# Patient Record
Sex: FEMALE | Race: WHITE | Employment: OTHER | URBAN - METROPOLITAN AREA
[De-identification: names, ages, dates, MRNs, and addresses within clinical notes are randomized per-mention and may not be internally consistent; named-entity substitution may affect disease eponyms.]

---

## 2020-01-16 ENCOUNTER — HOSPITAL ENCOUNTER (INPATIENT)
Facility: CLINIC | Age: 75
LOS: 2 days | Discharge: HOME | DRG: 306 | End: 2020-01-19
Attending: EMERGENCY MEDICINE | Admitting: INTERNAL MEDICINE
Payer: MEDICARE

## 2020-01-16 ENCOUNTER — APPOINTMENT (OUTPATIENT)
Dept: GENERAL RADIOLOGY | Facility: CLINIC | Age: 75
DRG: 306 | End: 2020-01-16
Attending: EMERGENCY MEDICINE
Payer: MEDICARE

## 2020-01-16 DIAGNOSIS — I50.9 ACUTE ON CHRONIC CONGESTIVE HEART FAILURE, UNSPECIFIED HEART FAILURE TYPE (H): ICD-10-CM

## 2020-01-16 DIAGNOSIS — I10 ESSENTIAL HYPERTENSION: ICD-10-CM

## 2020-01-16 DIAGNOSIS — J96.01 ACUTE RESPIRATORY FAILURE WITH HYPOXIA (H): ICD-10-CM

## 2020-01-16 DIAGNOSIS — Z79.4 TYPE 2 DIABETES MELLITUS WITHOUT COMPLICATION, WITH LONG-TERM CURRENT USE OF INSULIN (H): Primary | ICD-10-CM

## 2020-01-16 DIAGNOSIS — E11.9 TYPE 2 DIABETES MELLITUS WITHOUT COMPLICATION, WITH LONG-TERM CURRENT USE OF INSULIN (H): Primary | ICD-10-CM

## 2020-01-16 LAB
ALBUMIN SERPL-MCNC: 3.9 G/DL (ref 3.4–5)
ALP SERPL-CCNC: 56 U/L (ref 40–150)
ALT SERPL W P-5'-P-CCNC: 36 U/L (ref 0–50)
ANION GAP SERPL CALCULATED.3IONS-SCNC: 5 MMOL/L (ref 3–14)
AST SERPL W P-5'-P-CCNC: 32 U/L (ref 0–45)
BASOPHILS # BLD AUTO: 0 10E9/L (ref 0–0.2)
BASOPHILS NFR BLD AUTO: 0.4 %
BILIRUB SERPL-MCNC: 0.8 MG/DL (ref 0.2–1.3)
BUN SERPL-MCNC: 19 MG/DL (ref 7–30)
CALCIUM SERPL-MCNC: 9.5 MG/DL (ref 8.5–10.1)
CHLORIDE SERPL-SCNC: 104 MMOL/L (ref 94–109)
CO2 BLDCOV-SCNC: 28 MMOL/L (ref 21–28)
CO2 SERPL-SCNC: 27 MMOL/L (ref 20–32)
CREAT SERPL-MCNC: 0.95 MG/DL (ref 0.52–1.04)
DIFFERENTIAL METHOD BLD: NORMAL
EOSINOPHIL # BLD AUTO: 0.1 10E9/L (ref 0–0.7)
EOSINOPHIL NFR BLD AUTO: 1 %
ERYTHROCYTE [DISTWIDTH] IN BLOOD BY AUTOMATED COUNT: 13.1 % (ref 10–15)
FLUAV+FLUBV AG SPEC QL: NEGATIVE
FLUAV+FLUBV AG SPEC QL: NEGATIVE
GFR SERPL CREATININE-BSD FRML MDRD: 59 ML/MIN/{1.73_M2}
GLUCOSE BLDC GLUCOMTR-MCNC: 202 MG/DL (ref 70–99)
GLUCOSE SERPL-MCNC: 204 MG/DL (ref 70–99)
HCT VFR BLD AUTO: 38.3 % (ref 35–47)
HGB BLD-MCNC: 12.3 G/DL (ref 11.7–15.7)
IMM GRANULOCYTES # BLD: 0 10E9/L (ref 0–0.4)
IMM GRANULOCYTES NFR BLD: 0.3 %
LACTATE BLD-SCNC: 1.7 MMOL/L (ref 0.7–2.1)
LYMPHOCYTES # BLD AUTO: 0.9 10E9/L (ref 0.8–5.3)
LYMPHOCYTES NFR BLD AUTO: 11.8 %
MCH RBC QN AUTO: 31.9 PG (ref 26.5–33)
MCHC RBC AUTO-ENTMCNC: 32.1 G/DL (ref 31.5–36.5)
MCV RBC AUTO: 99 FL (ref 78–100)
MONOCYTES # BLD AUTO: 0.5 10E9/L (ref 0–1.3)
MONOCYTES NFR BLD AUTO: 6.6 %
NEUTROPHILS # BLD AUTO: 6.2 10E9/L (ref 1.6–8.3)
NEUTROPHILS NFR BLD AUTO: 79.9 %
NRBC # BLD AUTO: 0 10*3/UL
NRBC BLD AUTO-RTO: 0 /100
NT-PROBNP SERPL-MCNC: 1556 PG/ML (ref 0–900)
PCO2 BLDV: 50 MM HG (ref 40–50)
PH BLDV: 7.36 PH (ref 7.32–7.43)
PLATELET # BLD AUTO: 185 10E9/L (ref 150–450)
PO2 BLDV: 27 MM HG (ref 25–47)
POTASSIUM SERPL-SCNC: 4 MMOL/L (ref 3.4–5.3)
PROT SERPL-MCNC: 7.7 G/DL (ref 6.8–8.8)
RBC # BLD AUTO: 3.86 10E12/L (ref 3.8–5.2)
SAO2 % BLDV FROM PO2: 47 %
SODIUM SERPL-SCNC: 136 MMOL/L (ref 133–144)
SPECIMEN SOURCE: NORMAL
TROPONIN I SERPL-MCNC: 0.02 UG/L (ref 0–0.04)
WBC # BLD AUTO: 7.7 10E9/L (ref 4–11)

## 2020-01-16 PROCEDURE — 83036 HEMOGLOBIN GLYCOSYLATED A1C: CPT | Performed by: EMERGENCY MEDICINE

## 2020-01-16 PROCEDURE — 87804 INFLUENZA ASSAY W/OPTIC: CPT | Performed by: EMERGENCY MEDICINE

## 2020-01-16 PROCEDURE — 00000146 ZZHCL STATISTIC GLUCOSE BY METER IP

## 2020-01-16 PROCEDURE — 94640 AIRWAY INHALATION TREATMENT: CPT

## 2020-01-16 PROCEDURE — 96374 THER/PROPH/DIAG INJ IV PUSH: CPT

## 2020-01-16 PROCEDURE — 40000275 ZZH STATISTIC RCP TIME EA 10 MIN

## 2020-01-16 PROCEDURE — 85025 COMPLETE CBC W/AUTO DIFF WBC: CPT | Performed by: EMERGENCY MEDICINE

## 2020-01-16 PROCEDURE — 25000132 ZZH RX MED GY IP 250 OP 250 PS 637: Mod: GY | Performed by: EMERGENCY MEDICINE

## 2020-01-16 PROCEDURE — 71045 X-RAY EXAM CHEST 1 VIEW: CPT

## 2020-01-16 PROCEDURE — 99285 EMERGENCY DEPT VISIT HI MDM: CPT | Mod: 25

## 2020-01-16 PROCEDURE — 84145 PROCALCITONIN (PCT): CPT | Performed by: EMERGENCY MEDICINE

## 2020-01-16 PROCEDURE — 25000128 H RX IP 250 OP 636: Performed by: EMERGENCY MEDICINE

## 2020-01-16 PROCEDURE — 83880 ASSAY OF NATRIURETIC PEPTIDE: CPT | Performed by: EMERGENCY MEDICINE

## 2020-01-16 PROCEDURE — 93005 ELECTROCARDIOGRAM TRACING: CPT

## 2020-01-16 PROCEDURE — 25000128 H RX IP 250 OP 636

## 2020-01-16 PROCEDURE — 94660 CPAP INITIATION&MGMT: CPT

## 2020-01-16 PROCEDURE — 84484 ASSAY OF TROPONIN QUANT: CPT | Performed by: EMERGENCY MEDICINE

## 2020-01-16 PROCEDURE — 80053 COMPREHEN METABOLIC PANEL: CPT | Performed by: EMERGENCY MEDICINE

## 2020-01-16 PROCEDURE — 87040 BLOOD CULTURE FOR BACTERIA: CPT | Performed by: EMERGENCY MEDICINE

## 2020-01-16 PROCEDURE — 83605 ASSAY OF LACTIC ACID: CPT

## 2020-01-16 PROCEDURE — 96375 TX/PRO/DX INJ NEW DRUG ADDON: CPT

## 2020-01-16 PROCEDURE — 25000125 ZZHC RX 250

## 2020-01-16 PROCEDURE — 82803 BLOOD GASES ANY COMBINATION: CPT

## 2020-01-16 RX ORDER — IPRATROPIUM BROMIDE AND ALBUTEROL SULFATE 2.5; .5 MG/3ML; MG/3ML
SOLUTION RESPIRATORY (INHALATION)
Status: COMPLETED
Start: 2020-01-16 | End: 2020-01-16

## 2020-01-16 RX ORDER — ASPIRIN 81 MG/1
324 TABLET, CHEWABLE ORAL ONCE
Status: COMPLETED | OUTPATIENT
Start: 2020-01-16 | End: 2020-01-16

## 2020-01-16 RX ORDER — FUROSEMIDE 40 MG
40 TABLET ORAL EVERY MORNING
Status: ON HOLD | COMMUNITY
End: 2020-05-01

## 2020-01-16 RX ORDER — FUROSEMIDE 10 MG/ML
40 INJECTION INTRAMUSCULAR; INTRAVENOUS ONCE
Status: COMPLETED | OUTPATIENT
Start: 2020-01-16 | End: 2020-01-16

## 2020-01-16 RX ORDER — LOSARTAN POTASSIUM 50 MG/1
50 TABLET ORAL EVERY MORNING
Status: ON HOLD | COMMUNITY
End: 2020-05-01

## 2020-01-16 RX ORDER — METHYLPREDNISOLONE SODIUM SUCCINATE 125 MG/2ML
INJECTION, POWDER, LYOPHILIZED, FOR SOLUTION INTRAMUSCULAR; INTRAVENOUS
Status: COMPLETED
Start: 2020-01-16 | End: 2020-01-16

## 2020-01-16 RX ORDER — METFORMIN HCL 500 MG
1000 TABLET, EXTENDED RELEASE 24 HR ORAL
COMMUNITY
End: 2020-06-10

## 2020-01-16 RX ORDER — METOPROLOL SUCCINATE 25 MG/1
25 TABLET, EXTENDED RELEASE ORAL EVERY MORNING
Status: ON HOLD | COMMUNITY
End: 2020-01-19

## 2020-01-16 RX ORDER — IPRATROPIUM BROMIDE 42 UG/1
2 SPRAY, METERED NASAL 4 TIMES DAILY PRN
COMMUNITY
End: 2020-08-26

## 2020-01-16 RX ORDER — ALBUTEROL SULFATE 90 UG/1
2 AEROSOL, METERED RESPIRATORY (INHALATION) EVERY 4 HOURS PRN
COMMUNITY

## 2020-01-16 RX ADMIN — FUROSEMIDE 40 MG: 10 INJECTION, SOLUTION INTRAMUSCULAR; INTRAVENOUS at 23:09

## 2020-01-16 RX ADMIN — METHYLPREDNISOLONE SODIUM SUCCINATE 125 MG: 125 INJECTION, POWDER, FOR SOLUTION INTRAMUSCULAR; INTRAVENOUS at 22:20

## 2020-01-16 RX ADMIN — IPRATROPIUM BROMIDE AND ALBUTEROL SULFATE 9 ML: .5; 3 SOLUTION RESPIRATORY (INHALATION) at 22:18

## 2020-01-16 RX ADMIN — ASPIRIN 81 MG 324 MG: 81 TABLET ORAL at 23:44

## 2020-01-16 ASSESSMENT — ENCOUNTER SYMPTOMS
RHINORRHEA: 1
SHORTNESS OF BREATH: 1
FEVER: 1
COUGH: 1

## 2020-01-17 ENCOUNTER — APPOINTMENT (OUTPATIENT)
Dept: ULTRASOUND IMAGING | Facility: CLINIC | Age: 75
DRG: 306 | End: 2020-01-17
Attending: INTERNAL MEDICINE
Payer: MEDICARE

## 2020-01-17 ENCOUNTER — APPOINTMENT (OUTPATIENT)
Dept: CARDIOLOGY | Facility: CLINIC | Age: 75
DRG: 306 | End: 2020-01-17
Attending: INTERNAL MEDICINE
Payer: MEDICARE

## 2020-01-17 PROBLEM — J96.90 RESPIRATORY FAILURE (H): Status: ACTIVE | Noted: 2020-01-17

## 2020-01-17 LAB
GLUCOSE BLDC GLUCOMTR-MCNC: 122 MG/DL (ref 70–99)
GLUCOSE BLDC GLUCOMTR-MCNC: 209 MG/DL (ref 70–99)
GLUCOSE BLDC GLUCOMTR-MCNC: 284 MG/DL (ref 70–99)
GLUCOSE BLDC GLUCOMTR-MCNC: 317 MG/DL (ref 70–99)
GLUCOSE BLDC GLUCOMTR-MCNC: 338 MG/DL (ref 70–99)
HBA1C MFR BLD: 6.4 % (ref 0–5.6)
INTERPRETATION ECG - MUSE: NORMAL
MRSA DNA SPEC QL NAA+PROBE: NEGATIVE
PROCALCITONIN SERPL-MCNC: <0.05 NG/ML
SPECIMEN SOURCE: NORMAL
TROPONIN I SERPL-MCNC: 0.06 UG/L (ref 0–0.04)
TROPONIN I SERPL-MCNC: 0.11 UG/L (ref 0–0.04)

## 2020-01-17 PROCEDURE — 25000128 H RX IP 250 OP 636: Performed by: INTERNAL MEDICINE

## 2020-01-17 PROCEDURE — 25000131 ZZH RX MED GY IP 250 OP 636 PS 637: Mod: GY | Performed by: INTERNAL MEDICINE

## 2020-01-17 PROCEDURE — 84484 ASSAY OF TROPONIN QUANT: CPT | Performed by: INTERNAL MEDICINE

## 2020-01-17 PROCEDURE — 36415 COLL VENOUS BLD VENIPUNCTURE: CPT | Performed by: INTERNAL MEDICINE

## 2020-01-17 PROCEDURE — 99291 CRITICAL CARE FIRST HOUR: CPT | Performed by: INTERNAL MEDICINE

## 2020-01-17 PROCEDURE — 40000275 ZZH STATISTIC RCP TIME EA 10 MIN

## 2020-01-17 PROCEDURE — 93971 EXTREMITY STUDY: CPT | Mod: LT

## 2020-01-17 PROCEDURE — 25000132 ZZH RX MED GY IP 250 OP 250 PS 637: Mod: GY | Performed by: INTERNAL MEDICINE

## 2020-01-17 PROCEDURE — 12000000 ZZH R&B MED SURG/OB

## 2020-01-17 PROCEDURE — 99221 1ST HOSP IP/OBS SF/LOW 40: CPT | Mod: 25 | Performed by: INTERNAL MEDICINE

## 2020-01-17 PROCEDURE — 00000146 ZZHCL STATISTIC GLUCOSE BY METER IP

## 2020-01-17 PROCEDURE — 40000281 ZZH STATISTIC TRANSPORT TIME EA 15 MIN

## 2020-01-17 PROCEDURE — 94660 CPAP INITIATION&MGMT: CPT

## 2020-01-17 PROCEDURE — 87641 MR-STAPH DNA AMP PROBE: CPT | Performed by: INTERNAL MEDICINE

## 2020-01-17 PROCEDURE — 87640 STAPH A DNA AMP PROBE: CPT | Performed by: INTERNAL MEDICINE

## 2020-01-17 PROCEDURE — 93306 TTE W/DOPPLER COMPLETE: CPT | Mod: 26 | Performed by: INTERNAL MEDICINE

## 2020-01-17 PROCEDURE — 93306 TTE W/DOPPLER COMPLETE: CPT

## 2020-01-17 RX ORDER — LOSARTAN POTASSIUM 25 MG/1
25 TABLET ORAL EVERY MORNING
Status: DISCONTINUED | OUTPATIENT
Start: 2020-01-18 | End: 2020-01-19 | Stop reason: HOSPADM

## 2020-01-17 RX ORDER — IPRATROPIUM BROMIDE 42 UG/1
2 SPRAY, METERED NASAL 4 TIMES DAILY PRN
Status: DISCONTINUED | OUTPATIENT
Start: 2020-01-17 | End: 2020-01-19 | Stop reason: HOSPADM

## 2020-01-17 RX ORDER — PROCHLORPERAZINE MALEATE 5 MG
5 TABLET ORAL EVERY 6 HOURS PRN
Status: DISCONTINUED | OUTPATIENT
Start: 2020-01-17 | End: 2020-01-19 | Stop reason: HOSPADM

## 2020-01-17 RX ORDER — LOSARTAN POTASSIUM 50 MG/1
50 TABLET ORAL EVERY MORNING
Status: DISCONTINUED | OUTPATIENT
Start: 2020-01-17 | End: 2020-01-17

## 2020-01-17 RX ORDER — ONDANSETRON 4 MG/1
4 TABLET, ORALLY DISINTEGRATING ORAL EVERY 6 HOURS PRN
Status: DISCONTINUED | OUTPATIENT
Start: 2020-01-17 | End: 2020-01-17

## 2020-01-17 RX ORDER — POLYETHYLENE GLYCOL 3350 17 G/17G
17 POWDER, FOR SOLUTION ORAL DAILY PRN
Status: DISCONTINUED | OUTPATIENT
Start: 2020-01-17 | End: 2020-01-17

## 2020-01-17 RX ORDER — FUROSEMIDE 10 MG/ML
40 INJECTION INTRAMUSCULAR; INTRAVENOUS
Status: DISCONTINUED | OUTPATIENT
Start: 2020-01-17 | End: 2020-01-18

## 2020-01-17 RX ORDER — METOPROLOL SUCCINATE 25 MG/1
25 TABLET, EXTENDED RELEASE ORAL EVERY MORNING
Status: DISCONTINUED | OUTPATIENT
Start: 2020-01-17 | End: 2020-01-17

## 2020-01-17 RX ORDER — ONDANSETRON 2 MG/ML
4 INJECTION INTRAMUSCULAR; INTRAVENOUS EVERY 6 HOURS PRN
Status: DISCONTINUED | OUTPATIENT
Start: 2020-01-17 | End: 2020-01-19 | Stop reason: HOSPADM

## 2020-01-17 RX ORDER — POTASSIUM CHLORIDE 7.45 MG/ML
10 INJECTION INTRAVENOUS
Status: DISCONTINUED | OUTPATIENT
Start: 2020-01-17 | End: 2020-01-19 | Stop reason: HOSPADM

## 2020-01-17 RX ORDER — ATORVASTATIN CALCIUM 40 MG/1
40 TABLET, FILM COATED ORAL AT BEDTIME
Status: DISCONTINUED | OUTPATIENT
Start: 2020-01-18 | End: 2020-01-19 | Stop reason: HOSPADM

## 2020-01-17 RX ORDER — POTASSIUM CL/LIDO/0.9 % NACL 10MEQ/0.1L
10 INTRAVENOUS SOLUTION, PIGGYBACK (ML) INTRAVENOUS
Status: DISCONTINUED | OUTPATIENT
Start: 2020-01-17 | End: 2020-01-19 | Stop reason: HOSPADM

## 2020-01-17 RX ORDER — METOPROLOL SUCCINATE 50 MG/1
50 TABLET, EXTENDED RELEASE ORAL EVERY MORNING
Status: DISCONTINUED | OUTPATIENT
Start: 2020-01-18 | End: 2020-01-18

## 2020-01-17 RX ORDER — FUROSEMIDE 10 MG/ML
60 INJECTION INTRAMUSCULAR; INTRAVENOUS EVERY 8 HOURS
Status: CANCELLED | OUTPATIENT
Start: 2020-01-17

## 2020-01-17 RX ORDER — ALBUTEROL SULFATE 90 UG/1
2 AEROSOL, METERED RESPIRATORY (INHALATION) EVERY 4 HOURS PRN
Status: DISCONTINUED | OUTPATIENT
Start: 2020-01-17 | End: 2020-01-19 | Stop reason: HOSPADM

## 2020-01-17 RX ORDER — MAGNESIUM SULFATE HEPTAHYDRATE 40 MG/ML
4 INJECTION, SOLUTION INTRAVENOUS EVERY 4 HOURS PRN
Status: DISCONTINUED | OUTPATIENT
Start: 2020-01-17 | End: 2020-01-19 | Stop reason: HOSPADM

## 2020-01-17 RX ORDER — IPRATROPIUM BROMIDE AND ALBUTEROL SULFATE 2.5; .5 MG/3ML; MG/3ML
3 SOLUTION RESPIRATORY (INHALATION) EVERY 4 HOURS PRN
Status: DISCONTINUED | OUTPATIENT
Start: 2020-01-17 | End: 2020-01-19 | Stop reason: HOSPADM

## 2020-01-17 RX ORDER — POLYETHYLENE GLYCOL 3350 17 G/17G
17 POWDER, FOR SOLUTION ORAL DAILY PRN
Status: DISCONTINUED | OUTPATIENT
Start: 2020-01-17 | End: 2020-01-19 | Stop reason: HOSPADM

## 2020-01-17 RX ORDER — POTASSIUM CHLORIDE 29.8 MG/ML
20 INJECTION INTRAVENOUS
Status: DISCONTINUED | OUTPATIENT
Start: 2020-01-17 | End: 2020-01-19 | Stop reason: HOSPADM

## 2020-01-17 RX ORDER — ACETAMINOPHEN 325 MG/1
650 TABLET ORAL EVERY 4 HOURS PRN
Status: DISCONTINUED | OUTPATIENT
Start: 2020-01-17 | End: 2020-01-19 | Stop reason: HOSPADM

## 2020-01-17 RX ORDER — AMOXICILLIN 250 MG
1 CAPSULE ORAL 2 TIMES DAILY PRN
Status: DISCONTINUED | OUTPATIENT
Start: 2020-01-17 | End: 2020-01-19 | Stop reason: HOSPADM

## 2020-01-17 RX ORDER — DEXTROSE MONOHYDRATE 25 G/50ML
25-50 INJECTION, SOLUTION INTRAVENOUS
Status: DISCONTINUED | OUTPATIENT
Start: 2020-01-17 | End: 2020-01-19 | Stop reason: HOSPADM

## 2020-01-17 RX ORDER — AMOXICILLIN 250 MG
2 CAPSULE ORAL 2 TIMES DAILY PRN
Status: DISCONTINUED | OUTPATIENT
Start: 2020-01-17 | End: 2020-01-19 | Stop reason: HOSPADM

## 2020-01-17 RX ORDER — PROCHLORPERAZINE 25 MG
12.5 SUPPOSITORY, RECTAL RECTAL EVERY 12 HOURS PRN
Status: DISCONTINUED | OUTPATIENT
Start: 2020-01-17 | End: 2020-01-19 | Stop reason: HOSPADM

## 2020-01-17 RX ORDER — ACETAMINOPHEN 325 MG/1
650 TABLET ORAL EVERY 4 HOURS PRN
Status: DISCONTINUED | OUTPATIENT
Start: 2020-01-17 | End: 2020-01-17

## 2020-01-17 RX ORDER — ONDANSETRON 2 MG/ML
4 INJECTION INTRAMUSCULAR; INTRAVENOUS EVERY 6 HOURS PRN
Status: DISCONTINUED | OUTPATIENT
Start: 2020-01-17 | End: 2020-01-17

## 2020-01-17 RX ORDER — NICOTINE POLACRILEX 4 MG
15-30 LOZENGE BUCCAL
Status: DISCONTINUED | OUTPATIENT
Start: 2020-01-17 | End: 2020-01-19 | Stop reason: HOSPADM

## 2020-01-17 RX ORDER — BENZONATATE 100 MG/1
100 CAPSULE ORAL 3 TIMES DAILY PRN
Status: DISCONTINUED | OUTPATIENT
Start: 2020-01-17 | End: 2020-01-19 | Stop reason: HOSPADM

## 2020-01-17 RX ORDER — POTASSIUM CHLORIDE 1.5 G/1.58G
20-40 POWDER, FOR SOLUTION ORAL
Status: DISCONTINUED | OUTPATIENT
Start: 2020-01-17 | End: 2020-01-19 | Stop reason: HOSPADM

## 2020-01-17 RX ORDER — POTASSIUM CHLORIDE 1500 MG/1
20-40 TABLET, EXTENDED RELEASE ORAL
Status: DISCONTINUED | OUTPATIENT
Start: 2020-01-17 | End: 2020-01-19 | Stop reason: HOSPADM

## 2020-01-17 RX ORDER — NALOXONE HYDROCHLORIDE 0.4 MG/ML
.1-.4 INJECTION, SOLUTION INTRAMUSCULAR; INTRAVENOUS; SUBCUTANEOUS
Status: DISCONTINUED | OUTPATIENT
Start: 2020-01-17 | End: 2020-01-19 | Stop reason: HOSPADM

## 2020-01-17 RX ORDER — ONDANSETRON 4 MG/1
4 TABLET, ORALLY DISINTEGRATING ORAL EVERY 6 HOURS PRN
Status: DISCONTINUED | OUTPATIENT
Start: 2020-01-17 | End: 2020-01-19 | Stop reason: HOSPADM

## 2020-01-17 RX ADMIN — INSULIN ASPART 4 UNITS: 100 INJECTION, SOLUTION INTRAVENOUS; SUBCUTANEOUS at 08:12

## 2020-01-17 RX ADMIN — INSULIN HUMAN 40 UNITS: 100 INJECTION, SUSPENSION SUBCUTANEOUS at 16:35

## 2020-01-17 RX ADMIN — METOPROLOL SUCCINATE 25 MG: 25 TABLET, FILM COATED, EXTENDED RELEASE ORAL at 08:50

## 2020-01-17 RX ADMIN — INSULIN HUMAN 40 UNITS: 100 INJECTION, SUSPENSION SUBCUTANEOUS at 12:03

## 2020-01-17 RX ADMIN — ENOXAPARIN SODIUM 40 MG: 40 INJECTION SUBCUTANEOUS at 08:50

## 2020-01-17 RX ADMIN — LOSARTAN POTASSIUM 50 MG: 50 TABLET, FILM COATED ORAL at 08:50

## 2020-01-17 RX ADMIN — FUROSEMIDE 40 MG: 10 INJECTION, SOLUTION INTRAMUSCULAR; INTRAVENOUS at 16:08

## 2020-01-17 RX ADMIN — BENZONATATE 100 MG: 100 CAPSULE ORAL at 18:44

## 2020-01-17 RX ADMIN — FUROSEMIDE 40 MG: 10 INJECTION, SOLUTION INTRAMUSCULAR; INTRAVENOUS at 08:50

## 2020-01-17 RX ADMIN — INSULIN ASPART 4 UNITS: 100 INJECTION, SOLUTION INTRAVENOUS; SUBCUTANEOUS at 04:46

## 2020-01-17 RX ADMIN — SERTRALINE HYDROCHLORIDE 150 MG: 100 TABLET ORAL at 08:50

## 2020-01-17 ASSESSMENT — ACTIVITIES OF DAILY LIVING (ADL)
ADLS_ACUITY_SCORE: 15
COGNITION: 0 - NO COGNITION ISSUES REPORTED
SWALLOWING: 0-->SWALLOWS FOODS/LIQUIDS WITHOUT DIFFICULTY
ADLS_ACUITY_SCORE: 15
ADLS_ACUITY_SCORE: 15
RETIRED_COMMUNICATION: 0-->UNDERSTANDS/COMMUNICATES WITHOUT DIFFICULTY
RETIRED_EATING: 0-->INDEPENDENT
BATHING: 0-->INDEPENDENT
AMBULATION: 1-->ASSISTIVE EQUIPMENT
ADLS_ACUITY_SCORE: 15
DRESS: 0-->INDEPENDENT
ADLS_ACUITY_SCORE: 15
TOILETING: 0-->INDEPENDENT
TRANSFERRING: 1-->ASSISTIVE EQUIPMENT
FALL_HISTORY_WITHIN_LAST_SIX_MONTHS: NO

## 2020-01-17 NOTE — PROGRESS NOTES
A BiPAP of  14/7 @ 35% was applied to the pt via the full face mask for an increase in WOB and/or SOB.  The bridge of the nose is blanchable red but skin integrity is intact, a gel pad had been put in place. Pt is tolerating it well. Will continue to monitor and assess the pt's current respiratory status and needs.

## 2020-01-17 NOTE — PROGRESS NOTES
Pt has been transported to ICU on BIPAP. SPO2 has been maintained at 100% through duration of transport. PT is secure in new bed. No complications were encountered.

## 2020-01-17 NOTE — PROGRESS NOTES
RT: Received patient on BIPAP 14/7, 30%. Patient transitioned to 3L nasal cannula this am and has remained off BIPAP support the rest of the shift.  BS diminished. SPO2 94%+. Breathing is unlabored.    Will continue to follow and assess.

## 2020-01-17 NOTE — PROGRESS NOTES
Pt is tolerating BIPAP well overnight. RR 28-34, BS coarse/ expiratory wheezes, and SPO2 is 97%. Respiratory will continue to follow.

## 2020-01-17 NOTE — PLAN OF CARE
ICU End of Shift Summary.  For vital signs and complete assessments, please see documentation flowsheets.     Pertinent assessments: A&Ox4, calling as needed.  Tele SR.  Pt LS wheezy, tolerating Bipap with sats >90%.  Pt sleeping between cares and denying pain.  Good output via purewick, no reports of stool this shift.  Transferred to bed on admission with minimal staff assist.  Major Shift Events: Admitted to ICU, tolerating BiPAP  Plan (Upcoming Events): Continue to monitor respiratory status, telemetry and urine output  Discharge/Transfer Needs: Continue ICU cares but as day progresses may be able to transition to IMC or medical.    Bedside Shift Report Completed : y  Bedside Safety Check Completed: y

## 2020-01-17 NOTE — H&P
[facility]  Hospitalist Admission Note  Name: Amy Luna    MRN: [id number]  YOB: 1945    Age: 74 year old  Date of admission: 1/16/2020  Primary care provider: Candy Frederick    Chief Complaint:  SOB, cough, wheezing    Assessment and Plan:     Amy Luna is a 74 year old female with PMH including IDDM2, LAVERNE (CPAP at night), hypertension, status post PPM secondary to AV block, depression who was admitted on 01/17/20 with several day history of sinus drainage followed by development of significant cough and SOB and was found to have acute hypoxic respiratory failure requiring BiPAP therapy likely due to acute CHF exacerbation.    1.  Acute hypoxic respiratory failure: Patient presented with 2 days of significantly worsening shortness of breath with exertion and cough.  She was very cold and potentially had subjective fever earlier today.  She does describe significant sinus drainage but no sinus pain.  She presented in respiratory distress with hypoxia to the ER.  She was placed on BiPAP.  VBG obtained and was normal.  EKG showed no acute ischemic changes.  Chest x-ray shows evidence of mild CHF with possible small left pleural effusion.  Her troponin was 0.02.  Lactic acid was not elevated.  Her influenza swab was negative and CBC was normal.  BNP was elevated at 1550.  She was given a dose of Lasix 40 mg IV.  She has improved with Lasix as well as BiPAP therapy.  She does have known moderate aortic stenosis as of echo from 10/2016.  It is possible that her aortic stenosis has progressed.  At this time I think her presentation is most consistent with acute CHF exacerbation.  I do not see evidence of an acute bacterial infection and antibiotics are not indicated at this time.  -TTE  -Lasix 40 mg IV twice daily  -Wean oxygen as able, continue BiPAP at this time  -PRN DuoNebs    2.  Moderate aortic stenosis: Last echo was from 10/2016 and showed an EF of 60% with moderate  aortic stenosis.  It is possible that she has progression of her aortic stenosis and TTE will be repeated as above.    3.  Type 2 diabetes: Prior to admission the patient was on glipizide 20 mg daily, metformin 1000 mg daily and insulin NPH 50 units twice daily.  Glucose was 202 on admission.  For now she will be n.p.o. if she is on BiPAP.  We will hold her prior to admission diabetic regimen and use n.p.o. medium sliding scale insulin at this time.  Once a diet is ordered this will need to be adjusted.    4.  History of PPM: This was placed in 2015 due to symptomatic second-degree AV block with intermittent third-degree block. This was complicated by a large pericardial effusion with tamponade physiology which was thought secondary to PPM placement.  She did undergo pericardial window and did well other than development of volume overload and at that time was discharged on Lasix.  In 2016 she was found to be in atrial flutter and CHF during an echocardiogram.  She underwent SOCORRO guided atrial flutter ablation and was diuresed.  Last pacemaker check was 12/9/2019.    5.  LAVERNE: Patient is currently on BiPAP.  When this is able to be removed she should use her CPAP per home settings at night.    6.  Hypertension: Prior to admission the patient is on Lasix 40 mg daily, losartan 50 mg daily and metoprolol XL 25 mg daily.  Losartan and metoprolol will be continued.  As above she will be on IV Lasix for diuresis.    7.  Depression: Continue Zoloft.    8.  Hyperlipidemia: Continue statin.  Of note the patient did undergo coronary angiography in 12/2014 which showed mild nonocclusive coronary artery disease.    Diet: N.p.o. at this time given she is on BiPAP, once able to be removed from BiPAP a low-salt and diabetic diet could be started  DVT Prophylaxis: Enoxaparin (Lovenox) SQ  Stewart Catheter: not present  Code Status: Full code    Disposition Plan   Expected discharge: Admit to ICU, recommended to prior living arrangement  once cardiac workup completed, respiratory status improved.  Entered: Khadra Grimaldo MD 01/16/2020, 11:55 PM     The patient's care was discussed with the Bedside Nurse, Patient and Patient's Family.    The patient was discussed with Intensivist as well.    Khadra Grimaldo MD  [facility]      History of Present Illness:  Amy Luna is a 74 year old female with PMH including IDDM2, LAVERNE (CPAP at night), hypertension, status post PPM secondary to AV block, depression who was admitted on 01/17/20 with several day history of sinus drainage followed by development of significant cough and SOB and was found to have acute hypoxic respiratory failure requiring BiPAP therapy likely due to acute CHF exacerbation.  History was obtained through patient interview, interview with the patient's , chart review and discussion with Dr. Weir in the ER.    The patient was seen with her .  She is currently on BiPAP and able to answer questions.  She reports that quite frequently she will get a lot of mucus from her sinuses and her nose drips constantly.  If she does not have mucus then she has very thin drainage like water.  Lately she is noticed more mucus draining from her sinuses.  About 2 days ago she began coughing almost uncontrollably.  She would cough up mucus and had a lot of sinus drainage.  Yesterday when she got home from work she had walk up 15 stairs to get into her home and she felt incredibly short of breath doing this.  Today when she got home from work she could barely make it up the stairs and she felt as if she was being strangled because she could not get enough air in.  Normally she will not have shortness of breath when going up and down stairs.  She felt freezing cold and so went to bed.  Even after warming up she was shaking as if she could not get warm.  She knew she did not feel well and could hardly make it down the stairs because she was so short of breath to get to the  car to come to the emergency room.    She has not had any chest pain, nausea, vomiting, abdominal pain, headaches, worsening leg swelling, diarrhea or constipation.  She has felt both dizzy and lightheaded when she cannot catch her breath.  She denies sinus pain or pressure.  She thinks she might of had a fever earlier today but did not check her temperature.    She was seen in clinic on 2019 at which time she was diagnosed with sinusitis.  She completed a course of doxycycline at that time.  She thinks the sinus drainage did improve after the course of antibiotics.  I do note at that appointment her weight was 288 pounds.  She states that her normal weight is 287 to 288 pounds.  She thinks she is weighed herself in the past week.    She does use a CPAP at night.  Over the past 2 days when she is noticed worsening shortness of breath the CPAP does help her to breathe.    When she presented to the emergency room she was found to be in acute respiratory distress.  She was hypoxic to the mid 80s.  She had diffuse wheezing.  She was placed on BiPAP and given 40 mg of IV Lasix.     Past Medical History:  Past Medical History:   Diagnosis Date     Aortic stenosis      Chest pain      Depressive disorder      Diabetes (H)      Hyperlipidemia      Hypertension      Obesity      Sleep apnea     CPAP     Past Surgical History:  Past Surgical History:   Procedure Laterality Date     APPENDECTOMY       GYN SURGERY       x2 ,      GYN SURGERY      total hysterectomy     Social History:  Social History     Tobacco Use     Smoking status: Never Smoker     Smokeless tobacco: Never Used   Substance Use Topics     Alcohol use: No     Social History     Social History Narrative     Not on file     Family History:  No family history on file.  Allergies:  Allergies   Allergen Reactions     Penicillins      Medications:  No current facility-administered medications for this encounter.      Current Outpatient  Medications   Medication     albuterol (PROAIR HFA/PROVENTIL HFA/VENTOLIN HFA) 108 (90 Base) MCG/ACT inhaler     Atorvastatin Calcium (LIPITOR PO)     furosemide (LASIX) 40 MG tablet     GLIPIZIDE XL PO     insulin NPH (HUMULIN N/NOVOLIN N VIAL) 100 UNIT/ML vial     ipratropium (ATROVENT) 0.06 % nasal spray     losartan (COZAAR) 50 MG tablet     metFORMIN (GLUCOPHAGE-XR) 500 MG 24 hr tablet     metoprolol succinate ER (TOPROL-XL) 25 MG 24 hr tablet     pyridostigmine (MESTINON) 30 mg TABS half-tab     Sertraline HCl (ZOLOFT PO)      Review of Systems:  A Comprehensive greater than 10 system review of systems was carried out.  Pertinent positives and negatives are noted above.  Otherwise negative for contributory information.     Physical Exam:  Blood pressure (!) 174/69, pulse 85, temperature 96.4  F (35.8  C), temperature source Temporal, resp. rate 20, weight 133.7 kg (294 lb 12.1 oz), SpO2 99 %.  Wt Readings from Last 1 Encounters:   01/16/20 133.7 kg (294 lb 12.1 oz)     Exam:   General: Alert, awake, no acute distress. BiPAP currently in place  HEENT: NC/AT, eyes anicteric and without injection, EOMI, face symmetric.  Dentition WNL, MMM.  Cardiac: RRR, normal S1, S2.  3 out of 6 systolic ejection murmur heard loudest at the right upper sternal border but heard throughout precordium, no g/r.  2+ bilateral LE edema  Pulmonary: Normal chest rise, increased work of breathing with BiPAP currently in place.  Expiratory wheezes diffusely.  Abdomen: soft, non-tender, non-distended.  Normoactive BS.  No guarding or rebound tenderness.  Extremities: no deformities.  Warm, well perfused.  Skin: no rashes or lesions noted.  Warm and Dry.  Neuro: No focal deficits noted.  Speech clear.  Opening all extremities in bed.  Psych: Appropriate affect.  Alert and oriented x3.    Data Reviewed Today:  EKG: Normal sinus rhythm without acute ischemic changes  Imaging:  Results for orders placed or performed during the hospital  encounter of 01/16/20   XR Chest Port 1 View    Narrative    EXAM: XR CHEST PORT 1 VW  LOCATION: [facility]  DATE/TIME: 1/16/2020 10:18 PM    INDICATION: Shortness of breath  COMPARISON: 11/11/2016      Impression    IMPRESSION: Upper normal heart size. Stable left chest wall pacer. Mild CHF suggested, with subtle interstitial edema, vascular congestion, and possible small left pleural effusion. No pneumothorax.     Labs:  Recent Labs   Lab 01/16/20  2221   WBC 7.7   HGB 12.3   HCT 38.3   MCV 99        Recent Labs   Lab 01/16/20  2221      POTASSIUM 4.0   CHLORIDE 104   CO2 27   ANIONGAP 5   *   BUN 19   CR 0.95   GFRESTIMATED 59*   GFRESTBLACK 68   NELLY 9.5     Recent Labs   Lab 01/16/20  2221   NTBNPI 1,556*     Recent Labs   Lab 01/16/20  2223   LACT 1.7     Recent Labs   Lab 01/16/20  2221   TROPI 0.020       Khadra Grimaldo MD  Hospitalist  [facility]    Evaluation and management time exclusive of procedures was 45 minutes critical care time including: urgent examination and evaluation of the patient, discussion of the patient's condition with other physicians and members of the care team, reviewing data and chart related to the patient, discussion of patient's condition with the family and time utilizing the EMR for documentation of this patient's care.

## 2020-01-17 NOTE — PROGRESS NOTES
A BiPAP of  14/7 @ 50% was applied to the pt via the full face mask for an increase in WOB and/or SOB.  The bridge of the nose is intact with no signs of skin breakdown. Pt is tolerating it well. Will continue to monitor and assess the pt's current respiratory status and needs.

## 2020-01-17 NOTE — CONSULTS
Consult Date:  01/17/2020      CARDIOLOGY CONSULTATION      REQUESTING PHYSICIAN:  Candy Frederick MD      REASON FOR REFERRAL:  Congestive heart failure.      HISTORY OF PRESENT ILLNESS:  I have been asked to evaluate this very pleasant 74-year-old female for the above.  She has an underlying history of insulin-dependent diabetes, obstructive sleep apnea, morbid obesity, hypertension and a permanent pacemaker implant secondary to AV block.  She has had an atrial flutter ablation in the past as well and known moderate aortic stenosis per records.  It looks like she has been followed by Electrophysiology at [facility].  I do not see that she has a cardiologist, and she denies having seen a cardiologist in many years.  She has been experiencing progressive shortness of breath over the last 3 days.  She does have chronic dyspnea on exertion and mobility issues due to ongoing back pain and her weight.  She has chronic rhinitis but has been treated for sinus infection in 11/2019 and is having some persistent issues with this.  She has recurrent nasal congestion, cough productive of some green sputum.  She is not sure if it is coming from her sinuses of lungs.  She denies any subjective fevers.  She does have chronic lower extremity edema.  She wears her CPAP machine every night for obstructive sleep apnea.  She denies any sick contacts.  On admission, she was noted to have clinical evidence of congestive heart failure and therefore was admitted with IV Lasix.  She was hypertensive and tachycardic on admission with hypoxia.  Initially, BiPAP was needed, but she has been able to wean off all supplemental oxygen since admission.  She is currently short of breath with conversation but appears comfortable.  She denies any chest pain symptoms.      PAST MEDICAL HISTORY:  History of aortic stenosis, atrial flutter with ablation procedure, AV block and permanent pacemaker implant, insulin-dependent diabetes, depression,  hyperlipidemia, hypertension, morbid obesity and sleep apnea.      PAST SURGICAL HISTORY:  She has had an appendectomy and a total hysterectomy.      SOCIAL HISTORY:  She is a lifelong nonsmoker.  She is , lives independently.      FAMILY HISTORY:  Negative for premature coronary disease.      ALLERGIES:  PENICILLIN.      MEDICATIONS:  Please see H and P.      REVIEW OF SYSTEMS:  She denies any chest discomfort or palpitations, lightheadedness or presyncope.  She has chronic dyspnea on exertion, chronic lower extremity weakness and back pain that limit her mobility.  She has chronic rhinitis as described above.      PHYSICAL EXAMINATION:   VITAL SIGNS:  She is now normotensive with blood pressures in the 120s to 130s systolic over 50s diastolic.  Her heart rate is ranging in the 60s to 70s.  Respiratory rate is 18-21.  She is off oxygen at this time.  She is afebrile.   GENERAL:  She is a morbidly obese 74-year-old female in no apparent distress with mild tachypnea with conversation.   HEENT:  Head is atraumatic and normocephalic.  Pupils are equal and small.  Dentition is fair.   NECK:  Thick and short.  I cannot appreciate for jugular venous distention.  I do not hear carotid bruits.   CARDIOVASCULAR:  Tones are regular with a grade 2 systolic ejection murmur at the right and left upper sternal borders.  I do not appreciate a diastolic murmur on exam.   LUNGS:  Demonstrate some crackles, especially in the right base, otherwise clear.   ABDOMEN:  Soft, protuberant.  Bowel sounds are active.  She has tense pitting edema to the lower extremities to mid shin with some erythematous changes to her skin in the lower extremities.   NEUROLOGIC:  She is alert and oriented.  There are no gross focal neurologic abnormalities noted.        LABORATORY AND IMAGING:  I did review her admission electrocardiogram, which demonstrates sinus tachycardia with a left anterior fascicular block.  There are no acute ST or T-wave  abnormalities.  Chest x-ray was read by Radiology as mild CHF.  Influenza is negative.  CBC including white blood cell count is normal.  Electrolyte panel is normal.  Troponin level is initially 0.02 and subsequent 0.05.  NT-proBNP is 1556.  An echocardiogram was performed, which I have reviewed.  It demonstrates hyperdynamic LV systolic function with moderate-to-severe mitral stenosis with heavy annular calcification and moderate aortic stenosis with mild insufficiency.      ASSESSMENT AND PLAN:  This is a 74-year-old female with an upper respiratory infection with progressive dyspnea on exertion and respiratory failure over the last 3 days.  She has underlying history of sinusitis, type 2 diabetes, morbid obesity and sleep apnea.  Echocardiogram suggests moderate-to-severe valvular heart disease involving both aortic stenosis and mitral stenosis, likely contributing to her congestive heart failure.  There is no evidence of an acute coronary syndrome here.  I talked to her and her  a little bit about her valvular heart disease.  She has had significant symptomatic improvement with diuresis; however, she does remain mildly tachypneic with conversation and minimal exertion.  Neither valve problem is in the category of severe, but the combination of the two makes for severe valvular heart disease.  Unfortunately, because of the heavy calcification of both the mitral annulus and the leaflets, this really complicates and reduces management options for her valvular heart disease.  Transcatheter aortic valve replacement (TAVR) could be considered for her aortic valve, but she would still be left with mitral stenosis.  Mitral valvuloplasty will not work given the heavy calcifications.  I estimate her Patrick score to be at approximately 11.  That leaves her with medical management versus a double valve surgery.  Given her significant comorbidities, a double valve surgery would be high risk.  At this time, I am going  to try to slow her heart rate down with adjustments to her medications with a goal heart rate in the low 60s at rest to maximize her diastolic time and filling.  I would continue with gentle diuresis as well.  I will need to discuss her case with Cardiovascular Surgery and the Structural Heart Team, most likely presenting her in Valve Conference for discussion of management options given the complicated nature of her double valve disease.  Please feel free to contact me with any questions you have in regard to her care.         CHERI KAPOOR DO             D: 2020   T: 2020   MT:       Name:     KASSANDRA RIVERA   MRN:      [id number]        Account:       [id number]   :      1945           Consult Date:  2020      Document: [id number]       cc: Candy Frederick MD

## 2020-01-17 NOTE — PROGRESS NOTES
CM aware of patient's admitted diagnosis of CHF. Per discussion with hospitalist, workup still underway to determine if respiratory failure d/t cardiac reasons. He recommended waiting to do CHF teaching.     CM will continue to follow patient until discharge for any additional needs.     Kathy Angulo RN, BSN, CPHN, CM  Inpatient Care Coordination  [facility]  [phone]

## 2020-01-17 NOTE — PROGRESS NOTES
Brief update note    Patient seen and examined and admission H&P reviewed.  By the time of my evaluation, the patient had been able to be weaned from BiPAP to nasal cannula, 3 L.  She was breathing much more comfortably.  I noted the echocardiogram findings showing moderate to severe mitral stenosis as well as moderate aortic stenosis. For this reason cardiology consultation was ordered and their assistance is very much appreciated.  Sounds as if she has a complicated valve problem given severe calcifications.  Current plan will be to optimize blood pressure and heart rate.    Plan to continue diuresis as she is clinically improving with this.  Continue other cardiac medications as directed by cardiology.    As she has been able to be weaned to nasal cannula oxygen, she is appropriate for transfer to the floor.    Yunior Quinn MD

## 2020-01-17 NOTE — PHARMACY-ADMISSION MEDICATION HISTORY
Admission medication history interview status for this patient is complete. See [facility] admission navigator for allergy information, prior to admission medications and immunization status.     Medication history interview source(s):Patient  Medication history resources (including written lists, pill bottles, clinic record):typed medlist    Changes made to PTA medication list:  Added: nph insulin, cozaar, toprol, mestinon, atrovent nasal, ventolin MDI  Deleted: wellbuterin SR, lantus, hyzaar,   Changed: zoloft, glipizide ER, Metformin to Metformin ER    Actions taken by pharmacist (provider contacted, etc):None     Additional medication history information:None    Medication reconciliation/reorder completed by provider prior to medication history? No          Prior to Admission medications    Medication Sig Last Dose Taking? Auth Provider   albuterol (PROAIR HFA/PROVENTIL HFA/VENTOLIN HFA) 108 (90 Base) MCG/ACT inhaler Inhale 2 puffs into the lungs every 4 hours as needed for shortness of breath / dyspnea or wheezing  Yes Unknown, Entered By History   Atorvastatin Calcium (LIPITOR PO) Take 40 mg by mouth At Bedtime  1/15/2020 at Unknown time Yes Reported, Patient   furosemide (LASIX) 40 MG tablet Take 40 mg by mouth every morning 1/16/2020 at Unknown time Yes Unknown, Entered By History   GLIPIZIDE XL PO Take 20 mg by mouth daily (with breakfast)  1/16/2020 at am Yes Unknown, Entered By History   insulin NPH (HUMULIN N/NOVOLIN N VIAL) 100 UNIT/ML vial Inject 50 Units Subcutaneous 2 times daily 1/16/2020 at am Yes Unknown, Entered By History   ipratropium (ATROVENT) 0.06 % nasal spray Spray 2 sprays into both nostrils 4 times daily as needed for rhinitis  Yes Unknown, Entered By History   losartan (COZAAR) 50 MG tablet Take 50 mg by mouth every morning 1/16/2020 at am Yes Unknown, Entered By History   metFORMIN (GLUCOPHAGE-XR) 500 MG 24 hr tablet Take 1,000 mg by mouth daily (with breakfast) 1/16/2020 at am Yes Unknown,  Entered By History   metoprolol succinate ER (TOPROL-XL) 25 MG 24 hr tablet Take 25 mg by mouth every morning 1/16/2020 at am Yes Unknown, Entered By History   pyridostigmine (MESTINON) 30 mg TABS half-tab Take 30 mg by mouth every morning 1/16/2020 at am Yes Unknown, Entered By History   Sertraline HCl (ZOLOFT PO) Take 150 mg by mouth every morning  1/16/2020 at am Yes Reported, Patient

## 2020-01-18 LAB
ANION GAP SERPL CALCULATED.3IONS-SCNC: 7 MMOL/L (ref 3–14)
BUN SERPL-MCNC: 31 MG/DL (ref 7–30)
CALCIUM SERPL-MCNC: 9.2 MG/DL (ref 8.5–10.1)
CHLORIDE SERPL-SCNC: 107 MMOL/L (ref 94–109)
CO2 SERPL-SCNC: 26 MMOL/L (ref 20–32)
CREAT SERPL-MCNC: 0.94 MG/DL (ref 0.52–1.04)
ERYTHROCYTE [DISTWIDTH] IN BLOOD BY AUTOMATED COUNT: 13.1 % (ref 10–15)
GFR SERPL CREATININE-BSD FRML MDRD: 60 ML/MIN/{1.73_M2}
GLUCOSE BLDC GLUCOMTR-MCNC: 108 MG/DL (ref 70–99)
GLUCOSE BLDC GLUCOMTR-MCNC: 164 MG/DL (ref 70–99)
GLUCOSE BLDC GLUCOMTR-MCNC: 165 MG/DL (ref 70–99)
GLUCOSE BLDC GLUCOMTR-MCNC: 209 MG/DL (ref 70–99)
GLUCOSE BLDC GLUCOMTR-MCNC: 94 MG/DL (ref 70–99)
GLUCOSE SERPL-MCNC: 142 MG/DL (ref 70–99)
HCT VFR BLD AUTO: 35.6 % (ref 35–47)
HGB BLD-MCNC: 12 G/DL (ref 11.7–15.7)
MAGNESIUM SERPL-MCNC: 2.2 MG/DL (ref 1.6–2.3)
MCH RBC QN AUTO: 32.3 PG (ref 26.5–33)
MCHC RBC AUTO-ENTMCNC: 33.7 G/DL (ref 31.5–36.5)
MCV RBC AUTO: 96 FL (ref 78–100)
PLATELET # BLD AUTO: 184 10E9/L (ref 150–450)
POTASSIUM SERPL-SCNC: 4.5 MMOL/L (ref 3.4–5.3)
RBC # BLD AUTO: 3.72 10E12/L (ref 3.8–5.2)
SODIUM SERPL-SCNC: 140 MMOL/L (ref 133–144)
WBC # BLD AUTO: 6.6 10E9/L (ref 4–11)

## 2020-01-18 PROCEDURE — 25000128 H RX IP 250 OP 636: Performed by: INTERNAL MEDICINE

## 2020-01-18 PROCEDURE — 40000275 ZZH STATISTIC RCP TIME EA 10 MIN

## 2020-01-18 PROCEDURE — 94640 AIRWAY INHALATION TREATMENT: CPT | Mod: 76

## 2020-01-18 PROCEDURE — 12000000 ZZH R&B MED SURG/OB

## 2020-01-18 PROCEDURE — 25000132 ZZH RX MED GY IP 250 OP 250 PS 637: Mod: GY | Performed by: HOSPITALIST

## 2020-01-18 PROCEDURE — 25000132 ZZH RX MED GY IP 250 OP 250 PS 637: Mod: GY | Performed by: INTERNAL MEDICINE

## 2020-01-18 PROCEDURE — 85027 COMPLETE CBC AUTOMATED: CPT | Performed by: INTERNAL MEDICINE

## 2020-01-18 PROCEDURE — 80048 BASIC METABOLIC PNL TOTAL CA: CPT | Performed by: INTERNAL MEDICINE

## 2020-01-18 PROCEDURE — 00000146 ZZHCL STATISTIC GLUCOSE BY METER IP

## 2020-01-18 PROCEDURE — 40000274 ZZH STATISTIC RCP CONSULT EA 30 MIN

## 2020-01-18 PROCEDURE — 36415 COLL VENOUS BLD VENIPUNCTURE: CPT | Performed by: INTERNAL MEDICINE

## 2020-01-18 PROCEDURE — 83735 ASSAY OF MAGNESIUM: CPT | Performed by: INTERNAL MEDICINE

## 2020-01-18 PROCEDURE — 25000125 ZZHC RX 250: Performed by: INTERNAL MEDICINE

## 2020-01-18 PROCEDURE — 94640 AIRWAY INHALATION TREATMENT: CPT

## 2020-01-18 PROCEDURE — 99232 SBSQ HOSP IP/OBS MODERATE 35: CPT | Performed by: INTERNAL MEDICINE

## 2020-01-18 PROCEDURE — 99233 SBSQ HOSP IP/OBS HIGH 50: CPT | Performed by: INTERNAL MEDICINE

## 2020-01-18 RX ORDER — METOPROLOL SUCCINATE 25 MG/1
25 TABLET, EXTENDED RELEASE ORAL ONCE
Status: COMPLETED | OUTPATIENT
Start: 2020-01-18 | End: 2020-01-18

## 2020-01-18 RX ORDER — ALBUTEROL SULFATE 0.83 MG/ML
2.5 SOLUTION RESPIRATORY (INHALATION) EVERY 6 HOURS PRN
Status: DISCONTINUED | OUTPATIENT
Start: 2020-01-18 | End: 2020-01-19 | Stop reason: HOSPADM

## 2020-01-18 RX ORDER — IPRATROPIUM BROMIDE AND ALBUTEROL SULFATE 2.5; .5 MG/3ML; MG/3ML
3 SOLUTION RESPIRATORY (INHALATION)
Status: DISCONTINUED | OUTPATIENT
Start: 2020-01-18 | End: 2020-01-19 | Stop reason: HOSPADM

## 2020-01-18 RX ADMIN — Medication 10 MG: at 00:52

## 2020-01-18 RX ADMIN — IPRATROPIUM BROMIDE AND ALBUTEROL SULFATE 3 ML: .5; 3 SOLUTION RESPIRATORY (INHALATION) at 08:29

## 2020-01-18 RX ADMIN — BENZONATATE 100 MG: 100 CAPSULE ORAL at 19:06

## 2020-01-18 RX ADMIN — Medication 30 MG: at 10:29

## 2020-01-18 RX ADMIN — IPRATROPIUM BROMIDE AND ALBUTEROL SULFATE 3 ML: .5; 3 SOLUTION RESPIRATORY (INHALATION) at 12:20

## 2020-01-18 RX ADMIN — IPRATROPIUM BROMIDE AND ALBUTEROL SULFATE 3 ML: .5; 3 SOLUTION RESPIRATORY (INHALATION) at 20:14

## 2020-01-18 RX ADMIN — SERTRALINE HYDROCHLORIDE 150 MG: 100 TABLET ORAL at 10:20

## 2020-01-18 RX ADMIN — METOPROLOL SUCCINATE 50 MG: 50 TABLET, EXTENDED RELEASE ORAL at 10:19

## 2020-01-18 RX ADMIN — ATORVASTATIN CALCIUM 40 MG: 40 TABLET, FILM COATED ORAL at 22:00

## 2020-01-18 RX ADMIN — INSULIN HUMAN 40 UNITS: 100 INJECTION, SUSPENSION SUBCUTANEOUS at 12:16

## 2020-01-18 RX ADMIN — FUROSEMIDE 40 MG: 10 INJECTION, SOLUTION INTRAMUSCULAR; INTRAVENOUS at 10:20

## 2020-01-18 RX ADMIN — METOPROLOL SUCCINATE 25 MG: 25 TABLET, FILM COATED, EXTENDED RELEASE ORAL at 12:16

## 2020-01-18 RX ADMIN — LOSARTAN POTASSIUM 25 MG: 25 TABLET, FILM COATED ORAL at 10:20

## 2020-01-18 RX ADMIN — ENOXAPARIN SODIUM 40 MG: 40 INJECTION SUBCUTANEOUS at 10:18

## 2020-01-18 ASSESSMENT — ACTIVITIES OF DAILY LIVING (ADL)
ADLS_ACUITY_SCORE: 15
ADLS_ACUITY_SCORE: 13
ADLS_ACUITY_SCORE: 15
ADLS_ACUITY_SCORE: 15
ADLS_ACUITY_SCORE: 17
ADLS_ACUITY_SCORE: 17

## 2020-01-18 NOTE — PROGRESS NOTES
"[facility]     Date: 1/18/20    Admission Dx: Respiratory Failure    Pulmonary History: CHF, LAVERNE    Home Nebulizer/MDI Use: Albuterol Q4 prn    Home Oxygen: N/A    Acuity Level (RCAT flow sheet): Level 3    Aerosol Therapy initiated: Duoneb QID, Albuterol Q6 prn    Pulmonary Hygiene initiated: N/A    Volume Expansion initiated: Incentive spirometry    Current Oxygen Requirements: 3L NC    Current SpO2: 94%    Re-evaluation date: 1/21/20    Patient Education: indications for nebulizers      See \"RT Assessments\" flow sheet for patient assessment scoring and Acuity Level Details.             "

## 2020-01-18 NOTE — PROGRESS NOTES
Cardiology Progress Note          Assessment and Plan:     73 yo female with respiratory distress, URI and mod-severe MS and AS  1. IV diuresis and HR control for valvular HD. Discussed case with Structural heart physician.  She may qualify for TAVR valve in mitral position and in aortic position, as another option for treatment of valve disease instead of high risk open heart surgery.  He recommends follow up once discharged with structural heart clinic.   Will continue to attempt to increase filling period by slowing heart rate down, optimizing medical therapy. Treat underlying URI symptoms.  Possible discharge tomorrow.  Would like to see resting HR in 60s         Interval History:   no new complaints                Medications:   I have reviewed this patient's current medications         Physical Exam:         Vital Sign Ranges  Temperature Temp  Av.6  F (36.4  C)  Min: 96.3  F (35.7  C)  Max: 98.4  F (36.9  C)   Blood pressure Systolic (24hrs), Av , Min:96 , Max:142        Diastolic (24hrs), Av, Min:39, Max:57      Pulse Pulse  Av.9  Min: 71  Max: 78   Respirations Resp  Av.4  Min: 18  Max: 27   Pulse oximetry SpO2  Av.1 %  Min: 91 %  Max: 97 %         Intake/Output Summary (Last 24 hours) at 2020 1131  Last data filed at 2020 1000  Gross per 24 hour   Intake --   Output 4100 ml   Net -4100 ml       Constitutional:   in no apparent distress     Skin:   normal     Neck:   supple, symmetrical, trachea midline     Chest:   Normal Symmetry and no tenderness     Lungs:   Left LL rales     Cardiovascular:   Grade 2 high pitched MARYANNE and normal S1 and S2     Extremities and Back:   no cyanosis or clubbing     Neurological:   No gross or focal neurologic abnormalities              Data:     Results for orders placed or performed during the hospital encounter of 20 (from the past 24 hour(s))   Glucose by meter   Result Value Ref Range    Glucose 284 (H) 70 - 99 mg/dL   Glucose  by meter   Result Value Ref Range    Glucose 209 (H) 70 - 99 mg/dL   Glucose by meter   Result Value Ref Range    Glucose 122 (H) 70 - 99 mg/dL   Glucose by meter   Result Value Ref Range    Glucose 94 70 - 99 mg/dL   Glucose by meter   Result Value Ref Range    Glucose 108 (H) 70 - 99 mg/dL   CBC with platelets   Result Value Ref Range    WBC 6.6 4.0 - 11.0 10e9/L    RBC Count 3.72 (L) 3.8 - 5.2 10e12/L    Hemoglobin 12.0 11.7 - 15.7 g/dL    Hematocrit 35.6 35.0 - 47.0 %    MCV 96 78 - 100 fl    MCH 32.3 26.5 - 33.0 pg    MCHC 33.7 31.5 - 36.5 g/dL    RDW 13.1 10.0 - 15.0 %    Platelet Count 184 150 - 450 10e9/L   Basic metabolic panel   Result Value Ref Range    Sodium 140 133 - 144 mmol/L    Potassium 4.5 3.4 - 5.3 mmol/L    Chloride 107 94 - 109 mmol/L    Carbon Dioxide 26 20 - 32 mmol/L    Anion Gap 7 3 - 14 mmol/L    Glucose 142 (H) 70 - 99 mg/dL    Urea Nitrogen 31 (H) 7 - 30 mg/dL    Creatinine 0.94 0.52 - 1.04 mg/dL    GFR Estimate 60 (L) >60 mL/min/[1.73_m2]    GFR Estimate If Black 69 >60 mL/min/[1.73_m2]    Calcium 9.2 8.5 - 10.1 mg/dL   Magnesium   Result Value Ref Range    Magnesium 2.2 1.6 - 2.3 mg/dL   Glucose by meter   Result Value Ref Range    Glucose 164 (H) 70 - 99 mg/dL

## 2020-01-18 NOTE — PLAN OF CARE
VSS on RA. Tele: SR. A/O. SBA. Pt ambulated to the bathroom, declines purewick. , Insulin given per sliding scale. Mod carb diet, did not eat breakfast. Likely discharge to home tomorrow.

## 2020-01-18 NOTE — CONSULTS
CM aware of consult for SW/CC for CHF. Per Cardiology note, patient has HF 2/2 aortic & mitral stenosis. Cardiology to refer patient for possible TAVR. No dietary restrictions or daily weights recommended. Per conversation with hospitalist, further education would be confusing to patient. CM will clear consult(s).     CM will continue to follow patient until discharge for any additional needs.     Kathy Angulo RN, BSN, CPHN, CM  Inpatient Care Coordination  [facility]  [phone]

## 2020-01-18 NOTE — PLAN OF CARE
ICU End of Shift Summary.  For vital signs and complete assessments, please see documentation flowsheets.     Pertinent assessments: Patient alert, oriented and pleasant. Patient demonstrates dry hacking cough. Good effort. Patient reports that cough is productive with green/yellow sputum. No sputum visualized this shift. Cardiac murmur noted.    Major Shift Events: IV diuretics effective.  Significant urine output via purewick. Tessalon pearls ordered. Patient out of bed to chair for lunch and dinner. Tolerates activity fairly.  Echo completed, Cardiac consultation. LLE doppler US to rule out DVT completed. Patient status downgraded to Cardiac Telemetry.   Plan (Upcoming Events): Anticipate transfer to cardiac telemetry.   Discharge/Transfer Needs: To be determined, patient is high risk for surgical procedure and requires double valve surgery.     Bedside Shift Report Completed : Yes  Bedside Safety Check Completed: Yes

## 2020-01-18 NOTE — PROGRESS NOTES
[facility]  Hospitalist Progress Note  Laura Fung MD 01/18/2020    Reason for Stay (Diagnosis): Hypoxic respiratory failure         Assessment and Plan:      Summary of Stay: Amy Luna is a 74 year old female with a history of type 2 diabetes, obstructive sleep apnea on chronic CPAP, hypertension, pacemaker placement secondary to AV block, and depression admitted on 1/16/2020 with several day history of sinus drainage followed by development of cough and shortness of breath and found to have acute hypoxic respiratory failure requiring BiPAP therapy secondary to an acute CHF exacerbation.    During this hospitalization echocardiogram has confirmed that she has had progression of her moderate aortic stenosis and now has the addition of mitral stenosis neither of which individually are considered severe, but in combination the situation would be considered severe.  She is suffering from pulmonary edema due to progressive dual valvular disease.    At this point in time she is undergoing diuresis as well as initiation of beta-blockade to slow down her heart rate to improve forward flow.  She has some mild symptom symptoms of periodic lightheadedness that could be due to arterial dehydration.      Problem List:   1. Acute hypoxic respiratory failure: Secondary to pulmonary edema due to dual valvular disease of both aortic and mitral stenosis.  There was no evidence of an acute infection or ACS.  She required BiPAP rescue therapy and is responded well to IV diuresis.  2. Orthostatic BPs with symptoms: Suspect secondary to I serial dehydration- will stop IV Lasix this evening reassess in the morning.  3. Dual valvular stenosis: Very much appreciate cardiology input, Dr. Christina has discussed her care with the structural heart physician and she may be a candidate for TAVR for both the aortic and mitral disease as opposed to the more invasive open heart surgery.  4. Type 2 diabetes: PTA regimen is glipizide  20 mg daily, metformin 1 g daily, and insulin NPH 50 units twice daily.  She is currently on NPH 40 units twice daily and insulin sliding scale and blood sugars are trending up.  Will increase NPH to 50 twice daily and continue ISS  5. LAVERNE: Required short-term BiPAP initially and will resume typical CPAP dosing  6. Hypertension: PTA regimen furosemide 40 mg daily, losartan 50 mg daily, metoprolol XL 25 mg p.o. daily.  Both metoprolol and losartan have been resumed with her typical dose, and she is currently on IV furosemide to facilitate diuresis.  7. Hyperlipidemia: Continue statin.  Of note patient did undergo coronary angiography in 12/2014 which showed mild nonocclusive coronary artery disease  DVT Prophylaxis: Enoxaparin (Lovenox) SQ  Code Status: Full Code  Functional Status: We will inquire tomorrow  Diet: Regular texture  Stewart: Not present      Disposition Plan   Expected discharge in 1-2 days to prior living arrangement once adequately diuresed.     Entered: Laura Fung 01/18/2020, 5:01 PM               Interval History (Subjective):      Breathing is significantly improved from admission as well as her lower extremity swelling and venous stasis changes.  She reports a transient perhaps a minute long episode of lightheadedness while sitting in the reclining chair.  Felt like she was going to pass out but she did not.  It resolved without intervention and she did not notify the nurse.  She currently has none of the symptoms.  She denies nausea vomiting abdominal pain and diarrhea.                  Physical Exam:      Last Vital Signs:  BP (!) 117/34 (BP Location: Left arm)   Pulse 70   Temp 98  F (36.7  C) (Axillary)   Resp 18   Wt 126.7 kg (279 lb 6.4 oz)   SpO2 94%   BMI 45.10 kg/m      I/O: Net -5.6 L  Weight: Down 7 kg  Telemetry: NSR    Pleasant no acute distress looks stated age head is normocephalic atraumatic and sclera clear lungs she has bibasilar wet sounding crackles quarter the way up  effort is normal heart is of a regular rate and rhythm with a short systolic ejection murmur heard best at the left upper sternal border, chronic appearing edema currently only about 1-2+.  Belly is obese but soft nontender nondistended skin is warm and dry there is no cyanosis or clubbing of the extremities her affect is good inquisitive and somewhat tangential.  But she is able to understand complex medical issues.  He is moving all extremities           Medications:      All current medications were reviewed with changes reflected in problem list.         Data:      All new lab and imaging data was reviewed.   Labs:  Recent Labs   Lab 01/18/20  0645      POTASSIUM 4.5   CHLORIDE 107   CO2 26   ANIONGAP 7   *   BUN 31*   CR 0.94   GFRESTIMATED 60*   GFRESTBLACK 69   NELLY 9.2     Recent Labs   Lab 01/18/20  0645   WBC 6.6   HGB 12.0   HCT 35.6   MCV 96         Imaging:

## 2020-01-18 NOTE — PLAN OF CARE
ICU End of Shift Summary.  For vital signs and complete assessments, please see documentation flowsheets.     Pertinent assessments: Pt A&Ox4, up SBA, tele SR murmur detected, LS dim, BS +, pure wick- 900 out  Major Shift Events: good urine output, home cpap set up by RT  Plan (Upcoming Events): TOF  Discharge/Transfer Needs: transfer to 3rd floor- hand off report given to Niranjan RN    Bedside Shift Report Completed : Y  Bedside Safety Check Completed: Y

## 2020-01-18 NOTE — PLAN OF CARE
Assumed care of pt from [id number]  Neuro: A&Ox4, Afebrile. VSS. Denies pain.  CARDS: SR. Murmur detected. Needs bilat. Valve replacement. C/O dizziness/lightheaded after shower. Laid flat in bed; BP stable at that time. Slight edema. Obese.   Resp: RA, O2 sats stable. Lungs with crackles in upper airway and coarse in lower lobes.   GI/: MOD CHO. Did not eat breakfast this AM. Purewick in place. Placed brief on with dry cloths in abdominal folds due to excoriation.   Skin: Excoriation and yeast in abdominal folds and under breasts. Ordered nystatin powder and placed dry cloths.   Activity: SBAx1  PLAN: Cards to determine if wanting to replace heart valves due to calcification. Awaiting cards review

## 2020-01-19 VITALS
BODY MASS INDEX: 44.32 KG/M2 | HEART RATE: 75 BPM | RESPIRATION RATE: 20 BRPM | WEIGHT: 274.6 LBS | SYSTOLIC BLOOD PRESSURE: 116 MMHG | DIASTOLIC BLOOD PRESSURE: 42 MMHG | OXYGEN SATURATION: 94 % | TEMPERATURE: 96.6 F

## 2020-01-19 LAB
ANION GAP SERPL CALCULATED.3IONS-SCNC: 6 MMOL/L (ref 3–14)
BASOPHILS # BLD AUTO: 0 10E9/L (ref 0–0.2)
BASOPHILS NFR BLD AUTO: 0.7 %
BUN SERPL-MCNC: 33 MG/DL (ref 7–30)
CALCIUM SERPL-MCNC: 9.6 MG/DL (ref 8.5–10.1)
CHLORIDE SERPL-SCNC: 102 MMOL/L (ref 94–109)
CO2 SERPL-SCNC: 30 MMOL/L (ref 20–32)
CREAT SERPL-MCNC: 1.03 MG/DL (ref 0.52–1.04)
DIFFERENTIAL METHOD BLD: NORMAL
EOSINOPHIL # BLD AUTO: 0.2 10E9/L (ref 0–0.7)
EOSINOPHIL NFR BLD AUTO: 3.4 %
ERYTHROCYTE [DISTWIDTH] IN BLOOD BY AUTOMATED COUNT: 12.9 % (ref 10–15)
GFR SERPL CREATININE-BSD FRML MDRD: 54 ML/MIN/{1.73_M2}
GLUCOSE BLDC GLUCOMTR-MCNC: 164 MG/DL (ref 70–99)
GLUCOSE BLDC GLUCOMTR-MCNC: 168 MG/DL (ref 70–99)
GLUCOSE BLDC GLUCOMTR-MCNC: 174 MG/DL (ref 70–99)
GLUCOSE BLDC GLUCOMTR-MCNC: 235 MG/DL (ref 70–99)
GLUCOSE BLDC GLUCOMTR-MCNC: 236 MG/DL (ref 70–99)
GLUCOSE BLDC GLUCOMTR-MCNC: 268 MG/DL (ref 70–99)
GLUCOSE SERPL-MCNC: 201 MG/DL (ref 70–99)
HCT VFR BLD AUTO: 37.9 % (ref 35–47)
HGB BLD-MCNC: 12.4 G/DL (ref 11.7–15.7)
IMM GRANULOCYTES # BLD: 0 10E9/L (ref 0–0.4)
IMM GRANULOCYTES NFR BLD: 0.5 %
LYMPHOCYTES # BLD AUTO: 1.6 10E9/L (ref 0.8–5.3)
LYMPHOCYTES NFR BLD AUTO: 28.1 %
MCH RBC QN AUTO: 32.3 PG (ref 26.5–33)
MCHC RBC AUTO-ENTMCNC: 32.7 G/DL (ref 31.5–36.5)
MCV RBC AUTO: 99 FL (ref 78–100)
MONOCYTES # BLD AUTO: 0.5 10E9/L (ref 0–1.3)
MONOCYTES NFR BLD AUTO: 8.6 %
NEUTROPHILS # BLD AUTO: 3.3 10E9/L (ref 1.6–8.3)
NEUTROPHILS NFR BLD AUTO: 58.7 %
NRBC # BLD AUTO: 0 10*3/UL
NRBC BLD AUTO-RTO: 0 /100
PLATELET # BLD AUTO: 202 10E9/L (ref 150–450)
PLATELET # BLD EST: NORMAL 10*3/UL
POTASSIUM SERPL-SCNC: 3.5 MMOL/L (ref 3.4–5.3)
RBC # BLD AUTO: 3.84 10E12/L (ref 3.8–5.2)
RBC MORPH BLD: NORMAL
SODIUM SERPL-SCNC: 138 MMOL/L (ref 133–144)
VARIANT LYMPHS BLD QL SMEAR: PRESENT
WBC # BLD AUTO: 5.6 10E9/L (ref 4–11)

## 2020-01-19 PROCEDURE — 40000275 ZZH STATISTIC RCP TIME EA 10 MIN

## 2020-01-19 PROCEDURE — 25000128 H RX IP 250 OP 636: Performed by: INTERNAL MEDICINE

## 2020-01-19 PROCEDURE — 94640 AIRWAY INHALATION TREATMENT: CPT

## 2020-01-19 PROCEDURE — 99239 HOSP IP/OBS DSCHRG MGMT >30: CPT | Performed by: INTERNAL MEDICINE

## 2020-01-19 PROCEDURE — 25000125 ZZHC RX 250: Performed by: INTERNAL MEDICINE

## 2020-01-19 PROCEDURE — 94640 AIRWAY INHALATION TREATMENT: CPT | Mod: 76

## 2020-01-19 PROCEDURE — 00000146 ZZHCL STATISTIC GLUCOSE BY METER IP

## 2020-01-19 PROCEDURE — 85025 COMPLETE CBC W/AUTO DIFF WBC: CPT | Performed by: INTERNAL MEDICINE

## 2020-01-19 PROCEDURE — 36415 COLL VENOUS BLD VENIPUNCTURE: CPT | Performed by: INTERNAL MEDICINE

## 2020-01-19 PROCEDURE — 25000132 ZZH RX MED GY IP 250 OP 250 PS 637: Mod: GY | Performed by: INTERNAL MEDICINE

## 2020-01-19 PROCEDURE — 99232 SBSQ HOSP IP/OBS MODERATE 35: CPT | Performed by: INTERNAL MEDICINE

## 2020-01-19 PROCEDURE — 80048 BASIC METABOLIC PNL TOTAL CA: CPT | Performed by: INTERNAL MEDICINE

## 2020-01-19 RX ORDER — METOPROLOL SUCCINATE 25 MG/1
75 TABLET, EXTENDED RELEASE ORAL EVERY MORNING
Qty: 90 TABLET | Refills: 0 | Status: SHIPPED | OUTPATIENT
Start: 2020-01-19 | End: 2020-02-20

## 2020-01-19 RX ORDER — GLIPIZIDE 10 MG/1
10 TABLET, FILM COATED, EXTENDED RELEASE ORAL
Start: 2020-01-19 | End: 2020-04-14

## 2020-01-19 RX ADMIN — IPRATROPIUM BROMIDE AND ALBUTEROL SULFATE 3 ML: .5; 3 SOLUTION RESPIRATORY (INHALATION) at 08:05

## 2020-01-19 RX ADMIN — METOPROLOL SUCCINATE 75 MG: 50 TABLET, EXTENDED RELEASE ORAL at 09:45

## 2020-01-19 RX ADMIN — ENOXAPARIN SODIUM 40 MG: 40 INJECTION SUBCUTANEOUS at 09:40

## 2020-01-19 RX ADMIN — IPRATROPIUM BROMIDE AND ALBUTEROL SULFATE 3 ML: .5; 3 SOLUTION RESPIRATORY (INHALATION) at 11:54

## 2020-01-19 RX ADMIN — LOSARTAN POTASSIUM 25 MG: 25 TABLET, FILM COATED ORAL at 09:48

## 2020-01-19 RX ADMIN — SERTRALINE HYDROCHLORIDE 150 MG: 100 TABLET ORAL at 09:44

## 2020-01-19 ASSESSMENT — ACTIVITIES OF DAILY LIVING (ADL)
ADLS_ACUITY_SCORE: 17

## 2020-01-19 NOTE — PLAN OF CARE
A&O x 4, /40, LS coarse crackles, 93%RA, , 174 tele: SR, CPAP at night, mod carb diet, SBA, possible discharge today with follow up to cardiology

## 2020-01-19 NOTE — PLAN OF CARE
1022 MD updated: Pt up in chair and refused to go back to bed due to meal arriving. Lying down BP 0935 143/60 pulse 71. Sitting BP 1015: 112/56 pulse 74. Standing BP 1016: 114/64 pulse 84. can repeat when pt gets back in bed after meal. Thanks!    /64 (BP Location: Right arm)   Pulse 71   Temp 95.2  F (35.1  C) (Oral)   Resp 20   Wt 124.6 kg (274 lb 9.6 oz)   SpO2 92%   BMI 44.32 kg/m       Denies pain, mod carb CHO diet, SBA A/OX4, forgetful. PIV right arm SL. Tele: SR with peaked T waves HR 65. Orthostatic BP taken,  and 268. Plan to discharge today to home.

## 2020-01-19 NOTE — PLAN OF CARE
BP (!) 117/34 (BP Location: Left arm)   Pulse 70   Temp 98  F (36.7  C) (Axillary)   Resp 18   Wt 126.7 kg (279 lb 6.4 oz)   SpO2 94%   BMI 45.10 kg/m      -VSS  -No edema  -Pleasant  -Some difficulty word finding  -Had the huc do the orthostastic hypotension test whic hresulted in a 40 pt drop systolically from lying to standing  -Lasix DC'd  -Humalin DC'd  -tessalon given for cough  -Paged MD for 2 consecutive BP's with diastolic values less than 37. MD said its likely due to diastolic disfunction

## 2020-01-19 NOTE — PROGRESS NOTES
"Paged by nursing to evaluate the patient. He is demanding to speak with the physician on duty. He states \" it is in the patient's bill of rights to talk to the physician.\" He has been combative and verbally abusive to nursing staff. Refusing cares and than later asking for them. He is very frustrated with his overall health. He is constantly nauseated and dry heaving with no relief with Zofran. He had similar experiences when on dialysis in the past. He is uncomfortable in bed. While he is acting out to nursing staff it appears to be more related to his depressed mood and recognizing the poor quality of life his future holds now that he is back on dialysis. I ordered compazine and Reglan to assist with symptom management. Encouraged him to attempt tube feeds again and to try to rest/sleep. Offered heating pad and ice to help improve comfort. Additional conversations about his quality of life and overall care goals would be helpful. He may benefit for palliative care evaluation and anti-depressant. His mood swings seem to be associated with his overall frustration with his current health.   "

## 2020-01-19 NOTE — PROGRESS NOTES
"Cardiology Progress Note          Assessment and Plan:         73 yo female with respiratory distress, URI and mod-severe MS and AS  1. Lasix stopped yesterday due to low BPs, BUN/Cr ratio suggesting intravascular \"dry\".  Will likely need oral lasix upon discharge, was on lasix 40mg daily on admit, would just restart this.  2. Please order a clinic follow up at [facility] in [place] with Dr. Post for assessment of Valve disease.  Our office will call to arrange in am, just need order to be sent to .  Agree with discharge today         Interval History:   no new complaints                Medications:   I have reviewed this patient's current medications         Physical Exam:         Vital Sign Ranges  Temperature Temp  Av  F (36.1  C)  Min: 95.2  F (35.1  C)  Max: 98  F (36.7  C)   Blood pressure Systolic (24hrs), Av , Min:98 , Max:134        Diastolic (24hrs), Av, Min:34, Max:64      Pulse Pulse  Av.5  Min: 67  Max: 75   Respirations Resp  Av.6  Min: 18  Max: 20   Pulse oximetry SpO2  Av.4 %  Min: 91 %  Max: 95 %         Intake/Output Summary (Last 24 hours) at 2020 1200  Last data filed at 2020 1125  Gross per 24 hour   Intake 720 ml   Output --   Net 720 ml       Constitutional:   in no apparent distress     Skin:   normal     Neck:   supple, symmetrical, trachea midline     Chest:   Normal Symmetry and no tenderness     Lungs:   Coarse BS     Cardiovascular:   Regular MARYANNE     Extremities and Back:   no cyanosis or clubbing     Neurological:   No gross or focal neurologic abnormalities              Data:     Results for orders placed or performed during the hospital encounter of 20 (from the past 24 hour(s))   Glucose by meter   Result Value Ref Range    Glucose 165 (H) 70 - 99 mg/dL   Glucose by meter   Result Value Ref Range    Glucose 168 (H) 70 - 99 mg/dL   Glucose by meter   Result Value Ref Range    Glucose 164 (H) 70 - 99 mg/dL   Glucose by meter   Result " Value Ref Range    Glucose 174 (H) 70 - 99 mg/dL   Basic metabolic panel   Result Value Ref Range    Sodium 138 133 - 144 mmol/L    Potassium 3.5 3.4 - 5.3 mmol/L    Chloride 102 94 - 109 mmol/L    Carbon Dioxide 30 20 - 32 mmol/L    Anion Gap 6 3 - 14 mmol/L    Glucose 201 (H) 70 - 99 mg/dL    Urea Nitrogen 33 (H) 7 - 30 mg/dL    Creatinine 1.03 0.52 - 1.04 mg/dL    GFR Estimate 54 (L) >60 mL/min/[1.73_m2]    GFR Estimate If Black 62 >60 mL/min/[1.73_m2]    Calcium 9.6 8.5 - 10.1 mg/dL   CBC with platelets differential   Result Value Ref Range    WBC 5.6 4.0 - 11.0 10e9/L    RBC Count 3.84 3.8 - 5.2 10e12/L    Hemoglobin 12.4 11.7 - 15.7 g/dL    Hematocrit 37.9 35.0 - 47.0 %    MCV 99 78 - 100 fl    MCH 32.3 26.5 - 33.0 pg    MCHC 32.7 31.5 - 36.5 g/dL    RDW 12.9 10.0 - 15.0 %    Platelet Count 202 150 - 450 10e9/L    Diff Method Automated Method     % Neutrophils 58.7 %    % Lymphocytes 28.1 %    % Monocytes 8.6 %    % Eosinophils 3.4 %    % Basophils 0.7 %    % Immature Granulocytes 0.5 %    Nucleated RBCs 0 0 /100    Absolute Neutrophil 3.3 1.6 - 8.3 10e9/L    Absolute Lymphocytes 1.6 0.8 - 5.3 10e9/L    Absolute Monocytes 0.5 0.0 - 1.3 10e9/L    Absolute Eosinophils 0.2 0.0 - 0.7 10e9/L    Absolute Basophils 0.0 0.0 - 0.2 10e9/L    Abs Immature Granulocytes 0.0 0 - 0.4 10e9/L    Absolute Nucleated RBC 0.0     Reactive Lymphs Present     RBC Morphology Consistent with reported results     Platelet Estimate       Automated count confirmed.  Platelet morphology is normal.   Glucose by meter   Result Value Ref Range    Glucose 268 (H) 70 - 99 mg/dL

## 2020-01-19 NOTE — DISCHARGE SUMMARY
Discharge Summary  Hospitalist Service    Amy Luna MRN# [id number]   YOB: 1945 Age: 74 year old     Date of Admission:  1/16/2020  Date of Discharge:  1/19/2020  Admitting Physician:  Khadra Grimaldo MD  Discharge Physician: Laura Fung MD  Discharging Service: Hospitalist Service     Primary Provider: Candy Frederick  Primary Care Physician Phone Number: [phone]         Discharge Diagnoses/Problem Oriented Hospital Course (Providers):    Amy Luna was admitted on 1/16/2020 by Khadra Grimaldo MD and I would refer you to their history and physical.  The following problems were addressed during her hospitalization:    Acute hypoxic respiratory failure  Aortic stenosis and mitral stenosis leading to pulmonary edema  orthostasis 2/2 overdiuresis  T2dm  LAVERNE on CPAP  htn  hlp  Prior hx of possible MG, no longer on mestinon           Code Status:      Full Code        Brief Hospital Stay Summary Sent Home With Patient in AVS:       Summary of Stay: Amy Luna is a 74 year old female with a history of type 2 diabetes, obstructive sleep apnea on chronic CPAP, hypertension, pacemaker placement secondary to AV block, and depression admitted on 1/16/2020 with several day history of sinus drainage followed by development of cough and shortness of breath and found to have acute hypoxic respiratory failure requiring BiPAP therapy secondary to an acute CHF exacerbation.     During this hospitalization echocardiogram has confirmed that she has had progression of her moderate aortic stenosis and now has the addition of mitral stenosis neither of which individually are considered severe, but in combination the situation would be considered severe.  She is suffering from pulmonary edema due to progressive dual valvular disease.     She was admitted and treated with IV furosemide and initiated on beta-blockade to slow down her heart rate to improve forward flow.      She is substantially  improved from presentation and is feeling well enough to return home.     She will f/u with Dr Post-structural heart MD to discuss options for valvular replacement         Problem List:   1. Acute hypoxic respiratory failure: Secondary to pulmonary edema due to dual valvular disease of both aortic and mitral stenosis.  There was no evidence of an acute infection or ACS.  She required BiPAP rescue therapy and has responded well to IV diuresis.  She will discharge back on home furosemide at 40 mg per day  2. Orthostatic BPs with symptoms: Suspect secondary to overdiuresis and dehydration- stopped IV furosemide, symptoms have resolved  3. Dual valvular stenosis complicated by pulmonary edema/LE edema: Very much appreciate cardiology input, Dr. Christina has discussed her care with the structural heart physician and she may be a candidate for TAVR for both the aortic and mitral disease as opposed to the more invasive open heart surgery.  Metop was increased from 25 mg to 75 mg per day, and she will resume furosemide at 40 mg per day.   4. Type 2 diabetes: PTA regimen is glipizide 20 mg daily, metformin 1 g daily, and insulin NPH 50 units twice daily.  She is currently on NPH 40 units twice daily and insulin sliding scale and blood sugars are trending up. Increased NPH to 50 twice daily day prior to discharge and continued ISS.  Blood sugars remain inadequately controlled at > 200.  Therefore resumed metformin, and half dosing of glipizide at discharge and requested close follow up with PCP  5. LAVERNE: Required short-term BiPAP initially and will resume typical CPAP dosing  6. Hypertension: PTA regimen furosemide 40 mg daily, losartan 50 mg daily, metoprolol XL 25 mg p.o. daily.  Losartan had been resumed and metop has been titrated up from 25 mg to 75 mg during this hospitalization.  She underwent IV diuresis during her hospitalization but will resume typical furosemide at 40 mg per day at discharge.  7. Hyperlipidemia:  Continue statin.  Of note patient did undergo coronary angiography in 12/2014 which showed mild nonocclusive coronary artery disease  8. Mestinon:  Patient's medication list has mestinon on it but patient hasn't taken it for a long time.  Initially instituted for a droopy eyelid thought potentially related to myasthenia gravis. Seeing as how she is no longer on it, and does not carry the diagnosis of MG I have removed it from her medication list.   DVT Prophylaxis: Enoxaparin (Lovenox) subcutaneous while hospitalized  Code Status: Full Code  Diet: Regular texture  Stewart: Not present             Important Results:      As noted below          Pending Results:        Unresulted Labs Ordered in the Past 30 Days of this Admission     Date and Time Order Name Status Description    1/16/2020 2220 Blood culture Preliminary     1/16/2020 2220 Blood culture Preliminary       NG td      Discharge Instructions and Follow-Up:      Follow-up Appointments     Follow-up and recommended labs and tests       Establish care at [facility].     You should be seen in 7-10 days to f/u this hospitalization      I cut back on your glipizide from 20 mg to 10 mg per day-you've only   been getting your NPH insulin while here.        I resumed your metformin at discharge.     Track weights every morning to assess fluid retention     F/U Dr Post (structural heart MD) on 1/29/20 at 830 am to discuss   options for Aortic and Mitral Valve replacment    Your beta blocker was increased from 25 mg to 75 mg per day.  You can just   triple up on your tablets that you have at home and I have sent over a new   Rx to [facility]    I have taken mestinon off of your medication list               Discharge Disposition:      Discharged to home         Discharge Medications:        Current Discharge Medication List      CONTINUE these medications which have CHANGED    Details   glipiZIDE (GLIPIZIDE XL) 10 MG 24  hr tablet Take 1 tablet (10 mg) by mouth daily (with breakfast)    Associated Diagnoses: Type 2 diabetes mellitus without complication, with long-term current use of insulin (H)      metoprolol succinate ER (TOPROL-XL) 25 MG 24 hr tablet Take 3 tablets (75 mg) by mouth every morning  Qty: 90 tablet, Refills: 0    Associated Diagnoses: Essential hypertension         CONTINUE these medications which have NOT CHANGED    Details   albuterol (PROAIR HFA/PROVENTIL HFA/VENTOLIN HFA) 108 (90 Base) MCG/ACT inhaler Inhale 2 puffs into the lungs every 4 hours as needed for shortness of breath / dyspnea or wheezing    Comments: Pharmacy may dispense brand covered by insurance (Proair, or proventil or ventolin or generic albuterol inhaler)      Atorvastatin Calcium (LIPITOR PO) Take 40 mg by mouth At Bedtime       furosemide (LASIX) 40 MG tablet Take 40 mg by mouth every morning      insulin NPH (HUMULIN N/NOVOLIN N VIAL) 100 UNIT/ML vial Inject 50 Units Subcutaneous 2 times daily      ipratropium (ATROVENT) 0.06 % nasal spray Spray 2 sprays into both nostrils 4 times daily as needed for rhinitis      losartan (COZAAR) 50 MG tablet Take 50 mg by mouth every morning      metFORMIN (GLUCOPHAGE-XR) 500 MG 24 hr tablet Take 1,000 mg by mouth daily (with breakfast)      Sertraline HCl (ZOLOFT PO) Take 150 mg by mouth every morning          STOP taking these medications       pyridostigmine (MESTINON) 30 mg TABS half-tab Comments:   Reason for Stopping:                 Allergies:         Allergies   Allergen Reactions     Penicillins            Consultations This Hospital Stay:      Consultation during this admission received from cardiology         Condition and Physical on Discharge:      Discharge condition: Stable   Vitals: Blood pressure 114/64, pulse 71, temperature 95.2  F (35.1  C), temperature source Oral, resp. rate 20, weight 124.6 kg (274 lb 9.6 oz), SpO2 92 %.     Constitutional: Pleasant nad looks stated age head n/cat  sclera clear    Lungs: Bibasilar crackles ~1/8 of the way up otherwise cta with normal effort   Cardiovascular: rrr with brief HSM, trace - 1 + b le edema   Abdomen: S/nt/nd   Skin: W/d no c/c   Other: Alert and oriented affect appropriate sweeney          Discharge Time:      Greater than 30 minutes.        Image Results From This Hospital Stay (For Non-EPIC Providers):        Results for orders placed or performed during the hospital encounter of 20   XR Chest Port 1 View    Narrative    EXAM: XR CHEST PORT 1 VW  LOCATION: [facility]  DATE/TIME: 2020 10:18 PM    INDICATION: Shortness of breath  COMPARISON: 2016      Impression    IMPRESSION: Upper normal heart size. Stable left chest wall pacer. Mild CHF suggested, with subtle interstitial edema, vascular congestion, and possible small left pleural effusion. No pneumothorax.   US Lower Extremity Venous Duplex Left    Narrative    VENOUS ULTRASOUND LEFT LOWER EXTREMITY  2020 11:09 AM     HISTORY: Left leg pain and swelling.    COMPARISON: None.    TECHNIQUE: Color Doppler and spectral waveform analysis performed  throughout the deep veins of the left lower extremity.    FINDINGS: The left common femoral, proximal greater saphenous,  femoral, and popliteal veins demonstrate normal blood flow,  compression, and augmentation. Posterior tibial and peroneal veins are  compressible. Contralateral right common femoral vein is patent.      Impression    IMPRESSION: Negative for deep venous thrombosis in the left lower  extremity.    ALEXANDRIA PANIAGUA MD   Echocardiogram Complete    Narrative    [id number]  [id number]  [id number]  [id number]^LIN^LOUISE^EDGAR           [facility]  Echocardiography Laboratory  [address] [place] [address]        Name: KASSANDRA RIVERA  MRN: [id number]  : 1945  Study Date: 2020 09:19 AM  Age: 74 yrs  Gender: Female  Patient Location: [facility]  Reason For Study: SOB  Ordering  Physician: LOUISE LIN  Referring Physician: Candy Frederick  Performed By: Ciara Greco     BSA: 2.4 m2  Height: 66 in  Weight: 300 lb  BP: 178/86 mmHg  _____________________________________________________________________________  __        Procedure  Complete Portable Echo Adult.  _____________________________________________________________________________  __        Interpretation Summary     Hyperdynamic left ventricular function  The visual ejection fraction is estimated at >70%.  The right ventricle is not well visualized.  There is a pacemaker lead in the right ventricle.  The right ventricular systolic function is normal.  There is moderate to severe mitral annular calcification.  There is moderate to severe mitral stenosis.  Moderate valvular aortic stenosis.  There is mild (1+) aortic regurgitation.  The study was technically difficult. There is no comparison study available.  _____________________________________________________________________________  __        Left Ventricle  The left ventricle is normal in size. Diastolic function not assessed due to  significant mitral annular calcification. Hyperdynamic left ventricular  function. The visual ejection fraction is estimated at >70%. A trivial left  ventricular outflow tract (LVOT) obstruction is present. No regional wall  motion abnormalities noted.     Right Ventricle  Borderline right ventricular enlargement. The right ventricular systolic  function is normal. The right ventricle is not well visualized. There is a  pacemaker lead in the right ventricle.     Atria  The left atrium is moderately dilated. Right atrium not well visualized. Right  atrial size is normal.     Mitral Valve  The mitral valve is not well visualized. There is moderate to severe mitral  annular calcification. The mean mitral valve gradient is 10.4 mmHg. The peak  mitral valve gradient is 18.4 mmHg. There is moderate to severe mitral  stenosis.        Tricuspid Valve  The  tricuspid valve is not well visualized. The right ventricular systolic  pressure is elevated at 31.9 mmHg.     Aortic Valve  The aortic valve is not well visualized. There is mild (1+) aortic  regurgitation. The peak AoV pressure gradient is 41.0 mmHg. The mean AoV  pressure gradient is 24.9 mmHg. The calculated aortic valve are is 1.1 cm^2.  Moderate valvular aortic stenosis.     Pulmonic Valve  The pulmonic valve is not well visualized. Normal pulmonic valve velocity.     Vessels  Normal size aorta. The inferior vena cava is normal.     Pericardium  The pericardium is not well visualized.     _____________________________________________________________________________  __  MMode/2D Measurements & Calculations  IVSd: 1.0 cm  LVIDd: 4.6 cm  LVIDs: 2.4 cm  LVPWd: 1.1 cm  FS: 46.8 %     LV mass(C)d: 172.1 grams  LV mass(C)dI: 72.4 grams/m2  Ao root diam: 3.1 cm  asc Aorta Diam: 3.6 cm  LVOT diam: 1.9 cm  LVOT area: 2.7 cm2  LA Volume (BP): 95.0 ml  LA Volume Index (BP): 39.9 ml/m2  RWT: 0.48        Doppler Measurements & Calculations  MV E max yasir: 187.0 cm/sec  MV A max yasir: 154.2 cm/sec  MV E/A: 1.2     MV max P.4 mmHg  MV mean PG: 10.4 mmHg  MV V2 VTI: 85.2 cm  MVA(VTI): 1.1 cm2  MV P1/2t max yasir: 215.8 cm/sec  MV P1/2t: 149.6 msec  MVA(P1/2t): 1.5 cm2  MV dec slope: 422.6 cm/sec2  MV dec time: 0.46 sec  Ao V2 max: 322.8 cm/sec  Ao max P.0 mmHg  Ao V2 mean: 237.0 cm/sec  Ao mean P.9 mmHg  Ao V2 VTI: 82.6 cm  GUERITA(I,D): 1.1 cm2  GUERITA(V,D): 1.2 cm2  LV V1 max P.7 mmHg  LV V1 max: 138.3 cm/sec  LV V1 VTI: 34.0 cm  SV(LVOT): 93.4 ml  SI(LVOT): 39.3 ml/m2  PA acc time: 0.12 sec  TR max yasir: 282.3 cm/sec  TR max P.9 mmHg  AV Yasir Ratio (DI): 0.43  GUERITA Index (cm2/m2): 0.48  E/E' av.8  Lateral E/e': 30.2  Medial E/e': 37.3              _____________________________________________________________________________  __        Report approved by: Saul Vazquez 2020 11:28 AM               Most Recent Lab Results In EPIC (For Non-EPIC Providers):    Most Recent 3 CBC's:  Recent Labs   Lab Test 01/19/20  0643 01/18/20  0645 01/16/20  2221   WBC 5.6 6.6 7.7   HGB 12.4 12.0 12.3   MCV 99 96 99    184 185      Most Recent 3 BMP's:  Recent Labs   Lab Test 01/19/20  0643 01/18/20  0645 01/16/20  2221    140 136   POTASSIUM 3.5 4.5 4.0   CHLORIDE 102 107 104   CO2 30 26 27   BUN 33* 31* 19   CR 1.03 0.94 0.95   ANIONGAP 6 7 5   NELLY 9.6 9.2 9.5   * 142* 204*     Most Recent 2 LFT's:  Recent Labs   Lab Test 01/16/20  2221   AST 32   ALT 36   ALKPHOS 56   BILITOTAL 0.8     Most Recent 6 Bacteria Isolates From Any Culture (See EPIC Reports for Culture Details):  Recent Labs   Lab Test 01/16/20  2221 01/16/20  2220   CULT No growth after 2 days No growth after 2 days     Most Recent TSH, T4 and HgbA1c:   Recent Labs   Lab Test 01/16/20  2221   A1C 6.4*

## 2020-01-20 ENCOUNTER — TELEPHONE (OUTPATIENT)
Dept: CARDIOLOGY | Facility: CLINIC | Age: 75
End: 2020-01-20

## 2020-01-20 NOTE — TELEPHONE ENCOUNTER
Spoke with Amy regarding an appt with Dr. Post to discuss her mitral and aortic valve disease. Date, time and location reviewed. Pt verbalized understanding. Naila Campbell RN

## 2020-01-21 ENCOUNTER — NURSE TRIAGE (OUTPATIENT)
Dept: CARDIOLOGY | Facility: CLINIC | Age: 75
End: 2020-01-21

## 2020-01-21 NOTE — TELEPHONE ENCOUNTER
Patient called and left a message stating she was just discharged from the hospital and has a question regarding her after visit summary. Routing to discharge RN for follow up.

## 2020-01-23 LAB
BACTERIA SPEC CULT: NO GROWTH
BACTERIA SPEC CULT: NO GROWTH
Lab: NORMAL
Lab: NORMAL
SPECIMEN SOURCE: NORMAL
SPECIMEN SOURCE: NORMAL

## 2020-01-29 ENCOUNTER — OFFICE VISIT (OUTPATIENT)
Dept: CARDIOLOGY | Facility: CLINIC | Age: 75
End: 2020-01-29
Payer: MEDICARE

## 2020-01-29 VITALS
HEART RATE: 60 BPM | WEIGHT: 277.5 LBS | DIASTOLIC BLOOD PRESSURE: 71 MMHG | SYSTOLIC BLOOD PRESSURE: 117 MMHG | HEIGHT: 65 IN | BODY MASS INDEX: 46.23 KG/M2

## 2020-01-29 DIAGNOSIS — G47.33 OSA (OBSTRUCTIVE SLEEP APNEA): ICD-10-CM

## 2020-01-29 DIAGNOSIS — Q23.2 CONGENITAL MITRAL STENOSIS: ICD-10-CM

## 2020-01-29 DIAGNOSIS — E66.01 MORBID OBESITY (H): ICD-10-CM

## 2020-01-29 DIAGNOSIS — I34.81 MITRAL ANNULAR CALCIFICATION: ICD-10-CM

## 2020-01-29 DIAGNOSIS — I50.22 CHRONIC SYSTOLIC CONGESTIVE HEART FAILURE (H): ICD-10-CM

## 2020-01-29 DIAGNOSIS — I35.0 AORTIC VALVE STENOSIS, ETIOLOGY OF CARDIAC VALVE DISEASE UNSPECIFIED: Primary | ICD-10-CM

## 2020-01-29 PROCEDURE — 99205 OFFICE O/P NEW HI 60 MIN: CPT | Performed by: INTERNAL MEDICINE

## 2020-01-29 ASSESSMENT — MIFFLIN-ST. JEOR: SCORE: 1764.61

## 2020-01-29 NOTE — LETTER
1/29/2020    Candy Frederick MD  [facility] [address]  [facility] [address]    RE: Amy Luna       Dear Colleague,    I had the pleasure of seeing Amy Luna in the [facility].    HPI and Plan:     Amy Luna is a 74-year-old referred by Dr. Vela for consideration of transcatheter therapy.  She has been admitted recently at [facility] couple weeks ago with acute on chronic systolic heart failure.  She was hospitalized diuresed approximately 10 pounds and had some metastatic medication adjustments.  An echocardiogram showed normal left ventricular function with severe mitral valve stenosis primarily due to mitral annular calcification and moderate aortic valve stenosis with mean gradient of pressure 24 mmHg.  The mean gradient across her mitral valve 14 mmHg.  She not have a transesophageal echocardiogram done but did have one performed in 2015 at [facility].  This confirmed moderately severe mitral annular calcification.  No mention interestingly of the leaflets.    She denies any chest pain or chest pressure.  She did have an abnormal stress test at [facility] a few years ago showing some reversible defect but never had an angiogram performed that I can find.  She denies any heart attack.  She had a previous episode of congestive heart failure a few years ago as well.  She has chronic shortness of breath but she attributes this mostly to her weight.  It sounds as if it is hard that she does not do much activity for example and shopping she uses an electric chair cart.  She also has obstructive sleep apnea.    Assessment and plan patient is morbidly obese has obstructive sleep apnea and both aortic and mitral valve disease.  I believe the aortic valve disease is only moderate by visual as well as echocardiographic criteria and therefore does not warrant intervention.  Her mitral valve is clearly abnormal due to combination of mitral valve  thickening subvalvular thickening and mitral annular calcification making percutaneous options limited.  Certainly MitraClip is not feasible and she has a high Sherice score of approximately 11.  Mitral view uncertain mitral valvuloplasty is not indicated.  I restart is not warranted.  Obviously she is not a candidate for mitral clip as it is mitral stenosis.  Possible transcatheter therapy includes a TAVR and MAC.  I will refer her for a cardiovascular CT scan to assess the degree of circumferential calcification also the orion-LVOT area.  I also like her to see Dr. Ok thornton as open surgical option may in fact be her best option as well.  Amy does want to pursue options of course would prefer transcatheter approach if possible.  I did explain that if we did proceed with TAVR and MAC this would clearly be an off label indication.  She understood this.      Encounter Diagnoses   Name Primary?     Aortic valve stenosis, etiology of cardiac valve disease unspecified Yes     Congenital mitral stenosis      Chronic systolic congestive heart failure (H)      Morbid obesity (H)      LAVERNE (obstructive sleep apnea)        CURRENT MEDICATIONS:  Current Outpatient Medications   Medication Sig Dispense Refill     albuterol (PROAIR HFA/PROVENTIL HFA/VENTOLIN HFA) 108 (90 Base) MCG/ACT inhaler Inhale 2 puffs into the lungs every 4 hours as needed for shortness of breath / dyspnea or wheezing       Atorvastatin Calcium (LIPITOR PO) Take 40 mg by mouth At Bedtime        furosemide (LASIX) 40 MG tablet Take 40 mg by mouth every morning       glipiZIDE (GLIPIZIDE XL) 10 MG 24 hr tablet Take 1 tablet (10 mg) by mouth daily (with breakfast)       insulin NPH (HUMULIN N/NOVOLIN N VIAL) 100 UNIT/ML vial Inject 50 Units Subcutaneous 2 times daily       ipratropium (ATROVENT) 0.06 % nasal spray Spray 2 sprays into both nostrils 4 times daily as needed for rhinitis       losartan (COZAAR) 50 MG tablet Take 50 mg by mouth every  morning       metFORMIN (GLUCOPHAGE-XR) 500 MG 24 hr tablet Take 1,000 mg by mouth daily (with breakfast)       metoprolol succinate ER (TOPROL-XL) 25 MG 24 hr tablet Take 3 tablets (75 mg) by mouth every morning 90 tablet 0     Sertraline HCl (ZOLOFT PO) Take 150 mg by mouth every morning          ALLERGIES     Allergies   Allergen Reactions     Penicillins        PAST MEDICAL HISTORY:  Past Medical History:   Diagnosis Date     (HFpEF) heart failure with preserved ejection fraction (H)      Aortic stenosis      Chest pain      Depressive disorder      Diabetes (H)      Hyperlipidemia      Hypertension      Mitral annular calcification      Mitral stenosis      Obesity      Sleep apnea     CPAP       PAST SURGICAL HISTORY:  Past Surgical History:   Procedure Laterality Date     APPENDECTOMY       GYN SURGERY       x2 ,      GYN SURGERY      total hysterectomy     IMPLANT PACEMAKER         FAMILY HISTORY:  History reviewed. No pertinent family history.    SOCIAL HISTORY:  Social History     Socioeconomic History     Marital status:      Spouse name: None     Number of children: None     Years of education: None     Highest education level: None   Occupational History     None   Social Needs     Financial resource strain: None     Food insecurity:     Worry: None     Inability: None     Transportation needs:     Medical: None     Non-medical: None   Tobacco Use     Smoking status: Never Smoker     Smokeless tobacco: Never Used   Substance and Sexual Activity     Alcohol use: No     Drug use: No     Sexual activity: None   Lifestyle     Physical activity:     Days per week: None     Minutes per session: None     Stress: None   Relationships     Social connections:     Talks on phone: None     Gets together: None     Attends Yarsanism service: None     Active member of club or organization: None     Attends meetings of clubs or organizations: None     Relationship status: None     Intimate  "partner violence:     Fear of current or ex partner: None     Emotionally abused: None     Physically abused: None     Forced sexual activity: None   Other Topics Concern     None   Social History Narrative     None       Review of Systems:  Skin:  Negative     Eyes:  Positive for glasses  ENT:  Negative    Respiratory:  Positive for dyspnea on exertion;shortness of breath  Cardiovascular:  Negative for;palpitations;chest pain edema;Positive for  Gastroenterology: Negative    Genitourinary:  Negative    Musculoskeletal:  Positive for arthritis;back pain  Neurologic:  Negative    Psychiatric:  Positive for depression  Heme/Lymph/Imm:  Negative    Endocrine:  Positive for diabetes    Physical Exam:  Vitals: /71   Pulse 60   Ht 1.651 m (5' 5\")   Wt 125.9 kg (277 lb 8 oz)   BMI 46.18 kg/m       Constitutional:  cooperative, alert and oriented, well developed, well nourished, in no acute distress morbidly obese      Skin:  warm and dry to the touch, no apparent skin lesions or masses noted   pacemaker incision in the left infraclavicular area was well-healed      Head:           Eyes:  pupils equal and round, conjunctivae and lids unremarkable, sclera white, no xanthalasma, EOMS intact, no nystagmus        Lymph:No Cervical lymphadenopathy present     ENT:  no pallor or cyanosis, dentition good        Neck:  carotid pulses are full and equal bilaterally, JVP normal, no carotid bruit        Respiratory:  normal breath sounds, clear to auscultation, normal A-P diameter, normal symmetry, normal respiratory excursion, no use of accessory muscles         Cardiac: regular rhythm       systolic murmur;grade 2;radiation to the carotid   diastolic murmur;mid diastolic murmur;grade 2    pulses full and equal, no bruits auscultated                                        GI:  abdomen soft, non-tender, BS normoactive, no mass, no HSM, no bruits        Extremities and Muscular Skeletal:  no deformities, clubbing, cyanosis, " erythema observed              Neurological:  no gross motor deficits        Psych:  Alert and Oriented x 3      Recent Lab Results:  LIPID RESULTS:  No results found for: CHOL, HDL, LDL, TRIG, CHOLHDLRATIO    LIVER ENZYME RESULTS:  Lab Results   Component Value Date    AST 32 01/16/2020    ALT 36 01/16/2020       CBC RESULTS:  Lab Results   Component Value Date    WBC 5.6 01/19/2020    RBC 3.84 01/19/2020    HGB 12.4 01/19/2020    HCT 37.9 01/19/2020    MCV 99 01/19/2020    MCH 32.3 01/19/2020    MCHC 32.7 01/19/2020    RDW 12.9 01/19/2020     01/19/2020       BMP RESULTS:  Lab Results   Component Value Date     01/19/2020    POTASSIUM 3.5 01/19/2020    CHLORIDE 102 01/19/2020    CO2 30 01/19/2020    ANIONGAP 6 01/19/2020     (H) 01/19/2020    BUN 33 (H) 01/19/2020    CR 1.03 01/19/2020    GFRESTIMATED 54 (L) 01/19/2020    GFRESTBLACK 62 01/19/2020    NELLY 9.6 01/19/2020        A1C RESULTS:  Lab Results   Component Value Date    A1C 6.4 (H) 01/16/2020       INR RESULTS:  No results found for: INR        Thank you for allowing me to participate in the care of your patient.    Sincerely,     GEENA CHENEY MD     [facility]

## 2020-01-29 NOTE — PROGRESS NOTES
HPI and Plan:     Amy Luna is a 74-year-old referred by Dr. Vela for consideration of transcatheter therapy.  She has been admitted recently at [facility] couple weeks ago with acute on chronic systolic heart failure.  She was hospitalized diuresed approximately 10 pounds and had some metastatic medication adjustments.  An echocardiogram showed normal left ventricular function with severe mitral valve stenosis primarily due to mitral annular calcification and moderate aortic valve stenosis with mean gradient of pressure 24 mmHg.  The mean gradient across her mitral valve 14 mmHg.  She not have a transesophageal echocardiogram done but did have one performed in 2015 at [facility] beds at the [facility].  This confirmed moderately severe mitral annular calcification.  No mention interestingly of the leaflets.    She denies any chest pain or chest pressure.  She did have an abnormal stress test at [facility] a few years ago showing some reversible defect but never had an angiogram performed that I can find.  She denies any heart attack.  She had a previous episode of congestive heart failure a few years ago as well.  She has chronic shortness of breath but she attributes this mostly to her weight.  It sounds as if it is hard that she does not do much activity for example and shopping she uses an electric chair cart.  She also has obstructive sleep apnea.    Assessment and plan patient is morbidly obese has obstructive sleep apnea and both aortic and mitral valve disease.  I believe the aortic valve disease is only moderate by visual as well as echocardiographic criteria and therefore does not warrant intervention.  Her mitral valve is clearly abnormal due to combination of mitral valve thickening subvalvular thickening and mitral annular calcification making percutaneous options limited.  Certainly MitraClip is not feasible and she has a high Sherice score of approximately 11.  Mitral view uncertain mitral valvuloplasty is not  indicated.  I restart is not warranted.  Obviously she is not a candidate for mitral clip as it is mitral stenosis.  Possible transcatheter therapy includes a TAVR and MAC.  I will refer her for a cardiovascular CT scan to assess the degree of circumferential calcification also the orion-LVOT area.  I also like her to see Dr. Ok Wilson smaller as open surgical option may in fact be her best option as well.  Amy does want to pursue options of course would prefer transcatheter approach if possible.  I did explain that if we did proceed with TAVR and MAC this would clearly be an off label indication.  She understood this.      Encounter Diagnoses   Name Primary?     Aortic valve stenosis, etiology of cardiac valve disease unspecified Yes     Congenital mitral stenosis      Chronic systolic congestive heart failure (H)      Morbid obesity (H)      LAVERNE (obstructive sleep apnea)        CURRENT MEDICATIONS:  Current Outpatient Medications   Medication Sig Dispense Refill     albuterol (PROAIR HFA/PROVENTIL HFA/VENTOLIN HFA) 108 (90 Base) MCG/ACT inhaler Inhale 2 puffs into the lungs every 4 hours as needed for shortness of breath / dyspnea or wheezing       Atorvastatin Calcium (LIPITOR PO) Take 40 mg by mouth At Bedtime        furosemide (LASIX) 40 MG tablet Take 40 mg by mouth every morning       glipiZIDE (GLIPIZIDE XL) 10 MG 24 hr tablet Take 1 tablet (10 mg) by mouth daily (with breakfast)       insulin NPH (HUMULIN N/NOVOLIN N VIAL) 100 UNIT/ML vial Inject 50 Units Subcutaneous 2 times daily       ipratropium (ATROVENT) 0.06 % nasal spray Spray 2 sprays into both nostrils 4 times daily as needed for rhinitis       losartan (COZAAR) 50 MG tablet Take 50 mg by mouth every morning       metFORMIN (GLUCOPHAGE-XR) 500 MG 24 hr tablet Take 1,000 mg by mouth daily (with breakfast)       metoprolol succinate ER (TOPROL-XL) 25 MG 24 hr tablet Take 3 tablets (75 mg) by mouth every morning 90 tablet 0     Sertraline HCl  (ZOLOFT PO) Take 150 mg by mouth every morning          ALLERGIES     Allergies   Allergen Reactions     Penicillins        PAST MEDICAL HISTORY:  Past Medical History:   Diagnosis Date     (HFpEF) heart failure with preserved ejection fraction (H)      Aortic stenosis      Chest pain      Depressive disorder      Diabetes (H)      Hyperlipidemia      Hypertension      Mitral annular calcification      Mitral stenosis      Obesity      Sleep apnea     CPAP       PAST SURGICAL HISTORY:  Past Surgical History:   Procedure Laterality Date     APPENDECTOMY       GYN SURGERY       x2 ,      GYN SURGERY      total hysterectomy     IMPLANT PACEMAKER         FAMILY HISTORY:  History reviewed. No pertinent family history.    SOCIAL HISTORY:  Social History     Socioeconomic History     Marital status:      Spouse name: None     Number of children: None     Years of education: None     Highest education level: None   Occupational History     None   Social Needs     Financial resource strain: None     Food insecurity:     Worry: None     Inability: None     Transportation needs:     Medical: None     Non-medical: None   Tobacco Use     Smoking status: Never Smoker     Smokeless tobacco: Never Used   Substance and Sexual Activity     Alcohol use: No     Drug use: No     Sexual activity: None   Lifestyle     Physical activity:     Days per week: None     Minutes per session: None     Stress: None   Relationships     Social connections:     Talks on phone: None     Gets together: None     Attends Hindu service: None     Active member of club or organization: None     Attends meetings of clubs or organizations: None     Relationship status: None     Intimate partner violence:     Fear of current or ex partner: None     Emotionally abused: None     Physically abused: None     Forced sexual activity: None   Other Topics Concern     None   Social History Narrative     None       Review of  "Systems:  Skin:  Negative     Eyes:  Positive for glasses  ENT:  Negative    Respiratory:  Positive for dyspnea on exertion;shortness of breath  Cardiovascular:  Negative for;palpitations;chest pain edema;Positive for  Gastroenterology: Negative    Genitourinary:  Negative    Musculoskeletal:  Positive for arthritis;back pain  Neurologic:  Negative    Psychiatric:  Positive for depression  Heme/Lymph/Imm:  Negative    Endocrine:  Positive for diabetes    Physical Exam:  Vitals: /71   Pulse 60   Ht 1.651 m (5' 5\")   Wt 125.9 kg (277 lb 8 oz)   BMI 46.18 kg/m      Constitutional:  cooperative, alert and oriented, well developed, well nourished, in no acute distress morbidly obese      Skin:  warm and dry to the touch, no apparent skin lesions or masses noted   pacemaker incision in the left infraclavicular area was well-healed      Head:           Eyes:  pupils equal and round, conjunctivae and lids unremarkable, sclera white, no xanthalasma, EOMS intact, no nystagmus        Lymph:No Cervical lymphadenopathy present     ENT:  no pallor or cyanosis, dentition good        Neck:  carotid pulses are full and equal bilaterally, JVP normal, no carotid bruit        Respiratory:  normal breath sounds, clear to auscultation, normal A-P diameter, normal symmetry, normal respiratory excursion, no use of accessory muscles         Cardiac: regular rhythm       systolic murmur;grade 2;radiation to the carotid   diastolic murmur;mid diastolic murmur;grade 2    pulses full and equal, no bruits auscultated                                        GI:  abdomen soft, non-tender, BS normoactive, no mass, no HSM, no bruits        Extremities and Muscular Skeletal:  no deformities, clubbing, cyanosis, erythema observed              Neurological:  no gross motor deficits        Psych:  Alert and Oriented x 3      Recent Lab Results:  LIPID RESULTS:  No results found for: CHOL, HDL, LDL, TRIG, CHOLHDLRATIO    LIVER ENZYME " RESULTS:  Lab Results   Component Value Date    AST 32 01/16/2020    ALT 36 01/16/2020       CBC RESULTS:  Lab Results   Component Value Date    WBC 5.6 01/19/2020    RBC 3.84 01/19/2020    HGB 12.4 01/19/2020    HCT 37.9 01/19/2020    MCV 99 01/19/2020    MCH 32.3 01/19/2020    MCHC 32.7 01/19/2020    RDW 12.9 01/19/2020     01/19/2020       BMP RESULTS:  Lab Results   Component Value Date     01/19/2020    POTASSIUM 3.5 01/19/2020    CHLORIDE 102 01/19/2020    CO2 30 01/19/2020    ANIONGAP 6 01/19/2020     (H) 01/19/2020    BUN 33 (H) 01/19/2020    CR 1.03 01/19/2020    GFRESTIMATED 54 (L) 01/19/2020    GFRESTBLACK 62 01/19/2020    NELLY 9.6 01/19/2020        A1C RESULTS:  Lab Results   Component Value Date    A1C 6.4 (H) 01/16/2020       INR RESULTS:  No results found for: INR        CC  Candy Frederick MD  [facility]  [address]  [place] [address]

## 2020-01-29 NOTE — NURSING NOTE
TAVR Coordinator visit:  Provided additional education regarding TAVR in MAC procedure, after being present for discussion with physician. Explained the work-up process and next steps for patient, pt will have CT and appt with Dr. Wilson to discuss surgical options. Patient provided our direct contact number and instructed to call with any questions.

## 2020-02-11 ENCOUNTER — TELEPHONE (OUTPATIENT)
Dept: CARDIOLOGY | Facility: CLINIC | Age: 75
End: 2020-02-11

## 2020-02-11 NOTE — TELEPHONE ENCOUNTER
Pt lost her paperwork for her upcoming appts and needed to confirm dates, times and locations. This was reviewed with the patient on the phone and we will also send her the written reminder and prep in the mail. Naila Campbell RN

## 2020-02-14 ENCOUNTER — TELEPHONE (OUTPATIENT)
Dept: OTHER | Facility: CLINIC | Age: 75
End: 2020-02-14

## 2020-02-14 NOTE — TELEPHONE ENCOUNTER
Patient's consult with Dr. Wilson rescheduled to allow for echocardiogram completion prior to consult. Echo 2/19, Consult 2/24.

## 2020-02-17 ENCOUNTER — OFFICE VISIT (OUTPATIENT)
Dept: CARDIOLOGY | Facility: CLINIC | Age: 75
End: 2020-02-17
Payer: MEDICARE

## 2020-02-17 VITALS
HEIGHT: 65 IN | WEIGHT: 283 LBS | HEART RATE: 73 BPM | SYSTOLIC BLOOD PRESSURE: 138 MMHG | BODY MASS INDEX: 47.15 KG/M2 | DIASTOLIC BLOOD PRESSURE: 76 MMHG

## 2020-02-17 DIAGNOSIS — Q23.2 CONGENITAL MITRAL STENOSIS: ICD-10-CM

## 2020-02-17 DIAGNOSIS — I35.0 AORTIC VALVE STENOSIS, ETIOLOGY OF CARDIAC VALVE DISEASE UNSPECIFIED: ICD-10-CM

## 2020-02-17 DIAGNOSIS — I34.81 MITRAL ANNULAR CALCIFICATION: ICD-10-CM

## 2020-02-17 ASSESSMENT — MIFFLIN-ST. JEOR: SCORE: 1789.56

## 2020-02-17 NOTE — LETTER
2/17/2020      RE: Amy Luna  [address]  [facility] [address]       CV Surgery    Patient seen, clinic note dictated #[id number].    Ok Wilson MD    Service Date: 02/17/2020      REFERRING CARDIOLOGIST:  Ilan Post MD      REASON FOR CONSULTATION:  Evaluation for severe mitral valve stenosis with mitral annular calcification (MAC).      HISTORY OF PRESENT ILLNESS:  Ms. Luna is a pleasant, 74-year-old, morbidly obese female with type 2 diabetes who was hospitalized a month ago with congestive heart failure symptoms.  She has a known history of mitral valve stenosis and aortic stenosis dating back to 2015 when this was diagnosed at [facility], but no further treatment or followup was done.  Her recent echo from last month demonstrated moderate aortic valve stenosis with a mean gradient of 24 mmHg and severe mitral valve stenosis with a mean gradient of 14 mmHg with likely moderate to severe MAC.  She was diuresed aggressively, and her symptoms significantly improved.  She was seen by Dr. Audrey Christina while she was in the hospital, who referred the patient to Dr. Post for evaluation for possible transcatheter valve therapy options for her valvular disease.  Dr. Post referred the patient to me for surgical evaluation.  She had a TAVR protocol CT scan ordered to look at her anatomy; specifically, her MAC and her orion-LVOT area to see if a transcatheter valve and native MAC *** would be anatomically feasible.      PAST MEDICAL HISTORY:  Aortic stenosis and mitral valve stenosis likely due to rheumatic infection as a child, depression, type 2 diabetes, insulin dependent, hypertension, hyperlipidemia, mitral annular calcification, sleep apnea and wears a home CPAP and morbid obesity.      PAST SURGICAL HISTORY:  Appendectomy, C section x2, total hysterectomy, permanent pacemaker implantation in 2015 for sick sinus syndrome.      FAMILY HISTORY:  Noncontributory.      SOCIAL HISTORY:  She is  .  She has never smoked.  She does not drink alcohol.  She ambulates okay, but with a walker due to bilateral hip pain, which is chronic for her.      ALLERGIES:  Penicillin.      CURRENT OUTPATIENT MEDICATIONS:  Reviewed in Epic chart.      REVIEW OF SYSTEMS:  As per HPI.  All other 10 point reviews of systems are completed and were otherwise negative unless stated above.      IMAGING:  Her echocardiogram from 01/17/2020 demonstrated hyperdynamic left ventricle with EF of 70%, moderate to severe MAC, moderate to severe mitral stenosis, moderate aortic valve stenosis with mild aortic valve insufficiency.  Tricuspid valve is not well visualized.      IMPRESSION AND PLAN:  Ms. Luna is a 74-year-old, diabetic female with morbid obesity with a BMI of 47 with severe symptomatic mitral valve stenosis with likely severe MAC.  She has a TAVR protocol CT scan pending.  I agree with Dr. Post that surgical mitral valve replacement should be considered first.  Her surgical candidacy will ultimately depend on the degree of her MAC.  We will have a better idea of this after the TAVR protocol CT scan.  The next step would be to get a transesophageal echo and a left and right heart cath, but we will await the CT scan results first.  I explained to the patient the diagnosis in detail and the severity of her mitral valve stenosis in the setting of significant MAC.  I do not think that her aortic valve stenosis is severe, and I do not think it warrants intervention at this time.  If her MAC is not severe, I think we could potentially consider her for mitral valve replacement surgically, although given her morbid obesity status, she would at least be a moderate risk.  If her MAC is circumferential and orion-LVOT would be within the acceptable range, she could potentially be a transcatheter mitral valve replacement candidate.  Another approach would be a hybrid approach where we would surgically deploy a transcatheter valve in  the mitral position via a left atriotomy.  We will await the TAVR protocol CT scan, and I will discuss this with Dr. Post and hopefully move forward with the next preoperative workup.      It was a pleasure to meet Ms. Luna today.         CRISTA KING MD             D: 2020   T: 2020   MT: lauren      Name:     KASSANDRA LUNA   MRN:      [id number]        Account:      [id number]   :      1945           Service Date: 2020      Document: [id number]       Crista King MD

## 2020-02-17 NOTE — LETTER
Date:February 18, 2020      Patient was self referred, no letter generated. Do not send.        [facility] Health Information

## 2020-02-17 NOTE — LETTER
2/17/2020    RE: Amy Luna  [address]  [place] [address]     Dear Colleague,    Thank you for referring your patient, Amy Luna, to the [facility] CARDIOTH[facility]IC at [facility]. Please see a copy of my visit note below.    CV Surgery    Patient seen, clinic note dictated #[id number].    Ok Wilson MD    Service Date: 02/17/2020      REFERRING CARDIOLOGIST:  Ilan Post MD      REASON FOR CONSULTATION:  Evaluation for severe mitral valve stenosis with mitral annular calcification (MAC).      HISTORY OF PRESENT ILLNESS:  Ms. Luna is a pleasant, 74-year-old, morbidly obese female with type 2 diabetes who was hospitalized a month ago with congestive heart failure symptoms.  She has a known history of mitral valve stenosis and aortic stenosis dating back to 2015 when this was diagnosed at [facility], but no further treatment or followup was done.  Her recent echo from last month demonstrated moderate aortic valve stenosis with a mean gradient of 24 mmHg and severe mitral valve stenosis with a mean gradient of 14 mmHg with likely moderate to severe MAC.  She was diuresed aggressively, and her symptoms significantly improved.  She was seen by Dr. Audrey Christina while she was in the hospital, who referred the patient to Dr. Post for evaluation for possible transcatheter valve therapy options for her valvular disease.  Dr. Post referred the patient to me for surgical evaluation.  She had a TAVR protocol CT scan ordered to look at her anatomy; specifically, her MAC and her orion-LVOT area to see if a transcatheter valve and native MAC *** would be anatomically feasible.      PAST MEDICAL HISTORY:  Aortic stenosis and mitral valve stenosis likely due to rheumatic infection as a child, depression, type 2 diabetes, insulin dependent, hypertension, hyperlipidemia, mitral annular calcification, sleep apnea and wears a home CPAP and morbid obesity.      PAST SURGICAL  HISTORY:  Appendectomy, C section x2, total hysterectomy, permanent pacemaker implantation in 2015 for sick sinus syndrome.      FAMILY HISTORY:  Noncontributory.      SOCIAL HISTORY:  She is .  She has never smoked.  She does not drink alcohol.  She ambulates okay, but with a walker due to bilateral hip pain, which is chronic for her.      ALLERGIES:  Penicillin.      CURRENT OUTPATIENT MEDICATIONS:  Reviewed in Epic chart.      REVIEW OF SYSTEMS:  As per HPI.  All other 10 point reviews of systems are completed and were otherwise negative unless stated above.      IMAGING:  Her echocardiogram from 01/17/2020 demonstrated hyperdynamic left ventricle with EF of 70%, moderate to severe MAC, moderate to severe mitral stenosis, moderate aortic valve stenosis with mild aortic valve insufficiency.  Tricuspid valve is not well visualized.      IMPRESSION AND PLAN:  Ms. Luna is a 74-year-old, diabetic female with morbid obesity with a BMI of 47 with severe symptomatic mitral valve stenosis with likely severe MAC.  She has a TAVR protocol CT scan pending.  I agree with Dr. Post that surgical mitral valve replacement should be considered first.  Her surgical candidacy will ultimately depend on the degree of her MAC.  We will have a better idea of this after the TAVR protocol CT scan.  The next step would be to get a transesophageal echo and a left and right heart cath, but we will await the CT scan results first.  I explained to the patient the diagnosis in detail and the severity of her mitral valve stenosis in the setting of significant MAC.  I do not think that her aortic valve stenosis is severe, and I do not think it warrants intervention at this time.  If her MAC is not severe, I think we could potentially consider her for mitral valve replacement surgically, although given her morbid obesity status, she would at least be a moderate risk.  If her MAC is circumferential and orion-LVOT would be within the  acceptable range, she could potentially be a transcatheter mitral valve replacement candidate.  Another approach would be a hybrid approach where we would surgically deploy a transcatheter valve in the mitral position via a left atriotomy.  We will await the TAVR protocol CT scan, and I will discuss this with Dr. Post and hopefully move forward with the next preoperative workup.      It was a pleasure to meet Ms. Luna today.      CRISTA KING MD       D: 2020   T: 2020   MT: lauren      Name:     KASSANDRA LUNA   MRN:      [id number]        Account:      [id number]   :      1945           Service Date: 2020      Document: [id number]       Again, thank you for allowing me to participate in the care of your patient.      Sincerely,    Crista King MD

## 2020-02-17 NOTE — LETTER
2/17/2020      RE: Amy Luna  [address] [place] [facility] [address]       Dear Colleague,    Thank you for the opportunity to participate in the care of your patient, Amy Luna, at the [facility] CARDIOTH[facility]ACIC at [facility]. Please see a copy of my visit note below.    CV Surgery    Patient seen, clinic note dictated #[id number].    Ok Wilson MD    Please do not hesitate to contact me if you have any questions/concerns.     Sincerely,     kO Wilson MD

## 2020-02-18 NOTE — PROGRESS NOTES
Service Date: 02/17/2020      REFERRING CARDIOLOGIST:  Ilan Post MD      REASON FOR CONSULTATION:  Evaluation for severe mitral valve stenosis with mitral annular calcification (MAC).      HISTORY OF PRESENT ILLNESS:  Ms. Luna is a pleasant, 74-year-old, morbidly obese female with type 2 diabetes who was hospitalized a month ago with congestive heart failure symptoms.  She has a known history of mitral valve stenosis and aortic stenosis dating back to 2015 when this was diagnosed at [facility], but no further treatment or followup was done.  Her recent echo from last month demonstrated moderate aortic valve stenosis with a mean gradient of 24 mmHg and severe mitral valve stenosis with a mean gradient of 14 mmHg with likely moderate to severe MAC.  She was diuresed aggressively, and her symptoms significantly improved.  She was seen by Dr. Audrey Christina while she was in the hospital, who referred the patient to Dr. Post for evaluation for possible transcatheter valve therapy options for her valvular disease.  Dr. Post referred the patient to me for surgical evaluation.  She had a TAVR protocol CT scan ordered to look at her anatomy; specifically, her MAC and her orion-LVOT area to see if a transcatheter valve in the mitral position would be anatomically feasible.      PAST MEDICAL HISTORY:  Aortic stenosis and mitral valve stenosis likely due to rheumatic infection as a child, depression, type 2 diabetes, insulin dependent, hypertension, hyperlipidemia, mitral annular calcification, sleep apnea and wears a home CPAP and morbid obesity.      PAST SURGICAL HISTORY:  Appendectomy, C section x2, total hysterectomy, permanent pacemaker implantation in 2015 for sick sinus syndrome.      FAMILY HISTORY:  Noncontributory.      SOCIAL HISTORY:  She is .  She has never smoked.  She does not drink alcohol.  She ambulates okay, but with a walker due to bilateral hip pain, which is chronic for her.       ALLERGIES:  Penicillin.      CURRENT OUTPATIENT MEDICATIONS:  Reviewed in Epic chart.      REVIEW OF SYSTEMS:  As per HPI.  All other 10 point reviews of systems are completed and were otherwise negative unless stated above.      IMAGING:  Her echocardiogram from 01/17/2020 demonstrated hyperdynamic left ventricle with EF of 70%, moderate to severe MAC, moderate to severe mitral stenosis, moderate aortic valve stenosis with mild aortic valve insufficiency.  Tricuspid valve is not well visualized.      IMPRESSION AND PLAN:  Ms. Luna is a 74-year-old, diabetic female with morbid obesity with a BMI of 47 with severe symptomatic mitral valve stenosis with likely severe MAC.  She has a TAVR protocol CT scan pending.  I agree with Dr. Post that surgical mitral valve replacement should be considered first.  Her surgical candidacy will ultimately depend on the degree of her MAC.  We will have a better idea of this after the TAVR protocol CT scan.  The next step would be to get a transesophageal echo and a left and right heart cath, but we will await the CT scan results first.  I explained to the patient the diagnosis in detail and the severity of her mitral valve stenosis in the setting of significant MAC.  I do not think that her aortic valve stenosis is severe, and I do not think it warrants intervention at this time.  If her MAC is not severe, I think we could potentially consider her for mitral valve replacement surgically, although given her morbid obesity status, she would at least be a moderate risk.  If her MAC is circumferential and orion-LVOT would be within the acceptable range, she could potentially be a transcatheter mitral valve replacement candidate.  Another approach would be a hybrid approach where we would surgically deploy a transcatheter valve in the mitral position via a left atriotomy.  We will await the TAVR protocol CT scan, and I will discuss this with Dr. Post and hopefully move forward with  the next preoperative workup.      It was a pleasure to meet Ms. Rivera today.         CRISTA KING MD             D: 2020   T: 2020   MT: luaren      Name:     KASSANDRA RIVERA   MRN:      [id number]        Account:      [id number]   :      1945           Service Date: 2020      Document: [id number]

## 2020-02-20 DIAGNOSIS — I10 ESSENTIAL HYPERTENSION: ICD-10-CM

## 2020-02-20 RX ORDER — METOPROLOL SUCCINATE 25 MG/1
75 TABLET, EXTENDED RELEASE ORAL EVERY MORNING
Qty: 270 TABLET | Refills: 3 | Status: SHIPPED | OUTPATIENT
Start: 2020-02-20 | End: 2020-02-24

## 2020-02-24 DIAGNOSIS — I10 ESSENTIAL HYPERTENSION: ICD-10-CM

## 2020-02-24 RX ORDER — METOPROLOL SUCCINATE 50 MG/1
75 TABLET, EXTENDED RELEASE ORAL EVERY MORNING
Qty: 135 TABLET | Refills: 3 | Status: ON HOLD | OUTPATIENT
Start: 2020-02-24 | End: 2020-04-22

## 2020-03-10 ENCOUNTER — HOSPITAL ENCOUNTER (OUTPATIENT)
Dept: CARDIOLOGY | Facility: CLINIC | Age: 75
Discharge: HOME | End: 2020-03-10
Attending: INTERNAL MEDICINE | Admitting: INTERNAL MEDICINE
Payer: MEDICARE

## 2020-03-10 VITALS — DIASTOLIC BLOOD PRESSURE: 56 MMHG | HEART RATE: 73 BPM | SYSTOLIC BLOOD PRESSURE: 135 MMHG

## 2020-03-10 DIAGNOSIS — I34.81 MITRAL ANNULAR CALCIFICATION: ICD-10-CM

## 2020-03-10 DIAGNOSIS — I35.0 AORTIC VALVE STENOSIS, ETIOLOGY OF CARDIAC VALVE DISEASE UNSPECIFIED: ICD-10-CM

## 2020-03-10 DIAGNOSIS — Q23.2 CONGENITAL MITRAL STENOSIS: ICD-10-CM

## 2020-03-10 LAB
CREAT BLD-MCNC: 1.2 MG/DL (ref 0.52–1.04)
GFR SERPL CREATININE-BSD FRML MDRD: 44 ML/MIN/{1.73_M2}

## 2020-03-10 PROCEDURE — 82565 ASSAY OF CREATININE: CPT

## 2020-03-10 PROCEDURE — 25800030 ZZH RX IP 258 OP 636: Performed by: INTERNAL MEDICINE

## 2020-03-10 PROCEDURE — 74174 CTA ABD&PLVS W/CONTRAST: CPT | Mod: 26 | Performed by: INTERNAL MEDICINE

## 2020-03-10 PROCEDURE — 25000128 H RX IP 250 OP 636: Performed by: INTERNAL MEDICINE

## 2020-03-10 PROCEDURE — 71275 CT ANGIOGRAPHY CHEST: CPT

## 2020-03-10 PROCEDURE — 71275 CT ANGIOGRAPHY CHEST: CPT | Mod: 26 | Performed by: INTERNAL MEDICINE

## 2020-03-10 RX ORDER — DIPHENHYDRAMINE HCL 25 MG
25 CAPSULE ORAL
Status: DISCONTINUED | OUTPATIENT
Start: 2020-03-10 | End: 2020-03-11 | Stop reason: HOSPADM

## 2020-03-10 RX ORDER — ACYCLOVIR 200 MG/1
0-1 CAPSULE ORAL
Status: DISCONTINUED | OUTPATIENT
Start: 2020-03-10 | End: 2020-03-11 | Stop reason: HOSPADM

## 2020-03-10 RX ORDER — DIPHENHYDRAMINE HYDROCHLORIDE 50 MG/ML
25-50 INJECTION INTRAMUSCULAR; INTRAVENOUS
Status: DISCONTINUED | OUTPATIENT
Start: 2020-03-10 | End: 2020-03-11 | Stop reason: HOSPADM

## 2020-03-10 RX ORDER — IOPAMIDOL 755 MG/ML
500 INJECTION, SOLUTION INTRAVASCULAR ONCE
Status: COMPLETED | OUTPATIENT
Start: 2020-03-10 | End: 2020-03-10

## 2020-03-10 RX ORDER — METHYLPREDNISOLONE SODIUM SUCCINATE 125 MG/2ML
125 INJECTION, POWDER, LYOPHILIZED, FOR SOLUTION INTRAMUSCULAR; INTRAVENOUS
Status: DISCONTINUED | OUTPATIENT
Start: 2020-03-10 | End: 2020-03-11 | Stop reason: HOSPADM

## 2020-03-10 RX ORDER — ONDANSETRON 2 MG/ML
4 INJECTION INTRAMUSCULAR; INTRAVENOUS
Status: DISCONTINUED | OUTPATIENT
Start: 2020-03-10 | End: 2020-03-11 | Stop reason: HOSPADM

## 2020-03-10 RX ADMIN — SODIUM CHLORIDE 100 ML: 9 INJECTION, SOLUTION INTRAVENOUS at 13:44

## 2020-03-10 RX ADMIN — IOPAMIDOL 100 ML: 755 INJECTION, SOLUTION INTRAVENOUS at 13:44

## 2020-03-19 ENCOUNTER — TELEPHONE (OUTPATIENT)
Dept: CARDIOLOGY | Facility: CLINIC | Age: 75
End: 2020-03-19

## 2020-03-19 NOTE — TELEPHONE ENCOUNTER
Per Dr. Post, upload images to [facility] for TAVR in MAC measurements and approval. This information was called out to patient. Will contact her when we have more information/final read. She verbalized understanding. Naila Campbell RN

## 2020-03-19 NOTE — TELEPHONE ENCOUNTER
Pt called for results of CT and plan for mitral valve. Cardiology read of CT scan still pending, will discuss read with Dr. Post and call pt back with plan. Naila Campbell RN

## 2020-03-24 ENCOUNTER — CARE COORDINATION (OUTPATIENT)
Dept: CARDIOLOGY | Facility: CLINIC | Age: 75
End: 2020-03-24

## 2020-03-24 NOTE — PROGRESS NOTES
CT images were sent to [facility] who sent them onto Dr. Shantanu Marquis since use of TAVR valve would be considered off labe.  The response received from Dr. Fournier was;  Given the 1 year mortality for S3 in MAC and that this is intermediate risk it is difficult for him to justify the use of this off label request.  Therefore Dr. Post would like patient be sent back to Dr. Wilson to see what her option are since there is not a good candidate option.  I will send this onto Rupal Lees.

## 2020-03-26 ENCOUNTER — OFFICE VISIT (OUTPATIENT)
Dept: CARDIOLOGY | Facility: CLINIC | Age: 75
End: 2020-03-26
Payer: MEDICARE

## 2020-03-26 VITALS
HEIGHT: 65 IN | HEART RATE: 73 BPM | WEIGHT: 278 LBS | BODY MASS INDEX: 46.32 KG/M2 | DIASTOLIC BLOOD PRESSURE: 69 MMHG | SYSTOLIC BLOOD PRESSURE: 123 MMHG

## 2020-03-26 DIAGNOSIS — I34.81 MITRAL ANNULAR CALCIFICATION: Primary | ICD-10-CM

## 2020-03-26 ASSESSMENT — MIFFLIN-ST. JEOR: SCORE: 1766.88

## 2020-03-26 NOTE — PROGRESS NOTES
Service Date: 03/26/2020      HISTORY OF PRESENT ILLNESS:  Ms. Luna is a very pleasant, 74-year-old, morbidly obese, diabetic female whom I saw last month for evaluation for severe symptomatic mitral valve stenosis with significant MAC.  She also has moderate aortic stenosis.  Please refer to my notes from 02/17 for a complete consultation report.  In brief, given her morbid obesity status and the degree of significant MAC (mitral annular calcification), I sent the [facility] to [facility] to be evaluated for a possible transcatheter mitral valve replacement.  Unfortunately, based on the CT scan findings, she was not deemed an appropriate candidate for a TMVR.  The patient was referred back to us for surgical evaluation.  I was told that the patient was having worsening symptoms, and therefore I felt that it was urgent to evaluate her in clinic to proceed with surgical planning for a mitral valve replacement.  In person, she reports that her symptoms have actually been quite stable, and there has been a significant change, albeit she was admitted to the hospital in January for decompensated heart failure.        PHYSICAL EXAMINATION:  Deferred today.      ASSESSMENT AND PLAN:  Ms. Luna is a 74-year-old female with severe mitral valve stenosis with significant MAC and moderate aortic stenosis.  I think it is safe to wait a couple weeks in light of the COVID pandemic, and hopefully things will be better, and we will be able to proceed with a left and right heart cath and a SOCORRO at that point.  I explained that we will wait a couple weeks as long as her symptoms remain stable.  Once the preoperative studies have been completed, we will discuss again with her the surgical plan of mitral valve replacement plus or minus aortic valve replacement via a sternotomy approach.      It was a pleasure to see Ms. Luna today, and again thank you very much.         CRISTA KING MD             D: 03/26/2020    T: 2020   MT: lauren      Name:     KASSANDRA RIVERA   MRN:      [id number]        Account:      [id number]   :      1945           Service Date: 2020      Document: [id number]

## 2020-03-30 ENCOUNTER — TELEPHONE (OUTPATIENT)
Dept: OTHER | Facility: CLINIC | Age: 75
End: 2020-03-30

## 2020-03-30 PROBLEM — I05.0 MITRAL VALVE STENOSIS, UNSPECIFIED ETIOLOGY: Status: ACTIVE | Noted: 2020-03-30

## 2020-03-30 NOTE — TELEPHONE ENCOUNTER
I made a follow up call to patient. No change in symptoms. Cardiology scheduling to arrange R and L heart cath.

## 2020-03-31 ENCOUNTER — DOCUMENTATION ONLY (OUTPATIENT)
Dept: CARDIOLOGY | Facility: CLINIC | Age: 75
End: 2020-03-31

## 2020-03-31 NOTE — PROGRESS NOTES
Odin Pham and salazar Apple,  Again this is another patient who is coming in for an H&P prior to cardiac catheterization.  Please clarify if this H&P can be done over the phone.  Thanks

## 2020-04-03 ENCOUNTER — VIRTUAL VISIT (OUTPATIENT)
Dept: CARDIOLOGY | Facility: CLINIC | Age: 75
End: 2020-04-03
Payer: MEDICARE

## 2020-04-03 DIAGNOSIS — I34.81 MITRAL ANNULAR CALCIFICATION: Primary | ICD-10-CM

## 2020-04-03 DIAGNOSIS — I50.22 CHRONIC SYSTOLIC CONGESTIVE HEART FAILURE (H): ICD-10-CM

## 2020-04-03 DIAGNOSIS — I10 ESSENTIAL HYPERTENSION: ICD-10-CM

## 2020-04-03 DIAGNOSIS — I34.2 NONRHEUMATIC MITRAL VALVE STENOSIS: ICD-10-CM

## 2020-04-03 DIAGNOSIS — Q23.2 CONGENITAL MITRAL STENOSIS: ICD-10-CM

## 2020-04-03 DIAGNOSIS — I35.0 AORTIC VALVE STENOSIS, ETIOLOGY OF CARDIAC VALVE DISEASE UNSPECIFIED: ICD-10-CM

## 2020-04-03 PROCEDURE — 99443 ZZC PHYSICIAN TELEPHONE EVALUATION 21-30 MIN: CPT | Performed by: INTERNAL MEDICINE

## 2020-04-03 RX ORDER — GLUCOSAMINE/D3/BOSWELLIA SERRA 1500MG-400
10000 TABLET ORAL EVERY MORNING
COMMUNITY

## 2020-04-03 RX ORDER — CALCIUM CARBONATE 300MG(750)
400 TABLET,CHEWABLE ORAL EVERY EVENING
COMMUNITY

## 2020-04-03 RX ORDER — UBIDECARENONE 200 MG
200 CAPSULE ORAL EVERY MORNING
COMMUNITY

## 2020-04-03 RX ORDER — LIDOCAINE 40 MG/G
CREAM TOPICAL
Status: CANCELLED | OUTPATIENT
Start: 2020-04-03

## 2020-04-03 RX ORDER — MULTIVIT WITH MINERALS/LUTEIN
1000 TABLET ORAL DAILY PRN
COMMUNITY

## 2020-04-03 RX ORDER — ASPIRIN 81 MG/1
81 TABLET ORAL EVERY MORNING
Status: ON HOLD | COMMUNITY
End: 2020-04-22

## 2020-04-03 RX ORDER — SODIUM CHLORIDE 9 MG/ML
INJECTION, SOLUTION INTRAVENOUS CONTINUOUS
Status: CANCELLED | OUTPATIENT
Start: 2020-04-03

## 2020-04-03 RX ORDER — MULTIVITAMIN WITH IRON
1 TABLET ORAL DAILY
COMMUNITY

## 2020-04-03 RX ORDER — PERPHENAZINE/AMITRIPTYLINE HCL 4 MG-25 MG
40 TABLET ORAL EVERY MORNING
COMMUNITY

## 2020-04-03 NOTE — PROGRESS NOTES
"Amy Luna is a 74 year old female who is being evaluated via a billable telephone visit.      The patient has been notified of following:     \"This telephone visit will be conducted via a call between you and your physician/provider. We have found that certain health care needs can be provided without the need for a physical exam.  This service lets us provide the care you need with a short phone conversation.  If a prescription is necessary we can send it directly to your pharmacy.  If lab work is needed we can place an order for that and you can then stop by our lab to have the test done at a later time.    If during the course of the call the physician/provider feels a telephone visit is not appropriate, you will not be charged for this service.\"     Patient has given verbal consent for Telephone visit?  Yes    Amy Luna complains of    Chief Complaint   Patient presents with     H & P     H&P for heart cath        I have reviewed and updated the patient's Past Medical History, Social History, Family History and Medication List.    ALLERGIES  Penicillins    Additional provider notes:   BP. 94/45   P.  60  HT.5'5\"  [id number]  [facility] [address]    Phone call duration: 25 minutes    Tom Antonio MD [facility]    "

## 2020-04-03 NOTE — PROGRESS NOTES
Service Date: 04/03/2020      TELEPHONE VISIT      HISTORY OF PRESENT ILLNESS:  It is my pleasure to speak your patient, Amy Luna, on the phone.  The purpose of this telephone call was to go through the risks and benefits of right and left heart catheterization and transesophageal echocardiography with this patient.  This patient was seen earlier this year by Dr Christina and Dr Post. If you remember, this patient has been seen by Dr. Wilson approximately a week ago and this patient has severe symptomatic mitral valve stenosis with significant MAC and has moderate aortic stenosis.  She was not deemed appropriate for TMVR has been referred back for surgical evaluation.  Dr. Wilson has arranged for the right and left heart catheterization to be performed on Wednesday of next week which is 04/08.  However, it does not look like the transesophageal echocardiogram was ordered at that time which Dr Wilson mentioned as part of his plan and which should be performed prior to the cardiac catheterization, so the patient can have all of the procedures done at the one time.  Firstly, I explained the risks and benefits of coronary angiography and right heart catheterization to the patient.  I explained the risks and benefits including the risk of stroke, heart attack, death, bleeding, bruising, hematoma formation, vascular damage, limb loss, dye allergy, dye nephropathy, the risks associated with conscious sedation including aspiration, intubation, and the risks associated with blood transfusions.  I also explained the risks associated with right heart catheterization including the small risk of cardiac rupture given that the catheter is placed within the right atrium, right ventricle and pulmonary arteries.  I also explained that a catheter would be placed within the left ventricle and pressures will be measured across the aortic valve and pressures will be measured across the mitral valve.  She understood why we are  doing these procedures and the risks associated with the procedures and she told me that she wished to proceed.  I also explained the alternatives.  I also explained the risks and benefits of transesophageal echocardiography to the patient.  I explained to the patient that SOCORRO will give us a better look at the mitral and aortic valves which are the valves in question and that gradients can be measured but also, we would be looking at chamber size and chamber function also.  I explained the risks of throat discomfort, hematoma formation in the throat, bleeding and the risks associated with rupture of the esophagus.  The patient told me she understood why we were doing the transesophageal echocardiogram.  She told me that she understood the risks associated with that procedure and she finally told me that she wished to proceed.  I have spoken to the schedulers and they will try to arrange for the SOCORRO to be performed prior to the coronary angiogram.  I also explained to the patient that typically, the recovery period is 3 hours post heart catheterization and that she could time her pickup to go home at that time.  She understands that she  cannot drive herself home.  At the end of our conversation, all of her questions were answered to her satisfaction.      IMPRESSION:  Aortic and mitral stenosis.  The patient is pre-cardiovascular surgery per Dr. Ok Wilson.  Risks and benefits of the procedure were explained to the patient as described above.      PLAN:  The patient will proceed ahead with cardiac catheterization and transesophageal echocardiography on Wednesday of next week, as I described above, and the schedulers will try to move the heart catheterization forward so that the transesophageal echocardiogram can be performed prior to the heart catheterization.  Followup will be per Dr. Wilson and Dr. Carri beltran.      cc:   Candy Frederick MD    [id number] [place] [address]       Ok Wilson MD    [facility]   [facility] Sandra Narvaez S, [facility] [[email or web address]] [address]   [place] [address]      Ilna Post MD, [facility] at [address], [address] [place] [id number]      Yamilka Christina DO    [facility] at [address], [address] [place] [id number]         KERA ARGUELLES MD, [facility]             D: 2020   T: 2020   MT: TRAMAINE      Name:     KASSANDRA RIVERA   MRN:      [id number]        Account:      [id number]   :      1945           Service Date: 2020      Document: [id number]

## 2020-04-08 ENCOUNTER — HOSPITAL ENCOUNTER (OUTPATIENT)
Dept: CARDIOLOGY | Facility: CLINIC | Age: 75
Discharge: HOME | End: 2020-04-08
Attending: INTERNAL MEDICINE | Admitting: INTERNAL MEDICINE
Payer: MEDICARE

## 2020-04-08 ENCOUNTER — APPOINTMENT (OUTPATIENT)
Dept: ULTRASOUND IMAGING | Facility: CLINIC | Age: 75
End: 2020-04-08
Attending: SURGERY
Payer: MEDICARE

## 2020-04-08 ENCOUNTER — HOSPITAL ENCOUNTER (OUTPATIENT)
Facility: CLINIC | Age: 75
Discharge: HOME | End: 2020-04-08
Attending: SURGERY | Admitting: INTERNAL MEDICINE
Payer: MEDICARE

## 2020-04-08 ENCOUNTER — APPOINTMENT (OUTPATIENT)
Dept: GENERAL RADIOLOGY | Facility: CLINIC | Age: 75
End: 2020-04-08
Attending: SURGERY
Payer: MEDICARE

## 2020-04-08 VITALS
WEIGHT: 287 LBS | OXYGEN SATURATION: 97 % | DIASTOLIC BLOOD PRESSURE: 55 MMHG | TEMPERATURE: 97.8 F | HEART RATE: 60 BPM | HEIGHT: 65 IN | SYSTOLIC BLOOD PRESSURE: 112 MMHG | RESPIRATION RATE: 16 BRPM | BODY MASS INDEX: 47.82 KG/M2

## 2020-04-08 DIAGNOSIS — I34.2 NONRHEUMATIC MITRAL VALVE STENOSIS: ICD-10-CM

## 2020-04-08 DIAGNOSIS — I05.0 MITRAL VALVE STENOSIS, UNSPECIFIED ETIOLOGY: ICD-10-CM

## 2020-04-08 DIAGNOSIS — I34.81 MITRAL ANNULAR CALCIFICATION: ICD-10-CM

## 2020-04-08 DIAGNOSIS — I35.0 AORTIC VALVE STENOSIS, ETIOLOGY OF CARDIAC VALVE DISEASE UNSPECIFIED: ICD-10-CM

## 2020-04-08 DIAGNOSIS — I50.22 CHRONIC SYSTOLIC CONGESTIVE HEART FAILURE (H): ICD-10-CM

## 2020-04-08 PROBLEM — Z98.890 STATUS POST CORONARY ANGIOGRAM: Status: ACTIVE | Noted: 2020-04-08

## 2020-04-08 LAB
ALBUMIN SERPL-MCNC: 3.4 G/DL (ref 3.4–5)
ALBUMIN UR-MCNC: NEGATIVE MG/DL
ALP SERPL-CCNC: 44 U/L (ref 40–150)
ALT SERPL W P-5'-P-CCNC: 27 U/L (ref 0–50)
ANION GAP SERPL CALCULATED.3IONS-SCNC: 5 MMOL/L (ref 3–14)
APPEARANCE UR: CLEAR
APTT PPP: 26 SEC (ref 22–37)
AST SERPL W P-5'-P-CCNC: 17 U/L (ref 0–45)
BILIRUB DIRECT SERPL-MCNC: 0.1 MG/DL (ref 0–0.2)
BILIRUB SERPL-MCNC: 0.5 MG/DL (ref 0.2–1.3)
BILIRUB UR QL STRIP: NEGATIVE
BUN SERPL-MCNC: 34 MG/DL (ref 7–30)
CALCIUM SERPL-MCNC: 9.5 MG/DL (ref 8.5–10.1)
CHLORIDE SERPL-SCNC: 107 MMOL/L (ref 94–109)
CO2 BLD-SCNC: 27 MMOL/L (ref 21–28)
CO2 BLDCOV-SCNC: 29 MMOL/L (ref 21–28)
CO2 SERPL-SCNC: 29 MMOL/L (ref 20–32)
COLOR UR AUTO: YELLOW
CREAT SERPL-MCNC: 1.12 MG/DL (ref 0.52–1.04)
ERYTHROCYTE [DISTWIDTH] IN BLOOD BY AUTOMATED COUNT: 13 % (ref 10–15)
GFR SERPL CREATININE-BSD FRML MDRD: 48 ML/MIN/{1.73_M2}
GLUCOSE SERPL-MCNC: 183 MG/DL (ref 70–99)
GLUCOSE UR STRIP-MCNC: NEGATIVE MG/DL
HBA1C MFR BLD: 6.5 % (ref 0–5.6)
HCT VFR BLD AUTO: 35.8 % (ref 35–47)
HGB BLD-MCNC: 11.4 G/DL (ref 11.7–15.7)
HGB UR QL STRIP: NEGATIVE
INR PPP: 1 (ref 0.86–1.14)
KETONES UR STRIP-MCNC: NEGATIVE MG/DL
LEUKOCYTE ESTERASE UR QL STRIP: ABNORMAL
MCH RBC QN AUTO: 31.5 PG (ref 26.5–33)
MCHC RBC AUTO-ENTMCNC: 31.8 G/DL (ref 31.5–36.5)
MCV RBC AUTO: 99 FL (ref 78–100)
MUCOUS THREADS #/AREA URNS LPF: PRESENT /LPF
NITRATE UR QL: NEGATIVE
PCO2 BLD: 44 MM HG (ref 35–45)
PCO2 BLDV: 50 MM HG (ref 40–50)
PH BLD: 7.4 PH (ref 7.35–7.45)
PH BLDV: 7.37 PH (ref 7.32–7.43)
PH UR STRIP: 7 PH (ref 5–7)
PLATELET # BLD AUTO: 172 10E9/L (ref 150–450)
PO2 BLD: 73 MM HG (ref 80–105)
PO2 BLDV: 36 MM HG (ref 25–47)
POTASSIUM SERPL-SCNC: 3.8 MMOL/L (ref 3.4–5.3)
PROT SERPL-MCNC: 6.8 G/DL (ref 6.8–8.8)
RBC # BLD AUTO: 3.62 10E12/L (ref 3.8–5.2)
RBC #/AREA URNS AUTO: <1 /HPF (ref 0–2)
SAO2 % BLDA FROM PO2: 95 % (ref 92–100)
SAO2 % BLDV FROM PO2: 66 %
SODIUM SERPL-SCNC: 141 MMOL/L (ref 133–144)
SOURCE: ABNORMAL
SP GR UR STRIP: 1.03 (ref 1–1.03)
UROBILINOGEN UR STRIP-MCNC: NORMAL MG/DL (ref 0–2)
WBC # BLD AUTO: 5.7 10E9/L (ref 4–11)
WBC #/AREA URNS AUTO: 1 /HPF (ref 0–5)

## 2020-04-08 PROCEDURE — 99153 MOD SED SAME PHYS/QHP EA: CPT | Performed by: INTERNAL MEDICINE

## 2020-04-08 PROCEDURE — 93005 ELECTROCARDIOGRAM TRACING: CPT

## 2020-04-08 PROCEDURE — 40000235 ZZH STATISTIC TELEMETRY

## 2020-04-08 PROCEDURE — 40000857 ZZH STATISTIC TEE INCLUDES SEDATION

## 2020-04-08 PROCEDURE — 40000852 ZZH STATISTIC HEART CATH LAB OR EP LAB

## 2020-04-08 PROCEDURE — 85027 COMPLETE CBC AUTOMATED: CPT | Performed by: SURGERY

## 2020-04-08 PROCEDURE — 83036 HEMOGLOBIN GLYCOSYLATED A1C: CPT | Performed by: SURGERY

## 2020-04-08 PROCEDURE — 25000128 H RX IP 250 OP 636: Performed by: INTERNAL MEDICINE

## 2020-04-08 PROCEDURE — 36415 COLL VENOUS BLD VENIPUNCTURE: CPT

## 2020-04-08 PROCEDURE — 27210794 ZZH OR GENERAL SUPPLY STERILE: Performed by: INTERNAL MEDICINE

## 2020-04-08 PROCEDURE — 93460 R&L HRT ART/VENTRICLE ANGIO: CPT | Performed by: INTERNAL MEDICINE

## 2020-04-08 PROCEDURE — 86923 COMPATIBILITY TEST ELECTRIC: CPT | Performed by: SURGERY

## 2020-04-08 PROCEDURE — 86900 BLOOD TYPING SEROLOGIC ABO: CPT | Performed by: SURGERY

## 2020-04-08 PROCEDURE — 99152 MOD SED SAME PHYS/QHP 5/>YRS: CPT | Mod: 59 | Performed by: INTERNAL MEDICINE

## 2020-04-08 PROCEDURE — 82803 BLOOD GASES ANY COMBINATION: CPT

## 2020-04-08 PROCEDURE — 40000065 ZZH STATISTIC EKG NON-CHARGEABLE

## 2020-04-08 PROCEDURE — 86901 BLOOD TYPING SEROLOGIC RH(D): CPT | Performed by: SURGERY

## 2020-04-08 PROCEDURE — 81001 URINALYSIS AUTO W/SCOPE: CPT | Performed by: SURGERY

## 2020-04-08 PROCEDURE — 93010 ELECTROCARDIOGRAM REPORT: CPT | Performed by: INTERNAL MEDICINE

## 2020-04-08 PROCEDURE — 36415 COLL VENOUS BLD VENIPUNCTURE: CPT | Performed by: SURGERY

## 2020-04-08 PROCEDURE — 25000132 ZZH RX MED GY IP 250 OP 250 PS 637: Mod: GY | Performed by: INTERNAL MEDICINE

## 2020-04-08 PROCEDURE — 27210788 ZZH MANIFOLD CR3: Performed by: INTERNAL MEDICINE

## 2020-04-08 PROCEDURE — 93325 DOPPLER ECHO COLOR FLOW MAPG: CPT | Mod: 26 | Performed by: INTERNAL MEDICINE

## 2020-04-08 PROCEDURE — 93320 DOPPLER ECHO COMPLETE: CPT | Mod: 26 | Performed by: INTERNAL MEDICINE

## 2020-04-08 PROCEDURE — C1769 GUIDE WIRE: HCPCS | Performed by: INTERNAL MEDICINE

## 2020-04-08 PROCEDURE — C1887 CATHETER, GUIDING: HCPCS | Performed by: INTERNAL MEDICINE

## 2020-04-08 PROCEDURE — 93880 EXTRACRANIAL BILAT STUDY: CPT

## 2020-04-08 PROCEDURE — 85610 PROTHROMBIN TIME: CPT | Performed by: SURGERY

## 2020-04-08 PROCEDURE — 80076 HEPATIC FUNCTION PANEL: CPT | Performed by: SURGERY

## 2020-04-08 PROCEDURE — 71046 X-RAY EXAM CHEST 2 VIEWS: CPT

## 2020-04-08 PROCEDURE — 85730 THROMBOPLASTIN TIME PARTIAL: CPT | Performed by: SURGERY

## 2020-04-08 PROCEDURE — 93312 ECHO TRANSESOPHAGEAL: CPT | Mod: 26 | Performed by: INTERNAL MEDICINE

## 2020-04-08 PROCEDURE — C1894 INTRO/SHEATH, NON-LASER: HCPCS | Performed by: INTERNAL MEDICINE

## 2020-04-08 PROCEDURE — 80048 BASIC METABOLIC PNL TOTAL CA: CPT | Performed by: SURGERY

## 2020-04-08 PROCEDURE — 93320 DOPPLER ECHO COMPLETE: CPT

## 2020-04-08 PROCEDURE — 25000125 ZZHC RX 250: Performed by: INTERNAL MEDICINE

## 2020-04-08 PROCEDURE — 86850 RBC ANTIBODY SCREEN: CPT | Performed by: SURGERY

## 2020-04-08 PROCEDURE — 99152 MOD SED SAME PHYS/QHP 5/>YRS: CPT | Performed by: INTERNAL MEDICINE

## 2020-04-08 RX ORDER — ACETAMINOPHEN 325 MG/1
650 TABLET ORAL EVERY 4 HOURS PRN
Status: DISCONTINUED | OUTPATIENT
Start: 2020-04-08 | End: 2020-04-08 | Stop reason: HOSPADM

## 2020-04-08 RX ORDER — PHENYLEPHRINE HYDROCHLORIDE 10 MG/ML
INJECTION INTRAVENOUS
Status: DISCONTINUED | OUTPATIENT
Start: 2020-04-08 | End: 2020-04-08 | Stop reason: HOSPADM

## 2020-04-08 RX ORDER — LIDOCAINE 50 MG/G
OINTMENT TOPICAL ONCE
Status: COMPLETED | OUTPATIENT
Start: 2020-04-08 | End: 2020-04-08

## 2020-04-08 RX ORDER — GLYCOPYRROLATE 0.2 MG/ML
0.1 INJECTION, SOLUTION INTRAMUSCULAR; INTRAVENOUS ONCE
Status: COMPLETED | OUTPATIENT
Start: 2020-04-08 | End: 2020-04-08

## 2020-04-08 RX ORDER — FENTANYL CITRATE 50 UG/ML
25 INJECTION, SOLUTION INTRAMUSCULAR; INTRAVENOUS
Status: DISCONTINUED | OUTPATIENT
Start: 2020-04-08 | End: 2020-04-08 | Stop reason: HOSPADM

## 2020-04-08 RX ORDER — ATROPINE SULFATE 0.1 MG/ML
0.5 INJECTION INTRAVENOUS EVERY 5 MIN PRN
Status: DISCONTINUED | OUTPATIENT
Start: 2020-04-08 | End: 2020-04-08 | Stop reason: HOSPADM

## 2020-04-08 RX ORDER — SODIUM CHLORIDE 9 MG/ML
INJECTION, SOLUTION INTRAVENOUS CONTINUOUS
Status: DISCONTINUED | OUTPATIENT
Start: 2020-04-08 | End: 2020-04-08 | Stop reason: HOSPADM

## 2020-04-08 RX ORDER — DEXTROSE MONOHYDRATE 25 G/50ML
9.5 INJECTION, SOLUTION INTRAVENOUS
Status: DISCONTINUED | OUTPATIENT
Start: 2020-04-08 | End: 2020-04-08 | Stop reason: HOSPADM

## 2020-04-08 RX ORDER — SODIUM CHLORIDE 9 MG/ML
INJECTION, SOLUTION INTRAVENOUS CONTINUOUS PRN
Status: DISCONTINUED | OUTPATIENT
Start: 2020-04-08 | End: 2020-04-08 | Stop reason: HOSPADM

## 2020-04-08 RX ORDER — FLUMAZENIL 0.1 MG/ML
0.2 INJECTION, SOLUTION INTRAVENOUS
Status: DISCONTINUED | OUTPATIENT
Start: 2020-04-08 | End: 2020-04-08 | Stop reason: HOSPADM

## 2020-04-08 RX ORDER — LIDOCAINE 40 MG/G
CREAM TOPICAL
Status: DISCONTINUED | OUTPATIENT
Start: 2020-04-08 | End: 2020-04-08 | Stop reason: HOSPADM

## 2020-04-08 RX ORDER — LIDOCAINE HYDROCHLORIDE 40 MG/ML
1.5 SOLUTION TOPICAL ONCE
Status: COMPLETED | OUTPATIENT
Start: 2020-04-08 | End: 2020-04-08

## 2020-04-08 RX ORDER — NALOXONE HYDROCHLORIDE 0.4 MG/ML
.1-.4 INJECTION, SOLUTION INTRAMUSCULAR; INTRAVENOUS; SUBCUTANEOUS
Status: DISCONTINUED | OUTPATIENT
Start: 2020-04-08 | End: 2020-04-08 | Stop reason: HOSPADM

## 2020-04-08 RX ORDER — FENTANYL CITRATE 50 UG/ML
25-50 INJECTION, SOLUTION INTRAMUSCULAR; INTRAVENOUS
Status: DISCONTINUED | OUTPATIENT
Start: 2020-04-08 | End: 2020-04-08 | Stop reason: HOSPADM

## 2020-04-08 RX ORDER — NITROGLYCERIN 5 MG/ML
VIAL (ML) INTRAVENOUS
Status: DISCONTINUED | OUTPATIENT
Start: 2020-04-08 | End: 2020-04-08 | Stop reason: HOSPADM

## 2020-04-08 RX ORDER — VERAPAMIL HYDROCHLORIDE 2.5 MG/ML
INJECTION, SOLUTION INTRAVENOUS
Status: DISCONTINUED | OUTPATIENT
Start: 2020-04-08 | End: 2020-04-08 | Stop reason: HOSPADM

## 2020-04-08 RX ORDER — NALOXONE HYDROCHLORIDE 0.4 MG/ML
.2-.4 INJECTION, SOLUTION INTRAMUSCULAR; INTRAVENOUS; SUBCUTANEOUS
Status: DISCONTINUED | OUTPATIENT
Start: 2020-04-08 | End: 2020-04-08 | Stop reason: HOSPADM

## 2020-04-08 RX ORDER — IOPAMIDOL 755 MG/ML
INJECTION, SOLUTION INTRAVASCULAR
Status: DISCONTINUED | OUTPATIENT
Start: 2020-04-08 | End: 2020-04-08 | Stop reason: HOSPADM

## 2020-04-08 RX ADMIN — MIDAZOLAM 1 MG: 1 INJECTION INTRAMUSCULAR; INTRAVENOUS at 09:54

## 2020-04-08 RX ADMIN — FENTANYL CITRATE 25 MCG: 50 INJECTION, SOLUTION INTRAMUSCULAR; INTRAVENOUS at 09:54

## 2020-04-08 RX ADMIN — TOPICAL ANESTHETIC 1 ML: 200 SPRAY DENTAL; PERIODONTAL at 09:49

## 2020-04-08 RX ADMIN — LIDOCAINE HYDROCHLORIDE 1.5 ML: 40 SOLUTION TOPICAL at 09:03

## 2020-04-08 RX ADMIN — MIDAZOLAM 0.5 MG: 1 INJECTION INTRAMUSCULAR; INTRAVENOUS at 09:59

## 2020-04-08 RX ADMIN — MIDAZOLAM 0.5 MG: 1 INJECTION INTRAMUSCULAR; INTRAVENOUS at 09:56

## 2020-04-08 RX ADMIN — ASPIRIN 325 MG: 325 TABLET, COATED ORAL at 09:04

## 2020-04-08 RX ADMIN — GLYCOPYRROLATE 0.1 MG: 0.2 INJECTION, SOLUTION INTRAMUSCULAR; INTRAVENOUS at 09:07

## 2020-04-08 ASSESSMENT — MIFFLIN-ST. JEOR: SCORE: 1807.7

## 2020-04-08 NOTE — DISCHARGE INSTRUCTIONS
SOCORRO  (Transesophageal Echocardiogram)  Discharge Instructions    After you go home:      Have an adult stay with you for 6 hours.       For 24 hours - due to the sedation you received:    Relax and take it easy.    Do NOT make any important or legal decisions.    Do NOT drive or operate machines at home or at work.    Do NOT drink alcohol.    Diet:    You may resume your normal diet, but no scratchy foods for two days.    If your throat is sore, eat cold, bland or soft foods.    You may have heartburn if the tube used in the exam entered your stomach.  If so:   - Do not eat acidic and spicy foods.   - Do not eat three hours before bedtime. Clear liquids are okay.   - When lying down, use two pillows to raise your head.    Medicines:      Take your medications, including blood thinners, unless your provider tells you not to.    If you have stopped any medicines, check with your provider about when to restart them.    You may take Tylenol (Acetaminophen) if your throat is sore.    You may take antacids if you have heartburn.      Follow Up Appointments:      Follow up with your cardiologist at [facility] patient preference as instructed.    Follow up with your primary care provider as needed.    Call the clinic if:      You have heartburn that is severe or lasts more than 72 hours.    You have a sore throat that feels worse after 72 hours.    You have shortness of breath, neck pain, chest pain, fever, chills, coughing up blood, or other unusual signs.    Questions or concerns      Cardiac Angiogram Discharge Instructions - Neck & Radial    After you go home:      Have an adult stay with you until tomorrow.    Drink extra fluids for 2 days.    You may resume your normal diet.    No smoking       For 24 hours - due to the sedation you received:    Relax and take it easy.    Do NOT make any important or legal decisions.    Do NOT drive or operate machines at home or at work.    Do NOT drink alcohol.    Care of  Neck & Wrist Puncture Sites:      For the first 24 hrs - check the puncture site every 1-2 hours while awake.    It is normal to have soreness at the puncture site and mild tingling in your hand for up to 3 days.    Remove the bandaid after 24 hours. If there is minor oozing, apply another bandaid and remove it after 12 hours.    You may shower tomorrow. Do NOT take a bath, or use a hot tub or pool for at least 3 days. Do NOT scrub the site. Do not use lotion or powder near the puncture site.           Activity - For 2 days:    Neck site:    Avoid heavy lifting or the overuse of your shoulder.        Wrist site:    do not use your hand or arm to support your weight (such as rising from a chair)     do not bend your wrist (such as lifting a garage door).    do not lift more than 5 pounds or exercise your arm (such as tennis, golf or bowling).    Do NOT do any heavy activity such as exercise, lifting, or straining.     Bleeding:     Neck site:    If you start bleeding from the site in your neck, sit down and press gently on the site for 10 minutes.     Once bleeding stops, sit still for 2 hours.     Call [facility] as soon as you can    Wrist site:    If you start bleeding from the site in your wrist, sit down and press firmly on/above the site for 10 minutes.     Once bleeding stops, keep arm still for 2 hours.     Call [facility] as soon as you can.    Call 911 right away if you have heavy bleeding or bleeding that does not stop.      Medicines:      If you are taking an antiplatelet medication such as Plavix, Brilinta or Effient, do not stop taking it until you talk to your cardiologist.        If you are on Metformin (Glucophage), do not restart it until you have blood tests (within 2 to 3 days after discharge).  After you have your blood drawn, you may restart the Metformin.     Take your medications, including blood thinners, unless your provider tells you not to.  If you take Coumadin (Warfarin), have your INR  checked by your provider in  3-5 days. Call your clinic to schedule this.    If you have stopped any medicines, check with your provider about when to restart them.    Follow Up Appointments:      Follow up with [facility] Nurse Practitioner at [facility] of patient preference in 7-10 days.    Call the clinic if:      You have increased pain or a large or growing hard lump around the site.    The site is red, swollen, hot or tender.    Blood or fluid is draining from the site.    You have chills or a fever greater than 101 F (38 C).    Your arm feels numb, cool or changes color.    You have hives, a rash or unusual itching.    Any questions or concerns.      [facility] at [place]:    [phone] [facility] (7 days a week)

## 2020-04-08 NOTE — PROGRESS NOTES
~0725 Pt arrived to the CS      A/O. Pt denies difficulty swallowing. Pt has a Hx of sleep apnea and wears a CPAP at home. No dentures or loose teeth. NPO since 2000 4/7/2020. Discharge / post procedure instructions reviewed with the pt pre procedure w/ verbal understanding received.  All questions & concerns addressed.     0950 MD Sedation Assessment completed at this time.    0953 Time Out done at this time.    0954 procedure started     SOCORRO: Pt tolerated well. VSS. Total sedation given - 2mg Versed & 25 mcg Fentanyl. Dr. Yañez  spoke with pt  post procedure.See SOCORRO Flowsheet.     1025 procedure completed.    Pt waiting for next procedure, Angiogram ~1100

## 2020-04-08 NOTE — PROGRESS NOTES
1210: Pt returned from Cath Lab. TR band intact to L wrist. Right internal jugular site dry and intact as well. No oozing or hematoma noted. Areas soft & flat. L arm elevated on pillows. Pt denies pain. Pt instructed on activity restrictions with L wrist. Verbal understanding received from pt. Pt taking diet & flds well. No complaints. Air released from TR band per protocol.  OOB - steady on feet. Ambulated in halls to bathroom with good amirah. No change in puncture site assessment with activity.    1355: Discharge teaching & instructions given to both pt w/ verbal understanding received. All questions & concerns addressed.

## 2020-04-08 NOTE — PROGRESS NOTES
Incomplete              Care Suites Discharge Summary     Discharge Criteria:              Discharge Criteria met per MD orders: Yes.               Vital signs stable.                Pt demonstrates ability to ambulate safely: Yes.  (See discharge questionnaire for additional information)     Discharge instructions & education:              Discharge instructions reviewed with patient. Patient verbalizes      understanding.              Additional patient education provided:  SOCORRO and Cardiac angiogram     Medications:              Patient will be discharging on new medications- No. Patient verbalizes reason for use, start date, and side effects NA.     Items returned to patient:               Home and hospital acquired medications returned to patient NA              Listed belongings gathered and returned to patient: Yes     Patient discharged to home with .      Jose Antonio Tellez, TARI

## 2020-04-08 NOTE — PROGRESS NOTES
PATIENT WELLNESS SCREENING      1. In the last month, have you been in contact with someone who was confirmed or suspected to have Coronavirus/COVID-19? No    2. Do you have the following symptoms?   Fever? No   Cough? No   Shortness of breath? No   Skin rash? No    3. Have you traveled internationally in the last month? No   I

## 2020-04-08 NOTE — PRE-PROCEDURE
GENERAL PRE-PROCEDURE:   Procedure:  SOCORRO    Risks and benefits: Risks, benefits and alternatives were discussed    Patient states understanding of procedure being performed: Yes    Patient's understanding of procedure matches consent: Yes    Procedure consent matches procedure scheduled: Yes    Appropriately NPO:  Yes  ASA Class:  Class 2- mild systemic disease, no acute problems, no functional limitations  Lungs:  Lungs clear with good breath sounds bilaterally  Heart:  Normal heart sounds and rate  History & Physical reviewed:  History and physical reviewed and no updates needed  Statement of review:  I have reviewed the lab findings, diagnostic data, medications, and the plan for sedation

## 2020-04-09 ENCOUNTER — TELEPHONE (OUTPATIENT)
Dept: CARDIOLOGY | Facility: CLINIC | Age: 75
End: 2020-04-09

## 2020-04-09 ENCOUNTER — PREP FOR PROCEDURE (OUTPATIENT)
Dept: CARDIOLOGY | Facility: CLINIC | Age: 75
End: 2020-04-09

## 2020-04-09 DIAGNOSIS — I35.0 AORTIC STENOSIS: ICD-10-CM

## 2020-04-09 DIAGNOSIS — Q21.12 PATENT FORAMEN OVALE: ICD-10-CM

## 2020-04-09 DIAGNOSIS — I07.1 TRICUSPID VALVE REGURGITATION: Primary | ICD-10-CM

## 2020-04-09 DIAGNOSIS — Z98.890 S/P RIGHT AND LEFT HEART CATHETERIZATION: ICD-10-CM

## 2020-04-09 DIAGNOSIS — I50.22 CHRONIC SYSTOLIC CONGESTIVE HEART FAILURE (H): ICD-10-CM

## 2020-04-09 DIAGNOSIS — I05.0 MITRAL STENOSIS: ICD-10-CM

## 2020-04-09 DIAGNOSIS — I10 ESSENTIAL HYPERTENSION: Primary | ICD-10-CM

## 2020-04-09 NOTE — TELEPHONE ENCOUNTER
Per task, pt needs to schedule surgery with Dr. Wilson. Talked with pt and offered her 4/15. Pt ok with that. Will call if anything changes

## 2020-04-09 NOTE — PROGRESS NOTES
Pt set up to have BMP done on 4/10/20 post heart cath from 4/8/20.   Pt aware to hold metformin until after BMP is completed.     Wellness Screening Tool    Symptom Screening:    Do you have one of the following new symptoms:      Fever or reported chills? No    A new cough (started within the past 14 days)? No    Shortness of breath (started within the past 14 days)? No    Nausea, vomiting or diarrhea? No    Within the past 3 weeks, have you been exposed to someone with a known positive illness below?      COVID - 19 (known or suspected)? No    Chicken pox?  No    Measles? No    Pertussis? No        Patient notified of visitor restriction: Yes    Patient's appointment status: Patient will be seen in clinic as scheduled on 4/10/20

## 2020-04-10 ENCOUNTER — APPOINTMENT (OUTPATIENT)
Dept: CARDIOLOGY | Facility: CLINIC | Age: 75
End: 2020-04-10
Payer: MEDICARE

## 2020-04-10 ENCOUNTER — DOCUMENTATION ONLY (OUTPATIENT)
Dept: OTHER | Facility: CLINIC | Age: 75
End: 2020-04-10

## 2020-04-10 DIAGNOSIS — Z98.890 S/P RIGHT AND LEFT HEART CATHETERIZATION: ICD-10-CM

## 2020-04-10 DIAGNOSIS — I50.22 CHRONIC SYSTOLIC CONGESTIVE HEART FAILURE (H): ICD-10-CM

## 2020-04-10 DIAGNOSIS — I10 ESSENTIAL HYPERTENSION: ICD-10-CM

## 2020-04-10 PROBLEM — Q21.12 PATENT FORAMEN OVALE: Status: ACTIVE | Noted: 2020-04-10

## 2020-04-10 PROBLEM — I07.1 TRICUSPID VALVE REGURGITATION: Status: ACTIVE | Noted: 2020-04-10

## 2020-04-10 LAB
ANION GAP SERPL CALCULATED.3IONS-SCNC: 11.3 MMOL/L (ref 6–17)
BUN SERPL-MCNC: 21 MG/DL (ref 7–30)
CALCIUM SERPL-MCNC: 10 MG/DL (ref 8.5–10.5)
CHLORIDE SERPL-SCNC: 101 MMOL/L (ref 98–107)
CO2 SERPL-SCNC: 29 MMOL/L (ref 23–29)
CREAT SERPL-MCNC: 1.2 MG/DL (ref 0.7–1.3)
GFR SERPL CREATININE-BSD FRML MDRD: 44 ML/MIN/{1.73_M2}
GLUCOSE SERPL-MCNC: 163 MG/DL (ref 70–105)
POTASSIUM SERPL-SCNC: 4.3 MMOL/L (ref 3.5–5.1)
SODIUM SERPL-SCNC: 137 MMOL/L (ref 136–145)

## 2020-04-10 PROCEDURE — 80048 BASIC METABOLIC PNL TOTAL CA: CPT | Performed by: INTERNAL MEDICINE

## 2020-04-10 PROCEDURE — 36415 COLL VENOUS BLD VENIPUNCTURE: CPT | Performed by: INTERNAL MEDICINE

## 2020-04-10 NOTE — PROGRESS NOTES
Met with patient. Pre op imaging and labs reviewed. Pre op instructions given. Plan AVR/MVR/TVR, PFO closure 4/15.

## 2020-04-13 LAB — INTERPRETATION ECG - MUSE: NORMAL

## 2020-04-14 ENCOUNTER — ANESTHESIA EVENT (OUTPATIENT)
Dept: SURGERY | Facility: CLINIC | Age: 75
DRG: 219 | End: 2020-04-14
Payer: MEDICARE

## 2020-04-14 ENCOUNTER — TELEPHONE (OUTPATIENT)
Dept: OTHER | Facility: CLINIC | Age: 75
End: 2020-04-14

## 2020-04-14 RX ORDER — GLIPIZIDE 10 MG/1
20 TABLET, FILM COATED, EXTENDED RELEASE ORAL EVERY MORNING
Status: ON HOLD | COMMUNITY
End: 2020-05-01

## 2020-04-14 ASSESSMENT — ENCOUNTER SYMPTOMS: ORTHOPNEA: 1

## 2020-04-14 ASSESSMENT — LIFESTYLE VARIABLES: TOBACCO_USE: 0

## 2020-04-14 NOTE — TELEPHONE ENCOUNTER
Patient called with general questions regarding her upcoming MVR/AVR/TVR, All addressed. Scheduled 4/15.

## 2020-04-14 NOTE — ANESTHESIA PREPROCEDURE EVALUATION
Anesthesia Pre-Procedure Evaluation    Patient: Aym Luna   MRN: [id number] : 1945          Preoperative Diagnosis: Tricuspid valve regurgitation [I07.1]  Aortic stenosis [I35.0]  Mitral stenosis [I05.0]  Patent foramen ovale [Q21.1]    Procedure(s):  REPLACEMENT, AORTIC VALVE  REPLACEMENT, MITRAL VALVE, OPEN  REPLACEMENT, TRICUSPID VALVE, OPEN  Patent Foramen Ovale Closure    Past Medical History:   Diagnosis Date     (HFpEF) heart failure with preserved ejection fraction (H)      Aortic stenosis      Chest pain      Depressive disorder      Diabetes (H)      Hyperlipidemia      Hypertension      Mitral annular calcification      Mitral stenosis      Obesity      Sleep apnea     CPAP     Past Surgical History:   Procedure Laterality Date     APPENDECTOMY       CV CORONARY ANGIOGRAM N/A 2020    Procedure: Coronary Angiogram;  Surgeon: Inez Peoples MD;  Location:  [facility]     CV LEFT HEART CATH N/A 2020    Procedure: Left Heart Cath;  Surgeon: Inez Peoples MD;  Location:  [facility]     CV RIGHT HEART CATH N/A 2020    Procedure: Right Heart Cath;  Surgeon: Inez Peoples MD;  Location:  [facility]     GYN SURGERY       x2 ,      GYN SURGERY      total hysterectomy     IMPLANT PACEMAKER         Anesthesia Evaluation     . Pt has had prior anesthetic.     No history of anesthetic complications          ROS/MED HX    ENT/Pulmonary:     (+)sleep apnea, uses CPAP , . Other pulmonary disease acute hypoxic respiratory failure 2020.   (-) tobacco use   Neurologic:  - neg neurologic ROS     Cardiovascular:     (+) Dyslipidemia, hypertension----. : . CHF . JENKINS, orthopnea, . pacemaker Reason placed: AV block:. valvular problems/murmurs type: AS MS, TR:. congenital heart disease PFO:, pulmonary hypertension, Previous cardiac testing Echodate:results:1.The left ventricle is normal in size. Left ventricular  systolic function is   normal. The visual ejection fraction is estimated at 60-65%.   2. The right ventricle is normal size. There is a catheter/pacemaker lead seen   in the right ventricle. The right ventricular systolic function is normal.   3. The left atrium is moderately dilated. No left atrial mass or thrombus   visualized. Right atrial size is normal. Lipomatous hypertrophy of the   interatrial septum is noted. A patent foramen ovale is present. No thrombus is   detected in the left atrial appendage.   4. There is severe mitral annular calcification. There is mild to moderate (1-   2+) mitral regurgitation. There is moderate to severe mitral stenosis. The   mean mitral valve gradient is 9.1 mmHg. The peak mitral valve gradient is 20.5   mmHg. (Recorded at a heart rate of 60 bpm).   5. There is moderately severe (3+) tricuspid regurgitation. The mechanism   appears to be pacemaker lead tethering to the septal leaflet. Right   ventricular systolic pressure is elevated, consistent with moderate to severe   pulmonary hypertension.   5. There is mild (1+) aortic regurgitation. Moderate valvular aortic stenosis.       6. No pericardial effusion.date: results: date: results: date: results:          METS/Exercise Tolerance:     Hematologic:         Musculoskeletal:         GI/Hepatic:        (-) GERD   Renal/Genitourinary:     (+) chronic renal disease, type: CRI,       Endo:     (+) type II DM Using insulin Obesity, .      Psychiatric:     (+) psychiatric history depression      Infectious Disease:         Malignancy:         Other:                          Physical Exam  Normal systems: pulmonary and dental    Airway   Mallampati: III  TM distance: >3 FB  Neck ROM: full    Dental     Cardiovascular   (+) murmur       Pulmonary             Lab Results   Component Value Date    WBC 5.7 04/08/2020    HGB 11.4 (L) 04/08/2020    HCT 35.8 04/08/2020     04/08/2020     04/10/2020    POTASSIUM 4.3 04/10/2020  "   CHLORIDE 101 04/10/2020    CO2 29 04/10/2020    BUN 21 04/10/2020    CR 1.20 04/10/2020     (H) 04/10/2020    NELLY 10.0 04/10/2020    MAG 2.2 01/18/2020    ALBUMIN 3.4 04/08/2020    PROTTOTAL 6.8 04/08/2020    ALT 27 04/08/2020    AST 17 04/08/2020    ALKPHOS 44 04/08/2020    BILITOTAL 0.5 04/08/2020    PTT 26 04/08/2020    INR 1.00 04/08/2020       Preop Vitals  BP Readings from Last 3 Encounters:   04/08/20 112/55   03/26/20 123/69   03/10/20 135/56    Pulse Readings from Last 3 Encounters:   04/08/20 60   03/26/20 73   03/10/20 73      Resp Readings from Last 3 Encounters:   04/08/20 16   01/19/20 20   11/12/14 18    SpO2 Readings from Last 3 Encounters:   04/08/20 97%   01/19/20 94%   11/12/14 97%      Temp Readings from Last 1 Encounters:   04/08/20 36.6  C (97.8  F) (Oral)    Ht Readings from Last 1 Encounters:   04/08/20 1.651 m (5' 5\")      Wt Readings from Last 1 Encounters:   04/08/20 130.2 kg (287 lb)    Estimated body mass index is 47.76 kg/m  as calculated from the following:    Height as of 4/8/20: 1.651 m (5' 5\").    Weight as of 4/8/20: 130.2 kg (287 lb).       Anesthesia Plan      History & Physical Review  History and physical reviewed and following examination; no interval change.    ASA Status:  4 .        Plan for General with Intravenous and Propofol induction. Maintenance will be Balanced.    PONV prophylaxis:  Ondansetron (or other 5HT-3)  Additional equipment: Videolaryngoscope, 2nd IV, Arterial Line, CVP, SOCORRO and PA Catheter        Postoperative Care  Postoperative pain management:  Multi-modal analgesia.      Consents  Anesthetic plan, risks, benefits and alternatives discussed with:  Patient..                 Anuradha Adam MD  "

## 2020-04-14 NOTE — OR NURSING
Spoke to Dr. Wilson via phone, he said he does not need a [employer] rep to be present for surgery tomorrow morning .     Ttai Bates RN

## 2020-04-14 NOTE — PROGRESS NOTES
PTA medications updated by Medication Scribe day before surgery via phone call with patient      -LAST DOSES ENTERED BY NURSE-    Medication history sources: Patient, Surescripts and H&P  Medication history source reliability: Good  Adherence assessment: N/A Not Observed    Significant changes made to the medication list:  None      Additional medication history information:   None        Prior to Admission medications    Medication Sig Last Dose Taking? Auth Provider   albuterol (PROAIR HFA/PROVENTIL HFA/VENTOLIN HFA) 108 (90 Base) MCG/ACT inhaler Inhale 2 puffs into the lungs every 4 hours as needed for shortness of breath / dyspnea or wheezing more than a month Yes Unknown, Entered By History   aspirin 81 MG EC tablet Take 81 mg by mouth every morning   at AM Yes Reported, Patient   atorvastatin (LIPITOR) 40 MG tablet Take 40 mg by mouth At Bedtime   at PM Yes Reported, Patient   Biotin 20147 MCG TABS Take 1,000 mcg by mouth every morning   at AM Yes Reported, Patient   Calcium Carbonate-Vit D-Min (CALCIUM 1200 PO) Take 1 tablet by mouth every evening   at PM Yes Reported, Patient   coenzyme Q-10 200 MG CAPS Take 200 mg by mouth every morning   at AM Yes Reported, Patient   furosemide (LASIX) 40 MG tablet Take 40 mg by mouth every morning  at AM Yes Unknown, Entered By History   glipiZIDE (GLUCOTROL XL) 10 MG 24 hr tablet Take 20 mg by mouth every morning (takes 2 x 10mg)  at AM Yes Reported, Patient   insulin NPH (HUMULIN N/NOVOLIN N VIAL) 100 UNIT/ML vial Inject 50 Units Subcutaneous 2 times daily  Yes Unknown, Entered By History   ipratropium (ATROVENT) 0.06 % nasal spray Spray 2 sprays into both nostrils 4 times daily as needed for rhinitis more than a month at AM Yes Unknown, Entered By History   losartan (COZAAR) 50 MG tablet Take 50 mg by mouth every morning  at AM Yes Unknown, Entered By History   Lutein 40 MG CAPS Take 40 mg by mouth every morning   at AM Yes Reported, Patient   Magnesium 400 MG TABS  Take 400 mg by mouth every evening   at PM Yes Reported, Patient   metFORMIN (GLUCOPHAGE-XR) 500 MG 24 hr tablet Take 1,000 mg by mouth daily (with breakfast) (takes 2 x 500mg)  at AM Yes Unknown, Entered By History   metoprolol succinate ER (TOPROL-XL) 50 MG 24 hr tablet Take 1.5 tablets (75 mg) by mouth every morning  at AM Yes Yamilka Christina,    potassium 99 MG TABS Take 99 mg by mouth every evening   at PM Yes Reported, Patient   Propylene Glycol (SYSTANE BALANCE OP) Apply 1-2 drops to eye 3 times daily as needed (dry eyes)  Yes Reported, Patient   sertraline (ZOLOFT) 100 MG tablet Take 150 mg by mouth every morning (takes 1.5 x 100mg)  at AM Yes Reported, Patient   TURMERIC PO Take 1-3 tablets by mouth daily   Yes Reported, Patient   vitamin (B COMPLEX-C) tablet Take 1 tablet by mouth daily  at AM Yes Reported, Patient   vitamin C (ASCORBIC ACID) 1000 MG TABS Take 1,000 mg by mouth every evening   at PM Yes Reported, Patient   VITAMIN D PO Take 1 Dose by mouth every evening   at PM Yes Reported, Patient

## 2020-04-15 ENCOUNTER — HOSPITAL ENCOUNTER (INPATIENT)
Facility: CLINIC | Age: 75
LOS: 7 days | Discharge: NURSING, REHAB OR LONG-TERM CARE | DRG: 219 | End: 2020-04-22
Attending: SURGERY | Admitting: SURGERY
Payer: MEDICARE

## 2020-04-15 ENCOUNTER — APPOINTMENT (OUTPATIENT)
Dept: GENERAL RADIOLOGY | Facility: CLINIC | Age: 75
DRG: 219 | End: 2020-04-15
Attending: PHYSICIAN ASSISTANT
Payer: MEDICARE

## 2020-04-15 ENCOUNTER — APPOINTMENT (OUTPATIENT)
Dept: CARDIOLOGY | Facility: CLINIC | Age: 75
DRG: 219 | End: 2020-04-15
Attending: SURGERY
Payer: MEDICARE

## 2020-04-15 ENCOUNTER — ANESTHESIA (OUTPATIENT)
Dept: SURGERY | Facility: CLINIC | Age: 75
DRG: 219 | End: 2020-04-15
Payer: MEDICARE

## 2020-04-15 DIAGNOSIS — L08.9 WOUND INFECTION: ICD-10-CM

## 2020-04-15 DIAGNOSIS — I35.0 AORTIC STENOSIS: ICD-10-CM

## 2020-04-15 DIAGNOSIS — I05.0 MITRAL STENOSIS: ICD-10-CM

## 2020-04-15 DIAGNOSIS — Z79.4 TYPE 2 DIABETES MELLITUS WITHOUT COMPLICATION, WITH LONG-TERM CURRENT USE OF INSULIN (H): ICD-10-CM

## 2020-04-15 DIAGNOSIS — T82.110A FRACTURED ATRIAL PACEMAKER LEAD WIRE, INITIAL ENCOUNTER: ICD-10-CM

## 2020-04-15 DIAGNOSIS — Q21.12 PATENT FORAMEN OVALE: ICD-10-CM

## 2020-04-15 DIAGNOSIS — I10 ESSENTIAL HYPERTENSION: ICD-10-CM

## 2020-04-15 DIAGNOSIS — D62 ACUTE BLOOD LOSS ANEMIA: ICD-10-CM

## 2020-04-15 DIAGNOSIS — Z95.2 S/P AVR: Primary | ICD-10-CM

## 2020-04-15 DIAGNOSIS — I35.0 NONRHEUMATIC AORTIC VALVE STENOSIS: ICD-10-CM

## 2020-04-15 DIAGNOSIS — I07.1 TRICUSPID VALVE REGURGITATION: ICD-10-CM

## 2020-04-15 DIAGNOSIS — K59.03 DRUG-INDUCED CONSTIPATION: ICD-10-CM

## 2020-04-15 DIAGNOSIS — Z00.00 PREVENTATIVE HEALTH CARE: ICD-10-CM

## 2020-04-15 DIAGNOSIS — T14.8XXA WOUND INFECTION: ICD-10-CM

## 2020-04-15 DIAGNOSIS — E11.9 TYPE 2 DIABETES MELLITUS WITHOUT COMPLICATION, WITH LONG-TERM CURRENT USE OF INSULIN (H): ICD-10-CM

## 2020-04-15 DIAGNOSIS — R12 HEARTBURN: ICD-10-CM

## 2020-04-15 LAB
ABO + RH BLD: NORMAL
ABO + RH BLD: NORMAL
ALBUMIN SERPL-MCNC: 3.1 G/DL (ref 3.4–5)
ALBUMIN SERPL-MCNC: 3.1 G/DL (ref 3.4–5)
ALP SERPL-CCNC: 43 U/L (ref 40–150)
ALT SERPL W P-5'-P-CCNC: 23 U/L (ref 0–50)
ANION GAP SERPL CALCULATED.3IONS-SCNC: 10 MMOL/L (ref 3–14)
ANION GAP SERPL CALCULATED.3IONS-SCNC: 12 MMOL/L (ref 3–14)
ANION GAP SERPL CALCULATED.3IONS-SCNC: 9 MMOL/L (ref 3–14)
APTT PPP: 155 SEC (ref 22–37)
APTT PPP: 79 SEC (ref 22–37)
AST SERPL W P-5'-P-CCNC: 76 U/L (ref 0–45)
BASE DEFICIT BLDA-SCNC: 2.9 MMOL/L
BASE DEFICIT BLDA-SCNC: 5.1 MMOL/L
BASE DEFICIT BLDA-SCNC: 9.2 MMOL/L
BASE EXCESS BLDA CALC-SCNC: 0.2 MMOL/L
BASE EXCESS BLDA CALC-SCNC: 0.6 MMOL/L
BASE EXCESS BLDA CALC-SCNC: 1.9 MMOL/L
BASE EXCESS BLDA CALC-SCNC: 2.5 MMOL/L
BASE EXCESS BLDA CALC-SCNC: 3.8 MMOL/L
BASE EXCESS BLDV CALC-SCNC: 1.1 MMOL/L
BASE EXCESS BLDV CALC-SCNC: 5.2 MMOL/L
BILIRUB SERPL-MCNC: 0.9 MG/DL (ref 0.2–1.3)
BLD GP AB SCN SERPL QL: NORMAL
BLD PROD TYP BPU: NORMAL
BLD UNIT ID BPU: 0
BLD UNIT ID BPU: 0
BLOOD BANK CMNT PATIENT-IMP: NORMAL
BLOOD PRODUCT CODE: NORMAL
BLOOD PRODUCT CODE: NORMAL
BPU ID: NORMAL
BPU ID: NORMAL
BUN SERPL-MCNC: 28 MG/DL (ref 7–30)
BUN SERPL-MCNC: 29 MG/DL (ref 7–30)
BUN SERPL-MCNC: 34 MG/DL (ref 7–30)
CA-I BLD-MCNC: 4.3 MG/DL (ref 4.4–5.2)
CA-I BLD-MCNC: 4.4 MG/DL (ref 4.4–5.2)
CA-I BLD-MCNC: 4.5 MG/DL (ref 4.4–5.2)
CA-I BLD-MCNC: 4.8 MG/DL (ref 4.4–5.2)
CALCIUM SERPL-MCNC: 8.3 MG/DL (ref 8.5–10.1)
CALCIUM SERPL-MCNC: 8.3 MG/DL (ref 8.5–10.1)
CALCIUM SERPL-MCNC: 8.5 MG/DL (ref 8.5–10.1)
CHLORIDE SERPL-SCNC: 109 MMOL/L (ref 94–109)
CO2 SERPL-SCNC: 20 MMOL/L (ref 20–32)
CO2 SERPL-SCNC: 23 MMOL/L (ref 20–32)
CO2 SERPL-SCNC: 23 MMOL/L (ref 20–32)
CREAT SERPL-MCNC: 1.14 MG/DL (ref 0.52–1.04)
CREAT SERPL-MCNC: 1.26 MG/DL (ref 0.52–1.04)
CREAT SERPL-MCNC: 1.53 MG/DL (ref 0.52–1.04)
ERYTHROCYTE [DISTWIDTH] IN BLOOD BY AUTOMATED COUNT: 13.4 % (ref 10–15)
ERYTHROCYTE [DISTWIDTH] IN BLOOD BY AUTOMATED COUNT: 13.5 % (ref 10–15)
FIBRINOGEN PPP-MCNC: 386 MG/DL (ref 200–420)
GFR SERPL CREATININE-BSD FRML MDRD: 33 ML/MIN/{1.73_M2}
GFR SERPL CREATININE-BSD FRML MDRD: 42 ML/MIN/{1.73_M2}
GFR SERPL CREATININE-BSD FRML MDRD: 47 ML/MIN/{1.73_M2}
GLUCOSE BLDC GLUCOMTR-MCNC: 129 MG/DL (ref 70–99)
GLUCOSE BLDC GLUCOMTR-MCNC: 171 MG/DL (ref 70–99)
GLUCOSE BLDC GLUCOMTR-MCNC: 205 MG/DL (ref 70–99)
GLUCOSE BLDC GLUCOMTR-MCNC: 214 MG/DL (ref 70–99)
GLUCOSE BLDC GLUCOMTR-MCNC: 220 MG/DL (ref 70–99)
GLUCOSE BLDC GLUCOMTR-MCNC: 223 MG/DL (ref 70–99)
GLUCOSE BLDC GLUCOMTR-MCNC: 233 MG/DL (ref 70–99)
GLUCOSE BLDC GLUCOMTR-MCNC: 265 MG/DL (ref 70–99)
GLUCOSE BLDC GLUCOMTR-MCNC: 275 MG/DL (ref 70–99)
GLUCOSE BLDC GLUCOMTR-MCNC: 325 MG/DL (ref 70–99)
GLUCOSE BLDC GLUCOMTR-MCNC: 345 MG/DL (ref 70–99)
GLUCOSE SERPL-MCNC: 122 MG/DL (ref 70–99)
GLUCOSE SERPL-MCNC: 242 MG/DL (ref 70–99)
GLUCOSE SERPL-MCNC: 304 MG/DL (ref 70–99)
GLUCOSE SERPL-MCNC: 322 MG/DL (ref 70–99)
HCO3 BLD-SCNC: 20 MMOL/L (ref 21–28)
HCO3 BLD-SCNC: 22 MMOL/L (ref 21–28)
HCO3 BLD-SCNC: 24 MMOL/L (ref 21–28)
HCO3 BLD-SCNC: 26 MMOL/L (ref 21–28)
HCO3 BLD-SCNC: 27 MMOL/L (ref 21–28)
HCO3 BLD-SCNC: 27 MMOL/L (ref 21–28)
HCO3 BLD-SCNC: 28 MMOL/L (ref 21–28)
HCO3 BLD-SCNC: 28 MMOL/L (ref 21–28)
HCO3 BLDV-SCNC: 28 MMOL/L (ref 21–28)
HCO3 BLDV-SCNC: 31 MMOL/L (ref 21–28)
HCT VFR BLD AUTO: 28.3 % (ref 35–47)
HCT VFR BLD AUTO: 35 % (ref 35–47)
HGB BLD-MCNC: 11.3 G/DL (ref 11.7–15.7)
HGB BLD-MCNC: 9.2 G/DL (ref 11.7–15.7)
INR PPP: 1.38 (ref 0.86–1.14)
INR PPP: 1.55 (ref 0.86–1.14)
LACTATE BLD-SCNC: 0.6 MMOL/L (ref 0.7–2)
LACTATE BLD-SCNC: 0.7 MMOL/L (ref 0.7–2)
LACTATE BLD-SCNC: 0.7 MMOL/L (ref 0.7–2)
LACTATE BLD-SCNC: 1.4 MMOL/L (ref 0.7–2)
LACTATE BLD-SCNC: 1.5 MMOL/L (ref 0.7–2)
LACTATE BLD-SCNC: 2.2 MMOL/L (ref 0.7–2)
LACTATE BLD-SCNC: 3.3 MMOL/L (ref 0.7–2)
LACTATE BLD-SCNC: 4.7 MMOL/L (ref 0.7–2)
MAGNESIUM SERPL-MCNC: 2.8 MG/DL (ref 1.6–2.3)
MCH RBC QN AUTO: 31.7 PG (ref 26.5–33)
MCH RBC QN AUTO: 31.9 PG (ref 26.5–33)
MCHC RBC AUTO-ENTMCNC: 32.3 G/DL (ref 31.5–36.5)
MCHC RBC AUTO-ENTMCNC: 32.5 G/DL (ref 31.5–36.5)
MCV RBC AUTO: 98 FL (ref 78–100)
MCV RBC AUTO: 98 FL (ref 78–100)
MRSA DNA SPEC QL NAA+PROBE: NEGATIVE
NUM BPU REQUESTED: 4
O2/TOTAL GAS SETTING VFR VENT: ABNORMAL %
O2/TOTAL GAS SETTING VFR VENT: ABNORMAL %
OXYHGB MFR BLD: 100 % (ref 92–100)
OXYHGB MFR BLD: 90 % (ref 92–100)
OXYHGB MFR BLD: 93 % (ref 92–100)
OXYHGB MFR BLD: 98 % (ref 92–100)
OXYHGB MFR BLD: 98 % (ref 92–100)
OXYHGB MFR BLD: 99 % (ref 92–100)
OXYHGB MFR BLDV: 65 %
OXYHGB MFR BLDV: 81 %
PCO2 BLD: 36 MM HG (ref 35–45)
PCO2 BLD: 48 MM HG (ref 35–45)
PCO2 BLD: 48 MM HG (ref 35–45)
PCO2 BLD: 49 MM HG (ref 35–45)
PCO2 BLD: 50 MM HG (ref 35–45)
PCO2 BLD: 50 MM HG (ref 35–45)
PCO2 BLD: 52 MM HG (ref 35–45)
PCO2 BLD: 54 MM HG (ref 35–45)
PCO2 BLDV: 53 MM HG (ref 40–50)
PCO2 BLDV: 56 MM HG (ref 40–50)
PH BLD: 7.16 PH (ref 7.35–7.45)
PH BLD: 7.25 PH (ref 7.35–7.45)
PH BLD: 7.29 PH (ref 7.35–7.45)
PH BLD: 7.33 PH (ref 7.35–7.45)
PH BLD: 7.33 PH (ref 7.35–7.45)
PH BLD: 7.37 PH (ref 7.35–7.45)
PH BLD: 7.38 PH (ref 7.35–7.45)
PH BLD: 7.49 PH (ref 7.35–7.45)
PH BLDV: 7.31 PH (ref 7.32–7.43)
PH BLDV: 7.38 PH (ref 7.32–7.43)
PHOSPHATE SERPL-MCNC: 5.7 MG/DL (ref 2.5–4.5)
PLATELET # BLD AUTO: 166 10E9/L (ref 150–450)
PLATELET # BLD AUTO: 210 10E9/L (ref 150–450)
PO2 BLD: 231 MM HG (ref 80–105)
PO2 BLD: 319 MM HG (ref 80–105)
PO2 BLD: 329 MM HG (ref 80–105)
PO2 BLD: 360 MM HG (ref 80–105)
PO2 BLD: 362 MM HG (ref 80–105)
PO2 BLD: 376 MM HG (ref 80–105)
PO2 BLD: 76 MM HG (ref 80–105)
PO2 BLD: 91 MM HG (ref 80–105)
PO2 BLDV: 35 MM HG (ref 25–47)
PO2 BLDV: 52 MM HG (ref 25–47)
POTASSIUM SERPL-SCNC: 3.8 MMOL/L (ref 3.4–5.3)
POTASSIUM SERPL-SCNC: 4 MMOL/L (ref 3.4–5.3)
POTASSIUM SERPL-SCNC: 4 MMOL/L (ref 3.4–5.3)
POTASSIUM SERPL-SCNC: 4.6 MMOL/L (ref 3.4–5.3)
PROT SERPL-MCNC: 5.8 G/DL (ref 6.8–8.8)
RBC # BLD AUTO: 2.88 10E12/L (ref 3.8–5.2)
RBC # BLD AUTO: 3.56 10E12/L (ref 3.8–5.2)
SODIUM SERPL-SCNC: 141 MMOL/L (ref 133–144)
SODIUM SERPL-SCNC: 141 MMOL/L (ref 133–144)
SODIUM SERPL-SCNC: 142 MMOL/L (ref 133–144)
SPECIMEN EXP DATE BLD: NORMAL
SPECIMEN SOURCE: NORMAL
TRANSFUSION STATUS PATIENT QL: NORMAL
WBC # BLD AUTO: 13.1 10E9/L (ref 4–11)
WBC # BLD AUTO: 20.9 10E9/L (ref 4–11)

## 2020-04-15 PROCEDURE — 25000125 ZZHC RX 250: Performed by: SURGERY

## 2020-04-15 PROCEDURE — 25000132 ZZH RX MED GY IP 250 OP 250 PS 637: Mod: GY | Performed by: SURGERY

## 2020-04-15 PROCEDURE — 20000003 ZZH R&B ICU

## 2020-04-15 PROCEDURE — 93312 ECHO TRANSESOPHAGEAL: CPT | Mod: 26 | Performed by: INTERNAL MEDICINE

## 2020-04-15 PROCEDURE — 88305 TISSUE EXAM BY PATHOLOGIST: CPT | Mod: 26 | Performed by: SURGERY

## 2020-04-15 PROCEDURE — 93321 DOPPLER ECHO F-UP/LMTD STD: CPT | Mod: 26 | Performed by: INTERNAL MEDICINE

## 2020-04-15 PROCEDURE — 41000023 ZZH PERA-PERFUSION, SH ONLY,  EACH ADDTL 15 MIN: Performed by: SURGERY

## 2020-04-15 PROCEDURE — 85730 THROMBOPLASTIN TIME PARTIAL: CPT | Performed by: PHYSICIAN ASSISTANT

## 2020-04-15 PROCEDURE — 25000128 H RX IP 250 OP 636

## 2020-04-15 PROCEDURE — 37000009 ZZH ANESTHESIA TECHNICAL FEE, EACH ADDTL 15 MIN: Performed by: SURGERY

## 2020-04-15 PROCEDURE — 41000022 ZZH PER-PERFUSION, SH ONLY,  1ST 30 MIN: Performed by: SURGERY

## 2020-04-15 PROCEDURE — 02Q50ZZ REPAIR ATRIAL SEPTUM, OPEN APPROACH: ICD-10-PCS | Performed by: SURGERY

## 2020-04-15 PROCEDURE — 02RF08Z REPLACEMENT OF AORTIC VALVE WITH ZOOPLASTIC TISSUE, OPEN APPROACH: ICD-10-PCS | Performed by: SURGERY

## 2020-04-15 PROCEDURE — 27810169 ZZH OR IMPLANT GENERAL: Performed by: SURGERY

## 2020-04-15 PROCEDURE — 85384 FIBRINOGEN ACTIVITY: CPT | Performed by: SURGERY

## 2020-04-15 PROCEDURE — 25000125 ZZHC RX 250: Performed by: PHYSICIAN ASSISTANT

## 2020-04-15 PROCEDURE — 82805 BLOOD GASES W/O2 SATURATION: CPT | Performed by: SURGERY

## 2020-04-15 PROCEDURE — 40000985 XR CHEST PORT 1 VW

## 2020-04-15 PROCEDURE — 88305 TISSUE EXAM BY PATHOLOGIST: CPT | Performed by: SURGERY

## 2020-04-15 PROCEDURE — 00000146 ZZHCL STATISTIC GLUCOSE BY METER IP

## 2020-04-15 PROCEDURE — 25000128 H RX IP 250 OP 636: Performed by: SURGERY

## 2020-04-15 PROCEDURE — 82330 ASSAY OF CALCIUM: CPT | Performed by: PHYSICIAN ASSISTANT

## 2020-04-15 PROCEDURE — 25800030 ZZH RX IP 258 OP 636: Performed by: ANESTHESIOLOGY

## 2020-04-15 PROCEDURE — 37000008 ZZH ANESTHESIA TECHNICAL FEE, 1ST 30 MIN: Performed by: SURGERY

## 2020-04-15 PROCEDURE — 25000128 H RX IP 250 OP 636: Performed by: NURSE ANESTHETIST, CERTIFIED REGISTERED

## 2020-04-15 PROCEDURE — 83605 ASSAY OF LACTIC ACID: CPT | Performed by: SURGERY

## 2020-04-15 PROCEDURE — 40000275 ZZH STATISTIC RCP TIME EA 10 MIN

## 2020-04-15 PROCEDURE — 80048 BASIC METABOLIC PNL TOTAL CA: CPT | Performed by: INTERNAL MEDICINE

## 2020-04-15 PROCEDURE — 82330 ASSAY OF CALCIUM: CPT | Performed by: SURGERY

## 2020-04-15 PROCEDURE — 83735 ASSAY OF MAGNESIUM: CPT | Performed by: PHYSICIAN ASSISTANT

## 2020-04-15 PROCEDURE — 94003 VENT MGMT INPAT SUBQ DAY: CPT

## 2020-04-15 PROCEDURE — 99291 CRITICAL CARE FIRST HOUR: CPT | Performed by: INTERNAL MEDICINE

## 2020-04-15 PROCEDURE — 25800030 ZZH RX IP 258 OP 636: Performed by: PHYSICIAN ASSISTANT

## 2020-04-15 PROCEDURE — 25000128 H RX IP 250 OP 636: Performed by: PHYSICIAN ASSISTANT

## 2020-04-15 PROCEDURE — 85730 THROMBOPLASTIN TIME PARTIAL: CPT | Performed by: SURGERY

## 2020-04-15 PROCEDURE — 80048 BASIC METABOLIC PNL TOTAL CA: CPT | Performed by: SURGERY

## 2020-04-15 PROCEDURE — 02UJ0JZ SUPPLEMENT TRICUSPID VALVE WITH SYNTHETIC SUBSTITUTE, OPEN APPROACH: ICD-10-PCS | Performed by: SURGERY

## 2020-04-15 PROCEDURE — 87640 STAPH A DNA AMP PROBE: CPT | Performed by: SURGERY

## 2020-04-15 PROCEDURE — 83605 ASSAY OF LACTIC ACID: CPT | Performed by: INTERNAL MEDICINE

## 2020-04-15 PROCEDURE — C9113 INJ PANTOPRAZOLE SODIUM, VIA: HCPCS | Performed by: PHYSICIAN ASSISTANT

## 2020-04-15 PROCEDURE — 3E043XZ INTRODUCTION OF VASOPRESSOR INTO CENTRAL VEIN, PERCUTANEOUS APPROACH: ICD-10-PCS | Performed by: SURGERY

## 2020-04-15 PROCEDURE — 82040 ASSAY OF SERUM ALBUMIN: CPT | Performed by: SURGERY

## 2020-04-15 PROCEDURE — 25800030 ZZH RX IP 258 OP 636

## 2020-04-15 PROCEDURE — 5A1221Z PERFORMANCE OF CARDIAC OUTPUT, CONTINUOUS: ICD-10-PCS | Performed by: SURGERY

## 2020-04-15 PROCEDURE — 27210794 ZZH OR GENERAL SUPPLY STERILE: Performed by: SURGERY

## 2020-04-15 PROCEDURE — 82805 BLOOD GASES W/O2 SATURATION: CPT | Performed by: PHYSICIAN ASSISTANT

## 2020-04-15 PROCEDURE — 85027 COMPLETE CBC AUTOMATED: CPT | Performed by: PHYSICIAN ASSISTANT

## 2020-04-15 PROCEDURE — 25000125 ZZHC RX 250: Performed by: NURSE ANESTHETIST, CERTIFIED REGISTERED

## 2020-04-15 PROCEDURE — 87641 MR-STAPH DNA AMP PROBE: CPT | Performed by: SURGERY

## 2020-04-15 PROCEDURE — 25000125 ZZHC RX 250: Performed by: NURSE PRACTITIONER

## 2020-04-15 PROCEDURE — 83605 ASSAY OF LACTIC ACID: CPT | Performed by: PHYSICIAN ASSISTANT

## 2020-04-15 PROCEDURE — 25000128 H RX IP 250 OP 636: Performed by: ANESTHESIOLOGY

## 2020-04-15 PROCEDURE — 25000565 ZZH ISOFLURANE, EA 15 MIN: Performed by: SURGERY

## 2020-04-15 PROCEDURE — 93010 ELECTROCARDIOGRAM REPORT: CPT | Performed by: INTERNAL MEDICINE

## 2020-04-15 PROCEDURE — 25000131 ZZH RX MED GY IP 250 OP 636 PS 637: Mod: GY | Performed by: NURSE PRACTITIONER

## 2020-04-15 PROCEDURE — 25800025 ZZH RX 258: Performed by: SURGERY

## 2020-04-15 PROCEDURE — 85610 PROTHROMBIN TIME: CPT | Performed by: SURGERY

## 2020-04-15 PROCEDURE — 25000125 ZZHC RX 250

## 2020-04-15 PROCEDURE — 80053 COMPREHEN METABOLIC PANEL: CPT | Performed by: PHYSICIAN ASSISTANT

## 2020-04-15 PROCEDURE — 84132 ASSAY OF SERUM POTASSIUM: CPT | Performed by: ANESTHESIOLOGY

## 2020-04-15 PROCEDURE — 36415 COLL VENOUS BLD VENIPUNCTURE: CPT | Performed by: ANESTHESIOLOGY

## 2020-04-15 PROCEDURE — 85027 COMPLETE CBC AUTOMATED: CPT | Performed by: SURGERY

## 2020-04-15 PROCEDURE — 25800030 ZZH RX IP 258 OP 636: Performed by: SURGERY

## 2020-04-15 PROCEDURE — P9041 ALBUMIN (HUMAN),5%, 50ML: HCPCS

## 2020-04-15 PROCEDURE — 93005 ELECTROCARDIOGRAM TRACING: CPT

## 2020-04-15 PROCEDURE — 25800030 ZZH RX IP 258 OP 636: Performed by: NURSE ANESTHETIST, CERTIFIED REGISTERED

## 2020-04-15 PROCEDURE — 93325 DOPPLER ECHO COLOR FLOW MAPG: CPT | Mod: 26 | Performed by: INTERNAL MEDICINE

## 2020-04-15 PROCEDURE — 25000128 H RX IP 250 OP 636: Performed by: NURSE PRACTITIONER

## 2020-04-15 PROCEDURE — 84100 ASSAY OF PHOSPHORUS: CPT | Performed by: PHYSICIAN ASSISTANT

## 2020-04-15 PROCEDURE — 82947 ASSAY GLUCOSE BLOOD QUANT: CPT | Performed by: ANESTHESIOLOGY

## 2020-04-15 PROCEDURE — 85610 PROTHROMBIN TIME: CPT | Performed by: PHYSICIAN ASSISTANT

## 2020-04-15 PROCEDURE — 25000132 ZZH RX MED GY IP 250 OP 250 PS 637: Mod: GY | Performed by: PHYSICIAN ASSISTANT

## 2020-04-15 PROCEDURE — 93325 DOPPLER ECHO COLOR FLOW MAPG: CPT

## 2020-04-15 PROCEDURE — P9041 ALBUMIN (HUMAN),5%, 50ML: HCPCS | Performed by: NURSE ANESTHETIST, CERTIFIED REGISTERED

## 2020-04-15 PROCEDURE — 40000008 ZZH STATISTIC AIRWAY CARE

## 2020-04-15 PROCEDURE — 02RG08Z REPLACEMENT OF MITRAL VALVE WITH ZOOPLASTIC TISSUE, OPEN APPROACH: ICD-10-PCS | Performed by: SURGERY

## 2020-04-15 PROCEDURE — 25800030 ZZH RX IP 258 OP 636: Performed by: NURSE PRACTITIONER

## 2020-04-15 DEVICE — EDWARDS MC3 TRICUSPID ANNULOPLASTY RING
Type: IMPLANTABLE DEVICE | Site: HEART | Status: FUNCTIONAL
Brand: EDWARDS MC3 TRICUSPID ANNULOPLASTY RING

## 2020-04-15 DEVICE — INSPIRIS RESILIA AORTIC VALVE
Type: IMPLANTABLE DEVICE | Site: HEART | Status: FUNCTIONAL
Brand: INSPIRIS RESILIA AORTIC VALVE

## 2020-04-15 DEVICE — ST. JUDE MEDICAL PORCINE MITRAL BIOPROSTHETIC HEART VALVE
Type: IMPLANTABLE DEVICE | Site: HEART | Status: FUNCTIONAL
Brand: EPIC™

## 2020-04-15 RX ORDER — ALBUMIN, HUMAN INJ 5% 5 %
500-1000 SOLUTION INTRAVENOUS
Status: COMPLETED | OUTPATIENT
Start: 2020-04-15 | End: 2020-04-16

## 2020-04-15 RX ORDER — VECURONIUM BROMIDE 1 MG/ML
INJECTION, POWDER, LYOPHILIZED, FOR SOLUTION INTRAVENOUS PRN
Status: DISCONTINUED | OUTPATIENT
Start: 2020-04-15 | End: 2020-04-15

## 2020-04-15 RX ORDER — CLINDAMYCIN PHOSPHATE 900 MG/50ML
900 INJECTION, SOLUTION INTRAVENOUS SEE ADMIN INSTRUCTIONS
Status: DISCONTINUED | OUTPATIENT
Start: 2020-04-15 | End: 2020-04-15 | Stop reason: HOSPADM

## 2020-04-15 RX ORDER — FAMOTIDINE 20 MG/1
20 TABLET, FILM COATED ORAL
Status: COMPLETED | OUTPATIENT
Start: 2020-04-15 | End: 2020-04-15

## 2020-04-15 RX ORDER — HYDROXYZINE HYDROCHLORIDE 10 MG/1
10 TABLET, FILM COATED ORAL EVERY 6 HOURS PRN
Status: DISCONTINUED | OUTPATIENT
Start: 2020-04-15 | End: 2020-04-22 | Stop reason: HOSPADM

## 2020-04-15 RX ORDER — HYDROMORPHONE HYDROCHLORIDE 1 MG/ML
.3-.5 INJECTION, SOLUTION INTRAMUSCULAR; INTRAVENOUS; SUBCUTANEOUS
Status: DISCONTINUED | OUTPATIENT
Start: 2020-04-15 | End: 2020-04-22

## 2020-04-15 RX ORDER — METHOCARBAMOL 500 MG/1
500 TABLET, FILM COATED ORAL 4 TIMES DAILY PRN
Status: DISCONTINUED | OUTPATIENT
Start: 2020-04-15 | End: 2020-04-22 | Stop reason: HOSPADM

## 2020-04-15 RX ORDER — DEXTROSE MONOHYDRATE 25 G/50ML
25-50 INJECTION, SOLUTION INTRAVENOUS
Status: DISCONTINUED | OUTPATIENT
Start: 2020-04-15 | End: 2020-04-22 | Stop reason: HOSPADM

## 2020-04-15 RX ORDER — PHENYLEPHRINE HCL IN 0.9% NACL 50MG/250ML
.5-2 PLASTIC BAG, INJECTION (ML) INTRAVENOUS CONTINUOUS
Status: DISCONTINUED | OUTPATIENT
Start: 2020-04-15 | End: 2020-04-17

## 2020-04-15 RX ORDER — CLINDAMYCIN PHOSPHATE 900 MG/50ML
900 INJECTION, SOLUTION INTRAVENOUS EVERY 8 HOURS
Status: COMPLETED | OUTPATIENT
Start: 2020-04-15 | End: 2020-04-16

## 2020-04-15 RX ORDER — SODIUM CHLORIDE 9 MG/ML
INJECTION, SOLUTION INTRAVENOUS CONTINUOUS
Status: DISCONTINUED | OUTPATIENT
Start: 2020-04-15 | End: 2020-04-20

## 2020-04-15 RX ORDER — PROTAMINE SULFATE 10 MG/ML
INJECTION, SOLUTION INTRAVENOUS PRN
Status: DISCONTINUED | OUTPATIENT
Start: 2020-04-15 | End: 2020-04-15

## 2020-04-15 RX ORDER — CLINDAMYCIN PHOSPHATE 900 MG/50ML
900 INJECTION, SOLUTION INTRAVENOUS
Status: COMPLETED | OUTPATIENT
Start: 2020-04-15 | End: 2020-04-15

## 2020-04-15 RX ORDER — MUPIROCIN 20 MG/G
0.5 OINTMENT TOPICAL 2 TIMES DAILY
Status: DISCONTINUED | OUTPATIENT
Start: 2020-04-15 | End: 2020-04-16 | Stop reason: CLARIF

## 2020-04-15 RX ORDER — PROPOFOL 10 MG/ML
INJECTION, EMULSION INTRAVENOUS PRN
Status: DISCONTINUED | OUTPATIENT
Start: 2020-04-15 | End: 2020-04-15

## 2020-04-15 RX ORDER — METOPROLOL TARTRATE 25 MG/1
25 TABLET, FILM COATED ORAL EVERY 12 HOURS
Status: DISCONTINUED | OUTPATIENT
Start: 2020-04-16 | End: 2020-04-22 | Stop reason: HOSPADM

## 2020-04-15 RX ORDER — AMOXICILLIN 250 MG
1 CAPSULE ORAL 2 TIMES DAILY
Status: DISCONTINUED | OUTPATIENT
Start: 2020-04-15 | End: 2020-04-22 | Stop reason: HOSPADM

## 2020-04-15 RX ORDER — POTASSIUM CHLORIDE 29.8 MG/ML
20 INJECTION INTRAVENOUS
Status: DISCONTINUED | OUTPATIENT
Start: 2020-04-15 | End: 2020-04-22

## 2020-04-15 RX ORDER — MAGNESIUM SULFATE HEPTAHYDRATE 40 MG/ML
2 INJECTION, SOLUTION INTRAVENOUS DAILY PRN
Status: DISCONTINUED | OUTPATIENT
Start: 2020-04-15 | End: 2020-04-22 | Stop reason: HOSPADM

## 2020-04-15 RX ORDER — ONDANSETRON 4 MG/1
4 TABLET, ORALLY DISINTEGRATING ORAL EVERY 6 HOURS PRN
Status: DISCONTINUED | OUTPATIENT
Start: 2020-04-15 | End: 2020-04-22 | Stop reason: HOSPADM

## 2020-04-15 RX ORDER — MAGNESIUM SULFATE HEPTAHYDRATE 40 MG/ML
4 INJECTION, SOLUTION INTRAVENOUS EVERY 4 HOURS PRN
Status: DISCONTINUED | OUTPATIENT
Start: 2020-04-15 | End: 2020-04-22 | Stop reason: HOSPADM

## 2020-04-15 RX ORDER — OXYCODONE HYDROCHLORIDE 5 MG/1
5-10 TABLET ORAL EVERY 4 HOURS PRN
Status: DISCONTINUED | OUTPATIENT
Start: 2020-04-15 | End: 2020-04-20

## 2020-04-15 RX ORDER — SODIUM CHLORIDE 9 MG/ML
INJECTION, SOLUTION INTRAVENOUS CONTINUOUS PRN
Status: DISCONTINUED | OUTPATIENT
Start: 2020-04-15 | End: 2020-04-15

## 2020-04-15 RX ORDER — NICOTINE POLACRILEX 4 MG
15-30 LOZENGE BUCCAL
Status: DISCONTINUED | OUTPATIENT
Start: 2020-04-15 | End: 2020-04-22 | Stop reason: HOSPADM

## 2020-04-15 RX ORDER — FLUORIDE TOOTHPASTE
15 TOOTHPASTE DENTAL 4 TIMES DAILY PRN
Status: DISCONTINUED | OUTPATIENT
Start: 2020-04-15 | End: 2020-04-22 | Stop reason: HOSPADM

## 2020-04-15 RX ORDER — PROPOFOL 10 MG/ML
5-75 INJECTION, EMULSION INTRAVENOUS CONTINUOUS
Status: DISCONTINUED | OUTPATIENT
Start: 2020-04-15 | End: 2020-04-17

## 2020-04-15 RX ORDER — DEXTROSE MONOHYDRATE, SODIUM CHLORIDE, AND POTASSIUM CHLORIDE 50; 1.49; 4.5 G/1000ML; G/1000ML; G/1000ML
INJECTION, SOLUTION INTRAVENOUS CONTINUOUS
Status: DISCONTINUED | OUTPATIENT
Start: 2020-04-15 | End: 2020-04-17 | Stop reason: CLARIF

## 2020-04-15 RX ORDER — NALOXONE HYDROCHLORIDE 0.4 MG/ML
.1-.4 INJECTION, SOLUTION INTRAMUSCULAR; INTRAVENOUS; SUBCUTANEOUS
Status: DISCONTINUED | OUTPATIENT
Start: 2020-04-15 | End: 2020-04-15

## 2020-04-15 RX ORDER — AMOXICILLIN 250 MG
2 CAPSULE ORAL 2 TIMES DAILY
Status: DISCONTINUED | OUTPATIENT
Start: 2020-04-15 | End: 2020-04-22 | Stop reason: HOSPADM

## 2020-04-15 RX ORDER — ACETAMINOPHEN 325 MG/1
650 TABLET ORAL EVERY 4 HOURS PRN
Status: DISCONTINUED | OUTPATIENT
Start: 2020-04-18 | End: 2020-04-22 | Stop reason: HOSPADM

## 2020-04-15 RX ORDER — SODIUM CHLORIDE, SODIUM LACTATE, POTASSIUM CHLORIDE, CALCIUM CHLORIDE 600; 310; 30; 20 MG/100ML; MG/100ML; MG/100ML; MG/100ML
INJECTION, SOLUTION INTRAVENOUS CONTINUOUS PRN
Status: DISCONTINUED | OUTPATIENT
Start: 2020-04-15 | End: 2020-04-15

## 2020-04-15 RX ORDER — HEPARIN SODIUM 5000 [USP'U]/.5ML
5000 INJECTION, SOLUTION INTRAVENOUS; SUBCUTANEOUS EVERY 8 HOURS
Status: DISCONTINUED | OUTPATIENT
Start: 2020-04-16 | End: 2020-04-20

## 2020-04-15 RX ORDER — HYDRALAZINE HYDROCHLORIDE 20 MG/ML
10 INJECTION INTRAMUSCULAR; INTRAVENOUS EVERY 30 MIN PRN
Status: DISCONTINUED | OUTPATIENT
Start: 2020-04-15 | End: 2020-04-22 | Stop reason: HOSPADM

## 2020-04-15 RX ORDER — FUROSEMIDE 10 MG/ML
20 INJECTION INTRAMUSCULAR; INTRAVENOUS
Status: DISCONTINUED | OUTPATIENT
Start: 2020-04-15 | End: 2020-04-20

## 2020-04-15 RX ORDER — POTASSIUM CHLORIDE 1.5 G/1.58G
20-40 POWDER, FOR SOLUTION ORAL
Status: DISCONTINUED | OUTPATIENT
Start: 2020-04-15 | End: 2020-04-22

## 2020-04-15 RX ORDER — DEXTROSE MONOHYDRATE 100 MG/ML
INJECTION, SOLUTION INTRAVENOUS CONTINUOUS PRN
Status: DISCONTINUED | OUTPATIENT
Start: 2020-04-15 | End: 2020-04-22 | Stop reason: HOSPADM

## 2020-04-15 RX ORDER — ONDANSETRON 2 MG/ML
4 INJECTION INTRAMUSCULAR; INTRAVENOUS EVERY 6 HOURS PRN
Status: DISCONTINUED | OUTPATIENT
Start: 2020-04-15 | End: 2020-04-22 | Stop reason: HOSPADM

## 2020-04-15 RX ORDER — NICOTINE POLACRILEX 4 MG
15-30 LOZENGE BUCCAL
Status: DISCONTINUED | OUTPATIENT
Start: 2020-04-15 | End: 2020-04-16

## 2020-04-15 RX ORDER — NALOXONE HYDROCHLORIDE 0.4 MG/ML
.1-.4 INJECTION, SOLUTION INTRAMUSCULAR; INTRAVENOUS; SUBCUTANEOUS
Status: DISCONTINUED | OUTPATIENT
Start: 2020-04-15 | End: 2020-04-22 | Stop reason: HOSPADM

## 2020-04-15 RX ORDER — PROCHLORPERAZINE MALEATE 5 MG
5 TABLET ORAL EVERY 6 HOURS PRN
Status: DISCONTINUED | OUTPATIENT
Start: 2020-04-15 | End: 2020-04-22 | Stop reason: HOSPADM

## 2020-04-15 RX ORDER — FENTANYL CITRATE 50 UG/ML
100 INJECTION, SOLUTION INTRAMUSCULAR; INTRAVENOUS ONCE
Status: COMPLETED | OUTPATIENT
Start: 2020-04-15 | End: 2020-04-15

## 2020-04-15 RX ORDER — LIDOCAINE HYDROCHLORIDE 20 MG/ML
INJECTION, SOLUTION INFILTRATION; PERINEURAL PRN
Status: DISCONTINUED | OUTPATIENT
Start: 2020-04-15 | End: 2020-04-15

## 2020-04-15 RX ORDER — POTASSIUM CL/LIDO/0.9 % NACL 10MEQ/0.1L
10 INTRAVENOUS SOLUTION, PIGGYBACK (ML) INTRAVENOUS
Status: DISCONTINUED | OUTPATIENT
Start: 2020-04-15 | End: 2020-04-22

## 2020-04-15 RX ORDER — METOPROLOL TARTRATE 25 MG/1
25 TABLET, FILM COATED ORAL
Status: DISCONTINUED | OUTPATIENT
Start: 2020-04-15 | End: 2020-04-15 | Stop reason: HOSPADM

## 2020-04-15 RX ORDER — POTASSIUM CHLORIDE 7.45 MG/ML
10 INJECTION INTRAVENOUS
Status: DISCONTINUED | OUTPATIENT
Start: 2020-04-15 | End: 2020-04-22

## 2020-04-15 RX ORDER — MUPIROCIN 20 MG/G
OINTMENT TOPICAL 2 TIMES DAILY
Status: DISCONTINUED | OUTPATIENT
Start: 2020-04-15 | End: 2020-04-15 | Stop reason: HOSPADM

## 2020-04-15 RX ORDER — NITROGLYCERIN 10 MG/100ML
INJECTION INTRAVENOUS PRN
Status: DISCONTINUED | OUTPATIENT
Start: 2020-04-15 | End: 2020-04-15

## 2020-04-15 RX ORDER — POTASSIUM CHLORIDE 1500 MG/1
20-40 TABLET, EXTENDED RELEASE ORAL
Status: DISCONTINUED | OUTPATIENT
Start: 2020-04-15 | End: 2020-04-22

## 2020-04-15 RX ORDER — METOCLOPRAMIDE HYDROCHLORIDE 5 MG/ML
5 INJECTION INTRAMUSCULAR; INTRAVENOUS EVERY 6 HOURS PRN
Status: DISCONTINUED | OUTPATIENT
Start: 2020-04-15 | End: 2020-04-22 | Stop reason: HOSPADM

## 2020-04-15 RX ORDER — CHLORHEXIDINE GLUCONATE ORAL RINSE 1.2 MG/ML
15 SOLUTION DENTAL EVERY 12 HOURS
Status: DISCONTINUED | OUTPATIENT
Start: 2020-04-15 | End: 2020-04-16

## 2020-04-15 RX ORDER — FENTANYL CITRATE 50 UG/ML
INJECTION, SOLUTION INTRAMUSCULAR; INTRAVENOUS PRN
Status: DISCONTINUED | OUTPATIENT
Start: 2020-04-15 | End: 2020-04-15

## 2020-04-15 RX ORDER — SODIUM CHLORIDE, SODIUM LACTATE, POTASSIUM CHLORIDE, CALCIUM CHLORIDE 600; 310; 30; 20 MG/100ML; MG/100ML; MG/100ML; MG/100ML
INJECTION, SOLUTION INTRAVENOUS CONTINUOUS
Status: DISCONTINUED | OUTPATIENT
Start: 2020-04-15 | End: 2020-04-15 | Stop reason: HOSPADM

## 2020-04-15 RX ORDER — MEPERIDINE HYDROCHLORIDE 25 MG/ML
12.5-25 INJECTION INTRAMUSCULAR; INTRAVENOUS; SUBCUTANEOUS
Status: DISCONTINUED | OUTPATIENT
Start: 2020-04-15 | End: 2020-04-18

## 2020-04-15 RX ORDER — METOCLOPRAMIDE 5 MG/1
5 TABLET ORAL EVERY 6 HOURS PRN
Status: DISCONTINUED | OUTPATIENT
Start: 2020-04-15 | End: 2020-04-22 | Stop reason: HOSPADM

## 2020-04-15 RX ORDER — DEXTROSE MONOHYDRATE 25 G/50ML
25-50 INJECTION, SOLUTION INTRAVENOUS
Status: DISCONTINUED | OUTPATIENT
Start: 2020-04-15 | End: 2020-04-16

## 2020-04-15 RX ORDER — PROPOFOL 10 MG/ML
INJECTION, EMULSION INTRAVENOUS CONTINUOUS PRN
Status: DISCONTINUED | OUTPATIENT
Start: 2020-04-15 | End: 2020-04-15

## 2020-04-15 RX ORDER — DEXMEDETOMIDINE HYDROCHLORIDE 4 UG/ML
.2-.7 INJECTION, SOLUTION INTRAVENOUS CONTINUOUS
Status: DISCONTINUED | OUTPATIENT
Start: 2020-04-15 | End: 2020-04-17

## 2020-04-15 RX ORDER — GABAPENTIN 100 MG/1
100 CAPSULE ORAL 3 TIMES DAILY
Status: DISPENSED | OUTPATIENT
Start: 2020-04-15 | End: 2020-04-18

## 2020-04-15 RX ORDER — ALBUMIN, HUMAN INJ 5% 5 %
SOLUTION INTRAVENOUS CONTINUOUS PRN
Status: DISCONTINUED | OUTPATIENT
Start: 2020-04-15 | End: 2020-04-15

## 2020-04-15 RX ORDER — ACETAMINOPHEN 325 MG/1
975 TABLET ORAL EVERY 8 HOURS
Status: DISPENSED | OUTPATIENT
Start: 2020-04-15 | End: 2020-04-18

## 2020-04-15 RX ORDER — LIDOCAINE 4 G/G
1 PATCH TOPICAL
Status: DISCONTINUED | OUTPATIENT
Start: 2020-04-15 | End: 2020-04-22 | Stop reason: HOSPADM

## 2020-04-15 RX ORDER — HEPARIN SODIUM 1000 [USP'U]/ML
INJECTION, SOLUTION INTRAVENOUS; SUBCUTANEOUS PRN
Status: DISCONTINUED | OUTPATIENT
Start: 2020-04-15 | End: 2020-04-15

## 2020-04-15 RX ORDER — NITROGLYCERIN 20 MG/100ML
.07-1.56 INJECTION INTRAVENOUS CONTINUOUS
Status: DISCONTINUED | OUTPATIENT
Start: 2020-04-15 | End: 2020-04-17

## 2020-04-15 RX ORDER — LIDOCAINE 40 MG/G
CREAM TOPICAL
Status: DISCONTINUED | OUTPATIENT
Start: 2020-04-15 | End: 2020-04-17

## 2020-04-15 RX ADMIN — FENTANYL CITRATE 100 MCG: 50 INJECTION, SOLUTION INTRAMUSCULAR; INTRAVENOUS at 12:36

## 2020-04-15 RX ADMIN — VECURONIUM BROMIDE 2 MG: 1 INJECTION, POWDER, LYOPHILIZED, FOR SOLUTION INTRAVENOUS at 13:48

## 2020-04-15 RX ADMIN — HEPARIN SODIUM 50000 UNITS: 1000 INJECTION INTRAVENOUS; SUBCUTANEOUS at 09:06

## 2020-04-15 RX ADMIN — FENTANYL CITRATE 200 MCG: 50 INJECTION, SOLUTION INTRAMUSCULAR; INTRAVENOUS at 09:18

## 2020-04-15 RX ADMIN — AMINOCAPROIC ACID 1 G/HR: 250 INJECTION, SOLUTION INTRAVENOUS at 09:40

## 2020-04-15 RX ADMIN — EPINEPHRINE 0.05 MCG/KG/MIN: 1 INJECTION INTRAMUSCULAR; INTRAVENOUS; SUBCUTANEOUS at 12:12

## 2020-04-15 RX ADMIN — Medication 1.5 G (CENTRAL CATHETER): at 08:30

## 2020-04-15 RX ADMIN — FENTANYL CITRATE 200 MCG: 50 INJECTION, SOLUTION INTRAMUSCULAR; INTRAVENOUS at 08:55

## 2020-04-15 RX ADMIN — NITROGLYCERIN 20 MCG: 10 INJECTION INTRAVENOUS at 13:36

## 2020-04-15 RX ADMIN — SODIUM CHLORIDE 14 UNITS/HR: 9 INJECTION, SOLUTION INTRAVENOUS at 22:37

## 2020-04-15 RX ADMIN — NITROGLYCERIN 40 MCG: 10 INJECTION INTRAVENOUS at 12:58

## 2020-04-15 RX ADMIN — MIDAZOLAM HYDROCHLORIDE 2 MG: 1 INJECTION, SOLUTION INTRAMUSCULAR; INTRAVENOUS at 07:20

## 2020-04-15 RX ADMIN — NITROGLYCERIN 20 MCG: 10 INJECTION INTRAVENOUS at 13:31

## 2020-04-15 RX ADMIN — PHENYLEPHRINE HYDROCHLORIDE 100 MCG: 10 INJECTION INTRAVENOUS at 09:37

## 2020-04-15 RX ADMIN — PHENYLEPHRINE HYDROCHLORIDE 0.25 MCG/KG/MIN: 10 INJECTION INTRAVENOUS at 09:55

## 2020-04-15 RX ADMIN — NITROGLYCERIN 40 MCG: 10 INJECTION INTRAVENOUS at 12:56

## 2020-04-15 RX ADMIN — VECURONIUM BROMIDE 5 MG: 1 INJECTION, POWDER, LYOPHILIZED, FOR SOLUTION INTRAVENOUS at 11:30

## 2020-04-15 RX ADMIN — ROCURONIUM BROMIDE 50 MG: 10 INJECTION INTRAVENOUS at 08:04

## 2020-04-15 RX ADMIN — PHENYLEPHRINE HYDROCHLORIDE 100 MCG: 10 INJECTION INTRAVENOUS at 14:03

## 2020-04-15 RX ADMIN — FAMOTIDINE 20 MG: 20 TABLET, FILM COATED ORAL at 05:56

## 2020-04-15 RX ADMIN — FENTANYL CITRATE 100 MCG: 50 INJECTION, SOLUTION INTRAMUSCULAR; INTRAVENOUS at 12:49

## 2020-04-15 RX ADMIN — FENTANYL CITRATE 100 MCG: 50 INJECTION, SOLUTION INTRAMUSCULAR; INTRAVENOUS at 07:57

## 2020-04-15 RX ADMIN — PHENYLEPHRINE HYDROCHLORIDE 100 MCG: 10 INJECTION INTRAVENOUS at 14:02

## 2020-04-15 RX ADMIN — FENTANYL CITRATE 100 MCG: 50 INJECTION, SOLUTION INTRAMUSCULAR; INTRAVENOUS at 13:16

## 2020-04-15 RX ADMIN — PHENYLEPHRINE HYDROCHLORIDE 100 MCG: 10 INJECTION INTRAVENOUS at 13:27

## 2020-04-15 RX ADMIN — MIDAZOLAM HYDROCHLORIDE 2 MG: 1 INJECTION, SOLUTION INTRAMUSCULAR; INTRAVENOUS at 07:39

## 2020-04-15 RX ADMIN — SODIUM CHLORIDE: 9 INJECTION, SOLUTION INTRAVENOUS at 13:24

## 2020-04-15 RX ADMIN — PHENYLEPHRINE HYDROCHLORIDE 100 MCG: 10 INJECTION INTRAVENOUS at 09:32

## 2020-04-15 RX ADMIN — SODIUM CHLORIDE: 9 INJECTION, SOLUTION INTRAVENOUS at 21:00

## 2020-04-15 RX ADMIN — PHENYLEPHRINE HYDROCHLORIDE 100 MCG: 10 INJECTION INTRAVENOUS at 09:11

## 2020-04-15 RX ADMIN — SODIUM CHLORIDE 2 UNITS/HR: 9 INJECTION, SOLUTION INTRAVENOUS at 16:15

## 2020-04-15 RX ADMIN — NITROGLYCERIN 20 MCG: 10 INJECTION INTRAVENOUS at 13:33

## 2020-04-15 RX ADMIN — PHENYLEPHRINE HYDROCHLORIDE 100 MCG: 10 INJECTION INTRAVENOUS at 09:30

## 2020-04-15 RX ADMIN — SUCCINYLCHOLINE CHLORIDE 160 MG: 20 INJECTION, SOLUTION INTRAMUSCULAR; INTRAVENOUS; PARENTERAL at 07:57

## 2020-04-15 RX ADMIN — PHENYLEPHRINE HYDROCHLORIDE 100 MCG: 10 INJECTION INTRAVENOUS at 13:22

## 2020-04-15 RX ADMIN — MIDAZOLAM HYDROCHLORIDE 2 MG: 1 INJECTION, SOLUTION INTRAMUSCULAR; INTRAVENOUS at 08:58

## 2020-04-15 RX ADMIN — NITROGLYCERIN 40 MCG: 10 INJECTION INTRAVENOUS at 12:52

## 2020-04-15 RX ADMIN — AMINOCAPROIC ACID 5 G/HR: 250 INJECTION, SOLUTION INTRAVENOUS at 08:40

## 2020-04-15 RX ADMIN — SODIUM CHLORIDE, POTASSIUM CHLORIDE, SODIUM LACTATE AND CALCIUM CHLORIDE: 600; 310; 30; 20 INJECTION, SOLUTION INTRAVENOUS at 08:35

## 2020-04-15 RX ADMIN — PHENYLEPHRINE HYDROCHLORIDE 100 MCG: 10 INJECTION INTRAVENOUS at 09:35

## 2020-04-15 RX ADMIN — FENTANYL CITRATE 100 MCG: 50 INJECTION, SOLUTION INTRAMUSCULAR; INTRAVENOUS at 12:43

## 2020-04-15 RX ADMIN — SODIUM CHLORIDE, POTASSIUM CHLORIDE, SODIUM LACTATE AND CALCIUM CHLORIDE: 600; 310; 30; 20 INJECTION, SOLUTION INTRAVENOUS at 06:46

## 2020-04-15 RX ADMIN — PHENYLEPHRINE HYDROCHLORIDE 100 MCG: 10 INJECTION INTRAVENOUS at 14:36

## 2020-04-15 RX ADMIN — PHENYLEPHRINE HYDROCHLORIDE 100 MCG: 10 INJECTION INTRAVENOUS at 13:12

## 2020-04-15 RX ADMIN — PROPOFOL 100 MG: 10 INJECTION, EMULSION INTRAVENOUS at 07:57

## 2020-04-15 RX ADMIN — CLINDAMYCIN PHOSPHATE 900 MG: 900 INJECTION, SOLUTION INTRAVENOUS at 17:23

## 2020-04-15 RX ADMIN — VECURONIUM BROMIDE 10 MG: 1 INJECTION, POWDER, LYOPHILIZED, FOR SOLUTION INTRAVENOUS at 08:30

## 2020-04-15 RX ADMIN — NITROGLYCERIN 40 MCG: 10 INJECTION INTRAVENOUS at 12:49

## 2020-04-15 RX ADMIN — PANTOPRAZOLE SODIUM 40 MG: 40 INJECTION, POWDER, FOR SOLUTION INTRAVENOUS at 17:31

## 2020-04-15 RX ADMIN — MUPIROCIN: 20 OINTMENT TOPICAL at 05:56

## 2020-04-15 RX ADMIN — LIDOCAINE HYDROCHLORIDE 100 MG: 20 INJECTION, SOLUTION INFILTRATION; PERINEURAL at 07:57

## 2020-04-15 RX ADMIN — CLINDAMYCIN PHOSPHATE 900 MG: 900 INJECTION, SOLUTION INTRAVENOUS at 08:21

## 2020-04-15 RX ADMIN — PROTAMINE SULFATE 200 MG: 10 INJECTION, SOLUTION INTRAVENOUS at 13:30

## 2020-04-15 RX ADMIN — SODIUM CHLORIDE 14.5 UNITS/HR: 9 INJECTION, SOLUTION INTRAVENOUS at 20:44

## 2020-04-15 RX ADMIN — FENTANYL CITRATE 100 MCG: 50 INJECTION, SOLUTION INTRAMUSCULAR; INTRAVENOUS at 13:33

## 2020-04-15 RX ADMIN — PHENYLEPHRINE HYDROCHLORIDE 100 MCG: 10 INJECTION INTRAVENOUS at 09:20

## 2020-04-15 RX ADMIN — PROPOFOL 30 MCG/KG/MIN: 10 INJECTION, EMULSION INTRAVENOUS at 14:26

## 2020-04-15 RX ADMIN — SODIUM CHLORIDE, POTASSIUM CHLORIDE, SODIUM LACTATE AND CALCIUM CHLORIDE: 600; 310; 30; 20 INJECTION, SOLUTION INTRAVENOUS at 08:36

## 2020-04-15 RX ADMIN — SODIUM CHLORIDE: 9 INJECTION, SOLUTION INTRAVENOUS at 08:35

## 2020-04-15 RX ADMIN — PHENYLEPHRINE HYDROCHLORIDE 100 MCG: 10 INJECTION INTRAVENOUS at 13:13

## 2020-04-15 RX ADMIN — ALBUMIN HUMAN: 0.05 INJECTION, SOLUTION INTRAVENOUS at 12:47

## 2020-04-15 RX ADMIN — PHENYLEPHRINE HYDROCHLORIDE 100 MCG: 10 INJECTION INTRAVENOUS at 13:01

## 2020-04-15 RX ADMIN — PHENYLEPHRINE HYDROCHLORIDE 100 MCG: 10 INJECTION INTRAVENOUS at 13:11

## 2020-04-15 RX ADMIN — HUMAN INSULIN 3 UNITS/HR: 100 INJECTION, SOLUTION SUBCUTANEOUS at 14:19

## 2020-04-15 RX ADMIN — PHENYLEPHRINE HYDROCHLORIDE 100 MCG: 10 INJECTION INTRAVENOUS at 09:08

## 2020-04-15 RX ADMIN — FENTANYL CITRATE 25 MCG: 50 INJECTION, SOLUTION INTRAMUSCULAR; INTRAVENOUS at 07:21

## 2020-04-15 RX ADMIN — VANCOMYCIN HYDROCHLORIDE 1500 MG: 5 INJECTION, POWDER, LYOPHILIZED, FOR SOLUTION INTRAVENOUS at 20:55

## 2020-04-15 RX ADMIN — HYDROMORPHONE HYDROCHLORIDE 0.3 MG: 1 INJECTION, SOLUTION INTRAMUSCULAR; INTRAVENOUS; SUBCUTANEOUS at 21:27

## 2020-04-15 RX ADMIN — POTASSIUM CHLORIDE 20 MEQ: 1.5 POWDER, FOR SOLUTION ORAL at 18:06

## 2020-04-15 RX ADMIN — MIDAZOLAM HYDROCHLORIDE 3 MG: 1 INJECTION, SOLUTION INTRAMUSCULAR; INTRAVENOUS at 12:15

## 2020-04-15 RX ADMIN — POTASSIUM CHLORIDE, DEXTROSE MONOHYDRATE AND SODIUM CHLORIDE: 150; 5; 450 INJECTION, SOLUTION INTRAVENOUS at 15:35

## 2020-04-15 RX ADMIN — NITROGLYCERIN 20 MCG: 10 INJECTION INTRAVENOUS at 13:29

## 2020-04-15 ASSESSMENT — ACTIVITIES OF DAILY LIVING (ADL)
ADLS_ACUITY_SCORE: 16
ADLS_ACUITY_SCORE: 15

## 2020-04-15 NOTE — ANESTHESIA CARE TRANSFER NOTE
Patient: Amy Luna    Procedure(s):  REPLACEMENT, AORTIC VALVE WITH INSPIRIS TISSUE VALVE 25 MM; ON PUMP WITH SOCORRO READ BY CARDIOLOGIST DR BLANTON.  REPLACEMENT, MITRAL VALVE WITH EPIC TISSUE VALVE 27 MM.  Patent Foramen Ovale Closure  REPAIR TRICUSPID VALVE WITH VILLA MC3 26MM.    Diagnosis: Tricuspid valve regurgitation [I07.1]  Aortic stenosis [I35.0]  Mitral stenosis [I05.0]  Patent foramen ovale [Q21.1]  Diagnosis Additional Information: No value filed.    Anesthesia Type:   General     Note:  Airway :ETT  Patient transferred to:ICU  Comments: Patient connected to SpO2, EKG, and arterial blood pressure transport monitors and accompanied by CRNA, anesthesiologist, circulating RN to ICU room. Patient ventilated by anesthesiologist with ambu via ETT with O2 at 15 liters per minute during transport.     On arrival to ICU, endotracheal tube position unchanged, equal, bilateral breath sounds auscultated in ICU room, patient placed on ICU ventilator by respiratory therapist, teeth and oral mucosa intact and unchanged at handoff of care. At anesthesia handoff of care, clinical monitors and alarms on and functioning, report on patient's clinical status given to ICU RN, report on patient's clinical status given to intensivist, ICU staff questions answered.  VSS throughout.  RN comfortable at bedside.ICU Handoff: Call for PAUSE to initiate/utilize ICU HANDOFF, Identified Patient, Identified Responsible Provider, Reviewed the Pertinent Medical History, Discussed Surgical Course, Reviewed Intra-OP Anesthesia Management and Issues during Anesthesia, Set Expectations for Post Procedure Period and Allowed Opportunity for Questions and Acknowledgement of Understanding      Vitals: (Last set prior to Anesthesia Care Transfer)    CRNA VITALS  4/15/2020 1422 - 4/15/2020 1519      4/15/2020             Resp Rate (set):  10                Electronically Signed By: SARBJIT Hunt CRNA  April 15, 2020  3:19 PM

## 2020-04-15 NOTE — H&P
CRITICAL CARE ICU CONSULT NOTE  04/15/2020      PRIMARY TEAM: Cardiovascular Thoracic Surgery  SURGOEN: Dr. JEAN PAUL Wilson   REASON FOR CRITICAL CARE ADMISSION: Post op vent management   Date of Service (when I saw the patient): 04/15/2020    ASSESSMENT:  Amy Luna is a 74 year old female with PMH of severe mitral and aortic stenosis, tricuspid regurg, PPM 2015 for sick sinus syndrome, DM2, HTN, HLD, morbid obesity who was admitted to the ICU S/P AVR, MVR, TVR and PFO repair.    PLAN:    Neurological:  # Acute post op pain management:    - Pain: PRN Hydromorphone, Methocarbamol, Lidoderm patches, PRN tylenol  - Monitor neurological status. Delirium preventions and precautions.   - Sedation plan: Propofol, RASS goal 0 to -1, wean as appropriate for neuro exam and SBT    Pulmonary:   # Post operative ventilatory support  # LAVERNE- wears home CPAP  - VENT: AC: 16/500/5/60%. Continue full vent support.   - PST when meets criteria.  Ventilatory bundle.   - Obtain ABG now, post op CXR   - Supplemental oxygen to keep saturation above 92 %.  - Incentive spirometer every 15- 30 minutes when awake.  - Mediasternal chest tube output minimal sanguinous output, management per CVTS  - May need to extubate to BIPAP otherwise CPAP at bedtime    Cardiovascular:    # S/P MVR, AVR, TVR, PFO repair Dr. Wilson  # Hx HTN, HLD   # Hx aortic, mitral valve stenosis, tricuspid regurg  # S/P PPM for Sick Sinus Syndrome  Home cardiac meds: Metoprolol XL 75mg, Losartan 50mg, Furosemide 40, ASA 81: management per Cards    Vasopressors:  Phenylephrine: 0.25, Epi 0.03, titrate per CVTS protocol  - Monitor hemodynamic status. .     Gastroenterology/Nutrition:  -NPO while intubated  -Bedside swallow once extubated, the advance diet as able    Fluids/Electrolytes/Renal:  - BMP post op  - Replete electrolytes per protocol  - Urine output is 475 intraop . Will continue to monitor intake and output.    Endocrine:  # Stress hyperglycemia  # Diabetes  mellitus   -Home regimen includes: Metformin XR 1gm daily and Glipizide XL10mg- hold oral hyperglycemics   -Insulin gtt protocol post op    Heme:     # Acute blood loss anemia    CBC post op  - Transfuse if hgb <7.0 or signs/symptoms of hypoperfusion. Monitor,    Skin:  #mediasternal incision   Wound vac dressing intact    General Cares/Prophylaxis:    DVT Prophylaxis: Heparin SQ  GI Prophylaxis: PPI  Restraints: Restraints for medical healing needed: YES    Lines/ tubes/ drains:  - ETT  - Left MAC, TLIJ  - A line  - Mediasternal wound vac  - Mediasternal chest tubes  - Stewart    Disposition:   ICU    Patient seen, findings and plan discussed with surgical ICU staff, Dr. Bright.    Kolby SCHAFFER, CNP  - - - - - - - - - - - - - - - - - - - - - - - - - - - - - - - - - - - - - - - - - - - - - -   REVIEW OF SYSTEMS: 10 point ROS neg other than the symptoms noted above in the HPI.  PAST MEDICAL HISTORY:   Past Medical History:   Diagnosis Date     (HFpEF) heart failure with preserved ejection fraction (H)      Aortic stenosis      Chest pain      Depressive disorder      Diabetes (H)      Hyperlipidemia      Hypertension      Mitral annular calcification      Mitral stenosis      Obesity      Sleep apnea     CPAP   SURGICAL HISTORY:   Past Surgical History:   Procedure Laterality Date     APPENDECTOMY       CV CORONARY ANGIOGRAM N/A 2020    Procedure: Coronary Angiogram;  Surgeon: Inez Peoples MD;  Location:  [facility]     CV LEFT HEART CATH N/A 2020    Procedure: Left Heart Cath;  Surgeon: Inez Peoples MD;  Location:  [facility]     CV RIGHT HEART CATH N/A 2020    Procedure: Right Heart Cath;  Surgeon: Inez Peoples MD;  Location:  [facility]     GYN SURGERY       x2 ,      GYN SURGERY      total hysterectomy     IMPLANT PACEMAKER       SOCIAL HISTORY:   Social History     Socioeconomic History     Marital  status:      Spouse name: None     Number of children: None     Years of education: None     Highest education level: None   Occupational History     None   Social Needs     Financial resource strain: None     Food insecurity     Worry: None     Inability: None     Transportation needs     Medical: None     Non-medical: None   Tobacco Use     Smoking status: Never Smoker     Smokeless tobacco: Never Used   Substance and Sexual Activity     Alcohol use: No     Drug use: No     Sexual activity: None   Lifestyle     Physical activity     Days per week: None     Minutes per session: None     Stress: None   Relationships     Social connections     Talks on phone: None     Gets together: None     Attends Islam service: None     Active member of club or organization: None     Attends meetings of clubs or organizations: None     Relationship status: None     Intimate partner violence     Fear of current or ex partner: None     Emotionally abused: None     Physically abused: None     Forced sexual activity: None   Other Topics Concern     None   Social History Narrative     None     ALLERGIES:   Allergies   Allergen Reactions     Penicillins Hives     MEDICATIONS:  No current facility-administered medications on file prior to encounter.   albuterol (PROAIR HFA/PROVENTIL HFA/VENTOLIN HFA) 108 (90 Base) MCG/ACT inhaler, Inhale 2 puffs into the lungs every 4 hours as needed for shortness of breath / dyspnea or wheezing  aspirin 81 MG EC tablet, Take 81 mg by mouth every morning   atorvastatin (LIPITOR) 40 MG tablet, Take 40 mg by mouth At Bedtime   Biotin 08538 MCG TABS, Take 1,000 mcg by mouth every morning   Calcium Carbonate-Vit D-Min (CALCIUM 1200 PO), Take 1 tablet by mouth every evening   coenzyme Q-10 200 MG CAPS, Take 200 mg by mouth every morning   furosemide (LASIX) 40 MG tablet, Take 40 mg by mouth every morning  glipiZIDE (GLUCOTROL XL) 10 MG 24 hr tablet, Take 20 mg by mouth every morning (takes 2 x  10mg)  insulin NPH (HUMULIN N/NOVOLIN N VIAL) 100 UNIT/ML vial, Inject 50 Units Subcutaneous 2 times daily  ipratropium (ATROVENT) 0.06 % nasal spray, Spray 2 sprays into both nostrils 4 times daily as needed for rhinitis  losartan (COZAAR) 50 MG tablet, Take 50 mg by mouth every morning  Lutein 40 MG CAPS, Take 40 mg by mouth every morning   Magnesium 400 MG TABS, Take 400 mg by mouth every evening   metFORMIN (GLUCOPHAGE-XR) 500 MG 24 hr tablet, Take 1,000 mg by mouth daily (with breakfast) (takes 2 x 500mg)  metoprolol succinate ER (TOPROL-XL) 50 MG 24 hr tablet, Take 1.5 tablets (75 mg) by mouth every morning  potassium 99 MG TABS, Take 99 mg by mouth every evening   Propylene Glycol (SYSTANE BALANCE OP), Apply 1-2 drops to eye 3 times daily as needed (dry eyes)  sertraline (ZOLOFT) 100 MG tablet, Take 150 mg by mouth every morning (takes 1.5 x 100mg)  TURMERIC PO, Take 1-3 tablets by mouth daily   vitamin (B COMPLEX-C) tablet, Take 1 tablet by mouth daily  vitamin C (ASCORBIC ACID) 1000 MG TABS, Take 1,000 mg by mouth every evening   VITAMIN D PO, Take 1 Dose by mouth every evening       PHYSICAL EXAMINATION:  Temp:  [97.9  F (36.6  C)] 97.9  F (36.6  C)  Pulse:  [70] 70  Resp:  [16] 16  BP: (114)/(46) 114/46  FiO2 (%):  [60 %] 60 %  SpO2:  [96 %-100 %] 100 %  General: Sedated  HEENT:ETT secured   Neuro: DIMITRY, sedated post op  Pulm/Resp: Clear breath sounds bilaterally without rhonchi  CV: RRR, S1S2  Abdomen: Soft, rounded, obese  :  arellano catheter in place, urine yellow and clear  Incisions/Skin: Media sternal incision covered, wound vac dressing intact  MSK/Extremities: mild peripheral edema, BLE warm, +1 pedal pulses  LABS: Reviewed.   Arterial Blood Gases   No lab results found in last 7 days.  Complete Blood Count   Recent Labs   Lab 04/15/20  1342   WBC 13.1*   HGB 9.2*        Basic Metabolic Panel  Recent Labs   Lab 04/15/20  1342 04/15/20  0600 04/10/20  1011     --  137   POTASSIUM 4.0  3.8 4.3   CHLORIDE 109  --  101   CO2 23  --  29   BUN 28  --  21   CR 1.14*  --  1.20   * 122* 163*     Liver Function Tests  Recent Labs   Lab 04/15/20  1342   ALBUMIN 3.1*   INR 1.55*     Pancreatic Enzymes  No lab results found in last 7 days.  Coagulation Profile  Recent Labs   Lab 04/15/20  1342   INR 1.55*   PTT 79*     IMAGING:  Recent Results (from the past 24 hour(s))   Transesophageal Echocardiogram    Narrative    [id number]  [id number]  [id number]^CHRISTINE^[place]           [facility]  Echocardiography Laboratory  [address] [place] [id number]        Name: KASSANDRA RIVERA  MRN: [id number]  : 1945  Study Date: 04/15/2020 08:34 AM  Age: 74 yrs  Gender: Female  Patient Location: [facility]  Reason For Study: TV REPAIR  Ordering Physician: CRISTA KING  Referring Physician: JACKELINE TOURE  Performed By: Jose Antonio Hernandez RDCS     _____________________________________________________________________________  __        Procedure  Complete OR SOCORRO Adult.  _____________________________________________________________________________  __        Interpretation Summary     There is mild (1+) tricuspid regurgitation, post repair.  There is a bioprosthetic mitral valve.  Pre valve surgery SOCORRO performed by anesthesia.  _____________________________________________________________________________  __        SOCORRO  REPLACEMENT, AORTIC VALVE WITH INSPIRIS TISSUE VALVE 25 MM; ON PUMP WITH SOCORRO  READ BY CARDIOLOGIST DR BLANTON. (N/A Chest) REPLACEMENT, MITRAL VALVE WITH EPIC  TISSUE VALVE 27 MM. (N/A Chest) Patent Foramen Ovale Closure (N/A Heart)  REPAIR TRICUSPID VALVE WITH VILLA MC3 26MM. (N/A Chest)  Procedures. The transesophageal probe insertion was technically difficult.     Left Ventricle  Left ventricular systolic function is normal.     Right Ventricle  The right ventricle is normal in structure, function and size. There is a  catheter/pacemaker lead seen in the right  ventricle.     Mitral Valve  There is moderate to mod-severe (2-3+) mitral regurgitation. There is a  bioprosthetic mitral valve. Normal prosthetic mitral valve gradients. This  degree of valvular regurgitation is within normal limits.     Tricuspid Valve  The tricuspid valve is not well visualized. There is mild (1+) tricuspid  regurgitation.        Aortic Valve  There is mild (1+) aortic regurgitation.     Pericardial/Pleural  There is no pericardial effusion.     Rhythm  The rhythm was undetermined.  _____________________________________________________________________________  __                                Report approved by: Saul Thomas 04/15/2020 02:59 PM                    _____________________________________________________________________________  __

## 2020-04-15 NOTE — Clinical Note
When I went to care suites to  pt for procedure at 1600 pt informed me of left arm pain and weakness that started at 1200 today. Pt did not report to RN or provider on floor per pt. On neuro assessment the pt had no objective deficit, timing and strength of extremities nml bilat, bilat sensory of extremities and face symmetry intact bilat-no facial droop, speech nml, no arm or leg drift, finger to nose dexterity equal and nml bilat, pt alert and oriented x4, pt denies HA, nausea, vision changes. Dr Francis and floor RN notified, pts primary hospital provider notified, MD was in to assess pt. Pt ok'd to proceed with procudure by MD&pt's primary provider in hospital.

## 2020-04-15 NOTE — ANESTHESIA POSTPROCEDURE EVALUATION
Patient: Amy Luna    Procedure(s):  REPLACEMENT, AORTIC VALVE WITH INSPIRIS TISSUE VALVE 25 MM; ON PUMP WITH SOCORRO READ BY CARDIOLOGIST DR BLANTON.  REPLACEMENT, MITRAL VALVE WITH EPIC TISSUE VALVE 27 MM.  Patent Foramen Ovale Closure  REPAIR TRICUSPID VALVE WITH VILLA MC3 26MM.    Diagnosis:Tricuspid valve regurgitation [I07.1]  Aortic stenosis [I35.0]  Mitral stenosis [I05.0]  Patent foramen ovale [Q21.1]  Diagnosis Additional Information: No value filed.    Anesthesia Type:  General    Note:  Anesthesia Post Evaluation    Patient location during evaluation: ICU  Patient participation: Unable to evaluate secondary to administered sedation  Level of consciousness: obtunded/minimal responses  Pain management: unable to assess  Airway patency: patent  Cardiovascular status: stable  Respiratory status: ventilator, intubated and ETT  Hydration status: acceptable  PONV: unable to assess             Last vitals:  Vitals:    04/15/20 0558 04/15/20 1515   BP: 114/46    Pulse: 70    Resp: 16    Temp: 36.6  C (97.9  F)    SpO2: 96% 100%         Electronically Signed By: Anuradha Adam MD  April 15, 2020  4:04 PM

## 2020-04-15 NOTE — ANESTHESIA PROCEDURE NOTES
ARTERIAL LINE PROCEDURE NOTE:  Staff -   Anesthesiologist:  Anuradha Adam MD      Performed By: anesthesiologist         Pre-Procedure  Performed by Anuradha Adam MD  Location: pre-op      Pre-Anesthestic Checklist: patient identified, IV checked, risks and benefits discussed, informed consent, monitors and equipment checked and pre-op evaluation    Timeout  Correct Patient: Yes   Correct Procedure: Yes   Correct Site: Yes     Correct Position: Yes     .   Procedure Documentation  Procedure: arterial line    Supine  Insertion Site:right.Skin infiltrated with 1 mL of 1% lidocaine. Injection technique: Seldinger Technique and Andi's test completed  .  .  Patient Prep/Sterile Barriers; all elements of maximal sterile barrier technique followed, mask, hat, sterile gown, sterile gloves, draped, hand hygiene, chlorhexidine gluconate and isopropyl alcohol    Assessment/Narrative    Catheter: 20 gauge, 12 cm     Secured by suture  Tegaderm dressing used.    Arterial waveform: Yes IBP within 10% of NIBP: Yes

## 2020-04-15 NOTE — ANESTHESIA PROCEDURE NOTES
CENTRAL LINE INSERTION PROCEDURE NOTE:   Staff -         Performed By: anesthesiologist        Pre-Procedure    Location: pre-op      Pre-Anesthestic Checklist: patient identified, IV checked, site marked, risks and benefits discussed, informed consent, monitors and equipment checked, pre-op evaluation and at physician/surgeon's request    Timeout  Correct Patient: Yes   Correct Procedure: Yes   Correct Site: Yes   Correct Laterality: N/A   Correct Position: Yes   Site Marked: N/A   .   Procedure Documentation   Procedure: central line  Position: Trendelenburg  Patient Prep/Sterile Barriers; chlorhexidine gluconate and isopropyl alcohol, maximum sterile barriers used during central venous catheter insertion      Insertion Site:left, internal jugular  . A permanent image is entered into the patient's record.   Skin infiltrated with mL of 1% lidocaine.  Catheter: 9 Fr, 2-lumen MAC  Assessment/Narrative         Secured by suture  Biopatch and Tegaderm dressing used.  blood aspirated from all lumens    Verification method: Placement to be verified post-op  Comments:  RIJ catheter attempted. Unable to advance the wire as it was obstructed by the pacemaker wire. RIJ placement abandoned, MAC CATHETER PLACED VIA Left IJ

## 2020-04-16 ENCOUNTER — APPOINTMENT (OUTPATIENT)
Dept: PHYSICAL THERAPY | Facility: CLINIC | Age: 75
DRG: 219 | End: 2020-04-16
Attending: PHYSICIAN ASSISTANT
Payer: MEDICARE

## 2020-04-16 ENCOUNTER — APPOINTMENT (OUTPATIENT)
Dept: GENERAL RADIOLOGY | Facility: CLINIC | Age: 75
DRG: 219 | End: 2020-04-16
Attending: PHYSICIAN ASSISTANT
Payer: MEDICARE

## 2020-04-16 ENCOUNTER — APPOINTMENT (OUTPATIENT)
Dept: CARDIOLOGY | Facility: CLINIC | Age: 75
DRG: 219 | End: 2020-04-16
Attending: PHYSICIAN ASSISTANT
Payer: MEDICARE

## 2020-04-16 LAB
ALBUMIN SERPL-MCNC: 3.2 G/DL (ref 3.4–5)
ALP SERPL-CCNC: 44 U/L (ref 40–150)
ALT SERPL W P-5'-P-CCNC: 37 U/L (ref 0–50)
ANION GAP SERPL CALCULATED.3IONS-SCNC: 9 MMOL/L (ref 3–14)
AST SERPL W P-5'-P-CCNC: 136 U/L (ref 0–45)
BASE DEFICIT BLDA-SCNC: 2.8 MMOL/L
BILIRUB SERPL-MCNC: 0.5 MG/DL (ref 0.2–1.3)
BLD PROD TYP BPU: NORMAL
BLD PROD TYP BPU: NORMAL
BLD UNIT ID BPU: 0
BLD UNIT ID BPU: 0
BLOOD PRODUCT CODE: NORMAL
BLOOD PRODUCT CODE: NORMAL
BPU ID: NORMAL
BPU ID: NORMAL
BUN SERPL-MCNC: 34 MG/DL (ref 7–30)
CA-I BLD-MCNC: 4.6 MG/DL (ref 4.4–5.2)
CALCIUM SERPL-MCNC: 8.9 MG/DL (ref 8.5–10.1)
CHLORIDE SERPL-SCNC: 110 MMOL/L (ref 94–109)
CO2 SERPL-SCNC: 21 MMOL/L (ref 20–32)
COPATH REPORT: NORMAL
CREAT SERPL-MCNC: 1.65 MG/DL (ref 0.52–1.04)
ERYTHROCYTE [DISTWIDTH] IN BLOOD BY AUTOMATED COUNT: 13.6 % (ref 10–15)
GFR SERPL CREATININE-BSD FRML MDRD: 30 ML/MIN/{1.73_M2}
GLUCOSE BLDC GLUCOMTR-MCNC: 105 MG/DL (ref 70–99)
GLUCOSE BLDC GLUCOMTR-MCNC: 114 MG/DL (ref 70–99)
GLUCOSE BLDC GLUCOMTR-MCNC: 119 MG/DL (ref 70–99)
GLUCOSE BLDC GLUCOMTR-MCNC: 124 MG/DL (ref 70–99)
GLUCOSE BLDC GLUCOMTR-MCNC: 126 MG/DL (ref 70–99)
GLUCOSE BLDC GLUCOMTR-MCNC: 130 MG/DL (ref 70–99)
GLUCOSE BLDC GLUCOMTR-MCNC: 144 MG/DL (ref 70–99)
GLUCOSE BLDC GLUCOMTR-MCNC: 144 MG/DL (ref 70–99)
GLUCOSE BLDC GLUCOMTR-MCNC: 172 MG/DL (ref 70–99)
GLUCOSE BLDC GLUCOMTR-MCNC: 90 MG/DL (ref 70–99)
GLUCOSE SERPL-MCNC: 134 MG/DL (ref 70–99)
HCO3 BLD-SCNC: 24 MMOL/L (ref 21–28)
HCT VFR BLD AUTO: 33.4 % (ref 35–47)
HGB BLD-MCNC: 10.8 G/DL (ref 11.7–15.7)
LACTATE BLD-SCNC: 1.8 MMOL/L (ref 0.7–2)
MAGNESIUM SERPL-MCNC: 2.7 MG/DL (ref 1.6–2.3)
MCH RBC QN AUTO: 31.6 PG (ref 26.5–33)
MCHC RBC AUTO-ENTMCNC: 32.3 G/DL (ref 31.5–36.5)
MCV RBC AUTO: 98 FL (ref 78–100)
O2/TOTAL GAS SETTING VFR VENT: 40 %
OXYHGB MFR BLD: 96 % (ref 92–100)
PCO2 BLD: 47 MM HG (ref 35–45)
PH BLD: 7.31 PH (ref 7.35–7.45)
PHOSPHATE SERPL-MCNC: 3.7 MG/DL (ref 2.5–4.5)
PLATELET # BLD AUTO: 156 10E9/L (ref 150–450)
PO2 BLD: 115 MM HG (ref 80–105)
POTASSIUM SERPL-SCNC: 5 MMOL/L (ref 3.4–5.3)
PROT SERPL-MCNC: 6.3 G/DL (ref 6.8–8.8)
RBC # BLD AUTO: 3.42 10E12/L (ref 3.8–5.2)
SODIUM SERPL-SCNC: 140 MMOL/L (ref 133–144)
TRANSFUSION STATUS PATIENT QL: NORMAL
WBC # BLD AUTO: 14.6 10E9/L (ref 4–11)

## 2020-04-16 PROCEDURE — C9113 INJ PANTOPRAZOLE SODIUM, VIA: HCPCS | Performed by: PHYSICIAN ASSISTANT

## 2020-04-16 PROCEDURE — 83735 ASSAY OF MAGNESIUM: CPT | Performed by: PHYSICIAN ASSISTANT

## 2020-04-16 PROCEDURE — 93005 ELECTROCARDIOGRAM TRACING: CPT

## 2020-04-16 PROCEDURE — 93288 INTERROG EVL PM/LDLS PM IP: CPT

## 2020-04-16 PROCEDURE — 25000128 H RX IP 250 OP 636: Performed by: PHYSICIAN ASSISTANT

## 2020-04-16 PROCEDURE — 82805 BLOOD GASES W/O2 SATURATION: CPT | Performed by: PHYSICIAN ASSISTANT

## 2020-04-16 PROCEDURE — 25000132 ZZH RX MED GY IP 250 OP 250 PS 637: Mod: GY | Performed by: INTERNAL MEDICINE

## 2020-04-16 PROCEDURE — 97161 PT EVAL LOW COMPLEX 20 MIN: CPT | Mod: GP | Performed by: PHYSICAL THERAPIST

## 2020-04-16 PROCEDURE — 25800030 ZZH RX IP 258 OP 636: Performed by: PHYSICIAN ASSISTANT

## 2020-04-16 PROCEDURE — 84100 ASSAY OF PHOSPHORUS: CPT | Performed by: PHYSICIAN ASSISTANT

## 2020-04-16 PROCEDURE — 83605 ASSAY OF LACTIC ACID: CPT | Performed by: PHYSICIAN ASSISTANT

## 2020-04-16 PROCEDURE — 25000125 ZZHC RX 250: Performed by: PHYSICIAN ASSISTANT

## 2020-04-16 PROCEDURE — 40000008 ZZH STATISTIC AIRWAY CARE

## 2020-04-16 PROCEDURE — P9041 ALBUMIN (HUMAN),5%, 50ML: HCPCS | Performed by: PHYSICIAN ASSISTANT

## 2020-04-16 PROCEDURE — 85027 COMPLETE CBC AUTOMATED: CPT | Performed by: PHYSICIAN ASSISTANT

## 2020-04-16 PROCEDURE — 82330 ASSAY OF CALCIUM: CPT | Performed by: PHYSICIAN ASSISTANT

## 2020-04-16 PROCEDURE — 97530 THERAPEUTIC ACTIVITIES: CPT | Mod: GP | Performed by: PHYSICAL THERAPIST

## 2020-04-16 PROCEDURE — 40000275 ZZH STATISTIC RCP TIME EA 10 MIN

## 2020-04-16 PROCEDURE — 25000132 ZZH RX MED GY IP 250 OP 250 PS 637: Mod: GY | Performed by: PHYSICIAN ASSISTANT

## 2020-04-16 PROCEDURE — 71045 X-RAY EXAM CHEST 1 VIEW: CPT

## 2020-04-16 PROCEDURE — 93010 ELECTROCARDIOGRAM REPORT: CPT | Performed by: INTERNAL MEDICINE

## 2020-04-16 PROCEDURE — 25800030 ZZH RX IP 258 OP 636: Performed by: SURGERY

## 2020-04-16 PROCEDURE — 94003 VENT MGMT INPAT SUBQ DAY: CPT

## 2020-04-16 PROCEDURE — 25800030 ZZH RX IP 258 OP 636: Performed by: NURSE PRACTITIONER

## 2020-04-16 PROCEDURE — 00000146 ZZHCL STATISTIC GLUCOSE BY METER IP

## 2020-04-16 PROCEDURE — 20000003 ZZH R&B ICU

## 2020-04-16 PROCEDURE — 25000131 ZZH RX MED GY IP 250 OP 636 PS 637: Mod: GY | Performed by: NURSE PRACTITIONER

## 2020-04-16 PROCEDURE — 25000132 ZZH RX MED GY IP 250 OP 250 PS 637: Mod: GY | Performed by: SURGERY

## 2020-04-16 PROCEDURE — 80053 COMPREHEN METABOLIC PANEL: CPT | Performed by: PHYSICIAN ASSISTANT

## 2020-04-16 RX ORDER — VITAMIN B COMPLEX
25 TABLET ORAL EVERY EVENING
Status: DISCONTINUED | OUTPATIENT
Start: 2020-04-16 | End: 2020-04-22 | Stop reason: HOSPADM

## 2020-04-16 RX ORDER — ALBUMIN, HUMAN INJ 5% 5 %
25 SOLUTION INTRAVENOUS ONCE
Status: COMPLETED | OUTPATIENT
Start: 2020-04-16 | End: 2020-04-16

## 2020-04-16 RX ORDER — ALBUMIN, HUMAN INJ 5% 5 %
SOLUTION INTRAVENOUS
Status: DISCONTINUED
Start: 2020-04-16 | End: 2020-04-17 | Stop reason: HOSPADM

## 2020-04-16 RX ORDER — BISACODYL 10 MG
10 SUPPOSITORY, RECTAL RECTAL DAILY PRN
Status: DISCONTINUED | OUTPATIENT
Start: 2020-04-16 | End: 2020-04-22 | Stop reason: HOSPADM

## 2020-04-16 RX ORDER — ATORVASTATIN CALCIUM 40 MG/1
40 TABLET, FILM COATED ORAL AT BEDTIME
Status: DISCONTINUED | OUTPATIENT
Start: 2020-04-16 | End: 2020-04-22 | Stop reason: HOSPADM

## 2020-04-16 RX ADMIN — MELATONIN 25 MCG: at 20:13

## 2020-04-16 RX ADMIN — PANTOPRAZOLE SODIUM 40 MG: 40 INJECTION, POWDER, FOR SOLUTION INTRAVENOUS at 08:50

## 2020-04-16 RX ADMIN — SENNOSIDES AND DOCUSATE SODIUM 1 TABLET: 8.6; 5 TABLET ORAL at 20:13

## 2020-04-16 RX ADMIN — ACETAMINOPHEN 975 MG: 325 TABLET, FILM COATED ORAL at 06:10

## 2020-04-16 RX ADMIN — LIDOCAINE 1 PATCH: 560 PATCH PERCUTANEOUS; TOPICAL; TRANSDERMAL at 08:37

## 2020-04-16 RX ADMIN — BISACODYL 10 MG: 10 SUPPOSITORY RECTAL at 07:48

## 2020-04-16 RX ADMIN — ASPIRIN 325 MG: 325 TABLET, COATED ORAL at 08:46

## 2020-04-16 RX ADMIN — SERTRALINE HYDROCHLORIDE 150 MG: 100 TABLET ORAL at 10:25

## 2020-04-16 RX ADMIN — ALBUMIN HUMAN 500 ML: 0.05 INJECTION, SOLUTION INTRAVENOUS at 06:52

## 2020-04-16 RX ADMIN — SODIUM CHLORIDE 2 UNITS/HR: 9 INJECTION, SOLUTION INTRAVENOUS at 13:11

## 2020-04-16 RX ADMIN — CLINDAMYCIN PHOSPHATE 900 MG: 900 INJECTION, SOLUTION INTRAVENOUS at 13:23

## 2020-04-16 RX ADMIN — Medication 15 ML: at 02:02

## 2020-04-16 RX ADMIN — ATORVASTATIN CALCIUM 40 MG: 40 TABLET, FILM COATED ORAL at 22:22

## 2020-04-16 RX ADMIN — SENNOSIDES AND DOCUSATE SODIUM 2 TABLET: 8.6; 5 TABLET ORAL at 08:46

## 2020-04-16 RX ADMIN — GABAPENTIN 100 MG: 100 CAPSULE ORAL at 16:28

## 2020-04-16 RX ADMIN — CLINDAMYCIN PHOSPHATE 900 MG: 900 INJECTION, SOLUTION INTRAVENOUS at 04:57

## 2020-04-16 RX ADMIN — PHENYLEPHRINE HYDROCHLORIDE 0.3 MCG/KG/MIN: 10 INJECTION INTRAVENOUS at 17:04

## 2020-04-16 RX ADMIN — SODIUM CHLORIDE: 9 INJECTION, SOLUTION INTRAVENOUS at 23:26

## 2020-04-16 RX ADMIN — GABAPENTIN 100 MG: 100 CAPSULE ORAL at 08:46

## 2020-04-16 RX ADMIN — MUPIROCIN 0.5 G: 20 OINTMENT TOPICAL at 09:02

## 2020-04-16 RX ADMIN — GABAPENTIN 100 MG: 100 CAPSULE ORAL at 22:22

## 2020-04-16 RX ADMIN — ACETAMINOPHEN 975 MG: 325 TABLET, FILM COATED ORAL at 14:30

## 2020-04-16 RX ADMIN — HEPARIN SODIUM 5000 UNITS: 5000 INJECTION, SOLUTION INTRAVENOUS; SUBCUTANEOUS at 13:23

## 2020-04-16 RX ADMIN — ACETAMINOPHEN 975 MG: 325 TABLET, FILM COATED ORAL at 22:22

## 2020-04-16 RX ADMIN — HEPARIN SODIUM 5000 UNITS: 5000 INJECTION, SOLUTION INTRAVENOUS; SUBCUTANEOUS at 20:11

## 2020-04-16 RX ADMIN — ALBUMIN HUMAN 25 G: 0.05 INJECTION, SOLUTION INTRAVENOUS at 15:14

## 2020-04-16 RX ADMIN — VANCOMYCIN HYDROCHLORIDE 1500 MG: 5 INJECTION, POWDER, LYOPHILIZED, FOR SOLUTION INTRAVENOUS at 09:13

## 2020-04-16 RX ADMIN — PHENYLEPHRINE HYDROCHLORIDE 0.5 MCG/KG/MIN: 10 INJECTION INTRAVENOUS at 02:25

## 2020-04-16 RX ADMIN — METHOCARBAMOL 500 MG: 500 TABLET, FILM COATED ORAL at 22:22

## 2020-04-16 RX ADMIN — SODIUM CHLORIDE 4 UNITS/HR: 9 INJECTION, SOLUTION INTRAVENOUS at 02:21

## 2020-04-16 ASSESSMENT — ACTIVITIES OF DAILY LIVING (ADL)
ADLS_ACUITY_SCORE: 14
ADLS_ACUITY_SCORE: 13
ADLS_ACUITY_SCORE: 14
ADLS_ACUITY_SCORE: 12

## 2020-04-16 NOTE — CONSULTS
CARDIAC SURGERY NUTRITION CONSULT    Received standing order to assess and educate patient.    Will follow and complete assessment once patient is extubated and/or is transferred to medical unit.    Patient will receive nutrition education during the Outpatient Cardiac Rehab Program (nutrition classes/dietitian counseling).    Bridgett Cristina RD, LD, [facility]   Clinical Dietitian - [facility]

## 2020-04-16 NOTE — PROGRESS NOTES
ICU Multi-Disciplinary Note  Patient condition reviewed and discussed while on multidisciplinary rounds today.   S/P AVR/MVR, tricuspid repair, PFO repair, extubated at 01:58 am. Stable on 4LNC. There were no new interventions by the ICU team.  Ongoing cares per thoracic surgery.  The Critical Care service will sign off at this time.  We are readily available should issues arise.  Please feel free to contact us for critical care issues with which we may be of assistance. For all other concerns, please contact primary service first.     SARBJIT Ramos CNP

## 2020-04-16 NOTE — PROVIDER NOTIFICATION
Notified MD that 12 lead EKG shows the permanent pacer is not working correctly.  Pt has her own Aureliano rhythm.  MD stated they will have the pacer interrogated tomorrow. V-wires are connected to generator and it is turned off.

## 2020-04-16 NOTE — PLAN OF CARE
[facility]   Intensive Care Unit   Nursing Note    Vital signs stable during the day, pt received 500 ml 5% Albumin and remains on Devang at 0.3  PERRL and moves all extremities. Pt up to the chair x 2, but needs lots of encouragement and assist of 2-3. Pt sat on BSC today and had a large brown formed to soft BM. Stewart averaging 30 ml/hr. Tolerating 2 liters O2 NC or home cpap w 2 liters  O2 when resting.  updated via phone. Labs noted.  Continue to monitor closely.      Recent Labs   Lab 04/16/20  0120 04/15/20  2053 04/15/20  1519 04/15/20  1341   PH 7.31* 7.16* 7.25* 7.29*   PCO2 47* 54* 52* 49*   PO2 115* 91 76* 231*   HCO3 24 20* 22 24   O2PER 40 VENT 45% 60%  --        ROUTINE IP LABS (Last four results)  BMP  Recent Labs   Lab 04/16/20  0350 04/15/20  2123 04/15/20  1519 04/15/20  1342    141 142 141   POTASSIUM 5.0 4.6 4.0 4.0   CHLORIDE 110* 109 109 109   NELLY 8.9 8.3* 8.3* 8.5   CO2 21 20 23 23   BUN 34* 34* 29 28   CR 1.65* 1.53* 1.26* 1.14*   * 322* 242* 304*     CBC  Recent Labs   Lab 04/16/20  0350 04/15/20  1519 04/15/20  1342   WBC 14.6* 20.9* 13.1*   RBC 3.42* 3.56* 2.88*   HGB 10.8* 11.3* 9.2*   HCT 33.4* 35.0 28.3*   MCV 98 98 98   MCH 31.6 31.7 31.9   MCHC 32.3 32.3 32.5   RDW 13.6 13.5 13.4    210 166       Nolan Garcia RN

## 2020-04-16 NOTE — PROGRESS NOTES
Extubation Note    Successful completion of SBT (Yes or No):yes   Extubation time: 1:51AM    Patient assessment:  Lung sounds:  Stridor Present (Yes or No): no stridor   Patient tolerance: tolerated well     Oxygen device:  10L oxymask   Liter flow: 10l  FiO2:   SpO2:100    Plan: continue to monitor

## 2020-04-16 NOTE — PROGRESS NOTES
[facility] ICU RESPIRATORY NOTE        Date of Admission: 4/15/2020    Date of Intubation (most recent):4/15/2020    Reason for Mechanical Ventilation:Airway protection-s/p MVR and AVR    Number of Days on Mechanical Ventilation:1    Met Criteria for Spontaneous Breathing Trial:Yes PS 5/5 for about 3hrs. Pt wasn't awake enough to extubate after 1hr of PS. Pt was kept on PS, but was then placed back on full vent support after ABG results per MD.    Significant Events Today:Pt came up from the OR intubated.    ABG Results:   Recent Labs   Lab 04/15/20  2053 04/15/20  1519 04/15/20  1341 04/15/20  1216   PH 7.16* 7.25* 7.29* 7.33*   PCO2 54* 52* 49* 50*   PO2 91 76* 231* 319*   HCO3 20* 22 24 26   O2PER VENT 45% 60%  --   --        Current Vent Settings: Ventilation Mode: CMV/AC  (Continuous Mandatory Ventilation/ Assist Control)  FiO2 (%): 40 %  Rate Set (breaths/minute): 16 breaths/min  Tidal Volume Set (mL): 500 mL  PEEP (cm H2O): 5 cmH2O  Pressure Support (cm H2O): 5 cmH2O  Oxygen Concentration (%): 40 %  Resp: 20      Skin Assessment: Intact    Plan: Will cont full vent support for now.  Rosi Price, RT

## 2020-04-16 NOTE — PROVIDER NOTIFICATION
Spoke to Dr. Flynn about patient lethargically following commands with propofol off for an hour. Blood gas drawn and will decide on extubation or next steps once resulted.

## 2020-04-16 NOTE — PROVIDER NOTIFICATION
Spoke to Dr. Flynn about lactic acid of 4.7. Will try to wean epinephrine down and will put patient back on full vent support. Re-check blood gas when epinephrine gtt at lower dose.

## 2020-04-16 NOTE — PROGRESS NOTES
04/16/20 1045   Quick Adds   Type of Visit Initial PT Evaluation   Living Environment   Lives With spouse   Living Arrangements house   Self-Care   Usual Activity Tolerance good   Current Activity Tolerance poor   Regular Exercise No   Equipment Currently Used at Home none   Functional Level Prior   Ambulation 0-->independent   Transferring 0-->independent   Toileting 0-->independent   Bathing 0-->independent   Fall history within last six months no   Which of the above functional risks had a recent onset or change? ambulation;transferring;toileting;bathing  (functional activity tolerance)   General Information   Onset of Illness/Injury or Date of Surgery - Date 04/15/20   Referring Physician Lu Ingram PA-C   Patient/Family Goals Statement Not stated   Pertinent History of Current Problem (include personal factors and/or comorbidities that impact the POC) Pt is a 73yo female seen POD#1 s/p MVR, AVR, tricuspid valve repair, and PFO repair. PMH includes sick sinus syndrome s/p PPM in 2015, PPM being evaluated by EP. PMH includes morbid obesity and LAVERNE   Precautions/Limitations fall precautions;oxygen therapy device and L/min;sternal precautions   General Observations Pt resting in bed; pt with temporary pacer, IV including pressors, sternal wound vac, arellano, chest tube, PCDs, L IJ   Cognitive Status Examination   Orientation person;place   Level of Consciousness lethargic/somnolent   Cognitive Comment hallucinating at times but pt appears to be aware of hallucinations   Integumentary/Edema   Integumentary/Edema Comments sternal wound with wound vac, appears intact   Posture    Posture Forward head position;Protracted shoulders   Range of Motion (ROM)   ROM Comment BLE WFL   Strength   Strength Comments appears functionally weak with mobility, able to perform heel slide and LAQ   Bed Mobility   Bed Mobility Comments modA of 2 to roll   Transfer Skills   Transfer Comments Stella of 1 sit>stand from EOB with  "CGA of another for safety   Gait   Gait Comments able to take step with R but no L, able to wiggle feet to pivot to commode but significant difficulty   Balance   Balance Comments modA for sitting balance d/t posterior lean   General Therapy Interventions   Planned Therapy Interventions balance training;bed mobility training;gait training;strengthening;transfer training;progressive activity/exercise;home program guidelines;risk factor education   Clinical Impression   Criteria for Skilled Therapeutic Intervention yes, treatment indicated   PT Diagnosis Difficulty ambulating   Influenced by the following impairments Weakness, decreased activity tolerance, fatigue, decreased balance, sternal precautions   Functional limitations due to impairments Decreased safety and IND with functional transfers, gait, ADLs/IADLs   Clinical Presentation Evolving/Changing   Clinical Presentation Rationale PPM not functioning currently, on pressors    Clinical Decision Making (Complexity) Low complexity   Therapy Frequency Daily   Predicted Duration of Therapy Intervention (days/wks) 7 days   Anticipated Discharge Disposition Transitional Care Facility   Risk & Benefits of therapy have been explained Yes   Patient, Family & other staff in agreement with plan of care Yes   [facility] Storie TM \"6 Clicks\"   2016, Trustees of [facility], under license to [employer].  All rights reserved.   6 Clicks Short Forms Basic Mobility Inpatient Short Form   [facility] AM-PAC  \"6 Clicks\" V.2 Basic Mobility Inpatient Short Form   1. Turning from your back to your side while in a flat bed without using bedrails? 2 - A Lot   2. Moving from lying on your back to sitting on the side of a flat bed without using bedrails? 2 - A Lot   3. Moving to and from a bed to a chair (including a wheelchair)? 2 - A Lot   4. Standing up from a chair using your arms (e.g., wheelchair, or bedside chair)? 2 - A Lot   5. To walk in hospital room? 1 - " Total   6. Climbing 3-5 steps with a railing? 1 - Total   Basic Mobility Raw Score (Score out of 24.Lower scores equate to lower levels of function) 10   Total Evaluation Time   Total Evaluation Time (Minutes) 10

## 2020-04-16 NOTE — PLAN OF CARE
[facility]   Intensive Care Unit   Nursing Note    Vital signs stable during the afternoon, titrating off pressors and sedation.  PERRL.  Moves all extremities.  Follows commands.  Tolerating ventilator on propofol at 5. Vent wean 45% 5/5 started at 1839.   Labs noted.  Continue to monitor closely.      Recent Labs   Lab 04/15/20  1519 04/15/20  1341 04/15/20  1216 04/15/20  1126   PH 7.25* 7.29* 7.33* 7.33*   PCO2 52* 49* 50* 50*   PO2 76* 231* 319* 362*   HCO3 22 24 26 27   O2PER 60%  --   --   --        ROUTINE IP LABS (Last four results)  BMP  Recent Labs   Lab 04/15/20  1519 04/15/20  1342 04/15/20  0600 04/10/20  1011    141  --  137   POTASSIUM 4.0 4.0 3.8 4.3   CHLORIDE 109 109  --  101   NELLY 8.3* 8.5  --  10.0   CO2 23 23  --  29   BUN 29 28  --  21   CR 1.26* 1.14*  --  1.20   * 304* 122* 163*     CBC  Recent Labs   Lab 04/15/20  1519 04/15/20  1342   WBC 20.9* 13.1*   RBC 3.56* 2.88*   HGB 11.3* 9.2*   HCT 35.0 28.3*   MCV 98 98   MCH 31.7 31.9   MCHC 32.3 32.5   RDW 13.5 13.4    166     INR  Recent Labs   Lab 04/15/20  1519 04/15/20  1342   INR 1.38* 1.55*       Nolan Garcia RN

## 2020-04-16 NOTE — PLAN OF CARE
Discharge Planner PT   Patient plan for discharge: Not stated  Current status: CR: Orders received, eval completed, treatment initiated. Pt is a 75yo female seen POD#1 s/p MVR, AVR, tricuspid valve repair, and PFO repair. PMH includes sick sinus syndrome s/p PPM in 2015, PPM being evaluated by EP.    Pt hallucinating at times during session, follows commands, lethargic. BP 100s-110s/40s-50s with activity, HR 100s-110s, SpO2 % on 4L O2 via NC. Educated on sternal precautions. Pt requires modA of 2 for supine>sit. Pt requires min-modA for sitting balance, modA for scooting at EOB. Pt transfers sit<>stand with Stella of 1 and CGA of 1 for safety; RN also present to assist with line management. Pt tolerated static standing 3min and 1 min, having significant difficulty advancing LEs to take any steps, needs commode/chair brought up behind and Stella of 2 for balance when turning/attempting to take steps.   Barriers to return to prior living situation: Level of assist, weakness, sternal precautions  Recommendations for discharge: TCU  Rationale for recommendations: Pt is significantly below baseline for functional mobility and would benefit from continued PT while IP and at TCU to improve functional strength, balance, activity tolerance, safety and IND with functional mobility and reduce falls risk prior to returning home.       Entered by: Ladi Escoto 04/16/2020 11:34 AM

## 2020-04-16 NOTE — PLAN OF CARE
Patient not extubated by pathway timing due to lethargy and acidosis. Extubated at 0151. Dangled and up to chair by 0335. Dilaudid x1 for pain, effective. Pressors for MAP goal of 65. Plan to continue to mobilize.

## 2020-04-16 NOTE — PROGRESS NOTES
Reported A-lead malfunction after cardiac surgery yesterday.    Will have [employer] interrogate in-person to determine exactly what the issue is.    CXR shows intact PM leads and in appropriate position.    Of note, there are no reported issues with V-lead and patient has her own stable rhythm.    Full EP consult to follow.

## 2020-04-16 NOTE — OP NOTE
Procedure Date: 04/15/2020      REFERRING CARDIOLOGIST:  Yamilka Christina DO and Ilan Post MD.      PREOPERATIVE DIAGNOSES:  Severe symptomatic mitral stenosis, severe MAC, severe tricuspid valve regurgitation, moderate aortic stenosis, PFO.      POSTOPERATIVE DIAGNOSES:  Severe symptomatic mitral stenosis, severe MAC, severe tricuspid valve regurgitation, moderate aortic stenosis, PFO.      SURGEON:  Ok Wilson MD      ASSISTANT:  FANNY Greene      OPERATION:  Mitral valve replacement with a 27 mm [place]. Juan Epic (porcine bioprosthetic) valve, aortic valve replacement with a 25 mm Rubi Inspiris Resilia bovine pericardial valve, tricuspid valve repair with a 26 mm Rubi MC3 annuloplasty ring, PFO closure, intraoperative SOCORRO.      ANESTHESIA:  General orotracheal.      INDICATIONS FOR PROCEDURE:  Ms. Luna is a very pleasant 74-year-old morbidly obese female with type 2 diabetes who was recently found to have severe mitral valve stenosis, mild to moderate mitral regurgitation, moderate aortic stenosis and severe tricuspid valve regurgitation.  She was taken to the operating room today for MVR, AVR and tricuspid valve repair/replacement and a PFO closure.      OPERATIVE FINDINGS:  The patient had a severely calcified mitral stenosis that was severely stenotic along with severe circumferential MAC.  This made the mitral valve replacement quite difficult.  It had the classic appearance of advanced rheumatic valve disease.  The tricuspid regurgitation appeared to be functional in nature as the leaflets themselves appeared normal and the pacemaker lead across the tricuspid valve was not tethered to the leaflets.  I therefore placed a reduction annuloplasty ring.  The aortic valve had moderate stenosis with moderate calcification.  The aortic root was also calcified as well.  Her overall LV function was normal as well as the RV function.      DESCRIPTION OF PROCEDURE:  After informed consent was  obtained, the patient was brought down to the operating room and was placed on the OR table in a supine position.  Intravenous and intra-arterial lines were begun.  While monitoring her blood pressure and EKG tracing, she was anesthetized and intubated using a single lumen endotracheal tube.  Her entire chest, abdomen, both groins and legs were prepped down to the toes using multiple layers of drip prep.  She was draped in a sterile field.  A median sternotomy was performed and the sternal edges were retracted laterally.  The pericardium was opened and we encountered significant amount of adhesions, presumably from prior rheumatic infection and the resultant pericarditis.  The adhesions were taken down sharply using Metzenbaum scissors.  The patient was then fully heparinized.  The ascending aorta and the SVC and IVC were cannulated.  A retrograde cardioplegia catheter was placed in the coronary sinus without difficulty.  An antegrade needle/aortic root vent was placed in the ascending aorta as well.  After appropriate ACT level was achieved, cardiopulmonary bypass was established and the patient was cooled down to 34 degrees Celsius.  Carbon dioxide was flooded into the chest to prevent air embolism.  The aorta was then crossclamped and antegrade cold blood cardioplegia was given to arrest the heart.  The patient went into good diastolic arrest without any LV distention.  Following this, intermittent retrograde cardioplegia doses were given on average every 15 minutes for myocardial protection while the aorta was crossclamped.      First, a transverse aortotomy was made and the aortic valve was exposed.  All 3 leaflets were excised and the annulus was meticulously debrided.  The annulus was sized to a 25 mm Inspiris Resilia valve.  Then the interatrial groove was dissected out and a standard left atriotomy was made.  The mitral valve was exposed.  Findings are noted above.  Both leaflets were excised and the  annulus was meticulously and extensively debrided to decalcify the annulus so we could place the annular sutures.  The annulus was sized to a 27 mm Epic valve.  Annular sutures were placed using horizontal mattress sutures with subannular pledgets.  The valve was then brought to the field and all sutures were brought through the sewing ring and the valve was seated down without difficulty.  All sutures were tied down securely using the Cor-Knot device.  The left atriotomy was then closed in 2 layers of running 4-0 Prolene.      Next, attention was turned back towards the aortic root.  Annular sutures were placed using horizontal mattress sutures with subannular pledgets.  A 25 mm Inspiris Resilia valve was then brought to the field and all sutures were brought through the sewing ring and the valve was seated down without difficulty.  All sutures were tied down securely using the Cor-Knot device.  The aortotomy was then closed in 2 layers of running 4-0 Prolene.  Next, the umbilical tapes were snared down around the SVC and IVC cannulae and the right atrium was opened.  The PFO was identified and was closed using interrupted figure-of-eight 4-0 Prolene.  A liter of warm blood cardioplegia was then given as a retrograde hot shot and the aortic crossclamp was removed.  Aortic crossclamp time was 140 minutes.  The tricuspid valve was examined and we decided to do a reduction annuloplasty with a 26 mm MC3 ring.  Annular sutures were placed using interrupted 2-0 Ethibond sutures along the annulus, but preserving the annulus with a septal leaflet.  The ring was brought to the field and all sutures were brought through the sewing ring and the ring was seated down without difficulty.  All sutures were tied down securely using the Cor-Knot device.  We tested the valve and the valve appeared to have residual mild regurgitation, but I was satisfied with that.  The atriotomy was then closed in 2 layers of running 4-0 Prolene.   The patient was then weaned off cardiopulmonary bypass with low dose epinephrine and Devang-Synephrine support.  Both LV and RV function were good.  The mitral valve was seated down well with no paravalvular leak.  The left ventricle was adequately deaired.  The aortic valve was also seated down well with no paravalvular leak.  The tricuspid valve, however, had residual mild up to moderate regurgitation and again I was satisfied with this.  Once the patient remained stable off bypass, the venous cannulae were removed and protamine was given.  The aortic cannula was subsequently removed as well.  Total cardiopulmonary bypass time was 198 minutes.  Temporary ventricular pacing wires were placed in the RV muscle.  32-Palestinian angled and straight chest tubes were placed in mediastinum as well.  These were all brought out percutaneously below the sternotomy incision and secured to skin using 2-0 Ethibond.  Both pleural spaces are slightly opened.  The mediastinum was irrigated with antibiotic saline and hemostasis was achieved.  The sternum was reapproximated using multiple interrupted single and double wires.  The incision was closed in layers of running Vicryl suture.  Skin was closed using 3-0 Vicryl and was sealed using Dermabond.      There were no intraoperative complications and the patient tolerated the operation well.  No blood products were given intraoperatively.  All sponge counts, needle counts and instrument counts were correct x 2 at the end of the operation.      ESTIMATED BLOOD LOSS:  Unknown.      SPECIMEN REMOVED:  Mitral valve and aortic valve.      DISPOSITION:  The patient was brought up to the ICU in hemodynamically stable condition and remained intubated.         CRISTA KING MD             D: 04/15/2020   T: 04/15/2020   MT: LEONARD      Name:     KASSANDRA RIVERA   MRN:      [id number]        Account:        [id number]   :      1945           Procedure Date: 04/15/2020      Document:  [id number]       cc: Yamilka Brooke MD FACC

## 2020-04-16 NOTE — PROVIDER NOTIFICATION
Spoke to Dr. Flynn about critical pH. Lactic acid and metabolic panel drawn. Will re-check blood gas 1-2 hours after.

## 2020-04-16 NOTE — PROVIDER NOTIFICATION
Spoke to Dr. Flynn. Epinephrine is off. Will have patient pressure support and draw a blood gas and lactic acid after 30 minutes.

## 2020-04-16 NOTE — PROGRESS NOTES
"[facility]  Cardiovascular and Thoracic Surgery Daily Note          Assessment and Plan:   POD#1 s/p Mitral valve replacement with a 27 mm St. Juan Epic (porcine bioprosthetic) valve, aortic valve replacement with a 25 mm Rubi Inspiris Resilia bovine pericardial valve, tricuspid valve repair with a 26 mm Rubi MC3 annuloplasty ring, PFO closure, intraoperative SOCORRO by Dr. Ok Wilson  -CVS: HR: 50s-60s. SBP: 100s-120s. Remains on Devang- will liberalize MAP parameter to 60 d/t low diastolic BP. Wean Devang as able. ASA, BB on hold, statin. Hx of SSS with PPM set to back up rate of 60 and does not appear to pace when below 60, temporary pacer capped. Remote Medtronic pacer interrogation reveals dysfunction- written report pending. EP consulted to assist. Chest tubes to suction. Wound vac to remain in place for 5 days.   -Resp: Extubated within protocol. Saturating well on 4L. Continue to encourage IS, cough, deep breathing, ambulation.   -Neuro:  Grossly intact. Pain controlled. Reports some visual hallucinations that she recognizes as hallucinations and is \"not afraid of them\". Minimal opioid use. Hx of depression. PTA zoloft resumed.   -Renal: good UO, close to preoperative weight. CLARITA. Will hold diuresis today and continue to monitor.   Creatinine   Date Value Ref Range Status   2020 1.65 (H) 0.52 - 1.04 mg/dL Final   ]  -GI:  Tolerating diet. No BM. Continue bowel regimen. Advance diet as able  -:  Stewart in place d/t limited mobility and need for accurate I&Os.   -Endo: Pre op A1c: 6.5. Hx of DMII. PTA metformin, glipizide and insulin NPH on hold. Continue Insulin gtt 48hrs, and plan to transition to sliding scale insulin tomorrow per protocol.   -FEN: replete lytes as needed, Na: 140. K: 5.0  Orders Placed This Encounter      Advance Diet as Tolerated: Clear Liquid Diet    -ID: Temp (24hrs), Av.4  F (37.4  C), Min:97.2  F (36.2  C), Max:100.9  F (38.3  C)   Completing perioperative " abx. Leukocytosis likely related to stress from surgery. Will continue to monitor.   WBC   Date Value Ref Range Status   04/16/2020 14.6 (H) 4.0 - 11.0 10e9/L Final   ]  -Heme: plt: 156. Acute blood loss anemia and thrombocytopenia related to stress from surgery.   Hemoglobin   Date Value Ref Range Status   04/16/2020 10.8 (L) 11.7 - 15.7 g/dL Final   ],   -Proph: PCD, ASA, BB on hold, statin, PPI, sub q heparin  -Dispo: Continue ICU cares. Wean orion as able. Initiate therapies. Continue to encourage IS, cough, deep breathing, ambulation.           Interval History:   Extubated without issue. Remains on orion. Saturating well on 4L. Pain controlled. Tolerating clears, has not attempted to eat much else. Reports and recognizes she is having visual hallucinations of woman knitting today but reports they did not scare her.          Medications:       acetaminophen  975 mg Oral Q8H     aspirin  325 mg Oral Daily     gabapentin  100 mg Oral TID     heparin ANTICOAGULANT  5,000 Units Subcutaneous Q8H     lidocaine  1 patch Transdermal Q24h    And     lidocaine   Transdermal Q8H     metoprolol tartrate  25 mg Oral Q12H    Or     metoprolol  25 mg Per NG tube Q12H     mupirocin  0.5 g Both Nostrils BID     pantoprazole (PROTONIX) IV  40 mg Intravenous Daily     senna-docusate  1 tablet Oral BID    Or     senna-docusate  2 tablet Oral BID     sertraline  150 mg Oral QAM     sodium chloride (PF)  3 mL Intracatheter Q8H     Vitamin D3  25 mcg Oral QPM     [START ON 4/18/2020] acetaminophen, artificial saliva, bisacodyl, dextrose, glucose **OR** dextrose **OR** glucagon, furosemide, hydrALAZINE, HYDROmorphone, hydrOXYzine, lidocaine 4%, lidocaine (buffered or not buffered), magnesium sulfate, magnesium sulfate, meperidine, methocarbamol, metoclopramide **OR** metoclopramide, naloxone, ondansetron **OR** ondansetron, oxyCODONE IR, potassium chloride, potassium chloride with lidocaine, potassium chloride, potassium chloride,  potassium chloride, prochlorperazine **OR** prochlorperazine, sodium chloride (PF), sodium phosphate, sodium phosphate, sodium phosphate          Physical Exam:   Vitals were reviewed  Blood pressure (!) 112/39, pulse 59, temperature 100.4  F (38  C), resp. rate 19, weight 128.2 kg (282 lb 10.1 oz), SpO2 99 %.  Rhythm: NSR    Lungs: diminished bases    Cardiovascular: RRR normal s1 and s2    Abdomen: soft NTND    Extremeties: 1+ lower extremity edema    Incision: wound vac in place    CT: to suction    Weight:   Vitals:    04/15/20 0558 04/16/20 0500   Weight: 128.6 kg (283 lb 8.2 oz) 128.2 kg (282 lb 10.1 oz)            Data:   Labs:   Lab Results   Component Value Date    WBC 14.6 04/16/2020     Lab Results   Component Value Date    RBC 3.42 04/16/2020     Lab Results   Component Value Date    HGB 10.8 04/16/2020     Lab Results   Component Value Date    HCT 33.4 04/16/2020     No components found for: MCT  Lab Results   Component Value Date    MCV 98 04/16/2020     Lab Results   Component Value Date    MCH 31.6 04/16/2020     Lab Results   Component Value Date    MCHC 32.3 04/16/2020     Lab Results   Component Value Date    RDW 13.6 04/16/2020     Lab Results   Component Value Date     04/16/2020       Last Basic Metabolic Panel:  Lab Results   Component Value Date     04/16/2020      Lab Results   Component Value Date    POTASSIUM 5.0 04/16/2020     Lab Results   Component Value Date    CHLORIDE 110 04/16/2020     Lab Results   Component Value Date    NELLY 8.9 04/16/2020     Lab Results   Component Value Date    CO2 21 04/16/2020     Lab Results   Component Value Date    BUN 34 04/16/2020     Lab Results   Component Value Date    CR 1.65 04/16/2020     Lab Results   Component Value Date     04/16/2020       CXR: 4/16/2020    IMPRESSION: ETT and enteric tubes have been removed. Otherwise no significant change. Stable mild left basilar opacity likely pleural fluid and adjacent atelectasis or  infiltrate. Sternotomy with AVR and mild cardiomegaly. Cardiac pacer. Mediastinal   drain.    Arabella Angulo PA-C  CV Surgery  Pager #[phone]

## 2020-04-17 ENCOUNTER — APPOINTMENT (OUTPATIENT)
Dept: PHYSICAL THERAPY | Facility: CLINIC | Age: 75
DRG: 219 | End: 2020-04-17
Attending: SURGERY
Payer: MEDICARE

## 2020-04-17 LAB
ANION GAP SERPL CALCULATED.3IONS-SCNC: 5 MMOL/L (ref 3–14)
BUN SERPL-MCNC: 37 MG/DL (ref 7–30)
CALCIUM SERPL-MCNC: 8.5 MG/DL (ref 8.5–10.1)
CHLORIDE SERPL-SCNC: 113 MMOL/L (ref 94–109)
CO2 SERPL-SCNC: 24 MMOL/L (ref 20–32)
CREAT SERPL-MCNC: 1.26 MG/DL (ref 0.52–1.04)
ERYTHROCYTE [DISTWIDTH] IN BLOOD BY AUTOMATED COUNT: 13.8 % (ref 10–15)
GFR SERPL CREATININE-BSD FRML MDRD: 42 ML/MIN/{1.73_M2}
GLUCOSE BLDC GLUCOMTR-MCNC: 119 MG/DL (ref 70–99)
GLUCOSE BLDC GLUCOMTR-MCNC: 121 MG/DL (ref 70–99)
GLUCOSE BLDC GLUCOMTR-MCNC: 124 MG/DL (ref 70–99)
GLUCOSE BLDC GLUCOMTR-MCNC: 132 MG/DL (ref 70–99)
GLUCOSE BLDC GLUCOMTR-MCNC: 142 MG/DL (ref 70–99)
GLUCOSE BLDC GLUCOMTR-MCNC: 147 MG/DL (ref 70–99)
GLUCOSE BLDC GLUCOMTR-MCNC: 149 MG/DL (ref 70–99)
GLUCOSE BLDC GLUCOMTR-MCNC: 166 MG/DL (ref 70–99)
GLUCOSE BLDC GLUCOMTR-MCNC: 171 MG/DL (ref 70–99)
GLUCOSE BLDC GLUCOMTR-MCNC: 178 MG/DL (ref 70–99)
GLUCOSE BLDC GLUCOMTR-MCNC: 184 MG/DL (ref 70–99)
GLUCOSE BLDC GLUCOMTR-MCNC: 200 MG/DL (ref 70–99)
GLUCOSE BLDC GLUCOMTR-MCNC: 203 MG/DL (ref 70–99)
GLUCOSE BLDC GLUCOMTR-MCNC: 203 MG/DL (ref 70–99)
GLUCOSE BLDC GLUCOMTR-MCNC: 56 MG/DL (ref 70–99)
GLUCOSE BLDC GLUCOMTR-MCNC: 83 MG/DL (ref 70–99)
GLUCOSE SERPL-MCNC: 127 MG/DL (ref 70–99)
HCT VFR BLD AUTO: 23.9 % (ref 35–47)
HGB BLD-MCNC: 7.9 G/DL (ref 11.7–15.7)
MCH RBC QN AUTO: 32.4 PG (ref 26.5–33)
MCHC RBC AUTO-ENTMCNC: 33.1 G/DL (ref 31.5–36.5)
MCV RBC AUTO: 98 FL (ref 78–100)
PLATELET # BLD AUTO: 82 10E9/L (ref 150–450)
POTASSIUM SERPL-SCNC: 4.1 MMOL/L (ref 3.4–5.3)
RBC # BLD AUTO: 2.44 10E12/L (ref 3.8–5.2)
SODIUM SERPL-SCNC: 142 MMOL/L (ref 133–144)
WBC # BLD AUTO: 8.9 10E9/L (ref 4–11)

## 2020-04-17 PROCEDURE — 27211439 ZZ H KIT SHRLOCK 5FR POWER PICC DOUBLE LUMEN

## 2020-04-17 PROCEDURE — 99207 ZZC CONSULT E&M CHANGED TO SUBSEQUENT LEVEL: CPT | Performed by: PHYSICIAN ASSISTANT

## 2020-04-17 PROCEDURE — 12000000 ZZH R&B MED SURG/OB

## 2020-04-17 PROCEDURE — 40000257 ZZH STATISTIC CONSULT NO CHARGE VASC ACCESS

## 2020-04-17 PROCEDURE — 25000125 ZZHC RX 250: Performed by: PHYSICIAN ASSISTANT

## 2020-04-17 PROCEDURE — 25000132 ZZH RX MED GY IP 250 OP 250 PS 637: Mod: GY | Performed by: PHYSICIAN ASSISTANT

## 2020-04-17 PROCEDURE — 80048 BASIC METABOLIC PNL TOTAL CA: CPT | Performed by: PHYSICIAN ASSISTANT

## 2020-04-17 PROCEDURE — C9113 INJ PANTOPRAZOLE SODIUM, VIA: HCPCS | Performed by: PHYSICIAN ASSISTANT

## 2020-04-17 PROCEDURE — 85027 COMPLETE CBC AUTOMATED: CPT | Performed by: PHYSICIAN ASSISTANT

## 2020-04-17 PROCEDURE — 25000128 H RX IP 250 OP 636: Performed by: PHYSICIAN ASSISTANT

## 2020-04-17 PROCEDURE — 25000131 ZZH RX MED GY IP 250 OP 636 PS 637: Mod: GY | Performed by: NURSE PRACTITIONER

## 2020-04-17 PROCEDURE — 97530 THERAPEUTIC ACTIVITIES: CPT | Mod: GP | Performed by: PHYSICAL THERAPIST

## 2020-04-17 PROCEDURE — 99232 SBSQ HOSP IP/OBS MODERATE 35: CPT | Performed by: PHYSICIAN ASSISTANT

## 2020-04-17 PROCEDURE — 00000146 ZZHCL STATISTIC GLUCOSE BY METER IP

## 2020-04-17 PROCEDURE — 36569 INSJ PICC 5 YR+ W/O IMAGING: CPT

## 2020-04-17 PROCEDURE — 25800030 ZZH RX IP 258 OP 636: Performed by: NURSE PRACTITIONER

## 2020-04-17 PROCEDURE — 99222 1ST HOSP IP/OBS MODERATE 55: CPT | Performed by: INTERNAL MEDICINE

## 2020-04-17 RX ORDER — HEPARIN SODIUM,PORCINE 10 UNIT/ML
2-5 VIAL (ML) INTRAVENOUS
Status: COMPLETED | OUTPATIENT
Start: 2020-04-17 | End: 2020-04-20

## 2020-04-17 RX ORDER — POLYETHYLENE GLYCOL 3350 17 G/17G
17 POWDER, FOR SOLUTION ORAL DAILY
Status: DISCONTINUED | OUTPATIENT
Start: 2020-04-17 | End: 2020-04-22 | Stop reason: HOSPADM

## 2020-04-17 RX ORDER — LIDOCAINE 40 MG/G
CREAM TOPICAL
Status: DISCONTINUED | OUTPATIENT
Start: 2020-04-17 | End: 2020-04-22 | Stop reason: HOSPADM

## 2020-04-17 RX ADMIN — GABAPENTIN 100 MG: 100 CAPSULE ORAL at 08:49

## 2020-04-17 RX ADMIN — SENNOSIDES AND DOCUSATE SODIUM 1 TABLET: 8.6; 5 TABLET ORAL at 23:05

## 2020-04-17 RX ADMIN — ACETAMINOPHEN 975 MG: 325 TABLET, FILM COATED ORAL at 06:28

## 2020-04-17 RX ADMIN — LIDOCAINE 1 PATCH: 560 PATCH PERCUTANEOUS; TOPICAL; TRANSDERMAL at 10:30

## 2020-04-17 RX ADMIN — SENNOSIDES AND DOCUSATE SODIUM 1 TABLET: 8.6; 5 TABLET ORAL at 08:50

## 2020-04-17 RX ADMIN — HEPARIN SODIUM 5000 UNITS: 5000 INJECTION, SOLUTION INTRAVENOUS; SUBCUTANEOUS at 04:20

## 2020-04-17 RX ADMIN — LIDOCAINE HYDROCHLORIDE ANHYDROUS 1 ML: 10 INJECTION, SOLUTION INFILTRATION at 15:57

## 2020-04-17 RX ADMIN — HEPARIN SODIUM 5000 UNITS: 5000 INJECTION, SOLUTION INTRAVENOUS; SUBCUTANEOUS at 20:23

## 2020-04-17 RX ADMIN — ASPIRIN 325 MG: 325 TABLET, COATED ORAL at 08:50

## 2020-04-17 RX ADMIN — SERTRALINE HYDROCHLORIDE 150 MG: 100 TABLET ORAL at 08:49

## 2020-04-17 RX ADMIN — METOPROLOL TARTRATE 25 MG: 25 TABLET ORAL at 20:23

## 2020-04-17 RX ADMIN — SODIUM CHLORIDE 5 UNITS/HR: 9 INJECTION, SOLUTION INTRAVENOUS at 21:20

## 2020-04-17 RX ADMIN — PANTOPRAZOLE SODIUM 40 MG: 40 INJECTION, POWDER, FOR SOLUTION INTRAVENOUS at 08:32

## 2020-04-17 RX ADMIN — ACETAMINOPHEN 975 MG: 325 TABLET, FILM COATED ORAL at 23:04

## 2020-04-17 RX ADMIN — GABAPENTIN 100 MG: 100 CAPSULE ORAL at 23:05

## 2020-04-17 RX ADMIN — SENNOSIDES AND DOCUSATE SODIUM 1 TABLET: 8.6; 5 TABLET ORAL at 20:23

## 2020-04-17 RX ADMIN — ATORVASTATIN CALCIUM 40 MG: 40 TABLET, FILM COATED ORAL at 23:04

## 2020-04-17 RX ADMIN — SODIUM CHLORIDE 5 UNITS/HR: 9 INJECTION, SOLUTION INTRAVENOUS at 14:12

## 2020-04-17 RX ADMIN — MELATONIN 25 MCG: at 20:23

## 2020-04-17 RX ADMIN — ACETAMINOPHEN 975 MG: 325 TABLET, FILM COATED ORAL at 16:39

## 2020-04-17 RX ADMIN — SODIUM CHLORIDE 6 UNITS/HR: 9 INJECTION, SOLUTION INTRAVENOUS at 01:16

## 2020-04-17 RX ADMIN — GABAPENTIN 100 MG: 100 CAPSULE ORAL at 16:39

## 2020-04-17 ASSESSMENT — ACTIVITIES OF DAILY LIVING (ADL)
ADLS_ACUITY_SCORE: 15
ADLS_ACUITY_SCORE: 18
ADLS_ACUITY_SCORE: 18
ADLS_ACUITY_SCORE: 15
ADLS_ACUITY_SCORE: 18
ADLS_ACUITY_SCORE: 15

## 2020-04-17 ASSESSMENT — MIFFLIN-ST. JEOR: SCORE: 1803.88

## 2020-04-17 NOTE — PLAN OF CARE
Discharge Planner PT   Patient plan for discharge: not stated  Current status: CR: Patient in bed upon therapist arrival; nurse and rehab aide assisting; review of sternal precautions; patient able to move LE's to EOB with cues and some minor assist; max A x 3 to come to upright sitting at EOB; once in sitting tending to lose balance to L and backward; able to scoot to EOB with use of legs and min A x 1-2; min/mod A x 2 to come to standing at EOB with use of walker for support; extra person to manage lines; transfer to bedside chair with min A x 1-2, walker and nurse managing lines; positioned in recliner chair for comfort and skin protection; nurse attending to patient at end of session. May need lift to get patient back to bed secondary to bed height and patient stature  Barriers to return to prior living situation: current level of assist; decreased activity tolerance; weakness; sternal precautions  Recommendations for discharge: TCU  Rationale for recommendations: Pt is significantly below baseline for functional mobility and would benefit from continued PT while IP and at TCU to improve functional strength, balance, activity tolerance, safety and IND with functional mobility and reduce falls risk prior to returning home       Entered by: Ekaterina Albarado 04/17/2020 11:17 AM

## 2020-04-17 NOTE — PLAN OF CARE
Patient off Phenylephrine; BP WDL; VSS; No acute events over night; On home setup CPAP, tolerating well; Productive cough noted; No BM over night; Urine output adequate. Continuous Insulin infusion maintained. Denies pain. A&O throughout the night, no hallucinations or confusion noted.

## 2020-04-17 NOTE — CONSULTS
[facility]    Hospitalist Consultation    Date of Admission:  4/15/2020    Assessment & Plan   Amy Luna is a 74 year old female with a past medical history significant for severe AS, severe MS, TR, SSS s/p pacemaker placement, morbid obesity, LAVERNE on CPAP, HTN, HLD, and type 2 diabetes mellitus  who was admitted on 4/15/2020 for planned MVR, AVR and tricuspid valve repair. I was asked to see the patient for diabetes management upon transfer out of the ICU.     POD #2 s/p MVR with St Juan bioprosthetic valve, AVR with bovine valve, tricuspid valve repair, and PFO closure.   Procedure was performed by Dr. Wilson with EBL approximated at 2.2L per ICU note. No intraoperative complications identified. Post op remained on ventilator until 4/16. Required orion and epi initially for shock, pressors weaned evening of 4/16. She has been weaned to 2L nasal cannula. Currently with chest tubes and wound vac in place.   --primary management is per CV surgery  -- asa on hold per primary service  --currently on subcutaneous heparin q8  --transfer to  today per surgery    Hx SSS s/p pacemaker placement with current atrial lead malfunction  Post procedure noted to have inappropriate pacer spikes on EKG. Device interrogated 4/16 showing decreased atrial sensing resulting in non capture. Settings adjusted though she continues to have occasional inhibition.   --EP following, plan for observation for now with recheck early next week  --continue telemetry     Type 2 Diabetes Mellitus, well controlled  A1c is 6.5. PTA regimen includes metformin 1000mg daily, glipizide 20mg daily, and NPH 50u bid. Significant insulin needs 4/16 though on pressors at that time. Po intake has been poor, just small amounts of clears thus far.   --will continue insulin drip today and likely resume subcutaneous insulin tomorrow    CLARITA, improving. Baseline Cr appears to be around 1-1.2. Peak this admission at 1.65>1.26.   --avoid  nephrotoxins     Acute blood loss anemia. Pre-op hgb 11.4> 7.9.  --continue to monitor    Leukocytosis, resolved. Likely stress response from surgery. She has completed angeles op abx. Tmax in last 24 hours 100.4.   --continue to monitor     HTN, HLD. PTA regimen includes metoprolol XL 75mg every day, losartan 50mg daily, lasix 40mg every day, and lipitor 40mg.   --continue PTA atorvastatin  --lasix, losartan on hold  --metoprolol tartrate 25mg bid has been started by primary team     Depression. Continue PTA Zoloft    LAVERNE. CPAP per home settings     DVT Prophylaxis: Heparin subcutaneous- per CV Surgery  Code Status: Full Code    Disposition: Expected discharge in 2+ days. Hospitalist service will continue to follow along.     This patient was discussed with Dr. Thornton who agrees with the above plan    Deja Delacruz PA-C    Reason for Consult   Reason for consult: diabetes management     Primary Care Physician   Candy Frederick    History is obtained from the patient and from chart review    History of Present Illness   Amy Luna is a 74 year old female with a past medical history significant for severe AS, severe MS, TR, SSS s/p pacemaker placement, morbid obesity, LAVERNE on CPAP, HTN, HLD, and type 2 diabetes mellitus  who was admitted on 4/15/2020 for planned MVR, AVR and tricuspid valve repair. I was asked to see the patient for diabetes management upon transfer out of the ICU. The procedure was performed by Dr. Wilson on 4/15/20 with an EBL of 2.2L. She was admitted to ICU post procedure intubated and on pressor support. She was extubated to nasal cannula 4/16 and pressors weaned evening of 4/16. Post op course otherwise complicated by pacemaker malfunction for which she is being followed by EP. Plan to transfer from ICU to surgical floor today thus Hospitalist service consulted for management of diabetes upon transfer out of the ICU.     The patient is presently evaluated in her room in the ICU.  She reports pain is well controlled. Denies SOB, palpitations, dizziness. Very poor appetite but denies nausea or abdominal pain. She has had 2 popsicles today.     Past Medical History    Past Medical History:   Diagnosis Date     (HFpEF) heart failure with preserved ejection fraction (H)      Aortic stenosis      Chest pain      Depressive disorder      Diabetes (H)      Hyperlipidemia      Hypertension      Mitral annular calcification      Mitral stenosis      Obesity      Sleep apnea     CPAP       Past Surgical History   Past Surgical History:   Procedure Laterality Date     APPENDECTOMY       CV CORONARY ANGIOGRAM N/A 2020    Procedure: Coronary Angiogram;  Surgeon: Inez Peoples MD;  Location:  HEART CARDIAC CATH LAB     CV LEFT HEART CATH N/A 2020    Procedure: Left Heart Cath;  Surgeon: Inez Peoples MD;  Location:  HEART CARDIAC CATH LAB     CV PFO CLOSURE N/A 4/15/2020    Procedure: Patent Foramen Ovale Closure;  Surgeon: Ok Wilson MD;  Location:  OR     CV RIGHT HEART CATH N/A 2020    Procedure: Right Heart Cath;  Surgeon: Inez Peoples MD;  Location:  HEART CARDIAC CATH LAB     GYN SURGERY       x2 ,      GYN SURGERY      total hysterectomy     IMPLANT PACEMAKER       REPAIR VALVE TRICUSPID N/A 4/15/2020    Procedure: REPAIR TRICUSPID VALVE WITH VILLA MC3 26MM.;  Surgeon: Ok Wilson MD;  Location:  OR     REPLACE VALVE AORTIC N/A 4/15/2020    Procedure: REPLACEMENT, AORTIC VALVE WITH INSPIRIS TISSUE VALVE 25 MM; ON PUMP WITH SOCORRO READ BY CARDIOLOGIST DR BLANTON.;  Surgeon: Ok Wilson MD;  Location:  OR     REPLACE VALVE MITRAL N/A 4/15/2020    Procedure: REPLACEMENT, MITRAL VALVE WITH EPIC TISSUE VALVE 27 MM.;  Surgeon: Ok Wilson MD;  Location:  OR       Prior to Admission Medications   Prior to Admission Medications   Prescriptions Last Dose Informant Patient Reported?  Taking?   Biotin 96996 MCG TABS 4/14/2020 at AM Self Yes Yes   Sig: Take 1,000 mcg by mouth every morning    Calcium Carbonate-Vit D-Min (CALCIUM 1200 PO) 4/14/2020 at PM Self Yes Yes   Sig: Take 1 tablet by mouth every evening    Lutein 40 MG CAPS 4/14/2020 at AM Self Yes Yes   Sig: Take 40 mg by mouth every morning    Magnesium 400 MG TABS 4/14/2020 at PM Self Yes Yes   Sig: Take 400 mg by mouth every evening    Propylene Glycol (SYSTANE BALANCE OP) Past Month at Unknown time Self Yes Yes   Sig: Apply 1-2 drops to eye 3 times daily as needed (dry eyes)   TURMERIC PO 4/14/2020 at Unknown time Self Yes Yes   Sig: Take 1-3 tablets by mouth daily    VITAMIN D PO 4/14/2020 at PM Self Yes Yes   Sig: Take 1 Dose by mouth every evening    albuterol (PROAIR HFA/PROVENTIL HFA/VENTOLIN HFA) 108 (90 Base) MCG/ACT inhaler more than a month Self Yes Yes   Sig: Inhale 2 puffs into the lungs every 4 hours as needed for shortness of breath / dyspnea or wheezing   aspirin 81 MG EC tablet  at AM Self Yes Yes   Sig: Take 81 mg by mouth every morning    atorvastatin (LIPITOR) 40 MG tablet 4/14/2020 at PM Self Yes Yes   Sig: Take 40 mg by mouth At Bedtime    coenzyme Q-10 200 MG CAPS 4/9/2020 at AM Self Yes Yes   Sig: Take 200 mg by mouth every morning    furosemide (LASIX) 40 MG tablet 4/14/2020 at AM Self Yes Yes   Sig: Take 40 mg by mouth every morning   glipiZIDE (GLUCOTROL XL) 10 MG 24 hr tablet 4/14/2020 at AM Self Yes Yes   Sig: Take 20 mg by mouth every morning (takes 2 x 10mg)   insulin NPH (HUMULIN N/NOVOLIN N VIAL) 100 UNIT/ML vial 4/14/2020 at Unknown time Self Yes Yes   Sig: Inject 50 Units Subcutaneous 2 times daily   ipratropium (ATROVENT) 0.06 % nasal spray more than a month at AM Self Yes Yes   Sig: Spray 2 sprays into both nostrils 4 times daily as needed for rhinitis   losartan (COZAAR) 50 MG tablet 4/14/2020 at AM Self Yes Yes   Sig: Take 50 mg by mouth every morning   metFORMIN (GLUCOPHAGE-XR) 500 MG 24 hr tablet  4/14/2020 at AM Self Yes Yes   Sig: Take 1,000 mg by mouth daily (with breakfast) (takes 2 x 500mg)   metoprolol succinate ER (TOPROL-XL) 50 MG 24 hr tablet 4/15/2020 at AM Self No Yes   Sig: Take 1.5 tablets (75 mg) by mouth every morning   potassium 99 MG TABS 4/14/2020 at PM Self Yes Yes   Sig: Take 99 mg by mouth every evening    sertraline (ZOLOFT) 100 MG tablet 4/14/2020 at AM Self Yes Yes   Sig: Take 150 mg by mouth every morning (takes 1.5 x 100mg)   vitamin (B COMPLEX-C) tablet 4/14/2020 at AM Self Yes Yes   Sig: Take 1 tablet by mouth daily   vitamin C (ASCORBIC ACID) 1000 MG TABS 4/14/2020 at PM Self Yes Yes   Sig: Take 1,000 mg by mouth every evening       Facility-Administered Medications: None     Allergies   Allergies   Allergen Reactions     Penicillins Hives       Social History   No smoking history. Denies significant alcohol use.     Review of Systems   The 10 point Review of Systems is negative other than noted in the HPI or here.     Physical Exam   Temp: 99.5  F (37.5  C) Temp src: Bladder    Heart Rate: 60 Resp: 16 SpO2: 100 % O2 Device: BiPAP/CPAP Oxygen Delivery: 2 LPM  Vital Signs with Ranges  Temp:  [99.5  F (37.5  C)-100.6  F (38.1  C)] 99.5  F (37.5  C)  Heart Rate:  [] 60  Resp:  [11-31] 16  MAP:  [58 mmHg-81 mmHg] 64 mmHg  Arterial Line BP: ()/(43-65) 121/45  SpO2:  [93 %-100 %] 100 %  286 lbs 2.51 oz    Temp: 99.5  F (37.5  C) Temp src: Bladder BP: 127/61(after getting up to bedside chair with CR) Pulse: 60 Heart Rate: 67 Resp: 28 SpO2: 98 % O2 Device: Nasal cannula Oxygen Delivery: 2 LPM  Vitals:    04/15/20 0558 04/16/20 0500 04/17/20 0000   Weight: 128.6 kg (283 lb 8.2 oz) 128.2 kg (282 lb 10.1 oz) 129.8 kg (286 lb 2.5 oz)     Vital Signs with Ranges  Temp:  [99.5  F (37.5  C)-100.6  F (38.1  C)] 99.5  F (37.5  C)  Pulse:  [60] 60  Heart Rate:  [53-70] 67  Resp:  [11-31] 28  BP: (112-127)/(39-61) 127/61  MAP:  [58 mmHg-78 mmHg] 64 mmHg  Arterial Line BP:  ()/(43-57) 121/45  SpO2:  [93 %-100 %] 98 %  I/O last 3 completed shifts:  In: 2138.7 [I.V.:1638.7]  Out: 1407 [Urine:1080; Chest Tube:327]    Constitutional: Awake and alert. Siting up in bed. Appears comfortable and is appropriately conversant   ENT: normal cephalic, moist mucous membranes  Eyes:  Sclera anicteric, EOMI  Respiratory: Lungs clear to auscultation in anterior fields. No increased work of breathing or wheezing   Cardiovascular: Regular rate and rhythm, trace bilateral LE edema. 2 CT in place  GI:  active bowel sounds, abdomen soft, non-tender  Skin/Integumen: wound vac present sternum  MSK:  Moves all four extremities. No formal strength testing at IV placement in process  :  Stewart in place  Neuro:  Speech is clear. Face symmetric. Follows commands.     Data   -Data reviewed today: All pertinent laboratory and imaging results from this encounter were reviewed. I personally reviewed no images or EKG's today.  Recent Labs   Lab 04/17/20  0405 04/16/20  0350 04/15/20  2123 04/15/20  1519 04/15/20  1342   WBC 8.9 14.6*  --  20.9* 13.1*   HGB 7.9* 10.8*  --  11.3* 9.2*   MCV 98 98  --  98 98   PLT 82* 156  --  210 166   INR  --   --   --  1.38* 1.55*    140 141 142 141   POTASSIUM 4.1 5.0 4.6 4.0 4.0   CHLORIDE 113* 110* 109 109 109   CO2 24 21 20 23 23   BUN 37* 34* 34* 29 28   CR 1.26* 1.65* 1.53* 1.26* 1.14*   ANIONGAP 5 9 12 10 9   NELLY 8.5 8.9 8.3* 8.3* 8.5   * 134* 322* 242* 304*   ALBUMIN  --  3.2*  --  3.1* 3.1*   PROTTOTAL  --  6.3*  --  5.8*  --    BILITOTAL  --  0.5  --  0.9  --    ALKPHOS  --  44  --  43  --    ALT  --  37  --  23  --    AST  --  136*  --  76*  --        No results found for this or any previous visit (from the past 24 hour(s)).

## 2020-04-17 NOTE — PLAN OF CARE
"Pt out of bed x2, IS up to 750, coughing prn.  Dc'd lines per protocol, new picc placed as unable to place peripheral.  Continues on insulin gtt with labile blood sugars, requiring frequent checks for safety.  Pt states she feels uncomfortable, does not call this \"pain\", using scheduled tylenol and gabepentin with good results for pain management.   Pt with permanent pacemaker that occasionally drops the atrial beat, ventricular capture occurs with each beat.  Disconnected external pacemaker wires.  Pt meets criteria for transfer, await bed availability.  "

## 2020-04-17 NOTE — PROGRESS NOTES
"[facility]  Cardiovascular and Thoracic Surgery Daily Note          Assessment and Plan:   POD#2 s/p Mitral valve replacement with a 27 mm St. Juan Epic (porcine bioprosthetic) valve, aortic valve replacement with a 25 mm Rubi Inspiris Resilia bovine pericardial valve, tricuspid valve repair with a 26 mm Rubi MC3 annuloplasty ring, PFO closure, intraoperative SOCORRO by Dr. Ok Wilson    -CVS: HR: 50s-60s. SBP: 100s-120s. ASA, BB on hold, statin. Hx of SSS with PPM set to back up rate of 60 and does not appear to pace when below 60, temporary pacer capped. Remote Medtronic pacer interrogation reveals dysfunction- written report pending. Atrial lead not always capturing/sensing. EP following, will continue to monitor, ventricular lead working. Chest tubes to suction. Wound vac to remain in place for 5 days.     -Resp: Extubated within protocol. Saturating well on 2L<4L. Continue to encourage IS, cough, deep breathing, ambulation.     -Neuro:  Grossly intact. Pain controlled. Reports some visual hallucinations that she recognizes as hallucinations and is \"not afraid of them\". Minimal opioid use. Hx of depression. PTA zoloft resumed.     -Renal: good UO, Crea: 1.26<1.65, close to preoperative weight. CLARITA resolving. Will hold diuresis today and continue to monitor.     -GI:  Tolerating diet. No BM. Continue bowel regimen. Miralax added. Advance diet as able    -:  Stewart in place d/t limited mobility and need for accurate I&Os. Remove tomorrow    -Endo: Pre op A1c: 6.5. Hx of DMII. PTA metformin, glipizide and insulin NPH on hold. Continue Insulin gtt 48hrs, appreciate hospitalist for insulin management    -FEN: replete lytes as needed, Na: 142. K: 4.1  Orders Placed This Encounter      Advance Diet as Tolerated: Clear Liquid Diet    -ID: WBC: 8.9, Temp (24hrs), Av.9  F (37.7  C), Min:99.5  F (37.5  C), Max:100.6  F (38.1  C)   Completed perioperative abx. Leukocytosis likely related to " "stress from surgery. Will continue to monitor.     -Heme: Hgb: 7.9, plt: 82<156. Acute blood loss anemia and thrombocytopenia related to stress from surgery. ASA d/c     -Proph: PCD, ASA, BB on hold, statin, PPI, sub q heparin    -Dispo: ICU-->St [facility]. Continue therapies. Continue to encourage IS, cough, deep breathing, ambulation          Interval History:   Sitting up in bed, dozing, denies pain, breathing fine, tolerating diet         Medications:       acetaminophen  975 mg Oral Q8H     atorvastatin  40 mg Oral At Bedtime     gabapentin  100 mg Oral TID     heparin ANTICOAGULANT  5,000 Units Subcutaneous Q8H     lidocaine  1 patch Transdermal Q24h    And     lidocaine   Transdermal Q8H     metoprolol tartrate  25 mg Oral Q12H    Or     metoprolol  25 mg Per NG tube Q12H     pantoprazole (PROTONIX) IV  40 mg Intravenous Daily     senna-docusate  1 tablet Oral BID    Or     senna-docusate  2 tablet Oral BID     sertraline  150 mg Oral QAM     sodium chloride (PF)  3 mL Intracatheter Q8H     Vitamin D3  25 mcg Oral QPM     @MEDSPRN-@          Physical Exam:   Vitals were reviewed  Blood pressure (!) 112/39, pulse 59, temperature 99.7  F (37.6  C), resp. rate 19, height 1.651 m (5' 5\"), weight 129.8 kg (286 lb 2.5 oz), SpO2 96 %.  Rhythm: paced    Lungs: course    Cardiovascular: s1/s2, no m/r/g    Abdomen: s/nt/nd    Extremeties: trace-1+ lE edema    Incision: cdi    CT: to suction    Weight:   Vitals:    04/15/20 0558 04/16/20 0500 04/17/20 0000   Weight: 128.6 kg (283 lb 8.2 oz) 128.2 kg (282 lb 10.1 oz) 129.8 kg (286 lb 2.5 oz)            Data:   Labs:   Lab Results   Component Value Date    WBC 8.9 04/17/2020     Lab Results   Component Value Date    RBC 2.44 04/17/2020     Lab Results   Component Value Date    HGB 7.9 04/17/2020     Lab Results   Component Value Date    HCT 23.9 04/17/2020     No components found for: MCT  Lab Results   Component Value Date    MCV 98 04/17/2020     Lab Results   Component Value " Date    MCH 32.4 04/17/2020     Lab Results   Component Value Date    MCHC 33.1 04/17/2020     Lab Results   Component Value Date    RDW 13.8 04/17/2020     Lab Results   Component Value Date    PLT 82 04/17/2020       Last Basic Metabolic Panel:  Lab Results   Component Value Date     04/17/2020      Lab Results   Component Value Date    POTASSIUM 4.1 04/17/2020     Lab Results   Component Value Date    CHLORIDE 113 04/17/2020     Lab Results   Component Value Date    NELLY 8.5 04/17/2020     Lab Results   Component Value Date    CO2 24 04/17/2020     Lab Results   Component Value Date    BUN 37 04/17/2020     Lab Results   Component Value Date    CR 1.26 04/17/2020     Lab Results   Component Value Date     04/17/2020       Lu Ingram PA-C  Pager #: [phone]

## 2020-04-17 NOTE — CONSULTS
CARDIAC ELECTROPHYSIOLOGY CONSULTATION  April 17, 2020    REQUESTING PROVIDER:  Dr. Wilson    REASON FOR CONSULTATION: Atrial lead malfunction.      HISTORY OF PRESENT ILLNESS:    Thank you for asking the EP service to evaluate Mrs. Amy Luna for atrial lead malfunction.  She is a 74-year-old female with history of AV conduction disease and pacemaker implantation 3-4 years ago.  She had cardiac surgery on 4/15/2020 by Dr. Wilson.  She underwent MVR with 27 mm [place] epic porcine bioprosthetic valve, AVR with a 25 mm Rubi bovine pericardial valve, tricuspid valve repair with a 26 mm annuloplasty ring, and PFO closure.  LV function was normal preoperatively, LVEF was greater than 70%.    Postoperatively, she was noted to have inappropriate spikes on her ECG.  A chest x-ray showed no apparent disruption or dislodgement of the pacemaker leads.    We called the [employer] representative who interrogated the device yesterday.  She found that atrial sensing had significantly decreased and atrial pacing threshold had significantly increased, resulting in atrial non-capture with the previously programmed settings.  Adjustments were made in the pacemaker sensitivity and pacing output.  This immediately improved pacemaker function although earlier this morning the patient had occasional inhibition of atrial pacing.  Ventricular pacing has remained intact.    The patient is currently in the ICU, alert oriented but feels tired.  She complains of incisional pain but otherwise looks well.      DIAGNOSTIC STUDIES:  Labs: Sodium 142, potassium 4.1, creatinine 1.26, hematocrit 24%, platelets 82K  12-lead ECG: The tracing from yesterday showed regular rhythm with pacemaker spikes that do not result in capture.      IMPRESSION:  1. Atrial lead malfunction with undersensing and sharp increase in pacing threshold after major cardiac surgery.  At the moment it is not clear whether this is an issue with the lead itself or with  "the atrium that may be \"stunned\" after prolonged cardioplegia.  If the latter scenario is true, I would expect improvement in the pacemaker parameters within days.  We plan to recheck the pacemaker early next week.  In the meantime, the pacemaker has been reprogrammed to allow atrial sensing and adequate atrial capture.  We saw intermittent atrial pacing inhibition earlier today, but this is only temporary and of no apparent consequence.  2. Status post MVR, MVR, TV repair and PFO closure.    RECOMMENDATIONS:    1. Close monitoring.  2. Recheck pacemaker function early next week.    I appreciate the opportunity to part of your care.  Please free to contact me with any questions.      Juvenal Bustillo MD, [facility]        PHYSICAL EXAM:  Vitals: BP (!) 112/39 (BP Location: Left arm)   Pulse 59   Temp 99.5  F (37.5  C)   Resp 16   Ht 1.651 m (5' 5\")   Wt 129.8 kg (286 lb 2.5 oz)   SpO2 100%   BMI 47.62 kg/m      Intake/Output Summary (Last 24 hours) at 4/17/2020 0950  Last data filed at 4/17/2020 0900  Gross per 24 hour   Intake 1868.7 ml   Output 1290 ml   Net 578.7 ml     Vitals:    04/15/20 0558 04/16/20 0500 04/17/20 0000   Weight: 128.6 kg (283 lb 8.2 oz) 128.2 kg (282 lb 10.1 oz) 129.8 kg (286 lb 2.5 oz)     Constitutional:  Alert and oriented x3.  Very obese patient who appears comfortable and resting in her bed in the ICU.  In no respiratory distress.  Head: Normocephalic and atraumatic.   Skin:  Normal color and texture.  No rashes, lesions or eruptions.  Eyes:  PERRL, EOMI.  ENT:  Supple, normal JVP.  No lymphadenopathy or apparent thyroid enlargement.  Chest/Lungs: Midsternal incision with a suctioning device in place.  Auscultated in the supine position.  There is fair air movement bilaterally.  No obvious wheezing.  Cardiac:  Regular rhythm, normal S1 and S2.  1/6 systolic murmur, no rub or gallop.    Abdomen:  Normal bowel sounds. Abdomen is soft, non-tender & not distended.  No rebound or guarding.  "   Extremities:  Radial pulses 1+ bilaterally.  PT pulses palpable bilaterally.  Trace lower extremity edema is present.   Neurological:  CN 2-12 grossly intact.  Strength appears symmetric in upper extremities.  Back:  No CVA tenderness.     REVIEW OF SYSTEMS:  Review of system completed and negative with the exception of what was described in the HPI section.     CURRENT MEDICATIONS:    acetaminophen  975 mg Oral Q8H     atorvastatin  40 mg Oral At Bedtime     gabapentin  100 mg Oral TID     heparin ANTICOAGULANT  5,000 Units Subcutaneous Q8H     lidocaine  1 patch Transdermal Q24h    And     lidocaine   Transdermal Q8H     metoprolol tartrate  25 mg Oral Q12H    Or     metoprolol  25 mg Per NG tube Q12H     pantoprazole (PROTONIX) IV  40 mg Intravenous Daily     senna-docusate  1 tablet Oral BID    Or     senna-docusate  2 tablet Oral BID     sertraline  150 mg Oral QAM     sodium chloride (PF)  3 mL Intracatheter Q8H     Vitamin D3  25 mcg Oral QPM       ALLERGIES     Allergies   Allergen Reactions     Penicillins Hives       PAST MEDICAL HISTORY:  Past Medical History:   Diagnosis Date     (HFpEF) heart failure with preserved ejection fraction (H)      Aortic stenosis      Chest pain      Depressive disorder      Diabetes (H)      Hyperlipidemia      Hypertension      Mitral annular calcification      Mitral stenosis      Obesity      Sleep apnea     CPAP       PAST SURGICAL HISTORY:  Past Surgical History:   Procedure Laterality Date     APPENDECTOMY       CV CORONARY ANGIOGRAM N/A 2020    Procedure: Coronary Angiogram;  Surgeon: Inez Peoples MD;  Location:  HEART CARDIAC CATH LAB     CV LEFT HEART CATH N/A 2020    Procedure: Left Heart Cath;  Surgeon: Inez Peoples MD;  Location:  HEART CARDIAC CATH LAB     CV RIGHT HEART CATH N/A 2020    Procedure: Right Heart Cath;  Surgeon: Inez Peoples MD;  Location:  HEART CARDIAC CATH LAB     GYN SURGERY       x2  1981, 1984     GYN SURGERY      total hysterectomy     IMPLANT PACEMAKER  2015       FAMILY HISTORY:  History reviewed. No pertinent family history.    SOCIAL HISTORY:  Social History     Socioeconomic History     Marital status:      Spouse name: None     Number of children: None     Years of education: None     Highest education level: None   Occupational History     None   Social Needs     Financial resource strain: None     Food insecurity     Worry: None     Inability: None     Transportation needs     Medical: None     Non-medical: None   Tobacco Use     Smoking status: Never Smoker     Smokeless tobacco: Never Used   Substance and Sexual Activity     Alcohol use: No     Drug use: No     Sexual activity: None   Lifestyle     Physical activity     Days per week: None     Minutes per session: None     Stress: None   Relationships     Social connections     Talks on phone: None     Gets together: None     Attends [employer] service: None     Active member of club or organization: None     Attends meetings of clubs or organizations: None     Relationship status: None     Intimate partner violence     Fear of current or ex partner: None     Emotionally abused: None     Physically abused: None     Forced sexual activity: None   Other Topics Concern     None   Social History Narrative     None         Recent Lab Results:  Recent Labs   Lab 04/17/20  0405 04/16/20  0350 04/15/20  2123 04/15/20  1519 04/15/20  1342   WBC 8.9 14.6*  --  20.9* 13.1*   HGB 7.9* 10.8*  --  11.3* 9.2*   MCV 98 98  --  98 98   PLT 82* 156  --  210 166   INR  --   --   --  1.38* 1.55*    140 141 142 141   POTASSIUM 4.1 5.0 4.6 4.0 4.0   CHLORIDE 113* 110* 109 109 109   CO2 24 21 20 23 23   BUN 37* 34* 34* 29 28   CR 1.26* 1.65* 1.53* 1.26* 1.14*   ANIONGAP 5 9 12 10 9   NELLY 8.5 8.9 8.3* 8.3* 8.5   * 134* 322* 242* 304*   ALBUMIN  --  3.2*  --  3.1* 3.1*   PROTTOTAL  --  6.3*  --  5.8*  --    BILITOTAL  --  0.5  --  0.9  --     ALKPHOS  --  44  --  43  --    ALT  --  37  --  23  --    AST  --  136*  --  76*  --

## 2020-04-18 ENCOUNTER — APPOINTMENT (OUTPATIENT)
Dept: PHYSICAL THERAPY | Facility: CLINIC | Age: 75
DRG: 219 | End: 2020-04-18
Attending: SURGERY
Payer: MEDICARE

## 2020-04-18 LAB
ANION GAP SERPL CALCULATED.3IONS-SCNC: 3 MMOL/L (ref 3–14)
BUN SERPL-MCNC: 27 MG/DL (ref 7–30)
CALCIUM SERPL-MCNC: 8.7 MG/DL (ref 8.5–10.1)
CHLORIDE SERPL-SCNC: 112 MMOL/L (ref 94–109)
CO2 SERPL-SCNC: 26 MMOL/L (ref 20–32)
CREAT SERPL-MCNC: 0.99 MG/DL (ref 0.52–1.04)
ERYTHROCYTE [DISTWIDTH] IN BLOOD BY AUTOMATED COUNT: 13.5 % (ref 10–15)
GFR SERPL CREATININE-BSD FRML MDRD: 56 ML/MIN/{1.73_M2}
GLUCOSE BLDC GLUCOMTR-MCNC: 114 MG/DL (ref 70–99)
GLUCOSE BLDC GLUCOMTR-MCNC: 121 MG/DL (ref 70–99)
GLUCOSE BLDC GLUCOMTR-MCNC: 129 MG/DL (ref 70–99)
GLUCOSE BLDC GLUCOMTR-MCNC: 131 MG/DL (ref 70–99)
GLUCOSE BLDC GLUCOMTR-MCNC: 140 MG/DL (ref 70–99)
GLUCOSE BLDC GLUCOMTR-MCNC: 146 MG/DL (ref 70–99)
GLUCOSE BLDC GLUCOMTR-MCNC: 167 MG/DL (ref 70–99)
GLUCOSE BLDC GLUCOMTR-MCNC: 191 MG/DL (ref 70–99)
GLUCOSE BLDC GLUCOMTR-MCNC: 237 MG/DL (ref 70–99)
GLUCOSE SERPL-MCNC: 126 MG/DL (ref 70–99)
HCT VFR BLD AUTO: 23.8 % (ref 35–47)
HGB BLD-MCNC: 7.6 G/DL (ref 11.7–15.7)
MCH RBC QN AUTO: 31.5 PG (ref 26.5–33)
MCHC RBC AUTO-ENTMCNC: 31.9 G/DL (ref 31.5–36.5)
MCV RBC AUTO: 99 FL (ref 78–100)
PLATELET # BLD AUTO: 96 10E9/L (ref 150–450)
POTASSIUM SERPL-SCNC: 4.2 MMOL/L (ref 3.4–5.3)
RBC # BLD AUTO: 2.41 10E12/L (ref 3.8–5.2)
SODIUM SERPL-SCNC: 141 MMOL/L (ref 133–144)
WBC # BLD AUTO: 8.2 10E9/L (ref 4–11)

## 2020-04-18 PROCEDURE — 80048 BASIC METABOLIC PNL TOTAL CA: CPT | Performed by: PHYSICIAN ASSISTANT

## 2020-04-18 PROCEDURE — 97530 THERAPEUTIC ACTIVITIES: CPT | Mod: GP | Performed by: PHYSICAL THERAPIST

## 2020-04-18 PROCEDURE — 25000131 ZZH RX MED GY IP 250 OP 636 PS 637: Mod: GY | Performed by: HOSPITALIST

## 2020-04-18 PROCEDURE — 00000146 ZZHCL STATISTIC GLUCOSE BY METER IP

## 2020-04-18 PROCEDURE — 25000132 ZZH RX MED GY IP 250 OP 250 PS 637: Mod: GY | Performed by: PHYSICIAN ASSISTANT

## 2020-04-18 PROCEDURE — 25800030 ZZH RX IP 258 OP 636: Performed by: PHYSICIAN ASSISTANT

## 2020-04-18 PROCEDURE — 99232 SBSQ HOSP IP/OBS MODERATE 35: CPT | Performed by: HOSPITALIST

## 2020-04-18 PROCEDURE — 40000239 ZZH STATISTIC VAT ROUNDS

## 2020-04-18 PROCEDURE — 12000000 ZZH R&B MED SURG/OB

## 2020-04-18 PROCEDURE — 25000128 H RX IP 250 OP 636: Performed by: PHYSICIAN ASSISTANT

## 2020-04-18 PROCEDURE — 97116 GAIT TRAINING THERAPY: CPT | Mod: GP | Performed by: PHYSICAL THERAPIST

## 2020-04-18 PROCEDURE — C9113 INJ PANTOPRAZOLE SODIUM, VIA: HCPCS | Performed by: PHYSICIAN ASSISTANT

## 2020-04-18 PROCEDURE — 25000132 ZZH RX MED GY IP 250 OP 250 PS 637: Mod: GY | Performed by: HOSPITALIST

## 2020-04-18 PROCEDURE — 85027 COMPLETE CBC AUTOMATED: CPT | Performed by: PHYSICIAN ASSISTANT

## 2020-04-18 RX ORDER — FERROUS GLUCONATE 324(38)MG
324 TABLET ORAL
Status: DISCONTINUED | OUTPATIENT
Start: 2020-04-18 | End: 2020-04-22 | Stop reason: HOSPADM

## 2020-04-18 RX ORDER — DEXTROSE MONOHYDRATE 25 G/50ML
25-50 INJECTION, SOLUTION INTRAVENOUS
Status: DISCONTINUED | OUTPATIENT
Start: 2020-04-18 | End: 2020-04-18

## 2020-04-18 RX ORDER — MAGNESIUM CARB/ALUMINUM HYDROX 105-160MG
148 TABLET,CHEWABLE ORAL ONCE
Status: DISCONTINUED | OUTPATIENT
Start: 2020-04-18 | End: 2020-04-22 | Stop reason: HOSPADM

## 2020-04-18 RX ORDER — FUROSEMIDE 10 MG/ML
20 INJECTION INTRAMUSCULAR; INTRAVENOUS 2 TIMES DAILY
Status: DISCONTINUED | OUTPATIENT
Start: 2020-04-18 | End: 2020-04-19

## 2020-04-18 RX ORDER — ASPIRIN 325 MG
325 TABLET ORAL DAILY
Status: DISCONTINUED | OUTPATIENT
Start: 2020-04-18 | End: 2020-04-22 | Stop reason: HOSPADM

## 2020-04-18 RX ORDER — NICOTINE POLACRILEX 4 MG
15-30 LOZENGE BUCCAL
Status: DISCONTINUED | OUTPATIENT
Start: 2020-04-18 | End: 2020-04-18

## 2020-04-18 RX ORDER — FUROSEMIDE 10 MG/ML
20 INJECTION INTRAMUSCULAR; INTRAVENOUS DAILY
Status: DISCONTINUED | OUTPATIENT
Start: 2020-04-18 | End: 2020-04-18

## 2020-04-18 RX ADMIN — POLYETHYLENE GLYCOL 3350 17 G: 17 POWDER, FOR SOLUTION ORAL at 09:18

## 2020-04-18 RX ADMIN — MELATONIN 25 MCG: at 20:35

## 2020-04-18 RX ADMIN — INSULIN HUMAN 25 UNITS: 100 INJECTION, SUSPENSION SUBCUTANEOUS at 11:16

## 2020-04-18 RX ADMIN — HEPARIN SODIUM 5000 UNITS: 5000 INJECTION, SOLUTION INTRAVENOUS; SUBCUTANEOUS at 13:00

## 2020-04-18 RX ADMIN — INSULIN ASPART 3 UNITS: 100 INJECTION, SOLUTION INTRAVENOUS; SUBCUTANEOUS at 16:51

## 2020-04-18 RX ADMIN — INSULIN HUMAN 25 UNITS: 100 INJECTION, SUSPENSION SUBCUTANEOUS at 16:50

## 2020-04-18 RX ADMIN — ATORVASTATIN CALCIUM 40 MG: 40 TABLET, FILM COATED ORAL at 21:54

## 2020-04-18 RX ADMIN — METOPROLOL TARTRATE 25 MG: 25 TABLET ORAL at 20:35

## 2020-04-18 RX ADMIN — SODIUM CHLORIDE: 9 INJECTION, SOLUTION INTRAVENOUS at 02:12

## 2020-04-18 RX ADMIN — ACETAMINOPHEN 650 MG: 325 TABLET, FILM COATED ORAL at 04:06

## 2020-04-18 RX ADMIN — Medication 1 LOZENGE: at 09:17

## 2020-04-18 RX ADMIN — ASPIRIN 325 MG ORAL TABLET 325 MG: 325 PILL ORAL at 10:15

## 2020-04-18 RX ADMIN — SENNOSIDES AND DOCUSATE SODIUM 1 TABLET: 8.6; 5 TABLET ORAL at 09:17

## 2020-04-18 RX ADMIN — METOPROLOL TARTRATE 25 MG: 25 TABLET ORAL at 09:17

## 2020-04-18 RX ADMIN — FUROSEMIDE 20 MG: 10 INJECTION, SOLUTION INTRAMUSCULAR; INTRAVENOUS at 20:35

## 2020-04-18 RX ADMIN — FUROSEMIDE 20 MG: 10 INJECTION, SOLUTION INTRAMUSCULAR; INTRAVENOUS at 13:00

## 2020-04-18 RX ADMIN — HEPARIN SODIUM 5000 UNITS: 5000 INJECTION, SOLUTION INTRAVENOUS; SUBCUTANEOUS at 20:35

## 2020-04-18 RX ADMIN — PANTOPRAZOLE SODIUM 40 MG: 40 INJECTION, POWDER, FOR SOLUTION INTRAVENOUS at 09:17

## 2020-04-18 RX ADMIN — HEPARIN SODIUM 5000 UNITS: 5000 INJECTION, SOLUTION INTRAVENOUS; SUBCUTANEOUS at 04:06

## 2020-04-18 RX ADMIN — SERTRALINE HYDROCHLORIDE 150 MG: 100 TABLET ORAL at 09:17

## 2020-04-18 RX ADMIN — GABAPENTIN 100 MG: 100 CAPSULE ORAL at 09:17

## 2020-04-18 RX ADMIN — FERROUS GLUCONATE 324 MG: 324 TABLET ORAL at 13:00

## 2020-04-18 ASSESSMENT — ACTIVITIES OF DAILY LIVING (ADL)
ADLS_ACUITY_SCORE: 15
ADLS_ACUITY_SCORE: 16
ADLS_ACUITY_SCORE: 16
ADLS_ACUITY_SCORE: 15
ADLS_ACUITY_SCORE: 15
ADLS_ACUITY_SCORE: 16

## 2020-04-18 NOTE — PROGRESS NOTES
"[facility]  Cardiovascular and Thoracic Surgery Daily Note          Assessment and Plan:   POD#3 s/p Mitral valve replacement with a 27 mm St. Juan Epic (porcine bioprosthetic) valve, aortic valve replacement with a 25 mm Rubi Inspiris Resilia bovine pericardial valve, tricuspid valve repair with a 26 mm Rubi MC3 annuloplasty ring, PFO closure, intraoperative SOCORRO by Dr. Ok Wilson    Plans:  1. Mag citrate-Needs BM today  2. IVP lasix  3. Needs to ambulate, encourage IS  4. EP following for pacer issues-HR dips into 40s  5. PO iron for anemia     -CVS: HR: 50s-60s. SBP: 120s-130s. ASA, BB on hold, statin. Hx of SSS with PPM set to back up rate of 60 and does not appear to pace when below 60, temporary pacer capped. Remote Medtronic pacer interrogation reveals dysfunction- written report pending. Atrial lead not always capturing/sensing. EP following, will continue to monitor, ventricular lead working. HR dips into 40s-50s. Chest tubes to be removed this am if out of bed prior. Wound vac to remain in place for 5 days.      -Resp: Extubated within protocol. Saturating well on 2L<4L. Wean as able. Continue to encourage IS, cough, deep breathing, ambulation.      -Neuro:  Grossly intact. Pain controlled. Reports some visual hallucinations that she recognizes as hallucinations and is \"not afraid of them\". Minimal opioid use. Hx of depression. PTA zoloft resumed.      -Renal: good UO, Crea: 0.99<1.26<1.65, close to preoperative weight. CLARITA resolving. Lasix 20 mg IVP daily      -GI:  Tolerating diet. No BM. Continue bowel regimen. Miralax added. Advance diet as able, mag citrate-needs BM     -: remove Stewart today     -Endo: Pre op A1c: 6.5. Hx of DMII. PTA metformin, glipizide and insulin NPH on hold. Continue Insulin gtt 48hrs, appreciate hospitalist for insulin management     -FEN: replete lytes as needed, Na: 133. K: 4.0  Orders Placed This Encounter      Moderate Consistent CHO Diet    -ID: " "WBC: 17.2, Temp (24hrs), Av.8  F (36.6  C), Min:97.7  F (36.5  C), Max:97.8  F (36.6  C)   Completed perioperative abx. Leukocytosis likely related to stress from surgery. Will continue to monitor.      -Heme: Hgb: 7.6<7.9, plt: 96<82<156. Acute blood loss anemia and thrombocytopenia related to stress from surgery. PO iron added     -Proph: PCD, ASA, BB on hold, statin, PPI, sub q heparin     -Dispo: St 33. Continue therapies. Continue to encourage IS, cough, deep breathing, ambulation          Interval History:   Sitting up in bed, denies pain, tolerating diet, breathing fine, needs extra encouragement to move         Medications:       acetaminophen  975 mg Oral Q8H     aspirin  325 mg Oral Daily     atorvastatin  40 mg Oral At Bedtime     gabapentin  100 mg Oral TID     heparin ANTICOAGULANT  5,000 Units Subcutaneous Q8H     insulin aspart  1-10 Units Subcutaneous TID AC     insulin aspart  1-7 Units Subcutaneous At Bedtime     insulin isophane human  25 Units Subcutaneous BID AC     lidocaine  1 patch Transdermal Q24h    And     lidocaine   Transdermal Q8H     metoprolol tartrate  25 mg Oral Q12H    Or     metoprolol  25 mg Per NG tube Q12H     pantoprazole (PROTONIX) IV  40 mg Intravenous Daily     polyethylene glycol  17 g Oral Daily     senna-docusate  1 tablet Oral BID    Or     senna-docusate  2 tablet Oral BID     sertraline  150 mg Oral QAM     sodium chloride (PF)  10 mL Intracatheter Q8H     Vitamin D3  25 mcg Oral QPM     @MEDSPRN-@          Physical Exam:   Vitals were reviewed  Blood pressure 126/48, pulse 60, temperature 97.7  F (36.5  C), temperature source Oral, resp. rate 14, height 1.651 m (5' 5\"), weight 129.8 kg (286 lb 2.5 oz), SpO2 96 %.  Rhythm: paced    Lungs: course    Cardiovascular: s1/s2, no m/r/g    Abdomen: s/nt/nd    Extremeties: no LE edema    Incision: cdi    CT: to suction    Weight:   Vitals:    04/15/20 0558 20 0500 20 0000   Weight: 128.6 kg (283 lb 8.2 oz) " 128.2 kg (282 lb 10.1 oz) 129.8 kg (286 lb 2.5 oz)            Data:   Labs:   Lab Results   Component Value Date    WBC 8.2 04/18/2020     Lab Results   Component Value Date    RBC 2.41 04/18/2020     Lab Results   Component Value Date    HGB 7.6 04/18/2020     Lab Results   Component Value Date    HCT 23.8 04/18/2020     No components found for: MCT  Lab Results   Component Value Date    MCV 99 04/18/2020     Lab Results   Component Value Date    MCH 31.5 04/18/2020     Lab Results   Component Value Date    MCHC 31.9 04/18/2020     Lab Results   Component Value Date    RDW 13.5 04/18/2020     Lab Results   Component Value Date    PLT 96 04/18/2020       Last Basic Metabolic Panel:  Lab Results   Component Value Date     04/18/2020      Lab Results   Component Value Date    POTASSIUM 4.2 04/18/2020     Lab Results   Component Value Date    CHLORIDE 112 04/18/2020     Lab Results   Component Value Date    NELLY 8.7 04/18/2020     Lab Results   Component Value Date    CO2 26 04/18/2020     Lab Results   Component Value Date    BUN 27 04/18/2020     Lab Results   Component Value Date    CR 0.99 04/18/2020     Lab Results   Component Value Date     04/18/2020       Lu Ingram PA-C  Pager #: [phone]    Patient seen and examined. Agree with plan.

## 2020-04-18 NOTE — PROGRESS NOTES
SW:  D: SW received message from ARU with request for OT notes for patient and requested that patient be seen by OT as they are following for admission. SHAY paged MD with request.   P: Will continue to follow.      JAMES Badillo

## 2020-04-18 NOTE — PLAN OF CARE
A/Ox4. AVSS except bradycardic at times, permanent pacemaker in place. Tele: 100% AV paced. LS diminished, CPAP at night 1-2 LO2 when awake. Up with AX2 and GB. Insulin drip discontinued this afternoon. Sliding scale given for coverage. Mod card diet good appetite. Passing gas, one soft BM this shift. CT and pacer wires out today at 1300. Stewart removed today. Denies pain. Continue to monitor.

## 2020-04-18 NOTE — CONSULTS
"NUTRITION ASSESSMENT      REASON FOR ASSESSMENT:  Cardiac Surgery Nutrition Consult    CURRENT DIET / INTAKE:  Moderate CHO   Patient states that she had 100% of an omelet and about 90% of a piece of toast around 1100 today.   Appetite is somewhat reduced post-op.    ANTHROPOMETRICS:   Ht: 5'5\"  Wt: 128.2 kg (283#)(4/16)  BMI: 47.03 kg/m^2  IBW: 56.8 kg   Weight Status: Obesity Grade III BMI >40  %IBW: 226%    MALNUTRITION:  Patient does not meet two of the following criteria necessary for diagnosing malnutrition:  significant weight loss, reduced intake, subcutaneous fat loss, muscle loss or fluid retention. Nutrition Focused Physical Assessment (NFPA) not appropriate at this time.    NUTRITION DIAGNOSIS:   Increased nutrient needs (protein) R/t s/p open heart surgery AEB need for oral nutritional supplement     INTERVENTIONS:    Nutrition Prescription:  Moderate CHO diet as tolerated   Boost Glucose Control BID between meals     Implementation:  Nutrition Education (Content):  Discussed the importance of adequate nutrition post-op for healing and energy, emphasizing protein foods, and encouraged patient to order a protein food at each meal.    Goals:  Patient to consume ~75% at meals in the next 3 - 5 days    Follow Up/Monitoring (InPatient):  Food and Fluid intake -  Monitor for adequacy    Follow Up/Monitoring (OutPatient):  Patient will participate in out-patient cardiac rehab and attend nutrition classes during the program    Bridgett Cristina RD, LD, [facility]   Clinical Dietitian - [facility]     "

## 2020-04-18 NOTE — PLAN OF CARE
Discharge Planner PT   Patient plan for discharge: open to Acute rehab or TCU  Current status: CR: Patient transferred from ICU to  last night; having zain HR at times but OK to work with patient per FANNY Leigh. In bed upon therapist arrival; min A for LE's to EOB; max A x 2 to come to upright sitting at EOB; min/mod A to scoot to EOB to get feet on floor;; denies dizziness in sitting at EOB; min A of 1 to come to stand with cues to maintain sternal precautions and bed elevated to assist standing; gait in room x 40 feet with min A of 1 and 2nd person to help manage lines/tubes and for safety; unable to tolerate further distance secondary to SOB; O2 sats 95% with mobility; transfer to bedside chair and sitting with min A of 1 and cues to maintain sternal precautions HR 48-67 during session; BP elevated but below parameter.   Barriers to return to prior living situation: current level of assist; sternal precautions; weakness; decreased activity tolerance; falls risk  Recommendations for discharge: ARC  Rationale for recommendations: Appears patient would benefit from ongoing CR here in hospital and progression of strength, activity tolerance, and independence with functional mobility at Acute rehab; Patient would benefit from the complex medical management, has a supportive home environment, was previously independent with mobility and cares, and anticipate patient will work up to tolerating 3 hours of therapy per day.       Entered by: Ekaterina Albarado 04/18/2020 11:45 AM

## 2020-04-18 NOTE — PLAN OF CARE
Pt ATF from ICU at 22:30 last evening. A and O x4. CT 2:1, minimal output. Good UOP per arellano. Wound vac intact to sternal incision. 2-3 + edema in arms/hands bilaterally, +2 LE's. Repositioned q2h. Insulin gtt continues at 2 U/hr. Wgom=217% AV paced.

## 2020-04-18 NOTE — PLAN OF CARE
[facility]: VSS ex soft BP's following scheduled evening Metoprolol. Neuro: A/O x 4; follows commands, weak. Cardiac: SR, 100% v-paced when HR below 65; wires in place. Pulm: CT x 2 to 1 atrium: Serosanguinous output; Lungs: clear/dim, O2 NC. GI/: Stewart in place: WDL output; no BM, passing flatus. Activity: Assist 2-3 w/ repo, activity; ceiling lift for transfers.Access: PICC x 2 lumens. Denies pain. Insulin gtt, NS for MIVF.     [facility], report given to RN taking pt.

## 2020-04-18 NOTE — PROGRESS NOTES
[facility]    Hospitalist Consultation    Date of Admission:  4/15/2020    Assessment & Plan   Amy Luna is a 74 year old female with a past medical history significant for severe AS, severe MS, TR, SSS s/p pacemaker placement, morbid obesity, LAVERNE on CPAP, HTN, HLD, and type 2 diabetes mellitus  who was admitted on 4/15/2020 for planned MVR, AVR and tricuspid valve repair. I was asked to see the patient for diabetes management upon transfer out of the ICU.     POD #2 s/p MVR with [place] bioprosthetic valve, AVR with bovine valve, tricuspid valve repair, and PFO closure.   Procedure was performed by Dr. Wilson with EBL approximated at 2.2L per ICU note. No intraoperative complications identified. Post op remained on ventilator until 4/16. Required orion and epi initially for shock, pressors weaned evening of 4/16. She has been weaned to 2L nasal cannula. Currently with chest tubes and wound vac in place.   --primary management is per CV surgery    Hx SSS s/p pacemaker placement with current atrial lead malfunction  Post procedure noted to have inappropriate pacer spikes on EKG. Device interrogated 4/16 showing decreased atrial sensing resulting in non capture. Settings adjusted though she continues to have occasional inhibition.   --EP following, plan for observation for now with recheck early next week  --continue telemetry     Type 2 Diabetes Mellitus, well controlled  A1c is 6.5. PTA regimen includes metformin 1000mg daily, glipizide 20mg daily, and NPH 50u bid. Significant insulin needs 4/16 though on pressors at that time. Po intake has been poor, just small amounts of clears thus far.   --transition to NPH at 1/2 PTA dose given her poor nutritional status  - sliding scale insulin high dose, she has some degree of insulin sensitivity  - TDD prior was 100 units divided bid to NPH 50 units or about 0.77 units/kg/day    CLARITA, improving. Baseline Cr appears to be around 1-1.2. Peak this  admission at 1.65>1.26.   --avoid nephrotoxins  -- improving    Acute blood loss anemia. Pre-op hgb 11.4> 7.9.  --continue to monitor    Leukocytosis, resolved. Likely stress response from surgery. She has completed angeles op abx. Tmax in last 24 hours 100.4.   --continue to monitor     HTN, HLD. PTA regimen includes metoprolol XL 75mg every day, losartan 50mg daily, lasix 40mg every day, and lipitor 40mg.   --continue PTA atorvastatin  --lasix, losartan on hold  --metoprolol tartrate 25mg bid has been started by primary team     Depression. Continue PTA Zoloft    LAVERNE. CPAP per home settings     DVT Prophylaxis: Heparin subcutaneous- per CV Surgery  Code Status: Full Code    Disposition: Expected discharge in 2+ days. Hospitalist service will continue to follow along.     This patient was discussed with Dr. Thornton who agrees with the above plan    Khanh Thornton DO  Hospitalist [facility]  Pager: [phone]        Reason for Consult   Reason for consult: diabetes management     Primary Care Physician   Candy Frederick    Interim change  Pain well controlled  Minimal diet tolerance    Past Medical History    Past Medical History:   Diagnosis Date     (HFpEF) heart failure with preserved ejection fraction (H)      Aortic stenosis      Chest pain      Depressive disorder      Diabetes (H)      Hyperlipidemia      Hypertension      Mitral annular calcification      Mitral stenosis      Obesity      Sleep apnea     CPAP       Past Surgical History   Past Surgical History:   Procedure Laterality Date     APPENDECTOMY       CV CORONARY ANGIOGRAM N/A 4/8/2020    Procedure: Coronary Angiogram;  Surgeon: Inez Peoples MD;  Location:  HEART CARDIAC CATH LAB     CV LEFT HEART CATH N/A 4/8/2020    Procedure: Left Heart Cath;  Surgeon: Inez Peoples MD;  Location:  HEART CARDIAC CATH LAB     CV PFO CLOSURE N/A 4/15/2020    Procedure: Patent Foramen Ovale Closure;  Surgeon: Ok Wilson MD;   Location:  OR     CV RIGHT HEART CATH N/A 2020    Procedure: Right Heart Cath;  Surgeon: Inez Peoples MD;  Location:  HEART CARDIAC CATH LAB     GYN SURGERY       x2 ,      GYN SURGERY      total hysterectomy     IMPLANT PACEMAKER       REPAIR VALVE TRICUSPID N/A 4/15/2020    Procedure: REPAIR TRICUSPID VALVE WITH VILLA MC3 26MM.;  Surgeon: Ok Wilson MD;  Location: [facility] OR     REPLACE VALVE AORTIC N/A 4/15/2020    Procedure: REPLACEMENT, AORTIC VALVE WITH INSPIRIS TISSUE VALVE 25 MM; ON PUMP WITH SOCORRO READ BY CARDIOLOGIST DR BLANTON.;  Surgeon: Ok Wilson MD;  Location: [facility] OR     REPLACE VALVE MITRAL N/A 4/15/2020    Procedure: REPLACEMENT, MITRAL VALVE WITH EPIC TISSUE VALVE 27 MM.;  Surgeon: Ok Wilson MD;  Location:  OR       Prior to Admission Medications   Prior to Admission Medications   Prescriptions Last Dose Informant Patient Reported? Taking?   Biotin 80860 MCG TABS 2020 at AM Self Yes Yes   Sig: Take 1,000 mcg by mouth every morning    Calcium Carbonate-Vit D-Min (CALCIUM 1200 PO) 2020 at PM Self Yes Yes   Sig: Take 1 tablet by mouth every evening    Lutein 40 MG CAPS 2020 at AM Self Yes Yes   Sig: Take 40 mg by mouth every morning    Magnesium 400 MG TABS 2020 at PM Self Yes Yes   Sig: Take 400 mg by mouth every evening    Propylene Glycol (SYSTANE BALANCE OP) Past Month at Unknown time Self Yes Yes   Sig: Apply 1-2 drops to eye 3 times daily as needed (dry eyes)   TURMERIC PO 2020 at Unknown time Self Yes Yes   Sig: Take 1-3 tablets by mouth daily    VITAMIN D PO 2020 at PM Self Yes Yes   Sig: Take 1 Dose by mouth every evening    albuterol (PROAIR HFA/PROVENTIL HFA/VENTOLIN HFA) 108 (90 Base) MCG/ACT inhaler more than a month Self Yes Yes   Sig: Inhale 2 puffs into the lungs every 4 hours as needed for shortness of breath / dyspnea or wheezing   aspirin 81 MG EC tablet  at AM Self Yes Yes   Sig:  Take 81 mg by mouth every morning    atorvastatin (LIPITOR) 40 MG tablet 4/14/2020 at PM Self Yes Yes   Sig: Take 40 mg by mouth At Bedtime    coenzyme Q-10 200 MG CAPS 4/9/2020 at AM Self Yes Yes   Sig: Take 200 mg by mouth every morning    furosemide (LASIX) 40 MG tablet 4/14/2020 at AM Self Yes Yes   Sig: Take 40 mg by mouth every morning   glipiZIDE (GLUCOTROL XL) 10 MG 24 hr tablet 4/14/2020 at AM Self Yes Yes   Sig: Take 20 mg by mouth every morning (takes 2 x 10mg)   insulin NPH (HUMULIN N/NOVOLIN N VIAL) 100 UNIT/ML vial 4/14/2020 at Unknown time Self Yes Yes   Sig: Inject 50 Units Subcutaneous 2 times daily   ipratropium (ATROVENT) 0.06 % nasal spray more than a month at AM Self Yes Yes   Sig: Spray 2 sprays into both nostrils 4 times daily as needed for rhinitis   losartan (COZAAR) 50 MG tablet 4/14/2020 at AM Self Yes Yes   Sig: Take 50 mg by mouth every morning   metFORMIN (GLUCOPHAGE-XR) 500 MG 24 hr tablet 4/14/2020 at AM Self Yes Yes   Sig: Take 1,000 mg by mouth daily (with breakfast) (takes 2 x 500mg)   metoprolol succinate ER (TOPROL-XL) 50 MG 24 hr tablet 4/15/2020 at AM Self No Yes   Sig: Take 1.5 tablets (75 mg) by mouth every morning   potassium 99 MG TABS 4/14/2020 at PM Self Yes Yes   Sig: Take 99 mg by mouth every evening    sertraline (ZOLOFT) 100 MG tablet 4/14/2020 at AM Self Yes Yes   Sig: Take 150 mg by mouth every morning (takes 1.5 x 100mg)   vitamin (B COMPLEX-C) tablet 4/14/2020 at AM Self Yes Yes   Sig: Take 1 tablet by mouth daily   vitamin C (ASCORBIC ACID) 1000 MG TABS 4/14/2020 at PM Self Yes Yes   Sig: Take 1,000 mg by mouth every evening       Facility-Administered Medications: None     Allergies   Allergies   Allergen Reactions     Penicillins Hives       Social History   No smoking history. Denies significant alcohol use.     Review of Systems   The 10 point Review of Systems is negative other than noted in the HPI or here.     Physical Exam   Temp: 98.8  F (37.1  C) Temp  src: Axillary BP: (!) 137/37(prior to CR) Pulse: 53 Heart Rate: 56 Resp: 19 SpO2: 100 % O2 Device: BiPAP/CPAP Oxygen Delivery: 2 LPM  Vital Signs with Ranges  Temp:  [97.7  F (36.5  C)-98.8  F (37.1  C)] 98.8  F (37.1  C)  Pulse:  [53-74] 53  Heart Rate:  [46-70] 56  Resp:  [9-22] 19  BP: ()/(27-64) 137/37  SpO2:  [94 %-100 %] 100 %  286 lbs 2.51 oz    Temp: 98.8  F (37.1  C) Temp src: Axillary BP: (!) 137/37(prior to CR) Pulse: 53 Heart Rate: 56 Resp: 19 SpO2: 100 % O2 Device: BiPAP/CPAP Oxygen Delivery: 2 LPM  Vitals:    04/15/20 0558 04/16/20 0500 04/17/20 0000   Weight: 128.6 kg (283 lb 8.2 oz) 128.2 kg (282 lb 10.1 oz) 129.8 kg (286 lb 2.5 oz)     Vital Signs with Ranges  Temp:  [97.7  F (36.5  C)-98.8  F (37.1  C)] 98.8  F (37.1  C)  Pulse:  [53-74] 53  Heart Rate:  [46-70] 56  Resp:  [9-22] 19  BP: ()/(27-64) 137/37  SpO2:  [94 %-100 %] 100 %  I/O last 3 completed shifts:  In: 958.95 [P.O.:400; I.V.:558.95]  Out: 1865 [Urine:1575; Chest Tube:290]    Constitutional: Awake and alert. Siting up in bed. Appears comfortable and is appropriately conversant   ENT: normal cephalic, moist mucous membranes  Eyes:  Sclera anicteric, EOMI  Respiratory: Lungs clear to auscultation in anterior fields. No increased work of breathing or wheezing   Cardiovascular: Regular rate and rhythm, trace bilateral LE edema. 2 CT in place  GI:  active bowel sounds, abdomen soft, non-tender  Skin/Integumen: wound vac present sternum  MSK:  Moves all four extremities. No formal strength testing at IV placement in process  :  Stewart in place  Neuro:  Speech is clear. Face symmetric. Follows commands.     Data   -Data reviewed today: All pertinent laboratory and imaging results from this encounter were reviewed. I personally reviewed no images or EKG's today.  Recent Labs   Lab 04/18/20  0615 04/17/20  0405 04/16/20  0350  04/15/20  1519 04/15/20  1342   WBC 8.2 8.9 14.6*  --  20.9* 13.1*   HGB 7.6* 7.9* 10.8*  --  11.3* 9.2*    MCV 99 98 98  --  98 98   PLT 96* 82* 156  --  210 166   INR  --   --   --   --  1.38* 1.55*    142 140   < > 142 141   POTASSIUM 4.2 4.1 5.0   < > 4.0 4.0   CHLORIDE 112* 113* 110*   < > 109 109   CO2 26 24 21   < > 23 23   BUN 27 37* 34*   < > 29 28   CR 0.99 1.26* 1.65*   < > 1.26* 1.14*   ANIONGAP 3 5 9   < > 10 9   NELLY 8.7 8.5 8.9   < > 8.3* 8.5   * 127* 134*   < > 242* 304*   ALBUMIN  --   --  3.2*  --  3.1* 3.1*   PROTTOTAL  --   --  6.3*  --  5.8*  --    BILITOTAL  --   --  0.5  --  0.9  --    ALKPHOS  --   --  44  --  43  --    ALT  --   --  37  --  23  --    AST  --   --  136*  --  76*  --     < > = values in this interval not displayed.       No results found for this or any previous visit (from the past 24 hour(s)).

## 2020-04-19 ENCOUNTER — APPOINTMENT (OUTPATIENT)
Dept: PHYSICAL THERAPY | Facility: CLINIC | Age: 75
DRG: 219 | End: 2020-04-19
Attending: SURGERY
Payer: MEDICARE

## 2020-04-19 ENCOUNTER — APPOINTMENT (OUTPATIENT)
Dept: OCCUPATIONAL THERAPY | Facility: CLINIC | Age: 75
DRG: 219 | End: 2020-04-19
Attending: HOSPITALIST
Payer: MEDICARE

## 2020-04-19 ENCOUNTER — APPOINTMENT (OUTPATIENT)
Dept: GENERAL RADIOLOGY | Facility: CLINIC | Age: 75
DRG: 219 | End: 2020-04-19
Attending: PHYSICIAN ASSISTANT
Payer: MEDICARE

## 2020-04-19 LAB
ANION GAP SERPL CALCULATED.3IONS-SCNC: 6 MMOL/L (ref 3–14)
BUN SERPL-MCNC: 27 MG/DL (ref 7–30)
CALCIUM SERPL-MCNC: 8.6 MG/DL (ref 8.5–10.1)
CHLORIDE SERPL-SCNC: 106 MMOL/L (ref 94–109)
CO2 SERPL-SCNC: 26 MMOL/L (ref 20–32)
CREAT SERPL-MCNC: 0.97 MG/DL (ref 0.52–1.04)
ERYTHROCYTE [DISTWIDTH] IN BLOOD BY AUTOMATED COUNT: 13.2 % (ref 10–15)
GFR SERPL CREATININE-BSD FRML MDRD: 57 ML/MIN/{1.73_M2}
GLUCOSE BLDC GLUCOMTR-MCNC: 176 MG/DL (ref 70–99)
GLUCOSE BLDC GLUCOMTR-MCNC: 217 MG/DL (ref 70–99)
GLUCOSE BLDC GLUCOMTR-MCNC: 218 MG/DL (ref 70–99)
GLUCOSE BLDC GLUCOMTR-MCNC: 220 MG/DL (ref 70–99)
GLUCOSE BLDC GLUCOMTR-MCNC: 276 MG/DL (ref 70–99)
GLUCOSE SERPL-MCNC: 186 MG/DL (ref 70–99)
HCT VFR BLD AUTO: 23.3 % (ref 35–47)
HGB BLD-MCNC: 7.7 G/DL (ref 11.7–15.7)
INTERPRETATION ECG - MUSE: NORMAL
INTERPRETATION ECG - MUSE: NORMAL
MCH RBC QN AUTO: 32 PG (ref 26.5–33)
MCHC RBC AUTO-ENTMCNC: 33 G/DL (ref 31.5–36.5)
MCV RBC AUTO: 97 FL (ref 78–100)
PLATELET # BLD AUTO: 128 10E9/L (ref 150–450)
POTASSIUM SERPL-SCNC: 3.8 MMOL/L (ref 3.4–5.3)
RBC # BLD AUTO: 2.41 10E12/L (ref 3.8–5.2)
SODIUM SERPL-SCNC: 138 MMOL/L (ref 133–144)
WBC # BLD AUTO: 8.1 10E9/L (ref 4–11)

## 2020-04-19 PROCEDURE — C9113 INJ PANTOPRAZOLE SODIUM, VIA: HCPCS | Performed by: PHYSICIAN ASSISTANT

## 2020-04-19 PROCEDURE — 40000239 ZZH STATISTIC VAT ROUNDS

## 2020-04-19 PROCEDURE — 97116 GAIT TRAINING THERAPY: CPT | Mod: GP | Performed by: PHYSICAL THERAPIST

## 2020-04-19 PROCEDURE — 40000257 ZZH STATISTIC CONSULT NO CHARGE VASC ACCESS

## 2020-04-19 PROCEDURE — 40000986 XR CHEST 2 VW

## 2020-04-19 PROCEDURE — 99232 SBSQ HOSP IP/OBS MODERATE 35: CPT | Performed by: HOSPITALIST

## 2020-04-19 PROCEDURE — 97530 THERAPEUTIC ACTIVITIES: CPT | Mod: GP | Performed by: PHYSICAL THERAPIST

## 2020-04-19 PROCEDURE — 40000275 ZZH STATISTIC RCP TIME EA 10 MIN

## 2020-04-19 PROCEDURE — 00000146 ZZHCL STATISTIC GLUCOSE BY METER IP

## 2020-04-19 PROCEDURE — 25000132 ZZH RX MED GY IP 250 OP 250 PS 637: Mod: GY | Performed by: PHYSICIAN ASSISTANT

## 2020-04-19 PROCEDURE — 85027 COMPLETE CBC AUTOMATED: CPT | Performed by: PHYSICIAN ASSISTANT

## 2020-04-19 PROCEDURE — 25000128 H RX IP 250 OP 636: Performed by: PHYSICIAN ASSISTANT

## 2020-04-19 PROCEDURE — 80048 BASIC METABOLIC PNL TOTAL CA: CPT | Performed by: PHYSICIAN ASSISTANT

## 2020-04-19 PROCEDURE — 25000131 ZZH RX MED GY IP 250 OP 636 PS 637: Mod: GY | Performed by: HOSPITALIST

## 2020-04-19 PROCEDURE — 99207 ZZC CDG-MDM COMPONENT: MEETS MODERATE - UP CODED: CPT | Performed by: HOSPITALIST

## 2020-04-19 PROCEDURE — 12000000 ZZH R&B MED SURG/OB

## 2020-04-19 PROCEDURE — 97165 OT EVAL LOW COMPLEX 30 MIN: CPT | Mod: GO

## 2020-04-19 PROCEDURE — 25000132 ZZH RX MED GY IP 250 OP 250 PS 637: Mod: GY | Performed by: HOSPITALIST

## 2020-04-19 RX ORDER — PANTOPRAZOLE SODIUM 40 MG/1
40 TABLET, DELAYED RELEASE ORAL
Status: DISCONTINUED | OUTPATIENT
Start: 2020-04-20 | End: 2020-04-22 | Stop reason: HOSPADM

## 2020-04-19 RX ORDER — FUROSEMIDE 40 MG
40 TABLET ORAL DAILY
Status: DISCONTINUED | OUTPATIENT
Start: 2020-04-19 | End: 2020-04-22 | Stop reason: HOSPADM

## 2020-04-19 RX ORDER — LOSARTAN POTASSIUM 25 MG/1
25 TABLET ORAL DAILY
Status: DISCONTINUED | OUTPATIENT
Start: 2020-04-19 | End: 2020-04-20

## 2020-04-19 RX ADMIN — FERROUS GLUCONATE 324 MG: 324 TABLET ORAL at 08:37

## 2020-04-19 RX ADMIN — PANTOPRAZOLE SODIUM 40 MG: 40 INJECTION, POWDER, FOR SOLUTION INTRAVENOUS at 08:36

## 2020-04-19 RX ADMIN — INSULIN HUMAN 28 UNITS: 100 INJECTION, SUSPENSION SUBCUTANEOUS at 17:45

## 2020-04-19 RX ADMIN — HYDRALAZINE HYDROCHLORIDE 10 MG: 20 INJECTION INTRAMUSCULAR; INTRAVENOUS at 04:44

## 2020-04-19 RX ADMIN — POTASSIUM CHLORIDE 20 MEQ: 1500 TABLET, EXTENDED RELEASE ORAL at 08:36

## 2020-04-19 RX ADMIN — HEPARIN SODIUM 5000 UNITS: 5000 INJECTION, SOLUTION INTRAVENOUS; SUBCUTANEOUS at 12:18

## 2020-04-19 RX ADMIN — Medication 1 LOZENGE: at 08:36

## 2020-04-19 RX ADMIN — FUROSEMIDE 40 MG: 40 TABLET ORAL at 11:12

## 2020-04-19 RX ADMIN — HEPARIN SODIUM 5000 UNITS: 5000 INJECTION, SOLUTION INTRAVENOUS; SUBCUTANEOUS at 04:37

## 2020-04-19 RX ADMIN — LOSARTAN POTASSIUM 25 MG: 25 TABLET ORAL at 11:12

## 2020-04-19 RX ADMIN — METHOCARBAMOL 500 MG: 500 TABLET, FILM COATED ORAL at 12:18

## 2020-04-19 RX ADMIN — ATORVASTATIN CALCIUM 40 MG: 40 TABLET, FILM COATED ORAL at 22:10

## 2020-04-19 RX ADMIN — INSULIN HUMAN 25 UNITS: 100 INJECTION, SUSPENSION SUBCUTANEOUS at 08:36

## 2020-04-19 RX ADMIN — FUROSEMIDE 20 MG: 10 INJECTION, SOLUTION INTRAMUSCULAR; INTRAVENOUS at 08:36

## 2020-04-19 RX ADMIN — HEPARIN SODIUM 5000 UNITS: 5000 INJECTION, SOLUTION INTRAVENOUS; SUBCUTANEOUS at 20:23

## 2020-04-19 RX ADMIN — ACETAMINOPHEN 650 MG: 325 TABLET, FILM COATED ORAL at 20:26

## 2020-04-19 RX ADMIN — MELATONIN 25 MCG: at 20:22

## 2020-04-19 RX ADMIN — INSULIN ASPART 4 UNITS: 100 INJECTION, SOLUTION INTRAVENOUS; SUBCUTANEOUS at 17:44

## 2020-04-19 RX ADMIN — METOPROLOL TARTRATE 25 MG: 25 TABLET ORAL at 20:23

## 2020-04-19 RX ADMIN — OXYCODONE HYDROCHLORIDE 5 MG: 5 TABLET ORAL at 20:26

## 2020-04-19 RX ADMIN — INSULIN ASPART 1 UNITS: 100 INJECTION, SOLUTION INTRAVENOUS; SUBCUTANEOUS at 08:37

## 2020-04-19 RX ADMIN — METOPROLOL TARTRATE 25 MG: 25 TABLET ORAL at 08:37

## 2020-04-19 RX ADMIN — ASPIRIN 325 MG ORAL TABLET 325 MG: 325 PILL ORAL at 08:36

## 2020-04-19 RX ADMIN — SERTRALINE HYDROCHLORIDE 150 MG: 100 TABLET ORAL at 08:36

## 2020-04-19 RX ADMIN — OXYCODONE HYDROCHLORIDE 5 MG: 5 TABLET ORAL at 12:18

## 2020-04-19 RX ADMIN — INSULIN ASPART 3 UNITS: 100 INJECTION, SOLUTION INTRAVENOUS; SUBCUTANEOUS at 13:30

## 2020-04-19 ASSESSMENT — ACTIVITIES OF DAILY LIVING (ADL)
ADLS_ACUITY_SCORE: 15
ADLS_ACUITY_SCORE: 15
ADLS_ACUITY_SCORE: 16
ADLS_ACUITY_SCORE: 16
ADLS_ACUITY_SCORE: 15
ADLS_ACUITY_SCORE: 15

## 2020-04-19 ASSESSMENT — MIFFLIN-ST. JEOR: SCORE: 1813.88

## 2020-04-19 NOTE — PLAN OF CARE
Discharge Planner PT   Patient plan for discharge: ARC  Current status: Patient in bed upon therapist arrival; needing some encouragement to participate as patient fatigued from having an X-ray and OT prior to CR session; able to perform supine to sit with mod/max A at shoulders and HOB elevated; able to maintain sternal precautions with use of pillows and cues; no dizziness but SOB in sitting; /51 prior to activity and HR 72; O2 sats 96% on room air; sit>stand with bed elevated and use of a walker; min/CGA; able to tolerate gait in room 50 feet x 1 and 25 feet; toilet transfer with min A to lower to toilet and come to standing; dependent for angeles cares; transfer back to EOB with CGA and safety cues; mod/max A to return to supine and maintain sternal precautions and get adjusted in bed; O2 sats 100% after activity; /53 and HR 85  Barriers to return to prior living situation: current assist needed; weakness; decreased activity tolerance  Recommendations for discharge: ARC  Rationale for recommendations: Appears patient would benefit from ongoing CR here in hospital and progression of strength, activity tolerance, and independence with functional mobility at Acute rehab; Patient would benefit from the complex medical management, has a supportive home environment, was previously independent with mobility and cares, and anticipate patient will work up to tolerating 3 hours of therapy per day.       Entered by: Ekaterina Albarado 04/19/2020 11:18 AM

## 2020-04-19 NOTE — PROGRESS NOTES
[facility]    Hospitalist Consultation    Date of Admission:  4/15/2020    Assessment & Plan   Amy Luna is a 74 year old female with a past medical history significant for severe AS, severe MS, TR, SSS s/p pacemaker placement, morbid obesity, LAVERNE on CPAP, HTN, HLD, and type 2 diabetes mellitus  who was admitted on 4/15/2020 for planned MVR, AVR and tricuspid valve repair. I was asked to see the patient for diabetes management upon transfer out of the ICU.     POD #2 s/p MVR with St Juan bioprosthetic valve, AVR with bovine valve, tricuspid valve repair, and PFO closure.   Procedure was performed by Dr. Wilson with EBL approximated at 2.2L per ICU note. No intraoperative complications identified. Post op remained on ventilator until 4/16. Required orion and epi initially for shock, pressors weaned evening of 4/16. She has been weaned to 2L nasal cannula. Currently with chest tubes and wound vac in place.   --primary management is per CV surgery    Hx SSS s/p pacemaker placement with current atrial lead malfunction  Post procedure noted to have inappropriate pacer spikes on EKG. Device interrogated 4/16 showing decreased atrial sensing resulting in non capture. Settings adjusted though she continues to have occasional inhibition.   --EP following, plan for observation for now with recheck early next week  --continue telemetry     Type 2 Diabetes Mellitus, well controlled  A1c is 6.5. PTA regimen includes metformin 1000mg daily, glipizide 20mg daily, and NPH 50u bid. Significant insulin needs 4/16 though on pressors at that time. Po intake has been poor, just small amounts of clears thus far.   -- increase NPH to 28 bid  - sliding scale insulin high dose, she has degree of insulin resistance      CLRAITA, improving. Baseline Cr appears to be around 1-1.2. Peak this admission at 1.65>1.26.   --avoid nephrotoxins  -- improving    Acute blood loss anemia. Pre-op hgb 11.4> 7.9.  --continue to  monitor    Leukocytosis, resolved. Likely stress response from surgery. She has completed angeles op abx. Tmax in last 24 hours 100.4.   --continue to monitor     HTN, HLD. PTA regimen includes metoprolol XL 75mg every day, losartan 50mg daily, lasix 40mg every day, and lipitor 40mg.   --continue PTA atorvastatin  --lasix, losartan on hold  --metoprolol tartrate 25mg bid has been started by primary team     Depression. Continue PTA Zoloft    LAVERNE. CPAP per home settings     DVT Prophylaxis: Heparin subcutaneous- per CV Surgery  Code Status: Full Code    Disposition: Expected discharge in 2+ days. Hospitalist service will continue to follow along to adjust insulin      Khanh Thornton DO  Hospitalist [facility]  Pager: [phone]        Reason for Consult   Reason for consult: diabetes management     Primary Care Physician   Candy Frederick    No change, blood glucose is generally 180 - 240    Past Medical History    Past Medical History:   Diagnosis Date     (HFpEF) heart failure with preserved ejection fraction (H)      Aortic stenosis      Chest pain      Depressive disorder      Diabetes (H)      Hyperlipidemia      Hypertension      Mitral annular calcification      Mitral stenosis      Obesity      Sleep apnea     CPAP       Past Surgical History   Past Surgical History:   Procedure Laterality Date     APPENDECTOMY       CV CORONARY ANGIOGRAM N/A 2020    Procedure: Coronary Angiogram;  Surgeon: Inez Peoples MD;  Location:  HEART CARDIAC CATH LAB     CV LEFT HEART CATH N/A 2020    Procedure: Left Heart Cath;  Surgeon: Inez Peoples MD;  Location:  HEART CARDIAC CATH LAB     CV PFO CLOSURE N/A 4/15/2020    Procedure: Patent Foramen Ovale Closure;  Surgeon: Ok Wilson MD;  Location:  OR     CV RIGHT HEART CATH N/A 2020    Procedure: Right Heart Cath;  Surgeon: Inez Peoples MD;  Location:  HEART CARDIAC CATH LAB     GYN SURGERY       x2 ,       GYN SURGERY      total hysterectomy     IMPLANT PACEMAKER  2015     REPAIR VALVE TRICUSPID N/A 4/15/2020    Procedure: REPAIR TRICUSPID VALVE WITH VILLA MC3 26MM.;  Surgeon: Ok Wilson MD;  Location: [facility] OR     REPLACE VALVE AORTIC N/A 4/15/2020    Procedure: REPLACEMENT, AORTIC VALVE WITH INSPIRIS TISSUE VALVE 25 MM; ON PUMP WITH SOCORRO READ BY CARDIOLOGIST DR BLANTON.;  Surgeon: Ok Wilson MD;  Location: [facility] OR     REPLACE VALVE MITRAL N/A 4/15/2020    Procedure: REPLACEMENT, MITRAL VALVE WITH EPIC TISSUE VALVE 27 MM.;  Surgeon: Ok Wislon MD;  Location:  OR       Prior to Admission Medications   Prior to Admission Medications   Prescriptions Last Dose Informant Patient Reported? Taking?   Biotin 65745 MCG TABS 4/14/2020 at AM Self Yes Yes   Sig: Take 1,000 mcg by mouth every morning    Calcium Carbonate-Vit D-Min (CALCIUM 1200 PO) 4/14/2020 at PM Self Yes Yes   Sig: Take 1 tablet by mouth every evening    Lutein 40 MG CAPS 4/14/2020 at AM Self Yes Yes   Sig: Take 40 mg by mouth every morning    Magnesium 400 MG TABS 4/14/2020 at PM Self Yes Yes   Sig: Take 400 mg by mouth every evening    Propylene Glycol (SYSTANE BALANCE OP) Past Month at Unknown time Self Yes Yes   Sig: Apply 1-2 drops to eye 3 times daily as needed (dry eyes)   TURMERIC PO 4/14/2020 at Unknown time Self Yes Yes   Sig: Take 1-3 tablets by mouth daily    VITAMIN D PO 4/14/2020 at PM Self Yes Yes   Sig: Take 1 Dose by mouth every evening    albuterol (PROAIR HFA/PROVENTIL HFA/VENTOLIN HFA) 108 (90 Base) MCG/ACT inhaler more than a month Self Yes Yes   Sig: Inhale 2 puffs into the lungs every 4 hours as needed for shortness of breath / dyspnea or wheezing   aspirin 81 MG EC tablet  at AM Self Yes Yes   Sig: Take 81 mg by mouth every morning    atorvastatin (LIPITOR) 40 MG tablet 4/14/2020 at PM Self Yes Yes   Sig: Take 40 mg by mouth At Bedtime    coenzyme Q-10 200 MG CAPS 4/9/2020 at AM Self Yes Yes   Sig:  Take 200 mg by mouth every morning    furosemide (LASIX) 40 MG tablet 4/14/2020 at AM Self Yes Yes   Sig: Take 40 mg by mouth every morning   glipiZIDE (GLUCOTROL XL) 10 MG 24 hr tablet 4/14/2020 at AM Self Yes Yes   Sig: Take 20 mg by mouth every morning (takes 2 x 10mg)   insulin NPH (HUMULIN N/NOVOLIN N VIAL) 100 UNIT/ML vial 4/14/2020 at Unknown time Self Yes Yes   Sig: Inject 50 Units Subcutaneous 2 times daily   ipratropium (ATROVENT) 0.06 % nasal spray more than a month at AM Self Yes Yes   Sig: Spray 2 sprays into both nostrils 4 times daily as needed for rhinitis   losartan (COZAAR) 50 MG tablet 4/14/2020 at AM Self Yes Yes   Sig: Take 50 mg by mouth every morning   metFORMIN (GLUCOPHAGE-XR) 500 MG 24 hr tablet 4/14/2020 at AM Self Yes Yes   Sig: Take 1,000 mg by mouth daily (with breakfast) (takes 2 x 500mg)   metoprolol succinate ER (TOPROL-XL) 50 MG 24 hr tablet 4/15/2020 at AM Self No Yes   Sig: Take 1.5 tablets (75 mg) by mouth every morning   potassium 99 MG TABS 4/14/2020 at PM Self Yes Yes   Sig: Take 99 mg by mouth every evening    sertraline (ZOLOFT) 100 MG tablet 4/14/2020 at AM Self Yes Yes   Sig: Take 150 mg by mouth every morning (takes 1.5 x 100mg)   vitamin (B COMPLEX-C) tablet 4/14/2020 at AM Self Yes Yes   Sig: Take 1 tablet by mouth daily   vitamin C (ASCORBIC ACID) 1000 MG TABS 4/14/2020 at PM Self Yes Yes   Sig: Take 1,000 mg by mouth every evening       Facility-Administered Medications: None     Allergies   Allergies   Allergen Reactions     Penicillins Hives       Social History   No smoking history. Denies significant alcohol use.     Review of Systems   The 10 point Review of Systems is negative other than noted in the HPI or here.     Physical Exam   Temp: 98.2  F (36.8  C) Temp src: Axillary BP: (!) 141/43 Pulse: 69 Heart Rate: 68 Resp: 16 SpO2: 97 % O2 Device: None (Room air) Oxygen Delivery: 2 LPM  Vital Signs with Ranges  Temp:  [97.7  F (36.5  C)-98.5  F (36.9  C)] 98.2  F  (36.8  C)  Pulse:  [51-76] 69  Heart Rate:  [51-68] 68  Resp:  [16] 16  BP: (139-177)/(43-55) 141/43  SpO2:  [94 %-99 %] 97 %  288 lbs 5.79 oz    Temp: 98.2  F (36.8  C) Temp src: Axillary BP: (!) 141/43 Pulse: 69 Heart Rate: 68 Resp: 16 SpO2: 97 % O2 Device: None (Room air) Oxygen Delivery: 2 LPM  Vitals:    04/16/20 0500 04/17/20 0000 04/19/20 0500   Weight: 128.2 kg (282 lb 10.1 oz) 129.8 kg (286 lb 2.5 oz) 130.8 kg (288 lb 5.8 oz)     Vital Signs with Ranges  Temp:  [97.7  F (36.5  C)-98.5  F (36.9  C)] 98.2  F (36.8  C)  Pulse:  [51-76] 69  Heart Rate:  [51-68] 68  Resp:  [16] 16  BP: (139-177)/(43-55) 141/43  SpO2:  [94 %-99 %] 97 %  I/O last 3 completed shifts:  In: 340 [P.O.:340]  Out: 800 [Urine:800]    Constitutional: Awake and alert. Siting up in bed. Appears comfortable and is appropriately conversant   Respiratory: Lungs clear to auscultation in anterior fields. No increased work of breathing or wheezing   Cardiovascular: Regular rate and rhythm, trace bilateral LE edema. 2 CT out  GI:  active bowel sounds, abdomen soft, non-tender  Skin/Integumen: wound vac present sternum   Neuro:  Speech is clear. Face symmetric. Follows commands.     Data   -Data reviewed today: All pertinent laboratory and imaging results from this encounter were reviewed. I personally reviewed no images or EKG's today.  Recent Labs   Lab 04/19/20  0450 04/18/20  0615 04/17/20  0405 04/16/20  0350  04/15/20  1519 04/15/20  1342   WBC 8.1 8.2 8.9 14.6*  --  20.9* 13.1*   HGB 7.7* 7.6* 7.9* 10.8*  --  11.3* 9.2*   MCV 97 99 98 98  --  98 98   * 96* 82* 156  --  210 166   INR  --   --   --   --   --  1.38* 1.55*    141 142 140   < > 142 141   POTASSIUM 3.8 4.2 4.1 5.0   < > 4.0 4.0   CHLORIDE 106 112* 113* 110*   < > 109 109   CO2 26 26 24 21   < > 23 23   BUN 27 27 37* 34*   < > 29 28   CR 0.97 0.99 1.26* 1.65*   < > 1.26* 1.14*   ANIONGAP 6 3 5 9   < > 10 9   NELLY 8.6 8.7 8.5 8.9   < > 8.3* 8.5   * 126* 127* 134*    < > 242* 304*   ALBUMIN  --   --   --  3.2*  --  3.1* 3.1*   PROTTOTAL  --   --   --  6.3*  --  5.8*  --    BILITOTAL  --   --   --  0.5  --  0.9  --    ALKPHOS  --   --   --  44  --  43  --    ALT  --   --   --  37  --  23  --    AST  --   --   --  136*  --  76*  --     < > = values in this interval not displayed.       Recent Results (from the past 24 hour(s))   XR Chest 2 Views    Narrative    CHEST TWO VIEWS April 19, 2020 9:13 AM     HISTORY: Status post triple valve, chest tubes removed.    COMPARISON: 4/16/2020.      Impression    IMPRESSION: Median sternotomy wires, prosthetic cardiac valves, and  left-sided pacer device appear unchanged. Chest tubes have been  removed. There is a right-sided PICC line that terminates in the  proximal SVC. No pneumothorax or significant pleural effusion. No  significant airspace consolidation. Minor vascular prominence.     TAMI HALL MD

## 2020-04-19 NOTE — PLAN OF CARE
Pt is alert and oriented x 4, AVSS on room air. Rhythm is paced. Incision intact with wound vac in place.   Denied pain. Voiding. Up with 1 and walker.

## 2020-04-19 NOTE — PROGRESS NOTES
04/19/20 0820   Quick Adds   Type of Visit Initial Occupational Therapy Evaluation   Living Environment   Lives With spouse   Living Arrangements house   Home Accessibility stairs within home   Number of Stairs, Within Home, Primary other (see comments)  (15)   Transportation Anticipated family or friend will provide   Self-Care   Usual Activity Tolerance good   Current Activity Tolerance fair   Regular Exercise No   Equipment Currently Used at Home none   Functional Level   Ambulation 0-->independent   Transferring 0-->independent   Toileting 0-->independent   Bathing 0-->independent   Dressing 0-->independent   Eating 0-->independent   Communication 0-->understands/communicates without difficulty   Swallowing 0-->swallows foods/liquids without difficulty   Cognition 0 - no cognition issues reported   Fall history within last six months no   Which of the above functional risks had a recent onset or change? ambulation;transferring;toileting;bathing;dressing   Prior Functional Level Comment PTA, pt ind w/ all ADL's/IADL's w/o A.D.    General Information   Onset of Illness/Injury or Date of Surgery - Date 04/15/20   Referring Physician Khanh Thornton, DO    Patient/Family Goals Statement rehab after hospital stay   Additional Occupational Profile Info/Pertinent History of Current Problem  Pt s/p MVR with St Juan bioprosthetic valve, AVR with bovine valve, tricuspid valve repair, and PFO closure on 4/15/20   Precautions/Limitations fall precautions;sternal precautions   Cognitive Status Examination   Orientation orientation to person, place and time   Level of Consciousness alert   Follows Commands (Cognition) WNL   Visual Perception   Visual Perception Wears glasses   Visual Perception Comments states she has been having trouble w/ vision but is able to read the whiteboard in the room and can read info given to her via paper   Sensory Examination   Sensory Comments denied any numbness/tingling   Pain  "Assessment   Patient Currently in Pain   (when asked about pain stated, \"I feel okay\")   Range of Motion (ROM)   ROM Comment BUE ROM decreased but WFL   Strength   Strength Comments Did not assess formally 2' sternal precautions   Hand Strength   Hand Strength Comments weak bilaterally   Coordination   Coordination Comments WFL bilaterally   Mobility   Bed Mobility Comments Mod A for sup>sit   Transfer Skill: Sit to Stand   Level of Ewen: Sit/Stand contact guard   Bathing   Level of Ewen moderate assist (50% patients effort)   Upper Body Dressing   Level of Ewen: Dress Upper Body moderate assist (50% patients effort)   Lower Body Dressing   Level of Ewen: Dress Lower Body moderate assist (50% patients effort)   Toileting   Level of Ewen: Toilet minimum assist (75% patients effort)   Grooming   Level of Ewen: Grooming contact guard   Eating/Self Feeding   Level of Ewen: Eating independent   Activities of Daily Living Analysis   Impairments Contributing to Impaired Activities of Daily Living strength decreased;post surgical precautions;ROM decreased;flexibility decreased  (functional act tolerance)   General Therapy Interventions   Planned Therapy Interventions IADL retraining;ADL retraining;progressive activity/exercise   Clinical Impression   Criteria for Skilled Therapeutic Interventions Met yes, treatment indicated   OT Diagnosis decreased ind/safety w/ ADL's and IADL's   Influenced by the following impairments decreased strength, functional act tolerance, decreased flexibility to reach BLE's, new sternal precautions   Assessment of Occupational Performance 1-3 Performance Deficits   Identified Performance Deficits decreased ind/safety w/ bed mobility, functional mobility, dressing, toileting, g/h, bathing, all IADL's   Clinical Decision Making (Complexity) Low complexity   Therapy Frequency Daily   Predicted Duration of Therapy Intervention (days/wks) 7 days " "  Anticipated Discharge Disposition Acute Rehabilitation Facility   Risks and Benefits of Treatment have been explained. Yes   Patient, Family & other staff in agreement with plan of care Yes   [facility] TM \"6 Clicks\"   2016, Trustees of [facility], under license to [employer].  All rights reserved.   6 Clicks Short Forms Daily Activity Inpatient Short Form   [facility] AM-PAC  \"6 Clicks\" Daily Activity Inpatient Short Form   1. Putting on and taking off regular lower body clothing? 2 - A Lot   2. Bathing (including washing, rinsing, drying)? 2 - A Lot   3. Toileting, which includes using toilet, bedpan or urinal? 3 - A Little   4. Putting on and taking off regular upper body clothing? 2 - A Lot   5. Taking care of personal grooming such as brushing teeth? 3 - A Little   6. Eating meals? 4 - None   Daily Activity Raw Score (Score out of 24.Lower scores equate to lower levels of function) 16   Total Evaluation Time   Total Evaluation Time (Minutes) 17     "

## 2020-04-19 NOTE — PROGRESS NOTES
We plan to recheck the pacemaker A-lead tomorrow am to determine whether revision is needed or not.

## 2020-04-19 NOTE — PLAN OF CARE
A/Ox4. AVSS, permanent pacemaker in place. Tele: 100% AV paced. LS diminished, CPAP at night. On RA LS diminished. Up with AX1 GB and walker.Mod card diet good appetite.  and 220 and 217. Coverage given.  Passing gas, BM+, voiding adequately. Reports moderate pain, managed with oxy and robaxin x1. Plan for discharge to ARU tomorrow afternoon

## 2020-04-19 NOTE — INTERIM SUMMARY
[facility] Pre-Admission Screen    Referral Source: [facility] - [facility]  Admit date to referring facility: 4/15/2020    Rehab Diagnosis:    09 Cardiac s/p  MVR, AVR and tricuspid valve repair    Justification for Acute Inpatient Rehabilitation  Amy Luna is a 74 year old female with a past medical history significant for severe AS, severe MS, TR, SSS s/p pacemaker placement, morbid obesity, LAVERNE on CPAP, HTN, HLD, and type 2 diabetes mellitus  who was admitted on 4/15/2020 for planned MVR, AVR and tricuspid valve repair with PFO closure. Post op required pressors for treatment of shock. Incision with wound vac and CT placed. Pt extubated 4/16 with weaning of supplemental O2. Hospital course c/b atrial lead malfunction, DM-2, CLARITA, acute blood loss anemia, HTN, and leukocytosis. Pt is medically ready to admit to acute inpatient rehab unit.   Patient requires an intensive inpatient rehab program to address the following acute impairments:impaired strength, impaired activity tolerance and impaired balance and impaired functional ADLs.     Current Active Medical Management Needs/Risks for Clinical Complications  The patient requires the high level of rehabilitation physician supervision that accompanies the provision of intensive rehabilitation therapy.  The patient needs the services of the rehabilitation physician to assess the patient medically and functionally and to modify the course of treatment as needed to maximize the patient's capacity to benefit from the rehabilitation process. Manage cardio/respiratory status - Post procedure noted to have inappropriate pacer spikes on EKG. Device interrogated 4/16 showing decreased atrial sensing resulting in non capture. Settings adjusted and recheck at discharge. CT removed, weaned off supplemental O2. Manage infectious process with recent leukocytosis, incision with wound vac. Type 2 Diabetes - A1c is 6.5. PTA regimen includes metformin 1000mg  daily, glipizide 20mg daily, and NPH 50u bid. Significant insulin needs / though on pressors at that time, sliding scale insulin high dose, she has degree of insulin resistance. Manage renal function - CLARITA with baseline Cr appears to be around 1-1.2,  improving after peak of 1.65. Mange HTN - metoprolol tartrate 25mg bid. Depression - Continue PTA Zoloft. LAVERNE - CPAP per home settings.     Past Medical/Surgical History   Surgery in the past 100 days: Yes   Additional relevant past medical history: severe mitral and aortic stenosis, tricuspid regurg, PPM  for sick sinus syndrome, DM2, HTN, HLD, morbid obesity.    Level of Functioning prior to Admission:  Home Environment  Lives with: spouse  Living arrangements: house  Home accessibility: stairs within home  Stairs to enter home:    Stairs to negotiate within home: other (see comments)(15)  Stair railings at home:    Living environment comments:   Equipment currently used at home: none  Activity/exercise/self-care comment:      Ambulation: 0-->independent  Transferrin-->independent  Toiletin-->independent  Bathin-->independent  Dressin-->independent   Eatin-->independent  Communication: 0-->understands/communicates without difficulty  Swallowin-->swallows foods/liquids without difficulty  Cognition: 0 - no cognition issues reported  Prior Functional Level Comment: PTA, pt ind w/ all ADL's/IADL's w/o A.D.   Additional Comments: Spouse states he can take time off from work if necessary to care for pt.    Current Level of Function grid:   PT Most Recent Goals for Rehab   Bed Rolling 3 Partial/moderate assistance 6 Independent   Supine to Sit 2 Substantial/maximal assistance 6 Independent   Sit to Stand 3 Partial/moderate assistance 6 Independent   Transfer 3 Partial/moderate assistance 6 Independent   Ambulation 3 Partial/moderate assistance 6 Independent   Stairs 0 Not completed 6 Independent     OT Most Recent Goals for Rehab   Feeding 5  Setup or clean-up assistance 6 Independent   Grooming 0 Not completed 6 Independent   Bathing 0 Not completed 6 Independent   Upper Body Dressing 0 Not completed 6 Independent   Lower Body Dressing 3 Partial/moderate assistance 6 Independent   Toileting 2 Substantial/maximal assistance 6 Independent   Toilet Transfer 3 Partial/moderate assistance 6 Independent   Tub/Shower Transfer 0 Not completed 6 Independent   Cognitive Not impaired Able to make needs known     SLP Most Recent Goals for Rehab   Dysphagia No deficits noted NA       Summary Statement:  Pt requires intensive therapies, medical management, and rehab nursing in order to return to previous living environment at the Mod I to indep level of functioning. Pt  sit>stand with bed elevated and use of a walker; min/CGA; able to tolerate gait in room 50 feet x 1 and 25 feet; toilet transfer with min A to lower to toilet and come to standing; dependent for angeles cares; transfer back to EOB with CGA and safety cues; mod/max A to return to supine and maintain sternal precautions. Mod A for LB dressing.    Expected Therapies and Services required during Inpatient Rehab admission  Intensity of Therapy: Patient requires intensive therapies not available in a lesser level of care. Patient is motivated, making gains, and can tolerate 3 hours of therapy a day.  Physical Therapy: 90 minutes per day, at least 5 days a week for 14 days  Occupational Therapy: 90 minutes per day, at least 5 days a week for 14 days  Speech and Language Therapy: No SLP services indicated.  Rehabilitation Nursing Needs: Patient requires 24 hour Rehab Nursing to manage wound care, vitals, medication education, positioning, carryover of new rehab techniques and care coordination.    Precautions/restrictions/special needs:   Precautions: Sternal   Restrictions: N/A   Special Needs: N/A    Expected Level of Improvement: Mod I to indpe with mobility, transfers, stairs, and functional ADLs.   Expected  Length of time to achieve: 14 days    Anticipated Discharge Needs:  Anticipated Discharge Destination: Home  Anticipated Discharge Support: Family member  24/7 support available : Yes -  able to take time off work if needed  Identified caregiver(s):  , Irineo and daughter  Anticipated Discharge Needs: home with outpatient therapy    Identified challenges/barriers:  Stairs within home    Liaison Signature:    Physician statement of review and agreement:  I have reviewed and am in agreement of the need for IRF stay to address above functional and medical needs. In addition to above statements address, Patient requires intensive active and ongoing therapeutic intervention and multiple therapies; Patient requires medical supervision; Expected to actively participate in the intensive rehab program; Sufficiently stable to actively participate; Expectation for measurable improvement in functional capacity or adaption to impairments.    Physician Signature:

## 2020-04-19 NOTE — PROGRESS NOTES
"SPIRITUAL HEALTH SERVICES Progress Note  [facility]    Initiated visit via phone due to LOS.  Pt mentioned having much family/spiritual support, stating that \"my family has gotten the word out\" and that she has \"hundreds\" of people praying for her.  Pt was appreciative of SH support, but declined prayer at this time.  SH remains available as needed.      Cb Montoya  Chaplain Resident    "

## 2020-04-19 NOTE — PLAN OF CARE
Discharge Planner OT   Patient plan for discharge: rehab  Current status: OT eval completed. Currently pt needing mod A for sup>sit, CGA for sit>stand and functional transfer to bedside chair, VSS on RA. Mod A for LB dressing. CGA-min A for toileting.   Barriers to return to prior living situation: level of A needed for ADL's and mobility, sternal precautions, decreased strength and functional act tolerance  Recommendations for discharge: ARU  Rationale for recommendations: Pt will benefit from intensive therapy at ARU to improve deficits and facilitate improved ind w/ ADL's, IADL's and mobility. Pt is motivated to work w/ rehab and has supportive family. Anticipate pt will tolerate 3 hrs of therapy daily.       Entered by: Bernice Willoughby 04/19/2020 8:51 AM

## 2020-04-19 NOTE — PROGRESS NOTES
"[facility]  Cardiovascular and Thoracic Surgery Daily Note          Assessment and Plan:   POD# 4 s/p Mitral valve replacement with a 27 mm St. Juan Epic (porcine bioprosthetic) valve, aortic valve replacement with a 25 mm Rubi Inspiris Resilia bovine pericardial valve, tricuspid valve repair with a 26 mm Rubi MC3 annuloplasty ring, PFO closure, intraoperative SOCORRO by Dr. Ok Wilson     Plans:  1. Needs to ambulate, encourage IS  2. EP following for pacer issues-HR dips into 40s  3. PTA losartan started as HTN     -CVS: HR: 50s-60s. SBP: 120s-140s. ASA, BB on hold, statin. Hx of SSS with PPM set to back up rate of 60 and does not appear to pace when below 60, temporary pacer capped. Remote Medtronic pacer interrogation reveals dysfunction- written report pending. Atrial lead not always capturing/sensing. EP following, will continue to monitor, ventricular lead working. HR dips into 40s-50s. Wound vac to remain in place for 5 days. Losartan resumed for hypertension     -Resp: Extubated within protocol. Saturating well on RA<2L<4L. Wean as able. Continue to encourage IS, cough, deep breathing, ambulation.      -Neuro:  Grossly intact. Pain controlled. Reports some visual hallucinations that she recognizes as hallucinations and is \"not afraid of them\". Minimal opioid use. Hx of depression. PTA zoloft resumed.      -Renal: good UO, Crea: 0.97<0.99<1.26<1.65, close to preoperative weight. CLARITA resolving. Lasix 20 mg IVP daily      -GI:  Tolerating diet. + BM. Continue bowel regimen. Miralax added. Advance diet as able     -: voiding on own     -Endo: Pre op A1c: 6.5. Hx of DMII. PTA metformin, glipizide and insulin NPH on hold. Continue Insulin gtt 48hrs, appreciate hospitalist for insulin management     -FEN: replete lytes as needed, Na: 138. K: 3.8  Orders Placed This Encounter      Moderate Consistent CHO Diet    -ID: WBC: 8.1, Temp (24hrs), Av.3  F (36.8  C), Min:97.7  F (36.5  C), " "Max:98.8  F (37.1  C)   Completed perioperative abx. Leukocytosis likely related to stress from surgery. Will continue to monitor.      -Heme: Hgb: 7.7<7.6<7.9, plt: 128<96<82<156. Acute blood loss anemia and thrombocytopenia related to stress from surgery. PO iron added     -Proph: PCD, ASA, BB on hold, statin, PPI, sub q heparin     -Dispo: St 33. Continue therapies. Continue to encourage IS, cough, deep breathing, ambulation, ARU 1-2 days          Interval History:   Seen sitting up in wheel chair on way to cxr, denies pain, tolerating diet, breathing improved since CTs removed, +BM, no acute overnight events          Medications:       aspirin  325 mg Oral Daily     atorvastatin  40 mg Oral At Bedtime     ferrous gluconate  324 mg Oral Daily with breakfast     furosemide  20 mg Intravenous BID     heparin ANTICOAGULANT  5,000 Units Subcutaneous Q8H     insulin aspart  1-10 Units Subcutaneous TID AC     insulin aspart  1-7 Units Subcutaneous At Bedtime     insulin isophane human  28 Units Subcutaneous BID AC     lidocaine  1 patch Transdermal Q24h    And     lidocaine   Transdermal Q8H     losartan  25 mg Oral Daily     magnesium citrate  148 mL Oral Once     metoprolol tartrate  25 mg Oral Q12H    Or     metoprolol  25 mg Per NG tube Q12H     [START ON 4/20/2020] pantoprazole  40 mg Oral QAM AC     polyethylene glycol  17 g Oral Daily     senna-docusate  1 tablet Oral BID    Or     senna-docusate  2 tablet Oral BID     sertraline  150 mg Oral QAM     sodium chloride (PF)  10 mL Intracatheter Q8H     Vitamin D3  25 mcg Oral QPM     @MEDSPRN-@          Physical Exam:   Vitals were reviewed  Blood pressure (!) 147/52, pulse 76, temperature 98.3  F (36.8  C), temperature source Axillary, resp. rate 16, height 1.651 m (5' 5\"), weight 130.8 kg (288 lb 5.8 oz), SpO2 96 %.  Rhythm: paced    Lungs: course    Cardiovascular: s1/s2, no m/r/g    Abdomen: s/nt/nd    Extremeties: trace LE edema    Incision: cdi    CT: " na    Weight:   Vitals:    04/15/20 0558 04/16/20 0500 04/17/20 0000 04/19/20 0500   Weight: 128.6 kg (283 lb 8.2 oz) 128.2 kg (282 lb 10.1 oz) 129.8 kg (286 lb 2.5 oz) 130.8 kg (288 lb 5.8 oz)            Data:   Labs:   Lab Results   Component Value Date    WBC 8.1 04/19/2020     Lab Results   Component Value Date    RBC 2.41 04/19/2020     Lab Results   Component Value Date    HGB 7.7 04/19/2020     Lab Results   Component Value Date    HCT 23.3 04/19/2020     No components found for: MCT  Lab Results   Component Value Date    MCV 97 04/19/2020     Lab Results   Component Value Date    MCH 32.0 04/19/2020     Lab Results   Component Value Date    MCHC 33.0 04/19/2020     Lab Results   Component Value Date    RDW 13.2 04/19/2020     Lab Results   Component Value Date     04/19/2020       Last Basic Metabolic Panel:  Lab Results   Component Value Date     04/19/2020      Lab Results   Component Value Date    POTASSIUM 3.8 04/19/2020     Lab Results   Component Value Date    CHLORIDE 106 04/19/2020     Lab Results   Component Value Date    NELLY 8.6 04/19/2020     Lab Results   Component Value Date    CO2 26 04/19/2020     Lab Results   Component Value Date    BUN 27 04/19/2020     Lab Results   Component Value Date    CR 0.97 04/19/2020     Lab Results   Component Value Date     04/19/2020       CXR: pending    Lu Ingram PA-C  Pager #: [phone]    Patient seen and examined. Agree with plan.

## 2020-04-19 NOTE — PROGRESS NOTES
SW:  D: SW received call from  ARU regarding if patient is ready for discharge on 4/20 due to her progress. Per notes, patient should be stable for discharge. SW met with patient and she is agreeable to ARU and agreeable to [facility] wheelchair transportation and cost of transportation. SW spoke to [facility] transport -  time is 1200.  P: Will continue to follow.      Shaquille Boykin-JAMES Ma

## 2020-04-20 ENCOUNTER — SURGERY (OUTPATIENT)
Age: 75
End: 2020-04-20
Payer: MEDICARE

## 2020-04-20 DIAGNOSIS — Z95.2 S/P AVR (AORTIC VALVE REPLACEMENT): Primary | ICD-10-CM

## 2020-04-20 PROBLEM — T82.110A: Status: ACTIVE | Noted: 2020-04-10

## 2020-04-20 LAB
ANION GAP SERPL CALCULATED.3IONS-SCNC: 5 MMOL/L (ref 3–14)
BUN SERPL-MCNC: 29 MG/DL (ref 7–30)
CALCIUM SERPL-MCNC: 8.8 MG/DL (ref 8.5–10.1)
CHLORIDE SERPL-SCNC: 108 MMOL/L (ref 94–109)
CO2 SERPL-SCNC: 26 MMOL/L (ref 20–32)
CREAT SERPL-MCNC: 0.91 MG/DL (ref 0.52–1.04)
ERYTHROCYTE [DISTWIDTH] IN BLOOD BY AUTOMATED COUNT: 13.2 % (ref 10–15)
ERYTHROCYTE [DISTWIDTH] IN BLOOD BY AUTOMATED COUNT: 13.3 % (ref 10–15)
GFR SERPL CREATININE-BSD FRML MDRD: 62 ML/MIN/{1.73_M2}
GLUCOSE BLDC GLUCOMTR-MCNC: 156 MG/DL (ref 70–99)
GLUCOSE BLDC GLUCOMTR-MCNC: 175 MG/DL (ref 70–99)
GLUCOSE BLDC GLUCOMTR-MCNC: 184 MG/DL (ref 70–99)
GLUCOSE BLDC GLUCOMTR-MCNC: 190 MG/DL (ref 70–99)
GLUCOSE BLDC GLUCOMTR-MCNC: 248 MG/DL (ref 70–99)
GLUCOSE SERPL-MCNC: 210 MG/DL (ref 70–99)
HCT VFR BLD AUTO: 23 % (ref 35–47)
HCT VFR BLD AUTO: 23.1 % (ref 35–47)
HGB BLD-MCNC: 7.5 G/DL (ref 11.7–15.7)
HGB BLD-MCNC: 7.7 G/DL (ref 11.7–15.7)
INR PPP: 1.11 (ref 0.86–1.14)
MCH RBC QN AUTO: 31.5 PG (ref 26.5–33)
MCH RBC QN AUTO: 32.2 PG (ref 26.5–33)
MCHC RBC AUTO-ENTMCNC: 32.6 G/DL (ref 31.5–36.5)
MCHC RBC AUTO-ENTMCNC: 33.3 G/DL (ref 31.5–36.5)
MCV RBC AUTO: 97 FL (ref 78–100)
MCV RBC AUTO: 97 FL (ref 78–100)
PLATELET # BLD AUTO: 149 10E9/L (ref 150–450)
PLATELET # BLD AUTO: 167 10E9/L (ref 150–450)
POTASSIUM SERPL-SCNC: 3.6 MMOL/L (ref 3.4–5.3)
RBC # BLD AUTO: 2.38 10E12/L (ref 3.8–5.2)
RBC # BLD AUTO: 2.39 10E12/L (ref 3.8–5.2)
SODIUM SERPL-SCNC: 139 MMOL/L (ref 133–144)
WBC # BLD AUTO: 6.9 10E9/L (ref 4–11)
WBC # BLD AUTO: 8.1 10E9/L (ref 4–11)

## 2020-04-20 PROCEDURE — 25000132 ZZH RX MED GY IP 250 OP 250 PS 637: Mod: GY | Performed by: SURGERY

## 2020-04-20 PROCEDURE — 99152 MOD SED SAME PHYS/QHP 5/>YRS: CPT | Performed by: INTERNAL MEDICINE

## 2020-04-20 PROCEDURE — 85610 PROTHROMBIN TIME: CPT | Performed by: PHYSICIAN ASSISTANT

## 2020-04-20 PROCEDURE — 25000131 ZZH RX MED GY IP 250 OP 636 PS 637: Mod: GY | Performed by: HOSPITALIST

## 2020-04-20 PROCEDURE — 85027 COMPLETE CBC AUTOMATED: CPT | Performed by: PHYSICIAN ASSISTANT

## 2020-04-20 PROCEDURE — 25800029 ZZH RX IP 258 OP 250: Performed by: PHYSICIAN ASSISTANT

## 2020-04-20 PROCEDURE — C1894 INTRO/SHEATH, NON-LASER: HCPCS | Performed by: INTERNAL MEDICINE

## 2020-04-20 PROCEDURE — 25000128 H RX IP 250 OP 636: Performed by: PHYSICIAN ASSISTANT

## 2020-04-20 PROCEDURE — 40000852 ZZH STATISTIC HEART CATH LAB OR EP LAB

## 2020-04-20 PROCEDURE — C1898 LEAD, PMKR, OTHER THAN TRANS: HCPCS | Performed by: INTERNAL MEDICINE

## 2020-04-20 PROCEDURE — 12000000 ZZH R&B MED SURG/OB

## 2020-04-20 PROCEDURE — 99233 SBSQ HOSP IP/OBS HIGH 50: CPT | Mod: 25 | Performed by: INTERNAL MEDICINE

## 2020-04-20 PROCEDURE — 25000125 ZZHC RX 250: Performed by: PHYSICIAN ASSISTANT

## 2020-04-20 PROCEDURE — 27210794 ZZH OR GENERAL SUPPLY STERILE: Performed by: INTERNAL MEDICINE

## 2020-04-20 PROCEDURE — 02PA3MZ REMOVAL OF CARDIAC LEAD FROM HEART, PERCUTANEOUS APPROACH: ICD-10-PCS | Performed by: INTERNAL MEDICINE

## 2020-04-20 PROCEDURE — 02H63JZ INSERTION OF PACEMAKER LEAD INTO RIGHT ATRIUM, PERCUTANEOUS APPROACH: ICD-10-PCS | Performed by: INTERNAL MEDICINE

## 2020-04-20 PROCEDURE — 25000132 ZZH RX MED GY IP 250 OP 250 PS 637: Mod: GY | Performed by: PHYSICIAN ASSISTANT

## 2020-04-20 PROCEDURE — 99207 ZZC CDG-MDM COMPONENT: MEETS MODERATE - UP CODED: CPT | Performed by: HOSPITALIST

## 2020-04-20 PROCEDURE — 99232 SBSQ HOSP IP/OBS MODERATE 35: CPT | Performed by: HOSPITALIST

## 2020-04-20 PROCEDURE — 25000125 ZZHC RX 250: Performed by: INTERNAL MEDICINE

## 2020-04-20 PROCEDURE — 80048 BASIC METABOLIC PNL TOTAL CA: CPT | Performed by: PHYSICIAN ASSISTANT

## 2020-04-20 PROCEDURE — 25000128 H RX IP 250 OP 636: Performed by: INTERNAL MEDICINE

## 2020-04-20 PROCEDURE — 33216 INSERT 1 ELECTRODE PM-DEFIB: CPT | Performed by: INTERNAL MEDICINE

## 2020-04-20 PROCEDURE — 00000146 ZZHCL STATISTIC GLUCOSE BY METER IP

## 2020-04-20 DEVICE — IMP LEAD PACING BIPOLAR CAPSUREFIX NOVUS 45CM 5076-45: Type: IMPLANTABLE DEVICE | Status: FUNCTIONAL

## 2020-04-20 RX ORDER — BUPIVACAINE HYDROCHLORIDE 2.5 MG/ML
INJECTION, SOLUTION EPIDURAL; INFILTRATION; INTRACAUDAL
Status: DISCONTINUED | OUTPATIENT
Start: 2020-04-20 | End: 2020-04-20 | Stop reason: HOSPADM

## 2020-04-20 RX ORDER — HEPARIN SODIUM 200 [USP'U]/100ML
100-600 INJECTION, SOLUTION INTRAVENOUS CONTINUOUS PRN
Status: DISCONTINUED | OUTPATIENT
Start: 2020-04-20 | End: 2020-04-20 | Stop reason: HOSPADM

## 2020-04-20 RX ORDER — FENTANYL CITRATE 50 UG/ML
INJECTION, SOLUTION INTRAMUSCULAR; INTRAVENOUS
Status: DISCONTINUED | OUTPATIENT
Start: 2020-04-20 | End: 2020-04-20 | Stop reason: HOSPADM

## 2020-04-20 RX ORDER — DOBUTAMINE HYDROCHLORIDE 200 MG/100ML
5-40 INJECTION INTRAVENOUS CONTINUOUS PRN
Status: DISCONTINUED | OUTPATIENT
Start: 2020-04-20 | End: 2020-04-20 | Stop reason: HOSPADM

## 2020-04-20 RX ORDER — NALOXONE HYDROCHLORIDE 0.4 MG/ML
.1-.4 INJECTION, SOLUTION INTRAMUSCULAR; INTRAVENOUS; SUBCUTANEOUS
Status: DISCONTINUED | OUTPATIENT
Start: 2020-04-20 | End: 2020-04-20

## 2020-04-20 RX ORDER — LIDOCAINE 40 MG/G
CREAM TOPICAL
Status: DISCONTINUED | OUTPATIENT
Start: 2020-04-20 | End: 2020-04-20 | Stop reason: HOSPADM

## 2020-04-20 RX ORDER — SODIUM CHLORIDE 450 MG/100ML
INJECTION, SOLUTION INTRAVENOUS CONTINUOUS
Status: DISCONTINUED | OUTPATIENT
Start: 2020-04-20 | End: 2020-04-20 | Stop reason: HOSPADM

## 2020-04-20 RX ORDER — CLINDAMYCIN PHOSPHATE 900 MG/50ML
900 INJECTION, SOLUTION INTRAVENOUS
Status: COMPLETED | OUTPATIENT
Start: 2020-04-20 | End: 2020-04-20

## 2020-04-20 RX ORDER — HEPARIN SODIUM 200 [USP'U]/100ML
100-500 INJECTION, SOLUTION INTRAVENOUS CONTINUOUS PRN
Status: DISCONTINUED | OUTPATIENT
Start: 2020-04-20 | End: 2020-04-20 | Stop reason: HOSPADM

## 2020-04-20 RX ORDER — CEFAZOLIN SODIUM 1 G/3ML
1 INJECTION, POWDER, FOR SOLUTION INTRAMUSCULAR; INTRAVENOUS
Status: DISCONTINUED | OUTPATIENT
Start: 2020-04-20 | End: 2020-04-20 | Stop reason: HOSPADM

## 2020-04-20 RX ORDER — OXYCODONE AND ACETAMINOPHEN 5; 325 MG/1; MG/1
1 TABLET ORAL EVERY 4 HOURS PRN
Status: DISCONTINUED | OUTPATIENT
Start: 2020-04-20 | End: 2020-04-22 | Stop reason: HOSPADM

## 2020-04-20 RX ORDER — LOSARTAN POTASSIUM 50 MG/1
50 TABLET ORAL DAILY
Status: DISCONTINUED | OUTPATIENT
Start: 2020-04-21 | End: 2020-04-22 | Stop reason: HOSPADM

## 2020-04-20 RX ADMIN — POTASSIUM CHLORIDE 20 MEQ: 1500 TABLET, EXTENDED RELEASE ORAL at 21:26

## 2020-04-20 RX ADMIN — INSULIN ASPART 2 UNITS: 100 INJECTION, SOLUTION INTRAVENOUS; SUBCUTANEOUS at 18:42

## 2020-04-20 RX ADMIN — INSULIN HUMAN 32 UNITS: 100 INJECTION, SUSPENSION SUBCUTANEOUS at 18:42

## 2020-04-20 RX ADMIN — PANTOPRAZOLE SODIUM 40 MG: 40 TABLET, DELAYED RELEASE ORAL at 07:05

## 2020-04-20 RX ADMIN — FUROSEMIDE 40 MG: 40 TABLET ORAL at 08:02

## 2020-04-20 RX ADMIN — LOSARTAN POTASSIUM 25 MG: 25 TABLET ORAL at 08:02

## 2020-04-20 RX ADMIN — INSULIN HUMAN 32 UNITS: 100 INJECTION, SUSPENSION SUBCUTANEOUS at 08:19

## 2020-04-20 RX ADMIN — LIDOCAINE 1 PATCH: 560 PATCH PERCUTANEOUS; TOPICAL; TRANSDERMAL at 08:20

## 2020-04-20 RX ADMIN — INSULIN ASPART 5 UNITS: 100 INJECTION, SOLUTION INTRAVENOUS; SUBCUTANEOUS at 13:08

## 2020-04-20 RX ADMIN — ASPIRIN 325 MG ORAL TABLET 325 MG: 325 PILL ORAL at 08:01

## 2020-04-20 RX ADMIN — Medication 5 ML: at 12:44

## 2020-04-20 RX ADMIN — MIDAZOLAM 1 MG: 1 INJECTION INTRAMUSCULAR; INTRAVENOUS at 16:26

## 2020-04-20 RX ADMIN — FERROUS GLUCONATE 324 MG: 324 TABLET ORAL at 08:01

## 2020-04-20 RX ADMIN — MIDAZOLAM 1 MG: 1 INJECTION INTRAMUSCULAR; INTRAVENOUS at 16:49

## 2020-04-20 RX ADMIN — FENTANYL CITRATE 50 MCG: 50 INJECTION, SOLUTION INTRAMUSCULAR; INTRAVENOUS at 16:20

## 2020-04-20 RX ADMIN — LIDOCAINE HYDROCHLORIDE 10 ML: 10 INJECTION, SOLUTION EPIDURAL; INFILTRATION; INTRACAUDAL; PERINEURAL at 16:32

## 2020-04-20 RX ADMIN — MIDAZOLAM 1 MG: 1 INJECTION INTRAMUSCULAR; INTRAVENOUS at 16:20

## 2020-04-20 RX ADMIN — HEPARIN SODIUM 5000 UNITS: 5000 INJECTION, SOLUTION INTRAVENOUS; SUBCUTANEOUS at 12:58

## 2020-04-20 RX ADMIN — METOPROLOL TARTRATE 25 MG: 25 TABLET ORAL at 21:19

## 2020-04-20 RX ADMIN — Medication 100 ML: at 17:03

## 2020-04-20 RX ADMIN — FENTANYL CITRATE 50 MCG: 50 INJECTION, SOLUTION INTRAMUSCULAR; INTRAVENOUS at 16:26

## 2020-04-20 RX ADMIN — INSULIN ASPART 3 UNITS: 100 INJECTION, SOLUTION INTRAVENOUS; SUBCUTANEOUS at 08:15

## 2020-04-20 RX ADMIN — METOPROLOL TARTRATE 25 MG: 25 TABLET ORAL at 08:01

## 2020-04-20 RX ADMIN — HEPARIN SODIUM 5000 UNITS: 5000 INJECTION, SOLUTION INTRAVENOUS; SUBCUTANEOUS at 04:28

## 2020-04-20 RX ADMIN — CLINDAMYCIN PHOSPHATE 900 MG: 900 INJECTION, SOLUTION INTRAVENOUS at 14:47

## 2020-04-20 RX ADMIN — ATORVASTATIN CALCIUM 40 MG: 40 TABLET, FILM COATED ORAL at 21:18

## 2020-04-20 RX ADMIN — SERTRALINE HYDROCHLORIDE 150 MG: 100 TABLET ORAL at 08:02

## 2020-04-20 RX ADMIN — SODIUM CHLORIDE: 4.5 INJECTION, SOLUTION INTRAVENOUS at 12:44

## 2020-04-20 RX ADMIN — MELATONIN 25 MCG: at 21:18

## 2020-04-20 RX ADMIN — BUPIVACAINE HYDROCHLORIDE 10 ML: 2.5 INJECTION, SOLUTION EPIDURAL; INFILTRATION; INTRACAUDAL; PERINEURAL at 16:31

## 2020-04-20 ASSESSMENT — ACTIVITIES OF DAILY LIVING (ADL)
ADLS_ACUITY_SCORE: 15

## 2020-04-20 ASSESSMENT — MIFFLIN-ST. JEOR: SCORE: 1800.88

## 2020-04-20 NOTE — PLAN OF CARE
PT: Per EP pt going for an atrial lead revision today. Scheduled for procedure at 3. Holding PT until post procedure.

## 2020-04-20 NOTE — PROGRESS NOTES
"[facility]    EP Progress Note    Date of Service (when I saw the patient): 04/20/2020     Assessment & Plan   Amy Luna is a 74 year old female with h/o AV conduction dz s/p dual chamber Medtronic PPM 2015, and recent cardiac surgery 4/15/2020 (MVR, AVR, TV repair and PFO closure). Normal EF.  EP (Dr. Bustillo) was consulted 4/17 due to evidence of atrial lead malfunction manifested by inappropriate spikes on EKG.    1. Atrial Lead malfunction -   - Device interrogation today showed atrial threshold remains elevated @ 5.0 v @ 1.0 ms (bipolar and unipolar) suggesting need for atrial lead revision  - Last device interrogation ([employer]) done 12/2019 showed 6.9% AP and <0.1%  with 7 years battery remaining.     - Interrogation today showed  ~56% and AP ~48% in AAI/DDD  (reprogrammed Fri 4/17)     PLAN:   * Will plan atrial lead revision today with Dr. Apple. Pt ate breakfast ~0800.    * We discussed the risks, benefits and indications of atrial lead revision, including but not limited to use of conscious sedation, discomfort, peripheral vessel injury, pneumothorax, cardiac puncture and/or tamponade, device malfunction and/or recall, and infection requiring explantation of the device and long term antibiotics. We also briefly discussed follow up expectations and restrictions following the procedure. The patient voiced understanding and is willing to proceed.    * Consent form signed   * Likely discharge tomorrow following A lead revision today.    2. S/p MVR, AVR, TV repair and PFO closure -      PLAN:   * Per CV Surgery    Lynne Koch PA-C    Interval History   Feels \"OK\"  Excited to get Wound Vac out    Physical Exam   Temp: 98.2  F (36.8  C) Temp src: Oral BP: (!) 143/54(nurse inform) Pulse: 68 Heart Rate: 71 Resp: 18 SpO2: 98 % O2 Device: None (Room air)    Vitals:    04/17/20 0000 04/19/20 0500 04/20/20 0500   Weight: 129.8 kg (286 lb 2.5 oz) 130.8 kg (288 lb 5.8 oz) " 129.5 kg (285 lb 7.9 oz)     Vital Signs with Ranges  Temp:  [97.6  F (36.4  C)-98.3  F (36.8  C)] 98.2  F (36.8  C)  Pulse:  [62-81] 68  Heart Rate:  [62-81] 71  Resp:  [16-18] 18  BP: (107-143)/(39-60) 143/54  SpO2:  [96 %-99 %] 98 %  I/O last 3 completed shifts:  In: -   Out: 1150 [Urine:1150]    Telemetry: ASVP 70 bpm    Constitutional: awake, alert, cooperative, no apparent distress, and appears stated age  Respiratory: Diminished. L>R crackle  Cardiovascular: Regular. Wound vac in place  Musculoskeletal: Minimal edema    Medications     dextrose         aspirin  325 mg Oral Daily     atorvastatin  40 mg Oral At Bedtime     ferrous gluconate  324 mg Oral Daily with breakfast     furosemide  40 mg Oral Daily     heparin ANTICOAGULANT  5,000 Units Subcutaneous Q8H     insulin aspart  1-10 Units Subcutaneous TID AC     insulin aspart  1-7 Units Subcutaneous At Bedtime     insulin isophane human  32 Units Subcutaneous BID AC     lidocaine  1 patch Transdermal Q24h    And     lidocaine   Transdermal Q8H     [START ON 4/21/2020] losartan  50 mg Oral Daily     magnesium citrate  148 mL Oral Once     metoprolol tartrate  25 mg Oral Q12H    Or     metoprolol  25 mg Per NG tube Q12H     pantoprazole  40 mg Oral QAM AC     polyethylene glycol  17 g Oral Daily     senna-docusate  1 tablet Oral BID    Or     senna-docusate  2 tablet Oral BID     sertraline  150 mg Oral QAM     sodium chloride (PF)  10 mL Intracatheter Q8H     Vitamin D3  25 mcg Oral QPM       Data   I personally reviewed no images or EKG's today.  Results for orders placed or performed during the hospital encounter of 04/15/20 (from the past 24 hour(s))   XR Chest 2 Views    Narrative    CHEST TWO VIEWS April 19, 2020 9:13 AM     HISTORY: Status post triple valve, chest tubes removed.    COMPARISON: 4/16/2020.      Impression    IMPRESSION: Median sternotomy wires, prosthetic cardiac valves, and  left-sided pacer device appear unchanged. Chest tubes have  been  removed. There is a right-sided PICC line that terminates in the  proximal SVC. No pneumothorax or significant pleural effusion. No  significant airspace consolidation. Minor vascular prominence.     TAMI HALL MD   Glucose by meter   Result Value Ref Range    Glucose 220 (H) 70 - 99 mg/dL   Glucose by meter   Result Value Ref Range    Glucose 217 (H) 70 - 99 mg/dL   Glucose by meter   Result Value Ref Range    Glucose 276 (H) 70 - 99 mg/dL   Glucose by meter   Result Value Ref Range    Glucose 184 (H) 70 - 99 mg/dL   CBC with platelets   Result Value Ref Range    WBC 6.9 4.0 - 11.0 10e9/L    RBC Count 2.38 (L) 3.8 - 5.2 10e12/L    Hemoglobin 7.5 (L) 11.7 - 15.7 g/dL    Hematocrit 23.0 (L) 35.0 - 47.0 %    MCV 97 78 - 100 fl    MCH 31.5 26.5 - 33.0 pg    MCHC 32.6 31.5 - 36.5 g/dL    RDW 13.3 10.0 - 15.0 %    Platelet Count 149 (L) 150 - 450 10e9/L   Basic metabolic panel   Result Value Ref Range    Sodium 139 133 - 144 mmol/L    Potassium 3.6 3.4 - 5.3 mmol/L    Chloride 108 94 - 109 mmol/L    Carbon Dioxide 26 20 - 32 mmol/L    Anion Gap 5 3 - 14 mmol/L    Glucose 210 (H) 70 - 99 mg/dL    Urea Nitrogen 29 7 - 30 mg/dL    Creatinine 0.91 0.52 - 1.04 mg/dL    GFR Estimate 62 >60 mL/min/[1.73_m2]    GFR Estimate If Black 72 >60 mL/min/[1.73_m2]    Calcium 8.8 8.5 - 10.1 mg/dL   Glucose by meter   Result Value Ref Range    Glucose 190 (H) 70 - 99 mg/dL

## 2020-04-20 NOTE — PROGRESS NOTES
1500 Arrived in care suites per cart from station 33 as a cath lab hold. VSS. Placed on cardiac monitor. IV infusing.

## 2020-04-20 NOTE — PRE-PROCEDURE
GENERAL PRE-PROCEDURE:   Procedure:  Lead revision  Date/Time:  4/20/2020 4:21 PM    Written consent obtained?: Yes    Risks and benefits: Risks, benefits and alternatives were discussed    Consent given by:  Patient  Patient states understanding of procedure being performed: Yes    Patient's understanding of procedure matches consent: Yes    Procedure consent matches procedure scheduled: Yes    Expected level of sedation:  Moderate  Appropriately NPO:  Yes  ASA Class:  Class 3- Severe systemic disease, definite functional limitations  Mallampati  :  Grade 2- soft palate, base of uvula, tonsillar pillars, and portion of posterior pharyngeal wall visible  Lungs:  Lungs clear with good breath sounds bilaterally  Heart:  Normal heart sounds and rate  History & Physical reviewed:  History and physical reviewed and no updates needed  Statement of review:  I have reviewed the lab findings, diagnostic data, medications, and the plan for sedation

## 2020-04-20 NOTE — PLAN OF CARE
A&O x 4, AVSS, paced rhythm, on room air. Sternum incision covered with wound vac intact. Up with 1 and a walker. Voiding good amt, had BM.   Pain control with tylenol and oxycodone.

## 2020-04-20 NOTE — PROGRESS NOTES
[facility]    Hospitalist Consultation    Date of Admission:  4/15/2020    Assessment & Plan   Amy Luna is a 74 year old female with a past medical history significant for severe AS, severe MS, TR, SSS s/p pacemaker placement, morbid obesity, LAVERNE on CPAP, HTN, HLD, and type 2 diabetes mellitus  who was admitted on 4/15/2020 for planned MVR, AVR and tricuspid valve repair. I was asked to see the patient for diabetes management upon transfer out of the ICU.     POD #2 s/p MVR with St Juan bioprosthetic valve, AVR with bovine valve, tricuspid valve repair, and PFO closure.   Procedure was performed by Dr. Wilson with EBL approximated at 2.2L per ICU note. No intraoperative complications identified. Post op remained on ventilator until 4/16. Required orion and epi initially for shock, pressors weaned evening of 4/16. She has been weaned to 2L nasal cannula. Currently with chest tubes and wound vac in place.   --primary management is per CV surgery  - discharge per CV surgery service, possibly today if cleared from EP standpoint    Hx SSS s/p pacemaker placement with current atrial lead malfunction  Post procedure noted to have inappropriate pacer spikes on EKG. Device interrogated 4/16 showing decreased atrial sensing resulting in non capture. Settings adjusted though she continues to have occasional inhibition.   --EP following, plan for observation for now with recheck early next week  --continue telemetry     Type 2 Diabetes Mellitus, well controlled  A1c is 6.5. PTA regimen includes metformin 1000mg daily, glipizide 20mg daily, and NPH 50u bid. Significant insulin needs 4/16 though on pressors at that time. Po intake has been poor, just small amounts of clears thus far.   -- increase NPH to 32 bid  - sliding scale insulin high dose, she has degree of insulin resistance      CLARITA, improving. Baseline Cr appears to be around 1-1.2. Peak this admission at 1.65>1.26.   --avoid nephrotoxins  --  improving    Acute blood loss anemia. Pre-op hgb 11.4> 7.9.  --continue to monitor    Leukocytosis, resolved. Likely stress response from surgery. She has completed [place] op abx. Tmax in last 24 hours 100.4.   --continue to monitor     HTN, HLD. PTA regimen includes metoprolol XL 75mg every day, losartan 50mg daily, lasix 40mg every day, and lipitor 40mg.   --continue PTA atorvastatin  --lasix, losartan on hold  --metoprolol tartrate 25mg bid has been started by primary team     Depression. Continue PTA Zoloft    LAVERNE. CPAP per home settings     DVT Prophylaxis: Heparin subcutaneous- per CV Surgery  Code Status: Full Code    Disposition: Expected discharge as per CVS service       Khanh Thornton DO  Hospitalist [facility]  Pager: [phone]        Reason for Consult   Reason for consult: diabetes management     Primary Care Physician   Candy Frederick    Eating better, and so glucose is higher. Planning atrial lead revision today...    Past Medical History    Past Medical History:   Diagnosis Date     (HFpEF) heart failure with preserved ejection fraction (H)      Aortic stenosis      Chest pain      Depressive disorder      Diabetes (H)      Hyperlipidemia      Hypertension      Mitral annular calcification      Mitral stenosis      Obesity      Sleep apnea     CPAP       Past Surgical History   Past Surgical History:   Procedure Laterality Date     APPENDECTOMY       CV CORONARY ANGIOGRAM N/A 4/8/2020    Procedure: Coronary Angiogram;  Surgeon: Inez Peoples MD;  Location:  HEART CARDIAC CATH LAB     CV LEFT HEART CATH N/A 4/8/2020    Procedure: Left Heart Cath;  Surgeon: Inez Peoples MD;  Location:  HEART CARDIAC CATH LAB     CV PFO CLOSURE N/A 4/15/2020    Procedure: Patent Foramen Ovale Closure;  Surgeon: Ok Wilson MD;  Location:  OR     CV RIGHT HEART CATH N/A 4/8/2020    Procedure: Right Heart Cath;  Surgeon: Inez Peoples MD;  Location:  HEART CARDIAC CATH LAB      GYN SURGERY       x2 ,      GYN SURGERY      total hysterectomy     IMPLANT PACEMAKER  2015     REPAIR VALVE TRICUSPID N/A 4/15/2020    Procedure: REPAIR TRICUSPID VALVE WITH VILLA MC3 26MM.;  Surgeon: Ok Wilson MD;  Location: [facility] OR     REPLACE VALVE AORTIC N/A 4/15/2020    Procedure: REPLACEMENT, AORTIC VALVE WITH INSPIRIS TISSUE VALVE 25 MM; ON PUMP WITH SOCORRO READ BY CARDIOLOGIST DR BLANTON.;  Surgeon: Ok Wilson MD;  Location: [facility] OR     REPLACE VALVE MITRAL N/A 4/15/2020    Procedure: REPLACEMENT, MITRAL VALVE WITH EPIC TISSUE VALVE 27 MM.;  Surgeon: Ok Wilson MD;  Location: [facility] OR       Prior to Admission Medications   Prior to Admission Medications   Prescriptions Last Dose Informant Patient Reported? Taking?   Biotin 45096 MCG TABS 2020 at AM Self Yes Yes   Sig: Take 1,000 mcg by mouth every morning    Calcium Carbonate-Vit D-Min (CALCIUM 1200 PO) 2020 at PM Self Yes Yes   Sig: Take 1 tablet by mouth every evening    Lutein 40 MG CAPS 2020 at AM Self Yes Yes   Sig: Take 40 mg by mouth every morning    Magnesium 400 MG TABS 2020 at PM Self Yes Yes   Sig: Take 400 mg by mouth every evening    Propylene Glycol (SYSTANE BALANCE OP) Past Month at Unknown time Self Yes Yes   Sig: Apply 1-2 drops to eye 3 times daily as needed (dry eyes)   TURMERIC PO 2020 at Unknown time Self Yes Yes   Sig: Take 1-3 tablets by mouth daily    VITAMIN D PO 2020 at PM Self Yes Yes   Sig: Take 1 Dose by mouth every evening    albuterol (PROAIR HFA/PROVENTIL HFA/VENTOLIN HFA) 108 (90 Base) MCG/ACT inhaler more than a month Self Yes Yes   Sig: Inhale 2 puffs into the lungs every 4 hours as needed for shortness of breath / dyspnea or wheezing   aspirin 81 MG EC tablet  at AM Self Yes Yes   Sig: Take 81 mg by mouth every morning    atorvastatin (LIPITOR) 40 MG tablet 2020 at PM Self Yes Yes   Sig: Take 40 mg by mouth At Bedtime    coenzyme Q-10  200 MG CAPS 4/9/2020 at AM Self Yes Yes   Sig: Take 200 mg by mouth every morning    furosemide (LASIX) 40 MG tablet 4/14/2020 at AM Self Yes Yes   Sig: Take 40 mg by mouth every morning   glipiZIDE (GLUCOTROL XL) 10 MG 24 hr tablet 4/14/2020 at AM Self Yes Yes   Sig: Take 20 mg by mouth every morning (takes 2 x 10mg)   insulin NPH (HUMULIN N/NOVOLIN N VIAL) 100 UNIT/ML vial 4/14/2020 at Unknown time Self Yes Yes   Sig: Inject 50 Units Subcutaneous 2 times daily   ipratropium (ATROVENT) 0.06 % nasal spray more than a month at AM Self Yes Yes   Sig: Spray 2 sprays into both nostrils 4 times daily as needed for rhinitis   losartan (COZAAR) 50 MG tablet 4/14/2020 at AM Self Yes Yes   Sig: Take 50 mg by mouth every morning   metFORMIN (GLUCOPHAGE-XR) 500 MG 24 hr tablet 4/14/2020 at AM Self Yes Yes   Sig: Take 1,000 mg by mouth daily (with breakfast) (takes 2 x 500mg)   metoprolol succinate ER (TOPROL-XL) 50 MG 24 hr tablet 4/15/2020 at AM Self No Yes   Sig: Take 1.5 tablets (75 mg) by mouth every morning   potassium 99 MG TABS 4/14/2020 at PM Self Yes Yes   Sig: Take 99 mg by mouth every evening    sertraline (ZOLOFT) 100 MG tablet 4/14/2020 at AM Self Yes Yes   Sig: Take 150 mg by mouth every morning (takes 1.5 x 100mg)   vitamin (B COMPLEX-C) tablet 4/14/2020 at AM Self Yes Yes   Sig: Take 1 tablet by mouth daily   vitamin C (ASCORBIC ACID) 1000 MG TABS 4/14/2020 at PM Self Yes Yes   Sig: Take 1,000 mg by mouth every evening       Facility-Administered Medications: None     Allergies   Allergies   Allergen Reactions     Penicillins Hives       Social History   No smoking history. Denies significant alcohol use.     Review of Systems   The 10 point Review of Systems is negative other than noted in the HPI or here.     Physical Exam   Temp: 98.2  F (36.8  C) Temp src: Oral BP: (!) 143/54(nurse inform) Pulse: 68 Heart Rate: 71 Resp: 18 SpO2: 98 % O2 Device: None (Room air)    Vital Signs with Ranges  Temp:  [97.6  F  (36.4  C)-98.3  F (36.8  C)] 98.2  F (36.8  C)  Pulse:  [62-81] 68  Heart Rate:  [62-81] 71  Resp:  [16-18] 18  BP: (107-143)/(39-60) 143/54  SpO2:  [96 %-99 %] 98 %  285 lbs 7.93 oz    Temp: 98.2  F (36.8  C) Temp src: Oral BP: (!) 143/54(nurse inform) Pulse: 68 Heart Rate: 71 Resp: 18 SpO2: 98 % O2 Device: None (Room air)    Vitals:    04/17/20 0000 04/19/20 0500 04/20/20 0500   Weight: 129.8 kg (286 lb 2.5 oz) 130.8 kg (288 lb 5.8 oz) 129.5 kg (285 lb 7.9 oz)     Vital Signs with Ranges  Temp:  [97.6  F (36.4  C)-98.3  F (36.8  C)] 98.2  F (36.8  C)  Pulse:  [62-81] 68  Heart Rate:  [62-81] 71  Resp:  [16-18] 18  BP: (107-143)/(39-60) 143/54  SpO2:  [96 %-99 %] 98 %  I/O last 3 completed shifts:  In: -   Out: 1150 [Urine:1150]    Constitutional: Awake and alert. Siting up in bed. Appears comfortable and is appropriately conversant   Respiratory: Lungs clear to auscultation in anterior fields. No increased work of breathing or wheezing   Cardiovascular: Regular rate and rhythm, trace bilateral LE edema. 2 CT out  GI:  active bowel sounds, abdomen soft, non-tender  Skin/Integumen: wound vac present sternum   Neuro:  Speech is clear. Face symmetric. Follows commands.     Data   -Data reviewed today: All pertinent laboratory and imaging results from this encounter were reviewed. I personally reviewed no images or EKG's today.  Recent Labs   Lab 04/20/20  0615 04/19/20  0450 04/18/20  0615  04/16/20  0350  04/15/20  1519 04/15/20  1342   WBC 6.9 8.1 8.2   < > 14.6*  --  20.9* 13.1*   HGB 7.5* 7.7* 7.6*   < > 10.8*  --  11.3* 9.2*   MCV 97 97 99   < > 98  --  98 98   * 128* 96*   < > 156  --  210 166   INR  --   --   --   --   --   --  1.38* 1.55*    138 141   < > 140   < > 142 141   POTASSIUM 3.6 3.8 4.2   < > 5.0   < > 4.0 4.0   CHLORIDE 108 106 112*   < > 110*   < > 109 109   CO2 26 26 26   < > 21   < > 23 23   BUN 29 27 27   < > 34*   < > 29 28   CR 0.91 0.97 0.99   < > 1.65*   < > 1.26* 1.14*    ANIONGAP 5 6 3   < > 9   < > 10 9   NELLY 8.8 8.6 8.7   < > 8.9   < > 8.3* 8.5   * 186* 126*   < > 134*   < > 242* 304*   ALBUMIN  --   --   --   --  3.2*  --  3.1* 3.1*   PROTTOTAL  --   --   --   --  6.3*  --  5.8*  --    BILITOTAL  --   --   --   --  0.5  --  0.9  --    ALKPHOS  --   --   --   --  44  --  43  --    ALT  --   --   --   --  37  --  23  --    AST  --   --   --   --  136*  --  76*  --     < > = values in this interval not displayed.       Recent Results (from the past 24 hour(s))   XR Chest 2 Views    Narrative    CHEST TWO VIEWS April 19, 2020 9:13 AM     HISTORY: Status post triple valve, chest tubes removed.    COMPARISON: 4/16/2020.      Impression    IMPRESSION: Median sternotomy wires, prosthetic cardiac valves, and  left-sided pacer device appear unchanged. Chest tubes have been  removed. There is a right-sided PICC line that terminates in the  proximal SVC. No pneumothorax or significant pleural effusion. No  significant airspace consolidation. Minor vascular prominence.     TAMI HALL MD

## 2020-04-20 NOTE — PROVIDER NOTIFICATION
Text paged Lu Ingram regarding pt complaining of some left arm weakness down in the cath lab prior to having a pacemaker revision. Upon neuro assessment of the patient from cath lab, everything was normal. Pt was then held to continue with progression of the procedure until cardiology gave the okay to proceed. Lu Arceo along with Dr. Wilson were in agreement to proceed with the revision.

## 2020-04-20 NOTE — PROVIDER NOTIFICATION
OSVALDO Francis @ 18:45 on 4/20/20--D/C Heparin sq for now.  Jessenia Momin [facility]

## 2020-04-20 NOTE — PROGRESS NOTES
"[facility]  Cardiovascular and Thoracic Surgery Daily Note          Assessment and Plan:   POD#5 s/p Mitral valve replacement with a 27 mm St. Juan Epic (porcine bioprosthetic) valve, aortic valve replacement with a 25 mm Rubi Inspiris Resilia bovine pericardial valve, tricuspid valve repair with a 26 mm Rubi MC3 annuloplasty ring, PFO closure, intraoperative SOCORRO by Dr. Ok Wilson     Plans:  1. Needs to ambulate, encourage IS  2. EP following for pacer issues-HR dips into 40s, to check lead  3. PTA losartan started as HTN     -CVS: HR: 50s-80s. SBP: 120s-140s. ASA, BB on hold, statin. Hx of SSS with PPM set to back up rate of 60 and does not appear to pace when below 60, temporary pacer capped. Remote Medtronic pacer interrogation reveals dysfunction- written report pending. Atrial lead not always capturing/sensing. EP following, will continue to monitor, ventricular lead working. HR dips into 40s-50s. Sternal wound vac removed this am. Losartan resumed for hypertension     -Resp: Extubated within protocol. Saturating well on RA<2L<4L. Wean as able. Continue to encourage IS, cough, deep breathing, ambulation.      -Neuro:  Grossly intact. Pain controlled. Reports some visual hallucinations that she recognizes as hallucinations and is \"not afraid of them\". Minimal opioid use. Hx of depression. PTA zoloft resumed.      -Renal: good UO, Crea: 0.91<0.97<0.99<1.26<1.65, close to preoperative weight. CLARITA resolving. Lasix 40 mg PO daily      -GI:  Tolerating diet. + BM. Continue bowel regimen. Miralax added. Advance diet as able     -: voiding on own     -Endo: Pre op A1c: 6.5. Hx of DMII. PTA metformin, glipizide and insulin NPH on hold. Continue Insulin gtt 48hrs, appreciate hospitalist for insulin management     -FEN: replete lytes as needed, Na: 139. K: 3.6  Orders Placed This Encounter      Moderate Consistent CHO Diet     -ID: WBC: 6.9, Temp (24hrs), Av  F (36.7  C), Min:97.6  F " "(36.4  C), Max:98.3  F (36.8  C)   Completed perioperative abx. Leukocytosis likely related to stress from surgery. Will continue to monitor.      -Heme: Hgb: 7.5<7.7<7.6<7.9, plt: 149<128<96<82<156. Acute blood loss anemia and thrombocytopenia related to stress from surgery. PO iron added     -Proph: PCD, ASA, BB on hold, statin, PPI, sub q heparin     -Dispo: St 33. Continue therapies. Continue to encourage IS, cough, deep breathing, ambulation, ARU today if bed available, EP needs to check A pacing lead.          Interval History:   Seen sitting up in chair, breathing fine, tolerating diet, moves slowly          Medications:       aspirin  325 mg Oral Daily     atorvastatin  40 mg Oral At Bedtime     ferrous gluconate  324 mg Oral Daily with breakfast     furosemide  40 mg Oral Daily     heparin ANTICOAGULANT  5,000 Units Subcutaneous Q8H     insulin aspart  1-10 Units Subcutaneous TID AC     insulin aspart  1-7 Units Subcutaneous At Bedtime     insulin isophane human  32 Units Subcutaneous BID AC     lidocaine  1 patch Transdermal Q24h    And     lidocaine   Transdermal Q8H     [START ON 4/21/2020] losartan  50 mg Oral Daily     magnesium citrate  148 mL Oral Once     metoprolol tartrate  25 mg Oral Q12H    Or     metoprolol  25 mg Per NG tube Q12H     pantoprazole  40 mg Oral QAM AC     polyethylene glycol  17 g Oral Daily     senna-docusate  1 tablet Oral BID    Or     senna-docusate  2 tablet Oral BID     sertraline  150 mg Oral QAM     sodium chloride (PF)  10 mL Intracatheter Q8H     Vitamin D3  25 mcg Oral QPM     @MEDSPRN-@          Physical Exam:   Vitals were reviewed  Blood pressure (!) 143/54, pulse 68, temperature 98.2  F (36.8  C), temperature source Oral, resp. rate 18, height 1.651 m (5' 5\"), weight 129.5 kg (285 lb 7.9 oz), SpO2 98 %.  Rhythm: paced    Lungs: clear    Cardiovascular: s1/s2, no m/r/g    Abdomen: s/nt/nd    Extremeties: no LE edema    Incision: cdi    CT: na    Weight:   Vitals:    " 04/15/20 0558 04/16/20 0500 04/17/20 0000 04/19/20 0500   Weight: 128.6 kg (283 lb 8.2 oz) 128.2 kg (282 lb 10.1 oz) 129.8 kg (286 lb 2.5 oz) 130.8 kg (288 lb 5.8 oz)    04/20/20 0500   Weight: 129.5 kg (285 lb 7.9 oz)            Data:   Labs:   Lab Results   Component Value Date    WBC 6.9 04/20/2020     Lab Results   Component Value Date    RBC 2.38 04/20/2020     Lab Results   Component Value Date    HGB 7.5 04/20/2020     Lab Results   Component Value Date    HCT 23.0 04/20/2020     No components found for: MCT  Lab Results   Component Value Date    MCV 97 04/20/2020     Lab Results   Component Value Date    MCH 31.5 04/20/2020     Lab Results   Component Value Date    MCHC 32.6 04/20/2020     Lab Results   Component Value Date    RDW 13.3 04/20/2020     Lab Results   Component Value Date     04/20/2020       Last Basic Metabolic Panel:  Lab Results   Component Value Date     04/20/2020      Lab Results   Component Value Date    POTASSIUM 3.6 04/20/2020     Lab Results   Component Value Date    CHLORIDE 108 04/20/2020     Lab Results   Component Value Date    NELLY 8.8 04/20/2020     Lab Results   Component Value Date    CO2 26 04/20/2020     Lab Results   Component Value Date    BUN 29 04/20/2020     Lab Results   Component Value Date    CR 0.91 04/20/2020     Lab Results   Component Value Date     04/20/2020       Lu Ingram PA-C  Pager #: [phone]

## 2020-04-20 NOTE — PROGRESS NOTES
SW  D: Patient unable to discharge today. Amira with ARU notified. [facility] ride rescheduled for tomorrow at 1200.     P: Will continue to follow    JAMES Valero     [facility]

## 2020-04-21 ENCOUNTER — APPOINTMENT (OUTPATIENT)
Dept: GENERAL RADIOLOGY | Facility: CLINIC | Age: 75
DRG: 219 | End: 2020-04-21
Attending: INTERNAL MEDICINE
Payer: MEDICARE

## 2020-04-21 ENCOUNTER — APPOINTMENT (OUTPATIENT)
Dept: PHYSICAL THERAPY | Facility: CLINIC | Age: 75
DRG: 219 | End: 2020-04-21
Attending: SURGERY
Payer: MEDICARE

## 2020-04-21 ENCOUNTER — APPOINTMENT (OUTPATIENT)
Dept: OCCUPATIONAL THERAPY | Facility: CLINIC | Age: 75
DRG: 219 | End: 2020-04-21
Attending: SURGERY
Payer: MEDICARE

## 2020-04-21 ENCOUNTER — TELEPHONE (OUTPATIENT)
Dept: CARDIOLOGY | Facility: CLINIC | Age: 75
End: 2020-04-21

## 2020-04-21 DIAGNOSIS — I44.30 AV BLOCK: ICD-10-CM

## 2020-04-21 DIAGNOSIS — Z95.0 CARDIAC PACEMAKER IN SITU: Primary | ICD-10-CM

## 2020-04-21 PROBLEM — Z98.890 STATUS POST CORONARY ANGIOGRAM: Status: RESOLVED | Noted: 2020-04-08 | Resolved: 2020-04-21

## 2020-04-21 PROBLEM — D62 ACUTE BLOOD LOSS ANEMIA: Status: ACTIVE | Noted: 2020-04-21

## 2020-04-21 PROBLEM — N17.9 AKI (ACUTE KIDNEY INJURY) (H): Status: ACTIVE | Noted: 2020-04-21

## 2020-04-21 PROBLEM — Z95.2 S/P AVR (AORTIC VALVE REPLACEMENT): Status: RESOLVED | Noted: 2020-04-15 | Resolved: 2020-04-21

## 2020-04-21 LAB
BASOPHILS # BLD AUTO: 0 10E9/L (ref 0–0.2)
BASOPHILS NFR BLD AUTO: 0.2 %
DIFFERENTIAL METHOD BLD: ABNORMAL
EOSINOPHIL # BLD AUTO: 0.2 10E9/L (ref 0–0.7)
EOSINOPHIL NFR BLD AUTO: 2.5 %
ERYTHROCYTE [DISTWIDTH] IN BLOOD BY AUTOMATED COUNT: 13.5 % (ref 10–15)
GLUCOSE BLDC GLUCOMTR-MCNC: 133 MG/DL (ref 70–99)
GLUCOSE BLDC GLUCOMTR-MCNC: 140 MG/DL (ref 70–99)
GLUCOSE BLDC GLUCOMTR-MCNC: 171 MG/DL (ref 70–99)
GLUCOSE BLDC GLUCOMTR-MCNC: 218 MG/DL (ref 70–99)
GLUCOSE BLDC GLUCOMTR-MCNC: 223 MG/DL (ref 70–99)
HCT VFR BLD AUTO: 25.8 % (ref 35–47)
HGB BLD-MCNC: 8.5 G/DL (ref 11.7–15.7)
IMM GRANULOCYTES # BLD: 0.5 10E9/L (ref 0–0.4)
IMM GRANULOCYTES NFR BLD: 4.8 %
LYMPHOCYTES # BLD AUTO: 0.7 10E9/L (ref 0.8–5.3)
LYMPHOCYTES NFR BLD AUTO: 7.4 %
MCH RBC QN AUTO: 32.2 PG (ref 26.5–33)
MCHC RBC AUTO-ENTMCNC: 32.9 G/DL (ref 31.5–36.5)
MCV RBC AUTO: 98 FL (ref 78–100)
MONOCYTES # BLD AUTO: 1 10E9/L (ref 0–1.3)
MONOCYTES NFR BLD AUTO: 10.1 %
NEUTROPHILS # BLD AUTO: 7.1 10E9/L (ref 1.6–8.3)
NEUTROPHILS NFR BLD AUTO: 75 %
NRBC # BLD AUTO: 0.1 10*3/UL
NRBC BLD AUTO-RTO: 1 /100
PLATELET # BLD AUTO: 243 10E9/L (ref 150–450)
POTASSIUM SERPL-SCNC: 3.9 MMOL/L (ref 3.4–5.3)
RBC # BLD AUTO: 2.64 10E12/L (ref 3.8–5.2)
WBC # BLD AUTO: 9.5 10E9/L (ref 4–11)

## 2020-04-21 PROCEDURE — 97535 SELF CARE MNGMENT TRAINING: CPT | Mod: GO

## 2020-04-21 PROCEDURE — 25000132 ZZH RX MED GY IP 250 OP 250 PS 637: Mod: GY | Performed by: PHYSICIAN ASSISTANT

## 2020-04-21 PROCEDURE — 40000986 XR CHEST 2 VW

## 2020-04-21 PROCEDURE — 25000128 H RX IP 250 OP 636: Performed by: PHYSICIAN ASSISTANT

## 2020-04-21 PROCEDURE — 84132 ASSAY OF SERUM POTASSIUM: CPT | Performed by: PHYSICIAN ASSISTANT

## 2020-04-21 PROCEDURE — 97530 THERAPEUTIC ACTIVITIES: CPT | Mod: GP | Performed by: PHYSICAL THERAPIST

## 2020-04-21 PROCEDURE — 25000132 ZZH RX MED GY IP 250 OP 250 PS 637: Mod: GY | Performed by: SURGERY

## 2020-04-21 PROCEDURE — 99232 SBSQ HOSP IP/OBS MODERATE 35: CPT | Performed by: INTERNAL MEDICINE

## 2020-04-21 PROCEDURE — 36415 COLL VENOUS BLD VENIPUNCTURE: CPT | Performed by: SURGERY

## 2020-04-21 PROCEDURE — 97110 THERAPEUTIC EXERCISES: CPT | Mod: GP | Performed by: PHYSICAL THERAPIST

## 2020-04-21 PROCEDURE — 25000131 ZZH RX MED GY IP 250 OP 636 PS 637: Mod: GY | Performed by: HOSPITALIST

## 2020-04-21 PROCEDURE — 12000000 ZZH R&B MED SURG/OB

## 2020-04-21 PROCEDURE — 97530 THERAPEUTIC ACTIVITIES: CPT | Mod: GO

## 2020-04-21 PROCEDURE — 3E04317 INTRODUCTION OF OTHER THROMBOLYTIC INTO CENTRAL VEIN, PERCUTANEOUS APPROACH: ICD-10-PCS | Performed by: SURGERY

## 2020-04-21 PROCEDURE — 93010 ELECTROCARDIOGRAM REPORT: CPT | Performed by: INTERNAL MEDICINE

## 2020-04-21 PROCEDURE — 99232 SBSQ HOSP IP/OBS MODERATE 35: CPT | Mod: 24 | Performed by: INTERNAL MEDICINE

## 2020-04-21 PROCEDURE — 36593 DECLOT VASCULAR DEVICE: CPT

## 2020-04-21 PROCEDURE — 00000146 ZZHCL STATISTIC GLUCOSE BY METER IP

## 2020-04-21 PROCEDURE — 93005 ELECTROCARDIOGRAM TRACING: CPT

## 2020-04-21 PROCEDURE — 85025 COMPLETE CBC W/AUTO DIFF WBC: CPT | Performed by: SURGERY

## 2020-04-21 PROCEDURE — 40000239 ZZH STATISTIC VAT ROUNDS

## 2020-04-21 PROCEDURE — 25000132 ZZH RX MED GY IP 250 OP 250 PS 637: Mod: GY | Performed by: INTERNAL MEDICINE

## 2020-04-21 RX ORDER — WATER 10 ML/10ML
INJECTION INTRAMUSCULAR; INTRAVENOUS; SUBCUTANEOUS
Status: DISPENSED
Start: 2020-04-21 | End: 2020-04-21

## 2020-04-21 RX ORDER — CLINDAMYCIN HCL 300 MG
300 CAPSULE ORAL EVERY 6 HOURS SCHEDULED
Status: DISCONTINUED | OUTPATIENT
Start: 2020-04-21 | End: 2020-04-22 | Stop reason: HOSPADM

## 2020-04-21 RX ADMIN — ATORVASTATIN CALCIUM 40 MG: 40 TABLET, FILM COATED ORAL at 21:18

## 2020-04-21 RX ADMIN — ALTEPLASE 2 MG: 2.2 INJECTION, POWDER, LYOPHILIZED, FOR SOLUTION INTRAVENOUS at 11:27

## 2020-04-21 RX ADMIN — ACETAMINOPHEN 650 MG: 325 TABLET, FILM COATED ORAL at 15:24

## 2020-04-21 RX ADMIN — METOPROLOL TARTRATE 25 MG: 25 TABLET ORAL at 07:58

## 2020-04-21 RX ADMIN — INSULIN HUMAN 32 UNITS: 100 INJECTION, SUSPENSION SUBCUTANEOUS at 07:54

## 2020-04-21 RX ADMIN — POTASSIUM CHLORIDE 20 MEQ: 1500 TABLET, EXTENDED RELEASE ORAL at 07:54

## 2020-04-21 RX ADMIN — INSULIN ASPART 4 UNITS: 100 INJECTION, SOLUTION INTRAVENOUS; SUBCUTANEOUS at 17:45

## 2020-04-21 RX ADMIN — FERROUS GLUCONATE 324 MG: 324 TABLET ORAL at 08:02

## 2020-04-21 RX ADMIN — INSULIN ASPART 1 UNITS: 100 INJECTION, SOLUTION INTRAVENOUS; SUBCUTANEOUS at 07:58

## 2020-04-21 RX ADMIN — ALTEPLASE 2 MG: 2.2 INJECTION, POWDER, LYOPHILIZED, FOR SOLUTION INTRAVENOUS at 11:28

## 2020-04-21 RX ADMIN — MELATONIN 25 MCG: at 19:49

## 2020-04-21 RX ADMIN — CLINDAMYCIN HYDROCHLORIDE 300 MG: 300 CAPSULE ORAL at 12:40

## 2020-04-21 RX ADMIN — PANTOPRAZOLE SODIUM 40 MG: 40 TABLET, DELAYED RELEASE ORAL at 07:54

## 2020-04-21 RX ADMIN — CLINDAMYCIN HYDROCHLORIDE 300 MG: 300 CAPSULE ORAL at 23:52

## 2020-04-21 RX ADMIN — METHOCARBAMOL 500 MG: 500 TABLET, FILM COATED ORAL at 15:24

## 2020-04-21 RX ADMIN — INSULIN ASPART 4 UNITS: 100 INJECTION, SOLUTION INTRAVENOUS; SUBCUTANEOUS at 12:40

## 2020-04-21 RX ADMIN — INSULIN HUMAN 32 UNITS: 100 INJECTION, SUSPENSION SUBCUTANEOUS at 17:45

## 2020-04-21 RX ADMIN — FUROSEMIDE 40 MG: 40 TABLET ORAL at 08:01

## 2020-04-21 RX ADMIN — SERTRALINE HYDROCHLORIDE 150 MG: 100 TABLET ORAL at 08:02

## 2020-04-21 RX ADMIN — OXYCODONE HYDROCHLORIDE AND ACETAMINOPHEN 1 TABLET: 5; 325 TABLET ORAL at 10:14

## 2020-04-21 RX ADMIN — CLINDAMYCIN HYDROCHLORIDE 300 MG: 300 CAPSULE ORAL at 17:50

## 2020-04-21 RX ADMIN — ASPIRIN 325 MG ORAL TABLET 325 MG: 325 PILL ORAL at 08:02

## 2020-04-21 RX ADMIN — LOSARTAN POTASSIUM 50 MG: 50 TABLET, FILM COATED ORAL at 08:02

## 2020-04-21 ASSESSMENT — ACTIVITIES OF DAILY LIVING (ADL)
ADLS_ACUITY_SCORE: 15

## 2020-04-21 ASSESSMENT — MIFFLIN-ST. JEOR: SCORE: 1795.88

## 2020-04-21 NOTE — PROGRESS NOTES
SW  D: informed patient could not discharge to ARU today. Call placed to [facility] transport to cancel the ride.     P: Will continue to follow     JAMES Valero     [facility]

## 2020-04-21 NOTE — PROGRESS NOTES
[facility]  Cardiovascular and Thoracic Surgery Daily Note          Assessment and Plan:   POD#6 s/p Mitral valve replacement with a 27 mm [place]. Juan Epic (porcine bioprosthetic) valve, aortic valve replacement with a 25 mm Rubi Inspiris Resilia bovine pericardial valve, tricuspid valve repair with a 26 mm Rubi MC3 annuloplasty ring, PFO closure, intraoperative SOCORRO by Dr. Ok Wilson    POD#1 s/p atrial lead revision by Dr. Francis  -CVS: HR: 60s-90s. SBP: 100-120s. Pre op EF: ASA, BB on hold, statin. PTA losartan resumed for hypertension and PTA daily lasix resumed. Hx of SSS with PPM set to back up rate of 60, pacer interrogation revealed dysfunction and found to have atrial lead fracture s/p new atrial lead placement yesterday. Normal device check this am. Concern of purulent infection vs fat necrosis at lower aspect of sternal incision- will start oral clindamycin, daily iodine painting of incision and monitor for infection, repeat CBC tomorrow.   -Resp: Extubated within protocol. Saturating well on room air. Continue to encourage IS, cough, deep breathing, ambulation  -Neuro:  Grossly intact. Pain controlled. Visual hallucinations on POD#1- resolved. Hx of depression- PTA zoloft resumed.   -Renal: good UOP. Close to preoperative weight. Cr: 0.91. Will continue daily oral lasix with potassium supplementation  -GI:  Tolerating diet. +BM. Continue bowel regimen  -:  Voiding well on own.   -Endo: pre op a1c: 6.5. Hx of DMII. PTA metformin, glipizide and insulin NPH on hold. Hospitalist following for insulin management.   -FEN: replace electrolytes as needed. K: 3.9  -ID: Temp (24hrs), Av.3  F (36.8  C), Min:97.8  F (36.6  C), Max:98.5  F (36.9  C)  WBC: 9.5. Completed perioperative abx. Concern for sternal wound infection vs fat necrosis at lower aspect of incision. Started oral clindamycin today. Will follow and repeat CBC tomorrow.   -Heme: Hgb/plt stable.   -Proph: PCD, ASA, statin,  PPI, sub q heparin  -Dispo: [facility]. Will cancel discharge to ARU today. Possible discharge to ARU in 1-2 days depending on clinical status. Continue to encourage IS, cough, deep breathing, ambulation.          Interval History:   No acute events overnight. Wound vac removed yesterday. This morning some ?purulent drainage vs fat necrosis from lower aspect of sternal incision. Skin glue removed and fluid expressed. Incision painted with iodine. Pain controlled. Tolerating diet. +BM         Medications:       alteplase  2 mg Intravenous Q2H     alteplase  2 mg Intravenous Q2H     aspirin  325 mg Oral Daily     atorvastatin  40 mg Oral At Bedtime     clindamycin  300 mg Oral Q6H LESLIE     ferrous gluconate  324 mg Oral Daily with breakfast     furosemide  40 mg Oral Daily     insulin aspart  1-10 Units Subcutaneous TID AC     insulin aspart  1-7 Units Subcutaneous At Bedtime     insulin isophane human  32 Units Subcutaneous BID AC     lidocaine  1 patch Transdermal Q24h    And     lidocaine   Transdermal Q8H     losartan  50 mg Oral Daily     magnesium citrate  148 mL Oral Once     metoprolol tartrate  25 mg Oral Q12H    Or     metoprolol  25 mg Per NG tube Q12H     pantoprazole  40 mg Oral QAM AC     polyethylene glycol  17 g Oral Daily     senna-docusate  1 tablet Oral BID    Or     senna-docusate  2 tablet Oral BID     sertraline  150 mg Oral QAM     sodium chloride (PF)  10 mL Intracatheter Q8H     sterile water (preservative free)         Vitamin D3  25 mcg Oral QPM     acetaminophen, artificial saliva, sore throat lozenge, bisacodyl, dextrose, glucose **OR** dextrose **OR** glucagon, hydrALAZINE, HYDROmorphone, hydrOXYzine, lidocaine 4%, lidocaine (buffered or not buffered), magnesium sulfate, magnesium sulfate, methocarbamol, metoclopramide **OR** metoclopramide, naloxone, ondansetron **OR** ondansetron, oxyCODONE-acetaminophen, potassium chloride, potassium chloride with lidocaine, potassium chloride, potassium  "chloride, potassium chloride, prochlorperazine **OR** prochlorperazine, sodium chloride (PF), sodium chloride (PF), sodium phosphate, sodium phosphate, sodium phosphate          Physical Exam:   Vitals were reviewed  Blood pressure 123/40, pulse 66, temperature 97.8  F (36.6  C), temperature source Oral, resp. rate 16, height 1.651 m (5' 5\"), weight 129 kg (284 lb 6.3 oz), SpO2 95 %.  Rhythm: NSR    Lungs: diminished bases    Cardiovascular: RRR normal s1 and s2    Abdomen: soft NTND    Extremeties: minimal edema    Incision: Small amount of drainage (purulent vs fat necrosis?) found under skin glue this am. Fluid Expressed, skin glue removed. Incision painted with iodine.     CT: n/a    Weight:   Vitals:    04/16/20 0500 04/17/20 0000 04/19/20 0500 04/20/20 0500   Weight: 128.2 kg (282 lb 10.1 oz) 129.8 kg (286 lb 2.5 oz) 130.8 kg (288 lb 5.8 oz) 129.5 kg (285 lb 7.9 oz)    04/21/20 0600   Weight: 129 kg (284 lb 6.3 oz)            Data:   Labs:   Lab Results   Component Value Date    WBC 9.5 04/21/2020     Lab Results   Component Value Date    RBC 2.64 04/21/2020     Lab Results   Component Value Date    HGB 8.5 04/21/2020     Lab Results   Component Value Date    HCT 25.8 04/21/2020     No components found for: MCT  Lab Results   Component Value Date    MCV 98 04/21/2020     Lab Results   Component Value Date    MCH 32.2 04/21/2020     Lab Results   Component Value Date    MCHC 32.9 04/21/2020     Lab Results   Component Value Date    RDW 13.5 04/21/2020     Lab Results   Component Value Date     04/21/2020       Last Basic Metabolic Panel:  Lab Results   Component Value Date     04/20/2020      Lab Results   Component Value Date    POTASSIUM 3.9 04/21/2020     Lab Results   Component Value Date    CHLORIDE 108 04/20/2020     Lab Results   Component Value Date    NELLY 8.8 04/20/2020     Lab Results   Component Value Date    CO2 26 04/20/2020     Lab Results   Component Value Date    BUN 29 04/20/2020 "     Lab Results   Component Value Date    CR 0.91 04/20/2020     Lab Results   Component Value Date     04/20/2020       CXR: 4/21/20    IMPRESSION: A third pacemaker lead has been placed tip projected in  the right atrium. Sternotomy wires, prosthetic valve, and PICC line  are all unchanged. Mild cardiomegaly. Small left pleural effusion. No  pneumothorax.    Arabella Angulo PA-C  CV Surgery  Pager # [phone]

## 2020-04-21 NOTE — PLAN OF CARE
Discharge Planner OT   Patient plan for discharge: ARU  Current status: Pt transferred to/from the toilet with CGA. While seated/standing pt completed LE dressing with CGA and set up. Pt demonstrated increase in fatigue and SOB with activity.   Barriers to return to prior living situation:  level of A needed for ADL's and mobility, sternal precautions, decreased strength and functional act tolerance  Recommendations for discharge: ARU  Rationale for recommendations: Pt will benefit from intensive therapy at ARU to improve deficits and facilitate improved ind w/ ADL's, IADL's and mobility. Pt is motivated to work w/ rehab and has supportive family. Anticipate pt will tolerate 3 hrs of therapy daily.       Entered by: Lobo Hoang 04/21/2020 10:46 AM

## 2020-04-21 NOTE — PROGRESS NOTES
[facility]    Hospitalist Progress Note    Assessment & Plan   74 year old female who was admitted on 4/15/2020 for heart surgery:     Impression:   Principal Problem:    Mitral stenosis -- S/P Bioprost MVR 4/15/19    Aortic sten -- S/P Bioprost AVR 4/15/20    Tricuspid valve Regur -- S/P Repair 4/15/20    Patent foramen ovale -- S/P Closure 4/15/20   -- POD #6, doing well   -- on Clindamycin 300 mg qid for slight wound drainage      Fractured atrial pacemaker lead -- Replace 4/16/20   -- follow-up with EP next week      Acute blood loss anemia   -- on oral iron      CLARITA -- better       Active Problems:    LAVERNE on CPAP      DM type 2, Hgb A1C 6.5 on 4/8/20      Plan:  Continue postop, discharge to ARU when bed available.     DVT Prophylaxis: Pneumatic Compression Devices  Code Status: Full Code    Disposition: Expected discharge to ARU tomorrow    Siddharth Rawls MD  Pager [phone]  Cell Phone [phone]  Text Page (7am to 6pm)    Interval History   Feels OK, reports mild chest pain at 1 out of 10.  Bowels moving.     Physical Exam   Temp: 97.9  F (36.6  C) Temp src: Oral BP: (!) 104/38(notified RN) Pulse: 70 Heart Rate: 70 Resp: 16 SpO2: 96 % O2 Device: BiPAP/CPAP    Vitals:    04/19/20 0500 04/20/20 0500 04/21/20 0600   Weight: 130.8 kg (288 lb 5.8 oz) 129.5 kg (285 lb 7.9 oz) 129 kg (284 lb 6.3 oz)     Vital Signs with Ranges  Temp:  [97.8  F (36.6  C)-98.5  F (36.9  C)] 97.9  F (36.6  C)  Pulse:  [64-70] 70  Heart Rate:  [59-93] 70  Resp:  [16-18] 16  BP: (104-130)/(37-43) 104/38  SpO2:  [90 %-98 %] 96 %  I/O last 3 completed shifts:  In: 220 [P.O.:200; I.V.:20]  Out: 2200 [Urine:2200]    # Pain Assessment:  Current Pain Score 4/21/2020   Patient currently in pain? -   Pain score (0-10) 3   CPOT pain score -   - Amy is experiencing pain due to chest surgery. Pain management was discussed and the plan was created in a collaborative fashion.  Amy's response to the current  recommendations: engaged  - Please see the plan for pain management as documented above    Constitutional: Awake, alert, cooperative, no apparent distress  Respiratory: Clear to auscultation bilaterally, no crackles or wheezing  Cardiovascular: Regular rate and rhythm, normal S1 and S2, and no murmur noted  GI: Normal bowel sounds, soft, non-distended, non-tender  Extrem: No calf tenderness, trace ankle edema  Neuro: Ox3, no focal motor or sensory deficits    Medications     dextrose         aspirin  325 mg Oral Daily     atorvastatin  40 mg Oral At Bedtime     clindamycin  300 mg Oral Q6H LESLIE     ferrous gluconate  324 mg Oral Daily with breakfast     furosemide  40 mg Oral Daily     insulin aspart  1-10 Units Subcutaneous TID AC     insulin aspart  1-7 Units Subcutaneous At Bedtime     insulin isophane human  32 Units Subcutaneous BID AC     lidocaine  1 patch Transdermal Q24h    And     lidocaine   Transdermal Q8H     losartan  50 mg Oral Daily     magnesium citrate  148 mL Oral Once     metoprolol tartrate  25 mg Oral Q12H    Or     metoprolol  25 mg Per NG tube Q12H     pantoprazole  40 mg Oral QAM AC     polyethylene glycol  17 g Oral Daily     senna-docusate  1 tablet Oral BID    Or     senna-docusate  2 tablet Oral BID     sertraline  150 mg Oral QAM     sodium chloride (PF)  10 mL Intracatheter Q8H     sterile water (preservative free)         Vitamin D3  25 mcg Oral QPM       Data   Recent Labs   Lab 04/21/20  1039 04/21/20  0456 04/20/20  1255 04/20/20  0615 04/19/20  0450 04/18/20  0615  04/16/20  0350  04/15/20  1519 04/15/20  1342   WBC 9.5  --  8.1 6.9 8.1 8.2   < > 14.6*  --  20.9* 13.1*   HGB 8.5*  --  7.7* 7.5* 7.7* 7.6*   < > 10.8*  --  11.3* 9.2*   MCV 98  --  97 97 97 99   < > 98  --  98 98     --  167 149* 128* 96*   < > 156  --  210 166   INR  --   --  1.11  --   --   --   --   --   --  1.38* 1.55*   NA  --   --   --  139 138 141   < > 140   < > 142 141   POTASSIUM  --  3.9  --  3.6 3.8  4.2   < > 5.0   < > 4.0 4.0   CHLORIDE  --   --   --  108 106 112*   < > 110*   < > 109 109   CO2  --   --   --  26 26 26   < > 21   < > 23 23   BUN  --   --   --  29 27 27   < > 34*   < > 29 28   CR  --   --   --  0.91 0.97 0.99   < > 1.65*   < > 1.26* 1.14*   ANIONGAP  --   --   --  5 6 3   < > 9   < > 10 9   NELLY  --   --   --  8.8 8.6 8.7   < > 8.9   < > 8.3* 8.5   GLC  --   --   --  210* 186* 126*   < > 134*   < > 242* 304*   ALBUMIN  --   --   --   --   --   --   --  3.2*  --  3.1* 3.1*   PROTTOTAL  --   --   --   --   --   --   --  6.3*  --  5.8*  --    BILITOTAL  --   --   --   --   --   --   --  0.5  --  0.9  --    ALKPHOS  --   --   --   --   --   --   --  44  --  43  --    ALT  --   --   --   --   --   --   --  37  --  23  --    AST  --   --   --   --   --   --   --  136*  --  76*  --     < > = values in this interval not displayed.       Imaging:   Recent Results (from the past 24 hour(s))   EP Device    Narrative    PROCEDURES PERFORMED:   1. Conscious sedation.   2. Cardiac fluoroscopy.  3. Right atrial (RA) lead placement.     INDICATION: Lead malfunction.     HPI: 74-year-old lady with history of morbid obesity, sleep apnea,   previous pacemaker implantation (2015) and severe valve disease, who   underwent bioprosthetic mitral/ aortic valve replacement and tricuspid   valve repair, and was found to have atrial lead malfunction after surgery   (poor sensing and increased impedance- suggestive of lead fracture).  She   was referred for lead revision.      Risks and benefits of the procedure were reviewed including but not   limited to arrhythmia, pain, infection, bleeding, skin damage from ionized   radiation, kidney damage or allergic reactions to conscious dye, injury to   the neighboring vascular structures, need for emergent cardiopulmonary   resuscitation, heart attack, stroke or death. Alternatives were discussed   including doing nothing. All questions were answered to the patient's    satisfaction. Informed consent was obtained and patient wished to proceed.    FLUOROSCOPY DATA:  Fluoro time:  1 minute and 25 seconds.  Radiation dose (AK): 12 mGy.  Dose-area product (DAP):  895 Gy.cm2.    DESCRIPTION OF PROCEDURE: After written, informed consent was obtained;   the patient was brought to the Electrophysiology Laboratory. An   intravenous infusion of prophylactic antibiotic was begun. The patient was   sterilely prepped from the level of the iliac crest to the level of the   chin with an antibiotic soap and disinfectant, and draped appropriately.   Following infiltration with 1% lidocaine, the initial incision was   reopened with blunt dissection. Using blunt and sharp dissection, the   incision was extended down to the generator and the generator and leads   were dissected free of adhesions.     A pocket puncture via fluoroscopy guidance and modified seldinger   technique was used to attain access into the left subclavian vein. A 7   Tuvaluan sheath was inserted over the wire. A new right atrial lead was   advanced under fluoroscopy and a secure location found. Stimulation and   sensing thresholds were found to be satisfactory. No diaphragmatic   stimulation was noticed with high output pacing. The lead was sutured to   the underlying pectoral muscle with interrupted 0 silk suture over a   plastic collar. The old atrial lead was capped and left in place. The new   atrial lead was connected to the generator and found to perform   appropriately.      After irrigation with antibiotic solution, the pocket was inspected and no   bleeding was seen. The generator was inserted into the pocket. The wound   was then closed with subcutaneous sutures.     PACEMAKER GENERATOR: Medtronic, model A2DR 01, serial # [id number].    LEAD INFORMATION:   Right atrial lead: Medtronic, model 5076-45, serial # [id number].   (Implantation date: 06/15/2015; capped and left in place)  Right atrial lead: Medtronic, model  5076-45, serial #[id number].   Right ventricular lead: Medtronic, model 5076-52, serial # [id number].   (Implantation date: 06/15/2015)    MEASURED DATA:   Right atrial lead: Sensing 1.7 mV, threshold 1.0 volts at 0.5 msec,   impedance 511 ohms.   Right ventricular lead: Sensing 11.3 mV, threshold 0.75 volt at 0.4 msec,   impedance 551 ohms.     IMPRESSION:   1. Successful new atrial lead placement.   2. Bradycardia pacing set to MVP .     RECOMMENDATIONS:   1. Avoid vascular access to the left jugular and subclavian veins.   2. Keep wound clean, dry and covered for seven days.   3. May start oral medications. Avoid IV or subcutaneous heparin for at   least two weeks. If heparin must be used, please contract the procedural   team to discuss.   4. Wound care as follows:   a) Do not get incision wet for three days.   b) Leave the Steri-Strips in place, allow to come off naturally. If they   do not come off in two weeks, you may remove them.   c) Check incision daily and call if they notice one of the following:   redness, drainage (pus or blood), swelling, warmth or if you develop   fever.     If you have questions regarding wound care, please contact our office.     Sohan Francis MD   X-ray Chest 2 vws*    Narrative    XR CHEST 2 VW  4/21/2020 8:56 AM       INDICATION: Status post pacer/ICD  COMPARISON: 4/19/2020       Impression    IMPRESSION: A third pacemaker lead has been placed tip projected in  the right atrium. Sternotomy wires, prosthetic valve, and PICC line  are all unchanged. Mild cardiomegaly. Small left pleural effusion. No  pneumothorax.    JOSSIE AVILES MD

## 2020-04-21 NOTE — TELEPHONE ENCOUNTER
Post device RA lead revision discharge phone call.  Called patient at her hospital room 332-1 and reviewed the below instructions.      Reviewed the following:  - No raising arm above shoulder on the side of implant for 3 weeks  - Remove outer dressing 3-4 days after implant (Friday 4/24). May shower after outer dressing removed.   - Leave steri-strips in place, instructions will be provided at 1 week device check.  - Limit driving for: 1 week (PPM)  - Watch for redness, drainage, warmth, or fever. Call device clinic if any signs of infection.     1 week remote device check scheduled: 4/29/20    Patient notified that if the instructions from her open heart surgery are different than the above instructions, that she should follow whichever restriction is longer.  Pt verbalized understanding.    Patient stated she may be going to rehab post discharge.  She will see if someone can bring her remote monitor to the rehab facility.  Patient would like to transfer her cardiology and device care from [facility] to [facility] in [place].  Device Tech will reach out to [facility] to have the remote monitoring transferred to our clinic.    Pt states understanding of all instructions and will call the clinic with any questions, device and clinic phone numbers provided.     TARI Ojeda

## 2020-04-21 NOTE — PLAN OF CARE
CR: Attempted to see pt this am, pt just finished with OT. Sleeping sound with CPAP in place. Discussed with RN who agrees to allow pt sleep at this time.

## 2020-04-21 NOTE — PLAN OF CARE
Discharge Planner PT   Patient plan for discharge: Acute Rehab    Current status: Sup>sit with HOB at 45 degrees, Min A. Sit<>stand SBA. Ambulated with use of ' x 2 with WW, 5 min seated rest in between. ?CV response as BP on L forearm and pressures are anywhere from 120s to 150s/ 50s. See VSFS. Reviewed sternal precautions. HR 70s pre act and 90s post. Reports weakness is her limiting factor.     Barriers to return to prior living situation: current assist needed; weakness; decreased activity tolerance    Recommendations for discharge: ARC    Rationale for recommendations: Appears patient would benefit from ongoing CR here in hospital and progression of strength, activity tolerance, and independence with functional mobility at Acute rehab; Patient would benefit from the complex medical management, has a supportive home environment, was previously independent with mobility and cares, and anticipate patient will work up to tolerating 3 hours of therapy per day       Entered by: Vicky Patel 04/21/2020 5:02 PM

## 2020-04-21 NOTE — PROGRESS NOTES
"[facility]    EP Progress Note    Date of Service (when I saw the patient): 04/21/2020     Assessment & Plan   Amy Luna is a 74 year old female with h/o AV conduction dz s/p dual chamber Medtronic PPM 2015 (followed through [facility]), LAVERNE, morbid obesity, and recent extensive CV surgery 4/15/2020 (MVR, AVR, TV repair and PFO closure). Normal EF. Dr. Bustillo saw 4/17 due to concerns re: A lead malfunction manifested by inappropriate spikes on EKG.     1. Atrial Lead Fracture -   - Likely damaged during extensive CV Surgery; poor sensing and increased impedance noted  - Now s/p Atrial Lead revision with Dr. Francis with new RA lead. Previously placed RA lead (2015) was capped and left in place     Device interrogation (Von) looks good. A lead with sensing 2.1 mV with threshold 0.5 V @ 0.4 ms.   - CXR done showing no PTX     PLAN:   * OK to be discharged today from EP standpoint   * Device RNs will arrange follow-up. They have included PPM Information/Education in pt's Discharge Instructions (Dischage Navigator)    2. Valvular Disease -   - S/p extensive surgery 4/15 as above     PLAN:   * Per CV Surgery    Lynne Koch PA-C    Interval History   Doesn't feel \"any different\" but pleased procedure is completed and she's leaving today    Physical Exam   Temp: 98.5  F (36.9  C) Temp src: Oral BP: 114/41 Pulse: 66 Heart Rate: 67 Resp: 17 SpO2: 94 % O2 Device: None (Room air)    Vitals:    04/19/20 0500 04/20/20 0500 04/21/20 0600   Weight: 130.8 kg (288 lb 5.8 oz) 129.5 kg (285 lb 7.9 oz) 129 kg (284 lb 6.3 oz)     Vital Signs with Ranges  Temp:  [98.1  F (36.7  C)-98.5  F (36.9  C)] 98.5  F (36.9  C)  Pulse:  [64-73] 66  Heart Rate:  [66-93] 67  Resp:  [16-18] 17  BP: (106-143)/(37-54) 114/41  SpO2:  [90 %-98 %] 94 %  I/O last 3 completed shifts:  In: 220 [P.O.:200; I.V.:20]  Out: 1500 [Urine:1500]    Telemetry: currently ASVP    Constitutional: awake, alert, cooperative, no apparent " distress, and appears stated age  Respiratory: Nonlabored breathing  Cardiovascular: Dressing over PPM site c/d. Minimal ecchymosis/swelling surrounding device. Minimal tenderness  Musculoskeletal: Deferred    Medications     dextrose         aspirin  325 mg Oral Daily     atorvastatin  40 mg Oral At Bedtime     ferrous gluconate  324 mg Oral Daily with breakfast     furosemide  40 mg Oral Daily     insulin aspart  1-10 Units Subcutaneous TID AC     insulin aspart  1-7 Units Subcutaneous At Bedtime     insulin isophane human  32 Units Subcutaneous BID AC     lidocaine  1 patch Transdermal Q24h    And     lidocaine   Transdermal Q8H     losartan  50 mg Oral Daily     magnesium citrate  148 mL Oral Once     metoprolol tartrate  25 mg Oral Q12H    Or     metoprolol  25 mg Per NG tube Q12H     pantoprazole  40 mg Oral QAM AC     polyethylene glycol  17 g Oral Daily     senna-docusate  1 tablet Oral BID    Or     senna-docusate  2 tablet Oral BID     sertraline  150 mg Oral QAM     sodium chloride (PF)  10 mL Intracatheter Q8H     Vitamin D3  25 mcg Oral QPM       Data   I personally reviewed the EKG tracing showing ASVP 70 bpm. 4/21 @ 0728.  Results for orders placed or performed during the hospital encounter of 04/15/20 (from the past 24 hour(s))   Glucose by meter   Result Value Ref Range    Glucose 190 (H) 70 - 99 mg/dL   Glucose by meter   Result Value Ref Range    Glucose 248 (H) 70 - 99 mg/dL   CBC with platelets   Result Value Ref Range    WBC 8.1 4.0 - 11.0 10e9/L    RBC Count 2.39 (L) 3.8 - 5.2 10e12/L    Hemoglobin 7.7 (L) 11.7 - 15.7 g/dL    Hematocrit 23.1 (L) 35.0 - 47.0 %    MCV 97 78 - 100 fl    MCH 32.2 26.5 - 33.0 pg    MCHC 33.3 31.5 - 36.5 g/dL    RDW 13.2 10.0 - 15.0 %    Platelet Count 167 150 - 450 10e9/L   INR   Result Value Ref Range    INR 1.11 0.86 - 1.14   Glucose by meter   Result Value Ref Range    Glucose 175 (H) 70 - 99 mg/dL   Glucose by meter   Result Value Ref Range    Glucose 156 (H)  70 - 99 mg/dL   Glucose by meter   Result Value Ref Range    Glucose 171 (H) 70 - 99 mg/dL   Potassium   Result Value Ref Range    Potassium 3.9 3.4 - 5.3 mmol/L

## 2020-04-21 NOTE — PLAN OF CARE
A/Ox4. AVSS. Up with AX1 GB and walker. Sternal precautions maintained. Tele: 100% V-paced. Incision bruised, red with some areas of white puss and drainage. Assessed by PA, using iodine for sternal cares. Pacemaker site covered with gauze. K 3.9 replaced today, recheck tomorrow. Pain managed with oxy, tylenol and robaxin. Mod carb diet good appetite. Likely discharge to TCU tomorrow.

## 2020-04-21 NOTE — DISCHARGE INSTRUCTIONS
Discharge Instructions for Pacemaker Implantation  You have had a procedure to insert a pacemaker. Once inside your body, this small electronic device helps keep your heart from beating too slowly. A pacemaker can t fix existing heart problems. But it can help you feel better and have more energy. As you recover, follow all of the instructions you are given, including those below.  Activity    Follow the instructions you are given about limiting your activity.    Do not raise your arm on the incision side above shoulder level for 3 weeks. This gives the device lead wires time to attach securely inside your heart.    Ask your doctor when you can expect to return to work.    You can still exercise. It s good for your body and your heart. Talk with your doctor about an exercise plan.  Other Precautions    Follow your doctor's directions carefully for wound care. You may remove the outer dressing in 3 - 4 days (Friday, 4/24). Leave the steri-strips in place.  Instructions for these will be given by the device clinic at your 1 week follow-up. Never put any creams, lotions, or products like peroxide on an incision unless your doctor tells you to.     You may shower once the outer dressing has been removed.     Before you receive any treatment, tell all health care providers (including your dentist) that you have a pacemaker.    You will be given an ID card that contains information about your pacemaker. Always carry this card with you. You can show this card if your pacemaker sets off a metal detector. You should also show it to avoid screening with a hand-held security wand.    Keep your cell phone away from your pacemaker. Don t carry the phone in your shirt pocket, even when it s turned off.    Avoid strong magnets. Examples are those used in MRIs or in hand-held security wands.    Avoid strong electrical fields. Examples are those made by radio transmitting towers,  ham  radios, and heavy-duty electrical  equipment.    Avoid leaning over the open crawford of a running car. A running engine creates an electrical field. Most household and yard appliances will not cause any problems. If you use any large power tools, such as an industrial , talk with your doctor.   Follow-Up    Follow up in the device clinic as scheduled. Please keep your remote monitor plugged in within 10 feet of your bedside.    You have a remote device check scheduled for 4/29/20. On this day, a nurse from the clinic will be calling you at home to review instructions and the information received from your remote monitor.  If you have a smartphone, we will ask that you send us a picture of your incision.  Please have this ready to send.  An email address to send this to will be provided during the phone call.    Make regular follow-up appointments with your device clinic. They will check the pacemaker to make sure it s working properly.  When to Call Your Device Clinic [phone]  Call your doctor immediately if you have any of the following:    Dizziness    Chest pain    Lack of energy    Fainting spells    Twitching chest muscles    Rapid pulse or pounding heartbeat    Shortness of breath    Pain around your pacemaker    Fever above 100.4 F (38 C) or other signs of infection (redness, swelling, drainage, or warmth at the incision site)    Hiccups that won t stop     0466-7299 [employer]. [address] [place], [place] [address]. All rights reserved. This information is not intended as a substitute for professional medical care. Always follow your healthcare professional's instructions.    [facility] in [place]: [phone]  Device Clinic (Monday to Friday, 8am-4pm): [phone]      .  YOUR CARE AFTER HEART SURGERY   DISCHARGE INTRUCTIONS      Weight - Weigh yourself daily and record on daily weight sheet provided in this packet.     Incentive Spirometer - Continue to use 3 to 4 times a day for 10 days;  follow use of spirometer with coughing. Use attached sheet to report progress.     Daily shower - Wash all surgical incisions daily with liquid antibacterial soap, such as Dial.   No tub baths until incisions are healed. The sticky glue used to close your incisions may peel off slowly.    Incisions - Avoid all lotions, oils, ointments or sunscreen on incisions for 8 weeks. Avoid sunlight on incision sites for 8 weeks and then use sunscreen on incisions for one year.   You may have numbness, tingling, and increased sensitivity around your incision lines for several weeks/months as the nerves grow back. Some drainage from the sites where your chest tubes were is normal and can persist for a while until it has healed. It is best to keep this open to air to allow scab formation and healing, you can cover it with a gauze pad or band-aid if needed.     Diet - Eat a well balanced diet to promote healing. It can be normal to have a decreased appetite for a while after surgery. You can eat whatever it takes to keep up a normal food/calorie intake in the immediate post-operative period for about a month. Try to limit high sodium (salt) foods to prevent fluid retention.  If you are a diabetic, continue to balance your carbohydrate intake with your medicine. Eventually you will need to adopt a heart healthy diet, especially if you have coronary artery disease.     Daily exercise/activity precautions - Cardiac rehab therapist will review your restrictions with you.  No lifting, pushing or pulling anything over 10 pounds for 12 weeks if you are a diabetic or 8 weeks if you are not a diabetic (reference: a gallon of milk weighs 8 pounds).    Positioning -Keep your legs elevated above the level of your heart when you are in bed. You may lie on your side and stomach if it s comfortable and you have no sternal click (popping in breastbone).    Pain medications - Use as directed. Call surgeon for pain medication refill if needed.  Other medications should be filled by your primary doctor.     Depression - It is not uncommon after surgery.  If it lasts longer than two weeks or you feel you need to talk to someone, contact your primary physician.    Driving -No driving for 4 weeks from the day of surgery (or until your surgeon has approved it).    Work and Travel - Consult with your physician about specific work and travel restrictions    Cardiac Rehabilitation - Usually begins approximately one week after discharge and lasts up to 6 weeks. In the meantime, continue to work on the rehab activities you learned in the hospital and maintain your physical activity. Aerobic activity, especially walking, is a great way to regain your physical endurance.     Checking your Blood sugar (glucose) - If you have been instructed to begin checking your blood sugars at home, record them on the back page of this packet and take the packet with you to your follow appointment with you primary physician (usually about 1 week after surgery).          CALL YOUR SURGEON IF ANY OF THE FOLLOWING OCCURS:      Fever - If you feel chills, shaking, or your temperature is over 101 degrees    Sternal Click - Some popping or clicking sensations can be normal as the chest has been stretched open. If you notice a significant change or if pain becomes bothersome, please call.    Angina/Chest Pain - Or symptoms similar to those you experienced before surgery    Infection - If incisions look infected (increasing redness, tenderness, swelling, warmth, odor, drainage, or change in color if drainage).    Weight gain - Please call if weight gain of 2-3pounds over 24 hours or if greater than 5 pounds in 1 week as this may indicate fluid overload and could signify a problem with your heart.     Swelling - If you notice worsening swelling in your legs. A certain amount of swelling is expected, especially if you had a vein taken out of your leg for bypass surgery.      Shortness of  breath - Not relieved by rest    Persistent heart palpitations - Rapid, pounding heartbeat    Dizziness/lightheadedness - While sitting or at rest    Pain - Not relieved by pain medications      Your Daily Weight  Weigh yourself every morning in the same clothes after urinating and before breakfast.* Call your physician if your weight increases 2-3 lbs in one day or 3-5 lbs in a week**  My baseline weight (first morning home):____________   Date Weight   Date Weight   1.    17.     2.    18.     3.    19.     4.    20.     5.    21.     6.    22.     7.    23.     8.    24.     9.    25.     10.    26.     11.    27.     12.    28.     13.    29.     14.    30.     15.    31.     16.    32.           Incentive Spirometer- After Surgery Progress Record    Discharge Volume_______________ml    As you recover from your surgery it is important to continue the use of your incentive spirometer at home.  We recommend that you use your spirometer at least 3-4 times per day.  You should take 5 slow deep breaths with each use. Inhale slowly to raise the piston in the chamber as high as you are able.  Hold breath to count of 3 and then exhale normally.  Allow piston to return to bottom of chamber, rest and repeat 4 more times. It is helpful to see your progress by recording your average volume in the spaces provided below.    Day  1       Day  2       Day  3       Day  4       Day  5       Day  6       Day  7       Day  8       Day  9       Day  10

## 2020-04-21 NOTE — PLAN OF CARE
A/o. AVSS on RA. Denies pain. Up A1 w/ gb. BS active, +flatus. Sternal incision JEISON, ecchymotic around site. Lt chest incision for pacemaker revision, dressing CDI, ice pack on top. Clear liquid diet. Tele: 100% paced. CTM

## 2020-04-21 NOTE — PLAN OF CARE
Neuro: AOx4.     Respiratory: RA. Personal CPAP used during HS.     Cardiac: WNL.     GI/: Bowel sounds audible x4 quads. BM 04/20. Adequate urine OP.     Skin: Ecchymotic, erthyemia present at surgical sites. UTV pacemaker incision site; dressing CDI.     Pain: Denies.     Mobility: Ax1 c/ GB, walker.     Diet: CL.    IVF: -    Plan of Care: Monitor pain; cardiac activity. K replaced x1.      Will continue to monitor.

## 2020-04-22 ENCOUNTER — HOSPITAL ENCOUNTER (INPATIENT)
Facility: CLINIC | Age: 75
LOS: 9 days | Discharge: HOME HEALTH | DRG: 949 | End: 2020-05-01
Attending: PHYSICAL MEDICINE & REHABILITATION | Admitting: PHYSICAL MEDICINE & REHABILITATION
Payer: MEDICARE

## 2020-04-22 VITALS
OXYGEN SATURATION: 96 % | HEART RATE: 66 BPM | BODY MASS INDEX: 47.57 KG/M2 | SYSTOLIC BLOOD PRESSURE: 112 MMHG | HEIGHT: 65 IN | RESPIRATION RATE: 16 BRPM | DIASTOLIC BLOOD PRESSURE: 42 MMHG | TEMPERATURE: 97.5 F | WEIGHT: 285.5 LBS

## 2020-04-22 DIAGNOSIS — K21.9 GASTROESOPHAGEAL REFLUX DISEASE, ESOPHAGITIS PRESENCE NOT SPECIFIED: Primary | ICD-10-CM

## 2020-04-22 DIAGNOSIS — R53.81 DEBILITY: ICD-10-CM

## 2020-04-22 DIAGNOSIS — D62 ACUTE BLOOD LOSS ANEMIA: ICD-10-CM

## 2020-04-22 DIAGNOSIS — Z95.2 S/P AORTIC VALVE AND MITRAL VALVE REPLACEMENT: ICD-10-CM

## 2020-04-22 DIAGNOSIS — K21.00 GASTROESOPHAGEAL REFLUX DISEASE WITH ESOPHAGITIS: ICD-10-CM

## 2020-04-22 PROBLEM — E87.70 FLUID OVERLOAD: Status: ACTIVE | Noted: 2020-04-22

## 2020-04-22 LAB
ANION GAP SERPL CALCULATED.3IONS-SCNC: 6 MMOL/L (ref 3–14)
BUN SERPL-MCNC: 21 MG/DL (ref 7–30)
CALCIUM SERPL-MCNC: 9.5 MG/DL (ref 8.5–10.1)
CHLORIDE SERPL-SCNC: 109 MMOL/L (ref 94–109)
CO2 SERPL-SCNC: 25 MMOL/L (ref 20–32)
CREAT SERPL-MCNC: 0.93 MG/DL (ref 0.52–1.04)
ERYTHROCYTE [DISTWIDTH] IN BLOOD BY AUTOMATED COUNT: 13.8 % (ref 10–15)
GFR SERPL CREATININE-BSD FRML MDRD: 61 ML/MIN/{1.73_M2}
GLUCOSE BLDC GLUCOMTR-MCNC: 113 MG/DL (ref 70–99)
GLUCOSE BLDC GLUCOMTR-MCNC: 195 MG/DL (ref 70–99)
GLUCOSE BLDC GLUCOMTR-MCNC: 202 MG/DL (ref 70–99)
GLUCOSE BLDC GLUCOMTR-MCNC: 80 MG/DL (ref 70–99)
GLUCOSE SERPL-MCNC: 144 MG/DL (ref 70–99)
HCT VFR BLD AUTO: 26.9 % (ref 35–47)
HGB BLD-MCNC: 8.6 G/DL (ref 11.7–15.7)
INTERPRETATION ECG - MUSE: NORMAL
MCH RBC QN AUTO: 31.3 PG (ref 26.5–33)
MCHC RBC AUTO-ENTMCNC: 32 G/DL (ref 31.5–36.5)
MCV RBC AUTO: 98 FL (ref 78–100)
PLATELET # BLD AUTO: 271 10E9/L (ref 150–450)
POTASSIUM SERPL-SCNC: 4 MMOL/L (ref 3.4–5.3)
RBC # BLD AUTO: 2.75 10E12/L (ref 3.8–5.2)
SODIUM SERPL-SCNC: 140 MMOL/L (ref 133–144)
WBC # BLD AUTO: 9.6 10E9/L (ref 4–11)

## 2020-04-22 PROCEDURE — 25000131 ZZH RX MED GY IP 250 OP 636 PS 637: Mod: GY | Performed by: HOSPITALIST

## 2020-04-22 PROCEDURE — 97162 PT EVAL MOD COMPLEX 30 MIN: CPT | Mod: GP | Performed by: PHYSICAL THERAPIST

## 2020-04-22 PROCEDURE — 25000132 ZZH RX MED GY IP 250 OP 250 PS 637: Mod: GY | Performed by: PHYSICIAN ASSISTANT

## 2020-04-22 PROCEDURE — 40000239 ZZH STATISTIC VAT ROUNDS

## 2020-04-22 PROCEDURE — 00000146 ZZHCL STATISTIC GLUCOSE BY METER IP

## 2020-04-22 PROCEDURE — 25000132 ZZH RX MED GY IP 250 OP 250 PS 637: Mod: GY | Performed by: SURGERY

## 2020-04-22 PROCEDURE — 80048 BASIC METABOLIC PNL TOTAL CA: CPT | Performed by: SURGERY

## 2020-04-22 PROCEDURE — 25000132 ZZH RX MED GY IP 250 OP 250 PS 637: Mod: GY | Performed by: INTERNAL MEDICINE

## 2020-04-22 PROCEDURE — 99223 1ST HOSP IP/OBS HIGH 75: CPT | Mod: AI | Performed by: PHYSICAL MEDICINE & REHABILITATION

## 2020-04-22 PROCEDURE — 97530 THERAPEUTIC ACTIVITIES: CPT | Mod: GP | Performed by: PHYSICAL THERAPIST

## 2020-04-22 PROCEDURE — 36415 COLL VENOUS BLD VENIPUNCTURE: CPT | Performed by: SURGERY

## 2020-04-22 PROCEDURE — 97166 OT EVAL MOD COMPLEX 45 MIN: CPT | Mod: GO | Performed by: OCCUPATIONAL THERAPIST

## 2020-04-22 PROCEDURE — 99232 SBSQ HOSP IP/OBS MODERATE 35: CPT | Performed by: INTERNAL MEDICINE

## 2020-04-22 PROCEDURE — 12800006 ZZH R&B REHAB

## 2020-04-22 PROCEDURE — 85027 COMPLETE CBC AUTOMATED: CPT | Performed by: SURGERY

## 2020-04-22 PROCEDURE — 25000131 ZZH RX MED GY IP 250 OP 636 PS 637: Mod: GY | Performed by: PHYSICIAN ASSISTANT

## 2020-04-22 RX ORDER — POLYETHYLENE GLYCOL 3350 17 G/17G
17 POWDER, FOR SOLUTION ORAL 2 TIMES DAILY PRN
Status: DISCONTINUED | OUTPATIENT
Start: 2020-04-22 | End: 2020-05-01 | Stop reason: HOSPADM

## 2020-04-22 RX ORDER — ACETAMINOPHEN 325 MG/1
650 TABLET ORAL EVERY 4 HOURS PRN
Status: DISCONTINUED | OUTPATIENT
Start: 2020-04-22 | End: 2020-05-01 | Stop reason: HOSPADM

## 2020-04-22 RX ORDER — VITAMIN B COMPLEX
25 TABLET ORAL DAILY
Refills: 0
Start: 2020-04-22 | End: 2021-08-30

## 2020-04-22 RX ORDER — NALOXONE HYDROCHLORIDE 0.4 MG/ML
.1-.4 INJECTION, SOLUTION INTRAMUSCULAR; INTRAVENOUS; SUBCUTANEOUS
Status: DISCONTINUED | OUTPATIENT
Start: 2020-04-22 | End: 2020-05-01 | Stop reason: HOSPADM

## 2020-04-22 RX ORDER — DEXTROSE MONOHYDRATE 25 G/50ML
25-50 INJECTION, SOLUTION INTRAVENOUS
Status: DISCONTINUED | OUTPATIENT
Start: 2020-04-22 | End: 2020-05-01 | Stop reason: HOSPADM

## 2020-04-22 RX ORDER — ASPIRIN 325 MG
325 TABLET, DELAYED RELEASE (ENTERIC COATED) ORAL DAILY
Refills: 0
Start: 2020-04-22 | End: 2020-06-10

## 2020-04-22 RX ORDER — ATORVASTATIN CALCIUM 40 MG/1
40 TABLET, FILM COATED ORAL AT BEDTIME
Status: DISCONTINUED | OUTPATIENT
Start: 2020-04-22 | End: 2020-05-01 | Stop reason: HOSPADM

## 2020-04-22 RX ORDER — NICOTINE POLACRILEX 4 MG
15-30 LOZENGE BUCCAL
Status: DISCONTINUED | OUTPATIENT
Start: 2020-04-22 | End: 2020-05-01 | Stop reason: HOSPADM

## 2020-04-22 RX ORDER — FERROUS GLUCONATE 324(38)MG
324 TABLET ORAL
Status: DISCONTINUED | OUTPATIENT
Start: 2020-04-23 | End: 2020-05-01 | Stop reason: HOSPADM

## 2020-04-22 RX ORDER — FUROSEMIDE 40 MG
40 TABLET ORAL EVERY MORNING
Status: DISCONTINUED | OUTPATIENT
Start: 2020-04-23 | End: 2020-04-27

## 2020-04-22 RX ORDER — LOSARTAN POTASSIUM 50 MG/1
50 TABLET ORAL EVERY MORNING
Status: DISCONTINUED | OUTPATIENT
Start: 2020-04-23 | End: 2020-04-27

## 2020-04-22 RX ORDER — OXYCODONE AND ACETAMINOPHEN 5; 325 MG/1; MG/1
1 TABLET ORAL EVERY 4 HOURS PRN
Status: DISCONTINUED | OUTPATIENT
Start: 2020-04-22 | End: 2020-04-27

## 2020-04-22 RX ORDER — METOPROLOL SUCCINATE 50 MG/1
50 TABLET, EXTENDED RELEASE ORAL DAILY
Status: DISCONTINUED | OUTPATIENT
Start: 2020-04-23 | End: 2020-05-01 | Stop reason: HOSPADM

## 2020-04-22 RX ORDER — POLYETHYLENE GLYCOL 3350 17 G/17G
17 POWDER, FOR SOLUTION ORAL 2 TIMES DAILY PRN
Refills: 0
Start: 2020-04-22 | End: 2020-07-09

## 2020-04-22 RX ORDER — AMOXICILLIN 250 MG
1 CAPSULE ORAL 2 TIMES DAILY PRN
Status: DISCONTINUED | OUTPATIENT
Start: 2020-04-22 | End: 2020-05-01 | Stop reason: HOSPADM

## 2020-04-22 RX ORDER — AMOXICILLIN 250 MG
1 CAPSULE ORAL 2 TIMES DAILY
Refills: 0
Start: 2020-04-22 | End: 2020-07-09

## 2020-04-22 RX ORDER — VITAMIN B COMPLEX
25 TABLET ORAL DAILY
Status: DISCONTINUED | OUTPATIENT
Start: 2020-04-23 | End: 2020-05-01 | Stop reason: HOSPADM

## 2020-04-22 RX ORDER — CLINDAMYCIN HCL 300 MG
300 CAPSULE ORAL 4 TIMES DAILY
Status: COMPLETED | OUTPATIENT
Start: 2020-04-22 | End: 2020-04-30

## 2020-04-22 RX ORDER — ACETAMINOPHEN 325 MG/1
650 TABLET ORAL EVERY 4 HOURS PRN
Refills: 0 | Status: ON HOLD
Start: 2020-04-22 | End: 2022-01-01

## 2020-04-22 RX ORDER — FAMOTIDINE 20 MG/1
20 TABLET, FILM COATED ORAL 2 TIMES DAILY
Status: COMPLETED | OUTPATIENT
Start: 2020-04-22 | End: 2020-04-27

## 2020-04-22 RX ORDER — METFORMIN HCL 500 MG
1000 TABLET, EXTENDED RELEASE 24 HR ORAL
Status: DISCONTINUED | OUTPATIENT
Start: 2020-04-23 | End: 2020-05-01 | Stop reason: HOSPADM

## 2020-04-22 RX ORDER — METOPROLOL SUCCINATE 50 MG/1
50 TABLET, EXTENDED RELEASE ORAL DAILY
Qty: 135 TABLET | Refills: 3
Start: 2020-04-22 | End: 2020-06-10

## 2020-04-22 RX ORDER — OXYCODONE AND ACETAMINOPHEN 5; 325 MG/1; MG/1
1 TABLET ORAL EVERY 4 HOURS PRN
Qty: 10 TABLET | Refills: 0 | Status: ON HOLD | OUTPATIENT
Start: 2020-04-22 | End: 2020-05-01

## 2020-04-22 RX ORDER — FERROUS GLUCONATE 324(38)MG
324 TABLET ORAL
Refills: 0 | Status: ON HOLD
Start: 2020-04-23 | End: 2020-05-01

## 2020-04-22 RX ORDER — FAMOTIDINE 20 MG/1
TABLET, FILM COATED ORAL
Status: ON HOLD
Start: 2020-04-22 | End: 2020-05-01

## 2020-04-22 RX ORDER — MAGNESIUM OXIDE 400 MG/1
400 TABLET ORAL EVERY EVENING
Status: DISCONTINUED | OUTPATIENT
Start: 2020-04-22 | End: 2020-04-23

## 2020-04-22 RX ORDER — CLINDAMYCIN HCL 300 MG
300 CAPSULE ORAL 4 TIMES DAILY
Refills: 0 | Status: ON HOLD
Start: 2020-04-22 | End: 2020-05-01

## 2020-04-22 RX ADMIN — INSULIN HUMAN 32 UNITS: 100 INJECTION, SUSPENSION SUBCUTANEOUS at 09:03

## 2020-04-22 RX ADMIN — CALCIUM CARBONATE 600 MG (1,500 MG)-VITAMIN D3 400 UNIT TABLET 1 TABLET: at 21:25

## 2020-04-22 RX ADMIN — METOPROLOL TARTRATE 25 MG: 25 TABLET ORAL at 09:18

## 2020-04-22 RX ADMIN — INSULIN ASPART 1 UNITS: 100 INJECTION, SOLUTION INTRAVENOUS; SUBCUTANEOUS at 09:03

## 2020-04-22 RX ADMIN — CLINDAMYCIN HYDROCHLORIDE 300 MG: 300 CAPSULE ORAL at 21:24

## 2020-04-22 RX ADMIN — LOSARTAN POTASSIUM 50 MG: 50 TABLET, FILM COATED ORAL at 09:18

## 2020-04-22 RX ADMIN — INSULIN HUMAN 32 UNITS: 100 INJECTION, SUSPENSION SUBCUTANEOUS at 16:51

## 2020-04-22 RX ADMIN — ATORVASTATIN CALCIUM 40 MG: 40 TABLET, FILM COATED ORAL at 21:25

## 2020-04-22 RX ADMIN — FUROSEMIDE 40 MG: 40 TABLET ORAL at 09:17

## 2020-04-22 RX ADMIN — PANTOPRAZOLE SODIUM 40 MG: 40 TABLET, DELAYED RELEASE ORAL at 09:17

## 2020-04-22 RX ADMIN — OXYCODONE HYDROCHLORIDE AND ACETAMINOPHEN 1 TABLET: 5; 325 TABLET ORAL at 11:23

## 2020-04-22 RX ADMIN — FAMOTIDINE 20 MG: 20 TABLET ORAL at 21:25

## 2020-04-22 RX ADMIN — SERTRALINE HYDROCHLORIDE 150 MG: 100 TABLET ORAL at 09:17

## 2020-04-22 RX ADMIN — CLINDAMYCIN HYDROCHLORIDE 300 MG: 300 CAPSULE ORAL at 11:23

## 2020-04-22 RX ADMIN — CLINDAMYCIN HYDROCHLORIDE 300 MG: 300 CAPSULE ORAL at 16:51

## 2020-04-22 RX ADMIN — INSULIN ASPART 2 UNITS: 100 INJECTION, SOLUTION INTRAVENOUS; SUBCUTANEOUS at 16:49

## 2020-04-22 RX ADMIN — Medication 400 MG: at 21:25

## 2020-04-22 RX ADMIN — FERROUS GLUCONATE 324 MG: 324 TABLET ORAL at 09:18

## 2020-04-22 RX ADMIN — ASPIRIN 325 MG ORAL TABLET 325 MG: 325 PILL ORAL at 09:17

## 2020-04-22 RX ADMIN — CLINDAMYCIN HYDROCHLORIDE 300 MG: 300 CAPSULE ORAL at 05:03

## 2020-04-22 ASSESSMENT — ACTIVITIES OF DAILY LIVING (ADL)
ADLS_ACUITY_SCORE: 15
PREVIOUS_RESPONSIBILITIES: MEAL PREP;HOUSEKEEPING;LAUNDRY;MEDICATION MANAGEMENT;FINANCES;SHOPPING

## 2020-04-22 ASSESSMENT — MIFFLIN-ST. JEOR
SCORE: 1800.88
SCORE: 1808.16

## 2020-04-22 NOTE — DISCHARGE SUMMARY
"Discharge Summary    Amy Luna MRN# [id number]   YOB: 1945 Age: 74 year old     Date of Admission:  4/15/2020  Date of Discharge:  4/22/2020  Admitting Physician:  Ok Wilson MD  Discharge Physician:  Ok Wilson,*  Discharging Service:  Cardiovascular and Thoracic Surgery     Home clinic:   [address], [place]      Primary Phone: [phone] Primary Fax: [phone]       Primary Provider: Candy Frederick          Admission Diagnoses:   Tricuspid valve regurgitation [I07.1]  Aortic stenosis [I35.0]  Mitral stenosis [I05.0]  Patent foramen ovale [Q21.1]          Discharge Diagnosis:   Patient Active Problem List   Diagnosis     Aortic sten -- S/P Bioprost AVR 4/15/20     Hypertension     Hyperlipidemia     LAVERNE on CPAP     Respiratory failure (H)     Obesity     DM type 2, Hgb A1C 6.5 on 4/8/20     Depressive disorder     Mitral stenosis -- S/P Bioprost MVR 4/15/19     Mitral annular calcification     (HFpEF) heart failure with preserved ejection fraction (H)     Mitral valve stenosis, unspecified etiology     Tricuspid valve Regur -- S/P Repair 4/15/20     Patent foramen ovale -- S/P Closure 4/15/20     Fractured atrial pacemaker lead -- Replace 4/16/20     Acute blood loss anemia     CLARITA (acute kidney injury) (H)     Fluid overload                Discharge Disposition:   Discharged to rehabilitation facility           Condition on Discharge:   Discharge condition: Stable   Discharge vitals: Blood pressure 112/42, pulse 66, temperature 97.5  F (36.4  C), temperature source Oral, resp. rate 16, height 1.651 m (5' 5\"), weight 129.5 kg (285 lb 7.9 oz), SpO2 96 %.     Code status on discharge: Full Code           Procedures:   On 4/15/2020, Amy Luna underwent successful Mitral valve replacement with a 27 mm St. Juan Epic (porcine bioprosthetic) valve, aortic valve replacement with a 25 mm Rubi Inspiris Resilia bovine pericardial " valve, tricuspid valve repair with a 26 mm Rubi MC3 annuloplasty ring, PFO closure, intraoperative SOCORRO by Dr. Ok Wilson    On 4/20/20, Amy uLna underwent successful atrial lead revision by Dr. Francis.         Medications Prior to Admission:     Medications Prior to Admission   Medication Sig Dispense Refill Last Dose     albuterol (PROAIR HFA/PROVENTIL HFA/VENTOLIN HFA) 108 (90 Base) MCG/ACT inhaler Inhale 2 puffs into the lungs every 4 hours as needed for shortness of breath / dyspnea or wheezing   more than a month     atorvastatin (LIPITOR) 40 MG tablet Take 40 mg by mouth At Bedtime    4/14/2020 at PM     Biotin 64234 MCG TABS Take 1,000 mcg by mouth every morning    4/14/2020 at AM     Calcium Carbonate-Vit D-Min (CALCIUM 1200 PO) Take 1 tablet by mouth every evening    4/14/2020 at PM     coenzyme Q-10 200 MG CAPS Take 200 mg by mouth every morning    4/9/2020 at AM     furosemide (LASIX) 40 MG tablet Take 40 mg by mouth every morning   4/14/2020 at AM     glipiZIDE (GLUCOTROL XL) 10 MG 24 hr tablet Take 20 mg by mouth every morning (takes 2 x 10mg)   4/14/2020 at AM     ipratropium (ATROVENT) 0.06 % nasal spray Spray 2 sprays into both nostrils 4 times daily as needed for rhinitis   more than a month at AM     losartan (COZAAR) 50 MG tablet Take 50 mg by mouth every morning   4/14/2020 at AM     Lutein 40 MG CAPS Take 40 mg by mouth every morning    4/14/2020 at AM     Magnesium 400 MG TABS Take 400 mg by mouth every evening    4/14/2020 at PM     metFORMIN (GLUCOPHAGE-XR) 500 MG 24 hr tablet Take 1,000 mg by mouth daily (with breakfast) (takes 2 x 500mg)   4/14/2020 at AM     Propylene Glycol (SYSTANE BALANCE OP) Apply 1-2 drops to eye 3 times daily as needed (dry eyes)   Past Month at Unknown time     sertraline (ZOLOFT) 100 MG tablet Take 150 mg by mouth every morning (takes 1.5 x 100mg)   4/14/2020 at AM     TURMERIC PO Take 1-3 tablets by mouth daily    4/14/2020 at Unknown time     vitamin  (B COMPLEX-C) tablet Take 1 tablet by mouth daily   4/14/2020 at AM     vitamin C (ASCORBIC ACID) 1000 MG TABS Take 1,000 mg by mouth every evening    4/14/2020 at PM     [DISCONTINUED] aspirin 81 MG EC tablet Take 81 mg by mouth every morning     at AM     [DISCONTINUED] insulin NPH (HUMULIN N/NOVOLIN N VIAL) 100 UNIT/ML vial Inject 50 Units Subcutaneous 2 times daily   4/14/2020 at Unknown time     [DISCONTINUED] metoprolol succinate ER (TOPROL-XL) 50 MG 24 hr tablet Take 1.5 tablets (75 mg) by mouth every morning 135 tablet 3 4/15/2020 at AM     [DISCONTINUED] potassium 99 MG TABS Take 99 mg by mouth every evening    4/14/2020 at PM     [DISCONTINUED] VITAMIN D PO Take 1 Dose by mouth every evening    4/14/2020 at PM             Discharge Medications:     Current Discharge Medication List      START taking these medications    Details   acetaminophen (TYLENOL) 325 MG tablet Take 2 tablets (650 mg) by mouth every 4 hours as needed for other (multimodal surgical pain management along with NSAIDS and opioid medication as indicated based on pain control and physical function.)  Qty:  , Refills: 0    Associated Diagnoses: Nonrheumatic aortic valve stenosis      clindamycin (CLEOCIN) 300 MG capsule Take 1 capsule (300 mg) by mouth 4 times daily for 9 days  Refills: 0    Comments: For chest incision  Associated Diagnoses: Wound infection      famotidine (PEPCID) 20 MG tablet 20 mg bid for 5 days, then 20 mg bid as needed for heartburn -- for possible stress induced gastritis.    Associated Diagnoses: Heartburn      ferrous gluconate (FERGON) 324 (38 Fe) MG tablet Take 1 tablet (324 mg) by mouth daily (with breakfast)  Qty:  , Refills: 0    Comments: For 1 month for anemia  Associated Diagnoses: Acute blood loss anemia      insulin aspart (NOVOLOG PEN) 100 UNIT/ML pen 3 times a day with meals, for glucometer 150-200 give 2 units SQ, 201-250 give 4 units, >250 give 6 units    Associated Diagnoses: Type 2 diabetes mellitus  without complication, with long-term current use of insulin (H)      oxyCODONE-acetaminophen (PERCOCET) 5-325 MG tablet Take 1 tablet by mouth every 4 hours as needed for moderate to severe pain  Qty: 10 tablet, Refills: 0    Associated Diagnoses: Nonrheumatic aortic valve stenosis      polyethylene glycol (MIRALAX) 17 g packet Take 17 g by mouth 2 times daily as needed for constipation  Refills: 0    Associated Diagnoses: Drug-induced constipation      senna-docusate (SENOKOT-S/PERICOLACE) 8.6-50 MG tablet Take 1 tablet by mouth 2 times daily  Qty:  , Refills: 0    Associated Diagnoses: Drug-induced constipation         CONTINUE these medications which have CHANGED    Details   aspirin (ASA) 325 MG EC tablet Take 1 tablet (325 mg) by mouth daily  Qty:  , Refills: 0    Comments: For heart surgery  Associated Diagnoses: Nonrheumatic aortic valve stenosis      insulin isophane human (HUMULIN N PEN) 100 UNIT/ML injection Inject 32 Units Subcutaneous 2 times daily (before meals)  Qty:  , Refills: 0    Associated Diagnoses: Type 2 diabetes mellitus without complication, with long-term current use of insulin (H)      metoprolol succinate ER (TOPROL-XL) 50 MG 24 hr tablet Take 1 tablet (50 mg) by mouth daily  Qty: 135 tablet, Refills: 3    Associated Diagnoses: Essential hypertension      Vitamin D3 (CHOLECALCIFEROL) 25 mcg (1000 units) tablet Take 1 tablet (25 mcg) by mouth daily  Refills: 0    Associated Diagnoses: Preventative health care         CONTINUE these medications which have NOT CHANGED    Details   albuterol (PROAIR HFA/PROVENTIL HFA/VENTOLIN HFA) 108 (90 Base) MCG/ACT inhaler Inhale 2 puffs into the lungs every 4 hours as needed for shortness of breath / dyspnea or wheezing    Comments: Pharmacy may dispense brand covered by insurance (Proair, or proventil or ventolin or generic albuterol inhaler)      atorvastatin (LIPITOR) 40 MG tablet Take 40 mg by mouth At Bedtime       Biotin 78000 MCG TABS Take 1,000  mcg by mouth every morning       Calcium Carbonate-Vit D-Min (CALCIUM 1200 PO) Take 1 tablet by mouth every evening       coenzyme Q-10 200 MG CAPS Take 200 mg by mouth every morning       furosemide (LASIX) 40 MG tablet Take 40 mg by mouth every morning      glipiZIDE (GLUCOTROL XL) 10 MG 24 hr tablet Take 20 mg by mouth every morning (takes 2 x 10mg)      ipratropium (ATROVENT) 0.06 % nasal spray Spray 2 sprays into both nostrils 4 times daily as needed for rhinitis      losartan (COZAAR) 50 MG tablet Take 50 mg by mouth every morning      Lutein 40 MG CAPS Take 40 mg by mouth every morning       Magnesium 400 MG TABS Take 400 mg by mouth every evening       metFORMIN (GLUCOPHAGE-XR) 500 MG 24 hr tablet Take 1,000 mg by mouth daily (with breakfast) (takes 2 x 500mg)      Propylene Glycol (SYSTANE BALANCE OP) Apply 1-2 drops to eye 3 times daily as needed (dry eyes)      sertraline (ZOLOFT) 100 MG tablet Take 150 mg by mouth every morning (takes 1.5 x 100mg)      TURMERIC PO Take 1-3 tablets by mouth daily       vitamin (B COMPLEX-C) tablet Take 1 tablet by mouth daily      vitamin C (ASCORBIC ACID) 1000 MG TABS Take 1,000 mg by mouth every evening          STOP taking these medications       potassium 99 MG TABS Comments:   Reason for Stopping:                     Consultations:   Nutrition, Intensivist, Electrophysiology, social work             Brief History of Illness:   Ms. Luna is a very pleasant 74-year-old morbidly obese female with type 2 diabetes who was recently found to have severe mitral valve stenosis, mild to moderate mitral regurgitation, moderate aortic stenosis and severe tricuspid valve regurgitation.  She was taken to the operating room today for MVR, AVR and tricuspid valve repair/replacement and a PFO closure.           Hospital Course:   On 4/15/2020, Amy Luna underwent successful Mitral valve replacement with a 27 mm St. Juan Epic (porcine bioprosthetic) valve, aortic valve  replacement with a 25 mm Rubi Inspiris Resilia bovine pericardial valve, tricuspid valve repair with a 26 mm Rubi MC3 annuloplasty ring, PFO closure, intraoperative SOCORRO by Dr. Ok Wilson    She was extubated within 24 hours of surgery per protocol and once weaned from hemodynamic stabilizing infusions was transferred to the surgical telemetry floor. She was found to have some dysfunction of her Medtronic atrial pacemaker lead, her ventricular lead was continuing to function. She was evaluated by EP and underwent successful atrial lead revision on 4/20/20.      She developed an CLARITA with peak creatinine at 1.65 and diuretics were initially held. This resolved prior to discharge and her discharge creatinine is 0.93. She was fluid overloaded and treated with IV diuretics then resumed on her PTA oral lasix at discharge.    She has history of diabetes and was hyperglycemic post operatively, she was treated with insulin infusion per protocol and was transitioned to insulin regimen per Hospitalist team. Insulin orders written by Hospitalist for discharge.     She was found to have some possible purulent drainage vs fat necrosis at the inferior aspect of incision. WBC Skin glue was removed, fluid expressed and incision was painted with iodine. Recommend keeping incision uncovered and continue use of abdominal binder on chest with DAILY and PRN cleaning of incision with soap and water and painted with iodine. Do not apply lotions, powders, oils, creams or ointments to incisions. Patient will discharge on 10 day course clindamycin.     She has been working well with therapies and is stable to discharge to ARU on POD#7. Provider handoff was performed. She will discharge on ASA, BB, statin. Follow up with cardiac surgery and cardiology have been arranged. Patient will need to follow up with cardiac rehab upon discharge from ARU.              Significant Results:   Lab Results   Component Value Date    WBC 9.6  04/22/2020     Lab Results   Component Value Date    RBC 2.75 04/22/2020     Lab Results   Component Value Date    HGB 8.6 04/22/2020     Lab Results   Component Value Date    HCT 26.9 04/22/2020     No components found for: MCT  Lab Results   Component Value Date    MCV 98 04/22/2020     Lab Results   Component Value Date    MCH 31.3 04/22/2020     Lab Results   Component Value Date    MCHC 32.0 04/22/2020     Lab Results   Component Value Date    RDW 13.8 04/22/2020     Lab Results   Component Value Date     04/22/2020       Last Basic Metabolic Panel:  Lab Results   Component Value Date     04/22/2020      Lab Results   Component Value Date    POTASSIUM 4.0 04/22/2020     Lab Results   Component Value Date    CHLORIDE 109 04/22/2020     Lab Results   Component Value Date    NELLY 9.5 04/22/2020     Lab Results   Component Value Date    CO2 25 04/22/2020     Lab Results   Component Value Date    BUN 21 04/22/2020     Lab Results   Component Value Date    CR 0.93 04/22/2020     Lab Results   Component Value Date     04/22/2020                  Pending Results:   None           Discharge Instructions and Follow-Up:   Discharge diet: Regular   Discharge activity: Daily weights: Call if weight gain 2-3 lbs over 24 hours or if greater than 5 lbs in 1 week.  No lifting more than 10 lbs for 8-12 weeks.  No driving for 1 month.  Call for pain medication refill.  Call for temperature greater than 101.0  Daily shower with antibacterial soap.   Discharge follow-up: Follow-up Appointments     Follow-up Appointments   Follow Up and recommended labs and tests     Follow up with Cardiac surgery team via telephone visit on 4/29/20 at 3pm, our providers will call ARU that day if you are still a patient there and will arrange another follow up call once you return home.   Follow up with Dr. Bustillo of Electrophysiology via telephone visit on 5/19/20 at 2:15pm.       Follow-up 5 days at acute rehab to check wound,  and check Hgb and BMP in 5 days.  May need to increase NPH insulin as patient's appetite improves (currently on NPH 32 units subcutaneous bid -- prior to surgery was on 50 units subcutaneous bid).            Outpatient therapy: Cardiac rehab   Home Care agency: None    Supplies and equipment: None   Lines and drains: None    Wound care: Wash incision daily with antibacterial soap   Other instructions: None

## 2020-04-22 NOTE — PLAN OF CARE
"CR: Attempted to see pt this AM. Pt just back into bed, SOB noted. States \"If I have to I have to, but just got back into bed.\" Reviewed need for breathing with activity, especially getting in/out of bed. Reviewed use of at least a partial logroll, to get completely on the side with legs in the bed before rolling to the back. Edu on use of counting aloud, saying a rhyme, anything to ensure she is breathing (exhaling) with activity. State she is just too tired now. Pt then with multiple questions for RN, eager to discuss discharge and labs.     Physical Therapy Discharge Summary    Reason for therapy discharge:    Discharged to acute rehabilitation facility.    Progress towards therapy goal(s). See goals on Care Plan in [facility] electronic health record for goal details.  Goals partially met.  Barriers to achieving goals:   limited tolerance for therapy, discharge from facility and Pt will benefit from continued skilled rehab services in a multidicipinary setting. Pt wtih good potential to return to independence..    Therapy recommendation(s):    Continued therapy is recommended.  Rationale/Recommendations:  see mateoe..      "

## 2020-04-22 NOTE — H&P
[facility]   Acute Rehabilitation Unit  Admission History and Physical    CHIEF COMPLAINT   Weakness and impaired activity tolerance s/p heart surgery    HISTORY OF PRESENT ILLNESS  Amy Luna is a 74 year old a woman with PMH of severe mitral stenosis, moderate aortic stenosis, severe tricuspid regurgitation, PPM 2015 for sick sinus syndrome, DM2, LAVERNE, HTN, HLD, morbid obesity who was admitted for planned valve replacement on 4/15/20 . She underwent successful aortic valve replacement, mitral valve replacement, tricuspid valve repair and PFO repair per Dr. Wilson.  Hospital stay complicated by AV lead malfunction s/p atrial lead revision per electrophysiology  4/20, CLARITA creatinine peaked at 1.65, leukocytosis, volume overload,  acute blood loss anemia, and possible incision infection for which she was started on oral antibiotics per cardiology 4/21/20.     During his acute hospitalization, patient was seen and evaluated by PT and OT, who collectively recommended that patient would benefit from ongoing therapies.     In review of the therapy notes patient currently completing supine to sit transfer with HOB at 45 degrees with min assist.  She is able to do Sit to stand transfer with SBA. Ms Luna is ambulating up to 125' with WW. Toilet transfers with CGA.  Completing seated lower body dressing with CGA.  Noted to have impaired strength and activity tolerance.     On arrival to rehab patient seen sitting up in chair.  Reports ongoing sob with activity with prolonged recovery, no fevers.  Reports incisional chest pain at times.  Denies n/v/, had soft BM yesterday, no urinary concerns reported. Notes chronic bilateral lower extremity edema with left typically more than right.   Hopeful to get stronger and more independent with basic cares such as transfers in and out of bed which she notes is particularly difficult right now.   PAST MEDICAL HISTORY   Reviewed and updated  in Epic.  Past Medical History:   Diagnosis Date     (HFpEF) heart failure with preserved ejection fraction (H)      Aortic stenosis      Chest pain      Depressive disorder      Diabetes (H)      Hyperlipidemia      Hypertension      Mitral annular calcification      Mitral stenosis      Obesity      Sleep apnea     CPAP       SURGICAL HISTORY  Reviewed and updated in Epic.  Past Surgical History:   Procedure Laterality Date     APPENDECTOMY       CV CORONARY ANGIOGRAM N/A 2020    Procedure: Coronary Angiogram;  Surgeon: Inez Peoples MD;  Location:  HEART CARDIAC CATH LAB     CV LEFT HEART CATH N/A 2020    Procedure: Left Heart Cath;  Surgeon: Inez Peoples MD;  Location:  HEART CARDIAC CATH LAB     CV PFO CLOSURE N/A 4/15/2020    Procedure: Patent Foramen Ovale Closure;  Surgeon: Ok Wilson MD;  Location:  OR     CV RIGHT HEART CATH N/A 2020    Procedure: Right Heart Cath;  Surgeon: Inez Peoples MD;  Location:  HEART CARDIAC CATH LAB     EP PACEMAKER N/A 2020    Procedure: EP Pacemaker;  Surgeon: Sohan Francis MD;  Location:  HEART CARDIAC CATH LAB     GYN SURGERY       x2 ,      GYN SURGERY      total hysterectomy     IMPLANT PACEMAKER       REPAIR VALVE TRICUSPID N/A 4/15/2020    Procedure: REPAIR TRICUSPID VALVE WITH VILLA MC3 26MM.;  Surgeon: Ok Wilson MD;  Location:  OR     REPLACE VALVE AORTIC N/A 4/15/2020    Procedure: REPLACEMENT, AORTIC VALVE WITH INSPIRIS TISSUE VALVE 25 MM; ON PUMP WITH SOCORRO READ BY CARDIOLOGIST DR BLANTON.;  Surgeon: Ok Wilson MD;  Location:  OR     REPLACE VALVE MITRAL N/A 4/15/2020    Procedure: REPLACEMENT, MITRAL VALVE WITH EPIC TISSUE VALVE 27 MM.;  Surgeon: Ok Wilson MD;  Location:  OR       SOCIAL HISTORY  Reviewed and updated in Epic.  Marital Status:   Living situation: lives with  7ste and 8 to main level  Family  support: supportive spouse and 3 children  Vocational History: retired  Tobacco use: no  Alcohol use: none  Illicit drug use: no  Social History     Socioeconomic History     Marital status:      Spouse name: Not on file     Number of children: Not on file     Years of education: Not on file     Highest education level: Not on file   Occupational History     Not on file   Social Needs     Financial resource strain: Not on file     Food insecurity     Worry: Not on file     Inability: Not on file     Transportation needs     Medical: Not on file     Non-medical: Not on file   Tobacco Use     Smoking status: Never Smoker     Smokeless tobacco: Never Used   Substance and Sexual Activity     Alcohol use: No     Drug use: No     Sexual activity: Not on file   Lifestyle     Physical activity     Days per week: Not on file     Minutes per session: Not on file     Stress: Not on file   Relationships     Social connections     Talks on phone: Not on file     Gets together: Not on file     Attends Yarsani service: Not on file     Active member of club or organization: Not on file     Attends meetings of clubs or organizations: Not on file     Relationship status: Not on file     Intimate partner violence     Fear of current or ex partner: Not on file     Emotionally abused: Not on file     Physically abused: Not on file     Forced sexual activity: Not on file   Other Topics Concern     Not on file   Social History Narrative     Not on file       FAMILY HISTORY  Reviewed and updated in Epic.      PRIOR FUNCTIONAL HISTORY   Pt was independent with all ADLs/IADLs, transfers, mobility and gait.      MEDICATIONS  Scheduled meds  Medications Prior to Admission   Medication Sig Dispense Refill Last Dose     albuterol (PROAIR HFA/PROVENTIL HFA/VENTOLIN HFA) 108 (90 Base) MCG/ACT inhaler Inhale 2 puffs into the lungs every 4 hours as needed for shortness of breath / dyspnea or wheezing        atorvastatin (LIPITOR) 40 MG  tablet Take 40 mg by mouth At Bedtime         Biotin 82444 MCG TABS Take 1,000 mcg by mouth every morning         Calcium Carbonate-Vit D-Min (CALCIUM 1200 PO) Take 1 tablet by mouth every evening         coenzyme Q-10 200 MG CAPS Take 200 mg by mouth every morning         furosemide (LASIX) 40 MG tablet Take 40 mg by mouth every morning        glipiZIDE (GLUCOTROL XL) 10 MG 24 hr tablet Take 20 mg by mouth every morning (takes 2 x 10mg)        ipratropium (ATROVENT) 0.06 % nasal spray Spray 2 sprays into both nostrils 4 times daily as needed for rhinitis        losartan (COZAAR) 50 MG tablet Take 50 mg by mouth every morning        Lutein 40 MG CAPS Take 40 mg by mouth every morning         Magnesium 400 MG TABS Take 400 mg by mouth every evening         metFORMIN (GLUCOPHAGE-XR) 500 MG 24 hr tablet Take 1,000 mg by mouth daily (with breakfast) (takes 2 x 500mg)        Propylene Glycol (SYSTANE BALANCE OP) Apply 1-2 drops to eye 3 times daily as needed (dry eyes)        sertraline (ZOLOFT) 100 MG tablet Take 150 mg by mouth every morning (takes 1.5 x 100mg)        TURMERIC PO Take 1-3 tablets by mouth daily         vitamin (B COMPLEX-C) tablet Take 1 tablet by mouth daily        vitamin C (ASCORBIC ACID) 1000 MG TABS Take 1,000 mg by mouth every evening         [DISCONTINUED] aspirin 81 MG EC tablet Take 81 mg by mouth every morning         [DISCONTINUED] insulin NPH (HUMULIN N/NOVOLIN N VIAL) 100 UNIT/ML vial Inject 50 Units Subcutaneous 2 times daily        [DISCONTINUED] metoprolol succinate ER (TOPROL-XL) 50 MG 24 hr tablet Take 1.5 tablets (75 mg) by mouth every morning 135 tablet 3      [DISCONTINUED] potassium 99 MG TABS Take 99 mg by mouth every evening         [DISCONTINUED] VITAMIN D PO Take 1 Dose by mouth every evening           ALLERGIES     Allergies   Allergen Reactions     Penicillins Hives         REVIEW OF SYSTEMS  A 10 point ROS was performed and negative unless otherwise noted in HPI.  "        PHYSICAL EXAM  VITAL SIGNS:  BP (!) 114/37   Pulse 70   Temp 98.6  F (37  C) (Oral)   Resp 20   Ht 1.651 m (5' 5\")   Wt 130.2 kg (287 lb 1.6 oz)   SpO2 95%   BMI 47.78 kg/m    BMI:  Estimated body mass index is 47.51 kg/m  as calculated from the following:    Height as of 4/17/20: 1.651 m (5' 5\").    Weight as of an earlier encounter on 4/22/20: 129.5 kg (285 lb 7.9 oz).     General: awake alert NAD  HEENT: mmm  Pulmonary: non labored on room air  Cardiovascular: per Dr. Ruiz  Abdominal: soft non tender non distended  Extremities: warm, well perfused, BLE pitting edema L>R no redness no tenderness in calves   MSK/neuro:   Mental Status:  alert and oriented   Cranial Nerves: grossly normal    Sensory: Normal to light touch in bilateral upper and lower extremities    Strength: strength 5/5 in tested muscles (excludes shoulders given sternal precautions)    Abnormal movements: None   Skin: sternal incision CDI with some distal ecchymosis, 2 upper abdominal chest tube site upper abdomen/low chest left with mild drainage.       LABS  Lab Results   Component Value Date    WBC 9.6 04/22/2020     Lab Results   Component Value Date    RBC 2.75 04/22/2020     Lab Results   Component Value Date    HGB 8.6 04/22/2020     Lab Results   Component Value Date    HCT 26.9 04/22/2020     Lab Results   Component Value Date    MCV 98 04/22/2020     Lab Results   Component Value Date    MCH 31.3 04/22/2020     Lab Results   Component Value Date    MCHC 32.0 04/22/2020     Lab Results   Component Value Date    RDW 13.8 04/22/2020     Lab Results   Component Value Date     04/22/2020     Last Comprehensive Metabolic Panel:  Sodium   Date Value Ref Range Status   04/22/2020 140 133 - 144 mmol/L Final     Potassium   Date Value Ref Range Status   04/22/2020 4.0 3.4 - 5.3 mmol/L Final     Chloride   Date Value Ref Range Status   04/22/2020 109 94 - 109 mmol/L Final     Carbon Dioxide   Date Value Ref Range Status "   04/22/2020 25 20 - 32 mmol/L Final     Anion Gap   Date Value Ref Range Status   04/22/2020 6 3 - 14 mmol/L Final     Glucose   Date Value Ref Range Status   04/22/2020 144 (H) 70 - 99 mg/dL Final     Urea Nitrogen   Date Value Ref Range Status   04/22/2020 21 7 - 30 mg/dL Final     Creatinine   Date Value Ref Range Status   04/22/2020 0.93 0.52 - 1.04 mg/dL Final     GFR Estimate   Date Value Ref Range Status   04/22/2020 61 >60 mL/min/[1.73_m2] Final     Comment:     Non  GFR Calc  Starting 12/18/2018, serum creatinine based estimated GFR (eGFR) will be   calculated using the Chronic Kidney Disease Epidemiology Collaboration   (CKD-EPI) equation.       Calcium   Date Value Ref Range Status   04/22/2020 9.5 8.5 - 10.1 mg/dL Final       IMPRESSION/PLAN:  Amy Luna is a 74 year old a woman with PMH of severe mitral stenosis, moderate aortic stenosis, severe tricuspid regurgitation, sick sinus syndrome s/p ppm, DM2, LAVERNE, HTN, HLD, morbid obesity who underwent  aortic valve replacement, mitral valve replacement, tricuspid valve repair and PFO repair 4/15.   Hospital stay was complicated by AV lead malfunction s/p atrial lead revision per electrophysiology  4/20, CLARITA, leukocytosis, volume overload,  acute blood loss anemia, and possible incision infection. Admitted to acute rehab 4/22 for ongoing rehabilitation and medical management.     Admission to acute inpatient rehab s/p MVR, AVR, tricuspid valve repair and PFO repair  Impairment group code: 09      1. PT, OT 60 minutes of each on a daily basis, in addition to rehab nursing and close management of physiatrist.      2. Impairment of ADL's: noted to have impaired strength and activity tolerance will benefit from ongoing OT for goals for MOD I with basic ADLS    3. Impairment of mobility:  Impaired strength, balance, and activity tolerance further limited by sternal precautions will benefit from ongoing PT with goal of MOD I with basic  mobility    4. Medical Conditions  Mitral stenosis -- S/P Bioprost MVR 4/15/19    Aortic sten -- S/P Bioprost AVR 4/15/20    Tricuspid valve Regurgitation -- S/P Repair 4/15/20    Patent foramen ovale -- S/P Closure 4/15/20  Surgery per Dr. Wilson.   -sternal precautions  -f/u CV Surgery  -oxycodone, tylenol prn pain  -continue asa,   -continue home lasix 40 mg daily  -continue metoprolol 50 mg daily    Sternal incision infection- with initiation of clindamycin 4/21 per CV surgery  -continue clindamycin to complete 10 day course  -keep incision clean and dry- cleanse daily/prn and paint with iodine per discharge instructions  -abd binder to chest per CV surgery recs    Atrial lead fracture- suspected to be damaged in 4/15 surgery. S/p atrial lead revision per Dr. Francis. History of PPM for symptomatic second degree and intermittent third degree block 2015.  -f/u device RN  -Do not raise your arm on the incision side above shoulder level for 3 weeks.   -You may remove the outer dressing in 3 - 4 days (Friday, 4/24). Leave the steri-strips in place.  You may shower once the outer dressing has been removed.     DM type 2- Hgb A1C 6.5% 4/8 PTA on NPH, glipizide, and metformin  -continue NPH 32 bid  -restart metformin  -hold glipizide  -continue SSI  -monitor bg and titrate as indicated    HTN- on ARU admission on lasix 40 mg daily, losartan 40 mg daily, metoprolol xl 50 mg daily  -continue and titrate as indicated    LAVERNE-continue home CPAP    CLARITA- peak creatinine at 1.65 4/16.   Cr 0.93 4/22  -trend    Acute Blood loss Anemia-  Hgb stable 8.6 4/22 prior to admission Hgb ~11    HLD- continue statin    Depression- continue Zoloft    5. Adjustment to disability:  Monitor mood  6. FEN: diabetic  7. Bowel: continent  8. Bladder: continent  9. DVT Prophylaxis: mechanical/ambulation  10. GI Prophylaxis: PPI  11. Code: full confirmed on admission  12. Disposition: home  13. ELOS:  ~2 weeks  14. Rehab prognosis:   good  15. Follow up Appointments on Discharge: cardiology, CV surgery, PCP        Seen and discussed with Dr. Ruiz , PM&R staff physician     Caron Pham PA-C  Rehab Service  Pager [phone]

## 2020-04-22 NOTE — PLAN OF CARE
Post Admission Physician Evaluation:    I have compared Amy Atkinsons condition on admission to acute rehabilitation to that outlined in the preadmission screen. History and physical exam performed by me. No significant differences are identified and the patient remains appropriate for an inpatient rehabilitation facility level of care to manage medical issues and address functional impairments due to debility s/p AVR, MVR, TVR and PPM.    Comorbid medical conditions being managed: morbid obesity, LAVERNE, HTN, HLD, DM2    Prior functional level: independent in mobility and ADL    Present function: Min A. Sit<>stand SBA. Ambulated with use of ' x 2 with WW.  OT: Pt transfers to/from the toilet with CGA. LE dressing with CGA and set up. Pt demonstrates increase in fatigue and SOB with activity.    Anticipated rehabilitation course:   Will benefit from intensive rehabilitation includin minutes each of PT and OT  Rehabilitation nursing  Close management by physiatry    Prognosis: good    Estimated length of stay: 2 weeks

## 2020-04-22 NOTE — CONSULTS
20 1400   Living Arrangements   Lives With spouse   Living Arrangements house   Able to Return to Prior Arrangements yes   Living Arrangement Comments STI and TEDDY    Home Safety   Patient Feels Safe Living in Home? yes   Discharge Planning   Patient/Family Anticipates Transition to home with family  (ELOS 14 days )   Disposition Comments  can take time off to assist    Concerns to be Addressed no discharge needs identified   Plan   Plan Home, OP vs HHC pending eval.      Social Work: Initial Assessment with Discharge Plan    Patient Name: Amy Luna  : 1945  Age: 74 year old  MRN: [id number]  Completed assessment with: Chart review and interview with pt   Admitted to ARU: TODAY 2020    Presenting Information   Date of SW assessment: 2020  Health Care Directive: Health Care Directive Agent (if patient not able to make decisions)  Primary Health Care Agent: Patient/self   Secondary Health Care Agent: Pt , SHILPIK   Living Situation: Pt lives with spouse in a 3 level home - laundry level 1, kitchen level 2, and bedroom level 3. (3 levels 8 stairs to 3rd level).  Previous Functional Status: Independent with ADLs and IADLs PTA. No use of assistive device. Denied falls. Manages own medications and finances. No [facility] services or hired in help reported.   DME available: None reported  Patient and family understanding of hospitalization: Appropriate   Cultural/Language/Spiritual Considerations: [employer] Day [employer], 73 y/o  woman. English-speaking.       Physical Health  Reason for admission: S/P aortic valve and mitral valve replacement     Provider Information   Primary Care Physician:Candy Frederick   [facility] [address], [place]   PH: [phone]  : None reported     Mental Health/Chemical Dependency:   Diagnosis: Depressive disorder listed in pt chart.   Alcohol/Tobacco/Narcotis: denied   Support/Services in Place: None  reported   Services Needed/Recommended: Supportive services available by consult if needed   Sexuality/Intimacy: Not discussed     Support System  Marital Status: .  still working. Able to physically assist at discharge and planning to take time off work.   Family support: Veena listed in chart as pt dtr. When asked, pt denied having any children.   Other support available: None discussed     Community Resources  Current in home services: None reported   Previous services: None reported or documented in chart review     Financial/Employment/Education  Employment Status: Retired. Worked different kinds of jobs.   Income Source: Social security   Education: Not discussed   Financial Concerns:  Denied   Insurance: Medicare and [employer] Supplement     Discharge Plan   Patient and family discharge goal: Home   Provided Education on discharge plan: YES  Patient agreeable to discharge plan:  YES  Provided education and attained signature for Medicare IM and IRF Patient Rights and Privacy Information provided to patient : YES  Provided patient with [facility] Resources: N/A  Barriers to discharge: None identified at this time     Discharge Recommendations   Disposition: Home with family, HHC vs OP pending progress  Transportation Needs: Family assist   Name of Transportation Company and Phone: N/A     Additional comments   ELOS 7 days. Final recs pending. Pt  able to assist at discharge. No immediate needs or concerns. SWer will continue to follow.     Please invite to Care Conference:  Pt        Sonia Riddle, LICSHAY, CAD-IL  [facility]   Phone: [phone]  I   Pager: [phone]

## 2020-04-22 NOTE — PLAN OF CARE
Pt arrived to the unit at about 1400 and was admitted to room 508. She was alert and oriented x 4, she denied pain on arrival. She needed supervision to get in to regular chair from a wheel chair without assistive device. She was oriented to the room, call light system, bed control, meal times and routines of the unit. She needs skin assessment and PTA within the next 24 hrs.

## 2020-04-22 NOTE — PROGRESS NOTES
CV Surgery    S: No acute events overnight. Saturating well on room air. Pain controlled. Tolerating diet. +BM    O: B/P: 112/42, T: 97.5, P: 66, R: 16  General: NAD  Heart: RRR normal s1 and s2  Lungs: diminished bases  Abd: soft NTND  Sternum: CDI- no erythema, edema or drainage at lower aspect of incision.  Extremities: minimal edema    Lab Results   Component Value Date    WBC 9.6 04/22/2020     Lab Results   Component Value Date    RBC 2.75 04/22/2020     Lab Results   Component Value Date    HGB 8.6 04/22/2020     Lab Results   Component Value Date    HCT 26.9 04/22/2020     No components found for: MCT  Lab Results   Component Value Date    MCV 98 04/22/2020     Lab Results   Component Value Date    MCH 31.3 04/22/2020     Lab Results   Component Value Date    MCHC 32.0 04/22/2020     Lab Results   Component Value Date    RDW 13.8 04/22/2020     Lab Results   Component Value Date     04/22/2020       Last Basic Metabolic Panel:  Lab Results   Component Value Date     04/22/2020      Lab Results   Component Value Date    POTASSIUM 4.0 04/22/2020     Lab Results   Component Value Date    CHLORIDE 109 04/22/2020     Lab Results   Component Value Date    NELLY 9.5 04/22/2020     Lab Results   Component Value Date    CO2 25 04/22/2020     Lab Results   Component Value Date    BUN 21 04/22/2020     Lab Results   Component Value Date    CR 0.93 04/22/2020     Lab Results   Component Value Date     04/22/2020       A/P: POD# 7 s/p Mitral valve replacement with a 27 mm [place] Epic (porcine bioprosthetic) valve, aortic valve replacement with a 25 mm Rubi Inspiris Resilia bovine pericardial valve, tricuspid valve repair with a 26 mm Rubi MC3 annuloplasty ring, PFO closure, intraoperative SOCORRO by Dr. Ok Wilson    POD#2 s/p atrial lead revision by Dr. Francis    Plan for discharge to ARU today.  Will continue abx therapy for 10days for possible soft tissue infection and continue incision  [facility].    Arabella Angulo PA-C  Pager [phone]

## 2020-04-22 NOTE — PROGRESS NOTES
SHAY  D: Pt to discharge to [facility] @1245 via [facility] transport w/c gilberto.     P: Will continue to follow     JAMES Valero     [facility]

## 2020-04-22 NOTE — PLAN OF CARE
Neuro: AOx4.     Respiratory: RA. Personal CPAP used during HS.     Cardiac: WNL.     GI/: Bowel sounds audible x4 quads. BM 04/20. Adequate urine OP.     Skin: Ecchymotic, erthyemia present at surgical sites. UTV pacemaker incision site; dressing CDI.     Pain: Denies.     Mobility: Ax1 c/ GB, walker.     Diet: DM.     IVF: -     Plan of Care: Monitor pain; incision site. Labs.

## 2020-04-22 NOTE — PROGRESS NOTES
[facility]    Hospitalist Progress Note    Assessment & Plan   74 year old female who was admitted on 4/15/2020 for heart surgery:     Impression:   Principal Problem:    Mitral stenosis -- S/P Bioprost MVR 4/15/19    Aortic sten -- S/P Bioprost AVR 4/15/20    Tricuspid valve Regur -- S/P Repair 4/15/20    Patent foramen ovale -- S/P Closure 4/15/20   -- POD #7, doing well   -- on Clindamycin 300 mg qid for slight wound drainage, CV surg wants tx x 10 day      Fractured atrial pacemaker lead -- Replace 4/16/20   -- follow-up with EP next week      Probable left Ulnar Nerve contusion -- clinically improving       Acute blood loss anemia   -- on oral iron      CLARITA -- better       Active Problems:    LAVERNE on CPAP      DM type 2, Hgb A1C 6.5 on 4/8/20      Plan:  Continue postop, anticipate discharge to ARU today if bed available -- Discussed with patient and CV surgery.      DVT Prophylaxis: Pneumatic Compression Devices  Code Status: Full Code    Disposition: Expected discharge to ARU today    Siddharth Rawls MD  Pager [phone]  Cell Phone [phone]  Text Page (7am to 6pm)    Interval History   Feels OK, reports mild chest pain at 1 out of 10, worse with coughing.   Bowels moving.  Minimal yellow drainage from focal area of chest incision.  Left arm feels slightly weak and numb for last several days -- slowly improving.     Physical Exam   Temp: 97.5  F (36.4  C) Temp src: Oral BP: 112/42 Pulse: 66 Heart Rate: 80 Resp: 16 SpO2: 96 % O2 Device: None (Room air)    Vitals:    04/20/20 0500 04/21/20 0600 04/22/20 0500   Weight: 129.5 kg (285 lb 7.9 oz) 129 kg (284 lb 6.3 oz) 129.5 kg (285 lb 7.9 oz)     Vital Signs with Ranges  Temp:  [97.5  F (36.4  C)-97.9  F (36.6  C)] 97.5  F (36.4  C)  Pulse:  [63-70] 66  Heart Rate:  [59-96] 80  Resp:  [16-17] 16  BP: (103-156)/(38-66) 112/42  SpO2:  [95 %-98 %] 96 %  I/O last 3 completed shifts:  In: 20 [I.V.:20]  Out: 2550 [Urine:2550]    # Pain  Assessment:  Current Pain Score 4/22/2020   Patient currently in pain? denies   Pain score (0-10) -   CPOT pain score -   - Amy is experiencing pain due to chest surgery. Pain management was discussed and the plan was created in a collaborative fashion.  Amy's response to the current recommendations: engaged  - Please see the plan for pain management as documented above    Constitutional: Awake, alert, cooperative, no apparent distress  Respiratory: Clear to auscultation bilaterally, no crackles or wheezing  Cardiovascular: Regular rate and rhythm, normal S1 and S2, and no murmur noted  GI: Normal bowel sounds, soft, non-distended, non-tender  Extrem: No calf tenderness, trace left ankle and 1+ right ankle edema  Neuro: Ox3, no focal motor deficits, but slight decreased light touch in left 4th and 5th finger consistent with left ulnar nerve distribution.  Right  5/5, left 5-/5.     Medications     dextrose         aspirin  325 mg Oral Daily     atorvastatin  40 mg Oral At Bedtime     clindamycin  300 mg Oral Q6H LESLIE     ferrous gluconate  324 mg Oral Daily with breakfast     furosemide  40 mg Oral Daily     insulin aspart  1-10 Units Subcutaneous TID AC     insulin aspart  1-7 Units Subcutaneous At Bedtime     insulin isophane human  32 Units Subcutaneous BID AC     lidocaine  1 patch Transdermal Q24h    And     lidocaine   Transdermal Q8H     losartan  50 mg Oral Daily     magnesium citrate  148 mL Oral Once     metoprolol tartrate  25 mg Oral Q12H    Or     metoprolol  25 mg Per NG tube Q12H     pantoprazole  40 mg Oral QAM AC     polyethylene glycol  17 g Oral Daily     senna-docusate  1 tablet Oral BID    Or     senna-docusate  2 tablet Oral BID     sertraline  150 mg Oral QAM     sodium chloride (PF)  10 mL Intracatheter Q8H     Vitamin D3  25 mcg Oral QPM       Data   Recent Labs   Lab 04/22/20  0755 04/21/20  1039 04/21/20  0456 04/20/20  1255 04/20/20  0615 04/19/20  0450  04/16/20  0350   04/15/20  1519 04/15/20  1342   WBC 9.6 9.5  --  8.1 6.9 8.1   < > 14.6*  --  20.9* 13.1*   HGB 8.6* 8.5*  --  7.7* 7.5* 7.7*   < > 10.8*  --  11.3* 9.2*   MCV 98 98  --  97 97 97   < > 98  --  98 98    243  --  167 149* 128*   < > 156  --  210 166   INR  --   --   --  1.11  --   --   --   --   --  1.38* 1.55*     --   --   --  139 138   < > 140   < > 142 141   POTASSIUM 4.0  --  3.9  --  3.6 3.8   < > 5.0   < > 4.0 4.0   CHLORIDE 109  --   --   --  108 106   < > 110*   < > 109 109   CO2 25  --   --   --  26 26   < > 21   < > 23 23   BUN 21  --   --   --  29 27   < > 34*   < > 29 28   CR 0.93  --   --   --  0.91 0.97   < > 1.65*   < > 1.26* 1.14*   ANIONGAP 6  --   --   --  5 6   < > 9   < > 10 9   NELLY 9.5  --   --   --  8.8 8.6   < > 8.9   < > 8.3* 8.5   *  --   --   --  210* 186*   < > 134*   < > 242* 304*   ALBUMIN  --   --   --   --   --   --   --  3.2*  --  3.1* 3.1*   PROTTOTAL  --   --   --   --   --   --   --  6.3*  --  5.8*  --    BILITOTAL  --   --   --   --   --   --   --  0.5  --  0.9  --    ALKPHOS  --   --   --   --   --   --   --  44  --  43  --    ALT  --   --   --   --   --   --   --  37  --  23  --    AST  --   --   --   --   --   --   --  136*  --  76*  --     < > = values in this interval not displayed.       Imaging:   No results found for this or any previous visit (from the past 24 hour(s)).

## 2020-04-22 NOTE — PLAN OF CARE
PT eval complete, anticipate 7 days to meet goals as outlined in POC. Consider 4ww for community mobility, CR follow up.

## 2020-04-22 NOTE — PROGRESS NOTES
Unable to obtain blood return on both R PICC lumens. Purple easily flushed; red flushed c/ resistance.    Lab orders for AM changed to Lab Draw. Kjersten, RN Charge updated.

## 2020-04-22 NOTE — PLAN OF CARE
OT: pt stated she just had PICC line removed and needs to remain still x 1 hr, pt to discharge to ARU today, will defer further OT to next level of care, GOALS NOT MET, see discharge summary

## 2020-04-23 ENCOUNTER — APPOINTMENT (OUTPATIENT)
Dept: PHYSICAL THERAPY | Facility: CLINIC | Age: 75
End: 2020-04-23
Payer: MEDICARE

## 2020-04-23 ENCOUNTER — APPOINTMENT (OUTPATIENT)
Dept: OCCUPATIONAL THERAPY | Facility: CLINIC | Age: 75
End: 2020-04-23
Payer: MEDICARE

## 2020-04-23 LAB
GLUCOSE BLDC GLUCOMTR-MCNC: 142 MG/DL (ref 70–99)
GLUCOSE BLDC GLUCOMTR-MCNC: 166 MG/DL (ref 70–99)
GLUCOSE BLDC GLUCOMTR-MCNC: 195 MG/DL (ref 70–99)
GLUCOSE BLDC GLUCOMTR-MCNC: 227 MG/DL (ref 70–99)
GLUCOSE BLDC GLUCOMTR-MCNC: 267 MG/DL (ref 70–99)

## 2020-04-23 PROCEDURE — 97166 OT EVAL MOD COMPLEX 45 MIN: CPT | Mod: GO | Performed by: OCCUPATIONAL THERAPIST

## 2020-04-23 PROCEDURE — 97535 SELF CARE MNGMENT TRAINING: CPT | Mod: GO | Performed by: OCCUPATIONAL THERAPIST

## 2020-04-23 PROCEDURE — 00000146 ZZHCL STATISTIC GLUCOSE BY METER IP

## 2020-04-23 PROCEDURE — 97110 THERAPEUTIC EXERCISES: CPT | Mod: GP | Performed by: PHYSICAL THERAPIST

## 2020-04-23 PROCEDURE — 97530 THERAPEUTIC ACTIVITIES: CPT | Mod: GP | Performed by: PHYSICAL THERAPIST

## 2020-04-23 PROCEDURE — 12800006 ZZH R&B REHAB

## 2020-04-23 PROCEDURE — 25000132 ZZH RX MED GY IP 250 OP 250 PS 637: Mod: GY | Performed by: PHYSICIAN ASSISTANT

## 2020-04-23 PROCEDURE — 99232 SBSQ HOSP IP/OBS MODERATE 35: CPT | Performed by: PHYSICAL MEDICINE & REHABILITATION

## 2020-04-23 PROCEDURE — 97110 THERAPEUTIC EXERCISES: CPT | Mod: GO | Performed by: OCCUPATIONAL THERAPIST

## 2020-04-23 RX ADMIN — SERTRALINE HYDROCHLORIDE 150 MG: 100 TABLET ORAL at 08:08

## 2020-04-23 RX ADMIN — FAMOTIDINE 20 MG: 20 TABLET ORAL at 08:09

## 2020-04-23 RX ADMIN — INSULIN HUMAN 32 UNITS: 100 INJECTION, SUSPENSION SUBCUTANEOUS at 08:11

## 2020-04-23 RX ADMIN — FUROSEMIDE 40 MG: 40 TABLET ORAL at 08:10

## 2020-04-23 RX ADMIN — METOPROLOL SUCCINATE 50 MG: 50 TABLET, EXTENDED RELEASE ORAL at 08:10

## 2020-04-23 RX ADMIN — METFORMIN HYDROCHLORIDE 1000 MG: 500 TABLET, EXTENDED RELEASE ORAL at 08:09

## 2020-04-23 RX ADMIN — ACETAMINOPHEN 650 MG: 325 TABLET ORAL at 15:55

## 2020-04-23 RX ADMIN — CALCIUM CARBONATE 600 MG (1,500 MG)-VITAMIN D3 400 UNIT TABLET 1 TABLET: at 20:57

## 2020-04-23 RX ADMIN — MELATONIN 25 MCG: at 08:10

## 2020-04-23 RX ADMIN — LOSARTAN POTASSIUM 50 MG: 50 TABLET, FILM COATED ORAL at 08:09

## 2020-04-23 RX ADMIN — CLINDAMYCIN HYDROCHLORIDE 300 MG: 300 CAPSULE ORAL at 12:56

## 2020-04-23 RX ADMIN — CLINDAMYCIN HYDROCHLORIDE 300 MG: 300 CAPSULE ORAL at 20:57

## 2020-04-23 RX ADMIN — INSULIN ASPART 2 UNITS: 100 INJECTION, SOLUTION INTRAVENOUS; SUBCUTANEOUS at 08:14

## 2020-04-23 RX ADMIN — INSULIN ASPART 1 UNITS: 100 INJECTION, SOLUTION INTRAVENOUS; SUBCUTANEOUS at 18:03

## 2020-04-23 RX ADMIN — INSULIN ASPART 2 UNITS: 100 INJECTION, SOLUTION INTRAVENOUS; SUBCUTANEOUS at 12:56

## 2020-04-23 RX ADMIN — INSULIN HUMAN 32 UNITS: 100 INJECTION, SUSPENSION SUBCUTANEOUS at 18:03

## 2020-04-23 RX ADMIN — CLINDAMYCIN HYDROCHLORIDE 300 MG: 300 CAPSULE ORAL at 15:55

## 2020-04-23 RX ADMIN — CLINDAMYCIN HYDROCHLORIDE 300 MG: 300 CAPSULE ORAL at 08:08

## 2020-04-23 RX ADMIN — ASPIRIN 325 MG: 325 TABLET ORAL at 08:09

## 2020-04-23 RX ADMIN — FAMOTIDINE 20 MG: 20 TABLET ORAL at 20:57

## 2020-04-23 RX ADMIN — ATORVASTATIN CALCIUM 40 MG: 40 TABLET, FILM COATED ORAL at 20:57

## 2020-04-23 RX ADMIN — FERROUS GLUCONATE 324 MG: 324 TABLET ORAL at 08:09

## 2020-04-23 ASSESSMENT — MIFFLIN-ST. JEOR: SCORE: 1804.53

## 2020-04-23 NOTE — PLAN OF CARE
FOCUS/GOAL  Medical management    ASSESSMENT, INTERVENTIONS AND CONTINUING PLAN FOR GOAL:  Pt is alert and oriented. No complained of pain. Minimal assist A1 in getting up in bed observing sternal precaution. Supervision w/ ambulation to the BR w/o assistive device. Continent of bladder. Pt able to do [place] cares w/ supervision. Needs moderate A1 to lift lower extremities when laying back in bed. Diastolic BP readings still on 35mmhg. Encouraged resident to drink fluids. Slept intermittently.

## 2020-04-23 NOTE — PHARMACY-MEDICATION REGIMEN REVIEW
We will discontinue magnesium and check the level on Monday.      Pharmacy Medication Regimen Review  Amy Luna is a 74 year old female who is currently in the Acute Rehab Unit.    Assessment: All medications have appropriate indications, durations and no unnecessary use was found  Medications that need lab follow-up: Magnesium oxide. Last magnesium level was 2.7 on 4/16.  No other levels since.     Plan:   Attending provider will be sent this note for review.  If there are any emergent issues noted above, pharmacist will contact provider directly by phone.      Pharmacy will periodically review the resident's medication regimen for any PRN medications not administered in > 72 hours and discontinue them. The pharmacist will discuss gradual dose reductions of psychopharmacologic medications with interdisciplinary team on a regular basis.    Please contact pharmacy if the above does not answer specific medication questions/concerns.    Background:  A pharmacist has reviewed all medications and pertinent medical history today.  Medications were reviewed for appropriate use and any irregularities found are listed with recommendations.      Current Facility-Administered Medications:      acetaminophen (TYLENOL) tablet 650 mg, 650 mg, Oral, Q4H PRN, Caron Pham PA     aspirin (ASA) EC tablet 325 mg, 325 mg, Oral, Daily, Caron Pham PA, 325 mg at 04/23/20 0809     atorvastatin (LIPITOR) tablet 40 mg, 40 mg, Oral, At Bedtime, Caron Pham PA, 40 mg at 04/22/20 2125     calcium carbonate 600 mg-vitamin D 400 units (CALTRATE) per tablet 1 tablet, 1 tablet, Oral, QPM, Caron Pham PA, 1 tablet at 04/22/20 2125     clindamycin (CLEOCIN) capsule 300 mg, 300 mg, Oral, 4x Daily, Caron Pham PA, 300 mg at 04/23/20 0808     glucose gel 15-30 g, 15-30 g, Oral, Q15 Min PRN **OR** dextrose 50 % injection 25-50 mL, 25-50 mL, Intravenous, Q15 Min PRN **OR** glucagon injection 1 mg, 1 mg,  Subcutaneous, Q15 Min PRN, Caron Pham PA     famotidine (PEPCID) tablet 20 mg, 20 mg, Oral, BID, Caron Pham PA, 20 mg at 04/23/20 0809     ferrous gluconate (FERGON) tablet 324 mg, 324 mg, Oral, Daily with breakfast, Caron Pham PA, 324 mg at 04/23/20 0809     furosemide (LASIX) tablet 40 mg, 40 mg, Oral, QAM, Caron Pham PA, 40 mg at 04/23/20 0810     insulin aspart (NovoLOG) injection (RAPID ACTING), 1-7 Units, Subcutaneous, TID AC, Caron Pham PA, 2 Units at 04/23/20 0814     insulin aspart (NovoLOG) injection (RAPID ACTING), 1-5 Units, Subcutaneous, At Bedtime, Caron Pham PA, 1 Units at 04/22/20 2209     insulin isophane human (HumuLIN N PEN) injection 32 Units, 32 Units, Subcutaneous, BID AC, Caron Pham PA, 32 Units at 04/23/20 0811     losartan (COZAAR) tablet 50 mg, 50 mg, Oral, QAM, Caron Pham PA, 50 mg at 04/23/20 0809     magnesium oxide (MAG-OX) tablet 400 mg, 400 mg, Oral, QPM, Caron Pham PA, 400 mg at 04/22/20 2125     metFORMIN (GLUCOPHAGE-XR) 24 hr tablet 1,000 mg, 1,000 mg, Oral, Daily with breakfast, Caron Pham PA, 1,000 mg at 04/23/20 0809     metoprolol succinate ER (TOPROL-XL) 24 hr tablet 50 mg, 50 mg, Oral, Daily, Caron Pham PA, 50 mg at 04/23/20 0810     naloxone (NARCAN) injection 0.1-0.4 mg, 0.1-0.4 mg, Intravenous, Q2 Min PRN, Eli Shaffer MD     oxyCODONE-acetaminophen (PERCOCET) 5-325 MG per tablet 1 tablet, 1 tablet, Oral, Q4H PRN, Caron Pham PA     polyethylene glycol (MIRALAX) Packet 17 g, 17 g, Oral, BID PRN, Caron Pham PA     polyethylene glycol-propylene glycol (SYSTANE ULTRA) ophthalmic solution 1 drop, 1 drop, Both Eyes, TID PRN, Caron Pham PA     senna-docusate (SENOKOT-S/PERICOLACE) 8.6-50 MG per tablet 1 tablet, 1 tablet, Oral, BID PRN, Caron Pham PA     sertraline (ZOLOFT) tablet 150 mg, 150 mg, Oral, QAM, Caron Pham PA, 150 mg  at 04/23/20 0808     Vitamin D3 (CHOLECALCIFEROL) 25 mcg (1000 units) tablet 25 mcg, 25 mcg, Oral, Daily, Caron Pham PA, 25 mcg at 04/23/20 0810  No current outpatient prescriptions on file.   Amira Helms, [facility]  PharmD,BCPS  April 23, 2020

## 2020-04-23 NOTE — PROGRESS NOTES
04/22/20 1500   Quick Adds   Type of Visit Initial Occupational Therapy Evaluation   Living Environment   Lives With spouse   Living Arrangements house   Home Accessibility stairs within home   Number of Stairs, Within Home, Primary 7;other (see comments)  (3 levels 8 stairs to 3rd level)   Stair Railings, Within Home, Primary railing on right side (ascending)   Transportation Anticipated family or friend will provide   Living Environment Comment OT: Pt lives with spouse in a 3 level home - laundry level 1, kitchen level 2, and bedroom level 3. Bathroom setup: tubshower with hand rail and shower chair, standard toilet. Bedroom setup: Rafa size bed, very high, has a stool to approach bed. Spouse able to provide assistance as needed.   Self-Care   Usual Activity Tolerance fair   Current Activity Tolerance poor   Regular Exercise No   Equipment Currently Used at Home walker, rolling;shower chair;other (see comments)  (2 walkers, stool to approach bed)   Activity/Exercise/Self-Care Comment OT: No formal exercise program. Pt reports staying active with household tasks.    Functional Level   Ambulation 0-->independent   Transferring 0-->independent   Toileting 0-->independent   Bathing 0-->independent   Dressing 0-->independent   Eating 0-->independent   Communication 0-->understands/communicates without difficulty   Swallowing 0-->swallows foods/liquids without difficulty   Cognition 0 - no cognition issues reported   Fall history within last six months no   Which of the above functional risks had a recent onset or change? ambulation;transferring;toileting;bathing;dressing   Prior Functional Level Comment OT: IND mobility and transfers within house. Pt Mod IND/IND ADLs, however does acknowledge that toilet hygiene has been a concern for several years.        Present no   Language English   General Information   Onset of Illness/Injury or Date of Surgery - Date 04/15/20   Referring Physician  Eli Shaffer MD   Patient/Family Goals Statement Pt goals to improve walking, complete stairs, and complete toilet hygiene IND.   Additional Occupational Profile Info/Pertinent History of Current Problem Amy Luna is a 74 year old a woman with PMH of severe mitral stenosis, moderate aortic stenosis, severe tricuspid regurgitation, sick sinus syndrome s/p ppm, DM2, LAVERNE, HTN, HLD, morbid obesity who underwent  aortic valve replacement, mitral valve replacement, tricuspid valve repair and PFO repair 4/15.   Hospital stay was complicated by AV lead malfunction s/p atrial lead revision per electrophysiology  4/20, CLARITA, leukocytosis, volume overload,  acute blood loss anemia, and possible incision infection. Admitted to acute rehab 4/22 for ongoing rehabilitation and medical management.    Precautions/Limitations sternal precautions   Weight-Bearing Status - LUE other (see comments)  (no lifting/pushing/pulling > 10#)   Weight-Bearing Status - RUE other (see comments)  (no lifting/pushing/pulling > 10#)   Weight-Bearing Status - LLE full weight-bearing   Weight-Bearing Status - RLE full weight-bearing   Heart Disease Risk Factors Lack of physical activity;Overweight;Medical history   Cognitive Status Examination   Orientation orientation to person, place and time   Level of Consciousness alert   Follows Commands (Cognition) WNL   Memory impaired   Cognitive Comment Acknowledges longer term deficits recall and attention, reports no acute changes   Visual Perception   Visual Perception Wears glasses   Visual Acuity Reports a change in far acuity    Visual Field No field deficits   Visual Attention Able to scan room with organized pattern, able to sustain visual attention on therapist with conversation.   Visual Perception Comments OT: Pt is near sighted   Sensory Examination   Sensory Comments OT: numbness/tingling on RLE toes    Pain Assessment   Patient Currently in Pain Yes, see Vital Sign flowsheet    Integumentary/Edema   Integumentary/Edema Comments OT: 1+ edema in BLE's. BUE edema with left greater than right. Sternal incision, dressing over pacemaker incision, mild swelling BLE   Posture   Posture forward head position   Range of Motion (ROM)   ROM Comment OT: WFL all planes with BUE's. Only 90 degrees with shoulder flex and abd d/t sternal precautions. Impaired ROM with Right ring finger ROM d/t arthritis   Strength   Strength Comments OT: RUE: 4/5 with sh. flex, sh. abd., elb. flex/ext, wrist flex/ext and . LUE: 4/5 with elb. flex/ext, wrist flexext, and 4-/5 with . did not test left sh. flex and abd d/t sternal precautions.    Hand Strength   Left hand  (pounds) 42 pounds   Right hand  (pounds) 52 pounds   Coordination   Left hand, nine hole peg test (seconds) 27   Right hand, nine hole peg test (seconds) 32   ARC Assessment Only   Acute Rehab Functional Assessment See IP Rehab Daily Documentation Flowsheet for Functional Mobility/ADL Assessment   Transfer Skill: Sit to Stand   Level of [place]: Sit/Stand stand-by assist   Physical Assist/Nonphysical Assist: Sit/Stand set-up required   Transfer Skill: Toilet Transfer   Level of [place]: Toilet stand-by assist   Physical Assist/Nonphysical Assist: Toilet supervision   Instrumental Activities of Daily Living (IADL)   Previous Responsibilities meal prep;housekeeping;laundry;medication management;finances;shopping   Activities of Daily Living Analysis   Impairments Contributing to Impaired Activities of Daily Living balance impaired;post surgical precautions;strength decreased;ROM decreased;pain   General Therapy Interventions   Planned Therapy Interventions ADL retraining;IADL retraining;balance training;bed mobility training;cognition;ROM;strengthening;stretching;transfer training;home program guidelines;progressive activity/exercise;risk factor education   Clinical Impression   Criteria for Skilled Therapeutic Interventions Met  yes, treatment indicated   OT Diagnosis Decreased IND ADLs/IADLs and functional mobility   Influenced by the following impairments Weakness, deconditioning, complex medical hx, post surgical precautions, impaired balance   Assessment of Occupational Performance 5 or more Performance Deficits   Identified Performance Deficits Performance deficits include mobility, transfers, bathing, dressing, grooming, meal prep, household management, community transportation, sleep,    Clinical Decision Making (Complexity) Moderate complexity   Therapy Frequency Daily   Predicted Duration of Therapy Intervention (days/wks) 7 days   Anticipated Equipment Needs at Discharge bathing equipment;dressing equipment;reacher;other (see comments);toileting equipment;shower chair  (toilet hygiene aid)   Anticipated Discharge Disposition Home with Home Therapy;Home with Outpatient Therapy   Risks and Benefits of Treatment have been explained. Yes   Patient, Family & other staff in agreement with plan of care Yes   Clinical Impression Comments The pt will benefit from skilled OT intervention to address goals in POC and maximize IND and safety in d/c environment   Total Evaluation Time   Total Evaluation Time (Minutes) 20

## 2020-04-23 NOTE — PLAN OF CARE
Patient is alert and oriented x4, denies pain. Used call light appropriately. Has generalized weakness, needing CGA and walker to ambulate to bathroom. Continent of bowel and bladder, had large soft bm x1. Needing assist with perineal hygiene.   Needing assist to lift both legs into bed to maintain sternal precautions.    and 227 before meals.   Pt wears home cpap when rested in bed between therapies.   Sternal incisions JEISON, left upper chest pacemaker site dressing CDI, plan to remove dressing tomorrow. Abdominal binder to chest in place.   Continue with POC.

## 2020-04-23 NOTE — PLAN OF CARE
FOCUS/GOAL  Wound care management, Medical management, and Safety management    ASSESSMENT, INTERVENTIONS AND CONTINUING PLAN FOR GOAL:  Orientation: alert and oriented x4.  Bowel: continent of loose stool x1, needs assistance with pericares after using the bathroom.  Bladder: continent , PVR 14.  Pain: Denies pain, has discomfort when repositioning on chest incision.  Ambulation/Transfers: SBA/gait belt, requires assistance with repositioning and turning in bed.  Blood sugars: 195 before meals and 202 at bedtime.  Diet/ Liquids: Mod CHO diet/thin liquids take pills whole without any difficulty.   Tubes/ Lines/ Drains: No lines.  Vitals were reviewed  Temp: 96.5  F (35.8  C) Temp src: Oral BP: (!) 108/35 Pulse: 63   Resp: 12 SpO2: 98 % O2 Device: None (Room air)  VSS ex for low DBP . MD on call notified. Recommended to monitor for now, and will review meds tomorrow. Fluids encouraged .  Skin: chest incision open to air with some scant drainage on a small spot, old chest tube site on abdomen and some bruising on abdomen.  Others: Pt uses home CPAP at night. Reported SOB with activities, oxygen sats stable above 95.  Pt calls appropriately for needs.

## 2020-04-23 NOTE — PROGRESS NOTES
"  [facility]   Acute Rehabilitation Unit  Daily progress note    interval history  Amy Luna was seen and examined at bedside. Reports she had good night of sleep last night, has ongoing left shoulder pain radiating down left arm which has been ongoing since pacemaker lead intervention 4/20.  No new sob, cough, fevers, n/v or other concerns.       Functionally, undergoing therapy evaluations today        medications    aspirin  325 mg Oral Daily     atorvastatin  40 mg Oral At Bedtime     calcium carbonate 600 mg-vitamin D 400 units  1 tablet Oral QPM     clindamycin  300 mg Oral 4x Daily     famotidine  20 mg Oral BID     ferrous gluconate  324 mg Oral Daily with breakfast     furosemide  40 mg Oral QAM     insulin aspart  1-7 Units Subcutaneous TID AC     insulin aspart  1-5 Units Subcutaneous At Bedtime     insulin isophane human  32 Units Subcutaneous BID AC     losartan  50 mg Oral QAM     magnesium oxide  400 mg Oral QPM     metFORMIN  1,000 mg Oral Daily with breakfast     metoprolol succinate ER  50 mg Oral Daily     sertraline  150 mg Oral QAM     Vitamin D3  25 mcg Oral Daily        acetaminophen, glucose **OR** dextrose **OR** glucagon, naloxone, oxyCODONE-acetaminophen, polyethylene glycol, polyethylene glycol-propylene glycol, senna-docusate     physical exam  /46 (BP Location: Left arm)   Pulse 94   Temp 95.9  F (35.5  C) (Oral)   Resp 16   Ht 1.651 m (5' 5\")   Wt 129.9 kg (286 lb 4.8 oz)   SpO2 97%   BMI 47.64 kg/m    Gen: alert awake and in no distress  Pulm: non labored on room air  Abd: soft obese non tender  Ext: mild L>R ble edema  Neuro/MSK: alert moves all extremities  Skin: sternal incision cdi with ecchymosis no redness or warmth, 2 chest tube sites with serous fluid    labs  No new labs today.    Rehabilitation - continue comprehensive acute inpatient rehabilitation program with multidisciplinary approach including therapies, rehab " nursing, and physiatry following. See interval history for updates.      assessment and plan    Amy Luna is a 74 year old a woman with PMH of severe mitral stenosis, moderate aortic stenosis, severe tricuspid regurgitation, sick sinus syndrome s/p ppm, DM2, LAVERNE, HTN, HLD, morbid obesity who underwent  aortic valve replacement, mitral valve replacement, tricuspid valve repair and PFO repair 4/15.   Hospital stay was complicated by AV lead malfunction s/p atrial lead revision per electrophysiology  4/20, CLARITA, leukocytosis, volume overload,  acute blood loss anemia, and possible incision infection. Admitted to acute rehab 4/22 for ongoing rehabilitation and medical management.      Mitral stenosis -- S/P Bioprost MVR 4/15/19    Aortic sten -- S/P Bioprost AVR 4/15/20    Tricuspid valve Regurgitation -- S/P Repair 4/15/20    Patent foramen ovale -- S/P Closure 4/15/20  Surgery per Dr. Wilson. Post op echo 4/15 with mild Tricuspid regurgitation, mild aortic regurg, mild-mod mv regurg. Normal LV systolic function.   -sternal precautions  -f/u CV Surgery  -oxycodone, tylenol prn pain  -continue asa,  -continue home lasix 40 mg daily  -continue metoprolol 50 mg daily     Sternal incision infection- with initiation of clindamycin 4/21 per CV surgery  -continue clindamycin to complete 10 day course  -keep incision clean and dry- cleanse daily/prn and paint with iodine per discharge instructions  -abd binder to chest per CV surgery recs     Atrial lead fracture- suspected to be damaged in 4/15 surgery. S/p atrial lead revision per Dr. Francis. History of PPM for symptomatic second degree and intermittent third degree block 2015.  -f/u device RN  -Do not raise your arm on the incision side above shoulder level for 3 weeks.   -remove the outer dressing  (Friday, 4/24). Leave the steri-strips in place.  ok to shower once the outer dressing has been removed.      DM type 2- Hgb A1C 6.5% 4/8 PTA on NPH, glipizide, and  metformin.  -202  -continue NPH 32 bid  -continue metformin restarted 4/23  -hold glipizide  -continue SSI  -monitor bg and titrate as indicated     HTN- on ARU admission on lasix 40 mg daily, losartan 40 mg daily, metoprolol xl 50 mg daily.  Of note has low diastolic bp this appears stable and patient reports as baseline.   -continue and titrate as indicated     LAVERNE-continue home CPAP     CLARITA- peak creatinine at 1.65 4/16.   Cr 0.93 4/22  -trend     Acute Blood loss Anemia-  Hgb stable 8.6 4/22 prior to admission Hgb ~11     HLD- continue statin     Depression- continue Zoloft       1. Adjustment to disability: monitor mood   2. FEN: diabetic diet  3. Bowel: continent  4. Bladder: continent  5. DVT Prophylaxis: mechanical/ambulation   6. GI Prophylaxis: ppi  7. Code: full   8. Disposition:home   9. ELOS:undergoing therapy evals today  10. Follow up Appointments on Discharge: cv surgery, cardiology, pcp        Patient seen and discussed with Dr. Ruiz PM&R Staff Physician    Caron Pham PA-C  Physical Medicine & Rehabilitation   Pager: [phone]

## 2020-04-23 NOTE — PROGRESS NOTES
Discharge Planner OT   Patient plan for discharge: Home with HC OT services  Current status: Pt requires min A with donning/doffing dress. Pt requires SBA with toileting tasks with FWW. Pt requires SBA with G/H tasks standing at EOS with FWW. Pt requires mod A with supine to EOB with HOB raised.   Barriers to return to prior living situation: deconditioning, impaired standing tolerance, sternal precautions  Recommendations for discharge: Assistance with bathing and with heavier IADLs. Pt may benefit from bidet attachment to increase IND with [facility]  Rationale for recommendations: To increase IND and safety with ADLs/IADLs and functional mobility       Entered by: Jailene Stallings 04/23/2020 2:12 PM     Anticipate LOS 10 days with f/u OT services  AE/DME: Pt will likely need reacher, toilet tongs vs bidet attachment, extended tub bench vs shower chair

## 2020-04-23 NOTE — PROGRESS NOTES
04/22/20 1502   Quick Adds   Type of Visit Initial PT Evaluation   Living Environment   Lives With spouse   Living Arrangements house   Home Accessibility stairs within home   Number of Stairs, Within Home, Primary 7;other (see comments)  (7 + 8 split, enter on lowest of three levels)   Stair Railings, Within Home, Primary railing on right side (ascending)   Transportation Anticipated family or friend will provide   Living Environment Comment laundry level 1, kitchen level 2, bedroom level 3   Self-Care   Usual Activity Tolerance fair   Regular Exercise No   Equipment Currently Used at Home walker, rolling   Functional Level Prior   Ambulation 0-->independent   Transferring 0-->independent   Toileting 0-->independent   Bathing 0-->independent   Communication 0-->understands/communicates without difficulty   Swallowing 0-->swallows foods/liquids without difficulty   Cognition 0 - no cognition issues reported   Fall history within last six months no   Which of the above functional risks had a recent onset or change? transferring;toileting;dressing   Prior Functional Level Comment Toileting/wiping has been a concern for many years, would like to work toward different solution   General Information   Onset of Illness/Injury or Date of Surgery - Date 04/15/20   Referring Physician Joseph   Patient/Family Goals Statement return home w/ assist from    Pertinent History of Current Problem (include personal factors and/or comorbidities that impact the POC) MVR/AVR, replacement of pacer lead   Precautions/Limitations sternal precautions   Heart Disease Risk Factors Lack of physical activity;Overweight   Cognitive Status Examination   Orientation orientation to person, place and time   Level of Consciousness alert   Follows Commands and Answers Questions 100% of the time;able to follow multistep instructions   Personal Safety and Judgment intact   Memory intact   Pain Assessment   Patient Currently in Pain No    Integumentary/Edema   Integumentary/Edema Comments sternal incision, general fluid retention arms and legs, shoes fit per usual   Posture    Posture Not impaired   ARC Assessment Only   Acute Rehab Functional Assessment See IP Rehab Daily Documentation Flowsheet for Functional Mobility/ADL Assessment   Sensory Examination   Sensory Perception Comments not assessed   Coordination   Coordination Comments not assessed   Muscle Tone   Muscle Tone no deficits were identified   General Therapy Interventions   Planned Therapy Interventions strengthening;progressive activity/exercise;home program guidelines;risk factor education   Clinical Impression   Criteria for Skilled Therapeutic Intervention yes, treatment indicated   PT Diagnosis deficit of aerobic activity tolerance   Influenced by the following impairments decreased cardiac reserves   Functional limitations due to impairments amb only short distances, stairs not adequate to access her home, unable to get in/out of bed   Clinical Presentation Evolving/Changing   Clinical Decision Making (Complexity) Moderate complexity   Therapy Frequency 2x/day   Predicted Duration of Therapy Intervention (days/wks) 10 days   Anticipated Equipment Needs at Discharge   (has walker and 4ww at home)   Anticipated Discharge Disposition Home with Home Therapy   Risk & Benefits of therapy have been explained Yes   Patient, Family & other staff in agreement with plan of care Yes   Clinical Impression Comments 75 y/o female w/ cardiac history and new valve replacements, presents w/ deconditioning, weakness; would benefit from skilled PT services so she can return home w/ assist from her .    Total Evaluation Time   Total Evaluation Time (Minutes) 15

## 2020-04-24 ENCOUNTER — DOCUMENTATION ONLY (OUTPATIENT)
Dept: OTHER | Facility: CLINIC | Age: 75
End: 2020-04-24

## 2020-04-24 ENCOUNTER — APPOINTMENT (OUTPATIENT)
Dept: PHYSICAL THERAPY | Facility: CLINIC | Age: 75
End: 2020-04-24
Payer: MEDICARE

## 2020-04-24 ENCOUNTER — APPOINTMENT (OUTPATIENT)
Dept: OCCUPATIONAL THERAPY | Facility: CLINIC | Age: 75
End: 2020-04-24
Payer: MEDICARE

## 2020-04-24 LAB
GLUCOSE BLDC GLUCOMTR-MCNC: 132 MG/DL (ref 70–99)
GLUCOSE BLDC GLUCOMTR-MCNC: 147 MG/DL (ref 70–99)
GLUCOSE BLDC GLUCOMTR-MCNC: 169 MG/DL (ref 70–99)
GLUCOSE BLDC GLUCOMTR-MCNC: 194 MG/DL (ref 70–99)
GLUCOSE BLDC GLUCOMTR-MCNC: 208 MG/DL (ref 70–99)

## 2020-04-24 PROCEDURE — 97110 THERAPEUTIC EXERCISES: CPT | Mod: GP | Performed by: PHYSICAL THERAPIST

## 2020-04-24 PROCEDURE — 97535 SELF CARE MNGMENT TRAINING: CPT | Mod: GO | Performed by: OCCUPATIONAL THERAPIST

## 2020-04-24 PROCEDURE — 99232 SBSQ HOSP IP/OBS MODERATE 35: CPT | Performed by: PHYSICAL MEDICINE & REHABILITATION

## 2020-04-24 PROCEDURE — 97530 THERAPEUTIC ACTIVITIES: CPT | Mod: GP | Performed by: PHYSICAL THERAPIST

## 2020-04-24 PROCEDURE — 12800006 ZZH R&B REHAB

## 2020-04-24 PROCEDURE — 00000146 ZZHCL STATISTIC GLUCOSE BY METER IP

## 2020-04-24 PROCEDURE — 97110 THERAPEUTIC EXERCISES: CPT | Mod: GO | Performed by: OCCUPATIONAL THERAPIST

## 2020-04-24 PROCEDURE — 25000132 ZZH RX MED GY IP 250 OP 250 PS 637: Mod: GY | Performed by: PHYSICIAN ASSISTANT

## 2020-04-24 RX ADMIN — INSULIN HUMAN 32 UNITS: 100 INJECTION, SUSPENSION SUBCUTANEOUS at 08:07

## 2020-04-24 RX ADMIN — FAMOTIDINE 20 MG: 20 TABLET ORAL at 21:09

## 2020-04-24 RX ADMIN — METFORMIN HYDROCHLORIDE 1000 MG: 500 TABLET, EXTENDED RELEASE ORAL at 08:11

## 2020-04-24 RX ADMIN — CLINDAMYCIN HYDROCHLORIDE 300 MG: 300 CAPSULE ORAL at 13:07

## 2020-04-24 RX ADMIN — CLINDAMYCIN HYDROCHLORIDE 300 MG: 300 CAPSULE ORAL at 16:48

## 2020-04-24 RX ADMIN — CLINDAMYCIN HYDROCHLORIDE 300 MG: 300 CAPSULE ORAL at 21:08

## 2020-04-24 RX ADMIN — INSULIN ASPART 1 UNITS: 100 INJECTION, SOLUTION INTRAVENOUS; SUBCUTANEOUS at 17:43

## 2020-04-24 RX ADMIN — OXYCODONE HYDROCHLORIDE AND ACETAMINOPHEN 1 TABLET: 5; 325 TABLET ORAL at 09:55

## 2020-04-24 RX ADMIN — INSULIN HUMAN 32 UNITS: 100 INJECTION, SUSPENSION SUBCUTANEOUS at 17:39

## 2020-04-24 RX ADMIN — ATORVASTATIN CALCIUM 40 MG: 40 TABLET, FILM COATED ORAL at 21:09

## 2020-04-24 RX ADMIN — CALCIUM CARBONATE 600 MG (1,500 MG)-VITAMIN D3 400 UNIT TABLET 1 TABLET: at 21:08

## 2020-04-24 RX ADMIN — FERROUS GLUCONATE 324 MG: 324 TABLET ORAL at 08:12

## 2020-04-24 RX ADMIN — SERTRALINE HYDROCHLORIDE 150 MG: 100 TABLET ORAL at 08:13

## 2020-04-24 RX ADMIN — MELATONIN 25 MCG: at 08:13

## 2020-04-24 RX ADMIN — Medication 5 MG: at 08:12

## 2020-04-24 RX ADMIN — CLINDAMYCIN HYDROCHLORIDE 300 MG: 300 CAPSULE ORAL at 08:14

## 2020-04-24 RX ADMIN — INSULIN ASPART 1 UNITS: 100 INJECTION, SOLUTION INTRAVENOUS; SUBCUTANEOUS at 08:07

## 2020-04-24 RX ADMIN — FAMOTIDINE 20 MG: 20 TABLET ORAL at 08:11

## 2020-04-24 RX ADMIN — ASPIRIN 325 MG: 325 TABLET ORAL at 08:12

## 2020-04-24 ASSESSMENT — MIFFLIN-ST. JEOR: SCORE: 1805.44

## 2020-04-24 NOTE — PLAN OF CARE
Discharge Planner PT   Patient plan for discharge: home w/  [facility]  Current status: assist w/ LE's and trunk for bed mobility, stairs only able to manage 4 at a time  Barriers to return to prior living situation: stairs  Recommendations for discharge: progress LE strength and CV endurance  Rationale for recommendations: pt goals and presentation       Entered by: Keri Warner 04/24/2020 4:35 PM

## 2020-04-24 NOTE — PLAN OF CARE
FOCUS/GOAL  Bowel management, Bladder management, Wound care management, and Medical management    ASSESSMENT, INTERVENTIONS AND CONTINUING PLAN FOR GOAL:  Alert & oriented, uses call light to make needs known. Needs SBA with transfers and assist to get legs into bed. Continent of bladder and bowel, had loose stools x2 today. Appetite 100% for meals, blood glucose 169 & 132, insulin given per orders. Sternal incision, chest tube site and pacemaker site CDI and JEISON. PT c/o incisional pain, Percocet PRN given with effective results. Bilat. L/E swollen, wearing tubigrips. Pt had a shower with therapy this morning, intra dry in place under breast and abdominal folds.

## 2020-04-24 NOTE — PROGRESS NOTES
"  [facility]   Acute Rehabilitation Unit  Daily progress note    interval history  Amy Luna was seen and examined at bedside. She had just returned from therapy and was feeling tired. She reported b/l shoulder pain and said she had a loose bowel episode in therapy which made her frustrated. Her left upper chest dressing was removed today and looked clean and dry (steristrips in place). She will also be getting a shower. She was started on glipizide today.     Functionally, from OT standpoint: Pt required min A with donning/doffing dress. SBA with toileting tasks with FWW. SBA with G/H tasks standing at EOS with FWW. She requires mod A with supine to EOB with HOB raised.    As per PT, she is very engaged and motivated with therapy.        medications    aspirin  325 mg Oral Daily     atorvastatin  40 mg Oral At Bedtime     calcium carbonate 600 mg-vitamin D 400 units  1 tablet Oral QPM     clindamycin  300 mg Oral 4x Daily     famotidine  20 mg Oral BID     ferrous gluconate  324 mg Oral Daily with breakfast     furosemide  40 mg Oral QAM     glipiZIDE  5 mg Oral QAM AC     insulin aspart  1-7 Units Subcutaneous TID AC     insulin aspart  1-5 Units Subcutaneous At Bedtime     insulin isophane human  32 Units Subcutaneous BID AC     losartan  50 mg Oral QAM     metFORMIN  1,000 mg Oral Daily with breakfast     metoprolol succinate ER  50 mg Oral Daily     sertraline  150 mg Oral QAM     Vitamin D3  25 mcg Oral Daily        acetaminophen, glucose **OR** dextrose **OR** glucagon, naloxone, oxyCODONE-acetaminophen, polyethylene glycol, polyethylene glycol-propylene glycol, senna-docusate     physical exam  /40 (BP Location: Left arm)   Pulse 83   Temp 95.1  F (35.1  C) (Oral)   Resp 17   Ht 1.651 m (5' 5\")   Wt 130 kg (286 lb 8 oz)   SpO2 97%   BMI 47.68 kg/m    Gen: alert awake and in no distress  Pulm: non labored on room air  Abd: soft, non tender  Skin: there is " bruising in the epigastric region as well as both arms. Midline sternal incision is c/d/i. The 2 chest tube incisions are showing less drainage.  Ext: BLE edema (mild pitting)  Neuro/MSK: Muscle strength is wnl       labs  No new labs today.    Rehabilitation - continue comprehensive acute inpatient rehabilitation program with multidisciplinary approach including therapies, rehab nursing, and physiatry following. See interval history for updates.      assessment and plan    Amy Luna is a 74 year old a woman with PMH of severe mitral stenosis, moderate aortic stenosis, severe tricuspid regurgitation, sick sinus syndrome s/p ppm, DM2, LAVERNE, HTN, HLD, morbid obesity who underwent  aortic valve replacement, mitral valve replacement, tricuspid valve repair and PFO repair 4/15.   Hospital stay was complicated by AV lead malfunction s/p atrial lead revision per electrophysiology  4/20, CLARITA, leukocytosis, volume overload,  acute blood loss anemia, and possible incision infection. Admitted to acute rehab 4/22 for ongoing rehabilitation and medical management.      Mitral stenosis -- S/P Bioprost MVR 4/15/19    Aortic sten -- S/P Bioprost AVR 4/15/20    Tricuspid valve Regurgitation -- S/P Repair 4/15/20    Patent foramen ovale -- S/P Closure 4/15/20  Surgery per Dr. Wilson. Post op echo 4/15 with mild Tricuspid regurgitation, mild aortic regurg, mild-mod mv regurg. Normal LV systolic function.   -sternal precautions  -f/u CV Surgery  -oxycodone, tylenol prn pain  -continue asa,  -continue home lasix 40 mg daily  -continue metoprolol 50 mg daily     Sternal incision infection- with initiation of clindamycin 4/21 per CV surgery  -continue clindamycin to complete 10 day course  -keep incision clean and dry- cleanse daily/prn and paint with iodine per discharge instructions  -abd binder to chest per CV surgery recs     Atrial lead fracture- suspected to be damaged in 4/15 surgery. S/p atrial lead revision per Dr. Francis.  History of PPM for symptomatic second degree and intermittent third degree block 2015.  -f/u device RN  -Do not raise your arm on the incision side above shoulder level for 3 weeks.   -remove the outer dressing  (Friday, 4/24). Leave the steri-strips in place.  ok to shower once the outer dressing has been removed.      DM type 2- Hgb A1C 6.5% 4/8 PTA on NPH, glipizide, and metformin.  -202  -continue NPH 32 bid  -continue metformin restarted 4/23  -glipizide 2.5 mg started (4/24)  -continue SSI  -monitor bg and titrate as indicated     HTN- on ARU admission on lasix 40 mg daily, losartan 40 mg daily, metoprolol xl 50 mg daily.  Of note has low diastolic bp this appears stable and patient reports as baseline.   -continue and titrate as indicated     LAVERNE-continue home CPAP     CLARITA- peak creatinine at 1.65 4/16.   Cr 0.93 4/22  -trend     Acute Blood loss Anemia-  Hgb stable 8.6 4/22 prior to admission Hgb ~11     HLD- continue statin     Depression- continue Zoloft     1. Adjustment to disability: monitor mood   2. FEN: diabetic diet  3. Bowel: continent  4. Bladder: continent  5. DVT Prophylaxis: mechanical/ambulation   6. GI Prophylaxis: ppi  7. Code: full   8. Disposition:home   9. ELOS:undergoing therapy evals today  10. Follow up Appointments on Discharge: cv surgery, cardiology, pcp        Eli Michaud MD

## 2020-04-24 NOTE — PLAN OF CARE
VS: /28. Pt states this is around her baseline, asymptomatic. All other VSS.   O2: >90% on RA.   Output: Continent. Voiding adequately and spontaneously in bathroom.   Last BM: Continent. 4/23.   Activity: Assist x1, walker, gait belt.   Skin: Medial chest incision, approximated; old chest tube site, approximated; BUE bruising; pacemaker placement site, covered.    Pain: Received PRN tylenol x1 after therapies.   CMS: BUE numbness, baseline.    Dressing: Pacemaker placement location, dry gauze, CDI.   Diet: Moderate consistent CHO, thin liquids, pills whole.   BG's 166 & 267. Received sliding scale coverage.   LDA: N/A.   Equipment: Walker, gait belt,  tubi , CPAP, abdominal binder, personal items.    Plan: TBD.   Additional Info: Sternal precautions. Abdominal binder in place.   No alarms necessary.  Tubi  in place for BLE edema.   Home CPAP on for night.

## 2020-04-24 NOTE — PROGRESS NOTES
Chart check post hospitalization with transfer to ARU 4/22. Patient progressing well per therapy and progress note. Will continue to follow along.

## 2020-04-24 NOTE — PLAN OF CARE
FOCUS/GOAL  Medical management    ASSESSMENT, INTERVENTIONS AND CONTINUING PLAN FOR GOAL:  Pt is alert and oriented. No complaints of pain. Assist of 1 with walker. Continent of bladder. CPAP worn all night. Appeared to sleep well between cares.

## 2020-04-24 NOTE — PLAN OF CARE
Discharge Planner OT   Patient plan for discharge: Home with HC  Current status: SBA functional transfers/mobility with fww (requires w/c for longer distances). Pt requiring Min A UB dressing, Min A LB dressing, SBA grooming, Total A toilet hygiene/SBA clothing management, Mod A TB bathing.   Barriers to return to prior living situation: Fatigue, deconditioning, weakness, post surgical precautions.   Recommendations for discharge: Anticipate 10 days to address goals in POC. Goals to reach Mod IND ADLs and simple IADLs.       Entered by: Gricel Maravilla 04/24/2020 3:13 PM

## 2020-04-25 ENCOUNTER — APPOINTMENT (OUTPATIENT)
Dept: PHYSICAL THERAPY | Facility: CLINIC | Age: 75
End: 2020-04-25
Payer: MEDICARE

## 2020-04-25 ENCOUNTER — APPOINTMENT (OUTPATIENT)
Dept: OCCUPATIONAL THERAPY | Facility: CLINIC | Age: 75
End: 2020-04-25
Payer: MEDICARE

## 2020-04-25 LAB
GLUCOSE BLDC GLUCOMTR-MCNC: 113 MG/DL (ref 70–99)
GLUCOSE BLDC GLUCOMTR-MCNC: 117 MG/DL (ref 70–99)
GLUCOSE BLDC GLUCOMTR-MCNC: 133 MG/DL (ref 70–99)
GLUCOSE BLDC GLUCOMTR-MCNC: 136 MG/DL (ref 70–99)
GLUCOSE BLDC GLUCOMTR-MCNC: 160 MG/DL (ref 70–99)

## 2020-04-25 PROCEDURE — 97530 THERAPEUTIC ACTIVITIES: CPT | Mod: GO

## 2020-04-25 PROCEDURE — 97116 GAIT TRAINING THERAPY: CPT | Mod: GP | Performed by: PHYSICAL THERAPIST

## 2020-04-25 PROCEDURE — 97535 SELF CARE MNGMENT TRAINING: CPT | Mod: GO

## 2020-04-25 PROCEDURE — 97530 THERAPEUTIC ACTIVITIES: CPT | Mod: GP | Performed by: PHYSICAL THERAPIST

## 2020-04-25 PROCEDURE — 25000132 ZZH RX MED GY IP 250 OP 250 PS 637: Mod: GY | Performed by: PHYSICIAN ASSISTANT

## 2020-04-25 PROCEDURE — 97110 THERAPEUTIC EXERCISES: CPT | Mod: GO

## 2020-04-25 PROCEDURE — 12800006 ZZH R&B REHAB

## 2020-04-25 PROCEDURE — 00000146 ZZHCL STATISTIC GLUCOSE BY METER IP

## 2020-04-25 PROCEDURE — 97110 THERAPEUTIC EXERCISES: CPT | Mod: GP | Performed by: PHYSICAL THERAPIST

## 2020-04-25 RX ORDER — CALCIUM CARBONATE 500 MG/1
500 TABLET, CHEWABLE ORAL 4 TIMES DAILY PRN
Status: DISCONTINUED | OUTPATIENT
Start: 2020-04-25 | End: 2020-05-01 | Stop reason: HOSPADM

## 2020-04-25 RX ADMIN — INSULIN HUMAN 32 UNITS: 100 INJECTION, SUSPENSION SUBCUTANEOUS at 08:15

## 2020-04-25 RX ADMIN — ASPIRIN 325 MG: 325 TABLET ORAL at 08:12

## 2020-04-25 RX ADMIN — Medication 5 MG: at 06:51

## 2020-04-25 RX ADMIN — MELATONIN 25 MCG: at 08:12

## 2020-04-25 RX ADMIN — METOPROLOL SUCCINATE 50 MG: 50 TABLET, EXTENDED RELEASE ORAL at 08:12

## 2020-04-25 RX ADMIN — CLINDAMYCIN HYDROCHLORIDE 300 MG: 300 CAPSULE ORAL at 19:49

## 2020-04-25 RX ADMIN — CLINDAMYCIN HYDROCHLORIDE 300 MG: 300 CAPSULE ORAL at 17:38

## 2020-04-25 RX ADMIN — CALCIUM CARBONATE 600 MG (1,500 MG)-VITAMIN D3 400 UNIT TABLET 1 TABLET: at 22:09

## 2020-04-25 RX ADMIN — SERTRALINE HYDROCHLORIDE 150 MG: 100 TABLET ORAL at 08:12

## 2020-04-25 RX ADMIN — OXYCODONE HYDROCHLORIDE AND ACETAMINOPHEN 1 TABLET: 5; 325 TABLET ORAL at 09:38

## 2020-04-25 RX ADMIN — INSULIN HUMAN 32 UNITS: 100 INJECTION, SUSPENSION SUBCUTANEOUS at 17:38

## 2020-04-25 RX ADMIN — CLINDAMYCIN HYDROCHLORIDE 300 MG: 300 CAPSULE ORAL at 13:29

## 2020-04-25 RX ADMIN — METFORMIN HYDROCHLORIDE 1000 MG: 500 TABLET, EXTENDED RELEASE ORAL at 08:10

## 2020-04-25 RX ADMIN — LOSARTAN POTASSIUM 50 MG: 50 TABLET, FILM COATED ORAL at 08:15

## 2020-04-25 RX ADMIN — FAMOTIDINE 20 MG: 20 TABLET ORAL at 09:37

## 2020-04-25 RX ADMIN — FERROUS GLUCONATE 324 MG: 324 TABLET ORAL at 08:12

## 2020-04-25 RX ADMIN — FAMOTIDINE 20 MG: 20 TABLET ORAL at 19:49

## 2020-04-25 RX ADMIN — CLINDAMYCIN HYDROCHLORIDE 300 MG: 300 CAPSULE ORAL at 08:12

## 2020-04-25 RX ADMIN — ATORVASTATIN CALCIUM 40 MG: 40 TABLET, FILM COATED ORAL at 22:09

## 2020-04-25 ASSESSMENT — MIFFLIN-ST. JEOR: SCORE: 1841.27

## 2020-04-25 NOTE — PLAN OF CARE
PT - Pt requires min to modA for bed mobility with HOB at 30 degrees, SBA for basic transfers and pt ambulating up to 100' with CGA and no assistive device.  CV conditioning continues to be focus of treatments as pt fatigues easily.

## 2020-04-25 NOTE — PROGRESS NOTES
"OT: Focused on toilet hygiene/transfer with use of FWW and SBA. Focused on discussion of bed mobility and her goal to be IND to complete. Discussed tuck in mattress bed rail to purchase; pt would like to look into getting when returning home to ease bed mobility/transfers. Pt abandoning FWW twice during session once while performing hand washing after toileting and another during activities in OT gym. Pt reporting \"why should I use this if I don't need it\", Writer providing education on use of AD in OT until she is cleared by PT to ambulate/transfer w/o device.   "

## 2020-04-25 NOTE — PLAN OF CARE
FOCUS/GOAL  Bowel management, Bladder management, Nutrition/Feeding/Swallowing precautions, Pain management, Wound care management and Mobility    ASSESSMENT, INTERVENTIONS AND CONTINUING PLAN FOR GOAL:   Patient is alert and oriented x4. Able to communicate needs effectively using call light. VSS. Denied pain, denied chest pain. occasional SOB with exertion, baseline per pt report.  Oxygenation above 95% on RA. RR=18. Good appetite and oral hydration. BGs were 147 and 208. Covered with sliding scale insuline per order. SBA for transfer and mobility. Continent of bowel and bladder on toilet. Pacemaker, sternal incision, chest tube sites CDI with No s/s of infection noted and JEISON. Intra dry to abdominal folds and under bilateral breast. CiPAP on. Call light within reach. Will continue to monitor.

## 2020-04-25 NOTE — PLAN OF CARE
"  VS: /42 (BP Location: Right arm)   Pulse 96   Temp 95.9  F (35.5  C) (Oral)   Resp 16   Ht 1.651 m (5' 5\")   Wt 133.5 kg (294 lb 6.4 oz)   SpO2 94%   BMI 48.99 kg/m     O2: Room air, no complaints of SOB. Uses CPAP   Output: Continent. Using toilet.   Last BM: 4/24/2020   Activity: X1 assistance using gait belt   Skin: Incision on sternum WDL. Ecchymotic areas over arms. Skin is pale but warm.   Pain: Comfortably manageable.   CMS: Intact.   Dressing: 4x4 gauze placed over patient's sternal incision to help the    Diet: Consistent carb diabetic diet   LDA: N/A   Equipment: Personal belongings   Plan: Tentative discharge set for 4/24/2020   Additional Info:        "

## 2020-04-25 NOTE — PLAN OF CARE
FOCUS/GOAL  Medical management    ASSESSMENT, INTERVENTIONS AND CONTINUING PLAN FOR GOAL:  Alert & oriented x4, speech clear, uses call light appropriately to make needs known. Needs SBA with walker for transfers and toileting. Sternal incision CDI and JEISON. Denies pain or discomfort. CPAP in place. Pt slept most of the night.

## 2020-04-25 NOTE — PROGRESS NOTES
"  [facility]   Acute Rehabilitation Unit  Daily progress note    interval history  Amy Luna was seen and examined at bedside.     She reports getting some good rest last night but this am after therapy she was having a little bit more pain and needed to take a pain medication.  She otherwise feels that things are going well.  She was wearing an ab binder to hold breasts in place to not strain her chest incision and this was on fairly tight today and causing sensation of restricted breathing.  Reminded her that it is for support and not for compression.  Can be worn loosely for support.  No other concerns today.  Denies any fever, chills, SOB cough, n/v, HA, CP, bd discomfort, and no new weakness, numbness or tingling.      medications    aspirin  325 mg Oral Daily     atorvastatin  40 mg Oral At Bedtime     calcium carbonate 600 mg-vitamin D 400 units  1 tablet Oral QPM     clindamycin  300 mg Oral 4x Daily     famotidine  20 mg Oral BID     ferrous gluconate  324 mg Oral Daily with breakfast     furosemide  40 mg Oral QAM     glipiZIDE  5 mg Oral QAM AC     insulin aspart  1-7 Units Subcutaneous TID AC     insulin aspart  1-5 Units Subcutaneous At Bedtime     insulin isophane human  32 Units Subcutaneous BID AC     losartan  50 mg Oral QAM     metFORMIN  1,000 mg Oral Daily with breakfast     metoprolol succinate ER  50 mg Oral Daily     sertraline  150 mg Oral QAM     Vitamin D3  25 mcg Oral Daily        acetaminophen, glucose **OR** dextrose **OR** glucagon, naloxone, oxyCODONE-acetaminophen, polyethylene glycol, polyethylene glycol-propylene glycol, senna-docusate     physical exam  /42 (BP Location: Right arm)   Pulse 96   Temp 95.9  F (35.5  C) (Oral)   Resp 16   Ht 1.651 m (5' 5\")   Wt 133.5 kg (294 lb 6.4 oz)   SpO2 94%   BMI 48.99 kg/m    Gen: pleasant, lying in bed, comfortable, NAD  CV: regular rate, chest incision healing well.   Pulm: Breathing " comfortably on RA  Abd: Obese, soft, NT/ND  Skin: there is bruising in the epigastric region as well as both arms which is unchanged. Midline sternal incision is c/d/i.   Ext: BLE edema (mild pitting) stable and   Neuro/MSK: She is alert, conversing well, able to make needs known, moving all extremities at least antigravity.       labs  No new labs today.    Rehabilitation - continue comprehensive acute inpatient rehabilitation program with multidisciplinary approach including therapies, rehab nursing, and physiatry following. See interval history for updates.      assessment and plan    Amy Luna is a 74 year old a woman with PMH of severe mitral stenosis, moderate aortic stenosis, severe tricuspid regurgitation, sick sinus syndrome s/p ppm, DM2, LAVERNE, HTN, HLD, morbid obesity who underwent  aortic valve replacement, mitral valve replacement, tricuspid valve repair and PFO repair 4/15.   Hospital stay was complicated by AV lead malfunction s/p atrial lead revision per electrophysiology  4/20, CLARITA, leukocytosis, volume overload,  acute blood loss anemia, and possible incision infection. Admitted to acute rehab 4/22 for ongoing rehabilitation and medical management.        Changes to care plan: 4/25/2020  - No new changes to care plan today  - Note that ab binder is to be worn across the chest for support of breasts and not for compression.      Mitral stenosis -- S/P Bioprost MVR 4/15/19    Aortic sten -- S/P Bioprost AVR 4/15/20    Tricuspid valve Regurgitation -- S/P Repair 4/15/20    Patent foramen ovale -- S/P Closure 4/15/20  Surgery per Dr. Wilson. Post op echo 4/15 with mild Tricuspid regurgitation, mild aortic regurg, mild-mod mv regurg. Normal LV systolic function.   -sternal precautions  -f/u CV Surgery  -oxycodone, tylenol prn pain  -continue asa,  -continue home lasix 40 mg daily  -continue metoprolol 50 mg daily     Sternal incision infection- with initiation of clindamycin 4/21 per CV  surgery  -continue clindamycin to complete 10 day course  -keep incision clean and dry- cleanse daily/prn and paint with iodine per discharge instructions  -abd binder to chest per CV surgery recs     Atrial lead fracture- suspected to be damaged in 4/15 surgery. S/p atrial lead revision per Dr. Francis. History of PPM for symptomatic second degree and intermittent third degree block 2015.  -f/u device RN  -Do not raise your arm on the incision side above shoulder level for 3 weeks.   -remove the outer dressing  (Friday, 4/24). Leave the steri-strips in place.  ok to shower once the outer dressing has been removed.      DM type 2- Hgb A1C 6.5% 4/8 PTA on NPH, glipizide, and metformin.  -202  -continue NPH 32 bid  -continue metformin restarted 4/23  -glipizide 2.5 mg started (4/24) - BG  -continue SSI  -monitor bg and titrate as indicated     HTN- on ARU admission on lasix 40 mg daily, losartan 40 mg daily, metoprolol xl 50 mg daily.  Of note has low diastolic bp this appears stable and patient reports as baseline.   -continue and titrate as indicated     LAVERNE-continue home CPAP     CLARITA- peak creatinine at 1.65 4/16.   Cr 0.93 4/22  -trend     Acute Blood loss Anemia-  Hgb stable 8.6 4/22 prior to admission Hgb ~11     HLD- continue statin     Depression- continue Zoloft     1. Adjustment to disability: monitor mood   2. FEN: diabetic diet  3. Bowel: continent  4. Bladder: continent  5. DVT Prophylaxis: mechanical/ambulation   6. GI Prophylaxis: ppi  7. Code: full   8. Disposition:home   9. ELOS:undergoing therapy evals today  10. Follow up Appointments on Discharge: cv surgery, cardiology, pcp        Patient staffed with Dr. Macario who is in agreement with the above.     Gabriele Donald MD PGY-4  Physical Medicine & Rehabilitation  Pager: [phone]

## 2020-04-26 ENCOUNTER — APPOINTMENT (OUTPATIENT)
Dept: OCCUPATIONAL THERAPY | Facility: CLINIC | Age: 75
End: 2020-04-26
Payer: MEDICARE

## 2020-04-26 ENCOUNTER — APPOINTMENT (OUTPATIENT)
Dept: PHYSICAL THERAPY | Facility: CLINIC | Age: 75
End: 2020-04-26
Payer: MEDICARE

## 2020-04-26 LAB
GLUCOSE BLDC GLUCOMTR-MCNC: 120 MG/DL (ref 70–99)
GLUCOSE BLDC GLUCOMTR-MCNC: 129 MG/DL (ref 70–99)
GLUCOSE BLDC GLUCOMTR-MCNC: 130 MG/DL (ref 70–99)
GLUCOSE BLDC GLUCOMTR-MCNC: 136 MG/DL (ref 70–99)
GLUCOSE BLDC GLUCOMTR-MCNC: 172 MG/DL (ref 70–99)

## 2020-04-26 PROCEDURE — 25000132 ZZH RX MED GY IP 250 OP 250 PS 637: Mod: GY | Performed by: PHYSICAL MEDICINE & REHABILITATION

## 2020-04-26 PROCEDURE — 97530 THERAPEUTIC ACTIVITIES: CPT | Mod: GP

## 2020-04-26 PROCEDURE — 00000146 ZZHCL STATISTIC GLUCOSE BY METER IP

## 2020-04-26 PROCEDURE — 97535 SELF CARE MNGMENT TRAINING: CPT | Mod: GO

## 2020-04-26 PROCEDURE — 97110 THERAPEUTIC EXERCISES: CPT | Mod: GP

## 2020-04-26 PROCEDURE — 12800006 ZZH R&B REHAB

## 2020-04-26 PROCEDURE — 25000132 ZZH RX MED GY IP 250 OP 250 PS 637: Mod: GY | Performed by: PHYSICIAN ASSISTANT

## 2020-04-26 PROCEDURE — 97110 THERAPEUTIC EXERCISES: CPT | Mod: GP | Performed by: PHYSICAL THERAPIST

## 2020-04-26 PROCEDURE — 97110 THERAPEUTIC EXERCISES: CPT | Mod: GO

## 2020-04-26 RX ADMIN — ACETAMINOPHEN 650 MG: 325 TABLET ORAL at 20:53

## 2020-04-26 RX ADMIN — INSULIN HUMAN 32 UNITS: 100 INJECTION, SUSPENSION SUBCUTANEOUS at 18:08

## 2020-04-26 RX ADMIN — SERTRALINE HYDROCHLORIDE 150 MG: 100 TABLET ORAL at 08:04

## 2020-04-26 RX ADMIN — INSULIN HUMAN 32 UNITS: 100 INJECTION, SUSPENSION SUBCUTANEOUS at 09:36

## 2020-04-26 RX ADMIN — METFORMIN HYDROCHLORIDE 1000 MG: 500 TABLET, EXTENDED RELEASE ORAL at 07:58

## 2020-04-26 RX ADMIN — METOPROLOL SUCCINATE 50 MG: 50 TABLET, EXTENDED RELEASE ORAL at 09:32

## 2020-04-26 RX ADMIN — ASPIRIN 325 MG: 325 TABLET ORAL at 07:59

## 2020-04-26 RX ADMIN — ATORVASTATIN CALCIUM 40 MG: 40 TABLET, FILM COATED ORAL at 20:51

## 2020-04-26 RX ADMIN — FAMOTIDINE 20 MG: 20 TABLET ORAL at 08:05

## 2020-04-26 RX ADMIN — CLINDAMYCIN HYDROCHLORIDE 300 MG: 300 CAPSULE ORAL at 20:50

## 2020-04-26 RX ADMIN — CLINDAMYCIN HYDROCHLORIDE 300 MG: 300 CAPSULE ORAL at 07:59

## 2020-04-26 RX ADMIN — LOSARTAN POTASSIUM 50 MG: 50 TABLET, FILM COATED ORAL at 09:32

## 2020-04-26 RX ADMIN — Medication 5 MG: at 07:58

## 2020-04-26 RX ADMIN — CALCIUM CARBONATE 600 MG (1,500 MG)-VITAMIN D3 400 UNIT TABLET 1 TABLET: at 20:50

## 2020-04-26 RX ADMIN — CALCIUM CARBONATE (ANTACID) CHEW TAB 500 MG 500 MG: 500 CHEW TAB at 16:45

## 2020-04-26 RX ADMIN — FERROUS GLUCONATE 324 MG: 324 TABLET ORAL at 07:59

## 2020-04-26 RX ADMIN — CLINDAMYCIN HYDROCHLORIDE 300 MG: 300 CAPSULE ORAL at 12:38

## 2020-04-26 RX ADMIN — CLINDAMYCIN HYDROCHLORIDE 300 MG: 300 CAPSULE ORAL at 16:45

## 2020-04-26 RX ADMIN — MELATONIN 25 MCG: at 07:59

## 2020-04-26 RX ADMIN — FAMOTIDINE 20 MG: 20 TABLET ORAL at 20:50

## 2020-04-26 RX ADMIN — ACETAMINOPHEN 650 MG: 325 TABLET ORAL at 16:45

## 2020-04-26 RX ADMIN — ACETAMINOPHEN 650 MG: 325 TABLET ORAL at 07:58

## 2020-04-26 NOTE — PLAN OF CARE
FOCUS/GOAL  Bowel management, Bladder management, and Pain management    ASSESSMENT, INTERVENTIONS AND CONTINUING PLAN FOR GOAL:    Pt slept well during the overnight, denies pain or SOB. SBA with walker for transfers. Continent of bowel and bladder, LBM 2/24.  at 0200. Continue with plan of care.

## 2020-04-26 NOTE — PLAN OF CARE
PT: Pt performed 8 stairs x 2 rounds with seated rest break between with success this date. Pt appears to be making progress toward goals, continues to require endurance challenges and increased ambulation distances.

## 2020-04-26 NOTE — PROGRESS NOTES
Patient is A&O x4. Denied CP,  numbness, tingling and SOB. BP at 8 am was 116/31 recheck at 9:15, 121/40 Lasix held per order, pt denies lightheadedness and dizziness. Complain of left shoulder pain, pt stated has this pain probably for the last five days and it is getting worse every day, PRN tylenol and hot pack given with good effect, MD aware. Sternal incision is CDI. Pt is CGA with walker and gait belt. Continent of bowel and bladder, LBM, 4/24 per pt report, refused offered PRN stool softener but pt declined. Drinking well and voiding spontaneously without difficulties. BG monitored. Self repositioned and turned in bed, uses CPAP at night and during napping. Able to use call light appropriately and make needs known, will continue to monitor patient as POC.

## 2020-04-26 NOTE — PLAN OF CARE
OT: Pt presents with fatigue & SOB, declined to wear face mask because of this. Completed bed mobility with HOB elevated, CGA. Toileting task with SBA. Aime UE exer within sternal precautions to increase strength & endurance. Amb approx 30' with FWW with SBA.

## 2020-04-26 NOTE — PLAN OF CARE
FOCUS/GOAL  Wound care management, Medical management, and Safety management    ASSESSMENT, INTERVENTIONS AND CONTINUING PLAN FOR GOAL:  Pt VSS. Denies pain. C/o heart burn scheduled famotidine given and PMR on call paged. Order placed for PRN Tums. Pt verbalized relief at HS. Reported SOB with activities and at rest intermittently. Oxygen sats stable above 90. Lung sounds LLL coarse, other fields clear. Pt encouraged to use IS.  and 160 . No insulin given. SBA for transfers. Calls appropriately for needs.

## 2020-04-26 NOTE — PROGRESS NOTES
[facility]   Acute Rehabilitation Unit  Daily progress note    interval history  Amy Luna was seen and examined at bedside.     No acute events overnight.  She reports that she had some indigestion last night and the TUMS was helpful.  She also states that she has had some progressively worsening left shoulder and anterior pec pain that has been present for the past 5 days and intermittently bothering her more.  She states at best it is a 2/10, currently a 4/10 and at worst a 6/10.  It is not associated with deep breaths and palpation can make it worse.  She has no noted loss of strength or decreased sensation down the left arm.  No worsening chest pain, no back pain or abdominal pain.  She has no reported temperature differences or report of the arm feeling cold.  She feels it is more of a muscle ache than anything else as it gets slightly worse with movement and therapy as well as and better at rest.  She otherwise denies any other issues, no fever, chills, SOB cough, n/v, HA, CP, bd discomfort, and no new weakness, numbness or tingling.      medications    aspirin  325 mg Oral Daily     atorvastatin  40 mg Oral At Bedtime     calcium carbonate 600 mg-vitamin D 400 units  1 tablet Oral QPM     clindamycin  300 mg Oral 4x Daily     famotidine  20 mg Oral BID     ferrous gluconate  324 mg Oral Daily with breakfast     furosemide  40 mg Oral QAM     glipiZIDE  5 mg Oral QAM AC     insulin aspart  1-7 Units Subcutaneous TID AC     insulin aspart  1-5 Units Subcutaneous At Bedtime     insulin isophane human  32 Units Subcutaneous BID AC     losartan  50 mg Oral QAM     metFORMIN  1,000 mg Oral Daily with breakfast     metoprolol succinate ER  50 mg Oral Daily     sertraline  150 mg Oral QAM     Vitamin D3  25 mcg Oral Daily        acetaminophen, calcium carbonate, glucose **OR** dextrose **OR** glucagon, naloxone, oxyCODONE-acetaminophen, polyethylene glycol, polyethylene  "glycol-propylene glycol, senna-docusate     physical exam  /40 (BP Location: Right arm)   Pulse 76   Temp 95.6  F (35.3  C) (Oral)   Resp 16   Ht 1.651 m (5' 5\")   Wt 133.5 kg (294 lb 6.4 oz)   SpO2 99%   BMI 48.99 kg/m    Gen: pleasant, sitting up in chair, comfortable, NAD  CV: regular rate, chest incision healing well.   Pulm: Breathing comfortably on RA, no resp distress  Abd: Obese, soft, NT/ND  Skin: there is bruising in the epigastric region as well as both arms which is unchanged. Midline sternal incision is c/d/i.  No wearing ab binder around chest.   Ext: BLE edema (mild pitting) stable and unchanged from prior days.   Neuro/MSK: She is alert, conversing well, able to make needs known, moving all extremities at least antigravity. Her left shoulder is ttp along the pectoralis major and posterior shoulder.  She has full PROM with minimal pain at end range.  She has adequate initiation of strength in all major muscle groups tested and it is symmetric.  Sensation is intact.        labs  No new labs today.    Rehabilitation - continue comprehensive acute inpatient rehabilitation program with multidisciplinary approach including therapies, rehab nursing, and physiatry following. See interval history for updates.      assessment and plan    Amy Luna is a 74 year old a woman with PMH of severe mitral stenosis, moderate aortic stenosis, severe tricuspid regurgitation, sick sinus syndrome s/p ppm, DM2, LAVERNE, HTN, HLD, morbid obesity who underwent  aortic valve replacement, mitral valve replacement, tricuspid valve repair and PFO repair 4/15.   Hospital stay was complicated by AV lead malfunction s/p atrial lead revision per electrophysiology  4/20, CLARITA, leukocytosis, volume overload,  acute blood loss anemia, and possible incision infection. Admitted to acute rehab 4/22 for ongoing rehabilitation and medical management.        Changes to care plan: 4/26/2020    - Ice/Heat per patient preference for " left shoulder pain  - no ab binder around chest outside of therapy.     Mitral stenosis -- S/P Bioprost MVR 4/15/19    Aortic sten -- S/P Bioprost AVR 4/15/20    Tricuspid valve Regurgitation -- S/P Repair 4/15/20    Patent foramen ovale -- S/P Closure 4/15/20  Surgery per Dr. Wilson. Post op echo 4/15 with mild Tricuspid regurgitation, mild aortic regurg, mild-mod mv regurg. Normal LV systolic function.   -sternal precautions  -f/u CV Surgery  -oxycodone, tylenol prn pain  -continue asa,  -continue home lasix 40 mg daily  -continue metoprolol 50 mg daily     Sternal incision infection- with initiation of clindamycin 4/21 per CV surgery  -continue clindamycin to complete 10 day course  -keep incision clean and dry- cleanse daily/prn and paint with iodine per discharge instructions  -abd binder to chest per CV surgery recs     Atrial lead fracture- suspected to be damaged in 4/15 surgery. S/p atrial lead revision per Dr. Francis. History of PPM for symptomatic second degree and intermittent third degree block 2015.  -f/u device RN  -Do not raise your arm on the incision side above shoulder level for 3 weeks.   -remove the outer dressing  (Friday, 4/24). Leave the steri-strips in place.  ok to shower once the outer dressing has been removed.     MSK:   - left shoulder/anterior pectoralis major pain likely due to myofascial soreness as it is reproducible and exacerbated with palpation of the pec major at the lateral aspect.    - may be a component of compression onto the brachial plexus or peripheral nerve due to ab binder.  - Recommended temp therapy with either ice or heat or both  - can take Tylenol for this if needed  - recommended no ab binder outside of therapy.      DM type 2- Hgb A1C 6.5% 4/8 PTA on NPH, glipizide, and metformin.  -202  -continue NPH 32 bid  -continue metformin restarted 4/23  -glipizide 2.5 mg started (4/24) - BG  -continue SSI  -monitor bg and titrate as indicated     HTN- on ARU  admission on lasix 40 mg daily, losartan 40 mg daily, metoprolol xl 50 mg daily.  Of note has low diastolic bp this appears stable and patient reports as baseline.   -continue and titrate as indicated     LAVERNE-continue home CPAP     CLARITA- peak creatinine at 1.65 4/16.   Cr 0.93 4/22  -trend     Acute Blood loss Anemia-  Hgb stable 8.6 4/22 prior to admission Hgb ~11     HLD- continue statin     Depression- continue Zoloft     1. Adjustment to disability: monitor mood   2. FEN: diabetic diet  3. Bowel: continent  4. Bladder: continent  5. DVT Prophylaxis: mechanical/ambulation   6. GI Prophylaxis: ppi  7. Code: full   8. Disposition:home   9. ELOS:undergoing therapy evals today  10. Follow up Appointments on Discharge: cv surgery, cardiology, pcp      Patient staffed with Dr. Macario who is in agreement with the above.     Gabriele Donald MD PGY-4  Physical Medicine & Rehabilitation  Pager: [phone]

## 2020-04-27 ENCOUNTER — APPOINTMENT (OUTPATIENT)
Dept: OCCUPATIONAL THERAPY | Facility: CLINIC | Age: 75
End: 2020-04-27
Payer: MEDICARE

## 2020-04-27 ENCOUNTER — APPOINTMENT (OUTPATIENT)
Dept: PHYSICAL THERAPY | Facility: CLINIC | Age: 75
End: 2020-04-27
Payer: MEDICARE

## 2020-04-27 ENCOUNTER — TELEPHONE (OUTPATIENT)
Dept: OTHER | Facility: CLINIC | Age: 75
End: 2020-04-27

## 2020-04-27 LAB
ANION GAP SERPL CALCULATED.3IONS-SCNC: 5 MMOL/L (ref 3–14)
BASOPHILS # BLD AUTO: 0 10E9/L (ref 0–0.2)
BASOPHILS NFR BLD AUTO: 0.1 %
BUN SERPL-MCNC: 18 MG/DL (ref 7–30)
CALCIUM SERPL-MCNC: 9.9 MG/DL (ref 8.5–10.1)
CHLORIDE SERPL-SCNC: 109 MMOL/L (ref 94–109)
CO2 SERPL-SCNC: 27 MMOL/L (ref 20–32)
CREAT SERPL-MCNC: 0.91 MG/DL (ref 0.52–1.04)
DIFFERENTIAL METHOD BLD: ABNORMAL
EOSINOPHIL # BLD AUTO: 0.3 10E9/L (ref 0–0.7)
EOSINOPHIL NFR BLD AUTO: 3.4 %
ERYTHROCYTE [DISTWIDTH] IN BLOOD BY AUTOMATED COUNT: 14.6 % (ref 10–15)
GFR SERPL CREATININE-BSD FRML MDRD: 62 ML/MIN/{1.73_M2}
GLUCOSE BLDC GLUCOMTR-MCNC: 102 MG/DL (ref 70–99)
GLUCOSE BLDC GLUCOMTR-MCNC: 120 MG/DL (ref 70–99)
GLUCOSE BLDC GLUCOMTR-MCNC: 68 MG/DL (ref 70–99)
GLUCOSE BLDC GLUCOMTR-MCNC: 77 MG/DL (ref 70–99)
GLUCOSE BLDC GLUCOMTR-MCNC: 83 MG/DL (ref 70–99)
GLUCOSE BLDC GLUCOMTR-MCNC: 91 MG/DL (ref 70–99)
GLUCOSE BLDC GLUCOMTR-MCNC: 93 MG/DL (ref 70–99)
GLUCOSE SERPL-MCNC: 72 MG/DL (ref 70–99)
HCT VFR BLD AUTO: 22.6 % (ref 35–47)
HGB BLD-MCNC: 7.2 G/DL (ref 11.7–15.7)
IMM GRANULOCYTES # BLD: 0.1 10E9/L (ref 0–0.4)
IMM GRANULOCYTES NFR BLD: 0.7 %
LYMPHOCYTES # BLD AUTO: 1.1 10E9/L (ref 0.8–5.3)
LYMPHOCYTES NFR BLD AUTO: 11.8 %
MAGNESIUM SERPL-MCNC: 2.2 MG/DL (ref 1.6–2.3)
MCH RBC QN AUTO: 31.4 PG (ref 26.5–33)
MCHC RBC AUTO-ENTMCNC: 31.9 G/DL (ref 31.5–36.5)
MCV RBC AUTO: 99 FL (ref 78–100)
MONOCYTES # BLD AUTO: 0.6 10E9/L (ref 0–1.3)
MONOCYTES NFR BLD AUTO: 6.2 %
NEUTROPHILS # BLD AUTO: 7 10E9/L (ref 1.6–8.3)
NEUTROPHILS NFR BLD AUTO: 77.8 %
NRBC # BLD AUTO: 0 10*3/UL
NRBC BLD AUTO-RTO: 0 /100
PLATELET # BLD AUTO: 270 10E9/L (ref 150–450)
POTASSIUM SERPL-SCNC: 4.3 MMOL/L (ref 3.4–5.3)
RBC # BLD AUTO: 2.29 10E12/L (ref 3.8–5.2)
SODIUM SERPL-SCNC: 141 MMOL/L (ref 133–144)
WBC # BLD AUTO: 9 10E9/L (ref 4–11)

## 2020-04-27 PROCEDURE — 97110 THERAPEUTIC EXERCISES: CPT | Mod: GO | Performed by: OCCUPATIONAL THERAPIST

## 2020-04-27 PROCEDURE — 99232 SBSQ HOSP IP/OBS MODERATE 35: CPT | Performed by: PHYSICAL MEDICINE & REHABILITATION

## 2020-04-27 PROCEDURE — 83735 ASSAY OF MAGNESIUM: CPT | Performed by: PHYSICIAN ASSISTANT

## 2020-04-27 PROCEDURE — 97535 SELF CARE MNGMENT TRAINING: CPT | Mod: GO | Performed by: OCCUPATIONAL THERAPIST

## 2020-04-27 PROCEDURE — 00000146 ZZHCL STATISTIC GLUCOSE BY METER IP

## 2020-04-27 PROCEDURE — 80048 BASIC METABOLIC PNL TOTAL CA: CPT | Performed by: PHYSICIAN ASSISTANT

## 2020-04-27 PROCEDURE — 25000131 ZZH RX MED GY IP 250 OP 636 PS 637: Mod: GY | Performed by: PHYSICIAN ASSISTANT

## 2020-04-27 PROCEDURE — 97530 THERAPEUTIC ACTIVITIES: CPT | Mod: GP

## 2020-04-27 PROCEDURE — 36415 COLL VENOUS BLD VENIPUNCTURE: CPT | Performed by: PHYSICIAN ASSISTANT

## 2020-04-27 PROCEDURE — 12800006 ZZH R&B REHAB

## 2020-04-27 PROCEDURE — 25000132 ZZH RX MED GY IP 250 OP 250 PS 637: Mod: GY | Performed by: PHYSICIAN ASSISTANT

## 2020-04-27 PROCEDURE — 85025 COMPLETE CBC W/AUTO DIFF WBC: CPT | Performed by: PHYSICIAN ASSISTANT

## 2020-04-27 PROCEDURE — 97110 THERAPEUTIC EXERCISES: CPT | Mod: GP

## 2020-04-27 RX ORDER — FAMOTIDINE 20 MG/1
20 TABLET, FILM COATED ORAL DAILY
Status: DISCONTINUED | OUTPATIENT
Start: 2020-04-27 | End: 2020-04-27

## 2020-04-27 RX ORDER — FUROSEMIDE 20 MG
20 TABLET ORAL EVERY MORNING
Status: DISCONTINUED | OUTPATIENT
Start: 2020-04-28 | End: 2020-04-27

## 2020-04-27 RX ORDER — OXYCODONE AND ACETAMINOPHEN 5; 325 MG/1; MG/1
1 TABLET ORAL 2 TIMES DAILY PRN
Status: DISCONTINUED | OUTPATIENT
Start: 2020-04-27 | End: 2020-05-01 | Stop reason: HOSPADM

## 2020-04-27 RX ORDER — FAMOTIDINE 20 MG/1
20 TABLET, FILM COATED ORAL DAILY
Status: DISCONTINUED | OUTPATIENT
Start: 2020-04-28 | End: 2020-04-30

## 2020-04-27 RX ORDER — FUROSEMIDE 40 MG
40 TABLET ORAL DAILY
Status: DISCONTINUED | OUTPATIENT
Start: 2020-04-28 | End: 2020-04-28

## 2020-04-27 RX ADMIN — CLINDAMYCIN HYDROCHLORIDE 300 MG: 300 CAPSULE ORAL at 20:18

## 2020-04-27 RX ADMIN — CLINDAMYCIN HYDROCHLORIDE 300 MG: 300 CAPSULE ORAL at 16:05

## 2020-04-27 RX ADMIN — ATORVASTATIN CALCIUM 40 MG: 40 TABLET, FILM COATED ORAL at 22:19

## 2020-04-27 RX ADMIN — ACETAMINOPHEN 650 MG: 325 TABLET ORAL at 08:53

## 2020-04-27 RX ADMIN — MELATONIN 25 MCG: at 08:53

## 2020-04-27 RX ADMIN — ASPIRIN 325 MG: 325 TABLET ORAL at 08:42

## 2020-04-27 RX ADMIN — LOSARTAN POTASSIUM 50 MG: 50 TABLET, FILM COATED ORAL at 08:52

## 2020-04-27 RX ADMIN — INSULIN HUMAN 32 UNITS: 100 INJECTION, SUSPENSION SUBCUTANEOUS at 16:05

## 2020-04-27 RX ADMIN — METFORMIN HYDROCHLORIDE 1000 MG: 500 TABLET, EXTENDED RELEASE ORAL at 08:42

## 2020-04-27 RX ADMIN — CLINDAMYCIN HYDROCHLORIDE 300 MG: 300 CAPSULE ORAL at 08:42

## 2020-04-27 RX ADMIN — SERTRALINE HYDROCHLORIDE 150 MG: 100 TABLET ORAL at 08:42

## 2020-04-27 RX ADMIN — INSULIN HUMAN 32 UNITS: 100 INJECTION, SUSPENSION SUBCUTANEOUS at 08:44

## 2020-04-27 RX ADMIN — METOPROLOL SUCCINATE 50 MG: 50 TABLET, EXTENDED RELEASE ORAL at 08:42

## 2020-04-27 RX ADMIN — FUROSEMIDE 40 MG: 40 TABLET ORAL at 08:42

## 2020-04-27 RX ADMIN — FERROUS GLUCONATE 324 MG: 324 TABLET ORAL at 08:43

## 2020-04-27 RX ADMIN — CLINDAMYCIN HYDROCHLORIDE 300 MG: 300 CAPSULE ORAL at 11:59

## 2020-04-27 RX ADMIN — FAMOTIDINE 20 MG: 20 TABLET ORAL at 08:43

## 2020-04-27 RX ADMIN — CALCIUM CARBONATE 600 MG (1,500 MG)-VITAMIN D3 400 UNIT TABLET 1 TABLET: at 20:18

## 2020-04-27 ASSESSMENT — MIFFLIN-ST. JEOR: SCORE: 1815.87

## 2020-04-27 NOTE — PROGRESS NOTES
"  [facility]   Acute Rehabilitation Unit  Daily progress note    interval history  Amy Luna was seen sitting up in bed reports fatigue, reports abdominal fullness \"like I can't get any more food in\", no abdominal pain, no vomiting, no fevers, denies chest pain, has ongoing left shoulder/upper chest pain.  Making progress with therapy, ongoing sob with exertion.  Feels she is sleeping well at night. Hopeful to get home soon.     OT: Focused on toilet hygiene/transfer with use of FWW and SBA. Focused on discussion of bed mobility and her goal to be IND to complete. Discussed tuck in mattress bed rail to purchase; pt would like to look into getting when returning home to ease bed mobility/transfers. Pt abandoning FWW twice during session once while performing hand washing after toileting and another during activities in OT gym. Pt reporting \"why should I use this if I don't need it\", Writer providing education on use of AD in OT until she is cleared by PT to ambulate/transfer w/o device    medications    aspirin  325 mg Oral Daily     atorvastatin  40 mg Oral At Bedtime     calcium carbonate 600 mg-vitamin D 400 units  1 tablet Oral QPM     clindamycin  300 mg Oral 4x Daily     [START ON 4/28/2020] famotidine  20 mg Oral Daily     ferrous gluconate  324 mg Oral Daily with breakfast     insulin aspart  1-7 Units Subcutaneous TID AC     insulin aspart  1-5 Units Subcutaneous At Bedtime     insulin isophane human  32 Units Subcutaneous BID AC     losartan  50 mg Oral QAM     metFORMIN  1,000 mg Oral Daily with breakfast     metoprolol succinate ER  50 mg Oral Daily     sertraline  150 mg Oral QAM     Vitamin D3  25 mcg Oral Daily        acetaminophen, calcium carbonate, glucose **OR** dextrose **OR** glucagon, naloxone, oxyCODONE-acetaminophen, polyethylene glycol, polyethylene glycol-propylene glycol, senna-docusate     physical exam  /50 (BP Location: Right arm)   Pulse " "79   Temp 97.5  F (36.4  C) (Oral)   Resp 16   Ht 1.651 m (5' 5\")   Wt 131 kg (288 lb 12.8 oz)   SpO2 99%   BMI 48.06 kg/m    Gen: alert awake and in no distress  Pulm: non labored on room air  Abd: soft, non tender  Skin: there is bruising in the epigastric region as well as both arms. Midline sternal incision is c/d/i. The 2 chest tube incisions cdi dressing dry.  Ext: BLE edema mild  Neuro/MSK: Muscle strength is wnl.        labs  Lab Results   Component Value Date    WBC 9.0 04/27/2020     Lab Results   Component Value Date    RBC 2.29 04/27/2020     Lab Results   Component Value Date    HGB 7.2 04/27/2020     Lab Results   Component Value Date    HCT 22.6 04/27/2020     Lab Results   Component Value Date    MCV 99 04/27/2020     Lab Results   Component Value Date    MCH 31.4 04/27/2020     Lab Results   Component Value Date    MCHC 31.9 04/27/2020     Lab Results   Component Value Date    RDW 14.6 04/27/2020     Lab Results   Component Value Date     04/27/2020     Last Comprehensive Metabolic Panel:  Sodium   Date Value Ref Range Status   04/27/2020 141 133 - 144 mmol/L Final     Potassium   Date Value Ref Range Status   04/27/2020 4.3 3.4 - 5.3 mmol/L Final     Chloride   Date Value Ref Range Status   04/27/2020 109 94 - 109 mmol/L Final     Carbon Dioxide   Date Value Ref Range Status   04/27/2020 27 20 - 32 mmol/L Final     Anion Gap   Date Value Ref Range Status   04/27/2020 5 3 - 14 mmol/L Final     Glucose   Date Value Ref Range Status   04/27/2020 72 70 - 99 mg/dL Final     Urea Nitrogen   Date Value Ref Range Status   04/27/2020 18 7 - 30 mg/dL Final     Creatinine   Date Value Ref Range Status   04/27/2020 0.91 0.52 - 1.04 mg/dL Final     GFR Estimate   Date Value Ref Range Status   04/27/2020 62 >60 mL/min/[1.73_m2] Final     Comment:     Non  GFR Calc  Starting 12/18/2018, serum creatinine based estimated GFR (eGFR) will be   calculated using the Chronic Kidney Disease " Epidemiology Collaboration   (CKD-EPI) equation.       Calcium   Date Value Ref Range Status   04/27/2020 9.9 8.5 - 10.1 mg/dL Final         Rehabilitation - continue comprehensive acute inpatient rehabilitation program with multidisciplinary approach including therapies, rehab nursing, and physiatry following. See interval history for updates.      assessment and plan    Amy Luna is a 74 year old a woman with PMH of severe mitral stenosis, moderate aortic stenosis, severe tricuspid regurgitation, sick sinus syndrome s/p ppm, DM2, LAVERNE, HTN, HLD, morbid obesity who underwent  aortic valve replacement, mitral valve replacement, tricuspid valve repair and PFO repair 4/15.   Hospital stay was complicated by AV lead malfunction s/p atrial lead revision per electrophysiology  4/20, CLARITA, leukocytosis, volume overload,  acute blood loss anemia, and possible incision infection. Admitted to acute rehab 4/22 for ongoing rehabilitation and medical management.      Mitral stenosis -- S/P Bioprost MVR 4/15/19    Aortic sten -- S/P Bioprost AVR 4/15/20    Tricuspid valve Regurgitation -- S/P Repair 4/15/20    Patent foramen ovale -- S/P Closure 4/15/20  Surgery per Dr. Wilson. Post op echo 4/15 with mild Tricuspid regurgitation, mild aortic regurg, mild-mod mv regurg. Normal LV systolic function.   -sternal precautions  -f/u CV Surgery  -percocet bid prn, tylenol prn pain  -continue asa  -hold lasix for now- given ongoing low bp and monitor weight, edema. SBP 90s-120s- DBP 20s-50s  -continue metoprolol 50 mg daily     Sternal incision infection- with initiation of clindamycin 4/21 per CV surgery  -continue clindamycin to complete 10 day course  -keep incision clean and dry- cleanse daily/prn and paint with iodine per discharge instructions  -abd binder to chest per CV surgery recs     Atrial lead fracture- suspected to be damaged in 4/15 surgery. S/p atrial lead revision per Dr. Francis. History of PPM for symptomatic second  degree and intermittent third degree block 2015.  -f/u device RN  -Do not raise your arm on the incision side above shoulder level for 3 weeks.   -remove the outer dressing  (Friday, 4/24). Leave the steri-strips in place.  ok to shower once the outer dressing has been removed.      DM type 2- Hgb A1C 6.5% 4/8 PTA on NPH, glipizide, and metformin.  BG   -continue NPH 32 bid  -continue metformin restarted 4/23  -discontinue glipizide given intermittent hypoglycemia since initiating   -continue SSI  -monitor bg and titrate as indicated    Acute Blood loss Anemia-  Hgb stable 8.6 4/22-->7.2 4/27. No clear blood loss. prior to admission Hgb ~11   -monitor for signs and symptoms of anemia  -Hgb in am  -consider further workup and evaluation if down trends further.      HTN- on ARU admission on lasix 40 mg daily, losartan 40 mg daily, metoprolol xl 50 mg daily.  Of note has low diastolic bp this appears stable and patient reports as baseline. SBP 90s-120s. DBP 20s-50s  -continue and titrate as indicated  -hold lasix as above.      LAVERNE-continue home CPAP     CLARITA- peak creatinine at 1.65 4/16.   Cr 0.93 4/22-->0.91 4/27.   -trend       HLD- continue statin     Depression- continue Zoloft     1. Adjustment to disability: monitor mood   2. FEN: diabetic diet  3. Bowel: continent  4. Bladder: continent  5. DVT Prophylaxis: mechanical/ambulation   6. GI Prophylaxis: ppi  7. Code: full   8. Disposition:home   9. ELOS:undergoing therapy evals today  10. Follow up Appointments on Discharge: cv surgery, cardiology, pcp        Caron Pham PA-C    Seen and evaluated with Dr. Ruiz.

## 2020-04-27 NOTE — PLAN OF CARE
FOCUS/GOAL  Wound care management, Skin integrity, and Safety management    ASSESSMENT, INTERVENTIONS AND CONTINUING PLAN FOR GOAL:  Pt complained of pain on right shoulder relieved by tylenol and hot packs. Reports SOB with activities using CPAP when resting in bed and at night. Denies dizziness, nausea/vomiting, denies incisional pain. Tums given once for heart burn with good effect.  and 172. Has good appetite. Continent of bowel and bladder, BM x1 this evening. SBA with a walker for transfers. Pt VSS  ex for low /35, fluids encouraged. Alert and oriented. Able to verbalize needs appropriately.

## 2020-04-27 NOTE — PROGRESS NOTES
Patient demonstrates variable assistance need with functional mobility throughout the day.  She has demonstrated ability to ambulate without physical assist, without assistive device house hold distances when rested.  As day progressed pt requires use of FWW and physical assist for bed mobility in order to maintain sternal precautions.  On this date pt reported feeling lightheaded with all activity limiting ability to fully participate.

## 2020-04-27 NOTE — PLAN OF CARE
Denies pain, c/o dizziness after completing PT, orhto BP.s completed, page out to PA. Dressing puncture sites changed, scant amount of drainage and slough noted on the lower abd puncture site, cleansed with wound cleanse and covered with island dressing. Continent of bladder on the toilet, SBA with [place]-cares and clothing management, will continue POC

## 2020-04-27 NOTE — TELEPHONE ENCOUNTER
Provider form ARU called with concerns of post op anemia, hgb 7.2. BP 98/25. Lasix, Cozaar and Metoprolol on hold per parameters. Discussed with Arabella Angulo PA-C. Will hold Cozaar, continue lasix and Metoprolol with hold parameters. Trend CBC.

## 2020-04-27 NOTE — PLAN OF CARE
FOCUS/GOAL  Bowel management, Bladder management, and Pain management    ASSESSMENT, INTERVENTIONS AND CONTINUING PLAN FOR GOAL:    Pt is alert and oriented x4, able to communicate needs. BG 77 at 0200, 60 ml of apple juice and 120 ml of milk was given , recheck  at 0230. Pt denies pain or SOB. Sternal incision intact, chest tube site covered with dressing. Pt continent of bladder and uses toilet. SBA with walker for transfers. Slept well during the overnight. Continue with plan of care.

## 2020-04-27 NOTE — PROGRESS NOTES
OT: Completed full shower and ADLs with FWW. Pt req'd SBA to transfer in/out of walk in shower and on/off extended tub bench with FWW. Pt req'd assistance to wash/rinse/dry 7/10 body parts while seated on extended tub bench or CGA while standing. Will assess for mod IND in BR with FWW tomorrow.

## 2020-04-28 ENCOUNTER — APPOINTMENT (OUTPATIENT)
Dept: OCCUPATIONAL THERAPY | Facility: CLINIC | Age: 75
End: 2020-04-28
Payer: MEDICARE

## 2020-04-28 ENCOUNTER — APPOINTMENT (OUTPATIENT)
Dept: PHYSICAL THERAPY | Facility: CLINIC | Age: 75
End: 2020-04-28
Payer: MEDICARE

## 2020-04-28 LAB
GLUCOSE BLDC GLUCOMTR-MCNC: 111 MG/DL (ref 70–99)
GLUCOSE BLDC GLUCOMTR-MCNC: 126 MG/DL (ref 70–99)
GLUCOSE BLDC GLUCOMTR-MCNC: 165 MG/DL (ref 70–99)
GLUCOSE BLDC GLUCOMTR-MCNC: 189 MG/DL (ref 70–99)
GLUCOSE BLDC GLUCOMTR-MCNC: 193 MG/DL (ref 70–99)
GLUCOSE BLDC GLUCOMTR-MCNC: 85 MG/DL (ref 70–99)
GLUCOSE BLDC GLUCOMTR-MCNC: 98 MG/DL (ref 70–99)
HGB BLD-MCNC: 7.1 G/DL (ref 11.7–15.7)

## 2020-04-28 PROCEDURE — 25000132 ZZH RX MED GY IP 250 OP 250 PS 637: Mod: GY | Performed by: PHYSICIAN ASSISTANT

## 2020-04-28 PROCEDURE — 97110 THERAPEUTIC EXERCISES: CPT | Mod: GP

## 2020-04-28 PROCEDURE — 97535 SELF CARE MNGMENT TRAINING: CPT | Mod: GO | Performed by: OCCUPATIONAL THERAPIST

## 2020-04-28 PROCEDURE — 97530 THERAPEUTIC ACTIVITIES: CPT | Mod: GO | Performed by: OCCUPATIONAL THERAPIST

## 2020-04-28 PROCEDURE — 97530 THERAPEUTIC ACTIVITIES: CPT | Mod: GP

## 2020-04-28 PROCEDURE — 12800006 ZZH R&B REHAB

## 2020-04-28 PROCEDURE — 85018 HEMOGLOBIN: CPT | Performed by: PHYSICIAN ASSISTANT

## 2020-04-28 PROCEDURE — 97110 THERAPEUTIC EXERCISES: CPT | Mod: GP | Performed by: PHYSICAL THERAPIST

## 2020-04-28 PROCEDURE — 97530 THERAPEUTIC ACTIVITIES: CPT | Mod: GP | Performed by: PHYSICAL THERAPIST

## 2020-04-28 PROCEDURE — 36415 COLL VENOUS BLD VENIPUNCTURE: CPT | Performed by: PHYSICIAN ASSISTANT

## 2020-04-28 PROCEDURE — 99232 SBSQ HOSP IP/OBS MODERATE 35: CPT | Performed by: PHYSICAL MEDICINE & REHABILITATION

## 2020-04-28 PROCEDURE — 25000132 ZZH RX MED GY IP 250 OP 250 PS 637: Mod: GY | Performed by: PHYSICAL MEDICINE & REHABILITATION

## 2020-04-28 PROCEDURE — 00000146 ZZHCL STATISTIC GLUCOSE BY METER IP

## 2020-04-28 RX ORDER — FUROSEMIDE 40 MG
40 TABLET ORAL
Status: DISCONTINUED | OUTPATIENT
Start: 2020-04-28 | End: 2020-05-01 | Stop reason: HOSPADM

## 2020-04-28 RX ADMIN — SERTRALINE HYDROCHLORIDE 150 MG: 100 TABLET ORAL at 08:09

## 2020-04-28 RX ADMIN — FAMOTIDINE 20 MG: 20 TABLET ORAL at 08:10

## 2020-04-28 RX ADMIN — ACETAMINOPHEN 650 MG: 325 TABLET ORAL at 18:23

## 2020-04-28 RX ADMIN — CLINDAMYCIN HYDROCHLORIDE 300 MG: 300 CAPSULE ORAL at 12:29

## 2020-04-28 RX ADMIN — FUROSEMIDE 40 MG: 40 TABLET ORAL at 08:09

## 2020-04-28 RX ADMIN — ASPIRIN 325 MG: 325 TABLET ORAL at 08:10

## 2020-04-28 RX ADMIN — CALCIUM CARBONATE 600 MG (1,500 MG)-VITAMIN D3 400 UNIT TABLET 1 TABLET: at 21:09

## 2020-04-28 RX ADMIN — INSULIN ASPART 2 UNITS: 100 INJECTION, SOLUTION INTRAVENOUS; SUBCUTANEOUS at 16:53

## 2020-04-28 RX ADMIN — CLINDAMYCIN HYDROCHLORIDE 300 MG: 300 CAPSULE ORAL at 21:09

## 2020-04-28 RX ADMIN — CLINDAMYCIN HYDROCHLORIDE 300 MG: 300 CAPSULE ORAL at 16:48

## 2020-04-28 RX ADMIN — FUROSEMIDE 40 MG: 40 TABLET ORAL at 16:47

## 2020-04-28 RX ADMIN — MELATONIN 25 MCG: at 08:09

## 2020-04-28 RX ADMIN — CLINDAMYCIN HYDROCHLORIDE 300 MG: 300 CAPSULE ORAL at 08:09

## 2020-04-28 RX ADMIN — ATORVASTATIN CALCIUM 40 MG: 40 TABLET, FILM COATED ORAL at 21:09

## 2020-04-28 RX ADMIN — FERROUS GLUCONATE 324 MG: 324 TABLET ORAL at 08:08

## 2020-04-28 RX ADMIN — METFORMIN HYDROCHLORIDE 1000 MG: 500 TABLET, EXTENDED RELEASE ORAL at 08:19

## 2020-04-28 RX ADMIN — INSULIN ASPART 1 UNITS: 100 INJECTION, SOLUTION INTRAVENOUS; SUBCUTANEOUS at 12:30

## 2020-04-28 NOTE — PROGRESS NOTES
"  [facility]   Acute Rehabilitation Unit  Daily progress note    interval history  Amy Luna was seen and examined at bedside. She says she slept well and is feeling well today but continues to c/o of fullness which she feels is not indigestion. She says she has to cut up her food into very small pieces in order to be able to swallow. She reports SOB with therapy but not at rest. Denies HA, CP, abd pain. Shoulder pain is improving. Her hgb today is 7.1. We will continue to trend. Losartan was held as per surgery team and she will continue lasix as they believe the low hgb is dilutional.    Functionally, She has demonstrated ability to ambulate without physical assist, without assistive device house hold distances when rested.    OT: Completed full shower and ADLs with FWW. Pt req'd SBA to transfer in/out of walk in shower and on/off extended tub bench with FWW.      medications    aspirin  325 mg Oral Daily     atorvastatin  40 mg Oral At Bedtime     calcium carbonate 600 mg-vitamin D 400 units  1 tablet Oral QPM     clindamycin  300 mg Oral 4x Daily     famotidine  20 mg Oral Daily     ferrous gluconate  324 mg Oral Daily with breakfast     furosemide  40 mg Oral Daily     insulin aspart  1-7 Units Subcutaneous TID AC     insulin aspart  1-5 Units Subcutaneous At Bedtime     insulin isophane human  30 Units Subcutaneous BID AC     metFORMIN  1,000 mg Oral Daily with breakfast     metoprolol succinate ER  50 mg Oral Daily     sertraline  150 mg Oral QAM     Vitamin D3  25 mcg Oral Daily        acetaminophen, calcium carbonate, glucose **OR** dextrose **OR** glucagon, naloxone, oxyCODONE-acetaminophen, polyethylene glycol, polyethylene glycol-propylene glycol, senna-docusate     physical exam  /48 (BP Location: Right arm)   Pulse 86   Temp 97.1  F (36.2  C) (Oral)   Resp 16   Ht 1.651 m (5' 5\")   Wt 131 kg (288 lb 12.8 oz)   SpO2 93%   BMI 48.06 kg/m    Gen: " alert awake and in no distress  CVS: rrr  Pulm: CTAB  Abd: soft, non tender  Skin: there is bruising in the epigastric region as well as both arms. Midline sternal incision is c/d/i. The 2 chest tube incisions are showing less drainage.  Ext: BLE edema (mild pitting)  Neuro/MSK: Muscle strength is wnl       labs  Heme is 7.1 today.    Rehabilitation - continue comprehensive acute inpatient rehabilitation program with multidisciplinary approach including therapies, rehab nursing, and physiatry following. See interval history for updates.      assessment and plan    Amy Luna is a 74 year old a woman with PMH of severe mitral stenosis, moderate aortic stenosis, severe tricuspid regurgitation, sick sinus syndrome s/p ppm, DM2, LAVERNE, HTN, HLD, morbid obesity who underwent  aortic valve replacement, mitral valve replacement, tricuspid valve repair and PFO repair 4/15. Hospital stay was complicated by AV lead malfunction s/p atrial lead revision per electrophysiology  4/20, CLARITA, leukocytosis, volume overload,  acute blood loss anemia, and possible incision infection. Admitted to acute rehab 4/22 for ongoing rehabilitation and medical management.      Mitral stenosis -- S/P Bioprost MVR 4/15/19    Aortic sten -- S/P Bioprost AVR 4/15/20    Tricuspid valve Regurgitation -- S/P Repair 4/15/20    Patent foramen ovale -- S/P Closure 4/15/20  Surgery per Dr. Wilson. Post op echo 4/15 with mild Tricuspid regurgitation, mild aortic regurg, mild-mod mv regurg. Normal LV systolic function.   -sternal precautions  -f/u CV Surgery  -oxycodone, tylenol prn pain  -continue asa,  -continue home lasix 40 mg daily  -continue metoprolol 50 mg daily     Sternal incision infection- with initiation of clindamycin 4/21 per CV surgery  -continue clindamycin to complete 10 day course  -keep incision clean and dry- cleanse daily/prn and paint with iodine per discharge instructions  -abd binder to chest per CV surgery recs     Atrial lead  fracture- suspected to be damaged in 4/15 surgery. S/p atrial lead revision per Dr. Francis. History of PPM for symptomatic second degree and intermittent third degree block 2015.  -f/u device RN  -Do not raise your arm on the incision side above shoulder level for 3 weeks.   -remove the outer dressing  (Friday, 4/24). Leave the steri-strips in place.       DM type 2- Hgb A1C 6.5% 4/8 PTA on NPH, glipizide, and metformin.  -202  -continue NPH   -continue metformin restarted 4/23  -glipizide 2.5 mg (held)  -continue SSI  -monitor bg and titrate as indicated     HTN- on ARU admission on lasix 40 mg daily, losartan 40 mg daily, metoprolol xl 50 mg daily.  Of note has low diastolic bp this appears stable and patient reports as baseline.   -continue and titrate as indicated     LAVERNE-continue home CPAP     CLARITA- peak creatinine at 1.65 4/16.   Cr 0.93 4/22  -trend     Acute Blood loss Anemia-  Hgb 7.1 (4/28) prior to admission Hgb ~11     HLD- continue statin     Depression- continue Zoloft     1. Adjustment to disability: monitor mood   2. FEN: diabetic diet  3. Bowel: continent  4. Bladder: continent  5. DVT Prophylaxis: mechanical/ambulation   6. GI Prophylaxis: ppi  7. Code: full   8. Disposition:home   9. ELOS: 5/4/20  10. Follow up Appointments on Discharge: cv surgery, cardiology, pcp    I spent a total of 25 minutes face to face and coordinating care of Amy Luna. Over 50% of my time on the unit was spent counseling the patient and  coordinating care.      Eli Michaud MD

## 2020-04-28 NOTE — PLAN OF CARE
FOCUS/GOAL  Bowel management, Bladder management, and Pain management    ASSESSMENT, INTERVENTIONS AND CONTINUING PLAN FOR GOAL:    Pt is alert and oriented x4, using call light appropriately.  BG 85 at 0200, 120 ml of milk was given. Recheck  at 0406. Pt c/o fullness but denies chest pain, headache, dizziness or N/V. Continue with plan of care.

## 2020-04-28 NOTE — ADDENDUM NOTE
Addendum  created 04/28/20 1528 by Anuradha Adam MD    Intraprocedure Event deleted, Intraprocedure Event edited

## 2020-04-28 NOTE — PLAN OF CARE
Denies headache, dizziness or her usual right upper chest /arm pain. Hgb today 7.1, no signs of lethargy noted. Participated in PT/OTAte good at breakfast and lunch, BG's today 126 and 189. Continent of bowel and bladder on the toilet, need assist with [place]-cares after a bowel movement, will continue POC

## 2020-04-28 NOTE — PLAN OF CARE
VS: VSS.    Alert & oriented x4.   O2: >95% on RA.   Output: Continent. Voiding adequately and spontaneously in bathroom.   Last BM: Continent. 4/27.   Activity: SBA with gait belt. Pt refused to use walker when going to bathroom.    Skin: Midline chest incision, chest tube puncture sites, BUE bruising   Pain: Previously complained of L-shoulder pains. Pt learned that ice helps and heat does not.    CMS: LUE numbness present.    Dressing: Midline chest incision, approximated and JEISON. Punctures sites (2), dressings CDI.    Diet: Moderate consistent CHO.    BG's 93 & 91. Pt drank apple juice before going to bed.    LDA: N/A.   Equipment: Home CPAP, walker, gait belt, wheelchair, personal items.   Plan: Continue with plan of care.   Additional Info: Home CPAP on for night.  Sternal precautions.     Pt. Mentioned chest fullness and difficulty swallowing any solid foods down her midline incision. Spoke with MD, no concerns at this time. Will be addressed in the morning. This is causing the pt to eat minimal amounts of food (ate chocolate pudding for dinner). No difficulty or concerns swallowing pills whole.

## 2020-04-28 NOTE — PROGRESS NOTES
OT: Pt progressing well with ADLs. Pt requires supervision with FB dressing and toileting tasks. Pt continues to fatigue quickly and needing seated rest breaks.

## 2020-04-29 LAB
GLUCOSE BLDC GLUCOMTR-MCNC: 134 MG/DL (ref 70–99)
GLUCOSE BLDC GLUCOMTR-MCNC: 141 MG/DL (ref 70–99)
GLUCOSE BLDC GLUCOMTR-MCNC: 153 MG/DL (ref 70–99)
GLUCOSE BLDC GLUCOMTR-MCNC: 189 MG/DL (ref 70–99)
GLUCOSE BLDC GLUCOMTR-MCNC: 193 MG/DL (ref 70–99)
HGB BLD-MCNC: 7.1 G/DL (ref 11.7–15.7)

## 2020-04-29 PROCEDURE — 99233 SBSQ HOSP IP/OBS HIGH 50: CPT | Performed by: PHYSICAL MEDICINE & REHABILITATION

## 2020-04-29 PROCEDURE — 92610 EVALUATE SWALLOWING FUNCTION: CPT | Mod: GN

## 2020-04-29 PROCEDURE — 25000132 ZZH RX MED GY IP 250 OP 250 PS 637: Mod: GY | Performed by: PHYSICAL MEDICINE & REHABILITATION

## 2020-04-29 PROCEDURE — 40000183 ZZH STATISTIC PT MED CONFERENCE < 30 MIN: Performed by: PHYSICAL THERAPIST

## 2020-04-29 PROCEDURE — 40000187 ZZH STATISTIC PATIENT MED CONFERENCE < 30 MIN

## 2020-04-29 PROCEDURE — 97535 SELF CARE MNGMENT TRAINING: CPT | Mod: GO | Performed by: OCCUPATIONAL THERAPIST

## 2020-04-29 PROCEDURE — 97110 THERAPEUTIC EXERCISES: CPT | Mod: GP

## 2020-04-29 PROCEDURE — 00000146 ZZHCL STATISTIC GLUCOSE BY METER IP

## 2020-04-29 PROCEDURE — 85018 HEMOGLOBIN: CPT | Performed by: PHYSICAL MEDICINE & REHABILITATION

## 2020-04-29 PROCEDURE — 25000132 ZZH RX MED GY IP 250 OP 250 PS 637: Mod: GY | Performed by: PHYSICIAN ASSISTANT

## 2020-04-29 PROCEDURE — 97530 THERAPEUTIC ACTIVITIES: CPT | Mod: GP

## 2020-04-29 PROCEDURE — 36416 COLLJ CAPILLARY BLOOD SPEC: CPT | Performed by: PHYSICAL MEDICINE & REHABILITATION

## 2020-04-29 PROCEDURE — 40000187 ZZH STATISTIC PATIENT MED CONFERENCE < 30 MIN: Performed by: OCCUPATIONAL THERAPIST

## 2020-04-29 PROCEDURE — 12800006 ZZH R&B REHAB

## 2020-04-29 RX ADMIN — FAMOTIDINE 20 MG: 20 TABLET ORAL at 08:10

## 2020-04-29 RX ADMIN — CLINDAMYCIN HYDROCHLORIDE 300 MG: 300 CAPSULE ORAL at 20:07

## 2020-04-29 RX ADMIN — FUROSEMIDE 40 MG: 40 TABLET ORAL at 17:16

## 2020-04-29 RX ADMIN — INSULIN ASPART 1 UNITS: 100 INJECTION, SOLUTION INTRAVENOUS; SUBCUTANEOUS at 08:15

## 2020-04-29 RX ADMIN — METOPROLOL SUCCINATE 50 MG: 50 TABLET, EXTENDED RELEASE ORAL at 08:11

## 2020-04-29 RX ADMIN — CLINDAMYCIN HYDROCHLORIDE 300 MG: 300 CAPSULE ORAL at 08:11

## 2020-04-29 RX ADMIN — CALCIUM CARBONATE (ANTACID) CHEW TAB 500 MG 500 MG: 500 CHEW TAB at 17:39

## 2020-04-29 RX ADMIN — INSULIN ASPART 1 UNITS: 100 INJECTION, SOLUTION INTRAVENOUS; SUBCUTANEOUS at 17:15

## 2020-04-29 RX ADMIN — ACETAMINOPHEN 650 MG: 325 TABLET ORAL at 08:09

## 2020-04-29 RX ADMIN — CLINDAMYCIN HYDROCHLORIDE 300 MG: 300 CAPSULE ORAL at 17:16

## 2020-04-29 RX ADMIN — METFORMIN HYDROCHLORIDE 1000 MG: 500 TABLET, EXTENDED RELEASE ORAL at 08:10

## 2020-04-29 RX ADMIN — FERROUS GLUCONATE 324 MG: 324 TABLET ORAL at 08:11

## 2020-04-29 RX ADMIN — INSULIN ASPART 1 UNITS: 100 INJECTION, SOLUTION INTRAVENOUS; SUBCUTANEOUS at 12:34

## 2020-04-29 RX ADMIN — ATORVASTATIN CALCIUM 40 MG: 40 TABLET, FILM COATED ORAL at 21:40

## 2020-04-29 RX ADMIN — SERTRALINE HYDROCHLORIDE 150 MG: 100 TABLET ORAL at 08:11

## 2020-04-29 RX ADMIN — ASPIRIN 325 MG: 325 TABLET ORAL at 08:10

## 2020-04-29 RX ADMIN — CLINDAMYCIN HYDROCHLORIDE 300 MG: 300 CAPSULE ORAL at 12:33

## 2020-04-29 RX ADMIN — CALCIUM CARBONATE 600 MG (1,500 MG)-VITAMIN D3 400 UNIT TABLET 1 TABLET: at 20:07

## 2020-04-29 RX ADMIN — MELATONIN 25 MCG: at 08:11

## 2020-04-29 ASSESSMENT — MIFFLIN-ST. JEOR: SCORE: 1781.39

## 2020-04-29 NOTE — PROGRESS NOTES
Met with pt at bedside. Discussed discharge plans and [facility] recommendations. Pt denied hx of [facility]. SWer provided with pt information and education on services. Pt agreeable to RN, PT, OT and HHA. Provided choice. With pt permission, referral sent to [facility]. [facility] willing and able to service. Confirmed pt phone number on face sheet and [facility] information entered into AVS. Discussed IMM, verbal consent obtained. Pt inquired about therapy on Friday. Stated that she hopes that it is not 'jam packed like today'. Checked schedule for tomorrow which appears more spread out. Pt stated that she 'has really enjoyed the stay and the one only complaint is the noise at night'. Notified RN who assisted in ordering ear plugs for night time sleeping. Pt appreciative and denied additional needs at this time.  will transport home and  after work on Friday (around 5:00pm).     [facility] Ph: [phone]  RN, PT, OT and HHA    Sonia Riddle, NIGEL, [facility] Acute Rehab Unit   Phone: [phone]  I   Pager: [phone]

## 2020-04-29 NOTE — PLAN OF CARE
Dysphagia evaluation completed.  Patient presenting with a functional swallowing mechanism.  Does complain of some esophageal sticking sensation.  This did improve a bit with strategy to alternate solids and liquids or to dip her drier food textures in a liquid such as her muffin and coffee.  Over the progression of the full meal, no overt signs and symptoms of aspiration noted and no worsening in the sticking sensation with progression of the meal.  At some future point, patient may benefit from a GI consult if symptoms do not improve.  Currently, no SLP needs identified and no ongoing SLP intervention anticipated.

## 2020-04-29 NOTE — PROGRESS NOTES
04/29/20 1100   General Information   Onset Date 04/15/20   Start of Care Date 04/29/20   Referring Physician Dr. Ruiz   Patient Profile Review/OT: Additional Occupational Profile Info See Profile for full history and prior level of function   Patient/Family Goals Statement Get home by Friday   Swallowing Evaluation Bedside swallow evaluation   Behaviorial Observations WNL (within normal limits)   Mode of current nutrition Oral diet   Type of oral diet Regular;Thin liquid   Comments Patient referred to speech therapy due to complaints of swallowing difficulties.  Patient reporting a sticking sensation in her mid chest area associated with p.o. intake.  Patient reported that it did seem to worsen when lying down such as during repositioning.  Patient is on a reflux medication currently   Clinical Swallow Evaluation   Oral Musculature generally intact   Structural Abnormalities none present   Dentition present and adequate   Mandibular Strength and Mobility intact   Oral Labial Strength and Mobility WFL   Lingual Strength and Mobility WFL   Laryngeal Function Swallow   Clinical Swallow Eval: Thin Liquid Texture Trial   Mode of Presentation, Thin Liquids cup;self-fed   Volume of Liquid or Food Presented Single and double swallows   Oral Phase of Swallow WFL   Pharyngeal Phase of Swallow intact   Diagnostic Statement No difficulties or changes in vocal quality noted.  This continued throughout the full session and meal   Clinical Swallow Eval: Puree Solid Texture Trial   Mode of Presentation, Puree spoon;self-fed   Volume of Puree Presented 1 to 2 teaspoon size boluses   Oral Phase, Puree WFL   Pharyngeal Phase, Puree intact   Diagnostic Statement Tolerating without difficulties noted and only minimal sticking sensation reported   Clinical Swallow Eval: Solid Food Texture Trial   Mode of Presentation, Solid self-fed   Volume of Solid Food Presented Single bites   Oral Phase, Solid WFL  (Taking extra time due to  concern for mastication)   Pharyngeal Phase, Solid intact   Diagnostic Statement Patient reporting an increase in the sticking sensation in her mid chest area but did not impede her ability to eat a full meal this did not worsen with progression of the meal.  Patient did find it helpful to alternate solids and liquids as well as to dip her dryer items into her coffee   Esophageal Phase of Swallow   Patient reports or presents with symptoms of esophageal dysphagia Yes   Esophageal comments Patient reporting a sticking sensation in her mid chest area.  Etiology is unclear but patient reported this symptoms started shortly after her heart surgery.  Patient denies a history of reflux.  Patient was able to tolerate a full meal portion without worsening in the symptoms.  Patient did find it helpful to alternate solids and liquids or to dip her drier food textures into her liquids such as coffee   Swallow Eval: Clinical Impressions   Skilled Criteria for Therapy Intervention No problems identified which require skilled intervention   Functional Assessment Scale (FAS) 5   Treatment Diagnosis Mild esophageal dysphagia   Diet texture recommendations Regular diet;Thin liquids   Recommended Feeding/Eating Techniques alternate between small bites and sips of food/liquid;maintain upright posture during/after eating for 30 mins;small sips/bites   Demonstrates Need for Referral to Another Service other (see comments)  (Potential GI consult in future if esophageal symptoms remain)   Therapy Frequency Other (see comments)  (Eval only)   Predicted Duration of Therapy Intervention (days/wks) Eval only   Anticipated Discharge Disposition home   Risks and Benefits of Treatment have been explained. Yes   Patient, family and/or staff in agreement with Plan of Care Yes   Clinical Impression Comments SLP: Dysphagia evaluation completed.  Patient presenting with a functional swallowing mechanism.  Does complain of some esophageal sticking  sensation.  This did improve a bit with strategy to alternate solids and liquids or to dip her drier food textures in a liquid such as her muffin and coffee.  Over the progression of the full meal, no overt signs and symptoms of aspiration noted and no worsening in the sticking sensation with progression of the meal.  At some future point, patient may benefit from a GI consult if symptoms do not improve.  Currently, no SLP needs identified and no ongoing SLP intervention anticipated.   Total Evaluation Time   Total Evaluation Time (Minutes) 40

## 2020-04-29 NOTE — PLAN OF CARE
FOCUS/GOAL  Bowel management, Bladder management, and Pain management    ASSESSMENT, INTERVENTIONS AND CONTINUING PLAN FOR GOAL:    Pt slept well during the overnight, no c/o chest pain, headache or SOB. Independent in the room. Continent of bowel and bladder.  at 0200. Continue with plan of care.

## 2020-04-29 NOTE — PLAN OF CARE
Problem: Goal/Outcome  Goal: Goal Outcome Summary  Outcome: Improving   FOCUS/GOAL  Bowel management, Bladder management, Medical management, and Mobility    ASSESSMENT, INTERVENTIONS AND CONTINUING PLAN FOR GOAL:  Pt's vitals and BGs stable. Pt took prn tylenol for left upper chest incision discomfort with good effect. Pt's sternal incision JEISON and healing well. 2 chest tube sites covered with primapore, CDI. Pt independent in room and managed cares independently. Continent of bowel and bladder using the toilet.

## 2020-04-29 NOTE — CARE CONFERENCE
Acute Rehab Care Conference/Team Rounds    Type: Team Rounds    Present: Sonia Riddle SW, Jailene Stallings, OTR/L, Keri Warner, PT, Eli Ruiz MD, Caron Pham PA-C, Amanda Olson RN, Khanh Gao, SLP    Discharge Barriers/Treatment/Education    Rehab Diagnosis: deconditioning s/p MVR, AVR, tricuspid valve repair, PFO closure and PM revision.    Active Medical Co-morbidities/Prognosis: HTN, DM2, HLD, LAVERNE, blood loss anemia, CLARITA     Safety: No safety concerns, Pt  Independent in room but can call staff if she needs help especially with toileting.     Pain: Denies pain     Medications, Skin, Tubes/Lines: takes meds whole.  Midline sternal incision is c/d/i and two  chest tube incisions.     Swallowing/Nutrition: Dysphagia eval completed. Swallowing function is WFL as patient able to eat full meal without any overt s/sx of aspiration. Patient complaining of a sticking sensation in her mid chest area. Did better with frequent liquid washes or with dipping her drier bread items into her coffee. Suspect esophageal component to the difficulties and may require esophagram at some future point if improvement isn't noted. No ongoing SLP needs identified at this time.     Bowel/Bladder: Continent of bowel and bladder.     Psychosocial: . Pt denied having children although dtr listed on emergency list. Pt lives with spouse in a 3 level home - laundry level 1, kitchen level 2, and bedroom level 3. (3 levels 8 stairs to 3rd level). Independent with ADLs and IADLs PTA. No use of assistive device. Denied falls. Manages own medications and finances. No [facility] services or hired in help reported. Depression reported. No financial concerns reported.  to assist at discharge and able to take time off work.     ADLs/IADLs: Pt is mod IND with ADLs in BR with FB dressing, toileting, and G/H tasks. Pt requires SBA/CGA with tub transfers with FWW and grab bars and on/off of shower chair. Pt requires SBA with  light home mgmt tasks with FWW. Pt continues to easily fatigue and has limited activity tolerance. HC OT services upon discharge.     Mobility: Up in room w/o device, goals being met w/ increased strength and activity tolerance    Cognition/Language:    Community Re-Entry: limited community mobility w/ 4ww    Transportation: not a  due to sternal precautions, car transfer not a barrier    Decision maker: self    Plan of Care and goals reviewed and updated.    Discharge Plan/Recommendations    Fall Precautions: discontinue    Overall plan for the patient: Amy has been progressing well with her therapies. She continues to be SOB in therapy which is most likely due to severe anemia. We will continue to trend her hgb prior to discharge on 5/1 home with [facility] (PT,OT,RN,HHA).      Utilization Review and Continued Stay Justification    Medical Necessity Criteria:    For any criteria that is not met, please document reason and plan for discharge, transfer, or modification of plan of care to address.    Requires intensive rehabilitation program to treat functional deficits?: Yes    Requires 3x per week or greater involvement of rehabilitation physician to oversee rehabilitation program?: Yes    Requires rehabilitation nursing interventions?: Yes    Patient is making functional progress?: Yes    There is a potential for additional functional progress? Yes    Patient is participating in therapy 3 hours per day a minimum of 5 days per week or 15 hours per week in 7 day period?:Yes    Has discharge needs that require coordinated discharge planning approach?:Yes      Barriers/Concerns:  Low hemoglobin    Team Plan to Address Concern:  Will continue to trend      Final Physician Sign off    Statement of Approval: I have reviewed and agree with the above documentation.    Patient Goals  Social Work Goals:Confirm discharge recommendations with therapy, coordinate safe discharge plan and remain available to support and assist  as needed.     OT Frequency:  min daily  OT goal: hygiene/grooming: independent  OT goal: upper body dressing: Independent  OT goal: lower body dressing: Modified independent  OT goal: upper body bathing: Modified independent  OT goal: lower body bathing: Modified independent  OT goal: bed mobility: Modified independent  OT goal: toilet transfer/toileting: Modified independent  OT goal: meal preparation: Modified independent  OT goal: home management: Modified independent  OT goal: perform aerobic activity with stable cardiovascular response: 10 minutes, intermittent activity  OT goal 1: Pt sara demo SBA shower transfer using DME/AE prn      PT Frequency: daily x 90 minutes  PT goal: bed mobility: (P) (met)  PT goal: transfers: (P) (met)  PT goal: gait: Modified independent, Greater than 200 feet(consider 4ww for community)  PT goal: stairs: (P) (met)  PT goal: perform aerobic activity with stable cardiovascular response: 15 minutes, NuStep, intermittent activity   PT goal 1: ind car transfer  PT Goal 2: (P) (met)  PT Goal 3: ind w/ walking program for home HEP     No SLP goals             Individualized Goal 1: Pt will demonstrate safety awareness by asking for assistance when needed especially with toileting even though now independent in room.  Individualized Goal 2: Pt will demonstrate understanding of surgical infection by being able to enumerate s/sx to watch out for and being able to demonstrate or direct family member how to manage wound/skin cares.  Individualized Goal 3: Pt will demonstrate knowledge of meds and ability to safely manage meds at home.

## 2020-04-29 NOTE — PLAN OF CARE
FOCUS/GOAL  Bladder management, Pain management, Wound care management, Mobility, Skin integrity, and Safety management    ASSESSMENT, INTERVENTIONS AND CONTINUING PLAN FOR GOAL:    A&O x 4. Denies numb/tingling, dizziness, N/V. States SOB, but she says she has it all the time and it's not new. Encouraged incentive spirometer and deep breaths. O2 stat WDL. Aching pain in chest area-tylenol given with good effect. Middle chest and abdominal incision wound cares done per orders. Steri strips over R upper chest intact. New order for pt to be independent in room today. LBM earlier today, not during this shift. Pt continent of bladder in toilet. 1 PVR done: 0ml. Ate 100% of meal- 43 carbs.  and 165- Novolog given per orders. Pt lying in bed in lowest position, bed locked, CPAP on, call light within reach. Continue POC.

## 2020-04-30 ENCOUNTER — APPOINTMENT (OUTPATIENT)
Dept: OCCUPATIONAL THERAPY | Facility: CLINIC | Age: 75
End: 2020-04-30
Payer: MEDICARE

## 2020-04-30 ENCOUNTER — APPOINTMENT (OUTPATIENT)
Dept: PHYSICAL THERAPY | Facility: CLINIC | Age: 75
End: 2020-04-30
Payer: MEDICARE

## 2020-04-30 ENCOUNTER — APPOINTMENT (OUTPATIENT)
Dept: GENERAL RADIOLOGY | Facility: CLINIC | Age: 75
DRG: 949 | End: 2020-04-30
Attending: PHYSICAL MEDICINE & REHABILITATION
Payer: MEDICARE

## 2020-04-30 LAB
GLUCOSE BLDC GLUCOMTR-MCNC: 133 MG/DL (ref 70–99)
GLUCOSE BLDC GLUCOMTR-MCNC: 136 MG/DL (ref 70–99)
GLUCOSE BLDC GLUCOMTR-MCNC: 137 MG/DL (ref 70–99)
GLUCOSE BLDC GLUCOMTR-MCNC: 152 MG/DL (ref 70–99)
GLUCOSE BLDC GLUCOMTR-MCNC: 157 MG/DL (ref 70–99)
HGB BLD-MCNC: 7.2 G/DL (ref 11.7–15.7)

## 2020-04-30 PROCEDURE — 97530 THERAPEUTIC ACTIVITIES: CPT | Mod: GP

## 2020-04-30 PROCEDURE — 97110 THERAPEUTIC EXERCISES: CPT | Mod: GP

## 2020-04-30 PROCEDURE — 25500064 ZZH RX 255 OP 636: Performed by: PHYSICAL MEDICINE & REHABILITATION

## 2020-04-30 PROCEDURE — 25000132 ZZH RX MED GY IP 250 OP 250 PS 637: Mod: GY | Performed by: PHYSICIAN ASSISTANT

## 2020-04-30 PROCEDURE — 97535 SELF CARE MNGMENT TRAINING: CPT | Mod: GO | Performed by: OCCUPATIONAL THERAPIST

## 2020-04-30 PROCEDURE — 12800006 ZZH R&B REHAB

## 2020-04-30 PROCEDURE — 99232 SBSQ HOSP IP/OBS MODERATE 35: CPT | Performed by: PHYSICAL MEDICINE & REHABILITATION

## 2020-04-30 PROCEDURE — 85018 HEMOGLOBIN: CPT | Performed by: PHYSICIAN ASSISTANT

## 2020-04-30 PROCEDURE — 74220 X-RAY XM ESOPHAGUS 1CNTRST: CPT

## 2020-04-30 PROCEDURE — 00000146 ZZHCL STATISTIC GLUCOSE BY METER IP

## 2020-04-30 PROCEDURE — 25000132 ZZH RX MED GY IP 250 OP 250 PS 637: Mod: GY | Performed by: PHYSICAL MEDICINE & REHABILITATION

## 2020-04-30 PROCEDURE — 36415 COLL VENOUS BLD VENIPUNCTURE: CPT | Performed by: PHYSICIAN ASSISTANT

## 2020-04-30 PROCEDURE — 97530 THERAPEUTIC ACTIVITIES: CPT | Mod: GO | Performed by: OCCUPATIONAL THERAPIST

## 2020-04-30 RX ORDER — IOPAMIDOL 612 MG/ML
50 INJECTION, SOLUTION INTRAVASCULAR ONCE
Status: COMPLETED | OUTPATIENT
Start: 2020-04-30 | End: 2020-04-30

## 2020-04-30 RX ORDER — FAMOTIDINE 20 MG/1
40 TABLET, FILM COATED ORAL 2 TIMES DAILY
Status: DISCONTINUED | OUTPATIENT
Start: 2020-04-30 | End: 2020-05-01 | Stop reason: HOSPADM

## 2020-04-30 RX ADMIN — ATORVASTATIN CALCIUM 40 MG: 40 TABLET, FILM COATED ORAL at 20:24

## 2020-04-30 RX ADMIN — CLINDAMYCIN HYDROCHLORIDE 300 MG: 300 CAPSULE ORAL at 11:58

## 2020-04-30 RX ADMIN — FUROSEMIDE 40 MG: 40 TABLET ORAL at 08:51

## 2020-04-30 RX ADMIN — CALCIUM CARBONATE 600 MG (1,500 MG)-VITAMIN D3 400 UNIT TABLET 1 TABLET: at 20:24

## 2020-04-30 RX ADMIN — INSULIN ASPART 1 UNITS: 100 INJECTION, SOLUTION INTRAVENOUS; SUBCUTANEOUS at 16:40

## 2020-04-30 RX ADMIN — CALCIUM CARBONATE (ANTACID) CHEW TAB 500 MG 500 MG: 500 CHEW TAB at 16:46

## 2020-04-30 RX ADMIN — MELATONIN 25 MCG: at 08:43

## 2020-04-30 RX ADMIN — ASPIRIN 325 MG: 325 TABLET ORAL at 08:44

## 2020-04-30 RX ADMIN — CLINDAMYCIN HYDROCHLORIDE 300 MG: 300 CAPSULE ORAL at 20:24

## 2020-04-30 RX ADMIN — SERTRALINE HYDROCHLORIDE 150 MG: 100 TABLET ORAL at 08:43

## 2020-04-30 RX ADMIN — FAMOTIDINE 40 MG: 20 TABLET ORAL at 20:24

## 2020-04-30 RX ADMIN — CLINDAMYCIN HYDROCHLORIDE 300 MG: 300 CAPSULE ORAL at 16:35

## 2020-04-30 RX ADMIN — METFORMIN HYDROCHLORIDE 1000 MG: 500 TABLET, EXTENDED RELEASE ORAL at 08:43

## 2020-04-30 RX ADMIN — FERROUS GLUCONATE 324 MG: 324 TABLET ORAL at 08:44

## 2020-04-30 RX ADMIN — FAMOTIDINE 20 MG: 20 TABLET ORAL at 08:43

## 2020-04-30 RX ADMIN — IOPAMIDOL 50 ML: 612 INJECTION, SOLUTION INTRAVENOUS at 14:29

## 2020-04-30 RX ADMIN — CLINDAMYCIN HYDROCHLORIDE 300 MG: 300 CAPSULE ORAL at 08:43

## 2020-04-30 RX ADMIN — CALCIUM CARBONATE (ANTACID) CHEW TAB 500 MG 500 MG: 500 CHEW TAB at 10:14

## 2020-04-30 RX ADMIN — METOPROLOL SUCCINATE 50 MG: 50 TABLET, EXTENDED RELEASE ORAL at 08:51

## 2020-04-30 ASSESSMENT — MIFFLIN-ST. JEOR: SCORE: 1769.6

## 2020-04-30 NOTE — PROGRESS NOTES
Pt continues to express significant fatigue limiting activity at times.  Over all she is progressing well towards established goals.

## 2020-04-30 NOTE — PLAN OF CARE
Denies headache or dizziness. C/o heat burn after supper, Tums given, also told patient to sit up for a while before going back to bed to prevent exacerbating her heart burn, BG's today 141 and 193. Patient is currently teja in his room, on track to discharge on Friday, will continue POC

## 2020-04-30 NOTE — PLAN OF CARE
FOCUS/GOAL  Bowel management, Bladder management and Pain management    ASSESSMENT, INTERVENTIONS AND CONTINUING PLAN FOR GOAL:      Pt slept well during the overnight, no c/o chest pain, headache or SOB. Independent in the room. Continent of bowel and bladder.  at 0200. Continue with plan of care.

## 2020-04-30 NOTE — PLAN OF CARE
Patient given pill this afternoon with sip of water. No reports of pain, SOB, or chest pains. VSS. , NPO for procedure. Patient was sent down via transport to esophagram and video swallow. Continue POC.

## 2020-04-30 NOTE — PROGRESS NOTES
"  [facility]   Acute Rehabilitation Unit  Daily progress note    interval history  Amy Luna was seen and examined at bedside. No overnight events. She continues to c/o of fullness and difficulty swallowing. She is scheduled for esophogram today. She reports SOB with therapy but not at rest. Denies HA, CP, abd pain. Her hgb today is stable at 7.2.     Functionally, She has demonstrated ability to ambulate without physical assist, without assistive device house hold distances when rested (IND in room).    OT: Pt progressing well with ADLs. Pt requires supervision with FB dressing and toileting tasks.       medications    aspirin  325 mg Oral Daily     atorvastatin  40 mg Oral At Bedtime     calcium carbonate 600 mg-vitamin D 400 units  1 tablet Oral QPM     clindamycin  300 mg Oral 4x Daily     famotidine  20 mg Oral Daily     ferrous gluconate  324 mg Oral Daily with breakfast     furosemide  40 mg Oral BID     insulin aspart  1-7 Units Subcutaneous TID AC     insulin aspart  1-5 Units Subcutaneous At Bedtime     insulin isophane human  30 Units Subcutaneous BID AC     metFORMIN  1,000 mg Oral Daily with breakfast     metoprolol succinate ER  50 mg Oral Daily     sertraline  150 mg Oral QAM     Vitamin D3  25 mcg Oral Daily        acetaminophen, calcium carbonate, glucose **OR** dextrose **OR** glucagon, naloxone, oxyCODONE-acetaminophen, polyethylene glycol, polyethylene glycol-propylene glycol, senna-docusate     physical exam  /46   Pulse 94   Temp 97.4  F (36.3  C) (Oral)   Resp 16   Ht 1.651 m (5' 5\")   Wt 126.4 kg (278 lb 9.6 oz)   SpO2 96%   BMI 46.36 kg/m    Gen: alert awake and in no distress  CVS: rrr  Pulm: CTAB  Abd: soft, non tender  Skin: there is bruising in the epigastric region as well as both arms. Midline sternal incision is c/d/i. The 2 chest tube incisions are c/d/i. Ecchymosis in the lower abdomen.  Ext: BLE edema (mild pitting), more on " the left.  Neuro/MSK: Muscle strength is wnl       labs  Heme is 7.2 today.    Rehabilitation - continue comprehensive acute inpatient rehabilitation program with multidisciplinary approach including therapies, rehab nursing, and physiatry following. See interval history for updates.      assessment and plan    Amy Luna is a 74 year old a woman with PMH of severe mitral stenosis, moderate aortic stenosis, severe tricuspid regurgitation, sick sinus syndrome s/p ppm, DM2, LAVERNE, HTN, HLD, morbid obesity who underwent  aortic valve replacement, mitral valve replacement, tricuspid valve repair and PFO repair 4/15. Hospital stay was complicated by AV lead malfunction s/p atrial lead revision per electrophysiology  4/20, CLARITA, leukocytosis, volume overload,  acute blood loss anemia, and possible incision infection. Admitted to acute rehab 4/22 for ongoing rehabilitation and medical management.      Mitral stenosis -- S/P Bioprost MVR 4/15/19    Aortic sten -- S/P Bioprost AVR 4/15/20    Tricuspid valve Regurgitation -- S/P Repair 4/15/20    Patent foramen ovale -- S/P Closure 4/15/20  Surgery per Dr. Wilson. Post op echo 4/15 with mild Tricuspid regurgitation, mild aortic regurg, mild-mod mv regurg. Normal LV systolic function.   -sternal precautions  -f/u CV Surgery  -oxycodone, tylenol prn pain  -continue asa,  -continue home lasix 40 mg every day, lasix increased to BID (4/28)  -continue metoprolol 50 mg daily     Sternal incision infection- with initiation of clindamycin 4/21 per CV surgery  -continue clindamycin to complete 10 day course  -keep incision clean and dry- cleanse daily/prn and paint with iodine per discharge instructions  -abd binder to chest per CV surgery recs     Atrial lead fracture- suspected to be damaged in 4/15 surgery. S/p atrial lead revision per Dr. Francis. History of PPM for symptomatic second degree and intermittent third degree block 2015.  -f/u device RN  -Do not raise your arm on the  incision side above shoulder level for 3 weeks.   -remove the outer dressing  (Friday, 4/24). Leave the steri-strips in place.       DM type 2- Hgb A1C 6.5% 4/8 PTA on NPH, glipizide, and metformin.  -202  -continue NPH   -continue metformin restarted 4/23  -glipizide 2.5 mg (held)  -continue SSI  -monitor bg and titrate as indicated     HTN- on ARU admission on lasix 40 mg daily, losartan 40 mg daily, metoprolol xl 50 mg daily.  Of note has low diastolic bp this appears stable and patient reports as baseline.   -continue and titrate as indicated     LAVERNE-continue home CPAP     CLARITA- peak creatinine at 1.65 4/16.   Cr 0.93 4/22  -trend     Acute Blood loss Anemia-  Hgb 7.2 (4/30) prior to admission Hgb ~11     HLD- continue statin     Depression- continue Zoloft     1. Adjustment to disability: monitor mood   2. FEN: diabetic diet  3. Bowel: continent  4. Bladder: continent  5. DVT Prophylaxis: mechanical/ambulation   6. GI Prophylaxis: ppi  7. Code: full   8. Disposition:home   9. ELOS: 5/4/20  10. Follow up Appointments on Discharge: cv surgery, cardiology, pcp    I spent a total of 25 minutes face to face and coordinating care of Amy Luna. Over 50% of my time on the unit was spent counseling the patient and  coordinating care.      Eli Michaud MD

## 2020-04-30 NOTE — PLAN OF CARE
FOCUS/GOAL  Medical management and Mobility    ASSESSMENT, INTERVENTIONS AND CONTINUING PLAN FOR GOAL:  [id number]: Independent in room without difficulty reported. See flowsheet for abnormal BP readings this morning, Dr Ruiz aware and instructed writer to administer lasix and metoprol as ordered. PRN tums given this morning for c/o heartburn. New orders noted for esophagram and video swallow. Pt is scheduled for esophagram today at 2pm, is NPO for procedure (with exception of noon medication, may take with small sip of water per radiology), pt aware of NPO status. Hemoglobin was 7.2 this morning, Dr Ruiz aware. Will continue with POC.

## 2020-04-30 NOTE — PROGRESS NOTES
OT: -45 PM session d/t pt c/o of esophageal discomfort.     IND day shower scheduled for tomorrow prior to discharge. Pt is IND in BR with toileting, FB dressing, and G/H tasks.

## 2020-05-01 ENCOUNTER — APPOINTMENT (OUTPATIENT)
Dept: LAB | Facility: CLINIC | Age: 75
End: 2020-05-01
Attending: NURSE PRACTITIONER
Payer: MEDICARE

## 2020-05-01 ENCOUNTER — APPOINTMENT (OUTPATIENT)
Dept: OCCUPATIONAL THERAPY | Facility: CLINIC | Age: 75
End: 2020-05-01
Payer: MEDICARE

## 2020-05-01 ENCOUNTER — APPOINTMENT (OUTPATIENT)
Dept: PHYSICAL THERAPY | Facility: CLINIC | Age: 75
End: 2020-05-01
Payer: MEDICARE

## 2020-05-01 VITALS
OXYGEN SATURATION: 95 % | WEIGHT: 279.2 LBS | BODY MASS INDEX: 46.52 KG/M2 | SYSTOLIC BLOOD PRESSURE: 123 MMHG | HEIGHT: 65 IN | RESPIRATION RATE: 16 BRPM | HEART RATE: 90 BPM | TEMPERATURE: 96.7 F | DIASTOLIC BLOOD PRESSURE: 47 MMHG

## 2020-05-01 LAB
GLUCOSE BLDC GLUCOMTR-MCNC: 115 MG/DL (ref 70–99)
GLUCOSE BLDC GLUCOMTR-MCNC: 147 MG/DL (ref 70–99)
GLUCOSE BLDC GLUCOMTR-MCNC: 92 MG/DL (ref 70–99)
HGB BLD-MCNC: 7.1 G/DL (ref 11.7–15.7)

## 2020-05-01 PROCEDURE — 00000146 ZZHCL STATISTIC GLUCOSE BY METER IP

## 2020-05-01 PROCEDURE — 99239 HOSP IP/OBS DSCHRG MGMT >30: CPT | Performed by: PHYSICAL MEDICINE & REHABILITATION

## 2020-05-01 PROCEDURE — 97535 SELF CARE MNGMENT TRAINING: CPT | Mod: GO | Performed by: OCCUPATIONAL THERAPIST

## 2020-05-01 PROCEDURE — 97110 THERAPEUTIC EXERCISES: CPT | Mod: GP | Performed by: PHYSICAL THERAPIST

## 2020-05-01 PROCEDURE — 25000132 ZZH RX MED GY IP 250 OP 250 PS 637: Mod: GY | Performed by: PHYSICIAN ASSISTANT

## 2020-05-01 PROCEDURE — 97535 SELF CARE MNGMENT TRAINING: CPT | Mod: GO

## 2020-05-01 PROCEDURE — 85018 HEMOGLOBIN: CPT | Performed by: PHYSICIAN ASSISTANT

## 2020-05-01 PROCEDURE — 36415 COLL VENOUS BLD VENIPUNCTURE: CPT | Performed by: PHYSICIAN ASSISTANT

## 2020-05-01 RX ORDER — ALUMINA, MAGNESIA, AND SIMETHICONE 2400; 2400; 240 MG/30ML; MG/30ML; MG/30ML
30 SUSPENSION ORAL EVERY 6 HOURS PRN
COMMUNITY
Start: 2020-05-01 | End: 2020-07-09

## 2020-05-01 RX ORDER — FAMOTIDINE 40 MG/1
40 TABLET, FILM COATED ORAL 2 TIMES DAILY
Qty: 45 TABLET | Refills: 0 | Status: SHIPPED | OUTPATIENT
Start: 2020-05-01 | End: 2020-07-09

## 2020-05-01 RX ORDER — CALCIUM CARBONATE 500 MG/1
1 TABLET, CHEWABLE ORAL 2 TIMES DAILY PRN
COMMUNITY
Start: 2020-05-01 | End: 2020-07-09

## 2020-05-01 RX ORDER — FERROUS GLUCONATE 324(38)MG
324 TABLET ORAL
Refills: 0 | COMMUNITY
Start: 2020-05-01 | End: 2020-06-10

## 2020-05-01 RX ORDER — FUROSEMIDE 40 MG
40 TABLET ORAL
COMMUNITY
Start: 2020-05-01 | End: 2020-06-10

## 2020-05-01 RX ADMIN — FAMOTIDINE 40 MG: 20 TABLET ORAL at 11:40

## 2020-05-01 RX ADMIN — SERTRALINE HYDROCHLORIDE 150 MG: 100 TABLET ORAL at 08:45

## 2020-05-01 RX ADMIN — FERROUS GLUCONATE 324 MG: 324 TABLET ORAL at 08:45

## 2020-05-01 RX ADMIN — MELATONIN 25 MCG: at 08:45

## 2020-05-01 RX ADMIN — ASPIRIN 325 MG: 325 TABLET ORAL at 08:45

## 2020-05-01 RX ADMIN — INSULIN ASPART 1 UNITS: 100 INJECTION, SOLUTION INTRAVENOUS; SUBCUTANEOUS at 08:52

## 2020-05-01 RX ADMIN — METOPROLOL SUCCINATE 50 MG: 50 TABLET, EXTENDED RELEASE ORAL at 08:45

## 2020-05-01 RX ADMIN — METFORMIN HYDROCHLORIDE 1000 MG: 500 TABLET, EXTENDED RELEASE ORAL at 08:44

## 2020-05-01 ASSESSMENT — MIFFLIN-ST. JEOR: SCORE: 1772.32

## 2020-05-01 NOTE — DISCHARGE INSTRUCTIONS
Home Health Care:   [facility] Ph: [phone]  Nursing, physical therapy, occupational therapy and home health aide     Follow Up Appointments    -- CV surgery in 2-3 weeks  You are scheduled on Monday, May 4th at 3:00 PM. This is a telephone visit, please be ready for a phone call from the clinic at 3:00 PM.    Address  [facility] Cardioth[facility] Surgery  Phone   [phone]    -- Cardiology in 2-4 weeks  You are scheduled with Dr. Bustillo on May 19th at 2:15 PM. This is a telephone visit, please be ready for a phone call from the clinic at 2:15 PM.    Address  [facility] Cardiology  Phone   [phone]    -- PCP within 1 week.  You are scheduled with Kelli Lewis  on Friday, May 8th at 10:00 AM. This will be a telephone visit, please be ready for this call.  If you have questions prior to the appointment, please call [phone].    Address  [facility]  Phone   [phone]    Globus sensation (feeling of something in throat)/ difficulty swallowing GI referral for further evaluation.     Sliding Scale  MEDIUM sliding scale.   For Pre-Meal -189=1 unit  -239=2 units  -289=3 units  -339=4 units, -399=5 units   -449=6 units, BG = or >450=7 units.     For bedtime   -249=1 unit   -299=2 units   -349=3 units   -399=4 units  BG = or >400=5 units.

## 2020-05-01 NOTE — PLAN OF CARE
FOCUS/GOAL  Medical management and Discharge planning    ASSESSMENT, INTERVENTIONS AND CONTINUING PLAN FOR GOAL:  Patient A&O x4. Ind in room. Continent of bowel and bladder. No complaints of pain. AM BG was 147, covered appropriately. Chest incisions/wounds WDL except scant drainage. Dressings changed and education gone over w/ pt to manage at home. Discharge instructions gone over and questions answered. Pt to  discharge medications at outside pharmacy. Pt discharged safely around 1220 via car w/ family.

## 2020-05-01 NOTE — PLAN OF CARE
FOCUS/GOAL  Pain management, Wound care management, and Discharge planning    ASSESSMENT, INTERVENTIONS AND CONTINUING PLAN FOR GOAL:  Patient is alert and oriented x4. C/o dyspnea with activity. O2 sats 95% on RA at rest. Denies chest pain. C/o heartburn, prn Tums given x1 per request with some relief.    before supper.  at HS  VSS except /49, held scheduled lasix per parameter.   Independent with functional mobility in room.   Distal part of sternal incision and old chest tube site has scant amount of yellow drainage, new dressings applied. Educated pt how to care for wounds and what are signs of infection to notify provider. Provided 7days of dressing supplies to prepare for discharge home tomorrow.

## 2020-05-01 NOTE — PLAN OF CARE
Physical Therapy Discharge Summary    Reason for therapy discharge:    Discharged to home with home therapy.    Progress towards therapy goal(s). See goals on Care Plan in [facility] electronic health record for goal details.  Goals met    Therapy recommendation(s):    Continued therapy is recommended.  Rationale/Recommendations:  Has met goals to allow for safe discharge to home. Has HEP for walking program. Will benefit from ongoing therapy to progress functional endurance with goal of limited community mobility.

## 2020-05-01 NOTE — PLAN OF CARE
FOCUS/GOAL  Bowel management, Bladder management, Pain management and Cognition/Memory/Judgment/Problem solving    ASSESSMENT, INTERVENTIONS AND CONTINUING PLAN FOR GOAL:  Pt is alert and oriented x4, denies fever, chills, CP, SOB, N/V, abdominal pain, or new weakness/numbness/tingling, continent of bowel and bladder, independent to transfer, sleeping well throughout the night using home cpap, BG of 92 at 2am and declined intervention at that time, no tubes, lines or drains, vs stable, no further care concerns at this time continue with POC.

## 2020-05-01 NOTE — DISCHARGE SUMMARY
[facility]   Acute Rehabilitation Unit  Discharge summary     Date of Admission: 4/22/2020  Date of Discharge: 5/1/2020  Disposition: home  Primary Care Physician: Candy Frederick  Attending physician: Dr. Ruiz    discharge diagnosis  S/p MVR, AVR, Tricuspid valve repair, pfo closure  Sternal incision infection   Atrial lead fracture s/p atrial lead revision  DM type 2  htn    brief summary  Amy Luna is a 74 year old a woman with PMH of severe mitral stenosis, moderate aortic stenosis, severe tricuspid regurgitation, sick sinus syndrome s/p ppm, DM2, LAVERNE, HTN, HLD, morbid obesity who underwent  aortic valve replacement, mitral valve replacement, tricuspid valve repair and PFO repair 4/15. Hospital stay was complicated by AV lead malfunction s/p atrial lead revision per electrophysiology  4/20, CLARITA, leukocytosis, volume overload,  acute blood loss anemia, and possible incision infection. Admitted to acute rehab 4/22 for ongoing rehabilitation and medical management.     rehabilitation course  ADLs/IADLs: Pt is mod IND with ADLs in BR with FB dressing, toileting, and G/H tasks. Pt requires SBA/CGA with tub transfers with FWW and grab bars and on/off of shower chair. Pt requires SBA with light home mgmt tasks with FWW. Pt continues to easily fatigue and has limited activity tolerance. HC OT services upon discharge.      Mobility: Up in room w/o device, goals being met w/ increased strength and activity tolerance  mEDICAL COURSE    Mitral stenosis -- S/P Bioprost MVR 4/15/19    Aortic sten -- S/P Bioprost AVR 4/15/20    Tricuspid valve Regurgitation -- S/P Repair 4/15/20    Patent foramen ovale -- S/P Closure 4/15/20  Surgery per Dr. Wilson. Post op echo 4/15 with mild Tricuspid regurgitation, mild aortic regurg, mild-mod mv regurg. Normal LV systolic function.   -sternal precautions  -f/u CV Surgery  -continue asa,  -continue home- lasix increased to BID  (4/28)  -continue metoprolol 50 mg daily     Sternal incision infection- with initiation of clindamycin 4/21 per CV surgery  -continue clindamycin to complete 10 day course  -keep incision clean and dry- cleanse daily/prn and paint with iodine per discharge instructions  -abd binder to chest per CV surgery recs     Atrial lead fracture- suspected to be damaged in 4/15 surgery. S/p atrial lead revision per Dr. Francis. History of PPM for symptomatic second degree and intermittent third degree block 2015.  -f/u device RN  -Do not raise your arm on the incision side above shoulder level for 3 weeks.   -remove the outer dressing  (Friday, 4/24). Leave the steri-strips in place.       DM type 2- Hgb A1C 6.5% 4/8 PTA on NPH, glipizide, and metformin.  below home doses with glipizide on hold BG  in 24 hours prior to discharge.   -continue NPH 30 units bid  -continue metformin   -continue SSI  -monitor bg follow up with PCP     HTN- on ARU admission on lasix 40 mg daily, losartan 40 mg daily, metoprolol xl 50 mg daily.  Of note has low diastolic bp this appears stable and patient reports as baseline.   -was discussed with cv surgery per recommendations was discharged on lasix 40 mg bid and metoprolol 50 mg losartan recommended to be discontinued  -continue to monitor bp, weights  -titrate medications as indicated.      LAVERNE-continue home CPAP     CLARITA- peak creatinine at 1.65 4/16.   Cr 0.91 4/27.        Acute Blood loss Anemia-  Hgb 7.2 (4/30) prior to admission Hgb ~11  -stable f/u with Pcp within one week   -continue iron      Depression- continue Zoloft   dISCHARGE MEDICATIONS  Discharge Medication List as of 5/1/2020 12:17 PM      START taking these medications    Details   alum & mag hydroxide-simethicone (MAALOX ADVANCED MAX ST) 400-400-40 MG/5ML SUSP suspension Take 30 mLs by mouth every 6 hours as needed for indigestion, OTC      calcium carbonate (TUMS) 500 MG chewable tablet Take 1 tablet (500 mg) by mouth 2  times daily as needed for heartburn, OTC         CONTINUE these medications which have CHANGED    Details   famotidine (PEPCID) 40 MG tablet Take 1 tablet (40 mg) by mouth 2 times daily Take 2 times daily for 2 weeks then daily for 2 weeks then per primary care., Disp-45 tablet,R-0, E-Prescribe      ferrous gluconate (FERGON) 324 (38 Fe) MG tablet Take 1 tablet (324 mg) by mouth daily (with breakfast), R-0, OTC      furosemide (LASIX) 40 MG tablet Take 1 tablet (40 mg) by mouth 2 times daily Further dosing per PCP/ Cardiology, Historical      insulin aspart (NOVOLOG PEN) 100 UNIT/ML pen Inject 1-7 Units Subcutaneous 3 times daily (before meals), Historical      insulin isophane human (HUMULIN N PEN) 100 UNIT/ML injection Inject 30 Units Subcutaneous 2 times daily (before meals), Historical         CONTINUE these medications which have NOT CHANGED    Details   acetaminophen (TYLENOL) 325 MG tablet Take 2 tablets (650 mg) by mouth every 4 hours as needed for other (multimodal surgical pain management along with NSAIDS and opioid medication as indicated based on pain control and physical function.), R-0, No Print Out      albuterol (PROAIR HFA/PROVENTIL HFA/VENTOLIN HFA) 108 (90 Base) MCG/ACT inhaler Inhale 2 puffs into the lungs every 4 hours as needed for shortness of breath / dyspnea or wheezing, HistoricalPharmacy may dispense brand covered by insurance (Proair, or proventil or ventolin or generic albuterol inhaler)      aspirin (ASA) 325 MG EC tablet Take 1 tablet (325 mg) by mouth daily, R-0, No Print OutFor heart surgery      atorvastatin (LIPITOR) 40 MG tablet Take 40 mg by mouth At Bedtime , Historical      Biotin 64956 MCG TABS Take 1,000 mcg by mouth every morning , Historical      Calcium Carbonate-Vit D-Min (CALCIUM 1200 PO) Take 1 tablet by mouth every evening , Historical      coenzyme Q-10 200 MG CAPS Take 200 mg by mouth every morning , Historical      ipratropium (ATROVENT) 0.06 % nasal spray Spray 2  sprays into both nostrils 4 times daily as needed for rhinitis, Historical      Lutein 40 MG CAPS Take 40 mg by mouth every morning , Historical      Magnesium 400 MG TABS Take 400 mg by mouth every evening , Historical      metFORMIN (GLUCOPHAGE-XR) 500 MG 24 hr tablet Take 1,000 mg by mouth daily (with breakfast) (takes 2 x 500mg), Historical      metoprolol succinate ER (TOPROL-XL) 50 MG 24 hr tablet Take 1 tablet (50 mg) by mouth daily, Disp-135 tablet,R-3, No Print Out      polyethylene glycol (MIRALAX) 17 g packet Take 17 g by mouth 2 times daily as needed for constipation, R-0, No Print Out      Propylene Glycol (SYSTANE BALANCE OP) Apply 1-2 drops to eye 3 times daily as needed (dry eyes), Historical      senna-docusate (SENOKOT-S/PERICOLACE) 8.6-50 MG tablet Take 1 tablet by mouth 2 times daily, R-0, No Print Out      sertraline (ZOLOFT) 100 MG tablet Take 150 mg by mouth every morning (takes 1.5 x 100mg), Historical      TURMERIC PO Take 1-3 tablets by mouth daily , Historical      vitamin (B COMPLEX-C) tablet Take 1 tablet by mouth daily, Historical      vitamin C (ASCORBIC ACID) 1000 MG TABS Take 1,000 mg by mouth every evening , Historical      Vitamin D3 (CHOLECALCIFEROL) 25 mcg (1000 units) tablet Take 1 tablet (25 mcg) by mouth daily, R-0, No Print Out         STOP taking these medications       clindamycin (CLEOCIN) 300 MG capsule Comments:   Reason for Stopping:         glipiZIDE (GLUCOTROL XL) 10 MG 24 hr tablet Comments:   Reason for Stopping:         losartan (COZAAR) 50 MG tablet Comments:   Reason for Stopping:         oxyCODONE-acetaminophen (PERCOCET) 5-325 MG tablet Comments:   Reason for Stopping:                 DISCHARGE INSTRUCTIONS AND FOLLOW UP  Discharge Procedure Orders   GASTROENTEROLOGY ADULT REF CONSULT ONLY   Standing Status: Future Standing Exp. Date: 05/01/21   Referral Priority: Routine   Number of Visits Requested: 1     Home care nursing referral   Referral Priority:  Routine Referral Type: Home Health Therapies & Aides   Number of Visits Requested: 1     Home Care PT Referral for Hospital Discharge   Referral Priority: Routine Referral Type: Home Health Therapies & Aides   Number of Visits Requested: 1     Home Care OT Referral for Hospital Discharge   Referral Priority: Routine   Number of Visits Requested: 1     Reason for your hospital stay   Order Comments: Admitted for ongoing therapy following cardiac surgery.     Adult [facility] Follow-up and recommended labs and tests   Order Comments: Follow up with primary care within 1 week, follow up with cardiology, cv surgery as scheduled. If swallowing discomfort continues please follow up with GI (referral placed).     Appointments on [place] and/or [facility] (with [facility] or [facility] provider or service). Call [phone] if you haven't heard regarding these appointments within 7 days of discharge.     Follow Up and recommended labs and tests   Order Comments: Follow up with primary care provider, Candy Frederick, within 7 days for hospital follow- up.  The following labs/tests are recommended: CBC. BMP.     Activity   Order Comments: Your activity upon discharge: activity as tolerated continue weight lifting restrictions as advised by surgery. continue PT/OT with home care.     Order Specific Question Answer Comments   Is discharge order? Yes      Monitor and record   Order Comments: blood glucose 4 times a day, before meals and at bedtime  blood pressure daily- record and discuss at follow up appointments.   weight every other day- trend weights.   Notify primary care or cardiology if weight change >2 pounds in 1 day or 5 in one week.     Wound care and dressings   Order Comments: Instructions to care for your wound at home: keep wound clean and dry.     MD face to face encounter   Order Comments: Documentation of Face to Face and Certification for Home Health Services    I certify that patient: Amy Luna is under my  care and that I, or a nurse practitioner or physician's assistant working with me, had a face-to-face encounter that meets the physician face-to-face encounter requirements with this patient on: 5/1/2020.    This encounter with the patient was in whole, or in part, for the following medical condition, which is the primary reason for home health care: impaired mobility and activity tolerance s/p cardiac surgery.    I certify that, based on my findings, the following services are medically necessary home health services: Nursing, Occupational Therapy and Physical Therapy.    My clinical findings support the need for the above services because: Nurse is needed: To assess BP, volume status after changes in medications or other medical regimen. Monitor incision.     Further, I certify that my clinical findings support that this patient is homebound (i.e. absences from home require considerable and taxing effort and are for medical reasons or [employer] services or infrequently or of short duration when for other reasons) because: Requires assistance of another person or specialized equipment to access medical services because patient: limited activity tolerance variable requiring frequent rest    Based on the above findings. I certify that this patient is confined to the home and needs intermittent skilled nursing care, physical therapy and/or speech therapy.  The patient is under my care, and I have initiated the establishment of the plan of care.  This patient will be followed by a physician who will periodically review the plan of care.  Physician/Provider to provide follow up care: Candy Frederick    Attending hospital physician (the Medicare certified [facility] provider): Eli Shaffer MD  Physician Signature: See electronic signature associated with these discharge orders.  Date: 5/1/2020     Full Code     Order Specific Question Answer Comments   Code status determined by: Discussion with patient/legal  decision maker      Diet   Order Comments: Follow this diet upon discharge: Regular     Order Specific Question Answer Comments   Is discharge order? Yes           physical examination    Most recent Vital Signs:   Vitals:    04/30/20 1543 05/01/20 0633 05/01/20 0746 05/01/20 0839   BP: 134/49 134/44 128/57 123/47   BP Location: Right arm Left arm  Right arm   Pulse: 79 88 95 90   Resp: 12 16     Temp: 96.7  F (35.9  C) 96.7  F (35.9  C)     TempSrc: Oral Oral     SpO2: 95% 95%     Weight:  126.6 kg (279 lb 3.2 oz)     Height:       General: awake alert nad  Resp: non labored clear diminished  CV: rrr  Ab:soft non distended non tender  Extremities: warm dry with trace edema  Incision: cdi       40 minutes spent in discharge, including >50% in counseling and coordination of care, medication review and plan of care recommended on follow up.     Discharge summary was forwarded to Candy Frederick (PCP) at the time of discharge, so as to bridge from hospital to outpatient care.     It was our pleasure to care for Amy Luna during this hospitalization. Please do not hesitate to contact me should there be questions regarding the hospital course or discharge plan.          Caron Pham PA-C  Physical Medicine and Rehabilitation   [phone]

## 2020-05-01 NOTE — PLAN OF CARE
Occupational Therapy Discharge Summary    Reason for therapy discharge:    All goals and outcomes met, no further needs identified.    Progress towards therapy goal(s). See goals on Care Plan in [facility] electronic health record for goal details.  Goals met    Therapy recommendation(s):    Continued therapy is recommended.  Rationale/Recommendations:  Recommend home care.

## 2020-05-03 ENCOUNTER — MEDICAL CORRESPONDENCE (OUTPATIENT)
Dept: HEALTH INFORMATION MANAGEMENT | Facility: CLINIC | Age: 75
End: 2020-05-03

## 2020-05-03 ENCOUNTER — DOCUMENTATION ONLY (OUTPATIENT)
Dept: INTERNAL MEDICINE | Facility: CLINIC | Age: 75
End: 2020-05-03

## 2020-05-03 ENCOUNTER — NURSE TRIAGE (OUTPATIENT)
Dept: NURSING | Facility: CLINIC | Age: 75
End: 2020-05-03

## 2020-05-03 NOTE — TELEPHONE ENCOUNTER
Yuliya RN from  Home Care   [phone] calls in with concern of medications with pt    Apparently pt was just discharged from   [facility] (  )     Yuliya RN happened to stop in for post discharge check and discovered pt's  Medications were incorrect - especially her insulin     apparently insulin was Changed upon discharge   But current medication list - insulin does NOT reflect that     Paging  called for [facility]  (Dr Levy )   Will have on call a Dr Sanders call RN back directly at [phone] for advisement     Protocol and care advice reviewed  Caller states understanding of the recommended disposition    Advised to call back if further questions or concerns    Zhao Anton , RN / [facility]                                      Reason for Disposition    Caller has URGENT medication question about med that PCP prescribed and triager unable to answer question    Protocols used: MEDICATION QUESTION CALL-A-

## 2020-05-03 NOTE — PROGRESS NOTES
[facility] now requests orders and shares plan of care/discharge summaries for some patients through [email or web address].  Please REPLY TO THIS MESSAGE OR ROUTE BACK TO THE AUTHOR in order to give authorization for orders when needed.  This is considered a verbal order, you will still receive a faxed copy of orders for signature.  Thank you for your assistance in improving collaboration for our patients.    FYI-  Client is to establish care with Kelli Lewis NP on 5/8/2020. SHe has not been seen by Candy Frederick from  in about a year and has no plans to follow up or continue to see Dr. Ferderick. She was discharged from [facility] on 5/1/2020 after pacemaker lead repair, tricuspid valve repair, Aortic stent, and Patent foramen ovale repair. I completed an initial visit with client on 5/3/2020. She was not sent home with the discharge order of insulin. She had Novolin R and Novolin 70/30 in home. SN writer contacted Dr. Mendenhall to get orders for ok to take Novolin R per the SSI and Novolin 70/30 30 units BID until seen/telephone visit on 5/8/2020.   Her last Hgb was 7.1 on 5/1. Is there any labs you would like collected to follow up on her condition.  Homecare will complete admission on 5/4 and request further orders. Thank you

## 2020-05-04 ENCOUNTER — TELEPHONE (OUTPATIENT)
Dept: CARDIOLOGY | Facility: CLINIC | Age: 75
End: 2020-05-04

## 2020-05-04 ENCOUNTER — ANCILLARY PROCEDURE (OUTPATIENT)
Dept: CARDIOLOGY | Facility: CLINIC | Age: 75
End: 2020-05-04
Attending: INTERNAL MEDICINE
Payer: MEDICARE

## 2020-05-04 DIAGNOSIS — I44.30 AV BLOCK: ICD-10-CM

## 2020-05-04 DIAGNOSIS — Z95.0 CARDIAC PACEMAKER IN SITU: ICD-10-CM

## 2020-05-04 DIAGNOSIS — Z95.0 CARDIAC PACEMAKER IN SITU: Primary | ICD-10-CM

## 2020-05-04 LAB — HGB BLD-MCNC: 8.1 G/DL (ref 11.7–15.7)

## 2020-05-04 PROCEDURE — 85018 HEMOGLOBIN: CPT | Performed by: PHYSICIAN ASSISTANT

## 2020-05-04 PROCEDURE — 93296 REM INTERROG EVL PM/IDS: CPT | Performed by: INTERNAL MEDICINE

## 2020-05-04 PROCEDURE — 93294 REM INTERROG EVL PM/LDLS PM: CPT | Performed by: INTERNAL MEDICINE

## 2020-05-04 NOTE — PROGRESS NOTES
This patient is new to the clinic as well as the provider.  If there are issues that need to be addressed today, I have openings to move this patient's appointment to today vs tomorrow 5/5/20.  It might be best to do a video visit, if patient has capability.  Make sure that she has her updated list of medications/bottles available, what Home Health needs/orders are, etc.  Ideally, she should be seen in the clinic although I am not seeing patients this week in the clinic.

## 2020-05-04 NOTE — PROGRESS NOTES
Patient is new to the clinic and does not have appointment scheduled until 5/8 with Dorcas. Routed to Dorcas to review home care order request.

## 2020-05-04 NOTE — TELEPHONE ENCOUNTER
Pt had her 7-10 day remote PPM check completed today (completed remotely due to COVID 19).  Pt had an RA lead revision on 4/20/20 after her AVR/MVR on 4/15/20.      On review of arrhythmias, pt had  2 mode switch episodes logged.  EGM's show AF/AFL with average v-rates in the 70's. Longest lasting episode was on 5/3/20 at 1344 and lasted for [id number].  The other episode lasted for 1m12s on 4/22/20.  Total AT/F burden is 0.7% since 4/21/20.  Pt is not on anticoagulant.      Pt cannot recal any symptoms during this episode.      Pt is scheduled to f/up with Dr. Bustillo on 5/19/20 post RA lead revision.  Will route message to Dr. Bustillo re: AF/AFL for review and recommendations.    TARI Ojeda            Episode on 5/3/20:         Episode on 4/22/20:

## 2020-05-04 NOTE — PROGRESS NOTES
[facility] calling again and they want to see patient today. Please advise  Please call [phone]

## 2020-05-04 NOTE — PROGRESS NOTES
Attempted to call TARI Dwyer, but her voice message is full. Called [facility] and spoke to Ambika, Yuliya is showing up as being off today. Ambika will page one of their admission nurses to call back regarding patient.

## 2020-05-04 NOTE — TELEPHONE ENCOUNTER
"Pt had 7-10 day remote PPM check completed s/p RA lead revision on 4/20/20 (completed remotely due to COVID 19).  Pt is also s/p AVR/MVR on 4/15/20.      Pt states incision is CDI with no redness, drainage, swelling or signs of infection.  She stated it is a little tender at times and describes this as a \"pressure\" under the device.  Steri-strips intact and patient was instructed to remove these.     Pt sent in below picture of incision which shows a CDI incision with no redness, swelling, or signs of infection.    TARI Ojeda          "

## 2020-05-05 ENCOUNTER — VIRTUAL VISIT (OUTPATIENT)
Dept: INTERNAL MEDICINE | Facility: CLINIC | Age: 75
End: 2020-05-05
Payer: MEDICARE

## 2020-05-05 VITALS
WEIGHT: 278 LBS | DIASTOLIC BLOOD PRESSURE: 54 MMHG | HEART RATE: 96 BPM | BODY MASS INDEX: 46.26 KG/M2 | SYSTOLIC BLOOD PRESSURE: 105 MMHG

## 2020-05-05 DIAGNOSIS — Z79.4 TYPE 2 DIABETES MELLITUS WITHOUT COMPLICATION, WITH LONG-TERM CURRENT USE OF INSULIN (H): Primary | ICD-10-CM

## 2020-05-05 DIAGNOSIS — I35.0 NONRHEUMATIC AORTIC VALVE STENOSIS: ICD-10-CM

## 2020-05-05 DIAGNOSIS — I34.2 NONRHEUMATIC MITRAL VALVE STENOSIS: ICD-10-CM

## 2020-05-05 DIAGNOSIS — D62 ACUTE BLOOD LOSS ANEMIA: ICD-10-CM

## 2020-05-05 DIAGNOSIS — T82.110A FRACTURED ATRIAL PACEMAKER LEAD WIRE, INITIAL ENCOUNTER: ICD-10-CM

## 2020-05-05 DIAGNOSIS — Q21.12 PATENT FORAMEN OVALE: ICD-10-CM

## 2020-05-05 DIAGNOSIS — I10 ESSENTIAL HYPERTENSION: ICD-10-CM

## 2020-05-05 DIAGNOSIS — E11.9 TYPE 2 DIABETES MELLITUS WITHOUT COMPLICATION, WITH LONG-TERM CURRENT USE OF INSULIN (H): Primary | ICD-10-CM

## 2020-05-05 DIAGNOSIS — I36.1 NONRHEUMATIC TRICUSPID VALVE REGURGITATION: ICD-10-CM

## 2020-05-05 PROBLEM — E66.01 MORBID OBESITY (H): Status: ACTIVE | Noted: 2020-05-05

## 2020-05-05 PROCEDURE — 99443 ZZC PHYSICIAN TELEPHONE EVALUATION 21-30 MIN: CPT | Performed by: NURSE PRACTITIONER

## 2020-05-05 NOTE — PROGRESS NOTES
Ms. Luna established with her new PCP today; clarified insulin dosing regime (see Medication list).  Pt had been taking the Novolin 70/30 and Novolin R reversed which was corrected for now.  Please have Home Health send paperwork over for me to sign.

## 2020-05-05 NOTE — TELEPHONE ENCOUNTER
Have not heard back from home care regarding concerns, but patient does have follow-up appointment today with [facility] to establish care.

## 2020-05-05 NOTE — PROGRESS NOTES
"Amy Luna is a 74 year old female who is being evaluated via a billable telephone visit.      The patient has been notified of following:     \"This telephone visit will be conducted via a call between you and your physician/provider. We have found that certain health care needs can be provided without the need for a physical exam.  This service lets us provide the care you need with a short phone conversation.  If a prescription is necessary we can send it directly to your pharmacy.  If lab work is needed we can place an order for that and you can then stop by our lab to have the test done at a later time.    Telephone visits are billed at different rates depending on your insurance coverage. During this emergency period, for some insurers they may be billed the same as an in-person visit.  Please reach out to your insurance provider with any questions.    If during the course of the call the physician/provider feels a telephone visit is not appropriate, you will not be charged for this service.\"    Patient has given verbal consent for Telephone visit?  Yes    What phone number would you like to be contacted at? [phone]    How would you like to obtain your AVS? [place]    Subjective     Amy Luna is a 74 year old female who presents to clinic today for the following health issues:    Hospitals in [place]    Hospital Follow-up Visit:    Hospital/Nursing Home/IP Rehab Facility: [facility]  Date of Admission: 4- to [facility] 4- & home 5-1-2020  Reason(s) for Admission: heart surgery      Was your hospitalization related to COVID-19? No   Problems taking medications regularly:  None  Medication changes since discharge: None  Problems adhering to non-medication therapy:  None    Summary of hospitalization:  [facility] discharge summary reviewed  Diagnostic Tests/Treatments reviewed.  Follow up needed: Cardiology  Other Healthcare Providers Involved in Patient s Care:         Homecare  Update " since discharge: improved.       Post Discharge Medication Reconciliation: discharge medications reconciled, continue medications without change.  Plan of care communicated with patient              New patient to the clinic and to provider.  Prior PCP Dr. Frederick from [facility] with last visit 8/29/2019 for Medicare Wellness Visit.  Today, here to establish care and f/u on hospitalization.  Discharged from [facility] on 5/1/2020 s/p pacemaker lead repair, triscupid valve repair, aortic stent, and patent foramen ovale repair from 4/15/2020.  Followed by cardiology and reviewed notes.      Tells me that she is doing ok overall.  Still gets short of breathe with activity but working on getting her stamina up slowly.  No fever or cough.  Some chest pain with movement but uses a pillow that helps.  No stomach issues.  Appetite ok.  There was questions about her diabetes: insulin medication regime.  She reports taking Novolin 70/30 5 units daily when sugars are elevated as needed and the Novolin R anywhere from 30-40 units twice daily depending upon blood sugars.  States blood sugars running ok with some lows.  BP ok except for HR sometimes elevated.  Last A1C = 6.5 (4/8/2020).    Patient Active Problem List   Diagnosis     Aortic sten -- S/P Bioprost AVR 4/15/20     Hypertension     Hyperlipidemia     LAVERNE on CPAP     Respiratory failure (H)     Obesity     DM type 2, Hgb A1C 6.5 on 4/8/20     Depressive disorder     Mitral stenosis -- S/P Bioprost MVR 4/15/19     Mitral annular calcification     (HFpEF) heart failure with preserved ejection fraction (H)     Mitral valve stenosis, unspecified etiology     Tricuspid valve Regur -- S/P Repair 4/15/20     Patent foramen ovale -- S/P Closure 4/15/20     Fractured atrial pacemaker lead -- Replace 4/16/20     Acute blood loss anemia     CLARITA (acute kidney injury) (H)     Fluid overload     S/P aortic valve and mitral valve replacement     Morbid obesity  (H)     Past Surgical History:   Procedure Laterality Date     APPENDECTOMY       CV CORONARY ANGIOGRAM N/A 2020    Procedure: Coronary Angiogram;  Surgeon: Inez Peoples MD;  Location:  HEART CARDIAC CATH LAB     CV LEFT HEART CATH N/A 2020    Procedure: Left Heart Cath;  Surgeon: Inez Peoples MD;  Location:  HEART CARDIAC CATH LAB     CV PFO CLOSURE N/A 4/15/2020    Procedure: Patent Foramen Ovale Closure;  Surgeon: Ok Wilson MD;  Location:  OR     CV RIGHT HEART CATH N/A 2020    Procedure: Right Heart Cath;  Surgeon: Inez Peoples MD;  Location:  HEART CARDIAC CATH LAB     EP PACEMAKER N/A 2020    Procedure: EP Pacemaker;  Surgeon: Sohan Francis MD;  Location:  HEART CARDIAC CATH LAB     GYN SURGERY       x2 ,      GYN SURGERY      total hysterectomy     IMPLANT PACEMAKER       REPAIR VALVE TRICUSPID N/A 4/15/2020    Procedure: REPAIR TRICUSPID VALVE WITH VILLA MC3 26MM.;  Surgeon: Ok Wilson MD;  Location:  OR     REPLACE VALVE AORTIC N/A 4/15/2020    Procedure: REPLACEMENT, AORTIC VALVE WITH INSPIRIS TISSUE VALVE 25 MM; ON PUMP WITH SOCORRO READ BY CARDIOLOGIST DR BLANTON.;  Surgeon: Ok Wilson MD;  Location:  OR     REPLACE VALVE MITRAL N/A 4/15/2020    Procedure: REPLACEMENT, MITRAL VALVE WITH EPIC TISSUE VALVE 27 MM.;  Surgeon: Ok Wilson MD;  Location:  OR       Social History     Tobacco Use     Smoking status: Never Smoker     Smokeless tobacco: Never Used   Substance Use Topics     Alcohol use: No     Family History   Problem Relation Age of Onset     Diabetes Mother 56     Congenital heart disease Father 23         Current Outpatient Medications   Medication Sig Dispense Refill     acetaminophen (TYLENOL) 325 MG tablet Take 2 tablets (650 mg) by mouth every 4 hours as needed for other (multimodal surgical pain management along with NSAIDS and opioid medication as  indicated based on pain control and physical function.)  0     albuterol (PROAIR HFA/PROVENTIL HFA/VENTOLIN HFA) 108 (90 Base) MCG/ACT inhaler Inhale 2 puffs into the lungs every 4 hours as needed for shortness of breath / dyspnea or wheezing       alum & mag hydroxide-simethicone (MAALOX ADVANCED MAX ST) 400-400-40 MG/5ML SUSP suspension Take 30 mLs by mouth every 6 hours as needed for indigestion       aspirin (ASA) 325 MG EC tablet Take 1 tablet (325 mg) by mouth daily  0     atorvastatin (LIPITOR) 40 MG tablet Take 40 mg by mouth At Bedtime        Biotin 05327 MCG TABS Take 1,000 mcg by mouth every morning        calcium carbonate (TUMS) 500 MG chewable tablet Take 1 tablet (500 mg) by mouth 2 times daily as needed for heartburn       Calcium Carbonate-Vit D-Min (CALCIUM 1200 PO) Take 1 tablet by mouth every evening        coenzyme Q-10 200 MG CAPS Take 200 mg by mouth every morning        famotidine (PEPCID) 40 MG tablet Take 1 tablet (40 mg) by mouth 2 times daily Take 2 times daily for 2 weeks then daily for 2 weeks then per primary care. 45 tablet 0     ferrous gluconate (FERGON) 324 (38 Fe) MG tablet Take 1 tablet (324 mg) by mouth daily (with breakfast)  0     furosemide (LASIX) 40 MG tablet Take 1 tablet (40 mg) by mouth 2 times daily Further dosing per PCP/ Cardiology       insulin aspart (NOVOLOG PEN) 100 UNIT/ML pen Inject 1-7 Units Subcutaneous 3 times daily (before meals)       insulin NPH-Regular 70/30 (70-30) 100 UNIT/ML vial Inject 20-40 Units Subcutaneous 2 times daily (with meals) 72 mL 1     ipratropium (ATROVENT) 0.06 % nasal spray Spray 2 sprays into both nostrils 4 times daily as needed for rhinitis       Lutein 40 MG CAPS Take 40 mg by mouth every morning        Magnesium 400 MG TABS Take 400 mg by mouth every evening        metFORMIN (GLUCOPHAGE-XR) 500 MG 24 hr tablet Take 1,000 mg by mouth daily (with breakfast) (takes 2 x 500mg)       metoprolol succinate ER (TOPROL-XL) 50 MG 24 hr  tablet Take 1 tablet (50 mg) by mouth daily 135 tablet 3     polyethylene glycol (MIRALAX) 17 g packet Take 17 g by mouth 2 times daily as needed for constipation  0     Propylene Glycol (SYSTANE BALANCE OP) Apply 1-2 drops to eye 3 times daily as needed (dry eyes)       senna-docusate (SENOKOT-S/PERICOLACE) 8.6-50 MG tablet Take 1 tablet by mouth 2 times daily  0     sertraline (ZOLOFT) 100 MG tablet Take 150 mg by mouth every morning (takes 1.5 x 100mg)       TURMERIC PO Take 1-3 tablets by mouth daily        vitamin (B COMPLEX-C) tablet Take 1 tablet by mouth daily       vitamin C (ASCORBIC ACID) 1000 MG TABS Take 1,000 mg by mouth every evening        Vitamin D3 (CHOLECALCIFEROL) 25 mcg (1000 units) tablet Take 1 tablet (25 mcg) by mouth daily  0     Allergies   Allergen Reactions     Penicillins Hives     3 weeks after taking med got hives.       Pioglitazone Other (See Comments)     Increased urinary frequency, fatigue, dyspnea     Recent Labs   Lab Test 04/27/20  0616 04/22/20  0755  04/16/20  0350  04/15/20  1519  04/08/20  0800  01/16/20  2221   A1C  --   --   --   --   --   --   --  6.5*  --  6.4*   ALT  --   --   --  37  --  23  --  27  --  36   CR 0.91 0.93   < > 1.65*   < > 1.26*   < > 1.12*   < > 0.95   GFRESTIMATED 62 61   < > 30*   < > 42*   < > 48*   < > 59*   GFRESTBLACK 72 70   < > 35*   < > 49*   < > 56*   < > 68   POTASSIUM 4.3 4.0   < > 5.0   < > 4.0   < > 3.8   < > 4.0    < > = values in this interval not displayed.      BP Readings from Last 3 Encounters:   05/05/20 105/54   05/01/20 123/47   04/22/20 112/42    Wt Readings from Last 3 Encounters:   05/05/20 126.1 kg (278 lb)   05/01/20 126.6 kg (279 lb 3.2 oz)   04/22/20 129.5 kg (285 lb 7.9 oz)                    Reviewed and updated as needed this visit by Provider  Tobacco  Allergies  Meds  Problems  Med Hx  Surg Hx  Fam Hx  Soc Hx          Review of Systems   ROS COMP: Constitutional, HEENT, cardiovascular, pulmonary, GI, ,  musculoskeletal, neuro, skin, endocrine and psych systems are negative, except as otherwise noted.       Objective   Reported vitals:  /54   Pulse 96   Wt 126.1 kg (278 lb)   Breastfeeding No   BMI 46.26 kg/m     alert and no distress  PSYCH: Alert and oriented times 3; coherent speech, normal   rate and volume, able to articulate logical thoughts, able   to abstract reason, no tangential thoughts, no hallucinations   or delusions  Her affect is normal and pleasant  RESP: No cough, no audible wheezing, able to talk in full sentences  Remainder of exam unable to be completed due to telephone visits    Diagnostic Test Results:  Labs reviewed in Epic        Assessment/Plan:  1. Type 2 diabetes mellitus without complication, with long-term current use of insulin (H)  - discussed/instructed how pt should be taking her insulin 70/30 vs R as follows:    insulin NPH-Regular 70/30 (70-30) 100 UNIT/ML vial; Inject 20-40 Units Subcutaneous 2 times daily (with meals)  Dispense: 72 mL; Refill: 1; pt purchases this medication over the counter  - she plans to monitor her blood sugars with goal   - she will begin to take the Novolin R 1-7 units pending blood sugars as needed (sliding scale)  - last A1C = 6.5 good control back on 4/8/2020    2. Essential hypertension with Hx of CLARITA  - stable per patient and on Metoprolol  - taking Lasix 40 mg bid    3. Nonrheumatic aortic valve stenosis  - s/p surgery 4/15/2020; followed by cardiology    4. Nonrheumatic mitral valve stenosis  - s/p surgery 4/15/2020; followed by cardiology    5. Nonrheumatic tricuspid valve regurgitation  - s/p surgery 4/15/2020; followed by cardiology    6. Patent foramen ovale -- S/P Closure 4/15/20  - s/p surgery 4/15/2020; followed by cardiology    7. Fractured atrial pacemaker lead -- Replace 4/16/20  -- s/p surgery 4/15/2020; followed by cardiology    8.  Acute blood loss anemia  - Hgb yesterday 8.1 (was as low as 7.1)  - currently on Iron  supplement daily      Return in about 3 months (around 7/22/2020) for Physical Exam, Lab Work.    Telephone call from 10:17 to 10:44 AM  Phone call duration:  27 minutes    Kelli Lewis CNP

## 2020-05-05 NOTE — PATIENT INSTRUCTIONS
Concern about how she was taking her insulin medications for her Type 2 Diabetes:  instructed how pt should be taking her insulin 70/30 vs R as follows:    insulin NPH-Regular 70/30 (70-30) 100 UNIT/ML vial; Inject 20-40 Units Subcutaneous 2 times daily (with meals)  Dispense: 72 mL; Refill: 1; pt purchases this medication over the counter  - she plans to monitor her blood sugars with goal   - she will begin to take the Novolin R 1-7 units pending blood sugars as needed (sliding scale)  - last A1C = 6.5 good control back on 4/8/2020

## 2020-05-05 NOTE — PROGRESS NOTES
I saw pt yesterday for completion of her home care admission. Please advise if after establishing care today 5/5/2020 Kelli Lewis will be able to be pts attending for home care and who the MD would be.   Orders needed would be SN 1 week 2, 3 PRN.  As pt requested not to order much home care at this time, also refused PT and OT  She does have 4 incisions. 2 are draining minimal serous fluid so those are currently being covering with a primipore dressing.  Cardiology did have me draw a Hgb yesterday that was 8.1    TARI Olea

## 2020-05-07 NOTE — TELEPHONE ENCOUNTER
Called and left a message for patient letting her know that Dr. Bustillo' will discuss AC at the upcoming appointment on 5/19/20. Pt instructed to call back with any questions or concerns.      TARI Ojeda

## 2020-05-11 ENCOUNTER — HOSPITAL ENCOUNTER (OUTPATIENT)
Dept: CARDIAC REHAB | Facility: CLINIC | Age: 75
End: 2020-05-11
Attending: SURGERY
Payer: MEDICARE

## 2020-05-11 ENCOUNTER — TELEPHONE (OUTPATIENT)
Dept: INTERNAL MEDICINE | Facility: CLINIC | Age: 75
End: 2020-05-11

## 2020-05-11 DIAGNOSIS — I10 ESSENTIAL HYPERTENSION: Primary | ICD-10-CM

## 2020-05-11 DIAGNOSIS — D62 ACUTE BLOOD LOSS ANEMIA: ICD-10-CM

## 2020-05-11 DIAGNOSIS — Z95.2 S/P AVR (AORTIC VALVE REPLACEMENT): ICD-10-CM

## 2020-05-11 DIAGNOSIS — N17.9 AKI (ACUTE KIDNEY INJURY) (H): ICD-10-CM

## 2020-05-11 PROCEDURE — 40000116 ZZH STATISTIC OP CR VISIT

## 2020-05-11 PROCEDURE — 93797 PHYS/QHP OP CAR RHAB WO ECG: CPT

## 2020-05-11 PROCEDURE — 93798 PHYS/QHP OP CAR RHAB W/ECG: CPT

## 2020-05-11 PROCEDURE — 40000575 ZZH STATISTIC OP CARDIAC VISIT #2

## 2020-05-11 NOTE — TELEPHONE ENCOUNTER
Neetu with [facility] calling with update.  Currently with patient.    1.  Patient c/o increase in shortness of breath with activity since 5-10-20.  Weight gain of 4 lbs in 2 days (current weight today was 284 lbs).  Patient is being followed by cardiology.  Neetu will also update cardiology.    Patient has a history of low hgb.  Patient is wondering if she should recheck hgb today (has appointment @ 2:30 today for cardiac rehab eval).    2.  VITAL SIGNS STABLE:  O2 99% on room air, lungs clear.  Has slight non-pitting edema bilat lower legs/feet.  Patient states this is much better though.    Patient wasn't taking her 2nd dose of Lasix 40mg by accident.  Neetu states patient will start taking 2nd dose today so hopefully help with the edema.    Please advise if patient should check hgb today (if so,please place future order), thanks.

## 2020-05-11 NOTE — TELEPHONE ENCOUNTER
Called Neetu, informed her [facility] approved orders to check BMP and Hemoglobin. She asks that we call patient to inform her of this to try to draw lab when she comes for her Cardiology appointment. If not able to get the lab today, Neetu can arrange for someone to go to the home tomorrow to draw labs.    Patient states she likely won't be done with Cardiology appointment in time to come to our lab and cannot get here before her appointment. Patient would like to see if lab can be drawn in-home instead.    Left voice message for Neetu informing her patient unable to get to our office prior to her appointment and our lab will be closed by the time her Cardiac Rehab appointment is over. Asked her to arrange for a nurse to go to the home tomorrow to draw labs.

## 2020-05-12 ENCOUNTER — TELEPHONE (OUTPATIENT)
Dept: CARDIOLOGY | Facility: CLINIC | Age: 75
End: 2020-05-12

## 2020-05-12 LAB
ANION GAP SERPL CALCULATED.3IONS-SCNC: 4 MMOL/L (ref 3–14)
BUN SERPL-MCNC: 16 MG/DL (ref 7–30)
CALCIUM SERPL-MCNC: 9.2 MG/DL (ref 8.5–10.1)
CHLORIDE SERPL-SCNC: 108 MMOL/L (ref 94–109)
CO2 SERPL-SCNC: 28 MMOL/L (ref 20–32)
CREAT SERPL-MCNC: 1.13 MG/DL (ref 0.52–1.04)
GFR SERPL CREATININE-BSD FRML MDRD: 48 ML/MIN/{1.73_M2}
GLUCOSE SERPL-MCNC: 122 MG/DL (ref 70–99)
HGB BLD-MCNC: 7.6 G/DL (ref 11.7–15.7)
MDC_IDC_EPISODE_DTM: NORMAL
MDC_IDC_EPISODE_DTM: NORMAL
MDC_IDC_EPISODE_DURATION: 72 S
MDC_IDC_EPISODE_DURATION: 7347 S
MDC_IDC_EPISODE_ID: 14
MDC_IDC_EPISODE_ID: 15
MDC_IDC_EPISODE_TYPE: NORMAL
MDC_IDC_EPISODE_TYPE: NORMAL
MDC_IDC_LEAD_IMPLANT_DT: NORMAL
MDC_IDC_LEAD_IMPLANT_DT: NORMAL
MDC_IDC_LEAD_LOCATION: NORMAL
MDC_IDC_LEAD_LOCATION: NORMAL
MDC_IDC_LEAD_LOCATION_DETAIL_1: NORMAL
MDC_IDC_LEAD_LOCATION_DETAIL_1: NORMAL
MDC_IDC_LEAD_MFG: NORMAL
MDC_IDC_LEAD_MFG: NORMAL
MDC_IDC_LEAD_MODEL: NORMAL
MDC_IDC_LEAD_MODEL: NORMAL
MDC_IDC_LEAD_POLARITY_TYPE: NORMAL
MDC_IDC_LEAD_POLARITY_TYPE: NORMAL
MDC_IDC_LEAD_SERIAL: NORMAL
MDC_IDC_LEAD_SERIAL: NORMAL
MDC_IDC_MSMT_BATTERY_DTM: NORMAL
MDC_IDC_MSMT_BATTERY_REMAINING_LONGEVITY: 62 MO
MDC_IDC_MSMT_BATTERY_RRT_TRIGGER: 2.83
MDC_IDC_MSMT_BATTERY_STATUS: NORMAL
MDC_IDC_MSMT_BATTERY_VOLTAGE: 3 V
MDC_IDC_MSMT_LEADCHNL_RA_IMPEDANCE_VALUE: 285 OHM
MDC_IDC_MSMT_LEADCHNL_RA_IMPEDANCE_VALUE: 399 OHM
MDC_IDC_MSMT_LEADCHNL_RA_PACING_THRESHOLD_AMPLITUDE: 0.75 V
MDC_IDC_MSMT_LEADCHNL_RA_PACING_THRESHOLD_PULSEWIDTH: 0.4 MS
MDC_IDC_MSMT_LEADCHNL_RA_SENSING_INTR_AMPL: 2 MV
MDC_IDC_MSMT_LEADCHNL_RA_SENSING_INTR_AMPL: 2 MV
MDC_IDC_MSMT_LEADCHNL_RV_IMPEDANCE_VALUE: 399 OHM
MDC_IDC_MSMT_LEADCHNL_RV_IMPEDANCE_VALUE: 437 OHM
MDC_IDC_MSMT_LEADCHNL_RV_PACING_THRESHOLD_AMPLITUDE: 0.62 V
MDC_IDC_MSMT_LEADCHNL_RV_PACING_THRESHOLD_PULSEWIDTH: 0.4 MS
MDC_IDC_MSMT_LEADCHNL_RV_SENSING_INTR_AMPL: 14 MV
MDC_IDC_MSMT_LEADCHNL_RV_SENSING_INTR_AMPL: 14 MV
MDC_IDC_PG_IMPLANT_DTM: NORMAL
MDC_IDC_PG_MFG: NORMAL
MDC_IDC_PG_MODEL: NORMAL
MDC_IDC_PG_SERIAL: NORMAL
MDC_IDC_PG_TYPE: NORMAL
MDC_IDC_SESS_CLINIC_NAME: NORMAL
MDC_IDC_SESS_DTM: NORMAL
MDC_IDC_SESS_TYPE: NORMAL
MDC_IDC_SET_BRADY_AT_MODE_SWITCH_RATE: 171 {BEATS}/MIN
MDC_IDC_SET_BRADY_HYSTRATE: NORMAL
MDC_IDC_SET_BRADY_LOWRATE: 60 {BEATS}/MIN
MDC_IDC_SET_BRADY_MAX_SENSOR_RATE: 130 {BEATS}/MIN
MDC_IDC_SET_BRADY_MAX_TRACKING_RATE: 130 {BEATS}/MIN
MDC_IDC_SET_BRADY_MODE: NORMAL
MDC_IDC_SET_BRADY_PAV_DELAY_LOW: 250 MS
MDC_IDC_SET_BRADY_SAV_DELAY_LOW: 250 MS
MDC_IDC_SET_LEADCHNL_RA_PACING_AMPLITUDE: 2.25 V
MDC_IDC_SET_LEADCHNL_RA_PACING_ANODE_ELECTRODE_1: NORMAL
MDC_IDC_SET_LEADCHNL_RA_PACING_ANODE_LOCATION_1: NORMAL
MDC_IDC_SET_LEADCHNL_RA_PACING_CAPTURE_MODE: NORMAL
MDC_IDC_SET_LEADCHNL_RA_PACING_CATHODE_ELECTRODE_1: NORMAL
MDC_IDC_SET_LEADCHNL_RA_PACING_CATHODE_LOCATION_1: NORMAL
MDC_IDC_SET_LEADCHNL_RA_PACING_POLARITY: NORMAL
MDC_IDC_SET_LEADCHNL_RA_PACING_PULSEWIDTH: 0.4 MS
MDC_IDC_SET_LEADCHNL_RA_SENSING_ANODE_ELECTRODE_1: NORMAL
MDC_IDC_SET_LEADCHNL_RA_SENSING_ANODE_LOCATION_1: NORMAL
MDC_IDC_SET_LEADCHNL_RA_SENSING_CATHODE_ELECTRODE_1: NORMAL
MDC_IDC_SET_LEADCHNL_RA_SENSING_CATHODE_LOCATION_1: NORMAL
MDC_IDC_SET_LEADCHNL_RA_SENSING_POLARITY: NORMAL
MDC_IDC_SET_LEADCHNL_RA_SENSING_SENSITIVITY: 0.3 MV
MDC_IDC_SET_LEADCHNL_RV_PACING_AMPLITUDE: 2 V
MDC_IDC_SET_LEADCHNL_RV_PACING_ANODE_ELECTRODE_1: NORMAL
MDC_IDC_SET_LEADCHNL_RV_PACING_ANODE_LOCATION_1: NORMAL
MDC_IDC_SET_LEADCHNL_RV_PACING_CAPTURE_MODE: NORMAL
MDC_IDC_SET_LEADCHNL_RV_PACING_CATHODE_ELECTRODE_1: NORMAL
MDC_IDC_SET_LEADCHNL_RV_PACING_CATHODE_LOCATION_1: NORMAL
MDC_IDC_SET_LEADCHNL_RV_PACING_POLARITY: NORMAL
MDC_IDC_SET_LEADCHNL_RV_PACING_PULSEWIDTH: 0.4 MS
MDC_IDC_SET_LEADCHNL_RV_SENSING_ANODE_ELECTRODE_1: NORMAL
MDC_IDC_SET_LEADCHNL_RV_SENSING_ANODE_LOCATION_1: NORMAL
MDC_IDC_SET_LEADCHNL_RV_SENSING_CATHODE_ELECTRODE_1: NORMAL
MDC_IDC_SET_LEADCHNL_RV_SENSING_CATHODE_LOCATION_1: NORMAL
MDC_IDC_SET_LEADCHNL_RV_SENSING_POLARITY: NORMAL
MDC_IDC_SET_LEADCHNL_RV_SENSING_SENSITIVITY: 0.9 MV
MDC_IDC_SET_ZONE_DETECTION_INTERVAL: 350 MS
MDC_IDC_SET_ZONE_DETECTION_INTERVAL: 400 MS
MDC_IDC_SET_ZONE_TYPE: NORMAL
MDC_IDC_STAT_AT_BURDEN_PERCENT: 0.7 %
MDC_IDC_STAT_AT_DTM_END: NORMAL
MDC_IDC_STAT_AT_DTM_START: NORMAL
MDC_IDC_STAT_BRADY_AP_VP_PERCENT: 27.44 %
MDC_IDC_STAT_BRADY_AP_VS_PERCENT: 1.04 %
MDC_IDC_STAT_BRADY_AS_VP_PERCENT: 68.32 %
MDC_IDC_STAT_BRADY_AS_VS_PERCENT: 3.2 %
MDC_IDC_STAT_BRADY_DTM_END: NORMAL
MDC_IDC_STAT_BRADY_DTM_START: NORMAL
MDC_IDC_STAT_BRADY_RA_PERCENT_PACED: 28.06 %
MDC_IDC_STAT_BRADY_RV_PERCENT_PACED: 95.49 %
MDC_IDC_STAT_EPISODE_RECENT_COUNT: 0
MDC_IDC_STAT_EPISODE_RECENT_COUNT: 2
MDC_IDC_STAT_EPISODE_RECENT_COUNT_DTM_END: NORMAL
MDC_IDC_STAT_EPISODE_RECENT_COUNT_DTM_START: NORMAL
MDC_IDC_STAT_EPISODE_TOTAL_COUNT: 0
MDC_IDC_STAT_EPISODE_TOTAL_COUNT: 3
MDC_IDC_STAT_EPISODE_TOTAL_COUNT: 4
MDC_IDC_STAT_EPISODE_TOTAL_COUNT: 5
MDC_IDC_STAT_EPISODE_TOTAL_COUNT_DTM_END: NORMAL
MDC_IDC_STAT_EPISODE_TOTAL_COUNT_DTM_START: NORMAL
MDC_IDC_STAT_EPISODE_TYPE: NORMAL
POTASSIUM SERPL-SCNC: 3.9 MMOL/L (ref 3.4–5.3)
SODIUM SERPL-SCNC: 140 MMOL/L (ref 133–144)

## 2020-05-12 PROCEDURE — 85018 HEMOGLOBIN: CPT | Performed by: NURSE PRACTITIONER

## 2020-05-12 PROCEDURE — 80048 BASIC METABOLIC PNL TOTAL CA: CPT | Performed by: NURSE PRACTITIONER

## 2020-05-12 NOTE — TELEPHONE ENCOUNTER
Received call from Kirstin with  home care. She had a visit with pt on 5/11/20. Pt reported increased SOB that she had been having since Sunday. Pt reported a 4 lb weight gain in the last few days and had non pitting edema bilaterally in her lower extremities.   Pt's lung sounds were clear. Pt's BP was 128/74 and HR 74. Respirations were 16 and pt's O2 sats were 99% on room air.   Kirstin reported that she noticed pt having significant SOB while walking. Kirstin also called pt's PCP and pt will be having BMP and hemoglobin drawn today.   Pt is currently take 40mg of lasix daily.     Pt recently had follow up with Lu Ingram on 5/4/20.  On 4/15/20, pt successfully underwent mitral valve replacement with a 27 mm [place] Epic (porcine bioprosthetic) valve, aortic valve replacement with a 25 mm Rubi Inspiris Resilia bovine pericardial valve, tricuspid valve repair with a 26 mm Rubi MC3 annuloplasty ring, PFO closure, intraoperative SOCORRO by Dr. Ok Wilson.  Pt was extubated within 24 hours of surgery per protocol and once weaned from hemodynamic stabilizing infusions was transferred to the surgical telemetry floor. Pt was found to have some dysfunction of her Medtronic atrial pacemaker lead, her ventricular lead was continuing to function. Pt was then evaluated by EP and underwent successful atrial lead revision on 4/20/20.   Pt's last hemoglobin was 8.1 on 5/4/20.  Pt established care with Kelli Lewis CNP on 5/5/20.  Pt last had device check on 5/4/20- see telephone encounter.    Pt will have follow up with Dr. Bustillo on 5/19/20.   Pt currently participating in cardiac rehab.   Pt has follow up scheduled with Dr. Christina on 6/10/20.  Will route this update to Dr. Christina.

## 2020-05-13 ENCOUNTER — TELEPHONE (OUTPATIENT)
Dept: CARDIOLOGY | Facility: CLINIC | Age: 75
End: 2020-05-13

## 2020-05-13 NOTE — TELEPHONE ENCOUNTER
Noted.  I will see her next week.  Not sure why she has so much discomfort at the PM site.  It ids unusual.  Maybe a superficial nerve was cut?  Will consider a repeat CXR and a trial of gabapentin but I need to speak to her first.  DI

## 2020-05-13 NOTE — TELEPHONE ENCOUNTER
"Pt left VM. She has had discomfort since her procedure. Chart reviewed. Pt had valve surgery on 4/15/2020 and RA lead revision on 4/20/2020. She had a remote check for her 1 week check on 5/4/2020 and sent in a picture which was WNL with no signs of infection. Her 8 week check is scheduled for 7/7/2020.     Called pt back. She said from the very beginning after both surgeries she only had pain from the pacemaker. She said her incision from her valve surgery is all healed up and didn't cause her any problems. She also says the pacemaker incision has healed well, she denies any signs of infections, no redness, drainage, swelling, fever, or chills.  The pain is from the pacemaker pocket and shoots down all the say into her left breast to her nipple. She said is mostly \"very uncomfortable,\" but sometimes \"painful.\"  She reports it is getting worse over time. It is not related to activity or body position. She takes tylenol which helps a little.     I suggested to pt that it is likely just the healing process, and could be from the scar tissue and nerves healing from her procedure. She never had any pain like this before her lead revision. Will review with Dr. Bustillo for any recommendations or other concerns.     Pt has virtual visit with Dr. Bustillo next week on 5/19/2020. Dr. Francis did her lead revision and Dr. Bustillo saw her in the hospital.   "

## 2020-05-14 NOTE — TELEPHONE ENCOUNTER
Per Dr. Bustillo's note from 5/13/20:  I will see her next week.  Not sure why she has so much discomfort at the PM site.  It ids unusual.  Maybe a superficial nerve was cut?  Will consider a repeat CXR and a trial of gabapentin but I need to speak to her first.  TANNER

## 2020-05-14 NOTE — TELEPHONE ENCOUNTER
I called pt and gave her Dr. Bustillo's message from note below. Pt states understanding and is comfortable talking to Dr. Bustillo about it next week.

## 2020-05-14 NOTE — TELEPHONE ENCOUNTER
Yamilka Christina, Sadia Hugo, RN    Caller: Unspecified (3 days ago,  3:56 PM)               Looks like her Cr went up. Hgb is still very low 7.6 (on very low dose oral iron, may benefit from iron transfusion)  have her get CXR when sees Keny.

## 2020-05-18 ENCOUNTER — HOSPITAL ENCOUNTER (OUTPATIENT)
Dept: CARDIAC REHAB | Facility: CLINIC | Age: 75
End: 2020-05-18
Attending: SURGERY
Payer: MEDICARE

## 2020-05-18 PROCEDURE — 40000116 ZZH STATISTIC OP CR VISIT: Performed by: REHABILITATION PRACTITIONER

## 2020-05-18 PROCEDURE — 93798 PHYS/QHP OP CAR RHAB W/ECG: CPT | Performed by: REHABILITATION PRACTITIONER

## 2020-05-19 ENCOUNTER — VIRTUAL VISIT (OUTPATIENT)
Dept: CARDIOLOGY | Facility: CLINIC | Age: 75
End: 2020-05-19
Payer: MEDICARE

## 2020-05-19 VITALS
WEIGHT: 273 LBS | HEART RATE: 74 BPM | DIASTOLIC BLOOD PRESSURE: 57 MMHG | BODY MASS INDEX: 45.43 KG/M2 | SYSTOLIC BLOOD PRESSURE: 120 MMHG

## 2020-05-19 DIAGNOSIS — Z95.2 S/P AVR (AORTIC VALVE REPLACEMENT): ICD-10-CM

## 2020-05-19 DIAGNOSIS — I48.0 PAROXYSMAL ATRIAL FIBRILLATION (H): ICD-10-CM

## 2020-05-19 DIAGNOSIS — Z95.3 S/P MITRAL VALVE REPLACEMENT WITH BIOPROSTHETIC VALVE: ICD-10-CM

## 2020-05-19 DIAGNOSIS — R07.89 CHEST WALL PAIN: Primary | ICD-10-CM

## 2020-05-19 DIAGNOSIS — I44.30 AV BLOCK: ICD-10-CM

## 2020-05-19 DIAGNOSIS — Z95.0 CARDIAC PACEMAKER IN SITU: ICD-10-CM

## 2020-05-19 PROCEDURE — 99442: CPT | Mod: 24 | Performed by: INTERNAL MEDICINE

## 2020-05-19 NOTE — LETTER
5/19/2020      RE: Amy Luna  [address]  [facility] [address]       Dear Colleague,    Thank you for the opportunity to participate in the care of your patient, Amy Luna, at the [facility] at [facility]. Please see a copy of my visit note below.    PHYSICIAN NOTE:  This visit was completed via telephone due to COVID-19 precautions.  I had the pleasure speaking to Ms. Luna as part of a telephone visit today.  This appointment with EP was requested in order to discuss persistent left chest discomfort that the patient believes is related to her recent pacemaker procedure.  The patient does not have video capability, so this visit is via the phone.    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 Wilson on 4/15/2020.  PFO closure at the same time.  2. AV block with pacemaker implantation in 06/2015 (Medtronic).  Atrial lead malfunction after cardiac surgery.  New atrial lead implanted on 4/20/2020.  3. Paroxysmal atrial fibrillation.  2 episodes have been detected by her pacemaker in the past month.  4. Obesity.  5. Hypertension.  6. Blood loss anemia.    I was involved in Ms. Luna's care during her recent hospitalization.  During her extensive cardiac surgery there was apparent injury to the atrial pacing lead.  The lead was replaced on 4/20/2020 by Dr. Francis.    The patient has been referred back to EP to discuss 2 issues:  the first is anticoagulation given 2 episodes of AF that have been detected by her pacemaker, both asymptomatic.  One lasted approximately 2 hours and the second 1 hour.  The second issue is left-sided chest discomfort that the patient has experienced since  hospital discharge.    This discomfort is in the area of the left breast.  It becomes worse with certain movements or lying down or turning to the left side.  It is not worsened by deep breathing.  It does not radiate.  The patient states that the pacemaker pocket has healed well without hematoma, swelling or drainage.  The discomfort is actually not over the pacemaker site.  Tylenol significantly relieves the discomfort.    She reports that her surgical incisions have healed nicely.  She has not had fevers chills unusual dyspnea, edema, orthopnea.  She had a blood draw 1 week ago which showed a hemoglobin of only 7.6 g/dl.  She is on iron supplementation.      DIAGNOSTIC STUDIES:  Labs: Hemoglobin 7.6, potassium 3.9, creatinine 1.13, sodium 140.      IMPRESSION:  1. Left-sided chest pain.  Per her description over the phone this sounds like chest wall pain.  Perhaps rib injury related to the cardiac surgery.  It does not appear to be directly related to her pacemaker lead revision and the pocket has healed nicely.  I advised that she continue using as needed Tylenol.  If the discomfort persists, she should discuss this again with Dr. Christina during their face-to-face appointment on Amanda 10.  2. Paroxysmal atrial fibrillation.  She had 2 episodes recorded by her permanent pacemaker.  AF could be a transient arrhythmia related to the recent cardiac surgery, but given her history of significant valve disease it is conceivable she will have AF going forward.  At this juncture, I am hesitant to recommend anticoagulation given her significant anemia. [id number] score is at least 3.  Would reassess the patient in 4 weeks.  If hemoglobin improves and she continues to have atrial fibrillation, I would start anticoagulation.  3. Recent extensive cardiac surgery with MVR, AVR and tricuspid repair.    RECOMMENDATIONS:  1. No anticoagulation for now.  2. Continue to monitor AF burden through her device.  3. Reassess the need  for anticoagulation in 1 month.  4. Repeat CBC in 2 weeks.    It was my pleasure speaking to Mrs. Luna.      Please do not hesitate to contact me if you have any questions/concerns.     Sincerely,     Juvenal Bustillo MD

## 2020-05-19 NOTE — PROGRESS NOTES
"Amy Luna is a 74 year old female who is being evaluated via a billable telephone visit.      The patient has been notified of following:     \"This telephone visit will be conducted via a call between you and your physician/provider. We have found that certain health care needs can be provided without the need for a physical exam.  This service lets us provide the care you need with a short phone conversation.  If a prescription is necessary we can send it directly to your pharmacy.  If lab work is needed we can place an order for that and you can then stop by our lab to have the test done at a later time.    Telephone visits are billed at different rates depending on your insurance coverage. During this emergency period, for some insurers they may be billed the same as an in-person visit.  Please reach out to your insurance provider with any questions.    If during the course of the call the physician/provider feels a telephone visit is not appropriate, you will not be charged for this service.\"    Patient has given verbal consent for Telephone visit?  Yes    What phone number would you like to be contacted at? [phone]    How would you like to obtain your AVS? Mail a copy      Pt reported blood pressure: 120/57 p 74 273#        Review Of Systems  Skin:   Eyes:Ears/Nose/Throat:   Respiratory: SOB with exertion  Cardiovascular: fatigue, edema  Gastrointestinal: Black stool after starting iron  Genitourinary: negative   Musculoskeletal: Chest wall pain  Neurologic: Negative  Psychiatric:   Hematologic/Lymphatic/Immunologic: Anemia, hemoglobin 7.6 recently  Endocrine diabetic :      Bernice CORTEZ         PHYSICIAN NOTE:  This visit was completed via telephone due to COVID-19 precautions.  I had the pleasure speaking to Ms. Luna as part of a telephone visit today.  This appointment with EP was requested in order to discuss persistent left chest discomfort that the patient believes is related to her " recent pacemaker procedure.  The patient does not have video capability, so this visit is via the phone.    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 Wilson on 4/15/2020.  PFO closure at the same time.  2. AV block with pacemaker implantation in 06/2015 (Medtronic).  Atrial lead malfunction after cardiac surgery.  New atrial lead implanted on 4/20/2020.  3. Paroxysmal atrial fibrillation.  2 episodes have been detected by her pacemaker in the past month.  4. Obesity.  5. Hypertension.  6. Blood loss anemia.    I was involved in Ms. Luna's care during her recent hospitalization.  During her extensive cardiac surgery there was apparent injury to the atrial pacing lead.  The lead was replaced on 4/20/2020 by Dr. Francis.    The patient has been referred back to EP to discuss 2 issues:  the first is anticoagulation given 2 episodes of AF that have been detected by her pacemaker, both asymptomatic.  One lasted approximately 2 hours and the second 1 hour.  The second issue is left-sided chest discomfort that the patient has experienced since hospital discharge.    This discomfort is in the area of the left breast.  It becomes worse with certain movements or lying down or turning to the left side.  It is not worsened by deep breathing.  It does not radiate.  The patient states that the pacemaker pocket has healed well without hematoma, swelling or drainage.  The discomfort is actually not over the pacemaker site.  Tylenol significantly relieves the discomfort.    She reports that her surgical incisions have healed nicely.  She has not had fevers chills unusual dyspnea, edema, orthopnea.  She had a blood draw 1 week ago which showed a hemoglobin of only 7.6 g/dl.  She is on iron  supplementation.      DIAGNOSTIC STUDIES:  Labs: Hemoglobin 7.6, potassium 3.9, creatinine 1.13, sodium 140.      IMPRESSION:  1. Left-sided chest pain.  Per her description over the phone this sounds like chest wall pain.  Perhaps rib injury related to the cardiac surgery.  It does not appear to be directly related to her pacemaker lead revision and the pocket has healed nicely.  I advised that she continue using as needed Tylenol.  If the discomfort persists, she should discuss this again with Dr. Christina during their face-to-face appointment on Amanda 10.  2. Paroxysmal atrial fibrillation.  She had 2 episodes recorded by her permanent pacemaker.  AF could be a transient arrhythmia related to the recent cardiac surgery, but given her history of significant valve disease it is conceivable she will have AF going forward.  At this juncture, I am hesitant to recommend anticoagulation given her significant anemia. [id number] score is at least 3.  Would reassess the patient in 4 weeks.  If hemoglobin improves and she continues to have atrial fibrillation, I would start anticoagulation.  3. Recent extensive cardiac surgery with MVR, AVR and tricuspid repair.    RECOMMENDATIONS:  1. No anticoagulation for now.  2. Continue to monitor AF burden through her device.  3. Reassess the need for anticoagulation in 1 month.  4. Repeat CBC in 2 weeks.    It was my pleasure speaking to Mrs. Luna.    Juvenal Bustillo MD, [facility]        Telephone visit documentation  Start: 2:18 PM  End: 2:38 PM  Time: 20 minutes

## 2020-05-20 ENCOUNTER — HOSPITAL ENCOUNTER (OUTPATIENT)
Dept: CARDIAC REHAB | Facility: CLINIC | Age: 75
End: 2020-05-20
Attending: SURGERY
Payer: MEDICARE

## 2020-05-20 PROCEDURE — 40000116 ZZH STATISTIC OP CR VISIT

## 2020-05-20 PROCEDURE — 93798 PHYS/QHP OP CAR RHAB W/ECG: CPT

## 2020-05-21 ENCOUNTER — TELEPHONE (OUTPATIENT)
Dept: INTERNAL MEDICINE | Facility: CLINIC | Age: 75
End: 2020-05-21

## 2020-05-26 ENCOUNTER — TELEPHONE (OUTPATIENT)
Dept: INTERNAL MEDICINE | Facility: CLINIC | Age: 75
End: 2020-05-26

## 2020-05-27 ENCOUNTER — HOSPITAL ENCOUNTER (OUTPATIENT)
Dept: CARDIAC REHAB | Facility: CLINIC | Age: 75
End: 2020-05-27
Attending: SURGERY
Payer: MEDICARE

## 2020-05-27 DIAGNOSIS — Z53.9 DIAGNOSIS NOT YET DEFINED: Primary | ICD-10-CM

## 2020-05-27 PROCEDURE — 93798 PHYS/QHP OP CAR RHAB W/ECG: CPT | Performed by: REHABILITATION PRACTITIONER

## 2020-05-27 PROCEDURE — G0180 MD CERTIFICATION HHA PATIENT: HCPCS | Performed by: NURSE PRACTITIONER

## 2020-05-27 PROCEDURE — 40000116 ZZH STATISTIC OP CR VISIT: Performed by: REHABILITATION PRACTITIONER

## 2020-05-29 ENCOUNTER — HOSPITAL ENCOUNTER (OUTPATIENT)
Dept: CARDIAC REHAB | Facility: CLINIC | Age: 75
End: 2020-05-29
Attending: SURGERY
Payer: MEDICARE

## 2020-05-29 PROCEDURE — 93798 PHYS/QHP OP CAR RHAB W/ECG: CPT

## 2020-05-29 PROCEDURE — 40000116 ZZH STATISTIC OP CR VISIT

## 2020-06-01 ENCOUNTER — HOSPITAL ENCOUNTER (OUTPATIENT)
Dept: CARDIAC REHAB | Facility: CLINIC | Age: 75
End: 2020-06-01
Attending: SURGERY
Payer: MEDICARE

## 2020-06-01 DIAGNOSIS — I44.30 AV BLOCK: ICD-10-CM

## 2020-06-01 LAB
ERYTHROCYTE [DISTWIDTH] IN BLOOD BY AUTOMATED COUNT: 14.4 % (ref 10–15)
HCT VFR BLD AUTO: 30 % (ref 35–47)
HGB BLD-MCNC: 8.7 G/DL (ref 11.7–15.7)
MCH RBC QN AUTO: 28.7 PG (ref 26.5–33)
MCHC RBC AUTO-ENTMCNC: 29 G/DL (ref 31.5–36.5)
MCV RBC AUTO: 99 FL (ref 78–100)
PLATELET # BLD AUTO: 306 10E9/L (ref 150–450)
RBC # BLD AUTO: 3.03 10E12/L (ref 3.8–5.2)
WBC # BLD AUTO: 8.4 10E9/L (ref 4–11)

## 2020-06-01 PROCEDURE — 36415 COLL VENOUS BLD VENIPUNCTURE: CPT | Performed by: INTERNAL MEDICINE

## 2020-06-01 PROCEDURE — 40000116 ZZH STATISTIC OP CR VISIT

## 2020-06-01 PROCEDURE — 85027 COMPLETE CBC AUTOMATED: CPT | Performed by: INTERNAL MEDICINE

## 2020-06-01 PROCEDURE — 93798 PHYS/QHP OP CAR RHAB W/ECG: CPT

## 2020-06-02 ENCOUNTER — TELEPHONE (OUTPATIENT)
Dept: CARDIOLOGY | Facility: CLINIC | Age: 75
End: 2020-06-02

## 2020-06-02 DIAGNOSIS — I44.30 AV BLOCK: Primary | ICD-10-CM

## 2020-06-02 DIAGNOSIS — Z95.0 CARDIAC PACEMAKER IN SITU: ICD-10-CM

## 2020-06-02 NOTE — TELEPHONE ENCOUNTER
Patient is scheduled for f/up with Dr. Christina on 6/10/20.  Courtesy remote device check scheduled for 6/9/20 to determine AF burden/episodes per Dr. Bustillo' request.    Called and updated patient on her hemoglobin value and on the plan to do a remote device check on 6/9/20.  Patient verbalized understanding and agreement with plan and will send a remote transmission on 6/9/20.    TARI Ojeda        ----- Message from Juvenal Bustillo MD sent at 6/1/2020  4:50 PM CDT -----  Improvement in Hgb.  Closely monitor AF burden via her device.  EP RN, please make sure there is a remote device interrogation BEFORE she sees Dr. Christina in 2 weeks (AF burden).  We are trying to decide whether to start AC or not.  TANNER Morrissey

## 2020-06-03 ENCOUNTER — HOSPITAL ENCOUNTER (OUTPATIENT)
Dept: CARDIAC REHAB | Facility: CLINIC | Age: 75
End: 2020-06-03
Attending: SURGERY
Payer: MEDICARE

## 2020-06-03 PROCEDURE — 93798 PHYS/QHP OP CAR RHAB W/ECG: CPT

## 2020-06-03 PROCEDURE — 40000116 ZZH STATISTIC OP CR VISIT

## 2020-06-04 ENCOUNTER — TELEPHONE (OUTPATIENT)
Dept: CARDIOLOGY | Facility: CLINIC | Age: 75
End: 2020-06-04

## 2020-06-04 NOTE — TELEPHONE ENCOUNTER
Received a call today from Rupal Lees RN with CV surgery. The patient called stating that her HR has consistently been in the 90s-100s recently. She states that her heart rate used be consistently in the 60s-70s. She checks her BP regularly which is normal. She is not symptomatic with the faster rates.     She is already scheduled for a remote device on 6/09/20 to look for AT/AF before her virtual appointment with Dr. Christina on 6/10/20. She is comfortable waiting until this check to evaluate her heart rates. I asked that she contact us in the meantime if she develops symptoms. Patient verbalized understanding and agreement with plan.

## 2020-06-08 ENCOUNTER — HOSPITAL ENCOUNTER (OUTPATIENT)
Dept: CARDIAC REHAB | Facility: CLINIC | Age: 75
End: 2020-06-08
Attending: SURGERY
Payer: MEDICARE

## 2020-06-08 PROCEDURE — 93798 PHYS/QHP OP CAR RHAB W/ECG: CPT

## 2020-06-08 PROCEDURE — 40000116 ZZH STATISTIC OP CR VISIT

## 2020-06-09 ENCOUNTER — ANCILLARY PROCEDURE (OUTPATIENT)
Dept: CARDIOLOGY | Facility: CLINIC | Age: 75
End: 2020-06-09
Attending: INTERNAL MEDICINE
Payer: MEDICARE

## 2020-06-09 ENCOUNTER — TELEPHONE (OUTPATIENT)
Dept: CARDIOLOGY | Facility: CLINIC | Age: 75
End: 2020-06-09

## 2020-06-09 ENCOUNTER — DOCUMENTATION ONLY (OUTPATIENT)
Dept: CARDIOLOGY | Facility: CLINIC | Age: 75
End: 2020-06-09

## 2020-06-09 DIAGNOSIS — I44.30 AV BLOCK: ICD-10-CM

## 2020-06-09 DIAGNOSIS — Z95.0 CARDIAC PACEMAKER IN SITU: ICD-10-CM

## 2020-06-09 NOTE — PROGRESS NOTES
Remote device check completed to assess patient Afib. Patients AT/AF burden has increased to 80.4% since remote check on 5/4/2020.     Ventricular rate histograms show variable V rates while in Afib:       Medtronic Advisa (D) Pacemaker Device Check-Courtesy check to assess Afib burden/episodes  Patient seen in clinic for device evaluation and iterative programming.   AP: 0%    : 81 %    Mode: AAI<=>DDD   Presenting Rhythm: AS/VS 90s    Heart Rate: Stable with adequate variability. When patient in Afib V rates variable, with -130s approximately 8% of the time per rate histograms   Sensing: WNL  Pacing Threshold: Stable     Impedance: Stable     Battery Status: 4-6.5 years      Atrial Arrhythmia: 3 episodes of AT/AF logged. 1 EGM available shows AS markers suggestive of Afib, then EGM shows Afib with V pacing in the 90-100s. Episode logged on 5/10/2020, and duration logged as lasting >[id number]. AT/AF burden 80.4%. Will send FYI to Dr. Christina   Ventricular Arrhythmia: 0    Setting Change: 0    Care Plan: In clinic device check scheduled for 7/7/2020. Patient following up with Dr. Christina on 6/10/2020. Will send FYI to Dr. Butsillo and Dr. Christina regarding AT/AF burden.   PAOLA RN

## 2020-06-10 ENCOUNTER — VIRTUAL VISIT (OUTPATIENT)
Dept: CARDIOLOGY | Facility: CLINIC | Age: 75
End: 2020-06-10
Payer: MEDICARE

## 2020-06-10 ENCOUNTER — TELEPHONE (OUTPATIENT)
Dept: INTERNAL MEDICINE | Facility: CLINIC | Age: 75
End: 2020-06-10

## 2020-06-10 ENCOUNTER — TELEPHONE (OUTPATIENT)
Dept: CARDIOLOGY | Facility: CLINIC | Age: 75
End: 2020-06-10

## 2020-06-10 VITALS
DIASTOLIC BLOOD PRESSURE: 54 MMHG | WEIGHT: 265 LBS | HEART RATE: 93 BPM | SYSTOLIC BLOOD PRESSURE: 110 MMHG | BODY MASS INDEX: 44.1 KG/M2

## 2020-06-10 DIAGNOSIS — Z95.3 S/P MITRAL VALVE REPLACEMENT WITH BIOPROSTHETIC VALVE: ICD-10-CM

## 2020-06-10 DIAGNOSIS — I07.1 TRICUSPID VALVE INSUFFICIENCY, UNSPECIFIED ETIOLOGY: ICD-10-CM

## 2020-06-10 DIAGNOSIS — Z95.2 S/P AVR (AORTIC VALVE REPLACEMENT): ICD-10-CM

## 2020-06-10 DIAGNOSIS — Z95.2 S/P AORTIC VALVE AND MITRAL VALVE REPLACEMENT: ICD-10-CM

## 2020-06-10 DIAGNOSIS — D62 ACUTE BLOOD LOSS ANEMIA: ICD-10-CM

## 2020-06-10 DIAGNOSIS — Z79.01 LONG TERM CURRENT USE OF ANTICOAGULANTS WITH INR GOAL OF 2.0-3.0: ICD-10-CM

## 2020-06-10 DIAGNOSIS — I48.0 PAROXYSMAL ATRIAL FIBRILLATION (H): Primary | ICD-10-CM

## 2020-06-10 DIAGNOSIS — Z95.0 CARDIAC PACEMAKER IN SITU: ICD-10-CM

## 2020-06-10 DIAGNOSIS — I10 ESSENTIAL HYPERTENSION: ICD-10-CM

## 2020-06-10 PROCEDURE — 99214 OFFICE O/P EST MOD 30 MIN: CPT | Mod: 95 | Performed by: INTERNAL MEDICINE

## 2020-06-10 RX ORDER — WARFARIN SODIUM 5 MG/1
5 TABLET ORAL DAILY
Qty: 30 TABLET | Refills: 3 | Status: SHIPPED | OUTPATIENT
Start: 2020-06-10 | End: 2020-07-08

## 2020-06-10 RX ORDER — FUROSEMIDE 40 MG
40 TABLET ORAL
Qty: 90 TABLET | Refills: 3 | Status: ON HOLD | OUTPATIENT
Start: 2020-06-10 | End: 2020-11-19

## 2020-06-10 RX ORDER — METFORMIN HCL 500 MG
1000 TABLET, EXTENDED RELEASE 24 HR ORAL
Qty: 90 TABLET | Refills: 3 | Status: SHIPPED | OUTPATIENT
Start: 2020-06-10 | End: 2021-06-03

## 2020-06-10 RX ORDER — FERROUS GLUCONATE 324(38)MG
324 TABLET ORAL
Qty: 30 TABLET | Refills: 3 | Status: SHIPPED | OUTPATIENT
Start: 2020-06-10 | End: 2022-01-01

## 2020-06-10 RX ORDER — AMIODARONE HYDROCHLORIDE 200 MG/1
200 TABLET ORAL DAILY
Qty: 90 TABLET | Refills: 3 | Status: ON HOLD | OUTPATIENT
Start: 2020-06-10 | End: 2020-11-19

## 2020-06-10 RX ORDER — ATORVASTATIN CALCIUM 40 MG/1
40 TABLET, FILM COATED ORAL AT BEDTIME
Qty: 90 TABLET | Refills: 3 | Status: SHIPPED | OUTPATIENT
Start: 2020-06-10 | End: 2021-06-04

## 2020-06-10 RX ORDER — METOPROLOL SUCCINATE 25 MG/1
50 TABLET, EXTENDED RELEASE ORAL DAILY
Qty: 90 TABLET | Refills: 3 | Status: SHIPPED | OUTPATIENT
Start: 2020-06-10 | End: 2021-03-04

## 2020-06-10 NOTE — PROGRESS NOTES
"Amy Luna is a 74 year old female who is being evaluated via a billable video visit.      The patient has been notified of following:     \"This video visit will be conducted via a call between you and your physician/provider. We have found that certain health care needs can be provided without the need for an in-person physical exam.  This service lets us provide the care you need with a video conversation.  If a prescription is necessary we can send it directly to your pharmacy.  If lab work is needed we can place an order for that and you can then stop by our lab to have the test done at a later time.    Video visits are billed at different rates depending on your insurance coverage.  Please reach out to your insurance provider with any questions.    If during the course of the call the physician/provider feels a video visit is not appropriate, you will not be charged for this service.\"    Patient has given verbal consent for Video visit? Yes    How would you like to obtain your AVS? Mail a copy    Patient would like the video invitation sent by: text [phone]    Will anyone else be joining your video visit? No     Review Of Systems  Skin: NEGATIVE  Eyes:Ears/Nose/Throat: NEGATIVE  Respiratory:  JENKINS  Cardiovascular: energy level the same,edema occasionally  Gastrointestinal: NEGATIVE  Genitourinary:NEGATIVE   Musculoskeletal: NEGATIVE  Neurologic: NEGATIVE  Psychiatric: NEGATIVE  Hematologic/Lymphatic/Immunologic: NEGATIVE  Endocrine:  NEGATIVE  Vitals: Pt reports /54    Pulse has been high in 100's with walking,   resting pulse today 93     Weight 265lbs  General:  no apparent distress, normal body habitus, sitting upright.  ENT/Mouth:  membranes moist, no nasal discharge.  Normal head shape, no apparent injury or laceration.  Eyes:  no scleral icterus, normal conjunctivae.  No observed jaundice.  Neck:  no apparent neck swelling.   Chest/Lungs:  No breathing difficulty while speaking.  No audible " wheezing.  No cough during conversation.  Cardiovascular:  No obviously elevated jugular venous pressure.  No apparent edema bilaterally in LE.   Abdomen:  no obvious abdominal distention.   Extremities:  no apparent cyanosis.  Skin:  no xanthelasma.  No facial lacerations.  Neurologic:  Normal arm motion bilateral, no tremors.    Psychiatric:  Alert and oriented x3, calm demeanor    The rest of the comprehensive physical examination is deferred due to public health emergency video visit restrictions.      Video-Visit Details    Type of service:  Video Visit    Video Start Time: 11:29A  Video End Time: 11:59A    Originating Location (pt. Location): Home    Distant Location (provider location):  [facility]     Platform used for Video Visit: LORRAINE Renee DO

## 2020-06-10 NOTE — TELEPHONE ENCOUNTER
Received INR clinic referral from Dr. Christina today.  Per review of record, patient has had increased episodes of atrial fib and was started on warfarin and amiodarone today.  Has a history of mitral valve replacement with a 27 mm St. Juan porcine bioprosthetic valve, an aortic valve replacement with a 25 mm Rubi Inspiris bovine pericardial valve, a tricuspid valve repair with an annuloplasty ring, a PFO closure and this was then followed with an atrial lead revision of her pacemaker.  This all was done in April.     Per Cardiology, patient would like the [facility] to manage.  Will need a referral from PCP to do this.  Order pended.  Standing orders for INR checks ordered per protocol.  Called and spoke with patient and assisted her to schedule visit for 6/12.    Nadia Cabello RN

## 2020-06-10 NOTE — LETTER
6/10/2020    Candy Frederick MD  [facility] [address]  [place] [facility] [address]    RE: Amy Luna       Dear Colleague,    I had the pleasure of seeing Amy Luna in the [facility].    Amy Luna is a 74 year old female who is being evaluated via a billable video visit.         HISTORY OF PRESENT ILLNESS:  Ms. Luna is a pleasant 74-year-old female who I initially met in January in the hospital when she presented in congestive heart failure.  She had significant valvular heart disease and subsequently underwent a mitral valve replacement with a 27 mm St. Juan porcine bioprosthetic valve, an aortic valve replacement with a 25 mm Rubi Inspiris bovine pericardial valve, a tricuspid valve repair with an annuloplasty ring, a PFO closure and this was then followed with an atrial lead revision of her pacemaker.  This all was done in April.        She was discharged and sent to a TCU for about a week and now she is home and following up with Cardiac Rehab.  This is her followup visit today.        Her last visit was with Dr. Bustillo in regards to evidence of atrial fibrillation postoperatively.  When he saw her back in mid-May, she had only 2 very brief episodes of atrial fibrillation.  Because of her postop anemia, he did not recommend anticoagulation for this and recommended followup.  I did receive a note from her interrogation, which was done yesterday, that the interrogation of her pacemaker has shown a drastic increase and the episodes of atrial fibrillation.  This has increased by 80% since her check on 05/04.  She has had a total of 28 days of persistent atrial fibrillation with rapid ventricular rates.        She denies any symptoms of palpitations.  She states she is slowly improving each day and wonders if this is normal.  She feels that this is a slow process of recovery.  She denies any specific symptoms such as shortness of breath or symptoms  suggestive of orthopnea or PND.  She denies any pain in her chest.  She denies any lightheadedness or dizziness.        She was able to report her blood pressure at 110/54 and her heart rate is in the 100s.  Her weight is 265 pounds.  This is actually down about 13 pounds from before her surgery.  Her last hemoglobin measurement was drawn on 06/01.  It increased to 8.7 from 7.6 on her previous.  She does continue to take one 325 mg dose of iron daily.      SUMMARY:  Ms. Luna is a pleasant 74-year-old female with aortic mitral valve replacement and tricuspid repair.  She has had an 80% increase and postop atrial fibrillation noted increased even from her last visit with Dr. Bustillo in Electrophysiology from 05/19.        I will follow his recommendations to put her on anticoagulation due to significant increase in atrial fibrillation.  I will refer her to Coumadin Clinic in [place] and start her on warfarin 5 mg.  In addition to this, I will start her on amiodarone.  I have chosen a reduced loading dose, given her soft blood pressures, of 200 mg twice daily for 14 days, followed by 200 mg once daily.  I have asked her to stop her aspirin at this time.  I will also reduce her metoprolol dose from 50 mg down to 25 mg due to her soft blood pressures and the starting of amiodarone.  I have also asked her to increase her iron supplementation to twice daily.  If her stomach tolerates.        She will continue with Cardiac Rehab and I would like to see her back in about 3 months.  We will continue to follow her interrogations for evidence of persistent atrial fibrillation and I will have her follow up with EP in regards to continuation of amiodarone.  I also refilled all of her other medications for her today to her [facility] mail order.        She is instructed to contact me if she has any worsening fatigue, shortness of breath or lightheadedness.  I also discussed placing her on Coumadin and increased bleeding risks with  this.  She is to monitor that.  If she has any symptoms of the above, we will need to recheck her blood count as it continues to be low.  She will need close followup in this regard.        I will plan to see her back in 3 months unless she contacts me earlier than that.  Please feel free to contact me with any questions you have in regards to her care.      Thank you for allowing me to participate in the care of your patient.    Sincerely,     Yamilka Christina, DO     [facility]    cc:   [facility]   [address]   [place] [address]

## 2020-06-10 NOTE — TELEPHONE ENCOUNTER
Received referral for Anticoagulation Clinic, [facility]. Patient is s/p bioprosthetic MVR and AVR (April 2020), recently discharged from TCU and found to be in more persistent a-fib. Started today on warfarin, 5 mg daily and amiodarone as well.     Patient was unable to talk and asked that this RN call back. Called her back about 45 minutes later and no answer. Left VM to return call. A short time later was notified that Lele Murphy RN spoke with Amy and she is all set up for INR on 6/12/20. And Standing orders for lab-only INR have been placed.     Mailed Guide to Warfarin Therapy Booklet and foods with vit K list.     Yuliya RAMSEY RN  Anticoagulation Clinic  [place]

## 2020-06-10 NOTE — PROGRESS NOTES
Service Date: 06/10/2020      VIDEO VISIT      HISTORY OF PRESENT ILLNESS:  Ms. Luna is a pleasant 74-year-old female who I initially met in January in the hospital when she presented in congestive heart failure.  She had significant valvular heart disease and subsequently underwent a mitral valve replacement with a 27 mm [place] [place] porcine bioprosthetic valve, an aortic valve replacement with a 25 mm Rubi Inspiris bovine pericardial valve, a tricuspid valve repair with an annuloplasty ring, a PFO closure and this was then followed with an atrial lead revision of her pacemaker.  This all was done in April.        She was discharged and sent to a TCU for about a week and now she is home and following up with Cardiac Rehab.  This is her followup visit today.        Her last visit was with Dr. Bustillo in regards to evidence of atrial fibrillation postoperatively.  When he saw her back in mid-May, she had only 2 very brief episodes of atrial fibrillation.  Because of her postop anemia, he did not recommend anticoagulation for this and recommended followup.  I did receive a note from her interrogation, which was done yesterday, that the interrogation of her pacemaker has shown a drastic increase and the episodes of atrial fibrillation.  This has increased by 80% since her check on 05/04.  She has had a total of 28 days of persistent atrial fibrillation with rapid ventricular rates.        She denies any symptoms of palpitations.  She states she is slowly improving each day and wonders if this is normal.  She feels that this is a slow process of recovery.  She denies any specific symptoms such as shortness of breath or symptoms suggestive of orthopnea or PND.  She denies any pain in her chest.  She denies any lightheadedness or dizziness.        She was able to report her blood pressure at 110/54 and her heart rate is in the 100s.  Her weight is 265 pounds.  This is actually down about 13 pounds from before her surgery.   Her last hemoglobin measurement was drawn on 06/01.  It increased to 8.7 from 7.6 on her previous.  She does continue to take one 325 mg dose of iron daily.      SUMMARY:  Ms. Luna is a pleasant 74-year-old female with aortic mitral valve replacement and tricuspid repair.  She has had an 80% increase and postop atrial fibrillation noted increased even from her last visit with Dr. Bustillo in Electrophysiology from 05/19.        I will follow his recommendations to put her on anticoagulation due to significant increase in atrial fibrillation.  I will refer her to [facility]mad[facility] Clinic in [place] and start her on warfarin 5 mg.  In addition to this, I will start her on amiodarone.  I have chosen a reduced loading dose, given her soft blood pressures, of 200 mg twice daily for 14 days, followed by 200 mg once daily.  I have asked her to stop her aspirin at this time.  I will also reduce her metoprolol dose from 50 mg down to 25 mg due to her soft blood pressures and the starting of amiodarone.  I have also asked her to increase her iron supplementation to twice daily.  If her stomach tolerates.        She will continue with Cardiac Rehab and I would like to see her back in about 3 months.  We will continue to follow her interrogations for evidence of persistent atrial fibrillation and I will have her follow up with EP in regards to continuation of amiodarone.  I also refilled all of her other medications for her today to her [facility] mail order.        She is instructed to contact me if she has any worsening fatigue, shortness of breath or lightheadedness.  I also discussed placing her on Coumadin and increased bleeding risks with this.  She is to monitor that.  If she has any symptoms of the above, we will need to recheck her blood count as it continues to be low.  She will need close followup in this regard.        I will plan to see her back in 3 months unless she contacts me earlier than that.  Please feel free to  contact me with any questions you have in regards to her care.      cc:   Kelli Lewis, [facility]   [address] [facility]   [place] [address]         CHERI KAPOOR DO             D: 06/10/2020   T: 06/10/2020   MT: RICH      Name:     KASSANDRA RIVERA   MRN:      [id number]        Account:      [id number]   :      1945           Service Date: 06/10/2020      Document: [id number]

## 2020-06-10 NOTE — TELEPHONE ENCOUNTER
Pt returned call.  But looks like Nadia also spoke with pt in regards to the same topic.  Pt advised we would call after her INR on 6/12.  She informed me that she has 2 other appts after that, one to include rehab, so to have the nurse call later in the afternoon.

## 2020-06-10 NOTE — PATIENT INSTRUCTIONS
STOP ASPIRIN   START COUMADIN, COUMADIN CLINIC REFERRAL  DECREASE METOPROLOL 25MG DAILY  START AMIODARONE-LOADING DOSE IS 200MG BID FOR 14 DAYS, THEN DECREASE  MG ONCE DAILY

## 2020-06-11 LAB
MDC_IDC_EPISODE_DTM: NORMAL
MDC_IDC_EPISODE_DURATION: 712 S
MDC_IDC_EPISODE_DURATION: NORMAL S
MDC_IDC_EPISODE_DURATION: NORMAL S
MDC_IDC_EPISODE_ID: 16
MDC_IDC_EPISODE_ID: 17
MDC_IDC_EPISODE_ID: 18
MDC_IDC_EPISODE_TYPE: NORMAL
MDC_IDC_LEAD_IMPLANT_DT: NORMAL
MDC_IDC_LEAD_IMPLANT_DT: NORMAL
MDC_IDC_LEAD_LOCATION: NORMAL
MDC_IDC_LEAD_LOCATION: NORMAL
MDC_IDC_LEAD_LOCATION_DETAIL_1: NORMAL
MDC_IDC_LEAD_LOCATION_DETAIL_1: NORMAL
MDC_IDC_LEAD_MFG: NORMAL
MDC_IDC_LEAD_MFG: NORMAL
MDC_IDC_LEAD_MODEL: NORMAL
MDC_IDC_LEAD_MODEL: NORMAL
MDC_IDC_LEAD_POLARITY_TYPE: NORMAL
MDC_IDC_LEAD_POLARITY_TYPE: NORMAL
MDC_IDC_LEAD_SERIAL: NORMAL
MDC_IDC_LEAD_SERIAL: NORMAL
MDC_IDC_MSMT_BATTERY_DTM: NORMAL
MDC_IDC_MSMT_BATTERY_REMAINING_LONGEVITY: 64 MO
MDC_IDC_MSMT_BATTERY_RRT_TRIGGER: 2.83
MDC_IDC_MSMT_BATTERY_STATUS: NORMAL
MDC_IDC_MSMT_BATTERY_VOLTAGE: 3.01 V
MDC_IDC_MSMT_LEADCHNL_RA_IMPEDANCE_VALUE: 323 OHM
MDC_IDC_MSMT_LEADCHNL_RA_IMPEDANCE_VALUE: 418 OHM
MDC_IDC_MSMT_LEADCHNL_RA_PACING_THRESHOLD_AMPLITUDE: 0.62 V
MDC_IDC_MSMT_LEADCHNL_RA_PACING_THRESHOLD_PULSEWIDTH: 0.4 MS
MDC_IDC_MSMT_LEADCHNL_RA_SENSING_INTR_AMPL: 2.38 MV
MDC_IDC_MSMT_LEADCHNL_RA_SENSING_INTR_AMPL: 2.38 MV
MDC_IDC_MSMT_LEADCHNL_RV_IMPEDANCE_VALUE: 399 OHM
MDC_IDC_MSMT_LEADCHNL_RV_IMPEDANCE_VALUE: 418 OHM
MDC_IDC_MSMT_LEADCHNL_RV_PACING_THRESHOLD_AMPLITUDE: 0.5 V
MDC_IDC_MSMT_LEADCHNL_RV_PACING_THRESHOLD_PULSEWIDTH: 0.4 MS
MDC_IDC_MSMT_LEADCHNL_RV_SENSING_INTR_AMPL: 10.12 MV
MDC_IDC_MSMT_LEADCHNL_RV_SENSING_INTR_AMPL: 10.12 MV
MDC_IDC_PG_IMPLANT_DTM: NORMAL
MDC_IDC_PG_MFG: NORMAL
MDC_IDC_PG_MODEL: NORMAL
MDC_IDC_PG_SERIAL: NORMAL
MDC_IDC_PG_TYPE: NORMAL
MDC_IDC_SESS_CLINIC_NAME: NORMAL
MDC_IDC_SESS_DTM: NORMAL
MDC_IDC_SESS_TYPE: NORMAL
MDC_IDC_SET_BRADY_AT_MODE_SWITCH_RATE: 171 {BEATS}/MIN
MDC_IDC_SET_BRADY_HYSTRATE: NORMAL
MDC_IDC_SET_BRADY_LOWRATE: 60 {BEATS}/MIN
MDC_IDC_SET_BRADY_MAX_SENSOR_RATE: 130 {BEATS}/MIN
MDC_IDC_SET_BRADY_MAX_TRACKING_RATE: 130 {BEATS}/MIN
MDC_IDC_SET_BRADY_MODE: NORMAL
MDC_IDC_SET_BRADY_PAV_DELAY_LOW: 250 MS
MDC_IDC_SET_BRADY_SAV_DELAY_LOW: 250 MS
MDC_IDC_SET_LEADCHNL_RA_PACING_AMPLITUDE: 1.5 V
MDC_IDC_SET_LEADCHNL_RA_PACING_ANODE_ELECTRODE_1: NORMAL
MDC_IDC_SET_LEADCHNL_RA_PACING_ANODE_LOCATION_1: NORMAL
MDC_IDC_SET_LEADCHNL_RA_PACING_CAPTURE_MODE: NORMAL
MDC_IDC_SET_LEADCHNL_RA_PACING_CATHODE_ELECTRODE_1: NORMAL
MDC_IDC_SET_LEADCHNL_RA_PACING_CATHODE_LOCATION_1: NORMAL
MDC_IDC_SET_LEADCHNL_RA_PACING_POLARITY: NORMAL
MDC_IDC_SET_LEADCHNL_RA_PACING_PULSEWIDTH: 0.4 MS
MDC_IDC_SET_LEADCHNL_RA_SENSING_ANODE_ELECTRODE_1: NORMAL
MDC_IDC_SET_LEADCHNL_RA_SENSING_ANODE_LOCATION_1: NORMAL
MDC_IDC_SET_LEADCHNL_RA_SENSING_CATHODE_ELECTRODE_1: NORMAL
MDC_IDC_SET_LEADCHNL_RA_SENSING_CATHODE_LOCATION_1: NORMAL
MDC_IDC_SET_LEADCHNL_RA_SENSING_POLARITY: NORMAL
MDC_IDC_SET_LEADCHNL_RA_SENSING_SENSITIVITY: 0.3 MV
MDC_IDC_SET_LEADCHNL_RV_PACING_AMPLITUDE: 2 V
MDC_IDC_SET_LEADCHNL_RV_PACING_ANODE_ELECTRODE_1: NORMAL
MDC_IDC_SET_LEADCHNL_RV_PACING_ANODE_LOCATION_1: NORMAL
MDC_IDC_SET_LEADCHNL_RV_PACING_CAPTURE_MODE: NORMAL
MDC_IDC_SET_LEADCHNL_RV_PACING_CATHODE_ELECTRODE_1: NORMAL
MDC_IDC_SET_LEADCHNL_RV_PACING_CATHODE_LOCATION_1: NORMAL
MDC_IDC_SET_LEADCHNL_RV_PACING_POLARITY: NORMAL
MDC_IDC_SET_LEADCHNL_RV_PACING_PULSEWIDTH: 0.4 MS
MDC_IDC_SET_LEADCHNL_RV_SENSING_ANODE_ELECTRODE_1: NORMAL
MDC_IDC_SET_LEADCHNL_RV_SENSING_ANODE_LOCATION_1: NORMAL
MDC_IDC_SET_LEADCHNL_RV_SENSING_CATHODE_ELECTRODE_1: NORMAL
MDC_IDC_SET_LEADCHNL_RV_SENSING_CATHODE_LOCATION_1: NORMAL
MDC_IDC_SET_LEADCHNL_RV_SENSING_POLARITY: NORMAL
MDC_IDC_SET_LEADCHNL_RV_SENSING_SENSITIVITY: 0.9 MV
MDC_IDC_SET_ZONE_DETECTION_INTERVAL: 350 MS
MDC_IDC_SET_ZONE_DETECTION_INTERVAL: 400 MS
MDC_IDC_SET_ZONE_TYPE: NORMAL
MDC_IDC_STAT_AT_BURDEN_PERCENT: 80.4 %
MDC_IDC_STAT_AT_DTM_END: NORMAL
MDC_IDC_STAT_AT_DTM_START: NORMAL
MDC_IDC_STAT_BRADY_AP_VP_PERCENT: 0.25 %
MDC_IDC_STAT_BRADY_AP_VS_PERCENT: 0.1 %
MDC_IDC_STAT_BRADY_AS_VP_PERCENT: 80.79 %
MDC_IDC_STAT_BRADY_AS_VS_PERCENT: 18.85 %
MDC_IDC_STAT_BRADY_DTM_END: NORMAL
MDC_IDC_STAT_BRADY_DTM_START: NORMAL
MDC_IDC_STAT_BRADY_RA_PERCENT_PACED: 0.35 %
MDC_IDC_STAT_BRADY_RV_PERCENT_PACED: 80.64 %
MDC_IDC_STAT_EPISODE_RECENT_COUNT: 0
MDC_IDC_STAT_EPISODE_RECENT_COUNT: 3
MDC_IDC_STAT_EPISODE_RECENT_COUNT_DTM_END: NORMAL
MDC_IDC_STAT_EPISODE_RECENT_COUNT_DTM_START: NORMAL
MDC_IDC_STAT_EPISODE_TOTAL_COUNT: 0
MDC_IDC_STAT_EPISODE_TOTAL_COUNT: 3
MDC_IDC_STAT_EPISODE_TOTAL_COUNT: 4
MDC_IDC_STAT_EPISODE_TOTAL_COUNT: 8
MDC_IDC_STAT_EPISODE_TOTAL_COUNT_DTM_END: NORMAL
MDC_IDC_STAT_EPISODE_TOTAL_COUNT_DTM_START: NORMAL
MDC_IDC_STAT_EPISODE_TYPE: NORMAL

## 2020-06-12 ENCOUNTER — ANTICOAGULATION THERAPY VISIT (OUTPATIENT)
Dept: NURSING | Facility: CLINIC | Age: 75
End: 2020-06-12

## 2020-06-12 ENCOUNTER — HOSPITAL ENCOUNTER (OUTPATIENT)
Dept: CARDIAC REHAB | Facility: CLINIC | Age: 75
End: 2020-06-12
Attending: SURGERY
Payer: MEDICARE

## 2020-06-12 DIAGNOSIS — I48.0 PAROXYSMAL ATRIAL FIBRILLATION (H): ICD-10-CM

## 2020-06-12 DIAGNOSIS — Z79.01 LONG TERM CURRENT USE OF ANTICOAGULANTS WITH INR GOAL OF 2.0-3.0: ICD-10-CM

## 2020-06-12 LAB
CAPILLARY BLOOD COLLECTION: NORMAL
INR PPP: 1 (ref 0.86–1.14)

## 2020-06-12 PROCEDURE — 93798 PHYS/QHP OP CAR RHAB W/ECG: CPT

## 2020-06-12 PROCEDURE — 36416 COLLJ CAPILLARY BLOOD SPEC: CPT | Performed by: NURSE PRACTITIONER

## 2020-06-12 PROCEDURE — 40000116 ZZH STATISTIC OP CR VISIT

## 2020-06-12 PROCEDURE — 93797 PHYS/QHP OP CAR RHAB WO ECG: CPT

## 2020-06-12 PROCEDURE — 85610 PROTHROMBIN TIME: CPT | Performed by: NURSE PRACTITIONER

## 2020-06-12 PROCEDURE — 99207 ZZC NO CHARGE NURSE ONLY: CPT

## 2020-06-12 PROCEDURE — 40000575 ZZH STATISTIC OP CARDIAC VISIT #2

## 2020-06-12 NOTE — PROGRESS NOTES
ANTICOAGULATION FOLLOW-UP CLINIC VISIT    Patient Name:  Amy Luna  Date:  2020  Contact Type:  Telephone    SUBJECTIVE:  Patient Findings     Comments:   The patient was assessed for diet, medication, and activity level changes, missed or extra doses, bruising or bleeding, with no problem findings.          Clinical Outcomes     Negatives:   Major bleeding event, Thromboembolic event, Anticoagulation-related hospital admission, Anticoagulation-related ED visit, Anticoagulation-related fatality    Comments:   The patient was assessed for diet, medication, and activity level changes, missed or extra doses, bruising or bleeding, with no problem findings.             OBJECTIVE    Recent labs: (last 7 days)     20  1125   INR 1.00       ASSESSMENT / PLAN  No question data found.  Anticoagulation Summary  As of 2020    INR goal:   2.0-3.0   TTR:   --   INR used for dosin.00! (2020)   Warfarin maintenance plan:   5 mg (5 mg x 1) every day   Full warfarin instructions:   : 5 mg; : 5 mg; : 5 mg; 6/15: 5 mg; Otherwise 5 mg every day   Weekly warfarin total:   35 mg   Plan last modified:   Leatha Alexander, RN (2020)   Next INR check:   2020   Target end date:   Indefinite    Indications    Paroxysmal atrial fibrillation (H) [I48.0]  Long term current use of anticoagulants with INR goal of 2.0-3.0 [Z79.01]             Anticoagulation Episode Summary     INR check location:       Preferred lab:       Send INR reminders to:   [facility]    Comments:         Anticoagulation Care Providers     Provider Role Specialty Phone number    Yamilka Christina DO Referring Cardiology [phone]    Kelli Lewis CNP Referring Nurse Practitioner [phone]            See the Encounter Report to view Anticoagulation Flowsheet and Dosing Calendar (Go to Encounters tab in chart review, and find the Anticoagulation Therapy Visit)        Leatha Alexander RN

## 2020-06-15 ENCOUNTER — HOSPITAL ENCOUNTER (OUTPATIENT)
Dept: CARDIAC REHAB | Facility: CLINIC | Age: 75
End: 2020-06-15
Attending: SURGERY
Payer: MEDICARE

## 2020-06-15 PROCEDURE — 93798 PHYS/QHP OP CAR RHAB W/ECG: CPT | Performed by: REHABILITATION PRACTITIONER

## 2020-06-15 PROCEDURE — 40000116 ZZH STATISTIC OP CR VISIT: Performed by: REHABILITATION PRACTITIONER

## 2020-06-16 ENCOUNTER — ANTICOAGULATION THERAPY VISIT (OUTPATIENT)
Dept: ANTICOAGULATION | Facility: CLINIC | Age: 75
End: 2020-06-16

## 2020-06-16 DIAGNOSIS — I48.0 PAROXYSMAL ATRIAL FIBRILLATION (H): ICD-10-CM

## 2020-06-16 DIAGNOSIS — Z79.01 LONG TERM CURRENT USE OF ANTICOAGULANTS WITH INR GOAL OF 2.0-3.0: ICD-10-CM

## 2020-06-16 LAB
CAPILLARY BLOOD COLLECTION: NORMAL
INR PPP: 1.2 (ref 0.86–1.14)

## 2020-06-16 PROCEDURE — 36416 COLLJ CAPILLARY BLOOD SPEC: CPT | Performed by: NURSE PRACTITIONER

## 2020-06-16 PROCEDURE — 99207 ZZC NO CHARGE NURSE ONLY: CPT | Performed by: NURSE PRACTITIONER

## 2020-06-16 PROCEDURE — 85610 PROTHROMBIN TIME: CPT | Performed by: NURSE PRACTITIONER

## 2020-06-16 NOTE — PROGRESS NOTES
ANTICOAGULATION FOLLOW-UP CLINIC VISIT    Patient Name:  Amy Luna  Date:  2020  Contact Type:  Telephone/ discussed with patient    SUBJECTIVE:  Patient Findings     Comments:   The patient was assessed for diet, medication, and activity level changes, missed or extra doses, bruising or bleeding, with no problem findings.  Increased maintenance dose 20% per protocol and gave boost dose today.        Clinical Outcomes     Negatives:   Major bleeding event, Thromboembolic event, Anticoagulation-related hospital admission, Anticoagulation-related ED visit, Anticoagulation-related fatality    Comments:   The patient was assessed for diet, medication, and activity level changes, missed or extra doses, bruising or bleeding, with no problem findings.  Increased maintenance dose 20% per protocol and gave boost dose today.           OBJECTIVE    Recent labs: (last 7 days)     20  1019   INR 1.20*       ASSESSMENT / PLAN  INR assessment SUB    Recheck INR In: 3 DAYS    INR Location Clinic      Anticoagulation Summary  As of 2020    INR goal:   2.0-3.0   TTR:   --   INR used for dosin.20! (2020)   Warfarin maintenance plan:   7.5 mg (5 mg x 1.5) every Sun, Wed, Fri; 5 mg (5 mg x 1) all other days   Full warfarin instructions:   : 10 mg; Otherwise 7.5 mg every Sun, Wed, Fri; 5 mg all other days   Weekly warfarin total:   42.5 mg   Plan last modified:   Nadia Cabello RN (2020)   Next INR check:   2020   Target end date:   Indefinite    Indications    Paroxysmal atrial fibrillation (H) [I48.0]  Long term current use of anticoagulants with INR goal of 2.0-3.0 [Z79.01]             Anticoagulation Episode Summary     INR check location:       Preferred lab:       Send INR reminders to:   [facility]    Comments:         Anticoagulation Care Providers     Provider Role Specialty Phone number    Yamilka Christina DO Referring Cardiology [phone]    Joshua  RAMESH Pereira Referring Nurse Practitioner [phone]            See the Encounter Report to view Anticoagulation Flowsheet and Dosing Calendar (Go to Encounters tab in chart review, and find the Anticoagulation Therapy Visit)    Dosage adjustment made based on physician directed care plan.    Nadia Cabello RN

## 2020-06-17 ENCOUNTER — HOSPITAL ENCOUNTER (OUTPATIENT)
Dept: CARDIAC REHAB | Facility: CLINIC | Age: 75
End: 2020-06-17
Attending: SURGERY
Payer: MEDICARE

## 2020-06-17 PROCEDURE — 93798 PHYS/QHP OP CAR RHAB W/ECG: CPT | Performed by: REHABILITATION PRACTITIONER

## 2020-06-17 PROCEDURE — 40000116 ZZH STATISTIC OP CR VISIT: Performed by: REHABILITATION PRACTITIONER

## 2020-06-19 ENCOUNTER — ANTICOAGULATION THERAPY VISIT (OUTPATIENT)
Dept: NURSING | Facility: CLINIC | Age: 75
End: 2020-06-19

## 2020-06-19 DIAGNOSIS — Z79.01 LONG TERM CURRENT USE OF ANTICOAGULANTS WITH INR GOAL OF 2.0-3.0: ICD-10-CM

## 2020-06-19 DIAGNOSIS — I48.0 PAROXYSMAL ATRIAL FIBRILLATION (H): ICD-10-CM

## 2020-06-19 LAB
CAPILLARY BLOOD COLLECTION: NORMAL
INR PPP: 1.9 (ref 0.86–1.14)

## 2020-06-19 PROCEDURE — 85610 PROTHROMBIN TIME: CPT | Performed by: NURSE PRACTITIONER

## 2020-06-19 PROCEDURE — 99207 ZZC NO CHARGE NURSE ONLY: CPT

## 2020-06-19 PROCEDURE — 36416 COLLJ CAPILLARY BLOOD SPEC: CPT | Performed by: NURSE PRACTITIONER

## 2020-06-19 NOTE — PROGRESS NOTES
ANTICOAGULATION FOLLOW-UP CLINIC VISIT    Patient Name:  Amy Luna  Date:  6/19/2020  Contact Type:  Telephone    SUBJECTIVE:  Patient Findings     Comments:   Per protocol on day 9-10, for INR of 1.9, recommendations are to increase dose 1-10%. With starting amiodarone along with warfarin, it could increase INR at this point. Plus a boost was given along with maintenance dose increase at last INR check and her total warfarin dose at next check will already be increased. No increase in maintenance dose will occur at this time today. Discussed with Candy Tee, and she is in agreement with the plan.  Called patient to discuss today's INR results: The patient was assessed for diet, medication, and activity level changes, missed or extra doses, bruising or bleeding, with no problem findings. Reviewed maintenance warfarin dosing with patient. Patient will remain on the same dose until next INR check. No other questions or concerns. Scheduled next lab-only INR  Yuliya RAMSEY RN  Anticoagulation Clinic  [place]          Clinical Outcomes     Comments:   Per protocol on day 9-10, for INR of 1.9, recommendations are to increase dose 1-10%. With starting amiodarone along with warfarin, it could increase INR at this point. Plus a boost was given along with maintenance dose increase at last INR check and her total warfarin dose at next check will already be increased. No increase in maintenance dose will occur at this time today. Discussed with Candy Tee, and she is in agreement with the plan.  Called patient to discuss today's INR results: The patient was assessed for diet, medication, and activity level changes, missed or extra doses, bruising or bleeding, with no problem findings. Reviewed maintenance warfarin dosing with patient. Patient will remain on the same dose until next INR check. No other questions or concerns. Scheduled next lab-only INR  Yuliya RAMSEY RN  Anticoagulation  Clinic  [place]             OBJECTIVE    Recent labs: (last 7 days)     20  1032   INR 1.90*       ASSESSMENT / PLAN  INR assessment SUB    Recheck INR In: 3 DAYS    INR Location Clinic      Anticoagulation Summary  As of 2020    INR goal:   2.0-3.0   TTR:   --   INR used for dosin.90! (2020)   Warfarin maintenance plan:   7.5 mg (5 mg x 1.5) every Sun, Wed, Fri; 5 mg (5 mg x 1) all other days   Full warfarin instructions:   7.5 mg every Sun, Wed, Fri; 5 mg all other days   Weekly warfarin total:   42.5 mg   No change documented:   Yuliya Rawls RN   Plan last modified:   Nadia Cabello RN (2020)   Next INR check:   2020   Priority:   High   Target end date:   Indefinite    Indications    Paroxysmal atrial fibrillation (H) [I48.0]  Long term current use of anticoagulants with INR goal of 2.0-3.0 [Z79.01]             Anticoagulation Episode Summary     INR check location:       Preferred lab:       Send INR reminders to:   [facility]    Comments:         Anticoagulation Care Providers     Provider Role Specialty Phone number    Yamilka Christina DO Referring Cardiology [phone]    Kelli Lewis CNP Referring Nurse Practitioner [phone]            See the Encounter Report to view Anticoagulation Flowsheet and Dosing Calendar (Go to Encounters tab in chart review, and find the Anticoagulation Therapy Visit)    Yuliya Rawls, RN

## 2020-06-22 ENCOUNTER — HOSPITAL ENCOUNTER (OUTPATIENT)
Dept: CARDIAC REHAB | Facility: CLINIC | Age: 75
End: 2020-06-22
Attending: SURGERY
Payer: MEDICARE

## 2020-06-22 ENCOUNTER — ANTICOAGULATION THERAPY VISIT (OUTPATIENT)
Dept: ANTICOAGULATION | Facility: CLINIC | Age: 75
End: 2020-06-22

## 2020-06-22 DIAGNOSIS — I48.0 PAROXYSMAL ATRIAL FIBRILLATION (H): ICD-10-CM

## 2020-06-22 DIAGNOSIS — Z79.01 LONG TERM CURRENT USE OF ANTICOAGULANTS WITH INR GOAL OF 2.0-3.0: ICD-10-CM

## 2020-06-22 DIAGNOSIS — D62 ACUTE BLOOD LOSS ANEMIA: ICD-10-CM

## 2020-06-22 DIAGNOSIS — I10 ESSENTIAL HYPERTENSION: ICD-10-CM

## 2020-06-22 DIAGNOSIS — N17.9 AKI (ACUTE KIDNEY INJURY) (H): ICD-10-CM

## 2020-06-22 LAB
HGB BLD-MCNC: 9.7 G/DL (ref 11.7–15.7)
INR PPP: 1.5 (ref 0.86–1.14)

## 2020-06-22 PROCEDURE — 85610 PROTHROMBIN TIME: CPT | Performed by: NURSE PRACTITIONER

## 2020-06-22 PROCEDURE — 85018 HEMOGLOBIN: CPT | Performed by: NURSE PRACTITIONER

## 2020-06-22 PROCEDURE — 93798 PHYS/QHP OP CAR RHAB W/ECG: CPT | Performed by: REHABILITATION PRACTITIONER

## 2020-06-22 PROCEDURE — 80048 BASIC METABOLIC PNL TOTAL CA: CPT | Performed by: NURSE PRACTITIONER

## 2020-06-22 PROCEDURE — 40000116 ZZH STATISTIC OP CR VISIT: Performed by: REHABILITATION PRACTITIONER

## 2020-06-22 PROCEDURE — 36415 COLL VENOUS BLD VENIPUNCTURE: CPT | Performed by: NURSE PRACTITIONER

## 2020-06-22 PROCEDURE — 99207 ZZC NO CHARGE NURSE ONLY: CPT | Performed by: FAMILY MEDICINE

## 2020-06-22 NOTE — PROGRESS NOTES
ANTICOAGULATION FOLLOW-UP CLINIC VISIT    Patient Name:  Amy Luna  Date:  2020  Contact Type:  Telephone/ Called patient, she denies any changes or missed warfarin doses. Orders discussed with patient.     SUBJECTIVE:  Patient Findings     Positives:   Change in medications (Initiation of warfarin therapy)    Comments:   The patient was assessed for diet, medication, and activity level changes, missed or extra doses, bruising or bleeding, with no problem findings.  Spoke with Bernice Siddiqui, Pharmacist, will increase maintenance dose by 11.8 %,         Clinical Outcomes     Comments:   The patient was assessed for diet, medication, and activity level changes, missed or extra doses, bruising or bleeding, with no problem findings.  Spoke with Bernice Siddiqui, Pharmacist, will increase maintenance dose by 11.8 %,            OBJECTIVE    Recent labs: (last 7 days)     20  1415   INR 1.50*       ASSESSMENT / PLAN  INR assessment SUB    Recheck INR In: 3 DAYS    INR Location Outside lab      Anticoagulation Summary  As of 2020    INR goal:   2.0-3.0   TTR:   0.0 % (2 d)   INR used for dosin.50! (2020)   Warfarin maintenance plan:   5 mg (5 mg x 1) every Tue, Sat; 7.5 mg (5 mg x 1.5) all other days   Full warfarin instructions:   5 mg every Tue, Sat; 7.5 mg all other days   Weekly warfarin total:   47.5 mg   Plan last modified:   Chiqui Nowak, RN (2020)   Next INR check:   2020   Priority:   High   Target end date:   Indefinite    Indications    Paroxysmal atrial fibrillation (H) [I48.0]  Long term current use of anticoagulants with INR goal of 2.0-3.0 [Z79.01]             Anticoagulation Episode Summary     INR check location:       Preferred lab:       Send INR reminders to:   [facility]    Comments:         Anticoagulation Care Providers     Provider Role Specialty Phone number    Yamilka Christina DO Referring Cardiology [phone]    Kelli Lewis  CNP Referring Nurse Practitioner [phone]            See the Encounter Report to view Anticoagulation Flowsheet and Dosing Calendar (Go to Encounters tab in chart review, and find the Anticoagulation Therapy Visit)    Dosage adjustment made based on physician directed care plan.    Chiqui Nowak RN

## 2020-06-23 LAB
ANION GAP SERPL CALCULATED.3IONS-SCNC: 6 MMOL/L (ref 3–14)
BUN SERPL-MCNC: 24 MG/DL (ref 7–30)
CALCIUM SERPL-MCNC: 9.7 MG/DL (ref 8.5–10.1)
CHLORIDE SERPL-SCNC: 104 MMOL/L (ref 94–109)
CO2 SERPL-SCNC: 29 MMOL/L (ref 20–32)
CREAT SERPL-MCNC: 1.38 MG/DL (ref 0.52–1.04)
GFR SERPL CREATININE-BSD FRML MDRD: 38 ML/MIN/{1.73_M2}
GLUCOSE SERPL-MCNC: 177 MG/DL (ref 70–99)
POTASSIUM SERPL-SCNC: 5.2 MMOL/L (ref 3.4–5.3)
SODIUM SERPL-SCNC: 139 MMOL/L (ref 133–144)

## 2020-06-24 ENCOUNTER — HOSPITAL ENCOUNTER (OUTPATIENT)
Dept: CARDIAC REHAB | Facility: CLINIC | Age: 75
End: 2020-06-24
Attending: SURGERY
Payer: MEDICARE

## 2020-06-24 PROCEDURE — 93798 PHYS/QHP OP CAR RHAB W/ECG: CPT | Performed by: REHABILITATION PRACTITIONER

## 2020-06-24 PROCEDURE — 40000116 ZZH STATISTIC OP CR VISIT: Performed by: REHABILITATION PRACTITIONER

## 2020-06-25 ENCOUNTER — ANTICOAGULATION THERAPY VISIT (OUTPATIENT)
Dept: ANTICOAGULATION | Facility: CLINIC | Age: 75
End: 2020-06-25

## 2020-06-25 DIAGNOSIS — Z79.01 LONG TERM CURRENT USE OF ANTICOAGULANTS WITH INR GOAL OF 2.0-3.0: ICD-10-CM

## 2020-06-25 DIAGNOSIS — I48.0 PAROXYSMAL ATRIAL FIBRILLATION (H): ICD-10-CM

## 2020-06-25 LAB
CAPILLARY BLOOD COLLECTION: NORMAL
INR PPP: 1.6 (ref 0.86–1.14)

## 2020-06-25 PROCEDURE — 99207 ZZC NO CHARGE NURSE ONLY: CPT | Performed by: NURSE PRACTITIONER

## 2020-06-25 PROCEDURE — 85610 PROTHROMBIN TIME: CPT | Performed by: NURSE PRACTITIONER

## 2020-06-25 PROCEDURE — 36416 COLLJ CAPILLARY BLOOD SPEC: CPT | Performed by: NURSE PRACTITIONER

## 2020-06-25 NOTE — PROGRESS NOTES
ANTICOAGULATION FOLLOW-UP CLINIC VISIT    Patient Name:  Amy Luna  Date:  2020  Contact Type:  Telephone/ Called patient, she denies any changes or missed warfarin doses. Orders discussed with patient.     SUBJECTIVE:  Patient Findings     Comments:   The patient was assessed for diet, medication, and activity level changes, missed or extra doses, bruising or bleeding, with no problem findings.  Dosing instruction discussed with Mary Siddiqui, Pharmacist.         Clinical Outcomes     Negatives:   Major bleeding event, Thromboembolic event, Anticoagulation-related hospital admission, Anticoagulation-related ED visit, Anticoagulation-related fatality    Comments:   The patient was assessed for diet, medication, and activity level changes, missed or extra doses, bruising or bleeding, with no problem findings.  Dosing instruction discussed with Mary Siddiqui, Pharmacist.            OBJECTIVE    Recent labs: (last 7 days)     20  1104   INR 1.60*       ASSESSMENT / PLAN  INR assessment SUB    Recheck INR In: 4 DAYS    INR Location Outside lab      Anticoagulation Summary  As of 2020    INR goal:   2.0-3.0   TTR:   0.0 % (5 d)   INR used for dosin.60! (2020)   Warfarin maintenance plan:   7.5 mg (5 mg x 1.5) every day   Full warfarin instructions:   7.5 mg every day   Weekly warfarin total:   52.5 mg   Plan last modified:   Chiqui Nowak RN (2020)   Next INR check:   2020   Priority:   High   Target end date:   Indefinite    Indications    Paroxysmal atrial fibrillation (H) [I48.0]  Long term current use of anticoagulants with INR goal of 2.0-3.0 [Z79.01]             Anticoagulation Episode Summary     INR check location:       Preferred lab:       [facility]    Comments:   started amiodarone 6/10/20      Anticoagulation Care Providers     Provider Role Specialty Phone number    Yamilka Christina DO Referring Cardiology  [phone]    Kelli Lewis, CNP Referring Nurse Practitioner [phone]            See the Encounter Report to view Anticoagulation Flowsheet and Dosing Calendar (Go to Encounters tab in chart review, and find the Anticoagulation Therapy Visit)    Dosage adjustment made based on physician directed care plan.    Chiqui Nowak RN

## 2020-06-29 ENCOUNTER — ANTICOAGULATION THERAPY VISIT (OUTPATIENT)
Dept: ANTICOAGULATION | Facility: CLINIC | Age: 75
End: 2020-06-29

## 2020-06-29 DIAGNOSIS — I48.0 PAROXYSMAL ATRIAL FIBRILLATION (H): ICD-10-CM

## 2020-06-29 DIAGNOSIS — Z79.01 LONG TERM CURRENT USE OF ANTICOAGULANTS WITH INR GOAL OF 2.0-3.0: ICD-10-CM

## 2020-06-29 LAB
CAPILLARY BLOOD COLLECTION: NORMAL
INR PPP: 1.5 (ref 0.86–1.14)

## 2020-06-29 PROCEDURE — 36416 COLLJ CAPILLARY BLOOD SPEC: CPT | Performed by: NURSE PRACTITIONER

## 2020-06-29 PROCEDURE — 85610 PROTHROMBIN TIME: CPT | Performed by: NURSE PRACTITIONER

## 2020-06-29 PROCEDURE — 99207 ZZC NO CHARGE NURSE ONLY: CPT | Performed by: INTERNAL MEDICINE

## 2020-06-29 NOTE — PROGRESS NOTES
Anticoagulation Management    Unable to reach patient today.    Today's INR result of 1.5 is subtherapeutic (goal INR of 2.0-3.0).  Result received from: Clinic Lab    Follow up required to confirm warfarin dose taken and assess for changes    Left message for patient to take 12.5 mg of warfarin tonight.       Anticoagulation clinic to follow up    Chiqui Nowak RN

## 2020-06-29 NOTE — PROGRESS NOTES
Left message to call [phone]. Please transfer call to Chiqui at [phone].  Chiqui Nowak RN

## 2020-06-30 NOTE — PROGRESS NOTES
ANTICOAGULATION FOLLOW-UP CLINIC VISIT    Patient Name:  Amy Luna  Date:  2020  Contact Type:  Telephone/ Called patient, she reports her phone was off yesterday. Patient denies any changes or missed warfarin doses. Orders discussed with patient.     SUBJECTIVE:  Patient Findings     Positives:   Change in medications (initiation of therapy)    Comments:   The patient was assessed for diet, medication, and activity level changes, missed or extra doses, bruising or bleeding, with no problem findings.          Clinical Outcomes     Comments:   The patient was assessed for diet, medication, and activity level changes, missed or extra doses, bruising or bleeding, with no problem findings.             OBJECTIVE    Recent labs: (last 7 days)     20  1408   INR 1.50*       ASSESSMENT / PLAN  INR assessment SUB    Recheck INR In: 3 DAYS    INR Location Outside lab      Anticoagulation Summary  As of 2020    INR goal:   2.0-3.0   TTR:   0.0 % (1.3 wk)   INR used for dosin.50! (2020)   Warfarin maintenance plan:   10 mg (5 mg x 2) every Mon, Wed, Fri; 7.5 mg (5 mg x 1.5) all other days   Full warfarin instructions:   : 12.5 mg; Otherwise 10 mg every Mon, Wed, Fri; 7.5 mg all other days   Weekly warfarin total:   60 mg   Plan last modified:   Chiqui Nowak RN (2020)   Next INR check:   2020   Priority:   High   Target end date:   Indefinite    Indications    Paroxysmal atrial fibrillation (H) [I48.0]  Long term current use of anticoagulants with INR goal of 2.0-3.0 [Z79.01]             Anticoagulation Episode Summary     INR check location:       Preferred lab:       Send INR reminders to:   [facility]    Comments:   started amiodarone 6/10/20      Anticoagulation Care Providers     Provider Role Specialty Phone number    Yamilka Christina DO Referring Cardiology [phone]    Kelli Lewis CNP Referring Nurse Practitioner [phone]            See  the Encounter Report to view Anticoagulation Flowsheet and Dosing Calendar (Go to Encounters tab in chart review, and find the Anticoagulation Therapy Visit)    Dosage adjustment made based on physician directed care plan.    Chiqui Nowak RN

## 2020-07-01 ENCOUNTER — HOSPITAL ENCOUNTER (OUTPATIENT)
Dept: CARDIAC REHAB | Facility: CLINIC | Age: 75
End: 2020-07-01
Attending: SURGERY
Payer: MEDICARE

## 2020-07-01 PROCEDURE — 93798 PHYS/QHP OP CAR RHAB W/ECG: CPT

## 2020-07-01 PROCEDURE — 40000116 ZZH STATISTIC OP CR VISIT

## 2020-07-02 ENCOUNTER — ANTICOAGULATION THERAPY VISIT (OUTPATIENT)
Dept: ANTICOAGULATION | Facility: CLINIC | Age: 75
End: 2020-07-02

## 2020-07-02 DIAGNOSIS — I48.0 PAROXYSMAL ATRIAL FIBRILLATION (H): ICD-10-CM

## 2020-07-02 DIAGNOSIS — Z79.01 LONG TERM CURRENT USE OF ANTICOAGULANTS WITH INR GOAL OF 2.0-3.0: ICD-10-CM

## 2020-07-02 LAB
CAPILLARY BLOOD COLLECTION: NORMAL
INR PPP: 2.4 (ref 0.86–1.14)

## 2020-07-02 PROCEDURE — 85610 PROTHROMBIN TIME: CPT | Performed by: NURSE PRACTITIONER

## 2020-07-02 PROCEDURE — 36416 COLLJ CAPILLARY BLOOD SPEC: CPT | Performed by: NURSE PRACTITIONER

## 2020-07-02 PROCEDURE — 99207 ZZC NO CHARGE NURSE ONLY: CPT | Performed by: INTERNAL MEDICINE

## 2020-07-02 NOTE — PROGRESS NOTES
ANTICOAGULATION FOLLOW-UP CLINIC VISIT    Patient Name:  Amy Luna  Date:  2020  Contact Type:  Telephone/ Called patient, she denies any changes. Orders discussed with patient.  Patient did report mixing up dosing on 2 days, calendar updated, this did not change weekly dose.     SUBJECTIVE:  Patient Findings     Comments:   The patient was assessed for diet, medication, and activity level changes, missed or extra doses, bruising or bleeding, with no problem findings.          Clinical Outcomes     Negatives:   Major bleeding event, Thromboembolic event, Anticoagulation-related hospital admission, Anticoagulation-related ED visit, Anticoagulation-related fatality    Comments:   The patient was assessed for diet, medication, and activity level changes, missed or extra doses, bruising or bleeding, with no problem findings.             OBJECTIVE    Recent labs: (last 7 days)     20  1024   INR 2.40*       ASSESSMENT / PLAN  INR assessment THER    Recheck INR In: 4 DAYS    INR Location Outside lab      Anticoagulation Summary  As of 2020    INR goal:   2.0-3.0   TTR:   10.2 % (1.7 wk)   INR used for dosin.40 (2020)   Warfarin maintenance plan:   10 mg (5 mg x 2) every Mon, Wed, Fri; 7.5 mg (5 mg x 1.5) all other days   Full warfarin instructions:   10 mg every Mon, Wed, Fri; 7.5 mg all other days   Weekly warfarin total:   60 mg   No change documented:   Chiqui Nowak RN   Plan last modified:   Chiqui Nowak RN (2020)   Next INR check:   2020   Priority:   High   Target end date:   Indefinite    Indications    Paroxysmal atrial fibrillation (H) [I48.0]  Long term current use of anticoagulants with INR goal of 2.0-3.0 [Z79.01]             Anticoagulation Episode Summary     INR check location:       Preferred lab:       Send INR reminders to:   [facility]    Comments:   started amiodarone 6/10/20      Anticoagulation Care Providers     Provider Role Specialty  Phone number    Yamilka Christina DO [place] Referring Cardiology [phone]    Kelli Lewis CNP Referring Nurse Practitioner [phone]            See the Encounter Report to view Anticoagulation Flowsheet and Dosing Calendar (Go to Encounters tab in chart review, and find the Anticoagulation Therapy Visit)    Dosage adjustment made based on physician directed care plan.    Chiqui Nowak RN

## 2020-07-06 ENCOUNTER — ANTICOAGULATION THERAPY VISIT (OUTPATIENT)
Dept: ANTICOAGULATION | Facility: CLINIC | Age: 75
End: 2020-07-06

## 2020-07-06 ENCOUNTER — HOSPITAL ENCOUNTER (OUTPATIENT)
Dept: CARDIAC REHAB | Facility: CLINIC | Age: 75
End: 2020-07-06
Attending: SURGERY
Payer: MEDICARE

## 2020-07-06 DIAGNOSIS — I48.0 PAROXYSMAL ATRIAL FIBRILLATION (H): ICD-10-CM

## 2020-07-06 DIAGNOSIS — Z79.01 LONG TERM CURRENT USE OF ANTICOAGULANTS WITH INR GOAL OF 2.0-3.0: ICD-10-CM

## 2020-07-06 PROCEDURE — 40000116 ZZH STATISTIC OP CR VISIT

## 2020-07-06 PROCEDURE — 93798 PHYS/QHP OP CAR RHAB W/ECG: CPT

## 2020-07-06 PROCEDURE — 99207 ZZC NO CHARGE NURSE ONLY: CPT | Performed by: INTERNAL MEDICINE

## 2020-07-06 NOTE — PROGRESS NOTES
ANTICOAGULATION FOLLOW-UP CLINIC VISIT    Patient Name:  Amy Luna  Date:  2020  Contact Type:  Telephone/ Called patient, she denies any changes. Orders discussed with patient.     SUBJECTIVE:  Patient Findings     Positives:   Extra doses (Patient had 2.5 mg warfarin boost on 20.)    Comments:   The patient was assessed for diet, medication, and activity level changes, missed doses, bruising or bleeding, with no problem findings.          Clinical Outcomes     Negatives:   Major bleeding event, Thromboembolic event, Anticoagulation-related hospital admission, Anticoagulation-related ED visit, Anticoagulation-related fatality    Comments:   The patient was assessed for diet, medication, and activity level changes, missed doses, bruising or bleeding, with no problem findings.             OBJECTIVE    Recent labs: (last 7 days)     20  1308   INR 2.80*       ASSESSMENT / PLAN  INR assessment THER    Recheck INR In: 3 DAYS    INR Location Outside lab      Anticoagulation Summary  As of 2020    INR goal:   2.0-3.0   TTR:   32.5 % (2.3 wk)   INR used for dosin.80 (2020)   Warfarin maintenance plan:   10 mg (5 mg x 2) every Mon, Wed, Fri; 7.5 mg (5 mg x 1.5) all other days   Full warfarin instructions:   10 mg every Mon, Wed, Fri; 7.5 mg all other days   Weekly warfarin total:   60 mg   No change documented:   Chiqui Nowak RN   Plan last modified:   Chiqui Nowak RN (2020)   Next INR check:   2020   Priority:   High   Target end date:   Indefinite    Indications    Paroxysmal atrial fibrillation (H) [I48.0]  Long term current use of anticoagulants with INR goal of 2.0-3.0 [Z79.01]             Anticoagulation Episode Summary     INR check location:       Preferred lab:       Send INR reminders to:   [facility]    Comments:   started amiodarone 6/10/20      Anticoagulation Care Providers     Provider Role Specialty Phone number    Yamilka Christina  DO Referring Cardiology [phone]    Kelli Lewis CNP Referring Nurse Practitioner [phone]            See the Encounter Report to view Anticoagulation Flowsheet and Dosing Calendar (Go to Encounters tab in chart review, and find the Anticoagulation Therapy Visit)    Dosage adjustment made based on physician directed care plan.    Chiqui Nowak RN

## 2020-07-07 ENCOUNTER — MYC MEDICAL ADVICE (OUTPATIENT)
Dept: CARDIOLOGY | Facility: CLINIC | Age: 75
End: 2020-07-07

## 2020-07-07 ENCOUNTER — ANCILLARY PROCEDURE (OUTPATIENT)
Dept: CARDIOLOGY | Facility: CLINIC | Age: 75
End: 2020-07-07
Attending: INTERNAL MEDICINE
Payer: MEDICARE

## 2020-07-07 DIAGNOSIS — Z95.0 CARDIAC PACEMAKER IN SITU: ICD-10-CM

## 2020-07-07 DIAGNOSIS — I44.30 AV BLOCK: ICD-10-CM

## 2020-07-07 DIAGNOSIS — I48.0 PAROXYSMAL ATRIAL FIBRILLATION (H): ICD-10-CM

## 2020-07-07 DIAGNOSIS — I44.30 AV BLOCK: Primary | ICD-10-CM

## 2020-07-07 PROCEDURE — 93288 INTERROG EVL PM/LDLS PM IP: CPT | Performed by: INTERNAL MEDICINE

## 2020-07-08 RX ORDER — WARFARIN SODIUM 5 MG/1
TABLET ORAL
Qty: 150 TABLET | Refills: 1 | Status: SHIPPED | OUTPATIENT
Start: 2020-07-08 | End: 2020-08-11

## 2020-07-08 NOTE — TELEPHONE ENCOUNTER
Warfarin 5 mg      Last Written Prescription Date:  6/10/20  Last Fill Quantity: 30,   # refills: 3  Last Office Visit: 5/5/20  Future Office visit:       Requested Prescriptions   Pending Prescriptions Disp Refills     warfarin ANTICOAGULANT (COUMADIN) 5 MG tablet 30 tablet 3     Sig: Take 1 tablet (5 mg) by mouth daily       There is no refill protocol information for this order        Prescription approved per [facility] Refill Protocol.  Chiqui Nowak RN

## 2020-07-09 ENCOUNTER — ANTICOAGULATION THERAPY VISIT (OUTPATIENT)
Dept: ANTICOAGULATION | Facility: CLINIC | Age: 75
End: 2020-07-09

## 2020-07-09 ENCOUNTER — HOSPITAL ENCOUNTER (OUTPATIENT)
Dept: CARDIAC REHAB | Facility: CLINIC | Age: 75
End: 2020-07-09
Attending: SURGERY
Payer: MEDICARE

## 2020-07-09 DIAGNOSIS — I48.0 PAROXYSMAL ATRIAL FIBRILLATION (H): ICD-10-CM

## 2020-07-09 DIAGNOSIS — Z79.01 LONG TERM CURRENT USE OF ANTICOAGULANTS WITH INR GOAL OF 2.0-3.0: ICD-10-CM

## 2020-07-09 LAB
CAPILLARY BLOOD COLLECTION: NORMAL
INR PPP: 2.2 (ref 0.86–1.14)

## 2020-07-09 PROCEDURE — 99207 ZZC NO CHARGE NURSE ONLY: CPT | Performed by: NURSE PRACTITIONER

## 2020-07-09 PROCEDURE — 40000116 ZZH STATISTIC OP CR VISIT

## 2020-07-09 PROCEDURE — 36416 COLLJ CAPILLARY BLOOD SPEC: CPT | Performed by: NURSE PRACTITIONER

## 2020-07-09 PROCEDURE — 93797 PHYS/QHP OP CAR RHAB WO ECG: CPT | Mod: 59

## 2020-07-09 PROCEDURE — 93798 PHYS/QHP OP CAR RHAB W/ECG: CPT

## 2020-07-09 PROCEDURE — 85610 PROTHROMBIN TIME: CPT | Performed by: NURSE PRACTITIONER

## 2020-07-09 PROCEDURE — 40000575 ZZH STATISTIC OP CARDIAC VISIT #2

## 2020-07-09 NOTE — PROGRESS NOTES
ANTICOAGULATION FOLLOW-UP CLINIC VISIT    Patient Name:  Amy Luna  Date:  2020  Contact Type:  Telephone/ Called patient, she denies any changes or missed warfarin doses. Orders discussed with patient, she agrees with plan, lab INR scheduled on 20.    SUBJECTIVE:  Patient Findings     Comments:   The patient was assessed for diet, medication, and activity level changes, missed or extra doses, bruising or bleeding, with no problem findings. Maintenance warfarin dosing was reviewed with patient and will remain on the same dose until next INR check. Patient did not have any questions or concerns.           Clinical Outcomes     Negatives:   Major bleeding event, Thromboembolic event, Anticoagulation-related hospital admission, Anticoagulation-related ED visit, Anticoagulation-related fatality    Comments:   The patient was assessed for diet, medication, and activity level changes, missed or extra doses, bruising or bleeding, with no problem findings. Maintenance warfarin dosing was reviewed with patient and will remain on the same dose until next INR check. Patient did not have any questions or concerns.              OBJECTIVE    Recent labs: (last 7 days)     20  1337   INR 2.20*       ASSESSMENT / PLAN  INR assessment THER    Recheck INR In: 1 WEEK    INR Location Outside lab      Anticoagulation Summary  As of 2020    INR goal:   2.0-3.0   TTR:   42.9 % (2.7 wk)   INR used for dosin.20 (2020)   Warfarin maintenance plan:   10 mg (5 mg x 2) every Mon, Wed, Fri; 7.5 mg (5 mg x 1.5) all other days   Full warfarin instructions:   10 mg every Mon, Wed, Fri; 7.5 mg all other days   Weekly warfarin total:   60 mg   No change documented:   Chiqui Nowak RN   Plan last modified:   Chiqui Nowak RN (2020)   Next INR check:   2020   Priority:   High   Target end date:   Indefinite    Indications    Paroxysmal atrial fibrillation (H) [I48.0]  Long term current use of  anticoagulants with INR goal of 2.0-3.0 [Z79.01]             Anticoagulation Episode Summary     INR check location:       Preferred lab:       Send INR reminders to:   [facility]    Comments:   started amiodarone 6/10/20      Anticoagulation Care Providers     Provider Role Specialty Phone number    Jennifernicole Yamilka Ventura DO Referring Cardiology [phone]    Kelli Lewis CNP Referring Nurse Practitioner [phone]            See the Encounter Report to view Anticoagulation Flowsheet and Dosing Calendar (Go to Encounters tab in chart review, and find the Anticoagulation Therapy Visit)    Dosage adjustment made based on physician directed care plan.    Chiqui Nowak RN

## 2020-07-16 ENCOUNTER — ANTICOAGULATION THERAPY VISIT (OUTPATIENT)
Dept: ANTICOAGULATION | Facility: CLINIC | Age: 75
End: 2020-07-16

## 2020-07-16 DIAGNOSIS — I48.0 PAROXYSMAL ATRIAL FIBRILLATION (H): ICD-10-CM

## 2020-07-16 DIAGNOSIS — Z79.01 LONG TERM CURRENT USE OF ANTICOAGULANTS WITH INR GOAL OF 2.0-3.0: ICD-10-CM

## 2020-07-16 LAB
CAPILLARY BLOOD COLLECTION: NORMAL
INR PPP: 2 (ref 0.86–1.14)

## 2020-07-16 PROCEDURE — 85610 PROTHROMBIN TIME: CPT | Performed by: NURSE PRACTITIONER

## 2020-07-16 PROCEDURE — 99207 ZZC NO CHARGE NURSE ONLY: CPT | Performed by: INTERNAL MEDICINE

## 2020-07-16 PROCEDURE — 36416 COLLJ CAPILLARY BLOOD SPEC: CPT | Performed by: NURSE PRACTITIONER

## 2020-07-16 NOTE — PROGRESS NOTES
ANTICOAGULATION FOLLOW-UP CLINIC VISIT    Patient Name:  Amy Luna  Date:  2020  Contact Type:  Telephone/ Called patient, she reports bruising, denies any other changes. Orders discussed with patient, she agrees with plan. Lab INR scheudled on 20.    SUBJECTIVE:  Patient Findings     Positives:   Bruising (Patient reports she has a bruise on her arm that is getting smaller.)    Comments:   The patient was assessed for diet, medication, and activity level changes, missed or extra doses, bruising or bleeding, with no problem findings. INR has been decreasing each week, will increase maintenance dose by 4.2% today. Patient did not have any questions or concerns.           Clinical Outcomes     Comments:   The patient was assessed for diet, medication, and activity level changes, missed or extra doses, bruising or bleeding, with no problem findings. INR has been decreasing each week, will increase maintenance dose by 4.2% today. Patient did not have any questions or concerns.              OBJECTIVE    Recent labs: (last 7 days)     20  1030   INR 2.00*       ASSESSMENT / PLAN  INR assessment THER    Recheck INR In: 1 WEEK    INR Location Outside lab      Anticoagulation Summary  As of 2020    INR goal:   2.0-3.0   TTR:   57.8 % (3.7 wk)   INR used for dosin.00 (2020)   Warfarin maintenance plan:   7.5 mg (5 mg x 1.5) every Sun, Tue, Thu; 10 mg (5 mg x 2) all other days   Full warfarin instructions:   : 10 mg; Otherwise 7.5 mg every Sun, Tue, Thu; 10 mg all other days   Weekly warfarin total:   62.5 mg   Plan last modified:   Chiqui Nowak RN (2020)   Next INR check:   2020   Priority:   High   Target end date:   Indefinite    Indications    Paroxysmal atrial fibrillation (H) [I48.0]  Long term current use of anticoagulants with INR goal of 2.0-3.0 [Z79.01]             Anticoagulation Episode Summary     INR check location:       Preferred lab:       Send INR  reminders to:   [facility] DUENA[facility]    Comments:   started amiodarone 6/10/20      Anticoagulation Care Providers     Provider Role Specialty Phone number    Yamilka Christina DO Referring Cardiology [phone]    Kelli Lewis CNP Referring Nurse Practitioner [phone]            See the Encounter Report to view Anticoagulation Flowsheet and Dosing Calendar (Go to Encounters tab in chart review, and find the Anticoagulation Therapy Visit)    Dosage adjustment made based on physician directed care plan.    Chiqui Nowak RN

## 2020-07-23 ENCOUNTER — ANTICOAGULATION THERAPY VISIT (OUTPATIENT)
Dept: ANTICOAGULATION | Facility: CLINIC | Age: 75
End: 2020-07-23

## 2020-07-23 DIAGNOSIS — Z79.01 LONG TERM CURRENT USE OF ANTICOAGULANTS WITH INR GOAL OF 2.0-3.0: ICD-10-CM

## 2020-07-23 DIAGNOSIS — I48.0 PAROXYSMAL ATRIAL FIBRILLATION (H): ICD-10-CM

## 2020-07-23 LAB
CAPILLARY BLOOD COLLECTION: NORMAL
INR PPP: 3 (ref 0.86–1.14)

## 2020-07-23 PROCEDURE — 85610 PROTHROMBIN TIME: CPT | Performed by: NURSE PRACTITIONER

## 2020-07-23 PROCEDURE — 99207 ZZC NO CHARGE NURSE ONLY: CPT | Performed by: NURSE PRACTITIONER

## 2020-07-23 PROCEDURE — 36416 COLLJ CAPILLARY BLOOD SPEC: CPT | Performed by: NURSE PRACTITIONER

## 2020-07-23 NOTE — PROGRESS NOTES
ANTICOAGULATION FOLLOW-UP CLINIC VISIT    Patient Name:  Amy Luna  Date:  7/23/2020  Contact Type:  Telephone/ Called patient, she denies any changes. Orders discussed with patient, she agrees to plan. Lab INR scheduled on 8/6/20.    SUBJECTIVE:  Patient Findings     Comments:   The patient was assessed for diet, medication, and activity level changes, missed or extra doses, bruising or bleeding, with no problem findings. Patient denies any changes, maintenance dose decreased (4%) back to previous schedule since INR went from 2.0 to 3.0 in 1 week.         Clinical Outcomes     Negatives:   Major bleeding event, Thromboembolic event, Anticoagulation-related hospital admission, Anticoagulation-related ED visit, Anticoagulation-related fatality    Comments:   The patient was assessed for diet, medication, and activity level changes, missed or extra doses, bruising or bleeding, with no problem findings. Patient denies any changes, maintenance dose decreased (4%) back to previous schedule since INR went from 2.0 to 3.0 in 1 week.            OBJECTIVE    Recent labs: (last 7 days)     07/23/20  1133   INR 3.00*       ASSESSMENT / PLAN  INR assessment THER    Recheck INR In: 2 WEEKS    INR Location Outside lab      Anticoagulation Summary  As of 7/23/2020    INR goal:   2.0-3.0   TTR:   66.6 % (1.1 mo)   INR used for dosing:   3.00 (7/23/2020)   Warfarin maintenance plan:   10 mg (5 mg x 2) every Mon, Wed, Fri; 7.5 mg (5 mg x 1.5) all other days   Full warfarin instructions:   10 mg every Mon, Wed, Fri; 7.5 mg all other days   Weekly warfarin total:   60 mg   Plan last modified:   Chiqui Nowak RN (7/23/2020)   Next INR check:   8/6/2020   Priority:   High   Target end date:   Indefinite    Indications    Paroxysmal atrial fibrillation (H) [I48.0]  Long term current use of anticoagulants with INR goal of 2.0-3.0 [Z79.01]             Anticoagulation Episode Summary     INR check location:       Preferred lab:        Send INR reminders to:   [facility]    Comments:   started amiodarone 6/10/20      Anticoagulation Care Providers     Provider Role Specialty Phone number    Yamilka Christina DO Referring Cardiology [phone]    Kelli Lewis CNP Referring Nurse Practitioner [phone]            See the Encounter Report to view Anticoagulation Flowsheet and Dosing Calendar (Go to Encounters tab in chart review, and find the Anticoagulation Therapy Visit)    Dosage adjustment made based on physician directed care plan.    Chiqui Nowak RN

## 2020-07-28 ENCOUNTER — OFFICE VISIT (OUTPATIENT)
Dept: INTERNAL MEDICINE | Facility: CLINIC | Age: 75
End: 2020-07-28
Payer: MEDICARE

## 2020-07-28 VITALS
WEIGHT: 266 LBS | RESPIRATION RATE: 16 BRPM | HEART RATE: 70 BPM | SYSTOLIC BLOOD PRESSURE: 121 MMHG | DIASTOLIC BLOOD PRESSURE: 59 MMHG | HEIGHT: 65 IN | BODY MASS INDEX: 44.32 KG/M2 | OXYGEN SATURATION: 96 %

## 2020-07-28 DIAGNOSIS — Z00.00 ROUTINE GENERAL MEDICAL EXAMINATION AT A HEALTH CARE FACILITY: Primary | ICD-10-CM

## 2020-07-28 DIAGNOSIS — E83.51 HYPOCALCEMIA: ICD-10-CM

## 2020-07-28 DIAGNOSIS — Z95.0 PACEMAKER: ICD-10-CM

## 2020-07-28 DIAGNOSIS — Z95.2 S/P MVR (MITRAL VALVE REPLACEMENT): ICD-10-CM

## 2020-07-28 DIAGNOSIS — E66.01 MORBID OBESITY (H): ICD-10-CM

## 2020-07-28 DIAGNOSIS — I10 HTN, GOAL BELOW 140/90: ICD-10-CM

## 2020-07-28 DIAGNOSIS — Z95.2 S/P AORTIC VALVE AND MITRAL VALVE REPLACEMENT: ICD-10-CM

## 2020-07-28 DIAGNOSIS — E11.59 TYPE 2 DIABETES MELLITUS WITH OTHER CIRCULATORY COMPLICATION, WITHOUT LONG-TERM CURRENT USE OF INSULIN (H): ICD-10-CM

## 2020-07-28 DIAGNOSIS — I48.0 PAROXYSMAL A-FIB (H): ICD-10-CM

## 2020-07-28 DIAGNOSIS — Z20.822 SUSPECTED COVID-19 VIRUS INFECTION: ICD-10-CM

## 2020-07-28 DIAGNOSIS — Z12.11 SPECIAL SCREENING FOR MALIGNANT NEOPLASMS, COLON: ICD-10-CM

## 2020-07-28 DIAGNOSIS — Z98.890 HX OF TRICUSPID VALVE REPAIR: ICD-10-CM

## 2020-07-28 DIAGNOSIS — Z87.74 STATUS POST PATENT FORAMEN OVALE CLOSURE: ICD-10-CM

## 2020-07-28 DIAGNOSIS — Z12.31 ENCOUNTER FOR SCREENING MAMMOGRAM FOR BREAST CANCER: ICD-10-CM

## 2020-07-28 DIAGNOSIS — E78.5 HYPERLIPIDEMIA LDL GOAL <100: ICD-10-CM

## 2020-07-28 DIAGNOSIS — E56.9 VITAMIN DEFICIENCY: ICD-10-CM

## 2020-07-28 DIAGNOSIS — D64.9 ANEMIA, UNSPECIFIED TYPE: ICD-10-CM

## 2020-07-28 DIAGNOSIS — Z11.59 ENCOUNTER FOR HEPATITIS C SCREENING TEST FOR LOW RISK PATIENT: ICD-10-CM

## 2020-07-28 LAB
ERYTHROCYTE [DISTWIDTH] IN BLOOD BY AUTOMATED COUNT: 14.5 % (ref 10–15)
FOLATE SERPL-MCNC: 15 NG/ML
HBA1C MFR BLD: 6.7 % (ref 0–5.6)
HCT VFR BLD AUTO: 35 % (ref 35–47)
HGB BLD-MCNC: 10.8 G/DL (ref 11.7–15.7)
MCH RBC QN AUTO: 29.1 PG (ref 26.5–33)
MCHC RBC AUTO-ENTMCNC: 30.9 G/DL (ref 31.5–36.5)
MCV RBC AUTO: 94 FL (ref 78–100)
PLATELET # BLD AUTO: 189 10E9/L (ref 150–450)
RBC # BLD AUTO: 3.71 10E12/L (ref 3.8–5.2)
RETICS # AUTO: 90.2 10E9/L (ref 25–95)
RETICS/RBC NFR AUTO: 2.4 % (ref 0.5–2)
VIT B12 SERPL-MCNC: 658 PG/ML (ref 193–986)
WBC # BLD AUTO: 7.1 10E9/L (ref 4–11)

## 2020-07-28 PROCEDURE — 82306 VITAMIN D 25 HYDROXY: CPT | Performed by: INTERNAL MEDICINE

## 2020-07-28 PROCEDURE — 82043 UR ALBUMIN QUANTITATIVE: CPT | Performed by: INTERNAL MEDICINE

## 2020-07-28 PROCEDURE — 84439 ASSAY OF FREE THYROXINE: CPT | Performed by: INTERNAL MEDICINE

## 2020-07-28 PROCEDURE — 83540 ASSAY OF IRON: CPT | Performed by: INTERNAL MEDICINE

## 2020-07-28 PROCEDURE — 36415 COLL VENOUS BLD VENIPUNCTURE: CPT | Performed by: INTERNAL MEDICINE

## 2020-07-28 PROCEDURE — 83036 HEMOGLOBIN GLYCOSYLATED A1C: CPT | Performed by: INTERNAL MEDICINE

## 2020-07-28 PROCEDURE — G0439 PPPS, SUBSEQ VISIT: HCPCS | Performed by: INTERNAL MEDICINE

## 2020-07-28 PROCEDURE — 85027 COMPLETE CBC AUTOMATED: CPT | Performed by: INTERNAL MEDICINE

## 2020-07-28 PROCEDURE — 84207 ASSAY OF VITAMIN B-6: CPT | Mod: 90 | Performed by: INTERNAL MEDICINE

## 2020-07-28 PROCEDURE — 82180 ASSAY OF ASCORBIC ACID: CPT | Mod: 90 | Performed by: INTERNAL MEDICINE

## 2020-07-28 PROCEDURE — 99214 OFFICE O/P EST MOD 30 MIN: CPT | Mod: 25 | Performed by: INTERNAL MEDICINE

## 2020-07-28 PROCEDURE — 82746 ASSAY OF FOLIC ACID SERUM: CPT | Performed by: INTERNAL MEDICINE

## 2020-07-28 PROCEDURE — 82607 VITAMIN B-12: CPT | Performed by: INTERNAL MEDICINE

## 2020-07-28 PROCEDURE — 80053 COMPREHEN METABOLIC PANEL: CPT | Performed by: INTERNAL MEDICINE

## 2020-07-28 PROCEDURE — 84443 ASSAY THYROID STIM HORMONE: CPT | Performed by: INTERNAL MEDICINE

## 2020-07-28 PROCEDURE — 80061 LIPID PANEL: CPT | Performed by: INTERNAL MEDICINE

## 2020-07-28 PROCEDURE — 85045 AUTOMATED RETICULOCYTE COUNT: CPT | Performed by: INTERNAL MEDICINE

## 2020-07-28 PROCEDURE — G0472 HEP C SCREEN HIGH RISK/OTHER: HCPCS | Performed by: INTERNAL MEDICINE

## 2020-07-28 PROCEDURE — 83550 IRON BINDING TEST: CPT | Performed by: INTERNAL MEDICINE

## 2020-07-28 PROCEDURE — 99000 SPECIMEN HANDLING OFFICE-LAB: CPT | Performed by: INTERNAL MEDICINE

## 2020-07-28 PROCEDURE — 82728 ASSAY OF FERRITIN: CPT | Performed by: INTERNAL MEDICINE

## 2020-07-28 PROCEDURE — 86769 SARS-COV-2 COVID-19 ANTIBODY: CPT | Performed by: INTERNAL MEDICINE

## 2020-07-28 PROCEDURE — 84425 ASSAY OF VITAMIN B-1: CPT | Mod: 90 | Performed by: INTERNAL MEDICINE

## 2020-07-28 RX ORDER — CLINDAMYCIN HCL 300 MG
600 CAPSULE ORAL ONCE
Qty: 2 CAPSULE | Refills: 4 | Status: SHIPPED | OUTPATIENT
Start: 2020-07-28 | End: 2020-07-28

## 2020-07-28 ASSESSMENT — MIFFLIN-ST. JEOR: SCORE: 1707.45

## 2020-07-28 NOTE — PROGRESS NOTES
Dr Diaz's note    Patient's instructions / PLAN:                                                        Plan:  1.  Labs today - [address]   2. Please ask your eye doctor to send report to our chart regarding eye exam for diabetes - fax [phone]  3. Mammogram ( please call [phone] to schedule it)   4. No changes in meds  5. Follow up appointment in 2 weeks - video appointment   6. Clindamycin 600 mg 1 hour before dental appointment         ASSESSMENT & PLAN:                                                      75-year-old woman with chronic medical problems:DM on insulin, morbid obesity,HTN, HLipidemia had extensive heart surgery in April for AVR, MVR, PFO closure, TV repair, pacemaker atrial lead replaced.  Overall she feels stable, but still fatigue.  She had also postop anemia, for which she needs follow-up.  She was found with atrial fibrillation on the pacemaker records and she was started on amiodarone and Coumadin.    (Z00.00) Routine general medical examination at a health care facility  (primary encounter diagnosis)  Comment:   Plan: Comprehensive metabolic panel, Hemoglobin A1c,         CBC with platelets, Lipid panel reflex to         direct LDL Fasting, Albumin Random Urine         Quantitative with Creat Ratio, TSH with free T4        reflex, Vitamin B12, Reticulocyte count,         Folate, Iron and iron binding capacity,         Ferritin, Vitamin D Deficiency, T4 free            (E11.59) Type 2 diabetes mellitus with other circulatory complication, without long-term current use of insulin (H)  Comment: Last A1c was in acceptable range  Plan: Continue same meds, same doses for now     (E66.01) Morbid obesity (H)  Comment:   Plan:  we discussed about diet ( reducing calories intake) and increasing the exercise      (D64.9) Anemia, unspecified type  Comment: She needs more work-up  Plan: TSH with free T4 reflex, Vitamin B12,         Reticulocyte count, Folate, Iron and iron         binding  capacity, Ferritin            (E56.9) Vitamin deficiency  Comment:   Plan: Vitamin B12, Vitamin B1 whole blood, Vitamin         B6, Vitamin C, Vitamin D Deficiency, CANCELED:         Vitamin B12            (I10) HTN, goal below 140/90  Comment: Controlled    Plan: Comprehensive metabolic panel, Lipid panel         reflex to direct LDL Fasting, Albumin Random         Urine Quantitative with Creat Ratio            (E78.5) Hyperlipidemia LDL goal <100  Comment: Controlled    Plan: Comprehensive metabolic panel, Lipid panel         reflex to direct LDL Fasting, TSH with free T4         reflex            (E83.51) Hypocalcemia  Comment:   Plan: Vitamin D Deficiency            (Z20.828) Suspected COVID-19 virus infection  Comment:   Plan: COVID-19 Virus (Coronavirus) Antibody Screen,         IgG            (Z95.2) S/P AoValve Replacement - Rubi bovine pericardial valve April 2020  (Z95.2) S/P MVR (mitral valve replacement) - [place] bioprosthetic valve - April 2020  (Z98.890) s/p tricuspid valve repair - April 2020  (Z87.74) s/p PFO closure April 2020  Comment:   Plan: Continue amiodarone and warfarin, as per cardiology    (Z95.0) Pacemaker 2015, new atrial lead April 2020  Comment:   Plan:     (Z12.31) Encounter for screening mammogram for breast cancer  Comment:   Plan: MA Screening Digital Bilateral            (Z12.11) Special screening for malignant neoplasms, colon  Comment: I I advised her to postpone colonoscopy for a year after the surgery  Plan: Fecal colorectal cancer screen (FIT)            (Z11.59) Encounter for hepatitis C screening test for low risk patient  Comment:   Plan: Hepatitis C Screen Reflex to HCV RNA Quant and         Genotype               Chief Complaint:                                                        Annual exam  Follow up chronic medical problems      SUBJECTIVE:                                                    History of present illness     We reviewed the chronic medical problems  as above.   I reviewed the recent tests results in Epic       Since she has been taking Coumadin she noticed some red spots on the skin it was prescribed for AFib ( recorded on PPM) Exam: faint erythematous areas. No skin lesions. They don't hurt. We discussed to con't coumadin     Cardiology dr Carri SHARPE in December -- she would like to be tested for Covid. Interested in vaccine     Night sweats since Nov - drenching. I don't have an explanation     DM  -- NPH ( 70/30)  a day divided in 2 doses   -- no novolog   --   Lab Results   Component Value Date    A1C 6.7 07/28/2020    A1C 6.5 04/08/2020    A1C 6.4 01/16/2020          Cardiology  --In April 2020 she had extensive cardiac surgery: AVR bioprosthetic, MVR bioprosthetic, TV repair, PFO closure  --AV block with pacemaker implantation June 2015.  During the recent heart surgery the atrial lead was not functioning and she received new atrial lead April 2020  --Paroxysmal A. fib detected on the pacemaker.  Patient was started on amiodarone and warfarin    ROS:   General: Negative for fever, chills, major weight changes, positive for fatigue  Skin: As above  Eyes: Negative for blurred or double vision  ENT/mouth: Negative for sinuses discomfort, earache, sore throat  Respiratory: Negative for cough, wheezes, chronic lung disease  Cardiovascular: Negative for rest or exertional chest pain,  palpitations, leg edema, positive for chronic shortness of breath with exertion  Gastrointestinal: Negative for vomiting, abdominal pain, heartburn, blood in stool, diarrhea, constipation  Genitourinary: Negative for urinary frequency, blood in urine, history of kidney stones  Female: Negative for abnormal vaginal bleeding, vaginal discharge  Neuro: Negative for headaches, numbness, tingling, weakness in arms or legs, history of seizure, recent syncope  Psychiatry: Negative for  positive for controlled depression/anxiety  Endo: Negative for known thyroid disease,  positive for diabetes.  Hemato/Lymph: Negative for nodes, easy bleeding, history of DVT, blood transfusion  Musculoskeletal: Negative for joint swelling, back pain      PMHx: - reviewed  Past Medical History:   Diagnosis Date     (HFpEF) heart failure with preserved ejection fraction (H)      Aortic stenosis      Chest pain      Depressive disorder      Diabetes (H)      Hyperlipidemia      Hypertension      Mitral annular calcification      Mitral stenosis      Obesity      Sleep apnea     CPAP       PSHx: reviewed  Past Surgical History:   Procedure Laterality Date     APPENDECTOMY       CV CORONARY ANGIOGRAM N/A 2020    Procedure: Coronary Angiogram;  Surgeon: Inez Peoples MD;  Location:  [facility]     CV LEFT HEART CATH N/A 2020    Procedure: Left Heart Cath;  Surgeon: Inez Peoples MD;  Location:  [facility]     CV PFO CLOSURE N/A 4/15/2020    Procedure: Patent Foramen Ovale Closure;  Surgeon: Ok Wilson MD;  Location:  OR     CV RIGHT HEART CATH N/A 2020    Procedure: Right Heart Cath;  Surgeon: Inez Peoples MD;  Location:  [facility]     EP PACEMAKER N/A 2020    Procedure: EP Pacemaker;  Surgeon: Sohan Francis MD;  Location:  [facility]     GYN SURGERY       x2 ,      GYN SURGERY      total hysterectomy     IMPLANT PACEMAKER       REPAIR VALVE TRICUSPID N/A 4/15/2020    Procedure: REPAIR TRICUSPID VALVE WITH VILLA MC3 26MM.;  Surgeon: Ok Wilson MD;  Location:  OR     REPLACE VALVE AORTIC N/A 4/15/2020    Procedure: REPLACEMENT, AORTIC VALVE WITH INSPIRIS TISSUE VALVE 25 MM; ON PUMP WITH SOCORRO READ BY CARDIOLOGIST DR BLANTON.;  Surgeon: Ok Wilson MD;  Location:  OR     REPLACE VALVE MITRAL N/A 4/15/2020    Procedure: REPLACEMENT, MITRAL VALVE WITH EPIC TISSUE VALVE 27 MM.;  Surgeon: Ok Wilson MD;  Location:  OR         Soc Hx: No daily alcohol, no smoking  Social History     Socioeconomic History     Marital status:      Spouse name: Not on file     Number of children: Not on file     Years of education: Not on file     Highest education level: Not on file   Occupational History     Not on file   Social Needs     Financial resource strain: Not on file     Food insecurity     Worry: Not on file     Inability: Not on file     Transportation needs     Medical: Not on file     Non-medical: Not on file   Tobacco Use     Smoking status: Never Smoker     Smokeless tobacco: Never Used   Substance and Sexual Activity     Alcohol use: No     Drug use: No     Sexual activity: Not on file   Lifestyle     Physical activity     Days per week: Not on file     Minutes per session: Not on file     Stress: Not on file   Relationships     Social connections     Talks on phone: Not on file     Gets together: Not on file     Attends Druze service: Not on file     Active member of club or organization: Not on file     Attends meetings of clubs or organizations: Not on file     Relationship status: Not on file     Intimate partner violence     Fear of current or ex partner: Not on file     Emotionally abused: Not on file     Physically abused: Not on file     Forced sexual activity: Not on file   Other Topics Concern     Not on file   Social History Narrative     Not on file        Fam Hx: reviewed  Family History   Problem Relation Age of Onset     Diabetes Mother 56     Congenital heart disease Father 23         Screening: reviewed      All: reviewed    Meds: reviewed  Current Outpatient Medications   Medication Sig Dispense Refill     acetaminophen (TYLENOL) 325 MG tablet Take 2 tablets (650 mg) by mouth every 4 hours as needed for other (multimodal surgical pain management along with NSAIDS and opioid medication as indicated based on pain control and physical function.)  0     albuterol (PROAIR HFA/PROVENTIL HFA/VENTOLIN HFA) 108 (90  Base) MCG/ACT inhaler Inhale 2 puffs into the lungs every 4 hours as needed for shortness of breath / dyspnea or wheezing       amiodarone (PACERONE) 200 MG tablet Take 1 tablet (200 mg) by mouth daily 90 tablet 3     atorvastatin (LIPITOR) 40 MG tablet Take 1 tablet (40 mg) by mouth At Bedtime 90 tablet 3     Biotin 14898 MCG TABS Take 1,000 mcg by mouth every morning        Calcium Carbonate-Vit D-Min (CALCIUM 1200 PO) Take 1 tablet by mouth every evening        coenzyme Q-10 200 MG CAPS Take 200 mg by mouth every morning        ferrous gluconate (FERGON) 324 (38 Fe) MG tablet Take 1 tablet (324 mg) by mouth daily (with breakfast) 30 tablet 3     furosemide (LASIX) 40 MG tablet Take 1 tablet (40 mg) by mouth 2 times daily Further dosing per PCP/ Cardiology 90 tablet 3     insulin NPH-Regular 70/30 (70-30) 100 UNIT/ML vial Inject 20-40 Units Subcutaneous 2 times daily (with meals) 72 mL 1     ipratropium (ATROVENT) 0.06 % nasal spray Spray 2 sprays into both nostrils 4 times daily as needed for rhinitis       Lutein 40 MG CAPS Take 40 mg by mouth every morning        Magnesium 400 MG TABS Take 400 mg by mouth every evening        metFORMIN (GLUCOPHAGE-XR) 500 MG 24 hr tablet Take 2 tablets (1,000 mg) by mouth daily (with breakfast) (takes 2 x 500mg) 90 tablet 3     metoprolol succinate ER (TOPROL-XL) 25 MG 24 hr tablet Take 2 tablets (50 mg) by mouth daily 90 tablet 3     Propylene Glycol (SYSTANE BALANCE OP) Apply 1-2 drops to eye 3 times daily as needed (dry eyes)       sertraline (ZOLOFT) 100 MG tablet Take 150 mg by mouth every morning (takes 1.5 x 100mg)       TURMERIC PO Take 1-3 tablets by mouth daily        vitamin (B COMPLEX-C) tablet Take 1 tablet by mouth daily       vitamin C (ASCORBIC ACID) 1000 MG TABS Take 1,000 mg by mouth every evening        Vitamin D3 (CHOLECALCIFEROL) 25 mcg (1000 units) tablet Take 1 tablet (25 mcg) by mouth daily  0     warfarin ANTICOAGULANT (COUMADIN) 5 MG tablet Take 2  "tablets (10 mg) by mouth on Mon, Wed, Fri. Take 1 and 1/2 tablet (7.5 mg) on Sun, Tue, Thur and Sat or as instructed by INR Clinic. 150 tablet 1     insulin aspart (NOVOLOG PEN) 100 UNIT/ML pen Inject 1-7 Units Subcutaneous 3 times daily (before meals)         OBJECTIVE:                                                    Physical Exam :  Blood pressure 121/59, pulse 70, resp. rate 16, height 1.651 m (5' 5\"), weight 120.7 kg (266 lb), SpO2 96 %, not currently breastfeeding.     NAD, appears comfortable  Skin clear, no rashes  Neck: supple, no JVD,  no thyroidmegaly  Lymph nodes non palpable in the cervical, supraclavicular   Cardiac: S1S2, RRR, no mgr appreciated  Abdomen: soft, not tender, not distended, it is impossible to appreciate hepatosplenomegaly through this obese abdomen  Extremities: no cyanosis, clubbing or edema.   Neuro: A, Ox3, no focal signs.  Breast exam in supine and erect position: they are symmetrical, no skin changes, no tenderness or nodes on palpation. Nipples are erect, no skin lesions, no discharge on pressure.    Pelvic exam: deferred, s/p menopause, no symptoms, no hx of abnormal pap         She Diaz MD  Internal Medicine       SUBJECTIVE:   Amy Luna is a 75 year old female who presents for Preventive Visit.      Are you in the first 12 months of your Medicare coverage?  No    Healthy Habits:    In general, how would you rate your overall health?  Poor    Frequency of exercise:  None    Do you usually eat at least 4 servings of fruit and vegetables a day, include whole grains    & fiber and avoid regularly eating high fat or \"junk\" foods?  Yes    Taking medications regularly:  No    Barriers to taking medications:  None    Medication side effects:  None    Current function: sometimes.    Home Safety:  No safety concerns identified    Hearing Impairment:  No hearing concerns    In the past 6 months, have you been bothered by leaking of urine? Yes    In general, how would you rate " your overall mental or emotional health?  Very good      PHQ-2 Total Score:    Additional concerns today:  Yes    Do you feel safe in your environment? Yes    Have you ever done Advance Care Planning? (For example, a Health Directive, POLST, or a discussion with a medical provider or your loved ones about your wishes): No, advance care planning information given to patient to review.  Patient plans to discuss their wishes with loved ones or provider.        Fall risk       Fallen two or more times in the last year   No   Any fall with an injury in the last year  No    Cognitive Screening   1) Repeat 3 items (Leader, Season, Table)    2) Clock draw: NORMAL  3) 3 item recall: Recalls 3 objects  Results: 3 items recalled: COGNITIVE IMPAIRMENT LESS LIKELY    Mini-CogTM Copyright KEI Sandoval. Licensed by the author for use in [employer] reprinted with permission (adan@[facility]). All rights reserved.      Do you have sleep apnea, excessive snoring or daytime drowsiness?: yes    Reviewed and updated as needed this visit by clinical staff  Allergies         Reviewed and updated as needed this visit by Provider        Social History     Tobacco Use     Smoking status: Never Smoker     Smokeless tobacco: Never Used   Substance Use Topics     Alcohol use: No         No flowsheet data found.        Hyperlipidemia Follow-Up      Are you regularly taking any medication or supplement to lower your cholesterol?   Yes- atorvastatin    Are you having muscle aches or other side effects that you think could be caused by your cholesterol lowering medication?  No    Hypertension Follow-up      Do you check your blood pressure regularly outside of the clinic? Yes     Are you following a low salt diet? Yes    Are your blood pressures ever more than 140 on the top number (systolic) OR more   than 90 on the bottom number (diastolic), for example 140/90? No      Current providers sharing in care for this patient include:   Patient  "Care Team:  Clinic, [facility] as PCP - General (Internal Medicine)  Kelli Lewis CNP as Assigned PCP    The following health maintenance items are reviewed in Epic and correct as of today:  Health Maintenance   Topic Date Due     DEXA  1945     HF ACTION PLAN  1945     LIPID  1945     MICROALBUMIN  1945     TSH W/FREE T4 REFLEX  1945     DIABETIC FOOT EXAM  1945     HEPATITIS C SCREENING  1945     DEPRESSION ACTION PLAN  1945     EYE EXAM  1945     MAMMO SCREENING  1945     COLORECTAL CANCER SCREENING  06/17/1956     HEPATITIS B IMMUNIZATION (1 of 3 - Risk 3-dose series) 06/17/1965     DTAP/TDAP/TD IMMUNIZATION (1 - Tdap) 06/17/1971     MEDICARE ANNUAL WELLNESS VISIT  06/17/2011     FALL RISK ASSESSMENT  06/17/2011     PNEUMOCOCCAL IMMUNIZATION 65+ LOW/MEDIUM RISK (1 of 2 - PCV13) 06/17/2011     A1C  07/08/2020     INFLUENZA VACCINE (1) 09/01/2020     BMP  12/22/2020     PHQ-9  01/09/2021     ALT  04/16/2021     CBC  06/01/2021     ADVANCE CARE PLANNING  04/24/2025     ZOSTER IMMUNIZATION  Completed     IPV IMMUNIZATION  Aged Out     MENINGITIS IMMUNIZATION  Aged Out     Labs reviewed in EPIC          COUNSELING:  Reviewed preventive health counseling, as reflected in patient instructions       Regular exercise       Healthy diet/nutrition    Estimated body mass index is 44.1 kg/m  as calculated from the following:    Height as of 4/22/20: 1.651 m (5' 5\").    Weight as of 6/10/20: 120.2 kg (265 lb).    Weight management plan: Discussed healthy diet and exercise guidelines     reports that she has never smoked. She has never used smokeless tobacco.      Appropriate preventive services were discussed with this patient, including applicable screening as appropriate for cardiovascular disease, diabetes, osteopenia/osteoporosis, and glaucoma.  As appropriate for age/gender, discussed screening for colorectal cancer, prostate cancer, breast cancer, " and cervical cancer. Checklist reviewing preventive services available has been given to the patient.    Reviewed patients plan of care and provided an AVS. The Basic Care Plan (routine screening as documented in Health Maintenance) for Amy meets the Care Plan requirement. This Care Plan has been established and reviewed with the Patient.    Counseling Resources:  ATP IV Guidelines  Pooled Cohorts Equation Calculator  Breast Cancer Risk Calculator  FRAX Risk Assessment  ICSI Preventive Guidelines  Dietary Guidelines for Americans, 2010  USDA's MyPlate  ASA Prophylaxis  Lung CA Screening    She Huang MD  [facility]    Identified Health Risks:

## 2020-07-29 LAB
ALBUMIN SERPL-MCNC: 3.6 G/DL (ref 3.4–5)
ALP SERPL-CCNC: 66 U/L (ref 40–150)
ALT SERPL W P-5'-P-CCNC: 38 U/L (ref 0–50)
ANION GAP SERPL CALCULATED.3IONS-SCNC: 5 MMOL/L (ref 3–14)
AST SERPL W P-5'-P-CCNC: 32 U/L (ref 0–45)
BILIRUB SERPL-MCNC: 0.3 MG/DL (ref 0.2–1.3)
BUN SERPL-MCNC: 35 MG/DL (ref 7–30)
CALCIUM SERPL-MCNC: 9.3 MG/DL (ref 8.5–10.1)
CHLORIDE SERPL-SCNC: 103 MMOL/L (ref 94–109)
CHOLEST SERPL-MCNC: 187 MG/DL
CO2 SERPL-SCNC: 31 MMOL/L (ref 20–32)
COVID-19 ANTIBODY IGG: NEGATIVE
CREAT SERPL-MCNC: 1.26 MG/DL (ref 0.52–1.04)
CREAT UR-MCNC: 156 MG/DL
DEPRECATED CALCIDIOL+CALCIFEROL SERPL-MC: 52 UG/L (ref 20–75)
FERRITIN SERPL-MCNC: 22 NG/ML (ref 8–252)
GFR SERPL CREATININE-BSD FRML MDRD: 42 ML/MIN/{1.73_M2}
GLUCOSE SERPL-MCNC: 74 MG/DL (ref 70–99)
HCV AB SERPL QL IA: NONREACTIVE
HDLC SERPL-MCNC: 64 MG/DL
IRON SATN MFR SERPL: 13 % (ref 15–46)
IRON SERPL-MCNC: 50 UG/DL (ref 35–180)
LAB TEST METHOD: NORMAL
LDLC SERPL CALC-MCNC: 102 MG/DL
MICROALBUMIN UR-MCNC: 66 MG/L
MICROALBUMIN/CREAT UR: 42.18 MG/G CR (ref 0–25)
NONHDLC SERPL-MCNC: 123 MG/DL
POTASSIUM SERPL-SCNC: 3.4 MMOL/L (ref 3.4–5.3)
PROT SERPL-MCNC: 7.4 G/DL (ref 6.8–8.8)
SODIUM SERPL-SCNC: 139 MMOL/L (ref 133–144)
T4 FREE SERPL-MCNC: 1.14 NG/DL (ref 0.76–1.46)
TIBC SERPL-MCNC: 385 UG/DL (ref 240–430)
TRIGL SERPL-MCNC: 104 MG/DL
TSH SERPL DL<=0.005 MIU/L-ACNC: 4.47 MU/L (ref 0.4–4)

## 2020-07-30 LAB
MDC_IDC_LEAD_IMPLANT_DT: NORMAL
MDC_IDC_LEAD_IMPLANT_DT: NORMAL
MDC_IDC_LEAD_LOCATION: NORMAL
MDC_IDC_LEAD_LOCATION: NORMAL
MDC_IDC_LEAD_LOCATION_DETAIL_1: NORMAL
MDC_IDC_LEAD_LOCATION_DETAIL_1: NORMAL
MDC_IDC_LEAD_MFG: NORMAL
MDC_IDC_LEAD_MFG: NORMAL
MDC_IDC_LEAD_MODEL: NORMAL
MDC_IDC_LEAD_MODEL: NORMAL
MDC_IDC_LEAD_POLARITY_TYPE: NORMAL
MDC_IDC_LEAD_POLARITY_TYPE: NORMAL
MDC_IDC_LEAD_SERIAL: NORMAL
MDC_IDC_LEAD_SERIAL: NORMAL
MDC_IDC_MSMT_BATTERY_DTM: NORMAL
MDC_IDC_MSMT_BATTERY_REMAINING_LONGEVITY: 86 MO
MDC_IDC_MSMT_BATTERY_RRT_TRIGGER: 2.83
MDC_IDC_MSMT_BATTERY_STATUS: NORMAL
MDC_IDC_MSMT_BATTERY_VOLTAGE: 3.01 V
MDC_IDC_MSMT_LEADCHNL_RA_IMPEDANCE_VALUE: 323 OHM
MDC_IDC_MSMT_LEADCHNL_RA_IMPEDANCE_VALUE: 437 OHM
MDC_IDC_MSMT_LEADCHNL_RA_PACING_THRESHOLD_AMPLITUDE: 0.62 V
MDC_IDC_MSMT_LEADCHNL_RA_PACING_THRESHOLD_PULSEWIDTH: 0.4 MS
MDC_IDC_MSMT_LEADCHNL_RA_SENSING_INTR_AMPL: 2.25 MV
MDC_IDC_MSMT_LEADCHNL_RA_SENSING_INTR_AMPL: 2.38 MV
MDC_IDC_MSMT_LEADCHNL_RV_IMPEDANCE_VALUE: 418 OHM
MDC_IDC_MSMT_LEADCHNL_RV_IMPEDANCE_VALUE: 437 OHM
MDC_IDC_MSMT_LEADCHNL_RV_PACING_THRESHOLD_AMPLITUDE: 0.75 V
MDC_IDC_MSMT_LEADCHNL_RV_PACING_THRESHOLD_PULSEWIDTH: 0.4 MS
MDC_IDC_MSMT_LEADCHNL_RV_SENSING_INTR_AMPL: 10.88 MV
MDC_IDC_MSMT_LEADCHNL_RV_SENSING_INTR_AMPL: 11.25 MV
MDC_IDC_PG_IMPLANT_DTM: NORMAL
MDC_IDC_PG_MFG: NORMAL
MDC_IDC_PG_MODEL: NORMAL
MDC_IDC_PG_SERIAL: NORMAL
MDC_IDC_PG_TYPE: NORMAL
MDC_IDC_SESS_CLINIC_NAME: NORMAL
MDC_IDC_SESS_DTM: NORMAL
MDC_IDC_SESS_TYPE: NORMAL
MDC_IDC_SET_BRADY_AT_MODE_SWITCH_RATE: 171 {BEATS}/MIN
MDC_IDC_SET_BRADY_HYSTRATE: NORMAL
MDC_IDC_SET_BRADY_LOWRATE: 60 {BEATS}/MIN
MDC_IDC_SET_BRADY_MAX_SENSOR_RATE: 130 {BEATS}/MIN
MDC_IDC_SET_BRADY_MAX_TRACKING_RATE: 130 {BEATS}/MIN
MDC_IDC_SET_BRADY_MODE: NORMAL
MDC_IDC_SET_BRADY_PAV_DELAY_LOW: 250 MS
MDC_IDC_SET_BRADY_SAV_DELAY_LOW: 250 MS
MDC_IDC_SET_LEADCHNL_RA_PACING_AMPLITUDE: 1.5 V
MDC_IDC_SET_LEADCHNL_RA_PACING_ANODE_ELECTRODE_1: NORMAL
MDC_IDC_SET_LEADCHNL_RA_PACING_ANODE_LOCATION_1: NORMAL
MDC_IDC_SET_LEADCHNL_RA_PACING_CAPTURE_MODE: NORMAL
MDC_IDC_SET_LEADCHNL_RA_PACING_CATHODE_ELECTRODE_1: NORMAL
MDC_IDC_SET_LEADCHNL_RA_PACING_CATHODE_LOCATION_1: NORMAL
MDC_IDC_SET_LEADCHNL_RA_PACING_POLARITY: NORMAL
MDC_IDC_SET_LEADCHNL_RA_PACING_PULSEWIDTH: 0.4 MS
MDC_IDC_SET_LEADCHNL_RA_SENSING_ANODE_ELECTRODE_1: NORMAL
MDC_IDC_SET_LEADCHNL_RA_SENSING_ANODE_LOCATION_1: NORMAL
MDC_IDC_SET_LEADCHNL_RA_SENSING_CATHODE_ELECTRODE_1: NORMAL
MDC_IDC_SET_LEADCHNL_RA_SENSING_CATHODE_LOCATION_1: NORMAL
MDC_IDC_SET_LEADCHNL_RA_SENSING_POLARITY: NORMAL
MDC_IDC_SET_LEADCHNL_RA_SENSING_SENSITIVITY: 0.3 MV
MDC_IDC_SET_LEADCHNL_RV_PACING_AMPLITUDE: 2 V
MDC_IDC_SET_LEADCHNL_RV_PACING_ANODE_ELECTRODE_1: NORMAL
MDC_IDC_SET_LEADCHNL_RV_PACING_ANODE_LOCATION_1: NORMAL
MDC_IDC_SET_LEADCHNL_RV_PACING_CAPTURE_MODE: NORMAL
MDC_IDC_SET_LEADCHNL_RV_PACING_CATHODE_ELECTRODE_1: NORMAL
MDC_IDC_SET_LEADCHNL_RV_PACING_CATHODE_LOCATION_1: NORMAL
MDC_IDC_SET_LEADCHNL_RV_PACING_POLARITY: NORMAL
MDC_IDC_SET_LEADCHNL_RV_PACING_PULSEWIDTH: 0.4 MS
MDC_IDC_SET_LEADCHNL_RV_SENSING_ANODE_ELECTRODE_1: NORMAL
MDC_IDC_SET_LEADCHNL_RV_SENSING_ANODE_LOCATION_1: NORMAL
MDC_IDC_SET_LEADCHNL_RV_SENSING_CATHODE_ELECTRODE_1: NORMAL
MDC_IDC_SET_LEADCHNL_RV_SENSING_CATHODE_LOCATION_1: NORMAL
MDC_IDC_SET_LEADCHNL_RV_SENSING_POLARITY: NORMAL
MDC_IDC_SET_LEADCHNL_RV_SENSING_SENSITIVITY: 0.9 MV
MDC_IDC_SET_ZONE_DETECTION_INTERVAL: 350 MS
MDC_IDC_SET_ZONE_DETECTION_INTERVAL: 400 MS
MDC_IDC_SET_ZONE_TYPE: NORMAL
MDC_IDC_STAT_AT_BURDEN_PERCENT: 37.5 %
MDC_IDC_STAT_AT_DTM_END: NORMAL
MDC_IDC_STAT_AT_DTM_START: NORMAL
MDC_IDC_STAT_BRADY_AP_VP_PERCENT: 4.9 %
MDC_IDC_STAT_BRADY_AP_VS_PERCENT: 1.8 %
MDC_IDC_STAT_BRADY_AS_VP_PERCENT: 49.06 %
MDC_IDC_STAT_BRADY_AS_VS_PERCENT: 44.23 %
MDC_IDC_STAT_BRADY_DTM_END: NORMAL
MDC_IDC_STAT_BRADY_DTM_START: NORMAL
MDC_IDC_STAT_BRADY_RA_PERCENT_PACED: 6.68 %
MDC_IDC_STAT_BRADY_RV_PERCENT_PACED: 53.51 %
MDC_IDC_STAT_EPISODE_RECENT_COUNT: 0
MDC_IDC_STAT_EPISODE_RECENT_COUNT: 5
MDC_IDC_STAT_EPISODE_RECENT_COUNT_DTM_END: NORMAL
MDC_IDC_STAT_EPISODE_RECENT_COUNT_DTM_START: NORMAL
MDC_IDC_STAT_EPISODE_TOTAL_COUNT: 0
MDC_IDC_STAT_EPISODE_TOTAL_COUNT: 3
MDC_IDC_STAT_EPISODE_TOTAL_COUNT: 4
MDC_IDC_STAT_EPISODE_TOTAL_COUNT: 8
MDC_IDC_STAT_EPISODE_TOTAL_COUNT_DTM_END: NORMAL
MDC_IDC_STAT_EPISODE_TOTAL_COUNT_DTM_START: NORMAL
MDC_IDC_STAT_EPISODE_TYPE: NORMAL

## 2020-07-31 LAB
VIT B1 BLD-MCNC: 79 NMOL/L (ref 70–180)
VIT B6 SERPL-MCNC: 329.7 NMOL/L (ref 20–125)
VIT C SERPL-MCNC: 152 UMOL/L (ref 23–114)

## 2020-08-05 ENCOUNTER — TRANSFERRED RECORDS (OUTPATIENT)
Dept: HEALTH INFORMATION MANAGEMENT | Facility: CLINIC | Age: 75
End: 2020-08-05

## 2020-08-05 LAB — RETINOPATHY: NORMAL

## 2020-08-06 ENCOUNTER — ANTICOAGULATION THERAPY VISIT (OUTPATIENT)
Dept: ANTICOAGULATION | Facility: CLINIC | Age: 75
End: 2020-08-06

## 2020-08-06 DIAGNOSIS — I48.0 PAROXYSMAL ATRIAL FIBRILLATION (H): ICD-10-CM

## 2020-08-06 DIAGNOSIS — Z79.01 LONG TERM CURRENT USE OF ANTICOAGULANTS WITH INR GOAL OF 2.0-3.0: ICD-10-CM

## 2020-08-06 LAB
CAPILLARY BLOOD COLLECTION: NORMAL
INR PPP: 2.1 (ref 0.86–1.14)

## 2020-08-06 PROCEDURE — 36416 COLLJ CAPILLARY BLOOD SPEC: CPT | Performed by: NURSE PRACTITIONER

## 2020-08-06 PROCEDURE — 99207 ZZC NO CHARGE NURSE ONLY: CPT | Performed by: NURSE PRACTITIONER

## 2020-08-06 PROCEDURE — 85610 PROTHROMBIN TIME: CPT | Performed by: NURSE PRACTITIONER

## 2020-08-06 NOTE — PROGRESS NOTES
ANTICOAGULATION FOLLOW-UP CLINIC VISIT    Patient Name:  Amy Luna  Date:  2020  Contact Type:  Telephone/ Called patient, she denies any changes. Orders discussed with patient, she agrees with plan. Lab INR appointment scheduled on 20    SUBJECTIVE:  Patient Findings     Comments:   The patient was assessed for diet, medication, and activity level changes, missed or extra doses, bruising or bleeding, with no problem findings. Maintenance warfarin dosing was reviewed with patient and will remain on the same dose until next INR check. Patient did not have any questions or concerns.           Clinical Outcomes     Negatives:   Major bleeding event, Thromboembolic event, Anticoagulation-related hospital admission, Anticoagulation-related ED visit, Anticoagulation-related fatality    Comments:   The patient was assessed for diet, medication, and activity level changes, missed or extra doses, bruising or bleeding, with no problem findings. Maintenance warfarin dosing was reviewed with patient and will remain on the same dose until next INR check. Patient did not have any questions or concerns.              OBJECTIVE    Recent labs: (last 7 days)     20  1343   INR 2.10*       ASSESSMENT / PLAN  INR assessment THER    Recheck INR In: 2 WEEKS    INR Location Outside lab      Anticoagulation Summary  As of 2020    INR goal:   2.0-3.0   TTR:   76.5 % (1.6 mo)   INR used for dosin.10 (2020)   Warfarin maintenance plan:   10 mg (5 mg x 2) every Mon, Wed, Fri; 7.5 mg (5 mg x 1.5) all other days   Full warfarin instructions:   10 mg every Mon, Wed, Fri; 7.5 mg all other days   Weekly warfarin total:   60 mg   No change documented:   Chiqui Nowak RN   Plan last modified:   Chiqui Nowak RN (2020)   Next INR check:   2020   Priority:   High   Target end date:   Indefinite    Indications    Paroxysmal atrial fibrillation (H) [I48.0]  Long term current use of anticoagulants with INR  goal of 2.0-3.0 [Z79.01]             Anticoagulation Episode Summary     INR check location:       Preferred lab:       Send INR reminders to:   KENNEY DUENAS    Comments:   started amiodarone 6/10/20      Anticoagulation Care Providers     Provider Role Specialty Phone number    Yamilka Christina DO Referring Cardiology [phone]    Kelli Lewis CNP Referring Nurse Practitioner [phone]            See the Encounter Report to view Anticoagulation Flowsheet and Dosing Calendar (Go to Encounters tab in chart review, and find the Anticoagulation Therapy Visit)    Dosage adjustment made based on physician directed care plan.    Chiqui Nowak RN

## 2020-08-11 ENCOUNTER — VIRTUAL VISIT (OUTPATIENT)
Dept: INTERNAL MEDICINE | Facility: CLINIC | Age: 75
End: 2020-08-11
Payer: MEDICARE

## 2020-08-11 VITALS — DIASTOLIC BLOOD PRESSURE: 53 MMHG | BODY MASS INDEX: 45.43 KG/M2 | SYSTOLIC BLOOD PRESSURE: 101 MMHG | WEIGHT: 273 LBS

## 2020-08-11 DIAGNOSIS — I48.0 PAROXYSMAL ATRIAL FIBRILLATION (H): ICD-10-CM

## 2020-08-11 DIAGNOSIS — N18.30 TYPE 2 DIABETES MELLITUS WITH STAGE 3 CHRONIC KIDNEY DISEASE, WITHOUT LONG-TERM CURRENT USE OF INSULIN (H): Primary | ICD-10-CM

## 2020-08-11 DIAGNOSIS — F33.42 RECURRENT MAJOR DEPRESSIVE DISORDER, IN FULL REMISSION (H): ICD-10-CM

## 2020-08-11 DIAGNOSIS — E11.22 TYPE 2 DIABETES MELLITUS WITH STAGE 3 CHRONIC KIDNEY DISEASE, WITHOUT LONG-TERM CURRENT USE OF INSULIN (H): Primary | ICD-10-CM

## 2020-08-11 PROCEDURE — 99214 OFFICE O/P EST MOD 30 MIN: CPT | Mod: 95 | Performed by: INTERNAL MEDICINE

## 2020-08-11 RX ORDER — SERTRALINE HYDROCHLORIDE 100 MG/1
150 TABLET, FILM COATED ORAL EVERY MORNING
Qty: 135 TABLET | Refills: 1 | Status: SHIPPED | OUTPATIENT
Start: 2020-08-11 | End: 2021-08-09

## 2020-08-11 RX ORDER — WARFARIN SODIUM 5 MG/1
TABLET ORAL
Qty: 150 TABLET | Refills: 1 | Status: ON HOLD | OUTPATIENT
Start: 2020-08-11 | End: 2020-11-19

## 2020-08-11 NOTE — PROGRESS NOTES
"Amy Luna is a 75 year old female who is being evaluated via a billable video visit.      The patient has been notified of following:     \"This video visit will be conducted via a call between you and your physician/provider. We have found that certain health care needs can be provided without the need for an in-person physical exam.  This service lets us provide the care you need with a video conversation.  If a prescription is necessary we can send it directly to your pharmacy.  If lab work is needed we can place an order for that and you can then stop by our lab to have the test done at a later time.    Video visits are billed at different rates depending on your insurance coverage.  Please reach out to your insurance provider with any questions.    If during the course of the call the physician/provider feels a video visit is not appropriate, you will not be charged for this service.\"    Patient has given verbal consent for Video visit? Yes  How would you like to obtain your AVS? MyChart  If you are dropped from the video visit, the video invite should be resent to: Text to cell phone: .  Will anyone else be joining your video visit? No               This is a VIDEO ( using Doximity)  encounter with the patient.       Location of the provider : home   Location of the patient : home        10:31 -- 10:56          Dr Diaz's note      Patient's instructions / PLAN:                                                        Plan:  1. Take the iron along with the vitamin C  2. Continue same meds, same doses for now   3. Please make a lab appointment for non fasting labs in 3 months  4. Please make a video appointment few days after the labs to discuss about the results.         ASSESSMENT & PLAN:                                                      75-year-old woman with chronic medical problems:DM on insulin, morbid obesity,HTN, HLipidemia had extensive heart surgery in April for AVR, MVR, PFO closure, TV " repair, pacemaker atrial lead replaced.  Overall she feels stable, but still fatigue.  She had also postop anemia, for which she needs follow-up.  She was found with atrial fibrillation on the pacemaker records and she was started on amiodarone and Coumadin.       (E11.22,  N18.3) DM2 no insulin + CKD 3   (primary encounter diagnosis)  Comment: DM is Controlled  Cr is stable   Plan: Continue same meds, same doses for now   Monitor labs      (I48.0) Paroxysmal atrial fibrillation (H)  Comment: asymptomatic   Plan: warfarin ANTICOAGULANT (COUMADIN) 5 MG tablet        Continue same meds, same doses for now     (F33.42) Recurrent major depressive disorder, in full remission (H)  Comment: Controlled    Plan: sertraline (ZOLOFT) 100 MG tablet               Chief complaint:                                                      Follow up chronic medical problems   Labs      SUBJECTIVE:                                                    History of present illness:    Labs July 28 reviewed:    Anemia  -- Hgb 10.8<-- 9.7  -- taking Iron daily     DM  -- A1c 6.7   -- tolerates Metformin      CKD3  -- Creat 0.94 in Jan then she had heart surgery and Cr increased to 1.65.   -- Cr 1.26<-- 1.38<  1.65    Parox A fib - on pacemaker recording  -- she wants to know if she will continue it - yes  -- wants new Rx    Potassium   -- she has been taking OTC supplement  -- last K - 3.4    Zn  -- she wants to know if she needs to continue it  -- I told her to stop it    In the hospital she was told to stop Losartan and Glipizide. -- she doesn't need them at this time    HTN  -- BP at home 100-110   -- she doesn't need Losartan     D      LOV: Plan:  1.  Labs today - [address]   2. Please ask your eye doctor to send report to our chart regarding eye exam for diabetes - fax [phone]  3. Mammogram ( please call [phone] to schedule it)   4. No changes in meds  5. Follow up appointment in 2 weeks - video appointment   6. Clindamycin 600  mg 1 hour before dental appointment        Hyperlipidemia Follow-Up      Are you regularly taking any medication or supplement to lower your cholesterol?   Yes- lipitor    Are you having muscle aches or other side effects that you think could be caused by your cholesterol lowering medication?  No    Hypertension Follow-up      Do you check your blood pressure regularly outside of the clinic? Yes     Are you following a low salt diet? Yes    Are your blood pressures ever more than 140 on the top number (systolic) OR more   than 90 on the bottom number (diastolic), for example 140/90? No      How many servings of fruits and vegetables do you eat daily?  2-3    On average, how many sweetened beverages do you drink each day (Examples: soda, juice, sweet tea, etc.  Do NOT count diet or artificially sweetened beverages)?   0    How many days per week do you exercise enough to make your heart beat faster? 3 or less    How many minutes a day do you exercise enough to make your heart beat faster? 9 or less    How many days per week do you miss taking your medication? 0      Review of Systems:                                                      ROS: negative for fever, chills, cough, wheezes, chest pain, shortness of breath, vomiting, abdominal pain, leg swelling     A 10-point review of systems was obtained.  Those pertinent are above and in the in the Subjective section.  The rest of the systems are negative.           OBJECTIVE:           An actual physical exam can't be done during phone visit   A limited exam can sometimes be performed by video visit       PMHx: reviewed  Past Medical History:   Diagnosis Date     (HFpEF) heart failure with preserved ejection fraction (H)      Aortic stenosis      Chest pain      Depressive disorder      Diabetes (H)      Hyperlipidemia      Hypertension      Mitral annular calcification      Mitral stenosis      Obesity      Sleep apnea     CPAP      PSHx: reviewed  Past Surgical  History:   Procedure Laterality Date     APPENDECTOMY       CV CORONARY ANGIOGRAM N/A 2020    Procedure: Coronary Angiogram;  Surgeon: Inez Peoples MD;  Location:  [facility]     CV LEFT HEART CATH N/A 2020    Procedure: Left Heart Cath;  Surgeon: Inez Peoples MD;  Location:  [facility]     CV PFO CLOSURE N/A 4/15/2020    Procedure: Patent Foramen Ovale Closure;  Surgeon: Ok Wilson MD;  Location:  OR     CV RIGHT HEART CATH N/A 2020    Procedure: Right Heart Cath;  Surgeon: Inez Peoples MD;  Location:  [facility]     EP PACEMAKER N/A 2020    Procedure: EP Pacemaker;  Surgeon: Sohan Francis MD;  Location:  [facility]     GYN SURGERY       x2 ,      GYN SURGERY      total hysterectomy     IMPLANT PACEMAKER       REPAIR VALVE TRICUSPID N/A 4/15/2020    Procedure: REPAIR TRICUSPID VALVE WITH VILLA MC3 26MM.;  Surgeon: Ok Wilson MD;  Location:  OR     REPLACE VALVE AORTIC N/A 4/15/2020    Procedure: REPLACEMENT, AORTIC VALVE WITH INSPIRIS TISSUE VALVE 25 MM; ON PUMP WITH SOCORRO READ BY CARDIOLOGIST DR BLANTON.;  Surgeon: Ok Wilson MD;  Location:  OR     REPLACE VALVE MITRAL N/A 4/15/2020    Procedure: REPLACEMENT, MITRAL VALVE WITH EPIC TISSUE VALVE 27 MM.;  Surgeon: Ok Wilson MD;  Location:  OR        Meds: reviewed  Current Outpatient Medications   Medication Sig Dispense Refill     acetaminophen (TYLENOL) 325 MG tablet Take 2 tablets (650 mg) by mouth every 4 hours as needed for other (multimodal surgical pain management along with NSAIDS and opioid medication as indicated based on pain control and physical function.)  0     albuterol (PROAIR HFA/PROVENTIL HFA/VENTOLIN HFA) 108 (90 Base) MCG/ACT inhaler Inhale 2 puffs into the lungs every 4 hours as needed for shortness of breath / dyspnea or wheezing       amiodarone  (PACERONE) 200 MG tablet Take 1 tablet (200 mg) by mouth daily 90 tablet 3     atorvastatin (LIPITOR) 40 MG tablet Take 1 tablet (40 mg) by mouth At Bedtime 90 tablet 3     Biotin 31106 MCG TABS Take 1,000 mcg by mouth every morning        Calcium Carbonate-Vit D-Min (CALCIUM 1200 PO) Take 1 tablet by mouth every evening        coenzyme Q-10 200 MG CAPS Take 200 mg by mouth every morning        ferrous gluconate (FERGON) 324 (38 Fe) MG tablet Take 1 tablet (324 mg) by mouth daily (with breakfast) 30 tablet 3     furosemide (LASIX) 40 MG tablet Take 1 tablet (40 mg) by mouth 2 times daily Further dosing per PCP/ Cardiology 90 tablet 3     insulin NPH-Regular 70/30 (70-30) 100 UNIT/ML vial Inject 20-40 Units Subcutaneous 2 times daily (with meals) 72 mL 1     ipratropium (ATROVENT) 0.06 % nasal spray Spray 2 sprays into both nostrils 4 times daily as needed for rhinitis       Lutein 40 MG CAPS Take 40 mg by mouth every morning        Magnesium 400 MG TABS Take 400 mg by mouth every evening        metFORMIN (GLUCOPHAGE-XR) 500 MG 24 hr tablet Take 2 tablets (1,000 mg) by mouth daily (with breakfast) (takes 2 x 500mg) 90 tablet 3     metoprolol succinate ER (TOPROL-XL) 25 MG 24 hr tablet Take 2 tablets (50 mg) by mouth daily 90 tablet 3     potassium aminobenzoate 500 MG CAPS capsule Take 99 mg by mouth Patient taking tablet       Propylene Glycol (SYSTANE BALANCE OP) Apply 1-2 drops to eye 3 times daily as needed (dry eyes)       sertraline (ZOLOFT) 100 MG tablet Take 150 mg by mouth every morning (takes 1.5 x 100mg)       TURMERIC PO Take 1-3 tablets by mouth daily        vitamin (B COMPLEX-C) tablet Take 1 tablet by mouth daily       vitamin C (ASCORBIC ACID) 1000 MG TABS Take 1,000 mg by mouth every evening        Vitamin D3 (CHOLECALCIFEROL) 25 mcg (1000 units) tablet Take 1 tablet (25 mcg) by mouth daily  0     warfarin ANTICOAGULANT (COUMADIN) 5 MG tablet Take 2 tablets (10 mg) by mouth on Mon, Wed, Fri. Take 1  and 1/2 tablet (7.5 mg) on Sun, Tue, Thur and Sat or as instructed by INR Clinic. 150 tablet 1     zinc 23 MG LOZG lozenge Place 1 lozenge inside cheek 4 times daily         Soc Hx: reviewed  Fam Hx: reviewed          She Diaz MD  Internal Medicine

## 2020-08-13 DIAGNOSIS — Z12.11 SPECIAL SCREENING FOR MALIGNANT NEOPLASMS, COLON: ICD-10-CM

## 2020-08-13 PROCEDURE — 82274 ASSAY TEST FOR BLOOD FECAL: CPT | Performed by: INTERNAL MEDICINE

## 2020-08-14 ENCOUNTER — HOSPITAL ENCOUNTER (OUTPATIENT)
Dept: MAMMOGRAPHY | Facility: CLINIC | Age: 75
Discharge: HOME | End: 2020-08-14
Attending: INTERNAL MEDICINE | Admitting: INTERNAL MEDICINE
Payer: MEDICARE

## 2020-08-14 DIAGNOSIS — Z12.31 ENCOUNTER FOR SCREENING MAMMOGRAM FOR BREAST CANCER: ICD-10-CM

## 2020-08-14 PROCEDURE — 77063 BREAST TOMOSYNTHESIS BI: CPT

## 2020-08-16 LAB — HEMOCCULT STL QL IA: NEGATIVE

## 2020-08-20 ENCOUNTER — ANTICOAGULATION THERAPY VISIT (OUTPATIENT)
Dept: ANTICOAGULATION | Facility: CLINIC | Age: 75
End: 2020-08-20

## 2020-08-20 DIAGNOSIS — Z79.01 LONG TERM CURRENT USE OF ANTICOAGULANTS WITH INR GOAL OF 2.0-3.0: ICD-10-CM

## 2020-08-20 DIAGNOSIS — I48.0 PAROXYSMAL ATRIAL FIBRILLATION (H): ICD-10-CM

## 2020-08-20 LAB
CAPILLARY BLOOD COLLECTION: NORMAL
INR PPP: 3.9 (ref 0.86–1.14)

## 2020-08-20 PROCEDURE — 36416 COLLJ CAPILLARY BLOOD SPEC: CPT | Performed by: NURSE PRACTITIONER

## 2020-08-20 PROCEDURE — 99207 ZZC NO CHARGE NURSE ONLY: CPT

## 2020-08-20 PROCEDURE — 85610 PROTHROMBIN TIME: CPT | Performed by: NURSE PRACTITIONER

## 2020-08-20 NOTE — PROGRESS NOTES
ANTICOAGULATION FOLLOW-UP CLINIC VISIT    Patient Name:  Amy Luna  Date:  8/20/2020  Contact Type:  Telephone/ Called patient, she reports diet change, she denies any other changes. Orders discussed with the patient, she agrees with the plan. Lab INR appointment scheduled on 8/31/01    SUBJECTIVE:  Patient Findings     Positives:   Change in diet/appetite (Patient reports decreased green veggie intake, patient will resume usual diet. )    Comments:   The patient was assessed for diet, medication, and activity level changes, missed or extra doses, bruising or bleeding, with no problem findings. Patient has been taking amiodarone since 6/10/20. INR had been in low 2 range, today jumped up to 3.9, patient denies any changes that would spike INR. Will decrease maintenance dose by 4.2% (2.5 mg) today.  Signs and symptoms of bleeding discussed with patient:   nose bleed lasting longer than 10 minutes, bleeding from gums, vomiting or coughing up blood, large or unexplained bruises,   blood in stool/black stools, red or orange urine, have INR checked sooner and if any questions arise, call the clinic. If symptoms are severe, patient should report to the ED.          Clinical Outcomes     Comments:   The patient was assessed for diet, medication, and activity level changes, missed or extra doses, bruising or bleeding, with no problem findings. Patient has been taking amiodarone since 6/10/20. INR had been in low 2 range, today jumped up to 3.9, patient denies any changes that would spike INR. Will decrease maintenance dose by 4.2% (2.5 mg) today.  Signs and symptoms of bleeding discussed with patient:   nose bleed lasting longer than 10 minutes, bleeding from gums, vomiting or coughing up blood, large or unexplained bruises,   blood in stool/black stools, red or orange urine, have INR checked sooner and if any questions arise, call the clinic. If symptoms are severe, patient should report to the ED.              OBJECTIVE    Recent labs: (last 7 days)     08/20/20  1426   INR 3.90*       ASSESSMENT / PLAN  INR assessment SUPRA    Recheck INR In: 10 DAYS    INR Location Outside lab      Anticoagulation Summary  As of 8/20/2020    INR goal:   2.0-3.0   TTR:   70.5 % (2 mo)   INR used for dosing:   3.90! (8/20/2020)   Warfarin maintenance plan:   10 mg (5 mg x 2) every Mon, Thu; 7.5 mg (5 mg x 1.5) all other days   Full warfarin instructions:   8/20: Hold; 8/21: 7.5 mg; Otherwise 10 mg every Mon, Thu; 7.5 mg all other days   Weekly warfarin total:   57.5 mg   Plan last modified:   Chiqui Nowak RN (8/20/2020)   Next INR check:   8/31/2020   Priority:   High   Target end date:   Indefinite    Indications    Paroxysmal atrial fibrillation (H) [I48.0]  Long term current use of anticoagulants with INR goal of 2.0-3.0 [Z79.01]             Anticoagulation Episode Summary     INR check location:       Preferred lab:       Send INR reminders to:   [facility]    Comments:   started amiodarone 6/10/20      Anticoagulation Care Providers     Provider Role Specialty Phone number    Yamilka Christina DO Referring Cardiology [phone]    Kelli Lewis CNP Referring Nurse Practitioner [phone]            See the Encounter Report to view Anticoagulation Flowsheet and Dosing Calendar (Go to Encounters tab in chart review, and find the Anticoagulation Therapy Visit)    Dosage adjustment made based on physician directed care plan.    Chiqui Nowak, TARI

## 2020-08-26 DIAGNOSIS — J30.2 SEASONAL ALLERGIC RHINITIS, UNSPECIFIED TRIGGER: Primary | ICD-10-CM

## 2020-08-27 RX ORDER — IPRATROPIUM BROMIDE 42 UG/1
2 SPRAY, METERED NASAL 4 TIMES DAILY PRN
Qty: 3 BOX | Refills: 1 | Status: SHIPPED | OUTPATIENT
Start: 2020-08-27

## 2020-08-27 NOTE — TELEPHONE ENCOUNTER
Pending Prescriptions:                       Disp   Refills    ipratropium (ATROVENT) 0.06 % nasal spray                  Sig: Spray 2 sprays into both nostrils 4 times daily as           needed for rhinitis    Routing refill request to provider for review/approval because:  Medication is reported/historical

## 2020-08-27 NOTE — TELEPHONE ENCOUNTER
rx signed. I haven't prescribed it in the past   If it is not covered, patient needs to call insurance and find out which equivalent is covered.

## 2020-08-31 ENCOUNTER — ANTICOAGULATION THERAPY VISIT (OUTPATIENT)
Dept: ANTICOAGULATION | Facility: CLINIC | Age: 75
End: 2020-08-31

## 2020-08-31 DIAGNOSIS — I48.0 PAROXYSMAL ATRIAL FIBRILLATION (H): ICD-10-CM

## 2020-08-31 DIAGNOSIS — Z79.01 LONG TERM CURRENT USE OF ANTICOAGULANTS WITH INR GOAL OF 2.0-3.0: ICD-10-CM

## 2020-08-31 LAB
CAPILLARY BLOOD COLLECTION: NORMAL
INR PPP: 2.1 (ref 0.86–1.14)

## 2020-08-31 PROCEDURE — 36416 COLLJ CAPILLARY BLOOD SPEC: CPT | Performed by: NURSE PRACTITIONER

## 2020-08-31 PROCEDURE — 99207 ZZC NO CHARGE NURSE ONLY: CPT

## 2020-08-31 PROCEDURE — 85610 PROTHROMBIN TIME: CPT | Performed by: NURSE PRACTITIONER

## 2020-08-31 NOTE — PROGRESS NOTES
ANTICOAGULATION FOLLOW-UP CLINIC VISIT    Patient Name:  Amy Luna  Date:  2020  Contact Type:  Telephone/ Called patient, she reports missed dose, denies any other changes. Orders discussed with the patient, she agrees with the plan. Lab INR appointment scheduled on 20.    SUBJECTIVE:  Patient Findings     Positives:   Missed doses (Patient reports miss warfarin dose 20.)    Comments:   The patient was assessed for diet, medication, and activity level changes, missed or extra doses, bruising or bleeding, with no problem findings. Maintenance warfarin dosing was reviewed with patient and will remain on the same dose until next INR check. Patient did not have any questions or concerns.             Clinical Outcomes     Comments:   The patient was assessed for diet, medication, and activity level changes, missed or extra doses, bruising or bleeding, with no problem findings. Maintenance warfarin dosing was reviewed with patient and will remain on the same dose until next INR check. Patient did not have any questions or concerns.                OBJECTIVE    Recent labs: (last 7 days)     20  1333   INR 2.10*       ASSESSMENT / PLAN  INR assessment THER    Recheck INR In: 2 WEEKS    INR Location Outside lab      Anticoagulation Summary  As of 2020    INR goal:   2.0-3.0   TTR:   67.4 % (2.4 mo)   INR used for dosin.10 (2020)   Warfarin maintenance plan:   10 mg (5 mg x 2) every Mon, Thu; 7.5 mg (5 mg x 1.5) all other days   Full warfarin instructions:   10 mg every Mon, Thu; 7.5 mg all other days   Weekly warfarin total:   57.5 mg   No change documented:   Chiqui Nowak RN   Plan last modified:   Chiqui Nowak RN (2020)   Next INR check:   2020   Priority:   High   Target end date:   Indefinite    Indications    Paroxysmal atrial fibrillation (H) [I48.0]  Long term current use of anticoagulants with INR goal of 2.0-3.0 [Z79.01]             Anticoagulation Episode  Summary     INR check location:       Preferred lab:       Send INR reminders to:   [facility]    Comments:   started amiodarone 6/10/20      Anticoagulation Care Providers     Provider Role Specialty Phone number    Yamilka Christina DO Referring Cardiology [phone]    Kelli Lewis CNP Referring Nurse Practitioner [phone]            See the Encounter Report to view Anticoagulation Flowsheet and Dosing Calendar (Go to Encounters tab in chart review, and find the Anticoagulation Therapy Visit)    Dosage adjustment made based on physician directed care plan.    Chiqui Nowak RN

## 2020-09-04 DIAGNOSIS — Z95.0 CARDIAC PACEMAKER IN SITU: Primary | ICD-10-CM

## 2020-09-04 DIAGNOSIS — I44.30 AV BLOCK: ICD-10-CM

## 2020-09-08 ENCOUNTER — TELEPHONE (OUTPATIENT)
Dept: INTERNAL MEDICINE | Facility: CLINIC | Age: 75
End: 2020-09-08

## 2020-09-08 NOTE — TELEPHONE ENCOUNTER
Patient called to inform [facility] RN that she fell this past Sunday (9/6/20). She did not hit her head but did come down hard on the edge of a brick on her right posterior ribs. Both knees and her right ankle are also quite painful. Skin was not broken and patient has not noticed any bruising, but she continues to be in quite a bit of pain. Advised she should be seen by her PCP or a covering provider. Patient stated understanding and may see a chiropractor. Previous INR was within therapeutic range and next INR is scheduled for 9/14/20. Offered to move it up if she was interested, but patient declined.     Routing to [place] [facility] RN as BALDOMERO RAMSEY RN  Anticoagulation Clinic  [place]

## 2020-09-14 ENCOUNTER — ANTICOAGULATION THERAPY VISIT (OUTPATIENT)
Dept: ANTICOAGULATION | Facility: CLINIC | Age: 75
End: 2020-09-14

## 2020-09-14 DIAGNOSIS — I48.0 PAROXYSMAL ATRIAL FIBRILLATION (H): ICD-10-CM

## 2020-09-14 DIAGNOSIS — Z79.01 LONG TERM CURRENT USE OF ANTICOAGULANTS WITH INR GOAL OF 2.0-3.0: ICD-10-CM

## 2020-09-14 LAB
CAPILLARY BLOOD COLLECTION: NORMAL
INR PPP: 3.6 (ref 0.86–1.14)

## 2020-09-14 PROCEDURE — 85610 PROTHROMBIN TIME: CPT | Performed by: NURSE PRACTITIONER

## 2020-09-14 PROCEDURE — 99207 ZZC NO CHARGE NURSE ONLY: CPT

## 2020-09-14 PROCEDURE — 36416 COLLJ CAPILLARY BLOOD SPEC: CPT | Performed by: NURSE PRACTITIONER

## 2020-09-14 NOTE — PROGRESS NOTES
ANTICOAGULATION FOLLOW-UP CLINIC VISIT    Patient Name:  Amy Luna  Date:  9/14/2020  Contact Type:  Telephone/ Called patient, she reports diet change, she denies any other changes. Orders discussed with the patient, she agrees with the plan. Lab INR appointment scheduled on 9/23/20.    SUBJECTIVE:  Patient Findings     Positives:   Change in diet/appetite (Patient reports she will increase her green veggie intake to 2x a week. ), Bruising (Patient reports bruising on ribs from a fall 9/6/20 has improved some. )    Comments:   The patient was assessed for diet, medication, and activity level changes, missed or extra doses, bruising or bleeding, with no problem findings. Patient denies any identifiable changes that caused the supratherapeutic INR. Signs and symptoms of bleeding discussed with patient:   nose bleed lasting longer than 10 minutes, bleeding from gums, vomiting or coughing up blood, large or unexplained bruises,   blood in stool/black stools, red or orange urine, have INR checked sooner and if any questions arise, call the clinic. If symptoms are severe, patient should report to the ED.              Clinical Outcomes     Comments:   The patient was assessed for diet, medication, and activity level changes, missed or extra doses, bruising or bleeding, with no problem findings. Patient denies any identifiable changes that caused the supratherapeutic INR. Signs and symptoms of bleeding discussed with patient:   nose bleed lasting longer than 10 minutes, bleeding from gums, vomiting or coughing up blood, large or unexplained bruises,   blood in stool/black stools, red or orange urine, have INR checked sooner and if any questions arise, call the clinic. If symptoms are severe, patient should report to the ED.                 OBJECTIVE    Recent labs: (last 7 days)     09/14/20  1043   INR 3.60*       ASSESSMENT / PLAN  INR assessment SUPRA    Recheck INR In: 9 DAYS    INR Location Outside lab       Anticoagulation Summary  As of 9/14/2020    INR goal:   2.0-3.0   TTR:   66.2 % (2.9 mo)   INR used for dosing:   3.60! (9/14/2020)   Warfarin maintenance plan:   10 mg (5 mg x 2) every Mon, Thu; 7.5 mg (5 mg x 1.5) all other days   Full warfarin instructions:   9/14: 5 mg; Otherwise 10 mg every Mon, Thu; 7.5 mg all other days   Weekly warfarin total:   57.5 mg   Plan last modified:   Chiqui Nowak RN (8/20/2020)   Next INR check:   9/23/2020   Priority:   High   Target end date:   Indefinite    Indications    Paroxysmal atrial fibrillation (H) [I48.0]  Long term current use of anticoagulants with INR goal of 2.0-3.0 [Z79.01]             Anticoagulation Episode Summary     INR check location:       Preferred lab:       Send INR reminders to:   [facility]    Comments:   started amiodarone 6/10/20      Anticoagulation Care Providers     Provider Role Specialty Phone number    Yamilka Christina DO Referring Cardiology [phone]    Kelli Lewis CNP Referring Nurse Practitioner [phone]            See the Encounter Report to view Anticoagulation Flowsheet and Dosing Calendar (Go to Encounters tab in chart review, and find the Anticoagulation Therapy Visit)    Dosage adjustment made based on physician directed care plan.    Chiqui Nowak, TARI

## 2020-09-15 ENCOUNTER — ANCILLARY PROCEDURE (OUTPATIENT)
Dept: CARDIOLOGY | Facility: CLINIC | Age: 75
End: 2020-09-15
Attending: INTERNAL MEDICINE
Payer: MEDICARE

## 2020-09-15 DIAGNOSIS — Z95.0 CARDIAC PACEMAKER IN SITU: ICD-10-CM

## 2020-09-15 DIAGNOSIS — I44.30 AV BLOCK: ICD-10-CM

## 2020-09-16 ENCOUNTER — OFFICE VISIT (OUTPATIENT)
Dept: CARDIOLOGY | Facility: CLINIC | Age: 75
End: 2020-09-16
Attending: INTERNAL MEDICINE
Payer: MEDICARE

## 2020-09-16 ENCOUNTER — DOCUMENTATION ONLY (OUTPATIENT)
Dept: CARDIOLOGY | Facility: CLINIC | Age: 75
End: 2020-09-16

## 2020-09-16 VITALS
HEIGHT: 65 IN | WEIGHT: 265 LBS | DIASTOLIC BLOOD PRESSURE: 56 MMHG | SYSTOLIC BLOOD PRESSURE: 116 MMHG | BODY MASS INDEX: 44.15 KG/M2

## 2020-09-16 DIAGNOSIS — Z95.3 S/P MITRAL VALVE REPLACEMENT WITH BIOPROSTHETIC VALVE: ICD-10-CM

## 2020-09-16 DIAGNOSIS — I48.0 PAROXYSMAL ATRIAL FIBRILLATION (H): Primary | ICD-10-CM

## 2020-09-16 DIAGNOSIS — D62 ACUTE BLOOD LOSS ANEMIA: ICD-10-CM

## 2020-09-16 DIAGNOSIS — Z95.0 CARDIAC PACEMAKER IN SITU: ICD-10-CM

## 2020-09-16 DIAGNOSIS — I10 ESSENTIAL HYPERTENSION: ICD-10-CM

## 2020-09-16 DIAGNOSIS — I07.1 TRICUSPID VALVE INSUFFICIENCY, UNSPECIFIED ETIOLOGY: ICD-10-CM

## 2020-09-16 DIAGNOSIS — Z95.2 S/P AORTIC VALVE AND MITRAL VALVE REPLACEMENT: ICD-10-CM

## 2020-09-16 DIAGNOSIS — Z95.2 S/P AVR (AORTIC VALVE REPLACEMENT): ICD-10-CM

## 2020-09-16 PROCEDURE — 93000 ELECTROCARDIOGRAM COMPLETE: CPT | Performed by: INTERNAL MEDICINE

## 2020-09-16 PROCEDURE — 99214 OFFICE O/P EST MOD 30 MIN: CPT | Performed by: INTERNAL MEDICINE

## 2020-09-16 ASSESSMENT — MIFFLIN-ST. JEOR: SCORE: 1702.91

## 2020-09-16 NOTE — LETTER
9/16/2020    [facility] [place] [facility] [address] [place] [address]    RE: Amy Luna       Dear Colleague,    I had the pleasure of seeing Amy Luna in the [facility].    PHYSICIAN NOTE:  This visit was completed in person at the [facility].      I had the pleasure evaluating Mrs. SHANNAN Luna today.  The patient is a very pleasant 74-year-old female with the following chronic medical issues:  1. Valvular heart disease.  MVR with 27 mm Saint Juan epic porcine bioprosthetic valve, AVR with 25 mm Rubi bovine pericardial valve and tricuspid valve repair with 26 mm annuloplasty ring by Dr. Wilson on 4/15/2020.  PFO closure at the same time.  2. AV block with pacemaker implantation in 06/2015 ([employer]tr[employer]).  Atrial lead malfunction after cardiac surgery.  New atrial lead implanted on 4/20/2020.  3. Paroxysmal atrial fibrillation starting after cardiac surgery.  80% burden in 06/2020.  Amiodarone and warfarin were started.   4. Severe obesity.  LAVERNE.   5. Hypertension.     Ms. Luna is doing quite well.  She has recovered nicely after her major cardiac surgery in April.  Dr. Christina started the patient on warfarin and amiodarone on Amanda 10 as her AF burden sharply increased in the 2 weeks prior to the appointment.  The patient apparently experienced some palpitation, though no severe symptoms.      PHYSICAL EXAMINATION:  General:  in no apparent distress, very pleasant lady.   ENT/Mouth:  no nasal discharge.  Eyes:  normal conjunctivae.  No jaundice.  Neck:  no thyromegaly or lymphadenopathy.  Chest/Lungs:  patient is not dyspneic.  Lungs CTA, without rales or wheezing.    Cardiovascular:  ?JVP, rate is regular. No gallop 1/6 syst murmur at base.    Abdomen:  Very obese  Extremities:  Trace LE edema.   Skin:  PM incision has healed well.   Neurologic:  alert & oriented x 3.  Normal arm motion bilateral, no tremors.    Vascular:  1+ carotids without  bruits.  1+ radials.        DIAGNOSTIC STUDIES:  Device interrogation yesterday showed 0 AF burden from July to Sept. 15th.      IMPRESSION:  1. Paroxysmal atrial fibrillation following cardiac surgery.  The arrhythmia burden was high in early June.  On po amiodarone Marita Delgado scores went up on his home and mom, it dropped to 0.  The patient is appropriately on anticoagulation with warfarin.  INRs have been therapeutic.  2. It is unclear whether AF will be a transient rhythm disturbance following surgery or a longstanding issue going forward.  Time will tell.  Of note, Ms. Luna has multiple significant risk factors for AF, so I believe the latter scenario is more likely.    RECOMMENDATIONS:  1. Stop amiodarone on October 15.  2. Interrogate device in mid November.  We will keep monitoring AF burden via the device.  3. If she develops recurrent atrial fibrillation off amiodarone, I would recommend rate control strategy, unless she has significant symptoms related to the arrhythmia.    It was my pleasure seeing this delightful patient.  I would be happy to see her again in the future if she has recurrent atrial fibrillation that does not respond well to rate control.    Juvenal Bustillo MD, [facility]        Orders Placed This Encounter   Procedures     EKG 12-lead complete w/read - Clinics (performed today)       No orders of the defined types were placed in this encounter.      There are no discontinued medications.      Encounter Diagnoses   Name Primary?     Paroxysmal atrial fibrillation (H)      S/P mitral valve replacement with bioprosthetic valve      S/P aortic valve and mitral valve replacement      S/P AVR (aortic valve replacement)      Tricuspid valve insufficiency, unspecified etiology      Cardiac pacemaker in situ      Acute blood loss anemia      Essential hypertension        CURRENT MEDICATIONS:  Current Outpatient Medications   Medication Sig Dispense Refill     acetaminophen (TYLENOL) 325 MG  tablet Take 2 tablets (650 mg) by mouth every 4 hours as needed for other (multimodal surgical pain management along with NSAIDS and opioid medication as indicated based on pain control and physical function.)  0     albuterol (PROAIR HFA/PROVENTIL HFA/VENTOLIN HFA) 108 (90 Base) MCG/ACT inhaler Inhale 2 puffs into the lungs every 4 hours as needed for shortness of breath / dyspnea or wheezing       amiodarone (PACERONE) 200 MG tablet Take 1 tablet (200 mg) by mouth daily 90 tablet 3     atorvastatin (LIPITOR) 40 MG tablet Take 1 tablet (40 mg) by mouth At Bedtime 90 tablet 3     Biotin 74446 MCG TABS Take 1,000 mcg by mouth every morning        Calcium Carbonate-Vit D-Min (CALCIUM 1200 PO) Take 1 tablet by mouth every evening        coenzyme Q-10 200 MG CAPS Take 200 mg by mouth every morning        ferrous gluconate (FERGON) 324 (38 Fe) MG tablet Take 1 tablet (324 mg) by mouth daily (with breakfast) 30 tablet 3     furosemide (LASIX) 40 MG tablet Take 1 tablet (40 mg) by mouth 2 times daily Further dosing per PCP/ Cardiology 90 tablet 3     insulin NPH-Regular 70/30 (70-30) 100 UNIT/ML vial Inject 20-40 Units Subcutaneous 2 times daily (with meals) 72 mL 1     ipratropium (ATROVENT) 0.06 % nasal spray Spray 2 sprays into both nostrils 4 times daily as needed for rhinitis 3 Box 1     Lutein 40 MG CAPS Take 40 mg by mouth every morning        Magnesium 400 MG TABS Take 400 mg by mouth every evening        metFORMIN (GLUCOPHAGE-XR) 500 MG 24 hr tablet Take 2 tablets (1,000 mg) by mouth daily (with breakfast) (takes 2 x 500mg) 90 tablet 3     metoprolol succinate ER (TOPROL-XL) 25 MG 24 hr tablet Take 2 tablets (50 mg) by mouth daily 90 tablet 3     potassium aminobenzoate 500 MG CAPS capsule Take 99 mg by mouth Patient taking tablet       Propylene Glycol (SYSTANE BALANCE OP) Apply 1-2 drops to eye 3 times daily as needed (dry eyes)       sertraline (ZOLOFT) 100 MG tablet Take 1.5 tablets (150 mg) by mouth every  morning (takes 1.5 x 100mg) 135 tablet 1     TURMERIC PO Take 1-3 tablets by mouth daily        vitamin (B COMPLEX-C) tablet Take 1 tablet by mouth daily       vitamin C (ASCORBIC ACID) 1000 MG TABS Take 1,000 mg by mouth every evening        Vitamin D3 (CHOLECALCIFEROL) 25 mcg (1000 units) tablet Take 1 tablet (25 mcg) by mouth daily  0     warfarin ANTICOAGULANT (COUMADIN) 5 MG tablet Take 2 tablets (10 mg) by mouth on Mon, Wed, Fri. Take 1 and 1/2 tablet (7.5 mg) on Sun, Tue, Thur and Sat or as instructed by INR Clinic. 150 tablet 1     zinc 23 MG LOZG lozenge Place 1 lozenge inside cheek 4 times daily         ALLERGIES     Allergies   Allergen Reactions     Penicillins Hives     3 weeks after taking med got hives.       Pioglitazone Other (See Comments)     Increased urinary frequency, fatigue, dyspnea       PAST MEDICAL HISTORY:  Past Medical History:   Diagnosis Date     (HFpEF) heart failure with preserved ejection fraction (H)      Aortic stenosis      Chest pain      Depressive disorder      Diabetes (H)      Hyperlipidemia      Hypertension      Mitral annular calcification      Mitral stenosis      Obesity      Sleep apnea     CPAP       PAST SURGICAL HISTORY:  Past Surgical History:   Procedure Laterality Date     APPENDECTOMY       CV CORONARY ANGIOGRAM N/A 4/8/2020    Procedure: Coronary Angiogram;  Surgeon: Inez Peoples MD;  Location:  HEART CARDIAC CATH LAB     CV LEFT HEART CATH N/A 4/8/2020    Procedure: Left Heart Cath;  Surgeon: Inez Peoples MD;  Location:  HEART CARDIAC CATH LAB     CV PFO CLOSURE N/A 4/15/2020    Procedure: Patent Foramen Ovale Closure;  Surgeon: Ok Wilson MD;  Location:  OR     CV RIGHT HEART CATH N/A 4/8/2020    Procedure: Right Heart Cath;  Surgeon: Inez Peoples MD;  Location:  HEART CARDIAC CATH LAB     EP PACEMAKER N/A 4/20/2020    Procedure: EP Pacemaker;  Surgeon: Sohan Francis MD;  Location:  HEART CARDIAC  CATH LAB     GYN SURGERY       x2 ,      GYN SURGERY      total hysterectomy     IMPLANT PACEMAKER       REPAIR VALVE TRICUSPID N/A 4/15/2020    Procedure: REPAIR TRICUSPID VALVE WITH VILLA MC3 26MM.;  Surgeon: Ok Wilson MD;  Location: [facility] OR     REPLACE VALVE AORTIC N/A 4/15/2020    Procedure: REPLACEMENT, AORTIC VALVE WITH INSPIRIS TISSUE VALVE 25 MM; ON PUMP WITH SOCORRO READ BY CARDIOLOGIST DR BLANTON.;  Surgeon: Ok Wilson MD;  Location: [facility] OR     REPLACE VALVE MITRAL N/A 4/15/2020    Procedure: REPLACEMENT, MITRAL VALVE WITH EPIC TISSUE VALVE 27 MM.;  Surgeon: Ok Wilson MD;  Location: [facility] OR       FAMILY HISTORY:  Family History   Problem Relation Age of Onset     Diabetes Mother 56     Congenital heart disease Father 23       SOCIAL HISTORY:  Social History     Socioeconomic History     Marital status:      Spouse name: Not on file     Number of children: Not on file     Years of education: Not on file     Highest education level: Not on file   Occupational History     Not on file   Social Needs     Financial resource strain: Not on file     Food insecurity     Worry: Not on file     Inability: Not on file     Transportation needs     Medical: Not on file     Non-medical: Not on file   Tobacco Use     Smoking status: Never Smoker     Smokeless tobacco: Never Used   Substance and Sexual Activity     Alcohol use: No     Drug use: No     Sexual activity: Not on file   Lifestyle     Physical activity     Days per week: Not on file     Minutes per session: Not on file     Stress: Not on file   Relationships     Social connections     Talks on phone: Not on file     Gets together: Not on file     Attends Latter-day service: Not on file     Active member of club or organization: Not on file     Attends meetings of clubs or organizations: Not on file     Relationship status: Not on file     Intimate partner violence     Fear of current or ex partner: Not  "on file     Emotionally abused: Not on file     Physically abused: Not on file     Forced sexual activity: Not on file   Other Topics Concern     Not on file   Social History Narrative     Not on file       Review of Systems:  Skin:  Negative       Eyes:  Positive for glasses    ENT:  Negative      Respiratory:  Positive for shortness of breath;dyspnea on exertion     Cardiovascular:    edema;Positive for;fatigue    Gastroenterology: Negative      Genitourinary:  Negative      Musculoskeletal:  Positive for arthritis;back pain    Neurologic:  Negative      Psychiatric:  Positive for depression had an episode of memory loss wtih confusion  Heme/Lymph/Imm:  Negative      Endocrine:  Positive for diabetes      Physical Exam:  Vitals: Ht 1.651 m (5' 5\")   BMI 45.43 kg/m      Constitutional:           Skin:             Head:           Eyes:           Lymph:      ENT:           Neck:           Respiratory:            Cardiac:                                                           GI:           Extremities and Muscular Skeletal:                 Neurological:           Psych:           Thank you for allowing me to participate in the care of your patient.    Sincerely,     Juvenal Bustillo MD     [facility]  "

## 2020-09-16 NOTE — PROGRESS NOTES
PHYSICIAN NOTE:  This visit was completed in person at the [facility].      I had the pleasure evaluating Mrs. SHANNAN Luna today.  The patient is a very pleasant 74-year-old female with the following chronic medical issues:  1. Valvular heart disease.  MVR with 27 mm Saint Juan epic porcine bioprosthetic valve, AVR with 25 mm Rubi bovine pericardial valve and tricuspid valve repair with 26 mm annuloplasty ring by Dr. Wilson on 4/15/2020.  PFO closure at the same time.  2. AV block with pacemaker implantation in 06/2015 ([employer]tr[employer]).  Atrial lead malfunction after cardiac surgery.  New atrial lead implanted on 4/20/2020.  3. Paroxysmal atrial fibrillation starting after cardiac surgery.  80% burden in 06/2020.  Amiodarone and warfarin were started.   4. Severe obesity.  LAVERNE.   5. Hypertension.     Ms. Luna is doing quite well.  She has recovered nicely after her major cardiac surgery in April.  Dr. Christina started the patient on warfarin and amiodarone on Amanda 10 as her AF burden sharply increased in the 2 weeks prior to the appointment.  The patient apparently experienced some palpitation, though no severe symptoms.      PHYSICAL EXAMINATION:  General:  in no apparent distress, very pleasant lady.   ENT/Mouth:  no nasal discharge.  Eyes:  normal conjunctivae.  No jaundice.  Neck:  no thyromegaly or lymphadenopathy.  Chest/Lungs:  patient is not dyspneic.  Lungs CTA, without rales or wheezing.    Cardiovascular:  ?JVP, rate is regular. No gallop 1/6 syst murmur at base.    Abdomen:  Very obese  Extremities:  Trace LE edema.   Skin:  PM incision has healed well.   Neurologic:  alert & oriented x 3.  Normal arm motion bilateral, no tremors.    Vascular:  1+ carotids without bruits.  1+ radials.        DIAGNOSTIC STUDIES:  Device interrogation yesterday showed 0 AF burden from July to Sept. 15th.      IMPRESSION:  1. Paroxysmal atrial fibrillation following cardiac surgery.  The arrhythmia burden was  high in early June.  On po amiodarone it has dropped to 0.  The patient is appropriately on anticoagulation with warfarin.  INRs have been therapeutic.  2. It is unclear whether AF will be a transient rhythm disturbance following cardiac surgery or a long-term issue going forward.  Time will tell.  Ms. Luna has multiple significant risk factors for AF, so I believe that recurrence of AF is likely.    RECOMMENDATIONS:  1. Stop amiodarone on October 15.  2. Interrogate device in mid November.  We will keep monitoring AF burden via the device.  3. If she develops recurrent atrial fibrillation off amiodarone, I would recommend rate control strategy, unless she has significant symptoms related to the arrhythmia.    It was my pleasure seeing this delightful patient.  I would be happy to see her again in the future if she has recurrent atrial fibrillation that does not respond to rate control.    Juvenal Bustillo MD, [facility]        Orders Placed This Encounter   Procedures     EKG 12-lead complete w/read - Clinics (performed today)       No orders of the defined types were placed in this encounter.      There are no discontinued medications.      Encounter Diagnoses   Name Primary?     Paroxysmal atrial fibrillation (H)      S/P mitral valve replacement with bioprosthetic valve      S/P aortic valve and mitral valve replacement      S/P AVR (aortic valve replacement)      Tricuspid valve insufficiency, unspecified etiology      Cardiac pacemaker in situ      Acute blood loss anemia      Essential hypertension        CURRENT MEDICATIONS:  Current Outpatient Medications   Medication Sig Dispense Refill     acetaminophen (TYLENOL) 325 MG tablet Take 2 tablets (650 mg) by mouth every 4 hours as needed for other (multimodal surgical pain management along with NSAIDS and opioid medication as indicated based on pain control and physical function.)  0     albuterol (PROAIR HFA/PROVENTIL HFA/VENTOLIN HFA) 108 (90 Base) MCG/ACT  inhaler Inhale 2 puffs into the lungs every 4 hours as needed for shortness of breath / dyspnea or wheezing       amiodarone (PACERONE) 200 MG tablet Take 1 tablet (200 mg) by mouth daily 90 tablet 3     atorvastatin (LIPITOR) 40 MG tablet Take 1 tablet (40 mg) by mouth At Bedtime 90 tablet 3     Biotin 12301 MCG TABS Take 1,000 mcg by mouth every morning        Calcium Carbonate-Vit D-Min (CALCIUM 1200 PO) Take 1 tablet by mouth every evening        coenzyme Q-10 200 MG CAPS Take 200 mg by mouth every morning        ferrous gluconate (FERGON) 324 (38 Fe) MG tablet Take 1 tablet (324 mg) by mouth daily (with breakfast) 30 tablet 3     furosemide (LASIX) 40 MG tablet Take 1 tablet (40 mg) by mouth 2 times daily Further dosing per PCP/ Cardiology 90 tablet 3     insulin NPH-Regular 70/30 (70-30) 100 UNIT/ML vial Inject 20-40 Units Subcutaneous 2 times daily (with meals) 72 mL 1     ipratropium (ATROVENT) 0.06 % nasal spray Spray 2 sprays into both nostrils 4 times daily as needed for rhinitis 3 Box 1     Lutein 40 MG CAPS Take 40 mg by mouth every morning        Magnesium 400 MG TABS Take 400 mg by mouth every evening        metFORMIN (GLUCOPHAGE-XR) 500 MG 24 hr tablet Take 2 tablets (1,000 mg) by mouth daily (with breakfast) (takes 2 x 500mg) 90 tablet 3     metoprolol succinate ER (TOPROL-XL) 25 MG 24 hr tablet Take 2 tablets (50 mg) by mouth daily 90 tablet 3     potassium aminobenzoate 500 MG CAPS capsule Take 99 mg by mouth Patient taking tablet       Propylene Glycol (SYSTANE BALANCE OP) Apply 1-2 drops to eye 3 times daily as needed (dry eyes)       sertraline (ZOLOFT) 100 MG tablet Take 1.5 tablets (150 mg) by mouth every morning (takes 1.5 x 100mg) 135 tablet 1     TURMERIC PO Take 1-3 tablets by mouth daily        vitamin (B COMPLEX-C) tablet Take 1 tablet by mouth daily       vitamin C (ASCORBIC ACID) 1000 MG TABS Take 1,000 mg by mouth every evening        Vitamin D3 (CHOLECALCIFEROL) 25 mcg (1000 units)  tablet Take 1 tablet (25 mcg) by mouth daily  0     warfarin ANTICOAGULANT (COUMADIN) 5 MG tablet Take 2 tablets (10 mg) by mouth on Mon, Wed, Fri. Take 1 and 1/2 tablet (7.5 mg) on Sun, e, Thur and Sat or as instructed by INR Clinic. 150 tablet 1     zinc 23 MG LOZG lozenge Place 1 lozenge inside cheek 4 times daily         ALLERGIES     Allergies   Allergen Reactions     Penicillins Hives     3 weeks after taking med got hives.       Pioglitazone Other (See Comments)     Increased urinary frequency, fatigue, dyspnea       PAST MEDICAL HISTORY:  Past Medical History:   Diagnosis Date     (HFpEF) heart failure with preserved ejection fraction (H)      Aortic stenosis      Chest pain      Depressive disorder      Diabetes (H)      Hyperlipidemia      Hypertension      Mitral annular calcification      Mitral stenosis      Obesity      Sleep apnea     CPAP       PAST SURGICAL HISTORY:  Past Surgical History:   Procedure Laterality Date     APPENDECTOMY       CV CORONARY ANGIOGRAM N/A 2020    Procedure: Coronary Angiogram;  Surgeon: Inez Peoples MD;  Location:  HEART CARDIAC CATH LAB     CV LEFT HEART CATH N/A 2020    Procedure: Left Heart Cath;  Surgeon: Inez Peoples MD;  Location:  HEART CARDIAC CATH LAB     CV PFO CLOSURE N/A 4/15/2020    Procedure: Patent Foramen Ovale Closure;  Surgeon: Ok Wilson MD;  Location:  OR     CV RIGHT HEART CATH N/A 2020    Procedure: Right Heart Cath;  Surgeon: Inez Peoples MD;  Location:  HEART CARDIAC CATH LAB     EP PACEMAKER N/A 2020    Procedure: EP Pacemaker;  Surgeon: Sohan Francis MD;  Location:  HEART CARDIAC CATH LAB     GYN SURGERY       x2 ,      GYN SURGERY      total hysterectomy     IMPLANT PACEMAKER       REPAIR VALVE TRICUSPID N/A 4/15/2020    Procedure: REPAIR TRICUSPID VALVE WITH VILLA MC3 26MM.;  Surgeon: Ok Wilson MD;  Location:  OR     REPLACE  VALVE AORTIC N/A 4/15/2020    Procedure: REPLACEMENT, AORTIC VALVE WITH INSPIRIS TISSUE VALVE 25 MM; ON PUMP WITH SOCORRO READ BY CARDIOLOGIST DR BLANTON.;  Surgeon: Ok Wilson MD;  Location:  OR     REPLACE VALVE MITRAL N/A 4/15/2020    Procedure: REPLACEMENT, MITRAL VALVE WITH EPIC TISSUE VALVE 27 MM.;  Surgeon: Ok Wilson MD;  Location: [facility] OR       FAMILY HISTORY:  Family History   Problem Relation Age of Onset     Diabetes Mother 56     Congenital heart disease Father 23       SOCIAL HISTORY:  Social History     Socioeconomic History     Marital status:      Spouse name: Not on file     Number of children: Not on file     Years of education: Not on file     Highest education level: Not on file   Occupational History     Not on file   Social Needs     Financial resource strain: Not on file     Food insecurity     Worry: Not on file     Inability: Not on file     Transportation needs     Medical: Not on file     Non-medical: Not on file   Tobacco Use     Smoking status: Never Smoker     Smokeless tobacco: Never Used   Substance and Sexual Activity     Alcohol use: No     Drug use: No     Sexual activity: Not on file   Lifestyle     Physical activity     Days per week: Not on file     Minutes per session: Not on file     Stress: Not on file   Relationships     Social connections     Talks on phone: Not on file     Gets together: Not on file     Attends Latter day service: Not on file     Active member of club or organization: Not on file     Attends meetings of clubs or organizations: Not on file     Relationship status: Not on file     Intimate partner violence     Fear of current or ex partner: Not on file     Emotionally abused: Not on file     Physically abused: Not on file     Forced sexual activity: Not on file   Other Topics Concern     Not on file   Social History Narrative     Not on file       Review of Systems:  Skin:  Negative       Eyes:  Positive for glasses    ENT:   "Negative      Respiratory:  Positive for shortness of breath;dyspnea on exertion     Cardiovascular:    edema;Positive for;fatigue    Gastroenterology: Negative      Genitourinary:  Negative      Musculoskeletal:  Positive for arthritis;back pain    Neurologic:  Negative      Psychiatric:  Positive for depression had an episode of memory loss wtih confusion  Heme/Lymph/Imm:  Negative      Endocrine:  Positive for diabetes      Physical Exam:  Vitals: Ht 1.651 m (5' 5\")   BMI 45.43 kg/m      Constitutional:           Skin:             Head:           Eyes:           Lymph:      ENT:           Neck:           Respiratory:            Cardiac:                                                           GI:           Extremities and Muscular Skeletal:                 Neurological:           Psych:           CC  She Huang MD  [address] [place] [address]              "

## 2020-09-16 NOTE — PROGRESS NOTES
Device RN,    Please arrange for device interrogation (transmission) in mid November.  Amiodarone is stopped on 10/15/20.  Looking for recurrent AF starting 1 month post amio-.    SEMAJ Morrissey

## 2020-09-18 LAB
MDC_IDC_LEAD_IMPLANT_DT: NORMAL
MDC_IDC_LEAD_IMPLANT_DT: NORMAL
MDC_IDC_LEAD_LOCATION: NORMAL
MDC_IDC_LEAD_LOCATION: NORMAL
MDC_IDC_LEAD_LOCATION_DETAIL_1: NORMAL
MDC_IDC_LEAD_LOCATION_DETAIL_1: NORMAL
MDC_IDC_LEAD_MFG: NORMAL
MDC_IDC_LEAD_MFG: NORMAL
MDC_IDC_LEAD_MODEL: NORMAL
MDC_IDC_LEAD_MODEL: NORMAL
MDC_IDC_LEAD_POLARITY_TYPE: NORMAL
MDC_IDC_LEAD_POLARITY_TYPE: NORMAL
MDC_IDC_LEAD_SERIAL: NORMAL
MDC_IDC_LEAD_SERIAL: NORMAL
MDC_IDC_MSMT_BATTERY_DTM: NORMAL
MDC_IDC_MSMT_BATTERY_REMAINING_LONGEVITY: 79 MO
MDC_IDC_MSMT_BATTERY_RRT_TRIGGER: 2.83
MDC_IDC_MSMT_BATTERY_STATUS: NORMAL
MDC_IDC_MSMT_BATTERY_VOLTAGE: 3.01 V
MDC_IDC_MSMT_LEADCHNL_RA_IMPEDANCE_VALUE: 304 OHM
MDC_IDC_MSMT_LEADCHNL_RA_IMPEDANCE_VALUE: 418 OHM
MDC_IDC_MSMT_LEADCHNL_RA_PACING_THRESHOLD_AMPLITUDE: 0.62 V
MDC_IDC_MSMT_LEADCHNL_RA_PACING_THRESHOLD_PULSEWIDTH: 0.4 MS
MDC_IDC_MSMT_LEADCHNL_RA_SENSING_INTR_AMPL: 2 MV
MDC_IDC_MSMT_LEADCHNL_RA_SENSING_INTR_AMPL: 2 MV
MDC_IDC_MSMT_LEADCHNL_RV_IMPEDANCE_VALUE: 418 OHM
MDC_IDC_MSMT_LEADCHNL_RV_IMPEDANCE_VALUE: 418 OHM
MDC_IDC_MSMT_LEADCHNL_RV_PACING_THRESHOLD_AMPLITUDE: 0.75 V
MDC_IDC_MSMT_LEADCHNL_RV_PACING_THRESHOLD_PULSEWIDTH: 0.4 MS
MDC_IDC_MSMT_LEADCHNL_RV_SENSING_INTR_AMPL: 9.5 MV
MDC_IDC_MSMT_LEADCHNL_RV_SENSING_INTR_AMPL: 9.5 MV
MDC_IDC_PG_IMPLANT_DTM: NORMAL
MDC_IDC_PG_MFG: NORMAL
MDC_IDC_PG_MODEL: NORMAL
MDC_IDC_PG_SERIAL: NORMAL
MDC_IDC_PG_TYPE: NORMAL
MDC_IDC_SESS_CLINIC_NAME: NORMAL
MDC_IDC_SESS_DTM: NORMAL
MDC_IDC_SESS_TYPE: NORMAL
MDC_IDC_SET_BRADY_AT_MODE_SWITCH_RATE: 171 {BEATS}/MIN
MDC_IDC_SET_BRADY_HYSTRATE: NORMAL
MDC_IDC_SET_BRADY_LOWRATE: 60 {BEATS}/MIN
MDC_IDC_SET_BRADY_MAX_SENSOR_RATE: 130 {BEATS}/MIN
MDC_IDC_SET_BRADY_MAX_TRACKING_RATE: 130 {BEATS}/MIN
MDC_IDC_SET_BRADY_MODE: NORMAL
MDC_IDC_SET_BRADY_PAV_DELAY_LOW: 250 MS
MDC_IDC_SET_BRADY_SAV_DELAY_LOW: 250 MS
MDC_IDC_SET_LEADCHNL_RA_PACING_AMPLITUDE: 1.5 V
MDC_IDC_SET_LEADCHNL_RA_PACING_ANODE_ELECTRODE_1: NORMAL
MDC_IDC_SET_LEADCHNL_RA_PACING_ANODE_LOCATION_1: NORMAL
MDC_IDC_SET_LEADCHNL_RA_PACING_CAPTURE_MODE: NORMAL
MDC_IDC_SET_LEADCHNL_RA_PACING_CATHODE_ELECTRODE_1: NORMAL
MDC_IDC_SET_LEADCHNL_RA_PACING_CATHODE_LOCATION_1: NORMAL
MDC_IDC_SET_LEADCHNL_RA_PACING_POLARITY: NORMAL
MDC_IDC_SET_LEADCHNL_RA_PACING_PULSEWIDTH: 0.4 MS
MDC_IDC_SET_LEADCHNL_RA_SENSING_ANODE_ELECTRODE_1: NORMAL
MDC_IDC_SET_LEADCHNL_RA_SENSING_ANODE_LOCATION_1: NORMAL
MDC_IDC_SET_LEADCHNL_RA_SENSING_CATHODE_ELECTRODE_1: NORMAL
MDC_IDC_SET_LEADCHNL_RA_SENSING_CATHODE_LOCATION_1: NORMAL
MDC_IDC_SET_LEADCHNL_RA_SENSING_POLARITY: NORMAL
MDC_IDC_SET_LEADCHNL_RA_SENSING_SENSITIVITY: 0.3 MV
MDC_IDC_SET_LEADCHNL_RV_PACING_AMPLITUDE: 2 V
MDC_IDC_SET_LEADCHNL_RV_PACING_ANODE_ELECTRODE_1: NORMAL
MDC_IDC_SET_LEADCHNL_RV_PACING_ANODE_LOCATION_1: NORMAL
MDC_IDC_SET_LEADCHNL_RV_PACING_CAPTURE_MODE: NORMAL
MDC_IDC_SET_LEADCHNL_RV_PACING_CATHODE_ELECTRODE_1: NORMAL
MDC_IDC_SET_LEADCHNL_RV_PACING_CATHODE_LOCATION_1: NORMAL
MDC_IDC_SET_LEADCHNL_RV_PACING_POLARITY: NORMAL
MDC_IDC_SET_LEADCHNL_RV_PACING_PULSEWIDTH: 0.4 MS
MDC_IDC_SET_LEADCHNL_RV_SENSING_ANODE_ELECTRODE_1: NORMAL
MDC_IDC_SET_LEADCHNL_RV_SENSING_ANODE_LOCATION_1: NORMAL
MDC_IDC_SET_LEADCHNL_RV_SENSING_CATHODE_ELECTRODE_1: NORMAL
MDC_IDC_SET_LEADCHNL_RV_SENSING_CATHODE_LOCATION_1: NORMAL
MDC_IDC_SET_LEADCHNL_RV_SENSING_POLARITY: NORMAL
MDC_IDC_SET_LEADCHNL_RV_SENSING_SENSITIVITY: 0.9 MV
MDC_IDC_SET_ZONE_DETECTION_INTERVAL: 350 MS
MDC_IDC_SET_ZONE_DETECTION_INTERVAL: 400 MS
MDC_IDC_SET_ZONE_TYPE: NORMAL
MDC_IDC_STAT_AT_BURDEN_PERCENT: 0 %
MDC_IDC_STAT_AT_DTM_END: NORMAL
MDC_IDC_STAT_AT_DTM_START: NORMAL
MDC_IDC_STAT_BRADY_AP_VP_PERCENT: 0.84 %
MDC_IDC_STAT_BRADY_AP_VS_PERCENT: 37.92 %
MDC_IDC_STAT_BRADY_AS_VP_PERCENT: 0.14 %
MDC_IDC_STAT_BRADY_AS_VS_PERCENT: 61.1 %
MDC_IDC_STAT_BRADY_DTM_END: NORMAL
MDC_IDC_STAT_BRADY_DTM_START: NORMAL
MDC_IDC_STAT_BRADY_RA_PERCENT_PACED: 38.64 %
MDC_IDC_STAT_BRADY_RV_PERCENT_PACED: 1.29 %
MDC_IDC_STAT_EPISODE_RECENT_COUNT: 0
MDC_IDC_STAT_EPISODE_RECENT_COUNT_DTM_END: NORMAL
MDC_IDC_STAT_EPISODE_RECENT_COUNT_DTM_START: NORMAL
MDC_IDC_STAT_EPISODE_TOTAL_COUNT: 0
MDC_IDC_STAT_EPISODE_TOTAL_COUNT: 3
MDC_IDC_STAT_EPISODE_TOTAL_COUNT: 4
MDC_IDC_STAT_EPISODE_TOTAL_COUNT: 8
MDC_IDC_STAT_EPISODE_TOTAL_COUNT_DTM_END: NORMAL
MDC_IDC_STAT_EPISODE_TOTAL_COUNT_DTM_START: NORMAL
MDC_IDC_STAT_EPISODE_TYPE: NORMAL

## 2020-09-23 ENCOUNTER — ANTICOAGULATION THERAPY VISIT (OUTPATIENT)
Dept: ANTICOAGULATION | Facility: CLINIC | Age: 75
End: 2020-09-23

## 2020-09-23 DIAGNOSIS — I48.0 PAROXYSMAL ATRIAL FIBRILLATION (H): ICD-10-CM

## 2020-09-23 DIAGNOSIS — Z79.01 LONG TERM CURRENT USE OF ANTICOAGULANTS WITH INR GOAL OF 2.0-3.0: ICD-10-CM

## 2020-09-23 LAB
CAPILLARY BLOOD COLLECTION: NORMAL
INR PPP: 4.7 (ref 0.86–1.14)

## 2020-09-23 PROCEDURE — 36416 COLLJ CAPILLARY BLOOD SPEC: CPT | Performed by: NURSE PRACTITIONER

## 2020-09-23 PROCEDURE — 85610 PROTHROMBIN TIME: CPT | Performed by: NURSE PRACTITIONER

## 2020-09-23 PROCEDURE — 99207 ZZC NO CHARGE NURSE ONLY: CPT | Performed by: NURSE PRACTITIONER

## 2020-09-23 NOTE — PROGRESS NOTES
ANTICOAGULATION FOLLOW-UP CLINIC VISIT    Patient Name:  Amy Luna  Date:  2020  Contact Type:  Telephone/ discussed with patient    SUBJECTIVE:  Patient Findings     Positives:   Change in medications (Amiodarone will stop on 10/15.), Change in diet/appetite (Will also try to increase her vitamin K intake.), Bruising (bruising on arms and legs.)    Comments:   The patient was assessed for medication, and activity level changes, missed or extra doses, or bleeding, with no problem findings.          Clinical Outcomes     Negatives:   Major bleeding event, Thromboembolic event, Anticoagulation-related hospital admission, Anticoagulation-related ED visit, Anticoagulation-related fatality    Comments:   The patient was assessed for medication, and activity level changes, missed or extra doses, or bleeding, with no problem findings.             OBJECTIVE    Recent labs: (last 7 days)     20  1103   INR 4.70*       ASSESSMENT / PLAN  INR assessment SUPRA    Recheck INR In: 1 WEEK    INR Location Clinic      Anticoagulation Summary  As of 2020    INR goal:   2.0-3.0   TTR:   59.9 % (3.2 mo)   INR used for dosin.70! (2020)   Warfarin maintenance plan:   7.5 mg (5 mg x 1.5) every day   Full warfarin instructions:   : Hold; Otherwise 7.5 mg every day   Weekly warfarin total:   52.5 mg   Plan last modified:   Nadia Cabello RN (2020)   Next INR check:   2020   Priority:   High   Target end date:   Indefinite    Indications    Paroxysmal atrial fibrillation (H) [I48.0]  Long term current use of anticoagulants with INR goal of 2.0-3.0 [Z79.01]             Anticoagulation Episode Summary     INR check location:       Preferred lab:       Send INR reminders to:   [facility]    Comments:   started amiodarone 6/10/20      Anticoagulation Care Providers     Provider Role Specialty Phone number    Yamilka Christina DO Referring Cardiology [phone]    Joshua  RAMESH Pereira Referring Nurse Practitioner [phone]            See the Encounter Report to view Anticoagulation Flowsheet and Dosing Calendar (Go to Encounters tab in chart review, and find the Anticoagulation Therapy Visit)    Dosage adjustment made based on physician directed care plan.    Nadia Cabello RN

## 2020-09-25 ENCOUNTER — MYC MEDICAL ADVICE (OUTPATIENT)
Dept: INTERNAL MEDICINE | Facility: CLINIC | Age: 75
End: 2020-09-25

## 2020-09-25 NOTE — TELEPHONE ENCOUNTER
Please check my chart message.  If the patient has not read this message, please call her about the appointment changes

## 2020-09-29 ENCOUNTER — ANTICOAGULATION THERAPY VISIT (OUTPATIENT)
Dept: ANTICOAGULATION | Facility: CLINIC | Age: 75
End: 2020-09-29

## 2020-09-29 DIAGNOSIS — I48.0 PAROXYSMAL ATRIAL FIBRILLATION (H): ICD-10-CM

## 2020-09-29 DIAGNOSIS — Z79.01 LONG TERM CURRENT USE OF ANTICOAGULANTS WITH INR GOAL OF 2.0-3.0: ICD-10-CM

## 2020-09-29 LAB
CAPILLARY BLOOD COLLECTION: NORMAL
INR PPP: 3.9 (ref 0.86–1.14)

## 2020-09-29 PROCEDURE — 85610 PROTHROMBIN TIME: CPT | Performed by: NURSE PRACTITIONER

## 2020-09-29 PROCEDURE — 99207 ZZC NO CHARGE NURSE ONLY: CPT | Performed by: NURSE PRACTITIONER

## 2020-09-29 PROCEDURE — 36416 COLLJ CAPILLARY BLOOD SPEC: CPT | Performed by: NURSE PRACTITIONER

## 2020-09-29 NOTE — PROGRESS NOTES
ANTICOAGULATION FOLLOW-UP CLINIC VISIT    Patient Name:  Amy Luna  Date:  9/29/2020  Contact Type:  Telephone/ Called patient, she reports bruising and bleeding, she denies any other changes. Orders discussed with the patient, she verbalized understanding by repeating dosing instruction. Lab INR appointment scheduled on 10/5/20.    SUBJECTIVE:  Patient Findings     Positives:   Missed doses (Warfarin was held 9/16/20 d/t to elevated INR.), Change in medications (Amiodarone still scheduled to stop 10/15/20.), Bruising (patient reports she 8 bruisses, varying sizes, average about the size of an apricot, most having a lump in them. Also reports fingersticks are taking about 15 minutes to stop bleeding, also has bleeding from insulin injections.  )    Comments:   The patient was assessed for diet, medication, and activity level changes, missed or extra doses, with no problem findings. Patient denies any identifiable changes that caused the supratherapeutic INR. Warfarin Maintenance dose decreased by 4.8% (2.5 mg) today. Patient repeated dosing instruction back to this writer.               Clinical Outcomes     Comments:   The patient was assessed for diet, medication, and activity level changes, missed or extra doses, with no problem findings. Patient denies any identifiable changes that caused the supratherapeutic INR. Warfarin Maintenance dose decreased by 4.8% (2.5 mg) today. Patient repeated dosing instruction back to this writer.                  OBJECTIVE    Recent labs: (last 7 days)     09/29/20  1431   INR 3.90*       ASSESSMENT / PLAN  INR assessment SUPRA    Recheck INR In: 6 DAYS    INR Location Outside lab      Anticoagulation Summary  As of 9/29/2020    INR goal:   2.0-3.0   TTR:   56.3 % (3.4 mo)   INR used for dosing:   3.90! (9/29/2020)   Warfarin maintenance plan:   5 mg (5 mg x 1) every Sat; 7.5 mg (5 mg x 1.5) all other days   Full warfarin instructions:   9/29: Hold; Otherwise 5 mg every Sat;  7.5 mg all other days   Weekly warfarin total:   50 mg   Plan last modified:   Chiqui Nowak RN (9/29/2020)   Next INR check:   10/5/2020   Priority:   High   Target end date:   Indefinite    Indications    Paroxysmal atrial fibrillation (H) [I48.0]  Long term current use of anticoagulants with INR goal of 2.0-3.0 [Z79.01]             Anticoagulation Episode Summary     INR check location:       Preferred lab:       Send INR reminders to:   [facility]    Comments:   started amiodarone 6/10/20      Anticoagulation Care Providers     Provider Role Specialty Phone number    Yamilka Christina DO Referring Cardiology [phone]    Kelli Lewis CNP Referring Nurse Practitioner [phone]            See the Encounter Report to view Anticoagulation Flowsheet and Dosing Calendar (Go to Encounters tab in chart review, and find the Anticoagulation Therapy Visit)    Dosage adjustment made based on physician directed care plan.    Chiqui Nowak, RN

## 2020-10-05 ENCOUNTER — ANTICOAGULATION THERAPY VISIT (OUTPATIENT)
Dept: ANTICOAGULATION | Facility: CLINIC | Age: 75
End: 2020-10-05

## 2020-10-05 DIAGNOSIS — I48.0 PAROXYSMAL ATRIAL FIBRILLATION (H): ICD-10-CM

## 2020-10-05 DIAGNOSIS — Z79.01 LONG TERM CURRENT USE OF ANTICOAGULANTS WITH INR GOAL OF 2.0-3.0: ICD-10-CM

## 2020-10-05 LAB
CAPILLARY BLOOD COLLECTION: NORMAL
INR PPP: 2.3 (ref 0.86–1.14)

## 2020-10-05 PROCEDURE — 36416 COLLJ CAPILLARY BLOOD SPEC: CPT | Performed by: NURSE PRACTITIONER

## 2020-10-05 PROCEDURE — 99207 PR NO CHARGE NURSE ONLY: CPT

## 2020-10-05 PROCEDURE — 85610 PROTHROMBIN TIME: CPT | Performed by: NURSE PRACTITIONER

## 2020-10-05 NOTE — PROGRESS NOTES
ANTICOAGULATION FOLLOW-UP CLINIC VISIT    Patient Name:  Amy Luna  Date:  10/5/2020  Contact Type:  Telephone/ Called patient, she reports medication/supplement change, she denies any other changes. Orders discussed with the patient, she agrees with the plan. Lab INR appointment scheduled on 10/14/20    SUBJECTIVE:  Patient Findings     Positives:  Change in medications (Amiodarone is scheduled to be discontinued 10/15/20. Patient is going to increase her turmeric dose. )    Comments:  The patient was assessed for diet, medication, and activity level changes, missed or extra doses, bruising or bleeding, with no problem findings. Maintenance warfarin dosing was reviewed with patient and will remain on the same dose until next INR check. Patient did not have any questions or concerns.           Clinical Outcomes     Comments:  The patient was assessed for diet, medication, and activity level changes, missed or extra doses, bruising or bleeding, with no problem findings. Maintenance warfarin dosing was reviewed with patient and will remain on the same dose until next INR check. Patient did not have any questions or concerns.              OBJECTIVE    Recent labs: (last 7 days)     10/05/20  1038   INR 2.30*       ASSESSMENT / PLAN  INR assessment THER    Recheck INR In: 9 DAYS    INR Location Outside lab      Anticoagulation Summary  As of 10/5/2020    INR goal:  2.0-3.0   TTR:  55.6 % (3.6 mo)   INR used for dosin.30 (10/5/2020)   Warfarin maintenance plan:  5 mg (5 mg x 1) every Sat; 7.5 mg (5 mg x 1.5) all other days   Full warfarin instructions:  5 mg every Sat; 7.5 mg all other days   Weekly warfarin total:  50 mg   No change documented:  Chiqui Nowak RN   Plan last modified:  Chiqui Nowak RN (2020)   Next INR check:  10/14/2020   Priority:  High   Target end date:  Indefinite    Indications    Paroxysmal atrial fibrillation (H) [I48.0]  Long term current use of anticoagulants with INR goal  of 2.0-3.0 [Z79.01]             Anticoagulation Episode Summary     INR check location:      Preferred lab:      Send INR reminders to:  [facility]    Comments:  started amiodarone 6/10/20      Anticoagulation Care Providers     Provider Role Specialty Phone number    Yamilka Christina DO Referring Cardiology [phone]    Kelli Lewis CNP Referring Nurse Practitioner [phone]            See the Encounter Report to view Anticoagulation Flowsheet and Dosing Calendar (Go to Encounters tab in chart review, and find the Anticoagulation Therapy Visit)    Dosage adjustment made based on physician directed care plan.    Chiqui Nowak RN

## 2020-10-06 ENCOUNTER — ANCILLARY PROCEDURE (OUTPATIENT)
Dept: CARDIOLOGY | Facility: CLINIC | Age: 75
End: 2020-10-06
Attending: INTERNAL MEDICINE
Payer: MEDICARE

## 2020-10-06 DIAGNOSIS — Z95.0 CARDIAC PACEMAKER IN SITU: ICD-10-CM

## 2020-10-06 DIAGNOSIS — I44.30 AV BLOCK: ICD-10-CM

## 2020-10-06 PROCEDURE — 93296 REM INTERROG EVL PM/IDS: CPT | Performed by: INTERNAL MEDICINE

## 2020-10-06 PROCEDURE — 93294 REM INTERROG EVL PM/LDLS PM: CPT | Performed by: INTERNAL MEDICINE

## 2020-10-14 ENCOUNTER — ANTICOAGULATION THERAPY VISIT (OUTPATIENT)
Dept: ANTICOAGULATION | Facility: CLINIC | Age: 75
End: 2020-10-14

## 2020-10-14 DIAGNOSIS — Z79.01 LONG TERM CURRENT USE OF ANTICOAGULANTS WITH INR GOAL OF 2.0-3.0: ICD-10-CM

## 2020-10-14 DIAGNOSIS — I48.0 PAROXYSMAL ATRIAL FIBRILLATION (H): ICD-10-CM

## 2020-10-14 LAB
CAPILLARY BLOOD COLLECTION: NORMAL
INR PPP: 2.3 (ref 0.86–1.14)
MDC_IDC_LEAD_IMPLANT_DT: NORMAL
MDC_IDC_LEAD_IMPLANT_DT: NORMAL
MDC_IDC_LEAD_LOCATION: NORMAL
MDC_IDC_LEAD_LOCATION: NORMAL
MDC_IDC_LEAD_LOCATION_DETAIL_1: NORMAL
MDC_IDC_LEAD_LOCATION_DETAIL_1: NORMAL
MDC_IDC_LEAD_MFG: NORMAL
MDC_IDC_LEAD_MFG: NORMAL
MDC_IDC_LEAD_MODEL: NORMAL
MDC_IDC_LEAD_MODEL: NORMAL
MDC_IDC_LEAD_POLARITY_TYPE: NORMAL
MDC_IDC_LEAD_POLARITY_TYPE: NORMAL
MDC_IDC_LEAD_SERIAL: NORMAL
MDC_IDC_LEAD_SERIAL: NORMAL
MDC_IDC_MSMT_BATTERY_DTM: NORMAL
MDC_IDC_MSMT_BATTERY_REMAINING_LONGEVITY: 80 MO
MDC_IDC_MSMT_BATTERY_RRT_TRIGGER: 2.83
MDC_IDC_MSMT_BATTERY_STATUS: NORMAL
MDC_IDC_MSMT_BATTERY_VOLTAGE: 3.01 V
MDC_IDC_MSMT_LEADCHNL_RA_IMPEDANCE_VALUE: 342 OHM
MDC_IDC_MSMT_LEADCHNL_RA_IMPEDANCE_VALUE: 437 OHM
MDC_IDC_MSMT_LEADCHNL_RA_PACING_THRESHOLD_AMPLITUDE: 0.62 V
MDC_IDC_MSMT_LEADCHNL_RA_PACING_THRESHOLD_PULSEWIDTH: 0.4 MS
MDC_IDC_MSMT_LEADCHNL_RA_SENSING_INTR_AMPL: 2.38 MV
MDC_IDC_MSMT_LEADCHNL_RA_SENSING_INTR_AMPL: 2.38 MV
MDC_IDC_MSMT_LEADCHNL_RV_IMPEDANCE_VALUE: 456 OHM
MDC_IDC_MSMT_LEADCHNL_RV_IMPEDANCE_VALUE: 456 OHM
MDC_IDC_MSMT_LEADCHNL_RV_PACING_THRESHOLD_AMPLITUDE: 0.75 V
MDC_IDC_MSMT_LEADCHNL_RV_PACING_THRESHOLD_PULSEWIDTH: 0.4 MS
MDC_IDC_MSMT_LEADCHNL_RV_SENSING_INTR_AMPL: 12.25 MV
MDC_IDC_MSMT_LEADCHNL_RV_SENSING_INTR_AMPL: 12.25 MV
MDC_IDC_PG_IMPLANT_DTM: NORMAL
MDC_IDC_PG_MFG: NORMAL
MDC_IDC_PG_MODEL: NORMAL
MDC_IDC_PG_SERIAL: NORMAL
MDC_IDC_PG_TYPE: NORMAL
MDC_IDC_SESS_CLINIC_NAME: NORMAL
MDC_IDC_SESS_DTM: NORMAL
MDC_IDC_SESS_TYPE: NORMAL
MDC_IDC_SET_BRADY_AT_MODE_SWITCH_RATE: 171 {BEATS}/MIN
MDC_IDC_SET_BRADY_HYSTRATE: NORMAL
MDC_IDC_SET_BRADY_LOWRATE: 60 {BEATS}/MIN
MDC_IDC_SET_BRADY_MAX_SENSOR_RATE: 130 {BEATS}/MIN
MDC_IDC_SET_BRADY_MAX_TRACKING_RATE: 130 {BEATS}/MIN
MDC_IDC_SET_BRADY_MODE: NORMAL
MDC_IDC_SET_BRADY_PAV_DELAY_LOW: 250 MS
MDC_IDC_SET_BRADY_SAV_DELAY_LOW: 250 MS
MDC_IDC_SET_LEADCHNL_RA_PACING_AMPLITUDE: 1.5 V
MDC_IDC_SET_LEADCHNL_RA_PACING_ANODE_ELECTRODE_1: NORMAL
MDC_IDC_SET_LEADCHNL_RA_PACING_ANODE_LOCATION_1: NORMAL
MDC_IDC_SET_LEADCHNL_RA_PACING_CAPTURE_MODE: NORMAL
MDC_IDC_SET_LEADCHNL_RA_PACING_CATHODE_ELECTRODE_1: NORMAL
MDC_IDC_SET_LEADCHNL_RA_PACING_CATHODE_LOCATION_1: NORMAL
MDC_IDC_SET_LEADCHNL_RA_PACING_POLARITY: NORMAL
MDC_IDC_SET_LEADCHNL_RA_PACING_PULSEWIDTH: 0.4 MS
MDC_IDC_SET_LEADCHNL_RA_SENSING_ANODE_ELECTRODE_1: NORMAL
MDC_IDC_SET_LEADCHNL_RA_SENSING_ANODE_LOCATION_1: NORMAL
MDC_IDC_SET_LEADCHNL_RA_SENSING_CATHODE_ELECTRODE_1: NORMAL
MDC_IDC_SET_LEADCHNL_RA_SENSING_CATHODE_LOCATION_1: NORMAL
MDC_IDC_SET_LEADCHNL_RA_SENSING_POLARITY: NORMAL
MDC_IDC_SET_LEADCHNL_RA_SENSING_SENSITIVITY: 0.3 MV
MDC_IDC_SET_LEADCHNL_RV_PACING_AMPLITUDE: 2 V
MDC_IDC_SET_LEADCHNL_RV_PACING_ANODE_ELECTRODE_1: NORMAL
MDC_IDC_SET_LEADCHNL_RV_PACING_ANODE_LOCATION_1: NORMAL
MDC_IDC_SET_LEADCHNL_RV_PACING_CAPTURE_MODE: NORMAL
MDC_IDC_SET_LEADCHNL_RV_PACING_CATHODE_ELECTRODE_1: NORMAL
MDC_IDC_SET_LEADCHNL_RV_PACING_CATHODE_LOCATION_1: NORMAL
MDC_IDC_SET_LEADCHNL_RV_PACING_POLARITY: NORMAL
MDC_IDC_SET_LEADCHNL_RV_PACING_PULSEWIDTH: 0.4 MS
MDC_IDC_SET_LEADCHNL_RV_SENSING_ANODE_ELECTRODE_1: NORMAL
MDC_IDC_SET_LEADCHNL_RV_SENSING_ANODE_LOCATION_1: NORMAL
MDC_IDC_SET_LEADCHNL_RV_SENSING_CATHODE_ELECTRODE_1: NORMAL
MDC_IDC_SET_LEADCHNL_RV_SENSING_CATHODE_LOCATION_1: NORMAL
MDC_IDC_SET_LEADCHNL_RV_SENSING_POLARITY: NORMAL
MDC_IDC_SET_LEADCHNL_RV_SENSING_SENSITIVITY: 0.9 MV
MDC_IDC_SET_ZONE_DETECTION_INTERVAL: 350 MS
MDC_IDC_SET_ZONE_DETECTION_INTERVAL: 400 MS
MDC_IDC_SET_ZONE_TYPE: NORMAL
MDC_IDC_STAT_AT_BURDEN_PERCENT: 0 %
MDC_IDC_STAT_AT_DTM_END: NORMAL
MDC_IDC_STAT_AT_DTM_START: NORMAL
MDC_IDC_STAT_BRADY_AP_VP_PERCENT: 1.95 %
MDC_IDC_STAT_BRADY_AP_VS_PERCENT: 36.21 %
MDC_IDC_STAT_BRADY_AS_VP_PERCENT: 0.11 %
MDC_IDC_STAT_BRADY_AS_VS_PERCENT: 61.72 %
MDC_IDC_STAT_BRADY_DTM_END: NORMAL
MDC_IDC_STAT_BRADY_DTM_START: NORMAL
MDC_IDC_STAT_BRADY_RA_PERCENT_PACED: 38.04 %
MDC_IDC_STAT_BRADY_RV_PERCENT_PACED: 2.51 %
MDC_IDC_STAT_EPISODE_RECENT_COUNT: 0
MDC_IDC_STAT_EPISODE_RECENT_COUNT_DTM_END: NORMAL
MDC_IDC_STAT_EPISODE_RECENT_COUNT_DTM_START: NORMAL
MDC_IDC_STAT_EPISODE_TOTAL_COUNT: 0
MDC_IDC_STAT_EPISODE_TOTAL_COUNT: 3
MDC_IDC_STAT_EPISODE_TOTAL_COUNT: 4
MDC_IDC_STAT_EPISODE_TOTAL_COUNT: 8
MDC_IDC_STAT_EPISODE_TOTAL_COUNT_DTM_END: NORMAL
MDC_IDC_STAT_EPISODE_TOTAL_COUNT_DTM_START: NORMAL
MDC_IDC_STAT_EPISODE_TYPE: NORMAL

## 2020-10-14 PROCEDURE — 36416 COLLJ CAPILLARY BLOOD SPEC: CPT | Performed by: NURSE PRACTITIONER

## 2020-10-14 PROCEDURE — 99207 PR NO CHARGE NURSE ONLY: CPT

## 2020-10-14 PROCEDURE — 85610 PROTHROMBIN TIME: CPT | Performed by: NURSE PRACTITIONER

## 2020-10-14 NOTE — PROGRESS NOTES
ANTICOAGULATION FOLLOW-UP CLINIC VISIT    Patient Name:  Amy Luna  Date:  10/14/2020  Contact Type:  Telephone/ discussed with patient    SUBJECTIVE:  Patient Findings     Positives:  Missed doses (missed one dose within the last week.  Calendar updated.), Change in medications (Will be stopping amiodarone 10/15 (may see changes in INR within 7-10 days)), Bruising (bruising on arms.)    Comments:  The patient was assessed for diet, and activity level changes, extra doses, or bleeding, with no problem findings.  INR will decrease once amiodarone is stopped tomorrow, so maintenance dose for warfarin was increased today by 5%.          Clinical Outcomes     Negatives:  Major bleeding event, Thromboembolic event, Anticoagulation-related hospital admission, Anticoagulation-related ED visit, Anticoagulation-related fatality    Comments:  The patient was assessed for diet, and activity level changes, extra doses, or bleeding, with no problem findings.  INR will decrease once amiodarone is stopped tomorrow, so maintenance dose for warfarin was increased today by 5%.             OBJECTIVE    Recent labs: (last 7 days)     10/14/20  1518   INR 2.30*       ASSESSMENT / PLAN  INR assessment THER    Recheck INR In: 10 DAYS    INR Location Clinic      Anticoagulation Summary  As of 10/14/2020    INR goal:  2.0-3.0   TTR:  59.1 % (3.9 mo)   INR used for dosin.30 (10/14/2020)   Warfarin maintenance plan:  7.5 mg (5 mg x 1.5) every day   Full warfarin instructions:  7.5 mg every day   Weekly warfarin total:  52.5 mg   Plan last modified:  Nadia Cabello RN (10/14/2020)   Next INR check:  10/23/2020   Priority:  High   Target end date:  Indefinite    Indications    Paroxysmal atrial fibrillation (H) [I48.0]  Long term current use of anticoagulants with INR goal of 2.0-3.0 [Z79.01]             Anticoagulation Episode Summary     INR check location:      Preferred lab:      [facility]     Comments:  started amiodarone 6/10/20      Anticoagulation Care Providers     Provider Role Specialty Phone number    Jennifernicole Yamilka Ventura DO Referring Cardiology [phone]    Kelli Lewis CNP Referring Nurse Practitioner [phone]            See the Encounter Report to view Anticoagulation Flowsheet and Dosing Calendar (Go to Encounters tab in chart review, and find the Anticoagulation Therapy Visit)    Dosage adjustment made based on physician directed care plan.    Nadia Cabello RN

## 2020-10-23 ENCOUNTER — ANTICOAGULATION THERAPY VISIT (OUTPATIENT)
Dept: ANTICOAGULATION | Facility: CLINIC | Age: 75
End: 2020-10-23

## 2020-10-23 DIAGNOSIS — I48.0 PAROXYSMAL ATRIAL FIBRILLATION (H): ICD-10-CM

## 2020-10-23 DIAGNOSIS — Z79.01 LONG TERM CURRENT USE OF ANTICOAGULANTS WITH INR GOAL OF 2.0-3.0: ICD-10-CM

## 2020-10-23 LAB
CAPILLARY BLOOD COLLECTION: NORMAL
INR PPP: 3.6 (ref 0.86–1.14)

## 2020-10-23 PROCEDURE — 36416 COLLJ CAPILLARY BLOOD SPEC: CPT | Performed by: NURSE PRACTITIONER

## 2020-10-23 PROCEDURE — 99207 PR NO CHARGE NURSE ONLY: CPT

## 2020-10-23 PROCEDURE — 85610 PROTHROMBIN TIME: CPT | Performed by: NURSE PRACTITIONER

## 2020-10-23 NOTE — PROGRESS NOTES
ANTICOAGULATION FOLLOW-UP CLINIC VISIT    Patient Name:  Amy Luna  Date:  10/23/2020  Contact Type:  Telephone/ discussed with patient    SUBJECTIVE:  Patient Findings     Positives:  Missed doses (Took 5 mg instead of 7.5 mg on Tuesday this week.), Change in medications (Amiodarone stopped on 10/15 (may see changes in INR within 7-10 days))    Comments:  INR has been elevated when warfarin hasn't been held or missed.  Will lower maintenance dose 5% in comparison to what patient took this week with the missed dose.  The patient was assessed for diet, and activity level changes, or extra doses, bruising or bleeding, with no problem findings.          Clinical Outcomes     Negatives:  Major bleeding event, Thromboembolic event, Anticoagulation-related hospital admission, Anticoagulation-related ED visit, Anticoagulation-related fatality    Comments:  INR has been elevated when warfarin hasn't been held or missed.  Will lower maintenance dose 5% in comparison to what patient took this week with the missed dose.  The patient was assessed for diet, and activity level changes, or extra doses, bruising or bleeding, with no problem findings.             OBJECTIVE    Recent labs: (last 7 days)     10/23/20  1409   INR 3.60*       ASSESSMENT / PLAN  INR assessment SUPRA    Recheck INR In: 1 WEEK    INR Location Clinic      Anticoagulation Summary  As of 10/23/2020    INR goal:  2.0-3.0   TTR:  58.8 % (4.2 mo)   INR used for dosing:  3.60 (10/23/2020)   Warfarin maintenance plan:  5 mg (5 mg x 1) every Tue, Sat; 7.5 mg (5 mg x 1.5) all other days   Full warfarin instructions:  10/23: Hold; Otherwise 5 mg every Tue, Sat; 7.5 mg all other days   Weekly warfarin total:  47.5 mg   Plan last modified:  Nadia Cabello RN (10/23/2020)   Next INR check:  10/30/2020   Priority:  High   Target end date:  Indefinite    Indications    Paroxysmal atrial fibrillation (H) [I48.0]  Long term current use of anticoagulants with INR goal  of 2.0-3.0 [Z79.01]             Anticoagulation Episode Summary     INR check location:      Preferred lab:      Send INR reminders to:  [facility]    Comments:  started amiodarone 6/10/20      Anticoagulation Care Providers     Provider Role Specialty Phone number    Yamilka Christina DO Referring Cardiology [phone]    Kelli Lewis CNP Referring Nurse Practitioner [phone]            See the Encounter Report to view Anticoagulation Flowsheet and Dosing Calendar (Go to Encounters tab in chart review, and find the Anticoagulation Therapy Visit)    Dosage adjustment made based on physician directed care plan.    Nadia Cabello RN

## 2020-10-30 ENCOUNTER — ANTICOAGULATION THERAPY VISIT (OUTPATIENT)
Dept: ANTICOAGULATION | Facility: CLINIC | Age: 75
End: 2020-10-30

## 2020-10-30 DIAGNOSIS — I48.0 PAROXYSMAL ATRIAL FIBRILLATION (H): ICD-10-CM

## 2020-10-30 DIAGNOSIS — Z79.01 LONG TERM CURRENT USE OF ANTICOAGULANTS WITH INR GOAL OF 2.0-3.0: ICD-10-CM

## 2020-10-30 LAB
CAPILLARY BLOOD COLLECTION: NORMAL
INR PPP: 3.4 (ref 0.86–1.14)

## 2020-10-30 PROCEDURE — 99207 PR NO CHARGE NURSE ONLY: CPT

## 2020-10-30 PROCEDURE — 36416 COLLJ CAPILLARY BLOOD SPEC: CPT | Performed by: NURSE PRACTITIONER

## 2020-10-30 PROCEDURE — 85610 PROTHROMBIN TIME: CPT | Performed by: NURSE PRACTITIONER

## 2020-10-30 NOTE — PROGRESS NOTES
ANTICOAGULATION FOLLOW-UP CLINIC VISIT    Patient Name:  Amy Luna  Date:  10/30/2020  Contact Type:  Telephone/ discussed with patient    SUBJECTIVE:  Patient Findings     Comments:  The patient was assessed for diet, medication, and activity level changes, missed or extra doses, bruising or bleeding, with no problem findings.  Amiodarone was stopped about 14 days ago.  INR continues to remain elevated, so maintenance dose was lowered by 10% today.  Will do next INR check with her other lab work that is already scheduled on 11/3.        Clinical Outcomes     Negatives:  Major bleeding event, Thromboembolic event, Anticoagulation-related hospital admission, Anticoagulation-related ED visit, Anticoagulation-related fatality    Comments:  The patient was assessed for diet, medication, and activity level changes, missed or extra doses, bruising or bleeding, with no problem findings.  Amiodarone was stopped about 14 days ago.  INR continues to remain elevated, so maintenance dose was lowered by 10% today.  Will do next INR check with her other lab work that is already scheduled on 11/3.           OBJECTIVE    Recent labs: (last 7 days)     10/30/20  1421   INR 3.40*       ASSESSMENT / PLAN  INR assessment SUPRA    Recheck INR In: 5 DAYS    INR Location Clinic      Anticoagulation Summary  As of 10/30/2020    INR goal:  2.0-3.0   TTR:  55.7 % (4.4 mo)   INR used for dosing:  3.40 (10/30/2020)   Warfarin maintenance plan:  7.5 mg (5 mg x 1.5) every Sun, Tue, Thu; 5 mg (5 mg x 1) all other days   Full warfarin instructions:  7.5 mg every Sun, Tue, Thu; 5 mg all other days   Weekly warfarin total:  42.5 mg   Plan last modified:  Nadia Cabello RN (10/30/2020)   Next INR check:  11/3/2020   Priority:  High   Target end date:  Indefinite    Indications    Paroxysmal atrial fibrillation (H) [I48.0]  Long term current use of anticoagulants with INR goal of 2.0-3.0 [Z79.01]             Anticoagulation Episode Summary      INR check location:      Preferred lab:      Send INR reminders to:  [facility]    Comments:        Anticoagulation Care Providers     Provider Role Specialty Phone number    Yamilka Christina DO Referring Cardiovascular Disease [phone]    Kelli Lewis CNP Referring Nurse Practitioner [phone]            See the Encounter Report to view Anticoagulation Flowsheet and Dosing Calendar (Go to Encounters tab in chart review, and find the Anticoagulation Therapy Visit)    Dosage adjustment made based on physician directed care plan.    Nadia Cabello RN

## 2020-11-03 ENCOUNTER — ANTICOAGULATION THERAPY VISIT (OUTPATIENT)
Dept: ANTICOAGULATION | Facility: CLINIC | Age: 75
End: 2020-11-03

## 2020-11-03 DIAGNOSIS — Z79.01 LONG TERM CURRENT USE OF ANTICOAGULANTS WITH INR GOAL OF 2.0-3.0: ICD-10-CM

## 2020-11-03 DIAGNOSIS — I48.0 PAROXYSMAL ATRIAL FIBRILLATION (H): ICD-10-CM

## 2020-11-03 DIAGNOSIS — E11.22 TYPE 2 DIABETES MELLITUS WITH STAGE 3 CHRONIC KIDNEY DISEASE, WITHOUT LONG-TERM CURRENT USE OF INSULIN (H): ICD-10-CM

## 2020-11-03 DIAGNOSIS — N18.30 TYPE 2 DIABETES MELLITUS WITH STAGE 3 CHRONIC KIDNEY DISEASE, WITHOUT LONG-TERM CURRENT USE OF INSULIN (H): ICD-10-CM

## 2020-11-03 DIAGNOSIS — F33.42 RECURRENT MAJOR DEPRESSIVE DISORDER, IN FULL REMISSION (H): ICD-10-CM

## 2020-11-03 LAB
ERYTHROCYTE [DISTWIDTH] IN BLOOD BY AUTOMATED COUNT: 14.6 % (ref 10–15)
HBA1C MFR BLD: 6.8 % (ref 0–5.6)
HCT VFR BLD AUTO: 37.1 % (ref 35–47)
HGB BLD-MCNC: 11.7 G/DL (ref 11.7–15.7)
INR PPP: 2.8 (ref 0.86–1.14)
MCH RBC QN AUTO: 31.4 PG (ref 26.5–33)
MCHC RBC AUTO-ENTMCNC: 31.5 G/DL (ref 31.5–36.5)
MCV RBC AUTO: 100 FL (ref 78–100)
PLATELET # BLD AUTO: 216 10E9/L (ref 150–450)
RBC # BLD AUTO: 3.73 10E12/L (ref 3.8–5.2)
WBC # BLD AUTO: 7.5 10E9/L (ref 4–11)

## 2020-11-03 PROCEDURE — 85027 COMPLETE CBC AUTOMATED: CPT | Performed by: NURSE PRACTITIONER

## 2020-11-03 PROCEDURE — 99207 PR NO CHARGE NURSE ONLY: CPT

## 2020-11-03 PROCEDURE — 80053 COMPREHEN METABOLIC PANEL: CPT | Performed by: NURSE PRACTITIONER

## 2020-11-03 PROCEDURE — 83036 HEMOGLOBIN GLYCOSYLATED A1C: CPT | Performed by: NURSE PRACTITIONER

## 2020-11-03 PROCEDURE — 36415 COLL VENOUS BLD VENIPUNCTURE: CPT | Performed by: NURSE PRACTITIONER

## 2020-11-03 PROCEDURE — 85610 PROTHROMBIN TIME: CPT | Performed by: NURSE PRACTITIONER

## 2020-11-03 NOTE — PROGRESS NOTES
ANTICOAGULATION FOLLOW-UP CLINIC VISIT    Patient Name:  Amy Luna  Date:  11/3/2020  Contact Type:  Telephone/ Called patient, she denies any changes. Orders discussed with the patient, she agrees with the plan. Patient will go to the lab after her PCP appointment on 11/10/20.    SUBJECTIVE:  Patient Findings     Comments:  The patient was assessed for diet, medication, and activity level changes, missed or extra doses, bruising or bleeding, with no problem findings. Maintenance warfarin dosing was reviewed with patient and will remain on the same dose until next INR check. Patient did not have any questions or concerns.  Patient has a doctor appointment 11/10/20, will have INR checked after that appointment.         Clinical Outcomes     Negatives:  Major bleeding event, Thromboembolic event, Anticoagulation-related hospital admission, Anticoagulation-related ED visit, Anticoagulation-related fatality    Comments:  The patient was assessed for diet, medication, and activity level changes, missed or extra doses, bruising or bleeding, with no problem findings. Maintenance warfarin dosing was reviewed with patient and will remain on the same dose until next INR check. Patient did not have any questions or concerns.  Patient has a doctor appointment 11/10/20, will have INR checked after that appointment.            OBJECTIVE    Recent labs: (last 7 days)     20  1134   INR 2.80*       ASSESSMENT / PLAN  INR assessment THER    Recheck INR In: 1 WEEK    INR Location Outside lab      Anticoagulation Summary  As of 11/3/2020    INR goal:  2.0-3.0   TTR:  55.0 % (4.5 mo)   INR used for dosin.80 (11/3/2020)   Warfarin maintenance plan:  7.5 mg (5 mg x 1.5) every Sun, Tue, Thu; 5 mg (5 mg x 1) all other days   Full warfarin instructions:  7.5 mg every Sun, Tue, Thu; 5 mg all other days   Weekly warfarin total:  42.5 mg   No change documented:  Chiqui Nowak RN   Plan last modified:  Nadia Cabello RN  (10/30/2020)   Next INR check:  11/10/2020   Priority:  High   Target end date:  Indefinite    Indications    Paroxysmal atrial fibrillation (H) [I48.0]  Long term current use of anticoagulants with INR goal of 2.0-3.0 [Z79.01]             Anticoagulation Episode Summary     INR check location:      Preferred lab:      Send INR reminders to:  [facility]    Comments:        Anticoagulation Care Providers     Provider Role Specialty Phone number    Yamilka Christina DO Referring Cardiovascular Disease [phone]    Kelli Lewis CNP Referring Nurse Practitioner [phone]            See the Encounter Report to view Anticoagulation Flowsheet and Dosing Calendar (Go to Encounters tab in chart review, and find the Anticoagulation Therapy Visit)    Dosage adjustment made based on physician directed care plan.    Chiqui Nowak RN

## 2020-11-04 LAB
ALBUMIN SERPL-MCNC: 4 G/DL (ref 3.4–5)
ALP SERPL-CCNC: 82 U/L (ref 40–150)
ALT SERPL W P-5'-P-CCNC: 38 U/L (ref 0–50)
ANION GAP SERPL CALCULATED.3IONS-SCNC: 5 MMOL/L (ref 3–14)
AST SERPL W P-5'-P-CCNC: 37 U/L (ref 0–45)
BILIRUB SERPL-MCNC: 0.6 MG/DL (ref 0.2–1.3)
BUN SERPL-MCNC: 22 MG/DL (ref 7–30)
CALCIUM SERPL-MCNC: 10.1 MG/DL (ref 8.5–10.1)
CHLORIDE SERPL-SCNC: 106 MMOL/L (ref 94–109)
CO2 SERPL-SCNC: 29 MMOL/L (ref 20–32)
CREAT SERPL-MCNC: 1.08 MG/DL (ref 0.52–1.04)
GFR SERPL CREATININE-BSD FRML MDRD: 50 ML/MIN/{1.73_M2}
GLUCOSE SERPL-MCNC: 63 MG/DL (ref 70–99)
POTASSIUM SERPL-SCNC: 4.3 MMOL/L (ref 3.4–5.3)
PROT SERPL-MCNC: 7.6 G/DL (ref 6.8–8.8)
SODIUM SERPL-SCNC: 140 MMOL/L (ref 133–144)

## 2020-11-09 ENCOUNTER — TELEPHONE (OUTPATIENT)
Dept: NURSING | Facility: CLINIC | Age: 75
End: 2020-11-09

## 2020-11-09 NOTE — TELEPHONE ENCOUNTER
Patient's appt with provider on 11/10 was changed to a video visit.   I called patient back and informed her that having INR checked 2 weeks from 11/03 would be within protocol, first opening at [facility] lab=11/19/20.    Evy Lyons RN

## 2020-11-09 NOTE — TELEPHONE ENCOUNTER
Patient called wondering if she should cancel her INR appt.scheduled for 11/10/20.   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    Patient does NOT have direct contact with the students or parents and dose NOT have any symptoms of illness; however, she states she passed the child in a hallway and she was not wearing a mask.  Patient will proceed with INR appt. Scheduled for 11/10/20 and will look at [email or web address] site if she has further questions about symptoms or testing.    Evy Lyons RN

## 2020-11-10 ENCOUNTER — VIRTUAL VISIT (OUTPATIENT)
Dept: INTERNAL MEDICINE | Facility: CLINIC | Age: 75
End: 2020-11-10
Payer: MEDICARE
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
## 2020-11-10 DIAGNOSIS — N18.30 TYPE 2 DIABETES MELLITUS WITH STAGE 3 CHRONIC KIDNEY DISEASE, WITHOUT LONG-TERM CURRENT USE OF INSULIN, UNSPECIFIED WHETHER STAGE 3A OR 3B CKD (H): ICD-10-CM

## 2020-11-10 DIAGNOSIS — R07.89 CHEST WALL PAIN: ICD-10-CM

## 2020-11-10 DIAGNOSIS — D64.9 ANEMIA, UNSPECIFIED TYPE: ICD-10-CM

## 2020-11-10 DIAGNOSIS — G89.29 CHRONIC PAIN OF LEFT KNEE: Primary | ICD-10-CM

## 2020-11-10 DIAGNOSIS — E11.22 TYPE 2 DIABETES MELLITUS WITH STAGE 3 CHRONIC KIDNEY DISEASE, WITHOUT LONG-TERM CURRENT USE OF INSULIN, UNSPECIFIED WHETHER STAGE 3A OR 3B CKD (H): ICD-10-CM

## 2020-11-10 DIAGNOSIS — M25.562 CHRONIC PAIN OF LEFT KNEE: Primary | ICD-10-CM

## 2020-11-10 PROCEDURE — 99214 OFFICE O/P EST MOD 30 MIN: CPT | Mod: 95 | Performed by: INTERNAL MEDICINE

## 2020-11-10 NOTE — PROGRESS NOTES
"Aym Luna is a 75 year old female who is being evaluated via a billable video visit.      The patient has been notified of following:     \"This video visit will be conducted via a call between you and your physician/provider. We have found that certain health care needs can be provided without the need for an in-person physical exam.  This service lets us provide the care you need with a video conversation.  If a prescription is necessary we can send it directly to your pharmacy.  If lab work is needed we can place an order for that and you can then stop by our lab to have the test done at a later time.    Video visits are billed at different rates depending on your insurance coverage.  Please reach out to your insurance provider with any questions.    If during the course of the call the physician/provider feels a video visit is not appropriate, you will not be charged for this service.\"    Patient has given verbal consent for Video visit? Yes  How would you like to obtain your AVS? MyChart  If you are dropped from the video visit, the video invite should be resent to: Text to cell phone: [phone]  Will anyone else be joining your video visit? No            This is a VIDEO ( using Doximity)  encounter with the patient.       Location of the provider : office   Location of the patient : home      10:54 --11:20           Dr Diaz's note      Patient's instructions / PLAN:                                                        Plan:  1. Continue Iron for another 3 months  2. Chiropractor referral   3. Take 5 units insulin less than you would usually take  4. I asked  Afia Ricketts, Pharm D  To help you with CBG and insulin   5. Please make a lab appointment for fasting labs  In 3-4 months  6. Please make an appointment few days after the labs to discuss about the results.         ASSESSMENT & PLAN:                                                        75-year-old woman with chronic medical " problems:DM on insulin, morbid obesity,HTN, HLipidemia had extensive heart surgery in April for AVR, MVR, PFO closure, TV repair, pacemaker atrial lead replaced.  Overall she feels stable, but still fatigue.  She had also postop anemia, for which she needs follow-up.  She was found with atrial fibrillation on the pacemaker records and she was started on amiodarone and Coumadin.    (M25.562,  G89.29) Chronic pain of left knee  (primary encounter diagnosis)  (R07.89) Chest wall pain  (W19.XXXA) Fall, initial encounter  Comment:   Plan: [facility] PT, HAND, AND CHIROPRACTIC REFERRAL            (E11.22,  N18.30) Type 2 diabetes mellitus with stage 3 chronic kidney disease, without long-term current use of insulin, unspecified whether stage 3a or 3b CKD (H)  Comment: with unaware hypoglycemia   Plan: MED THERAPY MANAGE REFERRAL               Chief complaint:                                                      Follow up chronic medical problems      SUBJECTIVE:                                                    History of present illness:    She would like to see a chiropractor.  She fell about 1.5 m ago and she still has some problems with the rib cage the L knee. She was on a slope and she lost her balance. No head trauma.     Anemia  -- better Hgb 11.7 <--10.8  -- takes iron    CKD  -- creatinine 1.08 <--1.26    DM  -- A1c 6.8 < -- 6.7   -- she struggles to keep the numbers down  -- she has episodes of hypoglycemia  -- a month ago she had low blood sugar - unaware -   -- she is interested in CBG  -- she takes NPH insulin 40 -115   -- she     LOV: Plan:  1.  Labs today - [address]   2. Please ask your eye doctor to send report to our chart regarding eye exam for diabetes - fax [phone]  3. Mammogram ( please call [phone] to schedule it)   4. No changes in meds  5. Follow up appointment in 2 weeks - video appointment   6. Clindamycin 600 mg 1 hour before dental appointment       Review of Systems:                                                       ROS: negative for fever, chills, cough, wheezes, chest pain, shortness of breath, vomiting, abdominal pain, leg swelling          OBJECTIVE:           An actual physical exam can't be done during phone visit   A limited exam can sometimes be performed by video visit   NAD      PMHx: reviewed  Past Medical History:   Diagnosis Date     (HFpEF) heart failure with preserved ejection fraction (H)      Aortic stenosis      Chest pain      Depressive disorder      Diabetes (H)      Hyperlipidemia      Hypertension      Mitral annular calcification      Mitral stenosis      Obesity      Sleep apnea     CPAP      PSHx: reviewed  Past Surgical History:   Procedure Laterality Date     APPENDECTOMY       CV CORONARY ANGIOGRAM N/A 2020    Procedure: Coronary Angiogram;  Surgeon: Inez Peoples MD;  Location:  [facility]     CV LEFT HEART CATH N/A 2020    Procedure: Left Heart Cath;  Surgeon: Inez Peoples MD;  Location:  [facility]     CV PFO CLOSURE N/A 4/15/2020    Procedure: Patent Foramen [place]vale Closure;  Surgeon: Ok Wilson MD;  Location:  OR     CV RIGHT HEART CATH N/A 2020    Procedure: Right Heart Cath;  Surgeon: Inez Peoples MD;  Location:  [facility]     EP PACEMAKER N/A 2020    Procedure: EP Pacemaker;  Surgeon: Sohan Francis MD;  Location:  [facility]     GYN SURGERY       x2 ,      GYN SURGERY      total hysterectomy     IMPLANT PACEMAKER       REPAIR VALVE TRICUSPID N/A 4/15/2020    Procedure: REPAIR TRICUSPID VALVE WITH VILLA MC3 26MM.;  Surgeon: Ok Wilson MD;  Location:  OR     REPLACE VALVE AORTIC N/A 4/15/2020    Procedure: REPLACEMENT, AORTIC VALVE WITH INSPIRIS TISSUE VALVE 25 MM; ON PUMP WITH SOCORRO READ BY CARDIOLOGIST DR BLANTON.;  Surgeon: Ok Wilson MD;  Location: [facility]     REPLACE VALVE MITRAL N/A  4/15/2020    Procedure: REPLACEMENT, MITRAL VALVE WITH EPIC TISSUE VALVE 27 MM.;  Surgeon: Ok Wilson MD;  Location:  OR        Meds: reviewed  Current Outpatient Medications   Medication Sig Dispense Refill     acetaminophen (TYLENOL) 325 MG tablet Take 2 tablets (650 mg) by mouth every 4 hours as needed for other (multimodal surgical pain management along with NSAIDS and opioid medication as indicated based on pain control and physical function.)  0     albuterol (PROAIR HFA/PROVENTIL HFA/VENTOLIN HFA) 108 (90 Base) MCG/ACT inhaler Inhale 2 puffs into the lungs every 4 hours as needed for shortness of breath / dyspnea or wheezing       atorvastatin (LIPITOR) 40 MG tablet Take 1 tablet (40 mg) by mouth At Bedtime 90 tablet 3     Biotin 60567 MCG TABS Take 1,000 mcg by mouth every morning        Calcium Carbonate-Vit D-Min (CALCIUM 1200 PO) Take 1 tablet by mouth every evening        coenzyme Q-10 200 MG CAPS Take 200 mg by mouth every morning        ferrous gluconate (FERGON) 324 (38 Fe) MG tablet Take 1 tablet (324 mg) by mouth daily (with breakfast) 30 tablet 3     furosemide (LASIX) 40 MG tablet Take 1 tablet (40 mg) by mouth 2 times daily Further dosing per PCP/ Cardiology 90 tablet 3     insulin NPH-Regular 70/30 (70-30) 100 UNIT/ML vial Inject 20-40 Units Subcutaneous 2 times daily (with meals) 72 mL 1     ipratropium (ATROVENT) 0.06 % nasal spray Spray 2 sprays into both nostrils 4 times daily as needed for rhinitis 3 Box 1     Lutein 40 MG CAPS Take 40 mg by mouth every morning        Magnesium 400 MG TABS Take 400 mg by mouth every evening        metFORMIN (GLUCOPHAGE-XR) 500 MG 24 hr tablet Take 2 tablets (1,000 mg) by mouth daily (with breakfast) (takes 2 x 500mg) 90 tablet 3     metoprolol succinate ER (TOPROL-XL) 25 MG 24 hr tablet Take 2 tablets (50 mg) by mouth daily 90 tablet 3     potassium aminobenzoate 500 MG CAPS capsule Take 99 mg by mouth Patient taking tablet       Propylene  Glycol (SYSTANE BALANCE OP) Apply 1-2 drops to eye 3 times daily as needed (dry eyes)       sertraline (ZOLOFT) 100 MG tablet Take 1.5 tablets (150 mg) by mouth every morning (takes 1.5 x 100mg) 135 tablet 1     TURMERIC PO Take 1-3 tablets by mouth daily        vitamin (B COMPLEX-C) tablet Take 1 tablet by mouth daily       vitamin C (ASCORBIC ACID) 1000 MG TABS Take 1,000 mg by mouth every evening        Vitamin D3 (CHOLECALCIFEROL) 25 mcg (1000 units) tablet Take 1 tablet (25 mcg) by mouth daily  0     warfarin ANTICOAGULANT (COUMADIN) 5 MG tablet Take 2 tablets (10 mg) by mouth on Mon, Wed, Fri. Take 1 and 1/2 tablet (7.5 mg) on Sun, Tue, Thur and Sat or as instructed by INR Clinic. 150 tablet 1     zinc 23 MG LOZG lozenge Place 1 lozenge inside cheek 4 times daily       amiodarone (PACERONE) 200 MG tablet Take 1 tablet (200 mg) by mouth daily (Patient not taking: Reported on 11/10/2020) 90 tablet 3       Soc Hx: reviewed  Fam Hx: reviewed          She Diaz MD  Internal Medicine

## 2020-11-10 NOTE — PATIENT INSTRUCTIONS
Plan:  1. Continue Iron for another 3 months  2. Chiropractor referral   3. Take 5 units insulin less than you would usually take  4. I asked  Afia Ricketts, Pharm D  To help you with CBG and insulin   5. Please make a lab appointment for fasting labs  In 3-4 months  6. Please make an appointment few days after the labs to discuss about the results.

## 2020-11-16 ENCOUNTER — TELEPHONE (OUTPATIENT)
Dept: PHARMACY | Facility: CLINIC | Age: 75
End: 2020-11-16

## 2020-11-16 NOTE — TELEPHONE ENCOUNTER
Patient interested in starting CGM but wanting to make sure it was covered first.  She has concerns with hypoglycemia so would recommend the Viji 2.  She will check-on insurance coverage and then offered to schedule a visit to review how to use the reader/sensor and review her medications.

## 2020-11-19 ENCOUNTER — HOSPITAL ENCOUNTER (OUTPATIENT)
Facility: CLINIC | Age: 75
Setting detail: OBSERVATION
Discharge: HOME | End: 2020-11-20
Attending: EMERGENCY MEDICINE | Admitting: INTERNAL MEDICINE
Payer: MEDICARE

## 2020-11-19 DIAGNOSIS — I10 ESSENTIAL HYPERTENSION: ICD-10-CM

## 2020-11-19 DIAGNOSIS — D62 ACUTE BLOOD LOSS ANEMIA: Primary | ICD-10-CM

## 2020-11-19 DIAGNOSIS — I48.0 PAROXYSMAL ATRIAL FIBRILLATION (H): ICD-10-CM

## 2020-11-19 DIAGNOSIS — R79.1 SUPRATHERAPEUTIC INR: ICD-10-CM

## 2020-11-19 LAB
ANION GAP SERPL CALCULATED.3IONS-SCNC: 6 MMOL/L (ref 3–14)
BASOPHILS # BLD AUTO: 0.1 10E9/L (ref 0–0.2)
BASOPHILS NFR BLD AUTO: 0.6 %
BUN SERPL-MCNC: 36 MG/DL (ref 7–30)
CALCIUM SERPL-MCNC: 9 MG/DL (ref 8.5–10.1)
CAPILLARY BLOOD COLLECTION: NORMAL
CHLORIDE SERPL-SCNC: 105 MMOL/L (ref 94–109)
CO2 SERPL-SCNC: 29 MMOL/L (ref 20–32)
CREAT SERPL-MCNC: 1.21 MG/DL (ref 0.52–1.04)
DIFFERENTIAL METHOD BLD: ABNORMAL
EOSINOPHIL # BLD AUTO: 0.4 10E9/L (ref 0–0.7)
EOSINOPHIL NFR BLD AUTO: 4.4 %
ERYTHROCYTE [DISTWIDTH] IN BLOOD BY AUTOMATED COUNT: 13.6 % (ref 10–15)
GFR SERPL CREATININE-BSD FRML MDRD: 44 ML/MIN/{1.73_M2}
GLUCOSE BLDC GLUCOMTR-MCNC: 143 MG/DL (ref 70–99)
GLUCOSE SERPL-MCNC: 161 MG/DL (ref 70–99)
HCT VFR BLD AUTO: 35.7 % (ref 35–47)
HEMOCCULT STL QL: POSITIVE
HGB BLD-MCNC: 11.3 G/DL (ref 11.7–15.7)
IMM GRANULOCYTES # BLD: 0 10E9/L (ref 0–0.4)
IMM GRANULOCYTES NFR BLD: 0.5 %
INR PPP: 9.28 (ref 0.86–1.14)
INR PPP: >10 (ref 0.86–1.14)
INR PPP: >10 (ref 0.86–1.14)
LYMPHOCYTES # BLD AUTO: 1.5 10E9/L (ref 0.8–5.3)
LYMPHOCYTES NFR BLD AUTO: 17 %
MCH RBC QN AUTO: 31.3 PG (ref 26.5–33)
MCHC RBC AUTO-ENTMCNC: 31.7 G/DL (ref 31.5–36.5)
MCV RBC AUTO: 99 FL (ref 78–100)
MONOCYTES # BLD AUTO: 0.7 10E9/L (ref 0–1.3)
MONOCYTES NFR BLD AUTO: 8 %
NEUTROPHILS # BLD AUTO: 6.1 10E9/L (ref 1.6–8.3)
NEUTROPHILS NFR BLD AUTO: 69.5 %
NRBC # BLD AUTO: 0 10*3/UL
NRBC BLD AUTO-RTO: 0 /100
PLATELET # BLD AUTO: 225 10E9/L (ref 150–450)
POTASSIUM SERPL-SCNC: 3.6 MMOL/L (ref 3.4–5.3)
RBC # BLD AUTO: 3.61 10E12/L (ref 3.8–5.2)
SODIUM SERPL-SCNC: 140 MMOL/L (ref 133–144)
WBC # BLD AUTO: 8.8 10E9/L (ref 4–11)

## 2020-11-19 PROCEDURE — 36415 COLL VENOUS BLD VENIPUNCTURE: CPT | Performed by: NURSE PRACTITIONER

## 2020-11-19 PROCEDURE — 250N000011 HC RX IP 250 OP 636: Performed by: EMERGENCY MEDICINE

## 2020-11-19 PROCEDURE — 96375 TX/PRO/DX INJ NEW DRUG ADDON: CPT

## 2020-11-19 PROCEDURE — 99285 EMERGENCY DEPT VISIT HI MDM: CPT | Mod: 25

## 2020-11-19 PROCEDURE — 85610 PROTHROMBIN TIME: CPT | Performed by: PHYSICIAN ASSISTANT

## 2020-11-19 PROCEDURE — 258N000003 HC RX IP 258 OP 636: Performed by: EMERGENCY MEDICINE

## 2020-11-19 PROCEDURE — 96374 THER/PROPH/DIAG INJ IV PUSH: CPT

## 2020-11-19 PROCEDURE — 85025 COMPLETE CBC W/AUTO DIFF WBC: CPT | Performed by: PHYSICIAN ASSISTANT

## 2020-11-19 PROCEDURE — G0378 HOSPITAL OBSERVATION PER HR: HCPCS

## 2020-11-19 PROCEDURE — 80048 BASIC METABOLIC PNL TOTAL CA: CPT | Performed by: PHYSICIAN ASSISTANT

## 2020-11-19 PROCEDURE — 250N000009 HC RX 250: Performed by: EMERGENCY MEDICINE

## 2020-11-19 PROCEDURE — 96365 THER/PROPH/DIAG IV INF INIT: CPT

## 2020-11-19 PROCEDURE — C9113 INJ PANTOPRAZOLE SODIUM, VIA: HCPCS | Performed by: EMERGENCY MEDICINE

## 2020-11-19 PROCEDURE — U0003 INFECTIOUS AGENT DETECTION BY NUCLEIC ACID (DNA OR RNA); SEVERE ACUTE RESPIRATORY SYNDROME CORONAVIRUS 2 (SARS-COV-2) (CORONAVIRUS DISEASE [COVID-19]), AMPLIFIED PROBE TECHNIQUE, MAKING USE OF HIGH THROUGHPUT TECHNOLOGIES AS DESCRIBED BY CMS-2020-01-R: HCPCS | Performed by: PHYSICIAN ASSISTANT

## 2020-11-19 PROCEDURE — 85610 PROTHROMBIN TIME: CPT | Mod: 91 | Performed by: EMERGENCY MEDICINE

## 2020-11-19 PROCEDURE — 99220 PR INITIAL OBSERVATION CARE,LEVEL III: CPT | Mod: AI | Performed by: PHYSICIAN ASSISTANT

## 2020-11-19 PROCEDURE — 99207 PR CDG-CODE CATEGORY CHANGED: CPT | Performed by: PHYSICIAN ASSISTANT

## 2020-11-19 PROCEDURE — 85610 PROTHROMBIN TIME: CPT | Performed by: NURSE PRACTITIONER

## 2020-11-19 PROCEDURE — 999N001017 HC STATISTIC GLUCOSE BY METER IP

## 2020-11-19 PROCEDURE — 82272 OCCULT BLD FECES 1-3 TESTS: CPT | Performed by: EMERGENCY MEDICINE

## 2020-11-19 PROCEDURE — C9803 HOPD COVID-19 SPEC COLLECT: HCPCS

## 2020-11-19 RX ORDER — METFORMIN HCL 500 MG
1000 TABLET, EXTENDED RELEASE 24 HR ORAL
Status: DISCONTINUED | OUTPATIENT
Start: 2020-11-20 | End: 2020-11-20 | Stop reason: HOSPADM

## 2020-11-19 RX ORDER — ACETAMINOPHEN 650 MG/1
650 SUPPOSITORY RECTAL EVERY 4 HOURS PRN
Status: DISCONTINUED | OUTPATIENT
Start: 2020-11-19 | End: 2020-11-20 | Stop reason: HOSPADM

## 2020-11-19 RX ORDER — ONDANSETRON 2 MG/ML
4 INJECTION INTRAMUSCULAR; INTRAVENOUS EVERY 6 HOURS PRN
Status: DISCONTINUED | OUTPATIENT
Start: 2020-11-19 | End: 2020-11-20 | Stop reason: HOSPADM

## 2020-11-19 RX ORDER — FUROSEMIDE 40 MG
40 TABLET ORAL EVERY MORNING
Status: DISCONTINUED | OUTPATIENT
Start: 2020-11-20 | End: 2020-11-20 | Stop reason: HOSPADM

## 2020-11-19 RX ORDER — DEXTROSE MONOHYDRATE 25 G/50ML
25-50 INJECTION, SOLUTION INTRAVENOUS
Status: DISCONTINUED | OUTPATIENT
Start: 2020-11-19 | End: 2020-11-20 | Stop reason: HOSPADM

## 2020-11-19 RX ORDER — ACETAMINOPHEN 325 MG/1
650 TABLET ORAL EVERY 4 HOURS PRN
Status: DISCONTINUED | OUTPATIENT
Start: 2020-11-19 | End: 2020-11-20 | Stop reason: HOSPADM

## 2020-11-19 RX ORDER — METOPROLOL SUCCINATE 50 MG/1
50 TABLET, EXTENDED RELEASE ORAL DAILY
Status: DISCONTINUED | OUTPATIENT
Start: 2020-11-20 | End: 2020-11-20 | Stop reason: HOSPADM

## 2020-11-19 RX ORDER — AMOXICILLIN 250 MG
2 CAPSULE ORAL 2 TIMES DAILY PRN
Status: DISCONTINUED | OUTPATIENT
Start: 2020-11-19 | End: 2020-11-20 | Stop reason: HOSPADM

## 2020-11-19 RX ORDER — FUROSEMIDE 40 MG
40 TABLET ORAL DAILY PRN
COMMUNITY
End: 2020-12-17

## 2020-11-19 RX ORDER — PROCHLORPERAZINE MALEATE 5 MG
5 TABLET ORAL EVERY 6 HOURS PRN
Status: DISCONTINUED | OUTPATIENT
Start: 2020-11-19 | End: 2020-11-20 | Stop reason: HOSPADM

## 2020-11-19 RX ORDER — FUROSEMIDE 40 MG
40 TABLET ORAL EVERY MORNING
COMMUNITY
End: 2020-12-17

## 2020-11-19 RX ORDER — NICOTINE POLACRILEX 4 MG
15-30 LOZENGE BUCCAL
Status: DISCONTINUED | OUTPATIENT
Start: 2020-11-19 | End: 2020-11-20 | Stop reason: HOSPADM

## 2020-11-19 RX ORDER — ONDANSETRON 4 MG/1
4 TABLET, ORALLY DISINTEGRATING ORAL EVERY 6 HOURS PRN
Status: DISCONTINUED | OUTPATIENT
Start: 2020-11-19 | End: 2020-11-20 | Stop reason: HOSPADM

## 2020-11-19 RX ORDER — AMOXICILLIN 250 MG
1 CAPSULE ORAL 2 TIMES DAILY PRN
Status: DISCONTINUED | OUTPATIENT
Start: 2020-11-19 | End: 2020-11-20 | Stop reason: HOSPADM

## 2020-11-19 RX ORDER — POLYETHYLENE GLYCOL 3350 17 G/17G
17 POWDER, FOR SOLUTION ORAL DAILY PRN
Status: DISCONTINUED | OUTPATIENT
Start: 2020-11-19 | End: 2020-11-20 | Stop reason: HOSPADM

## 2020-11-19 RX ORDER — PROCHLORPERAZINE 25 MG
12.5 SUPPOSITORY, RECTAL RECTAL EVERY 12 HOURS PRN
Status: DISCONTINUED | OUTPATIENT
Start: 2020-11-19 | End: 2020-11-20 | Stop reason: HOSPADM

## 2020-11-19 RX ORDER — WARFARIN SODIUM 5 MG/1
TABLET ORAL EVERY EVENING
Status: ON HOLD | COMMUNITY
End: 2020-11-20

## 2020-11-19 RX ADMIN — PHYTONADIONE 6 MG: 10 INJECTION, EMULSION INTRAMUSCULAR; INTRAVENOUS; SUBCUTANEOUS at 18:47

## 2020-11-19 RX ADMIN — PHYTONADIONE 4 MG: 10 INJECTION, EMULSION INTRAMUSCULAR; INTRAVENOUS; SUBCUTANEOUS at 17:31

## 2020-11-19 RX ADMIN — PANTOPRAZOLE SODIUM 40 MG: 40 INJECTION, POWDER, FOR SOLUTION INTRAVENOUS at 17:42

## 2020-11-19 ASSESSMENT — ENCOUNTER SYMPTOMS
ABDOMINAL PAIN: 0
FEVER: 0
NAUSEA: 0
CHILLS: 0
DIARRHEA: 1
BLOOD IN STOOL: 1
VOMITING: 0
ANAL BLEEDING: 1
DIZZINESS: 0

## 2020-11-19 ASSESSMENT — MIFFLIN-ST. JEOR
SCORE: 1775.49
SCORE: 1766.42

## 2020-11-19 NOTE — ED PROVIDER NOTES
History     Chief Complaint:  Rectal Bleeding    HPI   Amy Luna is a 75 year old female s/p MVR, AVR with a bovine pericardial valve, TVR, and PFO closure on 4/15/2020 and with a history of afib anticoagulated on coumadin and with a pacemaker in place who presents with rectal bleeding. The patient reports her stool was watery and an orange-red color, and contained no blood clots. Her INR is currently very high at 11. She has had 1 finger stick and 2 blood draws that have all have resulted in abnormally high INRs. Her mouth was bleeding yesterday from her gums, but this has resolved. She has no dizziness or chest pain, and no vomiting, fever, or chills. Her stomach is pain free.     Allergies:  Penicillins  Pioglitazone     Medications:    Pacerone  Lipitor  Fergon  Lasix  Insulin  Metformin  Toprol  Zoloft  Coumadin    Past Medical History:    Diabetes, type II   Paroxysmal afib  Pericardial effusion  Hypertension  Hyperlipidemia  Depression  [place]  Second degree AV block  Glaucoma  Mitral stenosis  Respiratory failure  Patent foramen ovale  Acute kidney injury  Heart failure  Mitral annular calcification    Past Surgical History:    Appendectomy  Coronary angiogram  Left heart cath  PFO closure  Right heart cath  Pacemaker placement   x2   Total hysterectomy  Mitral valve replacement  Aortic valve replacement  Tricuspid valve replacement  Breast biopsy, right x2   Tonsillectomy  Adenoidectomy  Bilateral myringotomy  [place] teeth extraction  Stenotomy  Left kelman phacoemulsification intraocular lens implant w/ eye stent  Trigger finger release, right     Family History:    Diabetes (Mother)  CAD (Father)    Social History:  Smoking status: Never smoker  Alcohol use: No  Smokeless Tobacco: No  Drug use: No  Marital Status:      Review of Systems   Constitutional: Negative for chills and fever.   Cardiovascular: Negative for chest pain.   Gastrointestinal: Positive for anal bleeding, blood  "in stool and diarrhea. Negative for abdominal pain, nausea and vomiting.   Neurological: Negative for dizziness.   All other systems reviewed and are negative.      Physical Exam     Patient Vitals for the past 24 hrs:   BP Temp Temp src Pulse Resp SpO2 Height Weight   11/19/20 2017 133/42 97.7  F (36.5  C) Oral 76 16 95 % 1.651 m (5' 5\") 127.5 kg (281 lb)   11/19/20 2000 119/46 -- -- -- -- -- -- --   11/19/20 1945 110/54 -- -- 78 -- -- -- --   11/19/20 1730 -- -- -- 81 -- -- -- --   11/19/20 1715 104/42 -- -- 71 -- 96 % -- --   11/19/20 1700 137/55 -- -- 76 -- 96 % -- --   11/19/20 1645 133/49 -- -- 69 -- 97 % -- --   11/19/20 1630 133/53 -- -- 71 -- 97 % -- --   11/19/20 1615 131/57 -- -- 72 -- 96 % -- --   11/19/20 1600 131/57 -- -- 73 -- 97 % -- --   11/19/20 1545 123/60 -- -- 70 -- 97 % -- --   11/19/20 1443 135/82 98.9  F (37.2  C) Oral 77 16 98 % 1.651 m (5' 5\") 126.6 kg (279 lb)     Physical Exam  Constitutional: Alert, attentive, GCS 15  HENT:    Nose: Nose normal.    Mouth/Throat: Oropharynx is clear, mucous membranes are moist.   Eyes: EOM are normal, anicteric, conjugate gaze  CV: regular rate and rhythm; no murmurs  Chest: Effort normal and breath sounds clear without wheezing or rales, symmetric bilaterally   GI:  non tender. No distension. No guarding or rebound.    MSK: No LE edema, no tenderness to palpation of BLE.  Neurological: Alert, attentive, moving all extremities equally.   Skin: Skin is warm and dry.    Emergency Department Course     Laboratory:  Laboratory findings were communicated with the patient who voiced understanding of the findings.    CBC: WBC 8.8, HGB 11.3(L),   BMP: Glucose 161(H), BUN 36(H), Creatinine 1.21(H), GFR estimate 44(L) o/w WNL   (1531) INR: 9.28(HH)   (1809) INR: >10.00(HH)     Stool: occult blood: Positive(A)     Interventions:  1731 Vitamin K 4 mg Oral   1742 Protonix 40 mg IV   1847 Phytonadione 6 mg IV     Emergency Department Course:    IV was inserted " and blood was drawn for laboratory testing, results above.    1554 Nursing notes and vitals reviewed.    1601 I performed an exam of the patient as documented above.     Rectal exam performed as detailed in the physical exam.     Repeat INR collected, results above.     1854 I spoke with Rupal Ojeda PA-C for Dr. Churchill of the hospitalist service from [facility] regarding patient's presentation, findings, and plan of care.    Findings and plan explained to the patient who consents to admission. Discussed the patient with Rupal Ojeda PA-C for Dr. Churchill, who will admit the patient to an obs bed for further monitoring, evaluation, and treatment.    Impression & Plan      Medical Decision Making:  Amy Luna is a 75 year old female small history significant for mitral valve and aortic valve repair with bovine grafts as well as PFO closure April of this year and A. fib as well as bradycardia status post pacemaker presenting for evaluation of supratherapeutic INR.  She endorses some gingival bleeding as well as orange to dark stools but no chest symptoms, abdominal pain dizziness or lightheadedness.  Her INR rechecked here is 9.28, she was given oral vitamin K per the protocol pending her Hemoccult testing which returned positive.  She was then subsequently given 6 mg IV vitamin K for mild nonlife-threatening bleeding as her hemoglobin is stable, her vital signs are within normal limits and do not suspect brisk bleed.  It is possible that her Hemoccult is positive due to swallowed blood from her gingival bleeding.  However will admit to medicine given her significantly elevated INR for continued close monitoring.  She was given Protonix as a precaution.  She did endorse to her anticoagulation clinic that she was not taking her anticoagulation correctly and had in fact doubled her doses to 10 mg and 15 instead of 5 and 7.5mg.  No indication for advanced imaging at this time.  She was  hemodynamically stable the time of her admission.    Diagnosis:    ICD-10-CM    1. Supratherapeutic INR  R79.1 Glucose by meter     Glucose by meter     Asymptomatic COVID-19 Virus (Coronavirus) by PCR   2. Hemoccult positive    Disposition:   Admitted into the care of Rupal Ojeda PA-C under Dr. Churchill.     Milad Ortiz MD  [employer]  10:45 PM 11/19/20     Scribe Disclosure:  I, Nader Law, am serving as a scribe at 5:22 PM on 11/19/2020 to document services personally performed by Milad Ortiz MD based on my observations and the provider's statements to me.    I, Edyta Mcghee, am serving as a scribe at 7:54 PM on 11/19/2020 to document services personally performed by Milad Ortiz MD based on my observations and the provider's statements to me.      [facility] EMERGENCY DEPT       Milad Ortiz MD  11/19/20 2797

## 2020-11-19 NOTE — ED NOTES
DATE:  11/19/2020   TIME OF RECEIPT FROM LAB:  7794  LAB TEST:  INR  LAB VALUE:  9.28  RESULTS GIVEN WITH READ-BACK TO (PROVIDER):  Dr. Angel MD  TIME LAB VALUE REPORTED TO PROVIDER:   9856

## 2020-11-19 NOTE — ED TRIAGE NOTES
"Pt has \"watery stools but not diarrhea\" for past 3 days.  She has red color in toilet after having stools.  She reports INR is elevated and it was checked 4 times today.  2 finger sticks were 8.  Lab draw was 10 and 11.  She is on coumadin for atrial fib.  "

## 2020-11-20 VITALS
OXYGEN SATURATION: 95 % | HEART RATE: 68 BPM | TEMPERATURE: 97 F | RESPIRATION RATE: 18 BRPM | HEIGHT: 65 IN | WEIGHT: 279.4 LBS | BODY MASS INDEX: 46.55 KG/M2 | SYSTOLIC BLOOD PRESSURE: 138 MMHG | DIASTOLIC BLOOD PRESSURE: 50 MMHG

## 2020-11-20 LAB
ANION GAP SERPL CALCULATED.3IONS-SCNC: 4 MMOL/L (ref 3–14)
BASOPHILS # BLD AUTO: 0 10E9/L (ref 0–0.2)
BASOPHILS NFR BLD AUTO: 0.6 %
BUN SERPL-MCNC: 31 MG/DL (ref 7–30)
CALCIUM SERPL-MCNC: 8.2 MG/DL (ref 8.5–10.1)
CHLORIDE SERPL-SCNC: 107 MMOL/L (ref 94–109)
CO2 SERPL-SCNC: 29 MMOL/L (ref 20–32)
CREAT SERPL-MCNC: 1.21 MG/DL (ref 0.52–1.04)
DIFFERENTIAL METHOD BLD: ABNORMAL
EOSINOPHIL # BLD AUTO: 0.4 10E9/L (ref 0–0.7)
EOSINOPHIL NFR BLD AUTO: 5.5 %
ERYTHROCYTE [DISTWIDTH] IN BLOOD BY AUTOMATED COUNT: 13.8 % (ref 10–15)
GFR SERPL CREATININE-BSD FRML MDRD: 44 ML/MIN/{1.73_M2}
GLUCOSE BLDC GLUCOMTR-MCNC: 208 MG/DL (ref 70–99)
GLUCOSE BLDC GLUCOMTR-MCNC: 218 MG/DL (ref 70–99)
GLUCOSE BLDC GLUCOMTR-MCNC: 230 MG/DL (ref 70–99)
GLUCOSE SERPL-MCNC: 241 MG/DL (ref 70–99)
HCT VFR BLD AUTO: 32 % (ref 35–47)
HGB BLD-MCNC: 10.4 G/DL (ref 11.7–15.7)
IMM GRANULOCYTES # BLD: 0.1 10E9/L (ref 0–0.4)
IMM GRANULOCYTES NFR BLD: 0.8 %
INR PPP: 1.7 (ref 0.86–1.14)
LABORATORY COMMENT REPORT: NORMAL
LYMPHOCYTES # BLD AUTO: 1.3 10E9/L (ref 0.8–5.3)
LYMPHOCYTES NFR BLD AUTO: 20.2 %
MCH RBC QN AUTO: 31.9 PG (ref 26.5–33)
MCHC RBC AUTO-ENTMCNC: 32.5 G/DL (ref 31.5–36.5)
MCV RBC AUTO: 98 FL (ref 78–100)
MONOCYTES # BLD AUTO: 0.5 10E9/L (ref 0–1.3)
MONOCYTES NFR BLD AUTO: 7.9 %
NEUTROPHILS # BLD AUTO: 4.3 10E9/L (ref 1.6–8.3)
NEUTROPHILS NFR BLD AUTO: 65 %
NRBC # BLD AUTO: 0 10*3/UL
NRBC BLD AUTO-RTO: 0 /100
PLATELET # BLD AUTO: 196 10E9/L (ref 150–450)
POTASSIUM SERPL-SCNC: 3.6 MMOL/L (ref 3.4–5.3)
RBC # BLD AUTO: 3.26 10E12/L (ref 3.8–5.2)
SARS-COV-2 RNA SPEC QL NAA+PROBE: NEGATIVE
SARS-COV-2 RNA SPEC QL NAA+PROBE: NORMAL
SODIUM SERPL-SCNC: 140 MMOL/L (ref 133–144)
SPECIMEN SOURCE: NORMAL
SPECIMEN SOURCE: NORMAL
WBC # BLD AUTO: 6.6 10E9/L (ref 4–11)

## 2020-11-20 PROCEDURE — 250N000011 HC RX IP 250 OP 636: Performed by: PHYSICIAN ASSISTANT

## 2020-11-20 PROCEDURE — 999N001017 HC STATISTIC GLUCOSE BY METER IP

## 2020-11-20 PROCEDURE — 85610 PROTHROMBIN TIME: CPT | Performed by: PHYSICIAN ASSISTANT

## 2020-11-20 PROCEDURE — 85025 COMPLETE CBC W/AUTO DIFF WBC: CPT | Performed by: PHYSICIAN ASSISTANT

## 2020-11-20 PROCEDURE — C9113 INJ PANTOPRAZOLE SODIUM, VIA: HCPCS | Performed by: PHYSICIAN ASSISTANT

## 2020-11-20 PROCEDURE — 80048 BASIC METABOLIC PNL TOTAL CA: CPT | Performed by: PHYSICIAN ASSISTANT

## 2020-11-20 PROCEDURE — G0378 HOSPITAL OBSERVATION PER HR: HCPCS

## 2020-11-20 PROCEDURE — 250N000013 HC RX MED GY IP 250 OP 250 PS 637: Mod: GY | Performed by: PHYSICIAN ASSISTANT

## 2020-11-20 PROCEDURE — 96372 THER/PROPH/DIAG INJ SC/IM: CPT | Performed by: PHYSICIAN ASSISTANT

## 2020-11-20 PROCEDURE — 36415 COLL VENOUS BLD VENIPUNCTURE: CPT | Performed by: PHYSICIAN ASSISTANT

## 2020-11-20 PROCEDURE — 96376 TX/PRO/DX INJ SAME DRUG ADON: CPT

## 2020-11-20 PROCEDURE — 99217 PR OBSERVATION CARE DISCHARGE: CPT | Performed by: PHYSICIAN ASSISTANT

## 2020-11-20 RX ORDER — WARFARIN SODIUM 5 MG/1
5 TABLET ORAL EVERY EVENING
Qty: 6 TABLET | Refills: 0
Start: 2020-11-20 | End: 2020-12-22

## 2020-11-20 RX ORDER — WARFARIN SODIUM 5 MG/1
10 TABLET ORAL EVERY EVENING
Qty: 6 TABLET | Refills: 0
Start: 2020-11-20 | End: 2020-11-20

## 2020-11-20 RX ORDER — WARFARIN SODIUM 5 MG/1
5 TABLET ORAL EVERY EVENING
Qty: 6 TABLET | Refills: 0 | Status: SHIPPED | OUTPATIENT
Start: 2020-11-20 | End: 2020-11-20

## 2020-11-20 RX ADMIN — SERTRALINE HYDROCHLORIDE 150 MG: 100 TABLET ORAL at 08:08

## 2020-11-20 RX ADMIN — FUROSEMIDE 40 MG: 40 TABLET ORAL at 08:08

## 2020-11-20 RX ADMIN — PANTOPRAZOLE SODIUM 40 MG: 40 INJECTION, POWDER, FOR SOLUTION INTRAVENOUS at 08:10

## 2020-11-20 RX ADMIN — ENOXAPARIN SODIUM 135 MG: 150 INJECTION SUBCUTANEOUS at 12:31

## 2020-11-20 RX ADMIN — METFORMIN ER 500 MG 1000 MG: 500 TABLET ORAL at 11:17

## 2020-11-20 RX ADMIN — METOPROLOL SUCCINATE 50 MG: 50 TABLET, EXTENDED RELEASE ORAL at 08:09

## 2020-11-20 ASSESSMENT — MIFFLIN-ST. JEOR
SCORE: 1777.76
SCORE: 1768.23

## 2020-11-20 NOTE — ED NOTES
DATE:  11/19/2020   TIME OF RECEIPT FROM LAB:  1842  LAB TEST:  INR  LAB VALUE:  > 10  RESULTS GIVEN WITH READ-BACK TO (PROVIDER):  Dr. Angel MD  TIME LAB VALUE REPORTED TO PROVIDER:   1842

## 2020-11-20 NOTE — CONSULTS
[facility]  [facility]  Gastroenterology Consultation    Amy Luna  [address]  [facility] [address]  75 year old female    Admission Date/Time: 11/19/2020  Primary Care Provider: Bev, Safia Leland    We were asked to see the patient in consultation by Roma ESCOBEDO for evaluation of hematochezia.      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 She was admitted 11/19/20 from INR clinic with rectal bleeding and supratherapeutic INR. Patient reports rectal bleeding that started about 3-4 days ago. Had a few episodes of loose stools that appeared reddish in color. Yesterday she also reported a darker stool but she is on iron supplementation. There is no associated fever, nausea, vomiting, abdominal pain, or rectal pain. No similar episodes in the past. Patient also reports having dental work earlier this week and had some gingival bleeding at that time.    She went to anticoagulation clinic and was found to have INR 11 and so she was sent to ED for further evaluation. Hemoglobin was 11.3. She received vitamin K and today INR is 1.7 and hemoglobin 10.4. Patient denies any further bleeding episodes since yesterday afternoon.    Colonoscopy in 2017 at [facility] showed diverticulosis but was otherwise normal. She was advised to repeat this in 3 years due to inadequate prep. Patient states that was negative.      ROS: A comprehensive ten point review of systems was negative aside from those in mentioned in the HPI.      MEDICATIONS: No current facility-administered medications on file prior to encounter.        atorvastatin (LIPITOR) 40 MG tablet, Take 1 tablet (40  "mg) by mouth At Bedtime       Biotin 39870 MCG TABS, Take 1,000 mcg by mouth every morning        Calcium Carbonate-Vit D-Min (CALCIUM 1200 PO), Take 1 tablet by mouth every evening        coenzyme Q-10 200 MG CAPS, Take 200 mg by mouth every morning        ferrous gluconate (FERGON) 324 (38 Fe) MG tablet, Take 1 tablet (324 mg) by mouth daily (with breakfast)       furosemide (LASIX) 40 MG tablet, Take 40 mg by mouth every morning       furosemide (LASIX) 40 MG tablet, Take 40 mg by mouth daily as needed Takes ~ noon as needed for \"swelling\"       insulin NPH-Regular 70/30 (70-30) 100 UNIT/ML vial, Inject 20-40 Units Subcutaneous 2 times daily (with meals)       Lutein 40 MG CAPS, Take 40 mg by mouth every morning        Magnesium 400 MG TABS, Take 400 mg by mouth every evening        metFORMIN (GLUCOPHAGE-XR) 500 MG 24 hr tablet, Take 2 tablets (1,000 mg) by mouth daily (with breakfast) (takes 2 x 500mg)       metoprolol succinate ER (TOPROL-XL) 25 MG 24 hr tablet, Take 2 tablets (50 mg) by mouth daily       potassium aminobenzoate 500 MG CAPS capsule, Take 99 mg by mouth daily Patient taking tablet        Propylene Glycol (SYSTANE BALANCE OP), Apply 1-2 drops to eye 3 times daily as needed (dry eyes)       sertraline (ZOLOFT) 100 MG tablet, Take 1.5 tablets (150 mg) by mouth every morning (takes 1.5 x 100mg)       TURMERIC PO, Take 1-3 tablets by mouth daily        vitamin (B COMPLEX-C) tablet, Take 1 tablet by mouth daily       vitamin C (ASCORBIC ACID) 1000 MG TABS, Take 1,000 mg by mouth every evening        Vitamin D3 (CHOLECALCIFEROL) 25 mcg (1000 units) tablet, Take 1 tablet (25 mcg) by mouth daily       warfarin ANTICOAGULANT (COUMADIN) 5 MG tablet, Take by mouth every evening 15 mg Tuesday & Thursday  10 mg all other days of the week       zinc 23 MG LOZG lozenge, Place 1 lozenge inside cheek 4 times daily       acetaminophen (TYLENOL) 325 MG tablet, Take 2 tablets (650 mg) by mouth every 4 hours as " needed for other (multimodal surgical pain management along with NSAIDS and opioid medication as indicated based on pain control and physical function.)       albuterol (PROAIR HFA/PROVENTIL HFA/VENTOLIN HFA) 108 (90 Base) MCG/ACT inhaler, Inhale 2 puffs into the lungs every 4 hours as needed for shortness of breath / dyspnea or wheezing       ipratropium (ATROVENT) 0.06 % nasal spray, Spray 2 sprays into both nostrils 4 times daily as needed for rhinitis        ALLERGIES:   Allergies   Allergen Reactions     Penicillins Hives     3 weeks after taking med got hives.       Pioglitazone Other (See Comments)     Increased urinary frequency, fatigue, dyspnea       Past Medical History:   Diagnosis Date     (HFpEF) heart failure with preserved ejection fraction (H)      Aortic stenosis      Chest pain      Depressive disorder      Diabetes (H)      Hyperlipidemia      Hypertension      Mitral annular calcification      Mitral stenosis      Obesity      Sleep apnea     CPAP       Past Surgical History:   Procedure Laterality Date     APPENDECTOMY       CV CORONARY ANGIOGRAM N/A 2020    Procedure: Coronary Angiogram;  Surgeon: Inez Peoples MD;  Location:  HEART CARDIAC CATH LAB     CV LEFT HEART CATH N/A 2020    Procedure: Left Heart Cath;  Surgeon: Inez Peoples MD;  Location:  HEART CARDIAC CATH LAB     CV PFO CLOSURE N/A 4/15/2020    Procedure: Patent Foramen Ovale Closure;  Surgeon: Ok Wilson MD;  Location:  OR     CV RIGHT HEART CATH N/A 2020    Procedure: Right Heart Cath;  Surgeon: Inez Peoples MD;  Location:  HEART CARDIAC CATH LAB     EP PACEMAKER N/A 2020    Procedure: EP Pacemaker;  Surgeon: Sohan Francis MD;  Location:  HEART CARDIAC CATH LAB     GYN SURGERY       x2 ,      GYN SURGERY      total hysterectomy     IMPLANT PACEMAKER       REPAIR VALVE TRICUSPID N/A 4/15/2020    Procedure: REPAIR TRICUSPID VALVE  "WITH VILLA MC3 26MM.;  Surgeon: Ok Wilson MD;  Location: [facility] OR     REPLACE VALVE AORTIC N/A 4/15/2020    Procedure: REPLACEMENT, AORTIC VALVE WITH INSPIRIS TISSUE VALVE 25 MM; ON PUMP WITH SOCORRO READ BY CARDIOLOGIST DR BLANTON.;  Surgeon: Ok Wilson MD;  Location: [facility] OR     REPLACE VALVE MITRAL N/A 4/15/2020    Procedure: REPLACEMENT, MITRAL VALVE WITH EPIC TISSUE VALVE 27 MM.;  Surgeon: Ok Wilson MD;  Location:  OR       SOCIAL HISTORY:  Social History     Tobacco Use     Smoking status: Never Smoker     Smokeless tobacco: Never Used   Substance Use Topics     Alcohol use: No     Drug use: No       FAMILY HISTORY:  Family History   Problem Relation Age of Onset     Diabetes Mother 56     Congenital heart disease Father 23       PHYSICAL EXAM:   BP (!) 150/50   Pulse 64   Temp 97  F (36.1  C) (Oral)   Resp 18   Ht 1.651 m (5' 5\")   Wt 127.7 kg (281 lb 8 oz)   SpO2 94%   BMI 46.84 kg/m      Constitutional: No acute distress.  Head: Normocephalic, atraumatic.    Neck: Neck supple. No adenopathy.  Eyes: No scleral icterus.  ENT: Hearing adequate. Pharynx normal without erythema or exudate.  Cardiovascular: RRR, normal S1/S2, no murmurs.  Respiratory: Effort normal, CTAB.  Abdomen: Soft, nondistended, nontender, no guarding/rebound.  Neuro: CN II-XII grossly intact. No focal deficits.  Extremities: No edema.  Skin: No suspicious lesions, rashes, or jaundice.  Psychiatric: Mentation appears normal and affect normal.      ADDITIONAL COMMENTS:   I reviewed the patient's new clinical lab test results.   Recent Labs   Lab Test 11/20/20  0554 11/19/20  1809 11/19/20  1531 11/03/20  1134 11/03/20  1134   WBC 6.6  --  8.8  --  7.5   HGB 10.4*  --  11.3*  --  11.7   MCV 98  --  99  --  100     --  225  --  216   INR 1.70* >10.00* 9.28*   < > 2.80*    < > = values in this interval not displayed.     Recent Labs   Lab Test 11/20/20  0554 11/19/20  1531 11/03/20  1134    " 140 140   POTASSIUM 3.6 3.6 4.3   CHLORIDE 107 105 106   CO2 29 29 29   BUN 31* 36* 22   CR 1.21* 1.21* 1.08*   ANIONGAP 4 6 5   NELLY 8.2* 9.0 10.1   * 161* 63*     Recent Labs   Lab Test 11/03/20  1134 07/28/20  0904 04/16/20  0350 04/08/20  1317 04/08/20  1317 04/08/20  0800   ALBUMIN 4.0 3.6 3.2*   < >  --  3.4   BILITOTAL 0.6 0.3 0.5   < >  --  0.5   DBIL  --   --   --   --   --  0.1   ALT 38 38 37   < >  --  27   AST 37 32 136*   < >  --  17   ALKPHOS 82 66 44   < >  --  44   PROTEIN  --   --   --   --  Negative  --     < > = values in this interval not displayed.         IMAGING/ENDOSCOPY  No pertinent results.    CONSULTATION ASSESSMENT AND PLAN:    Amy Luna is a 75-year-old female with history of aortic/mitral valvular disease on warfarin who was admitted 11/19/20 with rectal bleeding in the setting of supratherapeutic INR (11). The INR is now corrected and the rectal bleeding has subsided.    1) Rectal bleeding: Most likely diverticular or hemorrhoidal bleeding in the setting of elevated INR. This has resolved with correction of the INR. Slight drop in hemoglobin today may be from the prior bleeding. Colonoscopy 2017 with diverticulosis, inadequate prep. No GI intervention is indicated currently, but patient should repeat an outpatient colonoscopy when feasible (3 year recall recommended).         - Monitor hemoglobin, INR, stool output.         - No need for colonoscopy as inpatient unless the bleeding persists.         - Due for outpatient colonoscopy at [facility]; should schedule in near future.    I discussed the patient plan with Dr. Hauser. GI will sign off at this time. Please call if further assistance is needed. Thank you for asking us to participate in the care of this patient.    Lilliana Natarajan PA-C  [facility] ([facility])

## 2020-11-20 NOTE — PLAN OF CARE
Patient's After Visit Summary was reviewed with patient.   Patient verbalized understanding of After Visit Summary, recommended follow up and was given an opportunity to ask questions.   Discharge medications sent home with patient/family: sent to outside facility    Discharged with self. Parked in ER parking lot, wheelchair ride provided downstairs.     OBSERVATION patient END time: 1310

## 2020-11-20 NOTE — PLAN OF CARE
PRIMARY DIAGNOSIS: GI BLEED/HIGH INR    OUTPATIENT/OBSERVATION GOALS TO BE MET BEFORE DISCHARGE  1. Orthostatic performed: N/A    2. Stable Hgb Yes.   Recent Labs   Lab Test 11/20/20  0554 11/19/20  1531 11/03/20  1134   HGB 10.4* 11.3* 11.7       3. Resolved or declined bleeding episodes: Yes Last episode: in ED     4. Appropriate testing complete: Yes    5. Cleared for discharge by consultants (if involved): No, GI consult     6. Safe discharge environment identified: Yes    Discharge Planner Nurse   Safe discharge environment identified: Yes  Barriers to discharge: Yes       Entered by: Gaurang Rosalse 11/20/2020    Please review provider order for any additional goals.   Nurse to notify provider when observation goals have been met and patient is ready for discharge.    Patient is alert and orientated. VSS. SBA. Diabetic diet. SL. CPAP taken off this morning, so no precautions at this time. COVID pending. INR 1.70 this am. GI consult, no BM since on the floor. States some left shoulder pain this morning since Tuesday at home, provider updated on this. Will continue to monitor and provide cares.

## 2020-11-20 NOTE — PLAN OF CARE
PRIMARY DIAGNOSIS: GI BLEED/HIGH INR    OUTPATIENT/OBSERVATION GOALS TO BE MET BEFORE DISCHARGE  1. Orthostatic performed: N/A    2. Stable Hgb Yes.   Recent Labs   Lab Test 11/20/20  0554 11/19/20  1531 11/03/20  1134   HGB 10.4* 11.3* 11.7       3. Resolved or declined bleeding episodes: Yes Last episode: in ED     4. Appropriate testing complete: Yes    5. Cleared for discharge by consultants (if involved): yes    6. Safe discharge environment identified: Yes    Discharge Planner Nurse   Safe discharge environment identified: Yes  Barriers to discharge: No       Entered by: Gaurang Rosales 11/20/2020    Please review provider order for any additional goals.   Nurse to notify provider when observation goals have been met and patient is ready for discharge.    Patient is alert and orientated. VSS. SBA. Diabetic diet. SL. COVID pending. INR 1.70 this am.no BM since on the floor. Anticipate discharge today after pharmacy consult.  Will continue to monitor and provide cares.

## 2020-11-20 NOTE — PLAN OF CARE
PRIMARY DIAGNOSIS: GI BLEED/High INR     OUTPATIENT/OBSERVATION GOALS TO BE MET BEFORE DISCHARGE  1. Orthostatic performed: N/A    2. Stable Hgb Yes.   Recent Labs   Lab Test 11/19/20  1531 11/03/20  1134 07/28/20  0904   HGB 11.3* 11.7 10.8*       3. Resolved or declined bleeding episodes: Yes Last episode: in ED     4. Appropriate testing complete: Yes    5. Cleared for discharge by consultants (if involved): NA    6. Safe discharge environment identified: Yes    Discharge Planner Nurse   Safe discharge environment identified: Yes  Barriers to discharge: Yes       Entered by: Mario Melvin 11/20/2020 12:48 AM     Please review provider order for any additional goals.   Nurse to notify provider when observation goals have been met and patient is ready for discharge.    Vitals are Temp: 97.4  F (36.3  C) Temp src: Oral BP: (!) 143/44 Pulse: 66   Resp: 18 SpO2: 96 %.  Patient is Alert and Oriented x4. They are SBA with no assistive devices .  Pt is a Diabetic diet.  They are denying pain.  Patient is Saline locked. CPAP on, precautions started. Pt education provided. COVID pending. Dispo pending INR in AM.

## 2020-11-20 NOTE — PHARMACY-ANTICOAGULATION SERVICE
Clinical Pharmacy- Warfarin Discharge Note  This patient is currently on warfarin for the treatment of Atrial fibrillation.  INR Goal= 2-3.    Per med history, warfarin dose = 15 mg TuTh, 10 mg [id number].   Per anti-coagulant clinic notes, warfarin dose = 5 mg daily except 7.5 Sun, Tues, Thurs         Anticoagulation Dose History     Recent Dosing and Labs Latest Ref Rng & Units 10/23/2020 10/30/2020 11/3/2020 11/19/2020 11/19/2020 11/19/2020 11/20/2020    INR 0.86 - 1.14 3.60(H) 3.40(H) 2.80(H) >10.00(HH) 9.28(HH) >10.00(HH) 1.70(H)          Vitamin K doses administered during the last 7 days: Yes, 4 mg po and 6 mg IV Rec'd 11/19.  FFP administered during the last 7 days:    Agree with discharging the patient on a warfarin regimen of 5 mg daily for 3 days with an INR check on Monday. Will have Lovenox for bridging.

## 2020-11-20 NOTE — PLAN OF CARE
ROOM # 222    Living Situation (if not independent, order SW consult): House with Spouse  Facility name:  : [facility]  [id number]    Activity level at baseline: Ind  Activity level on admit: SBA      Patient registered to observation; given Patient Bill of Rights; given the opportunity to ask questions about observation status and their plan of care.  Patient has been oriented to the observation room, bathroom and call light is in place.    Discussed discharge goals and expectations with patient/family.

## 2020-11-20 NOTE — PLAN OF CARE
PRIMARY DIAGNOSIS: GI BLEED/High INR     OUTPATIENT/OBSERVATION GOALS TO BE MET BEFORE DISCHARGE  1. Orthostatic performed: N/A    2. Stable Hgb Yes.   Recent Labs   Lab Test 11/19/20  1531 11/03/20  1134 07/28/20  0904   HGB 11.3* 11.7 10.8*       3. Resolved or declined bleeding episodes: Yes Last episode: in ED     4. Appropriate testing complete: Yes    5. Cleared for discharge by consultants (if involved): NA    6. Safe discharge environment identified: Yes    Discharge Planner Nurse   Safe discharge environment identified: Yes  Barriers to discharge: Yes       Entered by: Mario Melvin 11/19/2020 10:39 PM     Please review provider order for any additional goals.   Nurse to notify provider when observation goals have been met and patient is ready for discharge.    Vitals are Temp: 97.7  F (36.5  C) Temp src: Oral BP: 133/42 Pulse: 76   Resp: 16 SpO2: 95 %.  Patient is Alert and Oriented x4. They are SBA with no assistive devices .  Pt is a Diabetic diet.  They are denying pain.  Patient is Saline locked.

## 2020-11-20 NOTE — ED NOTES
"[facility]  ED Nurse Handoff Report     Amy Luna is a 75 year old female   ED Chief complaint: Rectal Bleeding  . ED Diagnosis:   Final diagnoses:   Supratherapeutic INR     Allergies:   Allergies   Allergen Reactions     Penicillins Hives     3 weeks after taking med got hives.       Pioglitazone Other (See Comments)     Increased urinary frequency, fatigue, dyspnea       Code Status: Full Code  Activity level - Baseline/Home:  Independent. Activity Level - Current:   Stand by Assist. Lift room needed: No. Bariatric: No   Needed: No   Isolation: No. Infection: Not Applicable.     Vital Signs:   Vitals:    11/19/20 1645 11/19/20 1700 11/19/20 1715 11/19/20 1730   BP: 133/49 137/55 104/42    Pulse: 69 76 71 81   Resp:       Temp:       TempSrc:       SpO2: 97% 96% 96%    Weight:       Height:           Cardiac Rhythm:  ,      Pain level:    Patient confused: No. Patient Falls Risk: Yes.   Elimination Status: Has voided   Patient Report - Initial Complaint:    Pt has \"watery stools but not diarrhea\" for past 3 days.  She has red color in toilet after having stools.  She reports INR is elevated and it was checked 4 times today.  2 finger sticks were 8.  Lab draw was 10 and 11.  She is on coumadin for atrial fib.     . Focused Assessment:   42 Cardiac Cardiac - Cardiac WDL: .WDL except (Pt on coumadin for A fib, had a high INR today, 10-11, when it was tested at her clinic and was told to come to the ER. Pt Denies chest pain, SOB, or any other symptoms. ) HS     15:42 Gastrointestinal Gastrointestinal - Gastrointestinal WDL: .WDL except (C/o loose stool for the past 3 days with small amount of blood. Pt states there are no other symptoms except feeling slightly more tired today than usual. )         Tests Performed: Labs. Abnormal Results:   Labs Ordered and Resulted from Time of ED Arrival Up to the Time of Departure from the ED   INR - Abnormal; Notable for the following components: "       Result Value    INR 9.28 (*)     All other components within normal limits   CBC WITH PLATELETS DIFFERENTIAL - Abnormal; Notable for the following components:    RBC Count 3.61 (*)     Hemoglobin 11.3 (*)     All other components within normal limits   BASIC METABOLIC PANEL - Abnormal; Notable for the following components:    Glucose 161 (*)     Urea Nitrogen 36 (*)     Creatinine 1.21 (*)     GFR Estimate 44 (*)     GFR Estimate If Black 51 (*)     All other components within normal limits   OCCULT BLOOD STOOL - Abnormal; Notable for the following components:    Occult Blood Positive (*)     All other components within normal limits   INR - Abnormal; Notable for the following components:    INR >10.00 (*)     All other components within normal limits     Treatments provided: See MAR  Family Comments: N/A  OBS brochure/video discussed/provided to patient:  Yes  ED Medications:   Medications   HOLD: warfarin (COUMADIN) (has no administration in time range)   phytonadione (AQUA-MEPHYTON) 6 mg in sodium chloride 0.9 % 50 mL intermittent infusion (6 mg Intravenous New Bag 11/19/20 8407)   phytonadione (MEPHYTON/VITAMIN K) 1 MG/ML oral solution 4 mg (4 mg Oral Given 11/19/20 1731)   pantoprazole (PROTONIX) IV push injection 40 mg (40 mg Intravenous Given 11/19/20 1742)     Drips infusing:  Yes- phytonadione (AQUA-MEPHYTON)  For the majority of the shift, the patient's behavior Green. Interventions performed were N/A.    Sepsis treatment initiated: No     Patient tested for COVID 19 prior to admission: YES    ED Nurse Name/Phone Number: Leena Painter RN,   6:57 PM    RECEIVING UNIT ED HANDOFF REVIEW    Above ED Nurse Handoff Report was reviewed: Yes  Reviewed by: Mario Melvin RN on November 19, 2020 at 8:00 PM

## 2020-11-20 NOTE — PLAN OF CARE
PRIMARY DIAGNOSIS: GI BLEED/High INR     OUTPATIENT/OBSERVATION GOALS TO BE MET BEFORE DISCHARGE  1. Orthostatic performed: N/A    2. Stable Hgb Yes.   Recent Labs   Lab Test 11/19/20  1531 11/03/20  1134 07/28/20  0904   HGB 11.3* 11.7 10.8*       3. Resolved or declined bleeding episodes: Yes Last episode: in ED     4. Appropriate testing complete: Yes    5. Cleared for discharge by consultants (if involved): NA    6. Safe discharge environment identified: Yes    Discharge Planner Nurse   Safe discharge environment identified: Yes  Barriers to discharge: Yes       Entered by: Mario Melvin 11/20/2020 5:41 AM     Please review provider order for any additional goals.   Nurse to notify provider when observation goals have been met and patient is ready for discharge.    Vitals are Temp: 96.1  F (35.6  C) Temp src: Oral BP: 132/44 Pulse: 64   Resp: 18 SpO2: 96 %.  Patient is Alert and Oriented x4. They are SBA with no assistive devices .  Pt is a Diabetic diet.  They are denying pain.  Patient is Saline locked. CPAP on, precautions started. Pt education provided. COVID pending. Dispo pending INR in AM.

## 2020-11-20 NOTE — DISCHARGE SUMMARY
[facility]    Discharge Summary  Hospitalist    Date of Admission:  11/19/2020  Date of Discharge:  11/20/2020  1:21 PM  Discharging Provider: Roma Smith PA-C  Date of Service (when I saw the patient): 11/20/2020    Discharge Diagnoses   Supra therapeutic INR  Hematochezia  Chronic anticoagulation with coumadin    History of Present Illness   Amy Luna is a 75 year old female who presents from INR clinic for evaluation of elevated INR and rectal bleeding.  The patient is currently taking warfarin for a history of atrial fibrillation after her recent cardiac surgery in April of this year.  She states that she was initially told to take 15 mg twice a week and 10 mg the other 5 days which has been the same since July and what was printed on the label of her prescription. Patient has had issues of recent with her INR being supratherapeutic this month as well erratic at times since starting the medication.  Her INR was noted to be greater than 10 today in clinic.  She states that she has had loose stools the last 3 to 4 days that have appeared reddish in color.  She notes melena today but also states she is on iron supplements due to a history of anemia.  Denies hematuria.  The patient states when she had fingersticks done in clinic today to check her INR level her finger continued to ooze for some time after. The patient had a dental appointment on Tuesday with a routine cleaning which caused increase bleeding of her gums.  Denies any significant bruising except for over her abdomen where she gives her self her insulin injections.  She notes baseline lightheadedness and dizziness that has not changed of recent.  Denies any recent fever, chills, nausea, vomiting, epigastric pain, chest pain, shortness of breath, or increased lower extremity swelling.  She states she had he last colonoscopy about 2 years ago. Denies any change in her diet or recent increased NSAID use.  Denies history  of peptic ulcer disease.  ED workup revealed: VSS, BUN of 36, creatinine 1.21, stool occult positive, glucose 161, Hgb 11.3, supratherapeutic INR of 9.28.   INR was >10.0 in clinic prior to presentation and 9.28 in the ED. Reports of 3-4 days of loose stools that have appeared reddish in color and melena today (also on iron supplement daily for anemia). Denies hematuria and no significant ecchymosis on exam. Easy bleeding with finger prick today and gingival bleeding during routine dental exam on 11/17.     She received Vitamin K (4 mg PO, 6 mg IV) in the ED. The patient was admitted to observation on 11/20 for further monitoring. She had no further bowel movements or symptoms of active bleeding. No abdominal pain, hypotension, nausea, emesis or fevers. Gastroenterology was consulted in her care. Suspicion was for rectal bleeding, likely diverticular or hemorrhoidal bleeding, mucosal irritation from supra therpeautic INR. She was recommended for outpatient colonoscopy (previously done at  GI). Patient was uncertain about continuing to follow  GI service, referral placed for [facility] at discharge if she so chooses.  In regards to her supra therapeautic INR, on further discussion with the Pharmacist, the patient had been taking the incorrect warfarin dosage as prescribed. Likely contribution to her ongoing INR's outside of desired range.   Per patient was taking, warfarin dose = 15 mg TuTh, 10 mg MWFSaSu. Per anti-coagulant clinic notes, she was to be taking warfarin dose = 5 mg daily except 7.5 Sun, Tues, Thurs.  The patient was discharged with corrected warfarin dose recommendations, close f/up with INR clinic. Is to bridge with lovenox ~3 Days until further direction from INR clinic.        Please see admitting H & P  by Echo Ojeda PA-C, on 11/19/2020 for full details of the encounter.     Pending Results     Unresulted Labs Ordered in the Past 30 Days of this Admission     Date and Time Order Name Status  "Description    11/19/2020 2004 SARS-CoV-2 COVID-19 Virus (Coronavirus) RT-PCR In process           Code Status   Full Code       Primary Care Physician   [facility]      BP (!) 150/50   Pulse 64   Temp 97  F (36.1  C) (Oral)   Resp 18   Ht 1.651 m (5' 5\")   Wt 127.7 kg (281 lb 8 oz)   SpO2 94%   BMI 46.84 kg/m      Constitutional: Awake, alert, no apparent distress  Respiratory:  Normal work of breathing. Lungs clear to auscultation bilaterally, no crackles or wheezing.  Cardiovascular: Regular rate and rhythm, normal S1 and S2, and no murmur appreciated.   GI: Bowel sounds present. soft, non-distended, non-tender.   Skin/Integument: Warm, dry. no peripheral edema.  Neuro: No focal deficits. Moving all extremities with normal strength. Coordination and sensation grossly intact. Speech clear.   Psych: Appropriate affect.        Discharge Disposition   Discharged to home  Condition at discharge: Stable    Consultations This Hospital Stay   GASTROENTEROLOGY IP CONSULT  PHARMACY IP CONSULT    Time Spent on this Encounter   IRoma PA-C, personally saw the patient today and spent greater than 30 minutes discharging this patient.    Discharge Orders      INR     Hemoglobin    Last Lab Result: Hemoglobin (g/dL)       Date                     Value                 11/20/2020               10.4 (L)         ----------     Basic metabolic panel     GASTROENTEROLOGY ADULT REF PROCEDURE ONLY      GASTROENTEROLOGY ADULT REF CONSULT ONLY      MED THERAPY MANAGE REFERRAL      Reason for your hospital stay    You were hospitalized to [facility] for evaluation of elevated INR.  Your INR was reversed with vitamin K.   You were seen by Gastroenterology regarding blood in your stools. We recommend outpatient colonoscopy.  This could be done at specialist group of your preference, referral sent to [facility] if you so choose.     Activity    Your activity upon discharge: activity as tolerated     Follow-up and " recommended labs and tests     Follow up with primary care provider, [facility], within 7-14 days to evaluate medication change and for hospital follow- up.  The following labs/tests are recommended: Hemoglobin, BMP, INR on (11/23).    You will need to schedule a close follow-up appointment with the INR clinic, Monday 11/23, regarding adjustment of warfarin dosing.  Should contact INR clinic and primary care regarding whether to return to your previous dosing.     When to contact your care team    Call your primary care doctor if you have any of the following: temperature greater than 101 F, worsening shortness of breath, increased swelling, worsening pain, new or unrelenting diarrhea, or any other concerning symptoms. Call 911 or go to the emergency room if you need immediate assistance.    Bleeding from your mouth or anus that can't be stopped    Bleeding along with feeling lightheaded or dizzy  You may expect to have some slight blood in your stool the next couple days.     Diet    Follow this diet upon discharge: Regular adult diet. Please try and eat 3 regular meals to adjust insulin, diabetic management according to blood sugars.     Discharge Medications   Current Discharge Medication List      START taking these medications    Details   enoxaparin ANTICOAGULANT (LOVENOX) 150 MG/ML syringe Inject 0.9 mLs (135 mg) Subcutaneous every 12 hours for 3 days On 11/23 follow-up with INR clinic and PCP regarding whether this medication needs to be continued.  Qty: 6 mL, Refills: 0    Associated Diagnoses: Paroxysmal atrial fibrillation (H)         CONTINUE these medications which have CHANGED    Details   warfarin ANTICOAGULANT (COUMADIN) 5 MG tablet Take 1 tablet (5 mg) by mouth every evening Take 5 mg Warfarin temporarily 11/20-11/22, f/up with INR clinic on 11/23 regarding whether to continue to previous dosing.  Qty: 6 tablet, Refills: 0    Associated Diagnoses: Paroxysmal atrial fibrillation (H)  "        CONTINUE these medications which have NOT CHANGED    Details   atorvastatin (LIPITOR) 40 MG tablet Take 1 tablet (40 mg) by mouth At Bedtime  Qty: 90 tablet, Refills: 3    Associated Diagnoses: Paroxysmal atrial fibrillation (H); S/P mitral valve replacement with bioprosthetic valve; S/P aortic valve and mitral valve replacement; S/P AVR (aortic valve replacement); Tricuspid valve insufficiency, unspecified etiology; Cardiac pacemaker in situ; Acute blood loss anemia; Essential hypertension      Biotin 50150 MCG TABS Take 1,000 mcg by mouth every morning       Calcium Carbonate-Vit D-Min (CALCIUM 1200 PO) Take 1 tablet by mouth every evening       coenzyme Q-10 200 MG CAPS Take 200 mg by mouth every morning       ferrous gluconate (FERGON) 324 (38 Fe) MG tablet Take 1 tablet (324 mg) by mouth daily (with breakfast)  Qty: 30 tablet, Refills: 3    Associated Diagnoses: Acute blood loss anemia      !! furosemide (LASIX) 40 MG tablet Take 40 mg by mouth every morning      !! furosemide (LASIX) 40 MG tablet Take 40 mg by mouth daily as needed Takes ~ noon as needed for \"swelling\"      insulin NPH-Regular 70/30 (70-30) 100 UNIT/ML vial Inject 20-40 Units Subcutaneous 2 times daily (with meals)  Qty: 72 mL, Refills: 1    Comments: Pt purchases OTC insulin (cheaper) and varies dose herself depending upon blood sugar readings  Associated Diagnoses: Type 2 diabetes mellitus without complication, with long-term current use of insulin (H)      Lutein 40 MG CAPS Take 40 mg by mouth every morning       Magnesium 400 MG TABS Take 400 mg by mouth every evening       metFORMIN (GLUCOPHAGE-XR) 500 MG 24 hr tablet Take 2 tablets (1,000 mg) by mouth daily (with breakfast) (takes 2 x 500mg)  Qty: 90 tablet, Refills: 3    Associated Diagnoses: Paroxysmal atrial fibrillation (H); S/P mitral valve replacement with bioprosthetic valve; S/P aortic valve and mitral valve replacement; S/P AVR (aortic valve replacement); Tricuspid valve " insufficiency, unspecified etiology; Cardiac pacemaker in situ; Acute blood loss anemia; Essential hypertension      metoprolol succinate ER (TOPROL-XL) 25 MG 24 hr tablet Take 2 tablets (50 mg) by mouth daily  Qty: 90 tablet, Refills: 3    Associated Diagnoses: Essential hypertension      potassium aminobenzoate 500 MG CAPS capsule Take 99 mg by mouth daily Patient taking tablet       Propylene Glycol (SYSTANE BALANCE OP) Apply 1-2 drops to eye 3 times daily as needed (dry eyes)      sertraline (ZOLOFT) 100 MG tablet Take 1.5 tablets (150 mg) by mouth every morning (takes 1.5 x 100mg)  Qty: 135 tablet, Refills: 1    Comments: Profile Rx: patient will contact pharmacy when needed  Associated Diagnoses: Recurrent major depressive disorder, in full remission (H)      TURMERIC PO Take 1-3 tablets by mouth daily       vitamin (B COMPLEX-C) tablet Take 1 tablet by mouth daily      vitamin C (ASCORBIC ACID) 1000 MG TABS Take 1,000 mg by mouth every evening       Vitamin D3 (CHOLECALCIFEROL) 25 mcg (1000 units) tablet Take 1 tablet (25 mcg) by mouth daily  Refills: 0    Associated Diagnoses: Preventative health care      zinc 23 MG LOZG lozenge Place 1 lozenge inside cheek 4 times daily      acetaminophen (TYLENOL) 325 MG tablet Take 2 tablets (650 mg) by mouth every 4 hours as needed for other (multimodal surgical pain management along with NSAIDS and opioid medication as indicated based on pain control and physical function.)  Qty:  , Refills: 0    Associated Diagnoses: Nonrheumatic aortic valve stenosis      albuterol (PROAIR HFA/PROVENTIL HFA/VENTOLIN HFA) 108 (90 Base) MCG/ACT inhaler Inhale 2 puffs into the lungs every 4 hours as needed for shortness of breath / dyspnea or wheezing    Comments: Pharmacy may dispense brand covered by insurance (Proair, or proventil or ventolin or generic albuterol inhaler)      ipratropium (ATROVENT) 0.06 % nasal spray Spray 2 sprays into both nostrils 4 times daily as needed for  rhinitis  Qty: 3 Box, Refills: 1    Associated Diagnoses: Seasonal allergic rhinitis, unspecified trigger       !! - Potential duplicate medications found. Please discuss with provider.        Allergies   Allergies   Allergen Reactions     Penicillins Hives     3 weeks after taking med got hives.       Pioglitazone Other (See Comments)     Increased urinary frequency, fatigue, dyspnea     Data   Most Recent 3 CBC's:  Recent Labs   Lab Test 11/23/20  1626 11/20/20  0554 11/19/20  1531 11/03/20  1134   WBC  --  6.6 8.8 7.5   HGB 11.3* 10.4* 11.3* 11.7   MCV  --  98 99 100   PLT  --  196 225 216      Most Recent 3 BMP's:  Recent Labs   Lab Test 11/20/20  0554 11/19/20  1531 11/03/20  1134    140 140   POTASSIUM 3.6 3.6 4.3   CHLORIDE 107 105 106   CO2 29 29 29   BUN 31* 36* 22   CR 1.21* 1.21* 1.08*   ANIONGAP 4 6 5   NELLY 8.2* 9.0 10.1   * 161* 63*     Most Recent 2 LFT's:  Recent Labs   Lab Test 11/03/20  1134 07/28/20  0904   AST 37 32   ALT 38 38   ALKPHOS 82 66   BILITOTAL 0.6 0.3     Most Recent INR's and Anticoagulation Dosing History:  Anticoagulation Dose History     Recent Dosing and Labs Latest Ref Rng & Units 10/30/2020 11/3/2020 11/19/2020 11/19/2020 11/19/2020 11/20/2020 11/23/2020    INR 0.86 - 1.14 3.40(H) 2.80(H) >10.00(HH) 9.28(HH) >10.00(HH) 1.70(H) 1.50(H)        Most Recent 3 Troponin's:  Recent Labs   Lab Test 01/17/20  0507 01/17/20  0149 01/16/20  2221   TROPI 0.056* 0.106* 0.020     Most Recent Cholesterol Panel:  Recent Labs   Lab Test 07/28/20  0904   CHOL 187   *   HDL 64   TRIG 104     Most Recent 6 Bacteria Isolates From Any Culture (See EPIC Reports for Culture Details):  Recent Labs   Lab Test 01/16/20  2221 01/16/20  2220   CULT No growth No growth     Most Recent TSH, T4 and A1c Labs:  Roma Smith PA-C  [facility]

## 2020-11-20 NOTE — H&P
[facility]    Hospitalist History and Physical    Name: Amy Luna    MRN: [id number]  YOB: 1945    Age: 75 year old  Date of Admission:  11/19/2020  Date of Service (when I saw the patient): 11/19/20    Assessment & Plan   Amy Luna is a 75 year old female with PMH significant for severe aortic stenosis, severe mitral stenosis, TR, SSS s/p PPM, s/p MVR with [place] bioprosthetic valve, AVR with bovine valve, tricuspid valve repair, PFO closure (04/2020), morbid obesity, LAVERNE on CPAP, hypertension, hyperlipidemia, DM2, CKD, and TERRIE who presents from the INR clinic for evaluation of rectal bleeding with supratherapeutic INR.     ED workup reveals: VSS, BUN of 36, creatinine 1.21, stool occult positive, glucose of 161, hemoglobin of 11.3, and INR of 9.28.     #Supratherpeutic INR  #Rectal bleeding: supratherapeutic INR levels over the last month with erratic levels sicne starting Warfarin. INR >10.0 in clinic today and 9.28 in the ED. Reports of 3-4 days of loose stools that have appeared reddish in color and melena today (also on iron supplement daily for anemia). Blood pressure stable. Hgb stable at 11.3. Denies hematuria and no significant ecchymosis on exam. Easy bleeding with finger prick today and gingival bleeding during routine dental exam on 11/17. Stool occult positive today, low suspicion for acute upper GI bleed at this time but rather mucosal irritation from supratherapeutic INR.   - received 4 mg of PO vitamin K and 6 mg of IV vitamin K in the ED, will hold off on further vitamin K at this time  - hold Warfarin  - recheck INR in AM  - IV Protonix BID in case of upper GI bleed (suspect occult positive from swallowing blood from gingival bleeding and mucosal irritation rather than PUD)   - monitor for signs of bleeding     #Paroxysmal A-fib: started after cardiac surgery with 80% burden noted in 06/2020. Managed on Amiodarone and Warfarin for  anticoagulation.   - hold warfarin due to subtherapeutic INR  - no longer on Amiodarone, stopped on 10/15 with plans to interrogate device this month to assess A-fib burden     #s/p MVR with [place] bioprosthetic valve, AVR with bovine valve, tricuspid valve repair, and PFO closure: performed by Dr. Wilson of CV surgery in 04/2020. Follows with Dr. Bustillo as her primary cardiologist.     #H/o SSS s/p PPM: issues with atrial lead malfunction postoperatively in 04/2020 requiring atrial lead revision by Dr. Francis on 4/20/20.     #DM2: most recent HgbA1c of 6.8 on 11/3/20 with initial blood glucose of 161. Reports difficulty with her current insulin regimen (changed from Lantus to 70/30 after hospitalization in 04/2020 due to cost)  - continue PTA Metformin and insulin NPH 70-30 20 units BID with meals (PTA taking 20-40 pending blood glucose level)  - medium sliding scale insulin   - may benefit from talking with pharmacist about diabetes education     #HTN: stable, continue PTA Metoprolol and Lasix (patient reports only taking Lasix 40 mg daily and in the afternoon if she has increased LE edema, not 40 mg BID as prescribed)     #HLD: continue Atorvastatin at discharge     #LAVERNE: continue nightly CPAP use if machine with patient otherwise PRN supplemental oxygen while sleeping     #TERRIE: Hgb stable at 11.3    #CKD: stable with creatinine of 1.21 today     COVID status: asymptomatic testing pending from 11/19  DVT Prophylaxis: on Wafarin with supratherpeutic INR  Code Status: Full Code, discussed with patient   Disposition: Expected discharge in 24-48 hours, will admit to observation     Primary Care Physician   [facility]    Chief Complaint   Rectal bleeding and elevated INR level     History obtained from discussion with ED provider, Dr. Ortiz, chart review, and interview with patient.     History of Present Illness   Amy Luna is a 75 year old female who presents from INR clinic for evaluation  of elevated INR and rectal bleeding.  The patient is currently taking warfarin for a history of atrial fibrillation after her recent cardiac surgery in April of this year.  She states that she was initially told to take 15 mg twice a week and 10 mg the other 5 days which has been the same since July and what was printed on the label of her prescription. Patient has had issues of recent with her INR being supratherapeutic this month as well erratic at times since starting the medication.  Her INR was noted to be greater than 10 today in clinic.  She states that she has had loose stools the last 3 to 4 days that have appeared reddish in color.  She notes melena today but also states she is on iron supplements due to a history of anemia.  Denies hematuria.  The patient states when she had fingersticks done in clinic today to check her INR level her finger continued to ooze for some time after. The patient had a dental appointment on Tuesday with a routine cleaning which caused increase bleeding of her gums.  Denies any significant bruising except for over her abdomen where she gives her self her insulin injections.  She notes baseline lightheadedness and dizziness that has not changed of recent.  Denies any recent fever, chills, nausea, vomiting, epigastric pain, chest pain, shortness of breath, or increased lower extremity swelling.  She states she had he last colonoscopy about 2 years ago. Denies any change in her diet or recent increased NSAID use.  Denies history of peptic ulcer disease.    Past Medical History    Past Medical History:   Diagnosis Date     (HFpEF) heart failure with preserved ejection fraction (H)      Aortic stenosis      Chest pain      Depressive disorder      Diabetes (H)      Hyperlipidemia      Hypertension      Mitral annular calcification      Mitral stenosis      Obesity      Sleep apnea     CPAP     Past Surgical History   Past Surgical History:   Procedure Laterality Date      APPENDECTOMY       CV CORONARY ANGIOGRAM N/A 2020    Procedure: Coronary Angiogram;  Surgeon: Inez Peoples MD;  Location:  HEART CARDIAC CATH LAB     CV LEFT HEART CATH N/A 2020    Procedure: Left Heart Cath;  Surgeon: Inez Peoples MD;  Location:  HEART CARDIAC CATH LAB     CV PFO CLOSURE N/A 4/15/2020    Procedure: Patent Foramen Ovale Closure;  Surgeon: Ok Wilson MD;  Location:  OR     CV RIGHT HEART CATH N/A 2020    Procedure: Right Heart Cath;  Surgeon: Inez Peoples MD;  Location:  HEART CARDIAC CATH LAB     EP PACEMAKER N/A 2020    Procedure: EP Pacemaker;  Surgeon: Sohan Francis MD;  Location:  HEART CARDIAC CATH LAB     GYN SURGERY       x2 ,      GYN SURGERY      total hysterectomy     IMPLANT PACEMAKER       REPAIR VALVE TRICUSPID N/A 4/15/2020    Procedure: REPAIR TRICUSPID VALVE WITH VILLA MC3 26MM.;  Surgeon: Ok Wilson MD;  Location:  OR     REPLACE VALVE AORTIC N/A 4/15/2020    Procedure: REPLACEMENT, AORTIC VALVE WITH INSPIRIS TISSUE VALVE 25 MM; ON PUMP WITH SOCORRO READ BY CARDIOLOGIST DR BLANTON.;  Surgeon: Ok Wilson MD;  Location:  OR     REPLACE VALVE MITRAL N/A 4/15/2020    Procedure: REPLACEMENT, MITRAL VALVE WITH EPIC TISSUE VALVE 27 MM.;  Surgeon: Ok Wilson MD;  Location:  OR     Prior to Admission Medications   Prior to Admission Medications   Prescriptions Last Dose Informant Patient Reported? Taking?   Biotin 68641 MCG TABS  Self Yes No   Sig: Take 1,000 mcg by mouth every morning    Calcium Carbonate-Vit D-Min (CALCIUM 1200 PO)  Self Yes No   Sig: Take 1 tablet by mouth every evening    Lutein 40 MG CAPS  Self Yes No   Sig: Take 40 mg by mouth every morning    Magnesium 400 MG TABS  Self Yes No   Sig: Take 400 mg by mouth every evening    Propylene Glycol (SYSTANE BALANCE OP)  Self Yes No   Sig: Apply 1-2 drops to eye 3 times daily as needed (dry  eyes)   TURMERIC PO  Self Yes No   Sig: Take 1-3 tablets by mouth daily    Vitamin D3 (CHOLECALCIFEROL) 25 mcg (1000 units) tablet   No No   Sig: Take 1 tablet (25 mcg) by mouth daily   acetaminophen (TYLENOL) 325 MG tablet   No No   Sig: Take 2 tablets (650 mg) by mouth every 4 hours as needed for other (multimodal surgical pain management along with NSAIDS and opioid medication as indicated based on pain control and physical function.)   albuterol (PROAIR HFA/PROVENTIL HFA/VENTOLIN HFA) 108 (90 Base) MCG/ACT inhaler  Self Yes No   Sig: Inhale 2 puffs into the lungs every 4 hours as needed for shortness of breath / dyspnea or wheezing   amiodarone (PACERONE) 200 MG tablet   No No   Sig: Take 1 tablet (200 mg) by mouth daily   Patient not taking: Reported on 11/10/2020   atorvastatin (LIPITOR) 40 MG tablet   No No   Sig: Take 1 tablet (40 mg) by mouth At Bedtime   coenzyme Q-10 200 MG CAPS  Self Yes No   Sig: Take 200 mg by mouth every morning    ferrous gluconate (FERGON) 324 (38 Fe) MG tablet   No No   Sig: Take 1 tablet (324 mg) by mouth daily (with breakfast)   furosemide (LASIX) 40 MG tablet   No No   Sig: Take 1 tablet (40 mg) by mouth 2 times daily Further dosing per PCP/ Cardiology   insulin NPH-Regular 70/30 (70-30) 100 UNIT/ML vial   No No   Sig: Inject 20-40 Units Subcutaneous 2 times daily (with meals)   ipratropium (ATROVENT) 0.06 % nasal spray   No No   Sig: Spray 2 sprays into both nostrils 4 times daily as needed for rhinitis   metFORMIN (GLUCOPHAGE-XR) 500 MG 24 hr tablet   No No   Sig: Take 2 tablets (1,000 mg) by mouth daily (with breakfast) (takes 2 x 500mg)   metoprolol succinate ER (TOPROL-XL) 25 MG 24 hr tablet   No No   Sig: Take 2 tablets (50 mg) by mouth daily   potassium aminobenzoate 500 MG CAPS capsule   Yes No   Sig: Take 99 mg by mouth Patient taking tablet   sertraline (ZOLOFT) 100 MG tablet   No No   Sig: Take 1.5 tablets (150 mg) by mouth every morning (takes 1.5 x 100mg)   vitamin  (B COMPLEX-C) tablet  Self Yes No   Sig: Take 1 tablet by mouth daily   vitamin C (ASCORBIC ACID) 1000 MG TABS  Self Yes No   Sig: Take 1,000 mg by mouth every evening    warfarin ANTICOAGULANT (COUMADIN) 5 MG tablet   No No   Sig: Take 2 tablets (10 mg) by mouth on Mon, Wed, Fri. Take 1 and 1/2 tablet (7.5 mg) on Sun, Tue, Thur and Sat or as instructed by INR Clinic.   zinc 23 MG LOZG lozenge   Yes No   Sig: Place 1 lozenge inside cheek 4 times daily      Facility-Administered Medications: None     Allergies   Allergies   Allergen Reactions     Penicillins Hives     3 weeks after taking med got hives.       Pioglitazone Other (See Comments)     Increased urinary frequency, fatigue, dyspnea       Social History   Social History     Tobacco Use     Smoking status: Never Smoker     Smokeless tobacco: Never Used   Substance Use Topics     Alcohol use: No     Social History     Social History Narrative     Not on file     Family History   Family history reviewed with patient and is noncontributory.    Review of Systems   A Comprehensive greater than 10 system review of systems was carried out.  Pertinent positives and negatives are noted above.  Otherwise negative for contributory information.    Physical Exam   Temp: 98.9  F (37.2  C) Temp src: Oral BP: 104/42 Pulse: 81   Resp: 16 SpO2: 96 % O2 Device: None (Room air)    Vital Signs with Ranges  Temp:  [98.9  F (37.2  C)] 98.9  F (37.2  C)  Pulse:  [69-81] 81  Resp:  [16] 16  BP: (104-137)/(42-82) 104/42  SpO2:  [96 %-98 %] 96 %  279 lbs 0 oz    GEN:  Alert, oriented x 3, appears comfortable, no overt distress  HEENT:  Normocephalic/atraumatic, no scleral icterus, no nasal discharge, mouth moist.  CV:  Regular rate and rhythm, no murmur or JVD.  S1 + S2 noted, no S3 or S4.  LUNGS:  Clear to auscultation bilaterally without rales/rhonchi/wheezing/retractions.  Symmetric chest rise on inhalation noted.  ABD:  Active bowel sounds, soft, non-tender/non-distended. No  rebound/guarding/rigidity.  EXT:  No edema.  No cyanosis.  No acute joint synovitis noted.  SKIN:  Dry to touch, ecchymosis over abdomen where patient has given insulin injection.  NEURO:  Symmetric muscle strength, sensation to touch grossly intact.  Coordination symmetric on general exam.  No new focal deficits appreciated.    Data   Data reviewed today:  I personally reviewed all labs and imaging from this visit.     Results for orders placed or performed during the hospital encounter of 11/19/20   INR     Status: Abnormal   Result Value Ref Range    INR 9.28 (HH) 0.86 - 1.14   CBC with platelets differential     Status: Abnormal   Result Value Ref Range    WBC 8.8 4.0 - 11.0 10e9/L    RBC Count 3.61 (L) 3.8 - 5.2 10e12/L    Hemoglobin 11.3 (L) 11.7 - 15.7 g/dL    Hematocrit 35.7 35.0 - 47.0 %    MCV 99 78 - 100 fl    MCH 31.3 26.5 - 33.0 pg    MCHC 31.7 31.5 - 36.5 g/dL    RDW 13.6 10.0 - 15.0 %    Platelet Count 225 150 - 450 10e9/L    Diff Method Automated Method     % Neutrophils 69.5 %    % Lymphocytes 17.0 %    % Monocytes 8.0 %    % Eosinophils 4.4 %    % Basophils 0.6 %    % Immature Granulocytes 0.5 %    Nucleated RBCs 0 0 /100    Absolute Neutrophil 6.1 1.6 - 8.3 10e9/L    Absolute Lymphocytes 1.5 0.8 - 5.3 10e9/L    Absolute Monocytes 0.7 0.0 - 1.3 10e9/L    Absolute Eosinophils 0.4 0.0 - 0.7 10e9/L    Absolute Basophils 0.1 0.0 - 0.2 10e9/L    Abs Immature Granulocytes 0.0 0 - 0.4 10e9/L    Absolute Nucleated RBC 0.0    Basic metabolic panel     Status: Abnormal   Result Value Ref Range    Sodium 140 133 - 144 mmol/L    Potassium 3.6 3.4 - 5.3 mmol/L    Chloride 105 94 - 109 mmol/L    Carbon Dioxide 29 20 - 32 mmol/L    Anion Gap 6 3 - 14 mmol/L    Glucose 161 (H) 70 - 99 mg/dL    Urea Nitrogen 36 (H) 7 - 30 mg/dL    Creatinine 1.21 (H) 0.52 - 1.04 mg/dL    GFR Estimate 44 (L) >60 mL/min/[1.73_m2]    GFR Estimate If Black 51 (L) >60 mL/min/[1.73_m2]    Calcium 9.0 8.5 - 10.1 mg/dL   Stool: occult  blood     Status: Abnormal   Result Value Ref Range    Occult Blood Positive (A) NEG^Negative   INR     Status: Abnormal   Result Value Ref Range    INR >10.00 (HH) 0.86 - 1.14   Results for orders placed or performed in visit on 11/19/20   Capillary Blood Collection     Status: None   Result Value Ref Range    Capillary Blood Collection Capillary collection performed    INR     Status: Abnormal   Result Value Ref Range    INR >10.00 (HH) 0.86 - 1.14     Rupal Ojeda PA-C

## 2020-11-20 NOTE — PHARMACY-ADMISSION MEDICATION HISTORY
Admission medication history interview status for this patient is complete. See [facility] admission navigator for allergy information, prior to admission medications and immunization status.     Medication history interview done via telephone during Covid-19 pandemic, indicate source(s): Patient  Medication history resources (including written lists, pill bottles, clinic record):None  Pharmacy: [facility]    Changes made to PTA medication list:  Added: none  Deleted: amiodarone  Changed: furosemide (separted into daily and PRN dose), warfarin (update to current dosing per patient)    Actions taken by pharmacist (provider contacted, etc):None   Additional medication history information: patient said she had hypoglycemic event (BG ~ 35) approximately 2 weeks ago which required assistance form paramedics  Medication reconciliation/reorder completed by provider prior to medication history? no    For patients on insulin therapy:   Do you use sliding scale insulin based on blood sugars? Not exactly  What is your pre-meal insulin coverage?  n/a  Do you typically eat three meals a day? yes  How many times do you check your blood glucose per day? 3-4x  How many episodes of hypoglycemia do you typically have per month? See med hx info above  Do you have a Continuous Glucose Monitor (CGM)?  no    Prior to Admission medications    Medication Sig Last Dose Taking? Auth Provider   atorvastatin (LIPITOR) 40 MG tablet Take 1 tablet (40 mg) by mouth At Bedtime 11/18/2020 at hs Yes Yamilka Christina, DO   Biotin 00334 MCG TABS Take 1,000 mcg by mouth every morning  11/18/2020 at am Yes Reported, Patient   Calcium Carbonate-Vit D-Min (CALCIUM 1200 PO) Take 1 tablet by mouth every evening  11/18/2020 at pm Yes Reported, Patient   coenzyme Q-10 200 MG CAPS Take 200 mg by mouth every morning  11/18/2020 at am Yes Reported, Patient   ferrous gluconate (FERGON) 324 (38 Fe) MG tablet Take 1 tablet (324 mg) by mouth daily (with  "breakfast) 11/18/2020 at am Yes Yamilka Christina DO   furosemide (LASIX) 40 MG tablet Take 40 mg by mouth every morning 11/18/2020 at am Yes Unknown, Entered By History   furosemide (LASIX) 40 MG tablet Take 40 mg by mouth daily as needed Takes ~ noon as needed for \"swelling\" Past Month at Unknown time Yes Unknown, Entered By History   insulin NPH-Regular 70/30 (70-30) 100 UNIT/ML vial Inject 20-40 Units Subcutaneous 2 times daily (with meals) 11/18/2020 at x 2 Yes Kelli Lewis, CNP   Lutein 40 MG CAPS Take 40 mg by mouth every morning  11/18/2020 at am Yes Reported, Patient   Magnesium 400 MG TABS Take 400 mg by mouth every evening  11/18/2020 at pm Yes Reported, Patient   metFORMIN (GLUCOPHAGE-XR) 500 MG 24 hr tablet Take 2 tablets (1,000 mg) by mouth daily (with breakfast) (takes 2 x 500mg) 11/18/2020 at am Yes Yamilka Christina DO   metoprolol succinate ER (TOPROL-XL) 25 MG 24 hr tablet Take 2 tablets (50 mg) by mouth daily 11/18/2020 at am Yes Yamilka Christina DO   potassium aminobenzoate 500 MG CAPS capsule Take 99 mg by mouth daily Patient taking tablet  11/18/2020 at am Yes Reported, Patient   Propylene Glycol (SYSTANE BALANCE OP) Apply 1-2 drops to eye 3 times daily as needed (dry eyes) Past Month at Unknown time Yes Reported, Patient   sertraline (ZOLOFT) 100 MG tablet Take 1.5 tablets (150 mg) by mouth every morning (takes 1.5 x 100mg) 11/18/2020 at am Yes She Huang MD   TURMERIC PO Take 1-3 tablets by mouth daily  11/18/2020 at am Yes Reported, Patient   vitamin (B COMPLEX-C) tablet Take 1 tablet by mouth daily 11/18/2020 at am Yes Reported, Patient   vitamin C (ASCORBIC ACID) 1000 MG TABS Take 1,000 mg by mouth every evening  11/18/2020 at pm Yes Reported, Patient   Vitamin D3 (CHOLECALCIFEROL) 25 mcg (1000 units) tablet Take 1 tablet (25 mcg) by mouth daily 11/18/2020 at am Yes Siddahrth Sanabria MD   warfarin ANTICOAGULANT (COUMADIN) 5 MG " tablet Take by mouth every evening 15 mg Tuesday & Thursday  10 mg all other days of the week 11/18/2020 at 10 mg Yes Unknown, Entered By History   zinc 23 MG LOZG lozenge Place 1 lozenge inside cheek 4 times daily 11/18/2020 at x 4 Yes Reported, Patient   acetaminophen (TYLENOL) 325 MG tablet Take 2 tablets (650 mg) by mouth every 4 hours as needed for other (multimodal surgical pain management along with NSAIDS and opioid medication as indicated based on pain control and physical function.) More than a month at Unknown time  Siddharth Sanabria MD   albuterol (PROAIR HFA/PROVENTIL HFA/VENTOLIN HFA) 108 (90 Base) MCG/ACT inhaler Inhale 2 puffs into the lungs every 4 hours as needed for shortness of breath / dyspnea or wheezing More than a month at Unknown time  Unknown, Entered By History   ipratropium (ATROVENT) 0.06 % nasal spray Spray 2 sprays into both nostrils 4 times daily as needed for rhinitis More than a month at Unknown time  She Huang MD

## 2020-11-23 ENCOUNTER — ANTICOAGULATION THERAPY VISIT (OUTPATIENT)
Dept: INTERNAL MEDICINE | Facility: CLINIC | Age: 75
End: 2020-11-23

## 2020-11-23 ENCOUNTER — DOCUMENTATION ONLY (OUTPATIENT)
Dept: ANTICOAGULATION | Facility: CLINIC | Age: 75
End: 2020-11-23

## 2020-11-23 DIAGNOSIS — Z79.01 LONG TERM CURRENT USE OF ANTICOAGULANTS WITH INR GOAL OF 2.0-3.0: ICD-10-CM

## 2020-11-23 DIAGNOSIS — I48.0 PAROXYSMAL ATRIAL FIBRILLATION (H): ICD-10-CM

## 2020-11-23 DIAGNOSIS — I10 ESSENTIAL HYPERTENSION: ICD-10-CM

## 2020-11-23 DIAGNOSIS — R79.1 SUPRATHERAPEUTIC INR: ICD-10-CM

## 2020-11-23 LAB
HGB BLD-MCNC: 11.3 G/DL (ref 11.7–15.7)
INR PPP: 1.5 (ref 0.86–1.14)

## 2020-11-23 PROCEDURE — 85610 PROTHROMBIN TIME: CPT | Performed by: NURSE PRACTITIONER

## 2020-11-23 PROCEDURE — 80048 BASIC METABOLIC PNL TOTAL CA: CPT | Performed by: NURSE PRACTITIONER

## 2020-11-23 PROCEDURE — 36415 COLL VENOUS BLD VENIPUNCTURE: CPT | Performed by: NURSE PRACTITIONER

## 2020-11-23 PROCEDURE — 85018 HEMOGLOBIN: CPT | Performed by: NURSE PRACTITIONER

## 2020-11-23 NOTE — PROGRESS NOTES
Chart reviewed with [facility] RN.  Suggest resuming previous 42.5mg/week regimen, stop Lovenox and recheck INR Friday.    Candy Nowak, PharmD BCACP  Anticoagulation Clinical Pharmacist

## 2020-11-23 NOTE — PROGRESS NOTES
ANTICOAGULATION  MANAGEMENT: Discharge Review    Amy Luna chart reviewed for anticoagulation continuity of care    Hospital Admission on 11/19/20 for Supra therapeutic INR >10 .   Patient had been taking double her warfarin dose.    Discharge disposition: Home    Results:    Recent labs: (last 7 days)     11/19/20  1222 11/19/20  1531 11/19/20  1809 11/20/20  0554   INR >10.00* 9.28* >10.00* 1.70*     Anticoagulation inpatient management:     reversed with 6 mg Vit K via IV and 4 mg Vit K oral liquid  on11/19/20    Anticoagulation discharge instructions:     Warfarin dosing: Take 1 tablet (5 mg) by mouth every evening Take 5 mg Warfarin temporarily 11/20-11/22, f/up with INR clinic on 11/23 regarding whether to continue to previous dosing.   Bridging: bridging with enoxaparin (Lovenox)  for 3 days   INR goal change: No      Medication changes affecting anticoagulation: No    Additional factors affecting anticoagulation: No    Plan     Agree with discharge plan for follow up on 11/23/20    Patient not contacted , has lab appointment on 11/23/20 for INR  Anticoagulation Calendar updated    Chiqui Nowak RN

## 2020-11-23 NOTE — PROGRESS NOTES
MTM ENCOUNTER  SUBJECTIVE/OBJECTIVE:                           Amy Luna is a 75 year old female called for a transitions of care visit. She was discharged from [facility] on 11/20 for supratherapeutic INR.      Reason for visit: Nothing.    Allergies/ADRs: Reviewed in chart  Tobacco: She reports that she has never smoked. She has never used smokeless tobacco.  Alcohol: not currently using  Caffeine: 1.5 pints/day of coffee, 24 oz sodas/day  Activity: Never exercised, and no interest    Medication Adherence/Access: Patient uses pill box(es).  Per patient, misses medication 1-2 times per month, and it would be her morning dose.  Medication barriers: none.   The patient fills medications at [place]: NO, fills medications at if a rush [facility] otherwise for standing scripts [employer].    Type 2 Diabetes:  Currently taking metformin 1000 mg daily, insulin 70/30 20-40 units per meal (averaging about 35 units). Also buys Regular insulin from [employer] to use as needed. She uses these because the insulin pens in the past were too expensive.   If she is real low she will take 30 units of the regular. For example when she wakes up and if blood sugar is 100 units, and she knows that she won't eat until noon, she will take regular insulin to cover her during the day. She reports that the insulin 70/30 she was only supposed to take with food due to it having short acting insulin.  If above 275 or more she will take 45 units 70/30, normally just takes 30-35 units of 70/30 with meals. Patient is not experiencing side effects.  Normally sugar are around 200, but can go up into the 300's  Blood sugar monitoring: 3-4 time(s) daily. Ranges (patient reported): Fasting- 200's  Just prior to her meals- Over 200's  Symptoms of low blood sugar? A couple times a week. About 4 weeks ago fell asleep on the toilet and called  when she woke up because she couldn't stand because her legs fell asleep. Once she was able  to stand she went to sleep. Her  could not wake her up and he had to call paramedics. Her blood sugar was 36, and she does not remember any of this episode. She hasn't changed anything since then, looked at her chart didin't eat any different and didn't double dose, so she continued with what she has always done.  Symptoms of high blood sugar? none  Eye exam: up to date  Foot exam: up to date  Diet/Exercise: 2 meals per day - eats potatoes (2 whole potatoes and doesn't see her blood sugar go up) and vegetables, tries to stay away from bread, refined, sugars  Aspirin: Not taking due to taking warfarin  Statin: Yes: atrovastatin   ACEi/ARB: No.   Urine Albumin:   Lab Results   Component Value Date    UMALCR 42.18 (H) 07/28/2020      Lab Results   Component Value Date    A1C 6.8 11/03/2020    A1C 6.7 07/28/2020    A1C 6.5 04/08/2020    A1C 6.4 01/16/2020     Afib: Patient is currently taking warfarin 5 mg for anticoagulation (adjusted by anticoagulation clinic). Patient is also taking amiodarone 200 mg daily. Patient reports no current concerns of bruising or bleeding. Patient does not have a history of GI bleed.   Patient was discharged from hospital on 11/20 for supratherapeutic INR. She had accidentally been taking double her prescribed doses of warfarin. Since being discharged she was on Lovenox until yesterday and is working with the anticoagulation clinic to get back in range. No increased bruising or bleeding currently, but did experience this when INR was >10.  INR Goal: 2-3    Lab Results   Component Value Date    INR 1.50 11/23/2020    INR 1.70 11/20/2020    INR >10.00 11/19/2020    INR 9.28 11/19/2020    INR >10.00 11/19/2020    INR 2.80 11/03/2020     Hyperlipidemia: Current therapy includes atorvastatin 40 mg daily.  Patient reports no significant myalgias or other side effects. She has suffered all her life from leg cramps but doesn't think it is related.  The 10-year ASCVD risk score (Vinny BRISA Mendez.,  et al., 2013) is: 28.2%    Values used to calculate the score:      Age: 75 years      Sex: Female      Is Non- : No      Diabetic: Yes      Tobacco smoker: No      Systolic Blood Pressure: 116 mmHg      Is BP treated: Yes      HDL Cholesterol: 64 mg/dL      Total Cholesterol: 187 mg/dL  Recent Labs   Lab Test 07/28/20  0904   CHOL 187   HDL 64   *   TRIG 104     Hypertension/HFpEF: Current medications include metoprolol succinate 50 mg (was taking 75 mg but she wasn't sure if this dose was correct), furosemide 40 mg twice daily if needed.  Patient thought she was supposed to take 3 tablets of metoprolol daily, but is not sure why she thought that. Also taking potassium 99 mg daily. Patient does self-monitor blood pressure. Home BP monitoring in range of 120-130's systolic over <70's diastolic in the morning prior to meds, better later in the day and thinks it was 101/57 yesterday afternoon.  Patient reports no current medication side effects. While in hospital staff was concerned that her blood pressure was going too low, but she reports feeling good at these lower values. Does have some swelling, and will take that second dose of furosemide about 4 times per week to combat this.    BP Readings from Last 3 Encounters:   11/20/20 138/50   09/16/20 116/56   08/11/20 101/53     Potassium   Date Value Ref Range Status   11/23/2020 4.0 3.4 - 5.3 mmol/L Final     Wt Readings from Last 5 Encounters:   11/20/20 279 lb 6.4 oz (126.7 kg)   09/16/20 265 lb (120.2 kg)   08/11/20 273 lb (123.8 kg)   07/28/20 266 lb (120.7 kg)   06/10/20 265 lb (120.2 kg)     Depression:  Current medications include: Sertraline 150 mg once daily. Mood has been down lately with all the hospital admission and meds, but feels she is managing well.  PHQ-9 SCORE 7/9/2020   PHQ-9 Total Score 5     Supplements: Currently taking biotin 48842 mcg (skin/hair), calcium-vitamin D (bones), CoQ-10 200 mg (heart), ferrous  gluconate, lutein 40 mg (eyes), magnesium 400 mg (cramping), potassium, turmeric (joint aches), vitamin B complex, vitamin B12, vitamin C 1000 mg daily (immune health), vitamin D 1000 units (mood in winter), zinc 23 mg (cramping). No reported issues at this time.     Potassium   Date Value Ref Range Status   11/03/2020 4.3 3.4 - 5.3 mmol/L Final     Vitamin D Deficiency Screening Results:  Lab Results   Component Value Date    VITDT 52 07/28/2020     Immunization: Patient has not received seasonal flu shot.  There is no immunization history for the selected administration types on file for this patient.  Patient immunization records are at [facility].    Today's Vitals: There were no vitals taken for this visit.    ASSESSMENT:                            Medication Adherence: No issues identified    Type 2 Diabetes: Patient is meeting A1c goal of < 7%. Self monitoring of blood glucose is not at goal of fasting  mg/dL and post prandial < 180 mg/dL. I recommend patient stop using Regular insulin when she is low, as this is likely contributing to her episodes of low blood sugars. I also recommend patient start taking insulin 70/30 more scheduled. It seems she is averaging about 35 units with meals and recommend we adjust from there depending on her blood sugars. Discussed with patient that regular insulin is a short acting insulin and the NPH is an intermediate insulin. Reinforced not to take either of these insulins without food to avoid episodes of lows.  Recommend patient speak with her insurance company to determine what CGM is covered. Patient would benefit from a CGM since she is not reporting any lows when she checks, but her A1c is lower than what she is reporting for her blood sugars and has had low blood sugars that required paramedic intervention. If a CGM is started patient would need to stop (preferred) or reduce vitamin C to less than 500 mg per day so as not to interact with the sensor.    Afib:  Improving but INR not at goal of 2-3. Patient would benefit from continued follow-up with the anticoagulation clinic to continue monitoring INRs.    Hyperlipidemia: Stable. Patient is on high intensity statin which is indicated based on 2019 ACC/AHA guidelines for lipid management.      Hypertension/HFpEF: Patient is meeting blood pressure goal of < 130/80mmHg. Patient would benefit from adjusting metoprolol dose to 50 mg daily per discharge reports instead of 75 mg daily.    Depression: Stable.    Supplements: Stable. In the future patient may benefit from stopping vitamin B12 since she is already on a vitamin B complex. Also see diabetes section regarding stopping or decreasing vitamin C.    Immunization: Patient would benefit from receiving her annual flu shot.     PLAN:                            1. Change metoprolol to 50 mg daily instead of 75 mg daily per discharge instructions  2. Call you insurance company to see what Continuous Glucose Monitor is the most affordable  3. I will talk with Dr. Diaz to see if she agrees with changing your insulin to insulin 70/30 35 units before meals and stopping the regular insulin. - Dr. Diaz agreed 11/30  4. Get your flu shot at the pharmacy or at the clinic.      I spent 57 minutes with this patient today. I offer these suggestions for consideration. A copy of the visit note was provided to the patient's primary care provider.    Will follow up in ~1 month.    The patient was sent via [employer] a summary of these recommendations.     Donya Araya PharmD  PGY1 Medication Therapy Management Resident  Voicemail: [phone]    Amy Maggie Cheryl was seen independently by Dr. Araya. I have reviewed and agree with the resident note and plan of care.      Nadia Sánchez PharmD [facility]  Medication Therapy Management Provider  Pager #[phone]      Patient consented to a telehealth visit: yes  Telemedicine Visit Details  Type of service:  Telephone visit  Start  Time: 3:30 PM  End Time: 4:27 PM  Originating Location (patient location): [place]  Distant Location (provider location):  [facility]  Mode of Communication:  Telephone

## 2020-11-24 ENCOUNTER — TELEPHONE (OUTPATIENT)
Dept: INTERNAL MEDICINE | Facility: CLINIC | Age: 75
End: 2020-11-24

## 2020-11-24 ENCOUNTER — VIRTUAL VISIT (OUTPATIENT)
Dept: PHARMACY | Facility: CLINIC | Age: 75
End: 2020-11-24
Payer: COMMERCIAL

## 2020-11-24 DIAGNOSIS — I48.0 PAROXYSMAL ATRIAL FIBRILLATION (H): ICD-10-CM

## 2020-11-24 DIAGNOSIS — Z78.9 TAKES DIETARY SUPPLEMENTS: ICD-10-CM

## 2020-11-24 DIAGNOSIS — Z71.85 VACCINE COUNSELING: ICD-10-CM

## 2020-11-24 DIAGNOSIS — F32.A DEPRESSIVE DISORDER: ICD-10-CM

## 2020-11-24 DIAGNOSIS — E78.5 HYPERLIPIDEMIA, UNSPECIFIED HYPERLIPIDEMIA TYPE: ICD-10-CM

## 2020-11-24 DIAGNOSIS — E11.69 TYPE 2 DIABETES MELLITUS WITH OTHER SPECIFIED COMPLICATION, WITHOUT LONG-TERM CURRENT USE OF INSULIN (H): Primary | ICD-10-CM

## 2020-11-24 DIAGNOSIS — I50.30 HEART FAILURE WITH PRESERVED EJECTION FRACTION, UNSPECIFIED HF CHRONICITY (H): ICD-10-CM

## 2020-11-24 DIAGNOSIS — I10 ESSENTIAL HYPERTENSION: ICD-10-CM

## 2020-11-24 LAB
ANION GAP SERPL CALCULATED.3IONS-SCNC: 6 MMOL/L (ref 3–14)
BUN SERPL-MCNC: 30 MG/DL (ref 7–30)
CALCIUM SERPL-MCNC: 9.7 MG/DL (ref 8.5–10.1)
CHLORIDE SERPL-SCNC: 105 MMOL/L (ref 94–109)
CO2 SERPL-SCNC: 25 MMOL/L (ref 20–32)
CREAT SERPL-MCNC: 1.52 MG/DL (ref 0.52–1.04)
GFR SERPL CREATININE-BSD FRML MDRD: 33 ML/MIN/{1.73_M2}
GLUCOSE SERPL-MCNC: 172 MG/DL (ref 70–99)
POTASSIUM SERPL-SCNC: 4 MMOL/L (ref 3.4–5.3)
SODIUM SERPL-SCNC: 136 MMOL/L (ref 133–144)

## 2020-11-24 PROCEDURE — 99605 MTMS BY PHARM NP 15 MIN: CPT | Mod: TEL | Performed by: PHARMACIST

## 2020-11-24 PROCEDURE — 99607 MTMS BY PHARM ADDL 15 MIN: CPT | Mod: TEL | Performed by: PHARMACIST

## 2020-11-24 RX ORDER — UBIDECARENONE 75 MG
100 CAPSULE ORAL DAILY
COMMUNITY

## 2020-11-24 NOTE — PATIENT INSTRUCTIONS
Recommendations from today's MTM visit:                                                    MTM (medication therapy management) is a service provided by a clinical pharmacist designed to help you get the most of out of your medicines.   Today we reviewed what your medicines are for, how to know if they are working, that your medicines are safe and how to make your medicine regimen as easy as possible.     1. Change metoprolol to 50 mg daily instead of 75 mg daily per discharge instructions    2. Call you insurance company to see what Continuous Glucose Monitor is the most affordable    3. I will talk with Dr. Diaz to see if she agrees with changing your insulin to insulin 70/30 35 units before meals and stopping the regular insulin.    4. Get your flu shot at the pharmacy or at the clinic. We can see your vaccination history, so no need to [facility] from [facility].    It was great to speak with you today.  I value your experience and would be very thankful for your time with providing feedback on our clinic survey. You may receive a survey via email or text message in the next few days.     Next MTM visit: follow-up in 1 month    To schedule another MTM appointment, please call the clinic directly or you may call the MTM scheduling line at [phone] or toll-free at [phone].     My Clinical Pharmacist's contact information:                                                      It was a pleasure talking with you today!  Please feel free to contact me with any questions or concerns you have.      Donya Araya, PharmD  PGY1 Medication Therapy Management Resident  Voicemail: [phone]

## 2020-11-24 NOTE — TELEPHONE ENCOUNTER
"IP F/U    Date: 11/20/20  Diagnosis: Supratherapeutic Inr, Acute Blood Loss Anemia  Is patient active in care coordination? No  Was patient in TCU? No    Hospital/TCU/ED for chronic condition Discharge Protocol    \"Hi, my name is Radha Jon RN, a registered nurse, and I am calling from [facility].  I am calling to follow up and see how things are going for you after your recent emergency visit/hospital/TCU stay.\"    Tell me how you are doing now that you are home?\" doing fine      Discharge Instructions    \"Let's review your discharge instructions.  What is/are the follow-up recommendations?  Pt. Response: follow up within 7-14 days     \"Has an appointment with your primary care provider been scheduled?\"   No (schedule appointment) - declined    \"When you see the provider, I would recommend that you bring your medications with you.\"    Medications    \"Tell me what changed about your medicines when you discharged?\"    Changes to chronic meds?    0-1    \"What questions do you have about your medications?\"    None     New diagnoses of heart failure, COPD, diabetes, or MI?    No     On insulin: \"Did you start on insulin in the hospital or did you have your insulin dose changed?\"  No     On warfarin: \"Were you given any recommendations for follow-up with the anticoagulation clinic?\" Yes - Anticoagulation clinic appointment is already scheduled at appropriate interval    Post Discharge Medication Reconciliation Status: discharge medications reconciled, continue medications without change.    Was MTM referral placed (*Make sure to put transitions as reason for referral)?   No    Call Summary    \"What questions or concerns do you have about your recent visit and your follow-up care?\"     none    \"If you have questions or things don't continue to improve, we encourage you contact us through the main clinic number (give number).  Even if the clinic is not open, triage nurses are available 24/7 to help you. " "    We would like you to know that our clinic has extended hours (provide information).  We also have urgent care (provide details on closest location and hours/contact info)\"      \"Thank you for your time and take care!\"                 "

## 2020-11-24 NOTE — PROGRESS NOTES
ANTICOAGULATION MANAGEMENT     Patient Name:  Amy Luna  Date:  2020    ASSESSMENT /SUBJECTIVE:    Today's INR result of 1.5 is subtherapeutic. Goal INR of 2.0-3.0      Warfarin dose taken: see dosing enetered into calendar.    Diet: given vitamin K in hospital    Medication changes/ interactions: No new medications/supplements affecting INR    Previous INR: Supratherapeutic     S/S of bleeding or thromboembolism: No    New injury or illness: No    Upcoming surgery, procedure or cardioversion: No    Additional findings: Patient doses as written on Rx bottle.       PLAN:    Telephone call with Amy regarding INR result and instructed:     Warfarin Dosing Instructions: 7.5 mg today then continue your current warfarin dose of 7.5 mg on Sun, e, Thur; 5 mg all other days.    Instructed patient to follow up no later than: 4 days  need to call lab and ask to double book a 7:30 am appointment for 2020. Patient will be celebrating Thanksgiving with family in [place] and that is the only time [facility] works for her. Either [facility] or [facility] lab.    Education provided: discussed with the patient the importance of following the dosing that is given by the RN. Went through how to find the calendar in [employer] to be able to print. Patient prefers to go by mg's that she is to be taking daily.      Amy verbalizes understanding and agrees to warfarin dosing plan. Had patient repeat the dosing for today and the next three days.    Instructed to call the Anticoagulation Clinic for any changes, questions or concerns. (#[phone])        Elva Edmond RN      OBJECTIVE:  Recent labs: (last 7 days)     20  1222 20  1531 20  1809 20  0554 20  1626   INR >10.00* 9.28* >10.00* 1.70* 1.50*         No question data found.  Anticoagulation Summary  As of 2020    INR goal:  2.0-3.0   TTR:  48.3 % (5.2 mo)   INR used for dosin.50 (2020)   Warfarin maintenance plan:  7.5 mg  (5 mg x 1.5) every Sun, Tue, Thu; 5 mg (5 mg x 1) all other days   Full warfarin instructions:  11/23: 7.5 mg; Otherwise 7.5 mg every Sun, Tue, Thu; 5 mg all other days   Weekly warfarin total:  42.5 mg   Plan last modified:  Nadia Cabello RN (10/30/2020)   Next INR check:  11/27/2020   Priority:  Critical   Target end date:  Indefinite    Indications    Paroxysmal atrial fibrillation (H) [I48.0]  Long term current use of anticoagulants with INR goal of 2.0-3.0 [Z79.01]             Anticoagulation Episode Summary     INR check location:      Preferred lab:      Send INR reminders to:  [facility]    Comments:        Anticoagulation Care Providers     Provider Role Specialty Phone number    Yamilka Christina DO Referring Cardiovascular Disease [phone]    Kelli Lewis CNP Referring Nurse Practitioner [phone]

## 2020-11-27 ENCOUNTER — ANTICOAGULATION THERAPY VISIT (OUTPATIENT)
Dept: ANTICOAGULATION | Facility: CLINIC | Age: 75
End: 2020-11-27

## 2020-11-27 DIAGNOSIS — I48.0 PAROXYSMAL ATRIAL FIBRILLATION (H): ICD-10-CM

## 2020-11-27 DIAGNOSIS — Z79.01 LONG TERM CURRENT USE OF ANTICOAGULANTS WITH INR GOAL OF 2.0-3.0: ICD-10-CM

## 2020-11-27 LAB
CAPILLARY BLOOD COLLECTION: NORMAL
INR PPP: 2.2 (ref 0.86–1.14)

## 2020-11-27 PROCEDURE — 36416 COLLJ CAPILLARY BLOOD SPEC: CPT | Performed by: NURSE PRACTITIONER

## 2020-11-27 PROCEDURE — 99207 PR NO CHARGE NURSE ONLY: CPT

## 2020-11-27 PROCEDURE — 85610 PROTHROMBIN TIME: CPT | Performed by: NURSE PRACTITIONER

## 2020-11-27 NOTE — PROGRESS NOTES
ANTICOAGULATION FOLLOW-UP CLINIC VISIT    Patient Name:  Amy Luna  Date:  2020  Contact Type:  Telephone/ discussed with patient    SUBJECTIVE:  Patient Findings     Comments:  The patient was assessed for diet, medication, and activity level changes, missed or extra doses, bruising or bleeding, with no problem findings.  Will resume previously set maintenance dose.  Reviewed dosing each day and had patient repeat this back to me.  She verbalized understanding.        Clinical Outcomes     Negatives:  Major bleeding event, Thromboembolic event, Anticoagulation-related hospital admission, Anticoagulation-related ED visit, Anticoagulation-related fatality    Comments:  The patient was assessed for diet, medication, and activity level changes, missed or extra doses, bruising or bleeding, with no problem findings.  Will resume previously set maintenance dose.  Reviewed dosing each day and had patient repeat this back to me.  She verbalized understanding.           OBJECTIVE    Recent labs: (last 7 days)     20  0725   INR 2.20*       ASSESSMENT / PLAN  INR assessment THER    Recheck INR In: 6 DAYS    INR Location Clinic      Anticoagulation Summary  As of 2020    INR goal:  2.0-3.0   TTR:  47.8 % (5.3 mo)   INR used for dosin.20 (2020)   Warfarin maintenance plan:  7.5 mg (5 mg x 1.5) every Sun, Tue, Thu; 5 mg (5 mg x 1) all other days   Full warfarin instructions:  7.5 mg every Sun, Tue, Thu; 5 mg all other days   Weekly warfarin total:  42.5 mg   No change documented:  Nadia Cabello RN   Plan last modified:  Nadia Cabello RN (10/30/2020)   Next INR check:  12/3/2020   Priority:  Critical   Target end date:  Indefinite    Indications    Paroxysmal atrial fibrillation (H) [I48.0]  Long term current use of anticoagulants with INR goal of 2.0-3.0 [Z79.01]             Anticoagulation Episode Summary     INR check location:      Preferred lab:      Send INR reminders to:  KENNEY  [facility]    Comments:        Anticoagulation Care Providers     Provider Role Specialty Phone number    Magnusbharati Yamilka Ventura DO Referring Cardiovascular Disease [phone]    Kelli Lewis CNP Referring Nurse Practitioner [phone]            See the Encounter Report to view Anticoagulation Flowsheet and Dosing Calendar (Go to Encounters tab in chart review, and find the Anticoagulation Therapy Visit)    Dosage adjustment made based on physician directed care plan.    Nadia Cabello RN

## 2020-11-28 DIAGNOSIS — Z11.59 ENCOUNTER FOR SCREENING FOR OTHER VIRAL DISEASES: Primary | ICD-10-CM

## 2020-12-03 ENCOUNTER — ANTICOAGULATION THERAPY VISIT (OUTPATIENT)
Dept: ANTICOAGULATION | Facility: CLINIC | Age: 75
End: 2020-12-03

## 2020-12-03 DIAGNOSIS — Z79.01 LONG TERM CURRENT USE OF ANTICOAGULANTS WITH INR GOAL OF 2.0-3.0: ICD-10-CM

## 2020-12-03 DIAGNOSIS — I48.0 PAROXYSMAL ATRIAL FIBRILLATION (H): ICD-10-CM

## 2020-12-03 LAB
CAPILLARY BLOOD COLLECTION: NORMAL
INR PPP: 2 (ref 0.86–1.14)

## 2020-12-03 PROCEDURE — 85610 PROTHROMBIN TIME: CPT | Performed by: NURSE PRACTITIONER

## 2020-12-03 PROCEDURE — 99207 PR NO CHARGE NURSE ONLY: CPT

## 2020-12-03 PROCEDURE — 36416 COLLJ CAPILLARY BLOOD SPEC: CPT | Performed by: NURSE PRACTITIONER

## 2020-12-03 NOTE — PROGRESS NOTES
ANTICOAGULATION FOLLOW-UP CLINIC VISIT    Patient Name:  Amy Luna  Date:  12/3/2020  Contact Type:  Telephone/ Called patient, she reports missing a warfarin dose. Orders discussed with the patient, she agrees with the plan. Patient repeated dosing instruction back to this writer. Lab INR appointment scheduled on 20.    SUBJECTIVE:  Patient Findings     Positives:  Missed doses (Patient reports missing warfarin dose 20.)    Comments:  The patient was assessed for diet, medication, and activity level changes, missed or extra doses, bruising or bleeding, with no problem findings. Maintenance warfarin dosing was reviewed with patient and will remain on the same dose until next INR check. Patient did not have any questions or concerns.               Clinical Outcomes     Comments:  The patient was assessed for diet, medication, and activity level changes, missed or extra doses, bruising or bleeding, with no problem findings. Maintenance warfarin dosing was reviewed with patient and will remain on the same dose until next INR check. Patient did not have any questions or concerns.                  OBJECTIVE    Recent labs: (last 7 days)     20  1248   INR 2.00*       ASSESSMENT / PLAN  INR assessment THER    Recheck INR In: 1 WEEK    INR Location Outside lab      Anticoagulation Summary  As of 12/3/2020    INR goal:  2.0-3.0   TTR:  49.8 % (5.5 mo)   INR used for dosin.00 (12/3/2020)   Warfarin maintenance plan:  7.5 mg (5 mg x 1.5) every Sun, Tue, Thu; 5 mg (5 mg x 1) all other days   Full warfarin instructions:  7.5 mg every Sun, Tue, Thu; 5 mg all other days   Weekly warfarin total:  42.5 mg   No change documented:  Chiqui Nowak RN   Plan last modified:  Nadia Cabello RN (10/30/2020)   Next INR check:  12/10/2020   Priority:  Critical   Target end date:  Indefinite    Indications    Paroxysmal atrial fibrillation (H) [I48.0]  Long term current use of anticoagulants with INR goal of  2.0-3.0 [Z79.01]             Anticoagulation Episode Summary     INR check location:      Preferred lab:      Send INR reminders to:  KENENY DUENAS    Comments:        Anticoagulation Care Providers     Provider Role Specialty Phone number    Yamilka Christina DO Referring Cardiovascular Disease [phone]    Kelli Lewis CNP Referring Nurse Practitioner [phone]            See the Encounter Report to view Anticoagulation Flowsheet and Dosing Calendar (Go to Encounters tab in chart review, and find the Anticoagulation Therapy Visit)    Dosage adjustment made based on physician directed care plan.    Chiqui Nowak RN

## 2020-12-03 NOTE — PROGRESS NOTES
Left message to call [phone]. Please transfer call to Chiqui at [phone].  Chiqui Nowak RN

## 2020-12-11 ENCOUNTER — ANTICOAGULATION THERAPY VISIT (OUTPATIENT)
Dept: NURSING | Facility: CLINIC | Age: 75
End: 2020-12-11

## 2020-12-11 DIAGNOSIS — Z79.01 LONG TERM CURRENT USE OF ANTICOAGULANTS WITH INR GOAL OF 2.0-3.0: ICD-10-CM

## 2020-12-11 DIAGNOSIS — I48.0 PAROXYSMAL ATRIAL FIBRILLATION (H): ICD-10-CM

## 2020-12-11 LAB
CAPILLARY BLOOD COLLECTION: NORMAL
INR PPP: 1.4 (ref 0.86–1.14)

## 2020-12-11 PROCEDURE — 99207 PR NO CHARGE NURSE ONLY: CPT

## 2020-12-11 PROCEDURE — 36416 COLLJ CAPILLARY BLOOD SPEC: CPT | Performed by: NURSE PRACTITIONER

## 2020-12-11 PROCEDURE — 85610 PROTHROMBIN TIME: CPT | Performed by: NURSE PRACTITIONER

## 2020-12-11 NOTE — PROGRESS NOTES
Patient prefers to be called after 3:00 today  Evy Lyons RN  ANTICOAGULATION FOLLOW-UP CLINIC VISIT    Patient Name:  Amy Luna  Date:  2020  Contact Type:  Telephone    SUBJECTIVE:  Patient Findings     Positives:  Upcoming invasive procedure (colonoscopy scheduled for 20, will need to review holding instructions with pt (she was unloading her car while we were talking), I will send bridging inquiry to [facility] Pharm[facility].), Change in diet/appetite (Ate 2 large kale salads and 1 serving of broccoli.  Pt states she doesn't have a specific diet routine and is not consistent with vitamin K.  I encouraged pt to decide how many greens per week she prefers to eat and to remember to keep portions small.)        Clinical Outcomes     Negatives:  Major bleeding event, Thromboembolic event, Anticoagulation-related hospital admission, Anticoagulation-related ED visit, Anticoagulation-related fatality           OBJECTIVE    Recent labs: (last 7 days)     20  1244   INR 1.40*       ASSESSMENT / PLAN  INR assessment SUB    Recheck INR In: 1 WEEK    INR Location Clinic      Anticoagulation Summary  As of 2020    INR goal:  2.0-3.0   TTR:  47.5 % (5.8 mo)   INR used for dosin.40 (2020)   Warfarin maintenance plan:  7.5 mg (5 mg x 1.5) every Sun, Tue, Thu; 5 mg (5 mg x 1) all other days   Full warfarin instructions:  : 10 mg; : Hold; : Hold; : Hold; : Hold; : Hold; Otherwise 7.5 mg every Sun, Tue, Thu; 5 mg all other days   Weekly warfarin total:  42.5 mg   Plan last modified:  Evy Lyons RN (2020)   Next INR check:  2020   Priority:  High   Target end date:  Indefinite    Indications    Paroxysmal atrial fibrillation (H) [I48.0]  Long term current use of anticoagulants with INR goal of 2.0-3.0 [Z79.01]             Anticoagulation Episode Summary     INR check location:      Preferred lab:      Send INR reminders to:  [facility]     Comments:        Anticoagulation Care Providers     Provider Role Specialty Phone number    Jennifernicole Yamilka Ventura DO Referring Cardiovascular Disease [phone]    Kelli Lewis CNP Referring Nurse Practitioner [phone]            See the Encounter Report to view Anticoagulation Flowsheet and Dosing Calendar (Go to Encounters tab in chart review, and find the Anticoagulation Therapy Visit)        Evy Lyons RN

## 2020-12-15 ENCOUNTER — MYC MEDICAL ADVICE (OUTPATIENT)
Dept: INTERNAL MEDICINE | Facility: CLINIC | Age: 75
End: 2020-12-15

## 2020-12-15 DIAGNOSIS — E11.22 TYPE 2 DIABETES MELLITUS WITH STAGE 3 CHRONIC KIDNEY DISEASE, WITHOUT LONG-TERM CURRENT USE OF INSULIN, UNSPECIFIED WHETHER STAGE 3A OR 3B CKD (H): Primary | ICD-10-CM

## 2020-12-15 DIAGNOSIS — N18.30 TYPE 2 DIABETES MELLITUS WITH STAGE 3 CHRONIC KIDNEY DISEASE, WITHOUT LONG-TERM CURRENT USE OF INSULIN, UNSPECIFIED WHETHER STAGE 3A OR 3B CKD (H): Primary | ICD-10-CM

## 2020-12-16 RX ORDER — FLASH GLUCOSE SENSOR
KIT MISCELLANEOUS
Qty: 2 EACH | Refills: 11 | Status: CANCELLED | OUTPATIENT
Start: 2020-12-16

## 2020-12-17 DIAGNOSIS — I50.32 CHRONIC HEART FAILURE WITH PRESERVED EJECTION FRACTION (H): Primary | ICD-10-CM

## 2020-12-17 RX ORDER — FLASH GLUCOSE SENSOR
1 KIT MISCELLANEOUS
Qty: 6 DEVICE | Refills: 1 | Status: SHIPPED | OUTPATIENT
Start: 2020-12-17 | End: 2021-03-02

## 2020-12-17 RX ORDER — FUROSEMIDE 40 MG
40 TABLET ORAL EVERY MORNING
Qty: 90 TABLET | Refills: 0 | Status: SHIPPED | OUTPATIENT
Start: 2020-12-17 | End: 2020-12-22

## 2020-12-17 RX ORDER — FUROSEMIDE 40 MG
40 TABLET ORAL DAILY PRN
Qty: 45 TABLET | Refills: 0 | Status: SHIPPED | OUTPATIENT
Start: 2020-12-17 | End: 2020-12-22

## 2020-12-18 ENCOUNTER — TELEPHONE (OUTPATIENT)
Dept: INTERNAL MEDICINE | Facility: CLINIC | Age: 75
End: 2020-12-18

## 2020-12-18 ENCOUNTER — ANTICOAGULATION THERAPY VISIT (OUTPATIENT)
Dept: NURSING | Facility: CLINIC | Age: 75
End: 2020-12-18

## 2020-12-18 DIAGNOSIS — Z79.01 LONG TERM CURRENT USE OF ANTICOAGULANTS WITH INR GOAL OF 2.0-3.0: ICD-10-CM

## 2020-12-18 DIAGNOSIS — I48.0 PAROXYSMAL ATRIAL FIBRILLATION (H): ICD-10-CM

## 2020-12-18 DIAGNOSIS — N18.30 TYPE 2 DIABETES MELLITUS WITH STAGE 3 CHRONIC KIDNEY DISEASE, WITHOUT LONG-TERM CURRENT USE OF INSULIN, UNSPECIFIED WHETHER STAGE 3A OR 3B CKD (H): Primary | ICD-10-CM

## 2020-12-18 DIAGNOSIS — E11.22 TYPE 2 DIABETES MELLITUS WITH STAGE 3 CHRONIC KIDNEY DISEASE, WITHOUT LONG-TERM CURRENT USE OF INSULIN, UNSPECIFIED WHETHER STAGE 3A OR 3B CKD (H): Primary | ICD-10-CM

## 2020-12-18 LAB
CAPILLARY BLOOD COLLECTION: NORMAL
INR PPP: 1.5 (ref 0.86–1.14)

## 2020-12-18 PROCEDURE — 36416 COLLJ CAPILLARY BLOOD SPEC: CPT | Performed by: NURSE PRACTITIONER

## 2020-12-18 PROCEDURE — 85610 PROTHROMBIN TIME: CPT | Performed by: NURSE PRACTITIONER

## 2020-12-18 NOTE — TELEPHONE ENCOUNTER
Bridging inquiry:    Amy is scheduled for colonoscopy on 12/30/20 so will need to hold Warfarin x 5 days.    Patient is currently on Warfarin for Atrial Fibrillation AND AVR and MVR.    CrCl cannot be calculated (Patient's most recent lab result is older than the maximum 10 days allowed.).      Thank you,  Evy Lyons RN

## 2020-12-18 NOTE — TELEPHONE ENCOUNTER
Call to pharmacy. They can use the prescription for the reader but will just fill one.  Prescription sent for sensors.

## 2020-12-18 NOTE — TELEPHONE ENCOUNTER
[facility]-PROCEDURAL ANTICOAGULATION  MANAGEMENT    Assessment     Warfarin interruption plan for colonoscpy on 2020.    Afib      Risk stratification for thromboembolism: moderate (2017 ACC periprocedure pathway for NVAF Expert Consensus)      [id number]-ZILt = 5 (CHF, HTN, age, DM, gender)    MVR and AVR are bioprosthetic    NVAF: 2017 ACC periprocedure pathway for NVAF advises NO bridge for moderate risk stratification ([id number] score 5-6 or hx of stroke, TIA or systemic embolism with increased risk of bleeding    Plan       Pre-Procedure:    Hold warfarin until after procedure startin2020        Post-Procedure:    Resume home warfarin dose if okay with provider doing procedure on night of procedure, 2020 PM: 10mh    Recheck INR 7 days after resuming warfarin   ?   Candy Nowak RPH    Subjective/Objective:      Amy Luna, a 75 year old female    Reason for Anticoagulation: A. Fib    Goal INR Range: 2.0-3.0    Patient bridged in past: Yes: 2020 with hospitalization    Pertinent History: GIB/blood in stools    Wt Readings from Last 3 Encounters:   20 126.7 kg (279 lb 6.4 oz)   20 120.2 kg (265 lb)   20 123.8 kg (273 lb)        Ideal body weight: 57 kg (125 lb 10.6 oz)  Adjusted ideal body weight: 84.9 kg (187 lb 2.5 oz)     Lab Results   Component Value Date    INR 1.50 (H) 2020    INR 1.40 (H) 2020    INR 2.00 (H) 2020     Lab Results   Component Value Date    HGB 11.3 2020    HCT 32.0 2020     2020     Lab Results   Component Value Date    CR 1.52 (H) 2020    CR 1.21 (H) 2020    CR 1.21 (H) 2020     CrCl cannot be calculated (Patient's most recent lab result is older than the maximum 10 days allowed.).

## 2020-12-18 NOTE — PROGRESS NOTES
ANTICOAGULATION MANAGEMENT     Patient Name:  Amy Luna  Date:  2020    ASSESSMENT /SUBJECTIVE:    Today's INR result of 1.5 is subtherapeutic. Goal INR of 2.0-3.0      Warfarin dose taken: Warfarin taken as instructed    Diet: Increased greens/vitamin K in diet which may be affecting INR; ongoing change  Eating Kale salads 2x week    Medication changes/ interactions: No new medications/supplements affecting INR    Previous INR: Subtherapeutic     S/S of bleeding or thromboembolism: No    New injury or illness: Yes: still has lumps from areas did Lovenox shots     Upcoming surgery, procedure or cardioversion: Yes - colonoscopy 2020    Additional findings: None      PLAN:    Telephone call with Amy regarding INR result and instructed:     Warfarin Dosing Instructions: Change your warfarin dose to 5mg wed, 7.5mg ROW    Instructed patient to follow up no later than: 1 week  Lab visit scheduled    Education provided: Importance of consistent vitamin K intake, Impact of vitamin K foods on INR and Vitamin K content of foods    Janae verbalizes understanding and agrees to warfarin dosing plan.    Instructed to call the Anticoagulation Clinic for any changes, questions or concerns. (#[phone])        Candy Nowak [facility]      OBJECTIVE:  Recent labs: (last 7 days)     20  1129   INR 1.50*         No question data found.  Anticoagulation Summary  As of 2020    INR goal:  2.0-3.0   TTR:  45.7 % (6 mo)   INR used for dosin.50 (2020)   Warfarin maintenance plan:  7.5 mg (5 mg x 1.5) every Sun, Tue, Thu; 5 mg (5 mg x 1) all other days   Full warfarin instructions:  : Hold; : Hold; : Hold; : Hold; Otherwise 7.5 mg every Sun, Tue, Thu; 5 mg all other days   Weekly warfarin total:  42.5 mg   Plan last modified:  Evy Lyons RN (2020)   Next INR check:     Priority:  High   Target end date:  Indefinite    Indications    Paroxysmal atrial fibrillation  (H) [I48.0]  Long term current use of anticoagulants with INR goal of 2.0-3.0 [Z79.01]             Anticoagulation Episode Summary     INR check location:      Preferred lab:      Send INR reminders to:  KENNEY DENNISS    Comments:        Anticoagulation Care Providers     Provider Role Specialty Phone number    Yamilka Christina DO Referring Cardiovascular Disease [phone]    Kelli Lewis CNP Referring Nurse Practitioner [phone]

## 2020-12-18 NOTE — TELEPHONE ENCOUNTER
Reason for Call:  Other prescription    Detailed comments: Pharmacy calling they would like to know if the prescription for yesterday for Continuous Blood Gluc  (FREESTYLE ADAM READER) is it actually supposed to be for readers or sensors? Please advise Thank you       Phone Number Patient can be reached at: Other phone number: [phone]    Best Time: any    Can we leave a detailed message on this number? YES    Call taken on 12/18/2020 at 1:42 PM by Amaya Sanchez

## 2020-12-19 ENCOUNTER — DOCUMENTATION ONLY (OUTPATIENT)
Dept: CARDIOLOGY | Facility: CLINIC | Age: 75
End: 2020-12-19

## 2020-12-19 DIAGNOSIS — Z95.0 CARDIAC PACEMAKER IN SITU: ICD-10-CM

## 2020-12-19 DIAGNOSIS — I48.0 PAROXYSMAL ATRIAL FIBRILLATION (H): Primary | ICD-10-CM

## 2020-12-19 NOTE — TELEPHONE ENCOUNTER
I agree Candy.  No bridging.  If possible, resume warfarin in the evening after GI procedure.    VANITA Bustillo MD

## 2020-12-19 NOTE — PROGRESS NOTES
Please interrogate pt's device and let me know whether she has had recent AF.  She has been off amiodarone since mid October (can you please double check with her she actually did this?).    TANNER Morrissey

## 2020-12-20 ENCOUNTER — HEALTH MAINTENANCE LETTER (OUTPATIENT)
Age: 75
End: 2020-12-20

## 2020-12-21 ENCOUNTER — ANCILLARY PROCEDURE (OUTPATIENT)
Dept: CARDIOLOGY | Facility: CLINIC | Age: 75
End: 2020-12-21
Attending: INTERNAL MEDICINE
Payer: MEDICARE

## 2020-12-21 DIAGNOSIS — I48.0 PAROXYSMAL ATRIAL FIBRILLATION (H): ICD-10-CM

## 2020-12-21 DIAGNOSIS — Z95.0 CARDIAC PACEMAKER IN SITU: ICD-10-CM

## 2020-12-21 NOTE — PROGRESS NOTES
Spoke with patient. She confirmed that she has not taken amiodarone since October. She sent a remote transmission today. She does not have any episodes or arrhythmias logged since her last check on 10/06/20.

## 2020-12-22 DIAGNOSIS — I48.0 PAROXYSMAL ATRIAL FIBRILLATION (H): ICD-10-CM

## 2020-12-22 DIAGNOSIS — I50.32 CHRONIC HEART FAILURE WITH PRESERVED EJECTION FRACTION (H): ICD-10-CM

## 2020-12-22 RX ORDER — FUROSEMIDE 40 MG
40 TABLET ORAL DAILY PRN
Qty: 45 TABLET | Refills: 0 | Status: SHIPPED | OUTPATIENT
Start: 2020-12-22 | End: 2020-12-30

## 2020-12-22 RX ORDER — WARFARIN SODIUM 5 MG/1
TABLET ORAL
Qty: 150 TABLET | Refills: 0 | Status: SHIPPED | OUTPATIENT
Start: 2020-12-22 | End: 2020-12-23

## 2020-12-22 RX ORDER — FUROSEMIDE 40 MG
40 TABLET ORAL EVERY MORNING
Qty: 90 TABLET | Refills: 0 | Status: SHIPPED | OUTPATIENT
Start: 2020-12-22 | End: 2020-12-30

## 2020-12-22 NOTE — PROGRESS NOTES
Updated patient that she is to continue warfarin until at least April 2021.   Updated paceart asking that device RNs alert Dr. Bustillo to any additional AF episodes.

## 2020-12-22 NOTE — PROGRESS NOTES
Thank you.  Please continue warfarin until at least April 2021.  Please update me regarding future AF events.    TANNER Morrissey

## 2020-12-22 NOTE — TELEPHONE ENCOUNTER
Prescription approved per [facility].  Irlanda Parikh RN   [facility]  [place], [place], [place]

## 2020-12-23 RX ORDER — WARFARIN SODIUM 5 MG/1
TABLET ORAL
Qty: 150 TABLET | Refills: 0 | Status: ON HOLD | OUTPATIENT
Start: 2020-12-23 | End: 2021-01-13

## 2020-12-23 NOTE — TELEPHONE ENCOUNTER
Fax received from [facility] stating they were unable to refill Rx for warfarin as written, resent Rx.  Chiqui Nowak RN BSN

## 2020-12-24 ENCOUNTER — ANTICOAGULATION THERAPY VISIT (OUTPATIENT)
Dept: NURSING | Facility: CLINIC | Age: 75
End: 2020-12-24

## 2020-12-24 DIAGNOSIS — Z79.01 LONG TERM CURRENT USE OF ANTICOAGULANTS WITH INR GOAL OF 2.0-3.0: ICD-10-CM

## 2020-12-24 DIAGNOSIS — I48.0 PAROXYSMAL ATRIAL FIBRILLATION (H): ICD-10-CM

## 2020-12-24 LAB
CAPILLARY BLOOD COLLECTION: NORMAL
INR PPP: 1.9 (ref 0.86–1.14)

## 2020-12-24 PROCEDURE — 85610 PROTHROMBIN TIME: CPT | Performed by: NURSE PRACTITIONER

## 2020-12-24 PROCEDURE — 36416 COLLJ CAPILLARY BLOOD SPEC: CPT | Performed by: NURSE PRACTITIONER

## 2020-12-24 PROCEDURE — 99207 PR NO CHARGE NURSE ONLY: CPT

## 2020-12-24 NOTE — PROGRESS NOTES
"ANTICOAGULATION FOLLOW-UP CLINIC VISIT    Patient Name:  Amy Luna  Date:  2020  Contact Type:  Telephone    SUBJECTIVE:  Patient Findings     Positives:  Upcoming invasive procedure (colonoscopy scheduled for 20, pt will hold warfarin x 4 days with booster dose evening of 20 per ACC PharmD recommendation.  Pt does NOT need to bridge.), Change in diet/appetite (Eating 2 bags of salad per week, pt prefers to continue this as she states, \"it's the only thing I eat for dinner\" )    Comments:  3rd sub-therapeutic INR despite recent warfarin dose increase, increased dose 5% per protocol.        Clinical Outcomes     Negatives:  Major bleeding event, Thromboembolic event, Anticoagulation-related hospital admission, Anticoagulation-related ED visit, Anticoagulation-related fatality    Comments:  3rd sub-therapeutic INR despite recent warfarin dose increase, increased dose 5% per protocol.           OBJECTIVE    Recent labs: (last 7 days)     20  1134   INR 1.90*       ASSESSMENT / PLAN  INR assessment SUB    Recheck INR In: 2 WEEKS    INR Location Clinic      Anticoagulation Summary  As of 2020    INR goal:  2.0-3.0   TTR:  44.2 % (6.2 mo)   INR used for dosin.90 (2020)   Warfarin maintenance plan:  7.5 mg (5 mg x 1.5) every day   Full warfarin instructions:  : Hold; : Hold; : Hold; : Hold; : 10 mg; Otherwise 7.5 mg every day   Weekly warfarin total:  52.5 mg   Plan last modified:  Evy Lyons RN (2020)   Next INR check:  2021   Priority:  High   Target end date:  Indefinite    Indications    Paroxysmal atrial fibrillation (H) [I48.0]  Long term current use of anticoagulants with INR goal of 2.0-3.0 [Z79.01]             Anticoagulation Episode Summary     INR check location:      Preferred lab:      Send INR reminders to:  [facility]    Comments:        Anticoagulation Care Providers     Provider Role Specialty Phone " number    Yamilka Christina DO Referring Cardiovascular Disease [phone]    Kelli Lewis CNP Referring Nurse Practitioner [phone]            See the Encounter Report to view Anticoagulation Flowsheet and Dosing Calendar (Go to Encounters tab in chart review, and find the Anticoagulation Therapy Visit)        Evy Lyons RN

## 2020-12-27 DIAGNOSIS — Z11.59 ENCOUNTER FOR SCREENING FOR OTHER VIRAL DISEASES: ICD-10-CM

## 2020-12-27 LAB
SARS-COV-2 RNA SPEC QL NAA+PROBE: NORMAL
SPECIMEN SOURCE: NORMAL

## 2020-12-27 PROCEDURE — U0003 INFECTIOUS AGENT DETECTION BY NUCLEIC ACID (DNA OR RNA); SEVERE ACUTE RESPIRATORY SYNDROME CORONAVIRUS 2 (SARS-COV-2) (CORONAVIRUS DISEASE [COVID-19]), AMPLIFIED PROBE TECHNIQUE, MAKING USE OF HIGH THROUGHPUT TECHNOLOGIES AS DESCRIBED BY CMS-2020-01-R: HCPCS | Performed by: COLON & RECTAL SURGERY

## 2020-12-28 DIAGNOSIS — Z11.59 ENCOUNTER FOR SCREENING FOR OTHER VIRAL DISEASES: ICD-10-CM

## 2020-12-28 LAB
LABORATORY COMMENT REPORT: NORMAL
SARS-COV-2 RNA SPEC QL NAA+PROBE: NEGATIVE
SPECIMEN SOURCE: NORMAL

## 2020-12-30 DIAGNOSIS — I48.0 PAROXYSMAL ATRIAL FIBRILLATION (H): ICD-10-CM

## 2020-12-30 DIAGNOSIS — Z95.2 S/P AORTIC VALVE AND MITRAL VALVE REPLACEMENT: ICD-10-CM

## 2020-12-30 DIAGNOSIS — I10 ESSENTIAL HYPERTENSION: ICD-10-CM

## 2020-12-30 DIAGNOSIS — Z95.3 S/P MITRAL VALVE REPLACEMENT WITH BIOPROSTHETIC VALVE: ICD-10-CM

## 2020-12-30 DIAGNOSIS — I50.32 CHRONIC HEART FAILURE WITH PRESERVED EJECTION FRACTION (H): Primary | ICD-10-CM

## 2020-12-30 RX ORDER — FUROSEMIDE 40 MG
40 TABLET ORAL 2 TIMES DAILY
Qty: 180 TABLET | Refills: 3 | Status: SHIPPED | OUTPATIENT
Start: 2020-12-30 | End: 2021-01-01

## 2020-12-31 ENCOUNTER — TELEPHONE (OUTPATIENT)
Dept: ANTICOAGULATION | Facility: CLINIC | Age: 75
End: 2020-12-31

## 2020-12-31 DIAGNOSIS — I48.0 PAROXYSMAL ATRIAL FIBRILLATION (H): ICD-10-CM

## 2020-12-31 DIAGNOSIS — Z79.01 LONG TERM CURRENT USE OF ANTICOAGULANTS WITH INR GOAL OF 2.0-3.0: ICD-10-CM

## 2020-12-31 NOTE — TELEPHONE ENCOUNTER
Patient called stating her colonoscopy was rescheduled from 12/30/20 to 1/13/21.  Patient reported she resumed warfarin on 12/28/20 with 10 mg and has take 7.5 mg daily.  Anticoagulation calendar updated.    Warfarin hold discussed with the patient, calendar mailed to the patient.   Chiqui Nowak RN BSN

## 2021-01-01 ENCOUNTER — LAB (OUTPATIENT)
Dept: LAB | Facility: CLINIC | Age: 76
End: 2021-01-01

## 2021-01-01 ENCOUNTER — ANTICOAGULATION THERAPY VISIT (OUTPATIENT)
Dept: ANTICOAGULATION | Facility: CLINIC | Age: 76
End: 2021-01-01

## 2021-01-01 ENCOUNTER — LAB (OUTPATIENT)
Dept: LAB | Facility: CLINIC | Age: 76
End: 2021-01-01
Payer: MEDICARE

## 2021-01-01 ENCOUNTER — TELEPHONE (OUTPATIENT)
Dept: PHARMACY | Facility: CLINIC | Age: 76
End: 2021-01-01
Payer: MEDICARE

## 2021-01-01 ENCOUNTER — OFFICE VISIT (OUTPATIENT)
Dept: INTERNAL MEDICINE | Facility: CLINIC | Age: 76
End: 2021-01-01
Payer: MEDICARE

## 2021-01-01 ENCOUNTER — VIRTUAL VISIT (OUTPATIENT)
Dept: PHARMACY | Facility: CLINIC | Age: 76
End: 2021-01-01
Payer: COMMERCIAL

## 2021-01-01 ENCOUNTER — HEALTH MAINTENANCE LETTER (OUTPATIENT)
Age: 76
End: 2021-01-01

## 2021-01-01 ENCOUNTER — ANCILLARY PROCEDURE (OUTPATIENT)
Dept: CARDIOLOGY | Facility: CLINIC | Age: 76
End: 2021-01-01
Attending: INTERNAL MEDICINE
Payer: MEDICARE

## 2021-01-01 VITALS
WEIGHT: 279.2 LBS | SYSTOLIC BLOOD PRESSURE: 126 MMHG | HEART RATE: 77 BPM | TEMPERATURE: 98.9 F | HEIGHT: 65 IN | DIASTOLIC BLOOD PRESSURE: 58 MMHG | RESPIRATION RATE: 18 BRPM | OXYGEN SATURATION: 94 % | BODY MASS INDEX: 46.52 KG/M2

## 2021-01-01 DIAGNOSIS — Z95.2 S/P AORTIC VALVE AND MITRAL VALVE REPLACEMENT: ICD-10-CM

## 2021-01-01 DIAGNOSIS — Z95.3 S/P MITRAL VALVE REPLACEMENT WITH BIOPROSTHETIC VALVE: ICD-10-CM

## 2021-01-01 DIAGNOSIS — I48.0 PAROXYSMAL ATRIAL FIBRILLATION (H): ICD-10-CM

## 2021-01-01 DIAGNOSIS — I44.30 AV BLOCK: ICD-10-CM

## 2021-01-01 DIAGNOSIS — E11.69 TYPE 2 DIABETES MELLITUS WITH OTHER SPECIFIED COMPLICATION, WITHOUT LONG-TERM CURRENT USE OF INSULIN (H): Primary | ICD-10-CM

## 2021-01-01 DIAGNOSIS — Z79.01 LONG TERM CURRENT USE OF ANTICOAGULANTS WITH INR GOAL OF 2.0-3.0: ICD-10-CM

## 2021-01-01 DIAGNOSIS — I48.0 PAROXYSMAL ATRIAL FIBRILLATION (H): Primary | ICD-10-CM

## 2021-01-01 DIAGNOSIS — N18.32 TYPE 2 DIABETES MELLITUS WITH STAGE 3B CHRONIC KIDNEY DISEASE, WITH LONG-TERM CURRENT USE OF INSULIN (H): Primary | ICD-10-CM

## 2021-01-01 DIAGNOSIS — Z95.0 CARDIAC PACEMAKER IN SITU: ICD-10-CM

## 2021-01-01 DIAGNOSIS — Z79.4 TYPE 2 DIABETES MELLITUS WITH STAGE 3B CHRONIC KIDNEY DISEASE, WITH LONG-TERM CURRENT USE OF INSULIN (H): Primary | ICD-10-CM

## 2021-01-01 DIAGNOSIS — E11.22 TYPE 2 DIABETES MELLITUS WITH STAGE 3B CHRONIC KIDNEY DISEASE, WITH LONG-TERM CURRENT USE OF INSULIN (H): ICD-10-CM

## 2021-01-01 DIAGNOSIS — I07.1 TRICUSPID VALVE INSUFFICIENCY, UNSPECIFIED ETIOLOGY: ICD-10-CM

## 2021-01-01 DIAGNOSIS — F33.42 RECURRENT MAJOR DEPRESSIVE DISORDER, IN FULL REMISSION (H): ICD-10-CM

## 2021-01-01 DIAGNOSIS — I10 ESSENTIAL HYPERTENSION: ICD-10-CM

## 2021-01-01 DIAGNOSIS — N18.32 TYPE 2 DIABETES MELLITUS WITH STAGE 3B CHRONIC KIDNEY DISEASE, WITH LONG-TERM CURRENT USE OF INSULIN (H): ICD-10-CM

## 2021-01-01 DIAGNOSIS — E11.22 TYPE 2 DIABETES MELLITUS WITH STAGE 3B CHRONIC KIDNEY DISEASE, WITH LONG-TERM CURRENT USE OF INSULIN (H): Primary | ICD-10-CM

## 2021-01-01 DIAGNOSIS — Z79.4 TYPE 2 DIABETES MELLITUS WITH STAGE 3B CHRONIC KIDNEY DISEASE, WITH LONG-TERM CURRENT USE OF INSULIN (H): ICD-10-CM

## 2021-01-01 DIAGNOSIS — D62 ACUTE BLOOD LOSS ANEMIA: ICD-10-CM

## 2021-01-01 DIAGNOSIS — Z95.2 S/P AVR (AORTIC VALVE REPLACEMENT): ICD-10-CM

## 2021-01-01 DIAGNOSIS — I50.32 CHRONIC HEART FAILURE WITH PRESERVED EJECTION FRACTION (H): ICD-10-CM

## 2021-01-01 LAB
HBA1C MFR BLD: 6.9 % (ref 0–5.6)
INR BLD: 1.6 (ref 0.9–1.1)
INR BLD: 2.5 (ref 0.9–1.1)
INR BLD: 2.6 (ref 0.9–1.1)
INR BLD: 2.8 (ref 0.9–1.1)
INR BLD: 4.6 (ref 0.9–1.1)
MDC_IDC_LEAD_IMPLANT_DT: NORMAL
MDC_IDC_LEAD_IMPLANT_DT: NORMAL
MDC_IDC_LEAD_LOCATION: NORMAL
MDC_IDC_LEAD_LOCATION: NORMAL
MDC_IDC_LEAD_LOCATION_DETAIL_1: NORMAL
MDC_IDC_LEAD_LOCATION_DETAIL_1: NORMAL
MDC_IDC_LEAD_MFG: NORMAL
MDC_IDC_LEAD_MFG: NORMAL
MDC_IDC_LEAD_MODEL: NORMAL
MDC_IDC_LEAD_MODEL: NORMAL
MDC_IDC_LEAD_POLARITY_TYPE: NORMAL
MDC_IDC_LEAD_POLARITY_TYPE: NORMAL
MDC_IDC_LEAD_SERIAL: NORMAL
MDC_IDC_LEAD_SERIAL: NORMAL
MDC_IDC_MSMT_BATTERY_DTM: NORMAL
MDC_IDC_MSMT_BATTERY_REMAINING_LONGEVITY: 62 MO
MDC_IDC_MSMT_BATTERY_RRT_TRIGGER: 2.83
MDC_IDC_MSMT_BATTERY_STATUS: NORMAL
MDC_IDC_MSMT_BATTERY_VOLTAGE: 2.99 V
MDC_IDC_MSMT_LEADCHNL_RA_IMPEDANCE_VALUE: 323 OHM
MDC_IDC_MSMT_LEADCHNL_RA_IMPEDANCE_VALUE: 399 OHM
MDC_IDC_MSMT_LEADCHNL_RA_PACING_THRESHOLD_AMPLITUDE: 0.38 V
MDC_IDC_MSMT_LEADCHNL_RA_PACING_THRESHOLD_PULSEWIDTH: 0.4 MS
MDC_IDC_MSMT_LEADCHNL_RA_SENSING_INTR_AMPL: 1.75 MV
MDC_IDC_MSMT_LEADCHNL_RA_SENSING_INTR_AMPL: 1.75 MV
MDC_IDC_MSMT_LEADCHNL_RV_IMPEDANCE_VALUE: 418 OHM
MDC_IDC_MSMT_LEADCHNL_RV_IMPEDANCE_VALUE: 418 OHM
MDC_IDC_MSMT_LEADCHNL_RV_PACING_THRESHOLD_AMPLITUDE: 0.62 V
MDC_IDC_MSMT_LEADCHNL_RV_PACING_THRESHOLD_PULSEWIDTH: 0.4 MS
MDC_IDC_MSMT_LEADCHNL_RV_SENSING_INTR_AMPL: 10.38 MV
MDC_IDC_MSMT_LEADCHNL_RV_SENSING_INTR_AMPL: 10.38 MV
MDC_IDC_PG_IMPLANT_DTM: NORMAL
MDC_IDC_PG_MFG: NORMAL
MDC_IDC_PG_MODEL: NORMAL
MDC_IDC_PG_SERIAL: NORMAL
MDC_IDC_PG_TYPE: NORMAL
MDC_IDC_SESS_CLINIC_NAME: NORMAL
MDC_IDC_SESS_DTM: NORMAL
MDC_IDC_SESS_TYPE: NORMAL
MDC_IDC_SET_BRADY_AT_MODE_SWITCH_RATE: 171 {BEATS}/MIN
MDC_IDC_SET_BRADY_HYSTRATE: NORMAL
MDC_IDC_SET_BRADY_LOWRATE: 60 {BEATS}/MIN
MDC_IDC_SET_BRADY_MAX_SENSOR_RATE: 130 {BEATS}/MIN
MDC_IDC_SET_BRADY_MAX_TRACKING_RATE: 130 {BEATS}/MIN
MDC_IDC_SET_BRADY_MODE: NORMAL
MDC_IDC_SET_BRADY_PAV_DELAY_LOW: 250 MS
MDC_IDC_SET_BRADY_SAV_DELAY_LOW: 250 MS
MDC_IDC_SET_LEADCHNL_RA_PACING_AMPLITUDE: 1.5 V
MDC_IDC_SET_LEADCHNL_RA_PACING_ANODE_ELECTRODE_1: NORMAL
MDC_IDC_SET_LEADCHNL_RA_PACING_ANODE_LOCATION_1: NORMAL
MDC_IDC_SET_LEADCHNL_RA_PACING_CAPTURE_MODE: NORMAL
MDC_IDC_SET_LEADCHNL_RA_PACING_CATHODE_ELECTRODE_1: NORMAL
MDC_IDC_SET_LEADCHNL_RA_PACING_CATHODE_LOCATION_1: NORMAL
MDC_IDC_SET_LEADCHNL_RA_PACING_POLARITY: NORMAL
MDC_IDC_SET_LEADCHNL_RA_PACING_PULSEWIDTH: 0.4 MS
MDC_IDC_SET_LEADCHNL_RA_SENSING_ANODE_ELECTRODE_1: NORMAL
MDC_IDC_SET_LEADCHNL_RA_SENSING_ANODE_LOCATION_1: NORMAL
MDC_IDC_SET_LEADCHNL_RA_SENSING_CATHODE_ELECTRODE_1: NORMAL
MDC_IDC_SET_LEADCHNL_RA_SENSING_CATHODE_LOCATION_1: NORMAL
MDC_IDC_SET_LEADCHNL_RA_SENSING_POLARITY: NORMAL
MDC_IDC_SET_LEADCHNL_RA_SENSING_SENSITIVITY: 0.3 MV
MDC_IDC_SET_LEADCHNL_RV_PACING_AMPLITUDE: 2 V
MDC_IDC_SET_LEADCHNL_RV_PACING_ANODE_ELECTRODE_1: NORMAL
MDC_IDC_SET_LEADCHNL_RV_PACING_ANODE_LOCATION_1: NORMAL
MDC_IDC_SET_LEADCHNL_RV_PACING_CAPTURE_MODE: NORMAL
MDC_IDC_SET_LEADCHNL_RV_PACING_CATHODE_ELECTRODE_1: NORMAL
MDC_IDC_SET_LEADCHNL_RV_PACING_CATHODE_LOCATION_1: NORMAL
MDC_IDC_SET_LEADCHNL_RV_PACING_POLARITY: NORMAL
MDC_IDC_SET_LEADCHNL_RV_PACING_PULSEWIDTH: 0.4 MS
MDC_IDC_SET_LEADCHNL_RV_SENSING_ANODE_ELECTRODE_1: NORMAL
MDC_IDC_SET_LEADCHNL_RV_SENSING_ANODE_LOCATION_1: NORMAL
MDC_IDC_SET_LEADCHNL_RV_SENSING_CATHODE_ELECTRODE_1: NORMAL
MDC_IDC_SET_LEADCHNL_RV_SENSING_CATHODE_LOCATION_1: NORMAL
MDC_IDC_SET_LEADCHNL_RV_SENSING_POLARITY: NORMAL
MDC_IDC_SET_LEADCHNL_RV_SENSING_SENSITIVITY: 0.9 MV
MDC_IDC_SET_ZONE_DETECTION_INTERVAL: 350 MS
MDC_IDC_SET_ZONE_DETECTION_INTERVAL: 400 MS
MDC_IDC_SET_ZONE_TYPE: NORMAL
MDC_IDC_STAT_AT_BURDEN_PERCENT: 0 %
MDC_IDC_STAT_AT_DTM_END: NORMAL
MDC_IDC_STAT_AT_DTM_START: NORMAL
MDC_IDC_STAT_BRADY_AP_VP_PERCENT: 2.27 %
MDC_IDC_STAT_BRADY_AP_VS_PERCENT: 31.03 %
MDC_IDC_STAT_BRADY_AS_VP_PERCENT: 0.33 %
MDC_IDC_STAT_BRADY_AS_VS_PERCENT: 66.37 %
MDC_IDC_STAT_BRADY_DTM_END: NORMAL
MDC_IDC_STAT_BRADY_DTM_START: NORMAL
MDC_IDC_STAT_BRADY_RA_PERCENT_PACED: 33.19 %
MDC_IDC_STAT_BRADY_RV_PERCENT_PACED: 2.93 %
MDC_IDC_STAT_EPISODE_RECENT_COUNT: 0
MDC_IDC_STAT_EPISODE_RECENT_COUNT_DTM_END: NORMAL
MDC_IDC_STAT_EPISODE_RECENT_COUNT_DTM_START: NORMAL
MDC_IDC_STAT_EPISODE_TOTAL_COUNT: 0
MDC_IDC_STAT_EPISODE_TOTAL_COUNT: 3
MDC_IDC_STAT_EPISODE_TOTAL_COUNT: 4
MDC_IDC_STAT_EPISODE_TOTAL_COUNT: 8
MDC_IDC_STAT_EPISODE_TOTAL_COUNT_DTM_END: NORMAL
MDC_IDC_STAT_EPISODE_TOTAL_COUNT_DTM_START: NORMAL
MDC_IDC_STAT_EPISODE_TYPE: NORMAL

## 2021-01-01 PROCEDURE — 85610 PROTHROMBIN TIME: CPT

## 2021-01-01 PROCEDURE — 83036 HEMOGLOBIN GLYCOSYLATED A1C: CPT

## 2021-01-01 PROCEDURE — 99607 MTMS BY PHARM ADDL 15 MIN: CPT | Performed by: PHARMACIST

## 2021-01-01 PROCEDURE — 36415 COLL VENOUS BLD VENIPUNCTURE: CPT

## 2021-01-01 PROCEDURE — 93296 REM INTERROG EVL PM/IDS: CPT | Performed by: INTERNAL MEDICINE

## 2021-01-01 PROCEDURE — 36416 COLLJ CAPILLARY BLOOD SPEC: CPT

## 2021-01-01 PROCEDURE — 93294 REM INTERROG EVL PM/LDLS PM: CPT | Performed by: INTERNAL MEDICINE

## 2021-01-01 PROCEDURE — 99606 MTMS BY PHARM EST 15 MIN: CPT | Performed by: PHARMACIST

## 2021-01-01 PROCEDURE — 99214 OFFICE O/P EST MOD 30 MIN: CPT | Performed by: INTERNAL MEDICINE

## 2021-01-01 RX ORDER — FUROSEMIDE 40 MG
TABLET ORAL
Qty: 180 TABLET | Refills: 1 | Status: ON HOLD | OUTPATIENT
Start: 2021-01-01 | End: 2022-01-01

## 2021-01-01 RX ORDER — SERTRALINE HYDROCHLORIDE 100 MG/1
TABLET, FILM COATED ORAL
Qty: 135 TABLET | Refills: 0 | Status: SHIPPED | OUTPATIENT
Start: 2021-01-01 | End: 2022-01-01

## 2021-01-01 RX ORDER — METFORMIN HCL 500 MG
TABLET, EXTENDED RELEASE 24 HR ORAL
Qty: 180 TABLET | Refills: 0 | Status: SHIPPED | OUTPATIENT
Start: 2021-01-01 | End: 2022-01-01

## 2021-01-01 ASSESSMENT — MIFFLIN-ST. JEOR: SCORE: 1762.32

## 2021-01-02 LAB
MDC_IDC_LEAD_IMPLANT_DT: NORMAL
MDC_IDC_LEAD_IMPLANT_DT: NORMAL
MDC_IDC_LEAD_LOCATION: NORMAL
MDC_IDC_LEAD_LOCATION: NORMAL
MDC_IDC_LEAD_LOCATION_DETAIL_1: NORMAL
MDC_IDC_LEAD_LOCATION_DETAIL_1: NORMAL
MDC_IDC_LEAD_MFG: NORMAL
MDC_IDC_LEAD_MFG: NORMAL
MDC_IDC_LEAD_MODEL: NORMAL
MDC_IDC_LEAD_MODEL: NORMAL
MDC_IDC_LEAD_POLARITY_TYPE: NORMAL
MDC_IDC_LEAD_POLARITY_TYPE: NORMAL
MDC_IDC_LEAD_SERIAL: NORMAL
MDC_IDC_LEAD_SERIAL: NORMAL
MDC_IDC_MSMT_BATTERY_DTM: NORMAL
MDC_IDC_MSMT_BATTERY_REMAINING_LONGEVITY: 70 MO
MDC_IDC_MSMT_BATTERY_RRT_TRIGGER: 2.83
MDC_IDC_MSMT_BATTERY_STATUS: NORMAL
MDC_IDC_MSMT_BATTERY_VOLTAGE: 3 V
MDC_IDC_MSMT_LEADCHNL_RA_IMPEDANCE_VALUE: 342 OHM
MDC_IDC_MSMT_LEADCHNL_RA_IMPEDANCE_VALUE: 437 OHM
MDC_IDC_MSMT_LEADCHNL_RA_PACING_THRESHOLD_AMPLITUDE: 0.5 V
MDC_IDC_MSMT_LEADCHNL_RA_PACING_THRESHOLD_PULSEWIDTH: 0.4 MS
MDC_IDC_MSMT_LEADCHNL_RA_SENSING_INTR_AMPL: 2.38 MV
MDC_IDC_MSMT_LEADCHNL_RA_SENSING_INTR_AMPL: 2.38 MV
MDC_IDC_MSMT_LEADCHNL_RV_IMPEDANCE_VALUE: 437 OHM
MDC_IDC_MSMT_LEADCHNL_RV_IMPEDANCE_VALUE: 456 OHM
MDC_IDC_MSMT_LEADCHNL_RV_PACING_THRESHOLD_AMPLITUDE: 0.75 V
MDC_IDC_MSMT_LEADCHNL_RV_PACING_THRESHOLD_PULSEWIDTH: 0.4 MS
MDC_IDC_MSMT_LEADCHNL_RV_SENSING_INTR_AMPL: 9.62 MV
MDC_IDC_MSMT_LEADCHNL_RV_SENSING_INTR_AMPL: 9.62 MV
MDC_IDC_PG_IMPLANT_DTM: NORMAL
MDC_IDC_PG_MFG: NORMAL
MDC_IDC_PG_MODEL: NORMAL
MDC_IDC_PG_SERIAL: NORMAL
MDC_IDC_PG_TYPE: NORMAL
MDC_IDC_SESS_CLINIC_NAME: NORMAL
MDC_IDC_SESS_DTM: NORMAL
MDC_IDC_SESS_TYPE: NORMAL
MDC_IDC_SET_BRADY_AT_MODE_SWITCH_RATE: 171 {BEATS}/MIN
MDC_IDC_SET_BRADY_HYSTRATE: NORMAL
MDC_IDC_SET_BRADY_LOWRATE: 60 {BEATS}/MIN
MDC_IDC_SET_BRADY_MAX_SENSOR_RATE: 130 {BEATS}/MIN
MDC_IDC_SET_BRADY_MAX_TRACKING_RATE: 130 {BEATS}/MIN
MDC_IDC_SET_BRADY_MODE: NORMAL
MDC_IDC_SET_BRADY_PAV_DELAY_LOW: 250 MS
MDC_IDC_SET_BRADY_SAV_DELAY_LOW: 250 MS
MDC_IDC_SET_LEADCHNL_RA_PACING_AMPLITUDE: 1.5 V
MDC_IDC_SET_LEADCHNL_RA_PACING_ANODE_ELECTRODE_1: NORMAL
MDC_IDC_SET_LEADCHNL_RA_PACING_ANODE_LOCATION_1: NORMAL
MDC_IDC_SET_LEADCHNL_RA_PACING_CAPTURE_MODE: NORMAL
MDC_IDC_SET_LEADCHNL_RA_PACING_CATHODE_ELECTRODE_1: NORMAL
MDC_IDC_SET_LEADCHNL_RA_PACING_CATHODE_LOCATION_1: NORMAL
MDC_IDC_SET_LEADCHNL_RA_PACING_POLARITY: NORMAL
MDC_IDC_SET_LEADCHNL_RA_PACING_PULSEWIDTH: 0.4 MS
MDC_IDC_SET_LEADCHNL_RA_SENSING_ANODE_ELECTRODE_1: NORMAL
MDC_IDC_SET_LEADCHNL_RA_SENSING_ANODE_LOCATION_1: NORMAL
MDC_IDC_SET_LEADCHNL_RA_SENSING_CATHODE_ELECTRODE_1: NORMAL
MDC_IDC_SET_LEADCHNL_RA_SENSING_CATHODE_LOCATION_1: NORMAL
MDC_IDC_SET_LEADCHNL_RA_SENSING_POLARITY: NORMAL
MDC_IDC_SET_LEADCHNL_RA_SENSING_SENSITIVITY: 0.3 MV
MDC_IDC_SET_LEADCHNL_RV_PACING_AMPLITUDE: 2 V
MDC_IDC_SET_LEADCHNL_RV_PACING_ANODE_ELECTRODE_1: NORMAL
MDC_IDC_SET_LEADCHNL_RV_PACING_ANODE_LOCATION_1: NORMAL
MDC_IDC_SET_LEADCHNL_RV_PACING_CAPTURE_MODE: NORMAL
MDC_IDC_SET_LEADCHNL_RV_PACING_CATHODE_ELECTRODE_1: NORMAL
MDC_IDC_SET_LEADCHNL_RV_PACING_CATHODE_LOCATION_1: NORMAL
MDC_IDC_SET_LEADCHNL_RV_PACING_POLARITY: NORMAL
MDC_IDC_SET_LEADCHNL_RV_PACING_PULSEWIDTH: 0.4 MS
MDC_IDC_SET_LEADCHNL_RV_SENSING_ANODE_ELECTRODE_1: NORMAL
MDC_IDC_SET_LEADCHNL_RV_SENSING_ANODE_LOCATION_1: NORMAL
MDC_IDC_SET_LEADCHNL_RV_SENSING_CATHODE_ELECTRODE_1: NORMAL
MDC_IDC_SET_LEADCHNL_RV_SENSING_CATHODE_LOCATION_1: NORMAL
MDC_IDC_SET_LEADCHNL_RV_SENSING_POLARITY: NORMAL
MDC_IDC_SET_LEADCHNL_RV_SENSING_SENSITIVITY: 0.9 MV
MDC_IDC_SET_ZONE_DETECTION_INTERVAL: 350 MS
MDC_IDC_SET_ZONE_DETECTION_INTERVAL: 400 MS
MDC_IDC_SET_ZONE_TYPE: NORMAL
MDC_IDC_STAT_AT_BURDEN_PERCENT: 0 %
MDC_IDC_STAT_AT_DTM_END: NORMAL
MDC_IDC_STAT_AT_DTM_START: NORMAL
MDC_IDC_STAT_BRADY_AP_VP_PERCENT: 2 %
MDC_IDC_STAT_BRADY_AP_VS_PERCENT: 35.44 %
MDC_IDC_STAT_BRADY_AS_VP_PERCENT: 0.29 %
MDC_IDC_STAT_BRADY_AS_VS_PERCENT: 62.28 %
MDC_IDC_STAT_BRADY_DTM_END: NORMAL
MDC_IDC_STAT_BRADY_DTM_START: NORMAL
MDC_IDC_STAT_BRADY_RA_PERCENT_PACED: 37.33 %
MDC_IDC_STAT_BRADY_RV_PERCENT_PACED: 2.7 %
MDC_IDC_STAT_EPISODE_RECENT_COUNT: 0
MDC_IDC_STAT_EPISODE_RECENT_COUNT_DTM_END: NORMAL
MDC_IDC_STAT_EPISODE_RECENT_COUNT_DTM_START: NORMAL
MDC_IDC_STAT_EPISODE_TOTAL_COUNT: 0
MDC_IDC_STAT_EPISODE_TOTAL_COUNT: 3
MDC_IDC_STAT_EPISODE_TOTAL_COUNT: 4
MDC_IDC_STAT_EPISODE_TOTAL_COUNT: 8
MDC_IDC_STAT_EPISODE_TOTAL_COUNT_DTM_END: NORMAL
MDC_IDC_STAT_EPISODE_TOTAL_COUNT_DTM_START: NORMAL
MDC_IDC_STAT_EPISODE_TYPE: NORMAL

## 2021-01-06 ENCOUNTER — ANTICOAGULATION THERAPY VISIT (OUTPATIENT)
Dept: ANTICOAGULATION | Facility: CLINIC | Age: 76
End: 2021-01-06

## 2021-01-06 DIAGNOSIS — I48.0 PAROXYSMAL ATRIAL FIBRILLATION (H): ICD-10-CM

## 2021-01-06 DIAGNOSIS — Z79.01 LONG TERM CURRENT USE OF ANTICOAGULANTS WITH INR GOAL OF 2.0-3.0: ICD-10-CM

## 2021-01-06 LAB
CAPILLARY BLOOD COLLECTION: NORMAL
INR PPP: 1.7 (ref 0.86–1.14)

## 2021-01-06 PROCEDURE — 85610 PROTHROMBIN TIME: CPT | Performed by: NURSE PRACTITIONER

## 2021-01-06 PROCEDURE — 36416 COLLJ CAPILLARY BLOOD SPEC: CPT | Performed by: NURSE PRACTITIONER

## 2021-01-06 PROCEDURE — 99207 PR NO CHARGE NURSE ONLY: CPT

## 2021-01-06 NOTE — PROGRESS NOTES
ANTICOAGULATION FOLLOW-UP CLINIC VISIT    Patient Name:  Amy Luna  Date:  2021  Contact Type:  Telephone/ discussed with patient    SUBJECTIVE:  Patient Findings     Positives:  Upcoming invasive procedure (colonoscopy scheduled on 21.  Will hold warfarin for 5 days with no bridging.  This was rescheduled from .)    Comments:  The patient was assessed for diet, medication, and activity level changes, missed or extra doses, bruising or bleeding, with no problem findings.  INR has been consistently low, so maintenance dose was increased by 9.5%.  Patient will restart on the warfarin when instructed by GI and will plan to take 10 mg on the day that she restarts.        Clinical Outcomes     Negatives:  Major bleeding event, Thromboembolic event, Anticoagulation-related hospital admission, Anticoagulation-related ED visit, Anticoagulation-related fatality    Comments:  The patient was assessed for diet, medication, and activity level changes, missed or extra doses, bruising or bleeding, with no problem findings.  INR has been consistently low, so maintenance dose was increased by 9.5%.  Patient will restart on the warfarin when instructed by GI and will plan to take 10 mg on the day that she restarts.           OBJECTIVE    Recent labs: (last 7 days)     21  1047   INR 1.70*       ASSESSMENT / PLAN  INR assessment SUB    Recheck INR In: 2 WEEKS    INR Location Clinic      Anticoagulation Summary  As of 2021    INR goal:  2.0-3.0   TTR:  41.4 % (6.7 mo)   INR used for dosin.70 (2021)   Warfarin maintenance plan:  10 mg (5 mg x 2) every Wed, Sat; 7.5 mg (5 mg x 1.5) all other days   Full warfarin instructions:  : Hold; : Hold; 1/10: Hold; : Hold; : Hold; : 10 mg; Otherwise 10 mg every Wed, Sat; 7.5 mg all other days   Weekly warfarin total:  57.5 mg   Plan last modified:  Nadia Cabello RN (2021)   Next INR check:  2021   Priority:  High   Target end  date:  Indefinite    Indications    Paroxysmal atrial fibrillation (H) [I48.0]  Long term current use of anticoagulants with INR goal of 2.0-3.0 [Z79.01]             Anticoagulation Episode Summary     INR check location:      Preferred lab:      [facility]    Comments:        Anticoagulation Care Providers     Provider Role Specialty Phone number    Yamilka Christina DO Referring Cardiovascular Disease [phone]    Kelli Lewis CNP Referring Internal Medicine [phone]            See the Encounter Report to view Anticoagulation Flowsheet and Dosing Calendar (Go to Encounters tab in chart review, and find the Anticoagulation Therapy Visit)    Dosage adjustment made based on physician directed care plan.    Nadia Cabello RN

## 2021-01-09 ENCOUNTER — HOSPITAL ENCOUNTER (OUTPATIENT)
Dept: LAB | Facility: CLINIC | Age: 76
Discharge: HOME | End: 2021-01-09
Attending: COLON & RECTAL SURGERY | Admitting: COLON & RECTAL SURGERY
Payer: MEDICARE

## 2021-01-09 DIAGNOSIS — Z11.59 ENCOUNTER FOR SCREENING FOR OTHER VIRAL DISEASES: ICD-10-CM

## 2021-01-09 LAB
SARS-COV-2 RNA RESP QL NAA+PROBE: NORMAL
SPECIMEN SOURCE: NORMAL

## 2021-01-09 PROCEDURE — U0003 INFECTIOUS AGENT DETECTION BY NUCLEIC ACID (DNA OR RNA); SEVERE ACUTE RESPIRATORY SYNDROME CORONAVIRUS 2 (SARS-COV-2) (CORONAVIRUS DISEASE [COVID-19]), AMPLIFIED PROBE TECHNIQUE, MAKING USE OF HIGH THROUGHPUT TECHNOLOGIES AS DESCRIBED BY CMS-2020-01-R: HCPCS | Performed by: COLON & RECTAL SURGERY

## 2021-01-09 PROCEDURE — U0005 INFEC AGEN DETEC AMPLI PROBE: HCPCS | Performed by: COLON & RECTAL SURGERY

## 2021-01-10 LAB
LABORATORY COMMENT REPORT: NORMAL
SARS-COV-2 RNA RESP QL NAA+PROBE: NEGATIVE
SPECIMEN SOURCE: NORMAL

## 2021-01-12 ENCOUNTER — ANCILLARY PROCEDURE (OUTPATIENT)
Dept: CARDIOLOGY | Facility: CLINIC | Age: 76
End: 2021-01-12
Attending: INTERNAL MEDICINE
Payer: MEDICARE

## 2021-01-12 DIAGNOSIS — Z95.0 CARDIAC PACEMAKER IN SITU: ICD-10-CM

## 2021-01-13 ENCOUNTER — HOSPITAL ENCOUNTER (OUTPATIENT)
Facility: CLINIC | Age: 76
Discharge: HOME | End: 2021-01-13
Attending: COLON & RECTAL SURGERY | Admitting: COLON & RECTAL SURGERY
Payer: MEDICARE

## 2021-01-13 VITALS
BODY MASS INDEX: 46.48 KG/M2 | HEART RATE: 79 BPM | OXYGEN SATURATION: 98 % | SYSTOLIC BLOOD PRESSURE: 119 MMHG | DIASTOLIC BLOOD PRESSURE: 41 MMHG | RESPIRATION RATE: 16 BRPM | HEIGHT: 65 IN | WEIGHT: 279 LBS

## 2021-01-13 DIAGNOSIS — I48.0 PAROXYSMAL ATRIAL FIBRILLATION (H): ICD-10-CM

## 2021-01-13 LAB
COLONOSCOPY: NORMAL
GLUCOSE BLDC GLUCOMTR-MCNC: 250 MG/DL (ref 70–99)
INR PPP: 1.15 (ref 0.86–1.14)

## 2021-01-13 PROCEDURE — 93296 REM INTERROG EVL PM/IDS: CPT | Performed by: INTERNAL MEDICINE

## 2021-01-13 PROCEDURE — 99153 MOD SED SAME PHYS/QHP EA: CPT

## 2021-01-13 PROCEDURE — 93294 REM INTERROG EVL PM/LDLS PM: CPT | Performed by: INTERNAL MEDICINE

## 2021-01-13 PROCEDURE — G0500 MOD SEDAT ENDO SERVICE >5YRS: HCPCS | Performed by: COLON & RECTAL SURGERY

## 2021-01-13 PROCEDURE — 45385 COLONOSCOPY W/LESION REMOVAL: CPT | Performed by: COLON & RECTAL SURGERY

## 2021-01-13 PROCEDURE — 88305 TISSUE EXAM BY PATHOLOGIST: CPT | Mod: TC | Performed by: COLON & RECTAL SURGERY

## 2021-01-13 PROCEDURE — 250N000011 HC RX IP 250 OP 636: Performed by: COLON & RECTAL SURGERY

## 2021-01-13 PROCEDURE — 85610 PROTHROMBIN TIME: CPT | Performed by: COLON & RECTAL SURGERY

## 2021-01-13 PROCEDURE — 999N001017 HC STATISTIC GLUCOSE BY METER IP

## 2021-01-13 PROCEDURE — 88305 TISSUE EXAM BY PATHOLOGIST: CPT | Mod: 26 | Performed by: PATHOLOGY

## 2021-01-13 RX ORDER — NALOXONE HYDROCHLORIDE 0.4 MG/ML
0.2 INJECTION, SOLUTION INTRAMUSCULAR; INTRAVENOUS; SUBCUTANEOUS
Status: DISCONTINUED | OUTPATIENT
Start: 2021-01-13 | End: 2021-01-13 | Stop reason: HOSPADM

## 2021-01-13 RX ORDER — WARFARIN SODIUM 5 MG/1
TABLET ORAL
Qty: 150 TABLET | Refills: 0
Start: 2021-01-16 | End: 2021-06-03

## 2021-01-13 RX ORDER — ONDANSETRON 4 MG/1
4 TABLET, ORALLY DISINTEGRATING ORAL EVERY 6 HOURS PRN
Status: DISCONTINUED | OUTPATIENT
Start: 2021-01-13 | End: 2021-01-13 | Stop reason: HOSPADM

## 2021-01-13 RX ORDER — FLUMAZENIL 0.1 MG/ML
0.2 INJECTION, SOLUTION INTRAVENOUS
Status: DISCONTINUED | OUTPATIENT
Start: 2021-01-13 | End: 2021-01-13 | Stop reason: HOSPADM

## 2021-01-13 RX ORDER — LIDOCAINE 40 MG/G
CREAM TOPICAL
Status: DISCONTINUED | OUTPATIENT
Start: 2021-01-13 | End: 2021-01-13 | Stop reason: HOSPADM

## 2021-01-13 RX ORDER — FENTANYL CITRATE 50 UG/ML
INJECTION, SOLUTION INTRAMUSCULAR; INTRAVENOUS PRN
Status: DISCONTINUED | OUTPATIENT
Start: 2021-01-13 | End: 2021-01-13 | Stop reason: HOSPADM

## 2021-01-13 RX ORDER — NALOXONE HYDROCHLORIDE 0.4 MG/ML
0.4 INJECTION, SOLUTION INTRAMUSCULAR; INTRAVENOUS; SUBCUTANEOUS
Status: DISCONTINUED | OUTPATIENT
Start: 2021-01-13 | End: 2021-01-13 | Stop reason: HOSPADM

## 2021-01-13 RX ORDER — ONDANSETRON 2 MG/ML
4 INJECTION INTRAMUSCULAR; INTRAVENOUS
Status: DISCONTINUED | OUTPATIENT
Start: 2021-01-13 | End: 2021-01-13 | Stop reason: HOSPADM

## 2021-01-13 RX ORDER — PROCHLORPERAZINE MALEATE 5 MG
5 TABLET ORAL EVERY 6 HOURS PRN
Status: DISCONTINUED | OUTPATIENT
Start: 2021-01-13 | End: 2021-01-13 | Stop reason: HOSPADM

## 2021-01-13 RX ORDER — ONDANSETRON 2 MG/ML
4 INJECTION INTRAMUSCULAR; INTRAVENOUS EVERY 6 HOURS PRN
Status: DISCONTINUED | OUTPATIENT
Start: 2021-01-13 | End: 2021-01-13 | Stop reason: HOSPADM

## 2021-01-13 ASSESSMENT — MIFFLIN-ST. JEOR: SCORE: 1766.42

## 2021-01-13 NOTE — H&P
Pre-Endoscopy History and Physical   Amy Luna MRN# [id number]   YOB: 1945 Age: 75 year old   Date of Procedure: 2021   Primary care provider: Safia Pablo   Type of Endoscopy: colonoscopy   Reason for Procedure: personal history of polyps  Type of Anesthesia Anticipated: Moderate Sedation   HPI:   Amy is a 75 year old female with a personal history of polyps.  She reports having had a colonoscopy 3 years ago and was told to come back in 3 years.  She denies BRBPR, abdominal pain, nausea/vomiting, changes in bowel habits or unintentional weight loss.  She denies a FH of CRC.    Allergies   Allergen Reactions     Penicillins Hives     3 weeks after taking med got hives.       Pioglitazone Other (See Comments)     Increased urinary frequency, fatigue, dyspnea      Prior to Admission Medications   Prescriptions Last Dose Informant Patient Reported? Taking?   Biotin 32120 MCG TABS Past Week at Unknown time Self Yes Yes   Sig: Take 10,000 mcg by mouth every morning    Calcium Carbonate-Vit D-Min (CALCIUM 1200 PO) Past Week at Unknown time Self Yes Yes   Sig: Take 1 tablet by mouth every evening    Continuous Blood Gluc  (FREESTYLE ADAM READER) HARJEET   No No   Si each every 14 days   Continuous Blood Gluc Sensor MISC   No No   Si each every 14 days   Ginkgo Biloba (GINKOBA PO) Past Week at Unknown time  Yes Yes   Sig: Take 250 mg by mouth daily   Lutein 40 MG CAPS Past Week at Unknown time Self Yes Yes   Sig: Take 40 mg by mouth every morning    Magnesium 400 MG TABS Past Week at Unknown time Self Yes Yes   Sig: Take 400 mg by mouth every evening    Propylene Glycol (SYSTANE BALANCE OP) 2021 at Unknown time Self Yes Yes   Sig: Apply 1-2 drops to eye 3 times daily as needed (dry eyes)   TURMERIC PO Past Week at Unknown time Self Yes Yes   Sig: Take 3 tablets by mouth daily    Vitamin D3 (CHOLECALCIFEROL) 25 mcg (1000 units) tablet Past Week at Unknown  time  No Yes   Sig: Take 1 tablet (25 mcg) by mouth daily   Patient taking differently: Take 125 mcg by mouth daily    acetaminophen (TYLENOL) 325 MG tablet More than a month at Unknown time  No No   Sig: Take 2 tablets (650 mg) by mouth every 4 hours as needed for other (multimodal surgical pain management along with NSAIDS and opioid medication as indicated based on pain control and physical function.)   albuterol (PROAIR HFA/PROVENTIL HFA/VENTOLIN HFA) 108 (90 Base) MCG/ACT inhaler More than a month at Unknown time Self Yes No   Sig: Inhale 2 puffs into the lungs every 4 hours as needed for shortness of breath / dyspnea or wheezing   atorvastatin (LIPITOR) 40 MG tablet 1/12/2021 at Unknown time  No Yes   Sig: Take 1 tablet (40 mg) by mouth At Bedtime   coenzyme Q-10 200 MG CAPS Past Week at Unknown time Self Yes Yes   Sig: Take 200 mg by mouth every morning    cyanocobalamin (VITAMIN B-12) 100 MCG tablet Past Week at Unknown time  Yes Yes   Sig: Take 100 mcg by mouth daily   ferrous gluconate (FERGON) 324 (38 Fe) MG tablet 1/11/2021  No Yes   Sig: Take 1 tablet (324 mg) by mouth daily (with breakfast)   furosemide (LASIX) 40 MG tablet Past Week at Unknown time  No Yes   Sig: Take 1 tablet (40 mg) by mouth 2 times daily Further dosing per PCP/ Cardiology - Oral   insulin NPH-Regular 70/30 (70-30) 100 UNIT/ML vial Past Week at Unknown time  No Yes   Sig: Inject 20-40 Units Subcutaneous 2 times daily (with meals)   ipratropium (ATROVENT) 0.06 % nasal spray 1/12/2021 at Unknown time  No Yes   Sig: Spray 2 sprays into both nostrils 4 times daily as needed for rhinitis   metFORMIN (GLUCOPHAGE-XR) 500 MG 24 hr tablet Past Week at Unknown time  No Yes   Sig: Take 2 tablets (1,000 mg) by mouth daily (with breakfast) (takes 2 x 500mg)   metoprolol succinate ER (TOPROL-XL) 25 MG 24 hr tablet Past Week at Unknown time  No Yes   Sig: Take 2 tablets (50 mg) by mouth daily   potassium aminobenzoate 500 MG CAPS capsule Past Week  at Unknown time  Yes Yes   Sig: Take 99 mg by mouth daily Patient taking tablet    sertraline (ZOLOFT) 100 MG tablet Past Week at Unknown time  No Yes   Sig: Take 1.5 tablets (150 mg) by mouth every morning (takes 1.5 x 100mg)   vitamin (B COMPLEX-C) tablet Past Week at Unknown time Self Yes Yes   Sig: Take 1 tablet by mouth daily   vitamin C (ASCORBIC ACID) 1000 MG TABS Past Week at Unknown time Self Yes Yes   Sig: Take 1,000 mg by mouth every evening    warfarin ANTICOAGULANT (COUMADIN) 5 MG tablet 1/9/2021  No Yes   Sig: Take 7.5 mg (1 and 1/2 tablets) by mouth on Sun, Tue, Thur. Take 5 mg (1 tablet)  all other days or as directed by INR clinic.   zinc 23 MG LOZG lozenge Past Week at Unknown time  Yes Yes   Sig: Place 1 lozenge inside cheek 4 times daily      Facility-Administered Medications: None      Patient Active Problem List   Diagnosis     Aortic sten -- S/P Bioprost AVR 4/15/20     Hypertension     Hyperlipidemia     LAVERNE on CPAP     Respiratory failure (H)     Obesity     Depressive disorder     Mitral stenosis -- S/P Bioprost MVR 4/15/19     Mitral annular calcification     (HFpEF) heart failure with preserved ejection fraction (H)     Mitral valve stenosis, unspecified etiology     Tricuspid valve Regur -- S/P Repair 4/15/20     Patent foramen ovale -- S/P Closure 4/15/20     Fractured atrial pacemaker lead -- Replace 4/16/20     Acute blood loss anemia     CLARITA (acute kidney injury) (H)     Fluid overload     S/P aortic valve and mitral valve replacement     Morbid obesity (H)     Paroxysmal atrial fibrillation (H)     Long term current use of anticoagulants with INR goal of 2.0-3.0     DM2 no insulin + CKD 3      Supratherapeutic INR      Past Medical History:   Diagnosis Date     (HFpEF) heart failure with preserved ejection fraction (H)      Aortic stenosis      Chest pain      Depressive disorder      Diabetes (H)      Hyperlipidemia      Hypertension      Mitral annular calcification      Mitral  "stenosis      Obesity      Sleep apnea     CPAP      Past Surgical History:   Procedure Laterality Date     APPENDECTOMY       CV CORONARY ANGIOGRAM N/A 2020    Procedure: Coronary Angiogram;  Surgeon: Inez Peoples MD;  Location:  HEART CARDIAC CATH LAB     CV LEFT HEART CATH N/A 2020    Procedure: Left Heart Cath;  Surgeon: Inez Peoples MD;  Location:  HEART CARDI[facility] CATH LAB     CV PFO CLOSURE N/A 4/15/2020    Procedure: Patent Foramen Ovale Closure;  Surgeon: Ok Wilson MD;  Location:  OR     CV RIGHT HEART CATH N/A 2020    Procedure: Right Heart Cath;  Surgeon: Inez Peoples MD;  Location:  HEART CARDI[facility] CATH LAB     EP PACEMAKER N/A 2020    Procedure: EP Pacemaker;  Surgeon: Sohan Farncis MD;  Location:  HEART CARDIAC CATH LAB     GYN SURGERY       x2 ,      GYN SURGERY      total hysterectomy     IMPLANT PACEMAKER       REPAIR VALVE TRICUSPID N/A 4/15/2020    Procedure: REPAIR TRICUSPID VALVE WITH VILLA MC3 26MM.;  Surgeon: Ok Wilson MD;  Location:  OR     REPLACE VALVE AORTIC N/A 4/15/2020    Procedure: REPLACEMENT, AORTIC VALVE WITH INSPIRIS TISSUE VALVE 25 MM; ON PUMP WITH SOCORRO READ BY CARDIOLOGIST DR BLANTON.;  Surgeon: Ok Wilson MD;  Location:  OR     REPLACE VALVE MITRAL N/A 4/15/2020    Procedure: REPLACEMENT, MITRAL VALVE WITH EPIC TISSUE VALVE 27 MM.;  Surgeon: Ok Wilson MD;  Location:  OR      Social History     Tobacco Use     Smoking status: Never Smoker     Smokeless tobacco: Never Used   Substance Use Topics     Alcohol use: No      Family History   Problem Relation Age of Onset     Diabetes Mother 56     Congenital heart disease Father 23     Colon Cancer No family hx of       PHYSICAL EXAM:   /59   Pulse 79   Resp 12   Ht 1.651 m (5' 5\")   Wt 126.6 kg (279 lb)   SpO2 94%   BMI 46.43 kg/m   Estimated body mass index is 46.43 kg/m  as " "calculated from the following:    Height as of this encounter: 1.651 m (5' 5\").    Weight as of this encounter: 126.6 kg (279 lb).   RESP: lungs clear to auscultation - no rales, rhonchi or wheezes   CV: regular rates and rhythm   ASA Class 3 - Severe systemic disease, but not incapacitating    Assessment: 76 y/o woman with a personal history of polyps    Plan: Colonoscopy with moderate sedation.  Risks of the procedure were discussed including, but not limited to, bleeding, perforation and missed lesions.  Patient understands and is willing to proceed.    Lamont Barboza MD ....................  1/13/2021   1:26 PM  Colon and Rectal Surgery Staff  [phone]    "

## 2021-01-14 LAB — COPATH REPORT: NORMAL

## 2021-01-15 ENCOUNTER — ANTICOAGULATION THERAPY VISIT (OUTPATIENT)
Dept: ANTICOAGULATION | Facility: CLINIC | Age: 76
End: 2021-01-15

## 2021-01-15 DIAGNOSIS — I48.0 PAROXYSMAL ATRIAL FIBRILLATION (H): ICD-10-CM

## 2021-01-15 DIAGNOSIS — Z79.01 LONG TERM CURRENT USE OF ANTICOAGULANTS WITH INR GOAL OF 2.0-3.0: ICD-10-CM

## 2021-01-15 NOTE — PROGRESS NOTES
ANTICOAGULATION  MANAGEMENT: Discharge Review    Amy Luna chart reviewed for anticoagulation continuity of care    Outpatient surgery/procedure on 1/13/21 for colonoscopy.    Discharge disposition: Home    Results:    Recent labs: (last 7 days)     01/13/21  1215   INR 1.15*     Anticoagulation inpatient management:     not applicable     Anticoagulation discharge instructions:     Warfarin dosing: home regimen continued   Bridging: No   INR goal change: No      Medication changes affecting anticoagulation: No    Additional factors affecting anticoagulation: No    Plan     No adjustment to anticoagulation plan needed    Recommended follow up is scheduled    No adjustment to Anticoagulation Calendar was required    Nadia Cabello RN

## 2021-01-20 ENCOUNTER — ANTICOAGULATION THERAPY VISIT (OUTPATIENT)
Dept: ANTICOAGULATION | Facility: CLINIC | Age: 76
End: 2021-01-20

## 2021-01-20 DIAGNOSIS — Z79.01 LONG TERM CURRENT USE OF ANTICOAGULANTS WITH INR GOAL OF 2.0-3.0: ICD-10-CM

## 2021-01-20 DIAGNOSIS — I48.0 PAROXYSMAL ATRIAL FIBRILLATION (H): ICD-10-CM

## 2021-01-20 LAB
CAPILLARY BLOOD COLLECTION: NORMAL
INR PPP: 1.2 (ref 0.86–1.14)

## 2021-01-20 PROCEDURE — 85610 PROTHROMBIN TIME: CPT | Performed by: NURSE PRACTITIONER

## 2021-01-20 PROCEDURE — 99207 PR NO CHARGE NURSE ONLY: CPT

## 2021-01-20 PROCEDURE — 36416 COLLJ CAPILLARY BLOOD SPEC: CPT | Performed by: NURSE PRACTITIONER

## 2021-01-20 NOTE — PROGRESS NOTES
Left message to call [phone]. Please transfer call to Chiqui at [phone].    Anticoagulation Management    Unable to reach Amy today.    Today's INR result of 1.2 is therapeutic (goal INR of 2.0-3.0).  Result received from: Clinic Lab    Follow up required to confirm warfarin dose taken and assess for changes    Left message to continue current dose of warfarin 10 mg tonight.      Anticoagulation clinic to follow up    Chiqui Nowak RN

## 2021-01-21 LAB
MDC_IDC_LEAD_IMPLANT_DT: NORMAL
MDC_IDC_LEAD_IMPLANT_DT: NORMAL
MDC_IDC_LEAD_LOCATION: NORMAL
MDC_IDC_LEAD_LOCATION: NORMAL
MDC_IDC_LEAD_LOCATION_DETAIL_1: NORMAL
MDC_IDC_LEAD_LOCATION_DETAIL_1: NORMAL
MDC_IDC_LEAD_MFG: NORMAL
MDC_IDC_LEAD_MFG: NORMAL
MDC_IDC_LEAD_MODEL: NORMAL
MDC_IDC_LEAD_MODEL: NORMAL
MDC_IDC_LEAD_POLARITY_TYPE: NORMAL
MDC_IDC_LEAD_POLARITY_TYPE: NORMAL
MDC_IDC_LEAD_SERIAL: NORMAL
MDC_IDC_LEAD_SERIAL: NORMAL
MDC_IDC_MSMT_BATTERY_DTM: NORMAL
MDC_IDC_MSMT_BATTERY_REMAINING_LONGEVITY: 69 MO
MDC_IDC_MSMT_BATTERY_RRT_TRIGGER: 2.83
MDC_IDC_MSMT_BATTERY_STATUS: NORMAL
MDC_IDC_MSMT_BATTERY_VOLTAGE: 3 V
MDC_IDC_MSMT_LEADCHNL_RA_IMPEDANCE_VALUE: 323 OHM
MDC_IDC_MSMT_LEADCHNL_RA_IMPEDANCE_VALUE: 418 OHM
MDC_IDC_MSMT_LEADCHNL_RA_PACING_THRESHOLD_AMPLITUDE: 0.5 V
MDC_IDC_MSMT_LEADCHNL_RA_PACING_THRESHOLD_PULSEWIDTH: 0.4 MS
MDC_IDC_MSMT_LEADCHNL_RA_SENSING_INTR_AMPL: 2.12 MV
MDC_IDC_MSMT_LEADCHNL_RA_SENSING_INTR_AMPL: 2.12 MV
MDC_IDC_MSMT_LEADCHNL_RV_IMPEDANCE_VALUE: 418 OHM
MDC_IDC_MSMT_LEADCHNL_RV_IMPEDANCE_VALUE: 437 OHM
MDC_IDC_MSMT_LEADCHNL_RV_PACING_THRESHOLD_AMPLITUDE: 0.75 V
MDC_IDC_MSMT_LEADCHNL_RV_PACING_THRESHOLD_PULSEWIDTH: 0.4 MS
MDC_IDC_MSMT_LEADCHNL_RV_SENSING_INTR_AMPL: 10.62 MV
MDC_IDC_MSMT_LEADCHNL_RV_SENSING_INTR_AMPL: 10.62 MV
MDC_IDC_PG_IMPLANT_DTM: NORMAL
MDC_IDC_PG_MFG: NORMAL
MDC_IDC_PG_MODEL: NORMAL
MDC_IDC_PG_SERIAL: NORMAL
MDC_IDC_PG_TYPE: NORMAL
MDC_IDC_SESS_CLINIC_NAME: NORMAL
MDC_IDC_SESS_DTM: NORMAL
MDC_IDC_SESS_TYPE: NORMAL
MDC_IDC_SET_BRADY_AT_MODE_SWITCH_RATE: 171 {BEATS}/MIN
MDC_IDC_SET_BRADY_HYSTRATE: NORMAL
MDC_IDC_SET_BRADY_LOWRATE: 60 {BEATS}/MIN
MDC_IDC_SET_BRADY_MAX_SENSOR_RATE: 130 {BEATS}/MIN
MDC_IDC_SET_BRADY_MAX_TRACKING_RATE: 130 {BEATS}/MIN
MDC_IDC_SET_BRADY_MODE: NORMAL
MDC_IDC_SET_BRADY_PAV_DELAY_LOW: 250 MS
MDC_IDC_SET_BRADY_SAV_DELAY_LOW: 250 MS
MDC_IDC_SET_LEADCHNL_RA_PACING_AMPLITUDE: 1.5 V
MDC_IDC_SET_LEADCHNL_RA_PACING_ANODE_ELECTRODE_1: NORMAL
MDC_IDC_SET_LEADCHNL_RA_PACING_ANODE_LOCATION_1: NORMAL
MDC_IDC_SET_LEADCHNL_RA_PACING_CAPTURE_MODE: NORMAL
MDC_IDC_SET_LEADCHNL_RA_PACING_CATHODE_ELECTRODE_1: NORMAL
MDC_IDC_SET_LEADCHNL_RA_PACING_CATHODE_LOCATION_1: NORMAL
MDC_IDC_SET_LEADCHNL_RA_PACING_POLARITY: NORMAL
MDC_IDC_SET_LEADCHNL_RA_PACING_PULSEWIDTH: 0.4 MS
MDC_IDC_SET_LEADCHNL_RA_SENSING_ANODE_ELECTRODE_1: NORMAL
MDC_IDC_SET_LEADCHNL_RA_SENSING_ANODE_LOCATION_1: NORMAL
MDC_IDC_SET_LEADCHNL_RA_SENSING_CATHODE_ELECTRODE_1: NORMAL
MDC_IDC_SET_LEADCHNL_RA_SENSING_CATHODE_LOCATION_1: NORMAL
MDC_IDC_SET_LEADCHNL_RA_SENSING_POLARITY: NORMAL
MDC_IDC_SET_LEADCHNL_RA_SENSING_SENSITIVITY: 0.3 MV
MDC_IDC_SET_LEADCHNL_RV_PACING_AMPLITUDE: 2 V
MDC_IDC_SET_LEADCHNL_RV_PACING_ANODE_ELECTRODE_1: NORMAL
MDC_IDC_SET_LEADCHNL_RV_PACING_ANODE_LOCATION_1: NORMAL
MDC_IDC_SET_LEADCHNL_RV_PACING_CAPTURE_MODE: NORMAL
MDC_IDC_SET_LEADCHNL_RV_PACING_CATHODE_ELECTRODE_1: NORMAL
MDC_IDC_SET_LEADCHNL_RV_PACING_CATHODE_LOCATION_1: NORMAL
MDC_IDC_SET_LEADCHNL_RV_PACING_POLARITY: NORMAL
MDC_IDC_SET_LEADCHNL_RV_PACING_PULSEWIDTH: 0.4 MS
MDC_IDC_SET_LEADCHNL_RV_SENSING_ANODE_ELECTRODE_1: NORMAL
MDC_IDC_SET_LEADCHNL_RV_SENSING_ANODE_LOCATION_1: NORMAL
MDC_IDC_SET_LEADCHNL_RV_SENSING_CATHODE_ELECTRODE_1: NORMAL
MDC_IDC_SET_LEADCHNL_RV_SENSING_CATHODE_LOCATION_1: NORMAL
MDC_IDC_SET_LEADCHNL_RV_SENSING_POLARITY: NORMAL
MDC_IDC_SET_LEADCHNL_RV_SENSING_SENSITIVITY: 0.9 MV
MDC_IDC_SET_ZONE_DETECTION_INTERVAL: 350 MS
MDC_IDC_SET_ZONE_DETECTION_INTERVAL: 400 MS
MDC_IDC_SET_ZONE_TYPE: NORMAL
MDC_IDC_STAT_AT_BURDEN_PERCENT: 0 %
MDC_IDC_STAT_AT_DTM_END: NORMAL
MDC_IDC_STAT_AT_DTM_START: NORMAL
MDC_IDC_STAT_BRADY_AP_VP_PERCENT: 1.93 %
MDC_IDC_STAT_BRADY_AP_VS_PERCENT: 37.02 %
MDC_IDC_STAT_BRADY_AS_VP_PERCENT: 0.92 %
MDC_IDC_STAT_BRADY_AS_VS_PERCENT: 60.13 %
MDC_IDC_STAT_BRADY_DTM_END: NORMAL
MDC_IDC_STAT_BRADY_DTM_START: NORMAL
MDC_IDC_STAT_BRADY_RA_PERCENT_PACED: 38.81 %
MDC_IDC_STAT_BRADY_RV_PERCENT_PACED: 3.24 %
MDC_IDC_STAT_EPISODE_RECENT_COUNT: 0
MDC_IDC_STAT_EPISODE_RECENT_COUNT_DTM_END: NORMAL
MDC_IDC_STAT_EPISODE_RECENT_COUNT_DTM_START: NORMAL
MDC_IDC_STAT_EPISODE_TOTAL_COUNT: 0
MDC_IDC_STAT_EPISODE_TOTAL_COUNT: 3
MDC_IDC_STAT_EPISODE_TOTAL_COUNT: 4
MDC_IDC_STAT_EPISODE_TOTAL_COUNT: 8
MDC_IDC_STAT_EPISODE_TOTAL_COUNT_DTM_END: NORMAL
MDC_IDC_STAT_EPISODE_TOTAL_COUNT_DTM_START: NORMAL
MDC_IDC_STAT_EPISODE_TYPE: NORMAL

## 2021-01-27 ENCOUNTER — ANTICOAGULATION THERAPY VISIT (OUTPATIENT)
Dept: ANTICOAGULATION | Facility: CLINIC | Age: 76
End: 2021-01-27

## 2021-01-27 DIAGNOSIS — I48.0 PAROXYSMAL ATRIAL FIBRILLATION (H): ICD-10-CM

## 2021-01-27 DIAGNOSIS — Z79.01 LONG TERM CURRENT USE OF ANTICOAGULANTS WITH INR GOAL OF 2.0-3.0: ICD-10-CM

## 2021-01-27 LAB
CAPILLARY BLOOD COLLECTION: NORMAL
INR PPP: 2 (ref 0.86–1.14)

## 2021-01-27 PROCEDURE — 36416 COLLJ CAPILLARY BLOOD SPEC: CPT | Performed by: NURSE PRACTITIONER

## 2021-01-27 PROCEDURE — 85610 PROTHROMBIN TIME: CPT | Performed by: NURSE PRACTITIONER

## 2021-01-27 PROCEDURE — 99207 PR NO CHARGE NURSE ONLY: CPT

## 2021-01-27 NOTE — PROGRESS NOTES
ANTICOAGULATION FOLLOW-UP CLINIC VISIT    Patient Name:  Amy Luna  Date:  2021  Contact Type:  Telephone/ discussed with patient    SUBJECTIVE:  Patient Findings     Comments:  The patient was assessed for diet, medication, and activity level changes, missed or extra doses, bruising or bleeding, with no problem findings.  INR is in range today.  Will resume maintenance dose at this time (this dose is higher than what she was previously on when she started holding for her last procedure).  Nadia Cabello RN          Clinical Outcomes     Negatives:  Major bleeding event, Thromboembolic event, Anticoagulation-related hospital admission, Anticoagulation-related ED visit, Anticoagulation-related fatality    Comments:  The patient was assessed for diet, medication, and activity level changes, missed or extra doses, bruising or bleeding, with no problem findings.  INR is in range today.  Will resume maintenance dose at this time (this dose is higher than what she was previously on when she started holding for her last procedure).  aNdia Cabello RN             OBJECTIVE    Recent labs: (last 7 days)     21  1250   INR 2.00*       ASSESSMENT / PLAN  INR assessment THER    Recheck INR In: 9 DAYS    INR Location Clinic      Anticoagulation Summary  As of 2021    INR goal:  2.0-3.0   TTR:  37.4 % (7.4 mo)   INR used for dosin.00 (2021)   Warfarin maintenance plan:  10 mg (5 mg x 2) every Wed, Sat; 7.5 mg (5 mg x 1.5) all other days   Full warfarin instructions:  10 mg every Wed, Sat; 7.5 mg all other days   Weekly warfarin total:  57.5 mg   No change documented:  aNdia Cabello RN   Plan last modified:  Nadia Cabello RN (2021)   Next INR check:  2021   Priority:  High   Target end date:  Indefinite    Indications    Paroxysmal atrial fibrillation (H) [I48.0]  Long term current use of anticoagulants with INR goal of 2.0-3.0 [Z79.01]             Anticoagulation Episode Summary     INR check  location:      Preferred lab:      [facility]ers [facility]:  [facility]    Comments:        Anticoagulation Care Providers     Provider Role Specialty Phone number    Yamilka Christina DO Referring Cardiovascular Disease [phone]    Kelli Lewis CNP Referring Internal Medicine [phone]            See the Encounter Report to view Anticoagulation Flowsheet and Dosing Calendar (Go to Encounters tab in chart review, and find the Anticoagulation Therapy Visit)    Dosage adjustment made based on physician directed care plan.    Nadia Cabello RN

## 2021-01-28 ENCOUNTER — OFFICE VISIT (OUTPATIENT)
Dept: CARDIOLOGY | Facility: CLINIC | Age: 76
End: 2021-01-28
Payer: MEDICARE

## 2021-01-28 VITALS
SYSTOLIC BLOOD PRESSURE: 144 MMHG | HEIGHT: 65 IN | DIASTOLIC BLOOD PRESSURE: 75 MMHG | BODY MASS INDEX: 48.23 KG/M2 | HEART RATE: 76 BPM | WEIGHT: 289.5 LBS

## 2021-01-28 DIAGNOSIS — Z95.0 CARDIAC PACEMAKER IN SITU: Primary | ICD-10-CM

## 2021-01-28 DIAGNOSIS — D62 ACUTE BLOOD LOSS ANEMIA: ICD-10-CM

## 2021-01-28 DIAGNOSIS — I48.0 PAROXYSMAL ATRIAL FIBRILLATION (H): ICD-10-CM

## 2021-01-28 DIAGNOSIS — Z95.3 S/P MITRAL VALVE REPLACEMENT WITH BIOPROSTHETIC VALVE: ICD-10-CM

## 2021-01-28 DIAGNOSIS — Z95.2 S/P AVR (AORTIC VALVE REPLACEMENT): ICD-10-CM

## 2021-01-28 DIAGNOSIS — I07.1 TRICUSPID VALVE INSUFFICIENCY, UNSPECIFIED ETIOLOGY: ICD-10-CM

## 2021-01-28 PROCEDURE — 99213 OFFICE O/P EST LOW 20 MIN: CPT | Performed by: INTERNAL MEDICINE

## 2021-01-28 ASSESSMENT — MIFFLIN-ST. JEOR: SCORE: 1814.04

## 2021-01-28 NOTE — LETTER
1/28/2021      [facility]  [address] [place] [address]      RE: Amy Dawkinsmer       Dear Colleague,    I had the pleasure of seeing Amy Luna in the [facility].    Service Date: 01/28/2021      HISTORY OF PRESENT ILLNESS:  Ms. Luna is a pleasant 75-year-old female with a history of congestive heart failure related to valvular heart disease.  She has undergone bioprosthetic mitral and aortic valve replacement with tricuspid repair and annuloplasty ring and PFO closure.  This was done in 04/2020.  She had a 27 mm St. Juan porcine bioprosthetic mitral valve and a 25 mm Rubi bovine pericardial valve for the aortic valve.  She did require permanent pacemaker implant and had an atrial lead revision due troubles with the atrial lead in April.        She did develop postoperative atrial fibrillation and was placed on amiodarone and warfarin.  She saw Dr. Bustillo in September and her device did not demonstrate persistent or recurrent atrial fibrillation, so he had recommended to stop amiodarone.        She has continued on warfarin therapy.  She was hospitalized for supratherapeutic INR and rectal bleeding and November.  This has subsided.  She is questioning whether she needs to continue to take warfarin at this time.        Her last interrogation of her pacing, which was done yesterday, does not show any evidence of recurrence of atrial fibrillation.  She seems to be doing well.  She is morbidly obese and walks with a walker.  She does struggle with stairs in her home, but overall she seems to be doing remarkably well for the extensive surgery she had.  She does wear a CPAP machine at night for sleep apnea.  She denies any symptoms suggestive of orthopnea or PND.  She does have some mild peripheral edema, worse in the left leg.  We talked about compression socks for that.  She does take Lasix, but when she is out and about to clinic visits and  said she does not take it in the afternoons.      PHYSICAL EXAMINATION:   VITAL SIGNS:  On exam today, her blood pressure was 144/75.  This is a little bit higher than what she has seen at previous office visits, pulse was 76, weight 289.  This has fluctuated quite a bit and is up from 265 in September.  Body mass index of 48.     NECK:  She has thick, short neck.  I cannot appreciate for jugular venous distention.  I do not hear carotid bruits.   CARDIOVASCULAR:  Tones are regular.  She has a soft systolic murmur without gallop or rub.   LUNGS:  Clear posteriorly.   EXTREMITIES:  She has 1+ edema to her left lower extremity and trace on the right.      LABORATORY DATA:  Last labs done were in November.  She had a mild anemia with a hemoglobin of 11.3.  Her creatinine is 1.52.  Electrolytes were normal.  Last INR yesterday was 2.      SUMMARY:  Ms. Luna is a very pleasant 75-year-old female with valvular heart disease.  She underwent bioprosthetic mitral and aortic valve replacements, annuloplasty of her tricuspid valve with repair, PFO closure and she has a permanent pacemaker implant.        She has been taken off of amiodarone after experiencing postoperative atrial fibrillation.  There has not been evidence of reoccurrence on her pacing interrogations, most recently done yesterday.  I will discuss with Electrophysiology about stopping her warfarin at this time as well.  We will likely put her back on baby aspirin.        She seems compensated.  I will recommend that she have an echocardiogram to assess valvular function and LV function in a few months, which will be the 1-year dagoberto from her valve replacement surgery.        We will continue her current medications and I will see her in followup in about 3 months in [facility].        Please feel free to contact me with any questions you have in regards to her care.      cc:      She Huang MD    [facility]   [address]   [place] [address]         CHERI KAPOOR DO             D: 2021   T: 2021   MT: RICH      Name:     KASSANDRA RIVERA   MRN:      [id number]        Account:      [id number]   :      1945           Service Date: 2021      Document: [id number]          Outpatient Encounter Medications as of 2021   Medication Sig Dispense Refill     acetaminophen (TYLENOL) 325 MG tablet Take 2 tablets (650 mg) by mouth every 4 hours as needed for other (multimodal surgical pain management along with NSAIDS and opioid medication as indicated based on pain control and physical function.)  0     albuterol (PROAIR HFA/PROVENTIL HFA/VENTOLIN HFA) 108 (90 Base) MCG/ACT inhaler Inhale 2 puffs into the lungs every 4 hours as needed for shortness of breath / dyspnea or wheezing       atorvastatin (LIPITOR) 40 MG tablet Take 1 tablet (40 mg) by mouth At Bedtime 90 tablet 3     Biotin 35584 MCG TABS Take 10,000 mcg by mouth every morning        Calcium Carbonate-Vit D-Min (CALCIUM 1200 PO) Take 1 tablet by mouth every evening        coenzyme Q-10 200 MG CAPS Take 200 mg by mouth every morning        Continuous Blood Gluc  (FREESTYLE ADAM READER) HARJEET 1 each every 14 days 6 Device 1     Continuous Blood Gluc Sensor MISC 1 each every 14 days 6 each 1     cyanocobalamin (VITAMIN B-12) 100 MCG tablet Take 100 mcg by mouth daily       ferrous gluconate (FERGON) 324 (38 Fe) MG tablet Take 1 tablet (324 mg) by mouth daily (with breakfast) 30 tablet 3     furosemide (LASIX) 40 MG tablet Take 1 tablet (40 mg) by mouth 2 times daily Further dosing per PCP/ Cardiology - Oral 180 tablet 3     Ginkgo Biloba (GINKOBA PO) Take 250 mg by mouth daily       insulin NPH-Regular 70/30 (70-30) 100 UNIT/ML vial Inject 20-40 Units Subcutaneous 2 times daily (with meals) 72 mL 1     ipratropium (ATROVENT) 0.06 % nasal spray Spray 2 sprays into both nostrils 4 times daily as needed for rhinitis 3 Box 1     Lutein 40  MG CAPS Take 40 mg by mouth every morning        Magnesium 400 MG TABS Take 400 mg by mouth every evening        metFORMIN (GLUCOPHAGE-XR) 500 MG 24 hr tablet Take 2 tablets (1,000 mg) by mouth daily (with breakfast) (takes 2 x 500mg) 90 tablet 3     metoprolol succinate ER (TOPROL-XL) 25 MG 24 hr tablet Take 2 tablets (50 mg) by mouth daily 90 tablet 3     potassium aminobenzoate 500 MG CAPS capsule Take 99 mg by mouth daily Patient taking tablet        Propylene Glycol (SYSTANE BALANCE OP) Apply 1-2 drops to eye 3 times daily as needed (dry eyes)       sertraline (ZOLOFT) 100 MG tablet Take 1.5 tablets (150 mg) by mouth every morning (takes 1.5 x 100mg) 135 tablet 1     TURMERIC PO Take 3 tablets by mouth daily        vitamin (B COMPLEX-C) tablet Take 1 tablet by mouth daily       vitamin C (ASCORBIC ACID) 1000 MG TABS Take 1,000 mg by mouth every evening        Vitamin D3 (CHOLECALCIFEROL) 25 mcg (1000 units) tablet Take 1 tablet (25 mcg) by mouth daily (Patient taking differently: Take 125 mcg by mouth daily 5,000 Units daily)  0     warfarin ANTICOAGULANT (COUMADIN) 5 MG tablet Take 7.5 mg (1 and 1/2 tablets) by mouth on Sun, Tue, Thur. Take 5 mg (1 tablet)  all other days or as directed by INR clinic. 150 tablet 0     zinc 23 MG LOZG lozenge Place 1 lozenge inside cheek daily        No facility-administered encounter medications on file as of 1/28/2021.        Again, thank you for allowing me to participate in the care of your patient.      Sincerely,    Yamilka Christina, DO     [facility]

## 2021-01-28 NOTE — PROGRESS NOTES
HPI and Plan:   See dictation    Orders Placed This Encounter   Procedures     Follow-Up with Cardiologist     Echocardiogram Complete       No orders of the defined types were placed in this encounter.      There are no discontinued medications.      Encounter Diagnoses   Name Primary?     Paroxysmal atrial fibrillation (H)      Cardiac pacemaker in situ Yes     S/P mitral valve replacement with bioprosthetic valve      S/P AVR (aortic valve replacement)      Tricuspid valve insufficiency, unspecified etiology      Acute blood loss anemia        CURRENT MEDICATIONS:  Current Outpatient Medications   Medication Sig Dispense Refill     acetaminophen (TYLENOL) 325 MG tablet Take 2 tablets (650 mg) by mouth every 4 hours as needed for other (multimodal surgical pain management along with NSAIDS and opioid medication as indicated based on pain control and physical function.)  0     albuterol (PROAIR HFA/PROVENTIL HFA/VENTOLIN HFA) 108 (90 Base) MCG/ACT inhaler Inhale 2 puffs into the lungs every 4 hours as needed for shortness of breath / dyspnea or wheezing       atorvastatin (LIPITOR) 40 MG tablet Take 1 tablet (40 mg) by mouth At Bedtime 90 tablet 3     Biotin 03682 MCG TABS Take 10,000 mcg by mouth every morning        Calcium Carbonate-Vit D-Min (CALCIUM 1200 PO) Take 1 tablet by mouth every evening        coenzyme Q-10 200 MG CAPS Take 200 mg by mouth every morning        Continuous Blood Gluc  (FREESTYLE ADAM READER) HARJEET 1 each every 14 days 6 Device 1     Continuous Blood Gluc Sensor MISC 1 each every 14 days 6 each 1     cyanocobalamin (VITAMIN B-12) 100 MCG tablet Take 100 mcg by mouth daily       ferrous gluconate (FERGON) 324 (38 Fe) MG tablet Take 1 tablet (324 mg) by mouth daily (with breakfast) 30 tablet 3     furosemide (LASIX) 40 MG tablet Take 1 tablet (40 mg) by mouth 2 times daily Further dosing per PCP/ Cardiology - Oral 180 tablet 3     Ginkgo Biloba (GINKOBA PO) Take 250 mg by mouth  daily       insulin NPH-Regular 70/30 (70-30) 100 UNIT/ML vial Inject 20-40 Units Subcutaneous 2 times daily (with meals) 72 mL 1     ipratropium (ATROVENT) 0.06 % nasal spray Spray 2 sprays into both nostrils 4 times daily as needed for rhinitis 3 Box 1     Lutein 40 MG CAPS Take 40 mg by mouth every morning        Magnesium 400 MG TABS Take 400 mg by mouth every evening        metFORMIN (GLUCOPHAGE-XR) 500 MG 24 hr tablet Take 2 tablets (1,000 mg) by mouth daily (with breakfast) (takes 2 x 500mg) 90 tablet 3     metoprolol succinate ER (TOPROL-XL) 25 MG 24 hr tablet Take 2 tablets (50 mg) by mouth daily 90 tablet 3     potassium aminobenzoate 500 MG CAPS capsule Take 99 mg by mouth daily Patient taking tablet        Propylene Glycol (SYSTANE BALANCE OP) Apply 1-2 drops to eye 3 times daily as needed (dry eyes)       sertraline (ZOLOFT) 100 MG tablet Take 1.5 tablets (150 mg) by mouth every morning (takes 1.5 x 100mg) 135 tablet 1     TURMERIC PO Take 3 tablets by mouth daily        vitamin (B COMPLEX-C) tablet Take 1 tablet by mouth daily       vitamin C (ASCORBIC ACID) 1000 MG TABS Take 1,000 mg by mouth every evening        Vitamin D3 (CHOLECALCIFEROL) 25 mcg (1000 units) tablet Take 1 tablet (25 mcg) by mouth daily (Patient taking differently: Take 125 mcg by mouth daily 5,000 Units daily)  0     warfarin ANTICOAGULANT (COUMADIN) 5 MG tablet Take 7.5 mg (1 and 1/2 tablets) by mouth on Sun, Tue, Thur. Take 5 mg (1 tablet)  all other days or as directed by INR clinic. 150 tablet 0     zinc 23 MG LOZG lozenge Place 1 lozenge inside cheek daily          ALLERGIES     Allergies   Allergen Reactions     Penicillins Hives     3 weeks after taking med got hives.       Pioglitazone Other (See Comments)     Increased urinary frequency, fatigue, dyspnea       PAST MEDICAL HISTORY:  Past Medical History:   Diagnosis Date     (HFpEF) heart failure with preserved ejection fraction (H)      Aortic stenosis      Chest pain       Depressive disorder      Diabetes (H)      Hyperlipidemia      Hypertension      Mitral annular calcification      Mitral stenosis      Obesity      Sleep apnea     CPAP       PAST SURGICAL HISTORY:  Past Surgical History:   Procedure Laterality Date     APPENDECTOMY       CV CORONARY ANGIOGRAM N/A 2020    Procedure: Coronary Angiogram;  Surgeon: Inez Peoples MD;  Location:  HEART CARDIAC CATH LAB     CV LEFT HEART CATH N/A 2020    Procedure: Left Heart Cath;  Surgeon: Inez Peoples MD;  Location:  HEART CARDIAC CATH LAB     CV PFO CLOSURE N/A 4/15/2020    Procedure: Patent Foramen Ovale Closure;  Surgeon: Ok Wilson MD;  Location:  OR      RIGHT HEART CATH MEASUREMENTS RECORDED N/A 2020    Procedure: Right Heart Cath;  Surgeon: Inez Peoples MD;  Location:  HEART CARDIAC CATH LAB     EP PACEMAKER N/A 2020    Procedure: EP Pacemaker;  Surgeon: Sohan Francis MD;  Location:  HEART CARDIAC CATH LAB     GYN SURGERY       x2 ,      GYN SURGERY      total hysterectomy     IMPLANT PACEMAKER       REPAIR VALVE TRICUSPID N/A 4/15/2020    Procedure: REPAIR TRICUSPID VALVE WITH VILLA MC3 26MM.;  Surgeon: Ok Wilson MD;  Location:  OR     REPLACE VALVE AORTIC N/A 4/15/2020    Procedure: REPLACEMENT, AORTIC VALVE WITH INSPIRIS TISSUE VALVE 25 MM; ON PUMP WITH SOCORRO READ BY CARDIOLOGIST DR BLANTON.;  Surgeon: Ok Wilson MD;  Location:  OR     REPLACE VALVE MITRAL N/A 4/15/2020    Procedure: REPLACEMENT, MITRAL VALVE WITH EPIC TISSUE VALVE 27 MM.;  Surgeon: Ok Wilson MD;  Location:  OR       FAMILY HISTORY:  Family History   Problem Relation Age of Onset     Diabetes Mother 56     Congenital heart disease Father 23     Colon Cancer No family hx of        SOCIAL HISTORY:  Social History     Socioeconomic History     Marital status:      Spouse name: None     Number of children:  "None     Years of education: None     Highest education level: None   Occupational History     None   Social Needs     Financial resource strain: None     Food insecurity     Worry: None     Inability: None     Transportation needs     Medical: None     Non-medical: None   Tobacco Use     Smoking status: Never Smoker     Smokeless tobacco: Never Used   Substance and Sexual Activity     Alcohol use: No     Drug use: No     Sexual activity: None   Lifestyle     Physical activity     Days per week: None     Minutes per session: None     Stress: None   Relationships     Social connections     Talks on phone: None     Gets together: None     Attends Synagogue service: None     Active member of club or organization: None     Attends meetings of clubs or organizations: None     Relationship status: None     Intimate partner violence     Fear of current or ex partner: None     Emotionally abused: None     Physically abused: None     Forced sexual activity: None   Other Topics Concern     Parent/sibling w/ CABG, MI or angioplasty before 65F 55M? Not Asked   Social History Narrative     None       Review of Systems:  Skin:  Negative       Eyes:  Positive for glasses    ENT:  Negative      Respiratory:  Positive for dyspnea on exertion;sleep apnea;CPAP     Cardiovascular:  Negative;syncope or near-syncope;cyanosis;dizziness;lightheadedness Positive for;fatigue;edema feels \"electrical zap\" occ, has incisional discomfort  Gastroenterology: Positive for   recent colonoscopy  Genitourinary:  Positive for urinary frequency    Musculoskeletal:  Negative back pain;neck pain;arthritis    Neurologic:  Positive for numbness or tingling of hands    Psychiatric:  Positive for anxiety;depression    Heme/Lymph/Imm:  Negative      Endocrine:  Positive for diabetes      Physical Exam:  Vitals: BP (!) 144/75 (BP Location: Right arm, Cuff Size: Adult Large)   Pulse 76   Ht 1.651 m (5' 5\")   Wt 131.3 kg (289 lb 8 oz)   BMI 48.18 kg/m  "     Constitutional:  cooperative;in no acute distress morbidly obese      Skin:  warm and dry to the touch   pacemaker incision in the left infraclavicular area was well-healed      Head:           Eyes:  pupils equal and round        Lymph:      ENT:  no pallor or cyanosis        Neck:  no carotid bruit        Respiratory:  clear to auscultation         Cardiac: regular rhythm       systolic murmur;grade 1        pulses full and equal                                        GI:  abdomen soft obese      Extremities and Muscular Skeletal:  no deformities, clubbing, cyanosis, erythema observed       1+;LLE edema      Neurological:  no gross motor deficits        Psych:  Alert and Oriented x 3          CC  [facility] [place] [facility] [address] [place] [address]

## 2021-01-28 NOTE — PROGRESS NOTES
Service Date: 01/28/2021      HISTORY OF PRESENT ILLNESS:  Ms. Luna is a pleasant 75-year-old female with a history of congestive heart failure related to valvular heart disease.  She has undergone bioprosthetic mitral and aortic valve replacement with tricuspid repair and annuloplasty ring and PFO closure.  This was done in 04/2020.  She had a 27 mm St. Juan porcine bioprosthetic mitral valve and a 25 mm Rubi bovine pericardial valve for the aortic valve.  She did require permanent pacemaker implant and had an atrial lead revision due troubles with the atrial lead in April.        She did develop postoperative atrial fibrillation and was placed on amiodarone and warfarin.  She saw Dr. Bustillo in September and her device did not demonstrate persistent or recurrent atrial fibrillation, so he had recommended to stop amiodarone.        She has continued on warfarin therapy.  She was hospitalized for supratherapeutic INR and rectal bleeding and November.  This has subsided.  She is questioning whether she needs to continue to take warfarin at this time.        Her last interrogation of her pacing, which was done yesterday, does not show any evidence of recurrence of atrial fibrillation.  She seems to be doing well.  She is morbidly obese and walks with a walker.  She does struggle with stairs in her home, but overall she seems to be doing remarkably well for the extensive surgery she had.  She does wear a CPAP machine at night for sleep apnea.  She denies any symptoms suggestive of orthopnea or PND.  She does have some mild peripheral edema, worse in the left leg.  We talked about compression socks for that.  She does take Lasix, but when she is out and about to clinic visits and said she does not take it in the afternoons.      PHYSICAL EXAMINATION:   VITAL SIGNS:  On exam today, her blood pressure was 144/75.  This is a little bit higher than what she has seen at previous office visits, pulse was 76, weight 289.   This has fluctuated quite a bit and is up from 265 in September.  Body mass index of 48.     NECK:  She has thick, short neck.  I cannot appreciate for jugular venous distention.  I do not hear carotid bruits.   CARDIOVASCULAR:  Tones are regular.  She has a soft systolic murmur without gallop or rub.   LUNGS:  Clear posteriorly.   EXTREMITIES:  She has 1+ edema to her left lower extremity and trace on the right.      LABORATORY DATA:  Last labs done were in November.  She had a mild anemia with a hemoglobin of 11.3.  Her creatinine is 1.52.  Electrolytes were normal.  Last INR yesterday was 2.      SUMMARY:  Ms. Rivera is a very pleasant 75-year-old female with valvular heart disease.  She underwent bioprosthetic mitral and aortic valve replacements, annuloplasty of her tricuspid valve with repair, PFO closure and she has a permanent pacemaker implant.        She has been taken off of amiodarone after experiencing postoperative atrial fibrillation.  There has not been evidence of reoccurrence on her pacing interrogations, most recently done yesterday.  I will discuss with Electrophysiology about stopping her warfarin at this time as well.  We will likely put her back on baby aspirin.        She seems compensated.  I will recommend that she have an echocardiogram to assess valvular function and LV function in a few months, which will be the 1-year dagoberto from her valve replacement surgery.        We will continue her current medications and I will see her in followup in about 3 months in [facility].        Please feel free to contact me with any questions you have in regards to her care.      cc:      Seh Huang MD    [facility]   [address]   [place] [address]         CHERI KAPOOR DO             D: 2021   T: 2021   MT: RICH      Name:     KASSANDRA RIVERA   MRN:      [id number]        Account:      [id number]   :      1945           Service  Date: 01/28/2021      Document: [id number]

## 2021-01-28 NOTE — LETTER
1/28/2021    [facility]  [address] [place] [address]    RE: Amy Luna       Dear Colleague,    I had the pleasure of seeing Amy Luna in the [facility].    HPI and Plan:   See dictation    Orders Placed This Encounter   Procedures     Follow-Up with Cardiologist     Echocardiogram Complete       No orders of the defined types were placed in this encounter.      There are no discontinued medications.      Encounter Diagnoses   Name Primary?     Paroxysmal atrial fibrillation (H)      Cardiac pacemaker in situ Yes     S/P mitral valve replacement with bioprosthetic valve      S/P AVR (aortic valve replacement)      Tricuspid valve insufficiency, unspecified etiology      Acute blood loss anemia        CURRENT MEDICATIONS:  Current Outpatient Medications   Medication Sig Dispense Refill     acetaminophen (TYLENOL) 325 MG tablet Take 2 tablets (650 mg) by mouth every 4 hours as needed for other (multimodal surgical pain management along with NSAIDS and opioid medication as indicated based on pain control and physical function.)  0     albuterol (PROAIR HFA/PROVENTIL HFA/VENTOLIN HFA) 108 (90 Base) MCG/ACT inhaler Inhale 2 puffs into the lungs every 4 hours as needed for shortness of breath / dyspnea or wheezing       atorvastatin (LIPITOR) 40 MG tablet Take 1 tablet (40 mg) by mouth At Bedtime 90 tablet 3     Biotin 16059 MCG TABS Take 10,000 mcg by mouth every morning        Calcium Carbonate-Vit D-Min (CALCIUM 1200 PO) Take 1 tablet by mouth every evening        coenzyme Q-10 200 MG CAPS Take 200 mg by mouth every morning        Continuous Blood Gluc  (FREESTYLE ADAM READER) HARJEET 1 each every 14 days 6 Device 1     Continuous Blood Gluc Sensor MISC 1 each every 14 days 6 each 1     cyanocobalamin (VITAMIN B-12) 100 MCG tablet Take 100 mcg by mouth daily       ferrous gluconate (FERGON) 324 (38 Fe) MG tablet Take 1 tablet (324 mg)  by mouth daily (with breakfast) 30 tablet 3     furosemide (LASIX) 40 MG tablet Take 1 tablet (40 mg) by mouth 2 times daily Further dosing per PCP/ Cardiology - Oral 180 tablet 3     Ginkgo Biloba (GINKOBA PO) Take 250 mg by mouth daily       insulin NPH-Regular 70/30 (70-30) 100 UNIT/ML vial Inject 20-40 Units Subcutaneous 2 times daily (with meals) 72 mL 1     ipratropium (ATROVENT) 0.06 % nasal spray Spray 2 sprays into both nostrils 4 times daily as needed for rhinitis 3 Box 1     Lutein 40 MG CAPS Take 40 mg by mouth every morning        Magnesium 400 MG TABS Take 400 mg by mouth every evening        metFORMIN (GLUCOPHAGE-XR) 500 MG 24 hr tablet Take 2 tablets (1,000 mg) by mouth daily (with breakfast) (takes 2 x 500mg) 90 tablet 3     metoprolol succinate ER (TOPROL-XL) 25 MG 24 hr tablet Take 2 tablets (50 mg) by mouth daily 90 tablet 3     potassium aminobenzoate 500 MG CAPS capsule Take 99 mg by mouth daily Patient taking tablet        Propylene Glycol (SYSTANE BALANCE OP) Apply 1-2 drops to eye 3 times daily as needed (dry eyes)       sertraline (ZOLOFT) 100 MG tablet Take 1.5 tablets (150 mg) by mouth every morning (takes 1.5 x 100mg) 135 tablet 1     TURMERIC PO Take 3 tablets by mouth daily        vitamin (B COMPLEX-C) tablet Take 1 tablet by mouth daily       vitamin C (ASCORBIC ACID) 1000 MG TABS Take 1,000 mg by mouth every evening        Vitamin D3 (CHOLECALCIFEROL) 25 mcg (1000 units) tablet Take 1 tablet (25 mcg) by mouth daily (Patient taking differently: Take 125 mcg by mouth daily 5,000 Units daily)  0     warfarin ANTICOAGULANT (COUMADIN) 5 MG tablet Take 7.5 mg (1 and 1/2 tablets) by mouth on Sun, Tue, Thur. Take 5 mg (1 tablet)  all other days or as directed by INR clinic. 150 tablet 0     zinc 23 MG LOZG lozenge Place 1 lozenge inside cheek daily          ALLERGIES     Allergies   Allergen Reactions     Penicillins Hives     3 weeks after taking med got hives.       Pioglitazone Other (See  Comments)     Increased urinary frequency, fatigue, dyspnea       PAST MEDICAL HISTORY:  Past Medical History:   Diagnosis Date     (HFpEF) heart failure with preserved ejection fraction (H)      Aortic stenosis      Chest pain      Depressive disorder      Diabetes (H)      Hyperlipidemia      Hypertension      Mitral annular calcification      Mitral stenosis      Obesity      Sleep apnea     CPAP       PAST SURGICAL HISTORY:  Past Surgical History:   Procedure Laterality Date     APPENDECTOMY       CV CORONARY ANGIOGRAM N/A 2020    Procedure: Coronary Angiogram;  Surgeon: Inez Peoples MD;  Location:  HEART CARDIAC CATH LAB     CV LEFT HEART CATH N/A 2020    Procedure: Left Heart Cath;  Surgeon: Inez Peoples MD;  Location:  HEART CARDIAC CATH LAB     CV PFO CLOSURE N/A 4/15/2020    Procedure: Patent Foramen Ovale Closure;  Surgeon: Ok Wilson MD;  Location:  OR      RIGHT HEART CATH MEASUREMENTS RECORDED N/A 2020    Procedure: Right Heart Cath;  Surgeon: Inez Peoples MD;  Location:  HEART CARDIAC CATH LAB     EP PACEMAKER N/A 2020    Procedure: EP Pacemaker;  Surgeon: Sohan Francis MD;  Location:  HEART CARDIAC CATH LAB     GYN SURGERY       x2 ,      GYN SURGERY      total hysterectomy     IMPLANT PACEMAKER       REPAIR VALVE TRICUSPID N/A 4/15/2020    Procedure: REPAIR TRICUSPID VALVE WITH VILLA MC3 26MM.;  Surgeon: Ok Wilson MD;  Location:  OR     REPLACE VALVE AORTIC N/A 4/15/2020    Procedure: REPLACEMENT, AORTIC VALVE WITH INSPIRIS TISSUE VALVE 25 MM; ON PUMP WITH SOCORRO READ BY CARDIOLOGIST DR BLANTON.;  Surgeon: Ok Wilson MD;  Location:  OR     REPLACE VALVE MITRAL N/A 4/15/2020    Procedure: REPLACEMENT, MITRAL VALVE WITH EPIC TISSUE VALVE 27 MM.;  Surgeon: Ok Wilson MD;  Location:  OR       FAMILY HISTORY:  Family History   Problem Relation Age of Onset      "Diabetes Mother 56     Congenital heart disease Father 23     Colon Cancer No family hx of        SOCIAL HISTORY:  Social History     Socioeconomic History     Marital status:      Spouse name: None     Number of children: None     Years of education: None     Highest education level: None   Occupational History     None   Social Needs     Financial resource strain: None     Food insecurity     Worry: None     Inability: None     Transportation needs     Medical: None     Non-medical: None   Tobacco Use     Smoking status: Never Smoker     Smokeless tobacco: Never Used   Substance and Sexual Activity     Alcohol use: No     Drug use: No     Sexual activity: None   Lifestyle     Physical activity     Days per week: None     Minutes per session: None     Stress: None   Relationships     Social connections     Talks on phone: None     Gets together: None     Attends Restorationist service: None     Active member of club or organization: None     Attends meetings of clubs or organizations: None     Relationship status: None     Intimate partner violence     Fear of current or ex partner: None     Emotionally abused: None     Physically abused: None     Forced sexual activity: None   Other Topics Concern     Parent/sibling w/ CABG, MI or angioplasty before 65F 55M? Not Asked   Social History Narrative     None       Review of Systems:  Skin:  Negative       Eyes:  Positive for glasses    ENT:  Negative      Respiratory:  Positive for dyspnea on exertion;sleep apnea;CPAP     Cardiovascular:  Negative;syncope or near-syncope;cyanosis;dizziness;lightheadedness Positive for;fatigue;edema feels \"electrical zap\" occ, has incisional discomfort  Gastroenterology: Positive for   recent colonoscopy  Genitourinary:  Positive for urinary frequency    Musculoskeletal:  Negative back pain;neck pain;arthritis    Neurologic:  Positive for numbness or tingling of hands    Psychiatric:  Positive for anxiety;depression    Heme/Lymph/Imm: " " Negative      Endocrine:  Positive for diabetes      Physical Exam:  Vitals: BP (!) 144/75 (BP Location: Right arm, Cuff Size: Adult Large)   Pulse 76   Ht 1.651 m (5' 5\")   Wt 131.3 kg (289 lb 8 oz)   BMI 48.18 kg/m      Constitutional:  cooperative;in no acute distress morbidly obese      Skin:  warm and dry to the touch   pacemaker incision in the left infraclavicular area was well-healed      Head:           Eyes:  pupils equal and round        Lymph:      ENT:  no pallor or cyanosis        Neck:  no carotid bruit        Respiratory:  clear to auscultation         Cardiac: regular rhythm       systolic murmur;grade 1        pulses full and equal                                        GI:  abdomen soft obese      Extremities and Muscular Skeletal:  no deformities, clubbing, cyanosis, erythema observed       1+;LLE edema      Neurological:  no gross motor deficits        Psych:  Alert and Oriented x 3          CC  [facility] [place] [facility] [address] [place] [address]                      Thank you for allowing me to participate in the care of your patient.      Sincerely,     Yamilka Christina,      [facility]    cc:   [facility] [place] [facility] [address] [place] [address]        "

## 2021-02-05 ENCOUNTER — ANTICOAGULATION THERAPY VISIT (OUTPATIENT)
Dept: ANTICOAGULATION | Facility: CLINIC | Age: 76
End: 2021-02-05

## 2021-02-05 DIAGNOSIS — Z79.01 LONG TERM CURRENT USE OF ANTICOAGULANTS WITH INR GOAL OF 2.0-3.0: ICD-10-CM

## 2021-02-05 DIAGNOSIS — I48.0 PAROXYSMAL ATRIAL FIBRILLATION (H): ICD-10-CM

## 2021-02-05 DIAGNOSIS — E11.22 TYPE 2 DIABETES MELLITUS WITH STAGE 3 CHRONIC KIDNEY DISEASE, WITHOUT LONG-TERM CURRENT USE OF INSULIN, UNSPECIFIED WHETHER STAGE 3A OR 3B CKD (H): ICD-10-CM

## 2021-02-05 DIAGNOSIS — N18.30 TYPE 2 DIABETES MELLITUS WITH STAGE 3 CHRONIC KIDNEY DISEASE, WITHOUT LONG-TERM CURRENT USE OF INSULIN, UNSPECIFIED WHETHER STAGE 3A OR 3B CKD (H): ICD-10-CM

## 2021-02-05 DIAGNOSIS — D64.9 ANEMIA, UNSPECIFIED TYPE: ICD-10-CM

## 2021-02-05 LAB
ERYTHROCYTE [DISTWIDTH] IN BLOOD BY AUTOMATED COUNT: 12.8 % (ref 10–15)
HBA1C MFR BLD: 7.1 % (ref 0–5.6)
HCT VFR BLD AUTO: 38.1 % (ref 35–47)
HGB BLD-MCNC: 12.4 G/DL (ref 11.7–15.7)
INR PPP: 2.8 (ref 0.86–1.14)
MCH RBC QN AUTO: 31.5 PG (ref 26.5–33)
MCHC RBC AUTO-ENTMCNC: 32.5 G/DL (ref 31.5–36.5)
MCV RBC AUTO: 97 FL (ref 78–100)
PLATELET # BLD AUTO: 193 10E9/L (ref 150–450)
RBC # BLD AUTO: 3.94 10E12/L (ref 3.8–5.2)
WBC # BLD AUTO: 6.9 10E9/L (ref 4–11)

## 2021-02-05 PROCEDURE — 36415 COLL VENOUS BLD VENIPUNCTURE: CPT | Performed by: NURSE PRACTITIONER

## 2021-02-05 PROCEDURE — 82728 ASSAY OF FERRITIN: CPT | Performed by: NURSE PRACTITIONER

## 2021-02-05 PROCEDURE — 83036 HEMOGLOBIN GLYCOSYLATED A1C: CPT | Performed by: NURSE PRACTITIONER

## 2021-02-05 PROCEDURE — 83540 ASSAY OF IRON: CPT | Performed by: NURSE PRACTITIONER

## 2021-02-05 PROCEDURE — 80048 BASIC METABOLIC PNL TOTAL CA: CPT | Performed by: NURSE PRACTITIONER

## 2021-02-05 PROCEDURE — 85610 PROTHROMBIN TIME: CPT | Performed by: NURSE PRACTITIONER

## 2021-02-05 PROCEDURE — 85027 COMPLETE CBC AUTOMATED: CPT | Performed by: NURSE PRACTITIONER

## 2021-02-05 PROCEDURE — 99207 PR NO CHARGE NURSE ONLY: CPT

## 2021-02-05 PROCEDURE — 83550 IRON BINDING TEST: CPT | Performed by: NURSE PRACTITIONER

## 2021-02-05 NOTE — PROGRESS NOTES
ANTICOAGULATION FOLLOW-UP CLINIC VISIT    Patient Name:  Amy Luna  Date:  2/5/2021  Contact Type:  Telephone/ Called patient, she denies any changes. Orders discussed witht the patient, she agrees with the plan. Lab INR appointment scheduled on 2/19/21.    SUBJECTIVE:  Patient Findings     Comments:  The patient was assessed for diet, medication, and activity level changes, missed or extra doses, bruising or bleeding, with no problem findings. Maintenance warfarin dosing was reviewed with patient and will remain on the same dose until next INR check. Patient did not have any questions or concerns. Patient reports she has not heard back from Dr Christina regarding the below.       Below is from the Office Visit 1/28/21 with Dr Christina:  There has not been evidence of reoccurrence on her pacing interrogations, most recently done yesterday.  I will discuss with Electrophysiology about stopping her warfarin at this time as well.  We will likely put her back on baby aspirin.              Clinical Outcomes     Negatives:  Major bleeding event, Thromboembolic event, Anticoagulation-related hospital admission, Anticoagulation-related ED visit, Anticoagulation-related fatality    Comments:  The patient was assessed for diet, medication, and activity level changes, missed or extra doses, bruising or bleeding, with no problem findings. Maintenance warfarin dosing was reviewed with patient and will remain on the same dose until next INR check. Patient did not have any questions or concerns. Patient reports she has not heard back from Dr Christina regarding the below.       Below is from the Office Visit 1/28/21 with Dr Christina:  There has not been evidence of reoccurrence on her pacing interrogations, most recently done yesterday.  I will discuss with Electrophysiology about stopping her warfarin at this time as well.  We will likely put her back on baby aspirin.                 OBJECTIVE    Recent labs: (last 7 days)      21  1036   INR 2.80*       ASSESSMENT / PLAN  INR assessment THER    Recheck INR In: 2 WEEKS    INR Location Outside lab      Anticoagulation Summary  As of 2021    INR goal:  2.0-3.0   TTR:  39.9 % (7.7 mo)   INR used for dosin.80 (2021)   Warfarin maintenance plan:  10 mg (5 mg x 2) every Wed, Sat; 7.5 mg (5 mg x 1.5) all other days   Full warfarin instructions:  10 mg every Wed, Sat; 7.5 mg all other days   Weekly warfarin total:  57.5 mg   No change documented:  Chiqui Nowak RN   Plan last modified:  aNdia Cabello RN (2021)   Next INR check:  2021   Priority:  Maintenance   Target end date:  Indefinite    Indications    Paroxysmal atrial fibrillation (H) [I48.0]  Long term current use of anticoagulants with INR goal of 2.0-3.0 [Z79.01]             Anticoagulation Episode Summary     INR check location:      Preferred lab:      Send INR reminders to:  [facility]    Comments:        Anticoagulation Care Providers     Provider Role Specialty Phone number    Yamilka Christina DO Referring Cardiovascular Disease [phone]    Kelli Lewis CNP Referring Internal Medicine [phone]            See the Encounter Report to view Anticoagulation Flowsheet and Dosing Calendar (Go to Encounters tab in chart review, and find the Anticoagulation Therapy Visit)    Dosage adjustment made based on physician directed care plan.    Chiqui Nowak, TARI

## 2021-02-06 LAB
ANION GAP SERPL CALCULATED.3IONS-SCNC: 5 MMOL/L (ref 3–14)
BUN SERPL-MCNC: 26 MG/DL (ref 7–30)
CALCIUM SERPL-MCNC: 9.7 MG/DL (ref 8.5–10.1)
CHLORIDE SERPL-SCNC: 105 MMOL/L (ref 94–109)
CO2 SERPL-SCNC: 28 MMOL/L (ref 20–32)
CREAT SERPL-MCNC: 1.05 MG/DL (ref 0.52–1.04)
FERRITIN SERPL-MCNC: 49 NG/ML (ref 8–252)
GFR SERPL CREATININE-BSD FRML MDRD: 52 ML/MIN/{1.73_M2}
GLUCOSE SERPL-MCNC: 103 MG/DL (ref 70–99)
IRON SATN MFR SERPL: 23 % (ref 15–46)
IRON SERPL-MCNC: 85 UG/DL (ref 35–180)
POTASSIUM SERPL-SCNC: 3.9 MMOL/L (ref 3.4–5.3)
SODIUM SERPL-SCNC: 138 MMOL/L (ref 133–144)
TIBC SERPL-MCNC: 370 UG/DL (ref 240–430)

## 2021-02-19 ENCOUNTER — ANTICOAGULATION THERAPY VISIT (OUTPATIENT)
Dept: ANTICOAGULATION | Facility: CLINIC | Age: 76
End: 2021-02-19

## 2021-02-19 DIAGNOSIS — Z79.01 LONG TERM CURRENT USE OF ANTICOAGULANTS WITH INR GOAL OF 2.0-3.0: ICD-10-CM

## 2021-02-19 DIAGNOSIS — I48.0 PAROXYSMAL ATRIAL FIBRILLATION (H): ICD-10-CM

## 2021-02-19 LAB
CAPILLARY BLOOD COLLECTION: NORMAL
INR PPP: 2.4 (ref 0.86–1.14)

## 2021-02-19 PROCEDURE — 99207 PR NO CHARGE NURSE ONLY: CPT

## 2021-02-19 PROCEDURE — 36416 COLLJ CAPILLARY BLOOD SPEC: CPT | Performed by: NURSE PRACTITIONER

## 2021-02-19 PROCEDURE — 85610 PROTHROMBIN TIME: CPT | Performed by: NURSE PRACTITIONER

## 2021-02-19 NOTE — PROGRESS NOTES
ANTICOAGULATION FOLLOW-UP CLINIC VISIT    Patient Name:  Amy Luna  Date:  2021  Contact Type:  Telephone/ Called patient, she denies any changes. Orders discussed with the patient, she agrees with the plan. Lab INR appointment scheduled on 3/19/21.    SUBJECTIVE:  Patient Findings     Comments:  The patient was assessed for diet, medication, and activity level changes, missed or extra doses, bruising or bleeding, with no problem findings. Maintenance warfarin dosing was reviewed with patient and will remain on the same dose until next INR check. Patient did not have any questions or concerns.  Patient reports she has not heard back about discontinuing her warfarin, patient will send a message to Cardio.          Clinical Outcomes     Negatives:  Major bleeding event, Thromboembolic event, Anticoagulation-related hospital admission, Anticoagulation-related ED visit, Anticoagulation-related fatality    Comments:  The patient was assessed for diet, medication, and activity level changes, missed or extra doses, bruising or bleeding, with no problem findings. Maintenance warfarin dosing was reviewed with patient and will remain on the same dose until next INR check. Patient did not have any questions or concerns.  Patient reports she has not heard back about discontinuing her warfarin, patient will send a message to Cardio.             OBJECTIVE    Recent labs: (last 7 days)     21  1135   INR 2.40*       ASSESSMENT / PLAN  INR assessment THER    Recheck INR In: 4 WEEKS    INR Location Outside lab      Anticoagulation Summary  As of 2021    INR goal:  2.0-3.0   TTR:  43.3 % (8.1 mo)   INR used for dosin.40 (2021)   Warfarin maintenance plan:  10 mg (5 mg x 2) every Wed, Sat; 7.5 mg (5 mg x 1.5) all other days   Full warfarin instructions:  10 mg every Wed, Sat; 7.5 mg all other days   Weekly warfarin total:  57.5 mg   No change documented:  Chiqui Nowak, RN   Plan last modified:   Nadia Cabello RN (1/6/2021)   Next INR check:  3/19/2021   Priority:  Maintenance   Target end date:  Indefinite    Indications    Paroxysmal atrial fibrillation (H) [I48.0]  Long term current use of anticoagulants with INR goal of 2.0-3.0 [Z79.01]             Anticoagulation Episode Summary     INR check location:      Preferred lab:      Send INR reminders to:  [facility]    Comments:        Anticoagulation Care Providers     Provider Role Specialty Phone number    Yamilka Christina DO Referring Cardiovascular Disease [phone]    Kelli Lewis CNP Referring Internal Medicine [phone]            See the Encounter Report to view Anticoagulation Flowsheet and Dosing Calendar (Go to Encounters tab in chart review, and find the Anticoagulation Therapy Visit)    Dosage adjustment made based on physician directed care plan.    Chiqui Nowak RN

## 2021-02-22 ENCOUNTER — VIRTUAL VISIT (OUTPATIENT)
Dept: INTERNAL MEDICINE | Facility: CLINIC | Age: 76
End: 2021-02-22
Payer: MEDICARE

## 2021-02-22 DIAGNOSIS — I50.30 HEART FAILURE WITH PRESERVED EJECTION FRACTION, UNSPECIFIED HF CHRONICITY (H): ICD-10-CM

## 2021-02-22 DIAGNOSIS — I10 HTN, GOAL BELOW 140/90: ICD-10-CM

## 2021-02-22 DIAGNOSIS — E66.01 MORBID OBESITY (H): ICD-10-CM

## 2021-02-22 DIAGNOSIS — E78.5 HYPERLIPIDEMIA LDL GOAL <100: ICD-10-CM

## 2021-02-22 DIAGNOSIS — N18.30 CONTROLLED TYPE 2 DIABETES MELLITUS WITH STAGE 3 CHRONIC KIDNEY DISEASE, WITH LONG-TERM CURRENT USE OF INSULIN (H): Primary | ICD-10-CM

## 2021-02-22 DIAGNOSIS — F33.42 RECURRENT MAJOR DEPRESSIVE DISORDER, IN FULL REMISSION (H): ICD-10-CM

## 2021-02-22 DIAGNOSIS — E11.22 CONTROLLED TYPE 2 DIABETES MELLITUS WITH STAGE 3 CHRONIC KIDNEY DISEASE, WITH LONG-TERM CURRENT USE OF INSULIN (H): Primary | ICD-10-CM

## 2021-02-22 DIAGNOSIS — E11.22 TYPE 2 DIABETES MELLITUS WITH STAGE 3 CHRONIC KIDNEY DISEASE, WITHOUT LONG-TERM CURRENT USE OF INSULIN, UNSPECIFIED WHETHER STAGE 3A OR 3B CKD (H): Primary | ICD-10-CM

## 2021-02-22 DIAGNOSIS — Z79.4 CONTROLLED TYPE 2 DIABETES MELLITUS WITH STAGE 3 CHRONIC KIDNEY DISEASE, WITH LONG-TERM CURRENT USE OF INSULIN (H): Primary | ICD-10-CM

## 2021-02-22 DIAGNOSIS — I48.0 PAROXYSMAL ATRIAL FIBRILLATION (H): ICD-10-CM

## 2021-02-22 DIAGNOSIS — N18.30 TYPE 2 DIABETES MELLITUS WITH STAGE 3 CHRONIC KIDNEY DISEASE, WITHOUT LONG-TERM CURRENT USE OF INSULIN, UNSPECIFIED WHETHER STAGE 3A OR 3B CKD (H): Primary | ICD-10-CM

## 2021-02-22 PROBLEM — J96.90 RESPIRATORY FAILURE (H): Status: RESOLVED | Noted: 2020-01-17 | Resolved: 2021-02-22

## 2021-02-22 PROCEDURE — 99214 OFFICE O/P EST MOD 30 MIN: CPT | Mod: 95 | Performed by: INTERNAL MEDICINE

## 2021-02-22 NOTE — PROGRESS NOTES
Amy is a 75 year old who is being evaluated via a billable video visit.      How would you like to obtain your AVS? MyChart  If the video visit is dropped, the invitation should be resent by: Text to cell phone: .  Will anyone else be joining your video visit? No          This is a VIDEO ( using Doximity)  encounter with the patient.       Location of the provider : office   Location of the patient : home    11:59 --- 12:22             Dr Diaz's note      Patient's instructions / PLAN:                                                        Plan:  1. Continue same meds, same doses for now   2. Follow up appointment with  Marce Belcher  To discuss CGM  3. No changes in meds   4. Please make a lab appointment for fasting labs after May 5  5. Please make an appointment few days after the labs to discuss about the results.         ASSESSMENT & PLAN:                                                      75-year-old woman with chronic medical problems:DM on insulin, morbid obesity,HTN, HLipidemia had extensive heart surgery in April for AVR, MVR, PFO closure, TV repair, pacemaker atrial lead replaced.  She was found with atrial fibrillation on the pacemaker records and she was started on amiodarone and Coumadin.     (E11.22,  N18.30,  Z79.4) DM 2 + insulin + CKD3 (H)  (primary encounter diagnosis)  Comment: Controlled    Plan: Continuous Blood Gluc Sensor MISC, Hemoglobin         A1c, Comprehensive metabolic panel, Lipid panel        reflex to direct LDL Fasting, TSH with free T4         reflex, CBC with platelets            (I48.0) Paroxysmal atrial fibrillation (H)  Comment: rate Controlled    Plan: Hemoglobin A1c, Comprehensive metabolic panel,         Lipid panel reflex to direct LDL Fasting, TSH         with free T4 reflex, CBC with platelets        F/u w cardio     (E78.5) Hyperlipidemia LDL goal <100  Comment: Controlled    Plan: Hemoglobin A1c, Comprehensive metabolic panel,         Lipid panel  reflex to direct LDL Fasting, TSH         with free T4 reflex, CBC with platelets            (I10) HTN, goal below 140/90  Comment: Controlled    Plan: Hemoglobin A1c, Comprehensive metabolic panel,         Lipid panel reflex to direct LDL Fasting, TSH         with free T4 reflex, CBC with platelets            (I50.30) Heart failure with preserved ejection fraction, unspecified HF chronicity (H)  Comment: stable   Plan:     (F33.42) Recurrent major depressive disorder, in full remission (H)  Comment: stable   Plan:     (E66.01) Morbid obesity (H)  Comment:   Plan:        Chief complaint:                                                      Follow up chronic medical problems       SUBJECTIVE:                                                    History of present illness:    DM, CGM -- want paper Rx     Labs Feb 5, Discussed      Creatinine back to normal  A1c 7.1 <--6.8    HTN  -- BP at home: 121/60, 11/60       Diabetes Follow-up    How often are you checking your blood sugar? Three times daily  Blood sugar testing frequency justification:  Patient modifying lifestyle changes (diet, exercise) with blood sugars  What time of day are you checking your blood sugars (select all that apply)?  Before meals  Have you had any blood sugars above 200?  Yes sometimes  Have you had any blood sugars below 70?  No    What symptoms do you notice when your blood sugar is low?  None    What concerns do you have today about your diabetes? None     Do you have any of these symptoms? (Select all that apply)  No numbness or tingling in feet.  No redness, sores or blisters on feet.  No complaints of excessive thirst.  No reports of blurry vision.  No significant changes to weight.      BP Readings from Last 2 Encounters:   01/28/21 (!) 144/75   01/13/21 119/41     Hemoglobin A1C (%)   Date Value   02/05/2021 7.1 (H)   11/03/2020 6.8 (H)     LDL Cholesterol Calculated (mg/dL)   Date Value   07/28/2020 102 (H)               Hyperlipidemia  Follow-Up      Are you regularly taking any medication or supplement to lower your cholesterol?   No    Are you having muscle aches or other side effects that you think could be caused by your cholesterol lowering medication?  No      How many servings of fruits and vegetables do you eat daily?  2-3    On average, how many sweetened beverages do you drink each day (Examples: soda, juice, sweet tea, etc.  Do NOT count diet or artificially sweetened beverages)?   0    How many days per week do you exercise enough to make your heart beat faster? 3 or less    How many minutes a day do you exercise enough to make your heart beat faster? 10 - 19    How many days per week do you miss taking your medication? 0      Review of Systems:                                                      ROS: negative for fever, chills, cough, wheezes, chest pain, shortness of breath, vomiting, abdominal pain, leg swelling          OBJECTIVE:           An actual physical exam can't be done during phone visit   A limited exam can sometimes be performed by video visit   NAD      PMHx: reviewed  Past Medical History:   Diagnosis Date     (HFpEF) heart failure with preserved ejection fraction (H)      Aortic stenosis      Chest pain      Depressive disorder      Diabetes (H)      Hyperlipidemia      Hypertension      Mitral annular calcification      Mitral stenosis      Obesity      Sleep apnea     CPAP      PSHx: reviewed  Past Surgical History:   Procedure Laterality Date     APPENDECTOMY       CV CORONARY ANGIOGRAM N/A 4/8/2020    Procedure: Coronary Angiogram;  Surgeon: Inez Peoples MD;  Location:  [facility]     CV LEFT HEART CATH N/A 4/8/2020    Procedure: Left Heart Cath;  Surgeon: Inez Peoples MD;  Location:  [facility]     CV PFO CLOSURE N/A 4/15/2020    Procedure: Patent Foramen Ovale Closure;  Surgeon: Ok Wilson MD;  Location:  OR      RIGHT HEART CATH MEASUREMENTS  RECORDED N/A 2020    Procedure: Right Heart Cath;  Surgeon: Inez Peoples MD;  Location:  [facility]ATH [facility]     EP PACEMAKER N/A 2020    Procedure: EP Pacemaker;  Surgeon: Sohan Francis MD;  Location:  [facility]     GYN SURGERY       x2 ,      GYN SURGERY      total hysterectomy     IMPLANT PACEMAKER       REPAIR VALVE TRICUSPID N/A 4/15/2020    Procedure: REPAIR TRICUSPID VALVE WITH VILLA MC3 26MM.;  Surgeon: Ok Wilson MD;  Location:  OR     REPLACE VALVE AORTIC N/A 4/15/2020    Procedure: REPLACEMENT, AORTIC VALVE WITH INSPIRIS TISSUE VALVE 25 MM; ON PUMP WITH SOCORRO READ BY CARDIOLOGIST DR BLANTON.;  Surgeon: Ok Wilson MD;  Location: [facility] OR     REPLACE VALVE MITRAL N/A 4/15/2020    Procedure: REPLACEMENT, MITRAL VALVE WITH EPIC TISSUE VALVE 27 MM.;  Surgeon: Ok Wilson MD;  Location:  OR        Meds: reviewed  Current Outpatient Medications   Medication Sig Dispense Refill     acetaminophen (TYLENOL) 325 MG tablet Take 2 tablets (650 mg) by mouth every 4 hours as needed for other (multimodal surgical pain management along with NSAIDS and opioid medication as indicated based on pain control and physical function.)  0     albuterol (PROAIR HFA/PROVENTIL HFA/VENTOLIN HFA) 108 (90 Base) MCG/ACT inhaler Inhale 2 puffs into the lungs every 4 hours as needed for shortness of breath / dyspnea or wheezing       atorvastatin (LIPITOR) 40 MG tablet Take 1 tablet (40 mg) by mouth At Bedtime 90 tablet 3     Biotin 49253 MCG TABS Take 10,000 mcg by mouth every morning        Calcium Carbonate-Vit D-Min (CALCIUM 1200 PO) Take 1 tablet by mouth every evening        coenzyme Q-10 200 MG CAPS Take 200 mg by mouth every morning        Continuous Blood Gluc  (FREESTYLE ADAM READER) HARJEET 1 each every 14 days 6 Device 1     Continuous Blood Gluc Sensor MISC 1 each every 14 days 6 each 1     cyanocobalamin (VITAMIN  B-12) 100 MCG tablet Take 100 mcg by mouth daily       ferrous gluconate (FERGON) 324 (38 Fe) MG tablet Take 1 tablet (324 mg) by mouth daily (with breakfast) 30 tablet 3     furosemide (LASIX) 40 MG tablet Take 1 tablet (40 mg) by mouth 2 times daily Further dosing per PCP/ Cardiology - Oral 180 tablet 3     Ginkgo Biloba (GINKOBA PO) Take 250 mg by mouth daily       insulin NPH-Regular 70/30 (70-30) 100 UNIT/ML vial Inject 20-40 Units Subcutaneous 2 times daily (with meals) 72 mL 1     ipratropium (ATROVENT) 0.06 % nasal spray Spray 2 sprays into both nostrils 4 times daily as needed for rhinitis 3 Box 1     Lutein 40 MG CAPS Take 40 mg by mouth every morning        Magnesium 400 MG TABS Take 400 mg by mouth every evening        metFORMIN (GLUCOPHAGE-XR) 500 MG 24 hr tablet Take 2 tablets (1,000 mg) by mouth daily (with breakfast) (takes 2 x 500mg) 90 tablet 3     metoprolol succinate ER (TOPROL-XL) 25 MG 24 hr tablet Take 2 tablets (50 mg) by mouth daily 90 tablet 3     potassium aminobenzoate 500 MG CAPS capsule Take 99 mg by mouth daily Patient taking tablet        Propylene Glycol (SYSTANE BALANCE OP) Apply 1-2 drops to eye 3 times daily as needed (dry eyes)       sertraline (ZOLOFT) 100 MG tablet Take 1.5 tablets (150 mg) by mouth every morning (takes 1.5 x 100mg) 135 tablet 1     TURMERIC PO Take 3 tablets by mouth daily        vitamin (B COMPLEX-C) tablet Take 1 tablet by mouth daily       vitamin C (ASCORBIC ACID) 1000 MG TABS Take 1,000 mg by mouth every evening        Vitamin D3 (CHOLECALCIFEROL) 25 mcg (1000 units) tablet Take 1 tablet (25 mcg) by mouth daily (Patient taking differently: Take 125 mcg by mouth daily 5,000 Units daily)  0     warfarin ANTICOAGULANT (COUMADIN) 5 MG tablet Take 7.5 mg (1 and 1/2 tablets) by mouth on Sun, Tue, Thur. Take 5 mg (1 tablet)  all other days or as directed by INR clinic. 150 tablet 0     zinc 23 MG LOZG lozenge Place 1 lozenge inside cheek daily          Soc Hx:  reviewed  Vinny : reviewed          She Diaz MD  Internal Medicine

## 2021-02-22 NOTE — PATIENT INSTRUCTIONS
Plan:  1. Continue same meds, same doses for now   2. Follow up appointment with  Marce Belcher  To discuss CGM  3. No changes in meds   4. Please make a lab appointment for fasting labs after May 5  5. Please make an appointment few days after the labs to discuss about the results.

## 2021-02-23 ENCOUNTER — TELEPHONE (OUTPATIENT)
Dept: PHARMACY | Facility: CLINIC | Age: 76
End: 2021-02-23

## 2021-02-23 NOTE — TELEPHONE ENCOUNTER
Called patient to schedule an MTM visit at the request of Dr. Diaz to discuss CGM. Patient states that she would first like to get the CGM device and then would want to have a visit to discuss specifics and placement. She will reach out to MTM at that time.    Donya Araya, PharmD  PGY1 Medication Therapy Management Resident  Voicemail: [phone]

## 2021-03-02 ENCOUNTER — TELEPHONE (OUTPATIENT)
Dept: INTERNAL MEDICINE | Facility: CLINIC | Age: 76
End: 2021-03-02

## 2021-03-02 DIAGNOSIS — N18.30 TYPE 2 DIABETES MELLITUS WITH STAGE 3 CHRONIC KIDNEY DISEASE, WITHOUT LONG-TERM CURRENT USE OF INSULIN, UNSPECIFIED WHETHER STAGE 3A OR 3B CKD (H): ICD-10-CM

## 2021-03-02 DIAGNOSIS — E11.22 TYPE 2 DIABETES MELLITUS WITH STAGE 3 CHRONIC KIDNEY DISEASE, WITHOUT LONG-TERM CURRENT USE OF INSULIN, UNSPECIFIED WHETHER STAGE 3A OR 3B CKD (H): ICD-10-CM

## 2021-03-02 NOTE — TELEPHONE ENCOUNTER
Patient brought in the prescription for the sensors. We will also need a prescription for the reader. We are billing Medicare so it will need to be compliant.   Qty of one with specific directions, it can not say use as directed    Thank you,  Idalia Haas [facility]  [phone]

## 2021-03-04 DIAGNOSIS — I10 ESSENTIAL HYPERTENSION: ICD-10-CM

## 2021-03-04 RX ORDER — METOPROLOL SUCCINATE 25 MG/1
50 TABLET, EXTENDED RELEASE ORAL DAILY
Qty: 180 TABLET | Refills: 0 | Status: SHIPPED | OUTPATIENT
Start: 2021-03-04 | End: 2021-06-03

## 2021-03-04 RX ORDER — FLASH GLUCOSE SENSOR
1 KIT MISCELLANEOUS
Qty: 1 EACH | Refills: 0 | Status: SHIPPED | OUTPATIENT
Start: 2021-03-04 | End: 2021-03-23

## 2021-03-08 ENCOUNTER — TELEPHONE (OUTPATIENT)
Dept: INTERNAL MEDICINE | Facility: CLINIC | Age: 76
End: 2021-03-08

## 2021-03-08 NOTE — TELEPHONE ENCOUNTER
We are needing some information added to the recent diabetic visit for medicare purposes.  Could you add the patients insulin regiment to the chart notes (name, units, frequency).  We will also need a Freestyle Viji certificate of medical necessity, if you do not have the form I can fax you one.    Thank you,    [place] [facility]  [phone]

## 2021-03-09 NOTE — TELEPHONE ENCOUNTER
I will discuss letter of necessity ( if needed) at the next appointment     This is in my note

## 2021-03-22 ENCOUNTER — ANTICOAGULATION THERAPY VISIT (OUTPATIENT)
Dept: ANTICOAGULATION | Facility: CLINIC | Age: 76
End: 2021-03-22

## 2021-03-22 DIAGNOSIS — I48.0 PAROXYSMAL ATRIAL FIBRILLATION (H): ICD-10-CM

## 2021-03-22 DIAGNOSIS — E11.22 TYPE 2 DIABETES MELLITUS WITH STAGE 3 CHRONIC KIDNEY DISEASE, WITHOUT LONG-TERM CURRENT USE OF INSULIN, UNSPECIFIED WHETHER STAGE 3A OR 3B CKD (H): ICD-10-CM

## 2021-03-22 DIAGNOSIS — Z79.01 LONG TERM CURRENT USE OF ANTICOAGULANTS WITH INR GOAL OF 2.0-3.0: ICD-10-CM

## 2021-03-22 DIAGNOSIS — N18.30 TYPE 2 DIABETES MELLITUS WITH STAGE 3 CHRONIC KIDNEY DISEASE, WITHOUT LONG-TERM CURRENT USE OF INSULIN, UNSPECIFIED WHETHER STAGE 3A OR 3B CKD (H): ICD-10-CM

## 2021-03-22 LAB
CAPILLARY BLOOD COLLECTION: NORMAL
INR PPP: 2.2 (ref 0.86–1.14)

## 2021-03-22 PROCEDURE — 85610 PROTHROMBIN TIME: CPT | Performed by: NURSE PRACTITIONER

## 2021-03-22 PROCEDURE — 99207 PR NO CHARGE NURSE ONLY: CPT

## 2021-03-22 PROCEDURE — 36416 COLLJ CAPILLARY BLOOD SPEC: CPT | Performed by: NURSE PRACTITIONER

## 2021-03-22 NOTE — TELEPHONE ENCOUNTER
"Please send new Rx for Freestyle Viji [place] (Medicare B Compliant)  Rx must be written this way:    Freestyle Viji 14 Day [place]  Qty: 1  Refills: 0  Sig \"USE TO READ BLOOD SUGARS PER  INSTRUCTIONS\"    Please call [phone] and speak to one of our Durable Medical Equipment Team members if you have any questions.    "

## 2021-03-22 NOTE — PROGRESS NOTES
ANTICOAGULATION FOLLOW-UP CLINIC VISIT    Patient Name:  Amy Luna  Date:  3/22/2021  Contact Type:  Telephone/ Called patient, she denies any changes. Orders discussed with the patient, she agrees with the plan. Lab INR appointment scheduled on 21.    SUBJECTIVE:  Patient Findings     Comments:  The patient was assessed for diet, medication, and activity level changes, missed or extra doses, bruising or bleeding, with no problem findings. Maintenance warfarin dosing was reviewed with patient and will remain on the same dose until next INR check. Patient did not have any questions or concerns. Patient reports she has not contacted Cardio yet about stopping the warfarin.         Clinical Outcomes     Negatives:  Major bleeding event, Thromboembolic event, Anticoagulation-related hospital admission, Anticoagulation-related ED visit, Anticoagulation-related fatality    Comments:  The patient was assessed for diet, medication, and activity level changes, missed or extra doses, bruising or bleeding, with no problem findings. Maintenance warfarin dosing was reviewed with patient and will remain on the same dose until next INR check. Patient did not have any questions or concerns. Patient reports she has not contacted Cardio yet about stopping the warfarin.            OBJECTIVE    Recent labs: (last 7 days)     21  1304   INR 2.20*       ASSESSMENT / PLAN  INR assessment THER    Recheck INR In: 4 WEEKS    INR Location Outside lab      Anticoagulation Summary  As of 3/22/2021    INR goal:  2.0-3.0   TTR:  49.7 % (9.2 mo)   INR used for dosin.20 (3/22/2021)   Warfarin maintenance plan:  10 mg (5 mg x 2) every Wed, Sat; 7.5 mg (5 mg x 1.5) all other days   Full warfarin instructions:  10 mg every Wed, Sat; 7.5 mg all other days   Weekly warfarin total:  57.5 mg   No change documented:  Chiqui Nowak RN   Plan last modified:  Nadia Cabello RN (2021)   Next INR check:  2021   Priority:   Maintenance   Target end date:  Indefinite    Indications    Paroxysmal atrial fibrillation (H) [I48.0]  Long term current use of anticoagulants with INR goal of 2.0-3.0 [Z79.01]             Anticoagulation Episode Summary     INR check location:      Preferred lab:      Send INR reminders to:  KENNEY DUENAS    Comments:        Anticoagulation Care Providers     Provider Role Specialty Phone number    Yamilka Christina DO Referring Cardiovascular Disease [phone]    Kelli Lewis CNP Referring Internal Medicine [phone]            See the Encounter Report to view Anticoagulation Flowsheet and Dosing Calendar (Go to Encounters tab in chart review, and find the Anticoagulation Therapy Visit)    Dosage adjustment made based on physician directed care plan.    Chiqui Nowak RN

## 2021-03-23 RX ORDER — FLASH GLUCOSE SENSOR
1 KIT MISCELLANEOUS
Qty: 1 EACH | Refills: 0 | Status: SHIPPED | OUTPATIENT
Start: 2021-03-23

## 2021-03-24 NOTE — TELEPHONE ENCOUNTER
Hello,    We are just waiting for a Freestyle Viji CMN to be completed by MD Huang. Please have information match clinic notes from 02/22/2021    CMN: [email or web address]    Thank you!  [facility] Team  [phone]

## 2021-03-31 NOTE — TELEPHONE ENCOUNTER
Vicky from  mail order pharmacy calling again regarding the CMN form.   She statss they faxed a CMN form to clinic, AND this can also be printed from the link below.     Please print CMN form from link below, and fill out for pt and fax back to pharmacy.   Please call pharmacy with questions or problems.

## 2021-04-09 ENCOUNTER — MYC MEDICAL ADVICE (OUTPATIENT)
Dept: INTERNAL MEDICINE | Facility: CLINIC | Age: 76
End: 2021-04-09

## 2021-04-19 ENCOUNTER — ANTICOAGULATION THERAPY VISIT (OUTPATIENT)
Dept: ANTICOAGULATION | Facility: CLINIC | Age: 76
End: 2021-04-19

## 2021-04-19 DIAGNOSIS — I48.0 PAROXYSMAL ATRIAL FIBRILLATION (H): ICD-10-CM

## 2021-04-19 DIAGNOSIS — Z79.01 LONG TERM CURRENT USE OF ANTICOAGULANTS WITH INR GOAL OF 2.0-3.0: ICD-10-CM

## 2021-04-19 LAB
CAPILLARY BLOOD COLLECTION: NORMAL
INR PPP: 2.4 (ref 0.86–1.14)

## 2021-04-19 PROCEDURE — 99207 PR NO CHARGE NURSE ONLY: CPT

## 2021-04-19 PROCEDURE — 36416 COLLJ CAPILLARY BLOOD SPEC: CPT | Performed by: NURSE PRACTITIONER

## 2021-04-19 PROCEDURE — 85610 PROTHROMBIN TIME: CPT | Performed by: NURSE PRACTITIONER

## 2021-04-19 NOTE — PROGRESS NOTES
ANTICOAGULATION FOLLOW-UP CLINIC VISIT    Patient Name:  Amy Luna  Date:  2021  Contact Type:  Telephone/ Called patient, she denies any changes. Orders discussed with the patient, she agrees with the plan Lab INR appointment scheduled on 21. Patient reports she has an Echo and Cardio appt 21 and will find out then if she can discontinue warfarin.     SUBJECTIVE:  Patient Findings     Comments:  The patient was assessed for diet, medication, and activity level changes, missed or extra doses, bruising or bleeding, with no problem findings. Maintenance warfarin dosing was reviewed with patient and will remain on the same dose until next INR check. Patient did not have any questions or concerns.             Clinical Outcomes     Negatives:  Major bleeding event, Thromboembolic event, Anticoagulation-related hospital admission, Anticoagulation-related ED visit, Anticoagulation-related fatality    Comments:  The patient was assessed for diet, medication, and activity level changes, missed or extra doses, bruising or bleeding, with no problem findings. Maintenance warfarin dosing was reviewed with patient and will remain on the same dose until next INR check. Patient did not have any questions or concerns.                OBJECTIVE    Recent labs: (last 7 days)     21  1133   INR 2.40*       ASSESSMENT / PLAN  INR assessment THER    Recheck INR In: 4 WEEKS    INR Location Outside lab      Anticoagulation Summary  As of 2021    INR goal:  2.0-3.0   TTR:  54.3 % (10.1 mo)   INR used for dosin.40 (2021)   Warfarin maintenance plan:  10 mg (5 mg x 2) every Wed, Sat; 7.5 mg (5 mg x 1.5) all other days   Full warfarin instructions:  10 mg every Wed, Sat; 7.5 mg all other days   Weekly warfarin total:  57.5 mg   No change documented:  Chiqui Nowak RN   Plan last modified:  Nadia Cabello RN (2021)   Next INR check:  2021   Priority:  Maintenance   Target end date:   Indefinite    Indications    Paroxysmal atrial fibrillation (H) [I48.0]  Long term current use of anticoagulants with INR goal of 2.0-3.0 [Z79.01]             Anticoagulation Episode Summary     INR check location:      Preferred lab:      [facility]  [facility]    Comments:        Anticoagulation Care Providers     Provider Role Specialty Phone number    Yamilka Christina DO Referring Cardiovascular Disease [phone]    Kelli Lewis CNP Referring Internal Medicine [phone]            See the Encounter Report to view Anticoagulation Flowsheet and Dosing Calendar (Go to Encounters tab in chart review, and find the Anticoagulation Therapy Visit)    Dosage adjustment made based on physician directed care plan.    Chiqui Nowak RN

## 2021-04-19 NOTE — PROGRESS NOTES
Left message to call [phone]. Please transfer call to Chiqui at [phone].  Chiqui Nowak RN, BSN  Anticoagulation Clinic

## 2021-04-29 ENCOUNTER — HOSPITAL ENCOUNTER (OUTPATIENT)
Dept: CARDIOLOGY | Facility: CLINIC | Age: 76
Discharge: HOME | End: 2021-04-29
Attending: INTERNAL MEDICINE | Admitting: INTERNAL MEDICINE
Payer: MEDICARE

## 2021-04-29 ENCOUNTER — OFFICE VISIT (OUTPATIENT)
Dept: CARDIOLOGY | Facility: CLINIC | Age: 76
End: 2021-04-29
Attending: INTERNAL MEDICINE
Payer: MEDICARE

## 2021-04-29 VITALS
HEART RATE: 84 BPM | BODY MASS INDEX: 48.18 KG/M2 | SYSTOLIC BLOOD PRESSURE: 148 MMHG | DIASTOLIC BLOOD PRESSURE: 72 MMHG | WEIGHT: 289.2 LBS | HEIGHT: 65 IN

## 2021-04-29 DIAGNOSIS — Z95.3 S/P MITRAL VALVE REPLACEMENT WITH BIOPROSTHETIC VALVE: ICD-10-CM

## 2021-04-29 DIAGNOSIS — D62 ACUTE BLOOD LOSS ANEMIA: ICD-10-CM

## 2021-04-29 DIAGNOSIS — I07.1 TRICUSPID VALVE INSUFFICIENCY, UNSPECIFIED ETIOLOGY: ICD-10-CM

## 2021-04-29 DIAGNOSIS — Z95.2 S/P AVR (AORTIC VALVE REPLACEMENT): ICD-10-CM

## 2021-04-29 DIAGNOSIS — Z95.0 CARDIAC PACEMAKER IN SITU: ICD-10-CM

## 2021-04-29 DIAGNOSIS — I48.0 PAROXYSMAL ATRIAL FIBRILLATION (H): ICD-10-CM

## 2021-04-29 PROCEDURE — 99214 OFFICE O/P EST MOD 30 MIN: CPT | Mod: 25 | Performed by: INTERNAL MEDICINE

## 2021-04-29 PROCEDURE — 93306 TTE W/DOPPLER COMPLETE: CPT | Mod: 26 | Performed by: INTERNAL MEDICINE

## 2021-04-29 PROCEDURE — 93306 TTE W/DOPPLER COMPLETE: CPT

## 2021-04-29 ASSESSMENT — MIFFLIN-ST. JEOR: SCORE: 1812.68

## 2021-04-29 NOTE — PROGRESS NOTES
Service Date: 04/29/2021    HISTORY OF PRESENT ILLNESS:  Ms. Luna is a pleasant 75-year-old female with a history of valvular heart disease and congestive heart failure.  She underwent bioprosthetic mitral and bioprosthetic aortic valve replacements and tricuspid annuloplasty with repair and PFO closure in April.  She required permanent pacemaker after this as well.      She did develop some postoperative atrial fibrillation and was placed on amiodarone for a while.  This was discontinued in the fall of 2020.  She has continued, however, on warfarin.      Subsequent pacer interrogations have not shown evidence of recurrence of atrial fibrillation, so we did discuss whether or not she needed to continue with warfarin therapy.      I did ask her to follow up with a repeat echocardiogram to assess valve function and also atrial size.  This was done on 04/15/2021.  Her LV function remains normal.  RV function remains normal.  Her atria, both left and right; however, are severely dilated.  No atrial shunt was seen.  The valve gradient across the mitral valve was high at 12.  No MR was appreciated.  The bioprosthetic valve gradients looked normal.      She seems to be doing well from a cardiac perspective.  She denies any difficulty breathing or symptoms suggestive of orthopnea or PND.      Her last hemoglobin measurement was in February and was normal at 12.4.  She is asking whether or not to stop iron at this time. She would like to reduce the amount of pills she is taking.  I do not believe she needs to continue taking iron with a recent normal hemoglobin measurement.      PHYSICAL EXAMINATION:  Her blood pressure is 148/72, pulse of 84, weight is 289, which is up about 20 pounds in the last month.  Body mass index is now 48.  Her physical exam findings are unchanged.  Lungs are clear.      Her last interrogation was on 01/13/2021.  There was no atrial or ventricular arrhythmias.  Her next one is scheduled for  05/04/2021.    IMPRESSION:  In summary, Ms. Luna is a pleasant 75-year-old female with a history of congestive heart failure, valvular heart disease with replacements of both aortic and mitral valves with bioprosthetic valves and annuloplasty repair of the tricuspid and PFO closure.  She had a permanent pacemaker implant.  She had postop atrial fibrillation was treated with amiodarone for the short term.  This was discontinued last fall.  She has continued with warfarin therapy.      We talked about the decision about continuing warfarin therapy, although she has not demonstrated any recurrence of atrial fibrillation, now that she is well out from her double valve surgery.  Although she has not demonstrated recurrence on device checks, she has severely dilated atria, which puts her at risk for future atrial fibrillation.  If she wanted to, she could stop warfarin at this time, but she has a very high likelihood that she will require it again at some point.  She has a high risk of developing atrial fibrillation in the future.  We also talked about novel anticoagulation today.  She would like to see if there is a more convenient way to take warfarin such as home testing.  She also wants to see if Eliquis is affordable for her.  So I did write her a prescription for this so that she can check with her insurance how much her copay would cost.  I did tell her not to start this medication without contacting for instructions on how to stop warfarin and start Eliquis should she decide to utilize this.  She also will contact her primary about possible home testing with the warfarin.    She will let me know what she decides.  Otherwise, I will plan to see her back annually or as needed.    Please feel free to contact me with any questions you have in regards to her care.    cc:   She Huang MD   [facility]  [address]  [place] [address]      Yamilka Christina,         D:  2021   T: 2021   MT: RICH    Name:     KASSANDRA RIVERA  MRN:      [id number]        Account:      [id number]   :      1945           Service Date: 2021       Document: [id number]

## 2021-04-29 NOTE — PROGRESS NOTES
HPI and Plan:   See dictation    No orders of the defined types were placed in this encounter.      Orders Placed This Encounter   Medications     apixaban ANTICOAGULANT (ELIQUIS ANTICOAGULANT) 5 MG tablet     Sig: Take 1 tablet (5 mg) by mouth 2 times daily     Dispense:  60 tablet     Refill:  3     Call before starting this medication, you need to stop warfarin and get instruction when to start eliquis.       There are no discontinued medications.      Encounter Diagnoses   Name Primary?     Paroxysmal atrial fibrillation (H)      Cardiac pacemaker in situ      S/P mitral valve replacement with bioprosthetic valve      S/P AVR (aortic valve replacement)      Tricuspid valve insufficiency, unspecified etiology      Acute blood loss anemia        CURRENT MEDICATIONS:  Current Outpatient Medications   Medication Sig Dispense Refill     acetaminophen (TYLENOL) 325 MG tablet Take 2 tablets (650 mg) by mouth every 4 hours as needed for other (multimodal surgical pain management along with NSAIDS and opioid medication as indicated based on pain control and physical function.)  0     albuterol (PROAIR HFA/PROVENTIL HFA/VENTOLIN HFA) 108 (90 Base) MCG/ACT inhaler Inhale 2 puffs into the lungs every 4 hours as needed for shortness of breath / dyspnea or wheezing       apixaban ANTICOAGULANT (ELIQUIS ANTICOAGULANT) 5 MG tablet Take 1 tablet (5 mg) by mouth 2 times daily 60 tablet 3     atorvastatin (LIPITOR) 40 MG tablet Take 1 tablet (40 mg) by mouth At Bedtime 90 tablet 3     Biotin 95655 MCG TABS Take 10,000 mcg by mouth every morning        Calcium Carbonate-Vit D-Min (CALCIUM 1200 PO) Take 1 tablet by mouth every evening        coenzyme Q-10 200 MG CAPS Take 200 mg by mouth every morning        Continuous Blood Gluc  (FREESTYLE ADAM READER) HARJEET 1 each every 14 days Use to read blood sugars per  instructions. 1 each 0     Continuous Blood Gluc Sensor MISC 1 each every 14 days 6 each 1      cyanocobalamin (VITAMIN B-12) 100 MCG tablet Take 100 mcg by mouth daily       ferrous gluconate (FERGON) 324 (38 Fe) MG tablet Take 1 tablet (324 mg) by mouth daily (with breakfast) 30 tablet 3     furosemide (LASIX) 40 MG tablet Take 1 tablet (40 mg) by mouth 2 times daily Further dosing per PCP/ Cardiology - Oral (Patient taking differently: Take 40 mg by mouth daily 4/29/21 Currently taking 1 tablet daily and an additional PRN; Further dosing per PCP/ Cardiology - Oral) 180 tablet 3     Ginkgo Biloba (GINKOBA PO) Take 250 mg by mouth daily       insulin NPH-Regular 70/30 (70-30) 100 UNIT/ML vial Inject 20-40 Units Subcutaneous 2 times daily (with meals) 72 mL 1     ipratropium (ATROVENT) 0.06 % nasal spray Spray 2 sprays into both nostrils 4 times daily as needed for rhinitis 3 Box 1     Lutein 40 MG CAPS Take 40 mg by mouth every morning        Magnesium 400 MG TABS Take 400 mg by mouth every evening        metFORMIN (GLUCOPHAGE-XR) 500 MG 24 hr tablet Take 2 tablets (1,000 mg) by mouth daily (with breakfast) (takes 2 x 500mg) 90 tablet 3     metoprolol succinate ER (TOPROL-XL) 25 MG 24 hr tablet Take 2 tablets (50 mg) by mouth daily 180 tablet 0     potassium aminobenzoate 500 MG CAPS capsule Take 99 mg by mouth daily Patient taking tablet        Propylene Glycol (SYSTANE BALANCE OP) Apply 1-2 drops to eye 3 times daily as needed (dry eyes)       sertraline (ZOLOFT) 100 MG tablet Take 1.5 tablets (150 mg) by mouth every morning (takes 1.5 x 100mg) 135 tablet 1     TURMERIC PO Take 3 tablets by mouth daily        vitamin (B COMPLEX-C) tablet Take 1 tablet by mouth daily       vitamin C (ASCORBIC ACID) 1000 MG TABS Take 1,000 mg by mouth every evening        Vitamin D3 (CHOLECALCIFEROL) 25 mcg (1000 units) tablet Take 1 tablet (25 mcg) by mouth daily (Patient taking differently: Take 125 mcg by mouth daily 5,000 Units daily)  0     warfarin ANTICOAGULANT (COUMADIN) 5 MG tablet Take 7.5 mg (1 and 1/2 tablets) by  mouth on Sun, Tue, Thur. Take 5 mg (1 tablet)  all other days or as directed by INR clinic. 150 tablet 0     zinc 23 MG LOZG lozenge Place 1 lozenge inside cheek daily          ALLERGIES     Allergies   Allergen Reactions     Penicillins Hives     3 weeks after taking med got hives.       Pioglitazone Other (See Comments)     Increased urinary frequency, fatigue, dyspnea       PAST MEDICAL HISTORY:  Past Medical History:   Diagnosis Date     (HFpEF) heart failure with preserved ejection fraction (H)      Aortic stenosis      Chest pain      Depressive disorder      Diabetes (H)      Hyperlipidemia      Hypertension      Mitral annular calcification      Mitral stenosis      Obesity      Sleep apnea     CPAP       PAST SURGICAL HISTORY:  Past Surgical History:   Procedure Laterality Date     APPENDECTOMY       CV CORONARY ANGIOGRAM N/A 2020    Procedure: Coronary Angiogram;  Surgeon: Inez Peoples MD;  Location:  HEART CARDIAC CATH LAB     CV LEFT HEART CATH N/A 2020    Procedure: Left Heart Cath;  Surgeon: Inez Peoples MD;  Location:  HE[facility]ARD[facility] CATH LAB     CV PFO CLOSURE N/A 4/15/2020    Procedure: Patent Foramen Ovale Closure;  Surgeon: Ok Wilson MD;  Location:  OR      RIGHT HEART CATH MEASUREMENTS RECORDED N/A 2020    Procedure: Right Heart Cath;  Surgeon: Inez Peoples MD;  Location:  HEART CARDIAC CATH LAB     EP PACEMAKER N/A 2020    Procedure: EP Pacemaker;  Surgeon: Sohan Francis MD;  Location:  HEART CARDIAC CATH LAB     GYN SURGERY       x2 ,      GYN SURGERY      total hysterectomy     IMPLANT PACEMAKER       REPAIR VALVE TRICUSPID N/A 4/15/2020    Procedure: REPAIR TRICUSPID VALVE WITH VILLA MC3 26MM.;  Surgeon: Ok Wilson MD;  Location:  OR     REPLACE VALVE AORTIC N/A 4/15/2020    Procedure: REPLACEMENT, AORTIC VALVE WITH INSPIRIS TISSUE VALVE 25 MM; ON PUMP WITH SOCORRO READ BY  CARDIOLOGIST DR BLANTON.;  Surgeon: Ok Wilson MD;  Location:  OR     REPLACE VALVE MITRAL N/A 4/15/2020    Procedure: REPLACEMENT, MITRAL VALVE WITH EPIC TISSUE VALVE 27 MM.;  Surgeon: Ok Wilson MD;  Location:  OR       FAMILY HISTORY:  Family History   Problem Relation Age of Onset     Diabetes Mother 56     Congenital heart disease Father 23     Colon Cancer No family hx of        SOCIAL HISTORY:  Social History     Socioeconomic History     Marital status:      Spouse name: None     Number of children: None     Years of education: None     Highest education level: None   Occupational History     None   Social Needs     Financial resource strain: None     Food insecurity     Worry: None     Inability: None     Transportation needs     Medical: None     Non-medical: None   Tobacco Use     Smoking status: Never Smoker     Smokeless tobacco: Never Used   Substance and Sexual Activity     Alcohol use: No     Drug use: No     Sexual activity: None   Lifestyle     Physical activity     Days per week: None     Minutes per session: None     Stress: None   Relationships     Social connections     Talks on phone: None     Gets together: None     Attends Catholic service: None     Active member of club or organization: None     Attends meetings of clubs or organizations: None     Relationship status: None     Intimate partner violence     Fear of current or ex partner: None     Emotionally abused: None     Physically abused: None     Forced sexual activity: None   Other Topics Concern     Parent/sibling w/ CABG, MI or angioplasty before 65F 55M? Not Asked   Social History Narrative     None       Review of Systems:  Skin:  Positive for   redness on right leg - itching, burning sensation, feels swollen   Eyes:  Positive for glasses    ENT:  Negative      Respiratory:  Positive for dyspnea on exertion;sleep apnea;CPAP     Cardiovascular:    Positive for;lightheadedness;edema;fatigue   "  Gastroenterology: Negative      Genitourinary:  Negative      Musculoskeletal:  Positive for joint pain;back pain;nocturnal cramping shoulders, right hip; back pain when trying to get out of bed  Neurologic:  Negative      Psychiatric:  Positive for anxiety    Heme/Lymph/Imm:  Positive for allergies RX  Endocrine:  Positive for diabetes      Physical Exam:  Vitals: BP (!) 148/72   Pulse 84   Ht 1.651 m (5' 5\")   Wt 131.2 kg (289 lb 3.2 oz)   BMI 48.13 kg/m      Constitutional:  cooperative;in no acute distress morbidly obese      Skin:  warm and dry to the touch   pacemaker incision in the left infraclavicular area was well-healed      Head:           Eyes:  pupils equal and round        Lymph:No Cervical lymphadenopathy present     ENT:  no pallor or cyanosis        Neck:  no carotid bruit        Respiratory:  clear to auscultation         Cardiac: regular rhythm       systolic murmur;grade 1   diastolic murmur;mid diastolic murmur;grade 2    pulses full and equal                                        GI:  abdomen soft obese      Extremities and Muscular Skeletal:  no deformities, clubbing, cyanosis, erythema observed       1+;LLE edema      Neurological:  no gross motor deficits        Psych:  Alert and Oriented x 3          CC  Yamilka Christina, DO  [address]  [place] [address]                  "

## 2021-04-29 NOTE — LETTER
4/29/2021    She Huang MD  [address] [facility] [address]    RE: Amy Luna       Dear Colleague,    I had the pleasure of seeing Amy Luna in the [facility].    HPI and Plan:   See dictation    No orders of the defined types were placed in this encounter.      Orders Placed This Encounter   Medications     apixaban ANTICOAGULANT (ELIQUIS ANTICOAGULANT) 5 MG tablet     Sig: Take 1 tablet (5 mg) by mouth 2 times daily     Dispense:  60 tablet     Refill:  3     Call before starting this medication, you need to stop warfarin and get instruction when to start eliquis.       There are no discontinued medications.      Encounter Diagnoses   Name Primary?     Paroxysmal atrial fibrillation (H)      Cardiac pacemaker in situ      S/P mitral valve replacement with bioprosthetic valve      S/P AVR (aortic valve replacement)      Tricuspid valve insufficiency, unspecified etiology      Acute blood loss anemia        CURRENT MEDICATIONS:  Current Outpatient Medications   Medication Sig Dispense Refill     acetaminophen (TYLENOL) 325 MG tablet Take 2 tablets (650 mg) by mouth every 4 hours as needed for other (multimodal surgical pain management along with NSAIDS and opioid medication as indicated based on pain control and physical function.)  0     albuterol (PROAIR HFA/PROVENTIL HFA/VENTOLIN HFA) 108 (90 Base) MCG/ACT inhaler Inhale 2 puffs into the lungs every 4 hours as needed for shortness of breath / dyspnea or wheezing       apixaban ANTICOAGULANT (ELIQUIS ANTICOAGULANT) 5 MG tablet Take 1 tablet (5 mg) by mouth 2 times daily 60 tablet 3     atorvastatin (LIPITOR) 40 MG tablet Take 1 tablet (40 mg) by mouth At Bedtime 90 tablet 3     Biotin 26183 MCG TABS Take 10,000 mcg by mouth every morning        Calcium Carbonate-Vit D-Min (CALCIUM 1200 PO) Take 1 tablet by mouth every evening        coenzyme Q-10 200 MG CAPS  Take 200 mg by mouth every morning        Continuous Blood Gluc  (FREESTYLE ADAM READER) HARJEET 1 each every 14 days Use to read blood sugars per  instructions. 1 each 0     Continuous Blood Gluc Sensor MISC 1 each every 14 days 6 each 1     cyanocobalamin (VITAMIN B-12) 100 MCG tablet Take 100 mcg by mouth daily       ferrous gluconate (FERGON) 324 (38 Fe) MG tablet Take 1 tablet (324 mg) by mouth daily (with breakfast) 30 tablet 3     furosemide (LASIX) 40 MG tablet Take 1 tablet (40 mg) by mouth 2 times daily Further dosing per PCP/ Cardiology - Oral (Patient taking differently: Take 40 mg by mouth daily 4/29/21 Currently taking 1 tablet daily and an additional PRN; Further dosing per PCP/ Cardiology - Oral) 180 tablet 3     Ginkgo Biloba (GINKOBA PO) Take 250 mg by mouth daily       insulin NPH-Regular 70/30 (70-30) 100 UNIT/ML vial Inject 20-40 Units Subcutaneous 2 times daily (with meals) 72 mL 1     ipratropium (ATROVENT) 0.06 % nasal spray Spray 2 sprays into both nostrils 4 times daily as needed for rhinitis 3 Box 1     Lutein 40 MG CAPS Take 40 mg by mouth every morning        Magnesium 400 MG TABS Take 400 mg by mouth every evening        metFORMIN (GLUCOPHAGE-XR) 500 MG 24 hr tablet Take 2 tablets (1,000 mg) by mouth daily (with breakfast) (takes 2 x 500mg) 90 tablet 3     metoprolol succinate ER (TOPROL-XL) 25 MG 24 hr tablet Take 2 tablets (50 mg) by mouth daily 180 tablet 0     potassium aminobenzoate 500 MG CAPS capsule Take 99 mg by mouth daily Patient taking tablet        Propylene Glycol (SYSTANE BALANCE OP) Apply 1-2 drops to eye 3 times daily as needed (dry eyes)       sertraline (ZOLOFT) 100 MG tablet Take 1.5 tablets (150 mg) by mouth every morning (takes 1.5 x 100mg) 135 tablet 1     TURMERIC PO Take 3 tablets by mouth daily        vitamin (B COMPLEX-C) tablet Take 1 tablet by mouth daily       vitamin C (ASCORBIC ACID) 1000 MG TABS Take 1,000 mg by mouth every evening         Vitamin D3 (CHOLECALCIFEROL) 25 mcg (1000 units) tablet Take 1 tablet (25 mcg) by mouth daily (Patient taking differently: Take 125 mcg by mouth daily 5,000 Units daily)  0     warfarin ANTICOAGULANT (COUMADIN) 5 MG tablet Take 7.5 mg (1 and 1/2 tablets) by mouth on Sun, Tue, Thur. Take 5 mg (1 tablet)  all other days or as directed by INR clinic. 150 tablet 0     zinc 23 MG LOZG lozenge Place 1 lozenge inside cheek daily          ALLERGIES     Allergies   Allergen Reactions     Penicillins Hives     3 weeks after taking med got hives.       Pioglitazone Other (See Comments)     Increased urinary frequency, fatigue, dyspnea       PAST MEDICAL HISTORY:  Past Medical History:   Diagnosis Date     (HFpEF) heart failure with preserved ejection fraction (H)      Aortic stenosis      Chest pain      Depressive disorder      Diabetes (H)      Hyperlipidemia      Hypertension      Mitral annular calcification      Mitral stenosis      Obesity      Sleep apnea     CPAP       PAST SURGICAL HISTORY:  Past Surgical History:   Procedure Laterality Date     APPENDECTOMY       CV CORONARY ANGIOGRAM N/A 2020    Procedure: Coronary Angiogram;  Surgeon: Inez Peoples MD;  Location:  HEART CARDIAC CATH LAB     CV LEFT HEART CATH N/A 2020    Procedure: Left Heart Cath;  Surgeon: Inez Peoples MD;  Location:  HEART CARDIAC CATH LAB     CV PFO CLOSURE N/A 4/15/2020    Procedure: Patent Foramen Ovale Closure;  Surgeon: Ok Wilson MD;  Location:  OR      RIGHT HEART CATH MEASUREMENTS RECORDED N/A 2020    Procedure: Right Heart Cath;  Surgeon: Inez Peoples MD;  Location:  HEART CARDIAC CATH LAB     EP PACEMAKER N/A 2020    Procedure: EP Pacemaker;  Surgeon: Sohan Francis MD;  Location:  HEART CARDIAC CATH LAB     GYN SURGERY       x2 ,      GYN SURGERY      total hysterectomy     IMPLANT PACEMAKER       REPAIR VALVE TRICUSPID N/A  4/15/2020    Procedure: REPAIR TRICUSPID VALVE WITH VILLA MC3 26MM.;  Surgeon: Ok Wilson MD;  Location: [facility] OR     REPLACE VALVE AORTIC N/A 4/15/2020    Procedure: REPLACEMENT, AORTIC VALVE WITH INSPIRIS TISSUE VALVE 25 MM; ON PUMP WITH SOCORRO READ BY CARDIOLOGIST DR BLANTON.;  Surgeon: Ok Wilson MD;  Location: [facility] OR     REPLACE VALVE MITRAL N/A 4/15/2020    Procedure: REPLACEMENT, MITRAL VALVE WITH EPIC TISSUE VALVE 27 MM.;  Surgeon: Ok Wilson MD;  Location: [facility] OR       FAMILY HISTORY:  Family History   Problem Relation Age of Onset     Diabetes Mother 56     Congenital heart disease Father 23     Colon Cancer No family hx of        SOCIAL HISTORY:  Social History     Socioeconomic History     Marital status:      Spouse name: None     Number of children: None     Years of education: None     Highest education level: None   Occupational History     None   Social Needs     Financial resource strain: None     Food insecurity     Worry: None     Inability: None     Transportation needs     Medical: None     Non-medical: None   Tobacco Use     Smoking status: Never Smoker     Smokeless tobacco: Never Used   Substance and Sexual Activity     Alcohol use: No     Drug use: No     Sexual activity: None   Lifestyle     Physical activity     Days per week: None     Minutes per session: None     Stress: None   Relationships     Social connections     Talks on phone: None     Gets together: None     Attends [employer] service: None     Active member of club or organization: None     Attends meetings of clubs or organizations: None     Relationship status: None     Intimate partner violence     Fear of current or ex partner: None     Emotionally abused: None     Physically abused: None     Forced sexual activity: None   Other Topics Concern     Parent/sibling w/ CABG, MI or angioplasty before 65F 55M? Not Asked   Social History Narrative     None       Review of Systems:  Skin:   "Positive for   redness on right leg - itching, burning sensation, feels swollen   Eyes:  Positive for glasses    ENT:  Negative      Respiratory:  Positive for dyspnea on exertion;sleep apnea;CPAP     Cardiovascular:    Positive for;lightheadedness;edema;fatigue    Gastroenterology: Negative      Genitourinary:  Negative      Musculoskeletal:  Positive for joint pain;back pain;nocturnal cramping shoulders, right hip; back pain when trying to get out of bed  Neurologic:  Negative      Psychiatric:  Positive for anxiety    Heme/Lymph/Imm:  Positive for allergies RX  Endocrine:  Positive for diabetes      Physical Exam:  Vitals: BP (!) 148/72   Pulse 84   Ht 1.651 m (5' 5\")   Wt 131.2 kg (289 lb 3.2 oz)   BMI 48.13 kg/m      Constitutional:  cooperative;in no acute distress morbidly obese      Skin:  warm and dry to the touch   pacemaker incision in the left infraclavicular area was well-healed      Head:           Eyes:  pupils equal and round        Lymph:No Cervical lymphadenopathy present     ENT:  no pallor or cyanosis        Neck:  no carotid bruit        Respiratory:  clear to auscultation         Cardiac: regular rhythm       systolic murmur;grade 1   diastolic murmur;mid diastolic murmur;grade 2    pulses full and equal                                        GI:  abdomen soft obese      Extremities and Muscular Skeletal:  no deformities, clubbing, cyanosis, erythema observed       1+;LLE edema      Neurological:  no gross motor deficits        Psych:  Alert and Oriented x 3    Thank you for allowing me to participate in the care of your patient.      Sincerely,     Yamilka Christina DO     [facility]    cc:   Yamilka Christina DO  [address] [address]        "

## 2021-05-03 ENCOUNTER — TELEPHONE (OUTPATIENT)
Dept: PHARMACY | Facility: CLINIC | Age: 76
End: 2021-05-03

## 2021-05-03 NOTE — TELEPHONE ENCOUNTER
I signed the form.  She doesn't meet the criteria for CGM at this time. The Rx for CGM was given at the pt's request.  Patient refused MTM appointment

## 2021-05-03 NOTE — TELEPHONE ENCOUNTER
We have attempted to contact this patient three times to set up a MTM follow up appointment and were unsuccessful. Contact attempts were made via telephonex2 and [employer]. We will no longer continue to contact this patient to schedule a visit at this time. Please refer back to MTM if you believe this patient would continue to benefit from our services.     Thank you!    Donya Araya, PharmD  PGY1 Medication Therapy Management Resident  Voicemail: [phone]

## 2021-05-04 ENCOUNTER — ANCILLARY PROCEDURE (OUTPATIENT)
Dept: CARDIOLOGY | Facility: CLINIC | Age: 76
End: 2021-05-04
Attending: INTERNAL MEDICINE
Payer: MEDICARE

## 2021-05-04 DIAGNOSIS — Z95.0 CARDIAC PACEMAKER IN SITU: ICD-10-CM

## 2021-05-04 PROCEDURE — 93296 REM INTERROG EVL PM/IDS: CPT | Performed by: INTERNAL MEDICINE

## 2021-05-04 PROCEDURE — 93294 REM INTERROG EVL PM/LDLS PM: CPT | Performed by: INTERNAL MEDICINE

## 2021-05-06 LAB
MDC_IDC_LEAD_IMPLANT_DT: NORMAL
MDC_IDC_LEAD_IMPLANT_DT: NORMAL
MDC_IDC_LEAD_LOCATION: NORMAL
MDC_IDC_LEAD_LOCATION: NORMAL
MDC_IDC_LEAD_LOCATION_DETAIL_1: NORMAL
MDC_IDC_LEAD_LOCATION_DETAIL_1: NORMAL
MDC_IDC_LEAD_MFG: NORMAL
MDC_IDC_LEAD_MFG: NORMAL
MDC_IDC_LEAD_MODEL: NORMAL
MDC_IDC_LEAD_MODEL: NORMAL
MDC_IDC_LEAD_POLARITY_TYPE: NORMAL
MDC_IDC_LEAD_POLARITY_TYPE: NORMAL
MDC_IDC_LEAD_SERIAL: NORMAL
MDC_IDC_LEAD_SERIAL: NORMAL
MDC_IDC_MSMT_BATTERY_DTM: NORMAL
MDC_IDC_MSMT_BATTERY_REMAINING_LONGEVITY: 70 MO
MDC_IDC_MSMT_BATTERY_RRT_TRIGGER: 2.83
MDC_IDC_MSMT_BATTERY_STATUS: NORMAL
MDC_IDC_MSMT_BATTERY_VOLTAGE: 3 V
MDC_IDC_MSMT_LEADCHNL_RA_IMPEDANCE_VALUE: 361 OHM
MDC_IDC_MSMT_LEADCHNL_RA_IMPEDANCE_VALUE: 437 OHM
MDC_IDC_MSMT_LEADCHNL_RA_PACING_THRESHOLD_AMPLITUDE: 0.38 V
MDC_IDC_MSMT_LEADCHNL_RA_PACING_THRESHOLD_PULSEWIDTH: 0.4 MS
MDC_IDC_MSMT_LEADCHNL_RA_SENSING_INTR_AMPL: 2.88 MV
MDC_IDC_MSMT_LEADCHNL_RA_SENSING_INTR_AMPL: 2.88 MV
MDC_IDC_MSMT_LEADCHNL_RV_IMPEDANCE_VALUE: 475 OHM
MDC_IDC_MSMT_LEADCHNL_RV_IMPEDANCE_VALUE: 475 OHM
MDC_IDC_MSMT_LEADCHNL_RV_PACING_THRESHOLD_AMPLITUDE: 0.62 V
MDC_IDC_MSMT_LEADCHNL_RV_PACING_THRESHOLD_PULSEWIDTH: 0.4 MS
MDC_IDC_MSMT_LEADCHNL_RV_SENSING_INTR_AMPL: 14.25 MV
MDC_IDC_MSMT_LEADCHNL_RV_SENSING_INTR_AMPL: 14.25 MV
MDC_IDC_PG_IMPLANT_DTM: NORMAL
MDC_IDC_PG_MFG: NORMAL
MDC_IDC_PG_MODEL: NORMAL
MDC_IDC_PG_SERIAL: NORMAL
MDC_IDC_PG_TYPE: NORMAL
MDC_IDC_SESS_CLINIC_NAME: NORMAL
MDC_IDC_SESS_DTM: NORMAL
MDC_IDC_SESS_TYPE: NORMAL
MDC_IDC_SET_BRADY_AT_MODE_SWITCH_RATE: 171 {BEATS}/MIN
MDC_IDC_SET_BRADY_HYSTRATE: NORMAL
MDC_IDC_SET_BRADY_LOWRATE: 60 {BEATS}/MIN
MDC_IDC_SET_BRADY_MAX_SENSOR_RATE: 130 {BEATS}/MIN
MDC_IDC_SET_BRADY_MAX_TRACKING_RATE: 130 {BEATS}/MIN
MDC_IDC_SET_BRADY_MODE: NORMAL
MDC_IDC_SET_BRADY_PAV_DELAY_LOW: 250 MS
MDC_IDC_SET_BRADY_SAV_DELAY_LOW: 250 MS
MDC_IDC_SET_LEADCHNL_RA_PACING_AMPLITUDE: 1.5 V
MDC_IDC_SET_LEADCHNL_RA_PACING_ANODE_ELECTRODE_1: NORMAL
MDC_IDC_SET_LEADCHNL_RA_PACING_ANODE_LOCATION_1: NORMAL
MDC_IDC_SET_LEADCHNL_RA_PACING_CAPTURE_MODE: NORMAL
MDC_IDC_SET_LEADCHNL_RA_PACING_CATHODE_ELECTRODE_1: NORMAL
MDC_IDC_SET_LEADCHNL_RA_PACING_CATHODE_LOCATION_1: NORMAL
MDC_IDC_SET_LEADCHNL_RA_PACING_POLARITY: NORMAL
MDC_IDC_SET_LEADCHNL_RA_PACING_PULSEWIDTH: 0.4 MS
MDC_IDC_SET_LEADCHNL_RA_SENSING_ANODE_ELECTRODE_1: NORMAL
MDC_IDC_SET_LEADCHNL_RA_SENSING_ANODE_LOCATION_1: NORMAL
MDC_IDC_SET_LEADCHNL_RA_SENSING_CATHODE_ELECTRODE_1: NORMAL
MDC_IDC_SET_LEADCHNL_RA_SENSING_CATHODE_LOCATION_1: NORMAL
MDC_IDC_SET_LEADCHNL_RA_SENSING_POLARITY: NORMAL
MDC_IDC_SET_LEADCHNL_RA_SENSING_SENSITIVITY: 0.3 MV
MDC_IDC_SET_LEADCHNL_RV_PACING_AMPLITUDE: 2 V
MDC_IDC_SET_LEADCHNL_RV_PACING_ANODE_ELECTRODE_1: NORMAL
MDC_IDC_SET_LEADCHNL_RV_PACING_ANODE_LOCATION_1: NORMAL
MDC_IDC_SET_LEADCHNL_RV_PACING_CAPTURE_MODE: NORMAL
MDC_IDC_SET_LEADCHNL_RV_PACING_CATHODE_ELECTRODE_1: NORMAL
MDC_IDC_SET_LEADCHNL_RV_PACING_CATHODE_LOCATION_1: NORMAL
MDC_IDC_SET_LEADCHNL_RV_PACING_POLARITY: NORMAL
MDC_IDC_SET_LEADCHNL_RV_PACING_PULSEWIDTH: 0.4 MS
MDC_IDC_SET_LEADCHNL_RV_SENSING_ANODE_ELECTRODE_1: NORMAL
MDC_IDC_SET_LEADCHNL_RV_SENSING_ANODE_LOCATION_1: NORMAL
MDC_IDC_SET_LEADCHNL_RV_SENSING_CATHODE_ELECTRODE_1: NORMAL
MDC_IDC_SET_LEADCHNL_RV_SENSING_CATHODE_LOCATION_1: NORMAL
MDC_IDC_SET_LEADCHNL_RV_SENSING_POLARITY: NORMAL
MDC_IDC_SET_LEADCHNL_RV_SENSING_SENSITIVITY: 0.9 MV
MDC_IDC_SET_ZONE_DETECTION_INTERVAL: 350 MS
MDC_IDC_SET_ZONE_DETECTION_INTERVAL: 400 MS
MDC_IDC_SET_ZONE_TYPE: NORMAL
MDC_IDC_STAT_AT_BURDEN_PERCENT: 0 %
MDC_IDC_STAT_AT_DTM_END: NORMAL
MDC_IDC_STAT_AT_DTM_START: NORMAL
MDC_IDC_STAT_BRADY_AP_VP_PERCENT: 2.14 %
MDC_IDC_STAT_BRADY_AP_VS_PERCENT: 32.56 %
MDC_IDC_STAT_BRADY_AS_VP_PERCENT: 0.57 %
MDC_IDC_STAT_BRADY_AS_VS_PERCENT: 64.74 %
MDC_IDC_STAT_BRADY_DTM_END: NORMAL
MDC_IDC_STAT_BRADY_DTM_START: NORMAL
MDC_IDC_STAT_BRADY_RA_PERCENT_PACED: 34.55 %
MDC_IDC_STAT_BRADY_RV_PERCENT_PACED: 3.07 %
MDC_IDC_STAT_EPISODE_RECENT_COUNT: 0
MDC_IDC_STAT_EPISODE_RECENT_COUNT_DTM_END: NORMAL
MDC_IDC_STAT_EPISODE_RECENT_COUNT_DTM_START: NORMAL
MDC_IDC_STAT_EPISODE_TOTAL_COUNT: 0
MDC_IDC_STAT_EPISODE_TOTAL_COUNT: 3
MDC_IDC_STAT_EPISODE_TOTAL_COUNT: 4
MDC_IDC_STAT_EPISODE_TOTAL_COUNT: 8
MDC_IDC_STAT_EPISODE_TOTAL_COUNT_DTM_END: NORMAL
MDC_IDC_STAT_EPISODE_TOTAL_COUNT_DTM_START: NORMAL
MDC_IDC_STAT_EPISODE_TYPE: NORMAL

## 2021-05-17 ENCOUNTER — ANTICOAGULATION THERAPY VISIT (OUTPATIENT)
Dept: ANTICOAGULATION | Facility: CLINIC | Age: 76
End: 2021-05-17
Payer: MEDICARE

## 2021-05-17 ENCOUNTER — OFFICE VISIT (OUTPATIENT)
Dept: INTERNAL MEDICINE | Facility: CLINIC | Age: 76
End: 2021-05-17
Payer: MEDICARE

## 2021-05-17 VITALS
OXYGEN SATURATION: 97 % | BODY MASS INDEX: 47.63 KG/M2 | HEART RATE: 79 BPM | TEMPERATURE: 98.7 F | WEIGHT: 286.2 LBS | DIASTOLIC BLOOD PRESSURE: 70 MMHG | SYSTOLIC BLOOD PRESSURE: 126 MMHG

## 2021-05-17 DIAGNOSIS — I48.0 PAROXYSMAL ATRIAL FIBRILLATION (H): ICD-10-CM

## 2021-05-17 DIAGNOSIS — S80.812A ABRASION OF ANTERIOR LEFT LOWER LEG, INITIAL ENCOUNTER: ICD-10-CM

## 2021-05-17 DIAGNOSIS — E11.42 DIABETIC POLYNEUROPATHY ASSOCIATED WITH TYPE 2 DIABETES MELLITUS (H): Primary | ICD-10-CM

## 2021-05-17 DIAGNOSIS — I87.2 VENOUS STASIS DERMATITIS OF BOTH LOWER EXTREMITIES: ICD-10-CM

## 2021-05-17 DIAGNOSIS — Z79.01 LONG TERM CURRENT USE OF ANTICOAGULANTS WITH INR GOAL OF 2.0-3.0: ICD-10-CM

## 2021-05-17 LAB
CAPILLARY BLOOD COLLECTION: NORMAL
INR PPP: 1.7 (ref 0.86–1.14)

## 2021-05-17 PROCEDURE — 99214 OFFICE O/P EST MOD 30 MIN: CPT | Performed by: INTERNAL MEDICINE

## 2021-05-17 PROCEDURE — 36416 COLLJ CAPILLARY BLOOD SPEC: CPT | Performed by: NURSE PRACTITIONER

## 2021-05-17 PROCEDURE — 85610 PROTHROMBIN TIME: CPT | Performed by: NURSE PRACTITIONER

## 2021-05-17 PROCEDURE — 99207 PR NO CHARGE NURSE ONLY: CPT

## 2021-05-17 RX ORDER — GABAPENTIN 100 MG/1
CAPSULE ORAL
Qty: 180 CAPSULE | Refills: 3 | Status: SHIPPED | OUTPATIENT
Start: 2021-05-17 | End: 2021-08-30

## 2021-05-17 NOTE — PROGRESS NOTES
ASSESSMENT/PLAN                                                      (E11.42) Diabetic polyneuropathy associated with type 2 diabetes mellitus (H)  (primary encounter diagnosis)  Plan: TRIAL of gabapentin 100 mg nightly; may increase dose and frequency as needed - up to 300 mg 3 times daily; if symptoms worsen, change, or do not improve, patient to contact MD.      (I87.2) Venous stasis dermatitis of both lower extremities  Comment: likely related to morbid obesity/poor venous return.  Plan: referred for lymphedema/edema therapy - patient will be contacted to schedule.      (O46.427N) Abrasion of anterior left lower leg, initial encounter  Comment: desquamated area with thin overlying superficial skin flap appears clean and uninfected; tetanus booster up-to-date.  Plan: keep area clean and dry and covered until healed; if symptoms worsen, change, or do not improve, patient to contact MD.      Stefanie Davis MD   [address] [address]  T: [phone], F: [phone]    SUBJECTIVE                                                      Amy Luna is a very pleasant 75 year old female who presents foot symptoms:    Symptoms have been ongoing for several weeks. Symptoms include itching and burning involving both feet. Associated skin redness involving lower legs and feet. No fevers or chills.    PMH significant for bilateral lower extremity edema well-controlled with Lasix 40 mg daily and as needed.    PMH also significant for longstanding but well-controlled insulin-dependent diabetes mellitus.    PMH also significant for morbid obesity, with BMI of 48.    Unrelated to above, patient reports injuring herself several days ago when her trash can tipped over.  The bottom of the trash can scraped her left anterior leg as the trash can fell over. Tetanus booster up-to-date (December, 2020)    OBJECTIVE                                                       /70   Pulse 79   Temp 98.7  F (37.1  C) (Tympanic)   Wt 129.8 kg (286 lb 3.2 oz)   SpO2 97%   BMI 47.63 kg/m    Constitutional: well-appearing  Lower legs and feet: warm and well perfused; venous stasis changes from below knee through feet  Integumentary: area of desquamation with overlying superficial thickness skin flap left anterior lower leg; no bleeding or discharge    ---    (Note documentation was completed, in part, with [employer] voice-recognition software. Documentation was reviewed, but some grammatical, spelling, and word errors may remain.)

## 2021-05-17 NOTE — PROGRESS NOTES
ANTICOAGULATION FOLLOW-UP CLINIC VISIT    Patient Name:  Amy Luna  Date:  2021  Contact Type:  Telephone/ Called patient, she reports diet change, had a missed dose more than 7 days ago. Orders discussed with the patient, she agrees with the plan. Lab INR appointment scheduled on 21.    SUBJECTIVE:  Patient Findings     Positives:  Change in diet/appetite (Patient reports increased green veggies, plans to keep this amount.  )    Comments:  The patient was assessed for diet, medication, and activity level changes, missed or extra doses, bruising or bleeding, with no problem findings.         Clinical Outcomes     Comments:  The patient was assessed for diet, medication, and activity level changes, missed or extra doses, bruising or bleeding, with no problem findings.            OBJECTIVE    Recent labs: (last 7 days)     21  1441   INR 1.70*       ASSESSMENT / PLAN  INR assessment SUB    Recheck INR In: 2 WEEKS    INR Location Outside lab      Anticoagulation Summary  As of 2021    INR goal:  2.0-3.0   TTR:  54.6 % (11 mo)   INR used for dosin.70 (2021)   Warfarin maintenance plan:  10 mg (5 mg x 2) every Wed, Sat; 7.5 mg (5 mg x 1.5) all other days   Full warfarin instructions:  : 10 mg; Otherwise 10 mg every Wed, Sat; 7.5 mg all other days   Weekly warfarin total:  57.5 mg   Plan last modified:  Nadia Cabello RN (2021)   Next INR check:  2021   Priority:  Maintenance   Target end date:  Indefinite    Indications    Paroxysmal atrial fibrillation (H) [I48.0]  Long term current use of anticoagulants with INR goal of 2.0-3.0 [Z79.01]             Anticoagulation Episode Summary     INR check location:      Preferred lab:      Send INR reminders to:  [facility]    Comments:        Anticoagulation Care Providers     Provider Role Specialty Phone number    Yamilka Christina DO Referring Cardiovascular Disease [phone]    Kelli Lewis, RAMESH  Referring Internal Medicine [phone]            See the Encounter Report to view Anticoagulation Flowsheet and Dosing Calendar (Go to Encounters tab in chart review, and find the Anticoagulation Therapy Visit)    Dosage adjustment made based on physician directed care plan.    Chiqui Nowak RN

## 2021-05-20 ENCOUNTER — TELEPHONE (OUTPATIENT)
Dept: INTERNAL MEDICINE | Facility: CLINIC | Age: 76
End: 2021-05-20

## 2021-05-20 NOTE — TELEPHONE ENCOUNTER
"Per pharmacy    \"Please clarify directions for gabapentin (NEURONTIN) 100 MG capsule.Just making sure I am reading this right. The pt can use up to 9 caps a day?\"    [facility]  [phone]   [phone] fx    "

## 2021-05-21 ENCOUNTER — HOSPITAL ENCOUNTER (OUTPATIENT)
Dept: OCCUPATIONAL THERAPY | Facility: CLINIC | Age: 76
Setting detail: THERAPIES SERIES
End: 2021-05-21
Attending: INTERNAL MEDICINE
Payer: MEDICARE

## 2021-05-21 DIAGNOSIS — I87.2 VENOUS STASIS DERMATITIS OF BOTH LOWER EXTREMITIES: ICD-10-CM

## 2021-05-21 PROCEDURE — 97140 MANUAL THERAPY 1/> REGIONS: CPT | Mod: GO

## 2021-05-21 PROCEDURE — 97165 OT EVAL LOW COMPLEX 30 MIN: CPT | Mod: GO

## 2021-05-21 NOTE — PROGRESS NOTES
[facility]        OUTPATIENT OCCUPATIONAL THERAPY EDEMA EVALUATION  PLAN OF TREATMENT FOR OUTPATIENT REHABILITATION  (COMPLETE FOR INITIAL CLAIMS ONLY)  Patient's Last Name, First Name, Amy Pantoja                           Provider s Name:   [facility] Medical Record No.  [id number]     Start of Care Date:  05/21/21   Onset Date:  05/21/21   Type:  OT   Medical Diagnosis:  lymphedema   Therapy Diagnosis:  lymphedema Visits from SOC:  1                                     __________________________________________________________________________________   Plan of Treatment/Functional Goals:    Manual lymph drainage, Gradient compression bandaging, Fit for compression garment, Exercises, Precautions to prevent infection / exacerbation, Education, Skin care / precautions, Home management program development        GOALS  1. Goal description: Pt will report understanding s/s lymphedema, s/s skin infections, and treatment options for safety and I with home management       Target date: 08/13/21  2. Goal description: Tolerate gradient compression bandaging/wearing compression garments 23 hrs/day to prevent re-acccumulation of extracellular fluid for max reductions needed to reduce risk skin infections an dreduce pain for improved walking       Target date: 08/13/21  3. Goal description: Demonstrate independence in applying gradient compression bandages to build I with home management of BLE lymphedema needed for pain reducitons for improved walking and to reduce risk skin infections       Target date: 08/13/21  4. Goal description: Demonstrate independence in performing prescribed exercises to facilate the lymph system and muscle pumping systems for max reductions needed to reduce risk skin infections an dreduce pain for improved walking        Target date:  08/13/21  5. Goal description: Be independent in donning/doffing, wearing schedule, and care of compression garments to build I with home management of BLE lymphedema needed for pain reducitons for improved walking and to reduce risk skin infections        Target date: 08/13/21  6. Goal description: Pt will reduce BLE total volume by .75L+ for reducitons needed to reduce pain and tension in skin for imporved mobility engagement       Target date: 08/13/21     7.             8.              Treatment Frequency: 1x/week, 3x/week   Treatment duration: 3x/wk x 3 weeks, then 1x/wk x 1 week, then 0x/wk x 3 weeks, then 1x/wk x 1 week    Onel Redmond                                    I CERTIFY THE NEED FOR THESE SERVICES FURNISHED UNDER        THIS PLAN OF TREATMENT AND WHILE UNDER MY CARE     (Physician co-signature of this document indicates review and certification of the therapy plan).                   Certification date from: 05/21/21       Certification date to: 08/13/21           Referring physician: Stefanie Davis   Initial Assessment  See Epic Evaluation- Start of care: 05/21/21

## 2021-05-21 NOTE — TELEPHONE ENCOUNTER
Yes - I have prescribed a TRIAL of gabapentin 100 mg nightly; may increase dose and frequency as needed - up to 300 mg 3 times daily.

## 2021-05-21 NOTE — PROGRESS NOTES
05/21/21 0800   Quick Adds   Quick Adds Certification   Rehab Discipline   Discipline OT   Type of Visit   Type of visit Initial Edema Evaluation   General Information   Start of care 05/21/21   Referring physician Stefanie Davis   Orders Evaluate and treat as indicated   Order date 05/21/21   Medical diagnosis lymphedema   Onset of illness / date of surgery 05/21/21   Edema onset 05/21/21   Affected body parts LLE;RLE   Edema etiology   (obesity; venous stasis; cardiac history; DM)   Edema etiology comments Pts BLE lymphedema is related to cardiac based LE swelling, venous stasis, DM,a nd obesity and reduced activity on feet 2/2 falls and neuropathy   Pertinent history of current problem (PT: include personal factors and/or comorbidities that impact the POC; OT: include additional occupational profile info) PMH significant for c=section deliveries, CHF and valve issue-open heart surgery for 3 valve repair, DM, cellulitis infection history, BLE neuropathy, pacemaker, and loss energy reported   Surgical / medical history reviewed Yes   Prior level of functional mobility I-Mod I   Prior treatment Elevation   Community support Family / friend caregiver   Patient role / employment history Retired;Disabled   Living environment [place] [facility]   Current assistive devices   (SEC/walker occasional use)   Fall Risk Screen   Fall screen completed by OT   Have you fallen 2 or more times in the past year? No   Have you fallen and had an injury in the past year? No   Is patient a fall risk? Yes   Fall screen comments Pt reports fall history in past 2/2 neuropathy; no falls last year with attentive walking. Pt defers PT   Abuse Screen (yes response referral indicated)   Feels Unsafe at Home or Work/School no   Feels Threatened by Someone no   Does Anyone Try to Keep You From Having Contact with Others or Doing Things Outside Your Home? no   Physical Signs of Abuse Present no   System Outcome Measures   Lymphedema Life Impact  Scale (score range 0-72). A higher score indicates greater impairment. 12   Subjective Report   Patient report of symptoms pain; limited walking   Patient / Family Goals   Patient / family goals statement to reduce pain and swleling in BLE's   Pain   Patient currently in pain Yes   Pain location BLE   Pain rating 2/10  (mornings)   Pain description Pressure;Discomfort   Cognitive Status   Orientation Orientation to person, place and time   Level of consciousness Alert   Follows commands and answers questions 100% of the time   Personal safety and judgement Intact   Edema Exam / Assessment   Skin condition Pitting   Skin condition comments 2+ pitting  (open skin anterior L shin-covered)   Pitting 2+   Dorsal pedal pulse comments unable to palpate-no signs ischemia   Stemmer sign Positive   Girth Measurements   Girth Measurements   (will obtain upon return for services)   Range of Motion   ROM comments WFL   Strength   Strength comments NT'd   Activities of Daily Living   Activities of Daily Living I-Min A   Bed Mobility   Bed mobility I   Transfers   Transfers I   Gait / Locomotion   Gait / Locomotion I-Mod I   Sensory   Sensory perception comments BLE neuropathy   Vascular Assessment   Vascular Assessment Comments venous stasis   Coordination   Coordination Gross motor coordination appropriate   Muscle Tone   Muscle tone No deficits were identified   Planned Edema Interventions   Planned edema interventions Manual lymph drainage;Gradient compression bandaging;Fit for compression garment;Exercises;Precautions to prevent infection / exacerbation;Education;Skin care / precautions;Home management program development   Clinical Impression   Criteria for skilled therapeutic intervention met Yes   Therapy diagnosis lymphedema   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;Phlebolymphedema   Assessment of Occupational Performance 1-3 Performance Deficits   Identified Performance Deficits infection risk; pain  limiting mobility   Clinical Decision Making (Complexity) Low complexity   Treatment Frequency 1x/week;3x/week   Treatment duration 3x/wk x 3 weeks, then 1x/wk x 1 week, then 0x/wk x 3 weeks, then 1x/wk x 1 week   Patient / family and/or staff in agreement with plan of care Yes   Risks and benefits of therapy have been explained Yes   Clinical impression comments Pt can benefit from skilled lymphedema services to reduce BLE lymphedema for pain reducitons to aide mobility and to reduce risk skin infections.   Goals   Edema Eval Goals 1;2;3;4;5;6   Goal 1   Goal identifier Ed   Goal description Pt will report understanding s/s lymphedema, s/s skin infections, and treatment options for safety and I with home management   Target date 08/13/21   Goal 2   Goal identifier GCB Wearing   Goal description Tolerate gradient compression bandaging/wearing compression garments 23 hrs/day to prevent re-acccumulation of extracellular fluid for max reductions needed to reduce risk skin infections an dreduce pain for improved walking   Target date 08/13/21   Goal 3   Goal identifier GCB Donning   Goal description Demonstrate independence in applying gradient compression bandages to build I with home management of BLE lymphedema needed for pain reducitons for improved walking and to reduce risk skin infections   Target date 08/13/21   Goal 4   Goal identifier HEP/MLD   Goal description Demonstrate independence in performing prescribed exercises to facilate the lymph system and muscle pumping systems for max reductions needed to reduce risk skin infections an dreduce pain for improved walking    Target date 08/13/21   Goal 5   Goal identifier Garments   Goal description Be independent in donning/doffing, wearing schedule, and care of compression garments to build I with home management of BLE lymphedema needed for pain reducitons for improved walking and to reduce risk skin infections    Target date 08/13/21   Goal 6   Goal identifier  Reductions   Goal description Pt will reduce BLE total volume by .75L+ for reducitons needed to reduce pain and tension in skin for imporved mobility engagement   Target date 08/13/21   Total Evaluation Time   OT Eugenie, Low Complexity Minutes (49272) 25   Certification   Certification date from 05/21/21   Certification date to 08/13/21   Medical Diagnosis lymphedema   Certification I certify the need for these services furnished under this plan of treatment and while under my care.  (Physician co-signature of this document indicates review and certification of the therapy plan).

## 2021-06-01 ENCOUNTER — DOCUMENTATION ONLY (OUTPATIENT)
Dept: ANTICOAGULATION | Facility: CLINIC | Age: 76
End: 2021-06-01

## 2021-06-01 ENCOUNTER — ANTICOAGULATION THERAPY VISIT (OUTPATIENT)
Dept: ANTICOAGULATION | Facility: CLINIC | Age: 76
End: 2021-06-01

## 2021-06-01 DIAGNOSIS — Z79.01 LONG TERM CURRENT USE OF ANTICOAGULANTS WITH INR GOAL OF 2.0-3.0: ICD-10-CM

## 2021-06-01 DIAGNOSIS — I48.0 PAROXYSMAL ATRIAL FIBRILLATION (H): ICD-10-CM

## 2021-06-01 DIAGNOSIS — Z95.2 S/P AORTIC VALVE AND MITRAL VALVE REPLACEMENT: Primary | ICD-10-CM

## 2021-06-01 LAB
CAPILLARY BLOOD COLLECTION: NORMAL
INR PPP: 1.9 (ref 0.86–1.14)

## 2021-06-01 PROCEDURE — 36416 COLLJ CAPILLARY BLOOD SPEC: CPT | Performed by: NURSE PRACTITIONER

## 2021-06-01 PROCEDURE — 99207 PR NO CHARGE NURSE ONLY: CPT

## 2021-06-01 PROCEDURE — 85610 PROTHROMBIN TIME: CPT | Performed by: NURSE PRACTITIONER

## 2021-06-01 NOTE — PROGRESS NOTES
ANTICOAGULATION MANAGEMENT      Amy Maggie Cheryl due for annual renewal of referral to anticoagulation monitoring. Order pended for your review and signature.      ANTICOAGULATION SUMMARY      Warfarin indication(s)     Atrial fibrillation    Heart valve present?  Yes, She underwent bioprosthetic mitral and bioprosthetic aortic valve replacements and tricuspid annuloplasty with repair and PFO closure          Current goal range   INR: 2.0-3.0     Goal appropriate for indication? Yes, INR 2-3 appropriate for hx of DVT, PE, hypercoagulable state, Afib, LVAD, or bileaflet AVR without risk factors     Current duration of therapy Indefinite/long term therapy   Time in Therapeutic Range (TTR)  (Goal > 60%) 52.2 %       Office visit with referring provider's group within last year yes on 2/22/21       Nadia Cabello RN

## 2021-06-01 NOTE — PROGRESS NOTES
Spoke with patient briefly and she was not at home yet.  Would prefer a call around 2:00.  ANTICOAGULATION FOLLOW-UP CLINIC VISIT    Patient Name:  Amy Luna  Date:  2021  Contact Type:  Telephone/ discussed with patient    SUBJECTIVE:  Patient Findings     Positives:  Missed doses (Missed dose on Tuesday last week.)    Comments:  The patient was assessed for diet, medication, and activity level changes, extra doses, bruising or bleeding, with no problem findings.          Clinical Outcomes     Negatives:  Major bleeding event, Thromboembolic event, Anticoagulation-related hospital admission, Anticoagulation-related ED visit, Anticoagulation-related fatality    Comments:  The patient was assessed for diet, medication, and activity level changes, extra doses, bruising or bleeding, with no problem findings.             OBJECTIVE    Recent labs: (last 7 days)     21  1115   INR 1.90*       ASSESSMENT / PLAN  INR assessment SUB    Recheck INR In: 2 WEEKS    INR Location Clinic      Anticoagulation Summary  As of 2021    INR goal:  2.0-3.0   TTR:  52.2 % (11.5 mo)   INR used for dosin.90 (2021)   Warfarin maintenance plan:  10 mg (5 mg x 2) every Wed, Sat; 7.5 mg (5 mg x 1.5) all other days   Full warfarin instructions:  : 10 mg; Otherwise 10 mg every Wed, Sat; 7.5 mg all other days   Weekly warfarin total:  57.5 mg   Plan last modified:  Nadia Cabello RN (2021)   Next INR check:  6/15/2021   Priority:  Maintenance   Target end date:  Indefinite    Indications    Paroxysmal atrial fibrillation (H) [I48.0]  Long term current use of anticoagulants with INR goal of 2.0-3.0 [Z79.01]             Anticoagulation Episode Summary     INR check location:      Preferred lab:      Send INR reminders to:  [facility]    Comments:        Anticoagulation Care Providers     Provider Role Specialty Phone number    Yamilka Christina DO Referring Cardiovascular Disease  [phone]    Kelli Lewis CNP Referring Internal Medicine [phone]            See the Encounter Report to view Anticoagulation Flowsheet and Dosing Calendar (Go to Encounters tab in chart review, and find the Anticoagulation Therapy Visit)    Dosage adjustment made based on physician directed care plan.    Nadia Caebllo RN

## 2021-06-02 ENCOUNTER — MYC MEDICAL ADVICE (OUTPATIENT)
Dept: INTERNAL MEDICINE | Facility: CLINIC | Age: 76
End: 2021-06-02

## 2021-06-02 DIAGNOSIS — I48.0 PAROXYSMAL ATRIAL FIBRILLATION (H): ICD-10-CM

## 2021-06-02 DIAGNOSIS — I07.1 TRICUSPID VALVE INSUFFICIENCY, UNSPECIFIED ETIOLOGY: ICD-10-CM

## 2021-06-02 DIAGNOSIS — Z95.2 S/P AORTIC VALVE AND MITRAL VALVE REPLACEMENT: ICD-10-CM

## 2021-06-02 DIAGNOSIS — Z95.2 S/P AVR (AORTIC VALVE REPLACEMENT): ICD-10-CM

## 2021-06-02 DIAGNOSIS — I10 ESSENTIAL HYPERTENSION: ICD-10-CM

## 2021-06-02 DIAGNOSIS — Z95.3 S/P MITRAL VALVE REPLACEMENT WITH BIOPROSTHETIC VALVE: ICD-10-CM

## 2021-06-02 DIAGNOSIS — Z95.0 CARDIAC PACEMAKER IN SITU: ICD-10-CM

## 2021-06-02 DIAGNOSIS — D62 ACUTE BLOOD LOSS ANEMIA: ICD-10-CM

## 2021-06-02 NOTE — TELEPHONE ENCOUNTER
Pending Prescriptions:                       Disp   Refills    metFORMIN (GLUCOPHAGE-XR) 500 MG 24 hr ta*90 tab*3            Sig: Take 2 tablets (1,000 mg) by mouth daily (with           breakfast) (takes 2 x 500mg)    Routing refill request to provider for review/approval because:  Ordered by an outside provider

## 2021-06-03 DIAGNOSIS — I10 ESSENTIAL HYPERTENSION: ICD-10-CM

## 2021-06-03 RX ORDER — WARFARIN SODIUM 5 MG/1
TABLET ORAL
Qty: 150 TABLET | Refills: 1 | Status: ON HOLD | OUTPATIENT
Start: 2021-06-03 | End: 2022-01-01

## 2021-06-03 RX ORDER — METOPROLOL SUCCINATE 25 MG/1
50 TABLET, EXTENDED RELEASE ORAL DAILY
Qty: 180 TABLET | Refills: 3 | Status: SHIPPED | OUTPATIENT
Start: 2021-06-03 | End: 2022-01-01

## 2021-06-03 RX ORDER — METFORMIN HCL 500 MG
1000 TABLET, EXTENDED RELEASE 24 HR ORAL
Qty: 180 TABLET | Refills: 0 | Status: SHIPPED | OUTPATIENT
Start: 2021-06-03 | End: 2021-08-06

## 2021-06-03 NOTE — TELEPHONE ENCOUNTER
Pending Prescriptions:                       Disp   Refills    warfarin ANTICOAGULANT (COUMADIN) 5 MG tab*150 ta*0        Sig: TAKE 1 AND 1/2 TABLETS ON SUNDAY, TUESDAY, THURSDAY           AND 1 TABLET  ALL OTHER DAYS OR AS DIRECTED BY           INR CLINIC

## 2021-06-03 NOTE — TELEPHONE ENCOUNTER
Anticoagulation Monitoring Instructions   Latest Ref Rng & Units    6/1/2021 6/1: 10 mg; Otherwise 10 mg every Wed, Sat; 7.5 mg all other days   Prescription approved per [facility] Refill Protocol.  Liyah Crawford, RN  Anticoagulation Nurse - [facility]

## 2021-06-04 DIAGNOSIS — Z95.2 S/P AORTIC VALVE AND MITRAL VALVE REPLACEMENT: ICD-10-CM

## 2021-06-04 DIAGNOSIS — D62 ACUTE BLOOD LOSS ANEMIA: ICD-10-CM

## 2021-06-04 DIAGNOSIS — Z95.0 CARDIAC PACEMAKER IN SITU: ICD-10-CM

## 2021-06-04 DIAGNOSIS — I07.1 TRICUSPID VALVE INSUFFICIENCY, UNSPECIFIED ETIOLOGY: ICD-10-CM

## 2021-06-04 DIAGNOSIS — I48.0 PAROXYSMAL ATRIAL FIBRILLATION (H): ICD-10-CM

## 2021-06-04 DIAGNOSIS — I10 ESSENTIAL HYPERTENSION: ICD-10-CM

## 2021-06-04 DIAGNOSIS — Z95.2 S/P AVR (AORTIC VALVE REPLACEMENT): ICD-10-CM

## 2021-06-04 DIAGNOSIS — Z95.3 S/P MITRAL VALVE REPLACEMENT WITH BIOPROSTHETIC VALVE: ICD-10-CM

## 2021-06-04 RX ORDER — ATORVASTATIN CALCIUM 40 MG/1
40 TABLET, FILM COATED ORAL AT BEDTIME
Qty: 90 TABLET | Refills: 3 | Status: SHIPPED | OUTPATIENT
Start: 2021-06-04 | End: 2022-01-01

## 2021-06-08 ENCOUNTER — HOSPITAL ENCOUNTER (OUTPATIENT)
Dept: OCCUPATIONAL THERAPY | Facility: CLINIC | Age: 76
Setting detail: THERAPIES SERIES
End: 2021-06-08
Attending: INTERNAL MEDICINE
Payer: MEDICARE

## 2021-06-08 DIAGNOSIS — I89.0 LYMPHEDEMA: Primary | ICD-10-CM

## 2021-06-08 PROCEDURE — 97140 MANUAL THERAPY 1/> REGIONS: CPT | Mod: GO

## 2021-06-09 ENCOUNTER — HOSPITAL ENCOUNTER (OUTPATIENT)
Dept: OCCUPATIONAL THERAPY | Facility: CLINIC | Age: 76
Setting detail: THERAPIES SERIES
End: 2021-06-09
Attending: INTERNAL MEDICINE
Payer: MEDICARE

## 2021-06-09 PROCEDURE — 97140 MANUAL THERAPY 1/> REGIONS: CPT | Mod: GO | Performed by: OCCUPATIONAL THERAPIST

## 2021-06-10 ENCOUNTER — HOSPITAL ENCOUNTER (OUTPATIENT)
Dept: OCCUPATIONAL THERAPY | Facility: CLINIC | Age: 76
Setting detail: THERAPIES SERIES
End: 2021-06-10
Attending: INTERNAL MEDICINE
Payer: MEDICARE

## 2021-06-10 PROCEDURE — 97140 MANUAL THERAPY 1/> REGIONS: CPT | Mod: GO

## 2021-06-13 ENCOUNTER — HEALTH MAINTENANCE LETTER (OUTPATIENT)
Age: 76
End: 2021-06-13

## 2021-06-16 ENCOUNTER — ANTICOAGULATION THERAPY VISIT (OUTPATIENT)
Dept: ANTICOAGULATION | Facility: CLINIC | Age: 76
End: 2021-06-16

## 2021-06-16 DIAGNOSIS — Z95.2 S/P AORTIC VALVE AND MITRAL VALVE REPLACEMENT: ICD-10-CM

## 2021-06-16 DIAGNOSIS — Z79.01 LONG TERM CURRENT USE OF ANTICOAGULANTS WITH INR GOAL OF 2.0-3.0: ICD-10-CM

## 2021-06-16 DIAGNOSIS — I48.0 PAROXYSMAL ATRIAL FIBRILLATION (H): ICD-10-CM

## 2021-06-16 LAB
CAPILLARY BLOOD COLLECTION: NORMAL
INR PPP: 2.6 (ref 0.86–1.14)

## 2021-06-16 PROCEDURE — 36416 COLLJ CAPILLARY BLOOD SPEC: CPT | Performed by: INTERNAL MEDICINE

## 2021-06-16 PROCEDURE — 85610 PROTHROMBIN TIME: CPT | Performed by: INTERNAL MEDICINE

## 2021-06-16 PROCEDURE — 99207 PR NO CHARGE NURSE ONLY: CPT

## 2021-06-16 NOTE — PROGRESS NOTES
ANTICOAGULATION FOLLOW-UP CLINIC VISIT    Patient Name:  Amy Luna  Date:  2021  Contact Type:  Telephone/ Called patient, she denies any changes. Orders discussed with the patient, she agrees with the plan. Lab INR appointment scheduled on 21.    SUBJECTIVE:  Patient Findings     Comments:  The patient was assessed for diet, medication, and activity level changes, missed or extra doses, bruising or bleeding, with no problem findings. Maintenance warfarin dosing was reviewed with patient and will remain on the same dose until next INR check. Patient did not have any questions or concerns.           Clinical Outcomes     Negatives:  Major bleeding event, Thromboembolic event, Anticoagulation-related hospital admission, Anticoagulation-related ED visit, Anticoagulation-related fatality    Comments:  The patient was assessed for diet, medication, and activity level changes, missed or extra doses, bruising or bleeding, with no problem findings. Maintenance warfarin dosing was reviewed with patient and will remain on the same dose until next INR check. Patient did not have any questions or concerns.              OBJECTIVE    Recent labs: (last 7 days)     21  1309   INR 2.60*       ASSESSMENT / PLAN  INR assessment THER    Recheck INR In: 3 WEEKS    INR Location Outside lab      Anticoagulation Summary  As of 2021    INR goal:  2.0-3.0   TTR:  53.6 % (1 y)   INR used for dosin.60 (2021)   Warfarin maintenance plan:  10 mg (5 mg x 2) every Wed, Sat; 7.5 mg (5 mg x 1.5) all other days   Full warfarin instructions:  10 mg every Wed, Sat; 7.5 mg all other days   Weekly warfarin total:  57.5 mg   No change documented:  Chiqui Nowak RN   Plan last modified:  Nadia Cabello RN (2021)   Next INR check:  2021   Priority:  Maintenance   Target end date:  Indefinite    Indications    Paroxysmal atrial fibrillation (H) [I48.0]  Long term current use of anticoagulants with INR goal  of 2.0-3.0 [Z79.01]  S/P aortic valve and mitral valve replacement [Z95.2]             Anticoagulation Episode Summary     INR check location:      Preferred lab:      Send INR reminders to:  [facility]    Comments:        Anticoagulation Care Providers     Provider Role Specialty Phone number    Jennifernicole Yamilka Ventura DO Referring Cardiovascular Disease [phone]    Kelli Lewis CNP Referring Internal Medicine [phone]    She Huang MD Referring Internal Medicine [phone]            See the Encounter Report to view Anticoagulation Flowsheet and Dosing Calendar (Go to Encounters tab in chart review, and find the Anticoagulation Therapy Visit)    Dosage adjustment made based on physician directed care plan.    Chiqui Nowak RN

## 2021-06-17 ENCOUNTER — HOSPITAL ENCOUNTER (OUTPATIENT)
Dept: OCCUPATIONAL THERAPY | Facility: CLINIC | Age: 76
Setting detail: THERAPIES SERIES
End: 2021-06-17
Attending: INTERNAL MEDICINE
Payer: MEDICARE

## 2021-06-17 PROCEDURE — 97140 MANUAL THERAPY 1/> REGIONS: CPT | Mod: GO

## 2021-06-18 ENCOUNTER — HOSPITAL ENCOUNTER (OUTPATIENT)
Dept: OCCUPATIONAL THERAPY | Facility: CLINIC | Age: 76
Setting detail: THERAPIES SERIES
End: 2021-06-18
Attending: INTERNAL MEDICINE
Payer: MEDICARE

## 2021-06-18 PROCEDURE — 97140 MANUAL THERAPY 1/> REGIONS: CPT | Mod: GO

## 2021-06-22 ENCOUNTER — HOSPITAL ENCOUNTER (OUTPATIENT)
Dept: OCCUPATIONAL THERAPY | Facility: CLINIC | Age: 76
Setting detail: THERAPIES SERIES
End: 2021-06-22
Attending: INTERNAL MEDICINE
Payer: MEDICARE

## 2021-06-22 PROCEDURE — 97140 MANUAL THERAPY 1/> REGIONS: CPT | Mod: GO

## 2021-06-24 ENCOUNTER — HOSPITAL ENCOUNTER (OUTPATIENT)
Dept: OCCUPATIONAL THERAPY | Facility: CLINIC | Age: 76
Setting detail: THERAPIES SERIES
End: 2021-06-24
Attending: INTERNAL MEDICINE
Payer: MEDICARE

## 2021-06-24 PROCEDURE — 97140 MANUAL THERAPY 1/> REGIONS: CPT | Mod: GO

## 2021-06-25 ENCOUNTER — HOSPITAL ENCOUNTER (OUTPATIENT)
Dept: OCCUPATIONAL THERAPY | Facility: CLINIC | Age: 76
Setting detail: THERAPIES SERIES
End: 2021-06-25
Attending: INTERNAL MEDICINE
Payer: MEDICARE

## 2021-06-25 PROCEDURE — 97140 MANUAL THERAPY 1/> REGIONS: CPT | Mod: GO

## 2021-07-07 ENCOUNTER — ANTICOAGULATION THERAPY VISIT (OUTPATIENT)
Dept: ANTICOAGULATION | Facility: CLINIC | Age: 76
End: 2021-07-07

## 2021-07-07 DIAGNOSIS — I48.0 PAROXYSMAL ATRIAL FIBRILLATION (H): ICD-10-CM

## 2021-07-07 DIAGNOSIS — Z95.2 S/P AORTIC VALVE AND MITRAL VALVE REPLACEMENT: ICD-10-CM

## 2021-07-07 DIAGNOSIS — Z79.01 LONG TERM CURRENT USE OF ANTICOAGULANTS WITH INR GOAL OF 2.0-3.0: ICD-10-CM

## 2021-07-07 DIAGNOSIS — Z79.01 LONG TERM CURRENT USE OF ANTICOAGULANTS WITH INR GOAL OF 2.0-3.0: Primary | ICD-10-CM

## 2021-07-07 LAB
CAPILLARY BLOOD COLLECTION: NORMAL
INR PPP: 2.6 (ref 0.86–1.14)

## 2021-07-07 PROCEDURE — 85610 PROTHROMBIN TIME: CPT | Performed by: INTERNAL MEDICINE

## 2021-07-07 PROCEDURE — 36416 COLLJ CAPILLARY BLOOD SPEC: CPT | Performed by: INTERNAL MEDICINE

## 2021-07-07 NOTE — PROGRESS NOTES
ANTICOAGULATION MANAGEMENT     Amy Luna 76 year old female is on warfarin with therapeutic INR result. (Goal INR 2.0-3.0)    Recent labs: (last 7 days)     07/07/21  1058   INR 2.60*       ASSESSMENT     Source(s): Patient/Caregiver Call       Warfarin doses taken: Warfarin taken as instructed    Diet: No new diet changes identified    New illness, injury, or hospitalization: No    Medication/supplement changes: None noted    Signs or symptoms of bleeding or clotting: No    Previous INR: Therapeutic last visit; previously outside of goal range    Additional findings: None     PLAN     Recommended plan for no diet, medication or health factor changes affecting INR     Dosing Instructions: Continue your current warfarin dose with next INR in 4 weeks       Summary  As of 7/7/2021    Full warfarin instructions:  10 mg every Wed, Sat; 7.5 mg all other days   Next INR check:  8/4/2021             Telephone call with Amy who verbalizes understanding and agrees to plan    Lab visit scheduled    Education provided: Contact [phone]  with any changes, questions or concerns.     Plan made per [facility] anticoagulation protocol    Chiqui Nowak RN  Anticoagulation Clinic  7/7/2021    _______________________________________________________________________     Anticoagulation Episode Summary     Current INR goal:  2.0-3.0   TTR:  57.2 % (1 y)   Target end date:  Indefinite   Send INR reminders to:  [facility]    Indications    Paroxysmal atrial fibrillation (H) [I48.0]  Long term current use of anticoagulants with INR goal of 2.0-3.0 [Z79.01]  S/P aortic valve and mitral valve replacement [Z95.2]           Comments:           Anticoagulation Care Providers     Provider Role Specialty Phone number    Yamilka Christina DO Referring Cardiovascular Disease [phone]    Kelli Lewis CNP Referring Internal Medicine [phone]    She Huang MD Referring Internal  Medicine [phone]

## 2021-07-13 DIAGNOSIS — N18.30 CONTROLLED TYPE 2 DIABETES MELLITUS WITH STAGE 3 CHRONIC KIDNEY DISEASE, WITH LONG-TERM CURRENT USE OF INSULIN (H): ICD-10-CM

## 2021-07-13 DIAGNOSIS — E11.22 CONTROLLED TYPE 2 DIABETES MELLITUS WITH STAGE 3 CHRONIC KIDNEY DISEASE, WITH LONG-TERM CURRENT USE OF INSULIN (H): ICD-10-CM

## 2021-07-13 DIAGNOSIS — Z79.4 CONTROLLED TYPE 2 DIABETES MELLITUS WITH STAGE 3 CHRONIC KIDNEY DISEASE, WITH LONG-TERM CURRENT USE OF INSULIN (H): ICD-10-CM

## 2021-07-14 RX ORDER — FLASH GLUCOSE SENSOR
KIT MISCELLANEOUS
Qty: 2 EACH | Refills: 1 | OUTPATIENT
Start: 2021-07-14

## 2021-07-16 NOTE — TELEPHONE ENCOUNTER
Patient calls stating she is out of her sensors.  Advised that they were denied by provider stating patient needs to be seen for diabetes check.    Appointment scheduled for Monday.  Will give refill to get patient through.    Briana Doe RN

## 2021-07-19 ENCOUNTER — OFFICE VISIT (OUTPATIENT)
Dept: INTERNAL MEDICINE | Facility: CLINIC | Age: 76
End: 2021-07-19
Payer: MEDICARE

## 2021-07-19 VITALS
DIASTOLIC BLOOD PRESSURE: 64 MMHG | RESPIRATION RATE: 18 BRPM | OXYGEN SATURATION: 99 % | SYSTOLIC BLOOD PRESSURE: 129 MMHG | TEMPERATURE: 97.6 F | WEIGHT: 280.9 LBS | BODY MASS INDEX: 46.8 KG/M2 | HEART RATE: 81 BPM | HEIGHT: 65 IN

## 2021-07-19 DIAGNOSIS — N18.32 TYPE 2 DIABETES MELLITUS WITH STAGE 3B CHRONIC KIDNEY DISEASE, WITH LONG-TERM CURRENT USE OF INSULIN (H): Primary | ICD-10-CM

## 2021-07-19 DIAGNOSIS — Z79.4 TYPE 2 DIABETES MELLITUS WITH STAGE 3B CHRONIC KIDNEY DISEASE, WITH LONG-TERM CURRENT USE OF INSULIN (H): Primary | ICD-10-CM

## 2021-07-19 DIAGNOSIS — E11.22 TYPE 2 DIABETES MELLITUS WITH STAGE 3B CHRONIC KIDNEY DISEASE, WITH LONG-TERM CURRENT USE OF INSULIN (H): Primary | ICD-10-CM

## 2021-07-19 PROCEDURE — 99214 OFFICE O/P EST MOD 30 MIN: CPT | Performed by: INTERNAL MEDICINE

## 2021-07-19 RX ORDER — FLASH GLUCOSE SENSOR
1 KIT MISCELLANEOUS
Qty: 6 EACH | Refills: 0 | Status: SHIPPED | OUTPATIENT
Start: 2021-07-19 | End: 2021-07-19

## 2021-07-19 RX ORDER — FLASH GLUCOSE SENSOR
1 KIT MISCELLANEOUS
Qty: 1 EACH | Refills: 5 | Status: SHIPPED | OUTPATIENT
Start: 2021-07-19 | End: 2021-08-30

## 2021-07-19 RX ORDER — FLASH GLUCOSE SENSOR
1 KIT MISCELLANEOUS
Qty: 6 EACH | Refills: 0 | Status: SHIPPED | OUTPATIENT
Start: 2021-07-19 | End: 2021-08-26

## 2021-07-19 ASSESSMENT — PATIENT HEALTH QUESTIONNAIRE - PHQ9: SUM OF ALL RESPONSES TO PHQ QUESTIONS 1-9: 3

## 2021-07-19 ASSESSMENT — MIFFLIN-ST. JEOR: SCORE: 1770.03

## 2021-07-19 NOTE — PATIENT INSTRUCTIONS
Plan:  1. Continue Viji  2. Please make a lab appointment for fasting labs    3. Please make an appointment with  Afia Ricketts, [facility]  After the labs  4. Follow up in 3 months with dr Diaz

## 2021-07-19 NOTE — PROGRESS NOTES
Dr Diaz's note      Patient's instructions / PLAN:                                                        Plan:  1. Continue Viji  2. Please make a lab appointment for fasting labs    3. Please make an appointment with  Marce Belcher D  After the labs  4. Follow up in 3 months with dr Diaz        ASSESSMENT & PLAN:                                                      chronic medical problems:DM on insulin, morbid obesity,HTN, HLipidemia had extensive heart surgery in April for AVR, MVR, PFO closure, TV repair, pacemaker atrial lead replaced.  She was found with atrial fibrillation on the pacemaker records and she was started on amiodarone and Coumadin.    (E11.21,  N18.32,  Z79.4) DM 2 , insulin, + CKD3  (primary encounter diagnosis)  Comment: Not controlled   Plan: Continuous Blood Gluc Sensor (FREESTYLE VIJI         14 DAY SENSOR) MISC, Continuous Blood Gluc         Sensor (FREESTYLE VIJI 14 DAY SENSOR) MISC,         DISCONTINUED: Continuous Blood Gluc Sensor         (FREESTYLE VIJI 14 DAY SENSOR) MISC               Chief complaint:                                                      DM    SUBJECTIVE:                                                    History of present illness:    DM  --At the last office visit in February, we discussed about the viji and about labs and follow-up appointment in May.  --She has not scheduled yet the labs  --She made appointment today because the pharmacy would not fill the viji sensor  --I reviewed her chart, new prescription for viji sensor was sent last week.  I am not sure where the problem was: If the prescription was not received in time or if the insurance did not cover it.  She states that the insurance has covered the so far.  --I reviewed the chart.  [facility] send her a message for an appointment to help downloaded the viji sensor.  Patient has not made appointment.     Viji CGM  -- she has been using it for the last 2 months  -- viji is covered only  28 d a month  -- BS average -- she didn't bring the monitor   --         Diabetes Follow-up    How often are you checking your blood sugar? Four or more times daily  Blood sugar testing frequency justification:  Uncontrolled diabetes  What time of day are you checking your blood sugars (select all that apply)?  Before and after meals  Have you had any blood sugars above 200?  Yes   Have you had any blood sugars below 70?  Yes     What symptoms do you notice when your blood sugar is low?  None and fatigue    What concerns do you have today about your diabetes? None and Blood sugar is often over 200     Do you have any of these symptoms? (Select all that apply)  No numbness or tingling in feet.  No redness, sores or blisters on feet.  No complaints of excessive thirst.  No reports of blurry vision.  No significant changes to weight.    Have you had a diabetic eye exam in the last 12 months? No                Hyperlipidemia Follow-Up      Are you regularly taking any medication or supplement to lower your cholesterol?   Yes- Atorvastatin    Are you having muscle aches or other side effects that you think could be caused by your cholesterol lowering medication?  No    Hypertension Follow-up      Do you check your blood pressure regularly outside of the clinic? Yes     Are you following a low salt diet? No    Are your blood pressures ever more than 140 on the top number (systolic) OR more   than 90 on the bottom number (diastolic), for example 140/90? No    BP Readings from Last 2 Encounters:   05/17/21 126/70   04/29/21 (!) 148/72     Hemoglobin A1C (%)   Date Value   02/05/2021 7.1 (H)   11/03/2020 6.8 (H)     LDL Cholesterol Calculated (mg/dL)   Date Value   07/28/2020 102 (H)         How many servings of fruits and vegetables do you eat daily?  2-3    On average, how many sweetened beverages do you drink each day (Examples: soda, juice, sweet tea, etc.  Do NOT count diet or artificially sweetened beverages)?    "0    How many days per week do you exercise enough to make your heart beat faster? 3 or less    How many minutes a day do you exercise enough to make your heart beat faster? 9 or less    How many days per week do you miss taking your medication? 0      Review of Systems:                                                      ROS: negative for fever, chills, cough, wheezes, chest pain, shortness of breath, vomiting, abdominal pain, leg swelling          OBJECTIVE:             Physical exam:  Blood pressure 129/64, pulse 81, temperature 97.6  F (36.4  C), temperature source Oral, resp. rate 18, height 1.651 m (5' 5\"), weight 127.4 kg (280 lb 14.4 oz), SpO2 99 %, not currently breastfeeding.     NAD, appears comfortable  Neurologic: A, Ox3, no focal signs appreciated    PMHx: reviewed  Past Medical History:   Diagnosis Date     (HFpEF) heart failure with preserved ejection fraction (H)      Aortic stenosis      Chest pain      Depressive disorder      Diabetes (H)      Hyperlipidemia      Hypertension      Mitral annular calcification      Mitral stenosis      Obesity      Sleep apnea     CPAP      PSHx: reviewed  Past Surgical History:   Procedure Laterality Date     APPENDECTOMY       CV CORONARY ANGIOGRAM N/A 2020    Procedure: Coronary Angiogram;  Surgeon: Inez Peoples MD;  Location:  HEART CARDIAC CATH LAB     CV LEFT HEART CATH N/A 2020    Procedure: Left Heart Cath;  Surgeon: Inez Peoples MD;  Location:  HEART CARD[facility] CATH LAB     CV PFO CLOSURE N/A 4/15/2020    Procedure: Patent Foramen Ovale Closure;  Surgeon: Ok Wilson MD;  Location:  OR      RIGHT HEART CATH MEASUREMENTS RECORDED N/A 2020    Procedure: Right Heart Cath;  Surgeon: Inez Peoples MD;  Location:  HEART CARDIAC CATH LAB     EP PACEMAKER N/A 2020    Procedure: EP Pacemaker;  Surgeon: Sohan Francis MD;  Location:  HEART CARDIAC CATH LAB     GYN SURGERY       x2 " 1981, 1984     GYN SURGERY      total hysterectomy     IMPLANT PACEMAKER  2015     REPAIR VALVE TRICUSPID N/A 4/15/2020    Procedure: REPAIR TRICUSPID VALVE WITH VILLA MC3 26MM.;  Surgeon: Ok Wilson MD;  Location: [facility] OR     REPLACE VALVE AORTIC N/A 4/15/2020    Procedure: REPLACEMENT, AORTIC VALVE WITH INSPIRIS TISSUE VALVE 25 MM; ON PUMP WITH SOCORRO READ BY CARDIOLOGIST DR BLANTON.;  Surgeon: Ok Wilson MD;  Location: [facility] OR     REPLACE VALVE MITRAL N/A 4/15/2020    Procedure: REPLACEMENT, MITRAL VALVE WITH EPIC TISSUE VALVE 27 MM.;  Surgeon: Ok Wilson MD;  Location: [facility] OR        Meds: reviewed  Current Outpatient Medications   Medication Sig Dispense Refill     acetaminophen (TYLENOL) 325 MG tablet Take 2 tablets (650 mg) by mouth every 4 hours as needed for other (multimodal surgical pain management along with NSAIDS and opioid medication as indicated based on pain control and physical function.)  0     albuterol (PROAIR HFA/PROVENTIL HFA/VENTOLIN HFA) 108 (90 Base) MCG/ACT inhaler Inhale 2 puffs into the lungs every 4 hours as needed for shortness of breath / dyspnea or wheezing       apixaban ANTICOAGULANT (ELIQUIS ANTICOAGULANT) 5 MG tablet Take 1 tablet (5 mg) by mouth 2 times daily 60 tablet 3     atorvastatin (LIPITOR) 40 MG tablet Take 1 tablet (40 mg) by mouth At Bedtime 90 tablet 3     Biotin 09451 MCG TABS Take 10,000 mcg by mouth every morning        Calcium Carbonate-Vit D-Min (CALCIUM 1200 PO) Take 1 tablet by mouth every evening        coenzyme Q-10 200 MG CAPS Take 200 mg by mouth every morning        Continuous Blood Gluc  (FREESTYLE ADAM READER) HARJEET 1 each every 14 days Use to read blood sugars per  instructions. 1 each 0     Continuous Blood Gluc Sensor MISC 1 each every 14 days 6 each 0     cyanocobalamin (VITAMIN B-12) 100 MCG tablet Take 100 mcg by mouth daily       ferrous gluconate (FERGON) 324 (38 Fe) MG tablet Take 1 tablet (324  mg) by mouth daily (with breakfast) 30 tablet 3     furosemide (LASIX) 40 MG tablet Take 1 tablet (40 mg) by mouth 2 times daily Further dosing per PCP/ Cardiology - Oral (Patient taking differently: Take 40 mg by mouth daily 4/29/21 Currently taking 1 tablet daily and an additional PRN; Further dosing per PCP/ Cardiology - Oral) 180 tablet 3     gabapentin (NEURONTIN) 100 MG capsule May use 1-3 capsules, 1-3x/during the day. 180 capsule 3     Ginkgo Biloba (GINKOBA PO) Take 250 mg by mouth daily       insulin NPH-Regular 70/30 (70-30) 100 UNIT/ML vial Inject 20-40 Units Subcutaneous 2 times daily (with meals) 72 mL 1     ipratropium (ATROVENT) 0.06 % nasal spray Spray 2 sprays into both nostrils 4 times daily as needed for rhinitis 3 Box 1     Lutein 40 MG CAPS Take 40 mg by mouth every morning        Magnesium 400 MG TABS Take 400 mg by mouth every evening        metFORMIN (GLUCOPHAGE-XR) 500 MG 24 hr tablet Take 2 tablets (1,000 mg) by mouth daily (with breakfast) (takes 2 x 500mg) 180 tablet 0     metoprolol succinate ER (TOPROL-XL) 25 MG 24 hr tablet Take 2 tablets (50 mg) by mouth daily 180 tablet 3     potassium aminobenzoate 500 MG CAPS capsule Take 99 mg by mouth daily Patient taking tablet        Propylene Glycol (SYSTANE BALANCE OP) Apply 1-2 drops to eye 3 times daily as needed (dry eyes)       sertraline (ZOLOFT) 100 MG tablet Take 1.5 tablets (150 mg) by mouth every morning (takes 1.5 x 100mg) 135 tablet 1     TURMERIC PO Take 3 tablets by mouth daily        vitamin (B COMPLEX-C) tablet Take 1 tablet by mouth daily       vitamin C (ASCORBIC ACID) 1000 MG TABS Take 1,000 mg by mouth every evening        Vitamin D3 (CHOLECALCIFEROL) 25 mcg (1000 units) tablet Take 1 tablet (25 mcg) by mouth daily (Patient taking differently: Take 125 mcg by mouth daily 5,000 Units daily)  0     warfarin ANTICOAGULANT (COUMADIN) 5 MG tablet TAKE 2 tablets (10 mg) every Wed, Sat; and take 1-1/2 tablets (7.5 mg) all other  days of the week or as directed by your [facility]. 150 tablet 1     zinc 23 MG LOZG lozenge Place 1 lozenge inside cheek daily          Soc Hx: reviewed  Fam Hx: reviewed          She Diaz MD  Internal Medicine

## 2021-07-27 ENCOUNTER — TELEPHONE (OUTPATIENT)
Dept: INTERNAL MEDICINE | Facility: CLINIC | Age: 76
End: 2021-07-27
Payer: MEDICARE

## 2021-07-27 NOTE — TELEPHONE ENCOUNTER
We will be unable to fill Freestyle Viji 14 Day Sensors after 8/22/21 for the patient due to not meeting Medicare Guidelines. In order to meet guidelines we need the following:    Injecting insulin at least 3 times per day    Please addend the 7/19/21 visit notes if appropriate.  For Medicare compliance the insulin regimen needs to be included in the clinic notes.    Thank you,  [facility]  [phone]

## 2021-07-29 NOTE — TELEPHONE ENCOUNTER
The insulin regimen must be in the clinic notes, please addend the notes appropriately.    Thank you,  DME team

## 2021-08-02 NOTE — TELEPHONE ENCOUNTER
Apologies for the misunderstanding.  The information must be in the clinic notes, we CAN NOT use the med list independently.    The insulin regimen must be in the clinic notes, please addend the notes appropriately.     Thank you,  DME team

## 2021-08-04 ENCOUNTER — ANTICOAGULATION THERAPY VISIT (OUTPATIENT)
Dept: ANTICOAGULATION | Facility: CLINIC | Age: 76
End: 2021-08-04

## 2021-08-04 ENCOUNTER — LAB (OUTPATIENT)
Dept: LAB | Facility: CLINIC | Age: 76
End: 2021-08-04

## 2021-08-04 ENCOUNTER — ANCILLARY PROCEDURE (OUTPATIENT)
Dept: CARDIOLOGY | Facility: CLINIC | Age: 76
End: 2021-08-04
Attending: INTERNAL MEDICINE
Payer: MEDICARE

## 2021-08-04 DIAGNOSIS — E11.22 CONTROLLED TYPE 2 DIABETES MELLITUS WITH STAGE 3 CHRONIC KIDNEY DISEASE, WITH LONG-TERM CURRENT USE OF INSULIN (H): ICD-10-CM

## 2021-08-04 DIAGNOSIS — I48.0 PAROXYSMAL ATRIAL FIBRILLATION (H): Primary | ICD-10-CM

## 2021-08-04 DIAGNOSIS — I48.0 PAROXYSMAL ATRIAL FIBRILLATION (H): ICD-10-CM

## 2021-08-04 DIAGNOSIS — Z95.0 CARDIAC PACEMAKER IN SITU: ICD-10-CM

## 2021-08-04 DIAGNOSIS — Z79.01 LONG TERM CURRENT USE OF ANTICOAGULANTS WITH INR GOAL OF 2.0-3.0: ICD-10-CM

## 2021-08-04 DIAGNOSIS — I44.30 AV BLOCK: Primary | ICD-10-CM

## 2021-08-04 DIAGNOSIS — Z95.2 S/P AORTIC VALVE AND MITRAL VALVE REPLACEMENT: ICD-10-CM

## 2021-08-04 DIAGNOSIS — I10 HTN, GOAL BELOW 140/90: ICD-10-CM

## 2021-08-04 DIAGNOSIS — N18.30 CONTROLLED TYPE 2 DIABETES MELLITUS WITH STAGE 3 CHRONIC KIDNEY DISEASE, WITH LONG-TERM CURRENT USE OF INSULIN (H): ICD-10-CM

## 2021-08-04 DIAGNOSIS — E78.5 HYPERLIPIDEMIA LDL GOAL <100: ICD-10-CM

## 2021-08-04 DIAGNOSIS — Z79.4 CONTROLLED TYPE 2 DIABETES MELLITUS WITH STAGE 3 CHRONIC KIDNEY DISEASE, WITH LONG-TERM CURRENT USE OF INSULIN (H): ICD-10-CM

## 2021-08-04 LAB
ERYTHROCYTE [DISTWIDTH] IN BLOOD BY AUTOMATED COUNT: 13.1 % (ref 10–15)
HBA1C MFR BLD: 7.3 % (ref 0–5.6)
HCT VFR BLD AUTO: 37.7 % (ref 35–47)
HGB BLD-MCNC: 11.8 G/DL (ref 11.7–15.7)
INR BLD: 2.3 (ref 0.9–1.1)
MCH RBC QN AUTO: 32.2 PG (ref 26.5–33)
MCHC RBC AUTO-ENTMCNC: 31.3 G/DL (ref 31.5–36.5)
MCV RBC AUTO: 103 FL (ref 78–100)
PLATELET # BLD AUTO: 198 10E3/UL (ref 150–450)
RBC # BLD AUTO: 3.67 10E6/UL (ref 3.8–5.2)
WBC # BLD AUTO: 7.5 10E3/UL (ref 4–11)

## 2021-08-04 PROCEDURE — 84443 ASSAY THYROID STIM HORMONE: CPT

## 2021-08-04 PROCEDURE — 85027 COMPLETE CBC AUTOMATED: CPT

## 2021-08-04 PROCEDURE — 36415 COLL VENOUS BLD VENIPUNCTURE: CPT

## 2021-08-04 PROCEDURE — 80061 LIPID PANEL: CPT

## 2021-08-04 PROCEDURE — 93280 PM DEVICE PROGR EVAL DUAL: CPT | Performed by: INTERNAL MEDICINE

## 2021-08-04 PROCEDURE — 85610 PROTHROMBIN TIME: CPT

## 2021-08-04 PROCEDURE — 83036 HEMOGLOBIN GLYCOSYLATED A1C: CPT

## 2021-08-04 PROCEDURE — 80053 COMPREHEN METABOLIC PANEL: CPT

## 2021-08-04 NOTE — PROGRESS NOTES
ANTICOAGULATION MANAGEMENT     Amy Luna 76 year old female is on warfarin with therapeutic INR result. (Goal INR 2.0-3.0)    Recent labs: (last 7 days)     08/04/21  1125   INR 2.3*       ASSESSMENT     Source(s): Chart Review and Patient/Caregiver Call       Warfarin doses taken: Warfarin taken as instructed    Diet: No new diet changes identified    New illness, injury, or hospitalization: No    Medication/supplement changes: None noted    Signs or symptoms of bleeding or clotting: No    Previous INR: Therapeutic last 2(+) visits    Additional findings: None     PLAN     Recommended plan for no diet, medication or health factor changes affecting INR     Dosing Instructions: Continue your current warfarin dose with next INR in 4 weeks       Summary  As of 8/4/2021    Full warfarin instructions:  10 mg every Wed, Sat; 7.5 mg all other days   Next INR check:  9/1/2021             Telephone call with Amy who verbalizes understanding and agrees to plan    Lab visit scheduled    Education provided: Please call back if any changes to your diet, medications or how you've been taking warfarin    Plan made per [facility] anticoagulation protocol    Nadia Cabello RN  Anticoagulation Clinic  8/4/2021    _______________________________________________________________________     Anticoagulation Episode Summary     Current INR goal:  2.0-3.0   TTR:  57.2 % (1 y)   Target end date:  Indefinite   Send INR reminders to:  [facility]    Indications    Paroxysmal atrial fibrillation (H) [I48.0]  Long term current use of anticoagulants with INR goal of 2.0-3.0 [Z79.01]  S/P aortic valve and mitral valve replacement [Z95.2]           Comments:           Anticoagulation Care Providers     Provider Role Specialty Phone number    Yamilka Christina DO Referring Cardiovascular Disease [phone]    Kelli Lewis CNP Referring Internal Medicine [phone]    She Huang MD Referring  Internal Medicine [phone]

## 2021-08-05 DIAGNOSIS — Z95.0 CARDIAC PACEMAKER IN SITU: ICD-10-CM

## 2021-08-05 DIAGNOSIS — Z95.2 S/P AORTIC VALVE AND MITRAL VALVE REPLACEMENT: ICD-10-CM

## 2021-08-05 DIAGNOSIS — F33.42 RECURRENT MAJOR DEPRESSIVE DISORDER, IN FULL REMISSION (H): ICD-10-CM

## 2021-08-05 DIAGNOSIS — D62 ACUTE BLOOD LOSS ANEMIA: ICD-10-CM

## 2021-08-05 DIAGNOSIS — I07.1 TRICUSPID VALVE INSUFFICIENCY, UNSPECIFIED ETIOLOGY: ICD-10-CM

## 2021-08-05 DIAGNOSIS — Z95.3 S/P MITRAL VALVE REPLACEMENT WITH BIOPROSTHETIC VALVE: ICD-10-CM

## 2021-08-05 DIAGNOSIS — Z95.2 S/P AVR (AORTIC VALVE REPLACEMENT): ICD-10-CM

## 2021-08-05 DIAGNOSIS — I10 ESSENTIAL HYPERTENSION: ICD-10-CM

## 2021-08-05 DIAGNOSIS — I48.0 PAROXYSMAL ATRIAL FIBRILLATION (H): ICD-10-CM

## 2021-08-05 LAB
ALBUMIN SERPL-MCNC: 3.9 G/DL (ref 3.4–5)
ALP SERPL-CCNC: 55 U/L (ref 40–150)
ALT SERPL W P-5'-P-CCNC: 37 U/L (ref 0–50)
ANION GAP SERPL CALCULATED.3IONS-SCNC: 5 MMOL/L (ref 3–14)
AST SERPL W P-5'-P-CCNC: 36 U/L (ref 0–45)
BILIRUB SERPL-MCNC: 0.6 MG/DL (ref 0.2–1.3)
BUN SERPL-MCNC: 19 MG/DL (ref 7–30)
CALCIUM SERPL-MCNC: 9.5 MG/DL (ref 8.5–10.1)
CHLORIDE BLD-SCNC: 106 MMOL/L (ref 94–109)
CHOLEST SERPL-MCNC: 190 MG/DL
CO2 SERPL-SCNC: 29 MMOL/L (ref 20–32)
CREAT SERPL-MCNC: 1.04 MG/DL (ref 0.52–1.04)
FASTING STATUS PATIENT QL REPORTED: YES
GFR SERPL CREATININE-BSD FRML MDRD: 53 ML/MIN/1.73M2
GLUCOSE BLD-MCNC: 145 MG/DL (ref 70–99)
HDLC SERPL-MCNC: 64 MG/DL
LDLC SERPL CALC-MCNC: 90 MG/DL
NONHDLC SERPL-MCNC: 126 MG/DL
POTASSIUM BLD-SCNC: 4.4 MMOL/L (ref 3.4–5.3)
PROT SERPL-MCNC: 7.2 G/DL (ref 6.8–8.8)
SODIUM SERPL-SCNC: 140 MMOL/L (ref 133–144)
TRIGL SERPL-MCNC: 178 MG/DL
TSH SERPL DL<=0.005 MIU/L-ACNC: 2.28 MU/L (ref 0.4–4)

## 2021-08-06 RX ORDER — METFORMIN HCL 500 MG
TABLET, EXTENDED RELEASE 24 HR ORAL
Qty: 180 TABLET | Refills: 0 | Status: SHIPPED | OUTPATIENT
Start: 2021-08-06 | End: 2021-01-01

## 2021-08-06 NOTE — TELEPHONE ENCOUNTER
Metformin  Prescription approved per [facility] Refill Protocol.    Sertraline  Routing refill request to provider for review/approval because:  A break in medication

## 2021-08-06 NOTE — TELEPHONE ENCOUNTER
"Requested Prescriptions   Pending Prescriptions Disp Refills     sertraline (ZOLOFT) 100 MG tablet [Pharmacy Med Name: SERTRALINE HYDROCHLORIDE 100 MG Tablet] 135 tablet 1     Sig: TAKE 1 AND 1/2 TABLETS EVERY MORNING       SSRIs Protocol Passed - 8/5/2021 12:37 PM        Passed - PHQ-9 score less than 5 in past 6 months     Please review last PHQ-9 score.           Passed - Medication is active on med list        Passed - Patient is age 18 or older        Passed - No active pregnancy on record        Passed - No positive pregnancy test in last 12 months        Passed - Recent (6 mo) or future (30 days) visit within the authorizing provider's specialty     Patient had office visit in the last 6 months or has a visit in the next 30 days with authorizing provider or within the authorizing provider's specialty.  See \"Patient Info\" tab in inbasket, or \"Choose Columns\" in Meds & Orders section of the refill encounter.               metFORMIN (GLUCOPHAGE-XR) 500 MG 24 hr tablet [Pharmacy Med Name: METFORMIN HYDROCHLORIDE  MG Tablet Extended Release 24 Hour] 180 tablet 0     Sig: TAKE 2 TABLETS EVERY DAY WITH BREAKFAST       Biguanide Agents Passed - 8/5/2021 12:37 PM        Passed - Patient is age 10 or older        Passed - Patient has documented A1c within the specified period of time.     If HgbA1C is 8 or greater, it needs to be on file within the past 3 months.  If less than 8, must be on file within the past 6 months.     Recent Labs   Lab Test 08/04/21  1126   A1C 7.3*             Passed - Patient's CR is NOT>1.4 OR Patient's EGFR is NOT<45 within past 12 mos.     Recent Labs   Lab Test 08/04/21  1126 02/05/21  1036   GFRESTIMATED 53* 52*   GFRESTBLACK  --  60*       Recent Labs   Lab Test 08/04/21  1126   CR 1.04             Passed - Patient does NOT have a diagnosis of CHF.        Passed - Medication is active on med list        Passed - Patient is not pregnant        Passed - Patient has not had a positive " "pregnancy test within the past 12 mos.         Passed - Recent (6 mo) or future (30 days) visit within the authorizing provider's specialty     Patient had office visit in the last 6 months or has a visit in the next 30 days with authorizing provider or within the authorizing provider's specialty.  See \"Patient Info\" tab in inbasket, or \"Choose Columns\" in Meds & Orders section of the refill encounter.                 "

## 2021-08-09 RX ORDER — SERTRALINE HYDROCHLORIDE 100 MG/1
TABLET, FILM COATED ORAL
Qty: 135 TABLET | Refills: 0 | Status: SHIPPED | OUTPATIENT
Start: 2021-08-09 | End: 2021-01-01

## 2021-08-13 LAB
MDC_IDC_LEAD_IMPLANT_DT: NORMAL
MDC_IDC_LEAD_IMPLANT_DT: NORMAL
MDC_IDC_LEAD_LOCATION: NORMAL
MDC_IDC_LEAD_LOCATION: NORMAL
MDC_IDC_LEAD_LOCATION_DETAIL_1: NORMAL
MDC_IDC_LEAD_LOCATION_DETAIL_1: NORMAL
MDC_IDC_LEAD_MFG: NORMAL
MDC_IDC_LEAD_MFG: NORMAL
MDC_IDC_LEAD_MODEL: NORMAL
MDC_IDC_LEAD_MODEL: NORMAL
MDC_IDC_LEAD_POLARITY_TYPE: NORMAL
MDC_IDC_LEAD_POLARITY_TYPE: NORMAL
MDC_IDC_LEAD_SERIAL: NORMAL
MDC_IDC_LEAD_SERIAL: NORMAL
MDC_IDC_MSMT_BATTERY_DTM: NORMAL
MDC_IDC_MSMT_BATTERY_REMAINING_LONGEVITY: 61 MO
MDC_IDC_MSMT_BATTERY_RRT_TRIGGER: 2.83
MDC_IDC_MSMT_BATTERY_STATUS: NORMAL
MDC_IDC_MSMT_BATTERY_VOLTAGE: 2.99 V
MDC_IDC_MSMT_LEADCHNL_RA_IMPEDANCE_VALUE: 323 OHM
MDC_IDC_MSMT_LEADCHNL_RA_IMPEDANCE_VALUE: 418 OHM
MDC_IDC_MSMT_LEADCHNL_RA_PACING_THRESHOLD_AMPLITUDE: 0.38 V
MDC_IDC_MSMT_LEADCHNL_RA_PACING_THRESHOLD_PULSEWIDTH: 0.4 MS
MDC_IDC_MSMT_LEADCHNL_RA_SENSING_INTR_AMPL: 2 MV
MDC_IDC_MSMT_LEADCHNL_RA_SENSING_INTR_AMPL: 2.38 MV
MDC_IDC_MSMT_LEADCHNL_RV_IMPEDANCE_VALUE: 456 OHM
MDC_IDC_MSMT_LEADCHNL_RV_IMPEDANCE_VALUE: 475 OHM
MDC_IDC_MSMT_LEADCHNL_RV_PACING_THRESHOLD_AMPLITUDE: 0.75 V
MDC_IDC_MSMT_LEADCHNL_RV_PACING_THRESHOLD_PULSEWIDTH: 0.4 MS
MDC_IDC_MSMT_LEADCHNL_RV_SENSING_INTR_AMPL: 11.62 MV
MDC_IDC_MSMT_LEADCHNL_RV_SENSING_INTR_AMPL: 9 MV
MDC_IDC_PG_IMPLANT_DTM: NORMAL
MDC_IDC_PG_MFG: NORMAL
MDC_IDC_PG_MODEL: NORMAL
MDC_IDC_PG_SERIAL: NORMAL
MDC_IDC_PG_TYPE: NORMAL
MDC_IDC_SESS_CLINIC_NAME: NORMAL
MDC_IDC_SESS_DTM: NORMAL
MDC_IDC_SESS_TYPE: NORMAL
MDC_IDC_SET_BRADY_AT_MODE_SWITCH_RATE: 171 {BEATS}/MIN
MDC_IDC_SET_BRADY_HYSTRATE: NORMAL
MDC_IDC_SET_BRADY_LOWRATE: 60 {BEATS}/MIN
MDC_IDC_SET_BRADY_MAX_SENSOR_RATE: 130 {BEATS}/MIN
MDC_IDC_SET_BRADY_MAX_TRACKING_RATE: 130 {BEATS}/MIN
MDC_IDC_SET_BRADY_MODE: NORMAL
MDC_IDC_SET_BRADY_PAV_DELAY_LOW: 250 MS
MDC_IDC_SET_BRADY_SAV_DELAY_LOW: 250 MS
MDC_IDC_SET_LEADCHNL_RA_PACING_AMPLITUDE: 1.5 V
MDC_IDC_SET_LEADCHNL_RA_PACING_ANODE_ELECTRODE_1: NORMAL
MDC_IDC_SET_LEADCHNL_RA_PACING_ANODE_LOCATION_1: NORMAL
MDC_IDC_SET_LEADCHNL_RA_PACING_CAPTURE_MODE: NORMAL
MDC_IDC_SET_LEADCHNL_RA_PACING_CATHODE_ELECTRODE_1: NORMAL
MDC_IDC_SET_LEADCHNL_RA_PACING_CATHODE_LOCATION_1: NORMAL
MDC_IDC_SET_LEADCHNL_RA_PACING_POLARITY: NORMAL
MDC_IDC_SET_LEADCHNL_RA_PACING_PULSEWIDTH: 0.4 MS
MDC_IDC_SET_LEADCHNL_RA_SENSING_ANODE_ELECTRODE_1: NORMAL
MDC_IDC_SET_LEADCHNL_RA_SENSING_ANODE_LOCATION_1: NORMAL
MDC_IDC_SET_LEADCHNL_RA_SENSING_CATHODE_ELECTRODE_1: NORMAL
MDC_IDC_SET_LEADCHNL_RA_SENSING_CATHODE_LOCATION_1: NORMAL
MDC_IDC_SET_LEADCHNL_RA_SENSING_POLARITY: NORMAL
MDC_IDC_SET_LEADCHNL_RA_SENSING_SENSITIVITY: 0.3 MV
MDC_IDC_SET_LEADCHNL_RV_PACING_AMPLITUDE: 2 V
MDC_IDC_SET_LEADCHNL_RV_PACING_ANODE_ELECTRODE_1: NORMAL
MDC_IDC_SET_LEADCHNL_RV_PACING_ANODE_LOCATION_1: NORMAL
MDC_IDC_SET_LEADCHNL_RV_PACING_CAPTURE_MODE: NORMAL
MDC_IDC_SET_LEADCHNL_RV_PACING_CATHODE_ELECTRODE_1: NORMAL
MDC_IDC_SET_LEADCHNL_RV_PACING_CATHODE_LOCATION_1: NORMAL
MDC_IDC_SET_LEADCHNL_RV_PACING_POLARITY: NORMAL
MDC_IDC_SET_LEADCHNL_RV_PACING_PULSEWIDTH: 0.4 MS
MDC_IDC_SET_LEADCHNL_RV_SENSING_ANODE_ELECTRODE_1: NORMAL
MDC_IDC_SET_LEADCHNL_RV_SENSING_ANODE_LOCATION_1: NORMAL
MDC_IDC_SET_LEADCHNL_RV_SENSING_CATHODE_ELECTRODE_1: NORMAL
MDC_IDC_SET_LEADCHNL_RV_SENSING_CATHODE_LOCATION_1: NORMAL
MDC_IDC_SET_LEADCHNL_RV_SENSING_POLARITY: NORMAL
MDC_IDC_SET_LEADCHNL_RV_SENSING_SENSITIVITY: 0.9 MV
MDC_IDC_SET_ZONE_DETECTION_INTERVAL: 350 MS
MDC_IDC_SET_ZONE_DETECTION_INTERVAL: 400 MS
MDC_IDC_SET_ZONE_TYPE: NORMAL
MDC_IDC_STAT_AT_BURDEN_PERCENT: 0 %
MDC_IDC_STAT_AT_DTM_END: NORMAL
MDC_IDC_STAT_AT_DTM_START: NORMAL
MDC_IDC_STAT_BRADY_AP_VP_PERCENT: 1.72 %
MDC_IDC_STAT_BRADY_AP_VS_PERCENT: 34 %
MDC_IDC_STAT_BRADY_AS_VP_PERCENT: 0.35 %
MDC_IDC_STAT_BRADY_AS_VS_PERCENT: 63.93 %
MDC_IDC_STAT_BRADY_DTM_END: NORMAL
MDC_IDC_STAT_BRADY_DTM_START: NORMAL
MDC_IDC_STAT_BRADY_RA_PERCENT_PACED: 35.6 %
MDC_IDC_STAT_BRADY_RV_PERCENT_PACED: 2.41 %
MDC_IDC_STAT_EPISODE_RECENT_COUNT: 0
MDC_IDC_STAT_EPISODE_RECENT_COUNT_DTM_END: NORMAL
MDC_IDC_STAT_EPISODE_RECENT_COUNT_DTM_START: NORMAL
MDC_IDC_STAT_EPISODE_TOTAL_COUNT: 0
MDC_IDC_STAT_EPISODE_TOTAL_COUNT: 3
MDC_IDC_STAT_EPISODE_TOTAL_COUNT: 4
MDC_IDC_STAT_EPISODE_TOTAL_COUNT: 8
MDC_IDC_STAT_EPISODE_TOTAL_COUNT_DTM_END: NORMAL
MDC_IDC_STAT_EPISODE_TOTAL_COUNT_DTM_START: NORMAL
MDC_IDC_STAT_EPISODE_TYPE: NORMAL

## 2021-08-19 ENCOUNTER — HOSPITAL ENCOUNTER (OUTPATIENT)
Dept: OCCUPATIONAL THERAPY | Facility: CLINIC | Age: 76
Setting detail: THERAPIES SERIES
End: 2021-08-19
Attending: INTERNAL MEDICINE
Payer: MEDICARE

## 2021-08-19 PROCEDURE — 97140 MANUAL THERAPY 1/> REGIONS: CPT | Mod: GO

## 2021-08-19 NOTE — PROGRESS NOTES
Outpatient Occupational Therapy Discharge Note     Patient: Amy Luna  : 1945    Beginning/End Dates of Reporting Period:  21 to 21    Referring Provider: Stefanie Thompson Diagnosis: lymphedema    Client Self Report:      Objective Measurements: see flowsheet                                                        Outcome Measures (most recent score):  Lymphedema Life Impact Scale (score range 0-72). A higher score indicates greater impairment.: 1 post score from 12 pre score indicates benefit and satisfaction with services    Goals:   Goal Identifier Ed   Goal Description Pt will report understanding s/s lymphedema, s/s skin infections, and treatment options for safety and I with home management   Target Date 21   Date Met  21   Progress (detail required for progress note):     Goal Identifier GCB Wearing   Goal Description Tolerate gradient compression bandaging/wearing compression garments 23 hrs/day to prevent re-acccumulation of extracellular fluid for max reductions needed to reduce risk skin infections an dreduce pain for improved walking   Target Date 21   Date Met  06/10/21   Progress (detail required for progress note):     Goal Identifier GCB Donning   Goal Description Demonstrate independence in applying gradient compression bandages to build I with home management of BLE lymphedema needed for pain reducitons for improved walking and to reduce risk skin infections   Target Date 21   Date Met  06/10/21   Progress (detail required for progress note):     Goal Identifier HEP/MLD   Goal Description Demonstrate independence in performing prescribed exercises to facilate the lymph system and muscle pumping systems for max reductions needed to reduce risk skin infections an dreduce pain for improved walking    Target Date 21   Date Met  21   Progress (detail required for progress note):     Goal Identifier Garments   Goal Description Be  independent in donning/doffing, wearing schedule, and care of compression garments to build I with home management of BLE lymphedema needed for pain reducitons for improved walking and to reduce risk skin infections    Target Date 08/13/21   Date Met  08/19/21   Progress (detail required for progress note):     Goal Identifier Reductions   Goal Description Pt will reduce BLE total volume by .75L+ for reducitons needed to reduce pain and tension in skin for imporved mobility engagement   Target Date 08/13/21   Date Met  06/25/21   Progress (detail required for progress note):     Goal Identifier     Goal Description     Target Date     Date Met      Progress (detail required for progress note):     Goal Identifier     Goal Description     Target Date     Date Met      Progress (detail required for progress note):       Plan:  Discharge from therapy. Pt and family member are I with compression donning and she has HEP and self MLD routine available for home use. Pt was not using these techniques when seen for follow up and had suboptimal use compression reported at that time as well. Measurements show retention reductions and despite suboptimal home management pts legs reduced and stable. No skilled needs identified to keep pt on POC; pt to be discharged and manage from home.    Discharge:    Reason for Discharge: Patient has met all goals.  No further expectation of progress.    Equipment Issued:     Discharge Plan: Patient to continue home program.

## 2021-08-24 ENCOUNTER — HOSPITAL ENCOUNTER (OUTPATIENT)
Dept: MAMMOGRAPHY | Facility: CLINIC | Age: 76
Discharge: HOME | End: 2021-08-24
Attending: INTERNAL MEDICINE | Admitting: INTERNAL MEDICINE
Payer: MEDICARE

## 2021-08-24 DIAGNOSIS — Z12.31 VISIT FOR SCREENING MAMMOGRAM: ICD-10-CM

## 2021-08-24 PROCEDURE — 77063 BREAST TOMOSYNTHESIS BI: CPT

## 2021-08-25 DIAGNOSIS — E11.22 TYPE 2 DIABETES MELLITUS WITH STAGE 3B CHRONIC KIDNEY DISEASE, WITH LONG-TERM CURRENT USE OF INSULIN (H): ICD-10-CM

## 2021-08-25 DIAGNOSIS — N18.32 TYPE 2 DIABETES MELLITUS WITH STAGE 3B CHRONIC KIDNEY DISEASE, WITH LONG-TERM CURRENT USE OF INSULIN (H): ICD-10-CM

## 2021-08-25 DIAGNOSIS — Z79.4 TYPE 2 DIABETES MELLITUS WITH STAGE 3B CHRONIC KIDNEY DISEASE, WITH LONG-TERM CURRENT USE OF INSULIN (H): ICD-10-CM

## 2021-08-25 NOTE — TELEPHONE ENCOUNTER
Per Medicare compliance guidelines, rx from 7/19 for joshua sensors could only be filled for #2 (1month supply) with zero refills.  Please send new rx for #2 sensors and can have up to 5 additional refills  Thank you!  Alethea Mullen CPhT  [facility]

## 2021-08-26 RX ORDER — FLASH GLUCOSE SENSOR
1 KIT MISCELLANEOUS
Qty: 6 EACH | Refills: 0 | Status: SHIPPED | OUTPATIENT
Start: 2021-08-26 | End: 2021-09-01

## 2021-08-30 ENCOUNTER — OFFICE VISIT (OUTPATIENT)
Dept: PHARMACY | Facility: CLINIC | Age: 76
End: 2021-08-30
Payer: COMMERCIAL

## 2021-08-30 ENCOUNTER — TELEPHONE (OUTPATIENT)
Dept: INTERNAL MEDICINE | Facility: CLINIC | Age: 76
End: 2021-08-30

## 2021-08-30 DIAGNOSIS — E78.5 HYPERLIPIDEMIA, UNSPECIFIED HYPERLIPIDEMIA TYPE: ICD-10-CM

## 2021-08-30 DIAGNOSIS — Z78.9 TAKES DIETARY SUPPLEMENTS: ICD-10-CM

## 2021-08-30 DIAGNOSIS — F32.A DEPRESSIVE DISORDER: ICD-10-CM

## 2021-08-30 DIAGNOSIS — I10 ESSENTIAL HYPERTENSION: ICD-10-CM

## 2021-08-30 DIAGNOSIS — E11.69 TYPE 2 DIABETES MELLITUS WITH OTHER SPECIFIED COMPLICATION, WITHOUT LONG-TERM CURRENT USE OF INSULIN (H): Primary | ICD-10-CM

## 2021-08-30 DIAGNOSIS — I50.30 HEART FAILURE WITH PRESERVED EJECTION FRACTION, UNSPECIFIED HF CHRONICITY (H): ICD-10-CM

## 2021-08-30 DIAGNOSIS — E11.22 TYPE 2 DIABETES MELLITUS WITH STAGE 3B CHRONIC KIDNEY DISEASE, WITH LONG-TERM CURRENT USE OF INSULIN (H): ICD-10-CM

## 2021-08-30 DIAGNOSIS — Z79.4 TYPE 2 DIABETES MELLITUS WITH STAGE 3B CHRONIC KIDNEY DISEASE, WITH LONG-TERM CURRENT USE OF INSULIN (H): ICD-10-CM

## 2021-08-30 DIAGNOSIS — I48.0 PAROXYSMAL ATRIAL FIBRILLATION (H): ICD-10-CM

## 2021-08-30 DIAGNOSIS — N18.32 TYPE 2 DIABETES MELLITUS WITH STAGE 3B CHRONIC KIDNEY DISEASE, WITH LONG-TERM CURRENT USE OF INSULIN (H): ICD-10-CM

## 2021-08-30 PROCEDURE — 99607 MTMS BY PHARM ADDL 15 MIN: CPT | Performed by: PHARMACIST

## 2021-08-30 PROCEDURE — 99605 MTMS BY PHARM NP 15 MIN: CPT | Performed by: PHARMACIST

## 2021-08-30 RX ORDER — FLASH GLUCOSE SENSOR
1 KIT MISCELLANEOUS
Qty: 1 EACH | Refills: 5 | Status: CANCELLED | OUTPATIENT
Start: 2021-08-30

## 2021-08-30 RX ORDER — GABAPENTIN 100 MG/1
CAPSULE ORAL
Qty: 180 CAPSULE | Refills: 3 | Status: ON HOLD | COMMUNITY
Start: 2021-08-30 | End: 2022-01-01

## 2021-08-30 NOTE — PATIENT INSTRUCTIONS
Recommendations from today's MTM visit:                                                       1.  Ask about home INR testing or follow-up with Dr. Christina about changing to Eliquis.  Call [employer] about cost of Eliquis.  2.  Recheck iron labs  3.  Calcium 1 tablet twice daily  4.  Stop Insulin 70/30.  Start Novolin N 20 units twice daily Novolin R 10 unit before meals (ideally 30 minutes before eating).    5.  Will send email invite: [email or web address]      Follow-up: Return in about 2 weeks (around 9/13/2021).    It was great to speak with you today.  I value your experience and would be very thankful for your time with providing feedback on our clinic survey. You may receive a survey via email or text message in the next few days.     To schedule another MTM appointment, please call the clinic directly or you may call the MTM scheduling line at [phone] or toll-free at [phone].     My Clinical Pharmacist's contact information:                                                      Please feel free to contact me with any questions or concerns you have.      Afia Ricketts , Pharm D  [phone] (phone)  Medication Therapy Management Pharmacist

## 2021-08-30 NOTE — PROGRESS NOTES
Medication Therapy Management (MTM) Encounter    ASSESSMENT:                            Medication Adherence/Access: No issues identified    Type 2 Diabetes:  A1c at goal <8%.  Home readings variable and within range 47%. Will stop 70/30.  Start novolin N (due to cost) 20 units twice daily and Novolin R 10 unit with meals.  Reviewed how the insulins work.  Advised not to 'correct' for high readings before bed.  Sent invite to share [employer] data.    Afib: INR at goal.  She can ask about home INR checks for convenience.  If she wants to change to Eliquis, check on cost/coverage and then follow-up with Dr. Christina.    Hyperlipidemia: stable    Hypertension/HFpEF: stable, ran out of time to check vitals during visit.  Home readings at goal.    Depression:  Stable    Supplements: recommend calcium twice daily, no more than 600 mg per dose.  Recommend rechecking iron labs.    PLAN:                            1.  Ask about home INR testing or follow-up with Dr. Christina about changing to Eliquis.  Call [employer] about cost of Eliquis.  2.  Recheck iron labs  3.  Calcium 1 tablet twice daily  4.  Stop 70/30.  Start Novolin N 20 units twice daily Novolin R 10 unit before meals.    5.  Will send email invite: [email or web address]    Follow-up: Return in about 2 weeks (around 9/13/2021).      SUBJECTIVE/OBJECTIVE:                          Amy Luna is a 76 year old female coming in for a follow-up visit. She was referred to me from Dr. Diaz.  Today's visit is a follow-up MTM visit from 11/24/20.  First visit in 2021.     Reason for visit: diabetes    Allergies/ADRs: Reviewed in chart  Tobacco: She reports that she has never smoked. She has never used smokeless tobacco.  Alcohol: not currently using    Medication Adherence/Access: no issues reported    Type 2 Diabetes:  Currently taking metformin 1000 mg daily, insulin 70/30 20-40 units per meal (averaging about 35 units) and Novolin R.  Just recently restarted 70/30 and will  taking R randomly if her readings are high.     Blood sugar monitoring: CGM- having a hard time getting sensors filled  Within [employer] 47%  7 day average 175    She does having readings in the 60s, most often occurs early in the morning after taking Novolin R before bed.    Symptoms of low blood sugar?  Lows do not wake her up  Eye exam: due  Foot exam: due  Aspirin: Not taking due to taking warfarin  Statin: Yes: atrovastatin   ACEi/ARB: No.   Urine Albumin:   Lab Results   Component Value Date    MICROL 66 07/28/2020     No results found for: MICROALBUMIN     Lab Results   Component Value Date    A1C 7.3 08/04/2021    A1C 7.1 02/05/2021    A1C 6.8 11/03/2020    A1C 6.7 07/28/2020    A1C 6.5 04/08/2020    A1C 6.4 01/16/2020       Afib: Patient is currently taking warfarin for anticoagulation (adjusted by anticoagulation clinic).  Patient reports no current concerns of  Bleeding, she does bruise easily. Patient does not have a history of GI bleed.     Lab Results   Component Value Date    INR 2.3 08/04/2021    INR 2.60 07/07/2021    INR 2.60 06/16/2021    INR 1.90 06/01/2021    INR 1.70 05/17/2021    INR 2.40 04/19/2021    INR 2.20 03/22/2021    INR 2.40 02/19/2021       Hyperlipidemia: Current therapy includes atorvastatin 40 mg daily.  Patient reports no significant myalgias or other side effects.  The 10-year ASCVD risk score (Beulahjeni LEDEZMA Jr., et al., 2013) is: 28.2%    Values used to calculate the score:      Age: 75 years      Sex: Female      Is Non- : No      Diabetic: Yes      Tobacco smoker: No      Systolic Blood Pressure: 116 mmHg      Is BP treated: Yes      HDL Cholesterol: 64 mg/dL      Total Cholesterol: 187 mg/dL  Recent Labs   Lab Test 08/04/21  1126 07/28/20  0904   CHOL 190 187   HDL 64 64   LDL 90 102*   TRIG 178* 104       Hypertension/HFpEF: Current medications include metoprolol succinate 50 mg daily, furosemide 40-80 mg daily (will only take one tablet if she leaves the  house and may take the 2nd if she gets home by early afternoon) and potassium 99 daily.   Patient does self-monitor blood pressure.  123-128/58-61  Patient reports no current medication side effects.   Weight 276.5-279.5    BP Readings from Last 3 Encounters:   07/19/21 129/64   05/17/21 126/70   04/29/21 (!) 148/72       Potassium   Date Value Ref Range Status   08/04/2021 4.4 3.4 - 5.3 mmol/L Final   02/05/2021 3.9 3.4 - 5.3 mmol/L Final       Depression:  Current medications include: Sertraline 150 mg once daily.  No concerns at this time.  PHQ 7/9/2020 7/19/2021   PHQ-9 Total Score 5 3   Q9: Thoughts of better off dead/self-harm past 2 weeks Not at all Not at all       Supplements: Currently taking biotin 68360 mcg (skin/hair), calcium-vitamin D (bones) 2 tablets at night, CoQ-10 200 mg (heart), ferrous gluconate, lutein 40 mg (eyes), magnesium 400 mg (cramping), potassium, turmeric (joint aches), vitamin B complex, vitamin B12, vitamin C 1000 mg daily (immune health), vitamin D 1000 units (mood in winter), zinc 23 mg (cramping). No reported issues at this time.     Vitamin D Deficiency Screening Results:  Lab Results   Component Value Date    VITDT 52 07/28/2020       Hemoglobin   Date Value Ref Range Status   08/04/2021 11.8 11.7 - 15.7 g/dL Final   02/05/2021 12.4 11.7 - 15.7 g/dL Final   ]    Today's Vitals: There were no vitals taken for this visit.  ----------------      I spent 45 minutes with this patient today. All changes were made via collaborative practice agreement with She Huang MD. A copy of the visit note was provided to the patient's primary care provider.    The patient was given a summary of these recommendations.     Afia Ricketts , Pharm D  [phone] (phone)  Medication Therapy Management Pharmacist          Medication Therapy Recommendations  Takes dietary supplements    Current Medication: Calcium Carbonate-Vit D-Min (CALCIUM 1200 PO)   Rationale: Frequency  inappropriate - Dosage too high - Safety   Recommendation: Decrease Dose - Calcium 1 tablet twice daily   Status: Accepted - no CPA Needed          Current Medication: ferrous gluconate (FERGON) 324 (38 Fe) MG tablet   Rationale: Medication requires monitoring - Needs additional monitoring   Recommendation: Order Lab - check iron labs   Status: Accepted per CPA         Type 2 diabetes mellitus with other specified complication, without long-term current use of insulin (H)    Current Medication: insulin NPH-Regular 70/30 (70-30) 100 UNIT/ML vial (Discontinued)   Rationale: More effective medication available - Ineffective medication - Effectiveness   Recommendation: Change Medication - Stop 70/30.  Start Novolin N and Novolin R.   Status: Accepted per CPA

## 2021-09-01 ENCOUNTER — ANTICOAGULATION THERAPY VISIT (OUTPATIENT)
Dept: ANTICOAGULATION | Facility: CLINIC | Age: 76
End: 2021-09-01

## 2021-09-01 ENCOUNTER — LAB (OUTPATIENT)
Dept: LAB | Facility: CLINIC | Age: 76
End: 2021-09-01
Payer: MEDICARE

## 2021-09-01 DIAGNOSIS — Z95.2 S/P AORTIC VALVE AND MITRAL VALVE REPLACEMENT: ICD-10-CM

## 2021-09-01 DIAGNOSIS — I48.0 PAROXYSMAL ATRIAL FIBRILLATION (H): ICD-10-CM

## 2021-09-01 DIAGNOSIS — I48.0 PAROXYSMAL ATRIAL FIBRILLATION (H): Primary | ICD-10-CM

## 2021-09-01 DIAGNOSIS — Z79.01 LONG TERM CURRENT USE OF ANTICOAGULANTS WITH INR GOAL OF 2.0-3.0: ICD-10-CM

## 2021-09-01 LAB — INR BLD: 1.9 (ref 0.9–1.1)

## 2021-09-01 PROCEDURE — 36416 COLLJ CAPILLARY BLOOD SPEC: CPT

## 2021-09-01 PROCEDURE — 85610 PROTHROMBIN TIME: CPT

## 2021-09-01 RX ORDER — FLASH GLUCOSE SENSOR
1 KIT MISCELLANEOUS
Qty: 2 EACH | Refills: 5 | Status: SHIPPED | OUTPATIENT
Start: 2021-09-01 | End: 2022-01-01

## 2021-09-01 NOTE — TELEPHONE ENCOUNTER
Please send new rx for [employer] 14 day sensors, per medicare guidelines, Rx sent 8/26 is only good for one fill of #2 sensors.    Please send new rx for qty #2 with up to 5 additional refills.  Medicare only allows fills of 1 month at a time.    Please call and speak to one of our Durable Medical Equipment Team members if you have any questions- [phone]      Thank you!  Alethea Mullen CPhT  [facility]

## 2021-09-01 NOTE — PROGRESS NOTES
ANTICOAGULATION MANAGEMENT     Amy Luna 76 year old female is on warfarin with subtherapeutic INR result. (Goal INR 2.0-3.0)    Recent labs: (last 7 days)     09/01/21  1122   INR 1.9*       ASSESSMENT     Source(s): Chart Review and Patient/Caregiver Call       Warfarin doses taken: Warfarin taken as instructed    Diet: Patient ate increased greens last night and plans to resume her usual diet.    New illness, injury, or hospitalization: No    Medication/supplement changes: None noted    Signs or symptoms of bleeding or clotting: No    Previous INR: Therapeutic last 2(+) visits    Additional findings: Patient will be on vacation in 2 weeks and prefers to do check in 3 weeks.     PLAN     Recommended plan for temporary change(s) affecting INR     Dosing Instructions: Continue your current warfarin dose with next INR in 3 weeks       Summary  As of 9/1/2021    Full warfarin instructions:  10 mg every Wed, Sat; 7.5 mg all other days   Next INR check:  9/22/2021             Telephone call with Amy who verbalizes understanding and agrees to plan    Lab visit scheduled    Education provided: Please call back if any changes to your diet, medications or how you've been taking warfarin    Plan made per [facility] anticoagulation protocol    Nadia Cabello RN  Anticoagulation Clinic  9/1/2021    _______________________________________________________________________     Anticoagulation Episode Summary     Current INR goal:  2.0-3.0   TTR:  58.7 % (1 y)   Target end date:  Indefinite   Send INR reminders to:  [facility]    Indications    Paroxysmal atrial fibrillation (H) [I48.0]  Long term current use of anticoagulants with INR goal of 2.0-3.0 [Z79.01]  S/P aortic valve 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

## 2021-09-13 NOTE — PROGRESS NOTES
Medication Therapy Management (MTM) Encounter    ASSESSMENT:                            Medication Adherence/Access: No issues identified    Type 2 Diabetes: will reduce evening dose of Novolin N to prevent lows overnight. Reviewed how to adjust range on software.      Afib: stable      PLAN:                            1.  Novolin N 22 units every morning and 20 units at bedtime      Follow-up: Return in about 3 weeks (around 10/4/2021) for Medication Therapy Management.      SUBJECTIVE/OBJECTIVE:                          Amy Luna is a 76 year old female called for a follow-up visit. She was referred to me from Dr. Diaz.  Today's visit is a follow-up MTM visit from 8/30/21.     Reason for visit:  diabetes      Allergies/ADRs: Reviewed in chart  Tobacco: She reports that she has never smoked. She has never used smokeless tobacco.  Alcohol: not currently using      Medication Adherence/Access: no issues reported    Type 2 Diabetes:  Currently taking metformin 1000 mg daily, Novolin N 22 units twice daily and Novolin R 10 units before meals (may take 12 units if her meals has more carbs).       Blood sugar monitoring: CGM- (sent invite for [employer], nothing uploaded)  240> 9%  181-240 23%   62%  70-84 4%  <70% 2%    Symptoms of low blood sugar?  Lows happen early morning - in the 40s from 3-6am  Eye exam: due  Foot exam: due  Aspirin: Not taking due to taking warfarin  Statin: Yes: atrovastatin   ACEi/ARB: No.   Urine Albumin:   Lab Results   Component Value Date    MICROL 66 07/28/2020     No results found for: MICROALBUMIN     Lab Results   Component Value Date    A1C 7.3 08/04/2021    A1C 7.1 02/05/2021    A1C 6.8 11/03/2020    A1C 6.7 07/28/2020    A1C 6.5 04/08/2020    A1C 6.4 01/16/2020       Afib: Patient is currently taking warfarin for anticoagulation (adjusted by anticoagulation clinic).  Patient reports no current concerns of  Bleeding, she does bruise easily. Patient does not have a history  of GI bleed.   Has considered Eliquis but decided to stay with warfarin.    Lab Results   Component Value Date    INR 1.9 09/01/2021    INR 2.3 08/04/2021    INR 2.60 07/07/2021    INR 2.60 06/16/2021    INR 1.90 06/01/2021    INR 1.70 05/17/2021    INR 2.40 04/19/2021    INR 2.20 03/22/2021    INR 2.40 02/19/2021    INR 2.80 02/05/2021    INR 2.00 01/27/2021    INR 1.20 01/20/2021    INR 1.15 01/13/2021    INR 1.70 01/06/2021             Today's Vitals: There were no vitals taken for this visit.  ----------------      I spent 20 minutes with this patient today. All changes were made via collaborative practice agreement with She Huang MD. A copy of the visit note was provided to the patient's primary care provider.    The patient was sent via [employer] a summary of these recommendations.     Afia Ricketts , Pharm D  [phone] (phone)  Medication Therapy Management Pharmacist     Telemedicine Visit Details  Type of service:  Telephone visit  Start Time: 956 am  End Time: 10:16 AM  Originating Location (patient location): [place]  Distant Location (provider location):  [facility]     Medication Therapy Recommendations  Type 2 diabetes mellitus with other specified complication, without long-term current use of insulin (H)    Current Medication: insulin  UNIT/ML vial   Rationale: Dose too high - Dosage too high - Safety   Recommendation: Decrease Dose - Novolin N 22 units every morning and 20 units at night   Status: Accepted per CPA

## 2021-09-13 NOTE — PATIENT INSTRUCTIONS
Recommendations from today's MTM visit:                                                       1.  Novolin N 22 units every morning and 20 units at bedtime      Follow-up: Return in about 3 weeks (around 10/4/2021) for Medication Therapy Management.    It was great to speak with you today.  I value your experience and would be very thankful for your time with providing feedback on our clinic survey. You may receive a survey via email or text message in the next few days.     To schedule another MTM appointment, please call the clinic directly or you may call the MTM scheduling line at [phone] or toll-free at [phone].     My Clinical Pharmacist's contact information:                                                      Please feel free to contact me with any questions or concerns you have.      Afia Ricketts , Pharm D  [phone] (phone)  Medication Therapy Management Pharmacist

## 2021-09-22 NOTE — PROGRESS NOTES
ANTICOAGULATION MANAGEMENT     Amy Luna 76 year old female is on warfarin with therapeutic INR result. (Goal INR 2.0-3.0)    Recent labs: (last 7 days)     09/22/21  1145   INR 2.6*       ASSESSMENT     Source(s): Chart Review and Patient/Caregiver Call       Warfarin doses taken: Warfarin taken as instructed    Diet: No new diet changes identified    New illness, injury, or hospitalization: No    Medication/supplement changes: None noted    Signs or symptoms of bleeding or clotting: No    Previous INR: Subtherapeutic    Additional findings: None     PLAN     Recommended plan for no diet, medication or health factor changes affecting INR     Dosing Instructions: Continue your current warfarin dose with next INR in 4 weeks       Summary  As of 9/22/2021    Full warfarin instructions:  10 mg every Wed, Sat; 7.5 mg all other days   Next INR check:  10/19/2021             Telephone call with Amy who verbalizes understanding and agrees to plan    Lab visit scheduled    Education provided: Please call back if any changes to your diet, medications or how you've been taking warfarin and Contact [phone]  with any changes, questions or concerns.     Plan made per [facility] anticoagulation protocol    Chiqui Nowak RN  Anticoagulation Clinic  9/22/2021    _______________________________________________________________________     Anticoagulation Episode Summary     Current INR goal:  2.0-3.0   TTR:  61.4 % (1 y)   Target end date:  Indefinite   Send INR reminders to:  [facility]    Indications    Paroxysmal atrial fibrillation (H) [I48.0]  Long term current use of anticoagulants with INR goal of 2.0-3.0 [Z79.01]  S/P aortic valve and mitral valve replacement [Z95.2]           Comments:           Anticoagulation Care Providers     Provider Role Specialty Phone number    Yamilka Christina DO Referring Cardiovascular Disease [phone]    Kelli Lewis CNP Referring Internal Medicine  [phone]    hSe Huang MD Referring Internal Medicine [phone]

## 2021-10-19 NOTE — PROGRESS NOTES
ANTICOAGULATION MANAGEMENT     Amy Luna 76 year old female is on warfarin with therapeutic INR result. (Goal INR 2.0-3.0)    Recent labs: (last 7 days)     10/19/21  0936   INR 2.8*       ASSESSMENT     Source(s): Chart Review and Patient/Caregiver Call       Warfarin doses taken: Warfarin taken as instructed    Diet: No new diet changes identified    New illness, injury, or hospitalization: No    Medication/supplement changes: insulin adjusted today at MD visit (should not effect INR)    Signs or symptoms of bleeding or clotting: No    Previous INR: Therapeutic last 2(+) visits    Additional findings: None     PLAN     Recommended plan for no diet, medication or health factor changes affecting INR     Dosing Instructions: Continue your current warfarin dose with next INR in 4 weeks       Summary  As of 10/19/2021    Full warfarin instructions:  10 mg every Wed, Sat; 7.5 mg all other days   Next INR check:  11/19/2021             Telephone call with Amy who verbalizes understanding and agrees to plan    Lab visit scheduled    Education provided: Please call back if any changes to your diet, medications or how you've been taking warfarin    Plan made per [facility] anticoagulation protocol    Nadia Cabello RN  Anticoagulation Clinic  10/19/2021    _______________________________________________________________________     Anticoagulation Episode Summary     Current INR goal:  2.0-3.0   TTR:  64.4 % (1 y)   Target end date:  Indefinite   Send INR reminders to:  [facility]    Indications    Paroxysmal atrial fibrillation (H) [I48.0]  Long term current use of anticoagulants with INR goal of 2.0-3.0 [Z79.01]  S/P aortic valve and mitral valve replacement [Z95.2]           Comments:           Anticoagulation Care Providers     Provider Role Specialty Phone number    Yamilka Christina DO Referring Cardiovascular Disease [phone]    Kelli Lewis CNP Referring Internal Medicine  [phone]    She Huang MD Referring Internal Medicine [phone]

## 2021-10-19 NOTE — PROGRESS NOTES
Dr Diaz's note      Patient's instructions / PLAN:                                                        Plan:  1. Long Actin Insulin  -- change the second dose: take it before supper or immediately after supper. Start with 20 units and slowly increase it  2. If you continue to have big variations in blood sugars we will make referral to endo  3. Continue the other meds, same doses for now.  4. Please make a lab appointment for non  fasting labs  After Nov 4 -- to check A1c  5.  Please make a lab appointment for non fasting labs  Second part of Feb -- to check A1c  6. Please make an appointment few days after the labs to discuss about the results. -- after Feb labs         ASSESSMENT & PLAN:                                                      (E11.22,  N18.32,  Z79.4) Type 2 diabetes mellitus with stage 3b chronic kidney disease, with long-term current use of insulin (H)  (primary encounter diagnosis)  Comment: Not controlled.  Very high numbers fluctuating with low numbers at night.  Plan: Hemoglobin A1c          She has at least 6 episodes of months of low blood sugar at night.  She takes the second dose of NPH at bedtime.  Her bedtime varies from 10 PM to 3 AM.  We will move the second dose of NPH to dinnertime so the peak of the NPH should be before she goes to bed.     Chief complaint:                                                      High blood sugar alternating with low blood sugar    SUBJECTIVE:                                                    History of present illness:  CGM:  -- checks BS 6-7 times a day.  -- the night BS go low into 40s   -- she has a very big variation in her BS: from 50 to 300. At least 6 low BS in a month.  -- NPH 26 + 26 Takes the second dose at bed time which varies between 22:00 and 03:00  -- she uses regular 8 - 10 units each meal   -- in 2014 she was taking Lantus, but she stopped it for financial reasons       MTM : Type 2 Diabetes: will reduce evening dose of  Novolin N to prevent lows overnight. Reviewed how to adjust range on software.    Novolin N 22 units every morning and 20 units at bedtime     Diabetes Follow-up    How often are you checking your blood sugar? Four or more times daily  Blood sugar testing frequency justification:  Uncontrolled diabetes  What time of day are you checking your blood sugars (select all that apply)?  Before meals and after mails  Have you had any blood sugars above 200?  Yes   Have you had any blood sugars below 70?  Yes     What symptoms do you notice when your blood sugar is low?  Patient states that when she drops below 70 she is sleeping most if the time. Patient said that when she is awake and it happens she feels really tired.    What concerns do you have today about your diabetes? None and Other: Patient would like to discuss fluctuating numbers     Do you have any of these symptoms? (Select all that apply)  No numbness or tingling in feet.  No redness, sores or blisters on feet.  No complaints of excessive thirst.  No reports of blurry vision.  No significant changes to weight.    Have you had a diabetic eye exam in the last 12 months?         {Reference  Diabetes Management Resources :222116}        Hyperlipidemia Follow-Up      Are you regularly taking any medication or supplement to lower your cholesterol?   Yes- atorvastatin    Are you having muscle aches or other side effects that you think could be caused by your cholesterol lowering medication?  No    Hypertension Follow-up      Do you check your blood pressure regularly outside of the clinic? Yes     Are you following a low salt diet? No    Are your blood pressures ever more than 140 on the top number (systolic) OR more   than 90 on the bottom number (diastolic), for example 140/90? Yes systolic has been over 140    BP Readings from Last 2 Encounters:   07/19/21 129/64   05/17/21 126/70     Hemoglobin A1C (%)   Date Value   08/04/2021 7.3 (H)   02/05/2021 7.1 (H)  "  11/03/2020 6.8 (H)     LDL Cholesterol Calculated (mg/dL)   Date Value   08/04/2021 90   07/28/2020 102 (H)         How many servings of fruits and vegetables do you eat daily?  2-3    On average, how many sweetened beverages do you drink each day (Examples: soda, juice, sweet tea, etc.  Do NOT count diet or artificially sweetened beverages)?   0    How many days per week do you exercise enough to make your heart beat faster? 3 or less    How many minutes a day do you exercise enough to make your heart beat faster? 9 or less    How many days per week do you miss taking your medication? 0      Review of Systems:                                                      ROS: negative for fever, chills, cough, wheezes, chest pain, shortness of breath, vomiting, abdominal pain, leg swelling       OBJECTIVE:             Physical exam:  Blood pressure 126/58, pulse 77, temperature 98.9  F (37.2  C), temperature source Tympanic, resp. rate 18, height 1.651 m (5' 5\"), weight 126.6 kg (279 lb 3.2 oz), SpO2 94 %, not currently breastfeeding.     NAD, appears comfortable    Neurologic: A, Ox3, no focal signs appreciated    PMHx: reviewed  Past Medical History:   Diagnosis Date     (HFpEF) heart failure with preserved ejection fraction (H)      Aortic stenosis      Chest pain      Depressive disorder      Diabetes (H)      Hyperlipidemia      Hypertension      Mitral annular calcification      Mitral stenosis      Obesity      Sleep apnea     CPAP      PSHx: reviewed  Past Surgical History:   Procedure Laterality Date     APPENDECTOMY       CV CORONARY ANGIOGRAM N/A 4/8/2020    Procedure: Coronary Angiogram;  Surgeon: Inez Peoples MD;  Location:  [facility]     CV LEFT HEART CATH N/A 4/8/2020    Procedure: Left Heart Cath;  Surgeon: Inez Peoples MD;  Location:  [facility]     CV PFO CLOSURE N/A 4/15/2020    Procedure: Patent Foramen Ovale Closure;  Surgeon: Ok Wilson, " MD;  Location:  OR     CV RIGHT HEART CATH MEASUREMENTS RECORDED N/A 2020    Procedure: Right Heart Cath;  Surgeon: Inez Peoples MD;  Location:  [facility]     EP PACEMAKER N/A 2020    Procedure: EP Pacemaker;  Surgeon: Sohan Francis MD;  Location:  [facility]     GYN SURGERY       x2 ,      GYN SURGERY      total hysterectomy     IMPLANT PACEMAKER       REPAIR VALVE TRICUSPID N/A 4/15/2020    Procedure: REPAIR TRICUSPID VALVE WITH VILLA MC3 26MM.;  Surgeon: Ok Wilson MD;  Location: [facility]     REPLACE VALVE AORTIC N/A 4/15/2020    Procedure: REPLACEMENT, AORTIC VALVE WITH INSPIRIS TISSUE VALVE 25 MM; ON PUMP WITH SOCORRO READ BY CARDIOLOGIST DR BLANTON.;  Surgeon: Ok Wilson MD;  Location: [facility] OR     REPLACE VALVE MITRAL N/A 4/15/2020    Procedure: REPLACEMENT, MITRAL VALVE WITH EPIC TISSUE VALVE 27 MM.;  Surgeon: Ok Wilson MD;  Location:  OR        Meds: reviewed  Current Outpatient Medications   Medication Sig Dispense Refill     acetaminophen (TYLENOL) 325 MG tablet Take 2 tablets (650 mg) by mouth every 4 hours as needed for other (multimodal surgical pain management along with NSAIDS and opioid medication as indicated based on pain control and physical function.)  0     albuterol (PROAIR HFA/PROVENTIL HFA/VENTOLIN HFA) 108 (90 Base) MCG/ACT inhaler Inhale 2 puffs into the lungs every 4 hours as needed for shortness of breath / dyspnea or wheezing       atorvastatin (LIPITOR) 40 MG tablet Take 1 tablet (40 mg) by mouth At Bedtime 90 tablet 3     Biotin 37351 MCG TABS Take 10,000 mcg by mouth every morning        Calcium Carbonate-Vit D-Min (CALCIUM 1200 PO) Take 1 tablet by mouth 2 times daily        coenzyme Q-10 200 MG CAPS Take 200 mg by mouth every morning        Continuous Blood Gluc  (FREESTYLE ADAM READER) HARJEET 1 each every 14 days Use to read blood sugars per   instructions. 1 each 0     Continuous Blood Gluc Sensor (FREESTYLE ADAM 14 DAY SENSOR) [facility] 1 each every 14 days Change every 14 days. 2 each 5     cyanocobalamin (VITAMIN B-12) 100 MCG tablet Take 100 mcg by mouth daily       ferrous gluconate (FERGON) 324 (38 Fe) MG tablet Take 1 tablet (324 mg) by mouth daily (with breakfast) 30 tablet 3     furosemide (LASIX) 40 MG tablet Take one tablet (40 mg) twice daily 180 tablet 1     gabapentin (NEURONTIN) 100 MG capsule May use 1-3 capsules, 1-3x/during the day as needed 180 capsule 3     Ginkgo Biloba (GINKOBA PO) Take 250 mg by mouth daily       insulin  UNIT/ML vial Inject 22 units every morning and 20 unit(s) every evening 10 mL      insulin regular 100 UNIT/ML vial Inject 0.1 mLs (10 Units) Subcutaneous 3 times daily (before meals)       ipratropium (ATROVENT) 0.06 % nasal spray Spray 2 sprays into both nostrils 4 times daily as needed for rhinitis 3 Box 1     Lutein 40 MG CAPS Take 40 mg by mouth every morning        Magnesium 400 MG TABS Take 400 mg by mouth every evening        metFORMIN (GLUCOPHAGE-XR) 500 MG 24 hr tablet TAKE 2 TABLETS EVERY DAY WITH BREAKFAST 180 tablet 0     metoprolol succinate ER (TOPROL-XL) 25 MG 24 hr tablet Take 2 tablets (50 mg) by mouth daily 180 tablet 3     potassium 99 MG TABS Take 1 tablet by mouth daily       Propylene Glycol (SYSTANE BALANCE OP) Apply 1-2 drops to eye 3 times daily as needed (dry eyes)       sertraline (ZOLOFT) 100 MG tablet TAKE 1 AND 1/2 TABLETS EVERY MORNING  135 tablet 0     TURMERIC PO Take 3 tablets by mouth daily        vitamin (B COMPLEX-C) tablet Take 1 tablet by mouth daily       vitamin C (ASCORBIC ACID) 1000 MG TABS Take 1,000 mg by mouth every evening        Vitamin D3 (CHOLECALCIFEROL) 125 MCG (5000 UT) tablet Take 1 tablet by mouth daily       warfarin ANTICOAGULANT (COUMADIN) 5 MG tablet TAKE 2 tablets (10 mg) every Wed, Sat; and take 1-1/2 tablets (7.5 mg) all other days of the week or as  directed by your [facility]. 150 tablet 1     zinc 23 MG LOZG lozenge Place 1 lozenge inside cheek daily          Soc Hx: reviewed  Fam Hx: reviewed          She iDaz MD  Internal Medicine

## 2021-10-19 NOTE — PATIENT INSTRUCTIONS
Plan:  1. Long Actin Insulin  -- change the second dose: take it before supper or immediately after supper. Start with 20 units and slowly increase it  2. If you continue to have big variations in blood sugars we will make referral to endo  3. Continue the other meds, same doses for now.  4. Please make a lab appointment for non  fasting labs  After Nov 4 -- to check A1c  5.  Please make a lab appointment for non fasting labs  Second part of Feb -- to check A1c  6. Please make an appointment few days after the labs to discuss about the results. -- after Feb labs

## 2021-11-19 NOTE — PROGRESS NOTES
ANTICOAGULATION MANAGEMENT     Amy Luna 76 year old female is on warfarin with subtherapeutic INR result. (Goal INR 2.0-3.0)    Recent labs: (last 7 days)     11/19/21  1039   INR 1.6*       ASSESSMENT     Source(s): Chart Review and Patient/Caregiver Call       Warfarin doses taken: Missed dose(s) may be affecting INR    Diet: No new diet changes identified    New illness, injury, or hospitalization: No    Medication/supplement changes: None noted    Signs or symptoms of bleeding or clotting: No    Previous INR: Therapeutic last 2(+) visits    Additional findings: recommended follow up in 2 weeks, but patient prefers to go 3 weeks.     PLAN     Recommended plan for no diet, medication or health factor changes affecting INR     Dosing Instructions: Booster dose then continue your current warfarin dose with next INR in 3 weeks       Summary  As of 11/19/2021    Full warfarin instructions:  11/19: 15 mg; Otherwise 10 mg every Wed, Sat; 7.5 mg all other days   Next INR check:  12/10/2021             Telephone call with Amy who agrees to plan and repeated back plan correctly    Lab visit scheduled    Education provided: Please call back if any changes to your diet, medications or how you've been taking warfarin, Vitamin K content of foods and reminded patient that she can take a missed dose of warfarin as soon as she notices that she missed the dose.    Plan made per [facility] anticoagulation protocol    Nadia Cabello RN  Anticoagulation Clinic  11/19/2021    _______________________________________________________________________     Anticoagulation Episode Summary     Current INR goal:  2.0-3.0   TTR:  69.5 % (1 y)   Target end date:  Indefinite   Send INR reminders to:  [facility]    Indications    Paroxysmal atrial fibrillation (H) [I48.0]  Long term current use of anticoagulants with INR goal of 2.0-3.0 [Z79.01]  S/P aortic valve and mitral valve replacement [Z95.2]           Comments:            Anticoagulation Care Providers     Provider Role Specialty Phone number    Yamilka Christina DO Referring Cardiovascular Disease [phone]    Kelli Lewis CNP Referring Internal Medicine [phone]    She Huang MD Referring Internal Medicine [phone]

## 2021-12-10 NOTE — PROGRESS NOTES
"ANTICOAGULATION MANAGEMENT     Amy Luna 76 year old female is on warfarin with supratherapeutic INR result. (Goal INR 2.0-3.0)    Recent labs: (last 7 days)     12/10/21  1013   INR 4.6*       ASSESSMENT     Source(s): Chart Review and Patient/Caregiver Call       Warfarin doses taken: Warfarin taken as instructed but states she did miss a dose about 1 1/2 weeks ago.    Diet: Decreased greens/vitamin K in diet; plans to resume previous intake --patient states she typically eats several chopped kale salads but has not been able to get to the store.  She plans to resume and/or I suggested keeping frozen broccoli or brussels sprouts on hand, patient verbalized understanding.    New illness, injury, or hospitalization: Yes: Patient states she had an excessively runny nose for a few days recently but otherwise, felt \"fine\".    Medication/supplement changes: None noted    Signs or symptoms of bleeding or clotting: No    Previous INR: Subtherapeutic    Additional findings: None     PLAN     Recommended plan for temporary change(s) affecting INR     Dosing Instructions: Hold 1 1/2 doses doses then continue your current warfarin dose with next INR in 10 days       Summary  As of 12/10/2021    Full warfarin instructions:  12/10: Hold; 12/11: 5 mg; Otherwise 10 mg every Wed, Sat; 7.5 mg all other days   Next INR check:  12/20/2021             Telephone call with Amy who verbalizes understanding and agrees to plan    Lab visit scheduled    Education provided: Importance of consistent vitamin K intake, Impact of vitamin K foods on INR, Vitamin K content of foods, Goal range and significance of current result, Importance of following up at instructed interval, Monitoring for bleeding signs and symptoms and Contact [phone]  with any changes, questions or concerns.     Plan made per [facility] anticoagulation protocol    Evy Lyons RN  Anticoagulation " Clinic  12/10/2021    _______________________________________________________________________     Anticoagulation Episode Summary     Current INR goal:  2.0-3.0   TTR:  69.4 % (1 y)   Target end date:  Indefinite   Send INR reminders to:  [facility]    Indications    Paroxysmal atrial fibrillation (H) [I48.0]  Long term current use of anticoagulants with INR goal of 2.0-3.0 [Z79.01]  S/P aortic valve and mitral valve replacement [Z95.2]           Comments:           Anticoagulation Care Providers     Provider Role Specialty Phone number    Yamilka Christina DO Referring Cardiovascular Disease [phone]    Kelli Lewis CNP Referring Internal Medicine [phone]    She Huang MD Referring Internal Medicine [phone]

## 2021-12-20 NOTE — PROGRESS NOTES
ANTICOAGULATION MANAGEMENT     Amy Luna 76 year old female is on warfarin with therapeutic INR result. (Goal INR 2.0-3.0)    Recent labs: (last 7 days)     12/20/21  1155   INR 2.5*       ASSESSMENT     Source(s): Chart Review and Patient/Caregiver Call       Warfarin doses taken: Warfarin taken as instructed    Diet: No new diet changes identified, patient reports she has resumed her salads    New illness, injury, or hospitalization: No    Medication/supplement changes: None noted    Signs or symptoms of bleeding or clotting: No    Previous INR: Supratherapeutic    Additional findings: None     PLAN     Recommended plan for no diet, medication or health factor changes affecting INR     Dosing Instructions: Continue your current warfarin dose with next INR in 3 weeks       Summary  As of 12/20/2021    Full warfarin instructions:  10 mg every Wed, Sat; 7.5 mg all other days   Next INR check:  1/10/2022             Telephone call with Amy who verbalizes understanding and agrees to plan    Lab visit scheduled    Education provided: Please call back if any changes to your diet, medications or how you've been taking warfarin and Contact [phone]  with any changes, questions or concerns.     Plan made per [facility] anticoagulation protocol    Chiqui Nowak RN  Anticoagulation Clinic  12/20/2021    _______________________________________________________________________     Anticoagulation Episode Summary     Current INR goal:  2.0-3.0   TTR:  70.0 % (1 y)   Target end date:  Indefinite   Send INR reminders to:  [facility]    Indications    Paroxysmal atrial fibrillation (H) [I48.0]  Long term current use of anticoagulants with INR goal of 2.0-3.0 [Z79.01]  S/P aortic valve and mitral valve replacement [Z95.2]           Comments:           Anticoagulation Care Providers     Provider Role Specialty Phone number    Yamilka Christina DO Referring Cardiovascular Disease [phone]     Lewis, Kelli, CNP Referring Internal Medicine [phone]    She Huang MD Referring Internal Medicine [phone]

## 2022-01-01 ENCOUNTER — OFFICE VISIT (OUTPATIENT)
Dept: PALLIATIVE MEDICINE | Facility: CLINIC | Age: 77
End: 2022-01-01
Payer: MEDICARE

## 2022-01-01 ENCOUNTER — TELEPHONE (OUTPATIENT)
Dept: INTERNAL MEDICINE | Facility: CLINIC | Age: 77
End: 2022-01-01
Payer: MEDICARE

## 2022-01-01 ENCOUNTER — LAB REQUISITION (OUTPATIENT)
Dept: LAB | Facility: CLINIC | Age: 77
End: 2022-01-01
Payer: MEDICARE

## 2022-01-01 ENCOUNTER — TELEPHONE (OUTPATIENT)
Dept: INTERNAL MEDICINE | Facility: CLINIC | Age: 77
End: 2022-01-01

## 2022-01-01 ENCOUNTER — APPOINTMENT (OUTPATIENT)
Dept: CT IMAGING | Facility: CLINIC | Age: 77
DRG: 314 | End: 2022-01-01
Attending: STUDENT IN AN ORGANIZED HEALTH CARE EDUCATION/TRAINING PROGRAM
Payer: MEDICARE

## 2022-01-01 ENCOUNTER — TRANSITIONAL CARE UNIT VISIT (OUTPATIENT)
Dept: GERIATRICS | Facility: CLINIC | Age: 77
End: 2022-01-01
Payer: MEDICARE

## 2022-01-01 ENCOUNTER — OFFICE VISIT (OUTPATIENT)
Dept: INTERNAL MEDICINE | Facility: CLINIC | Age: 77
End: 2022-01-01
Payer: MEDICARE

## 2022-01-01 ENCOUNTER — MEDICAL CORRESPONDENCE (OUTPATIENT)
Dept: HEALTH INFORMATION MANAGEMENT | Facility: CLINIC | Age: 77
End: 2022-01-01

## 2022-01-01 ENCOUNTER — VIRTUAL VISIT (OUTPATIENT)
Dept: INTERNAL MEDICINE | Facility: CLINIC | Age: 77
End: 2022-01-01
Payer: MEDICARE

## 2022-01-01 ENCOUNTER — APPOINTMENT (OUTPATIENT)
Dept: ULTRASOUND IMAGING | Facility: CLINIC | Age: 77
DRG: 314 | End: 2022-01-01
Attending: INTERNAL MEDICINE
Payer: MEDICARE

## 2022-01-01 ENCOUNTER — APPOINTMENT (OUTPATIENT)
Dept: GENERAL RADIOLOGY | Facility: CLINIC | Age: 77
DRG: 377 | End: 2022-01-01
Attending: INTERNAL MEDICINE
Payer: MEDICARE

## 2022-01-01 ENCOUNTER — APPOINTMENT (OUTPATIENT)
Dept: GENERAL RADIOLOGY | Facility: OTHER | Age: 77
DRG: 314 | End: 2022-01-01
Attending: STUDENT IN AN ORGANIZED HEALTH CARE EDUCATION/TRAINING PROGRAM
Payer: MEDICARE

## 2022-01-01 ENCOUNTER — E-VISIT (OUTPATIENT)
Dept: INTERNAL MEDICINE | Facility: CLINIC | Age: 77
End: 2022-01-01
Payer: MEDICARE

## 2022-01-01 ENCOUNTER — APPOINTMENT (OUTPATIENT)
Dept: GENERAL RADIOLOGY | Facility: CLINIC | Age: 77
DRG: 314 | End: 2022-01-01
Attending: STUDENT IN AN ORGANIZED HEALTH CARE EDUCATION/TRAINING PROGRAM
Payer: MEDICARE

## 2022-01-01 ENCOUNTER — ANESTHESIA (OUTPATIENT)
Dept: INTENSIVE CARE | Facility: CLINIC | Age: 77
DRG: 314 | End: 2022-01-01
Payer: MEDICARE

## 2022-01-01 ENCOUNTER — APPOINTMENT (OUTPATIENT)
Dept: SPEECH THERAPY | Facility: CLINIC | Age: 77
DRG: 314 | End: 2022-01-01
Attending: INTERNAL MEDICINE
Payer: MEDICARE

## 2022-01-01 ENCOUNTER — APPOINTMENT (OUTPATIENT)
Dept: CT IMAGING | Facility: CLINIC | Age: 77
DRG: 314 | End: 2022-01-01
Attending: INTERNAL MEDICINE
Payer: MEDICARE

## 2022-01-01 ENCOUNTER — ANCILLARY PROCEDURE (OUTPATIENT)
Dept: GENERAL RADIOLOGY | Facility: CLINIC | Age: 77
End: 2022-01-01
Attending: PHYSICIAN ASSISTANT
Payer: MEDICARE

## 2022-01-01 ENCOUNTER — THERAPY VISIT (OUTPATIENT)
Dept: PHYSICAL THERAPY | Facility: CLINIC | Age: 77
End: 2022-01-01
Payer: MEDICARE

## 2022-01-01 ENCOUNTER — HOSPITAL ENCOUNTER (OUTPATIENT)
Dept: GENERAL RADIOLOGY | Facility: CLINIC | Age: 77
Discharge: HOME | End: 2022-05-23
Attending: PHYSICIAN ASSISTANT
Payer: MEDICARE

## 2022-01-01 ENCOUNTER — ANCILLARY PROCEDURE (OUTPATIENT)
Dept: CARDIOLOGY | Facility: CLINIC | Age: 77
End: 2022-01-01
Attending: INTERNAL MEDICINE
Payer: MEDICARE

## 2022-01-01 ENCOUNTER — APPOINTMENT (OUTPATIENT)
Dept: ULTRASOUND IMAGING | Facility: OTHER | Age: 77
DRG: 314 | End: 2022-01-01
Attending: FAMILY MEDICINE
Payer: MEDICARE

## 2022-01-01 ENCOUNTER — HOSPITAL ENCOUNTER (OUTPATIENT)
Facility: AMBULATORY SURGERY CENTER | Age: 77
Discharge: HOME | End: 2022-07-27
Attending: PHYSICAL MEDICINE & REHABILITATION | Admitting: PHYSICAL MEDICINE & REHABILITATION
Payer: MEDICARE

## 2022-01-01 ENCOUNTER — APPOINTMENT (OUTPATIENT)
Dept: CARDIOLOGY | Facility: OTHER | Age: 77
DRG: 314 | End: 2022-01-01
Attending: FAMILY MEDICINE
Payer: MEDICARE

## 2022-01-01 ENCOUNTER — APPOINTMENT (OUTPATIENT)
Dept: GENERAL RADIOLOGY | Facility: CLINIC | Age: 77
DRG: 314 | End: 2022-01-01
Attending: INTERNAL MEDICINE
Payer: MEDICARE

## 2022-01-01 ENCOUNTER — MEDICAL CORRESPONDENCE (OUTPATIENT)
Dept: INTERNAL MEDICINE | Facility: CLINIC | Age: 77
End: 2022-01-01

## 2022-01-01 ENCOUNTER — ANTICOAGULATION THERAPY VISIT (OUTPATIENT)
Dept: ANTICOAGULATION | Facility: CLINIC | Age: 77
End: 2022-01-01

## 2022-01-01 ENCOUNTER — TELEPHONE (OUTPATIENT)
Dept: PALLIATIVE MEDICINE | Facility: CLINIC | Age: 77
End: 2022-01-01
Payer: MEDICARE

## 2022-01-01 ENCOUNTER — ANCILLARY PROCEDURE (OUTPATIENT)
Dept: BONE DENSITY | Facility: CLINIC | Age: 77
End: 2022-01-01
Attending: INTERNAL MEDICINE
Payer: MEDICARE

## 2022-01-01 ENCOUNTER — OFFICE VISIT (OUTPATIENT)
Dept: PALLIATIVE MEDICINE | Facility: CLINIC | Age: 77
End: 2022-01-01
Attending: PHYSICIAN ASSISTANT
Payer: MEDICARE

## 2022-01-01 ENCOUNTER — APPOINTMENT (OUTPATIENT)
Dept: PHYSICAL THERAPY | Facility: CLINIC | Age: 77
DRG: 377 | End: 2022-01-01
Attending: INTERNAL MEDICINE
Payer: MEDICARE

## 2022-01-01 ENCOUNTER — PATIENT OUTREACH (OUTPATIENT)
Dept: NURSING | Facility: CLINIC | Age: 77
End: 2022-01-01
Attending: INTERNAL MEDICINE
Payer: MEDICARE

## 2022-01-01 ENCOUNTER — PATIENT OUTREACH (OUTPATIENT)
Dept: FAMILY MEDICINE | Facility: OTHER | Age: 77
End: 2022-01-01

## 2022-01-01 ENCOUNTER — APPOINTMENT (OUTPATIENT)
Dept: CT IMAGING | Facility: CLINIC | Age: 77
DRG: 377 | End: 2022-01-01
Attending: EMERGENCY MEDICINE
Payer: MEDICARE

## 2022-01-01 ENCOUNTER — ANESTHESIA EVENT (OUTPATIENT)
Dept: INTENSIVE CARE | Facility: CLINIC | Age: 77
DRG: 314 | End: 2022-01-01
Payer: MEDICARE

## 2022-01-01 ENCOUNTER — IMMUNIZATION (OUTPATIENT)
Dept: FAMILY MEDICINE | Facility: CLINIC | Age: 77
End: 2022-01-01
Payer: MEDICARE

## 2022-01-01 ENCOUNTER — APPOINTMENT (OUTPATIENT)
Dept: CT IMAGING | Facility: CLINIC | Age: 77
DRG: 377 | End: 2022-01-01
Attending: INTERNAL MEDICINE
Payer: MEDICARE

## 2022-01-01 ENCOUNTER — VIRTUAL VISIT (OUTPATIENT)
Dept: EDUCATION SERVICES | Facility: CLINIC | Age: 77
End: 2022-01-01
Attending: INTERNAL MEDICINE
Payer: MEDICARE

## 2022-01-01 ENCOUNTER — NURSE TRIAGE (OUTPATIENT)
Dept: NURSING | Facility: CLINIC | Age: 77
End: 2022-01-01
Payer: MEDICARE

## 2022-01-01 ENCOUNTER — DOCUMENTATION ONLY (OUTPATIENT)
Dept: NEUROSURGERY | Facility: CLINIC | Age: 77
End: 2022-01-01
Payer: MEDICARE

## 2022-01-01 ENCOUNTER — TELEPHONE (OUTPATIENT)
Dept: ORTHOPEDICS | Facility: CLINIC | Age: 77
End: 2022-01-01

## 2022-01-01 ENCOUNTER — APPOINTMENT (OUTPATIENT)
Dept: CT IMAGING | Facility: OTHER | Age: 77
DRG: 314 | End: 2022-01-01
Attending: STUDENT IN AN ORGANIZED HEALTH CARE EDUCATION/TRAINING PROGRAM
Payer: MEDICARE

## 2022-01-01 ENCOUNTER — LAB (OUTPATIENT)
Dept: LAB | Facility: CLINIC | Age: 77
End: 2022-01-01
Payer: MEDICARE

## 2022-01-01 ENCOUNTER — TELEPHONE (OUTPATIENT)
Dept: NEUROSURGERY | Facility: CLINIC | Age: 77
End: 2022-01-01
Payer: MEDICARE

## 2022-01-01 ENCOUNTER — DOCUMENTATION ONLY (OUTPATIENT)
Dept: ANTICOAGULATION | Facility: CLINIC | Age: 77
End: 2022-01-01

## 2022-01-01 ENCOUNTER — TELEPHONE (OUTPATIENT)
Dept: NEUROSURGERY | Facility: CLINIC | Age: 77
End: 2022-01-01

## 2022-01-01 ENCOUNTER — TELEPHONE (OUTPATIENT)
Dept: INTERVENTIONAL RADIOLOGY/VASCULAR | Facility: CLINIC | Age: 77
End: 2022-01-01

## 2022-01-01 ENCOUNTER — HOSPITAL ENCOUNTER (OUTPATIENT)
Facility: CLINIC | Age: 77
Discharge: HOME | End: 2022-05-23
Admitting: PHYSICIAN ASSISTANT
Payer: MEDICARE

## 2022-01-01 ENCOUNTER — HOSPITAL ENCOUNTER (INPATIENT)
Facility: OTHER | Age: 77
LOS: 1 days | Discharge: ANOTHER HOSPITAL | DRG: 314 | End: 2022-08-24
Attending: STUDENT IN AN ORGANIZED HEALTH CARE EDUCATION/TRAINING PROGRAM | Admitting: FAMILY MEDICINE
Payer: MEDICARE

## 2022-01-01 ENCOUNTER — HOSPITAL ENCOUNTER (INPATIENT)
Facility: CLINIC | Age: 77
LOS: 11 days | DRG: 314 | End: 2022-09-05
Attending: INTERNAL MEDICINE | Admitting: INTERNAL MEDICINE
Payer: MEDICARE

## 2022-01-01 ENCOUNTER — TRANSFERRED RECORDS (OUTPATIENT)
Dept: MULTI SPECIALTY CLINIC | Facility: CLINIC | Age: 77
End: 2022-01-01

## 2022-01-01 ENCOUNTER — APPOINTMENT (OUTPATIENT)
Dept: OCCUPATIONAL THERAPY | Facility: CLINIC | Age: 77
DRG: 314 | End: 2022-01-01
Attending: INTERNAL MEDICINE
Payer: MEDICARE

## 2022-01-01 ENCOUNTER — APPOINTMENT (OUTPATIENT)
Dept: OCCUPATIONAL THERAPY | Facility: CLINIC | Age: 77
DRG: 377 | End: 2022-01-01
Attending: INTERNAL MEDICINE
Payer: MEDICARE

## 2022-01-01 ENCOUNTER — HOSPITAL ENCOUNTER (INPATIENT)
Facility: CLINIC | Age: 77
LOS: 5 days | Discharge: NURSING, REHAB OR LONG-TERM CARE | DRG: 377 | End: 2022-03-19
Attending: EMERGENCY MEDICINE | Admitting: INTERNAL MEDICINE
Payer: MEDICARE

## 2022-01-01 ENCOUNTER — HEALTH MAINTENANCE LETTER (OUTPATIENT)
Age: 77
End: 2022-01-01

## 2022-01-01 ENCOUNTER — PATIENT OUTREACH (OUTPATIENT)
Dept: CARE COORDINATION | Facility: CLINIC | Age: 77
End: 2022-01-01
Payer: MEDICARE

## 2022-01-01 ENCOUNTER — APPOINTMENT (OUTPATIENT)
Dept: CT IMAGING | Facility: OTHER | Age: 77
DRG: 314 | End: 2022-01-01
Attending: FAMILY MEDICINE
Payer: MEDICARE

## 2022-01-01 ENCOUNTER — HOSPITAL ENCOUNTER (OUTPATIENT)
Dept: CT IMAGING | Facility: CLINIC | Age: 77
Discharge: HOME | End: 2022-05-23
Attending: PHYSICIAN ASSISTANT
Payer: MEDICARE

## 2022-01-01 ENCOUNTER — OFFICE VISIT (OUTPATIENT)
Dept: FAMILY MEDICINE | Facility: OTHER | Age: 77
End: 2022-01-01
Attending: STUDENT IN AN ORGANIZED HEALTH CARE EDUCATION/TRAINING PROGRAM
Payer: MEDICARE

## 2022-01-01 ENCOUNTER — OFFICE VISIT (OUTPATIENT)
Dept: NEUROSURGERY | Facility: CLINIC | Age: 77
End: 2022-01-01
Attending: PHYSICIAN ASSISTANT
Payer: MEDICARE

## 2022-01-01 ENCOUNTER — CARE COORDINATION (OUTPATIENT)
Dept: SLEEP MEDICINE | Facility: CLINIC | Age: 77
End: 2022-01-01

## 2022-01-01 ENCOUNTER — HOSPITAL ENCOUNTER (INPATIENT)
Dept: NEUROLOGY | Facility: CLINIC | Age: 77
Discharge: HOME | DRG: 314 | End: 2022-08-28
Attending: INTERNAL MEDICINE | Admitting: INTERNAL MEDICINE
Payer: MEDICARE

## 2022-01-01 ENCOUNTER — TELEPHONE (OUTPATIENT)
Dept: MEDSURG UNIT | Facility: CLINIC | Age: 77
End: 2022-01-01
Payer: MEDICARE

## 2022-01-01 ENCOUNTER — OFFICE VISIT (OUTPATIENT)
Dept: NEUROSURGERY | Facility: CLINIC | Age: 77
End: 2022-01-01
Attending: NURSE PRACTITIONER
Payer: MEDICARE

## 2022-01-01 ENCOUNTER — APPOINTMENT (OUTPATIENT)
Dept: CARDIOLOGY | Facility: CLINIC | Age: 77
DRG: 314 | End: 2022-01-01
Attending: INTERNAL MEDICINE
Payer: MEDICARE

## 2022-01-01 ENCOUNTER — DISCHARGE SUMMARY NURSING HOME (OUTPATIENT)
Dept: GERIATRICS | Facility: CLINIC | Age: 77
End: 2022-01-01
Payer: MEDICARE

## 2022-01-01 ENCOUNTER — TRANSFERRED RECORDS (OUTPATIENT)
Dept: HEALTH INFORMATION MANAGEMENT | Facility: CLINIC | Age: 77
End: 2022-01-01
Payer: MEDICARE

## 2022-01-01 VITALS
HEART RATE: 104 BPM | WEIGHT: 261.2 LBS | BODY MASS INDEX: 43.52 KG/M2 | SYSTOLIC BLOOD PRESSURE: 130 MMHG | TEMPERATURE: 98.3 F | OXYGEN SATURATION: 97 % | RESPIRATION RATE: 16 BRPM | DIASTOLIC BLOOD PRESSURE: 58 MMHG | HEIGHT: 65 IN

## 2022-01-01 VITALS
TEMPERATURE: 97.2 F | SYSTOLIC BLOOD PRESSURE: 128 MMHG | WEIGHT: 209.13 LBS | RESPIRATION RATE: 18 BRPM | DIASTOLIC BLOOD PRESSURE: 66 MMHG | HEART RATE: 88 BPM | HEIGHT: 65 IN | OXYGEN SATURATION: 95 % | BODY MASS INDEX: 34.84 KG/M2

## 2022-01-01 VITALS
SYSTOLIC BLOOD PRESSURE: 116 MMHG | BODY MASS INDEX: 44.76 KG/M2 | HEART RATE: 69 BPM | DIASTOLIC BLOOD PRESSURE: 46 MMHG | WEIGHT: 269 LBS

## 2022-01-01 VITALS — DIASTOLIC BLOOD PRESSURE: 76 MMHG | HEART RATE: 74 BPM | SYSTOLIC BLOOD PRESSURE: 125 MMHG | OXYGEN SATURATION: 97 %

## 2022-01-01 VITALS
WEIGHT: 224 LBS | RESPIRATION RATE: 16 BRPM | HEIGHT: 65 IN | OXYGEN SATURATION: 98 % | SYSTOLIC BLOOD PRESSURE: 120 MMHG | HEART RATE: 74 BPM | DIASTOLIC BLOOD PRESSURE: 50 MMHG | TEMPERATURE: 97.6 F | BODY MASS INDEX: 37.32 KG/M2

## 2022-01-01 VITALS
WEIGHT: 245 LBS | HEIGHT: 64 IN | OXYGEN SATURATION: 96 % | RESPIRATION RATE: 18 BRPM | SYSTOLIC BLOOD PRESSURE: 111 MMHG | TEMPERATURE: 97.2 F | HEART RATE: 74 BPM | BODY MASS INDEX: 41.83 KG/M2 | DIASTOLIC BLOOD PRESSURE: 55 MMHG

## 2022-01-01 VITALS
DIASTOLIC BLOOD PRESSURE: 55 MMHG | WEIGHT: 230.3 LBS | BODY MASS INDEX: 38.37 KG/M2 | SYSTOLIC BLOOD PRESSURE: 105 MMHG | HEART RATE: 83 BPM | OXYGEN SATURATION: 96 % | HEIGHT: 65 IN

## 2022-01-01 VITALS
DIASTOLIC BLOOD PRESSURE: 60 MMHG | SYSTOLIC BLOOD PRESSURE: 126 MMHG | HEART RATE: 77 BPM | BODY MASS INDEX: 36.69 KG/M2 | RESPIRATION RATE: 14 BRPM | OXYGEN SATURATION: 97 % | HEIGHT: 65 IN | WEIGHT: 220.2 LBS

## 2022-01-01 VITALS
HEART RATE: 80 BPM | RESPIRATION RATE: 16 BRPM | DIASTOLIC BLOOD PRESSURE: 51 MMHG | SYSTOLIC BLOOD PRESSURE: 115 MMHG | BODY MASS INDEX: 34.7 KG/M2 | HEIGHT: 65 IN | OXYGEN SATURATION: 55 % | WEIGHT: 208.3 LBS | TEMPERATURE: 98 F

## 2022-01-01 VITALS
OXYGEN SATURATION: 97 % | BODY MASS INDEX: 42.14 KG/M2 | TEMPERATURE: 98.2 F | DIASTOLIC BLOOD PRESSURE: 61 MMHG | SYSTOLIC BLOOD PRESSURE: 124 MMHG | RESPIRATION RATE: 16 BRPM | HEART RATE: 81 BPM | WEIGHT: 246.8 LBS | HEIGHT: 64 IN

## 2022-01-01 VITALS
TEMPERATURE: 98.2 F | DIASTOLIC BLOOD PRESSURE: 59 MMHG | HEIGHT: 64 IN | RESPIRATION RATE: 17 BRPM | WEIGHT: 244.6 LBS | BODY MASS INDEX: 41.76 KG/M2 | HEART RATE: 76 BPM | SYSTOLIC BLOOD PRESSURE: 110 MMHG | OXYGEN SATURATION: 99 %

## 2022-01-01 VITALS
TEMPERATURE: 97.8 F | BODY MASS INDEX: 41.76 KG/M2 | DIASTOLIC BLOOD PRESSURE: 53 MMHG | WEIGHT: 244.6 LBS | HEIGHT: 64 IN | OXYGEN SATURATION: 96 % | SYSTOLIC BLOOD PRESSURE: 111 MMHG | RESPIRATION RATE: 18 BRPM | HEART RATE: 68 BPM

## 2022-01-01 VITALS
OXYGEN SATURATION: 95 % | SYSTOLIC BLOOD PRESSURE: 124 MMHG | DIASTOLIC BLOOD PRESSURE: 58 MMHG | WEIGHT: 242 LBS | BODY MASS INDEX: 41.54 KG/M2 | TEMPERATURE: 97.8 F | HEART RATE: 80 BPM | RESPIRATION RATE: 16 BRPM

## 2022-01-01 VITALS
HEART RATE: 87 BPM | RESPIRATION RATE: 18 BRPM | TEMPERATURE: 97.4 F | OXYGEN SATURATION: 95 % | SYSTOLIC BLOOD PRESSURE: 100 MMHG | DIASTOLIC BLOOD PRESSURE: 58 MMHG

## 2022-01-01 VITALS — OXYGEN SATURATION: 96 % | SYSTOLIC BLOOD PRESSURE: 116 MMHG | HEART RATE: 87 BPM | DIASTOLIC BLOOD PRESSURE: 73 MMHG

## 2022-01-01 VITALS
DIASTOLIC BLOOD PRESSURE: 47 MMHG | WEIGHT: 249.3 LBS | TEMPERATURE: 97.8 F | RESPIRATION RATE: 18 BRPM | OXYGEN SATURATION: 93 % | HEIGHT: 64 IN | SYSTOLIC BLOOD PRESSURE: 114 MMHG | BODY MASS INDEX: 42.56 KG/M2 | HEART RATE: 89 BPM

## 2022-01-01 VITALS — OXYGEN SATURATION: 95 % | SYSTOLIC BLOOD PRESSURE: 109 MMHG | DIASTOLIC BLOOD PRESSURE: 51 MMHG | HEART RATE: 73 BPM

## 2022-01-01 VITALS — OXYGEN SATURATION: 96 % | HEART RATE: 108 BPM | DIASTOLIC BLOOD PRESSURE: 71 MMHG | SYSTOLIC BLOOD PRESSURE: 110 MMHG

## 2022-01-01 VITALS
HEIGHT: 65 IN | SYSTOLIC BLOOD PRESSURE: 108 MMHG | OXYGEN SATURATION: 98 % | HEART RATE: 79 BPM | RESPIRATION RATE: 16 BRPM | WEIGHT: 230 LBS | TEMPERATURE: 97.6 F | BODY MASS INDEX: 38.32 KG/M2 | DIASTOLIC BLOOD PRESSURE: 49 MMHG

## 2022-01-01 VITALS
OXYGEN SATURATION: 97 % | HEART RATE: 75 BPM | HEIGHT: 64 IN | DIASTOLIC BLOOD PRESSURE: 68 MMHG | RESPIRATION RATE: 18 BRPM | BODY MASS INDEX: 42.51 KG/M2 | TEMPERATURE: 98.3 F | SYSTOLIC BLOOD PRESSURE: 122 MMHG | WEIGHT: 249 LBS

## 2022-01-01 DIAGNOSIS — G89.29 CHRONIC LOW BACK PAIN, UNSPECIFIED BACK PAIN LATERALITY, UNSPECIFIED WHETHER SCIATICA PRESENT: ICD-10-CM

## 2022-01-01 DIAGNOSIS — S32.000D COMPRESSION FRACTURE OF LUMBAR VERTEBRA WITH ROUTINE HEALING, UNSPECIFIED LUMBAR VERTEBRAL LEVEL, SUBSEQUENT ENCOUNTER: ICD-10-CM

## 2022-01-01 DIAGNOSIS — D62 ACUTE BLOOD LOSS ANEMIA: Primary | ICD-10-CM

## 2022-01-01 DIAGNOSIS — K92.2 GASTROINTESTINAL HEMORRHAGE, UNSPECIFIED GASTROINTESTINAL HEMORRHAGE TYPE: ICD-10-CM

## 2022-01-01 DIAGNOSIS — G47.33 OSA ON CPAP: ICD-10-CM

## 2022-01-01 DIAGNOSIS — I50.32 CHRONIC HEART FAILURE WITH PRESERVED EJECTION FRACTION (H): ICD-10-CM

## 2022-01-01 DIAGNOSIS — N18.30 CHRONIC KIDNEY DISEASE, STAGE 3 UNSPECIFIED (H): ICD-10-CM

## 2022-01-01 DIAGNOSIS — M48.56XG NON-TRAUMATIC COMPRESSION FRACTURE OF L4 LUMBAR VERTEBRA WITH DELAYED HEALING, SUBSEQUENT ENCOUNTER: ICD-10-CM

## 2022-01-01 DIAGNOSIS — I48.0 PAROXYSMAL ATRIAL FIBRILLATION (H): ICD-10-CM

## 2022-01-01 DIAGNOSIS — R53.81 PHYSICAL DECONDITIONING: ICD-10-CM

## 2022-01-01 DIAGNOSIS — R76.12 REACTION TO QUANTIFERON-TB TEST (QFT) WITHOUT ACTIVE TUBERCULOSIS: ICD-10-CM

## 2022-01-01 DIAGNOSIS — Z79.4 TYPE 2 DIABETES MELLITUS WITH HYPERGLYCEMIA, WITH LONG-TERM CURRENT USE OF INSULIN (H): ICD-10-CM

## 2022-01-01 DIAGNOSIS — R53.1 GENERALIZED WEAKNESS: ICD-10-CM

## 2022-01-01 DIAGNOSIS — R79.1 SUPRATHERAPEUTIC INR: ICD-10-CM

## 2022-01-01 DIAGNOSIS — Z78.0 ASYMPTOMATIC POSTMENOPAUSAL STATUS: ICD-10-CM

## 2022-01-01 DIAGNOSIS — K62.5 BRBPR (BRIGHT RED BLOOD PER RECTUM): ICD-10-CM

## 2022-01-01 DIAGNOSIS — Z95.2 S/P AORTIC VALVE AND MITRAL VALVE REPLACEMENT: ICD-10-CM

## 2022-01-01 DIAGNOSIS — Z79.4 TYPE 2 DIABETES MELLITUS WITH STAGE 3B CHRONIC KIDNEY DISEASE, WITH LONG-TERM CURRENT USE OF INSULIN (H): ICD-10-CM

## 2022-01-01 DIAGNOSIS — D62 ACUTE BLOOD LOSS ANEMIA: ICD-10-CM

## 2022-01-01 DIAGNOSIS — E11.9 TYPE 2 DIABETES, HBA1C GOAL < 8% (H): ICD-10-CM

## 2022-01-01 DIAGNOSIS — N30.00 ACUTE CYSTITIS WITHOUT HEMATURIA: ICD-10-CM

## 2022-01-01 DIAGNOSIS — Z95.3 S/P MITRAL VALVE REPLACEMENT WITH BIOPROSTHETIC VALVE: ICD-10-CM

## 2022-01-01 DIAGNOSIS — R53.81 DEBILITY: ICD-10-CM

## 2022-01-01 DIAGNOSIS — R33.9 URINARY RETENTION: ICD-10-CM

## 2022-01-01 DIAGNOSIS — E11.65 TYPE 2 DIABETES MELLITUS WITH HYPERGLYCEMIA, WITH LONG-TERM CURRENT USE OF INSULIN (H): ICD-10-CM

## 2022-01-01 DIAGNOSIS — M54.41 ACUTE BILATERAL LOW BACK PAIN WITH BILATERAL SCIATICA: Primary | ICD-10-CM

## 2022-01-01 DIAGNOSIS — Z79.4 INSULIN DEPENDENT TYPE 2 DIABETES MELLITUS (H): ICD-10-CM

## 2022-01-01 DIAGNOSIS — E11.22 TYPE 2 DIABETES MELLITUS WITH STAGE 3B CHRONIC KIDNEY DISEASE, WITH LONG-TERM CURRENT USE OF INSULIN (H): ICD-10-CM

## 2022-01-01 DIAGNOSIS — R50.9 HYPERTHERMIA-INDUCED DEFECT: ICD-10-CM

## 2022-01-01 DIAGNOSIS — S32.030A CLOSED COMPRESSION FRACTURE OF L3 LUMBAR VERTEBRA, INITIAL ENCOUNTER (H): ICD-10-CM

## 2022-01-01 DIAGNOSIS — Z95.0 CARDIAC PACEMAKER IN SITU: ICD-10-CM

## 2022-01-01 DIAGNOSIS — Z95.2 S/P AVR (AORTIC VALVE REPLACEMENT): ICD-10-CM

## 2022-01-01 DIAGNOSIS — E66.813 CLASS 3 SEVERE OBESITY WITH BODY MASS INDEX (BMI) OF 40.0 TO 44.9 IN ADULT, UNSPECIFIED OBESITY TYPE, UNSPECIFIED WHETHER SERIOUS COMORBIDITY PRESENT (H): ICD-10-CM

## 2022-01-01 DIAGNOSIS — M54.41 ACUTE BILATERAL LOW BACK PAIN WITH BILATERAL SCIATICA: ICD-10-CM

## 2022-01-01 DIAGNOSIS — T38.0X5A STEROID-INDUCED HYPERGLYCEMIA: Primary | ICD-10-CM

## 2022-01-01 DIAGNOSIS — F33.1 MAJOR DEPRESSIVE DISORDER, RECURRENT EPISODE, MODERATE (H): ICD-10-CM

## 2022-01-01 DIAGNOSIS — N18.32 TYPE 2 DIABETES MELLITUS WITH STAGE 3B CHRONIC KIDNEY DISEASE, WITH LONG-TERM CURRENT USE OF INSULIN (H): Primary | ICD-10-CM

## 2022-01-01 DIAGNOSIS — Z79.4 TYPE 2 DIABETES MELLITUS WITH OTHER SPECIFIED COMPLICATION, WITH LONG-TERM CURRENT USE OF INSULIN (H): ICD-10-CM

## 2022-01-01 DIAGNOSIS — R19.5 LOOSE STOOLS: ICD-10-CM

## 2022-01-01 DIAGNOSIS — I10 ESSENTIAL HYPERTENSION: ICD-10-CM

## 2022-01-01 DIAGNOSIS — N18.32 TYPE 2 DIABETES MELLITUS WITH STAGE 3B CHRONIC KIDNEY DISEASE, WITH LONG-TERM CURRENT USE OF INSULIN (H): ICD-10-CM

## 2022-01-01 DIAGNOSIS — S32.040A CLOSED COMPRESSION FRACTURE OF L4 LUMBAR VERTEBRA, INITIAL ENCOUNTER (H): ICD-10-CM

## 2022-01-01 DIAGNOSIS — Z79.01 LONG TERM CURRENT USE OF ANTICOAGULANTS WITH INR GOAL OF 2.0-3.0: ICD-10-CM

## 2022-01-01 DIAGNOSIS — R73.9 STEROID-INDUCED HYPERGLYCEMIA: Primary | ICD-10-CM

## 2022-01-01 DIAGNOSIS — Z79.01 CHRONIC ANTICOAGULATION: ICD-10-CM

## 2022-01-01 DIAGNOSIS — E11.69 TYPE 2 DIABETES MELLITUS WITH OTHER SPECIFIED COMPLICATION, WITH LONG-TERM CURRENT USE OF INSULIN (H): ICD-10-CM

## 2022-01-01 DIAGNOSIS — R41.82 ALTERED MENTAL STATUS, UNSPECIFIED ALTERED MENTAL STATUS TYPE: ICD-10-CM

## 2022-01-01 DIAGNOSIS — Z79.899 ENCOUNTER FOR LONG-TERM (CURRENT) USE OF MEDICATIONS: ICD-10-CM

## 2022-01-01 DIAGNOSIS — I35.0 AORTIC VALVE STENOSIS, ETIOLOGY OF CARDIAC VALVE DISEASE UNSPECIFIED: ICD-10-CM

## 2022-01-01 DIAGNOSIS — Z79.4 TYPE 2 DIABETES MELLITUS WITH OTHER SPECIFIED COMPLICATION, WITH LONG-TERM CURRENT USE OF INSULIN (H): Primary | ICD-10-CM

## 2022-01-01 DIAGNOSIS — I10 PRIMARY HYPERTENSION: ICD-10-CM

## 2022-01-01 DIAGNOSIS — M54.17 LUMBOSACRAL RADICULOPATHY: Primary | ICD-10-CM

## 2022-01-01 DIAGNOSIS — K59.00 CONSTIPATION, UNSPECIFIED CONSTIPATION TYPE: ICD-10-CM

## 2022-01-01 DIAGNOSIS — M48.56XG NON-TRAUMATIC COMPRESSION FRACTURE OF L4 LUMBAR VERTEBRA WITH DELAYED HEALING, SUBSEQUENT ENCOUNTER: Primary | ICD-10-CM

## 2022-01-01 DIAGNOSIS — M54.42 ACUTE BILATERAL LOW BACK PAIN WITH BILATERAL SCIATICA: Primary | ICD-10-CM

## 2022-01-01 DIAGNOSIS — G89.29 CHRONIC MIDLINE LOW BACK PAIN, UNSPECIFIED WHETHER SCIATICA PRESENT: ICD-10-CM

## 2022-01-01 DIAGNOSIS — D62 ANEMIA DUE TO BLOOD LOSS, ACUTE: ICD-10-CM

## 2022-01-01 DIAGNOSIS — M54.42 ACUTE BILATERAL LOW BACK PAIN WITH BILATERAL SCIATICA: ICD-10-CM

## 2022-01-01 DIAGNOSIS — Z11.1 ENCOUNTER FOR SCREENING FOR RESPIRATORY TUBERCULOSIS: ICD-10-CM

## 2022-01-01 DIAGNOSIS — M54.41 RIGHT-SIDED LOW BACK PAIN WITH SCIATICA, SCIATICA LATERALITY UNSPECIFIED, UNSPECIFIED CHRONICITY: ICD-10-CM

## 2022-01-01 DIAGNOSIS — M80.00XA OSTEOPOROSIS WITH CURRENT PATHOLOGICAL FRACTURE, UNSPECIFIED OSTEOPOROSIS TYPE, INITIAL ENCOUNTER: ICD-10-CM

## 2022-01-01 DIAGNOSIS — D50.9 IRON DEFICIENCY ANEMIA, UNSPECIFIED IRON DEFICIENCY ANEMIA TYPE: ICD-10-CM

## 2022-01-01 DIAGNOSIS — Z53.9 DIAGNOSIS NOT YET DEFINED: Primary | ICD-10-CM

## 2022-01-01 DIAGNOSIS — N17.9 AKI (ACUTE KIDNEY INJURY) (H): ICD-10-CM

## 2022-01-01 DIAGNOSIS — M54.50 CHRONIC LOW BACK PAIN, UNSPECIFIED BACK PAIN LATERALITY, UNSPECIFIED WHETHER SCIATICA PRESENT: ICD-10-CM

## 2022-01-01 DIAGNOSIS — E66.01 CLASS 3 SEVERE OBESITY WITH BODY MASS INDEX (BMI) OF 40.0 TO 44.9 IN ADULT, UNSPECIFIED OBESITY TYPE, UNSPECIFIED WHETHER SERIOUS COMORBIDITY PRESENT (H): ICD-10-CM

## 2022-01-01 DIAGNOSIS — N18.2 CKD (CHRONIC KIDNEY DISEASE) STAGE 2, GFR 60-89 ML/MIN: ICD-10-CM

## 2022-01-01 DIAGNOSIS — N39.0 URINARY TRACT INFECTION WITHOUT HEMATURIA, SITE UNSPECIFIED: ICD-10-CM

## 2022-01-01 DIAGNOSIS — I07.1 TRICUSPID VALVE INSUFFICIENCY, UNSPECIFIED ETIOLOGY: ICD-10-CM

## 2022-01-01 DIAGNOSIS — I48.0 PAROXYSMAL ATRIAL FIBRILLATION (H): Primary | ICD-10-CM

## 2022-01-01 DIAGNOSIS — I13.0 HYPERTENSIVE HEART AND CHRONIC KIDNEY DISEASE WITH HEART FAILURE AND STAGE 1 THROUGH STAGE 4 CHRONIC KIDNEY DISEASE, OR UNSPECIFIED CHRONIC KIDNEY DISEASE (H): ICD-10-CM

## 2022-01-01 DIAGNOSIS — Z79.4 TYPE 2 DIABETES MELLITUS WITH STAGE 3B CHRONIC KIDNEY DISEASE, WITH LONG-TERM CURRENT USE OF INSULIN (H): Primary | ICD-10-CM

## 2022-01-01 DIAGNOSIS — D64.9 ANEMIA, UNSPECIFIED: ICD-10-CM

## 2022-01-01 DIAGNOSIS — E11.9 INSULIN DEPENDENT TYPE 2 DIABETES MELLITUS (H): ICD-10-CM

## 2022-01-01 DIAGNOSIS — G47.33 OSA (OBSTRUCTIVE SLEEP APNEA): ICD-10-CM

## 2022-01-01 DIAGNOSIS — F33.1 MAJOR DEPRESSIVE DISORDER, RECURRENT EPISODE, MODERATE (H): Primary | ICD-10-CM

## 2022-01-01 DIAGNOSIS — Z11.52 ENCOUNTER FOR SCREENING LABORATORY TESTING FOR COVID-19 VIRUS: ICD-10-CM

## 2022-01-01 DIAGNOSIS — E87.1 HYPOSMOLALITY SYNDROME: ICD-10-CM

## 2022-01-01 DIAGNOSIS — K92.2 GASTROINTESTINAL HEMORRHAGE, UNSPECIFIED: ICD-10-CM

## 2022-01-01 DIAGNOSIS — L29.9 ITCHING: ICD-10-CM

## 2022-01-01 DIAGNOSIS — F33.42 RECURRENT MAJOR DEPRESSIVE DISORDER, IN FULL REMISSION (H): ICD-10-CM

## 2022-01-01 DIAGNOSIS — T38.0X5A STEROID-INDUCED HYPERGLYCEMIA: ICD-10-CM

## 2022-01-01 DIAGNOSIS — K92.2 UPPER GI BLEED: ICD-10-CM

## 2022-01-01 DIAGNOSIS — N18.30 STAGE 3 CHRONIC KIDNEY DISEASE, UNSPECIFIED WHETHER STAGE 3A OR 3B CKD (H): ICD-10-CM

## 2022-01-01 DIAGNOSIS — L50.9 URTICARIA: ICD-10-CM

## 2022-01-01 DIAGNOSIS — E66.01 MORBID OBESITY (H): ICD-10-CM

## 2022-01-01 DIAGNOSIS — Z71.89 OTHER SPECIFIED COUNSELING: ICD-10-CM

## 2022-01-01 DIAGNOSIS — E87.1 HYPONATREMIA: ICD-10-CM

## 2022-01-01 DIAGNOSIS — M54.41 RIGHT-SIDED LOW BACK PAIN WITH SCIATICA, SCIATICA LATERALITY UNSPECIFIED, UNSPECIFIED CHRONICITY: Primary | ICD-10-CM

## 2022-01-01 DIAGNOSIS — E11.22 TYPE 2 DIABETES MELLITUS WITH STAGE 3B CHRONIC KIDNEY DISEASE, WITH LONG-TERM CURRENT USE OF INSULIN (H): Primary | ICD-10-CM

## 2022-01-01 DIAGNOSIS — E11.69 TYPE 2 DIABETES MELLITUS WITH OTHER SPECIFIED COMPLICATION, WITH LONG-TERM CURRENT USE OF INSULIN (H): Primary | ICD-10-CM

## 2022-01-01 DIAGNOSIS — Z23 NEED FOR PROPHYLACTIC VACCINATION AND INOCULATION AGAINST INFLUENZA: Primary | ICD-10-CM

## 2022-01-01 DIAGNOSIS — D75.89 MACROCYTOSIS WITHOUT ANEMIA: ICD-10-CM

## 2022-01-01 DIAGNOSIS — F32.A DEPRESSION, UNSPECIFIED DEPRESSION TYPE: ICD-10-CM

## 2022-01-01 DIAGNOSIS — M25.552 HIP PAIN, LEFT: Primary | ICD-10-CM

## 2022-01-01 DIAGNOSIS — K92.2 UPPER GI BLEED: Primary | ICD-10-CM

## 2022-01-01 DIAGNOSIS — L50.9 URTICARIA: Primary | ICD-10-CM

## 2022-01-01 DIAGNOSIS — M54.17 LUMBOSACRAL RADICULOPATHY: ICD-10-CM

## 2022-01-01 DIAGNOSIS — J31.0 CHRONIC RHINITIS: Primary | ICD-10-CM

## 2022-01-01 DIAGNOSIS — M48.061 LUMBAR FORAMINAL STENOSIS: Primary | ICD-10-CM

## 2022-01-01 DIAGNOSIS — I44.30 AV BLOCK: ICD-10-CM

## 2022-01-01 DIAGNOSIS — R73.9 STEROID-INDUCED HYPERGLYCEMIA: ICD-10-CM

## 2022-01-01 DIAGNOSIS — M54.50 CHRONIC MIDLINE LOW BACK PAIN, UNSPECIFIED WHETHER SCIATICA PRESENT: ICD-10-CM

## 2022-01-01 DIAGNOSIS — G89.29 OTHER CHRONIC PAIN: ICD-10-CM

## 2022-01-01 DIAGNOSIS — Z23 NEED FOR COVID-19 VACCINE: ICD-10-CM

## 2022-01-01 DIAGNOSIS — Z53.9 ERRONEOUS ENCOUNTER--DISREGARD: Primary | ICD-10-CM

## 2022-01-01 DIAGNOSIS — I36.1 NONRHEUMATIC TRICUSPID VALVE REGURGITATION: ICD-10-CM

## 2022-01-01 DIAGNOSIS — S32.030G CLOSED COMPRESSION FRACTURE OF L3 LUMBAR VERTEBRA, WITH DELAYED HEALING, SUBSEQUENT ENCOUNTER: Primary | ICD-10-CM

## 2022-01-01 DIAGNOSIS — S32.030A CLOSED COMPRESSION FRACTURE OF L3 LUMBAR VERTEBRA, INITIAL ENCOUNTER (H): Primary | ICD-10-CM

## 2022-01-01 LAB
% LINING CELLS, BODY FLUID: 12 %
ABO/RH(D): NORMAL
ALBUMIN SERPL-MCNC: 1.7 G/DL (ref 3.4–5)
ALBUMIN SERPL-MCNC: 1.9 G/DL (ref 3.4–5)
ALBUMIN SERPL-MCNC: 1.9 G/DL (ref 3.4–5)
ALBUMIN SERPL-MCNC: 2 G/DL (ref 3.4–5)
ALBUMIN SERPL-MCNC: 2.1 G/DL (ref 3.4–5)
ALBUMIN SERPL-MCNC: 2.9 G/DL (ref 3.5–5.7)
ALBUMIN SERPL-MCNC: 3.2 G/DL (ref 3.4–5)
ALBUMIN SERPL-MCNC: 3.3 G/DL (ref 3.4–5)
ALBUMIN UR-MCNC: 100 MG/DL
ALBUMIN UR-MCNC: 50 MG/DL
ALBUMIN UR-MCNC: NEGATIVE MG/DL
ALP SERPL-CCNC: 57 U/L (ref 34–104)
ALP SERPL-CCNC: 59 U/L (ref 40–150)
ALP SERPL-CCNC: 64 U/L (ref 40–150)
ALP SERPL-CCNC: 66 U/L (ref 40–150)
ALP SERPL-CCNC: 66 U/L (ref 40–150)
ALP SERPL-CCNC: 67 U/L (ref 40–150)
ALP SERPL-CCNC: 72 U/L (ref 40–150)
ALP SERPL-CCNC: 72 U/L (ref 40–150)
ALP SERPL-CCNC: 74 U/L (ref 40–150)
ALP SERPL-CCNC: 75 U/L (ref 40–150)
ALP SERPL-CCNC: 80 U/L (ref 40–150)
ALP SERPL-CCNC: 82 U/L (ref 40–150)
ALT SERPL W P-5'-P-CCNC: 14 U/L (ref 0–50)
ALT SERPL W P-5'-P-CCNC: 14 U/L (ref 0–50)
ALT SERPL W P-5'-P-CCNC: 15 U/L (ref 0–50)
ALT SERPL W P-5'-P-CCNC: 18 U/L (ref 0–50)
ALT SERPL W P-5'-P-CCNC: 18 U/L (ref 0–50)
ALT SERPL W P-5'-P-CCNC: 19 U/L (ref 0–50)
ALT SERPL W P-5'-P-CCNC: 21 U/L (ref 0–50)
ALT SERPL W P-5'-P-CCNC: 21 U/L (ref 0–50)
ALT SERPL W P-5'-P-CCNC: 22 U/L (ref 0–50)
ALT SERPL W P-5'-P-CCNC: 22 U/L (ref 0–50)
ALT SERPL W P-5'-P-CCNC: 23 U/L (ref 0–50)
ALT SERPL W P-5'-P-CCNC: 8 U/L (ref 7–52)
AMMONIA PLAS-SCNC: 12 UMOL/L (ref 10–50)
ANION GAP SERPL CALCULATED.3IONS-SCNC: 11 MMOL/L (ref 5–18)
ANION GAP SERPL CALCULATED.3IONS-SCNC: 12 MMOL/L (ref 5–18)
ANION GAP SERPL CALCULATED.3IONS-SCNC: 3 MMOL/L (ref 3–14)
ANION GAP SERPL CALCULATED.3IONS-SCNC: 4 MMOL/L (ref 3–14)
ANION GAP SERPL CALCULATED.3IONS-SCNC: 5 MMOL/L (ref 3–14)
ANION GAP SERPL CALCULATED.3IONS-SCNC: 6 MMOL/L (ref 3–14)
ANION GAP SERPL CALCULATED.3IONS-SCNC: 7 MMOL/L (ref 3–14)
ANION GAP SERPL CALCULATED.3IONS-SCNC: 8 MMOL/L (ref 3–14)
ANION GAP SERPL CALCULATED.3IONS-SCNC: 9 MMOL/L (ref 3–14)
ANTIBODY SCREEN: NEGATIVE
APPEARANCE FLD: CLEAR
APPEARANCE UR: ABNORMAL
APPEARANCE UR: ABNORMAL
APPEARANCE UR: CLEAR
AST SERPL W P-5'-P-CCNC: 15 U/L (ref 0–45)
AST SERPL W P-5'-P-CCNC: 20 U/L (ref 0–45)
AST SERPL W P-5'-P-CCNC: 20 U/L (ref 13–39)
AST SERPL W P-5'-P-CCNC: 21 U/L (ref 0–45)
AST SERPL W P-5'-P-CCNC: 22 U/L (ref 0–45)
AST SERPL W P-5'-P-CCNC: 26 U/L (ref 0–45)
AST SERPL W P-5'-P-CCNC: 34 U/L (ref 0–45)
AST SERPL W P-5'-P-CCNC: 37 U/L (ref 0–45)
AST SERPL W P-5'-P-CCNC: 42 U/L (ref 0–45)
AST SERPL W P-5'-P-CCNC: 45 U/L (ref 0–45)
ATRIAL RATE - MUSE: 119 BPM
ATRIAL RATE - MUSE: 95 BPM
BACTERIA #/AREA URNS HPF: ABNORMAL /HPF
BACTERIA #/AREA URNS HPF: ABNORMAL /HPF
BACTERIA BLD CULT: ABNORMAL
BACTERIA BLD CULT: ABNORMAL
BACTERIA BLD CULT: NO GROWTH
BACTERIA BLD CULT: NO GROWTH
BACTERIA BRONCH: NORMAL
BACTERIA PLR CULT: NO GROWTH
BACTERIA UR CULT: ABNORMAL
BACTERIA UR CULT: ABNORMAL
BASE EXCESS BLDA CALC-SCNC: -1 MMOL/L (ref -9–1.8)
BASE EXCESS BLDA CALC-SCNC: -4.8 MMOL/L (ref -9–1.8)
BASE EXCESS BLDA CALC-SCNC: 1.4 MMOL/L (ref -9–1.8)
BASE EXCESS BLDA CALC-SCNC: 3.3 MMOL/L (ref -9–1.8)
BASE EXCESS BLDV CALC-SCNC: 0.8 MMOL/L (ref -7.7–1.9)
BASE EXCESS BLDV CALC-SCNC: 1.4 MMOL/L (ref -7.7–1.9)
BASE EXCESS BLDV CALC-SCNC: 1.7 MMOL/L (ref -7.7–1.9)
BASOPHILS # BLD AUTO: 0 10E3/UL (ref 0–0.2)
BASOPHILS # BLD AUTO: 0.1 10E3/UL (ref 0–0.2)
BASOPHILS NFR BLD AUTO: 0 %
BASOPHILS NFR BLD AUTO: 1 %
BASOPHILS NFR BLD AUTO: 1 %
BILIRUB DIRECT SERPL-MCNC: 0.1 MG/DL (ref 0–0.2)
BILIRUB DIRECT SERPL-MCNC: <0.1 MG/DL (ref 0–0.2)
BILIRUB SERPL-MCNC: 0.2 MG/DL (ref 0.2–1.3)
BILIRUB SERPL-MCNC: 0.3 MG/DL (ref 0.2–1.3)
BILIRUB SERPL-MCNC: 0.4 MG/DL (ref 0.2–1.3)
BILIRUB SERPL-MCNC: 0.5 MG/DL (ref 0.2–1.3)
BILIRUB SERPL-MCNC: 0.6 MG/DL (ref 0.2–1.3)
BILIRUB SERPL-MCNC: 0.6 MG/DL (ref 0.2–1.3)
BILIRUB SERPL-MCNC: 0.7 MG/DL (ref 0.3–1)
BILIRUB UR QL STRIP: NEGATIVE
BLD PROD TYP BPU: NORMAL
BLOOD COMPONENT TYPE: NORMAL
BUN SERPL-MCNC: 14 MG/DL (ref 7–30)
BUN SERPL-MCNC: 16 MG/DL (ref 8–28)
BUN SERPL-MCNC: 17 MG/DL (ref 7–30)
BUN SERPL-MCNC: 17 MG/DL (ref 7–30)
BUN SERPL-MCNC: 18 MG/DL (ref 7–30)
BUN SERPL-MCNC: 21 MG/DL (ref 7–30)
BUN SERPL-MCNC: 24 MG/DL (ref 8–28)
BUN SERPL-MCNC: 25 MG/DL (ref 7–30)
BUN SERPL-MCNC: 25 MG/DL (ref 7–30)
BUN SERPL-MCNC: 26 MG/DL (ref 7–30)
BUN SERPL-MCNC: 26 MG/DL (ref 7–30)
BUN SERPL-MCNC: 27 MG/DL (ref 7–30)
BUN SERPL-MCNC: 31 MG/DL (ref 7–30)
BUN SERPL-MCNC: 32 MG/DL (ref 7–30)
BUN SERPL-MCNC: 33 MG/DL (ref 7–30)
BUN SERPL-MCNC: 33 MG/DL (ref 7–30)
BUN SERPL-MCNC: 35 MG/DL (ref 7–30)
BUN SERPL-MCNC: 40 MG/DL (ref 7–30)
BUN SERPL-MCNC: 41 MG/DL (ref 7–30)
BUN SERPL-MCNC: 41 MG/DL (ref 7–30)
BUN SERPL-MCNC: 42 MG/DL (ref 7–25)
BUN SERPL-MCNC: 43 MG/DL (ref 7–30)
BUN SERPL-MCNC: 43 MG/DL (ref 7–30)
BUN SERPL-MCNC: 45 MG/DL (ref 7–25)
BUN SERPL-MCNC: 60 MG/DL (ref 7–30)
CA-I BLD-MCNC: 5.5 MG/DL (ref 4.4–5.2)
CALCIUM SERPL-MCNC: 10 MG/DL (ref 8.5–10.5)
CALCIUM SERPL-MCNC: 10.2 MG/DL (ref 8.5–10.1)
CALCIUM SERPL-MCNC: 10.2 MG/DL (ref 8.5–10.5)
CALCIUM SERPL-MCNC: 10.6 MG/DL (ref 8.5–10.1)
CALCIUM SERPL-MCNC: 10.6 MG/DL (ref 8.6–10.3)
CALCIUM SERPL-MCNC: 10.9 MG/DL (ref 8.6–10.3)
CALCIUM SERPL-MCNC: 8.6 MG/DL (ref 8.5–10.1)
CALCIUM SERPL-MCNC: 8.7 MG/DL (ref 8.5–10.1)
CALCIUM SERPL-MCNC: 9.1 MG/DL (ref 8.5–10.1)
CALCIUM SERPL-MCNC: 9.2 MG/DL (ref 8.5–10.1)
CALCIUM SERPL-MCNC: 9.3 MG/DL (ref 8.5–10.1)
CALCIUM SERPL-MCNC: 9.3 MG/DL (ref 8.5–10.1)
CALCIUM SERPL-MCNC: 9.4 MG/DL (ref 8.5–10.1)
CALCIUM SERPL-MCNC: 9.5 MG/DL (ref 8.5–10.1)
CALCIUM SERPL-MCNC: 9.8 MG/DL (ref 8.5–10.1)
CELL COUNT BODY FLUID SOURCE: NORMAL
CHLORIDE BLD-SCNC: 100 MMOL/L (ref 98–107)
CHLORIDE BLD-SCNC: 100 MMOL/L (ref 98–107)
CHLORIDE BLD-SCNC: 104 MMOL/L (ref 94–109)
CHLORIDE BLD-SCNC: 105 MMOL/L (ref 94–109)
CHLORIDE BLD-SCNC: 106 MMOL/L (ref 94–109)
CHLORIDE BLD-SCNC: 107 MMOL/L (ref 94–109)
CHLORIDE BLD-SCNC: 108 MMOL/L (ref 94–109)
CHLORIDE BLD-SCNC: 108 MMOL/L (ref 94–109)
CHLORIDE BLD-SCNC: 109 MMOL/L (ref 94–109)
CHLORIDE BLD-SCNC: 110 MMOL/L (ref 94–109)
CHLORIDE BLD-SCNC: 110 MMOL/L (ref 94–109)
CHLORIDE BLD-SCNC: 111 MMOL/L (ref 94–109)
CHLORIDE BLD-SCNC: 113 MMOL/L (ref 94–109)
CHLORIDE BLD-SCNC: 96 MMOL/L (ref 98–107)
CHLORIDE BLD-SCNC: 99 MMOL/L (ref 98–107)
CK SERPL-CCNC: 15 U/L (ref 30–225)
CO2 SERPL-SCNC: 22 MMOL/L (ref 20–32)
CO2 SERPL-SCNC: 23 MMOL/L (ref 20–32)
CO2 SERPL-SCNC: 23 MMOL/L (ref 20–32)
CO2 SERPL-SCNC: 23 MMOL/L (ref 21–31)
CO2 SERPL-SCNC: 24 MMOL/L (ref 20–32)
CO2 SERPL-SCNC: 25 MMOL/L (ref 20–32)
CO2 SERPL-SCNC: 25 MMOL/L (ref 21–31)
CO2 SERPL-SCNC: 25 MMOL/L (ref 22–31)
CO2 SERPL-SCNC: 26 MMOL/L (ref 20–32)
CO2 SERPL-SCNC: 27 MMOL/L (ref 20–32)
CO2 SERPL-SCNC: 28 MMOL/L (ref 20–32)
CO2 SERPL-SCNC: 28 MMOL/L (ref 22–31)
CO2 SERPL-SCNC: 29 MMOL/L (ref 20–32)
CO2 SERPL-SCNC: 30 MMOL/L (ref 20–32)
CO2 SERPL-SCNC: 30 MMOL/L (ref 20–32)
CO2 SERPL-SCNC: 33 MMOL/L (ref 20–32)
CODING SYSTEM: NORMAL
COLOR FLD: YELLOW
COLOR UR AUTO: YELLOW
CREAT SERPL-MCNC: 0.62 MG/DL (ref 0.52–1.04)
CREAT SERPL-MCNC: 0.63 MG/DL (ref 0.52–1.04)
CREAT SERPL-MCNC: 0.72 MG/DL (ref 0.52–1.04)
CREAT SERPL-MCNC: 0.75 MG/DL (ref 0.52–1.04)
CREAT SERPL-MCNC: 0.76 MG/DL (ref 0.52–1.04)
CREAT SERPL-MCNC: 0.78 MG/DL (ref 0.52–1.04)
CREAT SERPL-MCNC: 0.78 MG/DL (ref 0.52–1.04)
CREAT SERPL-MCNC: 0.8 MG/DL (ref 0.52–1.04)
CREAT SERPL-MCNC: 0.81 MG/DL (ref 0.52–1.04)
CREAT SERPL-MCNC: 0.83 MG/DL (ref 0.52–1.04)
CREAT SERPL-MCNC: 0.84 MG/DL (ref 0.52–1.04)
CREAT SERPL-MCNC: 0.85 MG/DL (ref 0.52–1.04)
CREAT SERPL-MCNC: 0.85 MG/DL (ref 0.52–1.04)
CREAT SERPL-MCNC: 0.87 MG/DL (ref 0.52–1.04)
CREAT SERPL-MCNC: 0.89 MG/DL (ref 0.52–1.04)
CREAT SERPL-MCNC: 0.91 MG/DL (ref 0.6–1.1)
CREAT SERPL-MCNC: 0.91 MG/DL (ref 0.6–1.1)
CREAT SERPL-MCNC: 1 MG/DL (ref 0.52–1.04)
CREAT SERPL-MCNC: 1.04 MG/DL (ref 0.52–1.04)
CREAT SERPL-MCNC: 1.06 MG/DL (ref 0.52–1.04)
CREAT SERPL-MCNC: 1.06 MG/DL (ref 0.52–1.04)
CREAT SERPL-MCNC: 1.15 MG/DL (ref 0.52–1.04)
CREAT SERPL-MCNC: 1.39 MG/DL (ref 0.52–1.04)
CREAT SERPL-MCNC: 1.6 MG/DL (ref 0.6–1.2)
CREAT SERPL-MCNC: 1.63 MG/DL (ref 0.6–1.2)
CROSSMATCH: NORMAL
DIASTOLIC BLOOD PRESSURE - MUSE: NORMAL MMHG
DIASTOLIC BLOOD PRESSURE - MUSE: NORMAL MMHG
EOSINOPHIL # BLD AUTO: 0.1 10E3/UL (ref 0–0.7)
EOSINOPHIL # BLD AUTO: 0.2 10E3/UL (ref 0–0.7)
EOSINOPHIL # BLD AUTO: 0.3 10E3/UL (ref 0–0.7)
EOSINOPHIL NFR BLD AUTO: 1 %
EOSINOPHIL NFR BLD AUTO: 2 %
EOSINOPHIL NFR BLD AUTO: 3 %
EOSINOPHIL NFR BLD AUTO: 4 %
ERYTHROCYTE [DISTWIDTH] IN BLOOD BY AUTOMATED COUNT: 15.2 % (ref 10–15)
ERYTHROCYTE [DISTWIDTH] IN BLOOD BY AUTOMATED COUNT: 15.2 % (ref 10–15)
ERYTHROCYTE [DISTWIDTH] IN BLOOD BY AUTOMATED COUNT: 15.7 % (ref 10–15)
ERYTHROCYTE [DISTWIDTH] IN BLOOD BY AUTOMATED COUNT: 15.7 % (ref 10–15)
ERYTHROCYTE [DISTWIDTH] IN BLOOD BY AUTOMATED COUNT: 15.8 % (ref 10–15)
ERYTHROCYTE [DISTWIDTH] IN BLOOD BY AUTOMATED COUNT: 16.4 % (ref 10–15)
ERYTHROCYTE [DISTWIDTH] IN BLOOD BY AUTOMATED COUNT: 17 % (ref 10–15)
ERYTHROCYTE [DISTWIDTH] IN BLOOD BY AUTOMATED COUNT: 17.1 % (ref 10–15)
ERYTHROCYTE [DISTWIDTH] IN BLOOD BY AUTOMATED COUNT: 17.2 % (ref 10–15)
ERYTHROCYTE [DISTWIDTH] IN BLOOD BY AUTOMATED COUNT: 17.3 % (ref 10–15)
ERYTHROCYTE [DISTWIDTH] IN BLOOD BY AUTOMATED COUNT: 17.3 % (ref 10–15)
ERYTHROCYTE [DISTWIDTH] IN BLOOD BY AUTOMATED COUNT: 17.4 % (ref 10–15)
ERYTHROCYTE [DISTWIDTH] IN BLOOD BY AUTOMATED COUNT: 17.9 % (ref 10–15)
ERYTHROCYTE [DISTWIDTH] IN BLOOD BY AUTOMATED COUNT: 18.1 % (ref 10–15)
ERYTHROCYTE [DISTWIDTH] IN BLOOD BY AUTOMATED COUNT: 18.6 % (ref 10–15)
ERYTHROCYTE [DISTWIDTH] IN BLOOD BY AUTOMATED COUNT: 18.7 % (ref 10–15)
ERYTHROCYTE [DISTWIDTH] IN BLOOD BY AUTOMATED COUNT: 19.1 % (ref 10–15)
ERYTHROCYTE [DISTWIDTH] IN BLOOD BY AUTOMATED COUNT: 19.6 % (ref 10–15)
ERYTHROCYTE [DISTWIDTH] IN BLOOD BY AUTOMATED COUNT: 19.6 % (ref 10–15)
ERYTHROCYTE [DISTWIDTH] IN BLOOD BY AUTOMATED COUNT: 19.8 % (ref 10–15)
FERRITIN SERPL-MCNC: 104 NG/ML (ref 8–252)
FLUAV RNA SPEC QL NAA+PROBE: NEGATIVE
FLUBV RNA RESP QL NAA+PROBE: NEGATIVE
GAMMA INTERFERON BACKGROUND BLD IA-ACNC: 0.03 IU/ML
GAMMA INTERFERON BACKGROUND BLD IA-ACNC: 0.03 IU/ML
GFR SERPL CREATININE-BSD FRML MDRD: 32 ML/MIN/1.73M2
GFR SERPL CREATININE-BSD FRML MDRD: 33 ML/MIN/1.73M2
GFR SERPL CREATININE-BSD FRML MDRD: 39 ML/MIN/1.73M2
GFR SERPL CREATININE-BSD FRML MDRD: 49 ML/MIN/1.73M2
GFR SERPL CREATININE-BSD FRML MDRD: 54 ML/MIN/1.73M2
GFR SERPL CREATININE-BSD FRML MDRD: 55 ML/MIN/1.73M2
GFR SERPL CREATININE-BSD FRML MDRD: 55 ML/MIN/1.73M2
GFR SERPL CREATININE-BSD FRML MDRD: 58 ML/MIN/1.73M2
GFR SERPL CREATININE-BSD FRML MDRD: 65 ML/MIN/1.73M2
GFR SERPL CREATININE-BSD FRML MDRD: 65 ML/MIN/1.73M2
GFR SERPL CREATININE-BSD FRML MDRD: 67 ML/MIN/1.73M2
GFR SERPL CREATININE-BSD FRML MDRD: 69 ML/MIN/1.73M2
GFR SERPL CREATININE-BSD FRML MDRD: 71 ML/MIN/1.73M2
GFR SERPL CREATININE-BSD FRML MDRD: 71 ML/MIN/1.73M2
GFR SERPL CREATININE-BSD FRML MDRD: 72 ML/MIN/1.73M2
GFR SERPL CREATININE-BSD FRML MDRD: 73 ML/MIN/1.73M2
GFR SERPL CREATININE-BSD FRML MDRD: 75 ML/MIN/1.73M2
GFR SERPL CREATININE-BSD FRML MDRD: 76 ML/MIN/1.73M2
GFR SERPL CREATININE-BSD FRML MDRD: 78 ML/MIN/1.73M2
GFR SERPL CREATININE-BSD FRML MDRD: 78 ML/MIN/1.73M2
GFR SERPL CREATININE-BSD FRML MDRD: 81 ML/MIN/1.73M2
GFR SERPL CREATININE-BSD FRML MDRD: 82 ML/MIN/1.73M2
GFR SERPL CREATININE-BSD FRML MDRD: 86 ML/MIN/1.73M2
GFR SERPL CREATININE-BSD FRML MDRD: >90 ML/MIN/1.73M2
GFR SERPL CREATININE-BSD FRML MDRD: >90 ML/MIN/1.73M2
GLUCOSE BLD-MCNC: 141 MG/DL (ref 70–99)
GLUCOSE BLD-MCNC: 148 MG/DL (ref 70–99)
GLUCOSE BLD-MCNC: 150 MG/DL (ref 70–99)
GLUCOSE BLD-MCNC: 178 MG/DL (ref 70–99)
GLUCOSE BLD-MCNC: 186 MG/DL (ref 70–125)
GLUCOSE BLD-MCNC: 190 MG/DL (ref 70–99)
GLUCOSE BLD-MCNC: 204 MG/DL (ref 70–99)
GLUCOSE BLD-MCNC: 209 MG/DL (ref 70–99)
GLUCOSE BLD-MCNC: 210 MG/DL (ref 70–125)
GLUCOSE BLD-MCNC: 226 MG/DL (ref 70–99)
GLUCOSE BLD-MCNC: 234 MG/DL (ref 70–99)
GLUCOSE BLD-MCNC: 236 MG/DL (ref 70–105)
GLUCOSE BLD-MCNC: 248 MG/DL (ref 70–99)
GLUCOSE BLD-MCNC: 255 MG/DL (ref 70–99)
GLUCOSE BLD-MCNC: 258 MG/DL (ref 70–99)
GLUCOSE BLD-MCNC: 272 MG/DL (ref 70–99)
GLUCOSE BLD-MCNC: 281 MG/DL (ref 70–99)
GLUCOSE BLD-MCNC: 281 MG/DL (ref 70–99)
GLUCOSE BLD-MCNC: 285 MG/DL (ref 70–105)
GLUCOSE BLD-MCNC: 292 MG/DL (ref 70–99)
GLUCOSE BLD-MCNC: 295 MG/DL (ref 70–99)
GLUCOSE BLD-MCNC: 305 MG/DL (ref 70–99)
GLUCOSE BLD-MCNC: 313 MG/DL (ref 70–99)
GLUCOSE BLD-MCNC: 56 MG/DL (ref 70–99)
GLUCOSE BLD-MCNC: 75 MG/DL (ref 70–99)
GLUCOSE BLDC GLUCOMTR-MCNC: 102 MG/DL (ref 70–99)
GLUCOSE BLDC GLUCOMTR-MCNC: 109 MG/DL (ref 70–99)
GLUCOSE BLDC GLUCOMTR-MCNC: 112 MG/DL (ref 70–99)
GLUCOSE BLDC GLUCOMTR-MCNC: 124 MG/DL (ref 70–99)
GLUCOSE BLDC GLUCOMTR-MCNC: 127 MG/DL (ref 70–99)
GLUCOSE BLDC GLUCOMTR-MCNC: 129 MG/DL (ref 70–99)
GLUCOSE BLDC GLUCOMTR-MCNC: 136 MG/DL (ref 70–99)
GLUCOSE BLDC GLUCOMTR-MCNC: 139 MG/DL (ref 70–99)
GLUCOSE BLDC GLUCOMTR-MCNC: 142 MG/DL (ref 70–99)
GLUCOSE BLDC GLUCOMTR-MCNC: 144 MG/DL (ref 70–99)
GLUCOSE BLDC GLUCOMTR-MCNC: 144 MG/DL (ref 70–99)
GLUCOSE BLDC GLUCOMTR-MCNC: 149 MG/DL (ref 70–99)
GLUCOSE BLDC GLUCOMTR-MCNC: 153 MG/DL (ref 70–99)
GLUCOSE BLDC GLUCOMTR-MCNC: 154 MG/DL (ref 70–99)
GLUCOSE BLDC GLUCOMTR-MCNC: 159 MG/DL (ref 70–99)
GLUCOSE BLDC GLUCOMTR-MCNC: 160 MG/DL (ref 70–99)
GLUCOSE BLDC GLUCOMTR-MCNC: 161 MG/DL (ref 70–99)
GLUCOSE BLDC GLUCOMTR-MCNC: 163 MG/DL (ref 70–99)
GLUCOSE BLDC GLUCOMTR-MCNC: 164 MG/DL (ref 70–99)
GLUCOSE BLDC GLUCOMTR-MCNC: 165 MG/DL (ref 70–99)
GLUCOSE BLDC GLUCOMTR-MCNC: 168 MG/DL (ref 70–99)
GLUCOSE BLDC GLUCOMTR-MCNC: 169 MG/DL (ref 70–99)
GLUCOSE BLDC GLUCOMTR-MCNC: 169 MG/DL (ref 70–99)
GLUCOSE BLDC GLUCOMTR-MCNC: 176 MG/DL (ref 70–99)
GLUCOSE BLDC GLUCOMTR-MCNC: 176 MG/DL (ref 70–99)
GLUCOSE BLDC GLUCOMTR-MCNC: 177 MG/DL (ref 70–99)
GLUCOSE BLDC GLUCOMTR-MCNC: 179 MG/DL (ref 70–99)
GLUCOSE BLDC GLUCOMTR-MCNC: 180 MG/DL (ref 70–99)
GLUCOSE BLDC GLUCOMTR-MCNC: 181 MG/DL (ref 70–99)
GLUCOSE BLDC GLUCOMTR-MCNC: 181 MG/DL (ref 70–99)
GLUCOSE BLDC GLUCOMTR-MCNC: 184 MG/DL (ref 70–99)
GLUCOSE BLDC GLUCOMTR-MCNC: 185 MG/DL (ref 70–99)
GLUCOSE BLDC GLUCOMTR-MCNC: 187 MG/DL (ref 70–99)
GLUCOSE BLDC GLUCOMTR-MCNC: 190 MG/DL (ref 70–99)
GLUCOSE BLDC GLUCOMTR-MCNC: 191 MG/DL (ref 70–99)
GLUCOSE BLDC GLUCOMTR-MCNC: 193 MG/DL (ref 70–99)
GLUCOSE BLDC GLUCOMTR-MCNC: 193 MG/DL (ref 70–99)
GLUCOSE BLDC GLUCOMTR-MCNC: 197 MG/DL (ref 70–99)
GLUCOSE BLDC GLUCOMTR-MCNC: 197 MG/DL (ref 70–99)
GLUCOSE BLDC GLUCOMTR-MCNC: 199 MG/DL (ref 70–99)
GLUCOSE BLDC GLUCOMTR-MCNC: 200 MG/DL (ref 70–99)
GLUCOSE BLDC GLUCOMTR-MCNC: 201 MG/DL (ref 70–99)
GLUCOSE BLDC GLUCOMTR-MCNC: 203 MG/DL (ref 70–99)
GLUCOSE BLDC GLUCOMTR-MCNC: 205 MG/DL (ref 70–99)
GLUCOSE BLDC GLUCOMTR-MCNC: 206 MG/DL (ref 70–99)
GLUCOSE BLDC GLUCOMTR-MCNC: 209 MG/DL (ref 70–99)
GLUCOSE BLDC GLUCOMTR-MCNC: 210 MG/DL (ref 70–99)
GLUCOSE BLDC GLUCOMTR-MCNC: 211 MG/DL (ref 70–99)
GLUCOSE BLDC GLUCOMTR-MCNC: 212 MG/DL (ref 70–99)
GLUCOSE BLDC GLUCOMTR-MCNC: 212 MG/DL (ref 70–99)
GLUCOSE BLDC GLUCOMTR-MCNC: 213 MG/DL (ref 70–99)
GLUCOSE BLDC GLUCOMTR-MCNC: 215 MG/DL (ref 70–99)
GLUCOSE BLDC GLUCOMTR-MCNC: 215 MG/DL (ref 70–99)
GLUCOSE BLDC GLUCOMTR-MCNC: 217 MG/DL (ref 70–99)
GLUCOSE BLDC GLUCOMTR-MCNC: 218 MG/DL (ref 70–99)
GLUCOSE BLDC GLUCOMTR-MCNC: 219 MG/DL (ref 70–99)
GLUCOSE BLDC GLUCOMTR-MCNC: 223 MG/DL (ref 70–99)
GLUCOSE BLDC GLUCOMTR-MCNC: 223 MG/DL (ref 70–99)
GLUCOSE BLDC GLUCOMTR-MCNC: 224 MG/DL (ref 70–99)
GLUCOSE BLDC GLUCOMTR-MCNC: 224 MG/DL (ref 70–99)
GLUCOSE BLDC GLUCOMTR-MCNC: 225 MG/DL (ref 70–99)
GLUCOSE BLDC GLUCOMTR-MCNC: 226 MG/DL (ref 70–99)
GLUCOSE BLDC GLUCOMTR-MCNC: 227 MG/DL (ref 70–99)
GLUCOSE BLDC GLUCOMTR-MCNC: 228 MG/DL (ref 70–99)
GLUCOSE BLDC GLUCOMTR-MCNC: 235 MG/DL (ref 70–99)
GLUCOSE BLDC GLUCOMTR-MCNC: 236 MG/DL (ref 70–99)
GLUCOSE BLDC GLUCOMTR-MCNC: 237 MG/DL (ref 70–99)
GLUCOSE BLDC GLUCOMTR-MCNC: 237 MG/DL (ref 70–99)
GLUCOSE BLDC GLUCOMTR-MCNC: 239 MG/DL (ref 70–99)
GLUCOSE BLDC GLUCOMTR-MCNC: 240 MG/DL (ref 70–99)
GLUCOSE BLDC GLUCOMTR-MCNC: 240 MG/DL (ref 70–99)
GLUCOSE BLDC GLUCOMTR-MCNC: 241 MG/DL (ref 70–99)
GLUCOSE BLDC GLUCOMTR-MCNC: 241 MG/DL (ref 70–99)
GLUCOSE BLDC GLUCOMTR-MCNC: 244 MG/DL (ref 70–99)
GLUCOSE BLDC GLUCOMTR-MCNC: 245 MG/DL (ref 70–99)
GLUCOSE BLDC GLUCOMTR-MCNC: 246 MG/DL (ref 70–99)
GLUCOSE BLDC GLUCOMTR-MCNC: 248 MG/DL (ref 70–99)
GLUCOSE BLDC GLUCOMTR-MCNC: 249 MG/DL (ref 70–99)
GLUCOSE BLDC GLUCOMTR-MCNC: 250 MG/DL (ref 70–99)
GLUCOSE BLDC GLUCOMTR-MCNC: 250 MG/DL (ref 70–99)
GLUCOSE BLDC GLUCOMTR-MCNC: 252 MG/DL (ref 70–99)
GLUCOSE BLDC GLUCOMTR-MCNC: 257 MG/DL (ref 70–99)
GLUCOSE BLDC GLUCOMTR-MCNC: 258 MG/DL (ref 70–99)
GLUCOSE BLDC GLUCOMTR-MCNC: 265 MG/DL (ref 70–99)
GLUCOSE BLDC GLUCOMTR-MCNC: 265 MG/DL (ref 70–99)
GLUCOSE BLDC GLUCOMTR-MCNC: 266 MG/DL (ref 70–99)
GLUCOSE BLDC GLUCOMTR-MCNC: 268 MG/DL (ref 70–99)
GLUCOSE BLDC GLUCOMTR-MCNC: 271 MG/DL (ref 70–99)
GLUCOSE BLDC GLUCOMTR-MCNC: 271 MG/DL (ref 70–99)
GLUCOSE BLDC GLUCOMTR-MCNC: 273 MG/DL (ref 70–99)
GLUCOSE BLDC GLUCOMTR-MCNC: 274 MG/DL (ref 70–99)
GLUCOSE BLDC GLUCOMTR-MCNC: 275 MG/DL (ref 70–99)
GLUCOSE BLDC GLUCOMTR-MCNC: 275 MG/DL (ref 70–99)
GLUCOSE BLDC GLUCOMTR-MCNC: 276 MG/DL (ref 70–99)
GLUCOSE BLDC GLUCOMTR-MCNC: 282 MG/DL (ref 70–99)
GLUCOSE BLDC GLUCOMTR-MCNC: 283 MG/DL (ref 70–99)
GLUCOSE BLDC GLUCOMTR-MCNC: 285 MG/DL (ref 70–99)
GLUCOSE BLDC GLUCOMTR-MCNC: 287 MG/DL (ref 70–99)
GLUCOSE BLDC GLUCOMTR-MCNC: 295 MG/DL (ref 70–99)
GLUCOSE BLDC GLUCOMTR-MCNC: 303 MG/DL (ref 70–99)
GLUCOSE BLDC GLUCOMTR-MCNC: 315 MG/DL (ref 70–99)
GLUCOSE BLDC GLUCOMTR-MCNC: 329 MG/DL (ref 70–99)
GLUCOSE BLDC GLUCOMTR-MCNC: 50 MG/DL (ref 70–99)
GLUCOSE BLDC GLUCOMTR-MCNC: 53 MG/DL (ref 70–99)
GLUCOSE BLDC GLUCOMTR-MCNC: 68 MG/DL (ref 70–99)
GLUCOSE BODY FLUID SOURCE: NORMAL
GLUCOSE FLD-MCNC: 180 MG/DL
GLUCOSE UR STRIP-MCNC: 50 MG/DL
GLUCOSE UR STRIP-MCNC: 50 MG/DL
GLUCOSE UR STRIP-MCNC: NEGATIVE MG/DL
GRAM STAIN RESULT: NORMAL
GRANULAR CAST: 45 /LPF
HBA1C MFR BLD: 6.6 % (ref 0–5.6)
HBA1C MFR BLD: 6.6 % (ref 0–5.6)
HBA1C MFR BLD: 6.7 % (ref 4–6.2)
HCO3 BLD-SCNC: 21 MMOL/L (ref 21–28)
HCO3 BLD-SCNC: 24 MMOL/L (ref 21–28)
HCO3 BLD-SCNC: 30 MMOL/L (ref 21–28)
HCO3 BLD-SCNC: 30 MMOL/L (ref 21–28)
HCO3 BLDV-SCNC: 26 MMOL/L (ref 21–28)
HCO3 BLDV-SCNC: 27 MMOL/L (ref 21–28)
HCO3 BLDV-SCNC: 28 MMOL/L (ref 21–28)
HCT VFR BLD AUTO: 15 % (ref 35–47)
HCT VFR BLD AUTO: 22.4 % (ref 35–47)
HCT VFR BLD AUTO: 22.4 % (ref 35–47)
HCT VFR BLD AUTO: 23.7 % (ref 35–47)
HCT VFR BLD AUTO: 23.8 % (ref 35–47)
HCT VFR BLD AUTO: 24 % (ref 35–47)
HCT VFR BLD AUTO: 24.3 % (ref 35–47)
HCT VFR BLD AUTO: 24.6 % (ref 35–47)
HCT VFR BLD AUTO: 24.7 % (ref 35–47)
HCT VFR BLD AUTO: 24.7 % (ref 35–47)
HCT VFR BLD AUTO: 25 % (ref 35–47)
HCT VFR BLD AUTO: 25.6 % (ref 35–47)
HCT VFR BLD AUTO: 25.7 % (ref 35–47)
HCT VFR BLD AUTO: 25.8 % (ref 35–47)
HCT VFR BLD AUTO: 26.4 % (ref 35–47)
HCT VFR BLD AUTO: 27.4 % (ref 35–47)
HCT VFR BLD AUTO: 27.9 % (ref 35–47)
HCT VFR BLD AUTO: 28.3 % (ref 35–47)
HCT VFR BLD AUTO: 28.3 % (ref 35–47)
HCT VFR BLD AUTO: 28.4 % (ref 35–47)
HCT VFR BLD AUTO: 29.9 % (ref 35–47)
HCT VFR BLD AUTO: 30.3 % (ref 35–47)
HCT VFR BLD AUTO: 31.8 % (ref 35–47)
HCT VFR BLD AUTO: 35.7 % (ref 35–47)
HCT VFR BLD AUTO: 35.8 % (ref 35–47)
HEMOCCULT SP1 STL QL: NEGATIVE
HEMOCCULT SP2 STL QL: NEGATIVE
HEMOCCULT SP3 STL QL: NEGATIVE
HGB BLD-MCNC: 10.8 G/DL (ref 11.7–15.7)
HGB BLD-MCNC: 11.3 G/DL (ref 11.7–15.7)
HGB BLD-MCNC: 4.3 G/DL (ref 11.7–15.7)
HGB BLD-MCNC: 6.2 G/DL (ref 11.7–15.7)
HGB BLD-MCNC: 6.4 G/DL (ref 11.7–15.7)
HGB BLD-MCNC: 6.6 G/DL (ref 11.7–15.7)
HGB BLD-MCNC: 6.7 G/DL (ref 11.7–15.7)
HGB BLD-MCNC: 7.1 G/DL (ref 11.7–15.7)
HGB BLD-MCNC: 7.2 G/DL (ref 11.7–15.7)
HGB BLD-MCNC: 7.3 G/DL (ref 11.7–15.7)
HGB BLD-MCNC: 7.3 G/DL (ref 11.7–15.7)
HGB BLD-MCNC: 7.4 G/DL (ref 11.7–15.7)
HGB BLD-MCNC: 7.4 G/DL (ref 11.7–15.7)
HGB BLD-MCNC: 7.5 G/DL (ref 11.7–15.7)
HGB BLD-MCNC: 7.5 G/DL (ref 11.7–15.7)
HGB BLD-MCNC: 7.6 G/DL (ref 11.7–15.7)
HGB BLD-MCNC: 7.6 G/DL (ref 11.7–15.7)
HGB BLD-MCNC: 7.9 G/DL (ref 11.7–15.7)
HGB BLD-MCNC: 8 G/DL (ref 11.7–15.7)
HGB BLD-MCNC: 8 G/DL (ref 11.7–15.7)
HGB BLD-MCNC: 8.1 G/DL (ref 11.7–15.7)
HGB BLD-MCNC: 8.3 G/DL (ref 11.7–15.7)
HGB BLD-MCNC: 8.5 G/DL (ref 11.7–15.7)
HGB BLD-MCNC: 8.6 G/DL (ref 11.7–15.7)
HGB BLD-MCNC: 8.6 G/DL (ref 11.7–15.7)
HGB BLD-MCNC: 8.8 G/DL (ref 11.7–15.7)
HGB BLD-MCNC: 8.9 G/DL (ref 11.7–15.7)
HGB BLD-MCNC: 9 G/DL (ref 11.7–15.7)
HGB BLD-MCNC: 9.4 G/DL (ref 11.7–15.7)
HGB UR QL STRIP: ABNORMAL
HGB UR QL STRIP: ABNORMAL
HGB UR QL STRIP: NEGATIVE
HOLD SPECIMEN: NORMAL
HYALINE CASTS: 10 /LPF
HYALINE CASTS: 5 /LPF
HYALINE CASTS: 59 /LPF
IMM GRANULOCYTES # BLD: 0 10E3/UL
IMM GRANULOCYTES # BLD: 0.1 10E3/UL
IMM GRANULOCYTES # BLD: 0.2 10E3/UL
IMM GRANULOCYTES # BLD: 0.3 10E3/UL
IMM GRANULOCYTES # BLD: 0.4 10E3/UL
IMM GRANULOCYTES NFR BLD: 1 %
IMM GRANULOCYTES NFR BLD: 2 %
IMM GRANULOCYTES NFR BLD: 4 %
INR BLD: 1.9 (ref 0.9–1.1)
INR BLD: 2 (ref 0.9–1.1)
INR PPP: 0.81 (ref 0.85–1.15)
INR PPP: 1.09 (ref 0.85–1.15)
INR PPP: 1.23 (ref 0.85–1.15)
INR PPP: 1.27 (ref 0.85–1.15)
INR PPP: 1.37 (ref 0.85–1.15)
INR PPP: >10 (ref 0.85–1.15)
INTERPRETATION ECG - MUSE: NORMAL
INTERPRETATION ECG - MUSE: NORMAL
IRON SATN MFR SERPL: 15 % (ref 15–46)
IRON SERPL-MCNC: 48 UG/DL (ref 35–180)
ISSUE DATE AND TIME: NORMAL
KETONES UR STRIP-MCNC: 10 MG/DL
KETONES UR STRIP-MCNC: ABNORMAL MG/DL
KETONES UR STRIP-MCNC: NEGATIVE MG/DL
LACTATE SERPL-SCNC: 0.8 MMOL/L (ref 0.7–2)
LACTATE SERPL-SCNC: 1 MMOL/L (ref 0.7–2)
LACTATE SERPL-SCNC: 1.1 MMOL/L (ref 0.7–2)
LD BODY BODY FLUID SOURCE: NORMAL
LDH FLD L TO P-CCNC: 117 U/L
LDH SERPL L TO P-CCNC: 318 U/L (ref 81–234)
LEUKOCYTE ESTERASE UR QL STRIP: ABNORMAL
LIPASE SERPL-CCNC: 33 U/L (ref 73–393)
LVEF ECHO: NORMAL
LVEF ECHO: NORMAL
LYMPHOCYTES # BLD AUTO: 0.9 10E3/UL (ref 0.8–5.3)
LYMPHOCYTES # BLD AUTO: 1 10E3/UL (ref 0.8–5.3)
LYMPHOCYTES # BLD AUTO: 1.1 10E3/UL (ref 0.8–5.3)
LYMPHOCYTES # BLD AUTO: 1.2 10E3/UL (ref 0.8–5.3)
LYMPHOCYTES # BLD AUTO: 1.3 10E3/UL (ref 0.8–5.3)
LYMPHOCYTES # BLD AUTO: 1.3 10E3/UL (ref 0.8–5.3)
LYMPHOCYTES # BLD AUTO: 1.4 10E3/UL (ref 0.8–5.3)
LYMPHOCYTES # BLD AUTO: 1.5 10E3/UL (ref 0.8–5.3)
LYMPHOCYTES NFR BLD AUTO: 11 %
LYMPHOCYTES NFR BLD AUTO: 12 %
LYMPHOCYTES NFR BLD AUTO: 13 %
LYMPHOCYTES NFR BLD AUTO: 17 %
LYMPHOCYTES NFR BLD AUTO: 18 %
LYMPHOCYTES NFR BLD AUTO: 8 %
LYMPHOCYTES NFR BLD AUTO: 8 %
LYMPHOCYTES NFR BLD AUTO: 9 %
LYMPHOCYTES NFR FLD MANUAL: 28 %
M TB IFN-G BLD-IMP: ABNORMAL
M TB IFN-G BLD-IMP: ABNORMAL
M TB IFN-G CD4+ BCKGRND COR BLD-ACNC: 0.28 IU/ML
M TB IFN-G CD4+ BCKGRND COR BLD-ACNC: 0.38 IU/ML
MAGNESIUM SERPL-MCNC: 1.8 MG/DL (ref 1.6–2.3)
MAGNESIUM SERPL-MCNC: 1.8 MG/DL (ref 1.9–2.7)
MAGNESIUM SERPL-MCNC: 1.9 MG/DL (ref 1.6–2.3)
MAGNESIUM SERPL-MCNC: 1.9 MG/DL (ref 1.6–2.3)
MAGNESIUM SERPL-MCNC: 2 MG/DL (ref 1.6–2.3)
MAGNESIUM SERPL-MCNC: 2.2 MG/DL (ref 1.6–2.3)
MAGNESIUM SERPL-MCNC: 2.2 MG/DL (ref 1.6–2.3)
MCH RBC QN AUTO: 26.9 PG (ref 26.5–33)
MCH RBC QN AUTO: 26.9 PG (ref 26.5–33)
MCH RBC QN AUTO: 27 PG (ref 26.5–33)
MCH RBC QN AUTO: 27.1 PG (ref 26.5–33)
MCH RBC QN AUTO: 27.3 PG (ref 26.5–33)
MCH RBC QN AUTO: 27.3 PG (ref 26.5–33)
MCH RBC QN AUTO: 27.5 PG (ref 26.5–33)
MCH RBC QN AUTO: 27.5 PG (ref 26.5–33)
MCH RBC QN AUTO: 27.6 PG (ref 26.5–33)
MCH RBC QN AUTO: 27.6 PG (ref 26.5–33)
MCH RBC QN AUTO: 27.8 PG (ref 26.5–33)
MCH RBC QN AUTO: 27.9 PG (ref 26.5–33)
MCH RBC QN AUTO: 27.9 PG (ref 26.5–33)
MCH RBC QN AUTO: 28 PG (ref 26.5–33)
MCH RBC QN AUTO: 28.1 PG (ref 26.5–33)
MCH RBC QN AUTO: 28.2 PG (ref 26.5–33)
MCH RBC QN AUTO: 28.3 PG (ref 26.5–33)
MCH RBC QN AUTO: 28.4 PG (ref 26.5–33)
MCH RBC QN AUTO: 28.4 PG (ref 26.5–33)
MCH RBC QN AUTO: 28.5 PG (ref 26.5–33)
MCH RBC QN AUTO: 28.5 PG (ref 26.5–33)
MCH RBC QN AUTO: 28.6 PG (ref 26.5–33)
MCH RBC QN AUTO: 28.7 PG (ref 26.5–33)
MCHC RBC AUTO-ENTMCNC: 28.4 G/DL (ref 31.5–36.5)
MCHC RBC AUTO-ENTMCNC: 28.4 G/DL (ref 31.5–36.5)
MCHC RBC AUTO-ENTMCNC: 28.6 G/DL (ref 31.5–36.5)
MCHC RBC AUTO-ENTMCNC: 28.6 G/DL (ref 31.5–36.5)
MCHC RBC AUTO-ENTMCNC: 28.7 G/DL (ref 31.5–36.5)
MCHC RBC AUTO-ENTMCNC: 28.8 G/DL (ref 31.5–36.5)
MCHC RBC AUTO-ENTMCNC: 29.1 G/DL (ref 31.5–36.5)
MCHC RBC AUTO-ENTMCNC: 29.3 G/DL (ref 31.5–36.5)
MCHC RBC AUTO-ENTMCNC: 29.3 G/DL (ref 31.5–36.5)
MCHC RBC AUTO-ENTMCNC: 29.6 G/DL (ref 31.5–36.5)
MCHC RBC AUTO-ENTMCNC: 29.6 G/DL (ref 31.5–36.5)
MCHC RBC AUTO-ENTMCNC: 29.8 G/DL (ref 31.5–36.5)
MCHC RBC AUTO-ENTMCNC: 29.9 G/DL (ref 31.5–36.5)
MCHC RBC AUTO-ENTMCNC: 30 G/DL (ref 31.5–36.5)
MCHC RBC AUTO-ENTMCNC: 30.1 G/DL (ref 31.5–36.5)
MCHC RBC AUTO-ENTMCNC: 30.2 G/DL (ref 31.5–36.5)
MCHC RBC AUTO-ENTMCNC: 30.4 G/DL (ref 31.5–36.5)
MCHC RBC AUTO-ENTMCNC: 30.5 G/DL (ref 31.5–36.5)
MCHC RBC AUTO-ENTMCNC: 30.5 G/DL (ref 31.5–36.5)
MCHC RBC AUTO-ENTMCNC: 31.3 G/DL (ref 31.5–36.5)
MCHC RBC AUTO-ENTMCNC: 31.3 G/DL (ref 31.5–36.5)
MCHC RBC AUTO-ENTMCNC: 31.4 G/DL (ref 31.5–36.5)
MCHC RBC AUTO-ENTMCNC: 31.7 G/DL (ref 31.5–36.5)
MCHC RBC AUTO-ENTMCNC: 32 G/DL (ref 31.5–36.5)
MCHC RBC AUTO-ENTMCNC: 32.1 G/DL (ref 31.5–36.5)
MCV RBC AUTO: 87 FL (ref 78–100)
MCV RBC AUTO: 89 FL (ref 78–100)
MCV RBC AUTO: 89 FL (ref 78–100)
MCV RBC AUTO: 90 FL (ref 78–100)
MCV RBC AUTO: 91 FL (ref 78–100)
MCV RBC AUTO: 92 FL (ref 78–100)
MCV RBC AUTO: 93 FL (ref 78–100)
MCV RBC AUTO: 93 FL (ref 78–100)
MCV RBC AUTO: 94 FL (ref 78–100)
MCV RBC AUTO: 94 FL (ref 78–100)
MCV RBC AUTO: 95 FL (ref 78–100)
MCV RBC AUTO: 96 FL (ref 78–100)
MCV RBC AUTO: 99 FL (ref 78–100)
MDC_IDC_LEAD_IMPLANT_DT: NORMAL
MDC_IDC_LEAD_LOCATION: NORMAL
MDC_IDC_LEAD_LOCATION_DETAIL_1: NORMAL
MDC_IDC_LEAD_MFG: NORMAL
MDC_IDC_LEAD_MODEL: NORMAL
MDC_IDC_LEAD_POLARITY_TYPE: NORMAL
MDC_IDC_LEAD_SERIAL: NORMAL
MDC_IDC_MSMT_BATTERY_DTM: NORMAL
MDC_IDC_MSMT_BATTERY_DTM: NORMAL
MDC_IDC_MSMT_BATTERY_REMAINING_LONGEVITY: 55 MO
MDC_IDC_MSMT_BATTERY_REMAINING_LONGEVITY: 58 MO
MDC_IDC_MSMT_BATTERY_RRT_TRIGGER: 2.83
MDC_IDC_MSMT_BATTERY_RRT_TRIGGER: 2.83
MDC_IDC_MSMT_BATTERY_STATUS: NORMAL
MDC_IDC_MSMT_BATTERY_STATUS: NORMAL
MDC_IDC_MSMT_BATTERY_VOLTAGE: 2.98 V
MDC_IDC_MSMT_BATTERY_VOLTAGE: 2.99 V
MDC_IDC_MSMT_LEADCHNL_RA_IMPEDANCE_VALUE: 323 OHM
MDC_IDC_MSMT_LEADCHNL_RA_IMPEDANCE_VALUE: 342 OHM
MDC_IDC_MSMT_LEADCHNL_RA_IMPEDANCE_VALUE: 380 OHM
MDC_IDC_MSMT_LEADCHNL_RA_IMPEDANCE_VALUE: 399 OHM
MDC_IDC_MSMT_LEADCHNL_RA_PACING_THRESHOLD_AMPLITUDE: 0.38 V
MDC_IDC_MSMT_LEADCHNL_RA_PACING_THRESHOLD_AMPLITUDE: 0.5 V
MDC_IDC_MSMT_LEADCHNL_RA_PACING_THRESHOLD_PULSEWIDTH: 0.4 MS
MDC_IDC_MSMT_LEADCHNL_RA_PACING_THRESHOLD_PULSEWIDTH: 0.4 MS
MDC_IDC_MSMT_LEADCHNL_RA_SENSING_INTR_AMPL: 1.62 MV
MDC_IDC_MSMT_LEADCHNL_RA_SENSING_INTR_AMPL: 1.62 MV
MDC_IDC_MSMT_LEADCHNL_RA_SENSING_INTR_AMPL: 2 MV
MDC_IDC_MSMT_LEADCHNL_RA_SENSING_INTR_AMPL: 2 MV
MDC_IDC_MSMT_LEADCHNL_RV_IMPEDANCE_VALUE: 456 OHM
MDC_IDC_MSMT_LEADCHNL_RV_IMPEDANCE_VALUE: 456 OHM
MDC_IDC_MSMT_LEADCHNL_RV_IMPEDANCE_VALUE: 475 OHM
MDC_IDC_MSMT_LEADCHNL_RV_IMPEDANCE_VALUE: 494 OHM
MDC_IDC_MSMT_LEADCHNL_RV_PACING_THRESHOLD_AMPLITUDE: 0.5 V
MDC_IDC_MSMT_LEADCHNL_RV_PACING_THRESHOLD_AMPLITUDE: 0.62 V
MDC_IDC_MSMT_LEADCHNL_RV_PACING_THRESHOLD_PULSEWIDTH: 0.4 MS
MDC_IDC_MSMT_LEADCHNL_RV_PACING_THRESHOLD_PULSEWIDTH: 0.4 MS
MDC_IDC_MSMT_LEADCHNL_RV_SENSING_INTR_AMPL: 10.12 MV
MDC_IDC_MSMT_LEADCHNL_RV_SENSING_INTR_AMPL: 10.12 MV
MDC_IDC_MSMT_LEADCHNL_RV_SENSING_INTR_AMPL: 11.38 MV
MDC_IDC_MSMT_LEADCHNL_RV_SENSING_INTR_AMPL: 11.38 MV
MDC_IDC_PG_IMPLANT_DTM: NORMAL
MDC_IDC_PG_IMPLANT_DTM: NORMAL
MDC_IDC_PG_MFG: NORMAL
MDC_IDC_PG_MFG: NORMAL
MDC_IDC_PG_MODEL: NORMAL
MDC_IDC_PG_MODEL: NORMAL
MDC_IDC_PG_SERIAL: NORMAL
MDC_IDC_PG_SERIAL: NORMAL
MDC_IDC_PG_TYPE: NORMAL
MDC_IDC_PG_TYPE: NORMAL
MDC_IDC_SESS_CLINIC_NAME: NORMAL
MDC_IDC_SESS_CLINIC_NAME: NORMAL
MDC_IDC_SESS_DTM: NORMAL
MDC_IDC_SESS_DTM: NORMAL
MDC_IDC_SESS_TYPE: NORMAL
MDC_IDC_SESS_TYPE: NORMAL
MDC_IDC_SET_BRADY_AT_MODE_SWITCH_RATE: 171 {BEATS}/MIN
MDC_IDC_SET_BRADY_AT_MODE_SWITCH_RATE: 171 {BEATS}/MIN
MDC_IDC_SET_BRADY_HYSTRATE: NORMAL
MDC_IDC_SET_BRADY_HYSTRATE: NORMAL
MDC_IDC_SET_BRADY_LOWRATE: 60 {BEATS}/MIN
MDC_IDC_SET_BRADY_LOWRATE: 60 {BEATS}/MIN
MDC_IDC_SET_BRADY_MAX_SENSOR_RATE: 130 {BEATS}/MIN
MDC_IDC_SET_BRADY_MAX_SENSOR_RATE: 130 {BEATS}/MIN
MDC_IDC_SET_BRADY_MAX_TRACKING_RATE: 130 {BEATS}/MIN
MDC_IDC_SET_BRADY_MAX_TRACKING_RATE: 130 {BEATS}/MIN
MDC_IDC_SET_BRADY_MODE: NORMAL
MDC_IDC_SET_BRADY_MODE: NORMAL
MDC_IDC_SET_BRADY_PAV_DELAY_LOW: 250 MS
MDC_IDC_SET_BRADY_PAV_DELAY_LOW: 250 MS
MDC_IDC_SET_BRADY_SAV_DELAY_LOW: 250 MS
MDC_IDC_SET_BRADY_SAV_DELAY_LOW: 250 MS
MDC_IDC_SET_LEADCHNL_RA_PACING_AMPLITUDE: 1.5 V
MDC_IDC_SET_LEADCHNL_RA_PACING_AMPLITUDE: 1.5 V
MDC_IDC_SET_LEADCHNL_RA_PACING_ANODE_ELECTRODE_1: NORMAL
MDC_IDC_SET_LEADCHNL_RA_PACING_ANODE_ELECTRODE_1: NORMAL
MDC_IDC_SET_LEADCHNL_RA_PACING_ANODE_LOCATION_1: NORMAL
MDC_IDC_SET_LEADCHNL_RA_PACING_ANODE_LOCATION_1: NORMAL
MDC_IDC_SET_LEADCHNL_RA_PACING_CAPTURE_MODE: NORMAL
MDC_IDC_SET_LEADCHNL_RA_PACING_CAPTURE_MODE: NORMAL
MDC_IDC_SET_LEADCHNL_RA_PACING_CATHODE_ELECTRODE_1: NORMAL
MDC_IDC_SET_LEADCHNL_RA_PACING_CATHODE_ELECTRODE_1: NORMAL
MDC_IDC_SET_LEADCHNL_RA_PACING_CATHODE_LOCATION_1: NORMAL
MDC_IDC_SET_LEADCHNL_RA_PACING_CATHODE_LOCATION_1: NORMAL
MDC_IDC_SET_LEADCHNL_RA_PACING_POLARITY: NORMAL
MDC_IDC_SET_LEADCHNL_RA_PACING_POLARITY: NORMAL
MDC_IDC_SET_LEADCHNL_RA_PACING_PULSEWIDTH: 0.4 MS
MDC_IDC_SET_LEADCHNL_RA_PACING_PULSEWIDTH: 0.4 MS
MDC_IDC_SET_LEADCHNL_RA_SENSING_ANODE_ELECTRODE_1: NORMAL
MDC_IDC_SET_LEADCHNL_RA_SENSING_ANODE_ELECTRODE_1: NORMAL
MDC_IDC_SET_LEADCHNL_RA_SENSING_ANODE_LOCATION_1: NORMAL
MDC_IDC_SET_LEADCHNL_RA_SENSING_ANODE_LOCATION_1: NORMAL
MDC_IDC_SET_LEADCHNL_RA_SENSING_CATHODE_ELECTRODE_1: NORMAL
MDC_IDC_SET_LEADCHNL_RA_SENSING_CATHODE_ELECTRODE_1: NORMAL
MDC_IDC_SET_LEADCHNL_RA_SENSING_CATHODE_LOCATION_1: NORMAL
MDC_IDC_SET_LEADCHNL_RA_SENSING_CATHODE_LOCATION_1: NORMAL
MDC_IDC_SET_LEADCHNL_RA_SENSING_POLARITY: NORMAL
MDC_IDC_SET_LEADCHNL_RA_SENSING_POLARITY: NORMAL
MDC_IDC_SET_LEADCHNL_RA_SENSING_SENSITIVITY: 0.3 MV
MDC_IDC_SET_LEADCHNL_RA_SENSING_SENSITIVITY: 0.3 MV
MDC_IDC_SET_LEADCHNL_RV_PACING_AMPLITUDE: 2 V
MDC_IDC_SET_LEADCHNL_RV_PACING_AMPLITUDE: 2 V
MDC_IDC_SET_LEADCHNL_RV_PACING_ANODE_ELECTRODE_1: NORMAL
MDC_IDC_SET_LEADCHNL_RV_PACING_ANODE_ELECTRODE_1: NORMAL
MDC_IDC_SET_LEADCHNL_RV_PACING_ANODE_LOCATION_1: NORMAL
MDC_IDC_SET_LEADCHNL_RV_PACING_ANODE_LOCATION_1: NORMAL
MDC_IDC_SET_LEADCHNL_RV_PACING_CAPTURE_MODE: NORMAL
MDC_IDC_SET_LEADCHNL_RV_PACING_CAPTURE_MODE: NORMAL
MDC_IDC_SET_LEADCHNL_RV_PACING_CATHODE_ELECTRODE_1: NORMAL
MDC_IDC_SET_LEADCHNL_RV_PACING_CATHODE_ELECTRODE_1: NORMAL
MDC_IDC_SET_LEADCHNL_RV_PACING_CATHODE_LOCATION_1: NORMAL
MDC_IDC_SET_LEADCHNL_RV_PACING_CATHODE_LOCATION_1: NORMAL
MDC_IDC_SET_LEADCHNL_RV_PACING_POLARITY: NORMAL
MDC_IDC_SET_LEADCHNL_RV_PACING_POLARITY: NORMAL
MDC_IDC_SET_LEADCHNL_RV_PACING_PULSEWIDTH: 0.4 MS
MDC_IDC_SET_LEADCHNL_RV_PACING_PULSEWIDTH: 0.4 MS
MDC_IDC_SET_LEADCHNL_RV_SENSING_ANODE_ELECTRODE_1: NORMAL
MDC_IDC_SET_LEADCHNL_RV_SENSING_ANODE_ELECTRODE_1: NORMAL
MDC_IDC_SET_LEADCHNL_RV_SENSING_ANODE_LOCATION_1: NORMAL
MDC_IDC_SET_LEADCHNL_RV_SENSING_ANODE_LOCATION_1: NORMAL
MDC_IDC_SET_LEADCHNL_RV_SENSING_CATHODE_ELECTRODE_1: NORMAL
MDC_IDC_SET_LEADCHNL_RV_SENSING_CATHODE_ELECTRODE_1: NORMAL
MDC_IDC_SET_LEADCHNL_RV_SENSING_CATHODE_LOCATION_1: NORMAL
MDC_IDC_SET_LEADCHNL_RV_SENSING_CATHODE_LOCATION_1: NORMAL
MDC_IDC_SET_LEADCHNL_RV_SENSING_POLARITY: NORMAL
MDC_IDC_SET_LEADCHNL_RV_SENSING_POLARITY: NORMAL
MDC_IDC_SET_LEADCHNL_RV_SENSING_SENSITIVITY: 0.9 MV
MDC_IDC_SET_LEADCHNL_RV_SENSING_SENSITIVITY: 0.9 MV
MDC_IDC_SET_ZONE_DETECTION_INTERVAL: 350 MS
MDC_IDC_SET_ZONE_DETECTION_INTERVAL: 350 MS
MDC_IDC_SET_ZONE_DETECTION_INTERVAL: 400 MS
MDC_IDC_SET_ZONE_DETECTION_INTERVAL: 400 MS
MDC_IDC_SET_ZONE_TYPE: NORMAL
MDC_IDC_STAT_AT_BURDEN_PERCENT: 0 %
MDC_IDC_STAT_AT_BURDEN_PERCENT: 0 %
MDC_IDC_STAT_AT_DTM_END: NORMAL
MDC_IDC_STAT_AT_DTM_END: NORMAL
MDC_IDC_STAT_AT_DTM_START: NORMAL
MDC_IDC_STAT_AT_DTM_START: NORMAL
MDC_IDC_STAT_BRADY_AP_VP_PERCENT: 0.26 %
MDC_IDC_STAT_BRADY_AP_VP_PERCENT: 2.26 %
MDC_IDC_STAT_BRADY_AP_VS_PERCENT: 19.67 %
MDC_IDC_STAT_BRADY_AP_VS_PERCENT: 4.7 %
MDC_IDC_STAT_BRADY_AS_VP_PERCENT: 1.37 %
MDC_IDC_STAT_BRADY_AS_VP_PERCENT: 5.97 %
MDC_IDC_STAT_BRADY_AS_VS_PERCENT: 76.7 %
MDC_IDC_STAT_BRADY_AS_VS_PERCENT: 89.07 %
MDC_IDC_STAT_BRADY_DTM_END: NORMAL
MDC_IDC_STAT_BRADY_DTM_END: NORMAL
MDC_IDC_STAT_BRADY_DTM_START: NORMAL
MDC_IDC_STAT_BRADY_DTM_START: NORMAL
MDC_IDC_STAT_BRADY_RA_PERCENT_PACED: 21.85 %
MDC_IDC_STAT_BRADY_RA_PERCENT_PACED: 4.95 %
MDC_IDC_STAT_BRADY_RV_PERCENT_PACED: 3.85 %
MDC_IDC_STAT_BRADY_RV_PERCENT_PACED: 6.35 %
MDC_IDC_STAT_EPISODE_RECENT_COUNT: 0
MDC_IDC_STAT_EPISODE_RECENT_COUNT_DTM_END: NORMAL
MDC_IDC_STAT_EPISODE_RECENT_COUNT_DTM_START: NORMAL
MDC_IDC_STAT_EPISODE_TOTAL_COUNT: 0
MDC_IDC_STAT_EPISODE_TOTAL_COUNT: 0
MDC_IDC_STAT_EPISODE_TOTAL_COUNT: 3
MDC_IDC_STAT_EPISODE_TOTAL_COUNT: 3
MDC_IDC_STAT_EPISODE_TOTAL_COUNT: 4
MDC_IDC_STAT_EPISODE_TOTAL_COUNT: 4
MDC_IDC_STAT_EPISODE_TOTAL_COUNT: 8
MDC_IDC_STAT_EPISODE_TOTAL_COUNT: 8
MDC_IDC_STAT_EPISODE_TOTAL_COUNT_DTM_END: NORMAL
MDC_IDC_STAT_EPISODE_TOTAL_COUNT_DTM_START: NORMAL
MDC_IDC_STAT_EPISODE_TYPE: NORMAL
MITOGEN IGNF BCKGRD COR BLD-ACNC: 0 IU/ML
MITOGEN IGNF BCKGRD COR BLD-ACNC: 0.01 IU/ML
MITOGEN IGNF BCKGRD COR BLD-ACNC: 0.02 IU/ML
MITOGEN IGNF BCKGRD COR BLD-ACNC: 0.03 IU/ML
MONOCYTES # BLD AUTO: 0.5 10E3/UL (ref 0–1.3)
MONOCYTES # BLD AUTO: 0.5 10E3/UL (ref 0–1.3)
MONOCYTES # BLD AUTO: 0.6 10E3/UL (ref 0–1.3)
MONOCYTES # BLD AUTO: 0.7 10E3/UL (ref 0–1.3)
MONOCYTES # BLD AUTO: 0.8 10E3/UL (ref 0–1.3)
MONOCYTES # BLD AUTO: 0.9 10E3/UL (ref 0–1.3)
MONOCYTES NFR BLD AUTO: 4 %
MONOCYTES NFR BLD AUTO: 4 %
MONOCYTES NFR BLD AUTO: 6 %
MONOCYTES NFR BLD AUTO: 6 %
MONOCYTES NFR BLD AUTO: 7 %
MONOCYTES NFR BLD AUTO: 8 %
MONOCYTES NFR BLD AUTO: 8 %
MONOS+MACROS NFR FLD MANUAL: 7 %
MUCOUS THREADS #/AREA URNS LPF: PRESENT /LPF
NEUTROPHILS # BLD AUTO: 10.1 10E3/UL (ref 1.6–8.3)
NEUTROPHILS # BLD AUTO: 11.1 10E3/UL (ref 1.6–8.3)
NEUTROPHILS # BLD AUTO: 11.1 10E3/UL (ref 1.6–8.3)
NEUTROPHILS # BLD AUTO: 5.7 10E3/UL (ref 1.6–8.3)
NEUTROPHILS # BLD AUTO: 6.1 10E3/UL (ref 1.6–8.3)
NEUTROPHILS # BLD AUTO: 6.5 10E3/UL (ref 1.6–8.3)
NEUTROPHILS # BLD AUTO: 6.6 10E3/UL (ref 1.6–8.3)
NEUTROPHILS # BLD AUTO: 6.6 10E3/UL (ref 1.6–8.3)
NEUTROPHILS # BLD AUTO: 7.4 10E3/UL (ref 1.6–8.3)
NEUTROPHILS # BLD AUTO: 8.2 10E3/UL (ref 1.6–8.3)
NEUTROPHILS NFR BLD AUTO: 70 %
NEUTROPHILS NFR BLD AUTO: 71 %
NEUTROPHILS NFR BLD AUTO: 74 %
NEUTROPHILS NFR BLD AUTO: 76 %
NEUTROPHILS NFR BLD AUTO: 76 %
NEUTROPHILS NFR BLD AUTO: 78 %
NEUTROPHILS NFR BLD AUTO: 79 %
NEUTROPHILS NFR BLD AUTO: 81 %
NEUTROPHILS NFR BLD AUTO: 85 %
NEUTROPHILS NFR BLD AUTO: 85 %
NEUTS BAND NFR FLD MANUAL: 53 %
NITRATE UR QL: NEGATIVE
NRBC # BLD AUTO: 0 10E3/UL
NRBC # BLD AUTO: 0.1 10E3/UL
NRBC # BLD AUTO: 0.1 10E3/UL
NRBC BLD AUTO-RTO: 0 /100
NRBC BLD AUTO-RTO: 1 /100
NT-PROBNP SERPL-MCNC: ABNORMAL PG/ML (ref 0–1800)
NT-PROBNP SERPL-MCNC: ABNORMAL PG/ML (ref 0–1800)
O2/TOTAL GAS SETTING VFR VENT: 30 %
O2/TOTAL GAS SETTING VFR VENT: 30 %
O2/TOTAL GAS SETTING VFR VENT: 40 %
O2/TOTAL GAS SETTING VFR VENT: 50 %
O2/TOTAL GAS SETTING VFR VENT: 60 %
OXYHGB MFR BLD: 97 % (ref 92–100)
OXYHGB MFR BLD: 97 % (ref 92–100)
P AXIS - MUSE: 61 DEGREES
P AXIS - MUSE: 74 DEGREES
PATH REPORT.COMMENTS IMP SPEC: NORMAL
PATH REPORT.FINAL DX SPEC: NORMAL
PATH REPORT.GROSS SPEC: NORMAL
PATH REPORT.MICROSCOPIC SPEC OTHER STN: NORMAL
PATH REPORT.RELEVANT HX SPEC: NORMAL
PCO2 BLD: 43 MM HG (ref 35–45)
PCO2 BLD: 44 MM HG (ref 35–45)
PCO2 BLD: 58 MM HG (ref 35–45)
PCO2 BLD: 69 MM HG (ref 35–45)
PCO2 BLDV: 46 MM HG (ref 40–50)
PCO2 BLDV: 46 MM HG (ref 40–50)
PCO2 BLDV: 51 MM HG (ref 40–50)
PH BLD: 7.25 [PH] (ref 7.35–7.45)
PH BLD: 7.3 [PH] (ref 7.35–7.45)
PH BLD: 7.33 [PH] (ref 7.35–7.45)
PH BLD: 7.36 [PH] (ref 7.35–7.45)
PH BLDV: 7.34 [PH] (ref 7.32–7.43)
PH BLDV: 7.37 [PH] (ref 7.32–7.43)
PH BLDV: 7.38 [PH] (ref 7.32–7.43)
PH BODY FLUID SOURCE: NORMAL
PH FLD: 8 PH
PH UR STRIP: 5 [PH] (ref 5–9)
PH UR STRIP: 5.5 [PH] (ref 5–7)
PH UR STRIP: 6 [PH] (ref 5–7)
PHOSPHATE SERPL-MCNC: 2.3 MG/DL (ref 2.5–4.5)
PHOSPHATE SERPL-MCNC: 2.3 MG/DL (ref 2.5–4.5)
PHOSPHATE SERPL-MCNC: 2.4 MG/DL (ref 2.5–4.5)
PHOSPHATE SERPL-MCNC: 2.6 MG/DL (ref 2.5–4.5)
PHOSPHATE SERPL-MCNC: 2.6 MG/DL (ref 2.5–4.5)
PHOSPHATE SERPL-MCNC: 2.7 MG/DL (ref 2.5–4.5)
PHOSPHATE SERPL-MCNC: 2.9 MG/DL (ref 2.5–4.5)
PHOSPHATE SERPL-MCNC: 3.3 MG/DL (ref 2.5–4.5)
PHOSPHATE SERPL-MCNC: 3.5 MG/DL (ref 2.5–4.5)
PHOSPHATE SERPL-MCNC: 3.6 MG/DL (ref 2.5–4.5)
PHOSPHATE SERPL-MCNC: 4.1 MG/DL (ref 2.5–4.5)
PLAT MORPH BLD: ABNORMAL
PLAT MORPH BLD: NORMAL
PLATELET # BLD AUTO: 185 10E3/UL (ref 150–450)
PLATELET # BLD AUTO: 187 10E3/UL (ref 150–450)
PLATELET # BLD AUTO: 192 10E3/UL (ref 150–450)
PLATELET # BLD AUTO: 194 10E3/UL (ref 150–450)
PLATELET # BLD AUTO: 202 10E3/UL (ref 150–450)
PLATELET # BLD AUTO: 214 10E3/UL (ref 150–450)
PLATELET # BLD AUTO: 219 10E3/UL (ref 150–450)
PLATELET # BLD AUTO: 227 10E3/UL (ref 150–450)
PLATELET # BLD AUTO: 228 10E3/UL (ref 150–450)
PLATELET # BLD AUTO: 237 10E3/UL (ref 150–450)
PLATELET # BLD AUTO: 242 10E3/UL (ref 150–450)
PLATELET # BLD AUTO: 243 10E3/UL (ref 150–450)
PLATELET # BLD AUTO: 246 10E3/UL (ref 150–450)
PLATELET # BLD AUTO: 248 10E3/UL (ref 150–450)
PLATELET # BLD AUTO: 255 10E3/UL (ref 150–450)
PLATELET # BLD AUTO: 270 10E3/UL (ref 150–450)
PLATELET # BLD AUTO: 275 10E3/UL (ref 150–450)
PLATELET # BLD AUTO: 279 10E3/UL (ref 150–450)
PLATELET # BLD AUTO: 288 10E3/UL (ref 150–450)
PLATELET # BLD AUTO: 292 10E3/UL (ref 150–450)
PLATELET # BLD AUTO: 294 10E3/UL (ref 150–450)
PLATELET # BLD AUTO: 318 10E3/UL (ref 150–450)
PLATELET # BLD AUTO: 363 10E3/UL (ref 150–450)
PLATELET # BLD AUTO: 375 10E3/UL (ref 150–450)
PLATELET # BLD AUTO: 393 10E3/UL (ref 150–450)
PO2 BLD: 81 MM HG (ref 80–105)
PO2 BLD: 93 MM HG (ref 80–105)
PO2 BLD: 98 MM HG (ref 80–105)
PO2 BLD: 99 MM HG (ref 80–105)
PO2 BLDV: 39 MM HG (ref 25–47)
PO2 BLDV: 42 MM HG (ref 25–47)
PO2 BLDV: 43 MM HG (ref 25–47)
POTASSIUM BLD-SCNC: 3.1 MMOL/L (ref 3.4–5.3)
POTASSIUM BLD-SCNC: 3.6 MMOL/L (ref 3.4–5.3)
POTASSIUM BLD-SCNC: 3.7 MMOL/L (ref 3.4–5.3)
POTASSIUM BLD-SCNC: 3.8 MMOL/L (ref 3.4–5.3)
POTASSIUM BLD-SCNC: 3.9 MMOL/L (ref 3.4–5.3)
POTASSIUM BLD-SCNC: 4.3 MMOL/L (ref 3.4–5.3)
POTASSIUM BLD-SCNC: 4.3 MMOL/L (ref 3.5–5)
POTASSIUM BLD-SCNC: 4.4 MMOL/L (ref 3.4–5.3)
POTASSIUM BLD-SCNC: 4.5 MMOL/L (ref 3.4–5.3)
POTASSIUM BLD-SCNC: 4.5 MMOL/L (ref 3.4–5.3)
POTASSIUM BLD-SCNC: 4.6 MMOL/L (ref 3.4–5.3)
POTASSIUM BLD-SCNC: 4.7 MMOL/L (ref 3.4–5.3)
POTASSIUM BLD-SCNC: 4.7 MMOL/L (ref 3.4–5.3)
POTASSIUM BLD-SCNC: 4.8 MMOL/L (ref 3.4–5.3)
POTASSIUM BLD-SCNC: 4.9 MMOL/L (ref 3.4–5.3)
POTASSIUM BLD-SCNC: 4.9 MMOL/L (ref 3.4–5.3)
POTASSIUM BLD-SCNC: 5 MMOL/L (ref 3.4–5.3)
POTASSIUM BLD-SCNC: 5 MMOL/L (ref 3.5–5)
POTASSIUM BLD-SCNC: 5 MMOL/L (ref 3.5–5.1)
POTASSIUM BLD-SCNC: 5 MMOL/L (ref 3.5–5.1)
POTASSIUM BLD-SCNC: 5.4 MMOL/L (ref 3.4–5.3)
POTASSIUM BLD-SCNC: 5.7 MMOL/L (ref 3.4–5.3)
PR INTERVAL - MUSE: 134 MS
PR INTERVAL - MUSE: 154 MS
PREALB SERPL IA-MCNC: 13 MG/DL (ref 15–45)
PROCALCITONIN SERPL-MCNC: 0.35 NG/ML
PROCALCITONIN SERPL-MCNC: 2.61 NG/ML
PROT FLD-MCNC: 2 G/DL
PROT SERPL-MCNC: 7 G/DL (ref 6.8–8.8)
PROT SERPL-MCNC: 7 G/DL (ref 6.8–8.8)
PROT SERPL-MCNC: 7.1 G/DL (ref 6.8–8.8)
PROT SERPL-MCNC: 7.2 G/DL (ref 6.8–8.8)
PROT SERPL-MCNC: 7.2 G/DL (ref 6.8–8.8)
PROT SERPL-MCNC: 7.3 G/DL (ref 6.8–8.8)
PROT SERPL-MCNC: 7.4 G/DL (ref 6.4–8.9)
PROT SERPL-MCNC: 7.6 G/DL (ref 6.8–8.8)
PROT SERPL-MCNC: 7.7 G/DL (ref 6.8–8.8)
PROT SERPL-MCNC: 7.7 G/DL (ref 6.8–8.8)
PROT SERPL-MCNC: 7.9 G/DL (ref 6.8–8.8)
PROT SERPL-MCNC: 8.3 G/DL (ref 6.8–8.8)
PROT SERPL-MCNC: 8.5 G/DL (ref 6.8–8.8)
PROTEIN BODY FLUID SOURCE: NORMAL
QRS DURATION - MUSE: 84 MS
QRS DURATION - MUSE: 96 MS
QT - MUSE: 320 MS
QT - MUSE: 354 MS
QTC - MUSE: 444 MS
QTC - MUSE: 450 MS
QUANTIFERON MITOGEN: 0.31 IU/ML
QUANTIFERON MITOGEN: 0.41 IU/ML
QUANTIFERON NIL TUBE: 0.03 IU/ML
QUANTIFERON NIL TUBE: 0.03 IU/ML
QUANTIFERON TB1 TUBE: 0.03 IU/ML
QUANTIFERON TB1 TUBE: 0.05 IU/ML
QUANTIFERON TB2 TUBE: 0.04
QUANTIFERON TB2 TUBE: 0.06
R AXIS - MUSE: -27 DEGREES
R AXIS - MUSE: -84 DEGREES
RBC # BLD AUTO: 1.6 10E6/UL (ref 3.8–5.2)
RBC # BLD AUTO: 2.36 10E6/UL (ref 3.8–5.2)
RBC # BLD AUTO: 2.44 10E6/UL (ref 3.8–5.2)
RBC # BLD AUTO: 2.51 10E6/UL (ref 3.8–5.2)
RBC # BLD AUTO: 2.61 10E6/UL (ref 3.8–5.2)
RBC # BLD AUTO: 2.62 10E6/UL (ref 3.8–5.2)
RBC # BLD AUTO: 2.63 10E6/UL (ref 3.8–5.2)
RBC # BLD AUTO: 2.64 10E6/UL (ref 3.8–5.2)
RBC # BLD AUTO: 2.64 10E6/UL (ref 3.8–5.2)
RBC # BLD AUTO: 2.67 10E6/UL (ref 3.8–5.2)
RBC # BLD AUTO: 2.68 10E6/UL (ref 3.8–5.2)
RBC # BLD AUTO: 2.78 10E6/UL (ref 3.8–5.2)
RBC # BLD AUTO: 2.78 10E6/UL (ref 3.8–5.2)
RBC # BLD AUTO: 2.79 10E6/UL (ref 3.8–5.2)
RBC # BLD AUTO: 2.82 10E6/UL (ref 3.8–5.2)
RBC # BLD AUTO: 2.84 10E6/UL (ref 3.8–5.2)
RBC # BLD AUTO: 2.95 10E6/UL (ref 3.8–5.2)
RBC # BLD AUTO: 2.97 10E6/UL (ref 3.8–5.2)
RBC # BLD AUTO: 3.02 10E6/UL (ref 3.8–5.2)
RBC # BLD AUTO: 3.07 10E6/UL (ref 3.8–5.2)
RBC # BLD AUTO: 3.11 10E6/UL (ref 3.8–5.2)
RBC # BLD AUTO: 3.23 10E6/UL (ref 3.8–5.2)
RBC # BLD AUTO: 3.48 10E6/UL (ref 3.8–5.2)
RBC # BLD AUTO: 4.01 10E6/UL (ref 3.8–5.2)
RBC # BLD AUTO: 4.1 10E6/UL (ref 3.8–5.2)
RBC MORPH BLD: ABNORMAL
RBC MORPH BLD: NORMAL
RBC URINE: 13 /HPF
RBC URINE: 146 /HPF
RBC URINE: <1 /HPF
RETINOPATHY: NORMAL
RSV RNA SPEC NAA+PROBE: NEGATIVE
SARS-COV-2 RNA RESP QL NAA+PROBE: NEGATIVE
SODIUM SERPL-SCNC: 128 MMOL/L (ref 134–144)
SODIUM SERPL-SCNC: 129 MMOL/L (ref 134–144)
SODIUM SERPL-SCNC: 132 MMOL/L (ref 133–144)
SODIUM SERPL-SCNC: 134 MMOL/L (ref 133–144)
SODIUM SERPL-SCNC: 134 MMOL/L (ref 133–144)
SODIUM SERPL-SCNC: 136 MMOL/L (ref 133–144)
SODIUM SERPL-SCNC: 137 MMOL/L (ref 133–144)
SODIUM SERPL-SCNC: 137 MMOL/L (ref 136–145)
SODIUM SERPL-SCNC: 138 MMOL/L (ref 133–144)
SODIUM SERPL-SCNC: 138 MMOL/L (ref 133–144)
SODIUM SERPL-SCNC: 139 MMOL/L (ref 133–144)
SODIUM SERPL-SCNC: 139 MMOL/L (ref 136–145)
SODIUM SERPL-SCNC: 140 MMOL/L (ref 133–144)
SODIUM SERPL-SCNC: 140 MMOL/L (ref 133–144)
SODIUM SERPL-SCNC: 142 MMOL/L (ref 133–144)
SODIUM SERPL-SCNC: 142 MMOL/L (ref 133–144)
SODIUM SERPL-SCNC: 143 MMOL/L (ref 133–144)
SODIUM SERPL-SCNC: 144 MMOL/L (ref 133–144)
SODIUM SERPL-SCNC: 144 MMOL/L (ref 133–144)
SODIUM SERPL-SCNC: 149 MMOL/L (ref 133–144)
SP GR UR STRIP: 1.02 (ref 1–1.03)
SP GR UR STRIP: 1.03 (ref 1–1.03)
SP GR UR STRIP: 1.03 (ref 1–1.03)
SPECIMEN EXPIRATION DATE: NORMAL
SYSTOLIC BLOOD PRESSURE - MUSE: NORMAL MMHG
SYSTOLIC BLOOD PRESSURE - MUSE: NORMAL MMHG
T AXIS - MUSE: 55 DEGREES
T AXIS - MUSE: 58 DEGREES
TIBC SERPL-MCNC: 310 UG/DL (ref 240–430)
TROPONIN I SERPL HS-MCNC: 162 NG/L
TROPONIN I SERPL HS-MCNC: 9 NG/L
UNIT ABO/RH: NORMAL
UNIT NUMBER: NORMAL
UNIT STATUS: NORMAL
UNIT TYPE ISBT: 6200
UPPER GI ENDOSCOPY: NORMAL
URATE CRY #/AREA URNS HPF: ABNORMAL /HPF
UROBILINOGEN UR STRIP-MCNC: 4 MG/DL
UROBILINOGEN UR STRIP-MCNC: NORMAL MG/DL
UROBILINOGEN UR STRIP-MCNC: NORMAL MG/DL
VENTRICULAR RATE- MUSE: 119 BPM
VENTRICULAR RATE- MUSE: 95 BPM
WBC # BLD AUTO: 10 10E3/UL (ref 4–11)
WBC # BLD AUTO: 10.4 10E3/UL (ref 4–11)
WBC # BLD AUTO: 10.7 10E3/UL (ref 4–11)
WBC # BLD AUTO: 11.1 10E3/UL (ref 4–11)
WBC # BLD AUTO: 11.7 10E3/UL (ref 4–11)
WBC # BLD AUTO: 11.8 10E3/UL (ref 4–11)
WBC # BLD AUTO: 12.4 10E3/UL (ref 4–11)
WBC # BLD AUTO: 12.9 10E3/UL (ref 4–11)
WBC # BLD AUTO: 13.2 10E3/UL (ref 4–11)
WBC # BLD AUTO: 13.8 10E3/UL (ref 4–11)
WBC # BLD AUTO: 15.1 10E3/UL (ref 4–11)
WBC # BLD AUTO: 17.7 10E3/UL (ref 4–11)
WBC # BLD AUTO: 7.7 10E3/UL (ref 4–11)
WBC # BLD AUTO: 7.9 10E3/UL (ref 4–11)
WBC # BLD AUTO: 8 10E3/UL (ref 4–11)
WBC # BLD AUTO: 8.2 10E3/UL (ref 4–11)
WBC # BLD AUTO: 8.3 10E3/UL (ref 4–11)
WBC # BLD AUTO: 8.5 10E3/UL (ref 4–11)
WBC # BLD AUTO: 8.5 10E3/UL (ref 4–11)
WBC # BLD AUTO: 8.6 10E3/UL (ref 4–11)
WBC # BLD AUTO: 8.7 10E3/UL (ref 4–11)
WBC # BLD AUTO: 8.8 10E3/UL (ref 4–11)
WBC # BLD AUTO: 9.2 10E3/UL (ref 4–11)
WBC # BLD AUTO: 9.8 10E3/UL (ref 4–11)
WBC # BLD AUTO: 9.9 10E3/UL (ref 4–11)
WBC # FLD AUTO: 198 /UL
WBC CAST: 5 /LPF
WBC CLUMPS #/AREA URNS HPF: PRESENT /HPF
WBC CLUMPS #/AREA URNS HPF: PRESENT /HPF
WBC URINE: 15 /HPF
WBC URINE: 22 /HPF
WBC URINE: 54 /HPF

## 2022-01-01 PROCEDURE — 250N000012 HC RX MED GY IP 250 OP 636 PS 637: Performed by: INTERNAL MEDICINE

## 2022-01-01 PROCEDURE — 70498 CT ANGIOGRAPHY NECK: CPT | Mod: MA

## 2022-01-01 PROCEDURE — 83735 ASSAY OF MAGNESIUM: CPT | Performed by: STUDENT IN AN ORGANIZED HEALTH CARE EDUCATION/TRAINING PROGRAM

## 2022-01-01 PROCEDURE — P9603 ONE-WAY ALLOW PRORATED MILES: HCPCS | Performed by: INTERNAL MEDICINE

## 2022-01-01 PROCEDURE — 999N000190 HC STATISTIC VAT ROUNDS

## 2022-01-01 PROCEDURE — 80053 COMPREHEN METABOLIC PANEL: CPT | Mod: ORL | Performed by: INTERNAL MEDICINE

## 2022-01-01 PROCEDURE — 85027 COMPLETE CBC AUTOMATED: CPT | Performed by: STUDENT IN AN ORGANIZED HEALTH CARE EDUCATION/TRAINING PROGRAM

## 2022-01-01 PROCEDURE — 74230 X-RAY XM SWLNG FUNCJ C+: CPT

## 2022-01-01 PROCEDURE — 96366 THER/PROPH/DIAG IV INF ADDON: CPT | Performed by: STUDENT IN AN ORGANIZED HEALTH CARE EDUCATION/TRAINING PROGRAM

## 2022-01-01 PROCEDURE — G0463 HOSPITAL OUTPT CLINIC VISIT: HCPCS

## 2022-01-01 PROCEDURE — 80048 BASIC METABOLIC PNL TOTAL CA: CPT | Performed by: INTERNAL MEDICINE

## 2022-01-01 PROCEDURE — 250N000013 HC RX MED GY IP 250 OP 250 PS 637: Performed by: STUDENT IN AN ORGANIZED HEALTH CARE EDUCATION/TRAINING PROGRAM

## 2022-01-01 PROCEDURE — 88112 CYTOPATH CELL ENHANCE TECH: CPT | Mod: 26 | Performed by: PATHOLOGY

## 2022-01-01 PROCEDURE — C9113 INJ PANTOPRAZOLE SODIUM, VIA: HCPCS | Performed by: STUDENT IN AN ORGANIZED HEALTH CARE EDUCATION/TRAINING PROGRAM

## 2022-01-01 PROCEDURE — 83615 LACTATE (LD) (LDH) ENZYME: CPT | Performed by: INTERNAL MEDICINE

## 2022-01-01 PROCEDURE — 99222 1ST HOSP IP/OBS MODERATE 55: CPT | Performed by: INTERNAL MEDICINE

## 2022-01-01 PROCEDURE — 84484 ASSAY OF TROPONIN QUANT: CPT | Performed by: EMERGENCY MEDICINE

## 2022-01-01 PROCEDURE — 250N000011 HC RX IP 250 OP 636: Performed by: INTERNAL MEDICINE

## 2022-01-01 PROCEDURE — P9016 RBC LEUKOCYTES REDUCED: HCPCS | Performed by: INTERNAL MEDICINE

## 2022-01-01 PROCEDURE — 85018 HEMOGLOBIN: CPT | Performed by: INTERNAL MEDICINE

## 2022-01-01 PROCEDURE — 99207 PR NO BILLABLE SERVICE THIS VISIT: CPT | Performed by: STUDENT IN AN ORGANIZED HEALTH CARE EDUCATION/TRAINING PROGRAM

## 2022-01-01 PROCEDURE — 99233 SBSQ HOSP IP/OBS HIGH 50: CPT | Performed by: STUDENT IN AN ORGANIZED HEALTH CARE EDUCATION/TRAINING PROGRAM

## 2022-01-01 PROCEDURE — 258N000003 HC RX IP 258 OP 636: Performed by: FAMILY MEDICINE

## 2022-01-01 PROCEDURE — 99223 1ST HOSP IP/OBS HIGH 75: CPT | Mod: AI | Performed by: INTERNAL MEDICINE

## 2022-01-01 PROCEDURE — 82310 ASSAY OF CALCIUM: CPT | Performed by: PHYSICIAN ASSISTANT

## 2022-01-01 PROCEDURE — 250N000013 HC RX MED GY IP 250 OP 250 PS 637: Performed by: INTERNAL MEDICINE

## 2022-01-01 PROCEDURE — 71250 CT THORAX DX C-: CPT | Mod: MG

## 2022-01-01 PROCEDURE — 99214 OFFICE O/P EST MOD 30 MIN: CPT | Mod: 95 | Performed by: INTERNAL MEDICINE

## 2022-01-01 PROCEDURE — 0B938ZZ DRAINAGE OF RIGHT MAIN BRONCHUS, VIA NATURAL OR ARTIFICIAL OPENING ENDOSCOPIC: ICD-10-PCS | Performed by: INTERNAL MEDICINE

## 2022-01-01 PROCEDURE — 36415 COLL VENOUS BLD VENIPUNCTURE: CPT | Performed by: FAMILY MEDICINE

## 2022-01-01 PROCEDURE — 30283B1 TRANSFUSION OF NONAUTOLOGOUS 4-FACTOR PROTHROMBIN COMPLEX CONCENTRATE INTO VEIN, PERCUTANEOUS APPROACH: ICD-10-PCS | Performed by: EMERGENCY MEDICINE

## 2022-01-01 PROCEDURE — 97162 PT EVAL MOD COMPLEX 30 MIN: CPT | Mod: GP | Performed by: PHYSICAL THERAPIST

## 2022-01-01 PROCEDURE — 82805 BLOOD GASES W/O2 SATURATION: CPT | Performed by: INTERNAL MEDICINE

## 2022-01-01 PROCEDURE — C9113 INJ PANTOPRAZOLE SODIUM, VIA: HCPCS | Performed by: INTERNAL MEDICINE

## 2022-01-01 PROCEDURE — 200N000001 HC R&B ICU

## 2022-01-01 PROCEDURE — 120N000001 HC R&B MED SURG/OB

## 2022-01-01 PROCEDURE — 272N000706 US THORACENTESIS PORTABLE

## 2022-01-01 PROCEDURE — 84155 ASSAY OF PROTEIN SERUM: CPT | Performed by: INTERNAL MEDICINE

## 2022-01-01 PROCEDURE — 99233 SBSQ HOSP IP/OBS HIGH 50: CPT | Performed by: INTERNAL MEDICINE

## 2022-01-01 PROCEDURE — 250N000009 HC RX 250: Performed by: STUDENT IN AN ORGANIZED HEALTH CARE EDUCATION/TRAINING PROGRAM

## 2022-01-01 PROCEDURE — 94002 VENT MGMT INPAT INIT DAY: CPT

## 2022-01-01 PROCEDURE — 82803 BLOOD GASES ANY COMBINATION: CPT | Performed by: INTERNAL MEDICINE

## 2022-01-01 PROCEDURE — 250N000013 HC RX MED GY IP 250 OP 250 PS 637: Performed by: FAMILY MEDICINE

## 2022-01-01 PROCEDURE — 0004A COVID-19,PF,PFIZER (12+ YRS): CPT

## 2022-01-01 PROCEDURE — 85025 COMPLETE CBC W/AUTO DIFF WBC: CPT | Performed by: STUDENT IN AN ORGANIZED HEALTH CARE EDUCATION/TRAINING PROGRAM

## 2022-01-01 PROCEDURE — 92610 EVALUATE SWALLOWING FUNCTION: CPT | Mod: GN | Performed by: SPEECH-LANGUAGE PATHOLOGIST

## 2022-01-01 PROCEDURE — 82330 ASSAY OF CALCIUM: CPT | Performed by: INTERNAL MEDICINE

## 2022-01-01 PROCEDURE — 97530 THERAPEUTIC ACTIVITIES: CPT | Mod: GO

## 2022-01-01 PROCEDURE — 250N000009 HC RX 250: Performed by: INTERNAL MEDICINE

## 2022-01-01 PROCEDURE — 272N000450 HC KIT 4FR POWER PICC SINGLE LUMEN

## 2022-01-01 PROCEDURE — 80048 BASIC METABOLIC PNL TOTAL CA: CPT | Performed by: FAMILY MEDICINE

## 2022-01-01 PROCEDURE — 94003 VENT MGMT INPAT SUBQ DAY: CPT

## 2022-01-01 PROCEDURE — 99223 1ST HOSP IP/OBS HIGH 75: CPT | Performed by: INTERNAL MEDICINE

## 2022-01-01 PROCEDURE — 258N000003 HC RX IP 258 OP 636: Performed by: INTERNAL MEDICINE

## 2022-01-01 PROCEDURE — 85610 PROTHROMBIN TIME: CPT | Performed by: INTERNAL MEDICINE

## 2022-01-01 PROCEDURE — G1010 CDSM STANSON: HCPCS

## 2022-01-01 PROCEDURE — 250N000009 HC RX 250

## 2022-01-01 PROCEDURE — 83735 ASSAY OF MAGNESIUM: CPT | Performed by: INTERNAL MEDICINE

## 2022-01-01 PROCEDURE — 82270 OCCULT BLOOD FECES: CPT | Performed by: STUDENT IN AN ORGANIZED HEALTH CARE EDUCATION/TRAINING PROGRAM

## 2022-01-01 PROCEDURE — 85025 COMPLETE CBC W/AUTO DIFF WBC: CPT | Mod: ORL | Performed by: INTERNAL MEDICINE

## 2022-01-01 PROCEDURE — 97167 OT EVAL HIGH COMPLEX 60 MIN: CPT | Mod: GO

## 2022-01-01 PROCEDURE — 258N000001 HC RX 258: Performed by: INTERNAL MEDICINE

## 2022-01-01 PROCEDURE — 250N000011 HC RX IP 250 OP 636: Performed by: STUDENT IN AN ORGANIZED HEALTH CARE EDUCATION/TRAINING PROGRAM

## 2022-01-01 PROCEDURE — 99309 SBSQ NF CARE MODERATE MDM 30: CPT | Performed by: PHYSICIAN ASSISTANT

## 2022-01-01 PROCEDURE — 93005 ELECTROCARDIOGRAM TRACING: CPT

## 2022-01-01 PROCEDURE — 5A09557 ASSISTANCE WITH RESPIRATORY VENTILATION, GREATER THAN 96 CONSECUTIVE HOURS, CONTINUOUS POSITIVE AIRWAY PRESSURE: ICD-10-PCS | Performed by: INTERNAL MEDICINE

## 2022-01-01 PROCEDURE — 250N000012 HC RX MED GY IP 250 OP 636 PS 637: Performed by: PHYSICIAN ASSISTANT

## 2022-01-01 PROCEDURE — 97166 OT EVAL MOD COMPLEX 45 MIN: CPT | Mod: GO | Performed by: OCCUPATIONAL THERAPIST

## 2022-01-01 PROCEDURE — 36600 WITHDRAWAL OF ARTERIAL BLOOD: CPT | Performed by: STUDENT IN AN ORGANIZED HEALTH CARE EDUCATION/TRAINING PROGRAM

## 2022-01-01 PROCEDURE — 80053 COMPREHEN METABOLIC PANEL: CPT | Performed by: STUDENT IN AN ORGANIZED HEALTH CARE EDUCATION/TRAINING PROGRAM

## 2022-01-01 PROCEDURE — 72131 CT LUMBAR SPINE W/O DYE: CPT | Mod: MG

## 2022-01-01 PROCEDURE — 258N000003 HC RX IP 258 OP 636: Performed by: HOSPITALIST

## 2022-01-01 PROCEDURE — 36415 COLL VENOUS BLD VENIPUNCTURE: CPT

## 2022-01-01 PROCEDURE — 999N000040 HC STATISTIC CONSULT NO CHARGE VASC ACCESS

## 2022-01-01 PROCEDURE — 36415 COLL VENOUS BLD VENIPUNCTURE: CPT | Performed by: INTERNAL MEDICINE

## 2022-01-01 PROCEDURE — 97014 ELECTRIC STIMULATION THERAPY: CPT | Mod: GP | Performed by: PHYSICAL THERAPIST

## 2022-01-01 PROCEDURE — 999N000157 HC STATISTIC RCP TIME EA 10 MIN

## 2022-01-01 PROCEDURE — 99222 1ST HOSP IP/OBS MODERATE 55: CPT | Mod: FS | Performed by: NURSE PRACTITIONER

## 2022-01-01 PROCEDURE — 94640 AIRWAY INHALATION TREATMENT: CPT

## 2022-01-01 PROCEDURE — 85027 COMPLETE CBC AUTOMATED: CPT | Performed by: INTERNAL MEDICINE

## 2022-01-01 PROCEDURE — 87070 CULTURE OTHR SPECIMN AEROBIC: CPT | Performed by: INTERNAL MEDICINE

## 2022-01-01 PROCEDURE — 85014 HEMATOCRIT: CPT | Performed by: INTERNAL MEDICINE

## 2022-01-01 PROCEDURE — 94640 AIRWAY INHALATION TREATMENT: CPT | Mod: 76

## 2022-01-01 PROCEDURE — 99291 CRITICAL CARE FIRST HOUR: CPT | Mod: GC | Performed by: STUDENT IN AN ORGANIZED HEALTH CARE EDUCATION/TRAINING PROGRAM

## 2022-01-01 PROCEDURE — 94660 CPAP INITIATION&MGMT: CPT

## 2022-01-01 PROCEDURE — 93320 DOPPLER ECHO COMPLETE: CPT | Mod: 26 | Performed by: INTERNAL MEDICINE

## 2022-01-01 PROCEDURE — 95816 EEG AWAKE AND DROWSY: CPT | Mod: 26 | Performed by: PSYCHIATRY & NEUROLOGY

## 2022-01-01 PROCEDURE — 71045 X-RAY EXAM CHEST 1 VIEW: CPT

## 2022-01-01 PROCEDURE — 99285 EMERGENCY DEPT VISIT HI MDM: CPT | Mod: 25 | Performed by: STUDENT IN AN ORGANIZED HEALTH CARE EDUCATION/TRAINING PROGRAM

## 2022-01-01 PROCEDURE — 71045 X-RAY EXAM CHEST 1 VIEW: CPT | Mod: 77

## 2022-01-01 PROCEDURE — P9047 ALBUMIN (HUMAN), 25%, 50ML: HCPCS | Performed by: INTERNAL MEDICINE

## 2022-01-01 PROCEDURE — 84100 ASSAY OF PHOSPHORUS: CPT | Performed by: STUDENT IN AN ORGANIZED HEALTH CARE EDUCATION/TRAINING PROGRAM

## 2022-01-01 PROCEDURE — 82310 ASSAY OF CALCIUM: CPT | Performed by: EMERGENCY MEDICINE

## 2022-01-01 PROCEDURE — 99207 PR FOOT EXAM NO CHARGE: CPT | Performed by: STUDENT IN AN ORGANIZED HEALTH CARE EDUCATION/TRAINING PROGRAM

## 2022-01-01 PROCEDURE — 72131 CT LUMBAR SPINE W/O DYE: CPT | Mod: ME

## 2022-01-01 PROCEDURE — 99221 1ST HOSP IP/OBS SF/LOW 40: CPT | Performed by: REGISTERED NURSE

## 2022-01-01 PROCEDURE — C9113 INJ PANTOPRAZOLE SODIUM, VIA: HCPCS | Performed by: PHYSICIAN ASSISTANT

## 2022-01-01 PROCEDURE — 258N000003 HC RX IP 258 OP 636: Performed by: STUDENT IN AN ORGANIZED HEALTH CARE EDUCATION/TRAINING PROGRAM

## 2022-01-01 PROCEDURE — 64483 NJX AA&/STRD TFRM EPI L/S 1: CPT | Mod: LT

## 2022-01-01 PROCEDURE — 93306 TTE W/DOPPLER COMPLETE: CPT

## 2022-01-01 PROCEDURE — 83605 ASSAY OF LACTIC ACID: CPT | Performed by: INTERNAL MEDICINE

## 2022-01-01 PROCEDURE — 250N000011 HC RX IP 250 OP 636: Performed by: PHYSICIAN ASSISTANT

## 2022-01-01 PROCEDURE — 02HV33Z INSERTION OF INFUSION DEVICE INTO SUPERIOR VENA CAVA, PERCUTANEOUS APPROACH: ICD-10-PCS | Performed by: INTERNAL MEDICINE

## 2022-01-01 PROCEDURE — 99233 SBSQ HOSP IP/OBS HIGH 50: CPT | Mod: FS | Performed by: INTERNAL MEDICINE

## 2022-01-01 PROCEDURE — 85610 PROTHROMBIN TIME: CPT | Performed by: PHYSICIAN ASSISTANT

## 2022-01-01 PROCEDURE — 88112 CYTOPATH CELL ENHANCE TECH: CPT | Mod: TC | Performed by: INTERNAL MEDICINE

## 2022-01-01 PROCEDURE — 96367 TX/PROPH/DG ADDL SEQ IV INF: CPT | Performed by: STUDENT IN AN ORGANIZED HEALTH CARE EDUCATION/TRAINING PROGRAM

## 2022-01-01 PROCEDURE — 99152 MOD SED SAME PHYS/QHP 5/>YRS: CPT | Performed by: INTERNAL MEDICINE

## 2022-01-01 PROCEDURE — 99233 SBSQ HOSP IP/OBS HIGH 50: CPT | Performed by: PSYCHIATRY & NEUROLOGY

## 2022-01-01 PROCEDURE — 99239 HOSP IP/OBS DSCHRG MGMT >30: CPT | Performed by: INTERNAL MEDICINE

## 2022-01-01 PROCEDURE — C9113 INJ PANTOPRAZOLE SODIUM, VIA: HCPCS | Performed by: EMERGENCY MEDICINE

## 2022-01-01 PROCEDURE — 99291 CRITICAL CARE FIRST HOUR: CPT | Performed by: INTERNAL MEDICINE

## 2022-01-01 PROCEDURE — 97535 SELF CARE MNGMENT TRAINING: CPT | Mod: GO | Performed by: OCCUPATIONAL THERAPIST

## 2022-01-01 PROCEDURE — 97161 PT EVAL LOW COMPLEX 20 MIN: CPT | Mod: GP | Performed by: PHYSICAL THERAPIST

## 2022-01-01 PROCEDURE — 370N000003 HC ANESTHESIA WARD SERVICE

## 2022-01-01 PROCEDURE — 99233 SBSQ HOSP IP/OBS HIGH 50: CPT | Performed by: REGISTERED NURSE

## 2022-01-01 PROCEDURE — 84100 ASSAY OF PHOSPHORUS: CPT | Performed by: INTERNAL MEDICINE

## 2022-01-01 PROCEDURE — 87637 SARSCOV2&INF A&B&RSV AMP PRB: CPT | Performed by: STUDENT IN AN ORGANIZED HEALTH CARE EDUCATION/TRAINING PROGRAM

## 2022-01-01 PROCEDURE — 36415 COLL VENOUS BLD VENIPUNCTURE: CPT | Performed by: STUDENT IN AN ORGANIZED HEALTH CARE EDUCATION/TRAINING PROGRAM

## 2022-01-01 PROCEDURE — 93010 ELECTROCARDIOGRAM REPORT: CPT | Performed by: INTERNAL MEDICINE

## 2022-01-01 PROCEDURE — 99213 OFFICE O/P EST LOW 20 MIN: CPT | Performed by: NURSE PRACTITIONER

## 2022-01-01 PROCEDURE — 87205 SMEAR GRAM STAIN: CPT | Performed by: INTERNAL MEDICINE

## 2022-01-01 PROCEDURE — 83735 ASSAY OF MAGNESIUM: CPT | Performed by: FAMILY MEDICINE

## 2022-01-01 PROCEDURE — 93325 DOPPLER ECHO COLOR FLOW MAPG: CPT

## 2022-01-01 PROCEDURE — 72100 X-RAY EXAM L-S SPINE 2/3 VWS: CPT | Mod: TC | Performed by: RADIOLOGY

## 2022-01-01 PROCEDURE — 87635 SARS-COV-2 COVID-19 AMP PRB: CPT | Performed by: EMERGENCY MEDICINE

## 2022-01-01 PROCEDURE — 96375 TX/PRO/DX INJ NEW DRUG ADDON: CPT | Performed by: STUDENT IN AN ORGANIZED HEALTH CARE EDUCATION/TRAINING PROGRAM

## 2022-01-01 PROCEDURE — 80053 COMPREHEN METABOLIC PANEL: CPT | Performed by: INTERNAL MEDICINE

## 2022-01-01 PROCEDURE — 250N000013 HC RX MED GY IP 250 OP 250 PS 637: Performed by: NURSE PRACTITIONER

## 2022-01-01 PROCEDURE — 91300 COVID-19,PF,PFIZER (12+ YRS): CPT

## 2022-01-01 PROCEDURE — 92526 ORAL FUNCTION THERAPY: CPT | Mod: GN | Performed by: SPEECH-LANGUAGE PATHOLOGIST

## 2022-01-01 PROCEDURE — 99232 SBSQ HOSP IP/OBS MODERATE 35: CPT | Performed by: PSYCHIATRY & NEUROLOGY

## 2022-01-01 PROCEDURE — 5A1945Z RESPIRATORY VENTILATION, 24-96 CONSECUTIVE HOURS: ICD-10-PCS | Performed by: INTERNAL MEDICINE

## 2022-01-01 PROCEDURE — P9016 RBC LEUKOCYTES REDUCED: HCPCS | Performed by: EMERGENCY MEDICINE

## 2022-01-01 PROCEDURE — 36569 INSJ PICC 5 YR+ W/O IMAGING: CPT

## 2022-01-01 PROCEDURE — 36415 COLL VENOUS BLD VENIPUNCTURE: CPT | Mod: ZL | Performed by: STUDENT IN AN ORGANIZED HEALTH CARE EDUCATION/TRAINING PROGRAM

## 2022-01-01 PROCEDURE — 86923 COMPATIBILITY TEST ELECTRIC: CPT | Performed by: INTERNAL MEDICINE

## 2022-01-01 PROCEDURE — 250N000009 HC RX 250: Performed by: PHYSICIAN ASSISTANT

## 2022-01-01 PROCEDURE — 85025 COMPLETE CBC W/AUTO DIFF WBC: CPT | Performed by: INTERNAL MEDICINE

## 2022-01-01 PROCEDURE — 86901 BLOOD TYPING SEROLOGIC RH(D): CPT | Performed by: INTERNAL MEDICINE

## 2022-01-01 PROCEDURE — 80048 BASIC METABOLIC PNL TOTAL CA: CPT | Performed by: STUDENT IN AN ORGANIZED HEALTH CARE EDUCATION/TRAINING PROGRAM

## 2022-01-01 PROCEDURE — G0008 ADMIN INFLUENZA VIRUS VAC: HCPCS

## 2022-01-01 PROCEDURE — 93296 REM INTERROG EVL PM/IDS: CPT | Performed by: INTERNAL MEDICINE

## 2022-01-01 PROCEDURE — 3E043XZ INTRODUCTION OF VASOPRESSOR INTO CENTRAL VEIN, PERCUTANEOUS APPROACH: ICD-10-PCS | Performed by: INTERNAL MEDICINE

## 2022-01-01 PROCEDURE — 36415 COLL VENOUS BLD VENIPUNCTURE: CPT | Performed by: EMERGENCY MEDICINE

## 2022-01-01 PROCEDURE — 87040 BLOOD CULTURE FOR BACTERIA: CPT | Performed by: INTERNAL MEDICINE

## 2022-01-01 PROCEDURE — 255N000002 HC RX 255 OP 636: Performed by: PHYSICIAN ASSISTANT

## 2022-01-01 PROCEDURE — 90662 IIV NO PRSV INCREASED AG IM: CPT

## 2022-01-01 PROCEDURE — 85610 PROTHROMBIN TIME: CPT

## 2022-01-01 PROCEDURE — 84145 PROCALCITONIN (PCT): CPT | Performed by: INTERNAL MEDICINE

## 2022-01-01 PROCEDURE — 99214 OFFICE O/P EST MOD 30 MIN: CPT | Performed by: INTERNAL MEDICINE

## 2022-01-01 PROCEDURE — 93312 ECHO TRANSESOPHAGEAL: CPT | Mod: 26 | Performed by: INTERNAL MEDICINE

## 2022-01-01 PROCEDURE — 88305 TISSUE EXAM BY PATHOLOGIST: CPT | Mod: 26 | Performed by: PATHOLOGY

## 2022-01-01 PROCEDURE — 250N000013 HC RX MED GY IP 250 OP 250 PS 637: Performed by: PHYSICIAN ASSISTANT

## 2022-01-01 PROCEDURE — 250N000011 HC RX IP 250 OP 636: Performed by: EMERGENCY MEDICINE

## 2022-01-01 PROCEDURE — 99291 CRITICAL CARE FIRST HOUR: CPT | Performed by: PSYCHIATRY & NEUROLOGY

## 2022-01-01 PROCEDURE — 250N000011 HC RX IP 250 OP 636: Performed by: NURSE ANESTHETIST, CERTIFIED REGISTERED

## 2022-01-01 PROCEDURE — 83880 ASSAY OF NATRIURETIC PEPTIDE: CPT | Performed by: STUDENT IN AN ORGANIZED HEALTH CARE EDUCATION/TRAINING PROGRAM

## 2022-01-01 PROCEDURE — 250N000011 HC RX IP 250 OP 636

## 2022-01-01 PROCEDURE — 93325 DOPPLER ECHO COLOR FLOW MAPG: CPT | Mod: 26 | Performed by: INTERNAL MEDICINE

## 2022-01-01 PROCEDURE — 86481 TB AG RESPONSE T-CELL SUSP: CPT | Performed by: PHYSICIAN ASSISTANT

## 2022-01-01 PROCEDURE — 88305 TISSUE EXAM BY PATHOLOGIST: CPT | Mod: TC | Performed by: INTERNAL MEDICINE

## 2022-01-01 PROCEDURE — G1010 CDSM STANSON: HCPCS | Mod: TC

## 2022-01-01 PROCEDURE — 85025 COMPLETE CBC W/AUTO DIFF WBC: CPT | Performed by: EMERGENCY MEDICINE

## 2022-01-01 PROCEDURE — 83036 HEMOGLOBIN GLYCOSYLATED A1C: CPT | Mod: ZL | Performed by: STUDENT IN AN ORGANIZED HEALTH CARE EDUCATION/TRAINING PROGRAM

## 2022-01-01 PROCEDURE — 87185 SC STD ENZYME DETCJ PER NZM: CPT | Performed by: STUDENT IN AN ORGANIZED HEALTH CARE EDUCATION/TRAINING PROGRAM

## 2022-01-01 PROCEDURE — 84450 TRANSFERASE (AST) (SGOT): CPT | Performed by: INTERNAL MEDICINE

## 2022-01-01 PROCEDURE — 99291 CRITICAL CARE FIRST HOUR: CPT | Mod: 25 | Performed by: INTERNAL MEDICINE

## 2022-01-01 PROCEDURE — 84132 ASSAY OF SERUM POTASSIUM: CPT | Performed by: INTERNAL MEDICINE

## 2022-01-01 PROCEDURE — 36416 COLLJ CAPILLARY BLOOD SPEC: CPT

## 2022-01-01 PROCEDURE — 95816 EEG AWAKE AND DROWSY: CPT

## 2022-01-01 PROCEDURE — 85027 COMPLETE CBC AUTOMATED: CPT | Performed by: FAMILY MEDICINE

## 2022-01-01 PROCEDURE — U0003 INFECTIOUS AGENT DETECTION BY NUCLEIC ACID (DNA OR RNA); SEVERE ACUTE RESPIRATORY SYNDROME CORONAVIRUS 2 (SARS-COV-2) (CORONAVIRUS DISEASE [COVID-19]), AMPLIFIED PROBE TECHNIQUE, MAKING USE OF HIGH THROUGHPUT TECHNOLOGIES AS DESCRIBED BY CMS-2020-01-R: HCPCS | Performed by: STUDENT IN AN ORGANIZED HEALTH CARE EDUCATION/TRAINING PROGRAM

## 2022-01-01 PROCEDURE — 86901 BLOOD TYPING SEROLOGIC RH(D): CPT | Performed by: STUDENT IN AN ORGANIZED HEALTH CARE EDUCATION/TRAINING PROGRAM

## 2022-01-01 PROCEDURE — 70496 CT ANGIOGRAPHY HEAD: CPT | Mod: MA

## 2022-01-01 PROCEDURE — 85610 PROTHROMBIN TIME: CPT | Performed by: EMERGENCY MEDICINE

## 2022-01-01 PROCEDURE — 96367 TX/PROPH/DG ADDL SEQ IV INF: CPT

## 2022-01-01 PROCEDURE — 250N000012 HC RX MED GY IP 250 OP 636 PS 637: Performed by: STUDENT IN AN ORGANIZED HEALTH CARE EDUCATION/TRAINING PROGRAM

## 2022-01-01 PROCEDURE — 99215 OFFICE O/P EST HI 40 MIN: CPT | Mod: 95 | Performed by: INTERNAL MEDICINE

## 2022-01-01 PROCEDURE — 87015 SPECIMEN INFECT AGNT CONCNTJ: CPT | Performed by: INTERNAL MEDICINE

## 2022-01-01 PROCEDURE — G0463 HOSPITAL OUTPT CLINIC VISIT: HCPCS | Performed by: PHYSICIAN ASSISTANT

## 2022-01-01 PROCEDURE — 82728 ASSAY OF FERRITIN: CPT | Performed by: INTERNAL MEDICINE

## 2022-01-01 PROCEDURE — 92611 MOTION FLUOROSCOPY/SWALLOW: CPT | Mod: GN | Performed by: SPEECH-LANGUAGE PATHOLOGIST

## 2022-01-01 PROCEDURE — 99213 OFFICE O/P EST LOW 20 MIN: CPT | Performed by: PHYSICIAN ASSISTANT

## 2022-01-01 PROCEDURE — 83605 ASSAY OF LACTIC ACID: CPT | Performed by: STUDENT IN AN ORGANIZED HEALTH CARE EDUCATION/TRAINING PROGRAM

## 2022-01-01 PROCEDURE — 99207 PR NO BILLABLE SERVICE THIS VISIT: CPT | Performed by: PHYSICIAN ASSISTANT

## 2022-01-01 PROCEDURE — 96376 TX/PRO/DX INJ SAME DRUG ADON: CPT

## 2022-01-01 PROCEDURE — 82140 ASSAY OF AMMONIA: CPT | Performed by: INTERNAL MEDICINE

## 2022-01-01 PROCEDURE — 250N000015 HC RX FACTOR IP MED 636 OP 636: Performed by: EMERGENCY MEDICINE

## 2022-01-01 PROCEDURE — 99309 SBSQ NF CARE MODERATE MDM 30: CPT | Performed by: NURSE PRACTITIONER

## 2022-01-01 PROCEDURE — 72100 X-RAY EXAM L-S SPINE 2/3 VWS: CPT

## 2022-01-01 PROCEDURE — 86850 RBC ANTIBODY SCREEN: CPT | Performed by: STUDENT IN AN ORGANIZED HEALTH CARE EDUCATION/TRAINING PROGRAM

## 2022-01-01 PROCEDURE — 99207 PR NO CHARGE NURSE ONLY: CPT

## 2022-01-01 PROCEDURE — 0DJ08ZZ INSPECTION OF UPPER INTESTINAL TRACT, VIA NATURAL OR ARTIFICIAL OPENING ENDOSCOPIC: ICD-10-PCS | Performed by: INTERNAL MEDICINE

## 2022-01-01 PROCEDURE — 0W9B3ZZ DRAINAGE OF LEFT PLEURAL CAVITY, PERCUTANEOUS APPROACH: ICD-10-PCS | Performed by: RADIOLOGY

## 2022-01-01 PROCEDURE — 99232 SBSQ HOSP IP/OBS MODERATE 35: CPT | Performed by: STUDENT IN AN ORGANIZED HEALTH CARE EDUCATION/TRAINING PROGRAM

## 2022-01-01 PROCEDURE — 99231 SBSQ HOSP IP/OBS SF/LOW 25: CPT | Performed by: REGISTERED NURSE

## 2022-01-01 PROCEDURE — 84484 ASSAY OF TROPONIN QUANT: CPT | Performed by: INTERNAL MEDICINE

## 2022-01-01 PROCEDURE — 97110 THERAPEUTIC EXERCISES: CPT | Mod: GP | Performed by: PHYSICAL THERAPIST

## 2022-01-01 PROCEDURE — 82550 ASSAY OF CK (CPK): CPT | Performed by: INTERNAL MEDICINE

## 2022-01-01 PROCEDURE — 62304 MYELOGRAPHY LUMBAR INJECTION: CPT

## 2022-01-01 PROCEDURE — 0B9D8ZX DRAINAGE OF RIGHT MIDDLE LUNG LOBE, VIA NATURAL OR ARTIFICIAL OPENING ENDOSCOPIC, DIAGNOSTIC: ICD-10-PCS | Performed by: INTERNAL MEDICINE

## 2022-01-01 PROCEDURE — 97530 THERAPEUTIC ACTIVITIES: CPT | Mod: GP | Performed by: PHYSICAL THERAPIST

## 2022-01-01 PROCEDURE — 36592 COLLECT BLOOD FROM PICC: CPT | Performed by: INTERNAL MEDICINE

## 2022-01-01 PROCEDURE — 85025 COMPLETE CBC W/AUTO DIFF WBC: CPT | Performed by: PHYSICIAN ASSISTANT

## 2022-01-01 PROCEDURE — 74177 CT ABD & PELVIS W/CONTRAST: CPT | Mod: MG

## 2022-01-01 PROCEDURE — 999N000065 XR CHEST PORT 1 VIEW

## 2022-01-01 PROCEDURE — 99235 HOSP IP/OBS SAME DATE MOD 70: CPT | Performed by: FAMILY MEDICINE

## 2022-01-01 PROCEDURE — 250N000012 HC RX MED GY IP 250 OP 636 PS 637: Performed by: FAMILY MEDICINE

## 2022-01-01 PROCEDURE — 5A09357 ASSISTANCE WITH RESPIRATORY VENTILATION, LESS THAN 24 CONSECUTIVE HOURS, CONTINUOUS POSITIVE AIRWAY PRESSURE: ICD-10-PCS | Performed by: INTERNAL MEDICINE

## 2022-01-01 PROCEDURE — 99207 PR NON-BILLABLE SERV PER CHARTING: CPT | Performed by: INTERNAL MEDICINE

## 2022-01-01 PROCEDURE — 82945 GLUCOSE OTHER FLUID: CPT | Performed by: INTERNAL MEDICINE

## 2022-01-01 PROCEDURE — 83036 HEMOGLOBIN GLYCOSYLATED A1C: CPT | Performed by: INTERNAL MEDICINE

## 2022-01-01 PROCEDURE — 86923 COMPATIBILITY TEST ELECTRIC: CPT | Performed by: EMERGENCY MEDICINE

## 2022-01-01 PROCEDURE — 99215 OFFICE O/P EST HI 40 MIN: CPT | Performed by: NURSE PRACTITIONER

## 2022-01-01 PROCEDURE — 87086 URINE CULTURE/COLONY COUNT: CPT | Performed by: EMERGENCY MEDICINE

## 2022-01-01 PROCEDURE — 272N000433 HC KIT CATH IV 18 OR 20G CM, POWERGLIDE W MAX BARRIER

## 2022-01-01 PROCEDURE — 82310 ASSAY OF CALCIUM: CPT | Performed by: INTERNAL MEDICINE

## 2022-01-01 PROCEDURE — 3E0436Z INTRODUCTION OF NUTRITIONAL SUBSTANCE INTO CENTRAL VEIN, PERCUTANEOUS APPROACH: ICD-10-PCS | Performed by: INTERNAL MEDICINE

## 2022-01-01 PROCEDURE — 82947 ASSAY GLUCOSE BLOOD QUANT: CPT | Performed by: PHYSICIAN ASSISTANT

## 2022-01-01 PROCEDURE — C9803 HOPD COVID-19 SPEC COLLECT: HCPCS

## 2022-01-01 PROCEDURE — 99421 OL DIG E/M SVC 5-10 MIN: CPT | Performed by: NURSE PRACTITIONER

## 2022-01-01 PROCEDURE — 82962 GLUCOSE BLOOD TEST: CPT

## 2022-01-01 PROCEDURE — 250N000009 HC RX 250: Performed by: NURSE ANESTHETIST, CERTIFIED REGISTERED

## 2022-01-01 PROCEDURE — 82040 ASSAY OF SERUM ALBUMIN: CPT | Performed by: STUDENT IN AN ORGANIZED HEALTH CARE EDUCATION/TRAINING PROGRAM

## 2022-01-01 PROCEDURE — 87086 URINE CULTURE/COLONY COUNT: CPT | Performed by: STUDENT IN AN ORGANIZED HEALTH CARE EDUCATION/TRAINING PROGRAM

## 2022-01-01 PROCEDURE — 93294 REM INTERROG EVL PM/LDLS PM: CPT | Performed by: INTERNAL MEDICINE

## 2022-01-01 PROCEDURE — 93306 TTE W/DOPPLER COMPLETE: CPT | Mod: 26 | Performed by: INTERNAL MEDICINE

## 2022-01-01 PROCEDURE — 96375 TX/PRO/DX INJ NEW DRUG ADDON: CPT

## 2022-01-01 PROCEDURE — 83550 IRON BINDING TEST: CPT | Performed by: INTERNAL MEDICINE

## 2022-01-01 PROCEDURE — 36556 INSERT NON-TUNNEL CV CATH: CPT | Performed by: INTERNAL MEDICINE

## 2022-01-01 PROCEDURE — 76770 US EXAM ABDO BACK WALL COMP: CPT

## 2022-01-01 PROCEDURE — 999N000009 HC STATISTIC AIRWAY CARE

## 2022-01-01 PROCEDURE — 99305 1ST NF CARE MODERATE MDM 35: CPT | Performed by: INTERNAL MEDICINE

## 2022-01-01 PROCEDURE — 99285 EMERGENCY DEPT VISIT HI MDM: CPT | Mod: 25

## 2022-01-01 PROCEDURE — 82805 BLOOD GASES W/O2 SATURATION: CPT

## 2022-01-01 PROCEDURE — 96374 THER/PROPH/DIAG INJ IV PUSH: CPT | Performed by: STUDENT IN AN ORGANIZED HEALTH CARE EDUCATION/TRAINING PROGRAM

## 2022-01-01 PROCEDURE — P9603 ONE-WAY ALLOW PRORATED MILES: HCPCS | Performed by: PHYSICIAN ASSISTANT

## 2022-01-01 PROCEDURE — 84134 ASSAY OF PREALBUMIN: CPT | Performed by: STUDENT IN AN ORGANIZED HEALTH CARE EDUCATION/TRAINING PROGRAM

## 2022-01-01 PROCEDURE — 99215 OFFICE O/P EST HI 40 MIN: CPT | Performed by: STUDENT IN AN ORGANIZED HEALTH CARE EDUCATION/TRAINING PROGRAM

## 2022-01-01 PROCEDURE — 89051 BODY FLUID CELL COUNT: CPT | Performed by: INTERNAL MEDICINE

## 2022-01-01 PROCEDURE — 250N000009 HC RX 250: Performed by: EMERGENCY MEDICINE

## 2022-01-01 PROCEDURE — 99215 OFFICE O/P EST HI 40 MIN: CPT | Performed by: INTERNAL MEDICINE

## 2022-01-01 PROCEDURE — 81001 URINALYSIS AUTO W/SCOPE: CPT | Performed by: EMERGENCY MEDICINE

## 2022-01-01 PROCEDURE — 83880 ASSAY OF NATRIURETIC PEPTIDE: CPT | Performed by: NURSE PRACTITIONER

## 2022-01-01 PROCEDURE — 999N000154 HC STATISTIC RADIOLOGY XRAY, US, CT, MAR, NM

## 2022-01-01 PROCEDURE — 72132 CT LUMBAR SPINE W/DYE: CPT

## 2022-01-01 PROCEDURE — 82248 BILIRUBIN DIRECT: CPT | Performed by: STUDENT IN AN ORGANIZED HEALTH CARE EDUCATION/TRAINING PROGRAM

## 2022-01-01 PROCEDURE — 86901 BLOOD TYPING SEROLOGIC RH(D): CPT | Performed by: EMERGENCY MEDICINE

## 2022-01-01 PROCEDURE — C9803 HOPD COVID-19 SPEC COLLECT: HCPCS | Performed by: STUDENT IN AN ORGANIZED HEALTH CARE EDUCATION/TRAINING PROGRAM

## 2022-01-01 PROCEDURE — 85018 HEMOGLOBIN: CPT | Performed by: PHYSICIAN ASSISTANT

## 2022-01-01 PROCEDURE — 86850 RBC ANTIBODY SCREEN: CPT | Performed by: EMERGENCY MEDICINE

## 2022-01-01 PROCEDURE — 71045 X-RAY EXAM CHEST 1 VIEW: CPT | Mod: TC

## 2022-01-01 PROCEDURE — 82248 BILIRUBIN DIRECT: CPT | Performed by: EMERGENCY MEDICINE

## 2022-01-01 PROCEDURE — 999N000099 HC STATISTIC MODERATE SEDATION < 10 MIN: Performed by: INTERNAL MEDICINE

## 2022-01-01 PROCEDURE — 85610 PROTHROMBIN TIME: CPT | Performed by: STUDENT IN AN ORGANIZED HEALTH CARE EDUCATION/TRAINING PROGRAM

## 2022-01-01 PROCEDURE — 83690 ASSAY OF LIPASE: CPT | Performed by: INTERNAL MEDICINE

## 2022-01-01 PROCEDURE — 99232 SBSQ HOSP IP/OBS MODERATE 35: CPT | Performed by: INTERNAL MEDICINE

## 2022-01-01 PROCEDURE — 82962 GLUCOSE BLOOD TEST: CPT | Performed by: PATHOLOGY

## 2022-01-01 PROCEDURE — 250N000011 HC RX IP 250 OP 636: Performed by: FAMILY MEDICINE

## 2022-01-01 PROCEDURE — 99316 NF DSCHRG MGMT 30 MIN+: CPT | Performed by: NURSE PRACTITIONER

## 2022-01-01 PROCEDURE — 77080 DXA BONE DENSITY AXIAL: CPT | Performed by: INTERNAL MEDICINE

## 2022-01-01 PROCEDURE — 31624 DX BRONCHOSCOPE/LAVAGE: CPT | Performed by: INTERNAL MEDICINE

## 2022-01-01 PROCEDURE — G0180 MD CERTIFICATION HHA PATIENT: HCPCS | Performed by: INTERNAL MEDICINE

## 2022-01-01 PROCEDURE — 43235 EGD DIAGNOSTIC BRUSH WASH: CPT | Performed by: INTERNAL MEDICINE

## 2022-01-01 PROCEDURE — 99215 OFFICE O/P EST HI 40 MIN: CPT | Mod: 95 | Performed by: NURSE PRACTITIONER

## 2022-01-01 PROCEDURE — 86923 COMPATIBILITY TEST ELECTRIC: CPT | Performed by: PHYSICIAN ASSISTANT

## 2022-01-01 PROCEDURE — P9016 RBC LEUKOCYTES REDUCED: HCPCS | Performed by: PHYSICIAN ASSISTANT

## 2022-01-01 PROCEDURE — 96365 THER/PROPH/DIAG IV INF INIT: CPT

## 2022-01-01 PROCEDURE — 81001 URINALYSIS AUTO W/SCOPE: CPT | Performed by: STUDENT IN AN ORGANIZED HEALTH CARE EDUCATION/TRAINING PROGRAM

## 2022-01-01 PROCEDURE — 258N000003 HC RX IP 258 OP 636: Performed by: EMERGENCY MEDICINE

## 2022-01-01 PROCEDURE — 81001 URINALYSIS AUTO W/SCOPE: CPT | Performed by: INTERNAL MEDICINE

## 2022-01-01 PROCEDURE — 31500 INSERT EMERGENCY AIRWAY: CPT | Performed by: NURSE ANESTHETIST, CERTIFIED REGISTERED

## 2022-01-01 PROCEDURE — 83986 ASSAY PH BODY FLUID NOS: CPT | Performed by: INTERNAL MEDICINE

## 2022-01-01 PROCEDURE — 99285 EMERGENCY DEPT VISIT HI MDM: CPT | Performed by: STUDENT IN AN ORGANIZED HEALTH CARE EDUCATION/TRAINING PROGRAM

## 2022-01-01 PROCEDURE — 87040 BLOOD CULTURE FOR BACTERIA: CPT | Performed by: STUDENT IN AN ORGANIZED HEALTH CARE EDUCATION/TRAINING PROGRAM

## 2022-01-01 PROCEDURE — 84157 ASSAY OF PROTEIN OTHER: CPT | Performed by: INTERNAL MEDICINE

## 2022-01-01 PROCEDURE — 86481 TB AG RESPONSE T-CELL SUSP: CPT | Performed by: INTERNAL MEDICINE

## 2022-01-01 PROCEDURE — 85018 HEMOGLOBIN: CPT | Performed by: FAMILY MEDICINE

## 2022-01-01 RX ORDER — ACETAMINOPHEN 650 MG/1
650 SUPPOSITORY RECTAL EVERY 4 HOURS PRN
Status: DISCONTINUED | OUTPATIENT
Start: 2022-01-01 | End: 2022-01-01 | Stop reason: HOSPADM

## 2022-01-01 RX ORDER — DEXTROSE MONOHYDRATE 25 G/50ML
25-50 INJECTION, SOLUTION INTRAVENOUS
Status: DISCONTINUED | OUTPATIENT
Start: 2022-01-01 | End: 2022-01-01

## 2022-01-01 RX ORDER — ONDANSETRON 4 MG/1
4 TABLET, ORALLY DISINTEGRATING ORAL EVERY 6 HOURS PRN
Status: DISCONTINUED | OUTPATIENT
Start: 2022-01-01 | End: 2022-01-01 | Stop reason: HOSPADM

## 2022-01-01 RX ORDER — DEXTROSE MONOHYDRATE 100 MG/ML
INJECTION, SOLUTION INTRAVENOUS CONTINUOUS PRN
Status: DISCONTINUED | OUTPATIENT
Start: 2022-01-01 | End: 2022-01-01

## 2022-01-01 RX ORDER — GABAPENTIN 250 MG/5ML
100 SOLUTION ORAL 3 TIMES DAILY
Status: DISCONTINUED | OUTPATIENT
Start: 2022-01-01 | End: 2022-01-01

## 2022-01-01 RX ORDER — FERROUS GLUCONATE 324(38)MG
324 TABLET ORAL
Qty: 30 TABLET | Refills: 3 | Status: SHIPPED | OUTPATIENT
Start: 2022-01-01 | End: 2022-01-01

## 2022-01-01 RX ORDER — LIDOCAINE 4 G/G
3 PATCH TOPICAL
Status: DISCONTINUED | OUTPATIENT
Start: 2022-01-01 | End: 2022-01-01 | Stop reason: HOSPADM

## 2022-01-01 RX ORDER — LORAZEPAM 1 MG/1
1 TABLET ORAL
Status: DISCONTINUED | OUTPATIENT
Start: 2022-01-01 | End: 2022-01-01 | Stop reason: HOSPADM

## 2022-01-01 RX ORDER — METOPROLOL TARTRATE 1 MG/ML
2.5 INJECTION, SOLUTION INTRAVENOUS EVERY 8 HOURS SCHEDULED
Status: DISCONTINUED | OUTPATIENT
Start: 2022-01-01 | End: 2022-01-01

## 2022-01-01 RX ORDER — VITAMIN B COMPLEX
125 TABLET ORAL DAILY
Status: DISCONTINUED | OUTPATIENT
Start: 2022-01-01 | End: 2022-01-01 | Stop reason: HOSPADM

## 2022-01-01 RX ORDER — ATORVASTATIN CALCIUM 40 MG/1
40 TABLET, FILM COATED ORAL AT BEDTIME
Qty: 90 TABLET | Refills: 1 | Status: SHIPPED | OUTPATIENT
Start: 2022-01-01

## 2022-01-01 RX ORDER — OXYCODONE HYDROCHLORIDE 5 MG/1
5 TABLET ORAL EVERY 4 HOURS PRN
Status: DISCONTINUED | OUTPATIENT
Start: 2022-01-01 | End: 2022-01-01 | Stop reason: HOSPADM

## 2022-01-01 RX ORDER — IOPAMIDOL 408 MG/ML
20 INJECTION, SOLUTION INTRATHECAL ONCE
Status: COMPLETED | OUTPATIENT
Start: 2022-01-01 | End: 2022-01-01

## 2022-01-01 RX ORDER — SERTRALINE HYDROCHLORIDE 100 MG/1
TABLET, FILM COATED ORAL
Qty: 135 TABLET | Refills: 0 | Status: SHIPPED | OUTPATIENT
Start: 2022-01-01 | End: 2022-01-01

## 2022-01-01 RX ORDER — SODIUM CHLORIDE 9 MG/ML
INJECTION, SOLUTION INTRAVENOUS CONTINUOUS PRN
Status: DISCONTINUED | OUTPATIENT
Start: 2022-01-01 | End: 2022-01-01 | Stop reason: HOSPADM

## 2022-01-01 RX ORDER — MORPHINE SULFATE 2 MG/ML
1-2 INJECTION, SOLUTION INTRAMUSCULAR; INTRAVENOUS
Status: DISCONTINUED | OUTPATIENT
Start: 2022-01-01 | End: 2022-01-01 | Stop reason: HOSPADM

## 2022-01-01 RX ORDER — MINERAL OIL/HYDROPHIL PETROLAT
OINTMENT (GRAM) TOPICAL
Status: DISCONTINUED | OUTPATIENT
Start: 2022-01-01 | End: 2022-01-01

## 2022-01-01 RX ORDER — GABAPENTIN 100 MG/1
100 CAPSULE ORAL 3 TIMES DAILY
Qty: 90 CAPSULE | Refills: 1 | Status: SHIPPED | OUTPATIENT
Start: 2022-01-01 | End: 2022-01-01

## 2022-01-01 RX ORDER — SALIVA STIMULANT COMB. NO.3
1 SPRAY, NON-AEROSOL (ML) MUCOUS MEMBRANE
Status: DISCONTINUED | OUTPATIENT
Start: 2022-01-01 | End: 2022-01-01 | Stop reason: HOSPADM

## 2022-01-01 RX ORDER — UBIDECARENONE 75 MG
100 CAPSULE ORAL DAILY
Status: DISCONTINUED | OUTPATIENT
Start: 2022-01-01 | End: 2022-01-01 | Stop reason: HOSPADM

## 2022-01-01 RX ORDER — SERTRALINE HYDROCHLORIDE 100 MG/1
150 TABLET, FILM COATED ORAL DAILY
Qty: 135 TABLET | Refills: 0 | DISCHARGE
Start: 2022-01-01 | End: 2022-01-01

## 2022-01-01 RX ORDER — NICOTINE POLACRILEX 4 MG
15-30 LOZENGE BUCCAL
Status: DISCONTINUED | OUTPATIENT
Start: 2022-01-01 | End: 2022-01-01 | Stop reason: HOSPADM

## 2022-01-01 RX ORDER — DEXTROSE MONOHYDRATE 25 G/50ML
25 INJECTION, SOLUTION INTRAVENOUS ONCE
Status: DISCONTINUED | OUTPATIENT
Start: 2022-01-01 | End: 2022-01-01

## 2022-01-01 RX ORDER — NICOTINE POLACRILEX 4 MG
15-30 LOZENGE BUCCAL
Status: DISCONTINUED | OUTPATIENT
Start: 2022-01-01 | End: 2022-01-01

## 2022-01-01 RX ORDER — FUROSEMIDE 20 MG
20 TABLET ORAL DAILY
Status: DISCONTINUED | OUTPATIENT
Start: 2022-01-01 | End: 2022-01-01 | Stop reason: HOSPADM

## 2022-01-01 RX ORDER — PREDNISONE 20 MG/1
20 TABLET ORAL 2 TIMES DAILY
Qty: 10 TABLET | Refills: 0
Start: 2022-01-01 | End: 2022-01-01

## 2022-01-01 RX ORDER — CYCLOBENZAPRINE HCL 10 MG
10 TABLET ORAL 3 TIMES DAILY PRN
Qty: 30 TABLET | Refills: 0 | Status: SHIPPED | OUTPATIENT
Start: 2022-01-01 | End: 2022-01-01

## 2022-01-01 RX ORDER — CIPROFLOXACIN 500 MG/1
500 TABLET, FILM COATED ORAL EVERY 12 HOURS
DISCHARGE
Start: 2022-01-01 | End: 2022-01-01

## 2022-01-01 RX ORDER — ACETAMINOPHEN 650 MG/1
650 SUPPOSITORY RECTAL EVERY 6 HOURS PRN
Status: DISCONTINUED | OUTPATIENT
Start: 2022-01-01 | End: 2022-01-01 | Stop reason: HOSPADM

## 2022-01-01 RX ORDER — FUROSEMIDE 10 MG/ML
40 INJECTION INTRAMUSCULAR; INTRAVENOUS ONCE
Status: COMPLETED | OUTPATIENT
Start: 2022-01-01 | End: 2022-01-01

## 2022-01-01 RX ORDER — PANTOPRAZOLE SODIUM 40 MG/1
40 TABLET, DELAYED RELEASE ORAL
Status: DISCONTINUED | OUTPATIENT
Start: 2022-01-01 | End: 2022-01-01

## 2022-01-01 RX ORDER — ONDANSETRON 2 MG/ML
4 INJECTION INTRAMUSCULAR; INTRAVENOUS ONCE
Status: COMPLETED | OUTPATIENT
Start: 2022-01-01 | End: 2022-01-01

## 2022-01-01 RX ORDER — IOPAMIDOL 755 MG/ML
500 INJECTION, SOLUTION INTRAVASCULAR ONCE
Status: COMPLETED | OUTPATIENT
Start: 2022-01-01 | End: 2022-01-01

## 2022-01-01 RX ORDER — BISACODYL 10 MG
10 SUPPOSITORY, RECTAL RECTAL DAILY PRN
Status: DISCONTINUED | OUTPATIENT
Start: 2022-01-01 | End: 2022-01-01 | Stop reason: HOSPADM

## 2022-01-01 RX ORDER — POLYETHYLENE GLYCOL 3350 17 G/17G
17 POWDER, FOR SOLUTION ORAL DAILY PRN
Status: DISCONTINUED | OUTPATIENT
Start: 2022-01-01 | End: 2022-01-01

## 2022-01-01 RX ORDER — IOPAMIDOL 408 MG/ML
INJECTION, SOLUTION INTRATHECAL PRN
Status: DISCONTINUED | OUTPATIENT
Start: 2022-01-01 | End: 2022-01-01 | Stop reason: HOSPADM

## 2022-01-01 RX ORDER — FLUMAZENIL 0.1 MG/ML
0.2 INJECTION, SOLUTION INTRAVENOUS
Status: DISCONTINUED | OUTPATIENT
Start: 2022-01-01 | End: 2022-01-01

## 2022-01-01 RX ORDER — OXYCODONE HYDROCHLORIDE 5 MG/1
5 TABLET ORAL EVERY 6 HOURS PRN
Qty: 30 TABLET | Refills: 0 | Status: SHIPPED | OUTPATIENT
Start: 2022-01-01 | End: 2022-01-01

## 2022-01-01 RX ORDER — GLYCOPYRROLATE 1 MG/1
2 TABLET ORAL EVERY 4 HOURS PRN
Status: DISCONTINUED | OUTPATIENT
Start: 2022-01-01 | End: 2022-01-01

## 2022-01-01 RX ORDER — SODIUM CHLORIDE 9 MG/ML
INJECTION, SOLUTION INTRAVENOUS CONTINUOUS
Status: DISCONTINUED | OUTPATIENT
Start: 2022-01-01 | End: 2022-01-01 | Stop reason: HOSPADM

## 2022-01-01 RX ORDER — NYSTATIN 100000 U/G
OINTMENT TOPICAL 2 TIMES DAILY
Status: DISCONTINUED | OUTPATIENT
Start: 2022-01-01 | End: 2022-01-01 | Stop reason: HOSPADM

## 2022-01-01 RX ORDER — LIDOCAINE 40 MG/G
CREAM TOPICAL
Status: CANCELLED | OUTPATIENT
Start: 2022-01-01

## 2022-01-01 RX ORDER — LIDOCAINE HYDROCHLORIDE 10 MG/ML
10 INJECTION, SOLUTION EPIDURAL; INFILTRATION; INTRACAUDAL; PERINEURAL ONCE
Status: COMPLETED | OUTPATIENT
Start: 2022-01-01 | End: 2022-01-01

## 2022-01-01 RX ORDER — MORPHINE SULFATE 2 MG/ML
2 INJECTION, SOLUTION INTRAMUSCULAR; INTRAVENOUS ONCE
Status: DISCONTINUED | OUTPATIENT
Start: 2022-01-01 | End: 2022-01-01 | Stop reason: HOSPADM

## 2022-01-01 RX ORDER — LORATADINE 10 MG/1
10 TABLET ORAL DAILY
Qty: 10 TABLET | Refills: 0
Start: 2022-01-01 | End: 2022-01-01

## 2022-01-01 RX ORDER — NYSTATIN 100000 [USP'U]/G
POWDER TOPICAL 2 TIMES DAILY
COMMUNITY
End: 2022-01-01

## 2022-01-01 RX ORDER — METFORMIN HCL 500 MG
1000 TABLET, EXTENDED RELEASE 24 HR ORAL
Qty: 180 TABLET | Refills: 1 | Status: SHIPPED | OUTPATIENT
Start: 2022-01-01

## 2022-01-01 RX ORDER — NALOXONE HYDROCHLORIDE 0.4 MG/ML
0.2 INJECTION, SOLUTION INTRAMUSCULAR; INTRAVENOUS; SUBCUTANEOUS
Status: DISCONTINUED | OUTPATIENT
Start: 2022-01-01 | End: 2022-01-01

## 2022-01-01 RX ORDER — NALOXONE HYDROCHLORIDE 0.4 MG/ML
0.4 INJECTION, SOLUTION INTRAMUSCULAR; INTRAVENOUS; SUBCUTANEOUS
Status: DISCONTINUED | OUTPATIENT
Start: 2022-01-01 | End: 2022-01-01

## 2022-01-01 RX ORDER — AMOXICILLIN 250 MG
3 CAPSULE ORAL
DISCHARGE
Start: 2022-01-01 | End: 2022-01-01

## 2022-01-01 RX ORDER — ATORVASTATIN CALCIUM 40 MG/1
40 TABLET, FILM COATED ORAL AT BEDTIME
Status: DISCONTINUED | OUTPATIENT
Start: 2022-01-01 | End: 2022-01-01 | Stop reason: HOSPADM

## 2022-01-01 RX ORDER — ALBUTEROL SULFATE 90 UG/1
2 AEROSOL, METERED RESPIRATORY (INHALATION) EVERY 4 HOURS PRN
Status: DISCONTINUED | OUTPATIENT
Start: 2022-01-01 | End: 2022-01-01

## 2022-01-01 RX ORDER — ACETAMINOPHEN 325 MG/1
650 TABLET ORAL EVERY 6 HOURS PRN
Status: DISCONTINUED | OUTPATIENT
Start: 2022-01-01 | End: 2022-01-01 | Stop reason: HOSPADM

## 2022-01-01 RX ORDER — ACETAMINOPHEN 325 MG/1
650 TABLET ORAL 3 TIMES DAILY
Status: DISCONTINUED | OUTPATIENT
Start: 2022-01-01 | End: 2022-01-01 | Stop reason: HOSPADM

## 2022-01-01 RX ORDER — CARBOXYMETHYLCELLULOSE SODIUM 5 MG/ML
1-2 SOLUTION/ DROPS OPHTHALMIC
Status: DISCONTINUED | OUTPATIENT
Start: 2022-01-01 | End: 2022-01-01 | Stop reason: HOSPADM

## 2022-01-01 RX ORDER — METOPROLOL SUCCINATE 50 MG/1
50 TABLET, EXTENDED RELEASE ORAL DAILY
Status: DISCONTINUED | OUTPATIENT
Start: 2022-01-01 | End: 2022-01-01 | Stop reason: HOSPADM

## 2022-01-01 RX ORDER — GABAPENTIN 100 MG/1
200 CAPSULE ORAL 3 TIMES DAILY
Status: DISCONTINUED | OUTPATIENT
Start: 2022-01-01 | End: 2022-01-01 | Stop reason: HOSPADM

## 2022-01-01 RX ORDER — DEXMEDETOMIDINE HYDROCHLORIDE 4 UG/ML
.1-1.2 INJECTION, SOLUTION INTRAVENOUS CONTINUOUS
Status: DISCONTINUED | OUTPATIENT
Start: 2022-01-01 | End: 2022-01-01

## 2022-01-01 RX ORDER — LIDOCAINE HYDROCHLORIDE 10 MG/ML
30 INJECTION, SOLUTION EPIDURAL; INFILTRATION; INTRACAUDAL; PERINEURAL ONCE
Status: COMPLETED | OUTPATIENT
Start: 2022-01-01 | End: 2022-01-01

## 2022-01-01 RX ORDER — ONDANSETRON 4 MG/1
4 TABLET, ORALLY DISINTEGRATING ORAL EVERY 6 HOURS PRN
Status: DISCONTINUED | OUTPATIENT
Start: 2022-01-01 | End: 2022-01-01

## 2022-01-01 RX ORDER — LORAZEPAM 2 MG/ML
0.5 INJECTION INTRAMUSCULAR ONCE
Status: COMPLETED | OUTPATIENT
Start: 2022-01-01 | End: 2022-01-01

## 2022-01-01 RX ORDER — ONDANSETRON 2 MG/ML
4 INJECTION INTRAMUSCULAR; INTRAVENOUS EVERY 6 HOURS PRN
Status: DISCONTINUED | OUTPATIENT
Start: 2022-01-01 | End: 2022-01-01

## 2022-01-01 RX ORDER — IPRATROPIUM BROMIDE AND ALBUTEROL SULFATE 2.5; .5 MG/3ML; MG/3ML
3 SOLUTION RESPIRATORY (INHALATION) EVERY 4 HOURS PRN
Status: DISCONTINUED | OUTPATIENT
Start: 2022-01-01 | End: 2022-01-01

## 2022-01-01 RX ORDER — BISACODYL 10 MG
10 SUPPOSITORY, RECTAL RECTAL DAILY
Status: DISCONTINUED | OUTPATIENT
Start: 2022-01-01 | End: 2022-01-01

## 2022-01-01 RX ORDER — FLASH GLUCOSE SENSOR
1 KIT MISCELLANEOUS
Qty: 2 EACH | Refills: 6 | Status: SHIPPED | OUTPATIENT
Start: 2022-01-01 | End: 2022-01-01

## 2022-01-01 RX ORDER — METOPROLOL TARTRATE 25 MG/1
25 TABLET, FILM COATED ORAL 2 TIMES DAILY
Status: DISCONTINUED | OUTPATIENT
Start: 2022-01-01 | End: 2022-01-01

## 2022-01-01 RX ORDER — NALOXONE HYDROCHLORIDE 0.4 MG/ML
0.4 INJECTION, SOLUTION INTRAMUSCULAR; INTRAVENOUS; SUBCUTANEOUS
Status: DISCONTINUED | OUTPATIENT
Start: 2022-01-01 | End: 2022-01-01 | Stop reason: HOSPADM

## 2022-01-01 RX ORDER — NOREPINEPHRINE BITARTRATE 0.02 MG/ML
.01-.6 INJECTION, SOLUTION INTRAVENOUS CONTINUOUS
Status: DISCONTINUED | OUTPATIENT
Start: 2022-01-01 | End: 2022-01-01

## 2022-01-01 RX ORDER — ATROPINE SULFATE 10 MG/ML
2 SOLUTION/ DROPS OPHTHALMIC EVERY 4 HOURS PRN
Status: DISCONTINUED | OUTPATIENT
Start: 2022-01-01 | End: 2022-01-01 | Stop reason: HOSPADM

## 2022-01-01 RX ORDER — MEROPENEM 1 G/1
1 INJECTION, POWDER, FOR SOLUTION INTRAVENOUS EVERY 8 HOURS
Status: DISCONTINUED | OUTPATIENT
Start: 2022-01-01 | End: 2022-01-01

## 2022-01-01 RX ORDER — AMOXICILLIN 250 MG
2 CAPSULE ORAL 2 TIMES DAILY
COMMUNITY
End: 2022-01-01

## 2022-01-01 RX ORDER — MEROPENEM 1 G/1
1 INJECTION, POWDER, FOR SOLUTION INTRAVENOUS EVERY 12 HOURS
Status: DISCONTINUED | OUTPATIENT
Start: 2022-01-01 | End: 2022-01-01

## 2022-01-01 RX ORDER — LORAZEPAM 2 MG/ML
.5-1 INJECTION INTRAMUSCULAR
Status: DISCONTINUED | OUTPATIENT
Start: 2022-01-01 | End: 2022-01-01 | Stop reason: HOSPADM

## 2022-01-01 RX ORDER — VANCOMYCIN HYDROCHLORIDE 1 G/200ML
1000 INJECTION, SOLUTION INTRAVENOUS EVERY 12 HOURS
Status: DISCONTINUED | OUTPATIENT
Start: 2022-01-01 | End: 2022-01-01

## 2022-01-01 RX ORDER — HYDROMORPHONE HYDROCHLORIDE 1 MG/ML
.3-.5 INJECTION, SOLUTION INTRAMUSCULAR; INTRAVENOUS; SUBCUTANEOUS
Status: DISCONTINUED | OUTPATIENT
Start: 2022-01-01 | End: 2022-01-01

## 2022-01-01 RX ORDER — FLUTICASONE PROPIONATE 50 MCG
1 SPRAY, SUSPENSION (ML) NASAL DAILY
Qty: 16 G | Refills: 4 | Status: ON HOLD | OUTPATIENT
Start: 2022-01-01 | End: 2022-01-01

## 2022-01-01 RX ORDER — FUROSEMIDE 10 MG/ML
20 INJECTION INTRAMUSCULAR; INTRAVENOUS EVERY 8 HOURS
Status: COMPLETED | OUTPATIENT
Start: 2022-01-01 | End: 2022-01-01

## 2022-01-01 RX ORDER — SIMETHICONE 40MG/0.6ML
133 SUSPENSION, DROPS(FINAL DOSAGE FORM)(ML) ORAL
Status: DISCONTINUED | OUTPATIENT
Start: 2022-01-01 | End: 2022-01-01

## 2022-01-01 RX ORDER — ACETAMINOPHEN 500 MG
1000 TABLET ORAL 3 TIMES DAILY
Status: DISCONTINUED | OUTPATIENT
Start: 2022-01-01 | End: 2022-01-01 | Stop reason: HOSPADM

## 2022-01-01 RX ORDER — AZITHROMYCIN 500 MG/1
500 INJECTION, POWDER, LYOPHILIZED, FOR SOLUTION INTRAVENOUS EVERY 24 HOURS
Status: DISCONTINUED | OUTPATIENT
Start: 2022-01-01 | End: 2022-01-01

## 2022-01-01 RX ORDER — LIDOCAINE 4 G/G
1 PATCH TOPICAL
Status: DISCONTINUED | OUTPATIENT
Start: 2022-01-01 | End: 2022-01-01

## 2022-01-01 RX ORDER — ALBUTEROL SULFATE 0.83 MG/ML
SOLUTION RESPIRATORY (INHALATION)
Status: COMPLETED
Start: 2022-01-01 | End: 2022-01-01

## 2022-01-01 RX ORDER — FUROSEMIDE 10 MG/ML
40 INJECTION INTRAMUSCULAR; INTRAVENOUS
Status: DISCONTINUED | OUTPATIENT
Start: 2022-01-01 | End: 2022-01-01

## 2022-01-01 RX ORDER — IOPAMIDOL 755 MG/ML
75 INJECTION, SOLUTION INTRAVASCULAR ONCE
Status: COMPLETED | OUTPATIENT
Start: 2022-01-01 | End: 2022-01-01

## 2022-01-01 RX ORDER — OXYCODONE HYDROCHLORIDE 5 MG/1
5 TABLET ORAL EVERY 6 HOURS PRN
Qty: 30 TABLET | Refills: 0 | Status: SHIPPED | OUTPATIENT
Start: 2022-01-01

## 2022-01-01 RX ORDER — BARIUM SULFATE 400 MG/ML
SUSPENSION ORAL ONCE
Status: COMPLETED | OUTPATIENT
Start: 2022-01-01 | End: 2022-01-01

## 2022-01-01 RX ORDER — DEXTROSE MONOHYDRATE 25 G/50ML
25-50 INJECTION, SOLUTION INTRAVENOUS
Status: DISCONTINUED | OUTPATIENT
Start: 2022-01-01 | End: 2022-01-01 | Stop reason: HOSPADM

## 2022-01-01 RX ORDER — GABAPENTIN 100 MG/1
100 CAPSULE ORAL 3 TIMES DAILY
Status: DISCONTINUED | OUTPATIENT
Start: 2022-01-01 | End: 2022-01-01

## 2022-01-01 RX ORDER — FENTANYL CITRATE 50 UG/ML
50 INJECTION, SOLUTION INTRAMUSCULAR; INTRAVENOUS
Status: DISCONTINUED | OUTPATIENT
Start: 2022-01-01 | End: 2022-01-01

## 2022-01-01 RX ORDER — CIPROFLOXACIN 500 MG/1
500 TABLET, FILM COATED ORAL EVERY 12 HOURS SCHEDULED
Status: DISCONTINUED | OUTPATIENT
Start: 2022-01-01 | End: 2022-01-01 | Stop reason: HOSPADM

## 2022-01-01 RX ORDER — GLIPIZIDE 10 MG/1
TABLET ORAL 3 TIMES DAILY PRN
Status: DISCONTINUED | OUTPATIENT
Start: 2022-01-01 | End: 2022-01-01 | Stop reason: HOSPADM

## 2022-01-01 RX ORDER — FLASH GLUCOSE SENSOR
1 KIT MISCELLANEOUS
Qty: 2 EACH | Refills: 6 | Status: SHIPPED | OUTPATIENT
Start: 2022-01-01

## 2022-01-01 RX ORDER — OMEGA-3 FATTY ACIDS/FISH OIL 300-1000MG
400 CAPSULE ORAL EVERY 4 HOURS PRN
Status: ON HOLD | COMMUNITY
End: 2022-01-01

## 2022-01-01 RX ORDER — HYDROMORPHONE HYDROCHLORIDE 1 MG/ML
0.5 INJECTION, SOLUTION INTRAMUSCULAR; INTRAVENOUS; SUBCUTANEOUS ONCE
Status: COMPLETED | OUTPATIENT
Start: 2022-01-01 | End: 2022-01-01

## 2022-01-01 RX ORDER — FERROUS GLUCONATE 324(38)MG
324 TABLET ORAL
Status: DISCONTINUED | OUTPATIENT
Start: 2022-01-01 | End: 2022-01-01 | Stop reason: HOSPADM

## 2022-01-01 RX ORDER — LIDOCAINE HYDROCHLORIDE 10 MG/ML
INJECTION, SOLUTION EPIDURAL; INFILTRATION; INTRACAUDAL; PERINEURAL PRN
Status: DISCONTINUED | OUTPATIENT
Start: 2022-01-01 | End: 2022-01-01 | Stop reason: HOSPADM

## 2022-01-01 RX ORDER — METFORMIN HCL 500 MG
1000 TABLET, EXTENDED RELEASE 24 HR ORAL
COMMUNITY
End: 2022-01-01

## 2022-01-01 RX ORDER — FUROSEMIDE 10 MG/ML
20 INJECTION INTRAMUSCULAR; INTRAVENOUS EVERY 8 HOURS
Status: DISCONTINUED | OUTPATIENT
Start: 2022-01-01 | End: 2022-01-01

## 2022-01-01 RX ORDER — METOPROLOL SUCCINATE 25 MG/1
50 TABLET, EXTENDED RELEASE ORAL DAILY
Qty: 180 TABLET | Refills: 1 | Status: SHIPPED | OUTPATIENT
Start: 2022-01-01

## 2022-01-01 RX ORDER — OXYCODONE HYDROCHLORIDE 5 MG/1
2.5 TABLET ORAL EVERY 4 HOURS PRN
Qty: 10 TABLET | Refills: 0 | Status: SHIPPED | OUTPATIENT
Start: 2022-01-01 | End: 2022-01-01 | Stop reason: ALTCHOICE

## 2022-01-01 RX ORDER — DEXTROSE MONOHYDRATE 100 MG/ML
INJECTION, SOLUTION INTRAVENOUS CONTINUOUS PRN
Status: DISCONTINUED | OUTPATIENT
Start: 2022-01-01 | End: 2022-01-01 | Stop reason: HOSPADM

## 2022-01-01 RX ORDER — NITROGLYCERIN 0.4 MG/1
0.4 TABLET SUBLINGUAL EVERY 5 MIN PRN
Status: DISCONTINUED | OUTPATIENT
Start: 2022-01-01 | End: 2022-01-01 | Stop reason: HOSPADM

## 2022-01-01 RX ORDER — ONDANSETRON 2 MG/ML
4 INJECTION INTRAMUSCULAR; INTRAVENOUS EVERY 6 HOURS PRN
Status: DISCONTINUED | OUTPATIENT
Start: 2022-01-01 | End: 2022-01-01 | Stop reason: HOSPADM

## 2022-01-01 RX ORDER — CHLORHEXIDINE GLUCONATE ORAL RINSE 1.2 MG/ML
15 SOLUTION DENTAL EVERY 12 HOURS
Status: DISCONTINUED | OUTPATIENT
Start: 2022-01-01 | End: 2022-01-01

## 2022-01-01 RX ORDER — SODIUM CHLORIDE, SODIUM LACTATE, POTASSIUM CHLORIDE, CALCIUM CHLORIDE 600; 310; 30; 20 MG/100ML; MG/100ML; MG/100ML; MG/100ML
INJECTION, SOLUTION INTRAVENOUS CONTINUOUS
Status: DISCONTINUED | OUTPATIENT
Start: 2022-01-01 | End: 2022-01-01

## 2022-01-01 RX ORDER — LANOLIN ALCOHOL/MO/W.PET/CERES
3 CREAM (GRAM) TOPICAL
Status: DISCONTINUED | OUTPATIENT
Start: 2022-01-01 | End: 2022-01-01

## 2022-01-01 RX ORDER — AMOXICILLIN 250 MG
1 CAPSULE ORAL 2 TIMES DAILY PRN
Status: DISCONTINUED | OUTPATIENT
Start: 2022-01-01 | End: 2022-01-01

## 2022-01-01 RX ORDER — CARBOXYMETHYLCELLULOSE SODIUM 5 MG/ML
1 SOLUTION/ DROPS OPHTHALMIC
Status: DISCONTINUED | OUTPATIENT
Start: 2022-01-01 | End: 2022-01-01

## 2022-01-01 RX ORDER — ACETAMINOPHEN 500 MG
1000 TABLET ORAL 3 TIMES DAILY
DISCHARGE
Start: 2022-01-01 | End: 2022-01-01

## 2022-01-01 RX ORDER — NALOXONE HYDROCHLORIDE 0.4 MG/ML
0.2 INJECTION, SOLUTION INTRAMUSCULAR; INTRAVENOUS; SUBCUTANEOUS
Status: DISCONTINUED | OUTPATIENT
Start: 2022-01-01 | End: 2022-01-01 | Stop reason: HOSPADM

## 2022-01-01 RX ORDER — CEFTRIAXONE 2 G/1
2 INJECTION, POWDER, FOR SOLUTION INTRAMUSCULAR; INTRAVENOUS EVERY 24 HOURS
Status: DISCONTINUED | OUTPATIENT
Start: 2022-01-01 | End: 2022-01-01

## 2022-01-01 RX ORDER — VANCOMYCIN HYDROCHLORIDE 1 G/200ML
1000 INJECTION, SOLUTION INTRAVENOUS EVERY 24 HOURS
Status: DISCONTINUED | OUTPATIENT
Start: 2022-01-01 | End: 2022-01-01 | Stop reason: HOSPADM

## 2022-01-01 RX ORDER — HYDROMORPHONE HCL IN WATER/PF 6 MG/30 ML
0.2 PATIENT CONTROLLED ANALGESIA SYRINGE INTRAVENOUS
Status: DISCONTINUED | OUTPATIENT
Start: 2022-01-01 | End: 2022-01-01 | Stop reason: HOSPADM

## 2022-01-01 RX ORDER — DEXAMETHASONE SODIUM PHOSPHATE 10 MG/ML
INJECTION INTRAMUSCULAR; INTRAVENOUS PRN
Status: DISCONTINUED | OUTPATIENT
Start: 2022-01-01 | End: 2022-01-01 | Stop reason: HOSPADM

## 2022-01-01 RX ORDER — FENTANYL CITRATE 50 UG/ML
50-100 INJECTION, SOLUTION INTRAMUSCULAR; INTRAVENOUS EVERY 5 MIN PRN
Status: DISCONTINUED | OUTPATIENT
Start: 2022-01-01 | End: 2022-01-01

## 2022-01-01 RX ORDER — LIDOCAINE 40 MG/G
CREAM TOPICAL
Status: ACTIVE | OUTPATIENT
Start: 2022-01-01 | End: 2022-01-01

## 2022-01-01 RX ORDER — ALBUTEROL SULFATE 90 UG/1
2 AEROSOL, METERED RESPIRATORY (INHALATION) EVERY 4 HOURS PRN
Status: DISCONTINUED | OUTPATIENT
Start: 2022-01-01 | End: 2022-01-01 | Stop reason: HOSPADM

## 2022-01-01 RX ORDER — ROPIVACAINE IN 0.9% SOD CHL/PF 0.1 %
.03-.125 PLASTIC BAG, INJECTION (ML) EPIDURAL CONTINUOUS
Status: DISCONTINUED | OUTPATIENT
Start: 2022-01-01 | End: 2022-01-01

## 2022-01-01 RX ORDER — OXYCODONE HYDROCHLORIDE 5 MG/1
5 TABLET ORAL EVERY 4 HOURS PRN
Qty: 30 TABLET | Refills: 0 | Status: SHIPPED | OUTPATIENT
Start: 2022-01-01 | End: 2022-01-01

## 2022-01-01 RX ORDER — POLYETHYLENE GLYCOL 3350 17 G/17G
17 POWDER, FOR SOLUTION ORAL DAILY
Qty: 510 G | DISCHARGE
Start: 2022-01-01 | End: 2022-01-01

## 2022-01-01 RX ORDER — PANTOPRAZOLE SODIUM 40 MG/1
40 TABLET, DELAYED RELEASE ORAL DAILY
Start: 2022-01-01 | End: 2022-01-01

## 2022-01-01 RX ORDER — SERTRALINE HYDROCHLORIDE 100 MG/1
100 TABLET, FILM COATED ORAL DAILY
Status: DISCONTINUED | OUTPATIENT
Start: 2022-01-01 | End: 2022-01-01

## 2022-01-01 RX ORDER — AZITHROMYCIN 500 MG/5ML
500 INJECTION, POWDER, LYOPHILIZED, FOR SOLUTION INTRAVENOUS DAILY
Status: DISCONTINUED | OUTPATIENT
Start: 2022-01-01 | End: 2022-01-01

## 2022-01-01 RX ORDER — EPINEPHRINE 1 MG/ML
0.1 INJECTION, SOLUTION, CONCENTRATE INTRAVENOUS
Status: DISCONTINUED | OUTPATIENT
Start: 2022-01-01 | End: 2022-01-01

## 2022-01-01 RX ORDER — ALBUTEROL SULFATE 0.83 MG/ML
2.5 SOLUTION RESPIRATORY (INHALATION)
Status: DISCONTINUED | OUTPATIENT
Start: 2022-01-01 | End: 2022-01-01 | Stop reason: HOSPADM

## 2022-01-01 RX ORDER — CEFEPIME HYDROCHLORIDE 2 G/1
2 INJECTION, POWDER, FOR SOLUTION INTRAVENOUS ONCE
Status: COMPLETED | OUTPATIENT
Start: 2022-01-01 | End: 2022-01-01

## 2022-01-01 RX ORDER — POLYETHYLENE GLYCOL 3350 17 G/17G
17 POWDER, FOR SOLUTION ORAL DAILY PRN
Qty: 510 G
Start: 2022-01-01 | End: 2022-01-01

## 2022-01-01 RX ORDER — DIAZEPAM 10 MG/2ML
5 INJECTION, SOLUTION INTRAMUSCULAR; INTRAVENOUS EVERY 6 HOURS PRN
Status: DISCONTINUED | OUTPATIENT
Start: 2022-01-01 | End: 2022-01-01

## 2022-01-01 RX ORDER — ALBUMIN (HUMAN) 12.5 G/50ML
25 SOLUTION INTRAVENOUS ONCE
Status: COMPLETED | OUTPATIENT
Start: 2022-01-01 | End: 2022-01-01

## 2022-01-01 RX ORDER — VANCOMYCIN HYDROCHLORIDE 1 G/200ML
1000 INJECTION, SOLUTION INTRAVENOUS EVERY 24 HOURS
Status: DISCONTINUED | OUTPATIENT
Start: 2022-01-01 | End: 2022-01-01

## 2022-01-01 RX ORDER — NALOXONE HYDROCHLORIDE 0.4 MG/ML
0.1 INJECTION, SOLUTION INTRAMUSCULAR; INTRAVENOUS; SUBCUTANEOUS
Status: DISCONTINUED | OUTPATIENT
Start: 2022-01-01 | End: 2022-01-01 | Stop reason: HOSPADM

## 2022-01-01 RX ORDER — FLUTICASONE PROPIONATE 50 MCG
1 SPRAY, SUSPENSION (ML) NASAL DAILY
Status: DISCONTINUED | OUTPATIENT
Start: 2022-01-01 | End: 2022-01-01 | Stop reason: HOSPADM

## 2022-01-01 RX ORDER — TRAMADOL HYDROCHLORIDE 50 MG/1
TABLET ORAL
Status: ON HOLD | COMMUNITY
Start: 2022-01-01 | End: 2022-01-01

## 2022-01-01 RX ORDER — PANTOPRAZOLE SODIUM 40 MG/1
TABLET, DELAYED RELEASE ORAL
Start: 2022-01-01 | End: 2022-01-01

## 2022-01-01 RX ORDER — IPRATROPIUM BROMIDE 42 UG/1
2 SPRAY, METERED NASAL 4 TIMES DAILY PRN
Status: DISCONTINUED | OUTPATIENT
Start: 2022-01-01 | End: 2022-01-01 | Stop reason: HOSPADM

## 2022-01-01 RX ORDER — SERTRALINE HYDROCHLORIDE 100 MG/1
TABLET, FILM COATED ORAL
Qty: 135 TABLET | Refills: 0 | Status: ON HOLD | OUTPATIENT
Start: 2022-01-01 | End: 2022-01-01

## 2022-01-01 RX ORDER — ACETAMINOPHEN 500 MG
500 TABLET ORAL 3 TIMES DAILY
COMMUNITY

## 2022-01-01 RX ORDER — ACETAMINOPHEN 325 MG/1
650 TABLET ORAL EVERY 4 HOURS PRN
Status: DISCONTINUED | OUTPATIENT
Start: 2022-01-01 | End: 2022-01-01 | Stop reason: HOSPADM

## 2022-01-01 RX ORDER — TRAMADOL HYDROCHLORIDE 50 MG/1
50 TABLET ORAL EVERY 6 HOURS PRN
Qty: 20 TABLET | Refills: 0 | Status: SHIPPED | OUTPATIENT
Start: 2022-01-01 | End: 2022-01-01

## 2022-01-01 RX ORDER — METFORMIN HCL 500 MG
TABLET, EXTENDED RELEASE 24 HR ORAL
Qty: 180 TABLET | Refills: 1 | Status: SHIPPED | OUTPATIENT
Start: 2022-01-01 | End: 2022-01-01 | Stop reason: DRUGHIGH

## 2022-01-01 RX ORDER — PROPOFOL 10 MG/ML
INJECTION, EMULSION INTRAVENOUS
Status: DISCONTINUED | OUTPATIENT
Start: 2022-01-01 | End: 2022-01-01

## 2022-01-01 RX ORDER — FUROSEMIDE 10 MG/ML
20 INJECTION INTRAMUSCULAR; INTRAVENOUS DAILY
Status: DISCONTINUED | OUTPATIENT
Start: 2022-01-01 | End: 2022-01-01 | Stop reason: HOSPADM

## 2022-01-01 RX ORDER — SODIUM CHLORIDE 9 MG/ML
INJECTION, SOLUTION INTRAVENOUS CONTINUOUS
Status: DISCONTINUED | OUTPATIENT
Start: 2022-01-01 | End: 2022-01-01

## 2022-01-01 RX ORDER — CEFTRIAXONE SODIUM 2 G/50ML
2 INJECTION, SOLUTION INTRAVENOUS EVERY 24 HOURS
Status: DISCONTINUED | OUTPATIENT
Start: 2022-01-01 | End: 2022-01-01 | Stop reason: HOSPADM

## 2022-01-01 RX ORDER — GLYCOPYRROLATE 0.2 MG/ML
0.2 INJECTION, SOLUTION INTRAMUSCULAR; INTRAVENOUS EVERY 4 HOURS PRN
Status: DISCONTINUED | OUTPATIENT
Start: 2022-01-01 | End: 2022-01-01

## 2022-01-01 RX ORDER — FUROSEMIDE 40 MG
20 TABLET ORAL DAILY
Qty: 180 TABLET | Refills: 1 | DISCHARGE
Start: 2022-01-01

## 2022-01-01 RX ORDER — LIDOCAINE 40 MG/G
CREAM TOPICAL
Status: DISCONTINUED | OUTPATIENT
Start: 2022-01-01 | End: 2022-01-01 | Stop reason: HOSPADM

## 2022-01-01 RX ORDER — BISACODYL 10 MG
10 SUPPOSITORY, RECTAL RECTAL DAILY PRN
DISCHARGE
Start: 2022-01-01 | End: 2022-01-01

## 2022-01-01 RX ORDER — ATROPINE SULFATE 0.1 MG/ML
1 INJECTION INTRAVENOUS
Status: DISCONTINUED | OUTPATIENT
Start: 2022-01-01 | End: 2022-01-01

## 2022-01-01 RX ORDER — HEPARIN SODIUM 5000 [USP'U]/.5ML
5000 INJECTION, SOLUTION INTRAVENOUS; SUBCUTANEOUS EVERY 12 HOURS
Status: DISCONTINUED | OUTPATIENT
Start: 2022-01-01 | End: 2022-01-01

## 2022-01-01 RX ORDER — PROPOFOL 10 MG/ML
5-75 INJECTION, EMULSION INTRAVENOUS CONTINUOUS
Status: DISCONTINUED | OUTPATIENT
Start: 2022-01-01 | End: 2022-01-01

## 2022-01-01 RX ORDER — HUMAN INSULIN 100 [IU]/ML
INJECTION, SOLUTION SUBCUTANEOUS
Refills: 0 | COMMUNITY
Start: 2022-01-01

## 2022-01-01 RX ORDER — AMOXICILLIN 250 MG
2 CAPSULE ORAL 2 TIMES DAILY PRN
Status: DISCONTINUED | OUTPATIENT
Start: 2022-01-01 | End: 2022-01-01

## 2022-01-01 RX ORDER — SERTRALINE HYDROCHLORIDE 100 MG/1
TABLET, FILM COATED ORAL
Qty: 135 TABLET | Refills: 0 | Status: SHIPPED | OUTPATIENT
Start: 2022-01-01

## 2022-01-01 RX ORDER — PROCHLORPERAZINE MALEATE 5 MG
5 TABLET ORAL EVERY 6 HOURS PRN
Status: DISCONTINUED | OUTPATIENT
Start: 2022-01-01 | End: 2022-01-01

## 2022-01-01 RX ORDER — LORAZEPAM 2 MG/ML
1 CONCENTRATE ORAL
Status: DISCONTINUED | OUTPATIENT
Start: 2022-01-01 | End: 2022-01-01 | Stop reason: HOSPADM

## 2022-01-01 RX ORDER — GABAPENTIN 100 MG/1
300 CAPSULE ORAL 3 TIMES DAILY
Qty: 270 CAPSULE | Refills: 1 | Status: SHIPPED | OUTPATIENT
Start: 2022-01-01

## 2022-01-01 RX ORDER — NOREPINEPHRINE BITARTRATE 0.02 MG/ML
INJECTION, SOLUTION INTRAVENOUS
Status: COMPLETED
Start: 2022-01-01 | End: 2022-01-01

## 2022-01-01 RX ORDER — ZINC GLUCONATE 50 MG
50 TABLET ORAL DAILY
COMMUNITY

## 2022-01-01 RX ORDER — LIDOCAINE 40 MG/G
CREAM TOPICAL
Status: DISCONTINUED | OUTPATIENT
Start: 2022-01-01 | End: 2022-01-01

## 2022-01-01 RX ORDER — PROCHLORPERAZINE 25 MG
12.5 SUPPOSITORY, RECTAL RECTAL EVERY 12 HOURS PRN
Status: DISCONTINUED | OUTPATIENT
Start: 2022-01-01 | End: 2022-01-01

## 2022-01-01 RX ORDER — LORAZEPAM 2 MG/ML
0.5 INJECTION INTRAMUSCULAR EVERY 6 HOURS PRN
Status: DISCONTINUED | OUTPATIENT
Start: 2022-01-01 | End: 2022-01-01

## 2022-01-01 RX ORDER — DIPHENHYDRAMINE HYDROCHLORIDE 50 MG/ML
25-50 INJECTION INTRAMUSCULAR; INTRAVENOUS
Status: DISCONTINUED | OUTPATIENT
Start: 2022-01-01 | End: 2022-01-01

## 2022-01-01 RX ORDER — SENNOSIDES 8.6 MG
650 CAPSULE ORAL 3 TIMES DAILY
COMMUNITY

## 2022-01-01 RX ORDER — DEXAMETHASONE SODIUM PHOSPHATE 4 MG/ML
4 INJECTION, SOLUTION INTRA-ARTICULAR; INTRALESIONAL; INTRAMUSCULAR; INTRAVENOUS; SOFT TISSUE EVERY 6 HOURS
Status: COMPLETED | OUTPATIENT
Start: 2022-01-01 | End: 2022-01-01

## 2022-01-01 RX ORDER — PROPOFOL 10 MG/ML
INJECTION, EMULSION INTRAVENOUS
Status: COMPLETED
Start: 2022-01-01 | End: 2022-01-01

## 2022-01-01 RX ORDER — AMOXICILLIN 250 MG
3 CAPSULE ORAL
Status: DISCONTINUED | OUTPATIENT
Start: 2022-01-01 | End: 2022-01-01 | Stop reason: HOSPADM

## 2022-01-01 RX ORDER — ALBUMIN (HUMAN) 12.5 G/50ML
SOLUTION INTRAVENOUS
Status: DISCONTINUED
Start: 2022-01-01 | End: 2022-01-01 | Stop reason: HOSPADM

## 2022-01-01 RX ORDER — POTASSIUM CHLORIDE 29.8 MG/ML
20 INJECTION INTRAVENOUS
Status: DISCONTINUED | OUTPATIENT
Start: 2022-01-01 | End: 2022-01-01

## 2022-01-01 RX ADMIN — FERROUS GLUCONATE 324 MG: 324 TABLET ORAL at 09:08

## 2022-01-01 RX ADMIN — ALBUTEROL SULFATE 2.5 MG: 2.5 SOLUTION RESPIRATORY (INHALATION) at 19:41

## 2022-01-01 RX ADMIN — HEPARIN SODIUM 5000 UNITS: 5000 INJECTION, SOLUTION INTRAVENOUS; SUBCUTANEOUS at 09:44

## 2022-01-01 RX ADMIN — ALBUTEROL SULFATE 2.5 MG: 2.5 SOLUTION RESPIRATORY (INHALATION) at 17:14

## 2022-01-01 RX ADMIN — POTASSIUM CHLORIDE 20 MEQ: 29.8 INJECTION, SOLUTION INTRAVENOUS at 09:25

## 2022-01-01 RX ADMIN — PANTOPRAZOLE SODIUM 40 MG: 40 INJECTION, POWDER, FOR SOLUTION INTRAVENOUS at 16:01

## 2022-01-01 RX ADMIN — ATORVASTATIN CALCIUM 40 MG: 40 TABLET, FILM COATED ORAL at 21:05

## 2022-01-01 RX ADMIN — INSULIN GLARGINE 25 UNITS: 100 INJECTION, SOLUTION SUBCUTANEOUS at 09:31

## 2022-01-01 RX ADMIN — GABAPENTIN 200 MG: 100 CAPSULE ORAL at 13:49

## 2022-01-01 RX ADMIN — METOPROLOL SUCCINATE 50 MG: 50 TABLET, EXTENDED RELEASE ORAL at 08:49

## 2022-01-01 RX ADMIN — SODIUM CHLORIDE, POTASSIUM CHLORIDE, SODIUM LACTATE AND CALCIUM CHLORIDE 500 ML: 600; 310; 30; 20 INJECTION, SOLUTION INTRAVENOUS at 00:30

## 2022-01-01 RX ADMIN — OXYCODONE HYDROCHLORIDE 5 MG: 5 TABLET ORAL at 21:18

## 2022-01-01 RX ADMIN — METOPROLOL TARTRATE 2.5 MG: 5 INJECTION INTRAVENOUS at 13:05

## 2022-01-01 RX ADMIN — HEPARIN SODIUM 5000 UNITS: 5000 INJECTION, SOLUTION INTRAVENOUS; SUBCUTANEOUS at 08:01

## 2022-01-01 RX ADMIN — HYDROMORPHONE HYDROCHLORIDE 0.5 MG: 1 INJECTION, SOLUTION INTRAMUSCULAR; INTRAVENOUS; SUBCUTANEOUS at 09:50

## 2022-01-01 RX ADMIN — METOPROLOL TARTRATE 25 MG: 25 TABLET, FILM COATED ORAL at 13:34

## 2022-01-01 RX ADMIN — ACETAMINOPHEN 650 MG: 325 TABLET ORAL at 09:39

## 2022-01-01 RX ADMIN — SODIUM CHLORIDE, POTASSIUM CHLORIDE, SODIUM LACTATE AND CALCIUM CHLORIDE 500 ML: 600; 310; 30; 20 INJECTION, SOLUTION INTRAVENOUS at 12:57

## 2022-01-01 RX ADMIN — MAGNESIUM HYDROXIDE 30 ML: 400 SUSPENSION ORAL at 11:11

## 2022-01-01 RX ADMIN — INSULIN ASPART 5 UNITS: 100 INJECTION, SOLUTION INTRAVENOUS; SUBCUTANEOUS at 01:27

## 2022-01-01 RX ADMIN — HEPARIN SODIUM 5000 UNITS: 5000 INJECTION, SOLUTION INTRAVENOUS; SUBCUTANEOUS at 19:50

## 2022-01-01 RX ADMIN — ALBUTEROL SULFATE 2.5 MG: 2.5 SOLUTION RESPIRATORY (INHALATION) at 07:54

## 2022-01-01 RX ADMIN — IOPAMIDOL 75 ML: 755 INJECTION, SOLUTION INTRAVENOUS at 12:41

## 2022-01-01 RX ADMIN — MORPHINE SULFATE 2 MG: 2 INJECTION, SOLUTION INTRAMUSCULAR; INTRAVENOUS at 12:15

## 2022-01-01 RX ADMIN — ALBUTEROL SULFATE 2.5 MG: 2.5 SOLUTION RESPIRATORY (INHALATION) at 07:08

## 2022-01-01 RX ADMIN — ACETAMINOPHEN 1000 MG: 500 TABLET, FILM COATED ORAL at 15:33

## 2022-01-01 RX ADMIN — ALBUTEROL SULFATE 2.5 MG: 2.5 SOLUTION RESPIRATORY (INHALATION) at 12:31

## 2022-01-01 RX ADMIN — GABAPENTIN 100 MG: 250 SOLUTION ORAL at 09:54

## 2022-01-01 RX ADMIN — VITAM B12 100 MCG: 100 TAB at 09:09

## 2022-01-01 RX ADMIN — Medication 0.05 MCG/KG/MIN: at 01:03

## 2022-01-01 RX ADMIN — FENTANYL CITRATE 50 MCG: 50 INJECTION, SOLUTION INTRAMUSCULAR; INTRAVENOUS at 08:03

## 2022-01-01 RX ADMIN — ACETAMINOPHEN 650 MG: 325 TABLET ORAL at 08:01

## 2022-01-01 RX ADMIN — MEROPENEM 1 G: 1 INJECTION, POWDER, FOR SOLUTION INTRAVENOUS at 17:11

## 2022-01-01 RX ADMIN — CHLORHEXIDINE GLUCONATE 15 ML: 1.2 SOLUTION ORAL at 08:01

## 2022-01-01 RX ADMIN — PANTOPRAZOLE SODIUM 40 MG: 40 INJECTION, POWDER, FOR SOLUTION INTRAVENOUS at 09:58

## 2022-01-01 RX ADMIN — SODIUM CHLORIDE 90 ML: 900 INJECTION INTRAVENOUS at 12:45

## 2022-01-01 RX ADMIN — LIDOCAINE 3 PATCH: 246 PATCH TOPICAL at 09:11

## 2022-01-01 RX ADMIN — LIDOCAINE 3 PATCH: 246 PATCH TOPICAL at 08:50

## 2022-01-01 RX ADMIN — DEXTROSE MONOHYDRATE 25 ML: 25 INJECTION, SOLUTION INTRAVENOUS at 04:32

## 2022-01-01 RX ADMIN — PANTOPRAZOLE SODIUM 40 MG: 40 INJECTION, POWDER, FOR SOLUTION INTRAVENOUS at 09:09

## 2022-01-01 RX ADMIN — DEXAMETHASONE SODIUM PHOSPHATE 4 MG: 4 INJECTION, SOLUTION INTRAMUSCULAR; INTRAVENOUS at 02:46

## 2022-01-01 RX ADMIN — GABAPENTIN 100 MG: 250 SOLUTION ORAL at 09:04

## 2022-01-01 RX ADMIN — ACETAMINOPHEN 650 MG: 325 TABLET ORAL at 08:03

## 2022-01-01 RX ADMIN — MIDAZOLAM HYDROCHLORIDE 1 MG: 1 INJECTION, SOLUTION INTRAMUSCULAR; INTRAVENOUS at 13:00

## 2022-01-01 RX ADMIN — FUROSEMIDE 20 MG: 10 INJECTION, SOLUTION INTRAMUSCULAR; INTRAVENOUS at 09:29

## 2022-01-01 RX ADMIN — PROPOFOL 30 MCG/KG/MIN: 10 INJECTION, EMULSION INTRAVENOUS at 02:09

## 2022-01-01 RX ADMIN — MORPHINE SULFATE 2 MG: 2 INJECTION, SOLUTION INTRAMUSCULAR; INTRAVENOUS at 14:19

## 2022-01-01 RX ADMIN — MEROPENEM 1 G: 1 INJECTION, POWDER, FOR SOLUTION INTRAVENOUS at 17:58

## 2022-01-01 RX ADMIN — PANTOPRAZOLE SODIUM 40 MG: 40 INJECTION, POWDER, FOR SOLUTION INTRAVENOUS at 11:59

## 2022-01-01 RX ADMIN — LIDOCAINE HYDROCHLORIDE 10 ML: 10 INJECTION, SOLUTION EPIDURAL; INFILTRATION; INTRACAUDAL; PERINEURAL at 14:57

## 2022-01-01 RX ADMIN — SODIUM CHLORIDE: 9 INJECTION, SOLUTION INTRAVENOUS at 11:40

## 2022-01-01 RX ADMIN — PHYTONADIONE 10 MG: 10 INJECTION, EMULSION INTRAMUSCULAR; INTRAVENOUS; SUBCUTANEOUS at 12:02

## 2022-01-01 RX ADMIN — HYDROMORPHONE HYDROCHLORIDE 0.5 MG: 1 INJECTION, SOLUTION INTRAMUSCULAR; INTRAVENOUS; SUBCUTANEOUS at 01:06

## 2022-01-01 RX ADMIN — FUROSEMIDE 40 MG: 10 INJECTION, SOLUTION INTRAVENOUS at 13:05

## 2022-01-01 RX ADMIN — BISACODYL 10 MG: 10 SUPPOSITORY RECTAL at 17:38

## 2022-01-01 RX ADMIN — ROCURONIUM BROMIDE 50 MG: 50 INJECTION, SOLUTION INTRAVENOUS at 19:23

## 2022-01-01 RX ADMIN — PANTOPRAZOLE SODIUM 40 MG: 40 INJECTION, POWDER, FOR SOLUTION INTRAVENOUS at 08:47

## 2022-01-01 RX ADMIN — INSULIN GLARGINE 25 UNITS: 100 INJECTION, SOLUTION SUBCUTANEOUS at 20:00

## 2022-01-01 RX ADMIN — HYDROMORPHONE HYDROCHLORIDE 0.5 MG: 1 INJECTION, SOLUTION INTRAMUSCULAR; INTRAVENOUS; SUBCUTANEOUS at 17:06

## 2022-01-01 RX ADMIN — PANTOPRAZOLE SODIUM 40 MG: 40 INJECTION, POWDER, FOR SOLUTION INTRAVENOUS at 08:23

## 2022-01-01 RX ADMIN — VITAM B12 100 MCG: 100 TAB at 08:49

## 2022-01-01 RX ADMIN — INSULIN ASPART 6 UNITS: 100 INJECTION, SOLUTION INTRAVENOUS; SUBCUTANEOUS at 04:50

## 2022-01-01 RX ADMIN — SERTRALINE HYDROCHLORIDE 150 MG: 100 TABLET ORAL at 08:49

## 2022-01-01 RX ADMIN — ALBUTEROL SULFATE 2.5 MG: 2.5 SOLUTION RESPIRATORY (INHALATION) at 13:09

## 2022-01-01 RX ADMIN — OXYCODONE HYDROCHLORIDE 5 MG: 5 TABLET ORAL at 12:39

## 2022-01-01 RX ADMIN — PANTOPRAZOLE SODIUM 40 MG: 40 INJECTION, POWDER, FOR SOLUTION INTRAVENOUS at 20:12

## 2022-01-01 RX ADMIN — MORPHINE SULFATE 2 MG: 2 INJECTION, SOLUTION INTRAMUSCULAR; INTRAVENOUS at 00:07

## 2022-01-01 RX ADMIN — CYCLOBENZAPRINE HYDROCHLORIDE 7.5 MG: 5 TABLET, FILM COATED ORAL at 00:06

## 2022-01-01 RX ADMIN — FUROSEMIDE 20 MG: 20 TABLET ORAL at 09:06

## 2022-01-01 RX ADMIN — HYDROMORPHONE HYDROCHLORIDE 0.5 MG: 1 INJECTION, SOLUTION INTRAMUSCULAR; INTRAVENOUS; SUBCUTANEOUS at 20:09

## 2022-01-01 RX ADMIN — SODIUM CHLORIDE, POTASSIUM CHLORIDE, SODIUM LACTATE AND CALCIUM CHLORIDE: 600; 310; 30; 20 INJECTION, SOLUTION INTRAVENOUS at 15:48

## 2022-01-01 RX ADMIN — ALBUTEROL SULFATE 2.5 MG: 2.5 SOLUTION RESPIRATORY (INHALATION) at 19:34

## 2022-01-01 RX ADMIN — MORPHINE SULFATE 2 MG: 2 INJECTION, SOLUTION INTRAMUSCULAR; INTRAVENOUS at 22:45

## 2022-01-01 RX ADMIN — ALBUTEROL SULFATE 2.5 MG: 2.5 SOLUTION RESPIRATORY (INHALATION) at 13:10

## 2022-01-01 RX ADMIN — HYDROMORPHONE HYDROCHLORIDE 0.5 MG: 1 INJECTION, SOLUTION INTRAMUSCULAR; INTRAVENOUS; SUBCUTANEOUS at 13:43

## 2022-01-01 RX ADMIN — FUROSEMIDE 40 MG: 10 INJECTION, SOLUTION INTRAVENOUS at 01:35

## 2022-01-01 RX ADMIN — PANTOPRAZOLE SODIUM 40 MG: 40 INJECTION, POWDER, FOR SOLUTION INTRAVENOUS at 16:52

## 2022-01-01 RX ADMIN — Medication 1 MG: at 23:47

## 2022-01-01 RX ADMIN — PROPOFOL 30 MCG/KG/MIN: 10 INJECTION, EMULSION INTRAVENOUS at 06:22

## 2022-01-01 RX ADMIN — PANTOPRAZOLE SODIUM 40 MG: 40 INJECTION, POWDER, FOR SOLUTION INTRAVENOUS at 08:49

## 2022-01-01 RX ADMIN — HEPARIN SODIUM 5000 UNITS: 5000 INJECTION, SOLUTION INTRAVENOUS; SUBCUTANEOUS at 09:48

## 2022-01-01 RX ADMIN — MORPHINE SULFATE 2 MG: 2 INJECTION, SOLUTION INTRAMUSCULAR; INTRAVENOUS at 13:56

## 2022-01-01 RX ADMIN — MORPHINE SULFATE 2 MG: 2 INJECTION, SOLUTION INTRAMUSCULAR; INTRAVENOUS at 12:41

## 2022-01-01 RX ADMIN — ACETAMINOPHEN 650 MG: 325 TABLET ORAL at 22:13

## 2022-01-01 RX ADMIN — OXYCODONE HYDROCHLORIDE 5 MG: 5 TABLET ORAL at 08:49

## 2022-01-01 RX ADMIN — METOPROLOL SUCCINATE 50 MG: 50 TABLET, EXTENDED RELEASE ORAL at 09:07

## 2022-01-01 RX ADMIN — GABAPENTIN 100 MG: 250 SOLUTION ORAL at 16:02

## 2022-01-01 RX ADMIN — FUROSEMIDE 20 MG: 10 INJECTION, SOLUTION INTRAMUSCULAR; INTRAVENOUS at 23:06

## 2022-01-01 RX ADMIN — ASCORBIC ACID, VITAMIN A PALMITATE, CHOLECALCIFEROL, THIAMINE HYDROCHLORIDE, RIBOFLAVIN-5 PHOSPHATE SODIUM, PYRIDOXINE HYDROCHLORIDE, NIACINAMIDE, DEXPANTHENOL, ALPHA-TOCOPHEROL ACETATE, VITAMIN K1, FOLIC ACID, BIOTIN, CYANOCOBALAMIN: 200; 3300; 200; 6; 3.6; 6; 40; 15; 10; 150; 600; 60; 5 INJECTION, SOLUTION INTRAVENOUS at 19:51

## 2022-01-01 RX ADMIN — MORPHINE SULFATE 2 MG: 2 INJECTION, SOLUTION INTRAMUSCULAR; INTRAVENOUS at 15:07

## 2022-01-01 RX ADMIN — OXYCODONE HYDROCHLORIDE 5 MG: 5 TABLET ORAL at 15:45

## 2022-01-01 RX ADMIN — OXYCODONE HYDROCHLORIDE 5 MG: 5 TABLET ORAL at 15:49

## 2022-01-01 RX ADMIN — MIDAZOLAM HYDROCHLORIDE 1 MG: 1 INJECTION, SOLUTION INTRAMUSCULAR; INTRAVENOUS at 10:37

## 2022-01-01 RX ADMIN — Medication 0.03 MCG/KG/MIN: at 12:30

## 2022-01-01 RX ADMIN — MEROPENEM 1 G: 1 INJECTION, POWDER, FOR SOLUTION INTRAVENOUS at 16:01

## 2022-01-01 RX ADMIN — MEROPENEM 1 G: 1 INJECTION, POWDER, FOR SOLUTION INTRAVENOUS at 23:37

## 2022-01-01 RX ADMIN — FUROSEMIDE 20 MG: 20 TABLET ORAL at 08:48

## 2022-01-01 RX ADMIN — HYDROMORPHONE HYDROCHLORIDE 0.5 MG: 1 INJECTION, SOLUTION INTRAMUSCULAR; INTRAVENOUS; SUBCUTANEOUS at 05:47

## 2022-01-01 RX ADMIN — TRAMADOL HYDROCHLORIDE 25 MG: 50 TABLET, FILM COATED ORAL at 12:01

## 2022-01-01 RX ADMIN — DEXMEDETOMIDINE HYDROCHLORIDE 0.2 MCG/KG/HR: 400 INJECTION INTRAVENOUS at 15:46

## 2022-01-01 RX ADMIN — METOPROLOL SUCCINATE 50 MG: 50 TABLET, EXTENDED RELEASE ORAL at 13:50

## 2022-01-01 RX ADMIN — PROPOFOL 20 MCG/KG/MIN: 10 INJECTION, EMULSION INTRAVENOUS at 19:40

## 2022-01-01 RX ADMIN — MICONAZOLE NITRATE: 20 POWDER TOPICAL at 10:17

## 2022-01-01 RX ADMIN — VITAM B12 100 MCG: 100 TAB at 08:07

## 2022-01-01 RX ADMIN — MORPHINE SULFATE 2 MG: 2 INJECTION, SOLUTION INTRAMUSCULAR; INTRAVENOUS at 03:54

## 2022-01-01 RX ADMIN — ACETAMINOPHEN 650 MG: 325 TABLET ORAL at 16:04

## 2022-01-01 RX ADMIN — HEPARIN SODIUM 5000 UNITS: 5000 INJECTION, SOLUTION INTRAVENOUS; SUBCUTANEOUS at 20:15

## 2022-01-01 RX ADMIN — SERTRALINE HYDROCHLORIDE 150 MG: 100 TABLET ORAL at 08:50

## 2022-01-01 RX ADMIN — OXYCODONE HYDROCHLORIDE 5 MG: 5 TABLET ORAL at 09:59

## 2022-01-01 RX ADMIN — LORAZEPAM 0.5 MG: 2 INJECTION INTRAMUSCULAR; INTRAVENOUS at 00:12

## 2022-01-01 RX ADMIN — PANTOPRAZOLE SODIUM 40 MG: 40 INJECTION, POWDER, FOR SOLUTION INTRAVENOUS at 16:03

## 2022-01-01 RX ADMIN — PANTOPRAZOLE SODIUM 40 MG: 40 INJECTION, POWDER, FOR SOLUTION INTRAVENOUS at 21:05

## 2022-01-01 RX ADMIN — PANTOPRAZOLE SODIUM 40 MG: 40 INJECTION, POWDER, FOR SOLUTION INTRAVENOUS at 15:37

## 2022-01-01 RX ADMIN — INSULIN GLARGINE 25 UNITS: 100 INJECTION, SOLUTION SUBCUTANEOUS at 21:09

## 2022-01-01 RX ADMIN — DEXTROSE MONOHYDRATE 25 ML: 25 INJECTION, SOLUTION INTRAVENOUS at 09:48

## 2022-01-01 RX ADMIN — FUROSEMIDE 20 MG: 10 INJECTION, SOLUTION INTRAMUSCULAR; INTRAVENOUS at 09:38

## 2022-01-01 RX ADMIN — ALBUTEROL SULFATE 2.5 MG: 2.5 SOLUTION RESPIRATORY (INHALATION) at 01:07

## 2022-01-01 RX ADMIN — LORAZEPAM 0.5 MG: 2 INJECTION INTRAMUSCULAR; INTRAVENOUS at 16:32

## 2022-01-01 RX ADMIN — SERTRALINE HYDROCHLORIDE 100 MG: 50 TABLET ORAL at 08:03

## 2022-01-01 RX ADMIN — VITAM B12 100 MCG: 100 TAB at 16:47

## 2022-01-01 RX ADMIN — ALBUTEROL SULFATE 2.5 MG: 2.5 SOLUTION RESPIRATORY (INHALATION) at 03:14

## 2022-01-01 RX ADMIN — HEPARIN SODIUM 5000 UNITS: 5000 INJECTION, SOLUTION INTRAVENOUS; SUBCUTANEOUS at 21:04

## 2022-01-01 RX ADMIN — MORPHINE SULFATE 2 MG: 2 INJECTION, SOLUTION INTRAMUSCULAR; INTRAVENOUS at 13:23

## 2022-01-01 RX ADMIN — VANCOMYCIN HYDROCHLORIDE 1000 MG: 1 INJECTION, SOLUTION INTRAVENOUS at 02:02

## 2022-01-01 RX ADMIN — OXYCODONE HYDROCHLORIDE 5 MG: 5 TABLET ORAL at 10:34

## 2022-01-01 RX ADMIN — ACETAMINOPHEN 650 MG: 325 TABLET ORAL at 22:32

## 2022-01-01 RX ADMIN — LORAZEPAM 0.5 MG: 2 INJECTION INTRAMUSCULAR at 12:44

## 2022-01-01 RX ADMIN — DEXTROSE MONOHYDRATE 50 ML: 25 INJECTION, SOLUTION INTRAVENOUS at 14:11

## 2022-01-01 RX ADMIN — Medication 125 MCG: at 09:08

## 2022-01-01 RX ADMIN — ALBUTEROL SULFATE 2.5 MG: 2.5 SOLUTION RESPIRATORY (INHALATION) at 19:32

## 2022-01-01 RX ADMIN — MEROPENEM 1 G: 1 INJECTION, POWDER, FOR SOLUTION INTRAVENOUS at 23:20

## 2022-01-01 RX ADMIN — CEFTRIAXONE SODIUM 2 G: 2 INJECTION, POWDER, FOR SOLUTION INTRAMUSCULAR; INTRAVENOUS at 15:36

## 2022-01-01 RX ADMIN — Medication 125 MCG: at 08:48

## 2022-01-01 RX ADMIN — ALBUMIN HUMAN 25 G: 0.25 SOLUTION INTRAVENOUS at 08:45

## 2022-01-01 RX ADMIN — GABAPENTIN 100 MG: 250 SOLUTION ORAL at 22:56

## 2022-01-01 RX ADMIN — HEPARIN SODIUM 5000 UNITS: 5000 INJECTION, SOLUTION INTRAVENOUS; SUBCUTANEOUS at 21:29

## 2022-01-01 RX ADMIN — FENTANYL CITRATE 50 MCG: 50 INJECTION, SOLUTION INTRAMUSCULAR; INTRAVENOUS at 10:37

## 2022-01-01 RX ADMIN — TRAMADOL HYDROCHLORIDE 25 MG: 50 TABLET, FILM COATED ORAL at 18:11

## 2022-01-01 RX ADMIN — ATORVASTATIN CALCIUM 40 MG: 40 TABLET, FILM COATED ORAL at 21:45

## 2022-01-01 RX ADMIN — HEPARIN SODIUM 5000 UNITS: 5000 INJECTION, SOLUTION INTRAVENOUS; SUBCUTANEOUS at 21:18

## 2022-01-01 RX ADMIN — SENNOSIDES AND DOCUSATE SODIUM 3 TABLET: 50; 8.6 TABLET ORAL at 15:33

## 2022-01-01 RX ADMIN — VANCOMYCIN HYDROCHLORIDE 1500 MG: 10 INJECTION, POWDER, LYOPHILIZED, FOR SOLUTION INTRAVENOUS at 00:00

## 2022-01-01 RX ADMIN — SODIUM CHLORIDE 90 ML: 900 INJECTION INTRAVENOUS at 12:42

## 2022-01-01 RX ADMIN — INSULIN GLARGINE 25 UNITS: 100 INJECTION, SOLUTION SUBCUTANEOUS at 21:28

## 2022-01-01 RX ADMIN — SODIUM CHLORIDE: 9 INJECTION, SOLUTION INTRAVENOUS at 10:05

## 2022-01-01 RX ADMIN — ALBUTEROL SULFATE 2.5 MG: 2.5 SOLUTION RESPIRATORY (INHALATION) at 08:02

## 2022-01-01 RX ADMIN — HEPARIN SODIUM 5000 UNITS: 5000 INJECTION, SOLUTION INTRAVENOUS; SUBCUTANEOUS at 09:29

## 2022-01-01 RX ADMIN — PROPOFOL 20 MCG/KG/MIN: 10 INJECTION, EMULSION INTRAVENOUS at 19:13

## 2022-01-01 RX ADMIN — MORPHINE SULFATE 2 MG: 2 INJECTION, SOLUTION INTRAMUSCULAR; INTRAVENOUS at 12:53

## 2022-01-01 RX ADMIN — SODIUM CHLORIDE, POTASSIUM CHLORIDE, SODIUM LACTATE AND CALCIUM CHLORIDE: 600; 310; 30; 20 INJECTION, SOLUTION INTRAVENOUS at 17:59

## 2022-01-01 RX ADMIN — HYDROMORPHONE HYDROCHLORIDE 0.3 MG: 1 INJECTION, SOLUTION INTRAMUSCULAR; INTRAVENOUS; SUBCUTANEOUS at 02:14

## 2022-01-01 RX ADMIN — HEPARIN SODIUM 5000 UNITS: 5000 INJECTION, SOLUTION INTRAVENOUS; SUBCUTANEOUS at 08:35

## 2022-01-01 RX ADMIN — FUROSEMIDE 20 MG: 10 INJECTION, SOLUTION INTRAMUSCULAR; INTRAVENOUS at 16:04

## 2022-01-01 RX ADMIN — ALBUTEROL SULFATE 2.5 MG: 2.5 SOLUTION RESPIRATORY (INHALATION) at 21:01

## 2022-01-01 RX ADMIN — MEROPENEM 1 G: 1 INJECTION, POWDER, FOR SOLUTION INTRAVENOUS at 15:49

## 2022-01-01 RX ADMIN — INSULIN GLARGINE 25 UNITS: 100 INJECTION, SOLUTION SUBCUTANEOUS at 08:46

## 2022-01-01 RX ADMIN — GABAPENTIN 100 MG: 100 CAPSULE ORAL at 22:32

## 2022-01-01 RX ADMIN — MEROPENEM 1 G: 1 INJECTION, POWDER, FOR SOLUTION INTRAVENOUS at 23:29

## 2022-01-01 RX ADMIN — FENTANYL CITRATE 50 MCG: 50 INJECTION, SOLUTION INTRAMUSCULAR; INTRAVENOUS at 03:13

## 2022-01-01 RX ADMIN — PROPOFOL 40 MCG/KG/MIN: 10 INJECTION, EMULSION INTRAVENOUS at 07:28

## 2022-01-01 RX ADMIN — ALBUTEROL SULFATE 2.5 MG: 2.5 SOLUTION RESPIRATORY (INHALATION) at 00:43

## 2022-01-01 RX ADMIN — PANTOPRAZOLE SODIUM 40 MG: 40 INJECTION, POWDER, FOR SOLUTION INTRAVENOUS at 09:48

## 2022-01-01 RX ADMIN — METOPROLOL TARTRATE 2.5 MG: 5 INJECTION INTRAVENOUS at 20:06

## 2022-01-01 RX ADMIN — ALBUTEROL SULFATE 2.5 MG: 2.5 SOLUTION RESPIRATORY (INHALATION) at 19:36

## 2022-01-01 RX ADMIN — ACETAMINOPHEN 650 MG: 325 TABLET, FILM COATED ORAL at 16:48

## 2022-01-01 RX ADMIN — PROPOFOL 40 MG: 10 INJECTION, EMULSION INTRAVENOUS at 19:30

## 2022-01-01 RX ADMIN — HYDROMORPHONE HYDROCHLORIDE 0.3 MG: 1 INJECTION, SOLUTION INTRAMUSCULAR; INTRAVENOUS; SUBCUTANEOUS at 18:41

## 2022-01-01 RX ADMIN — ACETAMINOPHEN 650 MG: 325 TABLET, FILM COATED ORAL at 06:56

## 2022-01-01 RX ADMIN — SODIUM CHLORIDE 500 ML: 9 INJECTION, SOLUTION INTRAVENOUS at 22:49

## 2022-01-01 RX ADMIN — ACETAMINOPHEN 650 MG: 325 TABLET, FILM COATED ORAL at 21:18

## 2022-01-01 RX ADMIN — MEROPENEM 1 G: 1 INJECTION, POWDER, FOR SOLUTION INTRAVENOUS at 23:06

## 2022-01-01 RX ADMIN — HYDROMORPHONE HYDROCHLORIDE 0.5 MG: 1 INJECTION, SOLUTION INTRAMUSCULAR; INTRAVENOUS; SUBCUTANEOUS at 20:42

## 2022-01-01 RX ADMIN — HEPARIN SODIUM 5000 UNITS: 5000 INJECTION, SOLUTION INTRAVENOUS; SUBCUTANEOUS at 21:34

## 2022-01-01 RX ADMIN — ONDANSETRON 4 MG: 2 INJECTION INTRAMUSCULAR; INTRAVENOUS at 00:06

## 2022-01-01 RX ADMIN — SERTRALINE HYDROCHLORIDE 100 MG: 100 TABLET ORAL at 09:04

## 2022-01-01 RX ADMIN — ALBUTEROL SULFATE 2.5 MG: 2.5 SOLUTION RESPIRATORY (INHALATION) at 08:10

## 2022-01-01 RX ADMIN — INSULIN ASPART 5 UNITS: 100 INJECTION, SOLUTION INTRAVENOUS; SUBCUTANEOUS at 08:37

## 2022-01-01 RX ADMIN — MEROPENEM 1 G: 1 INJECTION, POWDER, FOR SOLUTION INTRAVENOUS at 09:05

## 2022-01-01 RX ADMIN — HYDROMORPHONE HYDROCHLORIDE 0.5 MG: 1 INJECTION, SOLUTION INTRAMUSCULAR; INTRAVENOUS; SUBCUTANEOUS at 15:52

## 2022-01-01 RX ADMIN — PANTOPRAZOLE SODIUM 40 MG: 40 INJECTION, POWDER, FOR SOLUTION INTRAVENOUS at 08:50

## 2022-01-01 RX ADMIN — Medication 125 MCG: at 08:07

## 2022-01-01 RX ADMIN — PANTOPRAZOLE SODIUM 40 MG: 40 INJECTION, POWDER, FOR SOLUTION INTRAVENOUS at 21:18

## 2022-01-01 RX ADMIN — GABAPENTIN 100 MG: 250 SOLUTION ORAL at 16:04

## 2022-01-01 RX ADMIN — PANTOPRAZOLE SODIUM 40 MG: 40 INJECTION, POWDER, FOR SOLUTION INTRAVENOUS at 08:01

## 2022-01-01 RX ADMIN — VITAM B12 100 MCG: 100 TAB at 08:51

## 2022-01-01 RX ADMIN — FUROSEMIDE 40 MG: 10 INJECTION, SOLUTION INTRAVENOUS at 19:50

## 2022-01-01 RX ADMIN — FUROSEMIDE 20 MG: 20 TABLET ORAL at 10:11

## 2022-01-01 RX ADMIN — PANTOPRAZOLE SODIUM 40 MG: 40 INJECTION, POWDER, FOR SOLUTION INTRAVENOUS at 08:45

## 2022-01-01 RX ADMIN — FERROUS GLUCONATE 324 MG: 324 TABLET ORAL at 09:59

## 2022-01-01 RX ADMIN — ACETAMINOPHEN 650 MG: 325 TABLET ORAL at 09:04

## 2022-01-01 RX ADMIN — METOPROLOL TARTRATE 2.5 MG: 5 INJECTION INTRAVENOUS at 04:23

## 2022-01-01 RX ADMIN — ALBUTEROL SULFATE 2.5 MG: 2.5 SOLUTION RESPIRATORY (INHALATION) at 12:19

## 2022-01-01 RX ADMIN — INSULIN ASPART 2 UNITS: 100 INJECTION, SOLUTION INTRAVENOUS; SUBCUTANEOUS at 13:57

## 2022-01-01 RX ADMIN — RACEPINEPHRINE HYDROCHLORIDE 0.5 ML: 11.25 SOLUTION RESPIRATORY (INHALATION) at 19:19

## 2022-01-01 RX ADMIN — PANTOPRAZOLE SODIUM 40 MG: 40 INJECTION, POWDER, FOR SOLUTION INTRAVENOUS at 09:29

## 2022-01-01 RX ADMIN — IOPAMIDOL 17 ML: 408 INJECTION, SOLUTION INTRATHECAL at 11:20

## 2022-01-01 RX ADMIN — SODIUM CHLORIDE 500 ML: 9 INJECTION, SOLUTION INTRAVENOUS at 21:54

## 2022-01-01 RX ADMIN — ALBUTEROL SULFATE 2.5 MG: 2.5 SOLUTION RESPIRATORY (INHALATION) at 07:34

## 2022-01-01 RX ADMIN — HYDROMORPHONE HYDROCHLORIDE 0.5 MG: 1 INJECTION, SOLUTION INTRAMUSCULAR; INTRAVENOUS; SUBCUTANEOUS at 06:20

## 2022-01-01 RX ADMIN — OXYCODONE HYDROCHLORIDE 5 MG: 5 TABLET ORAL at 14:56

## 2022-01-01 RX ADMIN — GABAPENTIN 100 MG: 250 SOLUTION ORAL at 22:12

## 2022-01-01 RX ADMIN — IPRATROPIUM BROMIDE AND ALBUTEROL SULFATE 3 ML: .5; 3 SOLUTION RESPIRATORY (INHALATION) at 19:02

## 2022-01-01 RX ADMIN — HYDROMORPHONE HYDROCHLORIDE 0.2 MG: 0.2 INJECTION, SOLUTION INTRAMUSCULAR; INTRAVENOUS; SUBCUTANEOUS at 03:29

## 2022-01-01 RX ADMIN — PANTOPRAZOLE SODIUM 40 MG: 40 INJECTION, POWDER, FOR SOLUTION INTRAVENOUS at 08:55

## 2022-01-01 RX ADMIN — FUROSEMIDE 20 MG: 10 INJECTION, SOLUTION INTRAMUSCULAR; INTRAVENOUS at 00:26

## 2022-01-01 RX ADMIN — FLUTICASONE PROPIONATE 1 SPRAY: 50 SPRAY, METERED NASAL at 10:10

## 2022-01-01 RX ADMIN — HYDROMORPHONE HYDROCHLORIDE 0.5 MG: 1 INJECTION, SOLUTION INTRAMUSCULAR; INTRAVENOUS; SUBCUTANEOUS at 20:44

## 2022-01-01 RX ADMIN — INSULIN ASPART 6 UNITS: 100 INJECTION, SOLUTION INTRAVENOUS; SUBCUTANEOUS at 15:41

## 2022-01-01 RX ADMIN — CHLORHEXIDINE GLUCONATE 15 ML: 1.2 SOLUTION ORAL at 12:02

## 2022-01-01 RX ADMIN — GABAPENTIN 100 MG: 250 SOLUTION ORAL at 18:34

## 2022-01-01 RX ADMIN — SODIUM CHLORIDE 9.88 UNITS: 9 INJECTION, SOLUTION INTRAVENOUS at 09:50

## 2022-01-01 RX ADMIN — DEXAMETHASONE SODIUM PHOSPHATE 4 MG: 4 INJECTION, SOLUTION INTRAMUSCULAR; INTRAVENOUS at 13:49

## 2022-01-01 RX ADMIN — FUROSEMIDE 40 MG: 10 INJECTION, SOLUTION INTRAMUSCULAR; INTRAVENOUS at 16:47

## 2022-01-01 RX ADMIN — Medication 0.03 MCG/KG/MIN: at 06:47

## 2022-01-01 RX ADMIN — FERROUS GLUCONATE 324 MG: 324 TABLET ORAL at 08:49

## 2022-01-01 RX ADMIN — Medication 125 MCG: at 08:49

## 2022-01-01 RX ADMIN — HYDROMORPHONE HYDROCHLORIDE 0.2 MG: 0.2 INJECTION, SOLUTION INTRAMUSCULAR; INTRAVENOUS; SUBCUTANEOUS at 20:13

## 2022-01-01 RX ADMIN — ALBUTEROL SULFATE 2.5 MG: 2.5 SOLUTION RESPIRATORY (INHALATION) at 00:08

## 2022-01-01 RX ADMIN — HYDROMORPHONE HYDROCHLORIDE 0.5 MG: 1 INJECTION, SOLUTION INTRAMUSCULAR; INTRAVENOUS; SUBCUTANEOUS at 11:00

## 2022-01-01 RX ADMIN — MICONAZOLE NITRATE: 20 POWDER TOPICAL at 09:11

## 2022-01-01 RX ADMIN — PANTOPRAZOLE SODIUM 80 MG: 40 INJECTION, POWDER, FOR SOLUTION INTRAVENOUS at 11:01

## 2022-01-01 RX ADMIN — PROPOFOL 40 MCG/KG/MIN: 10 INJECTION, EMULSION INTRAVENOUS at 03:36

## 2022-01-01 RX ADMIN — FENTANYL CITRATE 50 MCG: 50 INJECTION, SOLUTION INTRAMUSCULAR; INTRAVENOUS at 20:15

## 2022-01-01 RX ADMIN — LIDOCAINE 3 PATCH: 246 PATCH TOPICAL at 08:10

## 2022-01-01 RX ADMIN — HEPARIN SODIUM 5000 UNITS: 5000 INJECTION, SOLUTION INTRAVENOUS; SUBCUTANEOUS at 22:13

## 2022-01-01 RX ADMIN — FUROSEMIDE 20 MG: 20 TABLET ORAL at 08:50

## 2022-01-01 RX ADMIN — ATORVASTATIN CALCIUM 40 MG: 40 TABLET, FILM COATED ORAL at 21:59

## 2022-01-01 RX ADMIN — PANTOPRAZOLE SODIUM 40 MG: 40 INJECTION, POWDER, FOR SOLUTION INTRAVENOUS at 22:01

## 2022-01-01 RX ADMIN — SERTRALINE HYDROCHLORIDE 100 MG: 100 TABLET ORAL at 08:56

## 2022-01-01 RX ADMIN — HYDROMORPHONE HYDROCHLORIDE 0.2 MG: 0.2 INJECTION, SOLUTION INTRAMUSCULAR; INTRAVENOUS; SUBCUTANEOUS at 11:27

## 2022-01-01 RX ADMIN — ATORVASTATIN CALCIUM 40 MG: 40 TABLET, FILM COATED ORAL at 20:19

## 2022-01-01 RX ADMIN — OXYCODONE HYDROCHLORIDE 5 MG: 5 TABLET ORAL at 20:36

## 2022-01-01 RX ADMIN — GABAPENTIN 100 MG: 250 SOLUTION ORAL at 21:09

## 2022-01-01 RX ADMIN — HEPARIN SODIUM 5000 UNITS: 5000 INJECTION, SOLUTION INTRAVENOUS; SUBCUTANEOUS at 08:47

## 2022-01-01 RX ADMIN — HYDROMORPHONE HYDROCHLORIDE 0.5 MG: 1 INJECTION, SOLUTION INTRAMUSCULAR; INTRAVENOUS; SUBCUTANEOUS at 04:23

## 2022-01-01 RX ADMIN — MICONAZOLE NITRATE: 20 POWDER TOPICAL at 08:51

## 2022-01-01 RX ADMIN — ALBUTEROL SULFATE 2.5 MG: 2.5 SOLUTION RESPIRATORY (INHALATION) at 00:03

## 2022-01-01 RX ADMIN — HYDROMORPHONE HYDROCHLORIDE 0.5 MG: 1 INJECTION, SOLUTION INTRAMUSCULAR; INTRAVENOUS; SUBCUTANEOUS at 00:05

## 2022-01-01 RX ADMIN — MEROPENEM 1 G: 1 INJECTION, POWDER, FOR SOLUTION INTRAVENOUS at 08:46

## 2022-01-01 RX ADMIN — MORPHINE SULFATE 2 MG: 2 INJECTION, SOLUTION INTRAMUSCULAR; INTRAVENOUS at 02:03

## 2022-01-01 RX ADMIN — PANTOPRAZOLE SODIUM 40 MG: 40 INJECTION, POWDER, FOR SOLUTION INTRAVENOUS at 16:08

## 2022-01-01 RX ADMIN — ALBUTEROL SULFATE 2.5 MG: 2.5 SOLUTION RESPIRATORY (INHALATION) at 07:21

## 2022-01-01 RX ADMIN — METOPROLOL TARTRATE 2.5 MG: 5 INJECTION INTRAVENOUS at 18:18

## 2022-01-01 RX ADMIN — ALBUTEROL SULFATE 2.5 MG: 2.5 SOLUTION RESPIRATORY (INHALATION) at 13:27

## 2022-01-01 RX ADMIN — PANTOPRAZOLE SODIUM 40 MG: 40 INJECTION, POWDER, FOR SOLUTION INTRAVENOUS at 09:26

## 2022-01-01 RX ADMIN — PANTOPRAZOLE SODIUM 40 MG: 40 INJECTION, POWDER, FOR SOLUTION INTRAVENOUS at 05:25

## 2022-01-01 RX ADMIN — MEROPENEM 1 G: 1 INJECTION, POWDER, FOR SOLUTION INTRAVENOUS at 16:30

## 2022-01-01 RX ADMIN — INSULIN GLARGINE 20 UNITS: 100 INJECTION, SOLUTION SUBCUTANEOUS at 22:30

## 2022-01-01 RX ADMIN — FUROSEMIDE 20 MG: 20 TABLET ORAL at 10:00

## 2022-01-01 RX ADMIN — FERROUS GLUCONATE 324 MG: 324 TABLET ORAL at 08:07

## 2022-01-01 RX ADMIN — HEPARIN SODIUM 5000 UNITS: 5000 INJECTION, SOLUTION INTRAVENOUS; SUBCUTANEOUS at 19:49

## 2022-01-01 RX ADMIN — PANTOPRAZOLE SODIUM 40 MG: 40 INJECTION, POWDER, FOR SOLUTION INTRAVENOUS at 08:08

## 2022-01-01 RX ADMIN — MICONAZOLE NITRATE: 20 POWDER TOPICAL at 21:54

## 2022-01-01 RX ADMIN — ALBUTEROL SULFATE 2.5 MG: 2.5 SOLUTION RESPIRATORY (INHALATION) at 20:00

## 2022-01-01 RX ADMIN — HYDROMORPHONE HYDROCHLORIDE 0.5 MG: 1 INJECTION, SOLUTION INTRAMUSCULAR; INTRAVENOUS; SUBCUTANEOUS at 02:09

## 2022-01-01 RX ADMIN — ACETAMINOPHEN 650 MG: 325 TABLET ORAL at 23:24

## 2022-01-01 RX ADMIN — METOPROLOL TARTRATE 2.5 MG: 5 INJECTION INTRAVENOUS at 09:48

## 2022-01-01 RX ADMIN — ACETAMINOPHEN 650 MG: 325 TABLET ORAL at 21:09

## 2022-01-01 RX ADMIN — MELATONIN TAB 3 MG 3 MG: 3 TAB at 23:24

## 2022-01-01 RX ADMIN — HYDROMORPHONE HYDROCHLORIDE 0.3 MG: 1 INJECTION, SOLUTION INTRAMUSCULAR; INTRAVENOUS; SUBCUTANEOUS at 13:33

## 2022-01-01 RX ADMIN — MEROPENEM 1 G: 1 INJECTION, POWDER, FOR SOLUTION INTRAVENOUS at 16:52

## 2022-01-01 RX ADMIN — PANTOPRAZOLE SODIUM 40 MG: 40 INJECTION, POWDER, FOR SOLUTION INTRAVENOUS at 16:05

## 2022-01-01 RX ADMIN — BARIUM SULFATE 10 ML: 400 SUSPENSION ORAL at 09:26

## 2022-01-01 RX ADMIN — ACETAMINOPHEN 1000 MG: 500 TABLET ORAL at 13:49

## 2022-01-01 RX ADMIN — HEPARIN SODIUM 5000 UNITS: 5000 INJECTION, SOLUTION INTRAVENOUS; SUBCUTANEOUS at 08:56

## 2022-01-01 RX ADMIN — ALBUTEROL SULFATE 2.5 MG: 2.5 SOLUTION RESPIRATORY (INHALATION) at 07:51

## 2022-01-01 RX ADMIN — FENTANYL CITRATE 50 MCG: 50 INJECTION, SOLUTION INTRAMUSCULAR; INTRAVENOUS at 12:49

## 2022-01-01 RX ADMIN — SERTRALINE HYDROCHLORIDE 100 MG: 100 TABLET ORAL at 09:39

## 2022-01-01 RX ADMIN — ACETAMINOPHEN 650 MG: 325 TABLET ORAL at 17:11

## 2022-01-01 RX ADMIN — MELATONIN TAB 3 MG 3 MG: 3 TAB at 23:13

## 2022-01-01 RX ADMIN — ACETAMINOPHEN 650 MG: 325 TABLET ORAL at 08:56

## 2022-01-01 RX ADMIN — MICONAZOLE NITRATE: 20 POWDER TOPICAL at 11:53

## 2022-01-01 RX ADMIN — SERTRALINE HYDROCHLORIDE 150 MG: 100 TABLET ORAL at 09:06

## 2022-01-01 RX ADMIN — INSULIN ASPART 3 UNITS: 100 INJECTION, SOLUTION INTRAVENOUS; SUBCUTANEOUS at 17:48

## 2022-01-01 RX ADMIN — FUROSEMIDE 40 MG: 10 INJECTION, SOLUTION INTRAVENOUS at 09:26

## 2022-01-01 RX ADMIN — FUROSEMIDE 20 MG: 10 INJECTION, SOLUTION INTRAVENOUS at 18:44

## 2022-01-01 RX ADMIN — PANTOPRAZOLE SODIUM 40 MG: 40 INJECTION, POWDER, FOR SOLUTION INTRAVENOUS at 15:30

## 2022-01-01 RX ADMIN — GABAPENTIN 100 MG: 250 SOLUTION ORAL at 09:05

## 2022-01-01 RX ADMIN — ACETAMINOPHEN 650 MG: 325 TABLET, FILM COATED ORAL at 23:47

## 2022-01-01 RX ADMIN — FUROSEMIDE 20 MG: 10 INJECTION, SOLUTION INTRAMUSCULAR; INTRAVENOUS at 17:06

## 2022-01-01 RX ADMIN — PROTHROMBIN, COAGULATION FACTOR VII HUMAN, COAGULATION FACTOR IX HUMAN, COAGULATION FACTOR X HUMAN, PROTEIN C, PROTEIN S HUMAN, AND WATER 5246 UNITS: KIT at 11:06

## 2022-01-01 RX ADMIN — LORAZEPAM 0.5 MG: 2 INJECTION INTRAMUSCULAR; INTRAVENOUS at 15:47

## 2022-01-01 RX ADMIN — GABAPENTIN 100 MG: 250 SOLUTION ORAL at 15:36

## 2022-01-01 RX ADMIN — HYDROMORPHONE HYDROCHLORIDE 0.5 MG: 1 INJECTION, SOLUTION INTRAMUSCULAR; INTRAVENOUS; SUBCUTANEOUS at 14:30

## 2022-01-01 RX ADMIN — ACETAMINOPHEN 1000 MG: 500 TABLET, FILM COATED ORAL at 21:05

## 2022-01-01 RX ADMIN — ALBUTEROL SULFATE 2.5 MG: 2.5 SOLUTION RESPIRATORY (INHALATION) at 14:03

## 2022-01-01 RX ADMIN — INSULIN GLARGINE 25 UNITS: 100 INJECTION, SOLUTION SUBCUTANEOUS at 20:15

## 2022-01-01 RX ADMIN — MORPHINE SULFATE 2 MG: 2 INJECTION, SOLUTION INTRAMUSCULAR; INTRAVENOUS at 15:44

## 2022-01-01 RX ADMIN — CIPROFLOXACIN 500 MG: 500 TABLET, FILM COATED ORAL at 21:05

## 2022-01-01 RX ADMIN — ATORVASTATIN CALCIUM 40 MG: 40 TABLET, FILM COATED ORAL at 21:18

## 2022-01-01 RX ADMIN — LORAZEPAM 0.5 MG: 2 INJECTION INTRAMUSCULAR; INTRAVENOUS at 08:56

## 2022-01-01 RX ADMIN — CEFTRIAXONE SODIUM 2 G: 2 INJECTION, POWDER, FOR SOLUTION INTRAMUSCULAR; INTRAVENOUS at 15:00

## 2022-01-01 RX ADMIN — LIDOCAINE HYDROCHLORIDE 4 ML: 10 INJECTION, SOLUTION EPIDURAL; INFILTRATION; INTRACAUDAL; PERINEURAL at 11:17

## 2022-01-01 RX ADMIN — Medication 0.05 MCG/KG/MIN: at 13:00

## 2022-01-01 RX ADMIN — ALBUTEROL SULFATE 2.5 MG: 2.5 SOLUTION RESPIRATORY (INHALATION) at 07:20

## 2022-01-01 RX ADMIN — MEROPENEM 1 G: 1 INJECTION, POWDER, FOR SOLUTION INTRAVENOUS at 08:35

## 2022-01-01 RX ADMIN — MICONAZOLE NITRATE: 20 POWDER TOPICAL at 21:23

## 2022-01-01 RX ADMIN — IOPAMIDOL 100 ML: 755 INJECTION, SOLUTION INTRAVENOUS at 10:06

## 2022-01-01 RX ADMIN — HEPARIN SODIUM 5000 UNITS: 5000 INJECTION, SOLUTION INTRAVENOUS; SUBCUTANEOUS at 09:04

## 2022-01-01 RX ADMIN — MEROPENEM 1 G: 1 INJECTION, POWDER, FOR SOLUTION INTRAVENOUS at 01:36

## 2022-01-01 RX ADMIN — Medication 125 MCG: at 10:00

## 2022-01-01 RX ADMIN — GABAPENTIN 100 MG: 100 CAPSULE ORAL at 08:03

## 2022-01-01 RX ADMIN — ALBUTEROL SULFATE 2.5 MG: 2.5 SOLUTION RESPIRATORY (INHALATION) at 00:00

## 2022-01-01 RX ADMIN — Medication 0.07 MCG/KG/MIN: at 02:12

## 2022-01-01 RX ADMIN — BARIUM SULFATE 10 ML: 400 SUSPENSION ORAL at 09:27

## 2022-01-01 RX ADMIN — LIDOCAINE 3 PATCH: 246 PATCH TOPICAL at 10:56

## 2022-01-01 RX ADMIN — CIPROFLOXACIN 500 MG: 500 TABLET, FILM COATED ORAL at 08:49

## 2022-01-01 RX ADMIN — SODIUM CHLORIDE 65 ML: 9 INJECTION, SOLUTION INTRAVENOUS at 10:06

## 2022-01-01 RX ADMIN — MEROPENEM 1 G: 1 INJECTION, POWDER, FOR SOLUTION INTRAVENOUS at 10:00

## 2022-01-01 RX ADMIN — MEROPENEM 1 G: 1 INJECTION, POWDER, FOR SOLUTION INTRAVENOUS at 01:27

## 2022-01-01 RX ADMIN — PANTOPRAZOLE SODIUM 40 MG: 40 INJECTION, POWDER, FOR SOLUTION INTRAVENOUS at 08:35

## 2022-01-01 RX ADMIN — HYDROMORPHONE HYDROCHLORIDE 0.2 MG: 0.2 INJECTION, SOLUTION INTRAMUSCULAR; INTRAVENOUS; SUBCUTANEOUS at 12:02

## 2022-01-01 RX ADMIN — ONDANSETRON 4 MG: 2 INJECTION INTRAMUSCULAR; INTRAVENOUS at 15:00

## 2022-01-01 RX ADMIN — CIPROFLOXACIN 500 MG: 500 TABLET, FILM COATED ORAL at 19:13

## 2022-01-01 RX ADMIN — DEXAMETHASONE SODIUM PHOSPHATE 4 MG: 4 INJECTION, SOLUTION INTRAMUSCULAR; INTRAVENOUS at 08:45

## 2022-01-01 RX ADMIN — CEFEPIME HYDROCHLORIDE 2 G: 2 INJECTION, POWDER, FOR SOLUTION INTRAVENOUS at 23:17

## 2022-01-01 RX ADMIN — DEXAMETHASONE SODIUM PHOSPHATE 4 MG: 4 INJECTION, SOLUTION INTRAMUSCULAR; INTRAVENOUS at 21:18

## 2022-01-01 RX ADMIN — MEROPENEM 1 G: 1 INJECTION, POWDER, FOR SOLUTION INTRAVENOUS at 08:31

## 2022-01-01 RX ADMIN — HEPARIN SODIUM 5000 UNITS: 5000 INJECTION, SOLUTION INTRAVENOUS; SUBCUTANEOUS at 09:26

## 2022-01-01 RX ADMIN — PANTOPRAZOLE SODIUM 8 MG/HR: 40 INJECTION, POWDER, FOR SOLUTION INTRAVENOUS at 12:33

## 2022-01-01 RX ADMIN — GABAPENTIN 100 MG: 250 SOLUTION ORAL at 09:30

## 2022-01-01 RX ADMIN — PROPOFOL 40 MCG/KG/MIN: 10 INJECTION, EMULSION INTRAVENOUS at 23:11

## 2022-01-01 RX ADMIN — LORAZEPAM 0.5 MG: 2 INJECTION INTRAMUSCULAR; INTRAVENOUS at 12:50

## 2022-01-01 RX ADMIN — TRAMADOL HYDROCHLORIDE 25 MG: 50 TABLET, FILM COATED ORAL at 05:07

## 2022-01-01 RX ADMIN — METOPROLOL SUCCINATE 50 MG: 50 TABLET, EXTENDED RELEASE ORAL at 08:07

## 2022-01-01 RX ADMIN — METOPROLOL SUCCINATE 50 MG: 50 TABLET, EXTENDED RELEASE ORAL at 10:00

## 2022-01-01 RX ADMIN — PROPOFOL 100 MG: 10 INJECTION, EMULSION INTRAVENOUS at 19:23

## 2022-01-01 RX ADMIN — MEROPENEM 1 G: 1 INJECTION, POWDER, FOR SOLUTION INTRAVENOUS at 08:48

## 2022-01-01 RX ADMIN — ACETAMINOPHEN 650 MG: 325 TABLET ORAL at 22:56

## 2022-01-01 RX ADMIN — CYCLOBENZAPRINE HYDROCHLORIDE 7.5 MG: 5 TABLET, FILM COATED ORAL at 08:49

## 2022-01-01 RX ADMIN — PROPOFOL 20 MCG/KG/MIN: 10 INJECTION, EMULSION INTRAVENOUS at 11:00

## 2022-01-01 RX ADMIN — HYDROMORPHONE HYDROCHLORIDE 0.5 MG: 1 INJECTION, SOLUTION INTRAMUSCULAR; INTRAVENOUS; SUBCUTANEOUS at 07:53

## 2022-01-01 RX ADMIN — INSULIN HUMAN 5 UNITS: 100 INJECTION, SUSPENSION SUBCUTANEOUS at 18:12

## 2022-01-01 RX ADMIN — MAGNESIUM HYDROXIDE 30 ML: 400 SUSPENSION ORAL at 08:49

## 2022-01-01 RX ADMIN — CIPROFLOXACIN 500 MG: 500 TABLET, FILM COATED ORAL at 13:55

## 2022-01-01 RX ADMIN — NYSTATIN: 100000 OINTMENT TOPICAL at 13:50

## 2022-01-01 RX ADMIN — INSULIN GLARGINE 25 UNITS: 100 INJECTION, SOLUTION SUBCUTANEOUS at 08:13

## 2022-01-01 RX ADMIN — LORAZEPAM 0.5 MG: 2 INJECTION INTRAMUSCULAR; INTRAVENOUS at 21:25

## 2022-01-01 RX ADMIN — GABAPENTIN 100 MG: 250 SOLUTION ORAL at 23:24

## 2022-01-01 RX ADMIN — MORPHINE SULFATE 2 MG: 2 INJECTION, SOLUTION INTRAMUSCULAR; INTRAVENOUS at 21:30

## 2022-01-01 RX ADMIN — PROPOFOL 40 MCG/KG/MIN: 10 INJECTION, EMULSION INTRAVENOUS at 07:25

## 2022-01-01 RX ADMIN — SERTRALINE HYDROCHLORIDE 150 MG: 100 TABLET ORAL at 08:08

## 2022-01-01 RX ADMIN — INSULIN GLARGINE 15 UNITS: 100 INJECTION, SOLUTION SUBCUTANEOUS at 01:27

## 2022-01-01 RX ADMIN — MIDAZOLAM HYDROCHLORIDE 1 MG: 1 INJECTION, SOLUTION INTRAMUSCULAR; INTRAVENOUS at 12:49

## 2022-01-01 RX ADMIN — IOPAMIDOL 75 ML: 755 INJECTION, SOLUTION INTRAVENOUS at 12:44

## 2022-01-01 RX ADMIN — SERTRALINE HYDROCHLORIDE 150 MG: 50 TABLET, FILM COATED ORAL at 13:49

## 2022-01-01 RX ADMIN — HEPARIN SODIUM 5000 UNITS: 5000 INJECTION, SOLUTION INTRAVENOUS; SUBCUTANEOUS at 08:04

## 2022-01-01 RX ADMIN — ACETAMINOPHEN 650 MG: 325 TABLET ORAL at 15:35

## 2022-01-01 RX ADMIN — AZITHROMYCIN MONOHYDRATE 500 MG: 500 INJECTION, POWDER, LYOPHILIZED, FOR SOLUTION INTRAVENOUS at 12:59

## 2022-01-01 RX ADMIN — HEPARIN SODIUM 5000 UNITS: 5000 INJECTION, SOLUTION INTRAVENOUS; SUBCUTANEOUS at 23:24

## 2022-01-01 RX ADMIN — TOPICAL ANESTHETIC 0.5 ML: 200 SPRAY DENTAL; PERIODONTAL at 12:45

## 2022-01-01 RX ADMIN — SERTRALINE HYDROCHLORIDE 100 MG: 100 TABLET ORAL at 08:01

## 2022-01-01 RX ADMIN — AZITHROMYCIN 500 MG: 500 INJECTION, POWDER, LYOPHILIZED, FOR SOLUTION INTRAVENOUS at 10:20

## 2022-01-01 RX ADMIN — METOPROLOL TARTRATE 2.5 MG: 5 INJECTION INTRAVENOUS at 21:25

## 2022-01-01 RX ADMIN — FERROUS GLUCONATE 324 MG: 324 TABLET ORAL at 08:50

## 2022-01-01 RX ADMIN — MEROPENEM 1 G: 1 INJECTION, POWDER, FOR SOLUTION INTRAVENOUS at 09:30

## 2022-01-01 RX ADMIN — DEXMEDETOMIDINE HYDROCHLORIDE 0.2 MCG/KG/HR: 400 INJECTION INTRAVENOUS at 22:41

## 2022-01-01 RX ADMIN — VITAM B12 100 MCG: 100 TAB at 10:00

## 2022-01-01 RX ADMIN — MORPHINE SULFATE 2 MG: 2 INJECTION, SOLUTION INTRAMUSCULAR; INTRAVENOUS at 13:45

## 2022-01-01 RX ADMIN — HEPARIN SODIUM 5000 UNITS: 5000 INJECTION, SOLUTION INTRAVENOUS; SUBCUTANEOUS at 21:05

## 2022-01-01 RX ADMIN — CHLORHEXIDINE GLUCONATE 15 ML: 1.2 SOLUTION ORAL at 19:49

## 2022-01-01 RX ADMIN — CEFTRIAXONE SODIUM 2 G: 2 INJECTION, POWDER, FOR SOLUTION INTRAMUSCULAR; INTRAVENOUS at 16:08

## 2022-01-01 RX ADMIN — ACETAMINOPHEN 650 MG: 325 TABLET ORAL at 16:02

## 2022-01-01 RX ADMIN — SERTRALINE HYDROCHLORIDE 150 MG: 100 TABLET ORAL at 10:00

## 2022-01-01 RX ADMIN — ACETAMINOPHEN 650 MG: 325 TABLET, FILM COATED ORAL at 00:06

## 2022-01-01 RX ADMIN — CEFTRIAXONE SODIUM 2 G: 2 INJECTION, POWDER, FOR SOLUTION INTRAMUSCULAR; INTRAVENOUS at 15:21

## 2022-01-01 RX ADMIN — MICONAZOLE NITRATE: 20 POWDER TOPICAL at 21:46

## 2022-01-01 RX ADMIN — PANTOPRAZOLE SODIUM 40 MG: 40 INJECTION, POWDER, FOR SOLUTION INTRAVENOUS at 17:11

## 2022-01-01 RX ADMIN — MEROPENEM 1 G: 1 INJECTION, POWDER, FOR SOLUTION INTRAVENOUS at 00:58

## 2022-01-01 RX ADMIN — FENTANYL CITRATE 50 MCG: 50 INJECTION, SOLUTION INTRAMUSCULAR; INTRAVENOUS at 22:32

## 2022-01-01 RX ADMIN — CEFTRIAXONE SODIUM 2 G: 2 INJECTION, SOLUTION INTRAVENOUS at 09:27

## 2022-01-01 ASSESSMENT — ACTIVITIES OF DAILY LIVING (ADL)
ADLS_ACUITY_SCORE: 22
ADLS_ACUITY_SCORE: 23
ADLS_ACUITY_SCORE: 33
ADLS_ACUITY_SCORE: 37
ADLS_ACUITY_SCORE: 21
ADLS_ACUITY_SCORE: 29
ADLS_ACUITY_SCORE: 24
ADLS_ACUITY_SCORE: 23
ADLS_ACUITY_SCORE: 37
FALL_HISTORY_WITHIN_LAST_SIX_MONTHS: YES
FALL_HISTORY_WITHIN_LAST_SIX_MONTHS: NO
ADLS_ACUITY_SCORE: 33
ADLS_ACUITY_SCORE: 32
ADLS_ACUITY_SCORE: 22
DRESS: 0-->ASSISTANCE NEEDED (DEVELOPMENTALLY APPROPRIATE)
ADLS_ACUITY_SCORE: 28
ADLS_ACUITY_SCORE: 29
ADLS_ACUITY_SCORE: 22
BATHING: 1-->ASSISTANCE NEEDED
ADLS_ACUITY_SCORE: 22
ADLS_ACUITY_SCORE: 33
ADLS_ACUITY_SCORE: 34
ADLS_ACUITY_SCORE: 22
ADLS_ACUITY_SCORE: 29
ADLS_ACUITY_SCORE: 21
ADLS_ACUITY_SCORE: 14
ADLS_ACUITY_SCORE: 29
DRESSING/BATHING: BATHING DIFFICULTY, ASSISTANCE 1 PERSON;DRESSING DIFFICULTY, ASSISTANCE 1 PERSON
ADLS_ACUITY_SCORE: 35
ADLS_ACUITY_SCORE: 35
ADLS_ACUITY_SCORE: 31
ADLS_ACUITY_SCORE: 22
ADLS_ACUITY_SCORE: 33
ADLS_ACUITY_SCORE: 35
ADLS_ACUITY_SCORE: 22
ADLS_ACUITY_SCORE: 22
ADLS_ACUITY_SCORE: 21
ADLS_ACUITY_SCORE: 16
ADLS_ACUITY_SCORE: 28
ADLS_ACUITY_SCORE: 31
ADLS_ACUITY_SCORE: 12
ADLS_ACUITY_SCORE: 16
ADLS_ACUITY_SCORE: 21
ADLS_ACUITY_SCORE: 20
ADLS_ACUITY_SCORE: 20
ADLS_ACUITY_SCORE: 22
ADLS_ACUITY_SCORE: 24
ADLS_ACUITY_SCORE: 35
ADLS_ACUITY_SCORE: 35
ADLS_ACUITY_SCORE: 33
ADLS_ACUITY_SCORE: 37
ADLS_ACUITY_SCORE: 27
ADLS_ACUITY_SCORE: 22
TRANSFERRING: 0-->ASSISTANCE NEEDED (DEVELOPMETNALLY APPROPRIATE)
ADLS_ACUITY_SCORE: 21
ADLS_ACUITY_SCORE: 25
ADLS_ACUITY_SCORE: 39
ADLS_ACUITY_SCORE: 33
ADLS_ACUITY_SCORE: 27
ADLS_ACUITY_SCORE: 33
ADLS_ACUITY_SCORE: 16
DRESS: 1-->ASSISTANCE (EQUIPMENT/PERSON) NEEDED
ADLS_ACUITY_SCORE: 35
ADLS_ACUITY_SCORE: 28
ADLS_ACUITY_SCORE: 35
ADLS_ACUITY_SCORE: 22
ADLS_ACUITY_SCORE: 33
ADLS_ACUITY_SCORE: 35
ADLS_ACUITY_SCORE: 21
ADLS_ACUITY_SCORE: 27
ADLS_ACUITY_SCORE: 22
ADLS_ACUITY_SCORE: 27
ADLS_ACUITY_SCORE: 35
ADLS_ACUITY_SCORE: 16
ADLS_ACUITY_SCORE: 35
ADLS_ACUITY_SCORE: 20
ADLS_ACUITY_SCORE: 22
ADLS_ACUITY_SCORE: 35
DIFFICULTY_EATING/SWALLOWING: NO
ADLS_ACUITY_SCORE: 33
ADLS_ACUITY_SCORE: 20
ADLS_ACUITY_SCORE: 22
ADLS_ACUITY_SCORE: 37
ADLS_ACUITY_SCORE: 35
ADLS_ACUITY_SCORE: 31
EQUIPMENT_CURRENTLY_USED_AT_HOME: OTHER (SEE COMMENTS);WALKER, ROLLING
CHANGE_IN_FUNCTIONAL_STATUS_SINCE_ONSET_OF_CURRENT_ILLNESS/INJURY: YES
ADLS_ACUITY_SCORE: 22
ADLS_ACUITY_SCORE: 35
ADLS_ACUITY_SCORE: 39
ADLS_ACUITY_SCORE: 37
ADLS_ACUITY_SCORE: 22
ADLS_ACUITY_SCORE: 28
ADLS_ACUITY_SCORE: 22
DIFFICULTY_COMMUNICATING: NO
TOILETING_ISSUES: YES
ADLS_ACUITY_SCORE: 22
ADLS_ACUITY_SCORE: 20
ADLS_ACUITY_SCORE: 29
ADLS_ACUITY_SCORE: 37
ADLS_ACUITY_SCORE: 23
ADLS_ACUITY_SCORE: 35
ADLS_ACUITY_SCORE: 27
ADLS_ACUITY_SCORE: 33
ADLS_ACUITY_SCORE: 22
ADLS_ACUITY_SCORE: 29
ADLS_ACUITY_SCORE: 31
ADLS_ACUITY_SCORE: 28
ADLS_ACUITY_SCORE: 35
ADLS_ACUITY_SCORE: 35
ADLS_ACUITY_SCORE: 33
ADLS_ACUITY_SCORE: 25
ADLS_ACUITY_SCORE: 25
TOILETING: 1-->ASSISTANCE (EQUIPMENT/PERSON) NEEDED
ADLS_ACUITY_SCORE: 28
ADLS_ACUITY_SCORE: 31
ADLS_ACUITY_SCORE: 22
ADLS_ACUITY_SCORE: 39
ADLS_ACUITY_SCORE: 33
ADLS_ACUITY_SCORE: 35
NUMBER_OF_TIMES_PATIENT_HAS_FALLEN_WITHIN_LAST_SIX_MONTHS: 2
ADLS_ACUITY_SCORE: 37
ADLS_ACUITY_SCORE: 33
ADLS_ACUITY_SCORE: 23
ADLS_ACUITY_SCORE: 21
ADLS_ACUITY_SCORE: 29
CHANGE_IN_FUNCTIONAL_STATUS_SINCE_ONSET_OF_CURRENT_ILLNESS/INJURY: YES
WALKING_OR_CLIMBING_STAIRS: AMBULATION DIFFICULTY, REQUIRES EQUIPMENT;TRANSFERRING DIFFICULTY, REQUIRES EQUIPMENT
ADLS_ACUITY_SCORE: 22
WALKING_OR_CLIMBING_STAIRS_DIFFICULTY: YES
ADLS_ACUITY_SCORE: 35
ADLS_ACUITY_SCORE: 35
ADLS_ACUITY_SCORE: 27
ADLS_ACUITY_SCORE: 37
ADLS_ACUITY_SCORE: 37
DOING_ERRANDS_INDEPENDENTLY_DIFFICULTY: YES
ADLS_ACUITY_SCORE: 37
ADLS_ACUITY_SCORE: 33
CONCENTRATING,_REMEMBERING_OR_MAKING_DECISIONS_DIFFICULTY: YES
ADLS_ACUITY_SCORE: 32
ADLS_ACUITY_SCORE: 37
ADLS_ACUITY_SCORE: 16
ADLS_ACUITY_SCORE: 33
ADLS_ACUITY_SCORE: 33
ADLS_ACUITY_SCORE: 35
CONCENTRATING,_REMEMBERING_OR_MAKING_DECISIONS_DIFFICULTY: YES
ADLS_ACUITY_SCORE: 29
ADLS_ACUITY_SCORE: 35
ADLS_ACUITY_SCORE: 32
ADLS_ACUITY_SCORE: 22
ADLS_ACUITY_SCORE: 33
ADLS_ACUITY_SCORE: 27
ADLS_ACUITY_SCORE: 37
ADLS_ACUITY_SCORE: 33
ADLS_ACUITY_SCORE: 37
ADLS_ACUITY_SCORE: 35
ADLS_ACUITY_SCORE: 34
ADLS_ACUITY_SCORE: 28
ADLS_ACUITY_SCORE: 39
ADLS_ACUITY_SCORE: 37
ADLS_ACUITY_SCORE: 33
ADLS_ACUITY_SCORE: 22
ADLS_ACUITY_SCORE: 37
ADLS_ACUITY_SCORE: 34
ADLS_ACUITY_SCORE: 37
DRESSING/BATHING_DIFFICULTY: YES
ADLS_ACUITY_SCORE: 39
ADLS_ACUITY_SCORE: 29
ADLS_ACUITY_SCORE: 20
ADLS_ACUITY_SCORE: 33
ADLS_ACUITY_SCORE: 35
ADLS_ACUITY_SCORE: 33
ADLS_ACUITY_SCORE: 28
ADLS_ACUITY_SCORE: 28
ADLS_ACUITY_SCORE: 35
ADLS_ACUITY_SCORE: 34
ADLS_ACUITY_SCORE: 37
TRANSFERRING: 1-->ASSISTANCE (EQUIPMENT/PERSON) NEEDED
ADLS_ACUITY_SCORE: 31
ADLS_ACUITY_SCORE: 24
ADLS_ACUITY_SCORE: 49
ADLS_ACUITY_SCORE: 22
ADLS_ACUITY_SCORE: 29
ADLS_ACUITY_SCORE: 22
ADLS_ACUITY_SCORE: 28
HEARING_DIFFICULTY_OR_DEAF: NO
ADLS_ACUITY_SCORE: 22
VISION_MANAGEMENT: GLASSES
ADLS_ACUITY_SCORE: 21
ADLS_ACUITY_SCORE: 32
ADLS_ACUITY_SCORE: 22
ADLS_ACUITY_SCORE: 20
ADLS_ACUITY_SCORE: 22
ADLS_ACUITY_SCORE: 27
DOING_ERRANDS_INDEPENDENTLY_DIFFICULTY: YES
ADLS_ACUITY_SCORE: 22
ADLS_ACUITY_SCORE: 29
DRESSING/BATHING_DIFFICULTY: NO
ADLS_ACUITY_SCORE: 33
ADLS_ACUITY_SCORE: 27
ADLS_ACUITY_SCORE: 22
ADLS_ACUITY_SCORE: 27
ADLS_ACUITY_SCORE: 35
ADLS_ACUITY_SCORE: 35
ADLS_ACUITY_SCORE: 34
ADLS_ACUITY_SCORE: 27
ADLS_ACUITY_SCORE: 33
ADLS_ACUITY_SCORE: 34
ADLS_ACUITY_SCORE: 25
ADLS_ACUITY_SCORE: 33
ADLS_ACUITY_SCORE: 37
ADLS_ACUITY_SCORE: 37
ADLS_ACUITY_SCORE: 35
ADLS_ACUITY_SCORE: 34
ADLS_ACUITY_SCORE: 37
ADLS_ACUITY_SCORE: 35
ADLS_ACUITY_SCORE: 29
ADLS_ACUITY_SCORE: 22
ADLS_ACUITY_SCORE: 21
ADLS_ACUITY_SCORE: 25
ADLS_ACUITY_SCORE: 35
ADLS_ACUITY_SCORE: 33
ADLS_ACUITY_SCORE: 35
ADLS_ACUITY_SCORE: 21
ADLS_ACUITY_SCORE: 35
ADLS_ACUITY_SCORE: 33
ADLS_ACUITY_SCORE: 33
PREVIOUS_RESPONSIBILITIES: MEAL PREP;HOUSEKEEPING;LAUNDRY;SHOPPING;MEDICATION MANAGEMENT;FINANCES;DRIVING
ADLS_ACUITY_SCORE: 16
ADLS_ACUITY_SCORE: 25
ADLS_ACUITY_SCORE: 22
ADLS_ACUITY_SCORE: 33
ADLS_ACUITY_SCORE: 35
ADLS_ACUITY_SCORE: 34
ADLS_ACUITY_SCORE: 27
ADLS_ACUITY_SCORE: 33
ADLS_ACUITY_SCORE: 22
ADLS_ACUITY_SCORE: 35
ADLS_ACUITY_SCORE: 35
ADLS_ACUITY_SCORE: 33
ADLS_ACUITY_SCORE: 37
ADLS_ACUITY_SCORE: 29
WEAR_GLASSES_OR_BLIND: YES
ADLS_ACUITY_SCORE: 12
ADLS_ACUITY_SCORE: 33
ADLS_ACUITY_SCORE: 28
ADLS_ACUITY_SCORE: 20
ADLS_ACUITY_SCORE: 35
ADLS_ACUITY_SCORE: 21
ADLS_ACUITY_SCORE: 22
ADLS_ACUITY_SCORE: 33
ADLS_ACUITY_SCORE: 21
ADLS_ACUITY_SCORE: 29
ADLS_ACUITY_SCORE: 22
ADLS_ACUITY_SCORE: 37
ADLS_ACUITY_SCORE: 27
ADLS_ACUITY_SCORE: 29
ADLS_ACUITY_SCORE: 31
ADLS_ACUITY_SCORE: 27
ADLS_ACUITY_SCORE: 33
TOILETING_ASSISTANCE: TOILETING DIFFICULTY, ASSISTANCE 1 PERSON
ADLS_ACUITY_SCORE: 21
ADLS_ACUITY_SCORE: 37
ADLS_ACUITY_SCORE: 22
ADLS_ACUITY_SCORE: 22
ADLS_ACUITY_SCORE: 37
ADLS_ACUITY_SCORE: 37
ADLS_ACUITY_SCORE: 35
ADLS_ACUITY_SCORE: 28
ADLS_ACUITY_SCORE: 35
ADLS_ACUITY_SCORE: 27
ADLS_ACUITY_SCORE: 37
ADLS_ACUITY_SCORE: 33
ADLS_ACUITY_SCORE: 22
TOILETING: 0-->NOT TOILET TRAINED OR ASSISTANCE NEEDED (DEVELOPMENTALLY APPROPRIATE)
ADLS_ACUITY_SCORE: 39
ADLS_ACUITY_SCORE: 21
ADLS_ACUITY_SCORE: 37
ADLS_ACUITY_SCORE: 22
ADLS_ACUITY_SCORE: 37
ADLS_ACUITY_SCORE: 33
WALKING_OR_CLIMBING_STAIRS_DIFFICULTY: NO
TOILETING_ISSUES: NO
ADLS_ACUITY_SCORE: 39
ADLS_ACUITY_SCORE: 28
ADLS_ACUITY_SCORE: 35
ADLS_ACUITY_SCORE: 33
ADLS_ACUITY_SCORE: 37
ADLS_ACUITY_SCORE: 21
DIFFICULTY_EATING/SWALLOWING: OTHER (SEE COMMENTS)
ADLS_ACUITY_SCORE: 22
WEAR_GLASSES_OR_BLIND: NO
ADLS_ACUITY_SCORE: 29
ADLS_ACUITY_SCORE: 27
ADLS_ACUITY_SCORE: 33
ADLS_ACUITY_SCORE: 20
ADLS_ACUITY_SCORE: 23
ADLS_ACUITY_SCORE: 37
ADLS_ACUITY_SCORE: 22

## 2022-01-01 ASSESSMENT — ENCOUNTER SYMPTOMS
APPETITE CHANGE: 1
BLOOD IN STOOL: 1
SHORTNESS OF BREATH: 0
FEVER: 0
FREQUENCY: 0
BACK PAIN: 1
VOMITING: 0
ABDOMINAL PAIN: 1
DYSURIA: 0

## 2022-01-01 ASSESSMENT — PATIENT HEALTH QUESTIONNAIRE - PHQ9
SUM OF ALL RESPONSES TO PHQ QUESTIONS 1-9: 2
SUM OF ALL RESPONSES TO PHQ QUESTIONS 1-9: 12
SUM OF ALL RESPONSES TO PHQ QUESTIONS 1-9: 2
SUM OF ALL RESPONSES TO PHQ QUESTIONS 1-9: 2
10. IF YOU CHECKED OFF ANY PROBLEMS, HOW DIFFICULT HAVE THESE PROBLEMS MADE IT FOR YOU TO DO YOUR WORK, TAKE CARE OF THINGS AT HOME, OR GET ALONG WITH OTHER PEOPLE: NOT DIFFICULT AT ALL

## 2022-01-01 ASSESSMENT — PAIN SCALES - GENERAL
PAINLEVEL: MILD PAIN (3)
PAINLEVEL: MODERATE PAIN (5)
PAINLEVEL: SEVERE PAIN (6)
PAINLEVEL: EXTREME PAIN (8)
PAINLEVEL: NO PAIN (1)

## 2022-01-01 ASSESSMENT — ASTHMA QUESTIONNAIRES: ACT_TOTALSCORE: 8

## 2022-01-05 NOTE — NURSING NOTE
Blood sugars checked today 169 at 7:Am    C-pap machine will be faxed to [facility] at [phone] along with MD notes.  Adeola Arroyo MA

## 2022-01-05 NOTE — PROGRESS NOTES
Amy is a 76 year old who is being evaluated via a billable video visit.      How would you like to obtain your AVS? MyChart  If the video visit is dropped, the invitation should be resent by: Text to cell phone: [phone]  Will anyone else be joining your video visit? No         This is a VIDEO ( using Doximity)  encounter with the patient.       Location of the provider : office   Location of the patient : home      07:51 --- 08: 23             Dr Diaz's note      Patient's instructions / PLAN:                                                        Plan:  1. Flonase nasal spray   2. Allegra or Zyrtec - 1 tablet daily   3. Try to stop the Sudafed   4. Sleep Center Referral -- Phone: [phone]  5. Please make a lab appointment for non fasting labs in April 6. Please make an appointment few days after the labs for ANNUAL EXAM     ASSESSMENT & PLAN:                                                      chronic medical problems:DM on insulin, morbid obesity,HTN, HLipidemia had extensive heart surgery in April for AVR, MVR, PFO closure, TV repair, pacemaker atrial lead replaced.  She was found with atrial fibrillation on the pacemaker records and she was started on amiodarone and Coumadin.     (J31.0) Chronic rhinitis  (primary encounter diagnosis)  Comment: Not controlled   Plan: fluticasone (FLONASE) 50 MCG/ACT nasal spray            (G47.33) LAVERNE (obstructive sleep apnea)  Comment:   Plan: SLEEP EVALUATION & MANAGEMENT REFERRAL - ADULT         -            (E11.22,  N18.32,  Z79.4) DM 2 , insulin, + CKD3  Comment: Controlled    Plan: Continuous Blood Gluc Sensor (FREESTYLE ADAM         14 DAY SENSOR) MISC, Hemoglobin A1c, CK total,         Comprehensive metabolic panel            (F33.42) Recurrent major depressive disorder, in full remission (H)  Comment: Controlled    Plan: sertraline (ZOLOFT) 100 MG tablet            (I48.0) Paroxysmal atrial fibrillation (H)  (Z95.3) S/P mitral valve replacement  with bioprosthetic valve  (Z95.2) S/P aortic valve and mitral valve replacement  (Z95.2) S/P AVR (aortic valve replacement)  (I07.1) Tricuspid valve insufficiency, unspecified etiology  (Z95.0) Cardiac pacemaker in situ  stable     (I10) Essential hypertension  Comment: Controlled    Plan:       Comprehensive metabolic panel            (D75.89) Macrocytosis without anemia  Comment:   Plan: Vitamin B12, Folate, CBC with platelets               Chief complaint:                                                      Follow up chronic medical problems      SUBJECTIVE:                                                    History of present illness:    URI  -- runny nose for months and now it is worse  -- worse for the last months   -- nasal secretions are thicker but clear   -- uses Sudafed almost daily for 2-3 weeks   -- uses Atrovent nasal spray with no help  -- she doesn't feel sick      DM  -- A1c 6.9 Nov 19  -- Insulin N 22 units bid  -- Insulin R 30 - 34 units daily in 3 divided doses   -- she buys the Insulin OTC    Review of Systems:                                                      ROS: negative for fever, chills, cough, wheezes, chest pain, shortness of breath, vomiting, abdominal pain, leg swelling       OBJECTIVE:           An actual physical exam can't be done during phone visit   A limited exam can sometimes be performed by video visit   NAD       PMHx: reviewed  Past Medical History:   Diagnosis Date     (HFpEF) heart failure with preserved ejection fraction (H)      Aortic stenosis      Chest pain      Depressive disorder      Diabetes (H)      Hyperlipidemia      Hypertension      Mitral annular calcification      Mitral stenosis      Obesity      Sleep apnea     CPAP      PSHx: reviewed  Past Surgical History:   Procedure Laterality Date     APPENDECTOMY       CV CORONARY ANGIOGRAM N/A 4/8/2020    Procedure: Coronary Angiogram;  Surgeon: Inez Peoples MD;  Location: [facility] CARDIAC CATH LAB      CV LEFT HEART CATH N/A 2020    Procedure: Left Heart Cath;  Surgeon: Inez Peoples MD;  Location:  HEART CARDIAC CATH LAB     CV PFO CLOSURE N/A 4/15/2020    Procedure: Patent Foramen Ovale Closure;  Surgeon: Ok Wilson MD;  Location:  OR     CV RIGHT HEART CATH MEASUREMENTS RECORDED N/A 2020    Procedure: Right Heart Cath;  Surgeon: Inez Peoples MD;  Location:  HEART CARDIAC CATH LAB     EP PACEMAKER N/A 2020    Procedure: EP Pacemaker;  Surgeon: Sohan Francis MD;  Location:  HEART CARDIAC CATH LAB     GYN SURGERY       x2 ,      GYN SURGERY      total hysterectomy     IMPLANT PACEMAKER       REPAIR VALVE TRICUSPID N/A 4/15/2020    Procedure: REPAIR TRICUSPID VALVE WITH VILLA MC3 26MM.;  Surgeon: Ok Wilson MD;  Location:  OR     REPLACE VALVE AORTIC N/A 4/15/2020    Procedure: REPLACEMENT, AORTIC VALVE WITH INSPIRIS TISSUE VALVE 25 MM; ON PUMP WITH SOCORRO READ BY CARDIOLOGIST DR BLANTON.;  Surgeon: Ok Wilson MD;  Location:  OR     REPLACE VALVE MITRAL N/A 4/15/2020    Procedure: REPLACEMENT, MITRAL VALVE WITH EPIC TISSUE VALVE 27 MM.;  Surgeon: Ok Wilson MD;  Location:  OR        Meds: reviewed  Current Outpatient Medications   Medication Sig Dispense Refill     acetaminophen (TYLENOL) 325 MG tablet Take 2 tablets (650 mg) by mouth every 4 hours as needed for other (multimodal surgical pain management along with NSAIDS and opioid medication as indicated based on pain control and physical function.)  0     albuterol (PROAIR HFA/PROVENTIL HFA/VENTOLIN HFA) 108 (90 Base) MCG/ACT inhaler Inhale 2 puffs into the lungs every 4 hours as needed for shortness of breath / dyspnea or wheezing       atorvastatin (LIPITOR) 40 MG tablet Take 1 tablet (40 mg) by mouth At Bedtime 90 tablet 3     Biotin 55216 MCG TABS Take 10,000 mcg by mouth every morning        Calcium Carbonate-Vit D-Min (CALCIUM 1200  PO) Take 1 tablet by mouth 2 times daily        coenzyme Q-10 200 MG CAPS Take 200 mg by mouth every morning        Continuous Blood Gluc  (FREESTYLE ADAM READER) HARJEET 1 each every 14 days Use to read blood sugars per  instructions. 1 each 0     Continuous Blood Gluc Sensor (FREESTYLE ADAM 14 DAY SENSOR) MISC 1 each every 14 days Change every 14 days. 2 each 5     cyanocobalamin (VITAMIN B-12) 100 MCG tablet Take 100 mcg by mouth daily       ferrous gluconate (FERGON) 324 (38 Fe) MG tablet Take 1 tablet (324 mg) by mouth daily (with breakfast) 30 tablet 3     furosemide (LASIX) 40 MG tablet Take one tablet (40 mg) twice daily 180 tablet 1     gabapentin (NEURONTIN) 100 MG capsule May use 1-3 capsules, 1-3x/during the day as needed 180 capsule 3     Ginkgo Biloba (GINKOBA PO) Take 250 mg by mouth daily       insulin  UNIT/ML vial Inject 22 units every morning and 20 unit(s) every evening 10 mL      insulin regular 100 UNIT/ML vial Inject 0.1 mLs (10 Units) Subcutaneous 3 times daily (before meals)       ipratropium (ATROVENT) 0.06 % nasal spray Spray 2 sprays into both nostrils 4 times daily as needed for rhinitis 3 Box 1     Lutein 40 MG CAPS Take 40 mg by mouth every morning        Magnesium 400 MG TABS Take 400 mg by mouth every evening        metFORMIN (GLUCOPHAGE-XR) 500 MG 24 hr tablet TAKE 2 TABLETS EVERY DAY WITH BREAKFAST 180 tablet 0     metoprolol succinate ER (TOPROL-XL) 25 MG 24 hr tablet Take 2 tablets (50 mg) by mouth daily 180 tablet 3     potassium 99 MG TABS Take 1 tablet by mouth daily       Propylene Glycol (SYSTANE BALANCE OP) Apply 1-2 drops to eye 3 times daily as needed (dry eyes)       sertraline (ZOLOFT) 100 MG tablet TAKE 1 AND 1/2 TABLETS EVERY MORNING  135 tablet 0     TURMERIC PO Take 3 tablets by mouth daily        vitamin (B COMPLEX-C) tablet Take 1 tablet by mouth daily       vitamin C (ASCORBIC ACID) 1000 MG TABS Take 1,000 mg by mouth every evening         Vitamin D3 (CHOLECALCIFEROL) 125 MCG (5000 UT) tablet Take 1 tablet by mouth daily       warfarin ANTICOAGULANT (COUMADIN) 5 MG tablet TAKE 2 tablets (10 mg) every Wed, Sat; and take 1-1/2 tablets (7.5 mg) all other days of the week or as directed by your [facility]. 150 tablet 1     zinc 23 MG LOZG lozenge Place 1 lozenge inside cheek daily          Soc Hx: reviewed  Fam Hx: reviewed          She Diaz MD  Internal Medicine

## 2022-01-05 NOTE — PATIENT INSTRUCTIONS
Plan:  1. Flonase nasal spray   2. Allegra or Zyrtec - 1 tablet daily   3. Try to stop the Sudafed   4. Sleep Center Referral -- Phone: [phone]  5. Please make a lab appointment for non fasting labs in April 6. Please make an appointment few days after the labs for ANNUAL EXAM

## 2022-01-10 NOTE — PROGRESS NOTES
ANTICOAGULATION MANAGEMENT     Amy Luna 76 year old female is on warfarin with subtherapeutic INR result. (Goal INR 2.0-3.0)    Recent labs: (last 7 days)     01/10/22  1134   INR 1.9*       ASSESSMENT     Source(s): Chart Review and Patient/Caregiver Call       Warfarin doses taken: Warfarin taken as instructed    Diet: No new diet changes identified    New illness, injury, or hospitalization: Yes: Patient reports she had a cold, this has resolved    Medication/supplement changes: None noted    Signs or symptoms of bleeding or clotting: No    Previous INR: Therapeutic last visit; previously outside of goal range    Additional findings: None     PLAN     Recommended plan for no diet, medication or health factor changes affecting INR     Dosing Instructions: Continue your current warfarin dose with next INR in 3 weeks       Summary  As of 1/10/2022    Full warfarin instructions:  10 mg every Wed, Sat; 7.5 mg all other days   Next INR check:  1/31/2022             Telephone call with Amy who verbalizes understanding and agrees to plan    Lab visit scheduled    Education provided: Please call back if any changes to your diet, medications or how you've been taking warfarin and Contact [phone]  with any changes, questions or concerns.     Plan made per [facility] anticoagulation protocol    Chiqui Nowak RN  Anticoagulation Clinic  1/10/2022    _______________________________________________________________________     Anticoagulation Episode Summary     Current INR goal:  2.0-3.0   TTR:  74.8 % (1 y)   Target end date:  Indefinite   Send INR reminders to:  [facility]    Indications    Paroxysmal atrial fibrillation (H) [I48.0]  Long term current use of anticoagulants with INR goal of 2.0-3.0 [Z79.01]  S/P aortic valve and mitral valve replacement [Z95.2]           Comments:           Anticoagulation Care Providers     Provider Role Specialty Phone number    Yamilka Christina DO  Referring Cardiovascular Disease [phone]    Kelli Lewis CNP Referring Internal Medicine [phone]    She Huang MD Referring Internal Medicine [phone]

## 2022-01-31 NOTE — PROGRESS NOTES
ANTICOAGULATION MANAGEMENT     Amy Luna 76 year old female is on warfarin with therapeutic INR result. (Goal INR 2.0-3.0)    Recent labs: (last 7 days)     01/31/22  1140   INR 2.0*       ASSESSMENT     Source(s): Chart Review and Patient/Caregiver Call       Warfarin doses taken: Warfarin taken as instructed    Diet: No new diet changes identified    New illness, injury, or hospitalization: No    Medication/supplement changes: None noted    Signs or symptoms of bleeding or clotting: No    Previous INR: Subtherapeutic    Additional findings: None     PLAN     Recommended plan for no diet, medication or health factor changes affecting INR     Dosing Instructions: Continue your current warfarin dose with next INR in 4 weeks       Summary  As of 1/31/2022    Full warfarin instructions:  10 mg every Wed, Sat; 7.5 mg all other days   Next INR check:  2/28/2022             Telephone call with Amy who verbalizes understanding and agrees to plan    Lab visit scheduled    Education provided: Please call back if any changes to your diet, medications or how you've been taking warfarin and Contact [phone]  with any changes, questions or concerns.     Plan made per [facility] anticoagulation protocol    Chiqui Nowak RN  Anticoagulation Clinic  1/31/2022    _______________________________________________________________________     Anticoagulation Episode Summary     Current INR goal:  2.0-3.0   TTR:  73.8 % (1 y)   Target end date:  Indefinite   Send INR reminders to:  [facility]    Indications    Paroxysmal atrial fibrillation (H) [I48.0]  Long term current use of anticoagulants with INR goal of 2.0-3.0 [Z79.01]  S/P aortic valve and mitral valve replacement [Z95.2]           Comments:           Anticoagulation Care Providers     Provider Role Specialty Phone number    Yamilka Christina DO Referring Cardiovascular Disease [phone]    Kelli Lewis CNP Referring Internal Medicine  [phone]    She Huang MD Referring Internal Medicine [phone]

## 2022-01-31 NOTE — PROGRESS NOTES
Left message to call [phone].     Anticoagulation Management    Unable to reach Amy today.    Today's INR result of 2.0 is therapeutic (goal INR of 2.0-3.0).  Result received from: Clinic Lab    Follow up required to confirm warfarin dose taken and assess for changes    Left message to continue current dose of warfarin 7.5 mg tonight. Request call back for assessment.      Anticoagulation clinic to follow up    Chiqui Nowak RN

## 2022-02-20 NOTE — TELEPHONE ENCOUNTER
"Consent to communicate is on file,  calling. Has had back pain for a month or so, got really bad on Tuesday    Reason for Disposition    [1] SEVERE back pain (e.g., excruciating) AND [2] sudden onset AND [3] age > 60     \"Suddenly got worse\"    Additional Information    Negative: Passed out (i.e., lost consciousness, collapsed and was not responding)    Negative: Shock suspected (e.g., cold/pale/clammy skin, too weak to stand, low BP, rapid pulse)    Negative: Sounds like a life-threatening emergency to the triager    Negative: Major injury to the back (e.g., MVA, fall > 10 feet or 3 meters, penetrating injury, etc.)    Negative: Followed a tailbone injury    Negative: [1] Pain in the upper back over the ribs (rib cage) AND [2] radiates (travels, goes) into chest    Negative: [1] Pain in the upper back over the ribs (rib cage) AND [2] worsened by coughing (or clearly increases with breathing)    Negative: Back pain during pregnancy    Negative: Pain mainly in flank (i.e., in the side, over the lower ribs or just below the ribs)    Answer Assessment - Initial Assessment Questions  1. ONSET: \"When did the pain begin?\"       A month ago, got worse on Tuesday, went to chiropractor, now can hardly stand up  2. LOCATION: \"Where does it hurt?\" (upper, mid or lower back)      Low back  3. SEVERITY: \"How bad is the pain?\"  (e.g., Scale 1-10; mild, moderate, or severe)    - MILD (1-3): doesn't interfere with normal activities     - MODERATE (4-7): interferes with normal activities or awakens from sleep     - SEVERE (8-10): excruciating pain, unable to do any normal activities       severe  4. PATTERN: \"Is the pain constant?\" (e.g., yes, no; constant, intermittent)       constant  5. RADIATION: \"Does the pain shoot into your legs or elsewhere?\"      A bit around right side  6. CAUSE:  \"What do you think is causing the back pain?\"       ?  7. BACK OVERUSE:  \"Any recent lifting of heavy objects, strenuous work or " "exercise?\"      no  8. MEDICATIONS: \"What have you taken so far for the pain?\" (e.g., nothing, acetaminophen, NSAIDS)      Not much help  9. NEUROLOGIC SYMPTOMS: \"Do you have any weakness, numbness, or problems with bowel/bladder control?\"      no  10. OTHER SYMPTOMS: \"Do you have any other symptoms?\" (e.g., fever, abdominal pain, burning with urination, blood in urine)        no  11. PREGNANCY: \"Is there any chance you are pregnant?\" (e.g., yes, no; LMP)        no    Protocols used: BACK PAIN-A-AH      "

## 2022-02-21 NOTE — NURSING NOTE
"on and off pain for 4 months, no getting worse.  Vital signs:  Temp: 98.3  F (36.8  C) Temp src: Tympanic BP: 130/58 Pulse: 104   Resp: 16 SpO2: 97 %     Height: 165.1 cm (5' 5\") Weight: 118.5 kg (261 lb 3.2 oz)  Estimated body mass index is 43.47 kg/m  as calculated from the following:    Height as of this encounter: 1.651 m (5' 5\").    Weight as of this encounter: 118.5 kg (261 lb 3.2 oz).          "

## 2022-02-21 NOTE — PROGRESS NOTES
"  Assessment & Plan     Right-sided low back pain with sciatica, sciatica laterality unspecified, unspecified chronicity    - cyclobenzaprine (FLEXERIL) 10 MG tablet; Take 1 tablet (10 mg) by mouth 3 times daily as needed for muscle spasms  - Physical Therapy Referral; Future  - traMADol (ULTRAM) 50 MG tablet; Take 1 tablet (50 mg) by mouth every 6 hours as needed for severe pain             BMI:   Estimated body mass index is 43.47 kg/m  as calculated from the following:    Height as of this encounter: 1.651 m (5' 5\").    Weight as of this encounter: 118.5 kg (261 lb 3.2 oz).           No follow-ups on file.    Yolette Mcguire NP  [facility] MARVIN eRyes is a 76 year old who presents for the following health issues     HPI     Concern - 1 week back pain  Onset: of and on 3-4 months  Description: sharp low back pain with radiation to R groin, lateral thigh  Intensity: 3-10/10  Progression of Symptoms:  intermittent  Accompanying Signs & Symptoms: none  Previous history of similar problem: none  Precipitating factors:        Worsened by: lying to sitting  Alleviating factors:        Improved by: sitting, lying  Therapies tried and outcome: NSAIDs, heat        Review of Systems   Constitutional, HEENT, cardiovascular, pulmonary, gi and gu systems are negative, except as otherwise noted.      Objective    /58   Pulse 104   Temp 98.3  F (36.8  C) (Tympanic)   Resp 16   Ht 1.651 m (5' 5\")   Wt 118.5 kg (261 lb 3.2 oz)   SpO2 97%   BMI 43.47 kg/m    Body mass index is 43.47 kg/m .  Physical Exam   GENERAL: alert, moderate distress and obese  Comprehensive back pain exam:  Tenderness of R paralumbar muscles and Straight leg raise negative bilaterally, unable to evaluate due to unable to get up on exam table.                "

## 2022-02-28 PROBLEM — M54.42 ACUTE BILATERAL LOW BACK PAIN WITH BILATERAL SCIATICA: Status: ACTIVE | Noted: 2022-01-01

## 2022-02-28 PROBLEM — M54.41 ACUTE BILATERAL LOW BACK PAIN WITH BILATERAL SCIATICA: Status: ACTIVE | Noted: 2022-01-01

## 2022-02-28 NOTE — PROGRESS NOTES
[facility]    OUTPATIENT Physical Therapy ORTHOPEDIC EVALUATION  PLAN OF TREATMENT FOR OUTPATIENT REHABILITATION  (COMPLETE FOR INITIAL CLAIMS ONLY)  Patient's Last Name, First Name, M.I.  YOB: 1945  Amy Luna    Provider s Name:  [facility]   Medical Record No.  [id number]   Start of Care Date:  02/28/22   Onset Date:   02/15/22   Type:     _X__PT   ___OT Medical Diagnosis:    Encounter Diagnoses   Name Primary?     Right-sided low back pain with sciatica, sciatica laterality unspecified, unspecified chronicity      Acute bilateral low back pain with bilateral sciatica         Treatment Diagnosis:  Low Back Pain         Goals:     02/28/22 0500   Body Part   Goals listed below are for Low Back Pain   Goal #1   Goal #1 ambulation   Previous Functional Level No restrictions   Current Functional Level Minutes patient can walk;with walker   Performance Level 2 minutes    STG Target Performance Minutes patient will be able to walk;with walker   Performance Level 5 minutes    Rationale for safe household ambulation;for safe outdoor household ambulation;for safe community ambulation;for safe work place ambulation;to promote a healthy and active lifestyle   Due Date 03/21/22    LTG Target Performance Minutes patient will be able to  walk   Performance Level 10 minutes with or without appropriate assistive device   Rationale for safe household ambulation;for safe outdoor household ambulation;for safe community ambulation;to promote a healthy and active lifestyle   Due Date 04/11/22       Therapy Frequency:  1x/week  Predicted Duration of Therapy Intervention:  8 weeks    BIJU MAZARIEGOS, PT                 I CERTIFY THE NEED FOR THESE SERVICES FURNISHED UNDER        THIS PLAN OF TREATMENT AND WHILE UNDER MY CARE     (Physician attestation of this document  indicates review and certification of the therapy plan).                       Certification Date From:  02/28/22   Certification Date To:  04/25/22    Referring Provider:  Yolette Mcguire    Initial Assessment        See Epic Evaluation SOC Date: 02/28/22

## 2022-02-28 NOTE — PROGRESS NOTES
Physical Therapy Initial Evaluation  Subjective:    Patient Health History  Amy Luna being seen for back pain.     Problem began: 2/15/2022.   Problem occurred: don't know   Pain is reported as 4/10 on pain scale.  General health as reported by patient is poor.  Pertinent medical history includes: anemia, depression, diabetes, history of fractures, heart problems, implanted device, migraines/headaches, overweight, rheumatoid arthritis, sleep disorder/apnea and weakness.        Surgeries include:  Heart surgery and other. Other surgery history details: two D$C's and hysterotomy.    Current medications:  Anti-depressants, anti-inflammatory, cardiac medication, heparin/coumadin, muscle relaxants and pain medication.       Primary job tasks include:  Computer work, prolonged sitting and other.   Other job/home tasks details: house work.                Therapist Generated HPI Evaluation  Problem details: The patient reports that she has been experiencing this low back pain for the past 2 weeks. She reports incidence of similar pain over the past several months, but it would improve within a day or two. Now, the pain has continued. She went to see a Chiropractor 3x when the pain started, with increase in pain after the third visit and she has not returned. The patient reports that prior to the past two weeks, she was able to perform ADLS, walk independently without assistive device. Now, she is primarily bed ridden, and utilizes a wheelchair and walker for mobility, only being able to walk to the bathroom with her walker, but not further than that. She reports pain in the R LB initially that radiates down the side of the  R thigh, but over the past couple of days, she is starting to feel pain in the L low back and anterior thigh. .         Type of problem:  Lumbar.    This is a new condition.  Occurance: unknown.    Patient reports pain:  Lower lumbar spine, lumbar spine left and lumbar spine right.  Pain is  described as aching and sharp and is constant.  Pain radiates to:  Gluteals left, gluteals right, thigh left and thigh right. Pain is the same all the time.  Since onset symptoms are gradually worsening.  Associated symptoms:  Loss of motion/stiffness, loss of strength, tingling and numbness. Symptoms are exacerbated by bending, walking, carrying, sitting, certain positions, standing, stress, lying down and twisting  and relieved by muscle relaxants and heat.    Previous treatment includes chiropractic. There was mild improvement following previous treatment.  Work activity restrictions: Retired.  Barriers include:  Requires assistance with ADL's.      Oswestry Score: 62.22 %                 Objective:  System         Lumbar/SI Evaluation  ROM:    AROM Lumbar:   Flexion:            Tested in sitting: able to flex forward with soreness, but no increase in radiating pain  Ext:                    Performed in standing with SBA, pain reproduced in L LB    Side Bend:        Left:  NT    Right:  NT  Rotation:           Left:  NT    Right:  NT   Side Glide:        Left:     Right:           Lumbar Myotomes:  Lumbar myotomes: Pain with hip flexor and quad testing in LB.  T12-L3 (Hip Flex):  Left: 4+    Right: 4+  L2-4 (Quads):  Left:  4+    Right:  4+  L4 (Ankle DF):  Left:  4+    Right:  4+              Lumbar Palpation:      Tenderness not present at Left:    Quadratus Lumborum; Erector Spinae or PSIS    Tenderness not present at Right:  Quadratus Lumborum; Erector Spinae or PSIS                                                 Objective testing limited overall due to patient's lack of mobility, pain and apprehension to movement. Patient able to stand from walker with SBA. Pain immediately upon standing, but alleviates throughout approx. 1 minute of standing. Significant pain in LB upon return to sitting in wheel chair.   General     ROS    Assessment/Plan:    Patient is a 76 year old female with lumbar complaints.     Patient has the following significant findings with corresponding treatment plan.                Diagnosis 1:  Low Back Pain  Pain -  hot/cold therapy, US, electric stimulation, manual therapy and home program  Decreased ROM/flexibility - manual therapy and therapeutic exercise  Decreased strength - therapeutic exercise and therapeutic activities  Impaired balance - neuro re-education and therapeutic activities  Decreased proprioception - neuro re-education and therapeutic activities  Impaired gait - gait training  Impaired muscle performance - neuro re-education  Decreased function - therapeutic activities  Impaired posture - neuro re-education    Therapy Evaluation Codes:   1) History comprised of:   Personal factors that impact the plan of care:      Age, Living environment, Overall behavior pattern, Past/current experiences and Time since onset of symptoms.    Comorbidity factors that impact the plan of care are:      Diabetes, Depression, Heart problems, Implanted device, Migraines/headaches, Numbness/tingling, Overweight, Rheumatoid arthritis and Sleep disorder/apnea.     Medications impacting care: Anti-depressant, Anti-inflammatory, Cardiac, Heparin/coumadin, Muscle relaxant and Pain.  2) Examination of Body Systems comprised of:   Body structures and functions that impact the plan of care:      Hip, Lumbar spine and Sacral illiac joint.   Activity limitations that impact the plan of care are:      Bathing, Bending, Cooking, Dressing, Lifting, Sitting, Stairs, Standing, Walking and Sleeping.  3) Clinical presentation characteristics are:   Evolving/Changing.  4) Decision-Making    Moderate complexity using standardized patient assessment instrument and/or measureable assessment of functional outcome.  Cumulative Therapy Evaluation is: Moderate complexity.    Previous and current functional limitations:  (See Goal Flow Sheet for this information)    Short term and Long term goals: (See Goal Flow Sheet for  this information)     Communication ability:  Patient appears to be able to clearly communicate and understand verbal and written communication and follow directions correctly.  Treatment Explanation - The following has been discussed with the patient:   RX ordered/plan of care  Anticipated outcomes  Possible risks and side effects  This patient would benefit from PT intervention to resume normal activities.   Rehab potential is good.    Frequency:  1 X week, once daily  Duration:  for 6 weeks  Discharge Plan:  Achieve all LTG.  Independent in home treatment program.  Reach maximal therapeutic benefit.    Please refer to the daily flowsheet for treatment today, total treatment time and time spent performing 1:1 timed codes.

## 2022-03-14 PROBLEM — K62.5 BRBPR (BRIGHT RED BLOOD PER RECTUM): Status: ACTIVE | Noted: 2022-01-01

## 2022-03-14 PROBLEM — Z79.01 CHRONIC ANTICOAGULATION: Status: ACTIVE | Noted: 2022-01-01

## 2022-03-14 PROBLEM — D62 ANEMIA DUE TO BLOOD LOSS, ACUTE: Status: ACTIVE | Noted: 2022-01-01

## 2022-03-14 PROBLEM — R53.1 GENERALIZED WEAKNESS: Status: ACTIVE | Noted: 2022-01-01

## 2022-03-14 NOTE — PROVIDER NOTIFICATION
MD Notification    Notified Person: MD    Notified Person Name: Dr. Knight    Notification Date/Time: 3/14/2022 2603    Notification Interaction: Vocera page    Purpose of Notification: When do you want next hgb and INR drawn? Was ordered for 1400 but had first unit of blood running. Second unit of blood running now.    Orders Received: Per MD, check hgb and INR 4 hours after second unit of blood is transfused.    Comments:

## 2022-03-14 NOTE — ED PROVIDER NOTES
History   Chief Complaint:  Back pain     The history is provided by the patient and the spouse.      Amy Luna is a 76 year old female anticoagulated on Coumadin with history of mitral and aortic valve replacements, tricuspid valve repair, and paroxysmal A fib who presents with back pain. The patient states about a month ago she started having lower back pain, located right above the buttock crease radiating down her right leg. This lasted a couple of weeks and is now gone. Then two weeks ago she notes the pain moved to her right hip, radiating down the back. She has tried using opioids and a muscle relaxant, which were prescribed by her NP, but she stopped taking these because they were not helping. She denies any recent falls or trauma to the hip, nor any fevers or history of cancer of being immunocompromised. She has also tried physical therapy where she is given electric stimulation, but this has not helped either. No numbness in her groin, no incontinence of stool or urine.     The patient additionally notes some diarrhea and four episodes of black bloody stools since Friday (3/11). The  states she has been taking regular strength Tylenol three times per day, as well as Advil. She denies any vomiting or chest pain but does note some right sided abdominal pain. She also endorses some increasing shortness of breath with exertion and has had a lack of appetite for about a week. No dysuria, or increased urgency or frequency of urination. Her last INR was 2, and she was supposed to get another check today. She also notes some blood coming out of her nose when she uses a tissue. She has never had a blood transfusion before.    Review of Systems   Constitutional: Positive for appetite change (decrease). Negative for fever.   Respiratory: Negative for shortness of breath.    Cardiovascular: Negative for chest pain.   Gastrointestinal: Positive for abdominal pain and blood in stool (bright red).  Negative for vomiting.   Genitourinary: Negative for dysuria, frequency and urgency.   Musculoskeletal: Positive for back pain (right hip radiating to back).   All other systems reviewed and are negative.    Allergies:  Penicillins  Pioglitazone    Medications:  Tylenol  Lipitor  Flexeril  Fergon  Flonase  Lasix  Atrovent  Glucophage  Toprol  Coumadin  Albuterol  Neurotonin  Advil/Motrin    Past Medical History:     Heart failure with preserved ejection fraction  Aortic stenosis  Second degree heart block  Paroxysmal A fib  Mitral annular calcification  Depression  Diabetes  Hyperlipidemia  Hypertension  Obesity  Sleep apnea  CLARITA  Adenomatous colon polyp    Glaucoma    Past Surgical History:    Appendectomy  Left heart catheterization  Pacemaker implant  C section x 2  Hysterectomy  Mitral valve replacement  Aortic valve replacement  Tricuspid valve repair   Tonsillectomy and adenoidectomy  Right cataract extraction  Mobile teeth extraction  Left Kelman phacoemulsification intraocular lens implant with eye stent  Trigger finger release  Papilloma removed from right breast    Family History:    Mother: Diabetes  Father: Congenital heart disease    Social History:  The patient presents with her . Never smoker.    Physical Exam     Patient Vitals for the past 24 hrs:   BP Temp Temp src Pulse Resp SpO2   03/14/22 1633 132/45 98.1  F (36.7  C) Oral 85 20 97 %   03/14/22 1530 123/51 97.6  F (36.4  C) Oral 87 18 99 %   03/14/22 1429 -- 98  F (36.7  C) Oral -- -- --   03/14/22 1426 126/50 99.1  F (37.3  C) Axillary 87 24 99 %   03/14/22 1415 129/43 98.1  F (36.7  C) Oral 86 18 100 %   03/14/22 1347 136/44 97.5  F (36.4  C) Oral 89 18 100 %   03/14/22 1315 125/49 -- -- 90 18 96 %   03/14/22 1300 132/49 -- -- 90 15 99 %   03/14/22 1245 132/48 -- -- 89 20 99 %   03/14/22 1230 123/45 -- -- 92 16 99 %   03/14/22 1215 132/51 -- -- 92 18 100 %   03/14/22 1200 125/46 -- -- 92 15 99 %   03/14/22 1145 122/46 -- -- 92 19 100 %    03/14/22 1132 120/46 -- -- 92 20 97 %   03/14/22 1115 127/49 -- -- 94 19 100 %   03/14/22 1100 127/58 98.1  F (36.7  C) Oral 95 16 100 %   03/14/22 1056 127/47 98.2  F (36.8  C) -- 94 16 --   03/14/22 1045 (!) 120/38 -- -- 91 16 95 %   03/14/22 1015 (!) 124/28 -- -- -- -- --   03/14/22 1000 -- -- -- -- -- 96 %   03/14/22 0930 98/41 -- -- 95 -- 100 %   03/14/22 0900 104/42 -- -- 94 -- 98 %   03/14/22 0819 (!) 118/39 97.6  F (36.4  C) Oral 91 16 100 %       Physical Exam  General: Alert, no acute distress  Neuro:  No focal deficits; 5/5 bilateral dorsiflexion/plantarflexion at the ankles.  SILT throughout BLE.  HEENT:  Moist mucous membranes.  Posterior oropharynx clear, no exudates.  Conjunctiva pale.   CV:  RRR, 1/6 SYS murmur., skin warm and well perfused  Pulm:  CTAB, no wheezes/ronchi/rales.  No acute distress, breathing comfortably  GI:  Soft, left side abdominal tenderness, nondistended.  No rebound or guarding.    MSK:  Moving all extremities.  No focal areas of edema, erythema  Skin:  WWP, no rashes, no lower extremity edema, skin color pale  Psych:  Well-appearing, normal affect, regular speech    Emergency Department Course   ECG  ECG obtained at 0919, ECG read at 0919  Normal sinus rhythm  Normal ECG   No significant change as compared to prior, dated 9/16/20.  Rate 95 bpm. IN interval 154 ms. QRS duration 96 ms. QT/QTc 354/444 ms. P-R-T axes 74 -27 55.     Imaging:  Abd/pelvis CT,  IV  contrast only TRAUMA / AAA   Final Result   IMPRESSION:    1.  No acute findings in the abdomen and pelvis.    2.  Very mild sigmoid diverticulosis.   3.  Splenomegaly.      THIAGO LARA MD            SYSTEM ID:  [id number]        Report per radiology    Laboratory:  Labs Ordered and Resulted from Time of ED Arrival to Time of ED Departure   BASIC METABOLIC PANEL - Abnormal       Result Value    Sodium 138      Potassium 4.4      Chloride 108      Carbon Dioxide (CO2) 22      Anion Gap 8      Urea Nitrogen 60 (*)      Creatinine 1.39 (*)     Calcium 9.4      Glucose 313 (*)     GFR Estimate 39 (*)    INR - Abnormal    INR >10.00 (*)    ROUTINE UA WITH MICROSCOPIC REFLEX TO CULTURE - Abnormal    Color Urine Yellow      Appearance Urine Clear      Glucose Urine 50  (*)     Bilirubin Urine Negative      Ketones Urine Negative      Specific Gravity Urine 1.021      Blood Urine Negative      pH Urine 5.5      Protein Albumin Urine Negative      Urobilinogen Urine Normal      Nitrite Urine Negative      Leukocyte Esterase Urine Moderate (*)     Bacteria Urine Many (*)     WBC Clumps Urine Present (*)     Mucus Urine Present (*)     RBC Urine <1      WBC Urine 15 (*)     Hyaline Casts Urine 10 (*)     WBC Casts Urine 5 (*)    HEPATIC FUNCTION PANEL - Abnormal    Bilirubin Total 0.2      Bilirubin Direct <0.1      Protein Total 7.0      Albumin 2.1 (*)     Alkaline Phosphatase 66      AST 15      ALT 18     CBC WITH PLATELETS AND DIFFERENTIAL - Abnormal    WBC Count 13.8 (*)     RBC Count 1.60 (*)     Hemoglobin 4.3 (*)     Hematocrit 15.0 (*)     MCV 94      MCH 26.9      MCHC 28.7 (*)     RDW 17.1 (*)     Platelet Count 375      % Neutrophils 81      % Lymphocytes 9      % Monocytes 7      % Eosinophils 1      % Basophils 0      % Immature Granulocytes 2      NRBCs per 100 WBC 1 (*)     Absolute Neutrophils 11.1 (*)     Absolute Lymphocytes 1.3      Absolute Monocytes 0.9      Absolute Eosinophils 0.2      Absolute Basophils 0.0      Absolute Immature Granulocytes 0.3      Absolute NRBCs 0.1     GLUCOSE BY METER - Abnormal    GLUCOSE BY METER POCT 268 (*)    TROPONIN I - Normal    Troponin I High Sensitivity 9     COVID-19 VIRUS (CORONAVIRUS) BY PCR - Normal    SARS CoV2 PCR Negative     TYPE AND SCREEN, ADULT    ABO/RH(D) A POS      Antibody Screen Negative      SPECIMEN EXPIRATION DATE [id number]     PREPARE RED BLOOD CELLS (UNIT)    CROSSMATCH Compatible      UNIT ABO/RH A Pos      Unit Number [id number]      Unit Status  Issued      Blood Component Type Red Blood Cells      Product Code X1510B59      CODING SYSTEM [id number]      UNIT TYPE ISBT 6200      ISSUE DATE AND TIME [id number]     PREPARE RED BLOOD CELLS (UNIT)    CROSSMATCH Compatible      UNIT ABO/RH A Pos      Unit Number [id number]      Unit Status Ready      Blood Component Type Red Blood Cells      Product Code D3506S39      CODING SYSTEM [id number]      UNIT TYPE ISBT 6200     PREPARE RED BLOOD CELLS (UNIT)   URINE CULTURE   ABO/RH TYPE AND SCREEN     Emergency Department Course:  Reviewed:  I reviewed nursing notes, vitals, past medical history and Care Everywhere    Assessments:  0840 I obtained history and examined the patient as noted above.   1007 I rechecked the patient and explained findings.  1024 I consented the patient for blood.    Consults:  1111 I spoke with Roma Smith PA-C, admitting for Dr. Knight, of the hospitalist service.    Interventions:  1101 Protonix, 80 mg, IV  1106 Kcentra, 80 mg, IV  1201 Ultram, 25 mg, PO  1202 Phytonadione in 0.9% sodium chloride, 10 mg, IV  1223 Protonix, 8 mg/hr, IV    Disposition:  The patient was admitted to the hospital under the care of Dr. Knight.     Impression & Plan     CMS Diagnoses: None    Medical Decision Making:  Amy Luna is a 76 year old female with history of paroxysmal atrial fibrillation, bioprosthetic aortic and mitral valve replacements on chronic warfarin who presents to the ER for back pain that she has been seeing a chiropractor and outpatient provider for and has been taking NSAIDs. She called 911 as she felt generally weak today and reports JENKINS for several days. She is afebrile and hemodynamically stable.  She is quite pale and my concern with associated dyspnea on exertion and reported BRBPR is for GI bleed.  Lab studies notable for hemoglobin of 4.3, mild CLARITA with creatinine 1.3 (baseline 1.04), supra therapeutic INR of 10, nonspecific leukocytosis. CT shows no acute findings  or source of bleed.  Favor possible lower GI bleed given BRBPR reported by patient but  reports black stool which would be concerning for UGIB; certainly with her NSAID use this could have caused gastritis or PUD.  No history of alcohol abuse or cirrhosis to suggest bleeding from varices.  She was given IV protonix.  She was also given Kcentra and IV vitamin K.  With shared decision making after discussion of risk/benefits, patient consented to blood transfusion and was started on 2 units in the ER.  She remains hemodynamically stable but will require close monitoring and serial hemoglobins and GI consultation.  No emergent GI consultation indicated at this time.      In regards to her back pain, this has been present for the last month and on exam and history, there are no red flag findings to suggest emergent work-up for spinal cord compression syndrome, osteomyelitis, discitis, etc. I feel this can be safely worked up in the outpatient setting once patient is stabilized and is discharged from the hospital.  Discussed the case with the hospitalist service who graciously accepted the patient for admission.    Total critical care Emergency physician time in direct patient evaluation and management of this patient, exclusive of procedures:  35 minutes        Diagnosis:    ICD-10-CM    1. Anemia due to blood loss, acute  D62    2. Generalized weakness  R53.1    3. BRBPR (bright red blood per rectum)  K62.5    4. Chronic anticoagulation  Z79.01    5. Supratherapeutic INR  R79.1    6. CLARITA (acute kidney injury) (H)  N17.9    7. Chronic low back pain, unspecified back pain laterality, unspecified whether sciatica present  M54.50     G89.29      Scribe Disclosure:  iM TIM, am serving as a scribe at 8:38 AM on 3/14/2022 to document services personally performed by Edmundo Ford MD based on my observations and the provider's statements to me.     Edmundo Ford MD  03/14/22 1418

## 2022-03-14 NOTE — CONSULTS
GASTROENTEROLOGY CONSULTATION     Amy Luna  [address] [facility] [place] [address]  76 year old female    Admission Date/Time: 3/14/2022  Primary Care Provider: She Huang    We were asked to see the patient in consultation by Dr. Knight for evaluation of anemia.        HPI:  Amy Luna is a 76 year old female with past medical h/o mitral and aortic valve replacements, tricuspid valve repair, and paroxysmal A fib who is on chronic coumadin.  Also HF, DM, HTN, Sleep apnea, HLP.  Presents with extreme fatigue and found to have hgb of 4.3 and INR >10.0.  Has been having back pain for a couple of months and taking aleve and tylenol three times per day.  Denies abd pain, N/V.  Last BM was yesterday at 9 pm and reports it was black and when she flushed the toilet the water turned red.  Has not had a BM today.  In the ER was given Kcentra and vitamin K to reverse her INR.  She had Ct abd/pelvis with no acute findings, had mild sigmoid diverticulosis.  Screening colonoscopy January 2021 with CRSAL and had 2 small TAs removed and noted to have mild sigmoid diverticulosis.    ROS: A comprehensive ten point review of systems was negative aside from those in mentioned in the HPI.      MEDICATIONS: No current facility-administered medications on file prior to encounter.  acetaminophen (TYLENOL) 325 MG tablet, Take 2 tablets (650 mg) by mouth every 4 hours as needed for other (multimodal surgical pain management along with NSAIDS and opioid medication as indicated based on pain control and physical function.)  albuterol (PROAIR HFA/PROVENTIL HFA/VENTOLIN HFA) 108 (90 Base) MCG/ACT inhaler, Inhale 2 puffs into the lungs every 4 hours as needed for shortness of breath / dyspnea or wheezing  atorvastatin (LIPITOR) 40 MG tablet, Take 1 tablet (40 mg) by mouth At Bedtime  Biotin 35646 MCG TABS, Take 10,000 mcg by mouth every morning   Calcium Carbonate-Vit D-Min (CALCIUM 1200 PO), Take 1 tablet by  mouth 2 times daily   coenzyme Q-10 200 MG CAPS, Take 200 mg by mouth every morning   Continuous Blood Gluc  (FREESTYLE ADAM READER) HARJEET, 1 each every 14 days Use to read blood sugars per  instructions.  Continuous Blood Gluc Sensor (FREESTYLE ADAM 14 DAY SENSOR) MISC, 1 each every 14 days Change every 14 days.  cyanocobalamin (VITAMIN B-12) 100 MCG tablet, Take 100 mcg by mouth daily  ferrous gluconate (FERGON) 324 (38 Fe) MG tablet, Take 1 tablet (324 mg) by mouth daily (with breakfast)  Ginkgo Biloba (GINKOBA PO), Take 250 mg by mouth daily  ibuprofen (ADVIL/MOTRIN) 200 MG capsule, Take 400 mg by mouth every 4 hours as needed for fever   ipratropium (ATROVENT) 0.06 % nasal spray, Spray 2 sprays into both nostrils 4 times daily as needed for rhinitis  Lutein 40 MG CAPS, Take 40 mg by mouth every morning   Magnesium 400 MG TABS, Take 400 mg by mouth every evening   metFORMIN (GLUCOPHAGE-XR) 500 MG 24 hr tablet, TAKE 2 TABLETS EVERY DAY WITH BREAKFAST  metoprolol succinate ER (TOPROL-XL) 25 MG 24 hr tablet, Take 2 tablets (50 mg) by mouth daily  NONFORMULARY, 1 packet 3 times daily as needed Relief factor for pain.  Natural product  potassium 99 MG TABS, Take 1 tablet by mouth daily  Propylene Glycol (SYSTANE BALANCE OP), Apply 1-2 drops to eye 3 times daily as needed (dry eyes)  sertraline (ZOLOFT) 100 MG tablet, TAKE 1 AND 1/2 TABLETS EVERY MORNING  TURMERIC PO, Take 3 tablets by mouth daily   vitamin (B COMPLEX-C) tablet, Take 1 tablet by mouth daily  vitamin C (ASCORBIC ACID) 1000 MG TABS, Take 1,000 mg by mouth every evening   Vitamin D3 (CHOLECALCIFEROL) 125 MCG (5000 UT) tablet, Take 1 tablet by mouth daily  warfarin ANTICOAGULANT (COUMADIN) 5 MG tablet, TAKE 2 tablets (10 mg) every Wed, Sat; and take 1-1/2 tablets (7.5 mg) all other days of the week or as directed by your [facility].  zinc 23 MG LOZG lozenge, Place 1 lozenge inside cheek daily   cyclobenzaprine (FLEXERIL) 10 MG tablet,  Take 1 tablet (10 mg) by mouth 3 times daily as needed for muscle spasms (Patient not taking: Reported on 3/14/2022)  fluticasone (FLONASE) 50 MCG/ACT nasal spray, Spray 1 spray into both nostrils daily (Patient not taking: Reported on 3/14/2022)  furosemide (LASIX) 40 MG tablet, Take one tablet (40 mg) twice daily (Patient not taking: Reported on 3/14/2022)  insulin  UNIT/ML vial, Inject 22 units every morning and 20 unit(s) every evening  insulin regular 100 UNIT/ML vial, Inject 0.1 mLs (10 Units) Subcutaneous 3 times daily (before meals)        ALLERGIES:   Allergies   Allergen Reactions     Penicillins Hives     3 weeks after taking med got hives.       Pioglitazone Other (See Comments)     Increased urinary frequency, fatigue, dyspnea       Past Medical History:   Diagnosis Date     (HFpEF) heart failure with preserved ejection fraction (H)      Aortic stenosis      Chest pain      Depressive disorder      Diabetes (H)      Hyperlipidemia      Hypertension      Mitral annular calcification      Mitral stenosis      Obesity      Sleep apnea     CPAP       Past Surgical History:   Procedure Laterality Date     APPENDECTOMY       CV CORONARY ANGIOGRAM N/A 2020    Procedure: Coronary Angiogram;  Surgeon: Inez Peoples MD;  Location:  [facility]     CV LEFT HEART CATH N/A 2020    Procedure: Left Heart Cath;  Surgeon: Inez Peoples MD;  Location:  [facility]     CV PFO CLOSURE N/A 4/15/2020    Procedure: Patent Foramen Ovale Closure;  Surgeon: Ok Wilson MD;  Location:  OR     CV RIGHT HEART CATH MEASUREMENTS RECORDED N/A 2020    Procedure: Right Heart Cath;  Surgeon: Inez Peoples MD;  Location:  [facility] CATH [facility]     EP PACEMAKER N/A 2020    Procedure: EP Pacemaker;  Surgeon: Sohan Francis MD;  Location:  HEART [facility]ARD[facility] CATH LAB     GYN SURGERY       x2 ,      GYN SURGERY      total  hysterectomy     IMPLANT PACEMAKER  2015     REPAIR VALVE TRICUSPID N/A 4/15/2020    Procedure: REPAIR TRICUSPID VALVE WITH VILLA MC3 26MM.;  Surgeon: Ok Wilson MD;  Location:  OR     REPLACE VALVE AORTIC N/A 4/15/2020    Procedure: REPLACEMENT, AORTIC VALVE WITH INSPIRIS TISSUE VALVE 25 MM; ON PUMP WITH SOCORRO READ BY CARDIOLOGIST DR BLANTON.;  Surgeon: Ok Wilson MD;  Location: [facility] OR     REPLACE VALVE MITRAL N/A 4/15/2020    Procedure: REPLACEMENT, MITRAL VALVE WITH EPIC TISSUE VALVE 27 MM.;  Surgeon: Ok Wilson MD;  Location:  OR         SOCIAL HISTORY:  Social History     Tobacco Use     Smoking status: Never Smoker     Smokeless tobacco: Never Used   Vaping Use     Vaping Use: Never used   Substance Use Topics     Alcohol use: No     Drug use: No       FAMILY HISTORY:  Reviewed in chart    PHYSICAL EXAM:   /50   Pulse 87   Temp 98  F (36.7  C) (Oral)   Resp 24   SpO2 99%     Constitutional: NAD, comfortable  Cardiovascular: RRR  Respiratory: CTAB  Psychiatric: mentation appears normal and affect normal/bright  Head: Normocephalic. Atraumatic.    Neck: Neck supple. No adenopathy. Thyroid symmetric, normal size, trachea midline  Eyes:  no icterus  ENT:  hearing adequate  Abdomen:   Soft, nt/nd, nabs, obese  NEURO: grossly negative  SKIN: no suspicious lesions or rashes            ADDITIONAL COMMENTS:   I reviewed the patient's new clinical lab test results.   Recent Labs   Lab Test 03/14/22  0901 01/31/22  1140 01/10/22  1134 09/01/21  1122 08/04/21  1126 02/19/21  1135 02/05/21  1036   WBC 13.8*  --   --   --  7.5  --  6.9   HGB 4.3*  --   --   --  11.8  --  12.4   MCV 94  --   --   --  103*  --  97     --   --   --  198  --  193   INR >10.00* 2.0* 1.9*   < >  --    < > 2.80*    < > = values in this interval not displayed.     Recent Labs   Lab Test 03/14/22  1449 03/14/22  1325 03/14/22  0901 08/04/21  1126 08/04/21  1126 02/05/21  1036   NA  --   --   138  --  140 138   POTASSIUM  --   --  4.4  --  4.4 3.9   CHLORIDE  --   --  108  --  106 105   CO2  --   --  22  --  29 28   BUN  --   --  60*  --  19 26   CR  --   --  1.39*  --  1.04 1.05*   ANIONGAP  --   --  8  --  5 5   NELLY  --   --  9.4  --  9.5 9.7   * 268* 313*   < > 145* 103*    < > = values in this interval not displayed.     Recent Labs   Lab Test 03/14/22  0933 03/14/22  0901 08/04/21  1126 11/03/20  1134 04/15/20  1342 04/08/20  1317   ALBUMIN  --  2.1* 3.9 4.0   < >  --    BILITOTAL  --  0.2 0.6 0.6   < >  --    ALT  --  18 37 38   < >  --    AST  --  15 36 37   < >  --    ALKPHOS  --  66 55 82   < >  --    PROTEIN Negative  --   --   --   --  Negative    < > = values in this interval not displayed.             .    CONSULTATION ASSESSMENT AND PLAN:       76 year old female with past medical h/o mitral and aortic valve replacements, tricuspid valve repair, and paroxysmal A fib who is on chronic coumadin.  Also HF, DM, HTN, Sleep apnea, HLP    Presents with following:  Chronic back pain, daily use of NSAIDs.  Anemia with admitting hgb of 4.3, getting 2 units of blood.  supratherapeutic INR of >10, was given Kcentra and vitamin K in the ER.  Melena last night, none today.    With supratherapeutic INR anything within the GI tract could bleed.  With her use of NSAIDs she is at risk of gastritis, duodenitis, erosions, PUD.  Currently she is not actively bleeding.  I do not recommend any procedures at this time.  Should avoid all NSAIDs, PPI BID x 2 weeks and then daily after that, clear liquids okay today, we will continue to follow.    I spent 33 minutes reviewing her chart, talking to patient/nurse, physical exam, placing orders, and documentation    Renetta Box MD  [facility]

## 2022-03-14 NOTE — ED NOTES
Bed: ED22  Expected date: 3/14/22  Expected time: 8:10 AM  Means of arrival: Ambulance  Comments:  [place]

## 2022-03-14 NOTE — ED TRIAGE NOTES
Pt arrives via EMS for increased pain and weakness in hips and back. Has seen NP which prescribed opioid and muscle relaxant. Pt did not like opioid so has been using tylenol and Advil. EMS reports possible blood in stool. Pt is type 1 DM, . VSS.

## 2022-03-14 NOTE — ED NOTES
DATE:  3/14/2022   TIME OF RECEIPT FROM LAB:  1003  LAB TEST:  INR  LAB VALUE:  >10  RESULTS GIVEN WITH READ-BACK TO (PROVIDER):  Edmundo Ford MD  TIME LAB VALUE REPORTED TO PROVIDER:   1006

## 2022-03-14 NOTE — PROVIDER NOTIFICATION
MD Notification    Notified Person: MD    Notified Person Name: Dr. Knight    Notification Date/Time: 3/14/2022 7751    Notification Interaction: Vocera page    Purpose of Notification: Bladder scanned for >569. Do you want to place standard bladder scan and straight cath orders?    Orders Received: See order for intermittent straight cath.    Comments:

## 2022-03-14 NOTE — H&P
[facility] Hospitalist Admission Note  Name: Amy Luna    MRN: [id number]  YOB: 1945    Age: 76 year old  Date of admission: 3/14/2022  Primary care provider: She Huang    Assessment & Plan   Amy Luna is a 76 year old female with PMHx singificant for chronic HFpEF, valvular heart disease with bioprosthetic mitral and bioprosthetic aortic valve replacements and tricuspid annuloplasty with repair and PFO closure requiring PPM, PAF on anticoagulation with warfarin, IDDM, obesity, LAVERNE, who was admitted on 3/14/2022 with acute anemia in the setting of supra therapeutic INR.      On presentation to the ED /47, Pulse 94, Spo2 96% on RA, RR 16.  Lab work notable for renal insufficiency, BUN increased of 60. Normocytic anemia with a hgb of 4.3, increased RDW and normal platelet count. Slight neutrophilic leukocytosis.  Hyperglycemic with a glucose of 313, no acidosis.  Troponin within reference range.  Type and cross.  Urinalysis with moderate LE, many bacteria and WBC clumps.  Urine culture was obtained.  Rapid Covid testing was negative via PCR.   Imaging with contrast CT abdomen pelvis demonstrated no acute findings in the abdomen and pelvis, mild sigmoid diverticulosis and splenomegaly with linear atelectasis left base. EKG showed NSR with a ventricular rate of 95.     Discussed with Dr. Ford in the ED, full chart review including lab work, imaging, and vital signs were reviewed. Patient received Kcentra, vitamin K, IV PPI, initiated on blood transfusion in the ED. Medical bed was requested for further monitoring and blood product transfusions.     Acute Blood Loss Anemia  Supra therapeutic INR  Symptomatic with 5 to 6 days of dyspnea on exertion, lightheadedness.  Complicated by possible GI bleeding. No hematochezia on presentation. No other identified complications of anemia, compensated BP and mentation maintained.   Patient is maintained on warfarin  for paroxysmal atrial fibrillation and has history of bioprosthetic valve replacements.  She has not had her INR checked for over 2 months and has been admitted in the past for improper dosing of warfarin resulting in supratherapeutic INR with GI bleeding prompting colonoscopy.  She has also been taking ibuprofen routinely for back pain since mid-end of February.   Fortunately no acute findings on abdominal imaging to explain bleeding source, splenomegaly is present likely secondary to sequestration.  Concern is for anemia secondary to possible lower GI bleeding in the setting of supratherapeutic INR.   -Transfuse hemoglobin to support blood pressure and keep hemoglobin above 7  -Trend hemoglobin -notify provider if development of hematochezia would recommend increased frequency of hemoglobin checks and more urgent gastroenterology evaluation  -Has been reversed in the ED - trend INR to ensure correction - every 8 hours x 3   -HOLD warfarin  -vascular access consult for consideration of midline given frequency of lab draws and multiple infusions - this was discussed with the patient on admission who is agreeable  -N.p.o. for now.  If no further evaluation endoscopically and not having hematochezia may consider clear liquid diet today  -monitor BP, telemetry     Possible Melena, BRBPR  Reported episode of dark BM with bright red blood tinge in toilet water on 3/13. Somewhat formed. Over the 2-3 days PTA, 3 looser BM episodes dark in color.  No current abdominal pain.  No history of hematemesis, nausea or vomiting.  No chest pain.  Imaging of bowel without source. Concern for GI bleeding such as PUD given NSAID history.  -D/w Dr. Knight, request GI consult  -IV PPI BID  -N.p.o. for now.  If no further evaluation endoscopically and not having hematochezia may consider clear liquid diet today  -monitor output for hematochezia  -anemia management as noted above  -hold further NSAIDS    Abnormal Urinalysis  Patient is  clinically without evidence of infection such as dysuria, urinary frequency.  No fever.  Urinalysis is abnormal preliminarily may be contaminated versus asymptomatic bacteria.    -Follow urine culture.  Repeat CBC with a.m. lab work.    -Would not recommend antibiotic treatment at this point in time.    Subacute Low Back Pain   Reason for presentation.  Atraumatic.  No red flag symptoms by history or deficits on exam.   History of back pain intermittently 3 to 4 months described as sharp right-sided low back pain, radiating to right groin and lateral   Has been treating with heat, NSAIDs, Flexeril and tramadol.  Was referred to physical therapy by primary care provider and diagnosed with sciatica, right-sided lumbago.  -Analgesia as needed - NO further NSAIDS at this time   -Topical therapy  -Consider further imaging if persistent such as MRI once stabilized from a hemodynamic standpoint vs outpatient  -PT OT once appropriate may require TCU    Valvular Heart Disease   Chronic HFpEF  Appears compensated. History of bioprosthetic valves - tricuspid, aortic, mitral.   Last TTE 4/2021 with preserved LV systolic function. Follows with Cardiology.   -telemetry  -resume beta blocker with hold parameters   -monitor BP/symptoms - given preserved ejection fraction by last echocardiogram would not give Lasix for now with the first unit of blood but this is something that should be monitored and considered, recommend 40 mg IV Lasix with second blood unit if BP would tolerated  -hold PO lasix - 40 mg daily today    Paroxysmal atrial fibrillation  Hx of post procedure atrial fibrillation was noted she was treated with amiodarone and discontinued in the fall 2020 maintained on warfarin with reported subsequent pacemaker interrogations without evidence of atrial fibrillation recurrence.  -Hold warfarin as noted above   -Telemetry -risk of development of A. fib with RVR given demand of significant anemia   -Resume beta-blocker with  "hold parameters    Hyperglycemia   IDDM  Blood sugar in the 300s on presentation without evidence of DKA.  Last A1c was 6.93 months ago.  Prior to admission insulin regimen is 22 units of NPH in the morning, 20 units in the evening, regular insulin 10 units 3 times daily with meals.    Clarifying with ED whether or not took NPH PTA. -> recommend 5 U BID for now while NPO + sliding scale coverage  -N.p.o. sugar checks  -Resume NPH at reduced rate while n.p.o.  -Sliding scale coverage    Obesity  Complicates cares, high risk for atelectasis and decline in mobility, physical deconditioning with back pain may require TCU.     LAVERNE  -resume CPAP nightly     DVT Prophylaxis: HOLD as noted above  Code Status: Full Code  Expected Discharge/Location: Home with spouse and HHC vs. TCU.  At baseline ambulatory in the home with walker.  Recommend occupational and physical therapy assessments once medically appropriate.  Not currently ordered.  Social work consult when appropriate.    Roma Smith PA-C    Primary Care Physician   She Huang    Chief Complaint   Back pain    History is obtained from the patient   Services Used: No    History of Present Illness   Amy Luna is a 76 year old female who presents for evaluation of back pain. Ms Luna has been experiencing right-sided low back pain for the previous 2 to 3 months.  Since onset she had been experiencing ongoing pain, exacerbated with activity. She denied any falls or trauma to her back.  She was seen in the end of February by primary care for evaluation of back pain who prescribed Flexeril and tramadol to help her symptoms.  The patient stated that she has not been taking these because she took them to gather in the end of February and the medications made her \"woozy \".  She has been alternating Tylenol and ibuprofen since the middle of February to manage her pain.  She estimates taking 3 tablets of ibuprofen a day to manage her " back pain.  Today she had her spouse bring her to the emergency department because her back pain has been persistent and she has had weakness in her legs preventing ambulation.  She has not had any back pain radiating into her legs.  She denies any new incontinence or numbness in her groin.  She has not had fever.   She had difficulty utilizing her walker to ambulate so her spouse helped her into a chair.  EMS were called to the home.  While in the ED she was found to have an INR over 10. She complained of 5 to 6 days of lightheadedness, dizziness and shortness of breath with activity.  She has also been globally weak.  She reported taking her warfarin as prescribed but has not had her INR checked for over 2 months, she states due to difficulty leaving her home.  She reported a darker stool yesterday that was partially formed with associated red-tinged blood in the toilet bowl.  She has been having looser bowel movements over the weekend described as 2 episodes of diarrhea on Friday and one episode of diarrhea on Sunday.  She has not had any hematemesis, nausea or vomiting.  In the ED reported a self limited episode of epistaxis when using a tissue. She has had a low appetite for the past 5 to 6 days and last consumed a protein drink with 2% milk prior to presentation to the emergency department.  Has not had any syncopal episodes.  No angina or palpitations. She has not had any bowel movements in the emergency department.      Past Medical History    I have reviewed this patient's medical history and updated it with pertinent information if needed.   Past Medical History:   Diagnosis Date     (HFpEF) heart failure with preserved ejection fraction (H)      Aortic stenosis      Chest pain      Depressive disorder      Diabetes (H)      Hyperlipidemia      Hypertension      Mitral annular calcification      Mitral stenosis      Obesity      Sleep apnea     CPAP       Past Surgical History   I have reviewed this  patient's surgical history and updated it with pertinent information if needed.  Past Surgical History:   Procedure Laterality Date     APPENDECTOMY       CV CORONARY ANGIOGRAM N/A 2020    Procedure: Coronary Angiogram;  Surgeon: Inez Peoples MD;  Location:  [facility] CATH LAB     CV LEFT HEART CATH N/A 2020    Procedure: Left Heart Cath;  Surgeon: Inez Peoples MD;  Location:  [facility]ARD[facility] C[facility] LAB     CV PFO CLOSURE N/A 4/15/2020    Procedure: Patent Foramen Ovale Closure;  Surgeon: Ok Wilson MD;  Location:  OR     CV RIGHT HEART CATH MEASUREMENTS RECORDED N/A 2020    Procedure: Right Heart Cath;  Surgeon: Inez Peoples MD;  Location:  HE[facility] CARDI[facility] CATH LAB     EP PACEMAKER N/A 2020    Procedure: EP Pacemaker;  Surgeon: Sohan Francis MD;  Location:  HEART CARDI[facility] CATH LAB     GYN SURGERY       x2 ,      GYN SURGERY      total hysterectomy     IMPLANT PACEMAKER       REPAIR VALVE TRICUSPID N/A 4/15/2020    Procedure: REPAIR TRICUSPID VALVE WITH VILLA MC3 26MM.;  Surgeon: Ok Wilson MD;  Location:  OR     REPLACE VALVE AORTIC N/A 4/15/2020    Procedure: REPLACEMENT, AORTIC VALVE WITH INSPIRIS TISSUE VALVE 25 MM; ON PUMP WITH SOCORRO READ BY CARDIOLOGIST DR BLANTON.;  Surgeon: Ok Wilson MD;  Location: [facility] OR     REPLACE VALVE MITRAL N/A 4/15/2020    Procedure: REPLACEMENT, MITRAL VALVE WITH EPIC TISSUE VALVE 27 MM.;  Surgeon: Ok Wilson MD;  Location:  OR       Prior to Admission Medications   Prior to Admission Medications   Prescriptions Last Dose Informant Patient Reported? Taking?   Biotin 13636 MCG TABS  Self Yes No   Sig: Take 10,000 mcg by mouth every morning    Calcium Carbonate-Vit D-Min (CALCIUM 1200 PO)  Self Yes No   Sig: Take 1 tablet by mouth 2 times daily    Continuous Blood Gluc  (FREESTYLE ADAM READER) HARJEET   No No   Si each every 14 days Use to  read blood sugars per  instructions.   Continuous Blood Gluc Sensor (FREESTYLE ADAM 14 DAY SENSOR) [facility]   No No   Si each every 14 days Change every 14 days.   Ginkgo Biloba (GINKOBA PO)   Yes No   Sig: Take 250 mg by mouth daily   Lutein 40 MG CAPS  Self Yes No   Sig: Take 40 mg by mouth every morning    Magnesium 400 MG TABS  Self Yes No   Sig: Take 400 mg by mouth every evening    Propylene Glycol (SYSTANE BALANCE OP)  Self Yes No   Sig: Apply 1-2 drops to eye 3 times daily as needed (dry eyes)   TURMERIC PO  Self Yes No   Sig: Take 3 tablets by mouth daily    Vitamin D3 (CHOLECALCIFEROL) 125 MCG (5000 UT) tablet   Yes No   Sig: Take 1 tablet by mouth daily   acetaminophen (TYLENOL) 325 MG tablet   No No   Sig: Take 2 tablets (650 mg) by mouth every 4 hours as needed for other (multimodal surgical pain management along with NSAIDS and opioid medication as indicated based on pain control and physical function.)   albuterol (PROAIR HFA/PROVENTIL HFA/VENTOLIN HFA) 108 (90 Base) MCG/ACT inhaler  Self Yes No   Sig: Inhale 2 puffs into the lungs every 4 hours as needed for shortness of breath / dyspnea or wheezing   atorvastatin (LIPITOR) 40 MG tablet   No No   Sig: Take 1 tablet (40 mg) by mouth At Bedtime   coenzyme Q-10 200 MG CAPS  Self Yes No   Sig: Take 200 mg by mouth every morning    cyanocobalamin (VITAMIN B-12) 100 MCG tablet   Yes No   Sig: Take 100 mcg by mouth daily   cyclobenzaprine (FLEXERIL) 10 MG tablet   No No   Sig: Take 1 tablet (10 mg) by mouth 3 times daily as needed for muscle spasms   ferrous gluconate (FERGON) 324 (38 Fe) MG tablet   No No   Sig: Take 1 tablet (324 mg) by mouth daily (with breakfast)   fluticasone (FLONASE) 50 MCG/ACT nasal spray   No No   Sig: Spray 1 spray into both nostrils daily   furosemide (LASIX) 40 MG tablet   No No   Sig: Take one tablet (40 mg) twice daily   gabapentin (NEURONTIN) 100 MG capsule   Yes No   Sig: May use 1-3 capsules, 1-3x/during the  day as needed   ibuprofen (ADVIL/MOTRIN) 200 MG capsule   Yes No   Sig: Take 200 mg by mouth every 4 hours as needed for fever   insulin  UNIT/ML vial   Yes No   Sig: Inject 22 units every morning and 20 unit(s) every evening   insulin regular 100 UNIT/ML vial   Yes No   Sig: Inject 0.1 mLs (10 Units) Subcutaneous 3 times daily (before meals)   ipratropium (ATROVENT) 0.06 % nasal spray   No No   Sig: Spray 2 sprays into both nostrils 4 times daily as needed for rhinitis   metFORMIN (GLUCOPHAGE-XR) 500 MG 24 hr tablet   No No   Sig: TAKE 2 TABLETS EVERY DAY WITH BREAKFAST   metoprolol succinate ER (TOPROL-XL) 25 MG 24 hr tablet   No No   Sig: Take 2 tablets (50 mg) by mouth daily   potassium 99 MG TABS   Yes No   Sig: Take 1 tablet by mouth daily   sertraline (ZOLOFT) 100 MG tablet   No No   Sig: TAKE 1 AND 1/2 TABLETS EVERY MORNING   vitamin (B COMPLEX-C) tablet  Self Yes No   Sig: Take 1 tablet by mouth daily   vitamin C (ASCORBIC ACID) 1000 MG TABS  Self Yes No   Sig: Take 1,000 mg by mouth every evening    warfarin ANTICOAGULANT (COUMADIN) 5 MG tablet   No No   Sig: TAKE 2 tablets (10 mg) every Wed, Sat; and take 1-1/2 tablets (7.5 mg) all other days of the week or as directed by your [facility].   zinc 23 MG LOZG lozenge   Yes No   Sig: Place 1 lozenge inside cheek daily       Facility-Administered Medications: None     Allergies   Allergies   Allergen Reactions     Penicillins Hives     3 weeks after taking med got hives.       Pioglitazone Other (See Comments)     Increased urinary frequency, fatigue, dyspnea       Social History   I have reviewed this patient's social history and updated it with pertinent information if needed. Amy Luna  reports that she has never smoked. She has never used smokeless tobacco. She reports that she does not drink alcohol and does not use drugs.    Family History   I have reviewed this patient's family history and updated it with pertinent information if needed.    Family History   Problem Relation Age of Onset     Diabetes Mother 56     Congenital heart disease Father 23     Colon Cancer No family hx of        Review of Systems   The 10 point Review of Systems is negative other than noted in the HPI or here.     Physical Exam   Temp: 98.2  F (36.8  C) Temp src: Oral BP: 127/47 Pulse: 94   Resp: 16 SpO2: 96 %      Vital Signs with Ranges  Temp:  [97.6  F (36.4  C)-98.2  F (36.8  C)] 98.2  F (36.8  C)  Pulse:  [91-95] 94  Resp:  [16] 16  BP: ()/(28-47) 127/47  SpO2:  [96 %-100 %] 96 %  0 lbs 0 oz    Constitutional: awake, lying in stretcher.   Eyes: Conjunctiva and pupils examined and normal.  HEENT: Pallor. Non traumatic. Moist mucous membranes, normal dentition. No gum bleeding or epistaxis.   Respiratory: Clear to auscultation bilaterally, no crackles or wheezing.  Cardiovascular: Regular rate and rhythm, normal S1 and S2, soft systolic murmur noted.  GI: Soft, non-distended, non-tender, bowel sounds present. No rebound tenderness or guarding. Abdominal exam limited due to body habitus.   Lymph/Hematologic: No anterior cervical or supraclavicular adenopathy.  Skin: Warm, dry. No edema.  Musculoskeletal: No gross deformities noted.  No erythema or tenderness. Moving all extremities. No pain with SLR bilaterally. Sensation intact distally. No saddle anesthesia.   Neurologic: A& Ox3. No tremor. Speech is clear. Moving all extremities. CN 2-12 grossly intact.  Coordination and sensation intact.   Psychiatric: Appropriate affect.    Data   Data reviewed today:      Imaging:   Recent Results (from the past 24 hour(s))   Abd/pelvis CT,  IV  contrast only TRAUMA / AAA    Narrative    CT ABDOMEN PELVIS W CONTRAST 3/14/2022 10:19 AM    CLINICAL HISTORY: Back pain, left-sided abdominal pain and few  episodes of hematochezia.    TECHNIQUE: CT scan of the abdomen and pelvis was performed following  injection of IV contrast. Multiplanar reformats were obtained. Dose  reduction  techniques were used.  CONTRAST: 100mL Isovue-370    COMPARISON: None.    FINDINGS:   LOWER CHEST: Tricuspid and mitral valvular prosthesis. Pacemaker.  Linear atelectasis in the left lung base.    HEPATOBILIARY: Normal.    PANCREAS: Normal.    SPLEEN: Splenomegaly with the spleen measuring about 21 cm in length..    ADRENAL GLANDS: Normal.    KIDNEYS/BLADDER: Normal.    BOWEL: Normal caliber loops of small bowel and colon. Mild sigmoid  diverticulosis. No inflammatory changes. No definite evidence of  intraluminal contrast extravasation within the limits of this single  phase of contrast.    PELVIC ORGANS: Hysterectomy. No pelvic masses.    ADDITIONAL FINDINGS: No free fluid, fluid collections or extraluminal  air. Few soft tissue nodules in the anterior abdominal wall may be  related to subcutaneous injections. No abdominal aortic aneurysm. No  pathologically enlarged lymph nodes in the abdomen and pelvis with few  small retroperitoneal lymph nodes measuring up to 8 mm, likely  reactive.    MUSCULOSKELETAL: No suspicious lesions in the bones.      Impression    IMPRESSION:   1.  No acute findings in the abdomen and pelvis.   2.  Very mild sigmoid diverticulosis.  3.  Splenomegaly.    THIAGO LARA MD         SYSTEM ID:  [id number]       Recent Labs   Lab 03/14/22  0901   WBC 13.8*   HGB 4.3*   MCV 94      INR >10.00*      POTASSIUM 4.4   CHLORIDE 108   CO2 22   BUN 60*   CR 1.39*   ANIONGAP 8   NELLY 9.4   *   ALBUMIN 2.1*   PROTTOTAL 7.0   BILITOTAL 0.2   ALKPHOS 66   ALT 18   AST 15       Roma Smith PA-C on 3/14/2022 at 11:07 AM

## 2022-03-14 NOTE — ED NOTES
DATE:  3/14/2022   TIME OF RECEIPT FROM LAB:  0930  LAB TEST:  Hgb  LAB VALUE:  4.3  RESULTS GIVEN WITH READ-BACK TO (PROVIDER):  Edmundo Ford MD  TIME LAB VALUE REPORTED TO PROVIDER:   0996

## 2022-03-14 NOTE — PROGRESS NOTES
VAT unable to place midline today.  Spoke with bedside RN and advised to call PICC stat if midline was needed prior to morning.  PICC stat phone number given to bedside RN if needed. VAT to follow in the AM.

## 2022-03-14 NOTE — PROVIDER NOTIFICATION
MD Notification    Notified Person: MD    Notified Person Name: Dr. Knight    Notification Date/Time: 3/14/2022 1600    Notification Interaction: Vocera page    Purpose of Notification: Diet advanced to clears by surgery. Do you want to order BG checks and SSI with meals?    Orders Received: Orders changed by MD.    Comments:

## 2022-03-14 NOTE — PHARMACY-ADMISSION MEDICATION HISTORY
"Admission medication history interview status for this patient is complete. See [facility] admission navigator for allergy information, prior to admission medications and immunization status.     Medication history interview done, indicate source(s): Patient, Family and surescripts  Medication history resources (including written lists, pill bottles, clinic record):home med list  Pharmacy: [facility]    Changes made to PTA medication list:  Added: Relief Factor  Changed: Insulin NPH from 20 units to 30 units twice daily  Reported as Not Taking: Flexeril, Lasix, Flonase  Removed: Gabapentin    Actions taken by pharmacist (provider contacted, etc):None     Additional medication history information: Patient seems to be confused on her insulin doses and the sliding scale.Patient reports to have stopped taking Lasix about two months ago due to inability to reach the bathroom fast enough. She reports no significant fluid retention since stopping. Per  she has tried \"relief factor\" for pain which is a natural product they have bought online, but it didn't do much for her so she is intending on stopping it.     Medication reconciliation/reorder completed by provider prior to medication history?  No     For patients on insulin therapy:   Do you use sliding scale insulin based on blood sugars?  Yes. When the CGM displays green color- admin 10 units, orange color-15 units, red color-20 units.  What is your pre-meal insulin coverage?  Uncertain  Do you typically eat three meals a day? 2 meals daily  How many times do you check your blood glucose per day? 2-4 times  How many episodes of hypoglycemia do you typically have per month? Rarely, mostly at night and right before awakening.   Do you have a Continuous Glucose Monitor (CGM)?  Yes, [place]    Prior to Admission medications    Medication Sig Last Dose Taking? Auth Provider   acetaminophen (TYLENOL) 325 MG tablet Take 2 tablets (650 mg) by mouth every 4 hours as needed for other " (multimodal surgical pain management along with NSAIDS and opioid medication as indicated based on pain control and physical function.) prn at prn Yes Siddharth Sanabria MD   albuterol (PROAIR HFA/PROVENTIL HFA/VENTOLIN HFA) 108 (90 Base) MCG/ACT inhaler Inhale 2 puffs into the lungs every 4 hours as needed for shortness of breath / dyspnea or wheezing prn at prn Yes Unknown, Entered By History   atorvastatin (LIPITOR) 40 MG tablet Take 1 tablet (40 mg) by mouth At Bedtime 3/13/2022 at Unknown time Yes Yamilka Christina DO   Biotin 88801 MCG TABS Take 10,000 mcg by mouth every morning  3/14/2022 at Unknown time Yes Reported, Patient   Calcium Carbonate-Vit D-Min (CALCIUM 1200 PO) Take 1 tablet by mouth 2 times daily  3/13/2022 at pm Yes Reported, Patient   coenzyme Q-10 200 MG CAPS Take 200 mg by mouth every morning  3/14/2022 at Unknown time Yes Reported, Patient   Continuous Blood Gluc  (FREESTYLE ADAM READER) HARJEET 1 each every 14 days Use to read blood sugars per  instructions. dme at [facility] Yes She Huang MD   Continuous Blood Gluc Sensor (FREESTYLE ADAM 14 DAY SENSOR) MISC 1 each every 14 days Change every 14 days. dme at [facility] Yes She Huang MD   cyanocobalamin (VITAMIN B-12) 100 MCG tablet Take 100 mcg by mouth daily 3/13/2022 at Unknown time Yes Reported, Patient   ferrous gluconate (FERGON) 324 (38 Fe) MG tablet Take 1 tablet (324 mg) by mouth daily (with breakfast) 3/14/2022 at Unknown time Yes Yamilka Christina DO   Ginkgo Biloba (GINKOBA PO) Take 250 mg by mouth daily 3/14/2022 at Unknown time Yes Reported, Patient   ibuprofen (ADVIL/MOTRIN) 200 MG capsule Take 400 mg by mouth every 4 hours as needed for fever  PRN at PRN Yes Reported, Patient   insulin  UNIT/ML vial Inject 30 units every morning and 30 unit(s) every evening 3/13/2022 at Unknown time Yes Afia Ricketts, [facility]   insulin regular 100 UNIT/ML vial  Inject 10 Units Subcutaneous 3 times daily (before meals) Sliding scale. 3/13/2022 at Unknown time Yes She Huang MD   ipratropium (ATROVENT) 0.06 % nasal spray Spray 2 sprays into both nostrils 4 times daily as needed for rhinitis prn at prn Yes She Huang MD   Lutein 40 MG CAPS Take 40 mg by mouth every morning  3/14/2022 at Unknown time Yes Reported, Patient   Magnesium 400 MG TABS Take 400 mg by mouth every evening  3/13/2022 at pm Yes Reported, Patient   metFORMIN (GLUCOPHAGE-XR) 500 MG 24 hr tablet TAKE 2 TABLETS EVERY DAY WITH BREAKFAST 3/13/2022 at Unknown time Yes She Huang MD   metoprolol succinate ER (TOPROL-XL) 25 MG 24 hr tablet Take 2 tablets (50 mg) by mouth daily 3/14/2022 at Unknown time Yes Yamilka Christina,    NONFORMULARY 1 packet 3 times daily as needed Relief factor for pain.  Natural product Past Week at Unknown time Yes Unknown, Entered By History   potassium 99 MG TABS Take 1 tablet by mouth daily 3/14/2022 at Unknown time Yes Reported, Patient   Propylene Glycol (SYSTANE BALANCE OP) Apply 1-2 drops to eye 3 times daily as needed (dry eyes) prn at prn Yes Reported, Patient   sertraline (ZOLOFT) 100 MG tablet TAKE 1 AND 1/2 TABLETS EVERY MORNING 3/14/2022 at Unknown time Yes She Huang MD   TURMERIC PO Take 3 tablets by mouth daily  3/14/2022 at Unknown time Yes Reported, Patient   vitamin (B COMPLEX-C) tablet Take 1 tablet by mouth daily 3/14/2022 at Unknown time Yes Reported, Patient   vitamin C (ASCORBIC ACID) 1000 MG TABS Take 1,000 mg by mouth every evening  3/14/2022 at Unknown time Yes Reported, Patient   Vitamin D3 (CHOLECALCIFEROL) 125 MCG (5000 UT) tablet Take 1 tablet by mouth daily 3/14/2022 at Unknown time Yes Reported, Patient   warfarin ANTICOAGULANT (COUMADIN) 5 MG tablet TAKE 2 tablets (10 mg) every Wed, Sat; and take 1-1/2 tablets (7.5 mg) all other days of the week or as  directed by your [facility]. 3/13/2022 at Unknown time Yes She Huang MD   zinc 23 MG LOZG lozenge Place 1 lozenge inside cheek daily  3/14/2022 at Unknown time Yes Reported, Patient   cyclobenzaprine (FLEXERIL) 10 MG tablet Take 1 tablet (10 mg) by mouth 3 times daily as needed for muscle spasms  Patient not taking: Reported on 3/14/2022 Not Taking at Unknown time  Yolette Mcguire NP   fluticasone (FLONASE) 50 MCG/ACT nasal spray Spray 1 spray into both nostrils daily  Patient not taking: Reported on 3/14/2022 Not Taking at Unknown time  She Huang MD   furosemide (LASIX) 40 MG tablet Take one tablet (40 mg) twice daily  Patient not taking: Reported on 3/14/2022 Not Taking at Unknown time  Yamilka Christina,

## 2022-03-14 NOTE — ED NOTES
[facility]  ED Nurse Handoff Report    Amy Luna is a 76 year old female   ED Chief complaint: Hip Pain  . ED Diagnosis:   Final diagnoses:   Anemia due to blood loss, acute   Generalized weakness   BRBPR (bright red blood per rectum)   Chronic anticoagulation     Allergies:   Allergies   Allergen Reactions     Penicillins Hives     3 weeks after taking med got hives.       Pioglitazone Other (See Comments)     Increased urinary frequency, fatigue, dyspnea       Code Status: Full Code  Activity level - Baseline/Home:  Assist X 1. Activity Level - Current:   Assist X 2. Lift room needed: Yes. Would be helpful Bariatric: No   Needed: No   Isolation: No. Infection: Not Applicable.     Vital Signs:   Vitals:    03/14/22 1056 03/14/22 1100 03/14/22 1115 03/14/22 1132   BP: 127/47 127/58 127/49 120/46   Pulse: 94 95 94 92   Resp: 16 16 19 20   Temp: 98.2  F (36.8  C) 98.1  F (36.7  C)     TempSrc:  Oral     SpO2:  100% 100% 97%       Cardiac Rhythm:  ,      Pain level:    Patient confused: No. Patient Falls Risk: Yes.   Elimination Status: Has voided, pt straight catheterized for urine  Patient Report - Initial Complaint: hip pain, weakness. Focused Assessment: pt came in for low back/hip pain with increased weakness. Pt's hgb found to be 4.3, INR >10. Blood running, Kcentra being given.    Tests Performed: see results. Abnormal Results: see results.  Labs Ordered and Resulted from Time of ED Arrival to Time of ED Departure   BASIC METABOLIC PANEL - Abnormal       Result Value    Sodium 138      Potassium 4.4      Chloride 108      Carbon Dioxide (CO2) 22      Anion Gap 8      Urea Nitrogen 60 (*)     Creatinine 1.39 (*)     Calcium 9.4      Glucose 313 (*)     GFR Estimate 39 (*)    INR - Abnormal    INR >10.00 (*)    ROUTINE UA WITH MICROSCOPIC REFLEX TO CULTURE - Abnormal    Color Urine Yellow      Appearance Urine Clear      Glucose Urine 50  (*)     Bilirubin Urine Negative       Ketones Urine Negative      Specific Gravity Urine 1.021      Blood Urine Negative      pH Urine 5.5      Protein Albumin Urine Negative      Urobilinogen Urine Normal      Nitrite Urine Negative      Leukocyte Esterase Urine Moderate (*)     Bacteria Urine Many (*)     WBC Clumps Urine Present (*)     Mucus Urine Present (*)     RBC Urine <1      WBC Urine 15 (*)     Hyaline Casts Urine 10 (*)     WBC Casts Urine 5 (*)    HEPATIC FUNCTION PANEL - Abnormal    Bilirubin Total 0.2      Bilirubin Direct <0.1      Protein Total 7.0      Albumin 2.1 (*)     Alkaline Phosphatase 66      AST 15      ALT 18     CBC WITH PLATELETS AND DIFFERENTIAL - Abnormal    WBC Count 13.8 (*)     RBC Count 1.60 (*)     Hemoglobin 4.3 (*)     Hematocrit 15.0 (*)     MCV 94      MCH 26.9      MCHC 28.7 (*)     RDW 17.1 (*)     Platelet Count 375      % Neutrophils 81      % Lymphocytes 9      % Monocytes 7      % Eosinophils 1      % Basophils 0      % Immature Granulocytes 2      NRBCs per 100 WBC 1 (*)     Absolute Neutrophils 11.1 (*)     Absolute Lymphocytes 1.3      Absolute Monocytes 0.9      Absolute Eosinophils 0.2      Absolute Basophils 0.0      Absolute Immature Granulocytes 0.3      Absolute NRBCs 0.1     TROPONIN I - Normal    Troponin I High Sensitivity 9     COVID-19 VIRUS (CORONAVIRUS) BY PCR - Normal    SARS CoV2 PCR Negative     TYPE AND SCREEN, ADULT    ABO/RH(D) A POS      Antibody Screen Negative      SPECIMEN EXPIRATION DATE [id number]     PREPARE RED BLOOD CELLS (UNIT)    CROSSMATCH Compatible      UNIT ABO/RH A Pos      Unit Number [id number]      Unit Status Issued      Blood Component Type Red Blood Cells      Product Code R5181E50      CODING SYSTEM [id number]      UNIT TYPE ISBT 6200      ISSUE DATE AND TIME [id number]     PREPARE RED BLOOD CELLS (UNIT)    CROSSMATCH Compatible      UNIT ABO/RH A Pos      Unit Number [id number]      Unit Status Ready      Blood Component Type Red Blood Cells       Product Code [id number]      CODING SYSTEM [id number]      UNIT TYPE ISBT 6200     PREPARE RED BLOOD CELLS (UNIT)   URINE CULTURE   TRANSFUSE RED BLOOD CELLS (UNIT)   ABO/RH TYPE AND SCREEN     Abd/pelvis CT,  IV  contrast only TRAUMA / AAA   Final Result   IMPRESSION:    1.  No acute findings in the abdomen and pelvis.    2.  Very mild sigmoid diverticulosis.   3.  Splenomegaly.      THIAGO LARA MD            SYSTEM ID:  [id number]         Treatments provided: see MAR  Family Comments:  was at bedside, went home. Please call with updates  OBS brochure/video discussed/provided to patient:  N/A  ED Medications:   Medications   HOLD: warfarin (COUMADIN) therapy (has no administration in time range)   phytonadione 10 mg in sodium chloride 0.9 % 50 mL intermittent infusion (has no administration in time range)   pantoprazole (PROTONIX) 80 mg in sodium chloride 0.9 % 100 mL infusion (has no administration in time range)   CT SCAN FLUSH (65 mLs Intravenous Given 3/14/22 1006)   iopamidol (ISOVUE-370) solution 500 mL (100 mLs Intravenous Given 3/14/22 1006)   prothrombin 4 factor complex concentrate (KCENTRA) infusion 5,246 Units (5,246 Units Intravenous Given 3/14/22 1106)   pantoprazole (PROTONIX) IV push injection 80 mg (80 mg Intravenous Given 3/14/22 1101)     Drips infusing:  Yes, blood   For the majority of the shift, the patient's behavior Green. Interventions performed were n/a.    Sepsis treatment initiated: No     Patient tested for COVID 19 prior to admission: YES    ED Nurse Name/Phone Number: Jus Banuelos RN,   11:36 AM    RECEIVING UNIT ED HANDOFF REVIEW    Above ED Nurse Handoff Report was reviewed: Yes  Reviewed by: Nadira Gong RN on March 14, 2022 at 1:09 PM

## 2022-03-14 NOTE — PROGRESS NOTES
04/07/2016 Sleep Study arrived via fax from [employer]. Scanned into Procedures. Leatha Kendrick CMA

## 2022-03-15 NOTE — PLAN OF CARE
Goal Outcome Evaluation:  To Do:  End of Shift Summary  For vital signs and complete assessments, please see documentation flowsheets.     Pertinent assessments: Arrived to floor from ED at about 1345. A&Ox4. VSS on RA. C/o back pain, given PRN Tylenol and tramadol. Tele. LS clear. BS hypoactive. Received 2/2 units of blood. IV protonix discontinued. Clear liquid diet, tolerating. Small/soft BM x 1, maroon. Surgery following. PIV x 2. Straight cath'd x 1 for 700mL. Purewick in place. Assist of 2-3, lift. Has not gotten out of bed yet. Hgb 4.3. INR >10.     Major Shift Events Received 2/2 units of blood, recheck hgb and INR at 2130    Treatment Plan: Transfuse blood for hgb <7, pain management.     Bedside Nurse: Nadira Gong RN

## 2022-03-15 NOTE — CONSULTS
Care Management Initial Consult    General Information  Assessment completed with: Patient, Spouse or significant other,    Type of CM/SW Visit: Initial Assessment    Primary Care Provider verified and updated as needed: Yes   Readmission within the last 30 days: no previous admission in last 30 days      Reason for Consult: care coordination/care conference, discharge planning    Communication Assessment  Patient's communication style: spoken language (English or Bilingual)    Hearing Difficulty or Deaf: no   Wear Glasses or Blind: yes    Cognitive  Cognitive/Neuro/Behavioral: WDL                      Living Environment:   People in home: spouse     Current living Arrangements: house      Able to return to prior arrangements: yes       Family/Social Support:  Care provided by: self, spouse/significant other  Provides care for: no one, unable/limited ability to care for self  Marital Status:             Description of Support System: Supportive         Current Resources:   Patient receiving home care services: No     Community Resources: None  Equipment currently used at home: walker, rolling, walker, standard  Supplies currently used at home: Diabetic Supplies, Nebulizer tubing    Employment/Financial:  Employment Status: retired        Financial Concerns: No concerns identified           Lifestyle & Psychosocial Needs:  Social Determinants of Health     Tobacco Use: Low Risk      Smoking Tobacco Use: Never Smoker     Smokeless Tobacco Use: Never Used   Alcohol Use: Not on file   Financial Resource Strain: Not on file   Food Insecurity: Not on file   Transportation Needs: Not on file   Physical Activity: Not on file   Stress: Not on file   Social Connections: Not on file   Intimate Partner Violence: Not on file   Depression: Not at risk     PHQ-2 Score: 1   Housing Stability: Not on file       Functional Status:  Prior to admission patient needed assistance:    yes    Mental Health Status:  Mental  "Health Status: No Current Concerns       Chemical Dependency Status:  Chemical Dependency Status: No Current Concerns         Additional Information:  Pt lives with her .  At baseline she could ambulate independently.  Four to five months ago Pt developed back pain on and off.  One month ago Pt had to use a walker as the pain was getting worse. They tried outpatient Physical Therapy in [place] at [facility].   did need to help her get out of bed as well. She also started flexeril and Tramadol.  However, that started hallucinations for her.  This is when they started Tylenol and Ibuprofen.       Pt's  does work full time still.  He does have video surveillance and speakers within the home so he and their Dtg can check up on her.  Pt has a four wheeled seated walker and a Two wheeled walker.  Pt usually wakes up around 9 or 10AM every day.        Pt has LAVERNE.  She is compliant with her CPAP.    Pt has a Free Style Viji that she uses for her diabetic management.  She manages her own medications.  For the last month that she hasn't been feeling well, her  has been monitoring her medications.    Pt has CHF.  She has a scale, but she doesn't weigh herself daily.  We discussed the importance of daily weights and recording it.  I gave her a CHF action care plan.  Pt hadn't been taking her lasix for the last 3-4 weeks due to her decrease in mobility she didn't want to have to get up and void.  She still uses a salt shaker.  We talked about different options like Mrs. Dash and other spices.      Pt has lymphedema.  She said she uses \"Lymph Stockings.\"    We discussed the option of rehab if it's recommended.  Pt had been to [facility] after her open heart surgery.  They would be interested in a TCU in the [facility].  We discussed AVHCC, ERCC, and [facility]    Pt/family was given the Medicare Compare List for SNF, with associated star ratings to assist with choices for " referrals/discharge planning Yes  Education was given to pt/family that star ratings are updated/maintained by Medicare and can be reviewed by visiting www.medicare.gov Yes     said they plan on moving up to [place] around June.  They will be living with their Daughter in the lower level of their [place].  Their current house is a split level home and stairs have been a barrier.      Will follow along.      Care Management Note      Ama López RN BSN   Inpatient Care Coordination  [facility]  [phone]      Lindsay López, RN

## 2022-03-15 NOTE — CONSULTS
Cardiology consultation dictated.     The patient has remained NSR, confirmed on several recent PPM interrogations. Although she may be at future risk of recurrent atrial fibrillation, at this time the risk of bleeding complication vastly exceeds any potential benefit. Fortunately we will continue to monitor her rhythm with PPM telemetry and reassess should atrial fibrillation recur.     Recommendations  1) stop warfarin  2) Follow up with  ( arranged.)  3) transfusion GI evaluation      We have signed off case. Thanks

## 2022-03-15 NOTE — CONSULTS
Consult Date: 03/15/2022    HISTORY OF PRESENT ILLNESS:  Amy Luna, a 76-year-old woman with mitral/aortic valve disease (status post bioprosthetic mitral valve/aortic valve replacement), heart block (status post pacemaker implantation), nonobstructive coronary disease and a history of postoperative atrial fibrillation (currently in sinus rhythm) was evaluated in consultation at the request of Dr. Knight for recommendations regarding the patient's need for anticoagulation in the setting of life-threatening GI bleed on oral anticoagulation.    In 04/2020, Ms. Luna developed symptomatic severe mitral stenosis/moderate aortic stenosis.  Preoperative coronary angiography showed mild nonobstructive coronary artery disease.  On 04/15/2020 the patient underwent mitral valve replacement with a #27 mm [place] Juan Epic porcine bioprosthetic valve and a 25 mm Rubi Inspiris Resilia bovine pericardial valve along with tricuspid annuloplasty and PFO closure.  At about the time of her surgery, she was noted to have atrial lead malfunction characterized by poor sensing and increase in impedance, and the atrial lead was replaced during that same admission.  Her postoperative course was complicated by atrial fibrillation, which required transient treatment with amiodarone.  Fortunately, the patient has remained in a sinus rhythm on subsequent pacemaker interrogations, most recently in 02/2022.  At the time of her last visit with Dr. Christina in 04/2021, Dr. Christina and the patient discussed the pros and cons of continuing warfarin anticoagulation in view of the patient's enlarged atria.  A final decision was not made at that time, and the patient has continued on warfarin, with her last INR being about 2 months prior to this admission.    In the meantime, the patient has been troubled by very severe low back pain and was so disabled she could not get in for INR testing.  She has continued on warfarin, but is also taking  nonsteroidal anti-inflammatories for treatment of her back pain.    Over the 4-5 days prior to this admission, she noted hematochezia, lightheadedness, weakness and dyspnea.  Emergency room laboratories were remarkable for a hemoglobin of 4.3 and INR greater than 10 mL    The patient has been transfused, and her anticoagulation has been reversed.  She has been seen by the GI service, and eventual colonoscopy is planned.  A CT scan did not reveal any definite abnormalities.  Because of the profound nature of the patient's GI bleed and the question as to whether she should remain on anticoagulation, formal Cardiology consultation was requested.    PAST MEDICAL HISTORY:    1.  Mitral stenosis/aortic stenosis, status post aortic and mitral valve bioprosthetic replacement 2020.  Closure of PFO with tricuspid annuloplasty.  Preoperative coronary angiogram showing nonobstructive coronary disease.  2.  History of heart failure with preserved ejection fraction.  3.  Diabetes.  4.  Hypertension.  5.  Dyslipidemia.  6.  Sleep apnea.  7.  Chronic low back pain.  8.  Heart block -- status post pacemaker implantation.  a. Atrial lead revision in 2020.  b. Most recent pacemaker interrogation demonstrating no episodes of atrial fibrillation with normal pacemaker function.    ALLERGIES:  PENICILLIN, PIOGLITAZONE.    SOCIAL HISTORY:  The patient is .  She has a supportive .  She does not use alcohol or smoke cigarettes.    FAMILY HISTORY:  There is a family of diabetes in her mother, congenital heart disease in her father.    REVIEW OF SYSTEMS:  A 12-point review of systems was performed.  Outside the issues mentioned in HPI, there were no other complaints.    MEDICATIONS:    1.  Acetaminophen.  2.  Albuterol inhaler.  3.  Atorvastatin 40 daily.  4.  Ferrous gluconate.  5.  Vitamin B12.  6.  Flonase.  7.  Furosemide 20 mg daily.  8.  Insulin.  9.  Metoprolol ER 50 mg daily.    The patient's warfarin has been  held.    PHYSICAL EXAMINATION:    GENERAL:  Exam demonstrates a pale but pleasant 76-year-old woman who complains of back discomfort.  VITAL SIGNS:  Her blood pressure was 116/42.  Her heart rate was 75.  LUNGS:  Clear to percussion and auscultation.  CARDIOVASCULAR:  Shows a normal prosthetic S1 and S2.  There is no murmur, rub or click.  Her pulses are symmetrical in the carotid, radial, brachial femoral, popliteal, dorsalis pedis and posterior tibials.  ABDOMEN:  Obesity is present.  I cannot palpate internal organs.  NEUROLOGIC:  Cranial nerves 2-12 are intact.  Strength equal and symmetrical.  She displays normal insight and judgment.    LABORATORY DATA:  Hemoglobin is 8.0.  Her white blood cell count is 10.0, platelet count 263,000.  INR 0.81.  Creatinine is 1.06, potassium 3.8.  ECG shows a normal sinus rhythm with a normal axis, normal intervals.    I have reviewed the patient's most recent pacemaker interrogation from late 02/2022, and this interrogation confirms normal pacemaker function with no atrial or ventricular arrhythmias.    IMPRESSION:  This 76-year-old woman sustained a profound gastrointestinal bleed in the setting of supratherapeutic warfarin levels.  Review of the patient's chart indicates she has had previous problems with gastrointestinal bleed on anticoagulation.  All in all, given the fact the patient has remained in a sinus rhythm, I would favor discontinuation of warfarin.  Fortunately, the patient's pacemaker will allow continued surveillance for recurrence of atrial fibrillation.  At this time, however, the risk of warfarin appears to exceed the benefit.    RECOMMENDATIONS:    1.  Discontinue warfarin.  2.  Follow up with Dr. Christina long-term.  I have called our office to arrange a followup visit.    We appreciate the opportunity to participate in the care of your patient Amy Luna.    Lito Ballard MD        D: 03/15/2022   T: 03/15/2022   MT: [facility]    Name:     NICOLE  KASSANDRA CRISOSTOMO  MRN:      [id number]        Account:      [id number]   :      1945           Consult Date: 03/15/2022     Document: [id number]

## 2022-03-15 NOTE — PROGRESS NOTES
Pertinent assessments: AOx4. Transferred to lift room. Ax2-3. Uses walker at baseline. Denies pain at rest, 8/10 back pain w activity. PRN tylenol x1 and tramadol x1. On tele. PIVs SL. Clear liquid diet. Purewick in place. Strict I/Os.     Major Shift Events: Transfused 2 Unit(s) PRBC    Treatment Plan: Monitor Hgb    Bedside Nurse: Edwin Alfonso RN

## 2022-03-15 NOTE — PLAN OF CARE
End of Shift Summary  For vital signs and complete assessments, please see documentation flowsheets.     Pertinent assessments: AOx4. VSS, on RA and afebrile. A2 with lift for transfers, PT and OT consult placed. Pain medications changed to oxycodone and Dilaudid for lower back pain which seem to be effective. Pt able to sit up higher in bed to eat dinner. Lumbar X-ray done. Diet upgraded to fulls. No s/s of bleeding. Hgb 8.0. Voiding into purewick, no BM today. Midline placed.  On tele: SR.    Major Shift Events: Cards consult, stopped Coumadin. Hgb 8.0. Lumbar X-ray. Midline.     Treatment Plan: Monitor Hgb, PPI, tele.     Bedside Nurse: Simona Ospina RN

## 2022-03-15 NOTE — PROCEDURES
[facility]    Single Lumen Midline Placement    Date/Time: 3/15/2022 9:27 AM  Performed by: Mamadou Knight MD  Authorized by: Mamadou Knight MD   Indications: vascular access      UNIVERSAL PROTOCOL   Site Marked: Yes  Prior Images Obtained and Reviewed:  Yes  Required items: Required blood products, implants, devices and special equipment available    Patient identity confirmed:  Verbally with patient  Patient was reevaluated immediately before administering moderate or deep sedation or anesthesia  Confirmation Checklist:  Patient's identity using two indicators, relevant allergies, procedure was appropriate and matched the consent or emergent situation and correct equipment/implants were available  Time out: Immediately prior to the procedure a time out was called    Universal Protocol: the Joint Commission Universal Protocol was followed    Preparation: Patient was prepped and draped in usual sterile fashion    ESBL (mL):  1     ANESTHESIA    Anesthesia: See MAR for details  Local Anesthetic:  Lidocaine 1% without epinephrine  Anesthetic Total (mL):  2 (lidocaine)      SEDATION    Patient Sedated: No        Preparation: skin prepped with ChloraPrep  Skin prep agent: skin prep agent completely dried prior to procedure  Sterile barriers: maximum sterile barriers were used: cap, mask, sterile gown, sterile gloves, and large sterile sheet  Hand hygiene: hand hygiene performed prior to central venous catheter insertion  Type of line used: Midline  Catheter type: single lumen  Lumen type: non-valved  Catheter size: 18 guage.  Brand: Imsys  Lot number: [id number]  Placement method: venipuncture, MST and ultrasound  Number of attempts: 1  Successful placement: yes  Orientation: right  Location: basilic vein  Arm circumference: adults 10 cm  Extremity circumference: 41  Visible catheter length: 0  Internal length: 10 cm  Total catheter length: 10  Dressing and securement: chlorhexidine  disc applied, dressing applied, fixation device, line secured, secure lock, securement device, site cleaned, sterile dressing applied and taped  Post procedure assessment: blood return through all ports (flushes freely)  PROCEDURE   Patient Tolerance:  Patient tolerated the procedure well with no immediate complicationsDescribe Procedure: Midline ok to use

## 2022-03-15 NOTE — PROGRESS NOTES
"GASTROENTEROLOGY PROGRESS NOTE     SUBJECTIVE:  Main complaint is back pain.  No stools since admission.  No hematemesis, nausea, vomiting, abdominal pain.      OBJECTIVE:  /53 (BP Location: Right arm)   Pulse 87   Temp 98.7  F (37.1  C) (Oral)   Resp 18   Ht 1.626 m (5' 4\")   Wt 115.1 kg (253 lb 11.2 oz)   SpO2 99%   BMI 43.55 kg/m    Temp (24hrs), Av.4  F (36.9  C), Min:97.5  F (36.4  C), Max:99.1  F (37.3  C)    Patient Vitals for the past 72 hrs:   Weight   03/15/22 0505 115.1 kg (253 lb 11.2 oz)       Intake/Output Summary (Last 24 hours) at 3/15/2022 0952  Last data filed at 3/15/2022 0657  Gross per 24 hour   Intake 1773.9 ml   Output 2050 ml   Net -276.1 ml        PHYSICAL EXAM  Gen: alert, oriented, NAD  Abd: soft, NT/ND, +BS           Additional Comments:  ROS, FH, SH: See initial GI consult for details.     I have reviewed the patient's new clinical lab results:     Recent Labs   Lab Test 03/15/22  0910 22  2207 22  2142 22  1841 22  0921  1122 21  1126 21  1135 21  1036   WBC 10.0  --   --   --  13.8*  --  7.5  --  6.9   HGB 8.0* 6.2*  --  6.6* 4.3*  --  11.8   < > 12.4   MCV 90  --   --   --  94  --  103*  --  97   PLT  --   --   --   --  375  --  198  --  193   INR 0.81*  --  1.09  --  >10.00*   < >  --    < > 2.80*    < > = values in this interval not displayed.     Recent Labs   Lab Test 03/15/22  0910 22  0901 21  1126   POTASSIUM 3.8 4.4 4.4   CHLORIDE 107 108 106   CO2 25 22 29   BUN 41* 60* 19   ANIONGAP 5 8 5     Recent Labs   Lab Test 22  0933 22  0901 21  1126 20  1134 04/15/20  1342 20  1317   ALBUMIN  --  2.1* 3.9 4.0   < >  --    BILITOTAL  --  0.2 0.6 0.6   < >  --    ALT  --  18 37 38   < >  --    AST  --  15 36 37   < >  --    PROTEIN Negative  --   --   --   --  Negative    < > = values in this interval not displayed.        Assessment/Plan:  76-year-old female with past medical " history significant for  CHF, valvular heart disease with bioprosthetic mitral and aortic valve replacements, tricuspid annuloplasty with repair and PFO closure, paroxysmal atrial fibrillation on chronic anticoagulation with warfarin, type 2 diabetes, obesity, LAVERNE, admitted 3/14 with severe anemia and supratherapeutic INR, and report of melena.  Hemoglobin on admission 4.3, down from 11.8 in August 2021 with INR greater than 10.     1.  Acute blood loss anemia.  Patient has very high risk for GI bleeding anywhere in GI tract given the supratherapeutic INR.  With report of melena and NSAID use (aleve) prior to admission, upper GI bleeding suspected. Last colonoscopy through [facility] in January 2021 showed 2 small tubular adenomas and mild sigmoid diverticulosis. No prior EGD. Given Kcentra and vitamin K 3/14 and INR 1.09 today. No overt GI bleeding noted since admission. HGB improved after 2 units of blood to 6.6 but dropped to 6.2 last night. Transfused 2 additional units overnight. HGB this morning 8. Initially on PPI drip, now IV BID. Hemodynamically stable.   --Monitor for overt GI bleeding.   --IV PPI BID.   --Avoid NSAIDs.   --Hold anticoagulation.   --Monitor HGB and transfuse prn.   --No urgent plans for endoscopy at this time as it appears overt bleeding has resolved with correction of INR.   --Will advance to full liquids.     Will update Dr. Box.     Estela Segovia, PAC  [facility] ([facility])

## 2022-03-15 NOTE — PROGRESS NOTES
Lab calls with critical lab value for hgb of 6.6. Patient has conditional transfuse orders for hgb <7. Unit 2/2 of blood finished at 1730. Per Dr. Knight note earlier, hgb and INR to be checked 4 hours after 2/2 unit of blood completed. These orders were previously placed for 2130. Discussed with evening shift RNs. Will await hgb and INR results timed for 2130.

## 2022-03-15 NOTE — PROGRESS NOTES
[facility]    Medicine Progress Note - Hospitalist Service    Date of Admission:  3/14/2022    Assessment & Plan            Amy Luna is a 76 year old female with PMHx singificant for chronic HFpEF, valvular heart disease with bioprosthetic mitral and bioprosthetic aortic valve replacements and tricuspid annuloplasty with repair and PFO closure requiring PPM, PAF on anticoagulation with warfarin, IDDM, obesity, LAVERNE, who was admitted on 3/14/2022 with acute anemia in the setting of supra therapeutic INR.      Problem list:    #1.  Acute blood loss anemia requiring packed RBC transfusion with suspicion of GI bleeding in the setting of supratherapeutic INR > 10 upon presentation  -She receiving total 4 units of packed RBC  -Most recent hemoglobin stable at 8 g  -Highly appreciate prompt input from GI service  -No procedures recommended at this time  -Continuation of PPI twice daily  -No NSAIDs use, as she has been using ibuprofen frequently  -Currently on clear liquids, will await further instructions from GI service    #2.  Supratherapeutic INR received Kcentra and vitamin K reversal agent during admission process in the emergency room  -Most recent INR less than 1  -Continue to hold her anticoagulation    #3.  History of atrial fibrillation on warfarin anticoagulation  -Currently rate controlled  -Admission EKG was NSR  -I requested formal cardiology evaluation to further guide us regarding plan of care with her multiple cardiac history.  She as previously known dilated atrium that might precipitate her on the higher chance of recurrent atrial fibrillation.  -She is not a candidate for any anticoagulation as of now given this recent life-threatening anemia and presumptive GI bleeding with supratherapeutic INR  -Has a history of a tissue valve with bioprosthetic aortic and mitral valve    #4.  Chronic heart failure with preserved EF not in exacerbation  -Home medications listed twice a  "day 40 mg of furosemide but reportedly she is not taking this  -She is at risk for exacerbation of her heart failure the setting of volume resuscitation with multiple packed RBC transfusion  -Creatinine is permitting and blood pressure is also holding  -We will start oral Lasix today    #5.  Insulin requiring diabetes mellitus  -We will resume her long-acting insulin as she takes NPH but a reduced dosing for now as currently she is still on clear liquids  -Change her insulin sliding scale to high intensity sliding scale  -Continue with prandial insulin coverage  -Hold her Metformin    #6.  Dyslipidemia  -Resume her statins    #7.  Physical deconditioning  -We will ask PT and OT input    #8.  LAVERNE  -Continue her appliance    #9.  Morbid obesity, complicates care    Continue inpatient care.  Remain on telemetry monitoring.  Condition of packed RBC transfusion ordered.  Midline access pursued and tolerated procedure         Diet: Clear Liquid Diet    DVT Prophylaxis: Pneumatic Compression Devices  Stewart Catheter: Not present  Central Lines: PRESENT     Cardiac Monitoring: None  Code Status: Full Code      Disposition Plan   Expected Discharge: 03/17/2022     Anticipated discharge location:  Awaiting care coordination huddle  Delays:            The patient's care was discussed with the Patient.    Mamadou Knight MD, MD  Hospitalist Service  [facility]  Securely message with the Vocera Web Console (learn more here)  Text page via [employer] Paging/Directory         Clinically Significant Risk Factors Present on Admission              # Coagulation Defect: home medication list includes an anticoagulant medication    # Diabetes, type II: last A1C 6.6 % (Ref range: 0.0 - 5.6 %)  # Severe Obesity: Estimated body mass index is 43.55 kg/m  as calculated from the following:    Height as of this encounter: 1.626 m (5' 4\").    Weight as of this encounter: 115.1 kg (253 lb 11.2 oz).  "     ______________________________________________________________________    Interval History   Continuing care for this a very pleasant 76-year-old  lady who initially came in here for severe anemia found with supratherapeutic INR.  Overnight she was still exhibiting severe anemia and received in total of 4 units of packed RBC transfusion.  Fortunately she tolerated this well with no complaints of any ongoing shortness of breath, fever spikes, nausea, vomiting, abdominal pain, mental status changes and remained not hypoxic and not requiring any oxygen support.  This morning she appears to be in better spirits as she is conversant, interactive and endorses no other new complaints.    Data reviewed today: I reviewed all medications, new labs and imaging results over the last 24 hours. I personally reviewed no images or EKG's today.    Physical Exam   Vital Signs: Temp: 98.7  F (37.1  C) Temp src: Oral BP: 130/53 Pulse: 87   Resp: 18 SpO2: 99 % O2 Device: None (Room air)    Weight: 253 lbs 11.2 oz  HEENT; Atraumatic, normocephalic, pinkish conjuctiva, pupils bilateral reactive   Skin: warm and moist, pale appearing  Lymphatics: no cervical or axillary lymphandenopathy  Lungs: equal chest expansion, clear to auscultation, no wheezes, no stridor, no crackles,   Heart: normal rate, normal rhythm, no rubs or gallops.   Abdomen: normal bowel sounds, no tenderness, no peritoneal signs, no guarding  Extremities: no deformities, non pitting bilateral lower extremity edema   Neuro; follow commands, alert and oriented x3, spontaneous speech, coherent, moves all extremities spontaneously  Psych; no hallucination, euthymic mood, not agitated        Data   Recent Labs   Lab 03/15/22  0910 03/15/22  0818 03/15/22  0205 03/14/22  2207 03/14/22  2142 03/14/22  2104 03/14/22  1841 03/14/22  1325 03/14/22  0901   WBC 10.0  --   --   --   --   --   --   --  13.8*   HGB 8.0*  --   --  6.2*  --   --  6.6*  --  4.3*   MCV 90  --    --   --   --   --   --   --  94   PLT  --   --   --   --   --   --   --   --  375   INR  --   --   --   --  1.09  --   --   --  >10.00*     --   --   --   --   --   --   --  138   POTASSIUM 3.8  --   --   --   --   --   --   --  4.4   CHLORIDE 107  --   --   --   --   --   --   --  108   CO2  --   --   --   --   --   --   --   --  22   BUN  --   --   --   --   --   --   --   --  60*   CR  --   --   --   --   --   --   --   --  1.39*   ANIONGAP  --   --   --   --   --   --   --   --  8   NELLY  --   --   --   --   --   --   --   --  9.4   GLC  --  282* 248*  --   --  223*  --    < > 313*   ALBUMIN  --   --   --   --   --   --   --   --  2.1*   PROTTOTAL  --   --   --   --   --   --   --   --  7.0   BILITOTAL  --   --   --   --   --   --   --   --  0.2   ALKPHOS  --   --   --   --   --   --   --   --  66   ALT  --   --   --   --   --   --   --   --  18   AST  --   --   --   --   --   --   --   --  15    < > = values in this interval not displayed.     Recent Results (from the past 24 hour(s))   Abd/pelvis CT,  IV  contrast only TRAUMA / AAA    Narrative    CT ABDOMEN PELVIS W CONTRAST 3/14/2022 10:19 AM    CLINICAL HISTORY: Back pain, left-sided abdominal pain and few  episodes of hematochezia.    TECHNIQUE: CT scan of the abdomen and pelvis was performed following  injection of IV contrast. Multiplanar reformats were obtained. Dose  reduction techniques were used.  CONTRAST: 100mL Isovue-370    COMPARISON: None.    FINDINGS:   LOWER CHEST: Tricuspid and mitral valvular prosthesis. Pacemaker.  Linear atelectasis in the left lung base.    HEPATOBILIARY: Normal.    PANCREAS: Normal.    SPLEEN: Splenomegaly with the spleen measuring about 21 cm in length..    ADRENAL GLANDS: Normal.    KIDNEYS/BLADDER: Normal.    BOWEL: Normal caliber loops of small bowel and colon. Mild sigmoid  diverticulosis. No inflammatory changes. No definite evidence of  intraluminal contrast extravasation within the limits of this  single  phase of contrast.    PELVIC ORGANS: Hysterectomy. No pelvic masses.    ADDITIONAL FINDINGS: No free fluid, fluid collections or extraluminal  air. Few soft tissue nodules in the anterior abdominal wall may be  related to subcutaneous injections. No abdominal aortic aneurysm. No  pathologically enlarged lymph nodes in the abdomen and pelvis with few  small retroperitoneal lymph nodes measuring up to 8 mm, likely  reactive.    MUSCULOSKELETAL: No suspicious lesions in the bones.      Impression    IMPRESSION:   1.  No acute findings in the abdomen and pelvis.   2.  Very mild sigmoid diverticulosis.  3.  Splenomegaly.    THIAGO LARA MD         SYSTEM ID:  [id number]     Medications       atorvastatin  40 mg Oral At Bedtime     cyanocobalamin  100 mcg Oral Daily     ferrous gluconate  324 mg Oral Daily with breakfast     fluticasone  1 spray Both Nostrils Daily     insulin aspart  1-10 Units Subcutaneous TID AC     insulin aspart  1-7 Units Subcutaneous At Bedtime     insulin NPH  10 Units Subcutaneous BID AC     lidocaine  3 patch Transdermal Q24H     lidocaine   Transdermal Q8H     metoprolol succinate ER  50 mg Oral Daily     pantoprazole (PROTONIX) IV  40 mg Intravenous BID     sertraline  150 mg Oral Daily     sodium chloride (PF)  3 mL Intracatheter Q8H     Vitamin D3  125 mcg Oral Daily

## 2022-03-15 NOTE — PROGRESS NOTES
"CLINICAL NUTRITION SERVICES - ASSESSMENT NOTE     Nutrition Prescription    Malnutrition Status:    % Intake: </= 50% for >/= 5 days (severe)  % Weight Loss: Weight loss does not meet criteria but has potential to during admit  Subcutaneous Fat Loss: Facial region:  moderate  Muscle Loss: Temporal:  moderate--> suspect more regions w/ muscle loss but difficult to assess given BMI  Fluid Accumulation/Edema: None documented or noted  Malnutrition Diagnosis: Severe malnutrition in the context of acute illness    Recommendations already ordered by Registered Dietitian (RD):  -Daily weights  -Vanilla Glucerna BID w/ breakfast and dinner tray    Future/Additional Recommendations:  -Wt trends (more accurate wt and need to adjust est. nutr needs?), appetite/PO intake, ONS acceptance     REASON FOR ASSESSMENT  Amy Luna is a/an 76 year old female assessed by the dietitian for Admission Nutrition Risk Screen for positive.    Dx: Pt presented to [facility] ED 3/14 with c/o back pain starting about 1 month ago and migrating starting 2 weeks ago and lack of appetite ongoing 1 week w/ abdominal pain. Pt admitted for CLARITA, acute blood loss anemia, concern for GI bleed --> no active bleeding per GI but at high risk given supratherapeutic INR.    PMH: Heart failure, DMII, HLD, HTN, Glaucoma, adenomatous colon polyp, LAVERNE.    NUTRITION HISTORY  Pt previously known to clinical nutrition services (04/2020) for cardiac education and nutrition assessment - pt received Boost Glucose Control during this time. Pt tells writer they are having a hard time thinking - suspect history is complicated by this. Pt follows a regular diet at home w/ 2 meals a day and one large snack. Pt cannot recall specific foods for a recall but says they eat a fairly balanced diet. Pt tries to eat less starchy foods. They report their current appetite is \"not there\" and this is ongoing for ~a week and a half. In this time the pt is eating significantly less than " "normal. Pt reports NKFA and no difficulties swallowing/chewing. Pt unsure of UBW at this time but believes they have had weight loss recently in which they cannot quantify. Pt drinks protein supplements (1 daily) at home with 2% milk.    CURRENT NUTRITION ORDERS  Diet: Full Liquid  Intake/Tolerance: Limited timeframe for intake opportunities since admit - nsg documents 25% intake of a clear liquid tray w/ poor appetite.    LABS  BUN 41 (H)  Cr 1.06 (H)  -313  Hgb A1c 6.6 (3/14/22)  Hg 8.0 (L)  INR 0.81 (L)  Albumin 2.1 (L)    MEDICATIONS  Lipitor  Cyanocobalamin  Ferrous Gluconate  PO Lasix  HSSI  Insulin NPH  Metoprolol  Pantoprazole  Cholecalciferol    ANTHROPOMETRICS  Height: 162.6 cm (5' 4\")  Most Recent Weight: 115.1 kg (253 lb 11.2 oz) (unsure if bed was zeroed. Weight may not be accurate)    IBW: 55 kg / 120#  BMI: Obesity Grade III BMI >40  Weight History: Nurse who took admit wt noted current wt may be inaccurate - wt trends within the past 6 mths suggest weight loss nearing clinical significance.    Wt Readings from Last 20 Encounters:   03/15/22 115.1 kg (253 lb 11.2 oz)   02/21/22 118.5 kg (261 lb 3.2 oz)   01/05/22 122 kg (269 lb)   10/19/21 126.6 kg (279 lb 3.2 oz)   07/19/21 127.4 kg (280 lb 14.4 oz)   05/17/21 129.8 kg (286 lb 3.2 oz)   04/29/21 131.2 kg (289 lb 3.2 oz)   01/28/21 131.3 kg (289 lb 8 oz)   01/13/21 126.6 kg (279 lb)   11/20/20 126.7 kg (279 lb 6.4 oz)   09/16/20 120.2 kg (265 lb)   08/11/20 123.8 kg (273 lb)   07/28/20 120.7 kg (266 lb)   06/10/20 120.2 kg (265 lb)   05/19/20 123.8 kg (273 lb)   05/05/20 126.1 kg (278 lb)   05/01/20 126.6 kg (279 lb 3.2 oz)   04/22/20 129.5 kg (285 lb 7.9 oz)   04/08/20 130.2 kg (287 lb)   03/26/20 126.1 kg (278 lb)       Dosing Weight: 70 kg (adjusted based on admit wt and IBW)    ASSESSED NUTRITION NEEDS  Estimated Energy Needs: 1750 - 2100 kcals/day (25 - 30 kcals/kg)  Justification: Maintenance  Estimated Protein Needs: 84 grams protein/day " (>/= 1.2 grams of pro/kg)  Justification: Preservation of LBM  Estimated Fluid Needs: Per MD    PHYSICAL FINDINGS  See malnutrition section below.  -Pt notes diarrhea and 4 episodes of black, bloody stool since 3/11    MALNUTRITION  % Intake: </= 50% for >/= 5 days (severe)  % Weight Loss: Weight loss does not meet criteria but has potential to during admit  Subcutaneous Fat Loss: Facial region:  Moderate  Muscle Loss: Temporal:  moderate--> suspect more regions w/ muscle loss but difficult to assess given BMI  Fluid Accumulation/Edema: None documented or noted  Malnutrition Diagnosis: Severe malnutrition in the context of acute illness    NUTRITION DIAGNOSIS  Inadequate protein-energy intake related to appetite decline w/ chronic pain and general illness as evidenced by decreased PO intake, weight/muscle/fat loss, and conversation with patient.    INTERVENTIONS  Implementation   Medical food supplement therapy - Ordered as above.  Collaboration with other providers - Discussed pt at Med/Surg rounds.    Goals  Patient to consume % of nutritionally adequate meal trays TID, or the equivalent with supplements/snacks.     Monitoring/Evaluation  Progress toward goals will be monitored and evaluated per protocol.    Nalini Ospina  Dietetic Intern

## 2022-03-16 NOTE — PROGRESS NOTES
[facility]    Medicine Progress Note - Hospitalist Service    Date of Admission:  3/14/2022    Assessment & Plan            Amy Luna is a 76 year old female with PMHx singificant for chronic HFpEF, valvular heart disease with bioprosthetic mitral and bioprosthetic aortic valve replacements and tricuspid annuloplasty with repair and PFO closure requiring PPM, PAF on anticoagulation with warfarin, IDDM, obesity, LAVERNE, who was admitted on 3/14/2022 with acute anemia in the setting of supra therapeutic INR.      Problem list:    #1.  Acute blood loss anemia requiring packed RBC transfusion with suspicion of GI bleeding in the setting of supratherapeutic INR > 10 upon presentation  -She receiving total 4 units of packed RBC  -Most recent hemoglobin stable at 8 g  -Highly appreciate prompt input from GI service  -No procedures recommended at this time  -Continuation of PPI twice daily  -No NSAIDs use, as she has been using ibuprofen frequently  -No further evidence of recurrent bleeding symptoms  -Continue to demonstrate stable hemoglobin levels  -We will continue current PPI  -Appreciate extensive input from GI service.  Likely not pursuing any inpatient GI intervention here  -Advance her diet to moderate CHO    #2.  Supratherapeutic INR received Kcentra and vitamin K reversal agent during admission process in the emergency room  -Most recent INR less than 1  -Continue to hold her anticoagulation  -Appreciate input from cardiology service who also felt that the benefits outweighed the risks of continuation of anticoagulation in the setting of this life-threatening GI bleeding.  Fortunately she remains on NSR.      #3.  History of atrial fibrillation on warfarin anticoagulation  -Currently rate controlled  -Admission EKG was NSR  -I requested formal cardiology evaluation to further guide us regarding plan of care with her multiple cardiac history.  She as previously known dilated atrium  that might precipitate her on the higher chance of recurrent atrial fibrillation.  -She is not a candidate for any anticoagulation as of now given this recent life-threatening anemia and presumptive GI bleeding with supratherapeutic INR  -Has a history of a tissue valve with bioprosthetic aortic and mitral valve  No further anticoagulation for now.  Appreciate cardiology input  Remained on NSR, stop tele monitor    #4.  Chronic heart failure with preserved EF not in exacerbation  -Home medications listed twice a day 40 mg of furosemide but reportedly she is not taking this  -She is at risk for exacerbation of her heart failure the setting of volume resuscitation with multiple packed RBC transfusion  -Creatinine is permitting and blood pressure is also holding  -We will start oral Lasix today    #5.  Insulin requiring diabetes mellitus  -We will resume her long-acting insulin as she takes NPH but a reduced dosing for now as currently she is still on clear liquids  -Change her insulin sliding scale to high intensity sliding scale  -Continue with prandial insulin coverage  -Hold her Metformin  -Her oral intake and diet being advanced and anticipating her hyperglycemia will continuously to be demonstrate rising trend.  I adjusted her long-acting insulin, put her on the prandial insulin coverage to get her with high intensity insulin sliding scale.    #6.  Dyslipidemia  -Resume her statins    #7.  Physical deconditioning  -We will ask PT and OT input    #8.  LAVERNE  -Continue her appliance    #9.  Morbid obesity, complicates care    #10.  Severe back pain, mostly lower back area.  -She mentioned that this is her chief complaint presenting the hospital and the reason why she is using so much NSAIDs prior to this hospitalization  -She denies any recent fall event.  -Lumbar x-ray did not show any obvious compression fractures, normal vertebral body height  -However with her severe symptoms I am pursuing further imaging such as  "an MRI of the lumbar spine.  Of note she also has a pacemaker, she is not quite certain if this is MRI compatible.  If cannot be done then will likely be pursuing CT of the L-spine.      Continue inpatient care.  Remain on telemetry monitoring.  Condition of packed RBC transfusion ordered.  Midline access pursued and tolerated procedure         Diet: Snacks/Supplements Adult: Glucerna; With Meals  Moderate Consistent Carb (60 g CHO per Meal) Diet    DVT Prophylaxis: Pneumatic Compression Devices  Stewart Catheter: Not present  Central Lines: PRESENT       Cardiac Monitoring: None  Code Status: Full Code      Disposition Plan   Expected Discharge: 03/17/2022     Anticipated discharge location: inpatient rehabilitation facility    Delays:            The patient's care was discussed with the Patient.    Mamadou Knight MD, MD  Hospitalist Service  [facility]  Securely message with the Vocera Web Console (learn more here)  Text page via [employer] Paging/Directory         Clinically Significant Risk Factors Present on Admission              # Diabetes, type II: last A1C 6.6 % (Ref range: 0.0 - 5.6 %)  # Severe Obesity: Estimated body mass index is 43.32 kg/m  as calculated from the following:    Height as of this encounter: 1.626 m (5' 4\").    Weight as of this encounter: 114.5 kg (252 lb 6.4 oz).  # Severe Malnutrition: based on nutrition assessment     ______________________________________________________________________    Interval History   Continuing care for this a very pleasant 76-year-old  lady who initially came in here for severe anemia found with supratherapeutic INR.    No significant reported issues overnight.  She continues to demonstrate no evidence of GI bleeding symptoms as she has no further melena, no reported bright red blood per rectum or coffee-ground emesis.  Denies any vomiting.    Currently not requiring any oxygen support.  Afebrile.  Stable mental status.  "   However she is still very much symptomatic with her low back pain leading to deconditioned state and not able to tolerate much of her out of bed to chair activity.    She denies any focal weakness, numbness or tingling sensation.  No mental status changes.    No incontinence seen.      Data reviewed today: I reviewed all medications, new labs and imaging results over the last 24 hours. I personally reviewed no images or EKG's today.    Physical Exam   Vital Signs: Temp: 98.9  F (37.2  C) Temp src: Oral BP: 138/56 Pulse: 89   Resp: 16 SpO2: 92 % O2 Device: None (Room air)    Weight: 252 lbs 6.4 oz  HEENT; Atraumatic, normocephalic, pinkish conjuctiva, pupils bilateral reactive   Skin: warm and moist, pale appearing  Lymphatics: no cervical or axillary lymphandenopathy  Lungs: equal chest expansion, clear to auscultation, no wheezes, no stridor, no crackles,   Heart: normal rate, normal rhythm, no rubs or gallops.   Abdomen: normal bowel sounds, no tenderness, no peritoneal signs, no guarding  Extremities: no deformities, non pitting bilateral lower extremity edema   Neuro; follow commands, alert and oriented x3, spontaneous speech, coherent, moves all extremities spontaneously  Psych; no hallucination, euthymic mood, not agitated        Data   Recent Labs   Lab 03/16/22  0834 03/16/22  0518 03/16/22  0223 03/15/22  1253 03/15/22  0910 03/15/22  0205 03/14/22  2207 03/14/22  2142 03/14/22  1325 03/14/22  0901   WBC  --  8.6  --   --  10.0  --   --   --   --  13.8*   HGB  --  8.1*  --   --  8.0*  --  6.2*  --    < > 4.3*   MCV  --  91  --   --  90  --   --   --   --  94   PLT  --  242  --   --  363  --   --   --   --  375   INR  --   --   --   --  0.81*  --   --  1.09  --  >10.00*   NA  --  136  --   --  137  --   --   --   --  138   POTASSIUM  --  3.9  --   --  3.8  --   --   --   --  4.4   CHLORIDE  --  105  --   --  107  --   --   --   --  108   CO2  --  27  --   --  25  --   --   --   --  22   BUN  --  26  --    --  41*  --   --   --   --  60*   CR  --  0.89  --   --  1.06*  --   --   --   --  1.39*   ANIONGAP  --  4  --   --  5  --   --   --   --  8   NELLY  --  8.6  --   --  8.7  --   --   --   --  9.4   * 272* 245*   < > 281*   < >  --   --    < > 313*   ALBUMIN  --   --   --   --   --   --   --   --   --  2.1*   PROTTOTAL  --   --   --   --   --   --   --   --   --  7.0   BILITOTAL  --   --   --   --   --   --   --   --   --  0.2   ALKPHOS  --   --   --   --   --   --   --   --   --  66   ALT  --   --   --   --   --   --   --   --   --  18   AST  --   --   --   --   --   --   --   --   --  15    < > = values in this interval not displayed.     Recent Results (from the past 24 hour(s))   XR Lumbar Spine 2/3 Views    Narrative    LUMBAR SPINE TWO - THREE VIEWS 3/15/2022 12:32 PM    INDICATION: Low back pain.    COMPARISON: Abdomen and pelvis CT 3/14/2022.      Impression    IMPRESSION: Five lumbar vertebral bodies. Normal lumbar vertebral body  heights. No compression deformity or fracture. Schmorl's nodes within  the upper T12 and L3 endplates better visualized on the comparison CT.  Low-grade retrolisthesis of L1, L2 and L4. Mild interspace, endplate  and facet degeneration.    RHONDA DOWNS MD         SYSTEM ID:  [id number]     Medications       atorvastatin  40 mg Oral At Bedtime     cyanocobalamin  100 mcg Oral Daily     ferrous gluconate  324 mg Oral Daily with breakfast     fluticasone  1 spray Both Nostrils Daily     furosemide  20 mg Oral Daily     insulin aspart   Subcutaneous TID AC     insulin aspart  1-10 Units Subcutaneous TID AC     insulin aspart  1-7 Units Subcutaneous At Bedtime     insulin NPH  20 Units Subcutaneous BID AC     lidocaine  3 patch Transdermal Q24H     lidocaine   Transdermal Q8H     metoprolol succinate ER  50 mg Oral Daily     miconazole   Topical BID     pantoprazole (PROTONIX) IV  40 mg Intravenous BID     sertraline  150 mg Oral Daily     sodium chloride (PF)  3 mL  Intracatheter Q8H     Vitamin D3  125 mcg Oral Daily

## 2022-03-16 NOTE — PROGRESS NOTES
CM: Pass completed for TCU at [facility] for tomorrow    [id number]  03/16/22 @ 1600

## 2022-03-16 NOTE — PROGRESS NOTES
03/16/22 0900   Quick Adds   Type of Visit Initial Occupational Therapy Evaluation   Living Environment   People in Home spouse   Current Living Arrangements house   Home Accessibility stairs to enter home   Number of Stairs, Main Entrance other (see comments)  (15)   Stair Railings, Main Entrance railings safe and in good condition   Transportation Anticipated family or friend will provide   Living Environment Comments Pt has 15 steps from garage to home, home is one level. Tub shower, grab bar. Comfort height toilet.    Self-Care   Usual Activity Tolerance moderate   Current Activity Tolerance poor   Regular Exercise No   Equipment Currently Used at Home walker, rolling;walker, standard   Fall history within last six months no   Activity/Exercise/Self-Care Comment Pt uses standard walker mostly, independent with ADLs   Instrumental Activities of Daily Living (IADL)   Previous Responsibilities meal prep;housekeeping;laundry;shopping;medication management;finances;driving   IADL Comments Pt  assists with IADLs like grocery shopping   General Information   Onset of Illness/Injury or Date of Surgery 03/14/22   Referring Physician Mamadou Knight MD   Patient/Family Therapy Goal Statement (OT) Pt would like to have less back pain   Additional Occupational Profile Info/Pertinent History of Current Problem 76-year-old woman with mitral/aortic valve disease (status post bioprosthetic mitral valve/aortic valve replacement), heart block (status post pacemaker implantation), nonobstructive coronary disease and a history of postoperative atrial fibrillation (currently in sinus rhythm) was evaluated in consultation at the request of Dr. Knight for recommendations regarding the patient's need for anticoagulation in the setting of life-threatening GI bleed on oral anticoagulation. Pt also having 10/10 back pain   Existing Precautions/Restrictions fall;cardiac;pacemaker   Cognitive Status Examination    Orientation Status person;place  (pt unable to state date and time)   Affect/Mental Status (Cognitive) low arousal/lethargic   Follows Commands WFL   Memory Deficit minimal deficit   Attention Deficit minimal deficit   Cognitive Status Comments Pt scored -6 on Short Blessed indicating mild cognitive impairment   Visual Perception   Visual Impairment/Limitations WFL   Pain Assessment   Patient Currently in Pain Yes, see Vital Sign flowsheet   Range of Motion Comprehensive   Comment, General Range of Motion UE WFL, LE limited due to back pain   Strength Comprehensive (MMT)   Comment, General Manual Muscle Testing (MMT) Assessment Global weakness observed throughout   Coordination   Coordination Comments Significantly limited due to back pain   Bed Mobility   Comment (Bed Mobility) Dependent   Transfers   Transfer Comments Dependent via lift   Balance   Balance Comments Impaired   Activities of Daily Living   BADL Assessment/Intervention bathing;upper body dressing;lower body dressing;toileting   Bathing Assessment/Intervention   Comment, (Bathing) Total assist   Upper Body Dressing Assessment/Training   Comment, (Upper Body Dressing) Total assist   Lower Body Dressing Assessment/Training   Comment, (Lower Body Dressing) Total assist   Toileting   Comment, (Toileting) Total assist via bed pan   Clinical Impression   Criteria for Skilled Therapeutic Interventions Met (OT) Yes, treatment indicated   OT Diagnosis Decline in ADL independence and safety   Influenced by the following impairments Back pain, GI bleed and low hemoglobin, cardiac history   OT Problem List-Impairments impacting ADL problems related to;activity tolerance impaired;cognition;coordination;mobility;strength;pain   Assessment of Occupational Performance 5 or more Performance Deficits   Identified Performance Deficits Impaired dressing bathing and toileting   Planned Therapy Interventions (OT) ADL retraining;progressive activity/exercise   Clinical  Decision Making Complexity (OT) moderate complexity   Anticipated Equipment Needs Upon Discharge (OT)   (TBD)   Risk & Benefits of therapy have been explained evaluation/treatment results reviewed;care plan/treatment goals reviewed;risks/benefits reviewed;current/potential barriers reviewed;participants voiced agreement with care plan;participants included;patient   OT Discharge Planning   OT Discharge Recommendation (DC Rec) Transitional Care Facility   OT Rationale for DC Rec Pt is currently dependent with all mobility due to back pain and weakness. Pt has an inaccessible home and unable to mobilize out of bed this date. Pt would require TCU for continued therapy to return to prior level of function   OT Brief overview of current status Dependent supine to sit   Total Evaluation Time (Minutes)   Total Evaluation Time (Minutes) 11   OT Goals   Therapy Frequency (OT) 5 times/wk   OT Predicated Duration/Target Date for Goal Attainment 03/19/22   OT Goals Upper Body Dressing;Lower Body Bathing;Bed Mobility;Toilet Transfer/Toileting;Cognition   OT: Upper Body Dressing Modified independent;using adaptive equipment;from wheelchair   OT: Lower Body Bathing Modified independent;using adaptive equipment   OT: Bed Mobility Supervision/stand-by assist;rolling;supine to/from sitting   OT: Toilet Transfer/Toileting Modified independent;cleaning and garment management;using adaptive equipment;within precautions;toilet transfer   OT: Cognitive Patient/caregiver will verbalize understanding of cognitive assessment results/recommendations as needed for safe discharge planning

## 2022-03-16 NOTE — CARE PLAN
Assumed cares from [id number]. Pt is A&Ox4, VSS on RA. C/O of back pain and PRN medications was given per order. No S/S of bleeding noted during my shift, last hemoglobin is 8.0, morning labs will be drawn for next check.   A2 with lift for repositions. Full liquid diet. Purewick is in use. Blood sugar AC/HS , 2 am BGM was 245. Pt can make her needs known and bed alarm is on for safety.

## 2022-03-16 NOTE — PROGRESS NOTES
"  Addendum: Will arrange outpatient EGD. Will sign off, please call with questions.    GASTROENTEROLOGY PROGRESS NOTE     SUBJECTIVE:  Main complaint is back pain.  No stools since admission.  No hematemesis, nausea, vomiting, abdominal pain.      OBJECTIVE:  /56   Pulse 89   Temp 98.9  F (37.2  C) (Oral)   Resp 16   Ht 1.626 m (5' 4\")   Wt 114.5 kg (252 lb 6.4 oz)   SpO2 92%   BMI 43.32 kg/m    Temp (24hrs), Av.4  F (36.9  C), Min:97.5  F (36.4  C), Max:99.1  F (37.3  C)    Patient Vitals for the past 72 hrs:   Weight   22 0632 114.5 kg (252 lb 6.4 oz)   03/15/22 0505 115.1 kg (253 lb 11.2 oz)       Intake/Output Summary (Last 24 hours) at 3/15/2022 0952  Last data filed at 3/15/2022 0657  Gross per 24 hour   Intake 1773.9 ml   Output 2050 ml   Net -276.1 ml        PHYSICAL EXAM  Gen: alert, oriented, NAD  Abd: soft, NT/ND, +BS           Additional Comments:  ROS, FH, SH: See initial GI consult for details.     I have reviewed the patient's new clinical lab results:     Recent Labs   Lab Test 03/16/22  0518 03/15/22  0910 22  2207 22  2142 22  1841 22  0901   WBC 8.6 10.0  --   --   --  13.8*   HGB 8.1* 8.0* 6.2*  --    < > 4.3*   MCV 91 90  --   --   --  94    363  --   --   --  375   INR  --  0.81*  --  1.09  --  >10.00*    < > = values in this interval not displayed.     Recent Labs   Lab Test 22  0518 03/15/22  0910 22  0901   POTASSIUM 3.9 3.8 4.4   CHLORIDE 105 107 108   CO2 27 25 22   BUN 26 41* 60*   ANIONGAP 4 5 8     Recent Labs   Lab Test 22  0933 22  0901 21  1126 20  1134 04/15/20  1342 20  1317   ALBUMIN  --  2.1* 3.9 4.0   < >  --    BILITOTAL  --  0.2 0.6 0.6   < >  --    ALT  --  18 37 38   < >  --    AST  --  15 36 37   < >  --    PROTEIN Negative  --   --   --   --  Negative    < > = values in this interval not displayed.        Assessment/Plan:  76-year-old female with past medical history significant " for  CHF, valvular heart disease with bioprosthetic mitral and aortic valve replacements, tricuspid annuloplasty with repair and PFO closure, paroxysmal atrial fibrillation on chronic anticoagulation with warfarin, type 2 diabetes, obesity, LAVERNE, admitted 3/14 with severe anemia and supratherapeutic INR, and report of melena.  Hemoglobin on admission 4.3, down from 11.8 in August 2021 with INR greater than 10.     1.  Acute blood loss anemia.  Patient has very high risk for GI bleeding anywhere in GI tract given the supratherapeutic INR.  With report of melena and NSAID use (aleve) prior to admission, upper GI bleeding suspected. Last colonoscopy through [facility] in January 2021 showed 2 small tubular adenomas and mild sigmoid diverticulosis. No prior EGD. Given Kcentra and vitamin K 3/14 and INR 1.09 today. No overt GI bleeding noted since admission. Received total 4 units of blood with last very early am 3/15. HGB now stable. Hemodynamically stable.     Cardiology notes reviewed and also discussed with hospital team, Dr. Knight - no plans to resume anticoagulation due to high bleeding risk.     --Monitor for overt GI bleeding.   --Transition to oral PPI 40mg daily.    --Avoid NSAIDs.   --Advance diet to regular/diabetic diet.   --Will discuss with Dr. Box regarding need for outpatient EGD.   --Ok to discharge from GI perspective.     Estela Segovia, PAC  [facility] ([facility])

## 2022-03-16 NOTE — PROGRESS NOTES
Care Management Follow Up    Length of Stay (days): 2    Expected Discharge Date: 03/17/2022     Concerns to be Addressed: Medical readiness, pain management, TCU placement      Patient plan of care discussed at interdisciplinary rounds: Yes    Anticipated Discharge Disposition: Transitional Care     Anticipated Discharge Services: None  Anticipated Discharge DME: None    Patient/family educated on Medicare website which has current facility and service quality ratings: yes  Education Provided on the Discharge Plan: Yes   Patient/Family in Agreement with the Plan: yes    Referrals Placed by CM/SW: Post Acute Facilities    Additional Information:  OT is recommending discharge to TCU, met with pt at bedside. She is in agreement with TCU recommendations, referrals sent for a shared room to [facility]. Referrals sent, awaiting responses.     Nadira House RN BSN   Inpatient Care Coordination  [facility]   Phone [phone]

## 2022-03-16 NOTE — PLAN OF CARE
To Do:  End of Shift Summary  For vital signs and complete assessments, please see documentation flowsheets.     Pertinent assessments: Patient is Aox4 this shift but very forgetful at times. Complains of uncontrolled pain in back. PRN medications given and lidocaine patches applied. Complains of JENKINS/repositioning. External cath in place.     Major Shift Events: CT lumbar spine done today. Discontinue tele. Diet advanced to Mod carb, poor PO intake.     Treatment Plan: TCU at discharge per therapy recommendations. Pain management. Monitor Hgb.     Bedside Nurse: Susannah Kirby RN    Yes

## 2022-03-16 NOTE — PROGRESS NOTES
Care Management Follow Up    Length of Stay (days): 2    Expected Discharge Date: 03/17/2022     Concerns to be Addressed:  TCU      Patient plan of care discussed at interdisciplinary rounds: Yes    Anticipated Discharge Disposition: Transitional Care     Anticipated Discharge Services: PT/OT  Anticipated Discharge DME: None    Patient/family educated on Medicare website which has current facility and service quality ratings: yes  Education Provided on the Discharge Plan:  Pt  Patient/Family in Agreement with the Plan: yes    Referrals Placed by CM/SW: Post Acute Facilities    Additional Information:  Pt has been accepted at [facility]CU.  They would have a bed for Pt tomorrow if medically stable. They will follow along.  updated and agreeable to [facility].      Ama López RN BSN   Inpatient Care Coordination  [facility]  [phone]        Lindsay López, RN

## 2022-03-17 NOTE — PROGRESS NOTES
[facility]    Medicine Progress Note - Hospitalist Service    Date of Admission:  3/14/2022    Assessment & Plan            Amy Luna is a 76 year old female with PMHx singificant for chronic HFpEF, valvular heart disease with bioprosthetic mitral and bioprosthetic aortic valve replacements and tricuspid annuloplasty with repair and PFO closure requiring PPM, PAF on anticoagulation with warfarin, IDDM, obesity, LAVERNE, who was admitted on 3/14/2022 with acute anemia in the setting of supra therapeutic INR.      Problem list:    #1.  Acute blood loss anemia requiring packed RBC transfusion with suspicion of GI bleeding in the setting of supratherapeutic INR > 10 upon presentation  -She receiving total 4 units of packed RBC  -Most recent hemoglobin remained stable at between 8 to 9 g  -Highly appreciate prompt input from GI service  -No procedures recommended at this time  -Continuation of PPI twice daily  -No NSAIDs use, as she has been using ibuprofen frequently  -No further evidence of recurrent bleeding symptoms  -Continue to demonstrate stable hemoglobin levels  -We will continue current PPI  -Appreciate extensive input from GI service.  Likely not pursuing any inpatient GI intervention here  -Advance her diet to moderate CHO    #2.  Supratherapeutic INR received Kcentra and vitamin K reversal agent during admission process in the emergency room  -Most recent INR less than 1  -Continue to hold her anticoagulation  -Appreciate input from cardiology service who also felt that the benefits outweighed the risks of continuation of anticoagulation in the setting of this life-threatening GI bleeding.  Fortunately she remains on NSR.      #3.  History of atrial fibrillation on warfarin anticoagulation  -Currently rate controlled  -Admission EKG was NSR  -I requested formal cardiology evaluation to further guide us regarding plan of care with her multiple cardiac history.  She as previously  known dilated atrium that might precipitate her on the higher chance of recurrent atrial fibrillation.  -She is not a candidate for any anticoagulation as of now given this recent life-threatening anemia and presumptive GI bleeding with supratherapeutic INR  -Has a history of a tissue valve with bioprosthetic aortic and mitral valve  No further anticoagulation for now.  Appreciate cardiology input  Remained on NSR, stop tele monitor    #4.  Chronic heart failure with preserved EF not in exacerbation  -Home medications listed twice a day 40 mg of furosemide but reportedly she is not taking this  -She is at risk for exacerbation of her heart failure the setting of volume resuscitation with multiple packed RBC transfusion  -Creatinine is permitting and blood pressure is also holding  -Home oral Lasix has been resumed    #5.  Insulin requiring diabetes mellitus    -Change her insulin sliding scale to high intensity sliding scale  -Continue with prandial insulin coverage  -Hold her Metformin  -Her oral intake and diet being advanced and anticipating her hyperglycemia will continuously to be demonstrate rising trend.  I adjusted her long-acting insulin, put her on the prandial insulin coverage to get her with high intensity insulin sliding scale.  -I resume her home regimen of NPH at 30 units twice daily    #6.  Dyslipidemia  -Resume her statins    #7.  Physical deconditioning  -We will ask PT and OT input    #8.  LAVERNE  -Continue her appliance    #9.  Morbid obesity, complicates care    #10.  Severe back pain, mostly lower back area.  -She mentioned that this is her chief complaint presenting the hospital and the reason why she is using so much NSAIDs prior to this hospitalization  -She denies any recent fall event.  -Lumbar x-ray did not show any obvious compression fractures, normal vertebral body height  -However with her severe symptoms I am pursuing further imaging such as an MRI of the lumbar spine.  Of note she also  has a pacemaker, she is not quite certain if this is MRI compatible.  If cannot be done then will likely be pursuing CT of the L-spine.  -CT of the lumbar spine showed acute appearing fractures at the inferior right L4 endplate and superior left L3 endplate, marked multilevel degenerative changes, moderate spinal canal narrowing, moderate to severe left neural foraminal narrowing at L2-L3  -Likely this is the source of patient's low back pain  -I requested a neurosurgery spine service evaluation as patient remained very much symptomatic.  -She cannot even tolerate out of bed activities.    #11. abnormal UA, urine cultures showing Klebsiella  Klebsiella UTI  -Isolated organism pansensitive, start oral Cipro    #12.  Elevated creatinine secondary to CKD stage II      Continue inpatient care.  Remain on telemetry monitoring.  Condition of packed RBC transfusion ordered.  Midline access pursued and tolerated procedure         Diet: Snacks/Supplements Adult: Glucerna; With Meals  Moderate Consistent Carb (60 g CHO per Meal) Diet    DVT Prophylaxis: Pneumatic Compression Devices  Stewart Catheter: Not present  Central Lines: PRESENT       Cardiac Monitoring: None  Code Status: Full Code      Disposition Plan   Expected Discharge: I am anticipating given patient's ongoing symptomatology mostly in her back area that she will still be requiring at least 2 inpatient hospitalization days     Anticipated discharge location: inpatient rehabilitation facility    Delays:            The patient's care was discussed with the Patient.    Mamadou Knight MD, MD  Hospitalist Service  [facility]  Securely message with the Vocera Web Console (learn more here)  Text page via [facility] Paging/Directory         Clinically Significant Risk Factors Present on Admission              # Diabetes, type II: last A1C 6.6 % (Ref range: 0.0 - 5.6 %)  # Severe Obesity: Estimated body mass index is 41.38 kg/m  as calculated from  "the following:    Height as of this encounter: 1.626 m (5' 4\").    Weight as of this encounter: 109.4 kg (241 lb 1.6 oz).  # Severe Malnutrition: based on nutrition assessment     ______________________________________________________________________    Interval History   Continuing care for this a very pleasant 76-year-old  lady who initially came in here for severe anemia found with supratherapeutic INR.    Overnight still complaining of intermittent low back pain and had some urinary retention needing straight catheterization  No reported urinary nor fecal incontinence, foot drop or focal weakness.  Denies any numbness or tingling sensation.  No mental status changes.  Still has no BM.  No bleeding symptoms.  Stable hemodynamics.  Not requiring any oxygen support.  Still not participating with out of bed to chair activities.      Data reviewed today: I reviewed all medications, new labs and imaging results over the last 24 hours. I personally reviewed no images or EKG's today.    Physical Exam   Vital Signs: Temp: 98.3  F (36.8  C) Temp src: Oral BP: (!) 144/61 Pulse: 95   Resp: 18 SpO2: 94 % O2 Device: None (Room air)    Weight: 241 lbs 1.6 oz  HEENT; Atraumatic, normocephalic, pinkish conjuctiva, pupils bilateral reactive   Skin: warm and moist, pale appearing  Lymphatics: no cervical or axillary lymphandenopathy  Lungs: equal chest expansion, clear to auscultation, no wheezes, no stridor, no crackles,   Heart: normal rate, normal rhythm, no rubs or gallops.   Abdomen: normal bowel sounds, no tenderness, no peritoneal signs, no guarding  Extremities: no deformities, non pitting bilateral lower extremity edema   Neuro; follow commands, alert and oriented x3, spontaneous speech, coherent, moves all extremities spontaneously  Psych; no hallucination, euthymic mood, not agitated        Data   Recent Labs   Lab 03/17/22  0858 03/17/22  0815 03/17/22  0144 03/16/22  0834 03/16/22  0518 03/15/22  1253 " 03/15/22  0910 03/14/22  2207 03/14/22  2142 03/14/22  1325 03/14/22  0901   WBC  --  8.3  --   --  8.6  --  10.0  --   --   --  13.8*   HGB  --  8.9*  --   --  8.1*  --  8.0*   < >  --    < > 4.3*   MCV  --  91  --   --  91  --  90  --   --   --  94   PLT  --   --   --   --  242  --  363  --   --   --  375   INR  --   --   --   --   --   --  0.81*  --  1.09  --  >10.00*   NA  --  136  --   --  136  --  137  --   --   --  138   POTASSIUM  --  3.6  --   --  3.9  --  3.8  --   --   --  4.4   CHLORIDE  --  105  --   --  105  --  107  --   --   --  108   CO2  --  26  --   --  27  --  25  --   --   --  22   BUN  --  17  --   --  26  --  41*  --   --   --  60*   CR  --  0.83  --   --  0.89  --  1.06*  --   --   --  1.39*   ANIONGAP  --  5  --   --  4  --  5  --   --   --  8   NELLY  --  8.6  --   --  8.6  --  8.7  --   --   --  9.4   * 258* 227*   < > 272*   < > 281*   < >  --    < > 313*   ALBUMIN  --   --   --   --   --   --   --   --   --   --  2.1*   PROTTOTAL  --   --   --   --   --   --   --   --   --   --  7.0   BILITOTAL  --   --   --   --   --   --   --   --   --   --  0.2   ALKPHOS  --   --   --   --   --   --   --   --   --   --  66   ALT  --   --   --   --   --   --   --   --   --   --  18   AST  --   --   --   --   --   --   --   --   --   --  15    < > = values in this interval not displayed.     Recent Results (from the past 24 hour(s))   CT Lumbar Spine w/o Contrast    Narrative    CT LUMBAR SPINE WITHOUT CONTRAST March 16, 2022 3:47 PM     HISTORY: Low back pain greater than six weeks.      TECHNIQUE: Axial images of the lumbar spine were obtained without  intravenous contrast. Multiplanar reformations were performed.  Radiation dose for this scan was reduced using automated exposure  control, adjustment of the mA and/or kV according to patient size, or  iterative reconstruction technique.     COMPARISON: Lumbar spine x-ray 3/15/2022.    FINDINGS: There are five lumbar-type vertebral bodies assumed  for the  purposes of this dictation.     There are subtle acute lucent fracture lines through the inferior  right L4 endplate. Fracture lines extend to the posterior endplate in  the region of the right neural foramen. Minimal loss of L4 vertebral  body height.    Deformity at the superior left L3 endplate. There is a lucent acute  appearing fracture line through the left aspect of this L3 endplate  and this is concerning for an area of acute/subacute fracture.    Deformity at the superior T12 endplate asymmetric towards the left is  not completely visualized. This has the appearance of a Schmorl's node  deformity although given the other apparent acute fractures, a  fracture could be considered.    Lumbar spine alignment is grossly within normal limits. No significant  posterior displacement of any of the fractured vertebral bodies  towards the spinal canal.    Level by level as follows:     T12-L1: No loss of disc height. No significant disc herniation.  Apparent marked facet hypertrophy with hypertrophic changes around the  right facet joint. No spinal canal narrowing. No significant right  neural foraminal narrowing. No left neural foraminal narrowing.    L1-L2: Mild loss of disc height. Posterior disc bulge. Mild facet  hypertrophy. No significant spinal canal narrowing. No significant  neural foraminal narrowing.     L2-L3: Moderate loss of disc height. Circumferential disc bulge with  mild endplate osteophytic spurring. Moderate bilateral facet  hypertrophy. Moderate spinal canal narrowing. Mild right neural  foraminal narrowing. Moderate to severe left neural foraminal  narrowing.    L3-L4: Mild loss of disc height. Circumferential disc bulge. Marked  bilateral facet hypertrophy. Moderate to severe spinal canal  narrowing. Moderate right neural foraminal narrowing. Moderate left  neural foraminal narrowing.     L4-L5: Mild loss of disc height. Circumferential disc bulge. Endplate  osteophytic spurring more  pronounced towards the right foraminal and  far lateral region. Severe bilateral facet hypertrophy with thickening  of the ligamentum flavum. Severe spinal canal narrowing. Severe right  neural foraminal narrowing. Moderate to severe left neural foraminal  narrowing.     L5-S1: Mild loss of disc height. Circumferential disc bulge with  endplate osteophytic spurring more pronounced towards the right  foraminal and far lateral region. Marked bilateral facet hypertrophy.  No spinal canal narrowing. Moderate right neural foraminal narrowing.  No significant left neural foraminal narrowing.     Visualized paraspinous tissues: Unremarkable.       Impression    IMPRESSION:    1. Subtle acute appearing fractures of the inferior right L4 endplate  and superior left L3 endplate as detailed above. Mild loss of L3 and  L4 vertebral body height. No significant posterior displacement of the  fractured vertebral bodies towards the spinal canal.  2. Presumed Schmorl's node deformity at the superior T12 endplate  although this is not completely visualized. Acute fracture is  difficult to exclude particularly given the apparent acute fractures  of L3 and L4.  3. Marked multilevel degenerative changes throughout the lumbar spine  as detailed above, most pronounced at L2-L3, L3-L4, and L4-L5.     SHANAE COMBS MD         SYSTEM ID:  [id number]     Medications       atorvastatin  40 mg Oral At Bedtime     cyanocobalamin  100 mcg Oral Daily     ferrous gluconate  324 mg Oral Daily with breakfast     fluticasone  1 spray Both Nostrils Daily     furosemide  20 mg Oral Daily     insulin aspart   Subcutaneous TID AC     insulin aspart  1-10 Units Subcutaneous TID AC     insulin aspart  1-7 Units Subcutaneous At Bedtime     insulin NPH  30 Units Subcutaneous BID AC     lidocaine  3 patch Transdermal Q24H     lidocaine   Transdermal Q8H     metoprolol succinate ER  50 mg Oral Daily     miconazole   Topical BID     pantoprazole (PROTONIX) IV  40 mg  Intravenous BID     sertraline  150 mg Oral Daily     sodium chloride (PF)  3 mL Intracatheter Q8H     Vitamin D3  125 mcg Oral Daily

## 2022-03-17 NOTE — PROGRESS NOTES
03/17/22 1045   Quick Adds   Type of Visit Initial PT Evaluation   Living Environment   People in Home spouse   Current Living Arrangements house   Home Accessibility stairs to enter home   Number of Stairs, Main Entrance other (see comments)  (15)   Stair Railings, Main Entrance railings safe and in good condition   Transportation Anticipated family or friend will provide   Living Environment Comments Pt has 15 steps from garage to home, home is one level. Tub shower, grab bar. Comfort height toilet. Has FWW and 4WW at home, uses FWW mostly.    Self-Care   Usual Activity Tolerance moderate   Current Activity Tolerance poor   Regular Exercise No   Equipment Currently Used at Home walker, rolling;walker, standard   Fall history within last six months no   Activity/Exercise/Self-Care Comment Independent with ADLs and mobility. has had increased difficulty since this weekend with standing, walking, and stair navigation. Crawled up stairs sunday night and had difficulty walking monday morning.    General Information   Onset of Illness/Injury or Date of Surgery 03/14/22   Referring Physician Mamadou Knight MD   Patient/Family Therapy Goals Statement (PT) be able to sit, stand, and walk again. Have less back pain    Pertinent History of Current Problem (include personal factors and/or comorbidities that impact the POC) 75 yo female admitted on 3/14/2022 with acute anemia in the setting of supra therapeutic INR. Pt presents to ED d/t intense low back pain: CT of the lumbar spine showed acute appearing fractures at the inferior right L4 endplate and superior left L3 endplate, marked multilevel degenerative changes, moderate spinal canal narrowing, moderate to severe left neural foraminal narrowing at L2-L3.    Existing Precautions/Restrictions spinal   Weight-Bearing Status - LLE full weight-bearing   Weight-Bearing Status - RLE full weight-bearing   General Observations Per CT lumbar spine: lumbar fractures noted  "and performed mobility with spinal precautions, will continue to assess any activity restrictions pending neurosurgy consult.    Cognition   Orientation Status (Cognition) oriented x 4   Pain Assessment   Patient Currently in Pain Yes, see Vital Sign flowsheet  (intense LBP, centrally/towards left side, radiates to groin)   Posture    Posture Comments Pt finds most comfort laying flat, pain in low back is mostly located centrally and towards left side low back. Pain tends to radiate towards groin as well.    Strength (Manual Muscle Testing)   Strength Comments no focal weakness noted, is at least anti-gravity with all movements in bilaterally LEs.    Bed Mobility   Comment, (Bed Mobility) Max A x 2 bed mobility supine <> sit with log roll technique. Dependent for supine scoot    Transfers   Comment, (Transfers) Min/Mod A x 2 sit <> stand with mati steady standing frame, able to stand x 1.5 minutes with CGA. Unable to march in place or lift feet off floor d/t low back pain    Gait/Stairs (Locomotion)   Distance in Feet (Required for LE Total Joints) unable to ambulate d/t low back pain   Sensory Examination   Sensory Perception Comments Pt reports no numbness and tinglying but states that she feels \"like the legs are about to go numb\"   Clinical Impression   Criteria for Skilled Therapeutic Intervention Yes, treatment indicated   PT Diagnosis (PT) impaired mobility    Influenced by the following impairments pain, weakness, impaired balance    Functional limitations due to impairments fall risk, decreased activity tolerance    Clinical Presentation (PT Evaluation Complexity) Stable/Uncomplicated   Clinical Presentation Rationale clinical judgement    Clinical Decision Making (Complexity) low complexity   Planned Therapy Interventions (PT) balance training;bed mobility training;gait training;patient/family education;stair training;strengthening;transfer training   Anticipated Equipment Needs at Discharge (PT) walker " rolling   Risk & Benefits of therapy have been explained evaluation/treatment results reviewed;care plan/treatment goals reviewed;risks/benefits reviewed;current/potential barriers reviewed;participants voiced agreement with care plan;participants included;patient   PT Discharge Planning   PT Discharge Recommendation (DC Rec) Transitional Care Facility   PT Rationale for DC Rec Pt currently requires A x 2 for all mobility d/t intense low back pain. Pt would benefit from ongoing therapies at TCU to address functional mobility limitations prior to returning home (house has 15 TEDDY). Pt is normally walks Mod I with walker however is only tolerating standing for 1.5 mins in standing frame.    PT Brief overview of current status Max A x 2 supine <> sit, Min A x 2 sit <> stand with mati steady, stood for 1.5 mins with CGA in mati steady unable to lift feet. Low back pain feels best when laying flat.    Total Evaluation Time   Total Evaluation Time (Minutes) 10   Physical Therapy Goals   PT Frequency 5x/week   PT Predicated Duration/Target Date for Goal Attainment 03/24/22   PT Goals Bed Mobility;Transfers;Gait;Stairs   PT: Bed Mobility Minimal assist;Supine to/from sit   PT: Transfers Minimal assist;Sit to/from stand;Bed to/from chair;Assistive device   PT: Gait Minimal assist;Assistive device;Rolling walker;25 feet   PT: Stairs Minimal assist;Greater than 10 stairs;Rail on both sides

## 2022-03-17 NOTE — PROGRESS NOTES
Care Management Follow Up    Length of Stay (days): 3    Expected Discharge Date: 03/19/2022     Concerns to be Addressed:     Discharge Planning  Patient plan of care discussed at interdisciplinary rounds: Yes    Anticipated Discharge Disposition: Transitional Care     Anticipated Discharge Services: None  Anticipated Discharge DME: None    Patient/family educated on Medicare website which has current facility and service quality ratings: yes  Education Provided on the Discharge Plan:  Yes  Patient/Family in Agreement with the Plan: yes    Referrals Placed by CM/SW: Post Acute Facilities  Private pay costs discussed: Not applicable    Additional Information:  Care management following for discharge planning.  [facility] has accepted for TCU pending bed availability once patient medically ready.  Left VM with admissions at [facility] updating on anticipated discharge in 2 days.      Kirstin Burton, RN      Kirstin Burton RN Case Manager  Inpatient Care Coordination  [facility]   [phone]

## 2022-03-17 NOTE — PLAN OF CARE
End of Shift Summary  For vital signs and complete assessments, please see documentation flowsheets.     Pertinent assessments: VSS. Afebrile. LS clear. BS x4. Pt having 6/10 back pain, treated with Tylenol, Flexeril, and IV Dilaudid; minimal changed in pain noted. Mod CHO diet with Carb Counts, BG was 227. Ax2 with Lift to move, repo in bed as able. Midline - SL. Forgetful at times; alarm on bed for safety; pt restless and anxious at times. Slept on/off.     Major Shift Events: Pt unable to void spontaneously tonight, require straight cath, had 1 L output.    Treatment Plan: Treat back pain, Encourage activity, Discharge TBD with plan for follow up EGD.

## 2022-03-17 NOTE — CONSULTS
NEUROSURGERY CONSULT    Neurosurgery was asked by Dr. Knight to consult for a L2 and L4 compression fracture.    PLAN:    Ms. Luna's most recent imaging exhibits subtle acute appearing fractures of the inferior right L4 endplate and superior left L3 endplate. Based on her physical exam and imaging review, we do not see any indication for surgical intervention or bracing. We feel that it would be in her best interest to proceed with a conservative approach with pain management set forth by the Medical team here at [facility]. We do suggest that she not lift anything greater than 5-10 pounds for six weeks and avoid excessive bending, twisting, and turning.     We did review the images together and we explained his condition and the recommended treatment.     It has been a pleasure working with Ms. Luna. For now, she no longer requires additional in-patient Neurosurgical follow-up, but if concerns arise we would gladly assist with this. We did discuss signs of a worsening problem that she should seek being evaluated.     We will sign off at this point. Please page our on-call service for any questions or concerns.   ______________________________________________________________________    HPI:    Amy Luna is a pleasant 76 year old female who presented to the [facility] Emergency Department via EMS for consultation on lumbar spine pain with out leg radiculopathy. She describes the symptoms as a constant, dull, aching, pain that initiates in the midline low lumbar region and does not radiate distally nor is this pain accompanied with paresthesia. The symptoms have been present for one month approximately. Neither a traumatic or provocative mechanism can be appreciated. There are no bowel or bladder changes. No other concerns are voiced.    Past Medical History:   Diagnosis Date     (HFpEF) heart failure with preserved ejection fraction (H)      Aortic stenosis      Chest pain      Depressive  disorder      Diabetes (H)      Hyperlipidemia      Hypertension      Mitral annular calcification      Mitral stenosis      Obesity      Sleep apnea     CPAP       Past Surgical History:   Procedure Laterality Date     APPENDECTOMY       CV CORONARY ANGIOGRAM N/A 2020    Procedure: Coronary Angiogram;  Surgeon: Inez Peoples MD;  Location:  HEART CARDIAC CATH LAB     CV LEFT HEART CATH N/A 2020    Procedure: Left Heart Cath;  Surgeon: Inez Peoples MD;  Location:  HEART CARDIAC CATH LAB     CV PFO CLOSURE N/A 4/15/2020    Procedure: Patent Foramen Ovale Closure;  Surgeon: Ok Wilson MD;  Location:  OR     CV RIGHT HEART CATH MEASUREMENTS RECORDED N/A 2020    Procedure: Right Heart Cath;  Surgeon: Inez Peoples MD;  Location:  HEART CARDIAC CATH LAB     EP PACEMAKER N/A 2020    Procedure: EP Pacemaker;  Surgeon: Sohan Francis MD;  Location:  HEART CARDIAC CATH LAB     GYN SURGERY       x2 ,      GYN SURGERY      total hysterectomy     IMPLANT PACEMAKER       REPAIR VALVE TRICUSPID N/A 4/15/2020    Procedure: REPAIR TRICUSPID VALVE WITH VILLA MC3 26MM.;  Surgeon: Ok Wilson MD;  Location:  OR     REPLACE VALVE AORTIC N/A 4/15/2020    Procedure: REPLACEMENT, AORTIC VALVE WITH INSPIRIS TISSUE VALVE 25 MM; ON PUMP WITH SOCORRO READ BY CARDIOLOGIST DR BLANTON.;  Surgeon: Ok Wilson MD;  Location:  OR     REPLACE VALVE MITRAL N/A 4/15/2020    Procedure: REPLACEMENT, MITRAL VALVE WITH EPIC TISSUE VALVE 27 MM.;  Surgeon: Ok Wilson MD;  Location:  OR       Allergies   Allergen Reactions     Penicillins Hives     3 weeks after taking med got hives.       Pioglitazone Other (See Comments)     Increased urinary frequency, fatigue, dyspnea       Social History     Tobacco Use     Smoking status: Never Smoker     Smokeless tobacco: Never Used   Substance Use Topics     Alcohol use: No  "      Family History   Problem Relation Age of Onset     Diabetes Mother 56     Congenital heart disease Father 23     Colon Cancer No family hx of        Scheduled Medications      atorvastatin  40 mg Oral At Bedtime     ciprofloxacin  500 mg Oral Q12H LESLIE     cyanocobalamin  100 mcg Oral Daily     ferrous gluconate  324 mg Oral Daily with breakfast     fluticasone  1 spray Both Nostrils Daily     furosemide  20 mg Oral Daily     insulin aspart   Subcutaneous TID AC     insulin aspart  1-10 Units Subcutaneous TID AC     insulin aspart  1-7 Units Subcutaneous At Bedtime     insulin NPH  30 Units Subcutaneous BID AC     lidocaine  3 patch Transdermal Q24H     lidocaine   Transdermal Q8H     metoprolol succinate ER  50 mg Oral Daily     miconazole   Topical BID     pantoprazole (PROTONIX) IV  40 mg Intravenous BID     sertraline  150 mg Oral Daily     sodium chloride (PF)  3 mL Intracatheter Q8H     Vitamin D3  125 mcg Oral Daily       Home Medications  Tylenol  Lipitor  Flexeril  Fergon  Flonase  Lasix  Atrovent  Glucophage  Toprol  Coumadin  Albuterol  Neurotonin  Advil/Motrin    PRN Medications    acetaminophen **OR** acetaminophen, albuterol, artificial tears, cyclobenzaprine, glucose **OR** dextrose **OR** glucagon, HYDROmorphone, ipratropium, lidocaine 4%, lidocaine (buffered or not buffered), melatonin, naloxone **OR** naloxone **OR** naloxone **OR** naloxone, oxyCODONE, sodium chloride (PF)    ROS: 10 point ROS neg other than the symptoms noted above in the HPI.    Vitals:    /47 (BP Location: Left arm)   Pulse 92   Temp 98.6  F (37  C) (Oral)   Resp 16   Ht 5' 4\" (1.626 m)   Wt 241 lb 1.6 oz (109.4 kg)   SpO2 95%   BMI 41.38 kg/m    Body mass index is 41.38 kg/m .  I/O last 3 completed shifts:  In: -   Out: 2650 [Urine:2650]    Exam:    Patient appears comfortable, conversational, and in no apparent distress.   Head: Normocephalic, without obvious abnormality, atraumatic, no facial asymmetry. "   Eyes: conjunctivae/corneas clear. PERRL, EOM's intact.   Throat: lips, mucosa, and tongue normal; teeth and gums normal.   Neck: supple, symmetrical, trachea midline, no adenopathy and thyroid: not enlarged, symmetric, no tenderness/mass/nodules.   Lungs: clear to auscultation bilaterally.   Heart: regular rate and rhythm.   Abdomen: soft, non-tender; bowel sounds normal; no masses, no organomegaly.   Pulses: 2+ and symmetric.   Skin: Skin color, texture, turgor normal. No rashes or lesions.     CN II-XII grossly intact, alert and appropriate with conversation and following commands.   Cervical spine is non tender to palpation. Appropriate range of motion of neck, not concerning for lhermitte's phenomenon.   Bilateral bicep 2/4 and tricep reflexes 1/4. Sensation intact throughout upper extremities.     UE muscle strength  Right  Left    Deltoid  5/5  5/5    Biceps  5/5  5/5    Triceps  5/5  5/5    Hand intrinsics  5/5  5/5    Hand grasp  5/5  5/5    De La Cruz signs  neg  neg      Lumbar spine is tender to palpation.   Intact sensation throughout lower extremities.   Bilateral patellar 2/4 and achilles reflex 1/4.    LE muscle strength  Right  Left    Iliopsoas (hip flexion)  5/5  5/5    Quad (knee extension)  5/5  5/5    Hamstring (knee flexion)  5/5  5/5    Gastrocnemius (PF)  5/5  5/5    Tibialis Ant. (DF)  5/5  5/5    EHL  5/5  5/5      Negative Babinski bilaterally. Negative for clonus.   Calves are soft and non-tender bilaterally.     ECG/Telemetry:  ECG obtained at 0919, ECG read at 0919  Normal sinus rhythm  Normal ECG   No significant change as compared to prior, dated 9/16/20.  Rate 95 bpm. NE interval 154 ms. QRS duration 96 ms. QT/QTc 354/444 ms. P-R-T axes 74 -27 55.     Imaging:      CT LUMBAR SPINE WITHOUT CONTRAST March 16, 2022 3:47 PM      Impression per radiology read - I have personally reviewed the images with the patient.    1. Subtle acute appearing fractures of the inferior right L4  endplate  and superior left L3 endplate as detailed above. Mild loss of L3 and  L4 vertebral body height. No significant posterior displacement of the  fractured vertebral bodies towards the spinal canal.  2. Presumed Schmorl's node deformity at the superior T12 endplate  although this is not completely visualized. Acute fracture is  difficult to exclude particularly given the apparent acute fractures  of L3 and L4.  3. Marked multilevel degenerative changes throughout the lumbar spine  as detailed above, most pronounced at L2-L3, L3-L4, and L4-L5.     Available labs at time of consult:       Recent Labs   Lab 03/17/22  0815 03/16/22  0518 03/15/22  0910   WBC 8.3 8.6 10.0   HGB 8.9* 8.1* 8.0*   HCT 28.4* 25.8* 25.0*   MCV 91 91 90    242 363     Recent Labs   Lab 03/17/22  0815 03/16/22  0518 03/15/22  0910   WBC 8.3 8.6 10.0   HGB 8.9* 8.1* 8.0*   HCT 28.4* 25.8* 25.0*   MCV 91 91 90    242 363     No results for input(s): SED, CRP in the last 168 hours.  Recent Labs   Lab 03/17/22  0815 03/16/22  0518 03/15/22  0910   HGB 8.9* 8.1* 8.0*     Recent Labs   Lab 03/15/22  0910 03/14/22  2142 03/14/22  0901   INR 0.81* 1.09 >10.00*     Recent Labs   Lab 03/17/22  0815 03/16/22  0518 03/15/22  0910    242 363     Recent Labs   Lab 03/17/22  0815 03/16/22  0518 03/15/22  0910 03/14/22  0901   WBC 8.3 8.6 10.0 13.8*         Respectfully,    Rickey Mckay, MPAS, CAROL  [facility] Neurosurgery  [facility]     Tel: [phone]      All imaging, physical findings, and the above plan have been reviewed with Dr. Milton.

## 2022-03-17 NOTE — PLAN OF CARE
Pertinent assessments: VSS on room air. Afebrile. Alert and oriented; forgetful. Complains of back pain. Lidocaine patches in place to lower back. Up with assist of 2 and lift. Poor appetite. B, 239.    Major Shift Events: none.    Treatment Plan: Pain management, turn and reposition, BG monitoring, and total care.

## 2022-03-18 NOTE — PROGRESS NOTES
Care Management Discharge Note    Discharge Date: 03/19/2022       Discharge Disposition: Transitional Care    Discharge Services: None    Discharge DME: None    Discharge Transportation: family or friend will provide    Private pay costs discussed: private room/amenity fees    PAS Confirmation Code: [id number] (03/16/22)  Patient/family educated on Medicare website which has current facility and service quality ratings: yes    Education Provided on the Discharge Plan:  Yes  Persons Notified of Discharge Plans: Patient, provider, nursing  Patient/Family in Agreement with the Plan: yes    Handoff Referral Completed: No    Additional Information:  Plan for discharge Saturday to [facility] TCU.  Discharge orders to be faxed Attn Nan [phone].  [facility] scheduled for 1300 Saturday 3/19.         Kirstin Burton, RN      Kirstin Burton RN Case Manager  Inpatient Care Coordination  [facility]   [phone]

## 2022-03-18 NOTE — PROGRESS NOTES
[facility]  Hospitalist Progress Note  Laura Fung MD 03/18/2022    Reason for Stay (Diagnosis): GIB         Assessment and Plan:      Summary of Stay: Amy Luna is a 76 year old female with a history of HFpEF with most recent echo in 4/2021 EF wnl (no percentage given) G1dd, s/p mitral/aortic bioprosthetic valve replacement and annular tricuspid ring, , hx of PFO s/p closure, obesity,  PAF on chronic warfarin, T2dm, LAVERNE on CPAP, admitted on 3/14/2022 with acute anemia felt 2/2 GIB in the setting of supratherapeutic INR >> 10.0    Admission hgb 4.3 (!) with 5-6 days of JENKINS/lightheadedness.     She's been transfused a total of 4 unit(s) PRBCs with improvement in hgb to 8 range and normalization of BPs.  GI was consulted on admission and given hx of aleve use strongly suspected to have Upper GIB but too risky for EGD so no plans for GI evaluation. Given life threatening GIB she is not a candidate for further anticoagulation and will need follow-up with her cardiologist Dr Christina at discharge.     Admission UA moderately abnormal with UCx > 100 K klebsiella    Hospital course complicated by urinary retention necessitating periodic straight cath     Problem List:   GIB given multiple medical problems deemed to risky for EGD/colonoscopy.  Life threatening GIB and so no longer a candidate for anticoagulation.    -f/u with Dr Christina after discharge    Acute blood loss anemia  -GI source not defined  -s/p 4 unit(s) PRBCs and hgb appears stable at this juncture    Urinary retention  Straight cathed x 2 for close to a liter each.  Seems as if when she's awake she can empty her bladder but overnights has a problem.  Also constipated so ? If that's an issue    Constipation   Senna s for 3 tabs tonight, prn MOM today     3 month hx of low back pain   Subtle but acute appearing fx of Right L4 endplate and superior Left L3 endplate  No NSAIDs in light of GIB.  Seen by NSG who recommend conservative management  "with pain control, no lifting of weights over 5-10 pounds for 6 weeks, and no excessive bending/twisting/turning  -apap 1 g tid, lidoderm patch  -PT  -also has prn oxy and cyclobenzaprine    Valvular disease s/p bioprosthetic mitral/aortic replacement and annular tricuspid ring    T2dm just on metformin 1 g qam   -will resumed    LAVERNE on CPAP    AF  Previously on warfarin but no longer given life threatening GIB.  Warfarin reversed with k centra on admission     Covid negative  S/p 3 shot pfizer series 1/2022      DVT Prophylaxis: Pneumatic Compression Devices  Code Status: Full Code  Functional Status: lives with her , prior to a month ago occasionally ambulated with a cane, now needs 2WW to get around  Diet: reg texture  Stewart: cont with bladder scanning  AccessPIV    Disposition Plan   Expected discharge in 1 days to transitional care unit once bed available.     Entered: Laura Fung 03/18/2022, 9:13 AM         Interval History (Subjective):      Feeling ok, still with back pain.  No stool for 5 days.  No cp or sob but feeling weak/tired                  Physical Exam:      Last Vital Signs:  /51 (BP Location: Left arm)   Pulse 93   Temp 97.9  F (36.6  C) (Oral)   Resp 18   Ht 1.626 m (5' 4\")   Wt 108.6 kg (239 lb 6.4 oz)   SpO2 95%   BMI 41.09 kg/m      Pleasant nad looks stated age head nc/at sclera clear lungs ctab nl effort rrr no mrg no le edema skin warm and dry no cyanosis or clubbing belly s/nt/nd alert and oriented affect is flat sweeney            Medications:      All current medications were reviewed with changes reflected in problem list.         Data:      All new lab and imaging data was reviewed.   Labs:  Recent Labs   Lab 03/18/22  1207 03/18/22  0819 03/18/22  0724   NA  --   --  136   POTASSIUM  --   --  3.7   CHLORIDE  --   --  104   CO2  --   --  27   ANIONGAP  --   --  5   *   < > 226*   BUN  --   --  18   CR  --   --  0.87   GFRESTIMATED  --   --  69   NELLY  --   --  " 8.6    < > = values in this interval not displayed.     Recent Labs   Lab 03/18/22  0724   WBC 8.7   HGB 8.5*   HCT 27.9*   MCV 92        Recent Labs   Lab 03/18/22  1207 03/18/22  0819 03/18/22  0724 03/18/22  0303 03/17/22  2112   * 209* 226* 180* 237*     Recent Labs   Lab 03/14/22  0901   AST 15   ALT 18   ALKPHOS 66   BILITOTAL 0.2      Imaging:

## 2022-03-18 NOTE — PLAN OF CARE
Pertinent assessments: A&O, pain increases with movement, Oxy and flexeril given, lidocaine patches in place. C/o constipation, +BS and Gas. MOM given. Voided x3, (-)PVR.    Treatment Plan: Cipro, pain management, PT/OT following, TCU discharge 3/19 1300

## 2022-03-18 NOTE — PLAN OF CARE
Pertinent assessments: VSS. Afebrile. LS clear. BS x4. Pt having 6/10 back pain, treated with Oxycodone, and IV Dilaudid with good results.   Mod CHO diet with Carb Counts, Bg 237, 250. Pt refused to get oob PT up to side of bed and stand at bedside with the sera steady. Turn in bed.    A/O Forgetful at times; alarm on bed for safety; CPAP used pt slept on and off this shift. Midline IV patent gbr.    Major Shift Events: pt able to void w/o diff adequate amounts today.  Not need for straight cath.      Treatment Plan: Treat back pain.  BG coverage and NPH. Encourage activity, Discharge TBD     Bedside Nurse: Olinda Courtney RN

## 2022-03-18 NOTE — PROGRESS NOTES
Care Management Follow Up    Length of Stay (days): 4    Expected Discharge Date: 03/19/2022     Concerns to be Addressed:    Discharge Planning   Patient plan of care discussed at interdisciplinary rounds: Yes    Anticipated Discharge Disposition: Transitional Care     Anticipated Discharge Services: None  Anticipated Discharge DME: None    Patient/family educated on Medicare website which has current facility and service quality ratings: yes  Education Provided on the Discharge Plan:  Yes  Patient/Family in Agreement with the Plan: yes    Referrals Placed by CM/SW: Post Acute Facilities  Private pay costs discussed: transportation costs    Additional Information:  Notified by nursing and provider [place]on of anticipated discharge readiness of patient for Saturday.  Spoke with patient at bedside. Updated on acceptance to TCU at [facility] ([facility] Verified with admissions that will have bed available on Saturday.  CM to fax orders to [phone] [facility] and call admissions once orders are complete.     Reviewed out of pocket cost for [facility] transport, $81.80 for base rate and $5.26 per mile to the destination. Spoke with patient, they expressed understanding and are agreeable to this.     [facility] transport arranged for Saturday 3/19 at 1300.    Patient will update  of plans for discharge.         Kirstin Burton, RN      Kirstin Burton RN Case Manager  Inpatient Care Coordination  [facility]   [phone]

## 2022-03-18 NOTE — PLAN OF CARE
Pertinent assessments: A&O, denies pain, c/o infrequent productive cough, on home CPAP overnight, constipated, purewick in, midline IV, straight cathed in am for 950 ml     Treatment Plan: Cipro, pain management, PT/OT following, pending TCU discharge

## 2022-03-18 NOTE — PLAN OF CARE
[id number]     Assessments: VSS. A&O x4. Pain controlled with IV Dilaudid. Midline IV patent. Voiding well. External cath placed. HS BG 237mg/dl.     Treatment Plan: Cipro, pain management, PT/OT following, Pending TCU discharge    Bedside Nurse: Too Jolly RN

## 2022-03-19 NOTE — DISCHARGE SUMMARY
Discharge Summary  Hospitalist Service    Amy Luna MRN# [id number]   YOB: 1945 Age: 76 year old     Date of Admission:  3/14/2022  Date of Discharge:  3/19/2022  Admitting Physician:  Mamadou Knight MD  Discharge Physician: Laura Fung MD  Discharging Service: Hospitalist Service     Primary Provider: She Huang  Primary Care Physician Phone Number: [phone]         Discharge Diagnoses/Problem Oriented Hospital Course (Providers):      Amy Luna was admitted on 3/14/2022 by Mamadou Knight MD and I would refer you to their history and physical.  The following problems were addressed during her hospitalization:      GIB  Acute blood loss anemia severe  Urinary retention   UTI  Constipation   Subacute to chronic low back pain with acute L3 and L4 endplate fractures  Valvular disease s/p bioprosthetic mitral/aortic replacement and annular tricuspid ring  T2dm   LAVERNE on CPAP  Atrial Fib             Code Status:      Full Code        Brief Hospital Stay Summary Sent Home With Patient in AVS:          Reason for your hospital stay      GI bleed with life threatening acute blood loss  Low back pain with some broken spinal endplates              Summary of Stay: Amy Luna is a 76 year old female with a history of HFpEF with most recent echo in 4/2021 EF wnl (no percentage given) G1dd, s/p mitral/aortic bioprosthetic valve replacement and annular tricuspid ring, , hx of PFO s/p closure, obesity,  PAF on chronic warfarin, T2dm, LAVERNE on CPAP, admitted on 3/14/2022 with acute anemia felt 2/2 GIB in the setting of supratherapeutic INR >> 10.0     Admission hgb 4.3 (!) with 5-6 days of JENKINS/lightheadedness.      She's been transfused a total of 4 unit(s) PRBCs with improvement in hgb to 8 range and normalization of BPs.  GI was consulted on admission and given hx of aleve use strongly suspected to have Upper GIB but too risky for EGD so no plans for GI  evaluation. Given life threatening GIB she is not a candidate for further anticoagulation and will need follow-up with her cardiologist Dr Christina at discharge.      Admission UA moderately abnormal with UCx > 100 K klebsiella     Hospital course complicated by urinary retention necessitating periodic straight cath      Problem List:   GIB given multiple medical problems deemed to risky for EGD/colonoscopy.  Life threatening GIB and so no longer a candidate for anticoagulation.    -f/u with Dr Christina after discharge     Acute blood loss anemia  -GI source not defined  -s/p 4 unit(s) PRBCs and hgb appears stable at this juncture  Recheck in 4 days at TCU      Urinary retention  Straight cathed x 2 for close to a liter each.  Seems as if when she's awake she can empty her bladder but overnights has a problem.  Also constipated so ? If that's an issue.  This appears resolved but will still have TCU check bladder scan to make sure    UTI  UA not that impressive but UCx is positive for > 100 K klebsiella species that's mostly sensitive.  Will complete 5 days of cipro      Constipation   Will discharge with prn MOM/senna s/dulcolax supp   -start miralax qd     3 month hx of low back pain   Subtle but acute appearing fx of Right L4 endplate and superior Left L3 endplate  No NSAIDs in light of GIB.  Seen by NSG who recommend conservative management with pain control, no lifting of weights over 5-10 pounds for 6 weeks, and no excessive bending/twisting/turning  -apap 1 g tid, lidoderm patch  -PT  -also has prn oxy and cyclobenzaprine     Valvular disease s/p bioprosthetic mitral/aortic replacement and annular tricuspid ring     T2dm just on metformin 1 g qam   -will resumed     LAVERNE on CPAP     AF  Previously on warfarin but no longer given life threatening GIB.  Warfarin reversed with k centra on admission      Covid negative  S/p 3 shot pfizer series 1/2022        DVT Prophylaxis: Pneumatic Compression Devices  Code Status:  Full Code  Functional Status: lives with her , prior to a month ago occasionally ambulated with a cane, now needs 2WW to get around  Diet: reg texture  Stewart: cont with bladder scanning  AccessPIV              Important Results:        As noted below          Pending Results:        Unresulted Labs Ordered in the Past 30 Days of this Admission     Date and Time Order Name Status Description    3/14/2022 10:35 AM Prepare red blood cells (unit) Preliminary             Discharge Instructions and Follow-Up:      Follow-up Appointments     Follow Up and recommended labs and tests      Follow-up with [facility] MD/NP in 7 days for recheck  Recheck hgb in 4 days with result sot CHELLY SANTANA/NP    CPAP with sleeping at home settings  Bladder scan:  Check PVR every day and prn straight cath per TCU policy.    Notify [facility] MD/NP if needs to be straight cathed more than 2 times in a row.           Follow-up and recommended labs and tests       [facility] 6 weeks with xrays prior.     [phone] for appts.               Discharge Disposition:        Discharged to home         Discharge Medications:        Current Discharge Medication List      START taking these medications    Details   bisacodyl (DULCOLAX) 10 MG suppository Place 1 suppository (10 mg) rectally daily as needed for constipation    Associated Diagnoses: Acute bilateral low back pain with bilateral sciatica      ciprofloxacin (CIPRO) 500 MG tablet Take 1 tablet (500 mg) by mouth every 12 hours for 5 doses    Associated Diagnoses: Acute cystitis without hematuria      !! insulin aspart (NOVOLOG PEN) 100 UNIT/ML pen Inject 1-10 Units Subcutaneous 3 times daily (before meals) For Pre-Meal  - 164 give 1 unit,   - 189 give 2 units,  - 214 give 3 unit,  - 239 give 4 units,  - 264 give 5 units,  - 289 give 6 units,  - 314 give 7 units  - 339 give 8 units,  - 364 give 9 units.BG greater than or  equal to 365 give 10 units.   Notify provider if glucose greater than or equal to 350 mg/dL after administration of correction dose. If given at mealtime, administer within 30 minutes of start of meal.  Qty: 15 mL    Associated Diagnoses: Type 2 diabetes, HbA1C goal < 8% (H)      !! insulin aspart (NOVOLOG PEN) 100 UNIT/ML pen Inject 1-7 Units Subcutaneous At Bedtime HIGH INSULIN RESISTANCE DOSING    Do Not give Bedtime Correction Insulin if BG less than 200.   For  - 224 give 1 units.   For  - 249 give 2 units.   For  - 274 give 3 units.   For  - 299 give 4 units.   For  - 324 give 5 units.   For  - 349 give 6 units.   For BG greater than or equal to 350 give 7 units.  Qty: 15 mL    Associated Diagnoses: Type 2 diabetes, HbA1C goal < 8% (H)      !! insulin aspart (NOVOLOG PEN) 100 UNIT/ML pen 1 unit/15 grams of carbs  Qty: 15 mL    Associated Diagnoses: Type 2 diabetes, HbA1C goal < 8% (H)      magnesium hydroxide (MILK OF MAGNESIA) 400 MG/5ML suspension Take 15-30 mLs by mouth daily as needed for constipation    Associated Diagnoses: Acute bilateral low back pain with bilateral sciatica      oxyCODONE (ROXICODONE) 5 MG tablet Take 1 tablet (5 mg) by mouth every 4 hours as needed for moderate to severe pain  Qty: 30 tablet, Refills: 0    Associated Diagnoses: Acute bilateral low back pain with bilateral sciatica      senna-docusate (SENOKOT-S/PERICOLACE) 8.6-50 MG tablet Take 3 tablets by mouth nightly as needed for constipation    Associated Diagnoses: Acute bilateral low back pain with bilateral sciatica       !! - Potential duplicate medications found. Please discuss with provider.      CONTINUE these medications which have CHANGED    Details   acetaminophen (TYLENOL) 500 MG tablet Take 2 tablets (1,000 mg) by mouth 3 times daily For 7 days then change to 1000 mg po tid prn    Associated Diagnoses: Acute bilateral low back pain with bilateral sciatica      furosemide (LASIX)  40 MG tablet Take 0.5 tablets (20 mg) by mouth daily  Qty: 180 tablet, Refills: 1    Associated Diagnoses: Essential hypertension; S/P mitral valve replacement with bioprosthetic valve; S/P aortic valve and mitral valve replacement; Paroxysmal atrial fibrillation (H); Chronic heart failure with preserved ejection fraction (H)      insulin  UNIT/ML vial Inject 30 Units Subcutaneous 2 times daily Inject 30 units every morning and 30 unit(s) every evening  Qty: 10 mL    Associated Diagnoses: Type 2 diabetes, HbA1C goal < 8% (H)      sertraline (ZOLOFT) 100 MG tablet Take 1.5 tablets (150 mg) by mouth daily  Qty: 135 tablet, Refills: 0    Associated Diagnoses: Recurrent major depressive disorder, in full remission (H)         CONTINUE these medications which have NOT CHANGED    Details   albuterol (PROAIR HFA/PROVENTIL HFA/VENTOLIN HFA) 108 (90 Base) MCG/ACT inhaler Inhale 2 puffs into the lungs every 4 hours as needed for shortness of breath / dyspnea or wheezing    Comments: Pharmacy may dispense brand covered by insurance (Proair, or proventil or ventolin or generic albuterol inhaler)      atorvastatin (LIPITOR) 40 MG tablet Take 1 tablet (40 mg) by mouth At Bedtime  Qty: 90 tablet, Refills: 3    Associated Diagnoses: Paroxysmal atrial fibrillation (H); S/P mitral valve replacement with bioprosthetic valve; S/P aortic valve and mitral valve replacement; S/P AVR (aortic valve replacement); Tricuspid valve insufficiency, unspecified etiology; Cardiac pacemaker in situ; Acute blood loss anemia; Essential hypertension      Biotin 06236 MCG TABS Take 10,000 mcg by mouth every morning       Calcium Carbonate-Vit D-Min (CALCIUM 1200 PO) Take 1 tablet by mouth 2 times daily       coenzyme Q-10 200 MG CAPS Take 200 mg by mouth every morning       Continuous Blood Gluc  (FREESTYLE ADAM READER) HARJEET 1 each every 14 days Use to read blood sugars per  instructions.  Qty: 1 each, Refills: 0     Associated Diagnoses: Type 2 diabetes mellitus with stage 3 chronic kidney disease, without long-term current use of insulin, unspecified whether stage 3a or 3b CKD (H)      Continuous Blood Gluc Sensor (FREESTYLE ADAM 14 DAY SENSOR) MISC 1 each every 14 days Change every 14 days.  Qty: 2 each, Refills: 6    Associated Diagnoses: Type 2 diabetes mellitus with stage 3b chronic kidney disease, with long-term current use of insulin (H)      cyanocobalamin (VITAMIN B-12) 100 MCG tablet Take 100 mcg by mouth daily      ferrous gluconate (FERGON) 324 (38 Fe) MG tablet Take 1 tablet (324 mg) by mouth daily (with breakfast)  Qty: 30 tablet, Refills: 3    Associated Diagnoses: Acute blood loss anemia      Ginkgo Biloba (GINKOBA PO) Take 250 mg by mouth daily      ipratropium (ATROVENT) 0.06 % nasal spray Spray 2 sprays into both nostrils 4 times daily as needed for rhinitis  Qty: 3 Box, Refills: 1    Associated Diagnoses: Seasonal allergic rhinitis, unspecified trigger      Lutein 40 MG CAPS Take 40 mg by mouth every morning       Magnesium 400 MG TABS Take 400 mg by mouth every evening       metFORMIN (GLUCOPHAGE-XR) 500 MG 24 hr tablet TAKE 2 TABLETS EVERY DAY WITH BREAKFAST  Qty: 180 tablet, Refills: 1    Comments: Profile Rx: patient will contact pharmacy when needed  Associated Diagnoses: Paroxysmal atrial fibrillation (H); S/P mitral valve replacement with bioprosthetic valve; S/P aortic valve and mitral valve replacement; S/P AVR (aortic valve replacement); Tricuspid valve insufficiency, unspecified etiology; Cardiac pacemaker in situ; Acute blood loss anemia; Essential hypertension      metoprolol succinate ER (TOPROL-XL) 25 MG 24 hr tablet Take 2 tablets (50 mg) by mouth daily  Qty: 180 tablet, Refills: 3    Associated Diagnoses: Essential hypertension      NONFORMULARY 1 packet 3 times daily as needed Relief factor for pain.  Natural product      potassium 99 MG TABS Take 1 tablet by mouth daily      Propylene  "Glycol (SYSTANE BALANCE OP) Apply 1-2 drops to eye 3 times daily as needed (dry eyes)      TURMERIC PO Take 3 tablets by mouth daily       vitamin (B COMPLEX-C) tablet Take 1 tablet by mouth daily      vitamin C (ASCORBIC ACID) 1000 MG TABS Take 1,000 mg by mouth every evening       Vitamin D3 (CHOLECALCIFEROL) 125 MCG (5000 UT) tablet Take 1 tablet by mouth daily      zinc 23 MG LOZG lozenge Place 1 lozenge inside cheek daily       cyclobenzaprine (FLEXERIL) 10 MG tablet Take 1 tablet (10 mg) by mouth 3 times daily as needed for muscle spasms  Qty: 30 tablet, Refills: 0    Associated Diagnoses: Right-sided low back pain with sciatica, sciatica laterality unspecified, unspecified chronicity      fluticasone (FLONASE) 50 MCG/ACT nasal spray Spray 1 spray into both nostrils daily  Qty: 16 g, Refills: 4    Associated Diagnoses: Chronic rhinitis         STOP taking these medications       ibuprofen (ADVIL/MOTRIN) 200 MG capsule Comments:   Reason for Stopping:         insulin regular 100 UNIT/ML vial Comments:   Reason for Stopping:         warfarin ANTICOAGULANT (COUMADIN) 5 MG tablet Comments:   Reason for Stopping:                 Allergies:         Allergies   Allergen Reactions     Penicillins Hives     3 weeks after taking med got hives.       Pioglitazone Other (See Comments)     Increased urinary frequency, fatigue, dyspnea           Consultations This Hospital Stay:        Consultation during this admission received from cardiology, gastroenterology, neurosurgery and PT/OT, SW         Condition and Physical on Discharge:        Discharge condition: Stable   Vitals: Blood pressure 127/52, pulse 96, temperature 97.5  F (36.4  C), temperature source Axillary, resp. rate 18, height 1.626 m (5' 4\"), weight 113.1 kg (249 lb 4.8 oz), SpO2 96 %, not currently breastfeeding.     Constitutional: Pleasant, sitting up in a chair, more interactive today nad looks stated age head ncat sclera clear     Lungs: ctab nl effort  "   Cardiovascular: IIR no mrg trace le edema    Abdomen: Obese s/nt/nd, tolerating po    Skin: Warm and dry no cyanosis or clubbing    Other: Alert and oriented affect flat sweeney          Discharge Time:      Greater than 30 minutes.        Image Results From This Hospital Stay (For Non-EPIC Providers):        Results for orders placed or performed during the hospital encounter of 03/14/22   Abd/pelvis CT,  IV  contrast only TRAUMA / AAA    Narrative    CT ABDOMEN PELVIS W CONTRAST 3/14/2022 10:19 AM    CLINICAL HISTORY: Back pain, left-sided abdominal pain and few  episodes of hematochezia.    TECHNIQUE: CT scan of the abdomen and pelvis was performed following  injection of IV contrast. Multiplanar reformats were obtained. Dose  reduction techniques were used.  CONTRAST: 100mL Isovue-370    COMPARISON: None.    FINDINGS:   LOWER CHEST: Tricuspid and mitral valvular prosthesis. Pacemaker.  Linear atelectasis in the left lung base.    HEPATOBILIARY: Normal.    PANCREAS: Normal.    SPLEEN: Splenomegaly with the spleen measuring about 21 cm in length..    ADRENAL GLANDS: Normal.    KIDNEYS/BLADDER: Normal.    BOWEL: Normal caliber loops of small bowel and colon. Mild sigmoid  diverticulosis. No inflammatory changes. No definite evidence of  intraluminal contrast extravasation within the limits of this single  phase of contrast.    PELVIC ORGANS: Hysterectomy. No pelvic masses.    ADDITIONAL FINDINGS: No free fluid, fluid collections or extraluminal  air. Few soft tissue nodules in the anterior abdominal wall may be  related to subcutaneous injections. No abdominal aortic aneurysm. No  pathologically enlarged lymph nodes in the abdomen and pelvis with few  small retroperitoneal lymph nodes measuring up to 8 mm, likely  reactive.    MUSCULOSKELETAL: No suspicious lesions in the bones.      Impression    IMPRESSION:   1.  No acute findings in the abdomen and pelvis.   2.  Very mild sigmoid diverticulosis.  3.   Splenomegaly.    THIAGO LARA MD         SYSTEM ID:  [id number]   XR Lumbar Spine 2/3 Views    Narrative    LUMBAR SPINE TWO - THREE VIEWS 3/15/2022 12:32 PM    INDICATION: Low back pain.    COMPARISON: Abdomen and pelvis CT 3/14/2022.      Impression    IMPRESSION: Five lumbar vertebral bodies. Normal lumbar vertebral body  heights. No compression deformity or fracture. Schmorl's nodes within  the upper T12 and L3 endplates better visualized on the comparison CT.  Low-grade retrolisthesis of L1, L2 and L4. Mild interspace, endplate  and facet degeneration.    RHONDA DOWNS MD         SYSTEM ID:  [id number]   CT Lumbar Spine w/o Contrast    Narrative    CT LUMBAR SPINE WITHOUT CONTRAST March 16, 2022 3:47 PM     HISTORY: Low back pain greater than six weeks.      TECHNIQUE: Axial images of the lumbar spine were obtained without  intravenous contrast. Multiplanar reformations were performed.  Radiation dose for this scan was reduced using automated exposure  control, adjustment of the mA and/or kV according to patient size, or  iterative reconstruction technique.     COMPARISON: Lumbar spine x-ray 3/15/2022.    FINDINGS: There are five lumbar-type vertebral bodies assumed for the  purposes of this dictation.     There are subtle acute lucent fracture lines through the inferior  right L4 endplate. Fracture lines extend to the posterior endplate in  the region of the right neural foramen. Minimal loss of L4 vertebral  body height.    Deformity at the superior left L3 endplate. There is a lucent acute  appearing fracture line through the left aspect of this L3 endplate  and this is concerning for an area of acute/subacute fracture.    Deformity at the superior T12 endplate asymmetric towards the left is  not completely visualized. This has the appearance of a Schmorl's node  deformity although given the other apparent acute fractures, a  fracture could be considered.    Lumbar spine alignment is grossly within  normal limits. No significant  posterior displacement of any of the fractured vertebral bodies  towards the spinal canal.    Level by level as follows:     T12-L1: No loss of disc height. No significant disc herniation.  Apparent marked facet hypertrophy with hypertrophic changes around the  right facet joint. No spinal canal narrowing. No significant right  neural foraminal narrowing. No left neural foraminal narrowing.    L1-L2: Mild loss of disc height. Posterior disc bulge. Mild facet  hypertrophy. No significant spinal canal narrowing. No significant  neural foraminal narrowing.     L2-L3: Moderate loss of disc height. Circumferential disc bulge with  mild endplate osteophytic spurring. Moderate bilateral facet  hypertrophy. Moderate spinal canal narrowing. Mild right neural  foraminal narrowing. Moderate to severe left neural foraminal  narrowing.    L3-L4: Mild loss of disc height. Circumferential disc bulge. Marked  bilateral facet hypertrophy. Moderate to severe spinal canal  narrowing. Moderate right neural foraminal narrowing. Moderate left  neural foraminal narrowing.     L4-L5: Mild loss of disc height. Circumferential disc bulge. Endplate  osteophytic spurring more pronounced towards the right foraminal and  far lateral region. Severe bilateral facet hypertrophy with thickening  of the ligamentum flavum. Severe spinal canal narrowing. Severe right  neural foraminal narrowing. Moderate to severe left neural foraminal  narrowing.     L5-S1: Mild loss of disc height. Circumferential disc bulge with  endplate osteophytic spurring more pronounced towards the right  foraminal and far lateral region. Marked bilateral facet hypertrophy.  No spinal canal narrowing. Moderate right neural foraminal narrowing.  No significant left neural foraminal narrowing.     Visualized paraspinous tissues: Unremarkable.       Impression    IMPRESSION:    1. Subtle acute appearing fractures of the inferior right L4  endplate  and superior left L3 endplate as detailed above. Mild loss of L3 and  L4 vertebral body height. No significant posterior displacement of the  fractured vertebral bodies towards the spinal canal.  2. Presumed Schmorl's node deformity at the superior T12 endplate  although this is not completely visualized. Acute fracture is  difficult to exclude particularly given the apparent acute fractures  of L3 and L4.  3. Marked multilevel degenerative changes throughout the lumbar spine  as detailed above, most pronounced at L2-L3, L3-L4, and L4-L5.     SHANAE COMBS MD         SYSTEM ID:  [id number]           Most Recent Lab Results In EPIC (For Non-EPIC Providers):    Most Recent 3 CBC's:  Recent Labs   Lab Test 03/19/22  0746 03/18/22  0724 03/17/22  0815 03/16/22  0518   WBC  --  8.7 8.3 8.6   HGB 8.6* 8.5* 8.9* 8.1*   MCV  --  92 91 91   PLT  --  214 248 242      Most Recent 3 BMP's:  Recent Labs   Lab Test 03/19/22  0736 03/19/22  0318 03/18/22 2047 03/18/22  0819 03/18/22  0724 03/17/22  0858 03/17/22  0815 03/16/22  0834 03/16/22  0518   NA  --   --   --   --  136  --  136  --  136   POTASSIUM  --   --   --   --  3.7  --  3.6  --  3.9   CHLORIDE  --   --   --   --  104  --  105  --  105   CO2  --   --   --   --  27  --  26  --  27   BUN  --   --   --   --  18  --  17  --  26   CR  --   --   --   --  0.87  --  0.83  --  0.89   ANIONGAP  --   --   --   --  5  --  5  --  4   NELLY  --   --   --   --  8.6  --  8.6  --  8.6   * 144* 181*   < > 226*   < > 258*   < > 272*    < > = values in this interval not displayed.     Most Recent 3 Troponin's:No lab results found.  Most Recent 3 INR's:  Recent Labs   Lab Test 03/15/22  0910 03/14/22 2142 03/14/22  0901   INR 0.81* 1.09 >10.00*     Most Recent 2 LFT's:  Recent Labs   Lab Test 03/14/22  0901 08/04/21  1126   AST 15 36   ALT 18 37   ALKPHOS 66 55   BILITOTAL 0.2 0.6     Most Recent Cholesterol Panel:  Recent Labs   Lab Test 08/04/21  1126   CHOL 190   LDL 90    HDL 64   TRIG 178*     Most Recent 6 Bacteria Isolates From Any Culture (See EPIC Reports for Culture Details):  Recent Labs   Lab Test 01/16/20  2221 01/16/20  2220   CULT No growth No growth     Most Recent TSH, T4 and HgbA1c:   Recent Labs   Lab Test 03/14/22  1841 11/19/21  1039 08/04/21  1126 11/03/20  1134 07/28/20  0904   TSH  --   --  2.28  --  4.47*   T4  --   --   --   --  1.14   A1C 6.6*   < > 7.3*   < > 6.7*    < > = values in this interval not displayed.

## 2022-03-19 NOTE — PHARMACY-ANTICOAGULATION SERVICE
Clinical Pharmacy- Warfarin Discharge Note  Warfarin discontinued for discharge.   Life-threatening GI bleed - Not a great candidate for anticoagulation per MD.       Jonel Palafox, Pharm.D., BCPS

## 2022-03-19 NOTE — PLAN OF CARE
Discharged to TCU via [facility] transportation. AVS copy given to pt. Packet sent with . All belongings sent with pt. IV removed CDI.

## 2022-03-19 NOTE — PLAN OF CARE
Occupational Therapy Discharge Summary    Reason for therapy discharge:    Discharged to transitional care facility.    Progress towards therapy goal(s). See goals on Care Plan in [facility] electronic health record for goal details.  Goals not met.  Barriers to achieving goals:   limited tolerance for therapy and discharge from facility.    Therapy recommendation(s):    Continued therapy is recommended.  Rationale/Recommendations:  OT eval and treat at TCU.      **Pt not seen by discharging therapist on this date, note written based on previous treating therapist's notes and recommendations

## 2022-03-19 NOTE — PLAN OF CARE
End of Shift Summary  For vital signs and complete assessments, please see documentation flowsheets.     Pertinent assessments: A&Ox4. VSS on room air, afebrile. C/o back pain with movement, tylenol given. No BM this shift, voiding well on own. Upx2 with mati loya.     Treatment Plan: Cipro, pain management, PT/OT following, TCU discharge 3/19 1300     Bedside Nurse: Renita Jackman RN

## 2022-03-21 NOTE — PROGRESS NOTES
ANTICOAGULATION  MANAGEMENT: Discharge Review    Amy Luna chart reviewed for anticoagulation continuity of care    Hospital Admission on 3/14 for GI bleed, acute anemia (hgb 4.3 on admission)    Discharge disposition: TCU    Results:    Recent labs: (last 7 days)     03/14/22  2142 03/15/22  0910   INR 1.09 0.81*     Anticoagulation inpatient management:     reversed with K centra and held warfarin due to GI bleed . Per cardiology consult note 3/15/22:    Although she may be at future risk of recurrent atrial fibrillation, at this time the risk of bleeding complication vastly exceeds any potential benefit. Fortunately we will continue to monitor her rhythm with PPM telemetry and reassess should atrial fibrillation recur.      Recommendations  1) stop warfarin  2) Follow up with  ( arranged.)      Anticoagulation discharge instructions:     Warfarin dosing: warfarin discontinued   Bridging: No   INR goal change: No      Medication changes affecting anticoagulation: No    Additional factors affecting anticoagulation: No    Plan     Amy Luna is being discharged from the [facility] ([facility]).    Reason for discharge: warfarin therapy completed    Anticoagulation episode resolved, ACC referral closed and INR Standing order discontinued    If patient needs warfarin management in the future, please send a new referral    TARI Bunch RN

## 2022-03-21 NOTE — LETTER
[facility]   To:             Please give to facility    From:   Meli Jay RN  Care Coordinator   [facility]   P: [phone] [email or web address]   Patient Name:  Amy Luna   YOB: 1945     Admit date: 3/19/22      *Information Needed:  Please contact me when the patient will discharge (or if they will move to long term care)- include the discharge date, disposition, and main diagnosis   - If the patient is discharged with home care services, please provide the name of the agency    Also- Please inform me if a care conference is being held.   Phone, Fax or Email with information                   Thank you

## 2022-03-21 NOTE — PROGRESS NOTES
Clinic Care Coordination Contact  Care Coordination Transition Communication         Clinical Data: Patient was hospitalized at [facility] from 3/14 to 3/19/22 with diagnosis of Subacute to chronic low back pain with acute L3 and L4 endplate fractures.     Transition to Facility:              Facility Name: [facility] ([facility])              Contact name and phone number/fax: [phone]    Plan: RN/SW Care Coordinator will await notification from facility staff informing RN/SW Care Coordinator of patient's discharge plans/needs. RN/SW Care Coordinator will review chart and outreach to facility staff every 4 weeks and as needed.     Meli Jay RN Care Coordinator  [facility]  Email: [email or web address]  Phone: [phone]

## 2022-03-21 NOTE — PATIENT INSTRUCTIONS
March 21, 2022    Amy Luna  1945      ORDERS:  1. Protonix 40 mg po bid x 14 days followed by Protonix 40 mg po daily. Dx GI bleed  2. No lifting > 5-10 lbs dx compression fracture  3. No excessive bending/twisting      Phuong Banegas PA-C

## 2022-03-21 NOTE — PROGRESS NOTES
[facility] GERIATRICS    PRIMARY CARE PROVIDER AND CLINIC:  She Huang MD, [address] / [facility] [address]  Chief Complaint   Patient presents with     Hospital F/U      [facility] Medical Record Number:  [id number]  Place of Service where encounter took place:  [facility] ([facility]) [[id number]]    Amy Luna  is a 76 year old  (1945), admitted to the above facility from  [facility]. Hospital stay 3/14/22 through 3/19/22..   HPI:    Amy Luna is a 77 yo female with a PMH of Afib, status post previous bioprosthetic aortic and mitral valve replacement, obstructive sleep apnea, chronic kidney disease, insulin-dependent type 2 diabetes, HFpEF and morbid obesity who was recently admitted to [facility] for evaluation of anemia thought to be secondary to GI bleed.    She presented for evaluation of increasing generalized weakness, dizziness and shortness of breath.  She was found to have a hemoglobin of 4.3.  This was in the setting of an INR of greater than 10 and recent NSAID use for back pain.  She was given 4 units of PRBCs throughout her hospital stay with improvement in the 8 range.  GI was consulted but given comorbidities EGD was not performed.  The strongly suspected upper GI bleed and recommended twice daily PPI.  Historically she is anticoagulated with warfarin given her history of atrial fibrillation and bioprosthetic valve replacements; however, cardiology was consulted who recommended discontinuation of warfarin and follow-up with her primary cardiologist.  Hospital course was additionally complicated by acute on chronic back pain.  She was found to have L3-L4 compression fractures.  Neurosurgery was consulted and recommended conservative management.  Additionally she was found to have a pansensitive Klebsiella UTI and was treated with a course of ceftriaxone and transition to Cipro on discharge.  She was noted  to have some mild intermittent urinary retention that resolved prior to discharge.  [facility]    Amy is seen for an initial visit at [facility].  She is just finished working with physical therapy and feels quite fatigued.  Slightly unhappy with nursing care since arrival to TCU.  Denies any chest pain or shortness of breath.  No nausea or vomiting.  No melena.  Struggled with some constipation in the hospital but had a bowel movement yesterday which was normal.  She denies orthopnea or PND.  No lower extremity edema.  Back pain is tolerable with Tylenol and oxycodone.    CODE STATUS/ADVANCE DIRECTIVES DISCUSSION:  Prior  DNR / DNI  ALLERGIES:   Allergies   Allergen Reactions     Penicillins Hives     3 weeks after taking med got hives.       Pioglitazone Other (See Comments)     Increased urinary frequency, fatigue, dyspnea      PAST MEDICAL HISTORY:   Past Medical History:   Diagnosis Date     (HFpEF) heart failure with preserved ejection fraction (H)      Aortic stenosis      Chest pain      Depressive disorder      Diabetes (H)      Hyperlipidemia      Hypertension      Mitral annular calcification      Mitral stenosis      Obesity      Sleep apnea     CPAP      PAST SURGICAL HISTORY:   has a past surgical history that includes GYN surgery; GYN surgery; appendectomy; Implant pacemaker (2015); Right Heart Catheterization (N/A, 4/8/2020); Left Heart Catheterization (N/A, 4/8/2020); Coronary Angiogram (N/A, 4/8/2020); Electrophysiology Pacemaker (N/A, 4/20/2020); Replace valve aortic (N/A, 4/15/2020); Replace valve mitral (N/A, 4/15/2020); Patent Foramen Ovale Closure (N/A, 4/15/2020); and Repair valve tricuspid (N/A, 4/15/2020).  FAMILY HISTORY: family history includes Congenital heart disease (age of onset: 23) in her father; Diabetes (age of onset: 56) in her mother.  SOCIAL HISTORY:   reports that she has never smoked. She has never used smokeless tobacco. She reports that she does not  drink alcohol and does not use drugs.  Patient's living condition: lives with spouse    Post Discharge Medication Reconciliation Status: discharge medications reconciled and changed, per note/orders  Current Outpatient Medications   Medication Sig     acetaminophen (TYLENOL) 500 MG tablet Take 2 tablets (1,000 mg) by mouth 3 times daily For 7 days then change to 1000 mg po tid prn     albuterol (PROAIR HFA/PROVENTIL HFA/VENTOLIN HFA) 108 (90 Base) MCG/ACT inhaler Inhale 2 puffs into the lungs every 4 hours as needed for shortness of breath / dyspnea or wheezing     atorvastatin (LIPITOR) 40 MG tablet Take 1 tablet (40 mg) by mouth At Bedtime     Biotin 46237 MCG TABS Take 10,000 mcg by mouth every morning      bisacodyl (DULCOLAX) 10 MG suppository Place 1 suppository (10 mg) rectally daily as needed for constipation     Calcium Carbonate-Vit D-Min (CALCIUM 1200 PO) Take 1 tablet by mouth 2 times daily      coenzyme Q-10 200 MG CAPS Take 200 mg by mouth every morning      Continuous Blood Gluc  (FREESTYLE ADAM READER) HARJEET 1 each every 14 days Use to read blood sugars per  instructions.     Continuous Blood Gluc Sensor (FREESTYLE ADAM 14 DAY SENSOR) MISC 1 each every 14 days Change every 14 days.     cyanocobalamin (VITAMIN B-12) 100 MCG tablet Take 100 mcg by mouth daily     cyclobenzaprine (FLEXERIL) 10 MG tablet Take 1 tablet (10 mg) by mouth 3 times daily as needed for muscle spasms     ferrous gluconate (FERGON) 324 (38 Fe) MG tablet Take 1 tablet (324 mg) by mouth daily (with breakfast)     fluticasone (FLONASE) 50 MCG/ACT nasal spray Spray 1 spray into both nostrils daily     furosemide (LASIX) 40 MG tablet Take 0.5 tablets (20 mg) by mouth daily     Ginkgo Biloba (GINKOBA PO) Take 250 mg by mouth daily     insulin aspart (NOVOLOG PEN) 100 UNIT/ML pen Inject 1-10 Units Subcutaneous 3 times daily (before meals) For Pre-Meal  - 164 give 1 unit,   - 189 give 2 units,  -  214 give 3 unit,  - 239 give 4 units,  - 264 give 5 units,  - 289 give 6 units,  - 314 give 7 units  - 339 give 8 units,  - 364 give 9 units.BG greater than or equal to 365 give 10 units.   Notify provider if glucose greater than or equal to 350 mg/dL after administration of correction dose. If given at mealtime, administer within 30 minutes of start of meal.     insulin aspart (NOVOLOG PEN) 100 UNIT/ML pen Inject 1-7 Units Subcutaneous At Bedtime HIGH INSULIN RESISTANCE DOSING    Do Not give Bedtime Correction Insulin if BG less than 200.   For  - 224 give 1 units.   For  - 249 give 2 units.   For  - 274 give 3 units.   For  - 299 give 4 units.   For  - 324 give 5 units.   For  - 349 give 6 units.   For BG greater than or equal to 350 give 7 units.     insulin  UNIT/ML vial Inject 30 Units Subcutaneous 2 times daily Inject 30 units every morning and 30 unit(s) every evening     ipratropium (ATROVENT) 0.06 % nasal spray Spray 2 sprays into both nostrils 4 times daily as needed for rhinitis     Lutein 40 MG CAPS Take 40 mg by mouth every morning      Magnesium 400 MG TABS Take 400 mg by mouth every evening      magnesium hydroxide (MILK OF MAGNESIA) 400 MG/5ML suspension Take 15-30 mLs by mouth daily as needed for constipation     metFORMIN (GLUCOPHAGE-XR) 500 MG 24 hr tablet TAKE 2 TABLETS EVERY DAY WITH BREAKFAST     metoprolol succinate ER (TOPROL-XL) 25 MG 24 hr tablet Take 2 tablets (50 mg) by mouth daily     NONFORMULARY 1 packet 3 times daily as needed Relief factor for pain.  Natural product     oxyCODONE (ROXICODONE) 5 MG tablet Take 1 tablet (5 mg) by mouth every 4 hours as needed for moderate to severe pain     pantoprazole (PROTONIX) 40 MG EC tablet 40 mg bid x 2 weeks followed by daily     polyethylene glycol (MIRALAX) 17 GM/Dose powder Take 17 g by mouth daily Hold for diarrhea     potassium 99 MG TABS Take 1 tablet by mouth  "daily     Propylene Glycol (SYSTANE BALANCE OP) Apply 1-2 drops to eye 3 times daily as needed (dry eyes)     senna-docusate (SENOKOT-S/PERICOLACE) 8.6-50 MG tablet Take 3 tablets by mouth nightly as needed for constipation     sertraline (ZOLOFT) 100 MG tablet Take 1.5 tablets (150 mg) by mouth daily     TURMERIC PO Take 3 tablets by mouth daily      vitamin (B COMPLEX-C) tablet Take 1 tablet by mouth daily     vitamin C (ASCORBIC ACID) 1000 MG TABS Take 1,000 mg by mouth every evening      Vitamin D3 (CHOLECALCIFEROL) 125 MCG (5000 UT) tablet Take 1 tablet by mouth daily     zinc 23 MG LOZG lozenge Place 1 lozenge inside cheek daily      No current facility-administered medications for this visit.       ROS:  10 point ROS of systems including Constitutional, Eyes, Respiratory, Cardiovascular, Gastroenterology, Genitourinary, Integumentary, Musculoskeletal, Psychiatric were all negative except for pertinent positives noted in my HPI.    Vitals:  /68   Pulse 75   Temp 98.3  F (36.8  C)   Resp 18   Ht 1.626 m (5' 4\")   Wt 112.9 kg (249 lb)   SpO2 97%   BMI 42.74 kg/m    Exam:  Physical Exam  Vitals and nursing note reviewed.   Constitutional:       General: She is not in acute distress.     Appearance: Normal appearance. She is obese. She is not toxic-appearing.   HENT:      Head: Normocephalic and atraumatic.   Eyes:      General: No scleral icterus.  Cardiovascular:      Rate and Rhythm: Normal rate and regular rhythm.      Heart sounds: No murmur heard.  Pulmonary:      Effort: Pulmonary effort is normal.      Breath sounds: Normal breath sounds. No wheezing or rales.   Abdominal:      General: Bowel sounds are normal.      Tenderness: There is no abdominal tenderness.   Musculoskeletal:         General: Normal range of motion.      Right lower leg: No edema.      Left lower leg: No edema.   Skin:     General: Skin is warm and dry.      Findings: No rash.   Neurological:      General: No focal deficit " present.      Mental Status: She is alert.      Cranial Nerves: No cranial nerve deficit.   Psychiatric:         Mood and Affect: Mood normal.         Behavior: Behavior normal.           Lab/Diagnostic data:  Recent labs in [facility] reviewed by me today.  and   Most Recent 3 CBC's:  Recent Labs   Lab Test 03/19/22  0746 03/18/22  0724 03/17/22  0815 03/16/22  0518   WBC  --  8.7 8.3 8.6   HGB 8.6* 8.5* 8.9* 8.1*   MCV  --  92 91 91   PLT  --  214 248 242     Most Recent 3 BMP's:  Recent Labs   Lab Test 03/19/22  1137 03/19/22  0736 03/19/22  0318 03/18/22  0819 03/18/22  0724 03/17/22  0858 03/17/22  0815 03/16/22  0834 03/16/22  0518   NA  --   --   --   --  136  --  136  --  136   POTASSIUM  --   --   --   --  3.7  --  3.6  --  3.9   CHLORIDE  --   --   --   --  104  --  105  --  105   CO2  --   --   --   --  27  --  26  --  27   BUN  --   --   --   --  18  --  17  --  26   CR  --   --   --   --  0.87  --  0.83  --  0.89   ANIONGAP  --   --   --   --  5  --  5  --  4   NELLY  --   --   --   --  8.6  --  8.6  --  8.6   * 205* 144*   < > 226*   < > 258*   < > 272*    < > = values in this interval not displayed.     Most Recent 3 INR's:  Recent Labs   Lab Test 03/15/22  0910 03/14/22  2142 03/14/22  0901   INR 0.81* 1.09 >10.00*     Most Recent 3 Hemoglobins:  Recent Labs   Lab Test 03/19/22  0746 03/18/22  0724 03/17/22  0815   HGB 8.6* 8.5* 8.9*     Most Recent Hemoglobin A1c:  Recent Labs   Lab Test 03/14/22  1841   A1C 6.6*       ASSESSMENT/PLAN:    (D62) Acute blood loss anemia  (primary encounter diagnosis)  (K92.2) Upper GI bleed: Presented for evaluation of generalized weakness and shortness of breath.  Found to have a hemoglobin of 4.3 in the setting of an INR greater than 10 and NSAID use.  Thought to be secondary to upper GI bleed no EGD not done due to comorbidities.  Received 4 units of PRBCs with stabilization of hemoglobin in the 8 range.  Warfarin discontinued per cardiology's  recommendations.  -Does not appear patient was sent from the hospital on PPI but GI notes indicate twice daily dosing x2 weeks followed by daily dosing.  Will start Protonix 40 twice daily x14 days and transition to daily  -Continue to monitor off anticoagulation  -Hemoglobin stabilized in the 8 range.  No further symptoms of melena or hematochezia.  Will repeat hemoglobin 3/23/22      (I48.0) Paroxysmal atrial fibrillation (H)  (Z79.01) Long term current use of anticoagulants with INR goal of 2.0-3.0  (Z95.2) S/P aortic valve and mitral valve replacement  (R79.1) Supratherapeutic INR: Historically anticoagulated with warfarin due to history of bioprosthetic valve replacement and atrial fibrillation.  Has not had her INR checked in 2 months.  INR greater than 10 on admission and received vitamin K and Kcentra.  Cardiology recommended discontinuation of anticoagulation and follow-up with cardiology  -Continue rate control with PTA metoprolol 50 mg daily  -Off all anticoagulation  -Follow-up with cardiology as outpatient    (R33.9) Urinary retention  (N39.0) Urinary tract infection without hematuria, site unspecified: Found to have a pansensitive Klebsiella UTI.  Patient denies that she was ever symptomatic.  Treated with a course of ceftriaxone and transition to ciprofloxacin on discharge which she completed.  Did have some intermittent urinary retention that required straight catheterization.  -Monitor for signs and symptoms of infection  -Monitor PVRs 3 times daily x3 days.  Straight cath for PVR greater than 300 cc and notify provider    (S32.000D) Compression fracture of lumbar vertebra with routine healing, unspecified lumbar vertebral level, subsequent encounter: On admission to hospital endorsed 3 months of low back pain without injury.  Imaging revealed acute appearing fractures of the inferior right L4 endplate and superior left L3 endplate.  Neurosurgery was consulted.  They did not recommend surgical  intervention or bracing.  -Continue pain control with scheduled Tylenol and as needed oxycodone  -No lifting greater than 5-10 pounds.  No bending or twisting  -PT/OT/social work    (I50.32) Chronic heart failure with preserved ejection fraction (H)  (I10) Primary hypertension: Most recent echo 4/2021 with preserved EF.  Historically on Lasix though not entirely compliant.  Denies orthopnea or lower extremity edema  -Continue PTA Lasix 20 mg daily  -Continue metoprolol 50 mg twice daily  - Monitor daily weights    (E11.9,  Z79.4) Insulin dependent type 2 diabetes mellitus (H): A1c 6.6.  Historically maintained on NPH 30 units twice daily and sliding scale insulin as well as Metformin XR 1000 mg daily.  Discharged home carb counting from the hospital but not familiar with usage at home  -Continue NPH 30 units twice daily  -Continue sliding scale insulin with meals and at bedtime.  Discontinue carb counting  -Continue Metformin    (K59.00) Constipation, unspecified constipation type: Notes with several days without a bowel movement but had a normal bowel movement yesterday.  Denies melena hematochezia.  Continue MiraLAX and senna    (N18.30) Stage 3 chronic kidney disease, unspecified whether stage 3a or 3b CKD (H):Baseline creatinine 1.  Creatinine currently at baseline.  Monitor BMP    (E66.01,  Z68.41) Class 3 severe obesity with body mass index (BMI) of 40.0 to 44.9 in adult, unspecified obesity type, unspecified whether serious comorbidity present (H) : BMI 42.7.  Increases all cause mortality.  -Follow-up with PCP      Orders:  Discontinue carb count insulin  Monitor PVRs 3 times daily x3 days.  Straight catheter PVR greater than 300  Monitor daily weight  Monitor blood glucose with meals and at bedtime  Protonix 40 mg twice daily x14 days followed by 40 mg daily  No lifting of greater than 5-10 pounds  No excessive bending or twisting    A total  45 min were spent on this initial visit. With > 50% spent on  coordination of care including extensive review of recent hospitalization, review and verification of facility orders, as well as discussion of plan of care with patien at bedside. Further details as discussed in HPI.      Electronically signed by:  Phuong Banegas PA-C

## 2022-03-21 NOTE — LETTER
3/21/2022        RE: Amy Luna  [address]  [place] [address]        [facility] GERIATRICS    PRIMARY CARE PROVIDER AND CLINIC:  She Huang MD, [address] / [facility] [address]  Chief Complaint   Patient presents with     Hospital F/U      [facility] Medical Record Number:  [id number]  Place of Service where encounter took place:  [facility] ([facility]) [[id number]]    Amy Luna  is a 76 year old  (1945), admitted to the above facility from  [facility]. Hospital stay 3/14/22 through 3/19/22..   HPI:    Amy Luna is a 77 yo female with a PMH of Afib, status post previous bioprosthetic aortic and mitral valve replacement, obstructive sleep apnea, chronic kidney disease, insulin-dependent type 2 diabetes, HFpEF and morbid obesity who was recently admitted to [facility] for evaluation of anemia thought to be secondary to GI bleed.    She presented for evaluation of increasing generalized weakness, dizziness and shortness of breath.  She was found to have a hemoglobin of 4.3.  This was in the setting of an INR of greater than 10 and recent NSAID use for back pain.  She was given 4 units of PRBCs throughout her hospital stay with improvement in the 8 range.  GI was consulted but given comorbidities EGD was not performed.  The strongly suspected upper GI bleed and recommended twice daily PPI.  Historically she is anticoagulated with warfarin given her history of atrial fibrillation and bioprosthetic valve replacements; however, cardiology was consulted who recommended discontinuation of warfarin and follow-up with her primary cardiologist.  Hospital course was additionally complicated by acute on chronic back pain.  She was found to have L3-L4 compression fractures.  Neurosurgery was consulted and recommended conservative management.  Additionally she was found to have a pansensitive Klebsiella UTI and was  treated with a course of ceftriaxone and transition to Cipro on discharge.  She was noted to have some mild intermittent urinary retention that resolved prior to discharge.  She is discharged to [facility]    Amy is seen for an initial visit at [facility].  She is just finished working with physical therapy and feels quite fatigued.  Slightly unhappy with nursing care since arrival to TCU.  Denies any chest pain or shortness of breath.  No nausea or vomiting.  No melena.  Struggled with some constipation in the hospital but had a bowel movement yesterday which was normal.  She denies orthopnea or PND.  No lower extremity edema.  Back pain is tolerable with Tylenol and oxycodone.    CODE STATUS/ADVANCE DIRECTIVES DISCUSSION:  Prior  DNR / DNI  ALLERGIES:   Allergies   Allergen Reactions     Penicillins Hives     3 weeks after taking med got hives.       Pioglitazone Other (See Comments)     Increased urinary frequency, fatigue, dyspnea      PAST MEDICAL HISTORY:   Past Medical History:   Diagnosis Date     (HFpEF) heart failure with preserved ejection fraction (H)      Aortic stenosis      Chest pain      Depressive disorder      Diabetes (H)      Hyperlipidemia      Hypertension      Mitral annular calcification      Mitral stenosis      Obesity      Sleep apnea     CPAP      PAST SURGICAL HISTORY:   has a past surgical history that includes GYN surgery; GYN surgery; appendectomy; Implant pacemaker (2015); Right Heart Catheterization (N/A, 4/8/2020); Left Heart Catheterization (N/A, 4/8/2020); Coronary Angiogram (N/A, 4/8/2020); Electrophysiology Pacemaker (N/A, 4/20/2020); Replace valve aortic (N/A, 4/15/2020); Replace valve mitral (N/A, 4/15/2020); Patent Foramen Ovale Closure (N/A, 4/15/2020); and Repair valve tricuspid (N/A, 4/15/2020).  FAMILY HISTORY: family history includes Congenital heart disease (age of onset: 23) in her father; Diabetes (age of onset: 56) in her mother.  SOCIAL HISTORY:   reports that  she has never smoked. She has never used smokeless tobacco. She reports that she does not drink alcohol and does not use drugs.  Patient's living condition: lives with spouse    Post Discharge Medication Reconciliation Status: discharge medications reconciled and changed, per note/orders  Current Outpatient Medications   Medication Sig     acetaminophen (TYLENOL) 500 MG tablet Take 2 tablets (1,000 mg) by mouth 3 times daily For 7 days then change to 1000 mg po tid prn     albuterol (PROAIR HFA/PROVENTIL HFA/VENTOLIN HFA) 108 (90 Base) MCG/ACT inhaler Inhale 2 puffs into the lungs every 4 hours as needed for shortness of breath / dyspnea or wheezing     atorvastatin (LIPITOR) 40 MG tablet Take 1 tablet (40 mg) by mouth At Bedtime     Biotin 83130 MCG TABS Take 10,000 mcg by mouth every morning      bisacodyl (DULCOLAX) 10 MG suppository Place 1 suppository (10 mg) rectally daily as needed for constipation     Calcium Carbonate-Vit D-Min (CALCIUM 1200 PO) Take 1 tablet by mouth 2 times daily      coenzyme Q-10 200 MG CAPS Take 200 mg by mouth every morning      Continuous Blood Gluc  (FREESTYLE ADAM READER) HARJEET 1 each every 14 days Use to read blood sugars per  instructions.     Continuous Blood Gluc Sensor (FREESTYLE ADAM 14 DAY SENSOR) MISC 1 each every 14 days Change every 14 days.     cyanocobalamin (VITAMIN B-12) 100 MCG tablet Take 100 mcg by mouth daily     cyclobenzaprine (FLEXERIL) 10 MG tablet Take 1 tablet (10 mg) by mouth 3 times daily as needed for muscle spasms     ferrous gluconate (FERGON) 324 (38 Fe) MG tablet Take 1 tablet (324 mg) by mouth daily (with breakfast)     fluticasone (FLONASE) 50 MCG/ACT nasal spray Spray 1 spray into both nostrils daily     furosemide (LASIX) 40 MG tablet Take 0.5 tablets (20 mg) by mouth daily     Ginkgo Biloba (GINKOBA PO) Take 250 mg by mouth daily     insulin aspart (NOVOLOG PEN) 100 UNIT/ML pen Inject 1-10 Units Subcutaneous 3 times daily  (before meals) For Pre-Meal  - 164 give 1 unit,   - 189 give 2 units,  - 214 give 3 unit,  - 239 give 4 units,  - 264 give 5 units,  - 289 give 6 units,  - 314 give 7 units  - 339 give 8 units,  - 364 give 9 units.BG greater than or equal to 365 give 10 units.   Notify provider if glucose greater than or equal to 350 mg/dL after administration of correction dose. If given at mealtime, administer within 30 minutes of start of meal.     insulin aspart (NOVOLOG PEN) 100 UNIT/ML pen Inject 1-7 Units Subcutaneous At Bedtime HIGH INSULIN RESISTANCE DOSING    Do Not give Bedtime Correction Insulin if BG less than 200.   For  - 224 give 1 units.   For  - 249 give 2 units.   For  - 274 give 3 units.   For  - 299 give 4 units.   For  - 324 give 5 units.   For  - 349 give 6 units.   For BG greater than or equal to 350 give 7 units.     insulin  UNIT/ML vial Inject 30 Units Subcutaneous 2 times daily Inject 30 units every morning and 30 unit(s) every evening     ipratropium (ATROVENT) 0.06 % nasal spray Spray 2 sprays into both nostrils 4 times daily as needed for rhinitis     Lutein 40 MG CAPS Take 40 mg by mouth every morning      Magnesium 400 MG TABS Take 400 mg by mouth every evening      magnesium hydroxide (MILK OF MAGNESIA) 400 MG/5ML suspension Take 15-30 mLs by mouth daily as needed for constipation     metFORMIN (GLUCOPHAGE-XR) 500 MG 24 hr tablet TAKE 2 TABLETS EVERY DAY WITH BREAKFAST     metoprolol succinate ER (TOPROL-XL) 25 MG 24 hr tablet Take 2 tablets (50 mg) by mouth daily     NONFORMULARY 1 packet 3 times daily as needed Relief factor for pain.  Natural product     oxyCODONE (ROXICODONE) 5 MG tablet Take 1 tablet (5 mg) by mouth every 4 hours as needed for moderate to severe pain     pantoprazole (PROTONIX) 40 MG EC tablet 40 mg bid x 2 weeks followed by daily     polyethylene glycol (MIRALAX) 17 GM/Dose powder  "Take 17 g by mouth daily Hold for diarrhea     potassium 99 MG TABS Take 1 tablet by mouth daily     Propylene Glycol (SYSTANE BALANCE OP) Apply 1-2 drops to eye 3 times daily as needed (dry eyes)     senna-docusate (SENOKOT-S/PERICOLACE) 8.6-50 MG tablet Take 3 tablets by mouth nightly as needed for constipation     sertraline (ZOLOFT) 100 MG tablet Take 1.5 tablets (150 mg) by mouth daily     TURMERIC PO Take 3 tablets by mouth daily      vitamin (B COMPLEX-C) tablet Take 1 tablet by mouth daily     vitamin C (ASCORBIC ACID) 1000 MG TABS Take 1,000 mg by mouth every evening      Vitamin D3 (CHOLECALCIFEROL) 125 MCG (5000 UT) tablet Take 1 tablet by mouth daily     zinc 23 MG LOZG lozenge Place 1 lozenge inside cheek daily      No current facility-administered medications for this visit.       ROS:  10 point ROS of systems including Constitutional, Eyes, Respiratory, Cardiovascular, Gastroenterology, Genitourinary, Integumentary, Musculoskeletal, Psychiatric were all negative except for pertinent positives noted in my HPI.    Vitals:  /68   Pulse 75   Temp 98.3  F (36.8  C)   Resp 18   Ht 1.626 m (5' 4\")   Wt 112.9 kg (249 lb)   SpO2 97%   BMI 42.74 kg/m    Exam:  Physical Exam  Vitals and nursing note reviewed.   Constitutional:       General: She is not in acute distress.     Appearance: Normal appearance. She is obese. She is not toxic-appearing.   HENT:      Head: Normocephalic and atraumatic.   Eyes:      General: No scleral icterus.  Cardiovascular:      Rate and Rhythm: Normal rate and regular rhythm.      Heart sounds: No murmur heard.  Pulmonary:      Effort: Pulmonary effort is normal.      Breath sounds: Normal breath sounds. No wheezing or rales.   Abdominal:      General: Bowel sounds are normal.      Tenderness: There is no abdominal tenderness.   Musculoskeletal:         General: Normal range of motion.      Right lower leg: No edema.      Left lower leg: No edema.   Skin:     General: " Skin is warm and dry.      Findings: No rash.   Neurological:      General: No focal deficit present.      Mental Status: She is alert.      Cranial Nerves: No cranial nerve deficit.   Psychiatric:         Mood and Affect: Mood normal.         Behavior: Behavior normal.           Lab/Diagnostic data:  Recent labs in [facility] reviewed by me today.  and   Most Recent 3 CBC's:  Recent Labs   Lab Test 03/19/22  0746 03/18/22  0724 03/17/22  0815 03/16/22  0518   WBC  --  8.7 8.3 8.6   HGB 8.6* 8.5* 8.9* 8.1*   MCV  --  92 91 91   PLT  --  214 248 242     Most Recent 3 BMP's:  Recent Labs   Lab Test 03/19/22  1137 03/19/22  0736 03/19/22  0318 03/18/22  0819 03/18/22  0724 03/17/22  0858 03/17/22  0815 03/16/22  0834 03/16/22  0518   NA  --   --   --   --  136  --  136  --  136   POTASSIUM  --   --   --   --  3.7  --  3.6  --  3.9   CHLORIDE  --   --   --   --  104  --  105  --  105   CO2  --   --   --   --  27  --  26  --  27   BUN  --   --   --   --  18  --  17  --  26   CR  --   --   --   --  0.87  --  0.83  --  0.89   ANIONGAP  --   --   --   --  5  --  5  --  4   NELLY  --   --   --   --  8.6  --  8.6  --  8.6   * 205* 144*   < > 226*   < > 258*   < > 272*    < > = values in this interval not displayed.     Most Recent 3 INR's:  Recent Labs   Lab Test 03/15/22  0910 03/14/22  2142 03/14/22  0901   INR 0.81* 1.09 >10.00*     Most Recent 3 Hemoglobins:  Recent Labs   Lab Test 03/19/22  0746 03/18/22  0724 03/17/22  0815   HGB 8.6* 8.5* 8.9*     Most Recent Hemoglobin A1c:  Recent Labs   Lab Test 03/14/22  1841   A1C 6.6*       ASSESSMENT/PLAN:    (D62) Acute blood loss anemia  (primary encounter diagnosis)  (K92.2) Upper GI bleed: Presented for evaluation of generalized weakness and shortness of breath.  Found to have a hemoglobin of 4.3 in the setting of an INR greater than 10 and NSAID use.  Thought to be secondary to upper GI bleed no EGD not done due to comorbidities.  Received 4 units of PRBCs with stabilization  of hemoglobin in the 8 range.  Warfarin discontinued per cardiology's recommendations.  -Does not appear patient was sent from the hospital on PPI but GI notes indicate twice daily dosing x2 weeks followed by daily dosing.  Will start Protonix 40 twice daily x14 days and transition to daily  -Continue to monitor off anticoagulation  -Hemoglobin stabilized in the 8 range.  No further symptoms of melena or hematochezia.  Will repeat hemoglobin 3/23/22      (I48.0) Paroxysmal atrial fibrillation (H)  (Z79.01) Long term current use of anticoagulants with INR goal of 2.0-3.0  (Z95.2) S/P aortic valve and mitral valve replacement  (R79.1) Supratherapeutic INR: Historically anticoagulated with warfarin due to history of bioprosthetic valve replacement and atrial fibrillation.  Has not had her INR checked in 2 months.  INR greater than 10 on admission and received vitamin K and Kcentra.  Cardiology recommended discontinuation of anticoagulation and follow-up with cardiology  -Continue rate control with PTA metoprolol 50 mg daily  -Off all anticoagulation  -Follow-up with cardiology as outpatient    (R33.9) Urinary retention  (N39.0) Urinary tract infection without hematuria, site unspecified: Found to have a pansensitive Klebsiella UTI.  Patient denies that she was ever symptomatic.  Treated with a course of ceftriaxone and transition to ciprofloxacin on discharge which she completed.  Did have some intermittent urinary retention that required straight catheterization.  -Monitor for signs and symptoms of infection  -Monitor PVRs 3 times daily x3 days.  Straight cath for PVR greater than 300 cc and notify provider    (S32.000D) Compression fracture of lumbar vertebra with routine healing, unspecified lumbar vertebral level, subsequent encounter: On admission to hospital endorsed 3 months of low back pain without injury.  Imaging revealed acute appearing fractures of the inferior right L4 endplate and superior left L3  endplate.  Neurosurgery was consulted.  They did not recommend surgical intervention or bracing.  -Continue pain control with scheduled Tylenol and as needed oxycodone  -No lifting greater than 5-10 pounds.  No bending or twisting  -PT/OT/social work    (I50.32) Chronic heart failure with preserved ejection fraction (H)  (I10) Primary hypertension: Most recent echo 4/2021 with preserved EF.  Historically on Lasix though not entirely compliant.  Denies orthopnea or lower extremity edema  -Continue PTA Lasix 20 mg daily  -Continue metoprolol 50 mg twice daily  - Monitor daily weights    (E11.9,  Z79.4) Insulin dependent type 2 diabetes mellitus (H): A1c 6.6.  Historically maintained on NPH 30 units twice daily and sliding scale insulin as well as Metformin XR 1000 mg daily.  Discharged home carb counting from the hospital but not familiar with usage at home  -Continue NPH 30 units twice daily  -Continue sliding scale insulin with meals and at bedtime.  Discontinue carb counting  -Continue Metformin    (K59.00) Constipation, unspecified constipation type: Notes with several days without a bowel movement but had a normal bowel movement yesterday.  Denies melena hematochezia.  Continue MiraLAX and senna    (N18.30) Stage 3 chronic kidney disease, unspecified whether stage 3a or 3b CKD (H):Baseline creatinine 1.  Creatinine currently at baseline.  Monitor BMP    (E66.01,  Z68.41) Class 3 severe obesity with body mass index (BMI) of 40.0 to 44.9 in adult, unspecified obesity type, unspecified whether serious comorbidity present (H) : BMI 42.7.  Increases all cause mortality.  -Follow-up with PCP      Orders:  Discontinue carb count insulin  Monitor PVRs 3 times daily x3 days.  Straight catheter PVR greater than 300  Monitor daily weight  Monitor blood glucose with meals and at bedtime  Protonix 40 mg twice daily x14 days followed by 40 mg daily  No lifting of greater than 5-10 pounds  No excessive bending or twisting    A  total  45 min were spent on this initial visit. With > 50% spent on coordination of care including extensive review of recent hospitalization, review and verification of facility orders, as well as discussion of plan of care with patien at bedside. Further details as discussed in HPI.      Electronically signed by:  Phuong Banegas PA-C                       Sincerely,        Phuong Banegas PA-C

## 2022-03-25 NOTE — PROGRESS NOTES
"[place] [facility]  PHYSICIAN NOTE    PRIMARY CARE PROVIDER AND CLINIC:  She Huang MD, [address] / [facility] [address]    Chief Complaint   Patient presents with     Hospital F/U     [facility] Record Number:  [id number]  Place of Service where encounter took place:  [facility] ([facility]) [[id number]]    Amy Luna is a 76 year old (1945), admitted to the above facility from  [facility]. Hospital stay 3/14/22 through 3/19/22. Admitted to this facility for  rehab, medical management and nursing care.     HPI:    HPI information obtained from: facility chart records, facility staff, patient report and [facility] chart review.     Brief summary of hospital course: Amy Luna is a 76yoF with h/o HFpEF, pAfib, DM2 and recent subacute significant back pain that was admitted with on-going low back pain and found to have acute blood loss anemia with Hgb 4.3. Underwent IVF and blood transfusion resuscitation. She is anticoagulated with Coumadin for pAfib and aortic+mitral valve replacement and INR was found to be >10 upon admission as well s/p treatment with Kcentra and IV Vitamin K. She had reported some hematochezia but also dark stools. Had been taking NSAIDs for her back pain at home. Started on PPI and GI consulted. As Hgb stabilized and no more on-going bleeding noted, procedures deferred at this time as per GI \"with supratherapeutic INR anything within the GI tract could bleed\". Anticoagulation continued to be held and cardiology team consulted as well. Her PPM was interrogated and has recently been noted to be in NSR and will discuss long term follow up with her primary cardiologist Dr. Christina in the future. Also noted to have urinary retention needing intermittent straight cath and treatment for bacteruria with antibiotic therapy. Finally work up for her subacute significant back pain with lumbar CT noted acute appearing " L3 & L4 compression fracture deformities. Neurosurg team was consulted noting that their appearance was subtle enough to avoid surgery & bracing and conservative plan pursued with recommendation for pain control, therapy and suggested that she not lift anything greater than 5-10 pounds for six weeks and avoid excessive bending, twisting, and turning. Ultimately discharged to TCU for further care/rehab.    Updates on status since skilled nursing admission: Amy is seen in her room accompanied by her  Irineo and they have some good questions. Yesterday still really struggling with LBP but today made some progress with therapies and feeling somewhat more hopeful. Still has a long ways to go. Pain is low back and radiating around to R>L hips. Also constipated but did have BM early on in TCU stay after suppository. PVRs have been low per my discussion with RN. She reports significant urinary output but no dysuria. H/o lymphedema but has recently been better as outpatient. Hadn't been taking home Lasix recently d/t impaired mobility to bathroom in setting of her LBP. No more signs/sx of GI bleeding. Sugars high; willing to increase Metformin. Now on XR Metformin no diarrhea though had this as outpatient with IR Metformin. Discussed indeterminate quantiferon TB screening and she has no high risk factors for TB exposure in the past. Knows is off of anticoagulation and has cardiology f/u planned. Also asks re: f/u labs which are planned next week.     CODE STATUS/ADVANCE DIRECTIVES DISCUSSION:   CPR/Full code   Patient's living condition: lives with spouse    ALLERGIES: Penicillins and Pioglitazone    Past Medical History:   Diagnosis Date     (HFpEF) heart failure with preserved ejection fraction (H)      Aortic stenosis      Chest pain      Depressive disorder      Diabetes (H)      Hyperlipidemia      Hypertension      Mitral annular calcification      Mitral stenosis      Obesity      Sleep apnea     CPAP       Past Surgical History:   Procedure Laterality Date     APPENDECTOMY       CV CORONARY ANGIOGRAM N/A 2020    Procedure: Coronary Angiogram;  Surgeon: Inez Peoples MD;  Location:  HEART CARDIAC CATH LAB     CV LEFT HEART CATH N/A 2020    Procedure: Left Heart Cath;  Surgeon: Inez Peoples MD;  Location:  HEART CARDIAC CATH LAB     CV PFO CLOSURE N/A 4/15/2020    Procedure: Patent Foramen Ovale Closure;  Surgeon: Ok Wilson MD;  Location:  OR      RIGHT HEART CATH MEASUREMENTS RECORDED N/A 2020    Procedure: Right Heart Cath;  Surgeon: Inez Peoples MD;  Location:  HEART CARDIAC CATH LAB     EP PACEMAKER N/A 2020    Procedure: EP Pacemaker;  Surgeon: Sohan Francis MD;  Location:  HEART CARDIAC CATH LAB     GYN SURGERY       x2 ,      GYN SURGERY      total hysterectomy     IMPLANT PACEMAKER       REPAIR VALVE TRICUSPID N/A 4/15/2020    Procedure: REPAIR TRICUSPID VALVE WITH VILLA MC3 26MM.;  Surgeon: Ok Wilson MD;  Location:  OR     REPLACE VALVE AORTIC N/A 4/15/2020    Procedure: REPLACEMENT, AORTIC VALVE WITH INSPIRIS TISSUE VALVE 25 MM; ON PUMP WITH SOCORRO READ BY CARDIOLOGIST DR BLANTON.;  Surgeon: Ok Wilson MD;  Location:  OR     REPLACE VALVE MITRAL N/A 4/15/2020    Procedure: REPLACEMENT, MITRAL VALVE WITH EPIC TISSUE VALVE 27 MM.;  Surgeon: Ok Wilson MD;  Location:  OR     Family History   Problem Relation Age of Onset     Diabetes Mother 56     Congenital heart disease Father 23     Colon Cancer No family hx of      Social History     Tobacco Use     Smoking status: Never Smoker     Smokeless tobacco: Never Used   Vaping Use     Vaping Use: Never used   Substance Use Topics     Alcohol use: No     Drug use: No        Post-discharge medication reconciliation status: Reviewed and updated in [facility] according to facility MAR    Current Outpatient Medications   Medication  Sig Dispense Refill     acetaminophen (TYLENOL) 500 MG tablet Take 2 tablets (1,000 mg) by mouth 3 times daily For 7 days then change to 1000 mg po tid prn       albuterol (PROAIR HFA/PROVENTIL HFA/VENTOLIN HFA) 108 (90 Base) MCG/ACT inhaler Inhale 2 puffs into the lungs every 4 hours as needed for shortness of breath / dyspnea or wheezing       atorvastatin (LIPITOR) 40 MG tablet Take 1 tablet (40 mg) by mouth At Bedtime 90 tablet 3     Biotin 27093 MCG TABS Take 10,000 mcg by mouth every morning        bisacodyl (DULCOLAX) 10 MG suppository Place 1 suppository (10 mg) rectally daily as needed for constipation       Calcium Carbonate-Vit D-Min (CALCIUM 1200 PO) Take 1 tablet by mouth 2 times daily        coenzyme Q-10 200 MG CAPS Take 200 mg by mouth every morning        Continuous Blood Gluc  (FREESTYLE ADAM READER) HARJEET 1 each every 14 days Use to read blood sugars per  instructions. 1 each 0     Continuous Blood Gluc Sensor (Gold AmericaSTYLE ADAM 14 DAY SENSOR) MISC 1 each every 14 days Change every 14 days. 2 each 6     cyanocobalamin (VITAMIN B-12) 100 MCG tablet Take 100 mcg by mouth daily       cyclobenzaprine (FLEXERIL) 10 MG tablet Take 1 tablet (10 mg) by mouth 3 times daily as needed for muscle spasms 30 tablet 0     ferrous gluconate (FERGON) 324 (38 Fe) MG tablet Take 1 tablet (324 mg) by mouth daily (with breakfast) 30 tablet 3     fluticasone (FLONASE) 50 MCG/ACT nasal spray Spray 1 spray into both nostrils daily 16 g 4     furosemide (LASIX) 40 MG tablet Take 0.5 tablets (20 mg) by mouth daily 180 tablet 1     Ginkgo Biloba (GINKOBA PO) Take 250 mg by mouth daily       insulin aspart (NOVOLOG PEN) 100 UNIT/ML pen Inject 1-10 Units Subcutaneous 3 times daily (before meals) For Pre-Meal  - 164 give 1 unit,   - 189 give 2 units,  - 214 give 3 unit,  - 239 give 4 units,  - 264 give 5 units,  - 289 give 6 units,  - 314 give 7 units  - 339  give 8 units,  - 364 give 9 units.BG greater than or equal to 365 give 10 units.   Notify provider if glucose greater than or equal to 350 mg/dL after administration of correction dose. If given at mealtime, administer within 30 minutes of start of meal. 15 mL      insulin aspart (NOVOLOG PEN) 100 UNIT/ML pen Inject 1-7 Units Subcutaneous At Bedtime HIGH INSULIN RESISTANCE DOSING    Do Not give Bedtime Correction Insulin if BG less than 200.   For  - 224 give 1 units.   For  - 249 give 2 units.   For  - 274 give 3 units.   For  - 299 give 4 units.   For  - 324 give 5 units.   For  - 349 give 6 units.   For BG greater than or equal to 350 give 7 units. 15 mL      insulin  UNIT/ML vial Inject 30 Units Subcutaneous 2 times daily Inject 30 units every morning and 30 unit(s) every evening 10 mL      ipratropium (ATROVENT) 0.06 % nasal spray Spray 2 sprays into both nostrils 4 times daily as needed for rhinitis 3 Box 1     Lutein 40 MG CAPS Take 40 mg by mouth every morning        Magnesium 400 MG TABS Take 400 mg by mouth every evening        magnesium hydroxide (MILK OF MAGNESIA) 400 MG/5ML suspension Take 15-30 mLs by mouth daily as needed for constipation       metFORMIN (GLUCOPHAGE-XR) 500 MG 24 hr tablet TAKE 2 TABLETS EVERY DAY WITH BREAKFAST 180 tablet 1     metoprolol succinate ER (TOPROL-XL) 25 MG 24 hr tablet Take 2 tablets (50 mg) by mouth daily 180 tablet 3     NONFORMULARY 1 packet 3 times daily as needed Relief factor for pain.  Natural product       oxyCODONE (ROXICODONE) 5 MG tablet Take 1 tablet (5 mg) by mouth every 4 hours as needed for moderate to severe pain 30 tablet 0     pantoprazole (PROTONIX) 40 MG EC tablet 40 mg bid x 2 weeks followed by daily       polyethylene glycol (MIRALAX) 17 GM/Dose powder Take 17 g by mouth daily Hold for diarrhea 510 g      potassium 99 MG TABS Take 1 tablet by mouth daily       Propylene Glycol (SYSTANE BALANCE OP)  "Apply 1-2 drops to eye 3 times daily as needed (dry eyes)       senna-docusate (SENOKOT-S/PERICOLACE) 8.6-50 MG tablet Take 3 tablets by mouth nightly as needed for constipation       sertraline (ZOLOFT) 100 MG tablet Take 1.5 tablets (150 mg) by mouth daily 135 tablet 0     TURMERIC PO Take 3 tablets by mouth daily        vitamin (B COMPLEX-C) tablet Take 1 tablet by mouth daily       vitamin C (ASCORBIC ACID) 1000 MG TABS Take 1,000 mg by mouth every evening        Vitamin D3 (CHOLECALCIFEROL) 125 MCG (5000 UT) tablet Take 1 tablet by mouth daily       zinc 23 MG LOZG lozenge Place 1 lozenge inside cheek daily          ROS:  10 point ROS of systems including Constitutional, Eyes, Respiratory, Cardiovascular, Gastroenterology, Genitourinary, Integumentary, Musculoskeletal, Psychiatric were all negative except for pertinent positives noted in my HPI.    Exam:  /61   Pulse 81   Temp 98.2  F (36.8  C)   Resp 16   Ht 1.626 m (5' 4\")   Wt 111.9 kg (246 lb 12.8 oz)   SpO2 97%   BMI 42.36 kg/m    Alert, pleasant, NAD  No scleral icterus   Moist oral mucosa  Is pale appearing with skin warm and dry; non-diaphoretic  Heart tones regular, non-tachy 2/6 systolic murmur  Lungs clear, breathing non-labored  Abdomen obese, soft, NT, +BS  No lower extremity edema  Able to move all extremities and roll to the side  Normal speech, no tremor  Mood euthymic and motivated     Lab/Diagnostic data:  3/14/22: A1c 6.6%, Hgb 4.3  3/18/22: BMP normalized from initial mild CLARITA  3/19/22: Hgb 8.6 at discharge  3/23/22: Indeterminate quantiferon TB screen    CT lumbar spine 3/16/22:  IMPRESSION:    1. Subtle acute appearing fractures of the inferior right L4 endplate  and superior left L3 endplate as detailed above. Mild loss of L3 and  L4 vertebral body height. No significant posterior displacement of the  fractured vertebral bodies towards the spinal canal.  2. Presumed Schmorl's node deformity at the superior T12 " "endplate  although this is not completely visualized. Acute fracture is  difficult to exclude particularly given the apparent acute fractures  of L3 and L4.  3. Marked multilevel degenerative changes throughout the lumbar spine  as detailed above, most pronounced at L2-L3, L3-L4, and L4-L5.       ASSESSMENT/PLAN:  (D62) Acute blood loss anemia  (primary encounter diagnosis)  (K92.2) Gastrointestinal hemorrhage, unspecified gastrointestinal hemorrhage type  Acute blood loss anemia with Hgb in 4s on admission s/p IVF and blood transfusions as well as INR reversal  Is on oral iron therapy and Hgb in 8s at discharge  No current signs/sx of GI bleeding  Is on BID PPI x 2 weeks followed by planned daily dosing  Did not have invasive GI procedures in the hospital given stability and no longer showing signs of active bleeding  F/u BMP and CBC ordered for Monday 3/28/22    (S32.000D) Compression fracture of lumbar vertebra with routine healing, unspecified lumbar vertebral level, subsequent encounter  (R53.81) Physical deconditioning  This is her biggest barrier for discharge from TCU to home as is still having LBP radiating around to hips leading to weakness  Noted to have L3 & L4 compression fractures on imaging; neurosurg noted conservative therapy plan without surgery or bracing needed  Continue physical therapy and occupational therapy   She is aware that NSAIDs are not an option d/t her recent GI bleed  Is on scheduled Tylenol TID but had orders to reduce to PRN starting tomorrow from hospital but we agreed still a good plan to stay on scheduled TID dosing so this is continued  Is rarely using PRN Oxycodone as wants to try to avoid it d/t s/e profile  Wants me to discontinue PRN Flexeril as not using here d/t past use as an outpatient leading to feeling \"loopy\"  Imaging didn't note any other specific bony pathology but if pain continues, consider eval of hip joints as well    (K59.00) Constipation, unspecified " constipation type  She reports on-going constipation despite escalation of bowel regimen  Had BM early in TCU course after suppository  Would like Senna-S increased so this is done today and has scheduled Miralax as well as several PRNs  Discussed immobility and opiates contribute to constipation and she is limiting Oxycodone use    (R33.9) Urinary retention  Noted in the hospital with UTI (was asymptomatic) needing periodic straight cath  Staff monitoring PVRs (which have been low recently)  Antibiotics already completed    (E11.65,  Z79.4) Type 2 diabetes mellitus with hyperglycemia, with long-term current use of insulin (H)  (E66.01,  Z68.41) Obesity (H)  POC glucoses checked QID and range 100-300s without hypoglycemia  Is on NPH 70/30 insulin dosed at 30 units BID and also QID sliding scale insulin   Also on Metformin XR 1000 mg daily and we discussed and she agreed to increase Metformin today to 1500 mg XR daily; could eventually go up to 2000 mg XR daily in the next week or so if tolerating    (G47.33,  Z99.89) LAVERNE on CPAP  She has her CPAP on site    (I48.0) Paroxysmal atrial fibrillation (H)  (Z95.2) S/P aortic valve and mitral valve replacement  Now off of anticoagulation with Coumadin d/t recent severe acute blood loss anemia  Has an appointment with cardiologist Dr. Christina scheduled 4/8/22 to discuss future options    (I50.32) Chronic heart failure with preserved ejection fraction (H)  Had not been taking Lasix at home the past month d/t her acute back pain and thus limited mobility to the bathroom and reported no difficulty with recurrent swelling/CHF symptoms during this time  Restarted on Lasix this hospitalization and remains on 20 mg daily now and appears euvolemic and is also self-diuresing after IVF resuscitation in the hospital and blood transfusions  Adjust Lasix as needed    (R76.12) Reaction to QuantiFERON-TB test (QFT) without active tuberculosis  Likely spurious indeterminate result d/t  recent acute illness/inflammation  Can recheck in another 1-2 weeks or as outpatient  Discussed with Janae today and no known past risk factors for TB        Electronically signed by:  Susannah Corbett DO

## 2022-03-25 NOTE — LETTER
"    3/25/2022        RE: Amy Luna  [address]  [facility] [address]        [facility]  PHYSICIAN NOTE    PRIMARY CARE PROVIDER AND CLINIC:  She Huang MD, [address] / [facility] [address]    Chief Complaint   Patient presents with     Hospital F/U     [facility] Medical Record Number:  [id number]  Place of Service where encounter took place:  [facility] (TCU) [[id number]]    Amy Luna is a 76 year old (1945), admitted to the above facility from  [facility]. Hospital stay 3/14/22 through 3/19/22. Admitted to this facility for  rehab, medical management and nursing care.     HPI:    HPI information obtained from: facility chart records, facility staff, patient report and [facility] chart review.     Brief summary of hospital course: Amy Luna is a 76yoF with h/o HFpEF, pAfib, DM2 and recent subacute significant back pain that was admitted with on-going low back pain and found to have acute blood loss anemia with Hgb 4.3. Underwent IVF and blood transfusion resuscitation. She is anticoagulated with Coumadin for pAfib and aortic+mitral valve replacement and INR was found to be >10 upon admission as well s/p treatment with Kcentra and IV Vitamin K. She had reported some hematochezia but also dark stools. Had been taking NSAIDs for her back pain at home. Started on PPI and GI consulted. As Hgb stabilized and no more on-going bleeding noted, procedures deferred at this time as per GI \"with supratherapeutic INR anything within the GI tract could bleed\". Anticoagulation continued to be held and cardiology team consulted as well. Her PPM was interrogated and has recently been noted to be in NSR and will discuss long term follow up with her primary cardiologist Dr. Christina in the future. Also noted to have urinary retention needing intermittent straight cath and treatment for bacteruria with antibiotic therapy. " Finally work up for her subacute significant back pain with lumbar CT noted acute appearing L3 & L4 compression fracture deformities. Neurosurg team was consulted noting that their appearance was subtle enough to avoid surgery & bracing and conservative plan pursued with recommendation for pain control, therapy and suggested that she not lift anything greater than 5-10 pounds for six weeks and avoid excessive bending, twisting, and turning. Ultimately discharged to TCU for further care/rehab.    Updates on status since skilled nursing admission: Amy is seen in her room accompanied by her  Irineo and they have some good questions. Yesterday still really struggling with LBP but today made some progress with therapies and feeling somewhat more hopeful. Still has a long ways to go. Pain is low back and radiating around to R>L hips. Also constipated but did have BM early on in TCU stay after suppository. PVRs have been low per my discussion with RN. She reports significant urinary output but no dysuria. H/o lymphedema but has recently been better as outpatient. Hadn't been taking home Lasix recently d/t impaired mobility to bathroom in setting of her LBP. No more signs/sx of GI bleeding. Sugars high; willing to increase Metformin. Now on XR Metformin no diarrhea though had this as outpatient with IR Metformin. Discussed indeterminate quantiferon TB screening and she has no high risk factors for TB exposure in the past. Knows is off of anticoagulation and has cardiology f/u planned. Also asks re: f/u labs which are planned next week.     CODE STATUS/ADVANCE DIRECTIVES DISCUSSION:   CPR/Full code   Patient's living condition: lives with spouse    ALLERGIES: Penicillins and Pioglitazone    Past Medical History:   Diagnosis Date     (HFpEF) heart failure with preserved ejection fraction (H)      Aortic stenosis      Chest pain      Depressive disorder      Diabetes (H)      Hyperlipidemia      Hypertension       Mitral annular calcification      Mitral stenosis      Obesity      Sleep apnea     CPAP      Past Surgical History:   Procedure Laterality Date     APPENDECTOMY       CV CORONARY ANGIOGRAM N/A 2020    Procedure: Coronary Angiogram;  Surgeon: Inez Peoples MD;  Location:  HEART CARDIAC CATH LAB     CV LEFT HEART CATH N/A 2020    Procedure: Left Heart Cath;  Surgeon: Inez Peoples MD;  Location:  HEART CARDIAC CATH LAB     CV PFO CLOSURE N/A 4/15/2020    Procedure: Patent Foramen Ovale Closure;  Surgeon: Ok Wilson MD;  Location:  OR      RIGHT HEART CATH MEASUREMENTS RECORDED N/A 2020    Procedure: Right Heart Cath;  Surgeon: Inez Peoples MD;  Location:  HEART CARDIAC CATH LAB     EP PACEMAKER N/A 2020    Procedure: EP Pacemaker;  Surgeon: Sohan Francis MD;  Location:  HEART CARDIAC CATH LAB     GYN SURGERY       x2 ,      GYN SURGERY      total hysterectomy     IMPLANT PACEMAKER       REPAIR VALVE TRICUSPID N/A 4/15/2020    Procedure: REPAIR TRICUSPID VALVE WITH VILLA MC3 26MM.;  Surgeon: Ok [place] MD;  Location:  OR     REPLACE VALVE AORTIC N/A 4/15/2020    Procedure: REPLACEMENT, AORTIC VALVE WITH INSPIRIS TISSUE VALVE 25 MM; ON PUMP WITH SOCORRO READ BY CARDIOLOGIST DR BLANTON.;  Surgeon: Ok Wilson MD;  Location:  OR     REPLACE VALVE MITRAL N/A 4/15/2020    Procedure: REPLACEMENT, MITRAL VALVE WITH EPIC TISSUE VALVE 27 MM.;  Surgeon: Ok Wilson MD;  Location:  OR     Family History   Problem Relation Age of Onset     Diabetes Mother 56     Congenital heart disease Father 23     Colon Cancer No family hx of      Social History     Tobacco Use     Smoking status: Never Smoker     Smokeless tobacco: Never Used   Vaping Use     Vaping Use: Never used   Substance Use Topics     Alcohol use: No     Drug use: No        Post-discharge medication reconciliation status: Reviewed  and updated in [facility] according to facility MAR    Current Outpatient Medications   Medication Sig Dispense Refill     acetaminophen (TYLENOL) 500 MG tablet Take 2 tablets (1,000 mg) by mouth 3 times daily For 7 days then change to 1000 mg po tid prn       albuterol (PROAIR HFA/PROVENTIL HFA/VENTOLIN HFA) 108 (90 Base) MCG/ACT inhaler Inhale 2 puffs into the lungs every 4 hours as needed for shortness of breath / dyspnea or wheezing       atorvastatin (LIPITOR) 40 MG tablet Take 1 tablet (40 mg) by mouth At Bedtime 90 tablet 3     Biotin 27393 MCG TABS Take 10,000 mcg by mouth every morning        bisacodyl (DULCOLAX) 10 MG suppository Place 1 suppository (10 mg) rectally daily as needed for constipation       Calcium Carbonate-Vit D-Min (CALCIUM 1200 PO) Take 1 tablet by mouth 2 times daily        coenzyme Q-10 200 MG CAPS Take 200 mg by mouth every morning        Continuous Blood Gluc  (FREESTYLE ADAM READER) HARJEET 1 each every 14 days Use to read blood sugars per  instructions. 1 each 0     Continuous Blood Gluc Sensor (Med[employer] LiaisonSTYLE ADAM 14 DAY SENSOR) MISC 1 each every 14 days Change every 14 days. 2 each 6     cyanocobalamin (VITAMIN B-12) 100 MCG tablet Take 100 mcg by mouth daily       cyclobenzaprine (FLEXERIL) 10 MG tablet Take 1 tablet (10 mg) by mouth 3 times daily as needed for muscle spasms 30 tablet 0     ferrous gluconate (FERGON) 324 (38 Fe) MG tablet Take 1 tablet (324 mg) by mouth daily (with breakfast) 30 tablet 3     fluticasone (FLONASE) 50 MCG/ACT nasal spray Spray 1 spray into both nostrils daily 16 g 4     furosemide (LASIX) 40 MG tablet Take 0.5 tablets (20 mg) by mouth daily 180 tablet 1     Ginkgo Biloba (GINKOBA PO) Take 250 mg by mouth daily       insulin aspart (NOVOLOG PEN) 100 UNIT/ML pen Inject 1-10 Units Subcutaneous 3 times daily (before meals) For Pre-Meal  - 164 give 1 unit,   - 189 give 2 units,  - 214 give 3 unit,  - 239 give 4 units, BG  240 - 264 give 5 units,  - 289 give 6 units,  - 314 give 7 units  - 339 give 8 units,  - 364 give 9 units.BG greater than or equal to 365 give 10 units.   Notify provider if glucose greater than or equal to 350 mg/dL after administration of correction dose. If given at mealtime, administer within 30 minutes of start of meal. 15 mL      insulin aspart (NOVOLOG PEN) 100 UNIT/ML pen Inject 1-7 Units Subcutaneous At Bedtime HIGH INSULIN RESISTANCE DOSING    Do Not give Bedtime Correction Insulin if BG less than 200.   For  - 224 give 1 units.   For  - 249 give 2 units.   For  - 274 give 3 units.   For  - 299 give 4 units.   For  - 324 give 5 units.   For  - 349 give 6 units.   For BG greater than or equal to 350 give 7 units. 15 mL      insulin  UNIT/ML vial Inject 30 Units Subcutaneous 2 times daily Inject 30 units every morning and 30 unit(s) every evening 10 mL      ipratropium (ATROVENT) 0.06 % nasal spray Spray 2 sprays into both nostrils 4 times daily as needed for rhinitis 3 Box 1     Lutein 40 MG CAPS Take 40 mg by mouth every morning        Magnesium 400 MG TABS Take 400 mg by mouth every evening        magnesium hydroxide (MILK OF MAGNESIA) 400 MG/5ML suspension Take 15-30 mLs by mouth daily as needed for constipation       metFORMIN (GLUCOPHAGE-XR) 500 MG 24 hr tablet TAKE 2 TABLETS EVERY DAY WITH BREAKFAST 180 tablet 1     metoprolol succinate ER (TOPROL-XL) 25 MG 24 hr tablet Take 2 tablets (50 mg) by mouth daily 180 tablet 3     NONFORMULARY 1 packet 3 times daily as needed Relief factor for pain.  Natural product       oxyCODONE (ROXICODONE) 5 MG tablet Take 1 tablet (5 mg) by mouth every 4 hours as needed for moderate to severe pain 30 tablet 0     pantoprazole (PROTONIX) 40 MG EC tablet 40 mg bid x 2 weeks followed by daily       polyethylene glycol (MIRALAX) 17 GM/Dose powder Take 17 g by mouth daily Hold for diarrhea 510 g       "potassium 99 MG TABS Take 1 tablet by mouth daily       Propylene Glycol (SYSTANE BALANCE OP) Apply 1-2 drops to eye 3 times daily as needed (dry eyes)       senna-docusate (SENOKOT-S/PERICOLACE) 8.6-50 MG tablet Take 3 tablets by mouth nightly as needed for constipation       sertraline (ZOLOFT) 100 MG tablet Take 1.5 tablets (150 mg) by mouth daily 135 tablet 0     TURMERIC PO Take 3 tablets by mouth daily        vitamin (B COMPLEX-C) tablet Take 1 tablet by mouth daily       vitamin C (ASCORBIC ACID) 1000 MG TABS Take 1,000 mg by mouth every evening        Vitamin D3 (CHOLECALCIFEROL) 125 MCG (5000 UT) tablet Take 1 tablet by mouth daily       zinc 23 MG LOZG lozenge Place 1 lozenge inside cheek daily          ROS:  10 point ROS of systems including Constitutional, Eyes, Respiratory, Cardiovascular, Gastroenterology, Genitourinary, Integumentary, Musculoskeletal, Psychiatric were all negative except for pertinent positives noted in my HPI.    Exam:  /61   Pulse 81   Temp 98.2  F (36.8  C)   Resp 16   Ht 1.626 m (5' 4\")   Wt 111.9 kg (246 lb 12.8 oz)   SpO2 97%   BMI 42.36 kg/m    Alert, pleasant, NAD  No scleral icterus   Moist oral mucosa  Is pale appearing with skin warm and dry; non-diaphoretic  Heart tones regular, non-tachy 2/6 systolic murmur  Lungs clear, breathing non-labored  Abdomen obese, soft, NT, +BS  No lower extremity edema  Able to move all extremities and roll to the side  Normal speech, no tremor  Mood euthymic and motivated     Lab/Diagnostic data:  3/14/22: A1c 6.6%, Hgb 4.3  3/18/22: BMP normalized from initial mild CLARITA  3/19/22: Hgb 8.6 at discharge  3/23/22: Indeterminate quantiferon TB screen    CT lumbar spine 3/16/22:  IMPRESSION:    1. Subtle acute appearing fractures of the inferior right L4 endplate  and superior left L3 endplate as detailed above. Mild loss of L3 and  L4 vertebral body height. No significant posterior displacement of the  fractured vertebral bodies " "towards the spinal canal.  2. Presumed Schmorl's node deformity at the superior T12 endplate  although this is not completely visualized. Acute fracture is  difficult to exclude particularly given the apparent acute fractures  of L3 and L4.  3. Marked multilevel degenerative changes throughout the lumbar spine  as detailed above, most pronounced at L2-L3, L3-L4, and L4-L5.       ASSESSMENT/PLAN:  (D62) Acute blood loss anemia  (primary encounter diagnosis)  (K92.2) Gastrointestinal hemorrhage, unspecified gastrointestinal hemorrhage type  Acute blood loss anemia with Hgb in 4s on admission s/p IVF and blood transfusions as well as INR reversal  Is on oral iron therapy and Hgb in 8s at discharge  No current signs/sx of GI bleeding  Is on BID PPI x 2 weeks followed by planned daily dosing  Did not have invasive GI procedures in the hospital given stability and no longer showing signs of active bleeding  F/u BMP and CBC ordered for Monday 3/28/22    (S32.000D) Compression fracture of lumbar vertebra with routine healing, unspecified lumbar vertebral level, subsequent encounter  (R53.81) Physical deconditioning  This is her biggest barrier for discharge from TCU to home as is still having LBP radiating around to hips leading to weakness  Noted to have L3 & L4 compression fractures on imaging; neurosurg noted conservative therapy plan without surgery or bracing needed  Continue physical therapy and occupational therapy   She is aware that NSAIDs are not an option d/t her recent GI bleed  Is on scheduled Tylenol TID but had orders to reduce to PRN starting tomorrow from hospital but we agreed still a good plan to stay on scheduled TID dosing so this is continued  Is rarely using PRN Oxycodone as wants to try to avoid it d/t s/e profile  Wants me to discontinue PRN Flexeril as not using here d/t past use as an outpatient leading to feeling \"loopy\"  Imaging didn't note any other specific bony pathology but if pain " continues, consider eval of hip joints as well    (K59.00) Constipation, unspecified constipation type  She reports on-going constipation despite escalation of bowel regimen  Had BM early in TCU course after suppository  Would like Senna-S increased so this is done today and has scheduled Miralax as well as several PRNs  Discussed immobility and opiates contribute to constipation and she is limiting Oxycodone use    (R33.9) Urinary retention  Noted in the hospital with UTI (was asymptomatic) needing periodic straight cath  Staff monitoring PVRs (which have been low recently)  Antibiotics already completed    (E11.65,  Z79.4) Type 2 diabetes mellitus with hyperglycemia, with long-term current use of insulin (H)  (E66.01,  Z68.41) Obesity (H)  POC glucoses checked QID and range 100-300s without hypoglycemia  Is on NPH 70/30 insulin dosed at 30 units BID and also QID sliding scale insulin   Also on Metformin XR 1000 mg daily and we discussed and she agreed to increase Metformin today to 1500 mg XR daily; could eventually go up to 2000 mg XR daily in the next week or so if tolerating    (G47.33,  Z99.89) LAVERNE on CPAP  She has her CPAP on site    (I48.0) Paroxysmal atrial fibrillation (H)  (Z95.2) S/P aortic valve and mitral valve replacement  Now off of anticoagulation with Coumadin d/t recent severe acute blood loss anemia  Has an appointment with cardiologist Dr. Christina scheduled 4/8/22 to discuss future options    (I50.32) Chronic heart failure with preserved ejection fraction (H)  Had not been taking Lasix at home the past month d/t her acute back pain and thus limited mobility to the bathroom and reported no difficulty with recurrent swelling/CHF symptoms during this time  Restarted on Lasix this hospitalization and remains on 20 mg daily now and appears euvolemic and is also self-diuresing after IVF resuscitation in the hospital and blood transfusions  Adjust Lasix as needed    (R76.12) Reaction to QuantiFERON-TB  test (QFT) without active tuberculosis  Likely spurious indeterminate result d/t recent acute illness/inflammation  Can recheck in another 1-2 weeks or as outpatient  Discussed with Janae today and no known past risk factors for TB        Electronically signed by:  Susannah Corbett DO        Sincerely,        Susannah Corbett DO

## 2022-03-25 NOTE — Clinical Note
Labs ordered for Monday and I increased her Metformin. I realized now [employer] has the BID PPI going down to once daily on 4/6/22 but its a ONE TIME dose only on that date. Can you please adjust that so the daily PPI continues without end date after 4/6/22? Thanks!

## 2022-03-26 PROBLEM — E87.70 FLUID OVERLOAD: Status: RESOLVED | Noted: 2020-04-22 | Resolved: 2022-01-01

## 2022-03-26 PROBLEM — E11.9 DIABETES MELLITUS, TYPE 2 (H): Status: ACTIVE | Noted: 2022-01-01

## 2022-03-30 NOTE — LETTER
3/30/2022        RE: Amy Luna  [address]  [facility] [address]        M [facility] GERIATRIC SERVICES    Chief Complaint   Patient presents with     RECHECK       HPI:  Amy Luna is a 76 year old  (1945), who is being seen today for an episodic care visit at: [facility] ([facility]) [[id number]].     Brief summary: Amy Luna is a 75 yo female with a PMH of Afib, status post previous bioprosthetic aortic and mitral valve replacement, obstructive sleep apnea, chronic kidney disease, insulin-dependent type 2 diabetes, HFpEF and morbid obesity who was recently admitted to [facility] for evaluation of anemia thought to be secondary to GI bleed.    She presented for evaluation of increasing generalized weakness, dizziness and shortness of breath.  She was found to have a hemoglobin of 4.3.  This was in the setting of an INR of greater than 10 and recent NSAID use for back pain.  She was given 4 units of PRBCs throughout her hospital stay with improvement in the 8 range.  GI was consulted but given comorbidities EGD was not performed.  The strongly suspected upper GI bleed and recommended twice daily PPI.  Historically she is anticoagulated with warfarin given her history of atrial fibrillation and bioprosthetic valve replacements; however, cardiology was consulted who recommended discontinuation of warfarin and follow-up with her primary cardiologist.  Hospital course was additionally complicated by acute on chronic back pain.  She was found to have L3-L4 compression fractures.  Neurosurgery was consulted and recommended conservative management.  Additionally she was found to have a pansensitive Klebsiella UTI and was treated with a course of ceftriaxone and transition to Cipro on discharge.  She was noted to have some mild intermittent urinary retention that resolved prior to discharge.  She is discharged to [facility]    Today's concern is: Amy is seen for  a follow up visit. Had a care conference and notes things are going well. Pain related to compression fracture biggest barrier. Really just using tylenol and trying to avoid oxycodone as she is afraid of becoming addicted. Discussed risk/benefit of narcotics and agreed limiting as much as possible preferred. Discussed option of lower dose and she would like to try it so will reduce to 2.5 mg q 4 prn. BG improving with increased Metformin. Denies GI symptoms. Discussed K borderline high on recent labs and rec holding K supplement and she is in agreement. Notes only 3 BMs since admission. Feels Senna works better than Miralax so will increase Senna. Denies Melena. No SOB. Voiding well    Allergies, and PMH/PSH reviewed in [employer] today.    REVIEW OF SYSTEMS:  10 point ROS of systems including Constitutional, Eyes, Respiratory, Cardiovascular, Gastroenterology, Genitourinary, Integumentary, Musculoskeletal, Psychiatric were all negative except for pertinent positives noted in my HPI.    Objective:   /58   Pulse 80   Temp 97.8  F (36.6  C)   Resp 16   Wt 109.8 kg (242 lb)   SpO2 95%   BMI 41.54 kg/m      Physical Exam  Vitals and nursing note reviewed.   Constitutional:       General: She is not in acute distress.     Appearance: Normal appearance. She is obese. She is not toxic-appearing.   HENT:      Head: Normocephalic and atraumatic.   Eyes:      General: No scleral icterus.  Cardiovascular:      Rate and Rhythm: Normal rate and regular rhythm.      Heart sounds: No murmur heard.  Pulmonary:      Effort: Pulmonary effort is normal.      Breath sounds: Normal breath sounds. No wheezing.   Abdominal:      General: Abdomen is flat. There is no distension.      Palpations: Abdomen is soft.   Musculoskeletal:         General: Normal range of motion.      Right lower leg: No edema.      Left lower leg: No edema.   Skin:     General: Skin is warm and dry.      Findings: No rash.   Neurological:      General: No  focal deficit present.      Mental Status: She is alert.      Cranial Nerves: No cranial nerve deficit.   Psychiatric:         Mood and Affect: Mood normal.         Behavior: Behavior normal.          Recent labs in [facility] reviewed by me today.  and   Most Recent 3 CBC's:Recent Labs   Lab Test 03/28/22  0545 03/19/22  0746 03/18/22  0724 03/17/22  0815   WBC 7.9  --  8.7 8.3   HGB 9.0* 8.6* 8.5* 8.9*   MCV 93  --  92 91     --  214 248     Most Recent 3 BMP's:Recent Labs   Lab Test 03/28/22  0545 03/19/22  1137 03/19/22  0736 03/18/22  0819 03/18/22  0724 03/17/22  0858 03/17/22  0815     --   --   --  136  --  136   POTASSIUM 5.0  --   --   --  3.7  --  3.6   CHLORIDE 100  --   --   --  104  --  105   CO2 28  --   --   --  27  --  26   BUN 16  --   --   --  18  --  17   CR 0.91  --   --   --  0.87  --  0.83   ANIONGAP 11  --   --   --  5  --  5   NELLY 10.2  --   --   --  8.6  --  8.6   * 218* 205*   < > 226*   < > 258*    < > = values in this interval not displayed.     Most Recent 3 Hemoglobins:Recent Labs   Lab Test 03/28/22  0545 03/19/22  0746 03/18/22  0724   HGB 9.0* 8.6* 8.5*       Assessment/Plan:  (D62) Acute blood loss anemia  (primary encounter diagnosis)  (K92.2) Upper GI bleed: Presented for evaluation of generalized weakness and shortness of breath.  Found to have a hemoglobin of 4.3 in the setting of an INR greater than 10 and NSAID use.  Thought to be secondary to upper GI bleed no EGD not done due to comorbidities.  Received 4 units of PRBCs with stabilization of hemoglobin in the 8 range.  Warfarin discontinued per cardiology's recommendations.  - Continue Protonix 40 twice daily x14 days and transition to daily (4/6)  -Continue to monitor off anticoagulation  -Most recent Hgb stable at 9 3/28. Will repeat 4/4    (I48.0) Paroxysmal atrial fibrillation (H)  (Z79.01) Long term current use of anticoagulants with INR goal of 2.0-3.0  (Z95.2) S/P aortic valve and mitral valve  replacement  (R79.1) Supratherapeutic INR: Historically anticoagulated with warfarin due to history of bioprosthetic valve replacement and atrial fibrillation.  Has not had her INR checked in 2 months.  INR greater than 10 on admission and received vitamin K and Kcentra.  Cardiology recommended discontinuation of anticoagulation and follow-up with cardiology  -Continue rate control with PTA metoprolol 50 mg daily  -Off all anticoagulation  -Follow-up with cardiology as outpatient    (R33.9) Urinary retention, resolved  (N39.0) Urinary tract infection without hematuria, site unspecified: Found to have a pansensitive Klebsiella UTI.  Patient denies that she was ever symptomatic.  Treated with a course of ceftriaxone and transition to ciprofloxacin on discharge which she completed.  Did have some intermittent urinary retention that required straight catheterization.  -Monitor for signs and symptoms of infection  -Monitor PVRs 3 times daily x3 days.  Straight cath for PVR greater than 300 cc and notify provider. No issues    (S32.000D) Compression fracture of lumbar vertebra with routine healing, unspecified lumbar vertebral level, subsequent encounter: On admission to hospital endorsed 3 months of low back pain without injury.  Imaging revealed acute appearing fractures of the inferior right L4 endplate and superior left L3 endplate.  Neurosurgery was consulted.  They did not recommend surgical intervention or bracing.  -Continue pain control with scheduled Tylenol and as needed oxycodone. As per discussion above will reduce oxycodone to 2.5 mg q 4 prn  -No lifting greater than 5-10 pounds.  No bending or twisting  -PT/OT/social work    (I50.32) Chronic heart failure with preserved ejection fraction (H)  (I10) Primary hypertension: Most recent echo 4/2021 with preserved EF.  Historically on Lasix though not entirely compliant.  Denies orthopnea or lower extremity edema  -Continue PTA Lasix 20 mg daily  -Continue  metoprolol 50 mg twice daily  - Monitor daily weights, trending down with hospitalization    (E11.9,  Z79.4) Insulin dependent type 2 diabetes mellitus (H): A1c 6.6.  Historically maintained on NPH 30 units twice daily and sliding scale insulin as well as Metformin XR 1000 mg daily.  Discharged home carb counting from the hospital but not familiar with usage at home  -Continue NPH 30 units twice daily  -Continue sliding scale insulin with meals and at bedtime.  Discontinue carb counting  -Metformin increased by Dr. Corbett to 1500 xl daily on 3/25. BG overall improving (112-200). No GI symptoms. Could consider increase next week to 2000 daily next week if continues to tolerate    (K59.00) Constipation, unspecified constipation type: Notes only 3 BM since admission. Feels like Senna works better.    -Continue daily Miralax  - Increase Senna-S to 2 tablets bid    (N18.30) Stage 3 chronic kidney disease, unspecified whether stage 3a or 3b CKD (H):Baseline creatinine 1.  Creatinine currently at baseline.   - BMP  3/28 stable at 0.91    (E66.01,  Z68.41) Class 3 severe obesity with body mass index (BMI) of 40.0 to 44.9 in adult, unspecified obesity type, unspecified whether serious comorbidity present (H) : BMI 42.7.  Increases all cause mortality.  -Follow-up with PCP    (R76.12) Reaction to QTB: No risk factors  - Will repeat 4/4    Orders:  Reduce Oxycodone to 2.5 mg q 4 hrs prn  BMP and HGb 4/4/22  Repeat QTB 4/4  Increase Senna S to 2 tablets bid  D/c Potassium acetate     Electronically signed by: Phuong Banegas PA-C           Sincerely,        Phuong Banegas PA-C

## 2022-03-30 NOTE — PROGRESS NOTES
[facility] GERIATRIC SERVICES    Chief Complaint   Patient presents with     RECHECK       HPI:  Amy Luna is a 76 year old  (1945), who is being seen today for an episodic care visit at: [facility] ([facility]) [[id number]].     Brief summary: Amy Luna is a 75 yo female with a PMH of Afib, status post previous bioprosthetic aortic and mitral valve replacement, obstructive sleep apnea, chronic kidney disease, insulin-dependent type 2 diabetes, HFpEF and morbid obesity who was recently admitted to [facility] for evaluation of anemia thought to be secondary to GI bleed.    She presented for evaluation of increasing generalized weakness, dizziness and shortness of breath.  She was found to have a hemoglobin of 4.3.  This was in the setting of an INR of greater than 10 and recent NSAID use for back pain.  She was given 4 units of PRBCs throughout her hospital stay with improvement in the 8 range.  GI was consulted but given comorbidities EGD was not performed.  The strongly suspected upper GI bleed and recommended twice daily PPI.  Historically she is anticoagulated with warfarin given her history of atrial fibrillation and bioprosthetic valve replacements; however, cardiology was consulted who recommended discontinuation of warfarin and follow-up with her primary cardiologist.  Hospital course was additionally complicated by acute on chronic back pain.  She was found to have L3-L4 compression fractures.  Neurosurgery was consulted and recommended conservative management.  Additionally she was found to have a pansensitive Klebsiella UTI and was treated with a course of ceftriaxone and transition to Cipro on discharge.  She was noted to have some mild intermittent urinary retention that resolved prior to discharge.  She is discharged to [facility]    Today's concern is: Amy is seen for a follow up visit. Had a care conference and notes things are going well. Pain related  to compression fracture biggest barrier. Really just using tylenol and trying to avoid oxycodone as she is afraid of becoming addicted. Discussed risk/benefit of narcotics and agreed limiting as much as possible preferred. Discussed option of lower dose and she would like to try it so will reduce to 2.5 mg q 4 prn. BG improving with increased Metformin. Denies GI symptoms. Discussed K borderline high on recent labs and rec holding K supplement and she is in agreement. Notes only 3 BMs since admission. Feels Senna works better than Miralax so will increase Senna. Denies Melena. No SOB. Voiding well    Allergies, and PMH/PSH reviewed in [employer] today.    REVIEW OF SYSTEMS:  10 point ROS of systems including Constitutional, Eyes, Respiratory, Cardiovascular, Gastroenterology, Genitourinary, Integumentary, Musculoskeletal, Psychiatric were all negative except for pertinent positives noted in my HPI.    Objective:   /58   Pulse 80   Temp 97.8  F (36.6  C)   Resp 16   Wt 109.8 kg (242 lb)   SpO2 95%   BMI 41.54 kg/m      Physical Exam  Vitals and nursing note reviewed.   Constitutional:       General: She is not in acute distress.     Appearance: Normal appearance. She is obese. She is not toxic-appearing.   HENT:      Head: Normocephalic and atraumatic.   Eyes:      General: No scleral icterus.  Cardiovascular:      Rate and Rhythm: Normal rate and regular rhythm.      Heart sounds: No murmur heard.  Pulmonary:      Effort: Pulmonary effort is normal.      Breath sounds: Normal breath sounds. No wheezing.   Abdominal:      General: Abdomen is flat. There is no distension.      Palpations: Abdomen is soft.   Musculoskeletal:         General: Normal range of motion.      Right lower leg: No edema.      Left lower leg: No edema.   Skin:     General: Skin is warm and dry.      Findings: No rash.   Neurological:      General: No focal deficit present.      Mental Status: She is alert.      Cranial Nerves: No cranial  nerve deficit.   Psychiatric:         Mood and Affect: Mood normal.         Behavior: Behavior normal.          Recent labs in [facility] reviewed by me today.  and   Most Recent 3 CBC's:Recent Labs   Lab Test 03/28/22  0545 03/19/22  0746 03/18/22  0724 03/17/22  0815   WBC 7.9  --  8.7 8.3   HGB 9.0* 8.6* 8.5* 8.9*   MCV 93  --  92 91     --  214 248     Most Recent 3 BMP's:Recent Labs   Lab Test 03/28/22  0545 03/19/22  1137 03/19/22  0736 03/18/22  0819 03/18/22  0724 03/17/22  0858 03/17/22  0815     --   --   --  136  --  136   POTASSIUM 5.0  --   --   --  3.7  --  3.6   CHLORIDE 100  --   --   --  104  --  105   CO2 28  --   --   --  27  --  26   BUN 16  --   --   --  18  --  17   CR 0.91  --   --   --  0.87  --  0.83   ANIONGAP 11  --   --   --  5  --  5   NELLY 10.2  --   --   --  8.6  --  8.6   * 218* 205*   < > 226*   < > 258*    < > = values in this interval not displayed.     Most Recent 3 Hemoglobins:Recent Labs   Lab Test 03/28/22  0545 03/19/22  0746 03/18/22  0724   HGB 9.0* 8.6* 8.5*       Assessment/Plan:  (D62) Acute blood loss anemia  (primary encounter diagnosis)  (K92.2) Upper GI bleed: Presented for evaluation of generalized weakness and shortness of breath.  Found to have a hemoglobin of 4.3 in the setting of an INR greater than 10 and NSAID use.  Thought to be secondary to upper GI bleed no EGD not done due to comorbidities.  Received 4 units of PRBCs with stabilization of hemoglobin in the 8 range.  Warfarin discontinued per cardiology's recommendations.  - Continue Protonix 40 twice daily x14 days and transition to daily (4/6)  -Continue to monitor off anticoagulation  -Most recent Hgb stable at 9 3/28. Will repeat 4/4    (I48.0) Paroxysmal atrial fibrillation (H)  (Z79.01) Long term current use of anticoagulants with INR goal of 2.0-3.0  (Z95.2) S/P aortic valve and mitral valve replacement  (R79.1) Supratherapeutic INR: Historically anticoagulated with warfarin due to  history of bioprosthetic valve replacement and atrial fibrillation.  Has not had her INR checked in 2 months.  INR greater than 10 on admission and received vitamin K and Kcentra.  Cardiology recommended discontinuation of anticoagulation and follow-up with cardiology  -Continue rate control with PTA metoprolol 50 mg daily  -Off all anticoagulation  -Follow-up with cardiology as outpatient    (R33.9) Urinary retention, resolved  (N39.0) Urinary tract infection without hematuria, site unspecified: Found to have a pansensitive Klebsiella UTI.  Patient denies that she was ever symptomatic.  Treated with a course of ceftriaxone and transition to ciprofloxacin on discharge which she completed.  Did have some intermittent urinary retention that required straight catheterization.  -Monitor for signs and symptoms of infection  -Monitor PVRs 3 times daily x3 days.  Straight cath for PVR greater than 300 cc and notify provider. No issues    (S32.000D) Compression fracture of lumbar vertebra with routine healing, unspecified lumbar vertebral level, subsequent encounter: On admission to hospital endorsed 3 months of low back pain without injury.  Imaging revealed acute appearing fractures of the inferior right L4 endplate and superior left L3 endplate.  Neurosurgery was consulted.  They did not recommend surgical intervention or bracing.  -Continue pain control with scheduled Tylenol and as needed oxycodone. As per discussion above will reduce oxycodone to 2.5 mg q 4 prn  -No lifting greater than 5-10 pounds.  No bending or twisting  -PT/OT/social work    (I50.32) Chronic heart failure with preserved ejection fraction (H)  (I10) Primary hypertension: Most recent echo 4/2021 with preserved EF.  Historically on Lasix though not entirely compliant.  Denies orthopnea or lower extremity edema  -Continue PTA Lasix 20 mg daily  -Continue metoprolol 50 mg twice daily  - Monitor daily weights, trending down with  hospitalization    (E11.9,  Z79.4) Insulin dependent type 2 diabetes mellitus (H): A1c 6.6.  Historically maintained on NPH 30 units twice daily and sliding scale insulin as well as Metformin XR 1000 mg daily.  Discharged home carb counting from the hospital but not familiar with usage at home  -Continue NPH 30 units twice daily  -Continue sliding scale insulin with meals and at bedtime.  Discontinue carb counting  -Metformin increased by Dr. Corbett to 1500 xl daily on 3/25. BG overall improving (112-200). No GI symptoms. Could consider increase next week to 2000 daily next week if continues to tolerate    (K59.00) Constipation, unspecified constipation type: Notes only 3 BM since admission. Feels like Senna works better.    -Continue daily Miralax  - Increase Senna-S to 2 tablets bid    (N18.30) Stage 3 chronic kidney disease, unspecified whether stage 3a or 3b CKD (H):Baseline creatinine 1.  Creatinine currently at baseline.   - BMP  3/28 stable at 0.91    (E66.01,  Z68.41) Class 3 severe obesity with body mass index (BMI) of 40.0 to 44.9 in adult, unspecified obesity type, unspecified whether serious comorbidity present (H) : BMI 42.7.  Increases all cause mortality.  -Follow-up with PCP    (R76.12) Reaction to QTB: No risk factors  - Will repeat 4/4    Orders:  Reduce Oxycodone to 2.5 mg q 4 hrs prn  BMP and HGb 4/4/22  Repeat QTB 4/4  Increase Senna S to 2 tablets bid  D/c Potassium acetate     Electronically signed by: Phuong Banegas PA-C

## 2022-03-31 NOTE — Clinical Note
Procedure scheduled as Elective.   Pt presents for 1 week of suprapubic abd pain with nausea and diarrhea. H/o diverticulitis and c/s x 3. Was seen 3/27 and given cipro, flagyl, norco, and colace.

## 2022-04-04 PROBLEM — E11.69 TYPE 2 DIABETES MELLITUS WITH OTHER SPECIFIED COMPLICATION, WITH LONG-TERM CURRENT USE OF INSULIN (H): Status: ACTIVE | Noted: 2022-01-01

## 2022-04-04 PROBLEM — L29.9 ITCHING: Status: ACTIVE | Noted: 2022-01-01

## 2022-04-04 PROBLEM — I10 ESSENTIAL HYPERTENSION: Status: ACTIVE | Noted: 2022-01-01

## 2022-04-04 PROBLEM — K92.2 GASTROINTESTINAL HEMORRHAGE, UNSPECIFIED GASTROINTESTINAL HEMORRHAGE TYPE: Status: ACTIVE | Noted: 2022-01-01

## 2022-04-04 PROBLEM — S32.000D COMPRESSION FRACTURE OF LUMBAR VERTEBRA WITH ROUTINE HEALING, UNSPECIFIED LUMBAR VERTEBRAL LEVEL, SUBSEQUENT ENCOUNTER: Status: ACTIVE | Noted: 2022-01-01

## 2022-04-04 PROBLEM — F32.A DEPRESSION, UNSPECIFIED DEPRESSION TYPE: Status: ACTIVE | Noted: 2022-01-01

## 2022-04-04 PROBLEM — I50.32 CHRONIC HEART FAILURE WITH PRESERVED EJECTION FRACTION (H): Status: ACTIVE | Noted: 2022-01-01

## 2022-04-04 PROBLEM — L50.9 URTICARIA: Status: ACTIVE | Noted: 2022-01-01

## 2022-04-04 PROBLEM — Z79.4 TYPE 2 DIABETES MELLITUS WITH OTHER SPECIFIED COMPLICATION, WITH LONG-TERM CURRENT USE OF INSULIN (H): Status: ACTIVE | Noted: 2022-01-01

## 2022-04-04 PROBLEM — R53.81 DEBILITY: Status: ACTIVE | Noted: 2022-01-01

## 2022-04-04 NOTE — LETTER
"    4/4/2022        RE: Amy Luna  [address]  [facility] [address]        M [facility] GERIATRICS    Chief Complaint   Patient presents with     Nursing Home Acute     HPI:  Amy Luna is a 76 year old  (1945), who is being seen today for an episodic care visit at: [facility] ([facility]) [[id number]].     Called to Mrs. Luna's room today due to c/o hives and itching.  In meeting Mrs. Luna, she reports starting yesterday she had hives and significant itching, \"It's driving me crazy\".  Reports a h/o intermittent hives in the past, is requesting Prednisone for treatment.  Besides the hives and itching, she has no other complaints.      Allergies, and PMH/PSH reviewed in [facility] today.  REVIEW OF SYSTEMS:  A 10 point ROS done including, light headedness/dizziness, fever/chills, pain, Resp, CV, GI, and  and is negative other than noted in HPI.      Objective:   /59   Pulse 76   Temp 98.2  F (36.8  C)   Resp 17   Ht 1.626 m (5' 4\")   Wt 110.9 kg (244 lb 9.6 oz)   SpO2 99%   BMI 41.99 kg/m      GENERAL APPEARANCE:  Elderly female sitting on side of bed, NAD, non-toxic.  ENT:  Mouth and oropharynx normal, moist mucous membranes.   RESP:  Lungs CTA.  Regular relaxed breathing effort.  No cough.   CV: S1/S2 no murmur or rubs.  Regular rhythm and rate.  No generalized edema.   ABDOMEN:   Obese with large pannus but, soft, non-tender with active bowel sounds.  No guarding, rigidity, or rebound tenderness.  EXTREMITIES:  No lower extremity edema, no calf tenderness.   PSYCH: Alert and orientated, pleasant and cooperative.   SKIN: Faint patches of redness (3) on Left forearm, faint patches of redness 2-3 on Left buttocks.  No overt hives/rash.     LABS:  I have personally reviewed labs, which are in facility or EMR chart.     Assessment/Plan:  (L50.9) Urticaria  (primary encounter diagnosis)  (L29.9) Itching  Mrs. Luna reports she has a intermittent h/o hives and usually " get's Prednisone in the past.  Reports she has developed these hives/itching starting yesterday.  On exam, I do see a few faint flat red/pink patches on her Left arm and Left buttocks, but it's very faint and not very impressive.  No overt rash/hives.  She is on a good number of medications, but only PPI/Fe are new, does not appear to be drug rash.  VSS.  Review of EMR did show hives treatment back in 2019?  She does admit it's been several years since she had them.  Not very impressive on exam, but will treat noting some evidence of past issues.  Did review risks/benefits of Prednisone in setting of DM II, she is adamant about taking Prednisone.    -Prednisone 20 mg's BID x 5 days.   -Loratadine 10 mg's q daily x 10 days.     (K92.2) Gastrointestinal hemorrhage, unspecified gastrointestinal hemorrhage type  (D62) Acute blood loss anemia  Significant GIB with Hgb down to 4.3 with INR of >10 and use of Aleve.  EGD not done due to risk>benefit.   Hgb stable and 8.8 today.   -Continues on Protonix 40 mg's BID, switches to daily on 4/6.   -Continues Fe+  --Warfarin dc'd at this time.     (I48.0) Paroxysmal atrial fibrillation (H)  (I10) Essential hypertension  (I50.32) Chronic heart failure with preserved ejection fraction (H)  Review of VS's show VSS and within acceptable range.   No current s/s of clinical CHF.   -No changes, continue to clinically monitor.   -As noted above, not on Warfarin or ASA at this time due to life threatening GIB.    --Recheck lab's 4/11.     (E11.69,  Z79.4) Type 2 diabetes mellitus with other specified complication, with long-term current use of insulin (H)  A1c 6.6 in March, indicating possible over treatment.   -Continues BID NPH 30/30 units.   -Continues Metformin which was recently increased to 1500 mg's qAM.   -Continues TID and at bedtime s/s Aspart.     (S32.000D) Compression fracture of lumbar vertebra with routine healing, unspecified lumbar vertebral level, subsequent  encounter  Seen by NeuroSurg in hospital, they noted no acute changes, conservative measures.   -Continues on TID Acetaminophen.   -Continues on PRN Oxycodone, reduced down to 2.5 mg's lately.     (F32.A) Depression, unspecified depression type  -Continues Sertraline.     (R53.81) Debility  -Continues PT/OT.     Orders:  -As noted above.     Electronically signed by: SARBJIT Zamorano CNP             Sincerely,        SARBJIT Zamorano CNP

## 2022-04-04 NOTE — PROGRESS NOTES
"[facility] GERIATRICS    Chief Complaint   Patient presents with     Nursing Home Acute     HPI:  Amy Luna is a 76 year old  (1945), who is being seen today for an episodic care visit at: [facility] ([facility]) [[id number]].     Called to Mrs. Luna's room today due to c/o hives and itching.  In meeting Mrs. Luna, she reports starting yesterday she had hives and significant itching, \"It's driving me crazy\".  Reports a h/o intermittent hives in the past, is requesting Prednisone for treatment.  Besides the hives and itching, she has no other complaints.      Allergies, and PMH/PSH reviewed in [facility] today.  REVIEW OF SYSTEMS:  A 10 point ROS done including, light headedness/dizziness, fever/chills, pain, Resp, CV, GI, and  and is negative other than noted in HPI.      Objective:   /59   Pulse 76   Temp 98.2  F (36.8  C)   Resp 17   Ht 1.626 m (5' 4\")   Wt 110.9 kg (244 lb 9.6 oz)   SpO2 99%   BMI 41.99 kg/m      GENERAL APPEARANCE:  Elderly female sitting on side of bed, NAD, non-toxic.  ENT:  Mouth and oropharynx normal, moist mucous membranes.   RESP:  Lungs CTA.  Regular relaxed breathing effort.  No cough.   CV: S1/S2 no murmur or rubs.  Regular rhythm and rate.  No generalized edema.   ABDOMEN:   Obese with large pannus but, soft, non-tender with active bowel sounds.  No guarding, rigidity, or rebound tenderness.  EXTREMITIES:  No lower extremity edema, no calf tenderness.   PSYCH: Alert and orientated, pleasant and cooperative.   SKIN: Faint patches of redness (3) on Left forearm, faint patches of redness 2-3 on Left buttocks.  No overt hives/rash.     LABS:  I have personally reviewed labs, which are in facility or EMR chart.     Assessment/Plan:  (L50.9) Urticaria  (primary encounter diagnosis)  (L29.9) Itching  Mrs. Luna reports she has a intermittent h/o hives and usually get's Prednisone in the past.  Reports she has developed these hives/itching starting " yesterday.  On exam, I do see a few faint flat red/pink patches on her Left arm and Left buttocks, but it's very faint and not very impressive.  No overt rash/hives.  She is on a good number of medications, but only PPI/Fe are new, does not appear to be drug rash.  VSS.  Review of EMR did show hives treatment back in 2019?  She does admit it's been several years since she had them.  Not very impressive on exam, but will treat noting some evidence of past issues.  Did review risks/benefits of Prednisone in setting of DM II, she is adamant about taking Prednisone.    -Prednisone 20 mg's BID x 5 days.   -Loratadine 10 mg's q daily x 10 days.     (K92.2) Gastrointestinal hemorrhage, unspecified gastrointestinal hemorrhage type  (D62) Acute blood loss anemia  Significant GIB with Hgb down to 4.3 with INR of >10 and use of Aleve.  EGD not done due to risk>benefit.   Hgb stable and 8.8 today.   -Continues on Protonix 40 mg's BID, switches to daily on 4/6.   -Continues Fe+  --Warfarin dc'd at this time.     (I48.0) Paroxysmal atrial fibrillation (H)  (I10) Essential hypertension  (I50.32) Chronic heart failure with preserved ejection fraction (H)  Review of VS's show VSS and within acceptable range.   No current s/s of clinical CHF.   -No changes, continue to clinically monitor.   -As noted above, not on Warfarin or ASA at this time due to life threatening GIB.    --Recheck lab's 4/11.     (E11.69,  Z79.4) Type 2 diabetes mellitus with other specified complication, with long-term current use of insulin (H)  A1c 6.6 in March, indicating possible over treatment.   -Continues BID NPH 30/30 units.   -Continues Metformin which was recently increased to 1500 mg's qAM.   -Continues TID and at bedtime s/s Aspart.     (S32.000D) Compression fracture of lumbar vertebra with routine healing, unspecified lumbar vertebral level, subsequent encounter  Seen by NeuroSurg in hospital, they noted no acute changes, conservative measures.    -Continues on TID Acetaminophen.   -Continues on PRN Oxycodone, reduced down to 2.5 mg's lately.     (F32.A) Depression, unspecified depression type  -Continues Sertraline.     (R53.81) Debility  -Continues PT/OT.     Orders:  -As noted above.     Electronically signed by: SARBJIT Zamorano CNP

## 2022-04-06 NOTE — PROGRESS NOTES
[facility] GERIATRIC SERVICES    Chief Complaint   Patient presents with     RECHECK       HPI:  Amy Luna is a 76 year old  (1945), who is being seen today for an episodic care visit at: [facility] [[id number]].     Brief summary: Amy Luna is a 75 yo female with a PMH of Afib, status post previous bioprosthetic aortic and mitral valve replacement, obstructive sleep apnea, chronic kidney disease, insulin-dependent type 2 diabetes, HFpEF and morbid obesity who was recently admitted to [facility] for evaluation of anemia thought to be secondary to GI bleed.    She presented for evaluation of increasing generalized weakness, dizziness and shortness of breath.  She was found to have a hemoglobin of 4.3.  This was in the setting of an INR of greater than 10 and recent NSAID use for back pain.  She was given 4 units of PRBCs throughout her hospital stay with improvement in the 8 range.  GI was consulted but given comorbidities EGD was not performed.  The strongly suspected upper GI bleed and recommended twice daily PPI.  Historically she is anticoagulated with warfarin given her history of atrial fibrillation and bioprosthetic valve replacements; however, cardiology was consulted who recommended discontinuation of warfarin and follow-up with her primary cardiologist.  Hospital course was additionally complicated by acute on chronic back pain.  She was found to have L3-L4 compression fractures.  Neurosurgery was consulted and recommended conservative management.  Additionally she was found to have a pansensitive Klebsiella UTI and was treated with a course of ceftriaxone and transition to Cipro on discharge.  She was noted to have some mild intermittent urinary retention that resolved prior to discharge.  [facility]    Today's concern is: Amy is seen today to eval elevated BG. She was started on prednisone burst 2 days ago due reports of  "hives and itching and her BG have been in 200-300 since. She notes that itching has overall improved. Understands prednisone can cause transient elevation in BG and agrees with increase in NPH. Overall therapy is going good. She is up in the chair this am which historically I have not seen and states she is working on stairs which is the limiting factor in discharge. Her repeat QTB was still indeterminate and CXR was ordered by oncall last evening.    Allergies, and PMH/PSH reviewed in [facility] today.    REVIEW OF SYSTEMS:  4 point ROS including Respiratory, CV, GI and , other than that noted in the HPI,  is negative    Objective:   /53   Pulse 68   Temp 97.8  F (36.6  C)   Resp 18   Ht 1.626 m (5' 4\")   Wt 110.9 kg (244 lb 9.6 oz)   SpO2 96%   BMI 41.99 kg/m      Physical Exam  Vitals and nursing note reviewed.   Constitutional:       General: She is not in acute distress.     Appearance: Normal appearance. She is obese. She is not toxic-appearing.   HENT:      Head: Normocephalic and atraumatic.   Eyes:      General: No scleral icterus.  Cardiovascular:      Rate and Rhythm: Normal rate and regular rhythm.      Heart sounds: No murmur heard.  Pulmonary:      Effort: Pulmonary effort is normal.      Breath sounds: Normal breath sounds. No wheezing.   Musculoskeletal:         General: Normal range of motion.      Right lower leg: No edema.      Left lower leg: No edema.   Skin:     General: Skin is warm and dry.      Findings: No rash.   Neurological:      General: No focal deficit present.      Mental Status: She is alert.      Cranial Nerves: No cranial nerve deficit.   Psychiatric:         Mood and Affect: Mood normal.         Behavior: Behavior normal.          Recent labs in [facility] reviewed by me today.  and   Most Recent 3 CBC's:Recent Labs   Lab Test 04/04/22  0555 03/28/22  0545 03/19/22  0746 03/18/22  0724 03/17/22  0815   WBC  --  7.9  --  8.7 8.3   HGB 8.8* 9.0* 8.6* 8.5* 8.9*   MCV  --  93  --  " 92 91   PLT  --  393  --  214 248     Most Recent 3 BMP's:Recent Labs   Lab Test 04/04/22  0555 03/28/22  0545 03/19/22  1137 03/18/22  0819 03/18/22  0724    139  --   --  136   POTASSIUM 4.3 5.0  --   --  3.7   CHLORIDE 100 100  --   --  104   CO2 25 28  --   --  27   BUN 24 16  --   --  18   CR 0.91 0.91  --   --  0.87   ANIONGAP 12 11  --   --  5   NELLY 10.0 10.2  --   --  8.6   * 186* 218*   < > 226*    < > = values in this interval not displayed.       Assessment/Plan:  (R73.9) Steroid induced Hyperglycemia  (E11.9,  Z79.4) Insulin dependent type 2 diabetes mellitus (H): A1c 6.6.  Historically maintained on NPH 30 units twice daily and sliding scale insulin as well as Metformin XR 1000 mg daily.  Metformin increased to 1500 mg daily on admission to TCU. Started on prednisone 4/4 for hives and itching and now with -300  -Increase NPH to 32 units bid, can likely reduce back to 30 units bid after steroid burst  -Continue sliding scale insulin with meals and at bedtime.    -Continue Metformin XR 1500 mg daily    (L50.9) Urticaria, improving  (L29.9) Itching: Developed 4/4. Requested rx with prednisone which has helped previously which was started with loratidine 4/4. Overall improving  - Complete prednisone burst  - Continue loratadine.     (D62) Acute blood loss anemia    (K92.2) Upper GI bleed: Presented for evaluation of generalized weakness and shortness of breath.  Found to have a hemoglobin of 4.3 in the setting of an INR greater than 10 and NSAID use.  Thought to be secondary to upper GI bleed no EGD not done due to comorbidities.  Received 4 units of PRBCs with stabilization of hemoglobin in the 8 range.  Warfarin discontinued per cardiology's recommendations.  - Continue Protonix 40 twice daily x14 days and transition to daily (4/6)  -Continue to monitor off anticoagulation  -Most recent Hgb 4/4 stable at 8.8    (I48.0) Paroxysmal atrial fibrillation (H)  (Z79.01) Long term current use  of anticoagulants with INR goal of 2.0-3.0  (Z95.2) S/P aortic valve and mitral valve replacement  (R79.1) Supratherapeutic INR: Historically anticoagulated with warfarin due to history of bioprosthetic valve replacement and atrial fibrillation.  Has not had her INR checked in 2 months.  INR greater than 10 on admission and received vitamin K and Kcentra.  Cardiology recommended discontinuation of anticoagulation and follow-up with cardiology  -Continue rate control with PTA metoprolol 50 mg daily  -Off all anticoagulation  -Follow-up with cardiology as outpatient    (R33.9) Urinary retention, resolved  (N39.0) Urinary tract infection without hematuria, site unspecified: Found to have a pansensitive Klebsiella UTI.  Patient denies that she was ever symptomatic.  Treated with a course of ceftriaxone and transition to ciprofloxacin on discharge which she completed.  Did have some intermittent urinary retention that required straight catheterization.  -No further issues with retention    (S32.000D) Compression fracture of lumbar vertebra with routine healing, unspecified lumbar vertebral level, subsequent encounter: On admission to hospital endorsed 3 months of low back pain without injury.  Imaging revealed acute appearing fractures of the inferior right L4 endplate and superior left L3 endplate.  Neurosurgery was consulted.  They did not recommend surgical intervention or bracing.  -Continue pain control with scheduled Tylenol and as needed oxycodone. Using minimal oxycodone  -No lifting greater than 5-10 pounds.  No bending or twisting  -PT/OT/social work    (I50.32) Chronic heart failure with preserved ejection fraction (H)  (I10) Primary hypertension: Most recent echo 4/2021 with preserved EF.  Historically on Lasix though not entirely compliant.  Denies orthopnea or lower extremity edema  -Continue PTA Lasix 20 mg daily  -Continue metoprolol 50 mg twice daily  - Monitor daily weights      (K59.00) Constipation,  unspecified constipation type:    -Continue daily Miralax  - Continue Senna-S to 2 tablets bid    (N18.30) Stage 3 chronic kidney disease, unspecified whether stage 3a or 3b CKD (H):Baseline creatinine 1.  Creatinine currently at baseline.   - BMP  4/4 stable at 0.9    (E66.01,  Z68.41) Class 3 severe obesity with body mass index (BMI) of 40.0 to 44.9 in adult, unspecified obesity type, unspecified whether serious comorbidity present (H) : BMI 42.7.  Increases all cause mortality.  -Follow-up with PCP    (R76.12) Reaction to QTB: No risk factors. Repeat 4/4 remained indeterminate. CXR ordered per protocol  - Follow up CXR    Orders:  Increase NPH to 32 units bid    Electronically signed by: Phuong Banegas PA-C

## 2022-04-06 NOTE — LETTER
4/6/2022        RE: Amy Luna  [address]  [place] [address]        [facility] GERIATRIC SERVICES    Chief Complaint   Patient presents with     RECHECK       HPI:  Amy Luna is a 76 year old  (1945), who is being seen today for an episodic care visit at: [facility] ([facility]) [[id number]].     Brief summary: Amy Luna is a 75 yo female with a PMH of Afib, status post previous bioprosthetic aortic and mitral valve replacement, obstructive sleep apnea, chronic kidney disease, insulin-dependent type 2 diabetes, HFpEF and morbid obesity who was recently admitted to [facility] for evaluation of anemia thought to be secondary to GI bleed.    She presented for evaluation of increasing generalized weakness, dizziness and shortness of breath.  She was found to have a hemoglobin of 4.3.  This was in the setting of an INR of greater than 10 and recent NSAID use for back pain.  She was given 4 units of PRBCs throughout her hospital stay with improvement in the 8 range.  GI was consulted but given comorbidities EGD was not performed.  The strongly suspected upper GI bleed and recommended twice daily PPI.  Historically she is anticoagulated with warfarin given her history of atrial fibrillation and bioprosthetic valve replacements; however, cardiology was consulted who recommended discontinuation of warfarin and follow-up with her primary cardiologist.  Hospital course was additionally complicated by acute on chronic back pain.  She was found to have L3-L4 compression fractures.  Neurosurgery was consulted and recommended conservative management.  Additionally she was found to have a pansensitive Klebsiella UTI and was treated with a course of ceftriaxone and transition to Cipro on discharge.  She was noted to have some mild intermittent urinary retention that resolved prior to discharge.  She is discharged to [facility]    Today's concern is: Amy is seen  "today to eval elevated BG. She was started on prednisone burst 2 days ago due reports of hives and itching and her BG have been in 200-300 since. She notes that itching has overall improved. Understands prednisone can cause transient elevation in BG and agrees with increase in NPH. Overall therapy is going good. She is up in the chair this am which historically I have not seen and states she is working on stairs which is the limiting factor in discharge. Her repeat QTB was still indeterminate and CXR was ordered by karel last evening.    Allergies, and PMH/PSH reviewed in [facility] today.    REVIEW OF SYSTEMS:  4 point ROS including Respiratory, CV, GI and , other than that noted in the HPI,  is negative    Objective:   /53   Pulse 68   Temp 97.8  F (36.6  C)   Resp 18   Ht 1.626 m (5' 4\")   Wt 110.9 kg (244 lb 9.6 oz)   SpO2 96%   BMI 41.99 kg/m      Physical Exam  Vitals and nursing note reviewed.   Constitutional:       General: She is not in acute distress.     Appearance: Normal appearance. She is obese. She is not toxic-appearing.   HENT:      Head: Normocephalic and atraumatic.   Eyes:      General: No scleral icterus.  Cardiovascular:      Rate and Rhythm: Normal rate and regular rhythm.      Heart sounds: No murmur heard.  Pulmonary:      Effort: Pulmonary effort is normal.      Breath sounds: Normal breath sounds. No wheezing.   Musculoskeletal:         General: Normal range of motion.      Right lower leg: No edema.      Left lower leg: No edema.   Skin:     General: Skin is warm and dry.      Findings: No rash.   Neurological:      General: No focal deficit present.      Mental Status: She is alert.      Cranial Nerves: No cranial nerve deficit.   Psychiatric:         Mood and Affect: Mood normal.         Behavior: Behavior normal.          Recent labs in [facility] reviewed by me today.  and   Most Recent 3 CBC's:Recent Labs   Lab Test 04/04/22  0555 03/28/22  0545 03/19/22  0746 03/18/22  0724 " 03/17/22  0815   WBC  --  7.9  --  8.7 8.3   HGB 8.8* 9.0* 8.6* 8.5* 8.9*   MCV  --  93  --  92 91   PLT  --  393  --  214 248     Most Recent 3 BMP's:Recent Labs   Lab Test 04/04/22  0555 03/28/22  0545 03/19/22  1137 03/18/22  0819 03/18/22  0724    139  --   --  136   POTASSIUM 4.3 5.0  --   --  3.7   CHLORIDE 100 100  --   --  104   CO2 25 28  --   --  27   BUN 24 16  --   --  18   CR 0.91 0.91  --   --  0.87   ANIONGAP 12 11  --   --  5   NELLY 10.0 10.2  --   --  8.6   * 186* 218*   < > 226*    < > = values in this interval not displayed.       Assessment/Plan:  (R73.9) Steroid induced Hyperglycemia  (E11.9,  Z79.4) Insulin dependent type 2 diabetes mellitus (H): A1c 6.6.  Historically maintained on NPH 30 units twice daily and sliding scale insulin as well as Metformin XR 1000 mg daily.  Metformin increased to 1500 mg daily on admission to TCU. Started on prednisone 4/4 for hives and itching and now with -300  -Increase NPH to 32 units bid, can likely reduce back to 30 units bid after steroid burst  -Continue sliding scale insulin with meals and at bedtime.    -Continue Metformin XR 1500 mg daily    (L50.9) Urticaria, improving  (L29.9) Itching: Developed 4/4. Requested rx with prednisone which has helped previously which was started with loratidine 4/4. Overall improving  - Complete prednisone burst  - Continue loratadine.     (D62) Acute blood loss anemia    (K92.2) Upper GI bleed: Presented for evaluation of generalized weakness and shortness of breath.  Found to have a hemoglobin of 4.3 in the setting of an INR greater than 10 and NSAID use.  Thought to be secondary to upper GI bleed no EGD not done due to comorbidities.  Received 4 units of PRBCs with stabilization of hemoglobin in the 8 range.  Warfarin discontinued per cardiology's recommendations.  - Continue Protonix 40 twice daily x14 days and transition to daily (4/6)  -Continue to monitor off anticoagulation  -Most recent Hgb 4/4  stable at 8.8    (I48.0) Paroxysmal atrial fibrillation (H)  (Z79.01) Long term current use of anticoagulants with INR goal of 2.0-3.0  (Z95.2) S/P aortic valve and mitral valve replacement  (R79.1) Supratherapeutic INR: Historically anticoagulated with warfarin due to history of bioprosthetic valve replacement and atrial fibrillation.  Has not had her INR checked in 2 months.  INR greater than 10 on admission and received vitamin K and Kcentra.  Cardiology recommended discontinuation of anticoagulation and follow-up with cardiology  -Continue rate control with PTA metoprolol 50 mg daily  -Off all anticoagulation  -Follow-up with cardiology as outpatient    (R33.9) Urinary retention, resolved  (N39.0) Urinary tract infection without hematuria, site unspecified: Found to have a pansensitive Klebsiella UTI.  Patient denies that she was ever symptomatic.  Treated with a course of ceftriaxone and transition to ciprofloxacin on discharge which she completed.  Did have some intermittent urinary retention that required straight catheterization.  -No further issues with retention    (S32.000D) Compression fracture of lumbar vertebra with routine healing, unspecified lumbar vertebral level, subsequent encounter: On admission to hospital endorsed 3 months of low back pain without injury.  Imaging revealed acute appearing fractures of the inferior right L4 endplate and superior left L3 endplate.  Neurosurgery was consulted.  They did not recommend surgical intervention or bracing.  -Continue pain control with scheduled Tylenol and as needed oxycodone. Using minimal oxycodone  -No lifting greater than 5-10 pounds.  No bending or twisting  -PT/OT/social work    (I50.32) Chronic heart failure with preserved ejection fraction (H)  (I10) Primary hypertension: Most recent echo 4/2021 with preserved EF.  Historically on Lasix though not entirely compliant.  Denies orthopnea or lower extremity edema  -Continue PTA Lasix 20 mg  daily  -Continue metoprolol 50 mg twice daily  - Monitor daily weights      (K59.00) Constipation, unspecified constipation type:    -Continue daily Miralax  - Continue Senna-S to 2 tablets bid    (N18.30) Stage 3 chronic kidney disease, unspecified whether stage 3a or 3b CKD (H):Baseline creatinine 1.  Creatinine currently at baseline.   - BMP  4/4 stable at 0.9    (E66.01,  Z68.41) Class 3 severe obesity with body mass index (BMI) of 40.0 to 44.9 in adult, unspecified obesity type, unspecified whether serious comorbidity present (H) : BMI 42.7.  Increases all cause mortality.  -Follow-up with PCP    (R76.12) Reaction to QTB: No risk factors. Repeat 4/4 remained indeterminate. CXR ordered per protocol  - Follow up CXR    Orders:  Increase NPH to 32 units bid    Electronically signed by: Phuong Banegas PA-C           Sincerely,        Phuong Banegas PA-C

## 2022-04-11 PROBLEM — T38.0X5A STEROID-INDUCED HYPERGLYCEMIA: Status: ACTIVE | Noted: 2022-01-01

## 2022-04-11 PROBLEM — R73.9 STEROID-INDUCED HYPERGLYCEMIA: Status: ACTIVE | Noted: 2022-01-01

## 2022-04-11 PROBLEM — N39.0 URINARY TRACT INFECTION WITHOUT HEMATURIA, SITE UNSPECIFIED: Status: ACTIVE | Noted: 2022-01-01

## 2022-04-11 PROBLEM — R33.9 URINARY RETENTION: Status: ACTIVE | Noted: 2022-01-01

## 2022-04-11 PROBLEM — G89.29 OTHER CHRONIC PAIN: Status: ACTIVE | Noted: 2022-01-01

## 2022-04-11 NOTE — PROGRESS NOTES
[facility] GERIATRICS DISCHARGE SUMMARY  PATIENT'S NAME: Amy Luna  YOB: 1945  MEDICAL RECORD NUMBER:  [id number]  Place of Service where encounter took place:  [facility] [[id number]]       PRIMARY CARE PROVIDER AND CLINIC RESPONSIBLE AFTER TRANSFER:   [facility] Huang MD[facility] [address] [facility] [id number]      Transferring providers: SARBJIT Zamorano CNP, Susannah Corbett MD  Recent Hospitalization/ED:  [facility] stay 3/14/22 to 3/19/22.  Date of SNF Admission: March 19, 2022  Date of SNF (anticipated) Discharge: April 13, 2022     Discharged to: previous independent home  Cognitive Scores: SLUMS: 26/30 and CPT: 5/5.6  Physical Function: Ambulating 150-165 ft with front wheel walker and supervision.   DME: No DME needed    CODE STATUS/ADVANCE DIRECTIVES DISCUSSION: Full Code  ALLERGIES: Penicillins and Pioglitazone    NURSING FACILITY COURSE   Summary:   Amy Luna is a 75 yo female with a PMH of Afib, status post previous bioprosthetic aortic and mitral valve replacement, obstructive sleep apnea, chronic kidney disease, insulin-dependent type 2 diabetes, HFpEF and morbid obesity who was recently admitted to [facility] for evaluation of anemia thought to be secondary to GI bleed.     She presented for evaluation of increasing generalized weakness, dizziness and shortness of breath.  Was found to have a hemoglobin of 4.3.  This was in the setting of an INR of greater than 10 and recent excessive Aleve/NSAID use for back pain.  She was given 4 units of PRBCs throughout her hospital stay with Hgb improvement in the 8 range.  GI was consulted but given comorbidities EGD was not performed due to risks>benefits and the strongly suspected upper GI bleed, they recommended twice daily PPI.  Historically she is anticoagulated with Warfarin given her history of atrial fibrillation and bioprosthetic  valve replacements; however, Cardiology was consulted who recommended discontinuation of Warfarin and follow-up with her primary Cardiologist.    Hospital course was additionally complicated by acute on chronic back pain.  She was found to have L3-L4 compression fractures. Neurosurgery was consulted and recommended conservative management.  Additionally she was found to have a pansensitive Klebsiella UTI and was treated with a course of Ceftriaxone and transition to Ciprofloxacin on discharge.  Transitioned to the TCU for ongoing cares.     Summary of nursing facility stay:   While at the TCU Mrs. Luna slowly improved.  She had a degree of urine retention while in hospital with the UTI, that has resolved.  Hgb continues to be trending up, no more bleeding.  Remains off Warfarin and NSAIDS, is on Iron.  PPI is down to daily now.  At one point she had mild flat itchy urticaria and per her report and old EMR notes, she get's this occasionally and took Prednisone in the past.  She was on a 5 day course of Prednisone and 10 day course of Loratadine for Urticaria, with resolution of symptoms/rash, but did have some higher glucoses during that time.  Otherwise Mrs. Luna continues to do well and will now discharge home with home care.     In meeting Mrs. Luna today for discharge, she continues to endorse some back pain, but tolerable, is up and ambulating.  Reports the rash/itching as gone.  She endorses a chronic sinus post nasal congestion/drip, but unchanged.  No other complaints, reports being ready for home.     (K92.2) Upper GI bleed, presumed  (primary encounter diagnosis)  (D62) Acute blood loss anemia  (R79.1) Supratherapeutic INR  GI bleed with Hgb down to 4.3 on admission to hospital, in setting of significant use of Aleve/NSAIDS due to her back pain and an INR >10.  S/p Transfusions, Warfarin stopped, Aleve stopped.  Is on Iron supplement and was on BID PPI, which is now down to daily.  EGD not done due to  risks>benefits at the time per GI. Lately Hgb stable, no further s/s of bleeding.   -Hgb was 9.4 today 4/11 and trending up.   -Continues Protonix daily.   -Continues Iron supplement.     (N39.0) Urinary tract infection without hematuria, site unspecified  (R33.9) Urinary retention  Had a UTI and retention in hospital, finished antibiotics and both considered resolved.   -Continue to clinically monitor.     (Z95.2) S/P aortic valve and mitral valve replacement  (I48.0) Paroxysmal atrial fibrillation (H)  (I50.32) Chronic heart failure with preserved ejection fraction (H)  Review of VS's show VSS and within acceptable range.   No current s/s of clinical CHF.   -Continues on Furosemide and Magnesium.   -Is NOT on KCl.   -Continues Toprol.   -Continues Statin and CoQ10.   --Remains off Warfarin at this time, is NOT on ASA.   --Follow up with Cardiology in near future to have anticoagulation options discussed.     (G47.33,  Z99.89) LAVERNE on CPAP  -Continues CPAP at night.     (E11.69,  Z79.4) Type 2 diabetes mellitus with other specified complication, with long-term current use of insulin (H)  (R73.9,  T38.0X5A) Steroid-induced hyperglycemia  A1c was 6.6 in March 2022.  Lately QID glucoses ranging 143-251, asymptomatic.  Was recently on Prednisone and glucoses would get to 300's but not since being off Prednisone.   -We have increased Metformin to 1500 mg's q daily.   -Continues BID NPH at 32/32 units.   -Continues s/s TID and at bedtime Aspart.   --Further adjustments left to PCP discretion.     (L50.9) Urticaria  (L29.9) Itching  Had faint/scant flat, red, patchy rash on both arms, and buttocks; reported global itching.  Did give a 5 day Prednisone boost with daily Loratadine, which resolved symptoms.   -Continue to clinically monitor.     (S32.000D) Compression fracture of lumbar vertebra with routine healing, unspecified lumbar vertebral level, subsequent encounter  (G89.29) Other chronic pain  Has known Inferior  Right L4 endplate and superior Left L3 endplate fractures, seen by NeuroSurg.  They noted no indication for surgery.   -No lifting 5-10 lbs for 6 weeks, with no twisting/bending/turning.   -No need for Neuro Surg follow up.   -Using TID Acetaminophen for pain.   -Using PRN Oxycodone for pain.   -No NSAIDS or Aleve.     (F32.A) Depression, unspecified depression type  -Continues Sertraline.     (R53.81) Debility  -Will discharge with home care.   -See below for F2F.     Discharge Medications:  Current Outpatient Medications   Medication Sig Dispense Refill     pantoprazole (PROTONIX) 40 MG EC tablet Take 1 tablet (40 mg) by mouth in the morning.       polyethylene glycol (MIRALAX) 17 GM/Dose powder Take 17 g by mouth as needed in the morning for constipation. Hold for diarrhea 510 g      acetaminophen (TYLENOL) 500 MG tablet Take 1,000 mg by mouth 3 times daily       albuterol (PROAIR HFA/PROVENTIL HFA/VENTOLIN HFA) 108 (90 Base) MCG/ACT inhaler Inhale 2 puffs into the lungs every 4 hours as needed for shortness of breath / dyspnea or wheezing       atorvastatin (LIPITOR) 40 MG tablet Take 1 tablet (40 mg) by mouth At Bedtime 90 tablet 3     Biotin 83518 MCG TABS Take 10,000 mcg by mouth every morning        bisacodyl (DULCOLAX) 10 MG suppository Place 1 suppository (10 mg) rectally daily as needed for constipation       calcium carbonate (OS-NELLY) 1500 (600 Ca) MG tablet Take 1,200 mg by mouth daily       coenzyme Q-10 200 MG CAPS Take 200 mg by mouth every morning        Continuous Blood Gluc  (FREESTYLE ADAM READER) HARJEET 1 each every 14 days Use to read blood sugars per  instructions. 1 each 0     Continuous Blood Gluc Sensor (FREESTYLE ADAM 14 DAY SENSOR) MISC 1 each every 14 days Change every 14 days. 2 each 6     cyanocobalamin (VITAMIN B-12) 100 MCG tablet Take 100 mcg by mouth daily       ferrous gluconate (FERGON) 324 (38 Fe) MG tablet Take 1 tablet (324 mg) by mouth daily (with  breakfast) 30 tablet 3     fluticasone (FLONASE) 50 MCG/ACT nasal spray Spray 1 spray into both nostrils daily 16 g 4     furosemide (LASIX) 40 MG tablet Take 0.5 tablets (20 mg) by mouth daily 180 tablet 1     Ginkgo Biloba (GINKOBA PO) Take 250 mg by mouth daily On hold at [facility] (Patient not taking: Reported on 3/30/2022)       insulin aspart (NOVOLOG PEN) 100 UNIT/ML pen Inject 1-10 Units Subcutaneous 3 times daily (before meals) For Pre-Meal  - 164 give 1 unit,   - 189 give 2 units,  - 214 give 3 unit,  - 239 give 4 units,  - 264 give 5 units,  - 289 give 6 units,  - 314 give 7 units  - 339 give 8 units,  - 364 give 9 units.BG greater than or equal to 365 give 10 units.   Notify provider if glucose greater than or equal to 350 mg/dL after administration of correction dose. If given at mealtime, administer within 30 minutes of start of meal. 15 mL      insulin aspart (NOVOLOG PEN) 100 UNIT/ML pen Inject 1-7 Units Subcutaneous At Bedtime HIGH INSULIN RESISTANCE DOSING    Do Not give Bedtime Correction Insulin if BG less than 200.   For  - 224 give 1 units.   For  - 249 give 2 units.   For  - 274 give 3 units.   For  - 299 give 4 units.   For  - 324 give 5 units.   For  - 349 give 6 units.   For BG greater than or equal to 350 give 7 units. 15 mL      insulin  UNIT/ML vial Inject 32 Units Subcutaneous 2 times daily Inject 30 units every morning and 30 unit(s) every evening 10 mL      ipratropium (ATROVENT) 0.06 % nasal spray Spray 2 sprays into both nostrils 4 times daily as needed for rhinitis 3 Box 1     Lutein 40 MG CAPS Take 40 mg by mouth every morning        Magnesium 400 MG TABS Take 400 mg by mouth every evening        magnesium hydroxide (MILK OF MAGNESIA) 400 MG/5ML suspension Take 15-30 mLs by mouth daily as needed for constipation       metFORMIN (GLUCOPHAGE-XR) 500 MG 24 hr tablet Take 1,500 mg by mouth daily  "(with breakfast)       metoprolol succinate ER (TOPROL-XL) 25 MG 24 hr tablet Take 2 tablets (50 mg) by mouth daily 180 tablet 3     nystatin (MYCOSTATIN) 443761 UNIT/GM external powder Apply topically 2 times daily       oxyCODONE (ROXICODONE) 5 MG tablet Take 0.5 tablets (2.5 mg) by mouth every 4 hours as needed for moderate to severe pain 10 tablet 0     Propylene Glycol (SYSTANE BALANCE OP) Apply 1 drop to eye 3 times daily as needed (dry eyes)        senna-docusate (SENOKOT-S/PERICOLACE) 8.6-50 MG tablet Take 2 tablets by mouth 2 times daily       senna-docusate (SENOKOT-S/PERICOLACE) 8.6-50 MG tablet Take 3 tablets by mouth nightly as needed for constipation       sertraline (ZOLOFT) 100 MG tablet Take 1.5 tablets (150 mg) by mouth daily 135 tablet 0     TURMERIC PO Take 3 tablets by mouth daily  (Patient not taking: Reported on 3/30/2022)       vitamin (B COMPLEX-C) tablet Take 1 tablet by mouth daily       vitamin C (ASCORBIC ACID) 1000 MG TABS Take 1,000 mg by mouth every evening        Vitamin D3 (CHOLECALCIFEROL) 125 MCG (5000 UT) tablet Take 1 tablet by mouth daily       zinc 23 MG LOZG lozenge Place 1 lozenge inside cheek daily HOLD at [facility]        Controlled medications:   Medication: PRN Oxycodone 2.5 mg's , 12 tabs given to patient at the time of discharge to take home     Past Medical History:   Past Medical History:   Diagnosis Date     (HFpEF) heart failure with preserved ejection fraction (H)      Aortic stenosis      Chest pain      Depressive disorder      Diabetes (H)      Hyperlipidemia      Hypertension      Mitral annular calcification      Mitral stenosis      Obesity      Sleep apnea     CPAP     Physical Exam:   Vitals: /55   Pulse 74   Temp 97.2  F (36.2  C)   Resp 18   Ht 1.626 m (5' 4\")   Wt 111.1 kg (245 lb)   SpO2 96%   BMI 42.05 kg/m    BMI: Body mass index is 42.05 kg/m .     GENERAL APPEARANCE:  Elderly female resting in bed.  NAD, non-toxic.  ENT:  Mouth and " oropharynx normal, moist mucous membranes.   RESP:  Lungs CTA.  Regular relaxed breathing effort.  No cough.   CV: S1/S2 no murmur or rubs.  Regular rhythm and rate.  No generalized edema.   ABDOMEN:   Obese with large pannus but, soft, non-tender with active bowel sounds.  No guarding, rigidity, or rebound tenderness.  EXTREMITIES:  No lower extremity edema, no calf tenderness.   PSYCH: Alert and orientated, pleasant and cooperative.   SKIN: No redness seen on forearms today.  Did not assess buttocks.      SNF labs: Labs done [facility] at [facility] done by [facility] lab; and should be able to be seen in Epic.     DISCHARGE PLAN:    Follow up labs: No labs orders/due    Medical Follow Up:      Follow up with primary care provider in 1-2 weeks.    Follow up with NeuroSurg PRN.    Follow up with Cardiology in 1-2 weeks.       Current [place] scheduled appointments:  Next 5 appointments (look out 90 days)    May 09, 2022 11:00 AM  Return Visit with Elizabeth Chen PA-C  [facility] [place] ([facility] ) [address] [facility] [address] [place] [id number] [phone]           Discharge Services: Home Care:  Occupational Therapy, Physical Therapy, Registered Nurse, Home Health Aide and From:  TBD home care.     Discharge Instructions Verbalized to Patient at Discharge:     Instructed patient to arrange/attend above appointments.     TOTAL DISCHARGE TIME:   Greater than 30 minutes  Electronically signed by:  SARBJIT Zamorano CNP       Documentation of Face to Face and Certification for Home Health Services    I certify that services are/were furnished while this patient was under the care of a physician and that a physician or an allowed non-physician practitioner (NPP), had a face-to-face encounter that meets the physician face-to-face encounter requirements. The encounter was in whole, or in part, related to the primary  reason for home health. The patient is confined to his/her home and needs intermittent skilled nursing, physical therapy, speech-language pathology, or the continued need for occupational therapy. A plan of care has been established by a physician and is periodically reviewed by a physician.  Date of Face-to-Face Encounter: 11 April 22.    I certify that, based on my findings, the following services are medically necessary home health services: Nursing, Occupational Therapy, Physical Therapy and HHA. .    My clinical findings support the need for the above skilled services because: Requires assistance of another person or specialized equipment to access medical services because patient: Requires supervision of another for safe transfer...    Patient to re-establish plan of care with their PCP within 7-10 days after leaving the facility to reestablish care.  Medicare certified [place] provider: SARBJIT Zamorano CNP  Date: April 11, 2022

## 2022-04-11 NOTE — LETTER
4/11/2022        RE: Amy Luna  [address]  [facility] [address]        [facility] GERIATRICS DISCHARGE SUMMARY  PATIENT'S NAME: Amy Luna  YOB: 1945  MEDICAL RECORD NUMBER:  [id number]  Place of Service where encounter took place:  [facility] (TCU) [[id number]]       PRIMARY CARE PROVIDER AND CLINIC RESPONSIBLE AFTER TRANSFER:   She Huang MD, [address] / [facility] [address]      Transferring providers: SARBJIT Zamorano CNP, Susannah Corbett MD  Recent Hospitalization/ED:  [facility] stay 3/14/22 to 3/19/22.  Date of SNF Admission: March 19, 2022  Date of SNF (anticipated) Discharge: April 13, 2022     Discharged to: previous independent home  Cognitive Scores: SLUMS: 26/30 and CPT: 5/5.6  Physical Function: Ambulating 150-165 ft with front wheel walker and supervision.   DME: No DME needed    CODE STATUS/ADVANCE DIRECTIVES DISCUSSION: Full Code  ALLERGIES: Penicillins and Pioglitazone    NURSING FACILITY COURSE   Summary:   Amy Luna is a 75 yo female with a PMH of Afib, status post previous bioprosthetic aortic and mitral valve replacement, obstructive sleep apnea, chronic kidney disease, insulin-dependent type 2 diabetes, HFpEF and morbid obesity who was recently admitted to [facility] for evaluation of anemia thought to be secondary to GI bleed.     She presented for evaluation of increasing generalized weakness, dizziness and shortness of breath.  Was found to have a hemoglobin of 4.3.  This was in the setting of an INR of greater than 10 and recent excessive Aleve/NSAID use for back pain.  She was given 4 units of PRBCs throughout her hospital stay with Hgb improvement in the 8 range.  GI was consulted but given comorbidities EGD was not performed due to risks>benefits and the strongly suspected upper GI bleed, they recommended twice daily PPI.  Historically she is  anticoagulated with Warfarin given her history of atrial fibrillation and bioprosthetic valve replacements; however, Cardiology was consulted who recommended discontinuation of Warfarin and follow-up with her primary Cardiologist.    Hospital course was additionally complicated by acute on chronic back pain.  She was found to have L3-L4 compression fractures. Neurosurgery was consulted and recommended conservative management.  Additionally she was found to have a pansensitive Klebsiella UTI and was treated with a course of Ceftriaxone and transition to Ciprofloxacin on discharge.  Transitioned to the TCU for ongoing cares.     Summary of nursing facility stay:   While at the TCU Mrs. Luna slowly improved.  She had a degree of urine retention while in hospital with the UTI, that has resolved.  Hgb continues to be trending up, no more bleeding.  Remains off Warfarin and NSAIDS, is on Iron.  PPI is down to daily now.  At one point she had mild flat itchy urticaria and per her report and old EMR notes, she get's this occasionally and took Prednisone in the past.  She was on a 5 day course of Prednisone and 10 day course of Loratadine for Urticaria, with resolution of symptoms/rash, but did have some higher glucoses during that time.  Otherwise Mrs. Luna continues to do well and will now discharge home with home care.     In meeting Mrs. Luna today for discharge, she continues to endorse some back pain, but tolerable, is up and ambulating.  Reports the rash/itching as gone.  She endorses a chronic sinus post nasal congestion/drip, but unchanged.  No other complaints, reports being ready for home.     (K92.2) Upper GI bleed, presumed  (primary encounter diagnosis)  (D62) Acute blood loss anemia  (R79.1) Supratherapeutic INR  GI bleed with Hgb down to 4.3 on admission to hospital, in setting of significant use of Aleve/NSAIDS due to her back pain and an INR >10.  S/p Transfusions, Warfarin stopped, Aleve stopped.  Is  on Iron supplement and was on BID PPI, which is now down to daily.  EGD not done due to risks>benefits at the time per GI. Lately Hgb stable, no further s/s of bleeding.   -Hgb was 9.4 today 4/11 and trending up.   -Continues Protonix daily.   -Continues Iron supplement.     (N39.0) Urinary tract infection without hematuria, site unspecified  (R33.9) Urinary retention  Had a UTI and retention in hospital, finished antibiotics and both considered resolved.   -Continue to clinically monitor.     (Z95.2) S/P aortic valve and mitral valve replacement  (I48.0) Paroxysmal atrial fibrillation (H)  (I50.32) Chronic heart failure with preserved ejection fraction (H)  Review of VS's show VSS and within acceptable range.   No current s/s of clinical CHF.   -Continues on Furosemide and Magnesium.   -Is NOT on KCl.   -Continues Toprol.   -Continues Statin and CoQ10.   --Remains off Warfarin at this time, is NOT on ASA.   --Follow up with Cardiology in near future to have anticoagulation options discussed.     (G47.33,  Z99.89) LAVERNE on CPAP  -Continues CPAP at night.     (E11.69,  Z79.4) Type 2 diabetes mellitus with other specified complication, with long-term current use of insulin (H)  (R73.9,  T38.0X5A) Steroid-induced hyperglycemia  A1c was 6.6 in March 2022.  Lately QID glucoses ranging 143-251, asymptomatic.  Was recently on Prednisone and glucoses would get to 300's but not since being off Prednisone.   -We have increased Metformin to 1500 mg's q daily.   -Continues BID NPH at 32/32 units.   -Continues s/s TID and at bedtime Aspart.   --Further adjustments left to PCP discretion.     (L50.9) Urticaria  (L29.9) Itching  Had faint/scant flat, red, patchy rash on both arms, and buttocks; reported global itching.  Did give a 5 day Prednisone boost with daily Loratadine, which resolved symptoms.   -Continue to clinically monitor.     (S32.000D) Compression fracture of lumbar vertebra with routine healing, unspecified lumbar  vertebral level, subsequent encounter  (G89.29) Other chronic pain  Has known Inferior Right L4 endplate and superior Left L3 endplate fractures, seen by NeuroSurg.  They noted no indication for surgery.   -No lifting 5-10 lbs for 6 weeks, with no twisting/bending/turning.   -No need for Neuro Surg follow up.   -Using TID Acetaminophen for pain.   -Using PRN Oxycodone for pain.   -No NSAIDS or Aleve.     (F32.A) Depression, unspecified depression type  -Continues Sertraline.     (R53.81) Debility  -Will discharge with home care.   -See below for F2F.     Discharge Medications:  Current Outpatient Medications   Medication Sig Dispense Refill     pantoprazole (PROTONIX) 40 MG EC tablet Take 1 tablet (40 mg) by mouth in the morning.       polyethylene glycol (MIRALAX) 17 GM/Dose powder Take 17 g by mouth as needed in the morning for constipation. Hold for diarrhea 510 g      acetaminophen (TYLENOL) 500 MG tablet Take 1,000 mg by mouth 3 times daily       albuterol (PROAIR HFA/PROVENTIL HFA/VENTOLIN HFA) 108 (90 Base) MCG/ACT inhaler Inhale 2 puffs into the lungs every 4 hours as needed for shortness of breath / dyspnea or wheezing       atorvastatin (LIPITOR) 40 MG tablet Take 1 tablet (40 mg) by mouth At Bedtime 90 tablet 3     Biotin 78343 MCG TABS Take 10,000 mcg by mouth every morning        bisacodyl (DULCOLAX) 10 MG suppository Place 1 suppository (10 mg) rectally daily as needed for constipation       calcium carbonate (OS-NELLY) 1500 (600 Ca) MG tablet Take 1,200 mg by mouth daily       coenzyme Q-10 200 MG CAPS Take 200 mg by mouth every morning        Continuous Blood Gluc  (FREESTYLE ADAM READER) HARJEET 1 each every 14 days Use to read blood sugars per  instructions. 1 each 0     Continuous Blood Gluc Sensor (FREESTYLE ADAM 14 DAY SENSOR) MISC 1 each every 14 days Change every 14 days. 2 each 6     cyanocobalamin (VITAMIN B-12) 100 MCG tablet Take 100 mcg by mouth daily       ferrous  gluconate (FERGON) 324 (38 Fe) MG tablet Take 1 tablet (324 mg) by mouth daily (with breakfast) 30 tablet 3     fluticasone (FLONASE) 50 MCG/ACT nasal spray Spray 1 spray into both nostrils daily 16 g 4     furosemide (LASIX) 40 MG tablet Take 0.5 tablets (20 mg) by mouth daily 180 tablet 1     Ginkgo Biloba (GINKOBA PO) Take 250 mg by mouth daily On hold at [facility] (Patient not taking: Reported on 3/30/2022)       insulin aspart (NOVOLOG PEN) 100 UNIT/ML pen Inject 1-10 Units Subcutaneous 3 times daily (before meals) For Pre-Meal  - 164 give 1 unit,   - 189 give 2 units,  - 214 give 3 unit,  - 239 give 4 units,  - 264 give 5 units,  - 289 give 6 units,  - 314 give 7 units  - 339 give 8 units,  - 364 give 9 units.BG greater than or equal to 365 give 10 units.   Notify provider if glucose greater than or equal to 350 mg/dL after administration of correction dose. If given at mealtime, administer within 30 minutes of start of meal. 15 mL      insulin aspart (NOVOLOG PEN) 100 UNIT/ML pen Inject 1-7 Units Subcutaneous At Bedtime HIGH INSULIN RESISTANCE DOSING    Do Not give Bedtime Correction Insulin if BG less than 200.   For  - 224 give 1 units.   For  - 249 give 2 units.   For  - 274 give 3 units.   For  - 299 give 4 units.   For  - 324 give 5 units.   For  - 349 give 6 units.   For BG greater than or equal to 350 give 7 units. 15 mL      insulin  UNIT/ML vial Inject 32 Units Subcutaneous 2 times daily Inject 30 units every morning and 30 unit(s) every evening 10 mL      ipratropium (ATROVENT) 0.06 % nasal spray Spray 2 sprays into both nostrils 4 times daily as needed for rhinitis 3 Box 1     Lutein 40 MG CAPS Take 40 mg by mouth every morning        Magnesium 400 MG TABS Take 400 mg by mouth every evening        magnesium hydroxide (MILK OF MAGNESIA) 400 MG/5ML suspension Take 15-30 mLs by mouth daily as needed for  "constipation       metFORMIN (GLUCOPHAGE-XR) 500 MG 24 hr tablet Take 1,500 mg by mouth daily (with breakfast)       metoprolol succinate ER (TOPROL-XL) 25 MG 24 hr tablet Take 2 tablets (50 mg) by mouth daily 180 tablet 3     nystatin (MYCOSTATIN) 597042 UNIT/GM external powder Apply topically 2 times daily       oxyCODONE (ROXICODONE) 5 MG tablet Take 0.5 tablets (2.5 mg) by mouth every 4 hours as needed for moderate to severe pain 10 tablet 0     Propylene Glycol (SYSTANE BALANCE OP) Apply 1 drop to eye 3 times daily as needed (dry eyes)        senna-docusate (SENOKOT-S/PERICOLACE) 8.6-50 MG tablet Take 2 tablets by mouth 2 times daily       senna-docusate (SENOKOT-S/PERICOLACE) 8.6-50 MG tablet Take 3 tablets by mouth nightly as needed for constipation       sertraline (ZOLOFT) 100 MG tablet Take 1.5 tablets (150 mg) by mouth daily 135 tablet 0     TURMERIC PO Take 3 tablets by mouth daily  (Patient not taking: Reported on 3/30/2022)       vitamin (B COMPLEX-C) tablet Take 1 tablet by mouth daily       vitamin C (ASCORBIC ACID) 1000 MG TABS Take 1,000 mg by mouth every evening        Vitamin D3 (CHOLECALCIFEROL) 125 MCG (5000 UT) tablet Take 1 tablet by mouth daily       zinc 23 MG LOZG lozenge Place 1 lozenge inside cheek daily HOLD at TCU        Controlled medications:   Medication: PRN Oxycodone 2.5 mg's , 12 tabs given to patient at the time of discharge to take home     Past Medical History:   Past Medical History:   Diagnosis Date     (HFpEF) heart failure with preserved ejection fraction (H)      Aortic stenosis      Chest pain      Depressive disorder      Diabetes (H)      Hyperlipidemia      Hypertension      Mitral annular calcification      Mitral stenosis      Obesity      Sleep apnea     CPAP     Physical Exam:   Vitals: /55   Pulse 74   Temp 97.2  F (36.2  C)   Resp 18   Ht 1.626 m (5' 4\")   Wt 111.1 kg (245 lb)   SpO2 96%   BMI 42.05 kg/m    BMI: Body mass index is 42.05 kg/m . "     GENERAL APPEARANCE:  Elderly female resting in bed.  NAD, non-toxic.  ENT:  Mouth and oropharynx normal, moist mucous membranes.   RESP:  Lungs CTA.  Regular relaxed breathing effort.  No cough.   CV: S1/S2 no murmur or rubs.  Regular rhythm and rate.  No generalized edema.   ABDOMEN:   Obese with large pannus but, soft, non-tender with active bowel sounds.  No guarding, rigidity, or rebound tenderness.  EXTREMITIES:  No lower extremity edema, no calf tenderness.   PSYCH: Alert and orientated, pleasant and cooperative.   SKIN: No redness seen on forearms today.  Did not assess buttocks.      SNF labs: Labs done while at Skilled Nursing Facility done by [facility] lab; and should be able to be seen in Epic.     DISCHARGE PLAN:    Follow up labs: No labs orders/due    Medical Follow Up:      Follow up with primary care provider in 1-2 weeks.    Follow up with NeuroSurg PRN.    Follow up with Cardiology in 1-2 weeks.       Current [facility] scheduled appointments:  Next 5 appointments (look out 90 days)    May 09, 2022 11:00 AM  Return Visit with Elizabeth Chen PA-C  [facility] ([facility] ) [address] [address] [address] [phone]           Discharge Services: Home Care:  Occupational Therapy, Physical Therapy, Registered Nurse, Home Health Aide and From:  TBD home care.     Discharge Instructions Verbalized to Patient at Discharge:     Instructed patient to arrange/attend above appointments.     TOTAL DISCHARGE TIME:   Greater than 30 minutes  Electronically signed by:  SARBJIT Zamorano CNP       Documentation of Face to Face and Certification for Home Health Services    I certify that services are/were furnished while this patient was under the care of a physician and that a physician or an allowed non-physician practitioner (NPP), had a face-to-face encounter that meets the physician  face-to-face encounter requirements. The encounter was in whole, or in part, related to the primary reason for home health. The patient is confined to his/her home and needs intermittent skilled nursing, physical therapy, speech-language pathology, or the continued need for occupational therapy. A plan of care has been established by a physician and is periodically reviewed by a physician.  Date of Face-to-Face Encounter: 11 April 22.    I certify that, based on my findings, the following services are medically necessary home health services: Nursing, Occupational Therapy, Physical Therapy and HHA. .    My clinical findings support the need for the above skilled services because: Requires assistance of another person or specialized equipment to access medical services because patient: Requires supervision of another for safe transfer...    Patient to re-establish plan of care with their PCP within 7-10 days after leaving the facility to reestablish care.  Medicare certified [facility] provider: SARBJIT Zamorano CNP  Date: April 11, 2022              Sincerely,        SARBJIT Zamorano CNP

## 2022-04-18 NOTE — TELEPHONE ENCOUNTER
[facility] RN calls to request verbal order      Skilled nursing    1x9wk  6 PRN's    PHYSICAL THERAPY and Occupational Therapy eval and treat    Best number to call back  [phone]

## 2022-04-19 NOTE — TELEPHONE ENCOUNTER
Nurse Areli from [facility] calling for Verbal Orders.     PHYSICAL THERAPY   5 visits over 6 weeks period.     [phone]  Okay to leave voice message on phone.

## 2022-04-19 NOTE — TELEPHONE ENCOUNTER
Call received from [facility] regarding below request. [facility] would like to get nursing care started as soon as possible. Patient was in the hospital and is deconditioned and fragile.

## 2022-04-20 NOTE — Clinical Note
HI Sending you this as FYI. Sees you may 10 th for follow up. On iron for GI bleed. Did not want to do Colon or EGD related to her health. Hgb improving - lowest 4.3. last 9.4- will have recheck with homecare in next week.  Home care physical therapy OT RN HHA. Compression fracture - seeing neurosurgery and x ray scheduled.    Have a good day Julianne

## 2022-04-20 NOTE — TELEPHONE ENCOUNTER
Please inform the HHC that I agree with the HHC as long as they make sure the patient has the documentation for face to face and home bound status. ( If these are required by the patient's insurance).

## 2022-04-20 NOTE — PROGRESS NOTES
Amy is a 76 year old who is being evaluated via a billable video visit.      How would you like to obtain your AVS? MyChart  If the video visit is dropped, the invitation should be resent by: Text to cell phone: [phone]  Will anyone else be joining your video visit? No    Video Start Time: 3:36 PM    Assessment & Plan     Anemia due to blood loss, acute  hgb has been improving - will recheck in next week   She will restart iron as she did not remember to take it from TCU   - CBC with platelets and differential; Future  - Home Care Referral    Compression fracture of lumbar vertebra with routine healing, unspecified lumbar vertebral level, subsequent encounter  No lifting 5-10 lbs for 6 weeks, with no twisting/bending/turning.   -No need for Neuro Surg follow up.   -Using TID Acetaminophen for pain.   -Using PRN Oxycodone for pain.   -No NSAIDS or Aleve.   Has follow up in place   - Home Care Referral    Type 2 diabetes mellitus with other specified complication, with long-term current use of insulin (H)  Increased glucose with steroid but doing well   - Home Care Referral  - Comprehensive metabolic panel (BMP + Alb, Alk Phos, ALT, AST, Total. Bili, TP); Future    S/P aortic valve and mitral valve replacement  - CBC with platelets and differential; Future  - Home Care Referral    Paroxysmal atrial fibrillation (H)  Not on blood thinner - restart left to cardiology   Upcoming appointment    - CBC with platelets and differential; Future  - Home Care Referral  - Comprehensive metabolic panel (BMP + Alb, Alk Phos, ALT, AST, Total. Bili, TP); Future    Morbid obesity (H)    - Home Care Referral    Major depressive disorder, recurrent episode, moderate (H)  Stable   - Home Care Referral    Essential hypertension  In good range   - CBC with platelets and differential; Future  - Home Care Referral  - Comprehensive metabolic panel (BMP + Alb, Alk Phos, ALT, AST, Total. Bili, TP); Future    CKD (chronic kidney  "disease) stage 2, GFR 60-89 ml/min  discussed   - Home Care Referral  - Comprehensive metabolic panel (BMP + Alb, Alk Phos, ALT, AST, Total. Bili, TP); Future    Loose stools  May be different color at home related to no iron being taken   - Comprehensive metabolic panel (BMP + Alb, Alk Phos, ALT, AST, Total. Bili, TP); Future      47 minutes spent on the date of the encounter doing chart review, history and exam, documentation and further activities per the note       BMI:   Estimated body mass index is 42.05 kg/m  as calculated from the following:    Height as of 4/11/22: 1.626 m (5' 4\").    Weight as of 4/11/22: 111.1 kg (245 lb).       Patient Instructions   Home care orders done     Lab in home care this week      Restart iron daily     May 10, 2022 at 1040 am  Dr Diaz follow up      No follow-ups on file.    SARBJIT Warren CNP  [facility]    Bari Reyes is a 76 year old who presents for the following health issues     HPI tcu follow up pt is concerned about her HGB was low.  Was admitted for GI bleed with hgb 4.3   INR greater than 10 - recent aleve and NSAID use for back pain   4 units PRBC given   HGB improved to 8 range   GI saw- no procedure done - twice daily PPI     hgb improving in TCU- no bleeding 9.4 4/11  Off warfarin and NSAIDS and is on iron   PPI once daily     Is on coumadin for a fib and valve replacement  Coumadin stopped by cardiology   To be determined by cardiology about ongoing blood thinner     L3-4 compression fracture - neurosurgery conservative management   No lifting 5-10 lbs for 6 weeks, with no twisting/bending/turning.   -No need for Neuro Surg follow up.   -Using TID Acetaminophen for pain.   -Using PRN Oxycodone for pain.   -No NSAIDS or Aleve.     UTI also in hospital     Diabetes   a1c 6.6 3/2022  Was on prednisone and gucose high   Metformin increased to 1500 mg daily   BID NPH 32/32 units     Home care   physical therapy OT RN " HHA    Loose yellow stool   Mustard color   Every 2-3 days   Not watery stool     Pain in back   Voltaren gel   Has lidocaine patches           Review of Systems   Constitutional, HEENT, cardiovascular, pulmonary, GI, , musculoskeletal, neuro, skin, endocrine and psych systems are negative, except as otherwise noted.      Objective           Vitals:  No vitals were obtained today due to virtual visit.    Physical Exam   GENERAL: Healthy, alert and no distress  EYES: Eyes grossly normal to inspection.  No discharge or erythema, or obvious scleral/conjunctival abnormalities.  RESP: No audible wheeze, cough, or visible cyanosis.  No visible retractions or increased work of breathing.    SKIN: Visible skin clear. No significant rash, abnormal pigmentation or lesions.  NEURO: Cranial nerves grossly intact.  Mentation and speech appropriate for age.  PSYCH: Mentation appears normal, affect normal/bright, judgement and insight intact, normal speech and appearance well-groomed.    Lab             Video-Visit Details    Type of service:  Video Visit    Video End Time:1410    Originating Location (pt. Location): Home    Distant Location (provider location):  [facility]     Platform used for Video Visit: [employer]

## 2022-04-20 NOTE — PATIENT INSTRUCTIONS
Home care orders done     Lab in home care this week      Restart iron daily     May 10, 2022 at 1040 am  Dr Diaz follow up

## 2022-04-21 NOTE — TELEPHONE ENCOUNTER
Naila calling from [facility] and said yes they will draw these labs when they see her next on 4/27/22. No need to call her back if this is ok.    Naila [phone]

## 2022-04-21 NOTE — TELEPHONE ENCOUNTER
Julianne saw patient for Virtual Visit 4/20/22, referral done for home care. Julianne wanting to have CMP and CBC with platelet and diff drawn by home care at patient's next visit. Lab orders in place for patient.    Called [facility] and they will page TARI Yoo Case Manager to call the clinic.     Elva Fernando RN  [facility]

## 2022-04-22 NOTE — TELEPHONE ENCOUNTER
The Home Care/Assisted Living/Nursing Facility is calling regarding an established patient.  Has the patient seen Home Care in the past or is currently residing in Assisted Living or Nursing Facility? Yes.     PT/OT/Speech Therapy    Any additional Orders:  Any order requested not stated above are outside of the standing order and must be routed to a licensed practitioner for approval.    No    Writer has verified [facility] will send fax to have orders signed.    Call to below number. Left detailed message approving requested orders.

## 2022-04-25 NOTE — TELEPHONE ENCOUNTER
4/15/22 - 6/13/22 HOME HEALTH CERTIFICATION forms orders received via fax. Form in your mailbox to be signed.

## 2022-04-25 NOTE — PROGRESS NOTES
Clinic Care Coordination Contact  Clinic Care Coordination Contact  Care Coordination Communication     Referral Source: IP Handoff     Clinical Data: Patient was receiving restorative services at [facility] and was discharged to home on 4/13/22.  Patient completed follow up appointment with PCP 4/20/22, no primary care coordination needs were identified at that time.      Home Care Contact:              Home Care Agency: [facility]              Anticipated start of care date: In-progress.         Plan: No further planned RN CC interventions at this time. RN/SW Care Coordinator will await notification from home care staff informing RN/SW Care Coordinator of patients discharge plans/needs.    Meli Jay RN Care Coordinator  [facility]  Email: [email or web address]  Phone: [phone]

## 2022-05-02 NOTE — PROGRESS NOTES
Discharge Note    Progress reporting period is from initial eval to Mar 7, 2022.     Amy failed to return for next follow up visit and current status is unknown.  Please see information below for last relevant information on current status.  Patient seen for Rxs Used: 2 visits.  SUBJECTIVE  Subjective changes noted by patient:  Subjective: Pain persits.  Poor tolerance for movement and exercise  .  Current pain level is  .     Previous pain level was  Initial Pain level: 4/10.   Changes in function:  Yes (See Goal flowsheet attached for changes in current functional level)  Adverse reaction to treatment or activity: None    OBJECTIVE  Changes noted in objective findings:       ASSESSMENT/PLAN  Diagnosis: Low Back Pain    DIAGP:  The encounter diagnosis was Acute bilateral low back pain with bilateral sciatica.  Updated problem list and treatment plan:   Decreased function - HEP  STG/LTGs have been met or progress has been made towards goals:  Yes, please see goal flowsheet for most current information  Assessment of Progress: current status is unknown.  Last current status:     Self Management Plans:  HEP  I have re-evaluated this patient and find that the nature, scope, duration and intensity of the therapy is appropriate for the medical condition of the patient.  Amy continues to require the following intervention to meet STG and LTG's:  HEP.    Recommendations:  Discharge with current home program.  Patient to follow up with MD as needed.    Please refer to the daily flowsheet for treatment today, total treatment time and time spent performing 1:1 timed codes.

## 2022-05-09 NOTE — PROGRESS NOTES
NEUROSURGERY CLINIC PROGRESS NOTE    DATE OF VISIT: 5/9/2022    HPI:     Amy Luna is a pleasant 76 year old female who presents to the clinic today for a 6 week follow up subtle acute appearing fractures of the inferior right L4 endplate  and superior left L3 endplate without posterior displacement towards spinal canal.     Amy historian, kellen. Amy noted when she got [facility] of bed at [facility] on [facility]15 she noted severe aching radiating from her low back into her anterior thigh stopping at knee. Pain is aggravated with any movements (sitting, standing, twisting) and alleviated when she lays flat. She notes associated decreased sensation in same distribution and subjective weakness at left hip. She denies bowel/bladder issues, recent falling episode, gait changes. She notes she has also had left hip pain. She notes hip and LLE pain occur at different times. She was seen at Urgent Care who diagnosed her with left hip bursitis, gave her prednisone and advised her to follow up with Orthopedics.     She has taken steroids, tylenol, tramadol, oxycodone. She noted oxcodone alleviated pain but she is all out of this. She denies recent fever, chills. Admits to loss of appetite and recent weight loss of 30 lbs within last 6-8 weeks.     CT LUMBAR SPINE WITHOUT CONTRAST March 16, 2022 3:47 PM                            IMPRESSION:    1. Subtle acute appearing fractures of the inferior right L4 endplate  and superior left L3 endplate as detailed above. Mild loss of L3 and  L4 vertebral body height. No significant posterior displacement of the  fractured vertebral bodies towards the spinal canal.  2. Presumed Schmorl's node deformity at the superior T12 endplate  although this is not completely visualized. Acute fracture is  difficult to exclude particularly given the apparent acute fractures  of L3 and L4.  3. Marked multilevel degenerative changes throughout the lumbar spine  as detailed above, most pronounced at  L2-L3, L3-L4, and L4-L5.      SHANAE COMBS MD     Current Outpatient Medications   Medication     oxyCODONE (ROXICODONE) 5 MG tablet     acetaminophen (TYLENOL) 500 MG tablet     albuterol (PROAIR HFA/PROVENTIL HFA/VENTOLIN HFA) 108 (90 Base) MCG/ACT inhaler     atorvastatin (LIPITOR) 40 MG tablet     Biotin 25165 MCG TABS     bisacodyl (DULCOLAX) 10 MG suppository     calcium carbonate (OS-NELLY) 1500 (600 Ca) MG tablet     coenzyme Q-10 200 MG CAPS     Continuous Blood Gluc  (FREESTYLE ADAM READER) HARJEET     Continuous Blood Gluc Sensor ([employer] Connect[employer]STYLE ADAM 14 DAY SENSOR) MISC     cyanocobalamin (VITAMIN B-12) 100 MCG tablet     ferrous gluconate (FERGON) 324 (38 Fe) MG tablet     fluticasone (FLONASE) 50 MCG/ACT nasal spray     furosemide (LASIX) 40 MG tablet     Ginkgo Biloba (GINKOBA PO)     insulin aspart (NOVOLOG PEN) 100 UNIT/ML pen     insulin aspart (NOVOLOG PEN) 100 UNIT/ML pen     insulin  UNIT/ML vial     ipratropium (ATROVENT) 0.06 % nasal spray     Lutein 40 MG CAPS     Magnesium 400 MG TABS     metFORMIN (GLUCOPHAGE-XR) 500 MG 24 hr tablet     metoprolol succinate ER (TOPROL-XL) 25 MG 24 hr tablet     nystatin (MYCOSTATIN) 877431 UNIT/GM external powder     oxyCODONE (ROXICODONE) 5 MG tablet     pantoprazole (PROTONIX) 40 MG EC tablet     polyethylene glycol (MIRALAX) 17 GM/Dose powder     Propylene Glycol (SYSTANE BALANCE OP)     sertraline (ZOLOFT) 100 MG tablet     TURMERIC PO     vitamin (B COMPLEX-C) tablet     vitamin C (ASCORBIC ACID) 1000 MG TABS     Vitamin D3 (CHOLECALCIFEROL) 125 MCG (5000 UT) tablet     zinc 23 MG LOZG lozenge     No current facility-administered medications for this visit.       Allergies   Allergen Reactions     Penicillins Hives     3 weeks after taking med got hives.       Pioglitazone Other (See Comments)     Increased urinary frequency, fatigue, dyspnea       Past Medical History:   Diagnosis Date     (HFpEF) heart failure with preserved ejection fraction  (H)      Aortic stenosis      Chest pain      Depressive disorder      Diabetes (H)      Hyperlipidemia      Hypertension      Mitral annular calcification      Mitral stenosis      Obesity      Sleep apnea     CPAP       Review Of Systems     ROS: 10 point ROS neg other than the symptoms noted above in the HPI.      OBJECTIVE:    /76   Pulse 74   SpO2 97%     Imaging:  CT LUMBAR SPINE WITHOUT CONTRAST March 16, 2022 3:47 PM      HISTORY: Low back pain greater than six weeks.      TECHNIQUE: Axial images of the lumbar spine were obtained without  intravenous contrast. Multiplanar reformations were performed.  Radiation dose for this scan was reduced using automated exposure  control, adjustment of the mA and/or kV according to patient size, or  iterative reconstruction technique.      COMPARISON: Lumbar spine x-ray 3/15/2022.     FINDINGS: There are five lumbar-type vertebral bodies assumed for the  purposes of this dictation.      There are subtle acute lucent fracture lines through the inferior  right L4 endplate. Fracture lines extend to the posterior endplate in  the region of the right neural foramen. Minimal loss of L4 vertebral  body height.     Deformity at the superior left L3 endplate. There is a lucent acute  appearing fracture line through the left aspect of this L3 endplate  and this is concerning for an area of acute/subacute fracture.     Deformity at the superior T12 endplate asymmetric towards the left is  not completely visualized. This has the appearance of a Schmorl's node  deformity although given the other apparent acute fractures, a  fracture could be considered.     Lumbar spine alignment is grossly within normal limits. No significant  posterior displacement of any of the fractured vertebral bodies  towards the spinal canal.     Level by level as follows:      T12-L1: No loss of disc height. No significant disc herniation.  Apparent marked facet hypertrophy with hypertrophic changes  around the  right facet joint. No spinal canal narrowing. No significant right  neural foraminal narrowing. No left neural foraminal narrowing.     L1-L2: Mild loss of disc height. Posterior disc bulge. Mild facet  hypertrophy. No significant spinal canal narrowing. No significant  neural foraminal narrowing.      L2-L3: Moderate loss of disc height. Circumferential disc bulge with  mild endplate osteophytic spurring. Moderate bilateral facet  hypertrophy. Moderate spinal canal narrowing. Mild right neural  foraminal narrowing. Moderate to severe left neural foraminal  narrowing.     L3-L4: Mild loss of disc height. Circumferential disc bulge. Marked  bilateral facet hypertrophy. Moderate to severe spinal canal  narrowing. Moderate right neural foraminal narrowing. Moderate left  neural foraminal narrowing.      L4-L5: Mild loss of disc height. Circumferential disc bulge. Endplate  osteophytic spurring more pronounced towards the right foraminal and  far lateral region. Severe bilateral facet hypertrophy with thickening  of the ligamentum flavum. Severe spinal canal narrowing. Severe right  neural foraminal narrowing. Moderate to severe left neural foraminal  narrowing.      L5-S1: Mild loss of disc height. Circumferential disc bulge with  endplate osteophytic spurring more pronounced towards the right  foraminal and far lateral region. Marked bilateral facet hypertrophy.  No spinal canal     Radiographic Findings: Full radiological report in chart. I personally reviewed the images with the patient today.    Exam:    Patient appears comfortable and in no apparent distress. Moving all extremities.  Sitting in wheelchair, gait was not assessed   CN II-XII grossly intact, alert and appropriate with conversation and following  commands  Bilateral lower extremities 5/5 strength including plantar and dorsiflexion. Left hip pain limiting with flexion.   Decreased sensation to light touch along left lateral and anterior  thigh   Negative clonus   +TTP along upper lumbar spinous processes and paraspinous muscles     ASSESSMENT:    1. Non-traumatic compression fracture of L4 lumbar vertebra with delayed healing, subsequent encounter        PLAN:    Amy Luna is a pleasant 76 year old female who presents to the clinic today for a 6 week follow up subtle acute appearing fractures of the inferior right L4 endplate and superior left L3 endplate without posterior displacement towards spinal canal.     Based on her physical exam, we do feel that it would be in her best interest to obtain a lumbar MRI to further assess. Once the images have been obtained she has agreed to call our office back at [phone] to further discuss possible surgical interventions or other conservative therapies.      Recommend Ortho referral for hip bursitis, pain management referral for pain management. These referrals placed. In meantime, rxed 30 tabs of oxycodone to take on an as needed basis. Side effects reviewed with patient who understood.     Will message her PCP to ensure she is up to date with osteoporosis management as well.     The patient gave verbal understanding and is in agreement with the above plan. She will call or return to the clinic for any worsening or changes in symptoms.    Respectfully,     Elizabeth Chen PA-C  [facility]  [address] [address]    Tel [phone]

## 2022-05-09 NOTE — PATIENT INSTRUCTIONS
-Lumbar MRI w/o contrast- you will need to call to schedule     Call [facility] scheduling for your procedure:  For scheduling in the [facility] ([facility] call [phone]  or  [phone]      -Ortho consult for left hip bursitis   -Pain management team to help with your pain   -Will RX oxycodone for your pain right now   -Will check with your PCP to ensure you are up to date with osteoporosis    Elizabeth MATOS [facility]  [address] [address]    Tel [phone]

## 2022-05-09 NOTE — LETTER
5/9/2022         RE: Amy Luna  [address]  [place] [facility] [place] [address]        Dear Colleague,    Thank you for referring your patient, Amy Luna, to the [facility]. Please see a copy of my visit note below.    NEUROSURGERY CLINIC PROGRESS NOTE    DATE OF VISIT: 5/9/2022    HPI:     Amy Luna is a pleasant 76 year old female who presents to the clinic today for a 6 week follow up subtle acute appearing fractures of the inferior right L4 endplate  and superior left L3 endplate without posterior displacement towards spinal canal.     Amy historian, reliable. Amy noted when she got [facility] of bed at [facility] on 4[facility]15 she noted severe aching radiating from her low back into her anterior thigh stopping at knee. Pain is aggravated with any movements (sitting, standing, twisting) and alleviated when she lays flat. She notes associated decreased sensation in same distribution and subjective weakness at left hip. She denies bowel/bladder issues, recent falling episode, gait changes. She notes she has also had left hip pain. She notes hip and LLE pain occur at different times. She was seen at Urgent Care who diagnosed her with left hip bursitis, gave her prednisone and advised her to follow up with Orthopedics.     She has taken steroids, tylenol, tramadol, oxycodone. She noted oxcodone alleviated pain but she is all out of this. She denies recent fever, chills. Admits to loss of appetite and recent weight loss of 30 lbs within last 6-8 weeks.     CT LUMBAR SPINE WITHOUT CONTRAST March 16, 2022 3:47 PM                            IMPRESSION:    1. Subtle acute appearing fractures of the inferior right L4 endplate  and superior left L3 endplate as detailed above. Mild loss of L3 and  L4 vertebral body height. No significant posterior displacement of the  fractured vertebral bodies towards the spinal canal.  2. Presumed Schmorl's node deformity at the superior  T12 endplate  although this is not completely visualized. Acute fracture is  difficult to exclude particularly given the apparent acute fractures  of L3 and L4.  3. Marked multilevel degenerative changes throughout the lumbar spine  as detailed above, most pronounced at L2-L3, L3-L4, and L4-L5.      SHANAE COMBS MD     Current Outpatient Medications   Medication     oxyCODONE (ROXICODONE) 5 MG tablet     acetaminophen (TYLENOL) 500 MG tablet     albuterol (PROAIR HFA/PROVENTIL HFA/VENTOLIN HFA) 108 (90 Base) MCG/ACT inhaler     atorvastatin (LIPITOR) 40 MG tablet     Biotin 54867 MCG TABS     bisacodyl (DULCOLAX) 10 MG suppository     calcium carbonate (OS-NELLY) 1500 (600 Ca) MG tablet     coenzyme Q-10 200 MG CAPS     Continuous Blood Gluc  (FREESTYLE ADAM READER) HARJEET     Continuous Blood Gluc Sensor (FREESTYLE ADAM 14 DAY SENSOR) MISC     cyanocobalamin (VITAMIN B-12) 100 MCG tablet     ferrous gluconate (FERGON) 324 (38 Fe) MG tablet     fluticasone (FLONASE) 50 MCG/ACT nasal spray     furosemide (LASIX) 40 MG tablet     Ginkgo Biloba (GINKOBA PO)     insulin aspart (NOVOLOG PEN) 100 UNIT/ML pen     insulin aspart (NOVOLOG PEN) 100 UNIT/ML pen     insulin  UNIT/ML vial     ipratropium (ATROVENT) 0.06 % nasal spray     Lutein 40 MG CAPS     Magnesium 400 MG TABS     metFORMIN (GLUCOPHAGE-XR) 500 MG 24 hr tablet     metoprolol succinate ER (TOPROL-XL) 25 MG 24 hr tablet     nystatin (MYCOSTATIN) 236091 UNIT/GM external powder     oxyCODONE (ROXICODONE) 5 MG tablet     pantoprazole (PROTONIX) 40 MG EC tablet     polyethylene glycol (MIRALAX) 17 GM/Dose powder     Propylene Glycol (SYSTANE BALANCE OP)     sertraline (ZOLOFT) 100 MG tablet     TURMERIC PO     vitamin (B COMPLEX-C) tablet     vitamin C (ASCORBIC ACID) 1000 MG TABS     Vitamin D3 (CHOLECALCIFEROL) 125 MCG (5000 UT) tablet     zinc 23 MG LOZG lozenge     No current facility-administered medications for this visit.       Allergies    Allergen Reactions     Penicillins Hives     3 weeks after taking med got hives.       Pioglitazone Other (See Comments)     Increased urinary frequency, fatigue, dyspnea       Past Medical History:   Diagnosis Date     (HFpEF) heart failure with preserved ejection fraction (H)      Aortic stenosis      Chest pain      Depressive disorder      Diabetes (H)      Hyperlipidemia      Hypertension      Mitral annular calcification      Mitral stenosis      Obesity      Sleep apnea     CPAP       Review Of Systems     ROS: 10 point ROS neg other than the symptoms noted above in the HPI.      OBJECTIVE:    /76   Pulse 74   SpO2 97%     Imaging:  CT LUMBAR SPINE WITHOUT CONTRAST March 16, 2022 3:47 PM      HISTORY: Low back pain greater than six weeks.      TECHNIQUE: Axial images of the lumbar spine were obtained without  intravenous contrast. Multiplanar reformations were performed.  Radiation dose for this scan was reduced using automated exposure  control, adjustment of the mA and/or kV according to patient size, or  iterative reconstruction technique.      COMPARISON: Lumbar spine x-ray 3/15/2022.     FINDINGS: There are five lumbar-type vertebral bodies assumed for the  purposes of this dictation.      There are subtle acute lucent fracture lines through the inferior  right L4 endplate. Fracture lines extend to the posterior endplate in  the region of the right neural foramen. Minimal loss of L4 vertebral  body height.     Deformity at the superior left L3 endplate. There is a lucent acute  appearing fracture line through the left aspect of this L3 endplate  and this is concerning for an area of acute/subacute fracture.     Deformity at the superior T12 endplate asymmetric towards the left is  not completely visualized. This has the appearance of a Schmorl's node  deformity although given the other apparent acute fractures, a  fracture could be considered.     Lumbar spine alignment is grossly within normal  limits. No significant  posterior displacement of any of the fractured vertebral bodies  towards the spinal canal.     Level by level as follows:      T12-L1: No loss of disc height. No significant disc herniation.  Apparent marked facet hypertrophy with hypertrophic changes around the  right facet joint. No spinal canal narrowing. No significant right  neural foraminal narrowing. No left neural foraminal narrowing.     L1-L2: Mild loss of disc height. Posterior disc bulge. Mild facet  hypertrophy. No significant spinal canal narrowing. No significant  neural foraminal narrowing.      L2-L3: Moderate loss of disc height. Circumferential disc bulge with  mild endplate osteophytic spurring. Moderate bilateral facet  hypertrophy. Moderate spinal canal narrowing. Mild right neural  foraminal narrowing. Moderate to severe left neural foraminal  narrowing.     L3-L4: Mild loss of disc height. Circumferential disc bulge. Marked  bilateral facet hypertrophy. Moderate to severe spinal canal  narrowing. Moderate right neural foraminal narrowing. Moderate left  neural foraminal narrowing.      L4-L5: Mild loss of disc height. Circumferential disc bulge. Endplate  osteophytic spurring more pronounced towards the right foraminal and  far lateral region. Severe bilateral facet hypertrophy with thickening  of the ligamentum flavum. Severe spinal canal narrowing. Severe right  neural foraminal narrowing. Moderate to severe left neural foraminal  narrowing.      L5-S1: Mild loss of disc height. Circumferential disc bulge with  endplate osteophytic spurring more pronounced towards the right  foraminal and far lateral region. Marked bilateral facet hypertrophy.  No spinal canal     Radiographic Findings: Full radiological report in chart. I personally reviewed the images with the patient today.    Exam:    Patient appears comfortable and in no apparent distress. Moving all extremities.  Sitting in wheelchair, gait was not assessed    CN II-XII grossly intact, alert and appropriate with conversation and following  commands  Bilateral lower extremities 5/5 strength including plantar and dorsiflexion. Left hip pain limiting with flexion.   Decreased sensation to light touch along left lateral and anterior thigh   Negative clonus   +TTP along upper lumbar spinous processes and paraspinous muscles     ASSESSMENT:    1. Non-traumatic compression fracture of L4 lumbar vertebra with delayed healing, subsequent encounter        PLAN:    Amy Luna is a pleasant 76 year old female who presents to the clinic today for a 6 week follow up subtle acute appearing fractures of the inferior right L4 endplate and superior left L3 endplate without posterior displacement towards spinal canal.     Based on her physical exam, we do feel that it would be in her best interest to obtain a lumbar MRI to further assess. Once the images have been obtained she has agreed to call our office back at [phone] to further discuss possible surgical interventions or other conservative therapies.      Recommend Ortho referral for hip bursitis, pain management referral for pain management. These referrals placed. In meantime, rxed 30 tabs of oxycodone to take on an as needed basis. Side effects reviewed with patient who understood.     Will message her PCP to ensure she is up to date with osteoporosis management as well.     The patient gave verbal understanding and is in agreement with the above plan. She will call or return to the clinic for any worsening or changes in symptoms.    Respectfully,     Elizabeth Chen PA-C  [facility]  [address] [address]    Tel [phone]          Again, thank you for allowing me to participate in the care of your patient.        Sincerely,        Elizabeth Chen PA-C

## 2022-05-10 NOTE — TELEPHONE ENCOUNTER
[facility] calling from [facility] requesting additional time to go see patient.  Patient is improving, but [facility] thinks she should be seen for a bit longer.      1 x every other week for 4 weeks:  Fall prevention  Standing tolerance   Upper body strength

## 2022-05-10 NOTE — PROGRESS NOTES
Amy is a 76 year old who is being evaluated via a billable video visit.      How would you like to obtain your AVS? Mail a copy  If the video visit is dropped, the invitation should be resent by: Text to cell phone: [phone]  Will anyone else be joining your video visit? No        This is a VIDEO ( using Doximity)  encounter with the patient.       Location of the provider : office   Location of the patient : home      11:00 --- 11:43          Dr Diaz's note      Patient's instructions / PLAN:                                                        Plan:  1. Left hip pain  -- check with insurance for coverage  -- you may want to schedule it in the same time with the back MRI since you need medtronic for the pacemaker   --  To schedule this test you may call Scheduling center at [phone]    2. Follow up next week video and 2 weeks in the office   3.take Tylenol with the Oxycodone      ASSESSMENT & PLAN:                                                      (M25.552) Hip pain, left  (primary encounter diagnosis)  Comment: since the pain is very severe and she report neg XRay I think she should have hip MRI to r/u hailine fracture.   Plan: MR Hip Left w/o Contrast        as above     (S32.030A) Closed compression fracture of L3 lumbar vertebra, initial encounter (H)  (S32.040A) Closed compression fracture of L4 lumbar vertebra, initial encounter (H)  Comment: with acute pain  Plan: lumbar MRI, f/u w neurosurg clinic     (Z95.0) Cardiac pacemaker in situ  Comment:   Plan: as above        Chief complaint:                                                      Pain     SUBJECTIVE:                                                    History of present illness:    The day she was discharged from [facility], she hurt the L hip and she has been having pain since.   She had home PT for the back. She was eval at [facility] for the hip she was dx with bursitis in the hip.   She understood that the XRay was neg for hip  Fracture, but the hip pain is worse than the back pain       The meds caused confusion and she feels she has hard time talking.        She had severe back pain, she took a lot of Ibuprofen and ended up in the hospital with severe anemia. Her biggest complaint is hip pain   She has been taking Oxycodone for pain. The Rx was sent yesterday. Took 1 pill last night ansd 1 pill this am. It helps the pain only minimal. The only position that helps the pain is lying in bed, but as soon as she sits, stands, she has pain.     The Oxycodone causes confusion and she feels she has hard time talking.     She was referred for pain clinic and spine MRI.     Diabetes Follow-up    How often are you checking your blood sugar? Four or more times daily  Blood sugar testing frequency justification:  Uncontrolled diabetes  What time of day are you checking your blood sugars (select all that apply)?  Before and after meals  Have you had any blood sugars above 200?  Yes   Have you had any blood sugars below 70?  Yes     What symptoms do you notice when your blood sugar is low?  None only happens when she is sleep.    What concerns do you have today about your diabetes? None     Do you have any of these symptoms? (Select all that apply)  No numbness or tingling in feet.  No redness, sores or blisters on feet.  No complaints of excessive thirst.  No reports of blurry vision.  No significant changes to weight.    Have you had a diabetic eye exam in the last 12 months? No                Hyperlipidemia Follow-Up      Are you regularly taking any medication or supplement to lower your cholesterol?   Yes- Atorvastatin    Are you having muscle aches or other side effects that you think could be caused by your cholesterol lowering medication?  No    Hypertension Follow-up      Do you check your blood pressure regularly outside of the clinic? No     Are you following a low salt diet? Yes    Are your blood pressures ever more than 140 on the top  number (systolic) OR more   than 90 on the bottom number (diastolic), for example 140/90? No    BP Readings from Last 2 Encounters:   05/09/22 125/76   04/11/22 111/55     Hemoglobin A1C POCT (%)   Date Value   02/05/2021 7.1 (H)   11/03/2020 6.8 (H)     Hemoglobin A1C (%)   Date Value   03/14/2022 6.6 (H)   11/19/2021 6.9 (H)     LDL Cholesterol Calculated (mg/dL)   Date Value   08/04/2021 90   07/28/2020 102 (H)         How many servings of fruits and vegetables do you eat daily?  2-3    On average, how many sweetened beverages do you drink each day (Examples: soda, juice, sweet tea, etc.  Do NOT count diet or artificially sweetened beverages)?   0    How many days per week do you exercise enough to make your heart beat faster? 3 or less    How many minutes a day do you exercise enough to make your heart beat faster? 9 or less    How many days per week do you miss taking your medication? 0      Review of Systems:                                                      ROS: negative for fever, chills, cough, wheezes, chest pain, shortness of breath, vomiting, abdominal pain, leg swelling         OBJECTIVE:           An actual physical exam can't be done during phone visit   A limited exam can sometimes be performed by video visit   She feels very stressed because of the pain. She cries.       PMHx: reviewed  Past Medical History:   Diagnosis Date     (HFpEF) heart failure with preserved ejection fraction (H)      Aortic stenosis      Chest pain      Depressive disorder      Diabetes (H)      Hyperlipidemia      Hypertension      Mitral annular calcification      Mitral stenosis      Obesity      Sleep apnea     CPAP      PSHx: reviewed  Past Surgical History:   Procedure Laterality Date     APPENDECTOMY       CV CORONARY ANGIOGRAM N/A 4/8/2020    Procedure: Coronary Angiogram;  Surgeon: Inez Peoples MD;  Location:  HEART CARDIAC CATH LAB     CV LEFT HEART CATH N/A 4/8/2020    Procedure: Left Heart Cath;   Surgeon: Inez Peoples MD;  Location:  HEART CARDIAC CATH LAB     CV PFO CLOSURE N/A 4/15/2020    Procedure: Patent Foramen Ovale Closure;  Surgeon: Ok Wilson MD;  Location:  OR     CV RIGHT HEART CATH MEASUREMENTS RECORDED N/A 2020    Procedure: Right Heart Cath;  Surgeon: Inez Peoples MD;  Location:  HEART CARDIAC CATH LAB     EP PACEMAKER N/A 2020    Procedure: EP Pacemaker;  Surgeon: Sohan Francis MD;  Location:  HEART CARDIAC CATH LAB     GYN SURGERY       x2 ,      GYN SURGERY      total hysterectomy     IMPLANT PACEMAKER       REPAIR VALVE TRICUSPID N/A 4/15/2020    Procedure: REPAIR TRICUSPID VALVE WITH VILLA MC3 26MM.;  Surgeon: Ok Wilson MD;  Location:  OR     REPLACE VALVE AORTIC N/A 4/15/2020    Procedure: REPLACEMENT, AORTIC VALVE WITH INSPIRIS TISSUE VALVE 25 MM; ON PUMP WITH SOCORRO READ BY CARDIOLOGIST DR BLANTON.;  Surgeon: Ok Wilson MD;  Location:  OR     REPLACE VALVE MITRAL N/A 4/15/2020    Procedure: REPLACEMENT, MITRAL VALVE WITH EPIC TISSUE VALVE 27 MM.;  Surgeon: Ok Wilson MD;  Location:  OR        Meds: reviewed  Current Outpatient Medications   Medication Sig Dispense Refill     acetaminophen (TYLENOL) 500 MG tablet Take 1,000 mg by mouth 3 times daily       albuterol (PROAIR HFA/PROVENTIL HFA/VENTOLIN HFA) 108 (90 Base) MCG/ACT inhaler Inhale 2 puffs into the lungs every 4 hours as needed for shortness of breath / dyspnea or wheezing       atorvastatin (LIPITOR) 40 MG tablet Take 1 tablet (40 mg) by mouth At Bedtime 90 tablet 3     Biotin 94015 MCG TABS Take 10,000 mcg by mouth every morning        bisacodyl (DULCOLAX) 10 MG suppository Place 1 suppository (10 mg) rectally daily as needed for constipation       calcium carbonate (OS-NELLY) 1500 (600 Ca) MG tablet Take 1,200 mg by mouth daily       coenzyme Q-10 200 MG CAPS Take 200 mg by mouth every morning         Continuous Blood Gluc  (FREESTYLE ADAM READER) HARJEET 1 each every 14 days Use to read blood sugars per  instructions. 1 each 0     Continuous Blood Gluc Sensor (FREESTYLE ADAM 14 DAY SENSOR) MIS 1 each every 14 days Change every 14 days. 2 each 6     cyanocobalamin (VITAMIN B-12) 100 MCG tablet Take 100 mcg by mouth daily       ferrous gluconate (FERGON) 324 (38 Fe) MG tablet Take 1 tablet (324 mg) by mouth daily (with breakfast) 30 tablet 3     fluticasone (FLONASE) 50 MCG/ACT nasal spray Spray 1 spray into both nostrils daily 16 g 4     furosemide (LASIX) 40 MG tablet Take 0.5 tablets (20 mg) by mouth daily 180 tablet 1     Ginkgo Biloba (GINKOBA PO) Take 250 mg by mouth daily On hold at [facility]       insulin aspart (NOVOLOG PEN) 100 UNIT/ML pen Inject 1-10 Units Subcutaneous 3 times daily (before meals) For Pre-Meal  - 164 give 1 unit,   - 189 give 2 units,  - 214 give 3 unit,  - 239 give 4 units,  - 264 give 5 units,  - 289 give 6 units,  - 314 give 7 units  - 339 give 8 units,  - 364 give 9 units.BG greater than or equal to 365 give 10 units.   Notify provider if glucose greater than or equal to 350 mg/dL after administration of correction dose. If given at mealtime, administer within 30 minutes of start of meal. 15 mL      insulin aspart (NOVOLOG PEN) 100 UNIT/ML pen Inject 1-7 Units Subcutaneous At Bedtime HIGH INSULIN RESISTANCE DOSING    Do Not give Bedtime Correction Insulin if BG less than 200.   For  - 224 give 1 units.   For  - 249 give 2 units.   For  - 274 give 3 units.   For  - 299 give 4 units.   For  - 324 give 5 units.   For  - 349 give 6 units.   For BG greater than or equal to 350 give 7 units. 15 mL      insulin  UNIT/ML vial Inject 32 Units Subcutaneous 2 times daily Inject 30 units every morning and 30 unit(s) every evening 10 mL      ipratropium (ATROVENT) 0.06 % nasal spray  Spray 2 sprays into both nostrils 4 times daily as needed for rhinitis 3 Box 1     Lutein 40 MG CAPS Take 40 mg by mouth every morning        Magnesium 400 MG TABS Take 400 mg by mouth every evening        metFORMIN (GLUCOPHAGE-XR) 500 MG 24 hr tablet Take 1,500 mg by mouth daily (with breakfast)       metoprolol succinate ER (TOPROL-XL) 25 MG 24 hr tablet Take 2 tablets (50 mg) by mouth daily 180 tablet 3     nystatin (MYCOSTATIN) 920799 UNIT/GM external powder Apply topically 2 times daily       oxyCODONE (ROXICODONE) 5 MG tablet Take 1 tablet (5 mg) by mouth every 6 hours as needed for pain or moderate to severe pain 30 tablet 0     oxyCODONE (ROXICODONE) 5 MG tablet Take 0.5 tablets (2.5 mg) by mouth every 4 hours as needed for moderate to severe pain 10 tablet 0     pantoprazole (PROTONIX) 40 MG EC tablet Take 1 tablet (40 mg) by mouth in the morning.       polyethylene glycol (MIRALAX) 17 GM/Dose powder Take 17 g by mouth as needed in the morning for constipation. Hold for diarrhea 510 g      Propylene Glycol (SYSTANE BALANCE OP) Apply 1 drop to eye 3 times daily as needed (dry eyes)        sertraline (ZOLOFT) 100 MG tablet Take 1.5 tablets (150 mg) by mouth daily 135 tablet 0     traMADol (ULTRAM) 50 MG tablet TAKE 1 TABLET BY MOUTH THREE TIMES DAILY AS NEEDED       TURMERIC PO Take 3 tablets by mouth daily        vitamin (B COMPLEX-C) tablet Take 1 tablet by mouth daily       vitamin C (ASCORBIC ACID) 1000 MG TABS Take 1,000 mg by mouth every evening        Vitamin D3 (CHOLECALCIFEROL) 125 MCG (5000 UT) tablet Take 1 tablet by mouth daily       zinc 23 MG LOZG lozenge Place 1 lozenge inside cheek daily HOLD at [facility] Hx: reviewed  Fam Hx: reviewed    43 minutes spent on the date of the encounter doing   chart review,   review of outside records,   review of test results,   interpretation of tests,   patient visit,   documentation,   discussion with care coordinator to call her and help her with the  future singh   Communicating with  w the spine clinic       She Diaz MD  Internal Medicine

## 2022-05-11 NOTE — TELEPHONE ENCOUNTER
Patient saw Elizabeth Chen PA-C on 5/9/522 who recommended MRI. Patient cannot have MRI due to abandoned lead in pacemaker.    Message routed to provider for review and recommendation.

## 2022-05-11 NOTE — TELEPHONE ENCOUNTER
Imaging called and they are not able to perform MRI on this patient due to an abandoned lead with the pacemaker.

## 2022-05-11 NOTE — TELEPHONE ENCOUNTER
Radiology department from [facility] calling stating patient not able to do the MRI due to pacemaker has an abandoned RA lead per Cardiology.  Does patient need another type of test?  Please advise, thanks.    Please call patient to inform her of any new testing ordered as patient attempted to schedule this test this am.

## 2022-05-12 NOTE — TELEPHONE ENCOUNTER
I will leave the spine MRI decision to the neurosurgeon dept    She will need to see ortho for the hip and they will decide the next test for the hip.     I will discuss with the patient on May 19 appointment

## 2022-05-12 NOTE — TELEPHONE ENCOUNTER
OCCUPATIONAL THERAPY; orders received via fax. Form in your mailbox to be signed.     The patient is a 36y Female complaining of

## 2022-05-12 NOTE — TELEPHONE ENCOUNTER
Call placed to patient. Advised of message from provider. Patient will contanct Spine clinic regarding Spine MRI. Patient will contact orthopedic  service to schedule appointment.    Appointments in Next Year    May 19, 2022  9:00 AM  (Arrive by 8:45 AM)  New Sleep Patient with Josias Stinson MD  [facility] [place] ([facility] ) [phone]   May 20, 2022  8:00 AM  (Arrive by 7:40 AM)  Provider Visit with She Huang MD  [facility] ([facility] ) [phone]   May 23, 2022 12:00 AM  CARDIAC DEVICE CHECK - REMOTE with FLORES TECH1  [facility] ([facility] ) [phone]   May 25, 2022  7:30 AM  (Arrive by 7:10 AM)  Provider Visit with She Huang MD  [facility] ([facility] ) [phone]   Jun 16, 2022  3:45 PM  Return Cardiology with Yamilka Christina DO  [facility] ([facility] ) [phone]

## 2022-05-12 NOTE — TELEPHONE ENCOUNTER
Since patient is unable to complete an MRI Elizabeth Chen PA-C is recommending lumbar CT myelogram. Orders placed. Contacted patient. She would like a [employer] message sent to her with this info along with scheduling number. Mychart sent.

## 2022-05-12 NOTE — TELEPHONE ENCOUNTER
No number to call Cata back.  Called [facility] and asked to leave a voicemail message.  Intake person said the there is no voicemail number to transfer me to but that she would get a message to Cata to call us back. YANETH Peralta R.N.

## 2022-05-13 NOTE — TELEPHONE ENCOUNTER
Pt will call back to schedule         Pain Management Center Referral      1. Confirmed address with patient? Yes  2. Confirmed phone number with patient? Yes  3. Confirmed referring provider? Yes  4. Is the PCP the same as the referring provider? Yes  5. Has the patient been to any previous pain clinics? No  (If yes, send MARIA ISABEL with welcome letter)  6. Which insurance are we to bill for this appointment?  Medicare    7. Informed pt of cancellation (48 hour) policy? Yes    REGARDING OPIOID MEDICATIONS: We will always address appropriateness of opioid pain medications, but we generally will not automatically take on a prescribing role. When we do take on prescribing of opioids for chronic pain, it is in collaboration with the referring physician for an intermediate period of time (months), with an expectation that the primary physician or provider will assume the prescribing role if medications are effective at stable doses with demonstrated compliance. Therefore, please do not assume that your prescribing responsibilities end on the day of pain clinic consultation.  8. Informed pt of prescribing policy? Yes    9.Please be aware that once you are established with a pain provider and location, you will need to continue have all future visits with that provider and location. It is best to determine what location is the most convenient for you and schedule with that one.    ** PATIENT INFORMED OF THIS POLICY Yes      9. Referring Provider: She Huang     10. Criteria for Triage Eval:   -Missed/Failed 1st appointment? N/A     -Medication Focused? N/A     -Mental Health Concerns? (e.g. Recent psych hospitalization/snap shot)? N/A     -Active substance abuse? N/A     -Patient behaviors (e.g. Offensive language/raised voice)? N/A

## 2022-05-13 NOTE — LETTER
May 13, 2022    Amy Luna  [address] [place] [place] [address]    Dear Cas Reyes to the [facility] at the [facility]. We are located at [address], [address], [place] [address]. Your appointment has been scheduled on 5/31/2022 at 8:00a with Silva Mcdonald MD.    At your first visit, you will meet your team of caregivers who will help you to develop pain management strategies that will last a lifetime. You will meet with our support staff to review your insurance information and collect your co-payment if required by your insurance company. You will meet with a medical pain specialist and care coordinator who will assess your pain and develop a plan of care for your successful pain rehabilitation. You should expect to spend approximately 1 hour at your first visit with us. Usually, patients work with us for a period of 6-12 months, and eventually return to their primary doctor once their pain management has stabilized.      To help us make your visit go as smoothly as possible, please bring the following items with you on your visit:     Completed Pain Questionnaire enclosed in this packet.  If you do not bring the completed questionnaire, we may have to reschedule your appointment.    List of any medicines that you are currently taking or have been prescribed    Pertinent NON-[place] medical information such as medical records or tests results (X-rays, or laboratory tests)    Your health insurance card    Financial resources to cover your co-payment or balance due at the time of service (cash, personal check, Visa, and MasterCard are acceptable methods of payment)     Due to the high demand for new patient evaluations, you must notify the scheduling department 48 hours (2 days) in advance if you are not able to keep this appointment.  Failure to do so could affect your ability to reschedule with our clinic. Please  do not assume that you will receive any prescription medications at your first visit.    Please call [phone] with any questions regarding your appointment. We look forward to meeting you and working to address your health care needs.     Sincerely,    [facility]

## 2022-05-20 NOTE — PROGRESS NOTES
Amy is a 76 year old who is being evaluated via a billable video visit.      How would you like to obtain your AVS? MyChart  If the video visit is dropped, the invitation should be resent by: Text to cell phone: [phone]  Will anyone else be joining your video visit? No          This is a VIDEO ( using Doximity)  encounter with the patient.       Location of the provider : office   Location of the patient : home      08:07 --- first attempt   08:22 -- 08:27 second attempt     Message sent to the patient to reschedule this appointment       Diabetes Follow-up    How often are you checking your blood sugar? A few times a week  What time of day are you checking your blood sugars (select all that apply)?  Before meals  Have you had any blood sugars above 200?  Yes sometimes  Have you had any blood sugars below 70?  No    What symptoms do you notice when your blood sugar is low?  None    What concerns do you have today about your diabetes? None     Do you have any of these symptoms? (Select all that apply)  No numbness or tingling in feet.  No redness, sores or blisters on feet.  No complaints of excessive thirst.  No reports of blurry vision.  No significant changes to weight.    Have you had a diabetic eye exam in the last 12 months? No                Hyperlipidemia Follow-Up      Are you regularly taking any medication or supplement to lower your cholesterol?   Yes- liptor     Are you having muscle aches or other side effects that you think could be caused by your cholesterol lowering medication?  No    Hypertension Follow-up      Do you check your blood pressure regularly outside of the clinic? Yes     Are you following a low salt diet? Yes    Are your blood pressures ever more than 140 on the top number (systolic) OR more   than 90 on the bottom number (diastolic), for example 140/90? No    BP Readings from Last 2 Encounters:   05/09/22 125/76   04/11/22 111/55     Hemoglobin A1C POCT (%)   Date Value    02/05/2021 7.1 (H)   11/03/2020 6.8 (H)     Hemoglobin A1C (%)   Date Value   03/14/2022 6.6 (H)   11/19/2021 6.9 (H)     LDL Cholesterol Calculated (mg/dL)   Date Value   08/04/2021 90   07/28/2020 102 (H)         How many servings of fruits and vegetables do you eat daily?  2-3    On average, how many sweetened beverages do you drink each day (Examples: soda, juice, sweet tea, etc.  Do NOT count diet or artificially sweetened beverages)?   0    How many days per week do you exercise enough to make your heart beat faster? 3 or less    How many minutes a day do you exercise enough to make your heart beat faster? 9 or less    How many days per week do you miss taking your medication? 0

## 2022-05-23 NOTE — PROGRESS NOTES
Return to CT for repeat imaged.    Drank 240cc orange juice for home glucose monitoring in the 90s. Refused this RN to check BGM

## 2022-05-23 NOTE — PROCEDURES
[facility]    Procedure: Lumbar Myelogram    Date/Time: 5/23/2022 11:47 AM  Performed by: Tom Simpson PA-C  Authorized by: Tom Simpson PA-C       UNIVERSAL PROTOCOL   Site Marked: Yes  Prior Images Obtained and Reviewed:  Yes  Required items: Required blood products, implants, devices and special equipment available    Patient identity confirmed:  Verbally with patient  Patient was reevaluated immediately before administering moderate or deep sedation or anesthesia  Confirmation Checklist:  Patient's identity using two indicators, relevant allergies, procedure was appropriate and matched the consent or emergent situation and correct equipment/implants were available  Time out: Immediately prior to the procedure a time out was called    Universal Protocol: the Joint Commission Universal Protocol was followed    Preparation: Patient was prepped and draped in usual sterile fashion      SEDATION    Patient Sedated: No    See dictated procedure note for full details.    PROCEDURE  Describe Procedure: L5-S1 entry.    Air/cyst seen on CT.  Discussed with neuro rad.  Rolled patient again to evaluate if fixed or introduced during the case.  Repeat scan showed air to be at the same location.  Likely fixed.  Patient Tolerance:  Patient tolerated the procedure well with no immediate complications  Length of time physician/provider present for 1:1 monitoring during sedation: 0

## 2022-05-23 NOTE — DISCHARGE INSTRUCTIONS
Myelogram Discharge Instructions     After you go home:    You may resume your normal diet  Continue to drink at least 8 ounces of fluid every 1-2 hours until bedtime tonight and continue to drink extra fluids for the next 2 days  Caffeinated beverages may help prevent or reduce spinal headaches    Care of Puncture Site:    If there is a bandaid on your back - you may remove it tomorrow morning  You may shower tomorrow  No tub baths, whirlpools or swimming pool for 48 hours  Use ice packs as needed for discomfort     Activity - To help prevent spinal headache or spinal fluid leakage:    Minimize your activity today. Bedrest is recommended for the rest of the day to prevent or decrease the chance of getting a spinal headache. You can be up to the bathroom and for meals.  Keep your head up on extra pillows while in bed today  Resume normal activities tomorrow.   Avoid strenuous activity for the next 2 days  Do not drive a motor vehicle until tomorrow     Medicines:    You may resume all medications, including blood thinners  Resume your Warfarin/Coumadin at your regular dose today. Follow up with your provider to have your INR rechecked  Resume your Platelet Inhibitors and Aspirin tomorrow at your regular dose  For minor discomfort, you may take Acetaminophen (Tylenol) or Ibuprofen (Advil)            Call the doctor who ordered this procedure if:    Your headache becomes worse or is severe. (A minor headache is not unusual)  You have nausea or vomiting  The site is red, swollen, hot or tender  You have chills or a fever greater than 101 F (38 C)  Increase in pain, weakness or numbness  Stiff neck  Any questions or concerns    What to watch for:    A spinal headache can develop after this type of procedure.  It is described as a dull, throbbing headache, usually appearing within 48 hours after the procedure, that worsens when you are sitting upright or standing, but improves or goes away when you lie down. Most spinal  headaches resolve on their own.  If you believe that you may be experiencing a spinal headache, continue bedrest for 2-4 hours and drink plenty of fluids.  If your symptoms persist for 24 hours or more, call your doctor who ordered your procedure.  It can be normal to have some bruising or slight swelling at the injection site.      If you have questions or concerns call:                  [facility] Radiology Dept @ [phone]                                    between 8am-4:30pm Mon-Fri    If you have urgent questions outside of these normal business hours, please contact the [facility] on call doctor @ [phone]

## 2022-05-23 NOTE — PROGRESS NOTES
Care Suites Post Procedure Note    Patient Information  Name: Amy Luna  Age: 76 year old    Post Procedure  Time patient returned to Care Suites: 1130  Concerns/abnormal assessment: air bubble per PA, will CT scan  If abnormal assessment, provider notified: Yes  Plan/Other: Jimmy ESCOBEDO spoke with pt. Still stay with 1 hr bedrest from time of procedure.    Evy Meadows, RN

## 2022-05-23 NOTE — PROGRESS NOTES
Care Suites Discharge Nursing Note    Patient Information  Name: Amy Luna  Age: 76 year old    Discharge Education:  Discharge instructions reviewed: Yes  Additional education/resources provided: NA  Patient/patient representative verbalizes understanding: Yes  Patient discharging on new medications: No  Medication education completed: N/A    Discharge Plans:   Discharge location: home  Discharge ride contacted: Yes  Approximate discharge time: 115    Discharge Criteria:  Discharge criteria met and vital signs stable: Yes    Patient Belongs:  Patient belongings returned to patient: Yes    Evy Meadows RN

## 2022-05-24 NOTE — TELEPHONE ENCOUNTER
Reviewed imaging with patient and daughter  Slight worsening of L2 and L3 compression fractures  Patient notes continues to have pain along anterior thigh on the left however has noted significant improvement in symptoms   Recommend LSO brace, follow up in 6 weeks with XR   Referral Orhtotics placed     All in agreement with plans

## 2022-05-24 NOTE — PROGRESS NOTES
Elizabeth Chen PA-C requests schedule appointment with her 6 weeks after last appointment (5/9/22) with XR prior     Order placed. Routed to scheduling team for assistance

## 2022-05-25 NOTE — PROGRESS NOTES
"  {PROVIDER CHARTING PREFERENCE:500005}    Bari Reyes is a 76 year old who presents for the following health issues {ACCOMPANIED BY STATEMENT (Optional):754575}    HPI     Diabetes Follow-up      How often are you checking your blood sugar? { :731051}    What concerns do you have today about your diabetes? { :358332::\"None\"}     Do you have any of these symptoms? (Select all that apply)  { :348470}    Have you had a diabetic eye exam in the last 12 months? { :548803}        {Reference  Diabetes Management Resources :107394}    {Reference  Diabetes Log - 7 days :070288}    Hyperlipidemia Follow-Up      Are you regularly taking any medication or supplement to lower your cholesterol?   { :844284}    Are you having muscle aches or other side effects that you think could be caused by your cholesterol lowering medication?  { :930404}    Hypertension Follow-up      Do you check your blood pressure regularly outside of the clinic? { :250197}     Are you following a low salt diet? { :028975}    Are your blood pressures ever more than 140 on the top number (systolic) OR more   than 90 on the bottom number (diastolic), for example 140/90? { :647603}    BP Readings from Last 2 Encounters:   05/23/22 120/50   05/09/22 125/76     Hemoglobin A1C POCT (%)   Date Value   02/05/2021 7.1 (H)   11/03/2020 6.8 (H)     Hemoglobin A1C (%)   Date Value   03/14/2022 6.6 (H)   11/19/2021 6.9 (H)     LDL Cholesterol Calculated (mg/dL)   Date Value   08/04/2021 90   07/28/2020 102 (H)       {Depression and Anxiety Followup:408899}    How many servings of fruits and vegetables do you eat daily?  { :954715}    On average, how many sweetened beverages do you drink each day (Examples: soda, juice, sweet tea, etc.  Do NOT count diet or artificially sweetened beverages)?   { 1-11:645328}    How many days per week do you exercise enough to make your heart beat faster? { :138480}    How many minutes a day do you exercise enough to make " your heart beat faster? { :656800}    How many days per week do you miss taking your medication? {0-7 :512847}    {additonal problems for provider to add (Optional):212352}    Review of Systems   {ROS COMP (Optional):075823}      Objective    There were no vitals taken for this visit.  There is no height or weight on file to calculate BMI.  Physical Exam   {Exam List (Optional):395490}    {Diagnostic Test Results (Optional):648901}    {AMBULATORY ATTESTATION (Optional):503694}

## 2022-05-25 NOTE — PATIENT INSTRUCTIONS
Plan:  1. Gabapentin 100 mg 3 times a day   2. continue Oxycodone   3. Keep the appointment with the pain clinic  4. Schedule labs and appointment Aug - Sept for diabetes follow up   5.  Labs today - [address]

## 2022-05-25 NOTE — TELEPHONE ENCOUNTER
Odin Chen,    I have been seeing Mr Luna for pain management.    You have been following her for the compression fractures witch are worse according to the last CT.  You recommended back brace and f/u in 6 weeks   I don't see that you have discussed with the patient or with  your team about vertebroplasty.   Can you please comment on vertebroplasty option for this case ?    Thank you,    She Diaz MD  Internal Medicine

## 2022-05-25 NOTE — PROGRESS NOTES
Patient's instructions / PLAN:                                                        Plan:  1. Gabapentin 100 mg 3 times a day   2. continue Oxycodone   3. Keep the appointment with the pain clinic  4. Schedule labs and appointment Aug - Sept for diabetes follow up   5.  Labs today - [address]     ASSESSMENT & PLAN:                                                      (S32.030A) Closed compression fracture of L3 lumbar vertebra, initial encounter (H)  (primary encounter diagnosis)  (S32.040A) Closed compression fracture of L4 lumbar vertebra, initial encounter (H)  (M48.56XG) Non-traumatic compression fracture of L4 lumbar vertebra with delayed healing, subsequent encounter  Comment: pain and the CT are worse   I have been communicating with neurosurgeon team about her ( see tel from today)  Plan: gabapentin (NEURONTIN) 100 MG capsule, naloxone        (NARCAN) 4 MG/0.1ML nasal spray    (S32.000D) Compression fracture of lumbar vertebra with routine healing, unspecified lumbar vertebral level, subsequent encounter  Comment:   Plan: DX Hip/Pelvis/Spine            (Z78.0) Asymptomatic postmenopausal status  Comment:   Plan: DX Hip/Pelvis/Spine            (D50.9) Iron deficiency anemia, unspecified iron deficiency anemia type  Comment: after taking high dose ibuprofen   Plan: Iron & Iron Binding Capacity, Ferritin,        (I48.0) Paroxysmal atrial fibrillation (H)  Comment: stable   Plan: Continue same meds, same doses for now Off anticoag until she see cardio     (I10) Essential hypertension  Comment: Controlled    Plan: Continue same meds, same doses for now       (E11.69,  Z79.4) Type 2 diabetes mellitus with other specified complication, with long-term current use of insulin (H)  (N18.2) CKD (chronic kidney disease) stage 2, GFR 60-89 ml/min  Comment: stable   Plan: as above          Chief Complaint:                                                            SUBJECTIVE:                                            "         History of present illness     Myelogram: L2 and L3 worsening compression fx    L3 - L4 compression fx  LOV: L hip pain  Recommended MRI. Can't do it because of loose lead of the PPM     Pain:  -- 9/10 when severe  -- no pain if rest in bed  -- oxycodone - 1 tab daily prn, but she has been taking 4 tabs a day int he last few days because the pain is worse   -- the prior Rx was from back clinic  --  appointment w pain clinic May 31  -- no comment on vertebroplasty in the neurosurgeon notes,     ROS:                                                      ROS: negative for fever, chills, cough, wheezes, chest pain, shortness of breath, vomiting, abdominal pain, leg swelling       OBJECTIVE:                                                    Physical Exam :    Blood pressure 126/60, pulse 77, resp. rate 14, height 1.651 m (5' 5\"), weight 99.9 kg (220 lb 3.2 oz), SpO2 97 %, not currently breastfeeding.   NAD, appears comfortable in wheelchair   Skin: no rashes   Neck: supple, no JVD, No thyroidmegaly. Lymph nodes nonpalpable cervical and supraclavicular.  Chest: clear to auscultation bilaterally, good respiratory effort  Heart: S1 S2, RRR, no mgr appreciated  Neurologic: A, Ox3, no focal signs appreciated    PMHx: reviewed  Past Medical History:   Diagnosis Date     (HFpEF) heart failure with preserved ejection fraction (H)      Aortic stenosis      Chest pain      Depressive disorder      Diabetes (H)      Hyperlipidemia      Hypertension      Mitral annular calcification      Mitral stenosis      Obesity      Sleep apnea     CPAP      PSHx: reviewed  Past Surgical History:   Procedure Laterality Date     APPENDECTOMY       CV CORONARY ANGIOGRAM N/A 4/8/2020    Procedure: Coronary Angiogram;  Surgeon: Inez Peoples MD;  Location:  [facility] CATH LAB     CV LEFT HEART CATH N/A 4/8/2020    Procedure: Left Heart Cath;  Surgeon: Inez Peoples MD;  Location:  HE[facility]ARD[facility] CATH LAB     CV PFO " CLOSURE N/A 4/15/2020    Procedure: Patent Foramen Ovale Closure;  Surgeon: Ok Wilson MD;  Location:  OR     CV RIGHT HEART CATH MEASUREMENTS RECORDED N/A 2020    Procedure: Right Heart Cath;  Surgeon: Inez Peoples MD;  Location:  HEART CARDIAC CATH LAB     EP PACEMAKER N/A 2020    Procedure: EP Pacemaker;  Surgeon: Sohan Francis MD;  Location:  HEART CARD[facility] CATH LAB     GYN SURGERY       x2 ,      GYN SURGERY      total hysterectomy     IMPLANT PACEMAKER       REPAIR VALVE TRICUSPID N/A 4/15/2020    Procedure: REPAIR TRICUSPID VALVE WITH VILLA MC3 26MM.;  Surgeon: Ok Wilson MD;  Location:  OR     REPLACE VALVE AORTIC N/A 4/15/2020    Procedure: REPLACEMENT, AORTIC VALVE WITH INSPIRIS TISSUE VALVE 25 MM; ON PUMP WITH SOCORRO READ BY CARDIOLOGIST DR BLANTON.;  Surgeon: Ok Wilson MD;  Location:  OR     REPLACE VALVE MITRAL N/A 4/15/2020    Procedure: REPLACEMENT, MITRAL VALVE WITH EPIC TISSUE VALVE 27 MM.;  Surgeon: Ok Wilson MD;  Location:  OR        Meds: reviewed  Current Outpatient Medications   Medication Sig Dispense Refill     acetaminophen (TYLENOL) 500 MG tablet Take 1,000 mg by mouth 3 times daily       albuterol (PROAIR HFA/PROVENTIL HFA/VENTOLIN HFA) 108 (90 Base) MCG/ACT inhaler Inhale 2 puffs into the lungs every 4 hours as needed for shortness of breath / dyspnea or wheezing       atorvastatin (LIPITOR) 40 MG tablet Take 1 tablet (40 mg) by mouth At Bedtime 90 tablet 3     Biotin 40390 MCG TABS Take 10,000 mcg by mouth every morning        bisacodyl (DULCOLAX) 10 MG suppository Place 1 suppository (10 mg) rectally daily as needed for constipation       calcium carbonate (OS-NELLY) 1500 (600 Ca) MG tablet Take 1,200 mg by mouth daily       coenzyme Q-10 200 MG CAPS Take 200 mg by mouth every morning        Continuous Blood Gluc  (FREESTYLE ADAM READER) [place] 1 each every 14 days Use to  read blood sugars per  instructions. 1 each 0     Continuous Blood Gluc Sensor (FREESTYLE ADAM 14 DAY SENSOR) [place] 1 each every 14 days Change every 14 days. 2 each 6     cyanocobalamin (VITAMIN B-12) 100 MCG tablet Take 100 mcg by mouth daily       ferrous gluconate (FERGON) 324 (38 Fe) MG tablet Take 1 tablet (324 mg) by mouth daily (with breakfast) 30 tablet 3     fluticasone (FLONASE) 50 MCG/ACT nasal spray Spray 1 spray into both nostrils daily 16 g 4     furosemide (LASIX) 40 MG tablet Take 0.5 tablets (20 mg) by mouth daily 180 tablet 1     Ginkgo Biloba (GINKOBA PO) Take 250 mg by mouth daily On hold at [facility]       insulin aspart (NOVOLOG PEN) 100 UNIT/ML pen Inject 1-10 Units Subcutaneous 3 times daily (before meals) For Pre-Meal  - 164 give 1 unit,   - 189 give 2 units,  - 214 give 3 unit,  - 239 give 4 units,  - 264 give 5 units,  - 289 give 6 units,  - 314 give 7 units  - 339 give 8 units,  - 364 give 9 units.BG greater than or equal to 365 give 10 units.   Notify provider if glucose greater than or equal to 350 mg/dL after administration of correction dose. If given at mealtime, administer within 30 minutes of start of meal. 15 mL      insulin aspart (NOVOLOG PEN) 100 UNIT/ML pen Inject 1-7 Units Subcutaneous At Bedtime HIGH INSULIN RESISTANCE DOSING    Do Not give Bedtime Correction Insulin if BG less than 200.   For  - 224 give 1 units.   For  - 249 give 2 units.   For  - 274 give 3 units.   For  - 299 give 4 units.   For  - 324 give 5 units.   For  - 349 give 6 units.   For BG greater than or equal to 350 give 7 units. 15 mL      insulin  UNIT/ML vial Inject 32 Units Subcutaneous 2 times daily Inject 30 units every morning and 30 unit(s) every evening 10 mL      ipratropium (ATROVENT) 0.06 % nasal spray Spray 2 sprays into both nostrils 4 times daily as needed for rhinitis 3 Box 1     Lutein 40  MG CAPS Take 40 mg by mouth every morning        Magnesium 400 MG TABS Take 400 mg by mouth every evening        metFORMIN (GLUCOPHAGE-XR) 500 MG 24 hr tablet Take 1,500 mg by mouth daily (with breakfast)       metoprolol succinate ER (TOPROL-XL) 25 MG 24 hr tablet Take 2 tablets (50 mg) by mouth daily 180 tablet 3     oxyCODONE (ROXICODONE) 5 MG tablet Take 1 tablet (5 mg) by mouth every 6 hours as needed for pain or moderate to severe pain 30 tablet 0     oxyCODONE (ROXICODONE) 5 MG tablet Take 0.5 tablets (2.5 mg) by mouth every 4 hours as needed for moderate to severe pain 10 tablet 0     Propylene Glycol (SYSTANE BALANCE OP) Apply 1 drop to eye 3 times daily as needed (dry eyes)        sertraline (ZOLOFT) 100 MG tablet Take 1.5 tablets (150 mg) by mouth daily 135 tablet 0     TURMERIC PO Take 3 tablets by mouth daily        vitamin (B COMPLEX-C) tablet Take 1 tablet by mouth daily       vitamin C (ASCORBIC ACID) 1000 MG TABS Take 1,000 mg by mouth every evening        Vitamin D3 (CHOLECALCIFEROL) 125 MCG (5000 UT) tablet Take 1 tablet by mouth daily       zinc 23 MG LOZG lozenge Place 1 lozenge inside cheek daily HOLD at [facility]       nystatin (MYCOSTATIN) 748615 UNIT/GM external powder Apply topically 2 times daily (Patient not taking: Reported on 5/25/2022)       pantoprazole (PROTONIX) 40 MG EC tablet Take 1 tablet (40 mg) by mouth in the morning. (Patient not taking: Reported on 5/25/2022)       polyethylene glycol (MIRALAX) 17 GM/Dose powder Take 17 g by mouth as needed in the morning for constipation. Hold for diarrhea (Patient not taking: Reported on 5/25/2022) 510 g        Soc Hx: reviewed  Fam Hx: reviewed      45 minutes spent on the date of the encounter doing   chart review,   review of outside records,   review of test results,   interpretation of tests,   patient visit,   documentation,   discussion with other provider(s),   discussion with family and      She Diaz MD  Internal Medicine

## 2022-05-25 NOTE — TELEPHONE ENCOUNTER
Thank you for getting back to me and clarifying the vertebroplasty question.   The pain has been worse for the last few days. She was using Oxycodone once a day and for the last few days she has been using 3-4 tabs a day .  She had few tabs left of Oxycodone. Since we are in the same system and we can monitor the Rx, I refilled the Oxycodone.  I also started her on Gabapentin.  Thank you for referring her to the pain clinic. She has singh May 31.     She Diaz MD  Internal Medicine

## 2022-05-25 NOTE — TELEPHONE ENCOUNTER
Reviewed with Dr. Huang the following when asked about possible vertebroplasty options      Any fractures our team recommends 12 weeks of conservative measures; especially if symptoms are improving  Patient pain has significantly improved  Therefore plan will be to continue conservative measures in brace and light activity and follow up in 6 weeks to assess XR and symptoms    Let me know if any further questions    Thanks!  Elizabeth

## 2022-05-25 NOTE — NURSING NOTE
"/60   Pulse 77   Resp 14   Ht 1.651 m (5' 5\")   Wt 99.9 kg (220 lb 3.2 oz)   SpO2 97%   BMI 36.64 kg/m    Patient in for follow up on Lt leg pain.  "

## 2022-05-26 NOTE — TELEPHONE ENCOUNTER
She is scheduled for DEXA on May 27    We will keep in touch.    She Diaz MD  Internal Medicine

## 2022-05-27 NOTE — PROGRESS NOTES
[facility] Pain Management     Date of visit: 5/31/2022    Assessment:  Amy Luna is a 76 year old female with a past medical history significant for heart failure, HTN, hyperlipidemia, mitral valve stenosis, aortic stenosis, diabetes mellitus, and obesity who presents with complaints of low back pain .     1. Low back pain- etiology likely related to worsening of L3 and L4 compression fracture. Significant lumbar spondylosis also noted.     1. Closed compression fracture of L3 lumbar vertebra, with delayed healing, subsequent encounter    2. Non-traumatic compression fracture of L4 lumbar vertebra with delayed healing, subsequent encounter        Plan:  The following recommendations were given to the patient. Diagnosis, treatment options, risks, benefits, and alternatives were discussed, and all questions were answered. The patient expressed understanding of the plan for management.     I am recommending a multidisciplinary treatment plan to help this patient better manage her pain.  This includes:      1.  Pain Physical Therapy: recommended Amy focus on gentle stretches for now. May consider formal physical therapy course in the future.    2.  Pain Psychologist to address relaxation, behavioral change, coping style, and other factors important to improvement.  NO   3.  Medication Management:   1. Amy reports excellent pain benefit with gabapentin. She has tolerated this medication well without any side effects. We discussed a dose increase and she is interested. She will increase gabapentin to [phone] for 3-4 days. Then increase to [phone] for 3-4 days. Then increase to 200mg TID. Call with issues. We may consider additional dose increase to 300mg TID if well tolerated. She has most of her prescription remaining so did not send over a new script today but I am happy to refill.  2. Given severe pain related to compression fractures, low dose with very good pain benefit, and no  red flags, reasonable to continue oxycodone as needed for severe pain. Okay to continue oxycodone, 2.5mg as needed for severe pain per primary care provider.    3. Okay to continue Tylenol. Safe dosing provided.  Acetaminophen (tylenol) comes in 325mg and 500mg sizes.  In the situation of normal liver function, you can take up to 1000mg per dose.  You should limit to no more than 3,000mg per day.  Acetaminophen is available in many over the counter medications including cold medications, so make sure to read labels.    4.  Potential procedures: not at this time.     5.  Referrals: Neurosurgery recommendations were to start wearing a back brace, order for Orthotics was placed. I advised she call to schedule an appointment with Orthotics for back brace. Follow up with Neursurgery as planned 6/20.    6.  Follow up with SARBJIT Purvis CNP in 4 weeks.    Review of Electronic Chart: Today I have also reviewed available medical information in the patient's medical record at [place] ([facility]), including relevant provider notes, laboratory work, and imaging.       SARBJIT Purvis CNP  [facility] Pain Management       -------------------------------------------------    Subjective:    Reason for consultation:    Amy Luna is a 76 year old female who is seen in consultation today at the request of her Neurosurgery provider, Elizabeth Chen PA-C, for evaluation of her pain issues and recommendations for management, with specific emphasis on  My Clinical Question Is: low back pain   Reason for Referral: Evaluation for Comprehensive Services   Please review opioid agreement in process instructions, do you agree to these terms? No   Are there any red flags that may impact the assessment or management of the patient? N/A      Please see the [facility] health questionnaire which the patient completed and reviewed with me in detail.    Review of [place] Prescription Monitoring Program (): No  "concern for abuse or misuse of controlled medications based on this report.     Review of Electronic Chart: Today I have also reviewed available medical information in the patient's medical record at [place] ([facility]), including relevant provider notes, laboratory work, and imaging.     Pain medications are being prescribed by Dr. Abdi.     Chief Complaint:    Chief Complaint   Patient presents with     Pain       Pain history:  Amy Luna is a 76 year old female who presents for initial evaluation of chief complaint of low back pain. She presents with her .     She first started having problems with low back pain in February of 2022. Unclear of clear inciting factor but does note the pain seemed to develop after lifting a heavy pile of books. She managed her pain with Advil, unfortunately developed a GI bleed. She was admitted to the hospital from 3/14-3/19. [facility] She developed left lateral hip pain on the last day of her stay, was diagnosed with GT bursitis by [facility]. She was given a Medrol dose pack without clear benefit. Her pain has been intermittent, seeming to be more bothersome over the last few weeks. She was referred to Neurosurgery, had a visit with Elizabeth Chen PA-C. She had a CT myelogram and was diagnosed with fractures of the inferior right L4 endplate and superior left L3 endplate, most recent test on 5/23 shows worsening. She is prescribed gabapentin and oxycodone by primary care provider. States gabapentin is most helpful 100mg TID, \"the only way I can survive the day.\" She takes oxycodone 2.5mg only as needed for severe pain, hasn't taken it in a few days. She note this is somewhat effective. It sounds like plan is to try bracing and additional pain medication, if not improving consideration of vertebroplasty. She has a DEXA scan scheduled in a few weeks- needed to be a few weeks after her CT myelogram. Her pain is most " bothersome upon waking up in the morning and getting into bed at night, no pain while sleeping at night. The pain is located in bilateral low back, radiates into left lateral/anterior thigh, right hip only. Numbness and tingling in left lateral/anterior thigh.       Pain description:  Location: low back pain   Quality: aching and sharp  Severity/Intensity: 1/10 at best, 10/10 at worst, 3-4/10 on average  Aggravating factors include: getting out of bed, standing, leaning to bend over  Relieving factors include: after 10 minutes in bed    The patient otherwise denies bowel or bladder incontinence, parasthesias, weakness, saddle anesthesia, unintentional weight loss (primary care provider aware), or fever/chills/sweats.     Amy Luna has not been seen at a pain clinic in the past.      Pain Treatments:  (H--helped; HI--Helped initially; [facility]--Somewhat helpful; NH--No help; W--worse; SE--side effects; ?--Unsure if helpful)   1. Medications:       Current pain medications:   Gabapentin 100mg TID- H   Oxycodone 2.5mg TID or QID- H, taking only as needed for severe pain   Tylenol 650mg TID-  H, takes with gabapentin      Other relevant medications:   Zoloft 150mg daily- H    Current calculated MME: 7    1. Previous Pain Relevant Medications:   Opiates: oxycodone- H   NSAIDS: Advil- H, SE, GI bleed    Muscle Relaxants: no   Anti-migraine mediations: no   Anti-depressants: Zoloft- H   Sleep aids:no   Anxiolytics: no   Neuropathics: gabapentin- H    Topicals: Biofreeze- [facility], lidocaine patches- NH   Other medications not covered above: Tylenol- H    2. Physical Therapy: yes a few sessions with [facility] in [place]  3. Pain Psychology: no  4. Surgery: no?  5. Injections: no  6. Chiropractic: yes over the years and recently x3 visits- W  7. Acupuncture: no  8. TENS Unit: yes-?    Past Medical History:  Past Medical History:   Diagnosis Date     (HFpEF) heart failure with preserved ejection fraction (H)       Aortic stenosis      Chest pain      Depressive disorder      Diabetes (H)      Hyperlipidemia      Hypertension      Mitral annular calcification      Mitral stenosis      Obesity      Sleep apnea     CPAP       Past Surgical History:  Past Surgical History:   Procedure Laterality Date     APPENDECTOMY       CV CORONARY ANGIOGRAM N/A 2020    Procedure: Coronary Angiogram;  Surgeon: Inez Peoples MD;  Location:  HEART CARDIAC CATH LAB     CV LEFT HEART CATH N/A 2020    Procedure: Left Heart Cath;  Surgeon: Inez Peoples MD;  Location:  HEART CARDIAC CATH LAB     CV PFO CLOSURE N/A 4/15/2020    Procedure: Patent Foramen Ovale Closure;  Surgeon: Ok Wilson MD;  Location:  OR     CV RIGHT HEART CATH MEASUREMENTS RECORDED N/A 2020    Procedure: Right Heart Cath;  Surgeon: Inez Peoples MD;  Location:  HEART CARDIAC CATH LAB     EP PACEMAKER N/A 2020    Procedure: EP Pacemaker;  Surgeon: Sohan Francis MD;  Location:  HEART CARDIAC CATH LAB     GYN SURGERY       x2 ,      GYN SURGERY      total hysterectomy     IMPLANT PACEMAKER       REPAIR VALVE TRICUSPID N/A 4/15/2020    Procedure: REPAIR TRICUSPID VALVE WITH VILLA MC3 26MM.;  Surgeon: Ok Wilson MD;  Location:  OR     REPLACE VALVE AORTIC N/A 4/15/2020    Procedure: REPLACEMENT, AORTIC VALVE WITH INSPIRIS TISSUE VALVE 25 MM; ON PUMP WITH SOCORRO READ BY CARDIOLOGIST DR BLANTON.;  Surgeon: Ok Wilson MD;  Location:  OR     REPLACE VALVE MITRAL N/A 4/15/2020    Procedure: REPLACEMENT, MITRAL VALVE WITH EPIC TISSUE VALVE 27 MM.;  Surgeon: Ok Wilson MD;  Location:  OR       Medications:  Current Outpatient Medications   Medication Sig Dispense Refill     acetaminophen (TYLENOL) 500 MG tablet Take 1,000 mg by mouth 3 times daily       albuterol (PROAIR HFA/PROVENTIL HFA/VENTOLIN HFA) 108 (90 Base) MCG/ACT inhaler Inhale 2 puffs into the  lungs every 4 hours as needed for shortness of breath / dyspnea or wheezing       atorvastatin (LIPITOR) 40 MG tablet Take 1 tablet (40 mg) by mouth At Bedtime 90 tablet 3     Biotin 85084 MCG TABS Take 10,000 mcg by mouth every morning        bisacodyl (DULCOLAX) 10 MG suppository Place 1 suppository (10 mg) rectally daily as needed for constipation       calcium carbonate (OS-NELLY) 1500 (600 Ca) MG tablet Take 1,200 mg by mouth daily       coenzyme Q-10 200 MG CAPS Take 200 mg by mouth every morning        Continuous Blood Gluc  (FREESTYLE ADAM READER) HARJEET 1 each every 14 days Use to read blood sugars per  instructions. 1 each 0     Continuous Blood Gluc Sensor (FREESTYLE ADAM 14 DAY SENSOR) MISC 1 each every 14 days Change every 14 days. 2 each 6     cyanocobalamin (VITAMIN B-12) 100 MCG tablet Take 100 mcg by mouth daily       ferrous gluconate (FERGON) 324 (38 Fe) MG tablet Take 1 tablet (324 mg) by mouth daily (with breakfast) 30 tablet 3     fluticasone (FLONASE) 50 MCG/ACT nasal spray Spray 1 spray into both nostrils daily 16 g 4     furosemide (LASIX) 40 MG tablet Take 0.5 tablets (20 mg) by mouth daily 180 tablet 1     gabapentin (NEURONTIN) 100 MG capsule Take 1 capsule (100 mg) by mouth 3 times daily 90 capsule 1     Ginkgo Biloba (GINKOBA PO) Take 250 mg by mouth daily On hold at TCU       insulin aspart (NOVOLOG PEN) 100 UNIT/ML pen Inject 1-10 Units Subcutaneous 3 times daily (before meals) For Pre-Meal  - 164 give 1 unit,   - 189 give 2 units,  - 214 give 3 unit,  - 239 give 4 units,  - 264 give 5 units,  - 289 give 6 units,  - 314 give 7 units  - 339 give 8 units,  - 364 give 9 units.BG greater than or equal to 365 give 10 units.   Notify provider if glucose greater than or equal to 350 mg/dL after administration of correction dose. If given at mealtime, administer within 30 minutes of start of meal. 15 mL      insulin  aspart (NOVOLOG PEN) 100 UNIT/ML pen Inject 1-7 Units Subcutaneous At Bedtime HIGH INSULIN RESISTANCE DOSING    Do Not give Bedtime Correction Insulin if BG less than 200.   For  - 224 give 1 units.   For  - 249 give 2 units.   For  - 274 give 3 units.   For  - 299 give 4 units.   For  - 324 give 5 units.   For  - 349 give 6 units.   For BG greater than or equal to 350 give 7 units. 15 mL      insulin  UNIT/ML vial Inject 32 Units Subcutaneous 2 times daily Inject 30 units every morning and 30 unit(s) every evening 10 mL      ipratropium (ATROVENT) 0.06 % nasal spray Spray 2 sprays into both nostrils 4 times daily as needed for rhinitis 3 Box 1     Lutein 40 MG CAPS Take 40 mg by mouth every morning        Magnesium 400 MG TABS Take 400 mg by mouth every evening        metFORMIN (GLUCOPHAGE-XR) 500 MG 24 hr tablet Take 1,500 mg by mouth daily (with breakfast)       metoprolol succinate ER (TOPROL-XL) 25 MG 24 hr tablet Take 2 tablets (50 mg) by mouth daily 180 tablet 3     naloxone (NARCAN) 4 MG/0.1ML nasal spray Spray 1 spray (4 mg) into one nostril alternating nostrils once as needed for opioid reversal every 2-3 minutes until assistance arrives 0.2 mL 1     nystatin (MYCOSTATIN) 841419 UNIT/GM external powder Apply topically 2 times daily       oxyCODONE (ROXICODONE) 5 MG tablet Take 1 tablet (5 mg) by mouth every 6 hours as needed for pain or moderate to severe pain 30 tablet 0     oxyCODONE (ROXICODONE) 5 MG tablet Take 0.5 tablets (2.5 mg) by mouth every 4 hours as needed for moderate to severe pain 10 tablet 0     pantoprazole (PROTONIX) 40 MG EC tablet Take 1 tablet (40 mg) by mouth in the morning.       polyethylene glycol (MIRALAX) 17 GM/Dose powder Take 17 g by mouth as needed in the morning for constipation. Hold for diarrhea 510 g      Propylene Glycol (SYSTANE BALANCE OP) Apply 1 drop to eye 3 times daily as needed (dry eyes)        sertraline (ZOLOFT) 100 MG  tablet Take 1.5 tablets (150 mg) by mouth daily 135 tablet 0     TURMERIC PO Take 3 tablets by mouth daily        vitamin (B COMPLEX-C) tablet Take 1 tablet by mouth daily       vitamin C (ASCORBIC ACID) 1000 MG TABS Take 1,000 mg by mouth every evening        Vitamin D3 (CHOLECALCIFEROL) 125 MCG (5000 UT) tablet Take 1 tablet by mouth daily       zinc 23 MG LOZG lozenge Place 1 lozenge inside cheek daily HOLD at TCU         Allergies:     Allergies   Allergen Reactions     Penicillins Hives     3 weeks after taking med got hives.       Pioglitazone Other (See Comments)     Increased urinary frequency, fatigue, dyspnea       Social History:  Home situation: lives at home with   Support system:   Occupation/Schooling: retired    Tobacco use: no  Drug use: no  Alcohol use: no  History of chemical dependency treatment: no  Mental health admissions: no    Family history:  Family History   Problem Relation Age of Onset     Diabetes Mother 56     Congenital heart disease Father 23     Colon Cancer No family hx of        Review of Systems:    POSTIVE IN BOLD  GENERAL: fever/chills, fatigue, general unwell feeling, weight gain/loss.  HEAD/EYES:  headache, dizziness, or vision changes.    EARS/NOSE/THROAT: nosebleeds, hearing loss, sinus infection, earache, tinnitus.  IMMUNE:  allergies, cancer, immune deficiency, or infections.  SKIN:  itching, rash, hives  HEME/Lymphatic: anemia, easy bruising, easy bleeding.  RESPIRATORY: cough, wheezing, or shortness of breath  CARDIOVASCULAR/Circulation: extremity edema, syncope, hypertension, tachycardia, or angina.  GASTROINTESTINAL: abdominal pain, nausea/emesis, diarrhea, constipation,  hematochezia, or melena.  ENDOCRINE:  diabetes, steroid use,  thyroid disease or osteoporosis.  MUSCULOSKELETAL: joint pain, stiffness, neck pain, back pain, arthritis, or gout.  GENITOURINARY: frequency, urgency, dysuria, difficulty voiding, hematuria or  incontinence.  NEUROLOGIC: weakness, numbness, paresthesias, seizure, tremor, stroke or memory loss.  PSYCHIATRIC: depression, anxiety, stress, suicidal thoughts or mood swings.     Objective:    Imaging:  CT of lumbar spine was completed on 5/23/2022 and shows:  T12-L1: Normal disc height. Mild marginal osteophyte. No herniation.  Mild facet arthropathy. No spinal canal or foraminal stenosis.      L1-L2: Mild disc space narrowing with marginal osteophyte  preferentially on the left. Mild facet arthropathy. Mild spinal canal  stenosis. Mild-to-moderate bilateral foraminal stenosis.      L2-L3: Moderate disc space narrowing with moderate disc bulge and  marginal osteophyte preferentially on the left. Mild-to-moderate  left-sided facet arthropathy. Moderate to severe spinal canal  stenosis. Severe left and moderate right foraminal stenoses.      L3-L4: Mild disc space narrowing with mild disc bulge. Severe facet  arthropathy with ligamentum flavum thickening. Moderate spinal canal  stenosis. Moderate left and mild right foraminal stenosis.      L4-L5: Mild disc space narrowing with mild disc bulge and marginal  osteophyte. Severe facet arthropathy. Focus of air within the thecal  sac along the right lateral recess at the mid L4 level. Mild to  moderate spinal canal stenosis. Severe right foraminal stenosis.  Moderate left foraminal stenosis.      L5-S1: Mild disc space narrowing. Severe facet arthropathy with facet  ankylosis. No spinal canal stenosis. Moderate right foraminal  stenosis. Mild left foraminal stenosis.                                                                       IMPRESSION:  1.  Worsening superior endplate fracture at L3 with 40% height loss  and increased comminuted component on the left.  2.  Worsening subtle inferior endplate compression fracture at L2 with  resorption along the endplate and developing sclerotic change.  3.  Interval resorption along the inferior endplate fracture at  L4  with surrounding sclerotic change.  4.  Moderate to advanced lumbar spondylosis with moderate to severe  spinal canal stenosis at L2-L3, moderate spinal canal stenosis at  L3-L4, and mild/moderate spinal canal stenosis at L4-L5.  5.  Severe foraminal stenosis right L4-L5 and left L2-L3. Moderate  foraminal stenoses right L2-L3, left L3-L4, left L4-L5, and right  L5-S1.  6.  Focus of air within the thecal sac on the right at L4, likely  iatrogenic.       Physical Exam:  Vitals:    05/31/22 0802   BP: 110/71   Pulse: 108   SpO2: 96%     Exam:  Constitutional: Well developed, well nourished, appears stated age. Obese.   HEENT: Head atraumatic, normocephalic. Eyes without conjunctival injection or jaundice. Oropharynx clear. Neck supple. No obvious neck masses.  Skin: No rash, lesions, or petechiae of exposed skin.   Extremities: Peripheral pulses intact. No clubbing, cyanosis, or edema.  Psychiatric/mental status: Alert, without lethargy or stupor. Speech fluent. Appropriate affect. Mood normal. Able to follow commands without difficulty.     Musculoskeletal exam:  Unable to walk on the heels and toes. Patient has antalgic gait favoring neither side.   Normal bulk and tone. Unremarkable spinal curvature.     Cervical spine:  Range of motion reduced.   Tenderness in the cervical paraspinal muscles.No  Rotation/ext to right: pain free  Rotation/ext to left: pain free    Thoracic spine:    Kyphosis. Yes   Tenderness in the thoracic paraspinal muscles.No    Lumbar spine:    Flex:  90 degrees   Ext: 5 degrees   Tenderness in the lumbar paraspinal muscles.No    Focal tenderness: No SI joint, gluteal, piriformis tenderness. Mild left GT tenderness      Neurologic exam:  CN:  Cranial nerves 2-12 are grossly intact  Motor:  5/5 UE and LE strength            Shoulder abduction (deltoid C5,6)    R: 5/5 L: 5/5  Elbow flexion (bicepts C5,6)      R: 5/5 L: 5/5  Elbow extension (tricepts C7)     R: 5/5 L: 5/5  Finger abduction  (C8)      R: 5/5 L: 5/5  Thumb flexion (C8)       R: 5/5 L: 5/5        Hip flexion (Iliosoas L1,2,3)     R: 5/5 L: 5/5  Knee extension (quadricepts L2,3,4)    R: 5/5 L: 5/5  Ankle dorsiflexion (tibialis anterior L4,5)   R: 5/5 L: 5/5    Ankle plantarflexion (gastrocnemius, soleus S1-2)  R: 5/5 L: 5/5  Big toe extension (ext. Dickinson lungus L5,S1)   R: 5/5 L: 5/5   Knee flexion (hamstrings L5, S1-2)    R: 5/5 L: 5/5    Reflexes:     Biceps:     R:  2/4 L: 2/4   Brachioradialis   R:  2/4 L: 2/4   Patella:  R:  1/4 L: 1/4   Achilles:  R:  1/4 L: 1/4    Sensory:   Light touch: normal bilateral upper and lower extremities    No allodynia, dysesthesia, or hyperalgesia.    DIRE Score for ongoing opioid management is calculated as follows:   Diagnosis = 2 pts (slowly progressive; moderate pain/objective findings)   Intractability = 2 pts (most treatments tried; patient not fully engaged/barriers)   Risk    Psych = 3 pts (no significant personality dysfunction/mental illness; good communication with clinic)    Chem Hlth = 3 pts (no history of chemical dependency; not drug-focused)   Reliability = 3 pts (highly reliable with meds, appointments, treatments)   Social = 3 pts (supportive family/close relationships; involved in work/school; no isolation)   (Psych + Chem hlth + Reliability + Social) = 16     Efficacy = 2 pts (moderate benefit/function; low med dose; too early/not tried meds)         DIRE Score = 18        7-13: likely NOT suitable candidate for long-term opioid analgesia       14-21: may be a suitable candidate for long-term opioid analgesia       BILLING TIME DOCUMENTATION:   The total TIME spent on this patient on the date of the encounter/appointment was 57 minutes.      TOTAL TIME includes:   Time spent preparing to see the patient (reviewing records and tests)   Time spent face to face (or over the phone) with the patient   Time spent ordering tests, medications, procedures and referrals   Time spent Referring  and communicating with other healthcare professionals   Time spent documenting clinical information in Epic

## 2022-05-31 NOTE — TELEPHONE ENCOUNTER
Attempted to reach out to patient, no answer. Unable to leave voice message.    Writer sent patient MyChart message.

## 2022-05-31 NOTE — NURSING NOTE
PEG Score 5/31/2022   PEG Total Score 5.67         Yissel Deutsch MA  [facility] Pain Management [place]

## 2022-05-31 NOTE — PATIENT INSTRUCTIONS
1.  Pain Physical Therapy:  continue gentle stretches for now. May consider formal physical therapy refresher in the future.    2.  Pain Psychologist to address relaxation, behavioral change, coping style, and other factors important to improvement.  NO   3.  Medication Management:   Increase gabapentin to [phone] for 3-4 days. Then increase to [phone] for 3-4 days. Then increase to 200mg three times daily. Call with issues.  Continue oxycodone, 2.5mg as needed for severe pain.   Continue Tylenol. See safe dosing below.  Acetaminophen (tylenol) comes in 325mg and 500mg sizes.  In the situation of normal liver function, you can take up to 1000mg per dose.  You should limit to no more than 3,000mg per day.  Acetaminophen is available in many over the counter medications including cold medications, so make sure to read labels.    4.  Potential procedures: not at this time.     5.  Referrals: call to schedule an appointment with Orthotics for back brace. Follow up with Neursurgery as planned.    6.  Follow up with SARBJIT Purvis CNP in 4 weeks.    ----------------------------------------------------------------  Clinic Number:  [phone]   Call with any questions about your care and for scheduling assistance.   Calls are returned Monday through Friday between 8 AM and 4:30 PM. We usually get back to you within 2 business days depending on the issue/request.    If we are prescribing your medications:  For opioid medication refills, call the clinic or send a Solulink message 7 days in advance.  Please include:  Name of requested medication  Name of the pharmacy.  For non-opioid medications, call your pharmacy directly to request a refill. Please allow 3-4 days to be processed.   Per [place] State Law:  All controlled substance prescriptions must be filled within 30 days of being written.    For those controlled substances allowing refills, pickup must occur within 30 days of last fill.      We believe  regular attendance is key to your success in our program!    Any time you are unable to keep your appointment we ask that you call us at least 24 hours in advance to cancel.This will allow us to offer the appointment time to another patient.   Multiple missed appointments may lead to dismissal from the clinic.

## 2022-05-31 NOTE — TELEPHONE ENCOUNTER
[facility] Call Center    Phone Message    May a detailed message be left on voicemail: yes     Reason for Call: Other: Patient has questions about back brace and seeing orthotics. Please call back at [phone].     Action Taken: Other: [facility]surgery    Travel Screening: Not Applicable

## 2022-05-31 NOTE — TELEPHONE ENCOUNTER
Returned patient call. Patient needed phone number to call for orthotics. Writer provided patient with phone number for orthotics ([phone]). Patient will call orthotics to get back brace. Advised patient to call neurosurgery clinic with further questions. Patient voiced agreement and understanding.

## 2022-06-09 NOTE — TELEPHONE ENCOUNTER
Left detailed voicemail message for Naila MARC giving approval of requested orders. YANETH Peralta R.N.

## 2022-06-09 NOTE — TELEPHONE ENCOUNTER
Naila, from [facility], calling in for Verbal Orders.     1 visit per week for 2 weeks with 2 PRNS with the intend of Discharging patient.     Notes:   Patient is taking gabapentin 200 MG, only been taking twice a day only. Taking 2 Metformin 500 MG daily. Patient not taking taking not anything for her bowel.  Health has improved.    [phone]

## 2022-06-15 NOTE — TELEPHONE ENCOUNTER
6/14/22-8/12/22 HOME HEALTH CERTIFICATION; order received via fax. Form in your mailbox to be signed.

## 2022-06-16 NOTE — LETTER
6/16/2022    She Huang MD  [address] [facility] [address]    RE: Amy Luna       Dear Colleague,     I had the pleasure of seeing Amy Luna in the [facility].  HPI and Plan:   See dictation    No orders of the defined types were placed in this encounter.      Orders Placed This Encounter   Medications     ferrous sulfate 140 (45 Fe) MG TBCR CR tablet     Sig: Take 280 mg by mouth daily       Medications Discontinued During This Encounter   Medication Reason     ferrous gluconate (FERGON) 324 (38 Fe) MG tablet Medication Reconciliation Clean Up         Encounter Diagnoses   Name Primary?     Cardiac pacemaker in situ      AV block        CURRENT MEDICATIONS:  Current Outpatient Medications   Medication Sig Dispense Refill     acetaminophen (TYLENOL) 500 MG tablet Take 1,000 mg by mouth 3 times daily       albuterol (PROAIR HFA/PROVENTIL HFA/VENTOLIN HFA) 108 (90 Base) MCG/ACT inhaler Inhale 2 puffs into the lungs every 4 hours as needed for shortness of breath / dyspnea or wheezing       atorvastatin (LIPITOR) 40 MG tablet Take 1 tablet (40 mg) by mouth At Bedtime 90 tablet 3     Biotin 64422 MCG TABS Take 10,000 mcg by mouth every morning        bisacodyl (DULCOLAX) 10 MG suppository Place 1 suppository (10 mg) rectally daily as needed for constipation       calcium carbonate (OS-NELLY) 1500 (600 Ca) MG tablet Take 1,200 mg by mouth daily       coenzyme Q-10 200 MG CAPS Take 200 mg by mouth every morning        Continuous Blood Gluc  (FREESTYLE ADAM READER) HARJEET 1 each every 14 days Use to read blood sugars per  instructions. 1 each 0     Continuous Blood Gluc Sensor (FREESTYLE ADAM 14 DAY SENSOR) [facility] 1 each every 14 days Change every 14 days. 2 each 6     cyanocobalamin (VITAMIN B-12) 100 MCG tablet Take 100 mcg by mouth daily       ferrous sulfate 140 (45 Fe) MG TBCR CR tablet Take 280 mg by mouth daily       fluticasone (FLONASE)  50 MCG/ACT nasal spray Spray 1 spray into both nostrils daily 16 g 4     furosemide (LASIX) 40 MG tablet Take 0.5 tablets (20 mg) by mouth daily 180 tablet 1     gabapentin (NEURONTIN) 100 MG capsule Take 1 capsule (100 mg) by mouth 3 times daily (Patient taking differently: Take 200 mg by mouth 3 times daily) 90 capsule 1     Ginkgo Biloba (GINKOBA PO) Take 250 mg by mouth daily On hold at TCU       insulin aspart (NOVOLOG PEN) 100 UNIT/ML pen Inject 1-10 Units Subcutaneous 3 times daily (before meals) For Pre-Meal  - 164 give 1 unit,   - 189 give 2 units,  - 214 give 3 unit,  - 239 give 4 units,  - 264 give 5 units,  - 289 give 6 units,  - 314 give 7 units  - 339 give 8 units,  - 364 give 9 units.BG greater than or equal to 365 give 10 units.   Notify provider if glucose greater than or equal to 350 mg/dL after administration of correction dose. If given at mealtime, administer within 30 minutes of start of meal. 15 mL      insulin aspart (NOVOLOG PEN) 100 UNIT/ML pen Inject 1-7 Units Subcutaneous At Bedtime HIGH INSULIN RESISTANCE DOSING    Do Not give Bedtime Correction Insulin if BG less than 200.   For  - 224 give 1 units.   For  - 249 give 2 units.   For  - 274 give 3 units.   For  - 299 give 4 units.   For  - 324 give 5 units.   For  - 349 give 6 units.   For BG greater than or equal to 350 give 7 units. 15 mL      insulin  UNIT/ML vial Inject 32 Units Subcutaneous 2 times daily Inject 30 units every morning and 30 unit(s) every evening 10 mL      ipratropium (ATROVENT) 0.06 % nasal spray Spray 2 sprays into both nostrils 4 times daily as needed for rhinitis 3 Box 1     Lutein 40 MG CAPS Take 40 mg by mouth every morning        Magnesium 400 MG TABS Take 400 mg by mouth every evening        metFORMIN (GLUCOPHAGE-XR) 500 MG 24 hr tablet Take 1,000 mg by mouth daily (with breakfast)       metoprolol succinate ER  (TOPROL-XL) 25 MG 24 hr tablet Take 2 tablets (50 mg) by mouth daily 180 tablet 3     naloxone (NARCAN) 4 MG/0.1ML nasal spray Spray 1 spray (4 mg) into one nostril alternating nostrils once as needed for opioid reversal every 2-3 minutes until assistance arrives 0.2 mL 1     nystatin (MYCOSTATIN) 798977 UNIT/GM external powder Apply topically 2 times daily       oxyCODONE (ROXICODONE) 5 MG tablet Take 1 tablet (5 mg) by mouth every 6 hours as needed for pain or moderate to severe pain 30 tablet 0     oxyCODONE (ROXICODONE) 5 MG tablet Take 0.5 tablets (2.5 mg) by mouth every 4 hours as needed for moderate to severe pain 10 tablet 0     pantoprazole (PROTONIX) 40 MG EC tablet Take 1 tablet (40 mg) by mouth in the morning.       polyethylene glycol (MIRALAX) 17 GM/Dose powder Take 17 g by mouth as needed in the morning for constipation. Hold for diarrhea 510 g      Propylene Glycol (SYSTANE BALANCE OP) Apply 1 drop to eye 3 times daily as needed (dry eyes)        sertraline (ZOLOFT) 100 MG tablet Take 1.5 tablets (150 mg) by mouth daily 135 tablet 0     TURMERIC PO Take 3 tablets by mouth daily        vitamin (B COMPLEX-C) tablet Take 1 tablet by mouth daily       vitamin C (ASCORBIC ACID) 1000 MG TABS Take 1,000 mg by mouth every evening        Vitamin D3 (CHOLECALCIFEROL) 125 MCG (5000 UT) tablet Take 1 tablet by mouth daily       zinc 23 MG LOZG lozenge Place 1 lozenge inside cheek daily HOLD at U         ALLERGIES     Allergies   Allergen Reactions     Penicillins Hives     3 weeks after taking med got hives.       Pioglitazone Other (See Comments)     Increased urinary frequency, fatigue, dyspnea       PAST MEDICAL HISTORY:  Past Medical History:   Diagnosis Date     (HFpEF) heart failure with preserved ejection fraction (H)      Aortic stenosis      Chest pain      Depressive disorder      Diabetes (H)      Hyperlipidemia      Hypertension      Mitral annular calcification      Mitral stenosis      Obesity       Sleep apnea     CPAP       PAST SURGICAL HISTORY:  Past Surgical History:   Procedure Laterality Date     APPENDECTOMY       CV CORONARY ANGIOGRAM N/A 2020    Procedure: Coronary Angiogram;  Surgeon: Inez Peoples MD;  Location:  HEART CARDIAC CATH LAB     CV LEFT HEART CATH N/A 2020    Procedure: Left Heart Cath;  Surgeon: Inez Peoples MD;  Location:  HEART CARDIAC CATH LAB     CV PFO CLOSURE N/A 4/15/2020    Procedure: Patent Foramen Ovale Closure;  Surgeon: Ok Wilson MD;  Location:  OR      RIGHT HEART CATH MEASUREMENTS RECORDED N/A 2020    Procedure: Right Heart Cath;  Surgeon: Inez Peoples MD;  Location:  HEART CARDIAC CATH LAB     EP PACEMAKER N/A 2020    Procedure: EP Pacemaker;  Surgeon: Sohan Frnacis MD;  Location:  HEART CARDIAC CATH LAB     GYN SURGERY       x2 ,      GYN SURGERY      total hysterectomy     IMPLANT PACEMAKER       REPAIR VALVE TRICUSPID N/A 4/15/2020    Procedure: REPAIR TRICUSPID VALVE WITH VILLA MC3 26MM.;  Surgeon: Ok Wilson MD;  Location:  OR     REPLACE VALVE AORTIC N/A 4/15/2020    Procedure: REPLACEMENT, AORTIC VALVE WITH INSPIRIS TISSUE VALVE 25 MM; ON PUMP WITH SOCORRO READ BY CARDIOLOGIST DR BLANTON.;  Surgeon: Ok Wilson MD;  Location:  OR     REPLACE VALVE MITRAL N/A 4/15/2020    Procedure: REPLACEMENT, MITRAL VALVE WITH EPIC TISSUE VALVE 27 MM.;  Surgeon: Ok Wilson MD;  Location:  OR       FAMILY HISTORY:  Family History   Problem Relation Age of Onset     Diabetes Mother 56     Congenital heart disease Father 23     Colon Cancer No family hx of        SOCIAL HISTORY:  Social History     Socioeconomic History     Marital status:    Tobacco Use     Smoking status: Never Smoker     Smokeless tobacco: Never Used   Vaping Use     Vaping Use: Never used   Substance and Sexual Activity     Alcohol use: No     Drug use: No  "      Review of Systems:  Skin:          Eyes:         ENT:         Respiratory:          Cardiovascular:         Gastroenterology:        Genitourinary:         Musculoskeletal:         Neurologic:         Psychiatric:         Heme/Lymph/Imm:         Endocrine:           Physical Exam:  Vitals: /55 (BP Location: Left arm, Patient Position: Sitting, Cuff Size: Adult Regular)   Pulse 83   Ht 1.651 m (5' 5\")   Wt 104.5 kg (230 lb 4.8 oz)   SpO2 96%   BMI 38.32 kg/m      Constitutional:  cooperative;in no acute distress morbidly obese      Skin:  warm and dry to the touch   pacemaker incision in the left infraclavicular area was well-healed      Head:           Eyes:  pupils equal and round        Lymph:No Cervical lymphadenopathy present     ENT:  no pallor or cyanosis        Neck:  no carotid bruit        Respiratory:  clear to auscultation         Cardiac: regular rhythm       systolic murmur;grade 1          pulses below the femoral arteries are diminished                                      GI:  abdomen soft obese      Extremities and Muscular Skeletal:  no deformities, clubbing, cyanosis, erythema observed       1+;LLE edema      Neurological:  no gross motor deficits        Psych:  Alert and Oriented x 3          CC  Yamilka Audrey Christina, DO  [address]  [place] [address]                    Service Date: 06/16/2022    REFERRING PHYSICIAN:  Dr. She Huang.    HISTORY OF PRESENT ILLNESS:  Ms. Luna is a very pleasant 76-year-old female with a history of valve disease.  She has bioprosthetic mitral and aortic valve replacements with tricuspid annuloplasty repair and PFO closure in 04/2020.  She did require a permanent pacemaker as well.  She had some postoperative atrial fibrillation, was on amiodarone for a short while and she has continued on warfarin.  Last year, we discussed discontinuing warfarin as she has not had evidence of recurrence on her pacer interrogations; " however, she does have severely dilated left and right atria, putting her at risk for recurrence.  We opted to continue the warfarin after discussion of her risks.  Unfortunately, she ended up hurting her back and was taking up to 4 tablets of ibuprofen 3 times a day, and she ended up with internal bleeding and multiple blood transfusions.  She had been cautioned against the use of nonsteroidal anti-inflammatories but apparently was told by someone she saw for her back to take that as well as Tylenol.  She was taken off of all of the above, including warfarin, during that hospitalization and told that she was no longer a candidate for blood thinner.  Since then, her hemoglobin has improved.  She is continuing to take iron supplementation.  She is also unfortunately continuing to have ongoing back issues.  She denies any difficulty with her breathing, chest pains, or lightheadedness.    PHYSICAL EXAMINATION:  VITAL SIGNS:  Today in the office, her blood pressure is 105/55, pulse is 83.  Weight is 230, body mass index of 38.  CARDIOVASCULAR:  Tones today were regular suggesting a normal sinus rhythm.  She does have a soft systolic ejection murmur.  LUNGS:  Lung fields are clear.    Her last echo was last April, showing normal LV function with some evidence of left ventricular hypertrophy.  Her left and right atrium are still severely dilated.  The mitral valve and aortic valve gradients were within normal range.  Her device check was just on 06/02 demonstrating no arrhythmias, both no atrial or ventricular arrhythmias.  She has about 3-5 year battery life remaining.    ASSESSMENT AND PLAN:  In summary, Ms. Luna is a very pleasant 76-year-old female with a history of valve disease, status post bioprosthetic mitral and aortic valves with tricuspid annuloplasty and PFO repair in 04/2020.  She had brief episode of postop AFib, was placed on warfarin and amiodarone.  Amiodarone was stopped due to the severely dilated  atria and high risk for recurrence.  She was kept on warfarin but unfortunately took significant amounts of nonsteroidal anti-inflammatories and ended up with internal bleeding requiring multiple blood transfusions.  She has subsequently been taken off both warfarin and all nonsteroidal anti-inflammatories.  She is with ongoing back pain.  She is concerned about this.  Her pacemaker device checks have consistently not shown evidence of recurrence of atrial fibrillation despite the severely dilated atria, and so we talked about options today.  I would feel comfortable with her just staying off of all anticoagulation and continuing to monitor for evidence of recurrence by her device every 3 months.  If she did develop atrial fibrillation again of any significant burden, I would then recommend seeing Structural Heart for consideration of a Watchman device.  Unfortunately, it sounds like she is going to be moving in with her daughter in [place], [place], and it may be more convenient for them to follow with some cardiologist in [place].  I would be happy to help her with this transition, as she will need Pacemaker Clinic follow up as well.  This is not a definite thing.  They have no set date, but I have asked her to contact me when they do so that I may help her with this transition.    Please feel free to contact me with any questions you have in regards to her care.    Yamilka Christina DO    cc:  She Huang MD  [facility]  [address], [address]  [place] [address]    Yamilka Christina DO        D: 2022   T: 2022   MT:     Name:     KASSANDRA RIEVRA  MRN:      [id number]        Account:      [id number]   :      1945           Service Date: 2022       Document: [id number]      Thank you for allowing me to participate in the care of your patient.      Sincerely,     Yamilka Christina DO     [facility]  [facility]  cc:   Yamilka Christina DO  [address]  [place] [address]

## 2022-06-16 NOTE — PROGRESS NOTES
HPI and Plan:   See dictation    No orders of the defined types were placed in this encounter.      Orders Placed This Encounter   Medications     ferrous sulfate 140 (45 Fe) MG TBCR CR tablet     Sig: Take 280 mg by mouth daily       Medications Discontinued During This Encounter   Medication Reason     ferrous gluconate (FERGON) 324 (38 Fe) MG tablet Medication Reconciliation Clean Up         Encounter Diagnoses   Name Primary?     Cardiac pacemaker in situ      AV block        CURRENT MEDICATIONS:  Current Outpatient Medications   Medication Sig Dispense Refill     acetaminophen (TYLENOL) 500 MG tablet Take 1,000 mg by mouth 3 times daily       albuterol (PROAIR HFA/PROVENTIL HFA/VENTOLIN HFA) 108 (90 Base) MCG/ACT inhaler Inhale 2 puffs into the lungs every 4 hours as needed for shortness of breath / dyspnea or wheezing       atorvastatin (LIPITOR) 40 MG tablet Take 1 tablet (40 mg) by mouth At Bedtime 90 tablet 3     Biotin 98507 MCG TABS Take 10,000 mcg by mouth every morning        bisacodyl (DULCOLAX) 10 MG suppository Place 1 suppository (10 mg) rectally daily as needed for constipation       calcium carbonate (OS-NELLY) 1500 (600 Ca) MG tablet Take 1,200 mg by mouth daily       coenzyme Q-10 200 MG CAPS Take 200 mg by mouth every morning        Continuous Blood Gluc  (FREESTYLE ADAM READER) HARJEET 1 each every 14 days Use to read blood sugars per  instructions. 1 each 0     Continuous Blood Gluc Sensor (FREESTYLE ADAM 14 DAY SENSOR) MISC 1 each every 14 days Change every 14 days. 2 each 6     cyanocobalamin (VITAMIN B-12) 100 MCG tablet Take 100 mcg by mouth daily       ferrous sulfate 140 (45 Fe) MG TBCR CR tablet Take 280 mg by mouth daily       fluticasone (FLONASE) 50 MCG/ACT nasal spray Spray 1 spray into both nostrils daily 16 g 4     furosemide (LASIX) 40 MG tablet Take 0.5 tablets (20 mg) by mouth daily 180 tablet 1     gabapentin (NEURONTIN) 100 MG capsule Take 1 capsule (100 mg)  by mouth 3 times daily (Patient taking differently: Take 200 mg by mouth 3 times daily) 90 capsule 1     Ginkgo Biloba (GINKOBA PO) Take 250 mg by mouth daily On hold at TC       insulin aspart (NOVOLOG PEN) 100 UNIT/ML pen Inject 1-10 Units Subcutaneous 3 times daily (before meals) For Pre-Meal  - 164 give 1 unit,   - 189 give 2 units,  - 214 give 3 unit,  - 239 give 4 units,  - 264 give 5 units,  - 289 give 6 units,  - 314 give 7 units  - 339 give 8 units,  - 364 give 9 units.BG greater than or equal to 365 give 10 units.   Notify provider if glucose greater than or equal to 350 mg/dL after administration of correction dose. If given at mealtime, administer within 30 minutes of start of meal. 15 mL      insulin aspart (NOVOLOG PEN) 100 UNIT/ML pen Inject 1-7 Units Subcutaneous At Bedtime HIGH INSULIN RESISTANCE DOSING    Do Not give Bedtime Correction Insulin if BG less than 200.   For  - 224 give 1 units.   For  - 249 give 2 units.   For  - 274 give 3 units.   For  - 299 give 4 units.   For  - 324 give 5 units.   For  - 349 give 6 units.   For BG greater than or equal to 350 give 7 units. 15 mL      insulin  UNIT/ML vial Inject 32 Units Subcutaneous 2 times daily Inject 30 units every morning and 30 unit(s) every evening 10 mL      ipratropium (ATROVENT) 0.06 % nasal spray Spray 2 sprays into both nostrils 4 times daily as needed for rhinitis 3 Box 1     Lutein 40 MG CAPS Take 40 mg by mouth every morning        Magnesium 400 MG TABS Take 400 mg by mouth every evening        metFORMIN (GLUCOPHAGE-XR) 500 MG 24 hr tablet Take 1,000 mg by mouth daily (with breakfast)       metoprolol succinate ER (TOPROL-XL) 25 MG 24 hr tablet Take 2 tablets (50 mg) by mouth daily 180 tablet 3     naloxone (NARCAN) 4 MG/0.1ML nasal spray Spray 1 spray (4 mg) into one nostril alternating nostrils once as needed for opioid reversal every  2-3 minutes until assistance arrives 0.2 mL 1     nystatin (MYCOSTATIN) 472367 UNIT/GM external powder Apply topically 2 times daily       oxyCODONE (ROXICODONE) 5 MG tablet Take 1 tablet (5 mg) by mouth every 6 hours as needed for pain or moderate to severe pain 30 tablet 0     oxyCODONE (ROXICODONE) 5 MG tablet Take 0.5 tablets (2.5 mg) by mouth every 4 hours as needed for moderate to severe pain 10 tablet 0     pantoprazole (PROTONIX) 40 MG EC tablet Take 1 tablet (40 mg) by mouth in the morning.       polyethylene glycol (MIRALAX) 17 GM/Dose powder Take 17 g by mouth as needed in the morning for constipation. Hold for diarrhea 510 g      Propylene Glycol (SYSTANE BALANCE OP) Apply 1 drop to eye 3 times daily as needed (dry eyes)        sertraline (ZOLOFT) 100 MG tablet Take 1.5 tablets (150 mg) by mouth daily 135 tablet 0     TURMERIC PO Take 3 tablets by mouth daily        vitamin (B COMPLEX-C) tablet Take 1 tablet by mouth daily       vitamin C (ASCORBIC ACID) 1000 MG TABS Take 1,000 mg by mouth every evening        Vitamin D3 (CHOLECALCIFEROL) 125 MCG (5000 UT) tablet Take 1 tablet by mouth daily       zinc 23 MG LOZG lozenge Place 1 lozenge inside cheek daily HOLD at [facility]         ALLERGIES     Allergies   Allergen Reactions     Penicillins Hives     3 weeks after taking med got hives.       Pioglitazone Other (See Comments)     Increased urinary frequency, fatigue, dyspnea       PAST MEDICAL HISTORY:  Past Medical History:   Diagnosis Date     (HFpEF) heart failure with preserved ejection fraction (H)      Aortic stenosis      Chest pain      Depressive disorder      Diabetes (H)      Hyperlipidemia      Hypertension      Mitral annular calcification      Mitral stenosis      Obesity      Sleep apnea     CPAP       PAST SURGICAL HISTORY:  Past Surgical History:   Procedure Laterality Date     APPENDECTOMY       CV CORONARY ANGIOGRAM N/A 4/8/2020    Procedure: Coronary Angiogram;  Surgeon: Inez Peoples  MD Lucina;  Location:  HEART CARDIAC CATH LAB     CV LEFT HEART CATH N/A 2020    Procedure: Left Heart Cath;  Surgeon: Inez Peoples MD;  Location:  HEART CARDIAC CATH LAB     CV PFO CLOSURE N/A 4/15/2020    Procedure: Patent Foramen Ovale Closure;  Surgeon: Ok Wilson MD;  Location:  OR     CV RIGHT HEART CATH MEASUREMENTS RECORDED N/A 2020    Procedure: Right Heart Cath;  Surgeon: Inez Peoples MD;  Location:  HEART CARDIAC CATH LAB     EP PACEMAKER N/A 2020    Procedure: EP Pacemaker;  Surgeon: Sohan Francis MD;  Location:  HEART CARDIAC CATH LAB     GYN SURGERY       x2 ,      GYN SURGERY      total hysterectomy     IMPLANT PACEMAKER       REPAIR VALVE TRICUSPID N/A 4/15/2020    Procedure: REPAIR TRICUSPID VALVE WITH VILLA MC3 26MM.;  Surgeon: Ok Wilson MD;  Location:  OR     REPLACE VALVE AORTIC N/A 4/15/2020    Procedure: REPLACEMENT, AORTIC VALVE WITH INSPIRIS TISSUE VALVE 25 MM; ON PUMP WITH SOCORRO READ BY CARDIOLOGIST DR BLANTON.;  Surgeon: Ok Wilson MD;  Location:  OR     REPLACE VALVE MITRAL N/A 4/15/2020    Procedure: REPLACEMENT, MITRAL VALVE WITH EPIC TISSUE VALVE 27 MM.;  Surgeon: Ok Wilson MD;  Location:  OR       FAMILY HISTORY:  Family History   Problem Relation Age of Onset     Diabetes Mother 56     Congenital heart disease Father 23     Colon Cancer No family hx of        SOCIAL HISTORY:  Social History     Socioeconomic History     Marital status:    Tobacco Use     Smoking status: Never Smoker     Smokeless tobacco: Never Used   Vaping Use     Vaping Use: Never used   Substance and Sexual Activity     Alcohol use: No     Drug use: No       Review of Systems:  Skin:          Eyes:         ENT:         Respiratory:          Cardiovascular:         Gastroenterology:        Genitourinary:         Musculoskeletal:         Neurologic:         Psychiatric:        "  Heme/Lymph/Imm:         Endocrine:           Physical Exam:  Vitals: /55 (BP Location: Left arm, Patient Position: Sitting, Cuff Size: Adult Regular)   Pulse 83   Ht 1.651 m (5' 5\")   Wt 104.5 kg (230 lb 4.8 oz)   SpO2 96%   BMI 38.32 kg/m      Constitutional:  cooperative;in no acute distress morbidly obese      Skin:  warm and dry to the touch   pacemaker incision in the left infraclavicular area was well-healed      Head:           Eyes:  pupils equal and round        Lymph:No Cervical lymphadenopathy present     ENT:  no pallor or cyanosis        Neck:  no carotid bruit        Respiratory:  clear to auscultation         Cardiac: regular rhythm       systolic murmur;grade 1          pulses below the femoral arteries are diminished                                      GI:  abdomen soft obese      Extremities and Muscular Skeletal:  no deformities, clubbing, cyanosis, erythema observed       1+;LLE edema      Neurological:  no gross motor deficits        Psych:  Alert and Oriented x 3          CC  Yamilka Audrey Christina,   [address]  [place] [id number]                  "

## 2022-06-16 NOTE — PROGRESS NOTES
Service Date: 06/16/2022    REFERRING PHYSICIAN:  Dr. She Huang.    HISTORY OF PRESENT ILLNESS:  Ms. Luna is a very pleasant 76-year-old female with a history of valve disease.  She has bioprosthetic mitral and aortic valve replacements with tricuspid annuloplasty repair and PFO closure in 04/2020.  She did require a permanent pacemaker as well.  She had some postoperative atrial fibrillation, was on amiodarone for a short while and she has continued on warfarin.  Last year, we discussed discontinuing warfarin as she has not had evidence of recurrence on her pacer interrogations; however, she does have severely dilated left and right atria, putting her at risk for recurrence.  We opted to continue the warfarin after discussion of her risks.  Unfortunately, she ended up hurting her back and was taking up to 4 tablets of ibuprofen 3 times a day, and she ended up with internal bleeding and multiple blood transfusions.  She had been cautioned against the use of nonsteroidal anti-inflammatories but apparently was told by someone she saw for her back to take that as well as Tylenol.  She was taken off of all of the above, including warfarin, during that hospitalization and told that she was no longer a candidate for blood thinner.  Since then, her hemoglobin has improved.  She is continuing to take iron supplementation.  She is also unfortunately continuing to have ongoing back issues.  She denies any difficulty with her breathing, chest pains, or lightheadedness.    PHYSICAL EXAMINATION:  VITAL SIGNS:  Today in the office, her blood pressure is 105/55, pulse is 83.  Weight is 230, body mass index of 38.  CARDIOVASCULAR:  Tones today were regular suggesting a normal sinus rhythm.  She does have a soft systolic ejection murmur.  LUNGS:  Lung fields are clear.    Her last echo was last April, showing normal LV function with some evidence of left ventricular hypertrophy.  Her left and right atrium are still  severely dilated.  The mitral valve and aortic valve gradients were within normal range.  Her device check was just on 06/02 demonstrating no arrhythmias, both no atrial or ventricular arrhythmias.  She has about 3-5 year battery life remaining.    ASSESSMENT AND PLAN:  In summary, Ms. Luna is a very pleasant 76-year-old female with a history of valve disease, status post bioprosthetic mitral and aortic valves with tricuspid annuloplasty and PFO repair in 04/2020.  She had brief episode of postop AFib, was placed on warfarin and amiodarone.  Amiodarone was stopped due to the severely dilated atria and high risk for recurrence.  She was kept on warfarin but unfortunately took significant amounts of nonsteroidal anti-inflammatories and ended up with internal bleeding requiring multiple blood transfusions.  She has subsequently been taken off both warfarin and all nonsteroidal anti-inflammatories.  She is with ongoing back pain.  She is concerned about this.  Her pacemaker device checks have consistently not shown evidence of recurrence of atrial fibrillation despite the severely dilated atria, and so we talked about options today.  I would feel comfortable with her just staying off of all anticoagulation and continuing to monitor for evidence of recurrence by her device every 3 months.  If she did develop atrial fibrillation again of any significant burden, I would then recommend seeing [facility] for consideration of a Watchman device.  Unfortunately, it sounds like she is going to be moving in with her daughter in [place], [place], and it may be more convenient for them to follow with some cardiologist in [place].  I would be happy to help her with this transition, as she will need Pacemaker Clinic follow up as well.  This is not a definite thing.  They have no set date, but I have asked her to contact me when they do so that I may help her with this transition.    Please feel free to contact me with  any questions you have in regards to her care.    Yamilka Christina DO    cc:  She Huang MD  [facility]  [address], [address]  [place] [address]    Yamilka Christina DO        D: 2022   T: 2022   MT: bell    Name:     KASSANDRA RIVERA  MRN:      [id number]        Account:      [id number]   :      1945           Service Date: 2022       Document: [id number]

## 2022-06-20 NOTE — PROGRESS NOTES
"NEUROSURGERY CLINIC PROGRESS NOTE    DATE OF VISIT: 6/20/2022    HPI:     Amy Luna is a pleasant 76 year old female who presents to the clinic today for a 12 week follow up L2, L3, L4 fractures.    Amy and  provide history. Amy notes she has not had low back pain in quite some time. She continues to have left lower extremity pain that radiates down anterior thigh that is most bothersome. Pain aggravated with movement, standing, walking around. Pain alleviated by laying down and resting. She also has had bilateral pain that radiates into her groin. She was seen at [facility] in April who diagnosed her with hip bursitis and provided oral steroids which did not assist with pain control. She has no further follow up with Orthopedics. She denies new weakness, paresthesias, bowel/bladder changes, saddle anesthesia, falls or trauma. She has been wearing her brace when upright which assists with pain.     Amy is diabetic and notes her diabetes is \"somewhat controlled\". Last A1c 3/14/22 was 6.6 3 months ago.     Amy participated in PT when she was in her TCU and also at home PT. She continues to perform her at home exercises and has not noticed significant improvement.     Current Outpatient Medications   Medication     acetaminophen (TYLENOL) 500 MG tablet     albuterol (PROAIR HFA/PROVENTIL HFA/VENTOLIN HFA) 108 (90 Base) MCG/ACT inhaler     atorvastatin (LIPITOR) 40 MG tablet     Biotin 48252 MCG TABS     bisacodyl (DULCOLAX) 10 MG suppository     calcium carbonate (OS-NELLY) 1500 (600 Ca) MG tablet     coenzyme Q-10 200 MG CAPS     Continuous Blood Gluc  (FREESTYLE ADAM READER) HARJEET     Continuous Blood Gluc Sensor (FREESTYLE ADAM 14 DAY SENSOR) MISC     cyanocobalamin (VITAMIN B-12) 100 MCG tablet     ferrous sulfate 140 (45 Fe) MG TBCR CR tablet     fluticasone (FLONASE) 50 MCG/ACT nasal spray     furosemide (LASIX) 40 MG tablet     gabapentin (NEURONTIN) 100 MG capsule     Ginkgo Biloba " (GINKOBA PO)     insulin aspart (NOVOLOG PEN) 100 UNIT/ML pen     insulin aspart (NOVOLOG PEN) 100 UNIT/ML pen     insulin  UNIT/ML vial     ipratropium (ATROVENT) 0.06 % nasal spray     Lutein 40 MG CAPS     Magnesium 400 MG TABS     metFORMIN (GLUCOPHAGE-XR) 500 MG 24 hr tablet     metoprolol succinate ER (TOPROL-XL) 25 MG 24 hr tablet     naloxone (NARCAN) 4 MG/0.1ML nasal spray     nystatin (MYCOSTATIN) 692793 UNIT/GM external powder     oxyCODONE (ROXICODONE) 5 MG tablet     oxyCODONE (ROXICODONE) 5 MG tablet     pantoprazole (PROTONIX) 40 MG EC tablet     polyethylene glycol (MIRALAX) 17 GM/Dose powder     Propylene Glycol (SYSTANE BALANCE OP)     sertraline (ZOLOFT) 100 MG tablet     TURMERIC PO     vitamin (B COMPLEX-C) tablet     vitamin C (ASCORBIC ACID) 1000 MG TABS     Vitamin D3 (CHOLECALCIFEROL) 125 MCG (5000 UT) tablet     zinc 23 MG LOZG lozenge     No current facility-administered medications for this visit.       Allergies   Allergen Reactions     Penicillins Hives     3 weeks after taking med got hives.       Pioglitazone Other (See Comments)     Increased urinary frequency, fatigue, dyspnea       Past Medical History:   Diagnosis Date     (HFpEF) heart failure with preserved ejection fraction (H)      Aortic stenosis      Chest pain      Depressive disorder      Diabetes (H)      Hyperlipidemia      Hypertension      Mitral annular calcification      Mitral stenosis      Obesity      Sleep apnea     CPAP       Review Of Systems     ROS: 10 point ROS neg other than the symptoms noted above in the HPI.      OBJECTIVE:    /51   Pulse 73   SpO2 95%     Imaging:    XR LUMBAR SPINE 2-3 VIEWS 6/20/2022 12:24 PM      COMPARISON: 5/23/2022 lumbar spine CT     HISTORY: Non-traumatic compression fracture of L4 lumbar vertebra with  delayed healing, subsequent encounter                                                                      IMPRESSION: Dextroconvex curvature from T11 to L4, apex  "at L1-L2.  Superimposed a compression fracture at L3 with approximately 40% loss  of height, concavity of the superior endplate and adjacent sclerosis.  Mild inferior endplate height loss at L2. Moderate degenerative disc  disease at L2-L3. Lower lumbar facet osteoarthrosis. There are  vascular calcifications. Bilateral sacroiliac joint osteoarthrosis.     MONIK ATKINS MD     Radiographic Findings: Full radiological report in chart. I personally reviewed the images with the patient today.    Exam:    Patient appears comfortable and in no apparent distress. Moving all extremities.  Gait is non-antalgic.  CN II-XII grossly intact, alert and appropriate with conversation and following  commands  Bilateral lower extremities 5/5 strength including plantar and dorsiflexion. Left hip pain limiting with flexion   Decreased sensation left anterolateral thigh  No TTP along bilateral hips    ASSESSMENT:    1. Non-traumatic compression fracture of L4 lumbar vertebra with delayed healing, subsequent encounter        PLAN:    Amy Luna is a pleasant 76 year old female who presents to the clinic today for a 12 week follow up L2, L3, L4 fractures.    Amy and  provide history. Amy notes she has not had low back pain in quite some time. She continues to have left lower extremity pain that radiates down anterior thigh that is most bothersome. Pain aggravated with movement, standing, walking around. Pain alleviated by laying down and resting. She also has had bilateral pain that radiates into her groin. She was seen at [facility] in April who diagnosed her with hip bursitis and provided oral steroids which did not assist with pain control. She has no further follow up with Orthopedics. She denies new weakness, paresthesias, bowel/bladder changes, saddle anesthesia, falls or trauma. She has been wearing her brace when upright which assists with pain.     Amy is diabetic and notes her diabetes is \"somewhat " "controlled\". Last A1c 3/14/22 was 6.6 3 months ago.     Recommend L2-3 RENZO. If no significant relief, would recommend follow up with Dr. Ponce to review surgical options including L2-3 decompression with L3 vertebroplasty. Called Amy and reviewed this recommendation. Referral placed for Dr. Maldonado.     Moving to [place], [place] in 1 month.     The patient gave verbal understanding and is in agreement with the above plan. She will call or return to the clinic for any worsening or changes in symptoms.    Respectfully,     Elizabeth Chen PA-C  [facility]  [address]  [address] [place] [address]    Tel [phone]    "

## 2022-06-20 NOTE — LETTER
"    6/20/2022         RE: Amy Luna  [address]  [place] [facility] [place] [facility] [address]        Dear Colleague,    Thank you for referring your patient, Amy Luna, to the [facility]UROS[facility]IC [place]. Please see a copy of my visit note below.    NEUROSURGERY CLINIC PROGRESS NOTE    DATE OF VISIT: 6/20/2022    HPI:     Amy Luna is a pleasant 76 year old female who presents to the clinic today for a 12 week follow up L2, L3, L4 fractures.    Amy and  provide history. Amy notes she has not had low back pain in quite some time. She continues to have left lower extremity pain that radiates down anterior thigh that is most bothersome. Pain aggravated with movement, standing, walking around. Pain alleviated by laying down and resting. She also has had bilateral pain that radiates into her groin. She was seen at [facility] in April who diagnosed her with hip bursitis and provided oral steroids which did not assist with pain control. She has no further follow up with Orthopedics. She denies new weakness, paresthesias, bowel/bladder changes, saddle anesthesia, falls or trauma. She has been wearing her brace when upright which assists with pain.     Amy is diabetic and notes her diabetes is \"somewhat controlled\". Last A1c 3/14/22 was 6.6 3 months ago.     Amy participated in PT when she was in her TCU and also at home PT. She continues to perform her at home exercises and has not noticed significant improvement.     Current Outpatient Medications   Medication     acetaminophen (TYLENOL) 500 MG tablet     albuterol (PROAIR HFA/PROVENTIL HFA/VENTOLIN HFA) 108 (90 Base) MCG/ACT inhaler     atorvastatin (LIPITOR) 40 MG tablet     Biotin 59675 MCG TABS     bisacodyl (DULCOLAX) 10 MG suppository     calcium carbonate (OS-NELLY) 1500 (600 Ca) MG tablet     coenzyme Q-10 200 MG CAPS     Continuous Blood Gluc  (FREESTYLE ADAM READER) HARJEET     Continuous Blood Gluc Sensor " (FREESTYLE ADAM 14 DAY SENSOR) MISC     cyanocobalamin (VITAMIN B-12) 100 MCG tablet     ferrous sulfate 140 (45 Fe) MG TBCR CR tablet     fluticasone (FLONASE) 50 MCG/ACT nasal spray     furosemide (LASIX) 40 MG tablet     gabapentin (NEURONTIN) 100 MG capsule     Ginkgo Biloba (GINKOBA PO)     insulin aspart (NOVOLOG PEN) 100 UNIT/ML pen     insulin aspart (NOVOLOG PEN) 100 UNIT/ML pen     insulin  UNIT/ML vial     ipratropium (ATROVENT) 0.06 % nasal spray     Lutein 40 MG CAPS     Magnesium 400 MG TABS     metFORMIN (GLUCOPHAGE-XR) 500 MG 24 hr tablet     metoprolol succinate ER (TOPROL-XL) 25 MG 24 hr tablet     naloxone (NARCAN) 4 MG/0.1ML nasal spray     nystatin (MYCOSTATIN) 680014 UNIT/GM external powder     oxyCODONE (ROXICODONE) 5 MG tablet     oxyCODONE (ROXICODONE) 5 MG tablet     pantoprazole (PROTONIX) 40 MG EC tablet     polyethylene glycol (MIRALAX) 17 GM/Dose powder     Propylene Glycol (SYSTANE BALANCE OP)     sertraline (ZOLOFT) 100 MG tablet     TURMERIC PO     vitamin (B COMPLEX-C) tablet     vitamin C (ASCORBIC ACID) 1000 MG TABS     Vitamin D3 (CHOLECALCIFEROL) 125 MCG (5000 UT) tablet     zinc 23 MG LOZG lozenge     No current facility-administered medications for this visit.       Allergies   Allergen Reactions     Penicillins Hives     3 weeks after taking med got hives.       Pioglitazone Other (See Comments)     Increased urinary frequency, fatigue, dyspnea       Past Medical History:   Diagnosis Date     (HFpEF) heart failure with preserved ejection fraction (H)      Aortic stenosis      Chest pain      Depressive disorder      Diabetes (H)      Hyperlipidemia      Hypertension      Mitral annular calcification      Mitral stenosis      Obesity      Sleep apnea     CPAP       Review Of Systems     ROS: 10 point ROS neg other than the symptoms noted above in the HPI.      OBJECTIVE:    /51   Pulse 73   SpO2 95%     Imaging:    XR LUMBAR SPINE 2-3 VIEWS 6/20/2022 12:24 PM       COMPARISON: 5/23/2022 lumbar spine CT     HISTORY: Non-traumatic compression fracture of L4 lumbar vertebra with  delayed healing, subsequent encounter                                                                      IMPRESSION: Dextroconvex curvature from T11 to L4, apex at L1-L2.  Superimposed a compression fracture at L3 with approximately 40% loss  of height, concavity of the superior endplate and adjacent sclerosis.  Mild inferior endplate height loss at L2. Moderate degenerative disc  disease at L2-L3. Lower lumbar facet osteoarthrosis. There are  vascular calcifications. Bilateral sacroiliac joint osteoarthrosis.     MONIK ATKINS MD     Radiographic Findings: Full radiological report in chart. I personally reviewed the images with the patient today.    Exam:    Patient appears comfortable and in no apparent distress. Moving all extremities.  Gait is non-antalgic.  CN II-XII grossly intact, alert and appropriate with conversation and following  commands  Bilateral lower extremities 5/5 strength including plantar and dorsiflexion. Left hip pain limiting with flexion   Decreased sensation left anterolateral thigh  No TTP along bilateral hips    ASSESSMENT:    1. Non-traumatic compression fracture of L4 lumbar vertebra with delayed healing, subsequent encounter        PLAN:    Amy Luna is a pleasant 76 year old female who presents to the clinic today for a 12 week follow up L2, L3, L4 fractures.    Amy and  provide history. Amy notes she has not had low back pain in quite some time. She continues to have left lower extremity pain that radiates down anterior thigh that is most bothersome. Pain aggravated with movement, standing, walking around. Pain alleviated by laying down and resting. She also has had bilateral pain that radiates into her groin. She was seen at [facility] in April who diagnosed her with hip bursitis and provided oral steroids which did not assist with pain control.  "She has no further follow up with Orthopedics. She denies new weakness, paresthesias, bowel/bladder changes, saddle anesthesia, falls or trauma. She has been wearing her brace when upright which assists with pain.     Amy is diabetic and notes her diabetes is \"somewhat controlled\". Last A1c 3/14/22 was 6.6 3 months ago.     Recommend L2-3 RENZO. If no significant relief, would recommend follow up with Dr. Ponce to review surgical options including L2-3 decompression with L3 vertebroplasty. Called Amy and reviewed this recommendation. Referral placed for Dr. Maldonado.     Moving to [place], [place] in 1 month.     The patient gave verbal understanding and is in agreement with the above plan. She will call or return to the clinic for any worsening or changes in symptoms.    Respectfully,     Elizabeth Chen PA-C  [facility]surgery  [address] [address]    Tel [phone]        Again, thank you for allowing me to participate in the care of your patient.        Sincerely,        Elizabeth Chen PA-C    "

## 2022-06-21 NOTE — PROGRESS NOTES
Per FANNY Kellogg (neurosurgery), ordered a left L2-L3 transforaminal epidural corticosteroid injection for Ms. Amy BORJA Cheryl.    Lito Maldonado MD

## 2022-06-27 NOTE — PROGRESS NOTES
[facility] Pain Management     Date of visit: 6/28/2022      Assessment:   Amy Luna is a 77 year old female with a past medical history significant for heart failure, HTN, hyperlipidemia, mitral valve stenosis, aortic stenosis, diabetes mellitus, and obesity who presents with complaints of low back pain .      1. Low back pain- etiology likely related to worsening of L3 and L4 compression fracture. Significant lumbar spondylosis and severe foraminal stenosis also noted.      Visit Diagnoses:  1. Lumbar foraminal stenosis    2. Closed compression fracture of L3 lumbar vertebra, initial encounter (H)    3. Closed compression fracture of L4 lumbar vertebra, initial encounter (H)        Plan:       1.  Pain Physical Therapy: continue to focus on gentle stretches for now. May consider formal physical therapy course in the future.               2.  Pain Psychologist to address relaxation, behavioral change, coping style, and other factors important to improvement.  NO              3.  Medication Management:   1. Amy increased gabapentin to 200mg TID as directed after last visit but then decreased to 200mg BID with temporary improvement in pain. We discussed additional dose increase and she is interested. She will increase gabapentin to [phone] for 3 days, then increase to [phone] for 3 days. She can stay at this dose or continue to increase to [phone] for 3 days, then [phone] for 3 days, then [phone]- stay at this dose for now.   2. Given severe pain related to compression fractures, low dose with very good pain benefit, and no red flags, reasonable to continue oxycodone as needed for severe pain. Okay to continue oxycodone, 2.5mg as needed for severe pain per primary care provider.    2. Okay to continue Tylenol- 1000mg three times daily.               4.  Potential procedures: not at this time.                5.  Referrals: continue using back brace. I was able to speak  directly with her Neurosurgery provider, Elizabeth Chen PA-C, regarding scheduling lumbar epidural steroid injection ASAP. I also offered to order the injection to be done with the pain clilnic.               6.  Follow up with SARBJIT Purvis CNP in 4 weeks.    Rupal SCHAFFER CNP  [facility] Pain Management     -------------------------------------------------    Subjective:    Chief complaint:   Chief Complaint   Patient presents with     Pain       Interval history:  Amy Luna is a 77 year old female last seen on 5/31/2022.  she is seen in follow up.     Recommendations/plan at the last visit included:     1.  Pain Physical Therapy: recommended Amy focus on gentle stretches for now. May consider formal physical therapy course in the future.               2.  Pain Psychologist to address relaxation, behavioral change, coping style, and other factors important to improvement.  NO              3.  Medication Management:   3. Amy reports excellent pain benefit with gabapentin. She has tolerated this medication well without any side effects. We discussed a dose increase and she is interested. She will increase gabapentin to [phone] for 3-4 days. Then increase to [phone] for 3-4 days. Then increase to 200mg TID. Call with issues. We may consider additional dose increase to 300mg TID if well tolerated. She has most of her prescription remaining so did not send over a new script today but I am happy to refill.  4. Given severe pain related to compression fractures, low dose with very good pain benefit, and no red flags, reasonable to continue oxycodone as needed for severe pain. Okay to continue oxycodone, 2.5mg as needed for severe pain per primary care provider.    5. Okay to continue Tylenol. Safe dosing provided.  Acetaminophen (tylenol) comes in 325mg and 500mg sizes.  In the situation of normal liver function, you can take up to 1000mg per dose.  You should limit to no more  than 3,000mg per day.  Acetaminophen is available in many over the counter medications including cold medications, so make sure to read labels.               4.  Potential procedures: not at this time.                5.  Referrals: Neurosurgery recommendations were to start wearing a back brace, order for Orthotics was placed. I advised she call to schedule an appointment with Orthotics for back brace. Follow up with Neursurgery as planned 6/20.               6.  Follow up with SARBJTI Purvis CNP in 4 weeks.    Since her last visit, Amy BORJA Cheryl reports:  -Her pain is about the same as it was at last visit.   -She states her pain was doing quite a bit better for a time but then worsened recently.   -She was able to  a back brace from Orthotics and she has been wearing this.   -She increased gabapentin as directed to 200mg TID but then reduced to 200mg BID as her pain was doing better. She is open to increasing further, never had any side effects.   -She continues to take oxycodone as needed for severe pain, typically a few days a week.   -She continues Tylenol 650mg BID and 500-1000mg midday with some benefit.   -She had follow up with Elizabeth Chen PA-C with Neurosurgery. She ordered a L2-3 transforaminal epidural steroid injection with Dr. Maldonado, hasn't been contacted to have this scheduled yet, she would like to have it done as soon as possible.       Pain Information:   Pain rating: averages 6/10 on a 0-10 scale.      Current pain medications:               Gabapentin 200mg BID- H              Oxycodone 2.5mg TID or QID- H, taking only as needed for severe pain              Tylenol 650mg TID-  H, takes with gabapentin      Other relevant medications:              Zoloft 150mg daily- H       Current MME: 0    Review of [place] Prescription Monitoring Program (): No concern for abuse or misuse of controlled medications based on this report.     Annual Controlled Substance Agreement/UDS due date:  NA    Past pain treatments:  1. Previous Pain Relevant Medications:              Opiates: oxycodone- H              NSAIDS: Advil- H, SE, GI bleed              Muscle Relaxants: no              Anti-migraine mediations: no              Anti-depressants: Zoloft- H              Sleep aids:no              Anxiolytics: no              Neuropathics: gabapentin- H                Topicals: Biofreeze- [facility], lidocaine patches- [facility]              Other medications not covered above: Tylenol- H     2. Physical Therapy: yes a few sessions with [facility] in [place]  3. Pain Psychology: no  4. Surgery: no?  5. Injections: no  6. Chiropractic: yes over the years and recently x3 visits- W  7. Acupuncture: no  8. TENS Unit: yes-?    Medications:  Current Outpatient Medications   Medication Sig Dispense Refill     gabapentin (NEURONTIN) 100 MG capsule Take 3 capsules (300 mg) by mouth 3 times daily 270 capsule 1     acetaminophen (TYLENOL) 500 MG tablet Take 1,000 mg by mouth 3 times daily       albuterol (PROAIR HFA/PROVENTIL HFA/VENTOLIN HFA) 108 (90 Base) MCG/ACT inhaler Inhale 2 puffs into the lungs every 4 hours as needed for shortness of breath / dyspnea or wheezing       atorvastatin (LIPITOR) 40 MG tablet Take 1 tablet (40 mg) by mouth At Bedtime 90 tablet 3     Biotin 63584 MCG TABS Take 10,000 mcg by mouth every morning        bisacodyl (DULCOLAX) 10 MG suppository Place 1 suppository (10 mg) rectally daily as needed for constipation       calcium carbonate (OS-NELLY) 1500 (600 Ca) MG tablet Take 1,200 mg by mouth daily       coenzyme Q-10 200 MG CAPS Take 200 mg by mouth every morning        Continuous Blood Gluc  (FREESTYLE ADAM READER) HARJEET 1 each every 14 days Use to read blood sugars per  instructions. 1 each 0     Continuous Blood Gluc Sensor (FREESTYLE ADAM 14 DAY SENSOR) MISC 1 each every 14 days Change every 14 days. 2 each 6     cyanocobalamin (VITAMIN B-12) 100 MCG tablet Take 100  mcg by mouth daily       ferrous sulfate 140 (45 Fe) MG TBCR CR tablet Take 280 mg by mouth daily       fluticasone (FLONASE) 50 MCG/ACT nasal spray Spray 1 spray into both nostrils daily 16 g 4     furosemide (LASIX) 40 MG tablet Take 0.5 tablets (20 mg) by mouth daily 180 tablet 1     Ginkgo Biloba (GINKOBA PO) Take 250 mg by mouth daily On hold at TCU       insulin aspart (NOVOLOG PEN) 100 UNIT/ML pen Inject 1-10 Units Subcutaneous 3 times daily (before meals) For Pre-Meal  - 164 give 1 unit,   - 189 give 2 units,  - 214 give 3 unit,  - 239 give 4 units,  - 264 give 5 units,  - 289 give 6 units,  - 314 give 7 units  - 339 give 8 units,  - 364 give 9 units.BG greater than or equal to 365 give 10 units.   Notify provider if glucose greater than or equal to 350 mg/dL after administration of correction dose. If given at mealtime, administer within 30 minutes of start of meal. 15 mL      insulin aspart (NOVOLOG PEN) 100 UNIT/ML pen Inject 1-7 Units Subcutaneous At Bedtime HIGH INSULIN RESISTANCE DOSING    Do Not give Bedtime Correction Insulin if BG less than 200.   For  - 224 give 1 units.   For  - 249 give 2 units.   For  - 274 give 3 units.   For  - 299 give 4 units.   For  - 324 give 5 units.   For  - 349 give 6 units.   For BG greater than or equal to 350 give 7 units. 15 mL      insulin  UNIT/ML vial Inject 30 units every morning and 20 unit(s) every evening 1 mL      ipratropium (ATROVENT) 0.06 % nasal spray Spray 2 sprays into both nostrils 4 times daily as needed for rhinitis 3 Box 1     Lutein 40 MG CAPS Take 40 mg by mouth every morning        Magnesium 400 MG TABS Take 400 mg by mouth every evening        metFORMIN (GLUCOPHAGE-XR) 500 MG 24 hr tablet Take 1,000 mg by mouth daily (with breakfast)       metoprolol succinate ER (TOPROL-XL) 25 MG 24 hr tablet Take 2 tablets (50 mg) by mouth daily 180 tablet 3      naloxone (NARCAN) 4 MG/0.1ML nasal spray Spray 1 spray (4 mg) into one nostril alternating nostrils once as needed for opioid reversal every 2-3 minutes until assistance arrives 0.2 mL 1     nystatin (MYCOSTATIN) 071355 UNIT/GM external powder Apply topically 2 times daily       oxyCODONE (ROXICODONE) 5 MG tablet Take 1 tablet (5 mg) by mouth every 6 hours as needed for pain or moderate to severe pain 30 tablet 0     oxyCODONE (ROXICODONE) 5 MG tablet Take 0.5 tablets (2.5 mg) by mouth every 4 hours as needed for moderate to severe pain 10 tablet 0     pantoprazole (PROTONIX) 40 MG EC tablet Take 1 tablet (40 mg) by mouth in the morning.       polyethylene glycol (MIRALAX) 17 GM/Dose powder Take 17 g by mouth as needed in the morning for constipation. Hold for diarrhea 510 g      Propylene Glycol (SYSTANE BALANCE OP) Apply 1 drop to eye 3 times daily as needed (dry eyes)        sertraline (ZOLOFT) 100 MG tablet Take 1.5 tablets (150 mg) by mouth daily 135 tablet 0     TURMERIC PO Take 3 tablets by mouth daily        vitamin (B COMPLEX-C) tablet Take 1 tablet by mouth daily       vitamin C (ASCORBIC ACID) 1000 MG TABS Take 1,000 mg by mouth every evening        Vitamin D3 (CHOLECALCIFEROL) 125 MCG (5000 UT) tablet Take 1 tablet by mouth daily       zinc 23 MG LOZG lozenge Place 1 lozenge inside cheek daily HOLD at U         Medical History: any changes in medical history since they were last seen? No    Review of Systems: A 10-point review of systems was negative, with the exception of chronic pain issues.      Objective:    Physical Exam:  Blood pressure 116/73, pulse 87, SpO2 96 %, not currently breastfeeding.  Constitutional: Well developed, well nourished, appears stated age. Obese.   HEENT: Head atraumatic, normocephalic. Eyes without conjunctival injection or jaundice. Oropharynx clear. Neck supple. No obvious neck masses.  Skin: No rash, lesions, or petechiae of exposed skin.   Psychiatric/mental status:  Alert, without lethargy or stupor. Speech fluent. Appropriate affect. Mood normal. Able to follow commands without difficulty.     Imaging:  CT of lumbar spine was completed on 5/23/2022 and shows:  T12-L1: Normal disc height. Mild marginal osteophyte. No herniation.  Mild facet arthropathy. No spinal canal or foraminal stenosis.      L1-L2: Mild disc space narrowing with marginal osteophyte  preferentially on the left. Mild facet arthropathy. Mild spinal canal  stenosis. Mild-to-moderate bilateral foraminal stenosis.      L2-L3: Moderate disc space narrowing with moderate disc bulge and  marginal osteophyte preferentially on the left. Mild-to-moderate  left-sided facet arthropathy. Moderate to severe spinal canal  stenosis. Severe left and moderate right foraminal stenoses.      L3-L4: Mild disc space narrowing with mild disc bulge. Severe facet  arthropathy with ligamentum flavum thickening. Moderate spinal canal  stenosis. Moderate left and mild right foraminal stenosis.      L4-L5: Mild disc space narrowing with mild disc bulge and marginal  osteophyte. Severe facet arthropathy. Focus of air within the thecal  sac along the right lateral recess at the mid L4 level. Mild to  moderate spinal canal stenosis. Severe right foraminal stenosis.  Moderate left foraminal stenosis.      L5-S1: Mild disc space narrowing. Severe facet arthropathy with facet  ankylosis. No spinal canal stenosis. Moderate right foraminal  stenosis. Mild left foraminal stenosis.                                                                       IMPRESSION:  1.  Worsening superior endplate fracture at L3 with 40% height loss  and increased comminuted component on the left.  2.  Worsening subtle inferior endplate compression fracture at L2 with  resorption along the endplate and developing sclerotic change.  3.  Interval resorption along the inferior endplate fracture at L4  with surrounding sclerotic change.  4.  Moderate to advanced lumbar  spondylosis with moderate to severe  spinal canal stenosis at L2-L3, moderate spinal canal stenosis at  L3-L4, and mild/moderate spinal canal stenosis at L4-L5.  5.  Severe foraminal stenosis right L4-L5 and left L2-L3. Moderate  foraminal stenoses right L2-L3, left L3-L4, left L4-L5, and right  L5-S1.  6.  Focus of air within the thecal sac on the right at L4, likely  iatrogenic.       BILLING TIME DOCUMENTATION:   The total TIME spent on this patient on the date of the encounter/appointment was 22 minutes.      TOTAL TIME includes:   Time spent preparing to see the patient (reviewing records and tests)   Time spent face to face (or over the phone) with the patient   Time spent ordering tests, medications, procedures and referrals   Time spent Referring and communicating with other healthcare professionals   Time spent documenting clinical information in Epic

## 2022-06-27 NOTE — TELEPHONE ENCOUNTER
Call received from Vanna RN - [facility] home care asking for clarification of patient's insulin dosage. Reviewed medication list. States patient has been taking Novolin N twice daily. Per chart patient takes NPH 30 units twice daily. Patient has been taking Novolin N 30 units in am and 20 units in pm because her blood sugars had been dropping below 60 at night. Vanna is requesting order be updated to reflect this. Vanna is also requesting a referral to diabetic education. [facility] patient Is not following a diabetic diet at all. Please call Vanna back if OK with these orders and she will advise patient.

## 2022-06-28 NOTE — NURSING NOTE
Patient presents to the clinic today for a follow up with SARBJIT Churchill CNP regarding Pain Management.              UDS/CSA-      Medications-        QUESTIONS:              Evy MATOS [facility] Patient Facilitator

## 2022-06-28 NOTE — TELEPHONE ENCOUNTER
Fax received from [facility] for plot discharge visit.    Form placed in provider's in box.    Jocelin Crum [facility] [place]  [phone]

## 2022-06-28 NOTE — PATIENT INSTRUCTIONS
1.  Pain Physical Therapy: continue to focus on gentle stretches for now. May consider formal physical therapy course in the future.               2.  Pain Psychologist to address relaxation, behavioral change, coping style, and other factors important to improvement.  NO              3.  Medication Management:   Increase gabapentin to [phone] for 3 days, then increase to [phone] for 3 days. You can stay at this dose or continue to increase to [phone] for 3 days, then [phone] for 3 days, then [phone]- stay at this dose for now.   Okay to continue oxycodone, 2.5mg as needed for severe pain per primary care provider.    Okay to continue Tylenol- 1000mg three times daily.               4.  Potential procedures: not at this time.                5.  Referrals: continue using back brace. I will reach out to Elizabeth regarding having an injection with our pain clinic.               6.  Follow up with SARBJIT Purvis CNP in 4 weeks.

## 2022-07-05 NOTE — PROGRESS NOTES
"Diabetes Self-Management Education & Support    Presents for:  Via phone    SUBJECTIVE/OBJECTIVE:     Cultural Influences/Ethnic Background:  Choose not to answer      Diabetes Symptoms & Complications:          Patient Problem List and Family Medical History reviewed for relevant medical history, current medical status, and diabetes risk factors.    Vitals:  There were no vitals taken for this visit.  Estimated body mass index is 38.32 kg/m  as calculated from the following:    Height as of 6/16/22: 1.651 m (5' 5\").    Weight as of 6/16/22: 104.5 kg (230 lb 4.8 oz).   Last 3 BP:   BP Readings from Last 3 Encounters:   06/28/22 116/73   06/20/22 109/51   06/16/22 105/55       History   Smoking Status     Never Smoker   Smokeless Tobacco     Never Used       Labs:  Lab Results   Component Value Date    A1C 6.6 03/14/2022    A1C 7.1 02/05/2021     Lab Results   Component Value Date     05/25/2022     02/05/2021     Lab Results   Component Value Date    LDL 90 08/04/2021     07/28/2020     HDL Cholesterol   Date Value Ref Range Status   07/28/2020 64 >49 mg/dL Final     Direct Measure HDL   Date Value Ref Range Status   08/04/2021 64 >=50 mg/dL Final     Comment:     0-19 years:       Greater than or equal to 45 mg/dL   Low: Less than 40 mg/dL   Borderline low: 40-44 mg/dL     20 years and older:   Female: Greater than or equal to 50 mg/dL   Male:   Greater than or equal to 40 mg/dL        ]  GFR Estimate   Date Value Ref Range Status   05/25/2022 72 >60 mL/min/1.73m2 Final     Comment:     Effective December 21, 2021 eGFRcr in adults is calculated using the 2021 CKD-EPI creatinine equation which includes age and gender (Yola ryder al., NEJM, DOI: 10.1056/CTKPcp3394703)   02/05/2021 52 (L) >60 mL/min/[1.73_m2] Final     Comment:     Non  GFR Calc  Starting 12/18/2018, serum creatinine based estimated GFR (eGFR) will be   calculated using the Chronic Kidney Disease Epidemiology " Collaboration   (CKD-EPI) equation.       GFR Estimate If Black   Date Value Ref Range Status   02/05/2021 60 (L) >60 mL/min/[1.73_m2] Final     Comment:      GFR Calc  Starting 12/18/2018, serum creatinine based estimated GFR (eGFR) will be   calculated using the Chronic Kidney Disease Epidemiology Collaboration   (CKD-EPI) equation.       Lab Results   Component Value Date    CR 0.84 05/25/2022    CR 1.05 02/05/2021     No results found for: MICROALBUMIN    Healthy Eating:  Breakfast: when she eats it: milk, potato and egg or oatmeal with raisins 1 cup  Lunch: does not always eat lunch.  Has been in hospital a lot, has lost her appetite.  Dinner: pizza 1 slice or can of soup or ramen (numbers run higher)  Snacks: not been snacking much.  Other: novolin N: 30 units twice daily: morning and evening.  Novolin R: uses sliding scale    Being Active:  Exercise:: Yes (not able to do much due to her back, has a lot of issues)    Monitoring:           Taking Medications:  Diabetes Medication(s)     Biguanides       metFORMIN (GLUCOPHAGE-XR) 500 MG 24 hr tablet    Take 1,000 mg by mouth daily (with breakfast)    Insulin       insulin aspart (NOVOLOG PEN) 100 UNIT/ML pen    Inject 1-10 Units Subcutaneous 3 times daily (before meals) For Pre-Meal  - 164 give 1 unit,   - 189 give 2 units,  - 214 give 3 unit,  - 239 give 4 units,  - 264 give 5 units,  - 289 give 6 units,  - 314 give 7 units  - 339 give 8 units,  - 364 give 9 units.BG greater than or equal to 365 give 10 units.   Notify provider if glucose greater than or equal to 350 mg/dL after administration of correction dose. If given at mealtime, administer within 30 minutes of start of meal.     insulin aspart (NOVOLOG PEN) 100 UNIT/ML pen    Inject 1-7 Units Subcutaneous At Bedtime HIGH INSULIN RESISTANCE DOSING    Do Not give Bedtime Correction Insulin if BG less than 200.   For  - 224 give 1  units.   For  - 249 give 2 units.   For  - 274 give 3 units.   For  - 299 give 4 units.   For  - 324 give 5 units.   For  - 349 give 6 units.   For BG greater than or equal to 350 give 7 units.     insulin  UNIT/ML vial    Inject 30 units every morning and 20 unit(s) every evening               Problem Solving:   not assessed              Reducing Risks:   not assessed    Healthy Coping:     Patient Activation Measure Survey Score:  No flowsheet data found.    Diabetes knowledge and skills assessment:   Patient is knowledgeable in diabetes management concepts related to: Healthy Eating, Being Active and Monitoring    Patient needs further education on the following diabetes management concepts: Healthy Eating, Being Active, Monitoring, Taking Medication, Problem Solving, Reducing Risks and Healthy Coping    Based on learning assessment above, most appropriate setting for further diabetes education would be: Individual setting.      INTERVENTIONS:    Education provided today on:  AADE Self-Care Behaviors:  Healthy Eating: consistency in amount, composition, and timing of food intake and portion control  Monitoring: individual blood glucose targets and frequency of monitoring  Taking Medication: proper site selection and rotation for injections and when to take medications    Opportunities for ongoing education and support in diabetes-self management were discussed.    Pt verbalized understanding of concepts discussed and recommendations provided today.       Education Materials Provided:  No new materials provided today      ASSESSMENT:  -went over meal plan and gave some small pointers on what to do to decrease carbs in some meals  -rotating insulin injections  -pt is not using the novolog is she using vials of insulin Novolin R. She is taking this after she eats.  I want her to work on taking it 30 minutes before eating and I am hoping that BG results improve with that  change.  -pt is in the process of moving so lots going on        Patient's most recent   Lab Results   Component Value Date    A1C 6.6 03/14/2022    A1C 7.1 02/05/2021    is meeting goal of <7.0    PLAN  See Patient Instructions for co-developed, patient-stated behavior change goals.  AVS printed and provided to patient today. See Follow-Up section for recommended follow-up.      Time Spent: 30 minutes  Encounter Type: Individual    Any diabetes medication dose changes were made via the CDE Protocol and Collaborative Practice Agreement with the patient's primary care provider. A copy of this encounter was shared with the provider.

## 2022-07-05 NOTE — LETTER
"    7/5/2022         RE: Amy Luna  [address] [facility] [address]        Dear Colleague,    Thank you for referring your patient, Amy Luna, to the [facility]. Please see a copy of my visit note below.    Diabetes Self-Management Education & Support    Presents for:  Via phone    SUBJECTIVE/OBJECTIVE:     Cultural Influences/Ethnic Background:  Choose not to answer      Diabetes Symptoms & Complications:          Patient Problem List and Family Medical History reviewed for relevant medical history, current medical status, and diabetes risk factors.    Vitals:  There were no vitals taken for this visit.  Estimated body mass index is 38.32 kg/m  as calculated from the following:    Height as of 6/16/22: 1.651 m (5' 5\").    Weight as of 6/16/22: 104.5 kg (230 lb 4.8 oz).   Last 3 BP:   BP Readings from Last 3 Encounters:   06/28/22 116/73   06/20/22 109/51   06/16/22 105/55       History   Smoking Status     Never Smoker   Smokeless Tobacco     Never Used       Labs:  Lab Results   Component Value Date    A1C 6.6 03/14/2022    A1C 7.1 02/05/2021     Lab Results   Component Value Date     05/25/2022     02/05/2021     Lab Results   Component Value Date    LDL 90 08/04/2021     07/28/2020     HDL Cholesterol   Date Value Ref Range Status   07/28/2020 64 >49 mg/dL Final     Direct Measure HDL   Date Value Ref Range Status   08/04/2021 64 >=50 mg/dL Final     Comment:     0-19 years:       Greater than or equal to 45 mg/dL   Low: Less than 40 mg/dL   Borderline low: 40-44 mg/dL     20 years and older:   Female: Greater than or equal to 50 mg/dL   Male:   Greater than or equal to 40 mg/dL        ]  GFR Estimate   Date Value Ref Range Status   05/25/2022 72 >60 mL/min/1.73m2 Final     Comment:     Effective December 21, 2021 eGFRcr in adults is calculated using the 2021 CKD-EPI creatinine equation which includes age and gender (Yola et al., NEJ, DOI: " 10.1056/AIHKpe7835465)   02/05/2021 52 (L) >60 mL/min/[1.73_m2] Final     Comment:     Non  GFR Calc  Starting 12/18/2018, serum creatinine based estimated GFR (eGFR) will be   calculated using the Chronic Kidney Disease Epidemiology Collaboration   (CKD-EPI) equation.       GFR Estimate If Black   Date Value Ref Range Status   02/05/2021 60 (L) >60 mL/min/[1.73_m2] Final     Comment:      GFR Calc  Starting 12/18/2018, serum creatinine based estimated GFR (eGFR) will be   calculated using the Chronic Kidney Disease Epidemiology Collaboration   (CKD-EPI) equation.       Lab Results   Component Value Date    CR 0.84 05/25/2022    CR 1.05 02/05/2021     No results found for: MICROALBUMIN    Healthy Eating:  Breakfast: when she eats it: milk, potato and egg or oatmeal with raisins 1 cup  Lunch: does not always eat lunch.  Has been in hospital a lot, has lost her appetite.  Dinner: pizza 1 slice or can of soup or ramen (numbers run higher)  Snacks: not been snacking much.  Other: novolin N: 30 units twice daily: morning and evening.  Novolin R: uses sliding scale    Being Active:  Exercise:: Yes (not able to do much due to her back, has a lot of issues)    Monitoring:           Taking Medications:  Diabetes Medication(s)     Biguanides       metFORMIN (GLUCOPHAGE-XR) 500 MG 24 hr tablet    Take 1,000 mg by mouth daily (with breakfast)    Insulin       insulin aspart (NOVOLOG PEN) 100 UNIT/ML pen    Inject 1-10 Units Subcutaneous 3 times daily (before meals) For Pre-Meal  - 164 give 1 unit,   - 189 give 2 units,  - 214 give 3 unit,  - 239 give 4 units,  - 264 give 5 units,  - 289 give 6 units,  - 314 give 7 units  - 339 give 8 units,  - 364 give 9 units.BG greater than or equal to 365 give 10 units.   Notify provider if glucose greater than or equal to 350 mg/dL after administration of correction dose. If given at mealtime, administer  within 30 minutes of start of meal.     insulin aspart (NOVOLOG PEN) 100 UNIT/ML pen    Inject 1-7 Units Subcutaneous At Bedtime HIGH INSULIN RESISTANCE DOSING    Do Not give Bedtime Correction Insulin if BG less than 200.   For  - 224 give 1 units.   For  - 249 give 2 units.   For  - 274 give 3 units.   For  - 299 give 4 units.   For  - 324 give 5 units.   For  - 349 give 6 units.   For BG greater than or equal to 350 give 7 units.     insulin  UNIT/ML vial    Inject 30 units every morning and 20 unit(s) every evening               Problem Solving:   not assessed              Reducing Risks:   not assessed    Healthy Coping:     Patient Activation Measure Survey Score:  No flowsheet data found.    Diabetes knowledge and skills assessment:   Patient is knowledgeable in diabetes management concepts related to: Healthy Eating, Being Active and Monitoring    Patient needs further education on the following diabetes management concepts: Healthy Eating, Being Active, Monitoring, Taking Medication, Problem Solving, Reducing Risks and Healthy Coping    Based on learning assessment above, most appropriate setting for further diabetes education would be: Individual setting.      INTERVENTIONS:    Education provided today on:  AADE Self-Care Behaviors:  Healthy Eating: consistency in amount, composition, and timing of food intake and portion control  Monitoring: individual blood glucose targets and frequency of monitoring  Taking Medication: proper site selection and rotation for injections and when to take medications    Opportunities for ongoing education and support in diabetes-self management were discussed.    Pt verbalized understanding of concepts discussed and recommendations provided today.       Education Materials Provided:  No new materials provided today      ASSESSMENT:  -went over meal plan and gave some small pointers on what to do to decrease carbs in some  meals  -rotating insulin injections  -pt is not using the novolog is she using vials of insulin Novolin R. She is taking this after she eats.  I want her to work on taking it 30 minutes before eating and I am hoping that BG results improve with that change.  -pt is in the process of moving so lots going on        Patient's most recent   Lab Results   Component Value Date    A1C 6.6 03/14/2022    A1C 7.1 02/05/2021    is meeting goal of <7.0    PLAN  See Patient Instructions for co-developed, patient-stated behavior change goals.  AVS printed and provided to patient today. See Follow-Up section for recommended follow-up.      Time Spent: 30 minutes  Encounter Type: Individual    Any diabetes medication dose changes were made via the CDE Protocol and Collaborative Practice Agreement with the patient's primary care provider. A copy of this encounter was shared with the provider.

## 2022-07-07 NOTE — TELEPHONE ENCOUNTER
Continuous Blood Gluc Sensor (FREESTYLE ADAM 14 DAY SENSOR) [facility]      Last Written Prescription Date:  01/05/22  Last Fill Quantity: 2,   # refills: 6  Last Office Visit: 08/29/22  Future Office visit:    Next 5 appointments (look out 90 days)    Aug 29, 2022  7:00 AM  (Arrive by 6:45 AM)  Provider Visit with She Huang MD  [facility] ([facility] [place] ) [address] [facility] [id number] [phone]

## 2022-07-12 NOTE — TELEPHONE ENCOUNTER
Call received from patient stating she tested positive for COVID on 7/9/22. Symptoms started that same day. Patient has an appointment on 7/19 and is asking if she can come for that appointment. Advised OK to come for appointment as long as symptoms are improving and she has been fever free for 24 hours. Advised also needs to strictly mask. Patient agrees.

## 2022-07-13 NOTE — TELEPHONE ENCOUNTER
Pending Prescriptions:                       Disp   Refills    sertraline (ZOLOFT) 100 MG tablet [Pharmac*135 ta*0        Sig: TAKE 1 AND 1/2 TABLETS EVERY MORNING

## 2022-07-13 NOTE — TELEPHONE ENCOUNTER
Received refill request for:  Metoprolol ER, Atorvastatin  Last OV was: 22 with Dr. Christina  Labs/EK21 Lipids  F/U scheduled: No orders, per Dr. Christina's note, pt moving to [place], [place]. Date unclear. Refills given for 6 months to give her time to establish elsewhere.  New script sent to: [facility]

## 2022-07-18 NOTE — TELEPHONE ENCOUNTER
Spoke with patient about appointment with Dr Maldonado on 7/21/22.  Patient states that not is not on blood thinners, aspirin or antibiotics.  Patient does not have any current illness and has not have a vaccine or immunization within the past two weeks.  Patient knows to have a  and arrive 15 minutes before injection start time.

## 2022-07-19 NOTE — PROGRESS NOTES
Amy is a 77 year old who is being evaluated via a billable video visit.      How would you like to obtain your AVS? Mail a copy  If the video visit is dropped, the invitation should be resent by: Text to cell phone: [phone]  Will anyone else be joining your video visit? No         This is a VIDEO ( using Doximity)  encounter with the patient.       Location of the provider : office   Location of the patient : home      14:00 --- 14:32             Dr Diaz's note      Patient's instructions / PLAN:                                                          Patient moves to [place] to be close to her family   Aug 2022    Plan:  1. Continue same meds, same doses for now   2. Establish care with a doctor in [place] -- easier if they have Epic medical  system      ASSESSMENT & PLAN:                                                        chronic medical problems:DM on insulin, morbid obesity,HTN, HLipidemia had extensive heart surgery in April for AVR, MVR, PFO closure, TV repair, pacemaker atrial lead replaced.  She was found with atrial fibrillation on the pacemaker records and she was started on amiodarone and Coumadin.     (E11.22,  N18.32,  Z79.4) Type 2 diabetes mellitus with stage 3b chronic kidney disease, with long-term current use of insulin (H)  (primary encounter diagnosis)  Comment: Controlled    Plan: insulin regular (NOVOLIN R VIAL) 100 UNIT/ML         vial, metFORMIN (GLUCOPHAGE XR) 500 MG 24 hr         tablet            (I48.0) Paroxysmal atrial fibrillation (H)  Comment: rate Controlled    Plan: Continue same meds, same doses for now     (Z79.899) Encounter for long-term (current) use of medications  Comment:   Plan:     (S32.030A) Closed compression fracture of L3 lumbar vertebra, initial encounter (H)  (S32.040A) Closed compression fracture of L4 lumbar vertebra, initial encounter (H)  Comment:   Plan: gabapentin     (I10) Essential hypertension  Comment: Controlled    Plan: Continue  same meds, same doses for now        Chief complaint:                                                      Follow up chronic medical problems      SUBJECTIVE:                                                    History of present illness:      LBP w sciatica -- schedule for steroid injection  -- the pain is very severe. The patient couldn't come to the clinic because of the pain     DM    -- Patient has diabetes mellitus type 2   -- Patient checks daily blood sugars 4 or more times with the dexcom/continuous glucose monitoring device.  --Patient is injecting insulin 5-6 times a day (long-acting insulin 2 times a day, short-acting insulin 3-4times a day)  -- -170 usually   -- she uses NPH and R OTC   -- last A1c 6.6 in March 2022         History of Present Illness       Diabetes:   She presents for follow up of diabetes.  She is checking home blood glucose four or more times daily. She checks blood glucose before and after meals.  Blood glucose is sometimes over 200 and sometimes under 70. She is aware of hypoglycemia symptoms including shakiness, weakness and confusion. She is concerned about blood sugar frequently over 200 and other. She is having redness, sores, or blisters on feet and excessive thirst. The patient has had a diabetic eye exam in the last 12 months. Eye exam performed on don't remember. Location of last eye exam [facility].        She eats 0-1 servings of fruits and vegetables daily.She consumes 1 sweetened beverage(s) daily.She exercises with enough effort to increase her heart rate 9 or less minutes per day.  She exercises with enough effort to increase her heart rate 7 days per week. She is missing 1 dose(s) of medications per week.  She is not taking prescribed medications regularly due to remembering to take.    Today's PHQ-9         PHQ-9 Total Score: 2    PHQ-9 Q9 Thoughts of better off dead/self-harm past 2 weeks :   Not at all    How difficult have these problems made it for  you to do your work, take care of things at home, or get along with other people: Not difficult at all       Hyperlipidemia Follow-Up      Are you regularly taking any medication or supplement to lower your cholesterol?   Yes- atorvastatin    Are you having muscle aches or other side effects that you think could be caused by your cholesterol lowering medication?  No      Review of Systems:                                                      ROS: negative for fever, chills, cough, wheezes, chest pain, shortness of breath, vomiting, abdominal pain, leg swelling       OBJECTIVE:           An actual physical exam can't be done during phone visit   A limited exam can sometimes be performed by video visit   NAD      PMHx: reviewed  Past Medical History:   Diagnosis Date     (HFpEF) heart failure with preserved ejection fraction (H)      Aortic stenosis      Chest pain      Depressive disorder      Diabetes (H)      Hyperlipidemia      Hypertension      Mitral annular calcification      Mitral stenosis      Obesity      Sleep apnea     CPAP      PSHx: reviewed  Past Surgical History:   Procedure Laterality Date     APPENDECTOMY       CV CORONARY ANGIOGRAM N/A 2020    Procedure: Coronary Angiogram;  Surgeon: Inez Peoples MD;  Location:  HEART CARDIAC CATH LAB     CV LEFT HEART CATH N/A 2020    Procedure: Left Heart Cath;  Surgeon: Inez Peoples MD;  Location:  HEART CARDIAC CATH LAB     CV PFO CLOSURE N/A 4/15/2020    Procedure: Patent Foramen Ovale Closure;  Surgeon: Ok Wilson MD;  Location:  OR      RIGHT HEART CATH MEASUREMENTS RECORDED N/A 2020    Procedure: Right Heart Cath;  Surgeon: Inez Peoples MD;  Location:  HEART CARDIAC CATH LAB     EP PACEMAKER N/A 2020    Procedure: EP Pacemaker;  Surgeon: Sohan Francis MD;  Location:  HEART CARDIAC CATH LAB     GYN SURGERY       x2 ,      GYN SURGERY      total hysterectomy      IMPLANT PACEMAKER  2015     REPAIR VALVE TRICUSPID N/A 4/15/2020    Procedure: REPAIR TRICUSPID VALVE WITH VILLA MC3 26MM.;  Surgeon: Ok Wilson MD;  Location: [facility] OR     REPLACE VALVE AORTIC N/A 4/15/2020    Procedure: REPLACEMENT, AORTIC VALVE WITH INSPIRIS TISSUE VALVE 25 MM; ON PUMP WITH SOCORRO READ BY CARDIOLOGIST DR BLANTON.;  Surgeon: Ok Wilson MD;  Location: [facility] OR     REPLACE VALVE MITRAL N/A 4/15/2020    Procedure: REPLACEMENT, MITRAL VALVE WITH EPIC TISSUE VALVE 27 MM.;  Surgeon: Ok Wilson MD;  Location: [facility] OR        Meds: reviewed  Current Outpatient Medications   Medication Sig Dispense Refill     acetaminophen (TYLENOL) 500 MG tablet Take 1,000 mg by mouth 3 times daily       albuterol (PROAIR HFA/PROVENTIL HFA/VENTOLIN HFA) 108 (90 Base) MCG/ACT inhaler Inhale 2 puffs into the lungs every 4 hours as needed for shortness of breath / dyspnea or wheezing       atorvastatin (LIPITOR) 40 MG tablet Take 1 tablet (40 mg) by mouth At Bedtime 90 tablet 1     Biotin 28825 MCG TABS Take 10,000 mcg by mouth every morning        calcium carbonate (OS-NELLY) 1500 (600 Ca) MG tablet Take 1,200 mg by mouth daily       coenzyme Q-10 200 MG CAPS Take 200 mg by mouth every morning        Continuous Blood Gluc  (FREESTYLE ADAM READER) HARJEET 1 each every 14 days Use to read blood sugars per  instructions. 1 each 0     Continuous Blood Gluc Sensor (AppographySTYLE ADAM 14 DAY SENSOR) MISC 1 each every 14 days Change every 14 days. 2 each 6     cyanocobalamin (VITAMIN B-12) 100 MCG tablet Take 100 mcg by mouth daily       ferrous sulfate 140 (45 Fe) MG TBCR CR tablet Take 280 mg by mouth daily       fluticasone (FLONASE) 50 MCG/ACT nasal spray Spray 1 spray into both nostrils daily 16 g 4     furosemide (LASIX) 40 MG tablet Take 0.5 tablets (20 mg) by mouth daily 180 tablet 1     gabapentin (NEURONTIN) 100 MG capsule Take 3 capsules (300 mg) by mouth 3 times daily 270 capsule  1     Ginkgo Biloba (GINKOBA PO) Take 250 mg by mouth daily On hold at TCU       insulin aspart (NOVOLOG PEN) 100 UNIT/ML pen Inject 1-10 Units Subcutaneous 3 times daily (before meals) For Pre-Meal  - 164 give 1 unit,   - 189 give 2 units,  - 214 give 3 unit,  - 239 give 4 units,  - 264 give 5 units,  - 289 give 6 units,  - 314 give 7 units  - 339 give 8 units,  - 364 give 9 units.BG greater than or equal to 365 give 10 units.   Notify provider if glucose greater than or equal to 350 mg/dL after administration of correction dose. If given at mealtime, administer within 30 minutes of start of meal. 15 mL      insulin aspart (NOVOLOG PEN) 100 UNIT/ML pen Inject 1-7 Units Subcutaneous At Bedtime HIGH INSULIN RESISTANCE DOSING    Do Not give Bedtime Correction Insulin if BG less than 200.   For  - 224 give 1 units.   For  - 249 give 2 units.   For  - 274 give 3 units.   For  - 299 give 4 units.   For  - 324 give 5 units.   For  - 349 give 6 units.   For BG greater than or equal to 350 give 7 units. 15 mL      insulin  UNIT/ML vial Inject 30 units every morning and 20 unit(s) every evening 1 mL      ipratropium (ATROVENT) 0.06 % nasal spray Spray 2 sprays into both nostrils 4 times daily as needed for rhinitis 3 Box 1     Lutein 40 MG CAPS Take 40 mg by mouth every morning        Magnesium 400 MG TABS Take 400 mg by mouth every evening        metFORMIN (GLUCOPHAGE-XR) 500 MG 24 hr tablet Take 1,000 mg by mouth daily (with breakfast)       metoprolol succinate ER (TOPROL XL) 25 MG 24 hr tablet Take 2 tablets (50 mg) by mouth daily 180 tablet 1     naloxone (NARCAN) 4 MG/0.1ML nasal spray Spray 1 spray (4 mg) into one nostril alternating nostrils once as needed for opioid reversal every 2-3 minutes until assistance arrives 0.2 mL 1     oxyCODONE (ROXICODONE) 5 MG tablet Take 1 tablet (5 mg) by mouth every 6 hours as needed for  pain or moderate to severe pain 30 tablet 0     Propylene Glycol (SYSTANE BALANCE OP) Apply 1 drop to eye 3 times daily as needed (dry eyes)        sertraline (ZOLOFT) 100 MG tablet TAKE 1 AND 1/2 TABLETS EVERY MORNING 135 tablet 0     vitamin (B COMPLEX-C) tablet Take 1 tablet by mouth daily       vitamin C (ASCORBIC ACID) 1000 MG TABS Take 1,000 mg by mouth every evening        Vitamin D3 (CHOLECALCIFEROL) 125 MCG (5000 UT) tablet Take 1 tablet by mouth daily       zinc 23 MG LOZG lozenge Place 1 lozenge inside cheek daily HOLD at [facility]         Soc Hx: reviewed  Fam Hx: reviewed          She Diaz MD  Internal Medicine

## 2022-07-19 NOTE — PATIENT INSTRUCTIONS
Plan:  1. Continue same meds, same doses for now   2. Establish care with a doctor in [place] -- easier if they have Epic medical  system

## 2022-07-21 NOTE — PROGRESS NOTES
Ms. Amy Luna was scheduled for a left L2-L3 transforaminal epidural steroid injection today.  Ms. Amy Luna reports that she recently had COVID-19.  As Ms. Amy Luna was recovering from COVID-19, she developed an upper respiratory illness.  Ms. Amy Luna currently has an upper respiratory illness.  Discussed the dangers of a corticosteroid injection in the setting of an active infection/illness.  Ms. Amy Luna's left L2-L3 transforaminal epidural steroid injection will be rescheduled.    Lito Maldonado MD

## 2022-07-26 PROBLEM — M54.17 LUMBOSACRAL RADICULOPATHY: Status: ACTIVE | Noted: 2022-01-01

## 2022-07-26 NOTE — TELEPHONE ENCOUNTER
Patient is scheduled for procedure with Dr. Maldonado    Spoke with: Patient    Date of Procedure: 07-27-22    [place] [facility]    Informed patient they will need an adult  Yes    Pre-procedure COVID-19 Test: N/A Positive 07-09-22    Additional comments: N/A    Patient is aware pre-op RN will call 2-3 days prior to procedure with arrival time and instructions. Yes      Milena Quiles on 7/26/2022 at 12:06 PM

## 2022-07-26 NOTE — TELEPHONE ENCOUNTER
Prescription approved per [facility] Refill Protocol.    Juan Manuel COOL RN

## 2022-07-27 NOTE — DISCHARGE INSTRUCTIONS
Home Care Instructions after an Epidural Steroid Pain Injection    A lumbar epidural steroid injection delivers steroid medication directly into the area that may be causing your lower back pain and/or leg pain. A cervical or thoracic epidural steroid injection delivers steroids into the epidural space surrounding spinal nerve roots to help relieve pain in the upper spine/neck.    Activity  -Rest today  -Do not work today  -Resume normal activity tomorrow  -DO NOT shower for 24 hours  -DO NOT remove bandaid for 24 hours    Pain  -You may experience soreness at the injection site for one or two days  -You may use an ice pack for 20 minutes every 2 hours for the first 24 hours  -You may use a heating pad after the first 24 hours  -You may use Tylenol (acetaminophen) every 4 hours or other pain medicines as     directed by your physician    You may experience numbness radiating into your legs or arms (depending on the procedure location). This numbness may last several hours. Until sensation returns to normal; please use caution in walking, climbing stairs, and stepping out of your vehicle, etc.      Common side effects of steroids:  Not everyone will experience corticosteroid side effects. If side effects are experienced, they will gradually subside in the 7-10 day period following an injection. Most common side effects include:  -Flushed face and/or chest  -Feeling of warmth, particularly in the face but could be an overall feeling of warmth  -Increased blood sugar in diabetic patients  -Menstrual irregularities my occur. If taking hormone-based birth control an alternate method of birth control is recommended  -Sleep disturbances and/or mood swings are possible  -Leg cramps    Please contact us if you have:  -Severe pain  -Fever more than 101.5 degrees Fahrenheit  -Signs of infection at the injection site (redness, swelling, or drainage)    If you have questions, please contact our office at [phone] between the  hours of 7:00 am and 3:00 pm Monday through Friday. After office hours you can contact the on call provider by dialing [phone]. If you need immediate attention, we recommend that you go to a hospital emergency room or dial [phone].

## 2022-07-27 NOTE — PROCEDURES
"PHYSICAL MEDICINE & REHABILITATION / MEDICAL SPINE      PROCEDURE DATE:  Jul 26, 2022      PATIENT NAME:  Amy Luna  MRN:  [id number]  YOB: 1945      PRE-PROCEDURE DIAGNOSIS:  1. Lumbosacral radiculopathy        POST-PROCEDURE DIAGNOSIS:  1. Lumbosacral radiculopathy        PROCEDURE:  Fluoroscopic-guided left L2-L3 transforaminal epidural steroid injection.  (CPT code:  41088)      PROCEDURE IN DETAIL:  Prior to the procedure, educational material was provided for the patient to review and take home.  After a discussion of the risks, benefits, and alternatives to the procedure, the patient expressed understanding and wished to proceed.  Consent form was signed.  The patient was brought to the fluoroscopy suite and placed in the prone position.  Procedural pause was conducted to verify:  patient identity, procedure to be performed, laterality, site, and patient position.    The skin was sterilely prepped using chlorhexidine and allowed to dry.  The skin was draped in the usual sterile fashion.  After identifying the left L2 pedicle with fluoroscopy, the skin and subcutaneous tissue were infiltrated with 2 ml 1% preservative-free lidocaine.  Then, 22g 5\" Quincke needle was advanced under fluoroscopic guidance to the posterior aspect of the left L2-L3 neuroforamen.  Appropriate foraminal depth was determined with a lateral fluoroscopic view, and anterior-posterior visualization confirmed needle positioning at approximately the 6 o'clock position relative to the pedicle.  After negative aspiration, 0.4 ml Isovue-M 200 (iopamidol) was injected using live fluoroscopy/digital subtraction angiography, confirming appropriate transforaminal spread without evidence of intravascular or intrathecal uptake.  Next, 1 ml 1% lidocaine was injected.  After 90 seconds, a brief assessment of sensation and strength was performed.  There were no gross changes in sensation or strength.  Then, 10 mg dexamethasone " followed by 1 ml 1% lidocaine was injected.  Following the injection, the needle was withdrawn slightly and flushed with 0.5 ml preservative-free 1% lidocaine as it was fully extracted.    The patient tolerated the procedure well, and there were no apparent complications.  The patient was escorted back to the postprocedure room.  The patient was monitored for side effects.  No reactions were noted.  After appropriate observation, the patient was dismissed from the clinic in good condition.    Preprocedure pain level: 8-9/10.  Postprocedure pain level: 0/10.      Lito Maldonado MD

## 2022-08-01 NOTE — TELEPHONE ENCOUNTER
Patient calls and states she was told that the RX from 07/07/22 for [employer] is useless per the [facility] and that she will need a new RX dated after the OV on 07/19/22.  Called and spoke to Lauri at [facility] who advised that the [facility] was doing something to the RX but that he could not tell me what they were doing and that I had to contact them.  Per the [facility]ty [facility] they were gathering Medicare Compliance information for the RX, but that because the script was before the 07/19/22 visit and made out to the [facility] they could not fill.    Per Maddy at the Specialty Pharm[facility] the easiest way to resolve the problem is to send a new RX for the Freestyle Sensors to them rather than to [facility] and they can fill and send to patient tomorrow.

## 2022-08-01 NOTE — TELEPHONE ENCOUNTER
Office visit from 5/25/2022 not Medicare complaint as the patient's diabetes wasn't discussed. Please send a new prescription sent dated after 7/19/2022 written by Dr. Huang.    Patient needs ASAP.    Thanks!

## 2022-08-12 PROBLEM — E11.9 DIABETES MELLITUS, TYPE 2 (H): Status: RESOLVED | Noted: 2022-01-01 | Resolved: 2022-01-01

## 2022-08-12 PROBLEM — F32.A DEPRESSION, UNSPECIFIED DEPRESSION TYPE: Status: RESOLVED | Noted: 2022-01-01 | Resolved: 2022-01-01

## 2022-08-12 PROBLEM — Z79.01 CHRONIC ANTICOAGULATION: Status: RESOLVED | Noted: 2022-01-01 | Resolved: 2022-01-01

## 2022-08-12 PROBLEM — F33.42 RECURRENT MAJOR DEPRESSIVE DISORDER, IN FULL REMISSION (H): Status: RESOLVED | Noted: 2021-02-22 | Resolved: 2022-01-01

## 2022-08-12 PROBLEM — K92.2 UPPER GI BLEED: Status: RESOLVED | Noted: 2022-01-01 | Resolved: 2022-01-01

## 2022-08-12 PROBLEM — M54.42 ACUTE BILATERAL LOW BACK PAIN WITH BILATERAL SCIATICA: Status: RESOLVED | Noted: 2022-01-01 | Resolved: 2022-01-01

## 2022-08-12 PROBLEM — G89.29 OTHER CHRONIC PAIN: Status: RESOLVED | Noted: 2022-01-01 | Resolved: 2022-01-01

## 2022-08-12 PROBLEM — T38.0X5A STEROID-INDUCED HYPERGLYCEMIA: Status: RESOLVED | Noted: 2022-01-01 | Resolved: 2022-01-01

## 2022-08-12 PROBLEM — Z79.01 LONG TERM CURRENT USE OF ANTICOAGULANTS WITH INR GOAL OF 2.0-3.0: Status: RESOLVED | Noted: 2020-06-10 | Resolved: 2022-01-01

## 2022-08-12 PROBLEM — M54.41 ACUTE BILATERAL LOW BACK PAIN WITH BILATERAL SCIATICA: Status: RESOLVED | Noted: 2022-01-01 | Resolved: 2022-01-01

## 2022-08-12 PROBLEM — M80.00XA OSTEOPOROSIS WITH CURRENT PATHOLOGICAL FRACTURE, UNSPECIFIED OSTEOPOROSIS TYPE, INITIAL ENCOUNTER: Status: ACTIVE | Noted: 2022-01-01

## 2022-08-12 PROBLEM — D62 ACUTE BLOOD LOSS ANEMIA: Status: RESOLVED | Noted: 2020-04-21 | Resolved: 2022-01-01

## 2022-08-12 PROBLEM — M54.17 LUMBOSACRAL RADICULOPATHY: Status: RESOLVED | Noted: 2022-01-01 | Resolved: 2022-01-01

## 2022-08-12 PROBLEM — L29.9 ITCHING: Status: RESOLVED | Noted: 2022-01-01 | Resolved: 2022-01-01

## 2022-08-12 PROBLEM — G89.29 CHRONIC MIDLINE LOW BACK PAIN, UNSPECIFIED WHETHER SCIATICA PRESENT: Status: ACTIVE | Noted: 2022-01-01

## 2022-08-12 PROBLEM — R73.9 STEROID-INDUCED HYPERGLYCEMIA: Status: RESOLVED | Noted: 2022-01-01 | Resolved: 2022-01-01

## 2022-08-12 PROBLEM — M54.50 CHRONIC MIDLINE LOW BACK PAIN, UNSPECIFIED WHETHER SCIATICA PRESENT: Status: ACTIVE | Noted: 2022-01-01

## 2022-08-12 PROBLEM — K62.5 BRBPR (BRIGHT RED BLOOD PER RECTUM): Status: RESOLVED | Noted: 2022-01-01 | Resolved: 2022-01-01

## 2022-08-12 NOTE — NURSING NOTE
Patient presents to clinic with her  for establish care appointment with Petey Alcocer MD.  Also, for diabetic check up and ongoing lower back pain rated 3.    Medication Rec Complete  Lindsay Guadarrama LPN............8/12/2022 2:30 PM

## 2022-08-12 NOTE — PROGRESS NOTES
Assessment & Plan     Non-traumatic compression fracture of L4 lumbar vertebra with delayed healing, subsequent encounter  Chronic midline low back pain, unspecified whether sciatica present  Patient requiring wheelchair for ambulation due to pain, frequent falls. Is quite hopeless with how much pain she is in. Referral to PT as I think this will be most beneficial for pain and balance/safety. Continue tylenol and gabapentin. Avoid NSAIDs due to prior GI bleed. Refilled oxycodone so take as needed for severe pain. We did discuss the goal that this is short term until she can get in to discuss treatment options with new spine specialist.   - oxyCODONE (ROXICODONE) 5 MG tablet; Take 1 tablet (5 mg) by mouth every 6 hours as needed for pain or moderate to severe pain  - Spine  Referral; Future  - Physical Therapy Referral; Future    Type 2 diabetes mellitus with other specified complication, with long-term current use of insulin (H)  a1c still at goal <7. Based on her age we can consider weaning back on insulin if she continues to be <7. Referral for eye exam. Foot exam today   - Hemoglobin A1c; Future  - Adult Eye Referral; Future  - FOOT EXAM  - Hemoglobin A1c    Aortic valve stenosis, etiology of cardiac valve disease unspecified  Cardiac pacemaker in situ  Nonrheumatic tricuspid valve regurgitation  Moved to the area so needs to establish care. No current cardiac symptoms   - Adult Cardiology Eval  Referral; Future    Major depressive disorder, recurrent episode, moderate (H)  Not well controlled on Zoloft 150 mg daily. We briefly discussed wean off of Zoloft and start Cymbalta for pain and depression. She will consider this     Osteoporosis with current pathological fracture, unspecified osteoporosis type, initial encounter  Reviewed dexa scan from last month. Has osteoporosis. Due to time constraints of above issues we will follow up in  A few weeks to discuss treatment options for  osteoporosis      Petey Escamilla MD  [facility]    Subjective   Amy is a 77 year old accompanied by her spouse, presenting for the following health issues:  Establish Care (With Dr. Ty, diabetic check, ongoing lower back pain rated 3)      HPI     Here to establish care  Main concern today is her chronic low back pain  Chronic low back pain   - pain started 6 months ago found to have multi level DDD and acute fractures of L4 and L3 endplates.   - has met with a spine specialist who gave her a steroid injection 3 weeks ago and that did not help   - no red flag symptoms   - gabapentin and tylenol help some   - taking oxycodone 4x a week at most when pain is unbearable  - feeling hopeless about her situation and chronic pain      Diabetes  - due for a1c check   - last checked 5 months ago was 6.6  - takes novolin about 3-4 times daily. Using about 2-4 units before each meal   - was previously taking lantus but it was too expensive so has been buying Novolin over the counter  - is due for an eye exam but just moved so needs to establish with an eye clinic  - right heal had a blister formed when she was laying and had foot resting. Now calloused and healed   - due for foot exam   - taking a statin       Mood  - depressed mood, taking zoloft 150 mg daily but due to chronic back pain does not feel her depression is under control       Osteoporosis  - recently had a dexa scan that showed osteoporosis   - was told to increase calcium and vitamin D, due to moving has not been taking them       Cardiac  - history of afib s/p pacemaker  - s/p mitral valve replacement and tricuspid valve repair  - she would like to establish care with cardiology here since she has moved      Activity Duration    Pre-charting 3 minutes    Chart accessed 2 minutes    Exam room 25 minutes    Chart accessed < 1 minute    Chart accessed 3 minutes    Chart accessed < 1 minute    Current session 25 minutes    Total  "time: 1 hour*                Review of Systems   Constitutional, HEENT, cardiovascular, pulmonary, gi and gu systems are negative, except as otherwise noted.      Objective    /66 (BP Location: Left arm, Patient Position: Sitting, Cuff Size: Adult Large)   Pulse 88   Temp 97.2  F (36.2  C) (Tympanic)   Resp 18   Ht 1.651 m (5' 5\")   Wt 94.9 kg (209 lb 2 oz)   LMP  (LMP Unknown)   SpO2 95%   Breastfeeding No   BMI 34.80 kg/m    Body mass index is 34.8 kg/m .  Physical Exam   GENERAL: frail  RESP: lungs clear to auscultation - no rales, rhonchi or wheezes  CV: regular rates and rhythm and no peripheral edema  MS: extremities normal- no gross deformities noted  SKIN: thickened dark callous right posterior heal   Comprehensive back pain exam:  Pain limits the following motions: standing, sitting, bending, Lower extremity strength functional and equal on both sides and Lower extremity sensation normal and equal on both sides. Straight leg positive on right.   PSYCH: tearful  Diabetic foot exam: normal DP and PT pulses, no trophic changes or ulcerative lesions and normal sensory exam    No results found for this visit on 08/12/22.                .  ..  "

## 2022-08-24 PROBLEM — R41.82 ALTERED MENTAL STATUS, UNSPECIFIED ALTERED MENTAL STATUS TYPE: Status: ACTIVE | Noted: 2022-01-01

## 2022-08-24 PROBLEM — K92.2 GI BLEED: Status: ACTIVE | Noted: 2022-01-01

## 2022-08-24 PROBLEM — E87.1 HYPONATREMIA: Status: ACTIVE | Noted: 2022-01-01

## 2022-08-24 PROBLEM — I48.0 PAROXYSMAL ATRIAL FIBRILLATION (H): Status: ACTIVE | Noted: 2020-06-10

## 2022-08-24 NOTE — PLAN OF CARE
Patient is drowsy and oriented to self only. Patient is having periods of confusion when awake. B/P is low, all other VS are stable. Patient was able to drink thin liquids with no issue. Stage 1 PU noted to R heel. Patient stated that is newer, from the last couple weeks. She has a scabbed area to R knee from a fall a few weeks ago. MASD noted to buttock, groin/angeles area, all abdominal folds, and below bilateral breasts. Barrier cream applied to buttock. Will request order for nystatin powder for abd folds and below breasts. Inter dry to be placed in L and R groin. Bruising noted to both arms and R lower extremity. Heels are being floated on a pillow. Staff to CCR Q 2hr and PRN. LS are coarse and diminished. Denies SOB and nausea. C/o pain to back, which is chronic. Patient is incontinent of bladder and continent of bowel. Pure wick is currently being used. Will continue to monitor and update MD as appropriate.     Goal Outcome Evaluation:    Plan of Care Reviewed With: patient     Overall Patient Progress: no change

## 2022-08-24 NOTE — PHARMACY-VANCOMYCIN DOSING SERVICE
Pharmacy Vancomycin Initial Note  Date of Service 2022  Patient's  1945  77 year old, female    Indication: Sepsis    Current estimated CrCl = CrCl cannot be calculated (Patient's most recent lab result is older than the maximum 30 days allowed.).    Creatinine for last 3 days  No results found for requested labs within last 72 hours.    Recent Vancomycin Level(s) for last 3 days  No results found for requested labs within last 72 hours.      Vancomycin IV Administrations (past 72 hours)      No vancomycin orders with administrations in past 72 hours.                Nephrotoxins and other renal medications (From now, onward)    Start     Dose/Rate Route Frequency Ordered Stop    22 1000  vancomycin (VANCOCIN) 1,000 mg in NaCl 0.9% 200 mL intermittent infusion         1,000 mg  over 60 Minutes Intravenous EVERY 12 HOURS 22 2210      22 2300  vancomycin (VANCOCIN) 1,500 mg in sodium chloride 0.9 % 500 mL intermittent infusion         1,500 mg  over 90 Minutes Intravenous ONCE 22 221            Contrast Orders - past 72 hours (72h ago, onward)    None          [employer] Prediction of Planned Initial Vancomycin Regimen   Loading dose: 1500 mg at 23:00 2022.  Regimen: 1000 mg IV every 12 hours.  Start time: 10:00 on 2022  Exposure target: AUC24 (range)400-600 mg/L.hr   AUC24,ss: 562 mg/L.hr  Probability of AUC24 > 400: 84 %  Ctrough,ss: 18.9 mg/L  Probability of Ctrough,ss > 20: 45 %  Probability of nephrotoxicity (Lodise KANCHAN ): 15 %             Plan:  1. Start vancomycin  1500mg x1 then 1000mg IV q12h.   2. Vancomycin monitoring method: AUC  3. Vancomycin therapeutic monitoring goal: 400-600 mg*h/L  4. Pharmacy will check vancomycin levels as appropriate in 1-3 Days.    5. Serum creatinine levels will be ordered daily for the first week of therapy and at least twice weekly for subsequent weeks.      Trevor Liang [facility]

## 2022-08-24 NOTE — ED TRIAGE NOTES
Pt arrives via EMS with increasing back pain, pt has a history of fractures in vertebrae that happened in Feb. Pt received 100 mcg of fent en route and 2.5mg of versed en route. Pt appears sedated and having a hard time answering questions. Pt normally alert and oriented at baseline.     Triage Assessment     Row Name 08/23/22 8741       Triage Assessment (Adult)    Airway WDL WDL       Respiratory WDL    Respiratory WDL WDL       Skin Circulation/Temperature WDL    Skin Circulation/Temperature WDL WDL       Cardiac WDL    Cardiac WDL WDL       Peripheral/Neurovascular WDL    Peripheral Neurovascular WDL WDL       Cognitive/Neuro/Behavioral WDL    Cognitive/Neuro/Behavioral WDL X  sedated

## 2022-08-24 NOTE — PROGRESS NOTES
:     Patient lives at home with spouse and daughter. Met with patient and spouse in room to discuss discharge planning needs. Patient and spouse stated they feel that patient would benefit from short term rehab.     Pt/family was given the Medicare Compare list for SNF, with associated star ratings to assist with choice for referrals/discharge planning Yes    Education was given to pt/family that star ratings are updated/maintained by Medicare and can be reviewed by visiting www.medicare.gov Yes    A referral has been sent to [facility] as patients first choice and Lu from [facility] has been made aware of the referral.      will continue to follow for discharge planning needs.     JOAQIUN Mascorro on 8/24/2022 at 3:07 PM

## 2022-08-24 NOTE — PROVIDER NOTIFICATION
08/24/22 0350   Initial Information   Patient Belongings remains with patient   Patient Belongings Remaining with Patient watch;ring;clothing;other (see comments)  (hat)   Did you bring any home meds/supplements to the hospital?  No     A               Admission:  I am responsible for any personal items that are not sent to the safe or pharmacy.  [facility] is not responsible for loss, theft or damage of any property in my possession.    Signature:  _________________________________ Date: _______  Time: _____                                              Staff Signature:  ____________________________ Date: ________  Time: _____      2nd Staff person, if patient is unable/unwilling to sign:    Signature: ________________________________ Date: ________  Time: _____     Discharge:  [facility] has returned all of my personal belongings:    Signature: _________________________________ Date: ________  Time: _____                                          Staff Signature:  ____________________________ Date: ________  Time: _____

## 2022-08-24 NOTE — DISCHARGE SUMMARY
[facility]    Discharge Summary  Hospitalist    Date of Admission:  8/23/2022  Date of Discharge:  8/24/2022  Discharging Provider: Lisa Warner DO  Date of Service (when I saw the patient): 08/24/22    Discharge Diagnoses     Infective endocarditis.     GI bleed (3/14/2022), ruled out.   Aortic sten -- S/P Bioprost AVR 4/15/20 ()  Sepsis secondary to Infective Endocarditis due to or associated with vascular implants/grafts/Artificial heart valves.     CLARITA (acute kidney injury) (H) (4/21/2020)    S/P aortic valve and mitral valve replacement (4/22/2020)    Paroxysmal atrial fibrillation (H) (6/10/2020)    Second degree AV block, Mobitz type II (6/12/2015)    Type 2 diabetes mellitus with other specified complication, with long-term current use of insulin (H) (4/4/2022)    Hyponatremia (8/24/2022)    Altered mental status, unspecified altered mental status type (8/24/2022)      History of Present Illness   Amy Luna is an 77 year old female who presented with fatigue, hallucination, black stool and back pain.     Hospital Course   Amy Luna was admitted on 8/23/2022.  The following problems were addressed during her hospitalization:    Infective endocarditis causing Encephalopathy. Mild leucocytosis. Met sepsis criteria in ER. CXR with pulm edema, pleural effusion and possible pneumonia.   Community acquired pneumonia. CXR showed pulm edema and L pleural effusion  Acute hypoxic respiratory failure. POA. Due to CAP and possible HF.  Patient was started on antibiotics with zosyn/vanco in the ER. Changed to rocephin/azithro to cover for community acquired pneumonia. Given pleural effusion, echocardiogram was done. Surprisingly, a 1.5cm mobile echodensity was seen on echo with a severely increased mean mitral gradient of 21-24mm hg. Patient has been accepted to the [facility] for further treatment. She was given vancomycin dose prior to transfer. Blood cultures neg at 12 hours. Blood  pressures low but stable at time of transfer.      UTI. Family states she has had back pain. Renal US neg for hydro.  Had a klebsiella UTI in march, otherwise no known recurrent infections in the last 6 months.      Black stool. Occult blood negative.   Concern for possible GIB. Hb stable this AM. May have stopped on its own. She is not on chronic anticoagulation.     Atrial fibrillation. In Afib on my exam. Not on chronic anticoagulation. With hx of GIB, would not start at this time. Defer to PCP.      Hyponatremia  CLARITA. POA. Was given gentle IV hydration initially due to infection concern, this was stopped and then one dose of lasix was given.      T2DM with elevated blood glucose  -holding home meds  -sliding scale insulin. Add back long acting insulin once dose verified with patient and pharmacy     Chronic back pain and left leg pain. CT L spine ordered (not a candidate for MRI due to pacemaker). Not done as she needed to transfer. Consider this as source of infection.     Lisa Warner, DO    Significant Results and Procedures       Pending Results   These results will be followed up by Dr. Milton at [facility] and team.   Unresulted Labs Ordered in the Past 30 Days of this Admission     Date and Time Order Name Status Description    8/23/2022 11:53 PM Urine Culture In process     8/23/2022  9:55 PM Blood Culture Artery, Brachial, Right Preliminary     8/23/2022  9:55 PM Blood Culture Artery, Brachial, Right Preliminary           Code Status   Full Code       Primary Care Physician   Petey Escamilla    Physical Exam   Temp: 97.4  F (36.3  C) Temp src: Tympanic BP: 103/40 Pulse: 77   Resp: 16 SpO2: 96 % O2 Device: Nasal cannula Oxygen Delivery: 3 LPM  There were no vitals filed for this visit.  Vital Signs with Ranges  Temp:  [97.4  F (36.3  C)-100.6  F (38.1  C)] 97.4  F (36.3  C)  Pulse:  [77-97] 77  Resp:  [11-24] 16  BP: ()/(31-69) 103/40  SpO2:  [89 %-99 %] 96 %  No intake/output data  recorded.    General Appearance: confused. Appears acutely ill and weak. Pale.   Eyes: EOMI. Lids normal.   HEENT: NC, AT.   Respiratory: poor inspiratory effort. Crackles at bases.   Cardiovascular: regular.   GI: abd soft, NT  Skin: pale but warm and dry.   Musculoskeletal: weak, deconditioned, cannot sit up in bed for full exam. Can moves arms and legs.   Neurologic: no obvious focal deficits but cannot due full neurologic testing based on patient  Mental status  Psychiatric: confused    Discharge Disposition   Transferred to [facility]  Condition at discharge: Stable    Consultations This Hospital Stay   PHARMACY TO DOSE VANCO  SOCIAL WORK IP CONSULT  PHARMACY TO DOSE VANCO    Time Spent on this Encounter   ILisa DO, personally saw the patient today and spent greater than 30 minutes discharging this patient.    Discharge Orders   No discharge procedures on file.  Discharge Medications   Current Discharge Medication List      CONTINUE these medications which have NOT CHANGED    Details   acetaminophen (TYLENOL) 500 MG tablet Take 500 mg by mouth 3 times daily Takes with 500 mg tablet three times daily      acetaminophen (TYLENOL) 650 MG CR tablet Take 650 mg by mouth 3 times daily Takes with 500 mg tablet three times daily      atorvastatin (LIPITOR) 40 MG tablet Take 1 tablet (40 mg) by mouth At Bedtime  Qty: 90 tablet, Refills: 1    Associated Diagnoses: Essential hypertension; Paroxysmal atrial fibrillation (H); S/P mitral valve replacement with bioprosthetic valve; S/P aortic valve and mitral valve replacement; S/P AVR (aortic valve replacement); Tricuspid valve insufficiency, unspecified etiology; Cardiac pacemaker in situ; Acute blood loss anemia      Biotin 64327 MCG TABS Take 10,000 mcg by mouth every morning       calcium carbonate (OS-NELLY) 1500 (600 Ca) MG tablet Take 2,400 mg by mouth daily      coenzyme Q-10 200 MG CAPS Take 200 mg by mouth every morning       Continuous Blood Gluc   (FREESTYLE ADAM READER) HARJEET 1 each every 14 days Use to read blood sugars per  instructions.  Qty: 1 each, Refills: 0    Associated Diagnoses: Type 2 diabetes mellitus with stage 3 chronic kidney disease, without long-term current use of insulin, unspecified whether stage 3a or 3b CKD (H)      Continuous Blood Gluc Sensor (FREESTYLE ADAM 14 DAY SENSOR) MISC 1 each every 14 days Change every 14 days.  Qty: 2 each, Refills: 6    Associated Diagnoses: Type 2 diabetes mellitus with stage 3b chronic kidney disease, with long-term current use of insulin (H)      cyanocobalamin (VITAMIN B-12) 100 MCG tablet Take 100 mcg by mouth daily      diphenhydrAMINE (BENADRYL) 25 MG tablet Take 25 mg by mouth every 6 hours as needed for itching or allergies      ferrous sulfate 140 (45 Fe) MG TBCR CR tablet Take 280 mg by mouth daily      gabapentin (NEURONTIN) 100 MG capsule Take 3 capsules (300 mg) by mouth 3 times daily  Qty: 270 capsule, Refills: 1    Associated Diagnoses: Closed compression fracture of L3 lumbar vertebra, initial encounter (H); Closed compression fracture of L4 lumbar vertebra, initial encounter (H)      Ginkgo Biloba (GINKOBA PO) Take 250 mg by mouth daily      insulin  UNIT/ML vial Inject 30 units every morning and 20 unit(s) every evening  Qty: 1 mL    Associated Diagnoses: Type 2 diabetes, HbA1C goal < 8% (H)      insulin regular (NOVOLIN R VIAL) 100 UNIT/ML vial 2-4 units before breakfast, 2-4 units before lunch, 2-4 units before dinner  Refills: 0    Associated Diagnoses: Type 2 diabetes mellitus with stage 3b chronic kidney disease, with long-term current use of insulin (H)      ipratropium (ATROVENT) 0.06 % nasal spray Spray 2 sprays into both nostrils 4 times daily as needed for rhinitis  Qty: 3 Box, Refills: 1    Associated Diagnoses: Seasonal allergic rhinitis, unspecified trigger      Lutein 40 MG CAPS Take 40 mg by mouth every morning       Magnesium 400 MG TABS Take 400 mg by  mouth every evening       metFORMIN (GLUCOPHAGE XR) 500 MG 24 hr tablet Take 2 tablets (1,000 mg) by mouth daily (with breakfast)  Qty: 180 tablet, Refills: 1    Associated Diagnoses: Type 2 diabetes mellitus with stage 3b chronic kidney disease, with long-term current use of insulin (H)      metoprolol succinate ER (TOPROL XL) 25 MG 24 hr tablet Take 2 tablets (50 mg) by mouth daily  Qty: 180 tablet, Refills: 1    Associated Diagnoses: Essential hypertension      naloxone (NARCAN) 4 MG/0.1ML nasal spray Spray 1 spray (4 mg) into one nostril alternating nostrils once as needed for opioid reversal every 2-3 minutes until assistance arrives  Qty: 0.2 mL, Refills: 1    Associated Diagnoses: Closed compression fracture of L3 lumbar vertebra, initial encounter (H); Closed compression fracture of L4 lumbar vertebra, initial encounter (H)      oxyCODONE (ROXICODONE) 5 MG tablet Take 1 tablet (5 mg) by mouth every 6 hours as needed for pain or moderate to severe pain  Qty: 30 tablet, Refills: 0    Associated Diagnoses: Non-traumatic compression fracture of L4 lumbar vertebra with delayed healing, subsequent encounter      Propylene Glycol (SYSTANE BALANCE OP) Apply 1 drop to eye 3 times daily as needed (dry eyes)       sertraline (ZOLOFT) 100 MG tablet TAKE 1 AND 1/2 TABLETS EVERY MORNING  Qty: 135 tablet, Refills: 0    Associated Diagnoses: Recurrent major depressive disorder, in full remission (H)      vitamin (B COMPLEX-C) tablet Take 1 tablet by mouth daily      vitamin C (ASCORBIC ACID) 1000 MG TABS Take 1,000 mg by mouth daily as needed (cold symptoms)      Vitamin D3 (CHOLECALCIFEROL) 125 MCG (5000 UT) tablet Take 2 tablets by mouth daily      zinc gluconate 50 MG tablet Take 50 mg by mouth daily      albuterol (PROAIR HFA/PROVENTIL HFA/VENTOLIN HFA) 108 (90 Base) MCG/ACT inhaler Inhale 2 puffs into the lungs every 4 hours as needed for shortness of breath / dyspnea or wheezing    Comments: Pharmacy may dispense  brand covered by insurance (Proair, or proventil or ventolin or generic albuterol inhaler)      furosemide (LASIX) 40 MG tablet Take 0.5 tablets (20 mg) by mouth daily  Qty: 180 tablet, Refills: 1    Associated Diagnoses: Essential hypertension; S/P mitral valve replacement with bioprosthetic valve; S/P aortic valve and mitral valve replacement; Paroxysmal atrial fibrillation (H); Chronic heart failure with preserved ejection fraction (H)           Allergies   Allergies   Allergen Reactions     Penicillins Hives     3 weeks after taking med got hives.       Pioglitazone Other (See Comments)     Increased urinary frequency, fatigue, dyspnea     Data   Most Recent 3 CBC's:  Recent Labs   Lab Test 08/24/22  1712 08/24/22  1044 08/24/22  0732 08/23/22  2217 05/25/22  0839   WBC  --   --  9.9 11.8* 10.4   HGB 7.2* 7.5* 7.3* 7.6* 11.3*   MCV  --   --  91 89 87   PLT  --   --  227 246 292      Most Recent 3 BMP's:  Recent Labs   Lab Test 08/24/22  1740 08/24/22  1357 08/24/22  1217 08/24/22  0732 08/23/22  2220 07/27/22  1322 05/25/22  0839   NA  --   --   --  129* 128*  --  139   POTASSIUM  --   --   --  5.0 5.0  --  4.9   CHLORIDE  --   --   --  99 96*  --  105   CO2  --   --   --  25 23  --  28   BUN  --   --   --  45* 42*  --  14   CR  --   --   --  1.63* 1.60*  --  0.84   ANIONGAP  --   --   --  5 9  --  6   NELLY  --   --   --  10.6* 10.9*  --  10.2*   * 212* 228* 236* 285*   < > 148*    < > = values in this interval not displayed.     Most Recent 2 LFT's:  Recent Labs   Lab Test 08/23/22 2220 05/25/22  0839   AST 20 21   ALT 8 22   ALKPHOS 57 75   BILITOTAL 0.7 0.5     Most Recent INR's and Anticoagulation Dosing History:  Anticoagulation Dose History     Recent Dosing and Labs Latest Ref Rng & Units 12/10/2021 12/20/2021 1/10/2022 1/31/2022 3/14/2022 3/14/2022 3/15/2022    INR 0.85 - 1.15 4.6(H) 2.5(H) 1.9(H) 2.0(H) >10.00(HH) 1.09 0.81(L)        Most Recent 3 Troponin's:  Recent Labs   Lab Test  01/17/20  0507 01/17/20  0149 01/16/20  2221   TROPI 0.056* 0.106* 0.020     Most Recent Cholesterol Panel:  Recent Labs   Lab Test 08/04/21  1126   CHOL 190   LDL 90   HDL 64   TRIG 178*     Most Recent 6 Bacteria Isolates From Any Culture (See EPIC Reports for Culture Details):  Recent Labs   Lab Test 01/16/20  2221 01/16/20  2220   CULT No growth No growth     Most Recent TSH, T4 and A1c Labs:  Recent Labs   Lab Test 08/12/22  1506 11/19/21  1039 08/04/21  1126 11/03/20  1134 07/28/20  0904   TSH  --   --  2.28  --  4.47*   T4  --   --   --   --  1.14   A1C 6.7*   < > 7.3*   < > 6.7*    < > = values in this interval not displayed.     Results for orders placed or performed during the hospital encounter of 08/23/22   XR Chest Port 1 View    Narrative    PROCEDURE INFORMATION:   Exam: XR Chest   Exam date and time: 8/23/2022 11:03 PM   Age: 77 years old   Clinical indication: Fever; Prior surgery; Surgery date: 6+ months; Surgery   type: Pacemaker; Additional info: Fever, occult infection     TECHNIQUE:   Imaging protocol: Radiologic exam of the chest.   Views: 1 view.     COMPARISON:   CR XR CHEST 2 VW 4/21/2020 10:04 AM     FINDINGS:   Tubes, catheters and devices: Aortic valve prosthesis again noted. Pacer leads   are seen in good position.     Lungs: Ill-defined bilateral pulmonary opacities. Interstitial pulmonary edema.   Pleural spaces: Small left pleural effusion.   Heart/Mediastinum: Unremarkable. No cardiomegaly.   Bones/joints: Median sternotomy noted.       Impression    IMPRESSION:   Cardiomegaly with pulmonary edema and left pleural effusion.    Bibasal atelectasis or mild pneumonia.     THIS DOCUMENT HAS BEEN ELECTRONICALLY SIGNED BY ESTEFANÍA STUART MD   CT Head w/o Contrast    Narrative    PROCEDURE INFORMATION:   Exam: CT Head Without Contrast   Exam date and time: 8/23/2022 11:49 PM   Age: 77 years old   Clinical indication: Altered mental status/memory loss; Confusion or   disorientation;  Patient HX: PT arrives via EMS with increasing back pain, PT   has a history of fractures in vertebrae that happened in feb. PT received 100   mcg of fent en route and 2.5mg of versed en route. PT appears sedated and   having a hard time answering questions. PT normally alert and oriented at   baseline. ; Additional info: AMS     TECHNIQUE:   Imaging protocol: Computed tomography of the head without contrast.   Radiation optimization: All CT scans at this facility use at least one of these   dose optimization techniques: automated exposure control; mA and/or kV   adjustment per patient size (includes targeted exams where dose is matched to   clinical indication); or iterative reconstruction.     COMPARISON:   No relevant prior studies available.     FINDINGS:   Brain: Age consistent cerebral and cerebellar atrophy. Age consistent   periventricular white matter abnormality. No intracranial hemorrhage or   intracranial mass.   Cerebral ventricles: No ventriculomegaly.   Paranasal sinuses: Visualized sinuses are unremarkable. No fluid levels.   Mastoid air cells: Visualized mastoid air cells are well aerated.   Orbital cavities: Postoperative change of each globe.     Bones/joints: Hyperostosis of the calvarium is noted. This is of no clinical   significance.   Soft tissues: Unremarkable.       Impression    IMPRESSION:   No acute intracranial pathology.     THIS DOCUMENT HAS BEEN ELECTRONICALLY SIGNED BY ESTEFANÍA STUART MD   US Renal Complete    Narrative    PROCEDURE: US RENAL COMPLETE    HISTORY: Leukocytosis, encephalopathy.    TECHNIQUE:  A renal ultrasound was performed.    COMPARISON:  None.    MEASUREMENTS:    Right renal length: 10.5 cm  Left renal length: 10.3 cm    RENAL FINDINGS: The kidneys are normal in size. There is no  hydronephrosis. No shadowing stones are seen.    BLADDER: The bladder is moderately distended and grossly unremarkable.      Impression    IMPRESSION:      No hydronephrosis.  Ultrasound cannot exclude pyelonephritis.      CHARLOTTE HENSON MD         SYSTEM ID:  [id number]   Echocardiogram Complete     Value    LVEF  70%    Narrative    [id number]  [id number]  [id number]  [id number]^ELMA^VICENTE^CARLO                                                                       Version 2     [facility]  [address] [place] [address]     Name: KASSANDRA RIVERA  MRN: [id number]  : 1945  Study Date: 2022 01:58 PM  Age: 77 yrs  Gender: Female  Patient Location: [facility]  Reason For Study: Heart Failure  History:  CV surgery 4/15/2021  AVR: INSPIRIS TISSUE VALVE 25 MM  MVR: EPIC TISSUE VALVE 27 MM  TV repair: VILLA MC3 26MM  ASD closure  Ordering Physician: VICENTE WILLS  Performed By: Cinda Hernandez RDCS, TIMA     BSA: 2.0 m2  Height: 65 in  Weight: 209 lb  HR: 83  BP: 118/31 mmHg  ______________________________________________________________________________  Procedure  Complete Portable Echo Adult.  ______________________________________________________________________________  Interpretation Summary  1.5cm mobile echo density on mitral prosthetic valve with severely increased  mean mitral gradient 21-24mmHg.. Heart rate 86BPM. Consistent with prosthetic  valve endocarditis.     CV surgery 4/15/2021  AVR: INSPIRIS TISSUE VALVE 25 MM  MVR: EPIC TISSUE VALVE 27 MM  TV repair: VILLA MC3 26MM  ASD closure     ______________________________________________________________________________  Left Ventricle  CV surgery 4/15/2021  AVR: INSPIRIS TISSUE VALVE 25 MM  MVR: EPIC TISSUE VALVE 27 MM  TV repair: VILLA MC3 26MM  ASD closure. Global and regional left ventricular function is hyperkinetic  with an EF >70%. Left ventricular wall thickness is normal. Left ventricular  size is normal. Diastolic function not assessed due to presence of prosthetic  mitral valve. No regional wall motion abnormalities are seen.     Right Ventricle  Right ventricular function, chamber  size, wall motion, and thickness are  normal. A pacemaker lead is noted in the right ventricle.     Atria  The right atria appears normal. Moderate left atrial enlargement is present.     Mitral Valve  1.5cm mobile echo density on mitral prosthetic valve with severely increased  mean mitral gradient 21-24mmHg.. Heart rate 86BOM.     Aortic Valve  The mean AoV pressure gradient is 12.0 mmHg. A bioprosthetic aortic valve is  present.     Tricuspid Valve  S/P TV repair. Mean gradient not assessed. Mild TR. The right ventricular  systolic pressure is approximated at 55.7 mmHg plus the right atrial pressure.     Pulmonic Valve  The pulmonic valve is normal.     Vessels  The thoracic aorta is normal. The pulmonary artery is normal. Dilation of the  inferior vena cava is present with abnormal respiratory variation in diameter.     Pericardium  No pericardial effusion is present.     ______________________________________________________________________________  MMode/2D Measurements & Calculations  IVSd: 1.2 cm  LVIDd: 3.6 cm  LVIDs: 2.1 cm  LVPWd: 1.3 cm  FS: 42.0 %  LV mass(C)d: 154.0 grams  LV mass(C)dI: 76.4 grams/m2  Ao root diam: 2.8 cm  asc Aorta Diam: 3.3 cm     LVOT diam: 2.1 cm  LVOT area: 3.5 cm2  LA Volume (BP): 92.0 ml  LA Volume Index (BP): 45.5 ml/m2  RWT: 0.73     Doppler Measurements & Calculations  MV max P.4 mmHg  MV mean P.5 mmHg  MV V2 VTI: 105.0 cm  MVA(VTI): 1.0 cm2  Ao V2 max: 238.0 cm/sec  Ao max P.0 mmHg  Ao V2 mean: 163.0 cm/sec  Ao mean P.0 mmHg  Ao V2 VTI: 39.5 cm  GUERITA(I,D): 2.7 cm2  GUERITA(V,D): 2.4 cm2  LV V1 max P.2 mmHg  LV V1 max: 167.0 cm/sec  LV V1 VTI: 31.3 cm  SV(LVOT): 108.4 ml  SI(LVOT): 53.8 ml/m2     PA acc time: 0.10 sec  TR max yasir: 373.0 cm/sec  TR max P.7 mmHg  AV Yasir Ratio (DI): 0.70  GUERITA Index (cm2/m2): 1.4     ______________________________________________________________________________  Report approved by: Saul Singer 2022 04:21 PM

## 2022-08-24 NOTE — PROGRESS NOTES
Echo showed 1.5cm mobile echo density on mitral prosthetic valve with severely increased   mean mitral gradient 21-24mmHg.. Heart rate 86BPM. Consistent with prosthetic   valve endocarditis  vanco and rocephin    Call to cardiology as well.   They recommend:  SOCORRO and CT surg consult recommended.  Lasix

## 2022-08-24 NOTE — PHARMACY-VANCOMYCIN DOSING SERVICE
Pharmacy Vancomycin Initial Note  Date of Service 2022  Patient's  1945  77 year old, female    Indication: Endocarditis    Current estimated CrCl = Estimated Creatinine Clearance: 33.5 mL/min (A) (based on SCr of 1.63 mg/dL (H)).    Creatinine for last 3 days  2022: 10:20 PM Creatinine 1.60 mg/dL  2022:  7:32 AM Creatinine 1.63 mg/dL    Recent Vancomycin Level(s) for last 3 days  No results found for requested labs within last 72 hours.      Vancomycin IV Administrations (past 72 hours)                   vancomycin (VANCOCIN) 1,500 mg in sodium chloride 0.9 % 500 mL intermittent infusion (mg) 1,500 mg New Bag 22 0000                Nephrotoxins and other renal medications (From now, onward)    Start     Dose/Rate Route Frequency Ordered Stop    22 0000  vancomycin (VANCOCIN) 1,000 mg in NaCl 0.9% 200 mL intermittent infusion         1,000 mg  over 60 Minutes Intravenous EVERY 24 HOURS 22 1853      22 1830  [Held by provider]  furosemide (LASIX) injection 20 mg        (Held by provider since 2022 at 1852 by Lisa Warner DO.Hold Reason: Change in Vitals.)    20 mg  over 1-3 Minutes Intravenous DAILY 22 1803            Contrast Orders - past 72 hours (72h ago, onward)    None          [employer]ight[employer] Prediction of Planned Initial Vancomycin Regimen    Loading dose: 1500 mg IV ONCE on 22 at 0000  Regimen: 1000 mg IV every 24 hours.  Start time: 0000 on 2022  Exposure target: AUC24 (range)400-600 mg/L.hr   AUC24,ss: 476 mg/L.hr  Probability of AUC24 > 400: 70 %  Ctrough,ss: 15.7 mg/L  Probability of Ctrough,ss > 20: 27 %  Probability of nephrotoxicity (Lodise KANCHAN ): 11 %          Plan:  1. Start vancomycin  1000 mg IV q24h at 0000 on 22.   2. Vancomycin monitoring method: AUC  3. Vancomycin therapeutic monitoring goal: 400-600 mg*h/L  4. Pharmacy will check vancomycin levels as appropriate in 1-3 Days.    5. Serum creatinine levels will  be ordered daily for the first week of therapy and at least twice weekly for subsequent weeks.      Vioal Shafer, RP

## 2022-08-24 NOTE — PHARMACY-ADMISSION MEDICATION HISTORY
"Pharmacy -- Admission Medication Reconciliation    Prior to admission (PTA) medications were reviewed and the patient's PTA medication list was updated.    Sources Consulted: chart review, sure scripts, patient's spouse Irineo    The reliability of this Medication Reconciliation is: Reliability: Reliable    The following significant changes were made:    Removed:    Flonase    Updated:    Dose of vitamin D and calcium per  has doubled these due to osteoporosis    Vitamin C to PRN    Zinc formulation to tablet    Acetaminophen dose- per , he gives her a 500 mg acetaminophen plus a 650 mg acetaminophen three times daily    Doses of insulin NPH and insulin regular per daughter. Patient was unable to communicate doses and timing.  was not sure about doses. Both  and daughter related that the patient has been \"delirious\" and not dosing insulin appropriately. Note these have been bought OTC due to cost.     Note:     thinks wife has an albuterol laying around but wasn't sure. Stated she doesn't need it.     Has not used lasix in more than a month (noted as not using at last admission as well) due to issues with getting to the bathroom on time. Marked as not taking on medication list.      is interested in taking over management of medications for his wife at discharge and would like in-depth education about the medications and uses. Also requested recommendations on if the supplements were all needed due to pill burden and cost. Recommend outpatient MTM appointment following discharge. Daughter also requested meeting with pharmacy at discharge to go over medications (ie insulin).    In addition, the patient's allergies were reviewed with the patient and updated as follows:   Allergies: Penicillins and Pioglitazone    The pharmacist has reviewed with the patient that all personal medications should be removed from the building or locked in the belongings safe.  Patient shall only " "take medications ordered by the physician and administered by the nursing staff.     Medication barriers identified:  noted that patient was incorrectly taking insulins (using both N and R three times daily regardless of if she ate); purchasing insulin OTC due to cost- worried about cost now that he is no longer working. [facility] C[facility]/Rx Outreach program appropriate at discharge?   Medication adherence concerns: none other than insulin   Understanding of emergency medications: CGM; has called EMS in the past for hypoglycemia event;  states blood glucose is a \"rollercoaster\" but is usually normal-high.    Viola Shafer [facility], 8/24/2022,  11:17 AM   "

## 2022-08-24 NOTE — ED PROVIDER NOTES
History     Chief Complaint   Patient presents with     Back Pain       Amy Luna is a 77 year old female who presents with fatigue, hallucinations, and black stools.  Most of the history is obtained by  as patient is somnolent but does respond to voice however there is some confusion.  She is oriented to time but not place.  Denies any pain, fever, chills, nausea, vomiting, cough, dyspnea, dysuria, vaginal discharge, falls involving head trauma.  She has chronic back pain from past compression fracture.  She also has a history of GI bleed with hemoglobin down to 4.   notes black-colored stools over the past several days.  She is on gabapentin which has been increased in dose approximately 4 weeks ago.  She is also taking an herbal supplement that this is new, but otherwise no other medication changes.    Allergies   Allergen Reactions     Penicillins Hives     3 weeks after taking med got hives.       Pioglitazone Other (See Comments)     Increased urinary frequency, fatigue, dyspnea       Patient Active Problem List    Diagnosis Date Noted     Chronic midline low back pain, unspecified whether sciatica present 08/12/2022     Priority: Medium     Osteoporosis with current pathological fracture, unspecified osteoporosis type, initial encounter 08/12/2022     Priority: Medium     Urinary tract infection without hematuria, site unspecified 04/11/2022     Priority: Medium     Urinary retention 04/11/2022     Priority: Medium     Urticaria 04/04/2022     Priority: Medium     Essential hypertension 04/04/2022     Priority: Medium     Chronic heart failure with preserved ejection fraction (H) 04/04/2022     Priority: Medium     Type 2 diabetes mellitus with other specified complication, with long-term current use of insulin (H) 04/04/2022     Priority: Medium     Compression fracture of lumbar vertebra with routine healing, unspecified lumbar vertebral level, subsequent encounter 04/04/2022      Priority: Medium     Debility 04/04/2022     Priority: Medium     Generalized weakness 03/14/2022     Priority: Medium     Anemia due to blood loss, acute 03/14/2022     Priority: Medium     Supratherapeutic INR 11/19/2020     Priority: Medium     Paroxysmal atrial fibrillation (H) 06/10/2020     Priority: Medium     Morbid obesity (H) 05/05/2020     Priority: Medium     S/P aortic valve and mitral valve replacement 04/22/2020     Priority: Medium     CLARIAT (acute kidney injury) (H) 04/21/2020     Priority: Medium     Tricuspid valve Regur -- S/P Repair 4/15/20 04/10/2020     Priority: Medium     Added automatically from request for surgery [id number]       Patent foramen ovale -- S/P Closure 4/15/20 04/10/2020     Priority: Medium     Added automatically from request for surgery [id number]       Fractured atrial pacemaker lead -- Replace 4/16/20 04/10/2020     Priority: Medium     Added automatically from request for surgery [id number]       Mitral valve stenosis, unspecified etiology 03/30/2020     Priority: Medium     Added automatically from request for surgery [id number]       Mitral stenosis -- S/P Bioprost MVR 4/15/19      Priority: Medium     Mitral annular calcification      Priority: Medium     (HFpEF) heart failure with preserved ejection fraction (H)      Priority: Medium     Cardiac pacemaker in situ 06/16/2015     Priority: Medium     Formatting of this note might be different from the original.  complete heart block  ; Dual Chamber [employer] PACEMAKER       Second degree AV block, Mobitz type II 06/12/2015     Priority: Medium     Aortic sten -- S/P Bioprost AVR 4/15/20      Priority: Medium     Hyperlipidemia      Priority: Medium     LAVERNE on CPAP      Priority: Medium     Encounter for long-term (current) use of insulin (H) 04/12/2013     Priority: Medium     Adenomatous polyp of colon 03/11/2013     Priority: Medium     Dermatochalasis 12/01/2008     Priority: Medium     Glaucoma suspect 12/01/2008     Priority:  Medium     Formatting of this note might be different from the original.  Peak IOP:  22/           Pach:  525/525              IOP Goal:  20/  C/D:  0.55/0.55               VF:  WNL                Other:  HRT WNL  Risks:  Cupping and thin corneas                 Surgery:     Meds:               Failed Meds:       Major depressive disorder, recurrent episode, moderate (H) 2008     Priority: Medium       Past Medical History:   Diagnosis Date     (HFpEF) heart failure with preserved ejection fraction (H)      Aortic stenosis      Chest pain      Depressive disorder      Diabetes (H)      Hyperlipidemia      Hypertension      Mitral annular calcification      Mitral stenosis      Obesity      Sleep apnea        Past Surgical History:   Procedure Laterality Date     APPENDECTOMY       CV CORONARY ANGIOGRAM N/A 2020    Procedure: Coronary Angiogram;  Surgeon: Inez Peoples MD;  Location:  HEART CARDI[facility] CATH LAB     CV LEFT HEART CATH N/A 2020    Procedure: Left Heart Cath;  Surgeon: Inez Peoples MD;  Location:  HEART [facility]ARDI[facility] CATH LAB     CV PFO CLOSURE N/A 4/15/2020    Procedure: Patent Foramen Ovale Closure;  Surgeon: Ok Wilson MD;  Location:  OR      RIGHT HEART CATH MEASUREMENTS RECORDED N/A 2020    Procedure: Right Heart Cath;  Surgeon: Inez Peoples MD;  Location:  HEART CARDIAC CATH LAB     EP PACEMAKER N/A 2020    Procedure: EP Pacemaker;  Surgeon: Sohan Francis MD;  Location:  HEART CARDIAC CATH LAB     GYN SURGERY       x2 ,      GYN SURGERY      total hysterectomy     IMPLANT PACEMAKER       INJECT EPIDURAL TRANSFORAMINAL LUMBAR / SACRAL SINGLE Left 2022    Procedure: left L2-L3 transforaminal epidural steroid injection;  Surgeon: Lito Maldonado MD;  Location: [facility] OR     REPAIR VALVE TRICUSPID N/A 4/15/2020    Procedure: REPAIR TRICUSPID VALVE WITH VILLA MC3 26MM.;  Surgeon: Ok Wilson  MD Cari;  Location: [facility] OR     REPLACE VALVE AORTIC N/A 4/15/2020    Procedure: REPLACEMENT, AORTIC VALVE WITH INSPIRIS TISSUE VALVE 25 MM; ON PUMP WITH SOCORRO READ BY CARDIOLOGIST DR BLANTON.;  Surgeon: Ok Wilson MD;  Location: [facility] OR     REPLACE VALVE MITRAL N/A 4/15/2020    Procedure: REPLACEMENT, MITRAL VALVE WITH EPIC TISSUE VALVE 27 MM.;  Surgeon: Ok Wilson MD;  Location: [facility] OR       Family History   Problem Relation Age of Onset     Diabetes Mother 56     Congenital heart disease Father 23     Colon Cancer No family hx of        Social History     Tobacco Use     Smoking status: Never Smoker     Smokeless tobacco: Never Used   Vaping Use     Vaping Use: Never used   Substance Use Topics     Alcohol use: No     Drug use: No       Medications:    acetaminophen (TYLENOL) 500 MG tablet  albuterol (PROAIR HFA/PROVENTIL HFA/VENTOLIN HFA) 108 (90 Base) MCG/ACT inhaler  atorvastatin (LIPITOR) 40 MG tablet  Biotin 58672 MCG TABS  calcium carbonate (OS-NELLY) 1500 (600 Ca) MG tablet  coenzyme Q-10 200 MG CAPS  Continuous Blood Gluc  (FREESTYLE ADAM READER) HARJEET  Continuous Blood Gluc Sensor ([employer]YLE ADAM 14 DAY SENSOR) MISC  cyanocobalamin (VITAMIN B-12) 100 MCG tablet  diphenhydrAMINE (BENADRYL) 25 MG tablet  ferrous sulfate 140 (45 Fe) MG TBCR CR tablet  fluticasone (FLONASE) 50 MCG/ACT nasal spray  furosemide (LASIX) 40 MG tablet  gabapentin (NEURONTIN) 100 MG capsule  Ginkgo Biloba (GINKOBA PO)  insulin  UNIT/ML vial  insulin regular (NOVOLIN R VIAL) 100 UNIT/ML vial  ipratropium (ATROVENT) 0.06 % nasal spray  Lutein 40 MG CAPS  Magnesium 400 MG TABS  metFORMIN (GLUCOPHAGE XR) 500 MG 24 hr tablet  metoprolol succinate ER (TOPROL XL) 25 MG 24 hr tablet  naloxone (NARCAN) 4 MG/0.1ML nasal spray  oxyCODONE (ROXICODONE) 5 MG tablet  Propylene Glycol (SYSTANE BALANCE OP)  sertraline (ZOLOFT) 100 MG tablet  vitamin (B COMPLEX-C) tablet  vitamin C (ASCORBIC ACID) 1000 MG  TABS  Vitamin D3 (CHOLECALCIFEROL) 125 MCG (5000 UT) tablet  zinc 23 MG LOZG lozenge        Review of Systems: See HPI for pertinent negatives and positives. All other systems reviewed and found to be negative.    Physical Exam   /50   Pulse 89   Temp 99.6  F (37.6  C) (Tympanic)   Resp 20   LMP  (LMP Unknown)   SpO2 97%      General: somnolent, comfortable except with any movement due to chronic back pain  HEENT: atraumatic  Respiratory: normal effort, anterior fields distant sounds but appear grossly clear to auscultation bilaterally  Cardiovascular: regular rate and rhythm, no murmurs  Abdomen: soft, nondistended, nontender  Extremities: no deformities, edema, or tenderness  Skin: warm, dry, no rashes, pale  Neuro: somnolent arousable to voice, oriented to person and year but not place, confused and hallucinating at times, no focal deficits  Psych: appropriate mood and affect    ED Course           Results for orders placed or performed during the hospital encounter of 08/23/22 (from the past 24 hour(s))   CBC with platelets differential    Narrative    The following orders were created for panel order CBC with platelets differential.  Procedure                               Abnormality         Status                     ---------                               -----------         ------                     CBC with platelets and d...[[id number]]  Abnormal            Final result                 Please view results for these tests on the individual orders.   Lactic acid whole blood   Result Value Ref Range    Lactic Acid 1.1 0.7 - 2.0 mmol/L   CBC with platelets and differential   Result Value Ref Range    WBC Count 11.8 (H) 4.0 - 11.0 10e3/uL    RBC Count 2.67 (L) 3.80 - 5.20 10e6/uL    Hemoglobin 7.6 (L) 11.7 - 15.7 g/dL    Hematocrit 23.7 (L) 35.0 - 47.0 %    MCV 89 78 - 100 fL    MCH 28.5 26.5 - 33.0 pg    MCHC 32.1 31.5 - 36.5 g/dL    RDW 17.4 (H) 10.0 - 15.0 %    Platelet Count 246 150 - 450  10e3/uL    % Neutrophils 85 %    % Lymphocytes 8 %    % Monocytes 4 %    % Eosinophils 1 %    % Basophils 0 %    % Immature Granulocytes 2 %    NRBCs per 100 WBC 0 <1 /100    Absolute Neutrophils 10.1 (H) 1.6 - 8.3 10e3/uL    Absolute Lymphocytes 1.0 0.8 - 5.3 10e3/uL    Absolute Monocytes 0.5 0.0 - 1.3 10e3/uL    Absolute Eosinophils 0.1 0.0 - 0.7 10e3/uL    Absolute Basophils 0.1 0.0 - 0.2 10e3/uL    Absolute Immature Granulocytes 0.2 <=0.4 10e3/uL    Absolute NRBCs 0.0 10e3/uL   ABO/Rh type and screen    Narrative    The following orders were created for panel order ABO/Rh type and screen.  Procedure                               Abnormality         Status                     ---------                               -----------         ------                     Adult Type and Screen[[id number]]                            In process                   Please view results for these tests on the individual orders.   Comprehensive metabolic panel   Result Value Ref Range    Sodium 128 (L) 134 - 144 mmol/L    Potassium 5.0 3.5 - 5.1 mmol/L    Chloride 96 (L) 98 - 107 mmol/L    Carbon Dioxide (CO2) 23 21 - 31 mmol/L    Anion Gap 9 3 - 14 mmol/L    Urea Nitrogen 42 (H) 7 - 25 mg/dL    Creatinine 1.60 (H) 0.60 - 1.20 mg/dL    Calcium 10.9 (H) 8.6 - 10.3 mg/dL    Glucose 285 (H) 70 - 105 mg/dL    Alkaline Phosphatase 57 34 - 104 U/L    AST 20 13 - 39 U/L    ALT 8 7 - 52 U/L    Protein Total 7.4 6.4 - 8.9 g/dL    Albumin 2.9 (L) 3.5 - 5.7 g/dL    Bilirubin Total 0.7 0.3 - 1.0 mg/dL    GFR Estimate 33 (L) >60 mL/min/1.73m2   Occult blood stool 1-3 spec   Result Value Ref Range    Occult Blood Slide 1 Negative Negative    Occult Blood Slide 2 Negative Negative    Occult Blood Slide 3 Negative Negative       Medications   ceFEPIme (MAXIPIME) 2 g in NS (2 g Intravenous Given 8/23/22 5375)   vancomycin (VANCOCIN) 1,500 mg in sodium chloride 0.9 % 500 mL intermittent infusion (1,500 mg Intravenous Handoff 8/23/22 7845)    vancomycin (VANCOCIN) 1,000 mg in NaCl 0.9% 200 mL intermittent infusion (has no administration in time range)   0.9% sodium chloride BOLUS (500 mLs Intravenous New Bag 8/23/22 1793)       Assessments & Plan (with Medical Decision Making)     I have reviewed the nursing notes.    77 year old female evaluated for 2 weeks of progressive fatigue with decreased alertness, hallucinations, and concern for black stool in context of prior GI bleed resulting in a hemoglobin of 4.  Patient appears mildly confused and is disoriented to place.  She appears to be hallucinating at times.  Pale appearing. She was given fentanyl 100 mcg and 2.5 mg of Versed in route via EMS.  She has chronic unchanged back pain.  Initial evaluation remarkable for anemia hemoglobin 7.6, hyponatremia 128, CLARITA creatinine 1.6/GFR 33.  Febrile 100.6 with heart rate above 90 meeting SIRS criteria and started on broad-spectrum antibiotics.  Chest x-ray, UA, blood cultures, COVID, head CT pending.  Of note her gabapentin has been titrating up with last increase 1 month ago, and this could be contributing or cause for her decreased alertness. With history of heart failure gentle fluids were ordered at 500 cc bolus. She will need admission for AMS. Case signed out to night shift provider Dr. Skinner.    New Prescriptions    No medications on file       Final diagnoses:   Altered mental status, unspecified altered mental status type   Hyponatremia   CLARITA (acute kidney injury) (H)       8/23/2022   [facility]     Eder Olmedo MD  08/23/22 3427       Eder Olmedo MD  08/23/22 0872       Eder Olmedo MD  08/23/22 3868

## 2022-08-24 NOTE — H&P
[facility]    History and Physical - Hospitalist Service       Date of Admission:  8/23/2022    Assessment & Plan      Amy Luna is a 77 year old female admitted on 8/23/2022. She presented with  for concern related to fatigue, hallucination and black stool.  She is confused on my exam today.    Encephalopathy. Mild leucocytosis. Met sepsis criteria in ER. CXR with pulm edema, pleural effusion and possible pneumonia  Community acquired pneumonia. CXR pulm edema and L pleural effusion  Acute hypoxic respiratory failure. POA. Due to CAP and possible HF.  -admit inpatient  -rocephin/azithromycin  -oxygen as needed to maintain sats >92%  -clear liquid diet for now   -lower dose of gabapentin as this may be contributing to confusion  -follow up sputum cultures and blood cultures    UTI. Family states she has had back pain.   -check renal US    Black stool. Occult blood negative.   GIB. Hb stable this AM. May have stopped on its own.   -continue serial Hb  -surgery consult as needed. Hb stable now so will hold off.   -not on chronic anticoagulation    Atrial fibrillation. In Afib on my exam. Not on chronic anticoagulation. With hx of GIB, would not start at this time. Defer to PCP.   -tele    Hyponatremia  CLARITA.   -gentle IV hydration. High risk of volume overload based on history.   -diurese as needed    T2DM with elevated blood glucose  -holding home meds  -sliding scale insulin. Add back long acting insulin once dose verified with patient and pharmacy    Chronic back pain and left leg pain  -continue home medications  -CT L spine       Diet: Combination Diet Clear Liquid   DVT Prophylaxis: on hold due to acute bleeding.   Stewart Catheter: Not present  Central Lines: None  Cardiac Monitoring: ACTIVE order. Indication: Electrolyte Imbalance (24 hours)- Magnesium <1.3 mg/ml; Potassium < =2.8 or > 5.5 mg/ml  Code Status: Full Code, could not address/update. Will discuss with  when  "he calls back. Prior code status ordered.    Clinically Significant Risk Factors Present on Admission         # Hyponatremia: Na = 129 mmol/L (Ref range: 134 - 144 mmol/L) on admission, will monitor as appropriate  # Hypercalcemia: Ca = 10.6 mg/dL (Ref range: 8.6 - 10.3 mg/dL) and/or iCa = N/A on admission, will monitor as appropriate    # Hypoalbuminemia: Albumin = 2.9 g/dL (Ref range: 3.5 - 5.7 g/dL) on admission, will monitor as appropriate        # DMII: A1C = N/A within past 3 months  # Obesity: Estimated body mass index is 34.8 kg/m  as calculated from the following:    Height as of 8/12/22: 1.651 m (5' 5\").    Weight as of 8/12/22: 94.9 kg (209 lb 2 oz).        Disposition Plan      The patient's care was discussed with the Patient and Patient's Family.    Lisa Warner DO  Hospitalist Service  [facility]  Securely message with the Vocera Web Console (learn more here)  Text page via [facility] Paging/Directory         ______________________________________________________________________    Chief Complaint   confusion    History is obtained from the patient's family    History of Present Illness   Amy Luna is a 77 year old female who presented via EMS with  for concerns related to confusion. Due to mental status, she cannot offer me any reliable history.     Per discussion with , patient getting weaker at home over last several days/weeks.  Last night he couldn't get her up. She has had one week of increasing confusion, hallucinating. They called EMS for further work up. Does not think she has had a fever. No coughing and has not complained of pain specifically. No chest pain or new leg swelling.     Other concerns are She has ongoing back pain. Pain in L leg, gabapentin recently increased. Seems more weak. No bowel/bladder incontinence.        Review of Systems    Review of systems not obtained due to patient factors - confusion    Past Medical History    I have " reviewed this patient's medical history and updated it with pertinent information if needed.   Past Medical History:   Diagnosis Date     (HFpEF) heart failure with preserved ejection fraction (H)      Aortic stenosis      Chest pain      Depressive disorder      Diabetes (H)      Hyperlipidemia      Hypertension      Mitral annular calcification      Mitral stenosis      Obesity      Sleep apnea     CPAP       Past Surgical History   I have reviewed this patient's surgical history and updated it with pertinent information if needed.  Past Surgical History:   Procedure Laterality Date     APPENDECTOMY       CV CORONARY ANGIOGRAM N/A 2020    Procedure: Coronary Angiogram;  Surgeon: Inez Peoples MD;  Location:  HEART CARDIAC CATH LAB     CV LEFT HEART CATH N/A 2020    Procedure: Left Heart Cath;  Surgeon: Inez Peoples MD;  Location:  HEART CARDIAC CATH LAB     CV PFO CLOSURE N/A 4/15/2020    Procedure: Patent Foramen Ovale Closure;  Surgeon: Ok Wilson MD;  Location:  OR     CV RIGHT HEART CATH MEASUREMENTS RECORDED N/A 2020    Procedure: Right Heart Cath;  Surgeon: Inez Peoples MD;  Location:  HEART CARDIAC CATH LAB     EP PACEMAKER N/A 2020    Procedure: EP Pacemaker;  Surgeon: Sohan Francis MD;  Location:  HEART CARDIAC CATH LAB     GYN SURGERY       x2 ,      GYN SURGERY      total hysterectomy     IMPLANT PACEMAKER       INJECT EPIDURAL TRANSFORAMINAL LUMBAR / SACRAL SINGLE Left 2022    Procedure: left L2-L3 transforaminal epidural steroid injection;  Surgeon: Lito Maldonado MD;  Location: [facility] OR     REPAIR VALVE TRICUSPID N/A 4/15/2020    Procedure: REPAIR TRICUSPID VALVE WITH VILLA MC3 26MM.;  Surgeon: Ok Wilson MD;  Location:  OR     REPLACE VALVE AORTIC N/A 4/15/2020    Procedure: REPLACEMENT, AORTIC VALVE WITH INSPIRIS TISSUE VALVE 25 MM; ON PUMP WITH SOCORRO READ BY CARDIOLOGIST DR BLANTON.;   Surgeon: Ok Wilson MD;  Location:  OR     REPLACE VALVE MITRAL N/A 4/15/2020    Procedure: REPLACEMENT, MITRAL VALVE WITH EPIC TISSUE VALVE 27 MM.;  Surgeon: Ok Wilson MD;  Location:  OR       Social History   I have reviewed this patient's social history and updated it with pertinent information if needed.  Social History     Tobacco Use     Smoking status: Never Smoker     Smokeless tobacco: Never Used   Vaping Use     Vaping Use: Never used   Substance Use Topics     Alcohol use: No     Drug use: No       Family History   I have reviewed this patient's family history and updated it with pertinent information if needed.  Family History   Problem Relation Age of Onset     Diabetes Mother 56     Congenital heart disease Father 23     Colon Cancer No family hx of        Prior to Admission Medications   Prior to Admission Medications   Prescriptions Last Dose Informant Patient Reported? Taking?   Biotin 45922 MCG TABS 2022 at AM Self Yes Yes   Sig: Take 10,000 mcg by mouth every morning    Continuous Blood Gluc  (FREESTYLE ADAM READER) HARJEET Unknown at Unknown time  No Yes   Si each every 14 days Use to read blood sugars per  instructions.   Continuous Blood Gluc Sensor (WorldsSTYLE ADAM 14 DAY SENSOR) MISC Unknown at Unknown time  No Yes   Si each every 14 days Change every 14 days.   Ginkgo Biloba (GINKOBA PO) 2022 at AM  Yes Yes   Sig: Take 250 mg by mouth daily   Lutein 40 MG CAPS 2022 at AM Self Yes Yes   Sig: Take 40 mg by mouth every morning    Magnesium 400 MG TABS Past Week at Unknown time Self Yes Yes   Sig: Take 400 mg by mouth every evening    Propylene Glycol (SYSTANE BALANCE OP) 2022 at Unknown time Self Yes Yes   Sig: Apply 1 drop to eye 3 times daily as needed (dry eyes)    Vitamin D3 (CHOLECALCIFEROL) 125 MCG (5000 UT) tablet 2022 at AM  Yes Yes   Sig: Take 2 tablets by mouth daily   acetaminophen (TYLENOL) 500 MG  tablet 2022 at AM  Yes Yes   Sig: Take 500 mg by mouth 3 times daily Takes with 500 mg tablet three times daily   acetaminophen (TYLENOL) 650 MG CR tablet 2022 at AM  Yes Yes   Sig: Take 650 mg by mouth 3 times daily Takes with 500 mg tablet three times daily   albuterol (PROAIR HFA/PROVENTIL HFA/VENTOLIN HFA) 108 (90 Base) MCG/ACT inhaler  Self Yes No   Sig: Inhale 2 puffs into the lungs every 4 hours as needed for shortness of breath / dyspnea or wheezing   atorvastatin (LIPITOR) 40 MG tablet Past Week at Unknown time  No Yes   Sig: Take 1 tablet (40 mg) by mouth At Bedtime   calcium carbonate (OS-NELLY) 1500 (600 Ca) MG tablet 2022 at AM  Yes Yes   Sig: Take 2,400 mg by mouth daily   coenzyme Q-10 200 MG CAPS 2022 at AM Self Yes Yes   Sig: Take 200 mg by mouth every morning    cyanocobalamin (VITAMIN B-12) 100 MCG tablet 2022 at AM  Yes Yes   Sig: Take 100 mcg by mouth daily   diphenhydrAMINE (BENADRYL) 25 MG tablet More than a month at Unknown time  Yes Yes   Sig: Take 25 mg by mouth every 6 hours as needed for itching or allergies   ferrous sulfate 140 (45 Fe) MG TBCR CR tablet 2022 at AM  Yes Yes   Sig: Take 280 mg by mouth daily   furosemide (LASIX) 40 MG tablet   No No   Sig: Take 0.5 tablets (20 mg) by mouth daily   gabapentin (NEURONTIN) 100 MG capsule 2022 at AM  No Yes   Sig: Take 3 capsules (300 mg) by mouth 3 times daily   insulin  UNIT/ML vial Unknown at Unknown time  No Yes   Sig: Inject 30 units every morning and 20 unit(s) every evening   Patient taking differently: Inject 30 units every morning and 30 unit(s) every evening   insulin regular (NOVOLIN R VIAL) 100 UNIT/ML vial Unknown at Unknown time  No Yes   Si-4 units before breakfast, 2-4 units before lunch, 2-4 units before dinner   Patient taking differently: 5 units before breakfast, 5 units before lunch, 5 units before dinner plus sliding scale coverage   ipratropium (ATROVENT) 0.06 % nasal spray  Past Week at Unknown time  No Yes   Sig: Spray 2 sprays into both nostrils 4 times daily as needed for rhinitis   metFORMIN (GLUCOPHAGE XR) 500 MG 24 hr tablet 8/23/2022 at AM  No Yes   Sig: Take 2 tablets (1,000 mg) by mouth daily (with breakfast)   metoprolol succinate ER (TOPROL XL) 25 MG 24 hr tablet 8/23/2022 at AM  No Yes   Sig: Take 2 tablets (50 mg) by mouth daily   naloxone (NARCAN) 4 MG/0.1ML nasal spray never used at never used  No Yes   Sig: Spray 1 spray (4 mg) into one nostril alternating nostrils once as needed for opioid reversal every 2-3 minutes until assistance arrives   oxyCODONE (ROXICODONE) 5 MG tablet Past Month at Unknown time  No Yes   Sig: Take 1 tablet (5 mg) by mouth every 6 hours as needed for pain or moderate to severe pain   sertraline (ZOLOFT) 100 MG tablet 8/23/2022 at Unknown time  No Yes   Sig: TAKE 1 AND 1/2 TABLETS EVERY MORNING   vitamin (B COMPLEX-C) tablet 8/23/2022 at AM Self Yes Yes   Sig: Take 1 tablet by mouth daily   vitamin C (ASCORBIC ACID) 1000 MG TABS 8/23/2022 at AM Self Yes Yes   Sig: Take 1,000 mg by mouth daily as needed (cold symptoms)   zinc gluconate 50 MG tablet 8/23/2022 at AM  Yes Yes   Sig: Take 50 mg by mouth daily      Facility-Administered Medications: None     Allergies   Allergies   Allergen Reactions     Penicillins Hives     3 weeks after taking med got hives.       Pioglitazone Other (See Comments)     Increased urinary frequency, fatigue, dyspnea       Physical Exam   Vital Signs: Temp: 98.6  F (37  C) Temp src: Tympanic BP: (!) 122/39 Pulse: 87   Resp: 18 SpO2: 96 % O2 Device: Nasal cannula Oxygen Delivery: 3 LPM  Weight: 0 lbs 0 oz    General Appearance: confused. Appears acutely ill and weak. Pale.   Eyes: EOMI. Lids normal.   HEENT: NC, AT.   Respiratory: poor inspiratory effort. Crackles at bases.   Cardiovascular: regular.   GI: abd soft, NT  Skin: pale but warm and dry.   Musculoskeletal: weak, deconditioned, cannot sit up in bed for full  exam. Can moves arms and legs.   Neurologic: no obvious focal deficits but cannot due full neurologic testing based on patient  Mental status  Psychiatric: confused    Data   Data reviewed today: I reviewed all medications, new labs and imaging results over the last 24 hours. I personally reviewed no images or EKG's today.    Recent Labs   Lab 08/24/22  1217 08/24/22  1044 08/24/22  0732 08/23/22  2220 08/23/22  2217   WBC  --   --  9.9  --  11.8*   HGB  --  7.5* 7.3*  --  7.6*   MCV  --   --  91  --  89   PLT  --   --  227  --  246   NA  --   --  129* 128*  --    POTASSIUM  --   --  5.0 5.0  --    CHLORIDE  --   --  99 96*  --    CO2  --   --  25 23  --    BUN  --   --  45* 42*  --    CR  --   --  1.63* 1.60*  --    ANIONGAP  --   --  5 9  --    NELLY  --   --  10.6* 10.9*  --    *  --  236* 285*  --    ALBUMIN  --   --   --  2.9*  --    PROTTOTAL  --   --   --  7.4  --    BILITOTAL  --   --   --  0.7  --    ALKPHOS  --   --   --  57  --    ALT  --   --   --  8  --    AST  --   --   --  20  --      Recent Results (from the past 24 hour(s))   XR Chest Port 1 View    Narrative    PROCEDURE INFORMATION:   Exam: XR Chest   Exam date and time: 8/23/2022 11:03 PM   Age: 77 years old   Clinical indication: Fever; Prior surgery; Surgery date: 6+ months; Surgery   type: Pacemaker; Additional info: Fever, occult infection     TECHNIQUE:   Imaging protocol: Radiologic exam of the chest.   Views: 1 view.     COMPARISON:   CR XR CHEST 2 VW 4/21/2020 10:04 AM     FINDINGS:   Tubes, catheters and devices: Aortic valve prosthesis again noted. Pacer leads   are seen in good position.     Lungs: Ill-defined bilateral pulmonary opacities. Interstitial pulmonary edema.   Pleural spaces: Small left pleural effusion.   Heart/Mediastinum: Unremarkable. No cardiomegaly.   Bones/joints: Median sternotomy noted.       Impression    IMPRESSION:   Cardiomegaly with pulmonary edema and left pleural effusion.    Bibasal atelectasis or mild  pneumonia.     THIS DOCUMENT HAS BEEN ELECTRONICALLY SIGNED BY ESTEFANÍA STUART MD   CT Head w/o Contrast    Narrative    PROCEDURE INFORMATION:   Exam: CT Head Without Contrast   Exam date and time: 8/23/2022 11:49 PM   Age: 77 years old   Clinical indication: Altered mental status/memory loss; Confusion or   disorientation; Patient HX: PT arrives via EMS with increasing back pain, PT   has a history of fractures in vertebrae that happened in feb. PT received 100   mcg of fent en route and 2.5mg of versed en route. PT appears sedated and   having a hard time answering questions. PT normally alert and oriented at   baseline. ; Additional info: AMS     TECHNIQUE:   Imaging protocol: Computed tomography of the head without contrast.   Radiation optimization: All CT scans at this facility use at least one of these   dose optimization techniques: automated exposure control; mA and/or kV   adjustment per patient size (includes targeted exams where dose is matched to   clinical indication); or iterative reconstruction.     COMPARISON:   No relevant prior studies available.     FINDINGS:   Brain: Age consistent cerebral and cerebellar atrophy. Age consistent   periventricular white matter abnormality. No intracranial hemorrhage or   intracranial mass.   Cerebral ventricles: No ventriculomegaly.   Paranasal sinuses: Visualized sinuses are unremarkable. No fluid levels.   Mastoid air cells: Visualized mastoid air cells are well aerated.   Orbital cavities: Postoperative change of each globe.     Bones/joints: Hyperostosis of the calvarium is noted. This is of no clinical   significance.   Soft tissues: Unremarkable.       Impression    IMPRESSION:   No acute intracranial pathology.     THIS DOCUMENT HAS BEEN ELECTRONICALLY SIGNED BY ESTEFANÍA STUART MD

## 2022-08-24 NOTE — PLAN OF CARE
Patient became more alert as day progressed.  Now opening eyes spontaneously, alert and oriented.  Family has been at bedside most of day.  Patient denies pain while resting, has pain only with movement in lower back, declines any interventions.  Lungs are diminished with crackles in bases, on 3L oxygen to keep sp02 >92%, denies feeling shortness of breath.  Has not had any bowel movements this shift, hemoglobin slowly trended down slightly, BPs have been stable, patient denies any abdominal discomfort or nausea.  Tolerated some jello and broth this afternoon.  VSs, afebrile.      Goal Outcome Evaluation:    Plan of Care Reviewed With: patient, spouse, daughter     Overall Patient Progress: improving

## 2022-08-24 NOTE — PROGRESS NOTES
Admission Note    Data:  Amy Luna admitted to 301 from emergency room at 0344.      Action:  Dr. Dean has been notified of admission. Pt oriented to unit, call light in reach.     Response:  Patient tolerated well.       How Severe Is Your Skin Lesion?: mild Has Your Skin Lesion Been Treated?: not been treated Is This A New Presentation, Or A Follow-Up?: Skin Lesion

## 2022-08-25 PROBLEM — A41.9 SEPTIC SHOCK (H): Status: ACTIVE | Noted: 2022-01-01

## 2022-08-25 PROBLEM — R65.21 SEPTIC SHOCK (H): Status: ACTIVE | Noted: 2022-01-01

## 2022-08-25 NOTE — PHARMACY-VANCOMYCIN DOSING SERVICE
Pharmacy Vancomycin Initial Note  Date of Service 2022  Patient's  1945  77 year old, female    Indication: Endocarditis    Current estimated CrCl = Estimated Creatinine Clearance: 34.1 mL/min (A) (based on SCr of 1.63 mg/dL (H)).    Creatinine for last 3 days  2022: 10:20 PM Creatinine 1.60 mg/dL  2022:  7:32 AM Creatinine 1.63 mg/dL    Recent Vancomycin Level(s) for last 3 days  No results found for requested labs within last 72 hours.      Vancomycin IV Administrations (past 72 hours)                   vancomycin (VANCOCIN) 1,500 mg in sodium chloride 0.9 % 500 mL intermittent infusion (mg) 1,500 mg New Bag 22 0000                Nephrotoxins and other renal medications (From now, onward)    Start     Dose/Rate Route Frequency Ordered Stop    22 0130  vancomycin (VANCOCIN) 1000 mg in dextrose 5% 200 mL PREMIX         1,000 mg  200 mL/hr over 1 Hours Intravenous EVERY 24 HOURS 22 0104      22 0100  norepinephrine (LEVOPHED) 4 mg in  mL infusion PREMIX         0.01-0.6 mcg/kg/min × 98.6 kg  3.7-221.9 mL/hr  Intravenous CONTINUOUS 22 0054            Contrast Orders - past 72 hours (72h ago, onward)    None          [employer] Prediction of Planned Initial Vancomycin Regimen  Loading dose: N/A  Regimen: 1000 mg IV every 24 hours.  Start time: 02:01 on 2022  Exposure target: AUC24 (range)400-600 mg/L.hr   AUC24,ss: 460 mg/L.hr  Probability of AUC24 > 400: 66 %  Ctrough,ss: 15.2 mg/L  Probability of Ctrough,ss > 20: 23 %  Probability of nephrotoxicity (Lodise KANCHAN ): 10 %          Plan:  1. Start vancomycin  1000 mg IV q24h.   2. Vancomycin monitoring method: AUC  3. Vancomycin therapeutic monitoring goal: 400-600 mg*h/L  4. Pharmacy will check vancomycin levels as appropriate in 1-3 Days.    5. Serum creatinine levels will be ordered daily for the first week of therapy and at least twice weekly for subsequent weeks.      Robinson Banks [facility]

## 2022-08-25 NOTE — PROGRESS NOTES
[facility] TRANSPORT NOTE  Data:   Reason for Transport:  Higher level of care     mAy Luna was transported to [facility] via [facility] at 2123.  Patient was accompanied by Emergency Medical Services. Equipment used for transport: Oxygen  Nasal Cannula, Cardiac monitor , Pulse oximeter, Blood pressure monitor and IV pump. Family was aware of reason for transport: yes - present at time of leaving facility.     Action:  Report: given to TARI Manriquez at  [facility]    Response:  Patient's condition when transferred off unit was stable.    Patti Corbett RN      [phone] called  [facility] and updated nurse that patient is en route.

## 2022-08-25 NOTE — PLAN OF CARE
Had Intensivist come to bedside re: patient's increased O2 needs (went from 3L NC to 4L oxy mask to 7L oxymask within an hour), c/o SOB. MD looked at CXR, ordered PRN neb. Paged hospitalist per Intensivist.

## 2022-08-25 NOTE — H&P
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 Luna is a very pleasant 76-year-old female with a history of valve disease.  She has bioprosthetic mitral and aortic valve replacements with tricuspid annuloplasty repair and PFO closure in 04/2020.  She did require a permanent pacemaker as well. She was admitted to [facility] on 8/23 for worsening encephalopathy.   -She was admitted with initial diagnosis of encephalopathy and community acquired pneumonia.  Her echo showed a possible mobile density and concern for endocarditis and she was trasnferred to [facility] for further care.   - She also complained of black stool at OSH, but was hemoccult negative there.  Of note, PO iron supplement is on her home med list, which could cause a stool-darkening effect.    She initially required norepinephrine on arrival here, but has since weaned off of this.    This morning she denies complaints to me outside of fatigue.  Denies chest pain or shortness of breath.  Denies dizzyness, lightheadedness, nausea or vomiting.      ROS:  Negative on 10 point ROS except as noted above.      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   I have personally reviewed the daily labs, imaging studies, cultures and discussed the case with referring physician and consulting physicians.   My assessment and plan by system for this patient is as follows:    Neurology/Psychiatry:   1. No acute issues  2. Analgesia - prn tylenol    Cardiovascular:   1.  Septic shock:  Present on arrival, now weaned off of pressors.  2.  Probable endocarditis:  veg seen on  echo at OSH.  SOCORRO ordered today.  Cardiology and ID consults.  Repeating blood cultures.  On vanco/cl.    Pulmonary/Ventilator Management:   1. Acute hypoxmia, mild:  satting well and unlabored on 3L nc. In setting of probable sepsis.  Monitor.    GI and Nutrition :   1. NPO today for possibleTEE  2. Report of dark stools at OSH:  Apparently hgb was stable there and hemoccult negative.  Pt has PO iron on home med list which could cause stool darkening in the absence of bleeding, but her hgb (though stable) is lower than baseline.  Will start PPI and consult GI.    Renal/Fluids/Electrolytes:   1. CLARITA:  Improving to 1.06  2. HyperK:  Mild to 5.7.  Shifting with insulin.  3.  HypoNa:  Mild to 132.  Trend.    Infectious Disease:   1. See above for infective endocarditis    Endocrine:   1. Hyperglycemia:  In setting of stress and h/o DM:  Not much headway with lantus and ssi.  Changing to insulin drip.    Hematology/Oncology:   1. Anemia:  hgb stable at 7.4.  No confirmed blood loss, but this is somewhat lower than her baseline, so will consult GI to r/o GI bleeding.  2. Leukocytosis, mild to 11.8, in setting of endocarditis.    ICU Prophylaxis:   1. DVT: mechanical for now awaiting GI eval      Clinically Significant Risk Factors Present on Admission        # Hyperkalemia: K = 5.7 mmol/L (Ref range: 3.4 - 5.3 mmol/L) on admission, will monitor as appropriate  # Hyponatremia: Na = 132 mmol/L (Ref range: 133 - 144 mmol/L) on admission, will monitor as appropriate     # Hypoalbuminemia: Albumin = 2.1 g/dL (Ref range: 3.4 - 5.0 g/dL) on admission, will monitor as appropriate   # Coagulation Defect: INR = 1.23 (Ref range: 0.85 - 1.15) and/or PTT = N/A on admission, will monitor for bleeding    # Circulatory Shock: currently requiring pressors for blood pressure support  # Anemia: based on hgb <11   # Obesity: Estimated body mass index is 36.25 kg/m  as calculated from the following:    Height as of this encounter: 1.651 m  "(5' 5\").    Weight as of this encounter: 98.8 kg (217 lb 13 oz).                 Key Medications:       insulin aspart  1-12 Units Subcutaneous Q4H     insulin glargine  15 Units Subcutaneous At Bedtime     lactated ringers  500 mL Intravenous Once     meropenem  1 g Intravenous Q8H                 Physical Examination:   /51 (BP Location: Left arm)   Pulse 90   Temp 98.8  F (37.1  C)   Resp 23   Ht 1.651 m (5' 5\")   Wt 98.8 kg (217 lb 13 oz)   LMP  (LMP Unknown)   SpO2 96%   BMI 36.25 kg/m    Gen:  No acute distress // HEENT:  PERRL, nose/ears grossly normal // Neck:  Supple // Lymph : no cervical adenopathy // chest : CTA-B, unlabored // cor:  rrr no m/r/g // abd s/nt/nd // extr:  wwp x4, no edema // neuro:  Awake, alert, rapid fluent appropriate speech, good str/sens x4 // skin:  No obvious rash           Data:   All data and imaging reviewed     ROUTINE ICU LABS (Last four results)  CMP  Recent Labs   Lab 08/24/22  2332 08/24/22  1740 08/24/22  1357 08/24/22  1217 08/24/22  0732 08/23/22  2220   NA  --   --   --   --  129* 128*   POTASSIUM  --   --   --   --  5.0 5.0   CHLORIDE  --   --   --   --  99 96*   CO2  --   --   --   --  25 23   ANIONGAP  --   --   --   --  5 9   * 271* 212* 228* 236* 285*   BUN  --   --   --   --  45* 42*   CR  --   --   --   --  1.63* 1.60*   GFRESTIMATED  --   --   --   --  32* 33*   NELLY  --   --   --   --  10.6* 10.9*   MAG  --   --   --   --  1.8*  --    PROTTOTAL  --   --   --   --   --  7.4   ALBUMIN  --   --   --   --   --  2.9*   BILITOTAL  --   --   --   --   --  0.7   ALKPHOS  --   --   --   --   --  57   AST  --   --   --   --   --  20   ALT  --   --   --   --   --  8     CBC  Recent Labs   Lab 08/24/22  1712 08/24/22  1044 08/24/22  0732 08/23/22  2217   WBC  --   --  9.9 11.8*   RBC  --   --  2.63* 2.67*   HGB 7.2* 7.5* 7.3* 7.6*   HCT  --   --  24.0* 23.7*   MCV  --   --  91 89   MCH  --   --  27.8 28.5   MCHC  --   --  30.4* 32.1   RDW  --   --  " 17.3* 17.4*   PLT  --   --  227 246     INRNo lab results found in last 7 days.  Arterial Blood GasNo lab results found in last 7 days.    All cultures:  No results for input(s): CULT in the last 168 hours.  Recent Results (from the past 24 hour(s))   US Renal Complete    Narrative    PROCEDURE: US RENAL COMPLETE    HISTORY: Leukocytosis, encephalopathy.    TECHNIQUE:  A renal ultrasound was performed.    COMPARISON:  None.    MEASUREMENTS:    Right renal length: 10.5 cm  Left renal length: 10.3 cm    RENAL FINDINGS: The kidneys are normal in size. There is no  hydronephrosis. No shadowing stones are seen.    BLADDER: The bladder is moderately distended and grossly unremarkable.      Impression    IMPRESSION:      No hydronephrosis. Ultrasound cannot exclude pyelonephritis.      CHARLOTTE HENSON MD         SYSTEM ID:  [id number]   Echocardiogram Complete   Result Value    LVEF  70%    Narrative    [id number]  [id number]  [id number]  [id number]^ELMA^VICENTE^CARLO                                                                       Version 2     [facility]  [address] [place] [address]     Name: KASSANDRA RIVERA  MRN: [id number]  : 1945  Study Date: 2022 01:58 PM  Age: 77 yrs  Gender: Female  Patient Location: [facility]  Reason For Study: Heart Failure  History:  CV surgery 4/15/2021  AVR: INSPIRIS TISSUE VALVE 25 MM  MVR: EPIC TISSUE VALVE 27 MM  TV repair: VILLA MC3 26MM  ASD closure  Ordering Physician: VICENTE WILLS  Performed By: Cinda Hernandez RDCS, RVT     BSA: 2.0 m2  Height: 65 in  Weight: 209 lb  HR: 83  BP: 118/31 mmHg  ______________________________________________________________________________  Procedure  Complete Portable Echo Adult.  ______________________________________________________________________________  Interpretation Summary  1.5cm mobile echo density on mitral prosthetic valve with severely increased  mean mitral gradient 21-24mmHg.. Heart rate 86BPM.  Consistent with prosthetic  valve endocarditis.     CV surgery 4/15/2021  AVR: INSPIRIS TISSUE VALVE 25 MM  MVR: EPIC TISSUE VALVE 27 MM  TV repair: VILLA MC3 26MM  ASD closure     ______________________________________________________________________________  Left Ventricle  CV surgery 4/15/2021  AVR: INSPIRIS TISSUE VALVE 25 MM  MVR: EPIC TISSUE VALVE 27 MM  TV repair: VILLA MC3 26MM  ASD closure. Global and regional left ventricular function is hyperkinetic  with an EF >70%. Left ventricular wall thickness is normal. Left ventricular  size is normal. Diastolic function not assessed due to presence of prosthetic  mitral valve. No regional wall motion abnormalities are seen.     Right Ventricle  Right ventricular function, chamber size, wall motion, and thickness are  normal. A pacemaker lead is noted in the right ventricle.     Atria  The right atria appears normal. Moderate left atrial enlargement is present.     Mitral Valve  1.5cm mobile echo density on mitral prosthetic valve with severely increased  mean mitral gradient 21-24mmHg.. Heart rate 86BOM.     Aortic Valve  The mean AoV pressure gradient is 12.0 mmHg. A bioprosthetic aortic valve is  present.     Tricuspid Valve  S/P TV repair. Mean gradient not assessed. Mild TR. The right ventricular  systolic pressure is approximated at 55.7 mmHg plus the right atrial pressure.     Pulmonic Valve  The pulmonic valve is normal.     Vessels  The thoracic aorta is normal. The pulmonary artery is normal. Dilation of the  inferior vena cava is present with abnormal respiratory variation in diameter.     Pericardium  No pericardial effusion is present.     ______________________________________________________________________________  MMode/2D Measurements & Calculations  IVSd: 1.2 cm  LVIDd: 3.6 cm  LVIDs: 2.1 cm  LVPWd: 1.3 cm  FS: 42.0 %  LV mass(C)d: 154.0 grams  LV mass(C)dI: 76.4 grams/m2  Ao root diam: 2.8 cm  asc Aorta Diam: 3.3 cm     LVOT diam: 2.1  cm  LVOT area: 3.5 cm2  LA Volume (BP): 92.0 ml  LA Volume Index (BP): 45.5 ml/m2  RWT: 0.73     Doppler Measurements & Calculations  MV max P.4 mmHg  MV mean P.5 mmHg  MV V2 VTI: 105.0 cm  MVA(VTI): 1.0 cm2  Ao V2 max: 238.0 cm/sec  Ao max P.0 mmHg  Ao V2 mean: 163.0 cm/sec  Ao mean P.0 mmHg  Ao V2 VTI: 39.5 cm  GUERITA(I,D): 2.7 cm2  GUERITA(V,D): 2.4 cm2  LV V1 max P.2 mmHg  LV V1 max: 167.0 cm/sec  LV V1 VTI: 31.3 cm  SV(LVOT): 108.4 ml  SI(LVOT): 53.8 ml/m2     PA acc time: 0.10 sec  TR max yasir: 373.0 cm/sec  TR max P.7 mmHg  AV Yasir Ratio (DI): 0.70  GUERITA Index (cm2/m2): 1.4     ______________________________________________________________________________  Report approved by: Saul Singer 2022 04:21 PM             Past Medical History:   Diagnosis Date     (HFpEF) heart failure with preserved ejection fraction (H)      Aortic stenosis      Chest pain      Depressive disorder      Diabetes (H)      Hyperlipidemia      Hypertension      Mitral annular calcification      Mitral stenosis      Obesity      Sleep apnea     CPAP     Past Surgical History:   Procedure Laterality Date     APPENDECTOMY       CV CORONARY ANGIOGRAM N/A 2020    Procedure: Coronary Angiogram;  Surgeon: Inez Peoples MD;  Location:  HEART CARDIAC CATH LAB     CV LEFT HEART CATH N/A 2020    Procedure: Left Heart Cath;  Surgeon: Inez Peoples MD;  Location:  HEART CARDIAC CATH LAB     CV PFO CLOSURE N/A 4/15/2020    Procedure: Patent Foramen Ovale Closure;  Surgeon: Ok Wilson MD;  Location:  OR     CV RIGHT HEART CATH MEASUREMENTS RECORDED N/A 2020    Procedure: Right Heart Cath;  Surgeon: Inez Peoples MD;  Location:  HEART CARDIAC CATH LAB     EP PACEMAKER N/A 2020    Procedure: EP Pacemaker;  Surgeon: Sohan Francis MD;  Location:  HEART CARDIAC CATH LAB     GYN SURGERY       x2 ,      GYN SURGERY      total  hysterectomy     IMPLANT PACEMAKER  2015     INJECT EPIDURAL TRANSFORAMINAL LUMBAR / SACRAL SINGLE Left 7/27/2022    Procedure: left L2-L3 transforaminal epidural steroid injection;  Surgeon: Lito Maldonado MD;  Location: [facility] OR     REPAIR VALVE TRICUSPID N/A 4/15/2020    Procedure: REPAIR TRICUSPID VALVE WITH VILLA MC3 26MM.;  Surgeon: Ok Wilson MD;  Location: [facility] OR     REPLACE VALVE AORTIC N/A 4/15/2020    Procedure: REPLACEMENT, AORTIC VALVE WITH INSPIRIS TISSUE VALVE 25 MM; ON PUMP WITH SOCORRO READ BY CARDIOLOGIST DR BLANTON.;  Surgeon: Ok Wilson MD;  Location: [facility] OR     REPLACE VALVE MITRAL N/A 4/15/2020    Procedure: REPLACEMENT, MITRAL VALVE WITH EPIC TISSUE VALVE 27 MM.;  Surgeon: Ok Wilson MD;  Location: [facility] OR        Allergies   Allergen Reactions     Penicillins Hives     3 weeks after taking med got hives.       Pioglitazone Other (See Comments)     Increased urinary frequency, fatigue, dyspnea     Current Facility-Administered Medications   Medication     bisacodyl (DULCOLAX) suppository 10 mg     glucose gel 15-30 g    Or     dextrose 50 % injection 25-50 mL    Or     glucagon injection 1 mg     insulin aspart (NovoLOG) injection (RAPID ACTING)     insulin glargine (LANTUS PEN) injection 15 Units     meropenem (MERREM) 1 g vial to attach to  mL bag     ondansetron (ZOFRAN ODT) ODT tab 4 mg    Or     ondansetron (ZOFRAN) injection 4 mg     polyethylene glycol (MIRALAX) Packet 17 g     prochlorperazine (COMPAZINE) injection 5 mg    Or     prochlorperazine (COMPAZINE) tablet 5 mg    Or     prochlorperazine (COMPAZINE) suppository 12.5 mg     senna-docusate (SENOKOT-S/PERICOLACE) 8.6-50 MG per tablet 1 tablet    Or     senna-docusate (SENOKOT-S/PERICOLACE) 8.6-50 MG per tablet 2 tablet     vancomycin (VANCOCIN) 1000 mg in dextrose 5% 200 mL PREMIX     Social History     Socioeconomic History     Marital status:      Spouse name: Not on file     Number of  children: Not on file     Years of education: Not on file     Highest education level: Not on file   Occupational History     Not on file   Tobacco Use     Smoking status: Never Smoker     Smokeless tobacco: Never Used   Vaping Use     Vaping Use: Never used   Substance and Sexual Activity     Alcohol use: No     Drug use: No     Sexual activity: Yes     Partners: Male   Other Topics Concern     Parent/sibling w/ CABG, MI or angioplasty before 65F 55M? Not Asked   Social History Narrative     Not on file     Social Determinants of Health     Financial Resource Strain: Not on file   Food Insecurity: Not on file   Transportation Needs: Not on file   Physical Activity: Not on file   Stress: Not on file   Social Connections: Not on file   Intimate Partner Violence: Not on file   Housing Stability: Not on file     Family History   Problem Relation Age of Onset     Diabetes Mother 56     Congenital heart disease Father 23     Colon Cancer No family hx of

## 2022-08-25 NOTE — PROGRESS NOTES
[facility]  Hospitalist Progress Note  Name: Amy Luna    MRN: [id number]  Physician:  Eros Bey DO, FHM (Text Page)  Securely message with the Vocera Web Console (learn more here)    Summary of Stay: Amy Luna is a 77 year old female with a history of valve disease.  She has bioprosthetic mitral and aortic valve replacements with tricuspid annuloplasty repair and PFO closure in 04/2020.  She did require a permanent pacemaker as well. She was admitted to [facility] on 8/23 for worsening encephalopathy.  She was initially felt to have encephalopathy and community acquired pneumonia.  Her echo showed a possible mobile density with concern for endocarditis and she was transferred to [facility] for further care.  En route she had a decline of BP and had a central line placed with levophed briefly administered.  BP quickly improved and now has been off vasopressors several hours.  Hospitalist service asked to take over care from ICU service 8/25.    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  Cultures pending.  ID consultation pending.  Will defer further abx changes to infectious disease.  Of note the patient did have a central line placed due to vasopressor needs initially.  If a significant bacteremia noted will need to monitor line, consider removing as may become seeded.  -  Monitor BP, hold on further vasopressors as BP improved  -  CT surgery also being consulted    Encephalopathy felt secondary to endocarditis/sepsis:  -  Treat as above, try to maintain appropriate sleep/wake cycle  and minimizing exacerbating medications like narcotics as able.  Confusion may transiently worsen some today with sedatives for EGD/SOCORRO.    Acute on Chronic Anemia, unclear source:   -  EGD today, GI consulted already by ICU service.  Given hgb around 7 and probable need for cardiac surgery EGD being obtained.   Possible anemia is more cardiac/infection related.    DM2, uncontrolled initially:  -  sliding scale insulin, ICU service prior gave IV insulin and lantus.  Patient NPO today.  Will plan for NPO sliding scale insulin until can take oral intake after procedures.  Will continue lantus as even with lantus earlier + NPO status glu still 200 range likely being exacerbated by infection stress.  If worsens consider insulin drip.    HTN:    -  Hold metoprolol with recent hypotension    Hyperlipidemia:  -  Hold statin for now    Chronic back pain/neuropathy:  -  Resume gabapentin reduced dose with sedation/enecphalopathy recently.  If tolerating and pain increasing consider increasing back to  mg TID dose tomorrow.           COVID Status:  COVID-19 PCR Results    COVID-19 PCR Results 11/19/20 11/19/20 12/27/20 12/27/20 1/9/21 1/9/21 3/14/22 8/23/22    2004 2004 1259 1259 1515 1515     COVID-19 Virus PCR to [facility] - Result Test received-See reflex to IDDL test SARS CoV2 (COVID-19) Virus RT-PCR  Test received-See reflex to IDDL test SARS CoV2 (COVID-19) Virus RT-PCR  Test received-See reflex to IDDL test SARS CoV2 (COVID-19) Virus RT-PCR      COVID-19 Virus PCR to [facility] - Source Nasopharyngeal  Nasopharyngeal  Nasopharyngeal      SARS-CoV-2 Virus Specimen Source  Nasopharyngeal  Nasopharyngeal  Nasopharyngeal     SARS-CoV-2 PCR Result  NEGATIVE  NEGATIVE  NEGATIVE     SARS CoV2 PCR       Negative Negative      Comments are available for some flowsheets but are not being displayed.         COVID-19 Antibody Results, Testing for Immunity    COVID-19 Antibody Results, Testing for Immunity   No data to display.             Diet:   NPO for procedures today  DVT Prophylaxis: Pneumatic Compression Devices  Stewart Catheter: Not present    Cardiac Monitoring: ACTIVE order. Indication: ICU    Code Status: Full Code        Disposition Plan   Expect several more days required in the hospital.  Given her improvement, appears could move out of ICU later today but will see how she is doing after sedatives/procedures this afternoon before considering transfer.     Entered: Eros Bey, DO 08/25/2022, 10:55 AM       Interval History   Hospitalist service asked to assume care.  Patient denies pain, sob.  She feels tired.  She reports feeling ill for several days.  Appetite diminished.  No current nausea.    -Data reviewed today: I reviewed all new labs and imaging reports over the last 24 hours. I personally reviewed no images or EKG's today.    Physical Exam   Temp: 98.8  F (37.1  C) Temp src: Oral BP: 115/47 Pulse: 89   Resp: 17 SpO2: 99 % O2 Device: Nasal cannula Oxygen Delivery: 3 LPM  Vitals:    08/25/22 0000 08/25/22 0500   Weight: 98.6 kg (217 lb 6 oz) 98.8 kg (217 lb 13 oz)     Vital Signs with Ranges  Temp:  [97.4  F (36.3  C)-98.8  F (37.1  C)] 98.8  F (37.1  C)  Pulse:  [77-97] 89  Resp:  [12-28] 17  BP: (100-134)/(31-68) 115/47  SpO2:  [90 %-99 %] 99 %  I/O last 3 completed shifts:  In: 20.29 [I.V.:20.29]  Out: -     GEN:  Alert, oriented, answers basic questions well but confused with some detailed questions.  Appears ill but comfortable, no overt distress.  HEENT:  Normocephalic/atraumatic, no scleral icterus, no nasal discharge, mouth dry.  CV:  Regular, distant.  Soft murmur.  No rub.  LUNGS:  Clear to auscultation bilaterally though breath sounds diminished bases.  No wheezes/retractions.  Symmetric chest rise on inhalation noted.  ABD:  Active bowel sounds, soft, non-tender/non-distended.  No guarding/rigidity.  EXT:  Trace edema.  No cyanosis.  No acute joint synovitis noted.  SKIN:  Dry to touch, no exanthems noted  in the visualized areas.    Medications     dextrose       sodium chloride 10 mL/hr at 08/25/22 1005       insulin aspart  1-12 Units Subcutaneous Q4H     insulin glargine  20 Units Subcutaneous BID     meropenem  1 g Intravenous Q8H     pantoprazole  40 mg Oral BID AC     vancomycin  1,000 mg Intravenous Q24H     Data     Recent Labs   Lab 08/25/22  0546 08/25/22  0115 08/24/22  1712 08/24/22  1044 08/24/22  0732   WBC 11.1* 11.7*  --   --  9.9   HGB 7.4* 7.4* 7.2*   < > 7.3*   HCT 24.3* 24.7*  --   --  24.0*   MCV 92 94  --   --  91    228  --   --  227    < > = values in this interval not displayed.     Recent Labs   Lab 08/25/22  1123 08/25/22  1112 08/25/22  1101 08/25/22  0832 08/25/22  0546 08/25/22  0449 08/25/22  0115 08/24/22  1217 08/24/22  0732 08/23/22  2220   NA  --   --   --   --  132*  --  134  --  129* 128*   POTASSIUM  --   --   --   --  5.7*  --  4.9  --  5.0 5.0   CHLORIDE  --   --   --   --  104  --  104  --  99 96*   CO2  --   --   --   --  23  --  24  --  25 23   ANIONGAP  --   --   --   --  5  --  6  --  5 9   * 154* 159*   < > 281*   < > 292*   < > 236* 285*   BUN  --   --   --   --  43*  --  43*  --  45* 42*   CR  --   --   --   --  1.06*  --  1.15*  --  1.63* 1.60*   GFRESTIMATED  --   --   --   --  54*  --  49*  --  32* 33*   NELLY  --   --   --   --  9.4  --  9.1  --  10.6* 10.9*   MAG  --   --   --   --  1.9  --  1.8  --  1.8*  --    PHOS  --   --   --   --  3.5  --  3.6  --   --   --    PROTTOTAL  --   --   --   --  7.7  --  7.6  --   --  7.4   ALBUMIN  --   --   --   --  2.1*  --  2.1*  --   --  2.9*   BILITOTAL  --   --   --   --  0.4  --  0.4  --   --  0.7   ALKPHOS  --   --   --   --  67  --  66  --   --  57   AST  --   --   --   --  45  --  20  --   --  20   ALT  --   --   --   --  19  --  14  --   --  8    < > = values in this interval not displayed.       Recent Results (from the past 24 hour(s))   US Renal Complete    Narrative    PROCEDURE: US RENAL  COMPLETE    HISTORY: Leukocytosis, encephalopathy.    TECHNIQUE:  A renal ultrasound was performed.    COMPARISON:  None.    MEASUREMENTS:    Right renal length: 10.5 cm  Left renal length: 10.3 cm    RENAL FINDINGS: The kidneys are normal in size. There is no  hydronephrosis. No shadowing stones are seen.    BLADDER: The bladder is moderately distended and grossly unremarkable.      Impression    IMPRESSION:      No hydronephrosis. Ultrasound cannot exclude pyelonephritis.      CHARLOTTE HENSON MD         SYSTEM ID:  [id number]   Echocardiogram Complete   Result Value    LVEF  70%    Narrative    [id number]  [id number]  [id number]  [id number]^ELMA^VICENTE^CARLO                                                                       Version 2     [facility]  [address] [place] [address]     Name: KASSANDRA RIVERA  MRN: [id number]  : 1945  Study Date: 2022 01:58 PM  Age: 77 yrs  Gender: Female  Patient Location: [facility]  Reason For Study: Heart Failure  History:  CV surgery 4/15/2021  AVR: INSPIRIS TISSUE VALVE 25 MM  MVR: EPIC TISSUE VALVE 27 MM  TV repair: VILLA MC3 26MM  ASD closure  Ordering Physician: VICENTE WILLS  Performed By: Cinda Hernandez RDCS, LETITIAT     BSA: 2.0 m2  Height: 65 in  Weight: 209 lb  HR: 83  BP: 118/31 mmHg  ______________________________________________________________________________  Procedure  Complete Portable Echo Adult.  ______________________________________________________________________________  Interpretation Summary  1.5cm mobile echo density on mitral prosthetic valve with severely increased  mean mitral gradient 21-24mmHg.. Heart rate 86BPM. Consistent with prosthetic  valve endocarditis.     CV surgery 4/15/2021  AVR: NIDHI TISSUE VALVE 25 MM  MVR: EPIC TISSUE VALVE 27 MM  TV repair: VILLA MC3 26MM  ASD closure     ______________________________________________________________________________  Left Ventricle  CV surgery 4/15/2021  AVR:  INSPIRIS TISSUE VALVE 25 MM  MVR: EPIC TISSUE VALVE 27 MM  TV repair: VILLA MC3 26MM  ASD closure. Global and regional left ventricular function is hyperkinetic  with an EF >70%. Left ventricular wall thickness is normal. Left ventricular  size is normal. Diastolic function not assessed due to presence of prosthetic  mitral valve. No regional wall motion abnormalities are seen.     Right Ventricle  Right ventricular function, chamber size, wall motion, and thickness are  normal. A pacemaker lead is noted in the right ventricle.     Atria  The right atria appears normal. Moderate left atrial enlargement is present.     Mitral Valve  1.5cm mobile echo density on mitral prosthetic valve with severely increased  mean mitral gradient 21-24mmHg.. Heart rate 86BOM.     Aortic Valve  The mean AoV pressure gradient is 12.0 mmHg. A bioprosthetic aortic valve is  present.     Tricuspid Valve  S/P TV repair. Mean gradient not assessed. Mild TR. The right ventricular  systolic pressure is approximated at 55.7 mmHg plus the right atrial pressure.     Pulmonic Valve  The pulmonic valve is normal.     Vessels  The thoracic aorta is normal. The pulmonary artery is normal. Dilation of the  inferior vena cava is present with abnormal respiratory variation in diameter.     Pericardium  No pericardial effusion is present.     ______________________________________________________________________________  MMode/2D Measurements & Calculations  IVSd: 1.2 cm  LVIDd: 3.6 cm  LVIDs: 2.1 cm  LVPWd: 1.3 cm  FS: 42.0 %  LV mass(C)d: 154.0 grams  LV mass(C)dI: 76.4 grams/m2  Ao root diam: 2.8 cm  asc Aorta Diam: 3.3 cm     LVOT diam: 2.1 cm  LVOT area: 3.5 cm2  LA Volume (BP): 92.0 ml  LA Volume Index (BP): 45.5 ml/m2  RWT: 0.73     Doppler Measurements & Calculations  MV max P.4 mmHg  MV mean P.5 mmHg  MV V2 VTI: 105.0 cm  MVA(VTI): 1.0 cm2  Ao V2 max: 238.0 cm/sec  Ao max P.0 mmHg  Ao V2 mean: 163.0 cm/sec  Ao mean P.0  mmHg  Ao V2 VTI: 39.5 cm  GUERITA(I,D): 2.7 cm2  GUERITA(V,D): 2.4 cm2  LV V1 max P.2 mmHg  LV V1 max: 167.0 cm/sec  LV V1 VTI: 31.3 cm  SV(LVOT): 108.4 ml  SI(LVOT): 53.8 ml/m2     PA acc time: 0.10 sec  TR max yasir: 373.0 cm/sec  TR max P.7 mmHg  AV Yasir Ratio (DI): 0.70  GUERITA Index (cm2/m2): 1.4     ______________________________________________________________________________  Report approved by: Saul Singer 2022 04:21 PM         CT Lumbar spine w/o contrast*    Narrative    PROCEDURE: CT LUMBAR SPINE W/O CONTRAST 2022 4:04 PM    HISTORY: ro impingement, pain of back and L leg.    COMPARISONS: 2022    Meds/Dose Given:    TECHNIQUE: CT scan of the lumbar spine with sagittal and coronal  reconstructions    FINDINGS: There is a large Schmorl's node is seen involving the  superior endplate of T12.    The L1 vertebra is intact.    There is a compression fracture of the inferior endplate of L2. There  is increasing sclerosis seen at the site of fracture as compared to  previous examination on May 23, 2022.    There is a compression fracture of the superior endplate of L3. There  is increasing sclerosis consistent with bony healing. No change in the  degree of compression is seen.    There is a new compression fracture of the L4 vertebra with  compression of the superior endplate of the L4 vertebra.    The L5 vertebra is intact.    Severe facet joint degenerative changes are noted in the lower lumbar  spine.         Impression    IMPRESSION: New L4 vertebral body compression fracture involving the  superior endplate. This represents a change from May 23, 2022. The  degree of compression is mild. No involvement of the vertebral arches  are seen.    Compression fractures of L2 and L3 are stable and unchanged from  previous examination.    INDIO CAZARES MD         SYSTEM ID:  [id number]   XR Chest Port 1 View    Narrative    EXAM: XR CHEST PORT 1 VIEW  LOCATION: [facility]  HOSPITAL  DATE/TIME: 8/25/2022 1:02 AM    INDICATION: Right IJ placement  COMPARISON: 8/23/2020      Impression    IMPRESSION: Right IJ catheter present with its tip projecting at distal SVC. Patient rotated somewhat into an LPO projection. Prominent skinfold seen along right chest with no pneumothorax evident. Left lung remains grossly clear. Stable postoperative   changes of heart including pacemaker and AVR. Mild cardiomegaly.

## 2022-08-25 NOTE — PROGRESS NOTES
Called [facility] to set up air transport for patient. Awaiting call back with flight time and information.

## 2022-08-25 NOTE — CONSULTS
CARDIOTHORACIC SURGERY CONSULT NOTE  8/25/2022      Reason for Consult:  Endocarditis    ASSESSMENT/PLAN:     Amy Luna is a 77 year old female with a history AVR, MVR, tricuspid valve repair and PFO closure in April of 2020 with Dr. Wilson now with concern for infective endocarditis with 1.5 mobile echo density on mitral valve. CVTS was consulted for consideration of surgical intervention.    - ID consulted, appreciate recommendations  - Cardiology consulted  - Awaiting results of SOCORRO  - Surgical plan/consideration pending the above work-up and optimization of current septic state  - Other cares per primary team  - Thank you for the opportunity to participate in the care of this patient.    Patient and plan discussed with attending, Dr. Moralez.    SARBJIT Negron, [facility]-AG, CCRN  Nurse Practitioner  Cardiothoracic Surgery  Pager: [phone]  ________________________________________________________________________________________________    HPI:  Amy Luna is a 77 year old female with a history of bioprosthetic AVR and MVR along with tricuspid valve repair and PFO closure in April of 2020 with Dr. Wilson. Subsequently had placement of permanent pacemaker and developed paroxysmal atrial fibrillation. Presented from OSH with worsening encephalopathy and community acquired pneumonia. It appears she also has urosepsis as urine culture positive for GNB and prelim one of four blood cultures positive for GNB thus far. Currently on Vancomycin and Meropenem. Workup included a TTE which demonstrated preserved biventricular function, a 1.5 cm mobile echo density on the mitral valve with a severely increased mean gradient 21-24 mmHg. Mean gradient of AV 12 mm Hg and mild TR noted. SOCORRO was completed today and is pending read. Of note, she had been previously anticoagulated with Warfarin for atrial fibrillation and has a history of chronic anemia and GIB. Had a hospital admission in March of 2022 where her  hemoglobin was initially found to be 4.3 with INR of 10. She was transfused with multiple blood products but EGD not completed given reversible causes of GIB. Anticoagulation was discontinued indefinitely. Hemoglobin here on admission is 7.4 and noted black stools at OSH but hemoccult negative. GI was consulted and attributing anemia to sepsis and not GIB. Renal function appears to be at baseline and liver function normal. CT lumbar spine with previously noted fractures but no mention of discitis.  CT of head at OSH without acute pathology. Hemodynamics have improved with supportive cares, currently on low dose norepinephrine drip. CVTS was consulted for consideration of surgical intervention for possible infective endocarditis.    Other PMH includes: HTN, HLD, DM2, LAVERNE with CPAP use, urinary retention, recurrent UTIs, chronic anemia, L3-4 fractures, depression, obesity.    Patient quite lethargic at time of interview and unable to meaningfully answer questions.  Time spent on consult: 45 minutes.    PMH:  Past Medical History:   Diagnosis Date     (HFpEF) heart failure with preserved ejection fraction (H)      Aortic stenosis      Chest pain      Depressive disorder      Diabetes (H)      Hyperlipidemia      Hypertension      Mitral annular calcification      Mitral stenosis      Obesity      Sleep apnea     CPAP       PSH:  Past Surgical History:   Procedure Laterality Date     APPENDECTOMY       CV CORONARY ANGIOGRAM N/A 4/8/2020    Procedure: Coronary Angiogram;  Surgeon: Inez Peoples MD;  Location:  HEART CARDIAC CATH LAB     CV LEFT HEART CATH N/A 4/8/2020    Procedure: Left Heart Cath;  Surgeon: Inez Peoples MD;  Location:  HEART CARDIAC CATH LAB     CV PFO CLOSURE N/A 4/15/2020    Procedure: Patent Foramen Ovale Closure;  Surgeon: Ok Wilson MD;  Location:  OR      RIGHT HEART CATH MEASUREMENTS RECORDED N/A 4/8/2020    Procedure: Right Heart Cath;  Surgeon: March,  Inez Verdugo MD;  Location:  HEART CARDIAC CATH LAB     EP PACEMAKER N/A 2020    Procedure: EP Pacemaker;  Surgeon: Sohan Francis MD;  Location:  HEART CARDIAC CATH LAB     GYN SURGERY       x2 ,      GYN SURGERY      total hysterectomy     IMPLANT PACEMAKER  2015     INJECT EPIDURAL TRANSFORAMINAL LUMBAR / SACRAL SINGLE Left 2022    Procedure: left L2-L3 transforaminal epidural steroid injection;  Surgeon: Lito Maldonado MD;  Location: [facility] OR     REPAIR VALVE TRICUSPID N/A 4/15/2020    Procedure: REPAIR TRICUSPID VALVE WITH VILLA MC3 26MM.;  Surgeon: Ok Wilson MD;  Location:  OR     REPLACE VALVE AORTIC N/A 4/15/2020    Procedure: REPLACEMENT, AORTIC VALVE WITH INSPIRIS TISSUE VALVE 25 MM; ON PUMP WITH SOCORRO READ BY CARDIOLOGIST DR BLANTON.;  Surgeon: Ok Wilson MD;  Location: [facility] OR     REPLACE VALVE MITRAL N/A 4/15/2020    Procedure: REPLACEMENT, MITRAL VALVE WITH EPIC TISSUE VALVE 27 MM.;  Surgeon: Ok Wilson MD;  Location:  OR       FH:  Family History   Problem Relation Age of Onset     Diabetes Mother 56     Congenital heart disease Father 23     Colon Cancer No family hx of        SH:  Social History     Socioeconomic History     Marital status:    Tobacco Use     Smoking status: Never Smoker     Smokeless tobacco: Never Used   Vaping Use     Vaping Use: Never used   Substance and Sexual Activity     Alcohol use: No     Drug use: No     Sexual activity: Yes     Partners: Male       Home Meds:  Medications Prior to Admission   Medication Sig Dispense Refill Last Dose     acetaminophen (TYLENOL) 500 MG tablet Take 500 mg by mouth 3 times daily Takes with 650 mg tablet three times daily        acetaminophen (TYLENOL) 650 MG CR tablet Take 650 mg by mouth 3 times daily Takes with 500 mg tablet three times daily        albuterol (PROAIR HFA/PROVENTIL HFA/VENTOLIN HFA) 108 (90 Base) MCG/ACT inhaler Inhale 2 puffs into the  lungs every 4 hours as needed for shortness of breath / dyspnea or wheezing        atorvastatin (LIPITOR) 40 MG tablet Take 1 tablet (40 mg) by mouth At Bedtime 90 tablet 1      Biotin 22428 MCG TABS Take 10,000 mcg by mouth every morning         calcium carbonate (OS-NELLY) 1500 (600 Ca) MG tablet Take 2,400 mg by mouth daily        coenzyme Q-10 200 MG CAPS Take 200 mg by mouth every morning         Continuous Blood Gluc  (FREESTYLE ADAM READER) HARJEET 1 each every 14 days Use to read blood sugars per  instructions. 1 each 0      Continuous Blood Gluc Sensor (FREESTYLE ADAM 14 DAY SENSOR) MISC 1 each every 14 days Change every 14 days. 2 each 6      cyanocobalamin (VITAMIN B-12) 100 MCG tablet Take 100 mcg by mouth daily        diphenhydrAMINE (BENADRYL) 25 MG tablet Take 25 mg by mouth every 6 hours as needed for itching or allergies        ferrous sulfate 140 (45 Fe) MG TBCR CR tablet Take 280 mg by mouth daily        furosemide (LASIX) 40 MG tablet Take 0.5 tablets (20 mg) by mouth daily 180 tablet 1      gabapentin (NEURONTIN) 100 MG capsule Take 3 capsules (300 mg) by mouth 3 times daily 270 capsule 1      Ginkgo Biloba (GINKOBA PO) Take 250 mg by mouth daily        insulin  UNIT/ML vial Inject 30 units every morning and 20 unit(s) every evening (Patient taking differently: Inject 30 units every morning and 30 unit(s) every evening) 1 mL       insulin regular (NOVOLIN R VIAL) 100 UNIT/ML vial 2-4 units before breakfast, 2-4 units before lunch, 2-4 units before dinner (Patient taking differently: 5 units before breakfast, 5 units before lunch, 5 units before dinner plus sliding scale coverage)  0      ipratropium (ATROVENT) 0.06 % nasal spray Spray 2 sprays into both nostrils 4 times daily as needed for rhinitis 3 Box 1      Lutein 40 MG CAPS Take 40 mg by mouth every morning         Magnesium 400 MG TABS Take 400 mg by mouth every evening         metFORMIN (GLUCOPHAGE XR) 500 MG 24 hr  tablet Take 2 tablets (1,000 mg) by mouth daily (with breakfast) 180 tablet 1      metoprolol succinate ER (TOPROL XL) 25 MG 24 hr tablet Take 2 tablets (50 mg) by mouth daily 180 tablet 1      naloxone (NARCAN) 4 MG/0.1ML nasal spray Spray 1 spray (4 mg) into one nostril alternating nostrils once as needed for opioid reversal every 2-3 minutes until assistance arrives 0.2 mL 1      oxyCODONE (ROXICODONE) 5 MG tablet Take 1 tablet (5 mg) by mouth every 6 hours as needed for pain or moderate to severe pain 30 tablet 0      Propylene Glycol (SYSTANE BALANCE OP) Apply 1 drop to eye 3 times daily as needed (dry eyes)         sertraline (ZOLOFT) 100 MG tablet TAKE 1 AND 1/2 TABLETS EVERY MORNING 135 tablet 0      vitamin (B COMPLEX-C) tablet Take 1 tablet by mouth daily        vitamin C (ASCORBIC ACID) 1000 MG TABS Take 1,000 mg by mouth daily as needed (cold symptoms)        Vitamin D3 (CHOLECALCIFEROL) 125 MCG (5000 UT) tablet Take 2 tablets by mouth daily        zinc gluconate 50 MG tablet Take 50 mg by mouth daily        Allergies:  Allergies   Allergen Reactions     Penicillins Hives     3 weeks after taking med got hives.       Pioglitazone Other (See Comments)     Increased urinary frequency, fatigue, dyspnea     ROS:  ROS: A [id number] point ROS neg other than the symptoms noted above in the HPI.    Physical Exam:  Temp:  [97.4  F (36.3  C)-98.8  F (37.1  C)] 98.2  F (36.8  C)  Pulse:  [77-97] 96  Resp:  [12-28] 15  BP: (100-134)/(35-68) 101/45  SpO2:  [90 %-99 %] 98 %  Gen: NAD, resting comfortably in bed, appears toxic, fatigued  HEENT: normocephalic, atraumatic cranium, EOMI, sclerae anicteric. Oral mucosa pink and dry,  midline trachea  Lungs: CTA in all fields, no wheezing or rhonchi, JENKINS, +O2  CV: RRR, S1S2 normal, no murmur. Radial pulses and DP pulses symmetric. No dependent edema.   Abd: positive normal pitched bowel sounds, overall soft and non distended, nontender  Musculoskeletal: grossly intact,  strength 2/5 upper and lower extremities  Neuro: Obtunded, CN II-VII grossly intact, sensation/motor intact in upper and lower extremities  Mental: Fatigued, withdrawn    Labs:  ABG No lab results found in last 7 days.  CBC  Recent Labs   Lab 22  0546 22  0115 22  1712 22  1044 22  0732 22  2217   WBC 11.1* 11.7*  --   --  9.9 11.8*   HGB 7.4* 7.4* 7.2* 7.5* 7.3* 7.6*    228  --   --  227 246     BMP  Recent Labs   Lab 22  1306 22  1153 22  1140 22  1137 22  1123 22  0832 22  0546 22  0449 22  0115 22  1217 22  0732 22  2220   NA  --   --   --   --   --   --  132*  --  134  --  129* 128*   POTASSIUM  --   --  4.8  --   --   --  5.7*  --  4.9  --  5.0 5.0   CHLORIDE  --   --   --   --   --   --  104  --  104  --  99 96*   CO2  --   --   --   --   --   --  23  --  24  --  25 23   BUN  --   --   --   --   --   --  43*  --  43*  --  45* 42*   CR  --   --   --   --   --   --  1.06*  --  1.15*  --  1.63* 1.60*   * 161*  --  163* 153*   < > 281*   < > 292*   < > 236* 285*    < > = values in this interval not displayed.     LFT  Recent Labs   Lab 22  0546 22  0115 220   AST 45 20 20   ALT 19 14 8   ALKPHOS 67 66 57   BILITOTAL 0.4 0.4 0.7   ALBUMIN 2.1* 2.1* 2.9*   INR  --  1.23*  --      Pancreas  Recent Labs   Lab 22  0115   LIPASE 33*       Imaging:  Recent Results (from the past 24 hour(s))   Echocardiogram Complete   Result Value    LVEF  70%    Narrative    [id number]  [id number]  [id number]  [id number]^ELMA^VICENTE^CARLO                                                                       Version 2     [facility]  [address] [place] [address]     Name: KASSANDRA RIEVRA  MRN: [id number]  : 1945  Study Date: 2022 01:58 PM  Age: 77 yrs  Gender: Female  Patient Location: [facility]  Reason For Study: Heart Failure  History:  CV surgery  4/15/2021  AVR: INSPIRIS TISSUE VALVE 25 MM  MVR: EPIC TISSUE VALVE 27 MM  TV repair: VILLA MC3 26MM  ASD closure  Ordering Physician: VICENTE WILLS  Performed By: Cinda Hernandez RDCS, T     BSA: 2.0 m2  Height: 65 in  Weight: 209 lb  HR: 83  BP: 118/31 mmHg  ______________________________________________________________________________  Procedure  Complete Portable Echo Adult.  ______________________________________________________________________________  Interpretation Summary  1.5cm mobile echo density on mitral prosthetic valve with severely increased  mean mitral gradient 21-24mmHg.. Heart rate 86BPM. Consistent with prosthetic  valve endocarditis.     CV surgery 4/15/2021  AVR: INSPIRIS TISSUE VALVE 25 MM  MVR: EPIC TISSUE VALVE 27 MM  TV repair: VILLA MC3 26MM  ASD closure     ______________________________________________________________________________  Left Ventricle  CV surgery 4/15/2021  AVR: INSPIRIS TISSUE VALVE 25 MM  MVR: EPIC TISSUE VALVE 27 MM  TV repair: VILLA MC3 26MM  ASD closure. Global and regional left ventricular function is hyperkinetic  with an EF >70%. Left ventricular wall thickness is normal. Left ventricular  size is normal. Diastolic function not assessed due to presence of prosthetic  mitral valve. No regional wall motion abnormalities are seen.     Right Ventricle  Right ventricular function, chamber size, wall motion, and thickness are  normal. A pacemaker lead is noted in the right ventricle.     Atria  The right atria appears normal. Moderate left atrial enlargement is present.     Mitral Valve  1.5cm mobile echo density on mitral prosthetic valve with severely increased  mean mitral gradient 21-24mmHg.. Heart rate 86BOM.     Aortic Valve  The mean AoV pressure gradient is 12.0 mmHg. A bioprosthetic aortic valve is  present.     Tricuspid Valve  S/P TV repair. Mean gradient not assessed. Mild TR. The right ventricular  systolic pressure is approximated at 55.7 mmHg plus  the right atrial pressure.     Pulmonic Valve  The pulmonic valve is normal.     Vessels  The thoracic aorta is normal. The pulmonary artery is normal. Dilation of the  inferior vena cava is present with abnormal respiratory variation in diameter.     Pericardium  No pericardial effusion is present.     ______________________________________________________________________________  MMode/2D Measurements & Calculations  IVSd: 1.2 cm  LVIDd: 3.6 cm  LVIDs: 2.1 cm  LVPWd: 1.3 cm  FS: 42.0 %  LV mass(C)d: 154.0 grams  LV mass(C)dI: 76.4 grams/m2  Ao root diam: 2.8 cm  asc Aorta Diam: 3.3 cm     LVOT diam: 2.1 cm  LVOT area: 3.5 cm2  LA Volume (BP): 92.0 ml  LA Volume Index (BP): 45.5 ml/m2  RWT: 0.73     Doppler Measurements & Calculations  MV max P.4 mmHg  MV mean P.5 mmHg  MV V2 VTI: 105.0 cm  MVA(VTI): 1.0 cm2  Ao V2 max: 238.0 cm/sec  Ao max P.0 mmHg  Ao V2 mean: 163.0 cm/sec  Ao mean P.0 mmHg  Ao V2 VTI: 39.5 cm  GUERITA(I,D): 2.7 cm2  GUERITA(V,D): 2.4 cm2  LV V1 max P.2 mmHg  LV V1 max: 167.0 cm/sec  LV V1 VTI: 31.3 cm  SV(LVOT): 108.4 ml  SI(LVOT): 53.8 ml/m2     PA acc time: 0.10 sec  TR max yasir: 373.0 cm/sec  TR max P.7 mmHg  AV Yasir Ratio (DI): 0.70  GUERITA Index (cm2/m2): 1.4     ______________________________________________________________________________  Report approved by: Saul Singer 2022 04:21 PM         CT Lumbar spine w/o contrast*    Narrative    PROCEDURE: CT LUMBAR SPINE W/O CONTRAST 2022 4:04 PM    HISTORY: ro impingement, pain of back and L leg.    COMPARISONS: 2022    Meds/Dose Given:    TECHNIQUE: CT scan of the lumbar spine with sagittal and coronal  reconstructions    FINDINGS: There is a large Schmorl's node is seen involving the  superior endplate of T12.    The L1 vertebra is intact.    There is a compression fracture of the inferior endplate of L2. There  is increasing sclerosis seen at the site of fracture as compared to  previous examination on  May 23, 2022.    There is a compression fracture of the superior endplate of L3. There  is increasing sclerosis consistent with bony healing. No change in the  degree of compression is seen.    There is a new compression fracture of the L4 vertebra with  compression of the superior endplate of the L4 vertebra.    The L5 vertebra is intact.    Severe facet joint degenerative changes are noted in the lower lumbar  spine.         Impression    IMPRESSION: New L4 vertebral body compression fracture involving the  superior endplate. This represents a change from May 23, 2022. The  degree of compression is mild. No involvement of the vertebral arches  are seen.    Compression fractures of L2 and L3 are stable and unchanged from  previous examination.    INDIO CAZARES MD         SYSTEM ID:  [id number]   XR Chest Port 1 View    Narrative    EXAM: XR CHEST PORT 1 VIEW  LOCATION: [facility]  DATE/TIME: 8/25/2022 1:02 AM    INDICATION: Right IJ placement  COMPARISON: 8/23/2020      Impression    IMPRESSION: Right IJ catheter present with its tip projecting at distal SVC. Patient rotated somewhat into an LPO projection. Prominent skinfold seen along right chest with no pneumothorax evident. Left lung remains grossly clear. Stable postoperative   changes of heart including pacemaker and AVR. Mild cardiomegaly.

## 2022-08-25 NOTE — PLAN OF CARE
Paged hospitalist re: new onset SOB/feeling like she can't take deep breaths. O2 87-88% on NC. Changed to 4L oxymask. Pt improving on mask. Mild expiratory wheezes.

## 2022-08-25 NOTE — PHARMACY-ADMISSION MEDICATION HISTORY
Pharmacy Medication History  Admission medication history interview status for the 8/24/2022  admission is complete. See EPIC admission navigator for prior to admission medications     Additional medication history information:   Please see Admission Medication History note done 8/24/22 at [facility] for further details.    Medication reconciliation completed by provider prior to medication history? No      Prior to Admission medications    Medication Sig Last Dose Taking? Auth Provider Long Term End Date   acetaminophen (TYLENOL) 500 MG tablet Take 500 mg by mouth 3 times daily Takes with 650 mg tablet three times daily   Reported, Patient     acetaminophen (TYLENOL) 650 MG CR tablet Take 650 mg by mouth 3 times daily Takes with 500 mg tablet three times daily   Unknown, Entered By History     albuterol (PROAIR HFA/PROVENTIL HFA/VENTOLIN HFA) 108 (90 Base) MCG/ACT inhaler Inhale 2 puffs into the lungs every 4 hours as needed for shortness of breath / dyspnea or wheezing   Unknown, Entered By History Yes    atorvastatin (LIPITOR) 40 MG tablet Take 1 tablet (40 mg) by mouth At Bedtime   Yamilka Christina, DO Yes    Biotin 03897 MCG TABS Take 10,000 mcg by mouth every morning    Reported, Patient     calcium carbonate (OS-NELLY) 1500 (600 Ca) MG tablet Take 2,400 mg by mouth daily   Reported, Patient     coenzyme Q-10 200 MG CAPS Take 200 mg by mouth every morning    Reported, Patient     Continuous Blood Gluc  (FREESTYLE ADAM READER) HARJEET 1 each every 14 days Use to read blood sugars per  instructions.   She Huang MD     Continuous Blood Gluc Sensor (bettercodes.orgSTYLE ADAM 14 DAY SENSOR) MISC 1 each every 14 days Change every 14 days.   She Huang MD     cyanocobalamin (VITAMIN B-12) 100 MCG tablet Take 100 mcg by mouth daily   Reported, Patient     diphenhydrAMINE (BENADRYL) 25 MG tablet Take 25 mg by mouth every 6 hours as needed for itching  or allergies   Reported, Patient     ferrous sulfate 140 (45 Fe) MG TBCR CR tablet Take 280 mg by mouth daily   Reported, Patient     furosemide (LASIX) 40 MG tablet Take 0.5 tablets (20 mg) by mouth daily   Laura Fung MD Yes    gabapentin (NEURONTIN) 100 MG capsule Take 3 capsules (300 mg) by mouth 3 times daily   Lynne Castro, APRN CNP Yes    Ginkgo Biloba (GINKOBA PO) Take 250 mg by mouth daily   Reported, Patient     insulin  UNIT/ML vial Inject 30 units every morning and 20 unit(s) every evening  Patient taking differently: Inject 30 units every morning and 30 unit(s) every evening   She Huang MD Yes    insulin regular (NOVOLIN R VIAL) 100 UNIT/ML vial 2-4 units before breakfast, 2-4 units before lunch, 2-4 units before dinner  Patient taking differently: 5 units before breakfast, 5 units before lunch, 5 units before dinner plus sliding scale coverage   She Huang MD Yes    ipratropium (ATROVENT) 0.06 % nasal spray Spray 2 sprays into both nostrils 4 times daily as needed for rhinitis   She Huang MD     Lutein 40 MG CAPS Take 40 mg by mouth every morning    Reported, Patient     Magnesium 400 MG TABS Take 400 mg by mouth every evening    Reported, Patient     metFORMIN (GLUCOPHAGE XR) 500 MG 24 hr tablet Take 2 tablets (1,000 mg) by mouth daily (with breakfast)   She Huang MD Yes    metoprolol succinate ER (TOPROL XL) 25 MG 24 hr tablet Take 2 tablets (50 mg) by mouth daily   Yamilka Christina DO Yes    naloxone (NARCAN) 4 MG/0.1ML nasal spray Spray 1 spray (4 mg) into one nostril alternating nostrils once as needed for opioid reversal every 2-3 minutes until assistance arrives   She Huang MD Yes    oxyCODONE (ROXICODONE) 5 MG tablet Take 1 tablet (5 mg) by mouth every 6 hours as needed for pain or moderate to severe pain   Petey Lira MD     Propylene Glycol  (SYSTANE BALANCE OP) Apply 1 drop to eye 3 times daily as needed (dry eyes)    Reported, Patient     sertraline (ZOLOFT) 100 MG tablet TAKE 1 AND 1/2 TABLETS EVERY MORNING   She Huang MD Yes    vitamin (B COMPLEX-C) tablet Take 1 tablet by mouth daily   Reported, Patient     vitamin C (ASCORBIC ACID) 1000 MG TABS Take 1,000 mg by mouth daily as needed (cold symptoms)   Reported, Patient     Vitamin D3 (CHOLECALCIFEROL) 125 MCG (5000 UT) tablet Take 2 tablets by mouth daily   Reported, Patient     zinc gluconate 50 MG tablet Take 50 mg by mouth daily   Unknown, Entered By History         The information provided in this note is only as accurate as the sources available at the time of update(s)

## 2022-08-25 NOTE — PROGRESS NOTES
Brief Cardiology Note  Pt: Amy Luna    1945      Amy Luna is a 77 year old female with a history of valve disease.  She has bioprosthetic mitral and aortic valve replacements with tricuspid annuloplasty repair and PFO closure in 2020.  She did require a permanent pacemaker as well. She was admitted to [facility] on  for worsening encephalopathy.  She was initially felt to have encephalopathy and community acquired pneumonia.  Her echo showed a possible mobile density with concern for endocarditis and she was transferred to [facility] for further care.      Echocardiogram  confirmed 1.5 cm mobile echodensity most consistent with endocarditis.  Unfortunately this is causing severe mitral gradient up 21-24 mmHg.  SOCORRO is recommended to further evaluate the valve and vegetation to develop plans for possible redo surgery vs medical management.    Pt has encephalopathy and I discussed the procedure in depth with her , Irineo, and daughter by phone.       Risks and benefits for transesophageal echocardiogram have been explained.  This includes but is not limited to damage to the oral cavity, esophageal perforation, GI bleeding, pharyngeal hematoma, transient bronchospasm, transient hypoxia, arrhthymias (NSVT, transient atrial fibrillation), vomiting, hemoptysis, complications from anesthesia, up to and including emergency surgery and death.       Specifically, the family was told by a doctor in [place] that any sedation would likely kill this patient.  We reviewed that we are in a difficult situation, that we would use minimal sedation necessary, and if needed intubate the patient for additional pictures.  Ideally, we would discuss with the family before intubation were required and the procedure would be postponed.    Per family, Patient has no history of esophageal stricture, odynphagia, dysphagia, airway problems; specifically no problems swallowing meat.    Mallampati  Score: 3   and daughter are agreeable to proceed.     Neetu Naranjo PA-C  11:58 AM 8/25/2022   [facility]  Pager: [phone]

## 2022-08-25 NOTE — CONSULTS
[facility]    Cardiology Consultation     Date of Admission:  8/24/2022    Assessment & Plan   Amy Luna is a 77 year old female who was admitted on 8/24/2022.    1.  Acute delirium/encephalopathy secondary to sepsis, with gradual weakness developing over the last several days.  2.  Subacute prosthetic mitral valve endocarditis.  Large vegetation identified on the mitral valve by transthoracic echocardiogram with severe valve dysfunction.  Severe mitral valve stenosis is present with a mean mitral valve gradient of 24 mmHg.  The severity of mitral regurgitation is unclear due to shadowing.  3.  History of bioprosthetic aortic valve replacement without evidence of dysfunction by transthoracic echo  4.  History of tricuspid valve repair with satisfactory tricuspid valve function  5.  Severe pulmonary hypertension with severe right ventricular enlargement and dysfunction, probably due to severe mitral stenosis  6.  Paroxysmal atrial fibrillation, postoperatively after her cardiac surgery in April 2020 for valve replacement.  She was treated briefly with amiodarone and warfarin.  Amiodarone was discontinued and she continued warfarin until she recently had a GI bleed due to ibuprofen use for back pain and it was then stopped.  She has not had any evidence of recurrent atrial fibrillation on her pacemaker checks since shortly after her cardiac surgery in 2020.  Currently in sinus rhythm.  Not on anticoagulation prior to admission.  7.  Sick sinus syndrome with permanent pacemaker in place  8.  Severe anemia, cause unclear.  Stool was guaiac negative.  She has a history of recent GI bleed due to ibuprofen use while on warfarin.  Both of those medications were then discontinued  9.  Type 2 diabetes mellitus  10.  Hypertension   11.  Dyslipidemia  12.  Chronic back and leg pain.    Recommendations:    SOCORRO planned for today.  I agree with that assessment.  Urgent cardiac surgery consultation  due to severe mitral valve dysfunction.  Antibiotics per ID and intensive care staff  Consider transfusion of red blood cells for management of of cardiogenic shock and acute heart failure due to mitral stenosis.  Cardiac function and oxygen delivery would improve with hemoglobin greater than 9.  GI evaluation is underway for possible bleeding causing anemia.      MILLIE HERNANDEZ MD     60 minutes critical care    Primary Care Physician   Petey Escamilla    Reason for Consult   Reason for consult: I was asked by intensive care unit staff to evaluate this patient for mitral valve endocarditis.    History of Present Illness   Amy Luna is a 77 year old female who presents with several days or weeks of progressive weakness culminating in confusion and hallucination.  She was brought to her local hospital in [facility] and identified as having evidence of sepsis on initial evaluation.    Initially there was concern about black stools with significant anemia, hemoglobin of 7.6, but her stool was guaiac negative and she was taking iron which may have explained that issue.  Her hemoglobin has been stable since presentation.    She was admitted with a probationary diagnosis of community-acquired pneumonia with an abnormal chest x-ray showing infiltrates and pleural effusion.  However the pattern appeared to be most consistent with pulmonary edema.    She has a history of symptomatic severe mitral stenosis and moderate aortic stenosis diagnosed in 2020.  On 4/15/2020 she underwent cardiac surgery with Dr. Wilson, including aortic valve replacement with an Inspiris 25 mm bioprosthetic valve, mitral valve replacement with a epic 27 mm bioprosthetic valve, tricuspid valve repair with a 26 mm annuloplasty ring, and repair of PFO.  She has a history of sick sinus syndrome with a permanent pacemaker implanted in 2015.  She had mild nonocclusive coronary artery disease by cardiac catheterization prior to her  "cardiac surgery.  She had an atrial pacemaker lead fracture identified postoperatively, requiring revision of the atrial lead. She had postoperative atrial fibrillation and was briefly on amiodarone.  She was continued on warfarin until she had some \"internal bleeding\" after taking ibuprofen for chronic back pain, prompting discontinuation of warfarin.  This seemed to be a low risk situation since pacemaker checks have not shown recurrent atrial fibrillation.  Watchman device has been considered.    An echocardiogram was performed in [facility] after her admission there.  It showed normal to hyperdynamic left ventricular systolic function with severe mitral valve dysfunction.  Her prosthetic mitral valve demonstrated severe stenosis with a mean gradient of 24 mmHg and a large mobile vegetation extending into the LV and prolapsing into the left atrium.  The degree of mitral regurgitation is unclear due to shadowing.  The aortic and tricuspid valves appear to be functioning normally.  The right ventricle was severely dilated and dysfunctional with severe pulmonary hypertension.  Findings felt to be consistent with prosthetic mitral valve endocarditis.    She was then transferred to [facility] for further care, including a SOCORRO and cardiac surgery consultation.    She is not able to communicate well but is arousable and indicates that she is felt weak for several months, ever since she had the internal bleeding episode.  Over the last several days she has been so weak that she has not been able to walk.  Reports indicate that she had progressive confusion during this time as well which was what prompted her family to bring her into the hospital.  She denies having any chest pain to me.  She is short of breath.      Patient Active Problem List   Diagnosis     Aortic sten -- S/P Bioprost AVR 4/15/20     Hyperlipidemia     LAVERNE on CPAP     Mitral stenosis -- S/P Bioprost MVR 4/15/19     Mitral annular " calcification     (HFpEF) heart failure with preserved ejection fraction (H)     Mitral valve stenosis, unspecified etiology     Tricuspid valve Regur -- S/P Repair 4/15/20     Patent foramen ovale -- S/P Closure 4/15/20     Fractured atrial pacemaker lead -- Replace 4/16/20     CLARITA (acute kidney injury) (H)     S/P aortic valve and mitral valve replacement     Morbid obesity (H)     Paroxysmal atrial fibrillation (H)     Supratherapeutic INR     Adenomatous polyp of colon     Cardiac pacemaker in situ     Dermatochalasis     Encounter for long-term (current) use of insulin (H)     Glaucoma suspect     Major depressive disorder, recurrent episode, moderate (H)     Second degree AV block, Mobitz type II     Generalized weakness     GI bleed     Anemia due to blood loss, acute     Urticaria     Essential hypertension     Chronic heart failure with preserved ejection fraction (H)     Type 2 diabetes mellitus with other specified complication, with long-term current use of insulin (H)     Compression fracture of lumbar vertebra with routine healing, unspecified lumbar vertebral level, subsequent encounter     Debility     Urinary tract infection without hematuria, site unspecified     Urinary retention     Chronic midline low back pain, unspecified whether sciatica present     Osteoporosis with current pathological fracture, unspecified osteoporosis type, initial encounter     Hyponatremia     Altered mental status, unspecified altered mental status type     Septic shock (H)       Past Medical History   I have reviewed this patient's medical history and updated it with pertinent information if needed.   Past Medical History:   Diagnosis Date     (HFpEF) heart failure with preserved ejection fraction (H)      Aortic stenosis      Chest pain      Depressive disorder      Diabetes (H)      Hyperlipidemia      Hypertension      Mitral annular calcification      Mitral stenosis      Obesity      Sleep apnea     CPAP        Past Surgical History   I have reviewed this patient's surgical history and updated it with pertinent information if needed.  Past Surgical History:   Procedure Laterality Date     APPENDECTOMY       CV CORONARY ANGIOGRAM N/A 2020    Procedure: Coronary Angiogram;  Surgeon: Inez Peoples MD;  Location:  HEART CARDIAC CATH LAB     CV LEFT HEART CATH N/A 2020    Procedure: Left Heart Cath;  Surgeon: Inez Peoples MD;  Location:  HEART CARDIAC CATH LAB     CV PFO CLOSURE N/A 4/15/2020    Procedure: Patent Foramen Ovale Closure;  Surgeon: Ok Wilson MD;  Location:  OR      RIGHT HEART CATH MEASUREMENTS RECORDED N/A 2020    Procedure: Right Heart Cath;  Surgeon: Inez Peoples MD;  Location:  HEART CARDIAC CATH LAB     EP PACEMAKER N/A 2020    Procedure: EP Pacemaker;  Surgeon: Sohan Francis MD;  Location:  HEART CARDIAC CATH LAB     GYN SURGERY       x2 ,      GYN SURGERY      total hysterectomy     IMPLANT PACEMAKER       INJECT EPIDURAL TRANSFORAMINAL LUMBAR / SACRAL SINGLE Left 2022    Procedure: left L2-L3 transforaminal epidural steroid injection;  Surgeon: Lito Maldonado MD;  Location: [facility] OR     REPAIR VALVE TRICUSPID N/A 4/15/2020    Procedure: REPAIR TRICUSPID VALVE WITH VILLA MC3 26MM.;  Surgeon: Ok Wilson MD;  Location:  OR     REPLACE VALVE AORTIC N/A 4/15/2020    Procedure: REPLACEMENT, AORTIC VALVE WITH INSPIRIS TISSUE VALVE 25 MM; ON PUMP WITH SOCORRO READ BY CARDIOLOGIST DR BLANTON.;  Surgeon: Ok Wilson MD;  Location: [facility] OR     REPLACE VALVE MITRAL N/A 4/15/2020    Procedure: REPLACEMENT, MITRAL VALVE WITH EPIC TISSUE VALVE 27 MM.;  Surgeon: Ok Wilson MD;  Location: [facility] OR       Prior to Admission Medications   Prior to Admission Medications   Prescriptions Last Dose Informant Patient Reported? Taking?   Biotin 05880 MCG TABS  Self Yes No   Sig: Take 10,000  mcg by mouth every morning    Continuous Blood Gluc  (FREESTYLE ADAM READER) HARJEET   No No   Si each every 14 days Use to read blood sugars per  instructions.   Continuous Blood Gluc Sensor (FREESTYLE ADAM 14 DAY SENSOR) MISC   No No   Si each every 14 days Change every 14 days.   Ginkgo Biloba (GINKOBA PO)   Yes No   Sig: Take 250 mg by mouth daily   Lutein 40 MG CAPS  Self Yes No   Sig: Take 40 mg by mouth every morning    Magnesium 400 MG TABS  Self Yes No   Sig: Take 400 mg by mouth every evening    Propylene Glycol (SYSTANE BALANCE OP)  Self Yes No   Sig: Apply 1 drop to eye 3 times daily as needed (dry eyes)    Vitamin D3 (CHOLECALCIFEROL) 125 MCG (5000 UT) tablet   Yes No   Sig: Take 2 tablets by mouth daily   acetaminophen (TYLENOL) 500 MG tablet   Yes No   Sig: Take 500 mg by mouth 3 times daily Takes with 650 mg tablet three times daily   acetaminophen (TYLENOL) 650 MG CR tablet   Yes No   Sig: Take 650 mg by mouth 3 times daily Takes with 500 mg tablet three times daily   albuterol (PROAIR HFA/PROVENTIL HFA/VENTOLIN HFA) 108 (90 Base) MCG/ACT inhaler  Self Yes No   Sig: Inhale 2 puffs into the lungs every 4 hours as needed for shortness of breath / dyspnea or wheezing   atorvastatin (LIPITOR) 40 MG tablet   No No   Sig: Take 1 tablet (40 mg) by mouth At Bedtime   calcium carbonate (OS-NELLY) 1500 (600 Ca) MG tablet   Yes No   Sig: Take 2,400 mg by mouth daily   coenzyme Q-10 200 MG CAPS  Self Yes No   Sig: Take 200 mg by mouth every morning    cyanocobalamin (VITAMIN B-12) 100 MCG tablet   Yes No   Sig: Take 100 mcg by mouth daily   diphenhydrAMINE (BENADRYL) 25 MG tablet   Yes No   Sig: Take 25 mg by mouth every 6 hours as needed for itching or allergies   ferrous sulfate 140 (45 Fe) MG TBCR CR tablet   Yes No   Sig: Take 280 mg by mouth daily   furosemide (LASIX) 40 MG tablet   No No   Sig: Take 0.5 tablets (20 mg) by mouth daily   gabapentin (NEURONTIN) 100 MG capsule   No No    Sig: Take 3 capsules (300 mg) by mouth 3 times daily   insulin  UNIT/ML vial   No No   Sig: Inject 30 units every morning and 20 unit(s) every evening   Patient taking differently: Inject 30 units every morning and 30 unit(s) every evening   insulin regular (NOVOLIN R VIAL) 100 UNIT/ML vial   No No   Si-4 units before breakfast, 2-4 units before lunch, 2-4 units before dinner   Patient taking differently: 5 units before breakfast, 5 units before lunch, 5 units before dinner plus sliding scale coverage   ipratropium (ATROVENT) 0.06 % nasal spray   No No   Sig: Spray 2 sprays into both nostrils 4 times daily as needed for rhinitis   metFORMIN (GLUCOPHAGE XR) 500 MG 24 hr tablet   No No   Sig: Take 2 tablets (1,000 mg) by mouth daily (with breakfast)   metoprolol succinate ER (TOPROL XL) 25 MG 24 hr tablet   No No   Sig: Take 2 tablets (50 mg) by mouth daily   naloxone (NARCAN) 4 MG/0.1ML nasal spray   No No   Sig: Spray 1 spray (4 mg) into one nostril alternating nostrils once as needed for opioid reversal every 2-3 minutes until assistance arrives   oxyCODONE (ROXICODONE) 5 MG tablet   No No   Sig: Take 1 tablet (5 mg) by mouth every 6 hours as needed for pain or moderate to severe pain   sertraline (ZOLOFT) 100 MG tablet   No No   Sig: TAKE 1 AND 1/2 TABLETS EVERY MORNING   vitamin (B COMPLEX-C) tablet  Self Yes No   Sig: Take 1 tablet by mouth daily   vitamin C (ASCORBIC ACID) 1000 MG TABS  Self Yes No   Sig: Take 1,000 mg by mouth daily as needed (cold symptoms)   zinc gluconate 50 MG tablet   Yes No   Sig: Take 50 mg by mouth daily      Facility-Administered Medications: None     Current Facility-Administered Medications   Medication Dose Route Frequency     insulin aspart  1-12 Units Subcutaneous Q4H     insulin glargine  20 Units Subcutaneous BID     meropenem  1 g Intravenous Q8H     pantoprazole  40 mg Oral BID AC     vancomycin  1,000 mg Intravenous Q24H     Current Facility-Administered  "Medications   Medication Last Rate     dextrose       sodium chloride       Allergies   Allergies   Allergen Reactions     Penicillins Hives     3 weeks after taking med got hives.       Pioglitazone Other (See Comments)     Increased urinary frequency, fatigue, dyspnea       Social History    reports that she has never smoked. She has never used smokeless tobacco. She reports that she does not drink alcohol and does not use drugs.    Family History   Family History   Problem Relation Age of Onset     Diabetes Mother 56     Congenital heart disease Father 23     Colon Cancer No family hx of        Review of Systems   The comprehensive 10 point Review of Systems is negative other than noted in the HPI or here.     Physical Exam   Vital Signs with Ranges  Temp:  [97.4  F (36.3  C)-98.8  F (37.1  C)] 98.8  F (37.1  C)  Pulse:  [77-97] 87  Resp:  [12-28] 16  BP: (100-134)/(31-68) 110/47  SpO2:  [90 %-99 %] 99 %  Wt Readings from Last 4 Encounters:   08/25/22 98.8 kg (217 lb 13 oz)   08/12/22 94.9 kg (209 lb 2 oz)   07/27/22 104.3 kg (230 lb)   06/16/22 104.5 kg (230 lb 4.8 oz)     I/O last 3 completed shifts:  In: 20.29 [I.V.:20.29]  Out: -       Vitals: /47   Pulse 87   Temp 98.8  F (37.1  C)   Resp 16   Ht 1.651 m (5' 5\")   Wt 98.8 kg (217 lb 13 oz)   LMP  (LMP Unknown)   SpO2 99%   BMI 36.25 kg/m      Constitutional: Very pale, somnolent but arousable, difficulty maintaining eye contact or conversation.  Eyes: Lids and lashes normal, pupils equal, round and reactive to light, extra ocular muscles intact, sclera clear, conjunctiva normal.  ENT: Normocephalic, without obvious abnormality, atramatic, , external ears without lesions, oral pharynx with moist mucus membranes, gums normal and good dentition.  Neck: Supple, symmetrical, trachea midline, no adenopathy, , skin normal.  Hematologic / Lymphatic: No cervical lymphadenopathy and no supraclavicular lymphadenopathy.  Lungs: No increased work of " breathing, good air exchange, clear to auscultation bilaterally, no crackles or wheezing.  Cardiovascular: Regular rate and rhythm, normal S1 and S2, no S3 or S4, and no murmur noted.  Abdomen: No scars, normal bowel sounds, soft, non-distended, non-tender, no masses palpated, no hepatosplenomegally.  Musculoskeletal: No redness, warmth, or swelling of the joints.    Neurologic: Awake, alert, oriented to name, place and time.  Cranial nerves II-XII are grossly intact.    Neuropsychiatric: Normal affect, mood, orientation, memory and insight.  Skin: No rashes, erythema, pallor, petechia or purpura.    No lab results found in last 7 days.    Invalid input(s): TROPONINIES    Recent Labs   Lab 08/25/22  0832 08/25/22  0546 08/25/22  0449 08/25/22  0115 08/24/22  1740 08/24/22  1712 08/24/22  1044 08/24/22  0732   WBC  --  11.1*  --  11.7*  --   --   --  9.9   HGB  --  7.4*  --  7.4*  --  7.2*   < > 7.3*   MCV  --  92  --  94  --   --   --  91   PLT  --  187  --  228  --   --   --  227   INR  --   --   --  1.23*  --   --   --   --    NA  --  132*  --  134  --   --   --  129*   POTASSIUM  --  5.7*  --  4.9  --   --   --  5.0   CHLORIDE  --  104  --  104  --   --   --  99   CO2  --  23  --  24  --   --   --  25   BUN  --  43*  --  43*  --   --   --  45*   CR  --  1.06*  --  1.15*  --   --   --  1.63*   GFRESTIMATED  --  54*  --  49*  --   --   --  32*   ANIONGAP  --  5  --  6  --   --   --  5   NELLY  --  9.4  --  9.1  --   --   --  10.6*   * 281* 275* 292*   < >  --    < > 236*   ALBUMIN  --  2.1*  --  2.1*  --   --   --   --    PROTTOTAL  --  7.7  --  7.6  --   --   --   --    BILITOTAL  --  0.4  --  0.4  --   --   --   --    ALKPHOS  --  67  --  66  --   --   --   --    ALT  --  19  --  14  --   --   --   --    AST  --  45  --  20  --   --   --   --    LIPASE  --   --   --  33*  --   --   --   --     < > = values in this interval not displayed.     Recent Labs   Lab Test 08/04/21  1126 07/28/20  0904   CHOL 190 187    HDL 64 64   LDL 90 102*   TRIG 178* 104     Recent Labs   Lab 08/25/22  0546 08/25/22  0115 08/24/22  1712 08/24/22  1044 08/24/22  0732   WBC 11.1* 11.7*  --   --  9.9   HGB 7.4* 7.4* 7.2*   < > 7.3*   HCT 24.3* 24.7*  --   --  24.0*   MCV 92 94  --   --  91    228  --   --  227    < > = values in this interval not displayed.     No results for input(s): PH, PHV, PO2, PO2V, SAT, PCO2, PCO2V, HCO3, HCO3V in the last 168 hours.  No results for input(s): NTBNPI, NTBNP in the last 168 hours.  No results for input(s): DD in the last 168 hours.  No results for input(s): SED, CRP in the last 168 hours.  Recent Labs   Lab 08/25/22  0546 08/25/22  0115 08/24/22  0732    228 227     No results for input(s): TSH in the last 168 hours.  Recent Labs   Lab 08/23/22 2233   COLOR Yellow   APPEARANCE Slightly Cloudy*   URINEGLC Negative   URINEBILI Negative   URINEKETONE Trace*   SG 1.027   UBLD Moderate*   URINEPH 5.0   PROTEIN 50 *   NITRITE Negative   LEUKEST Large*   RBCU 13*   WBCU 54*       Imaging:  Recent Results (from the past 48 hour(s))   XR Chest Port 1 View    Narrative    PROCEDURE INFORMATION:   Exam: XR Chest   Exam date and time: 8/23/2022 11:03 PM   Age: 77 years old   Clinical indication: Fever; Prior surgery; Surgery date: 6+ months; Surgery   type: Pacemaker; Additional info: Fever, occult infection     TECHNIQUE:   Imaging protocol: Radiologic exam of the chest.   Views: 1 view.     COMPARISON:   CR XR CHEST 2 VW 4/21/2020 10:04 AM     FINDINGS:   Tubes, catheters and devices: Aortic valve prosthesis again noted. Pacer leads   are seen in good position.     Lungs: Ill-defined bilateral pulmonary opacities. Interstitial pulmonary edema.   Pleural spaces: Small left pleural effusion.   Heart/Mediastinum: Unremarkable. No cardiomegaly.   Bones/joints: Median sternotomy noted.       Impression    IMPRESSION:   Cardiomegaly with pulmonary edema and left pleural effusion.    Bibasal atelectasis or  mild pneumonia.     THIS DOCUMENT HAS BEEN ELECTRONICALLY SIGNED BY ESTEFANÍA STUART MD   CT Head w/o Contrast    Narrative    PROCEDURE INFORMATION:   Exam: CT Head Without Contrast   Exam date and time: 8/23/2022 11:49 PM   Age: 77 years old   Clinical indication: Altered mental status/memory loss; Confusion or   disorientation; Patient HX: PT arrives via EMS with increasing back pain, PT   has a history of fractures in vertebrae that happened in feb. PT received 100   mcg of fent en route and 2.5mg of versed en route. PT appears sedated and   having a hard time answering questions. PT normally alert and oriented at   baseline. ; Additional info: AMS     TECHNIQUE:   Imaging protocol: Computed tomography of the head without contrast.   Radiation optimization: All CT scans at this facility use at least one of these   dose optimization techniques: automated exposure control; mA and/or kV   adjustment per patient size (includes targeted exams where dose is matched to   clinical indication); or iterative reconstruction.     COMPARISON:   No relevant prior studies available.     FINDINGS:   Brain: Age consistent cerebral and cerebellar atrophy. Age consistent   periventricular white matter abnormality. No intracranial hemorrhage or   intracranial mass.   Cerebral ventricles: No ventriculomegaly.   Paranasal sinuses: Visualized sinuses are unremarkable. No fluid levels.   Mastoid air cells: Visualized mastoid air cells are well aerated.   Orbital cavities: Postoperative change of each globe.     Bones/joints: Hyperostosis of the calvarium is noted. This is of no clinical   significance.   Soft tissues: Unremarkable.       Impression    IMPRESSION:   No acute intracranial pathology.     THIS DOCUMENT HAS BEEN ELECTRONICALLY SIGNED BY ESTEFANÍA STUART MD   US Renal Complete    Narrative    PROCEDURE: US RENAL COMPLETE    HISTORY: Leukocytosis, encephalopathy.    TECHNIQUE:  A renal ultrasound was  performed.    COMPARISON:  None.    MEASUREMENTS:    Right renal length: 10.5 cm  Left renal length: 10.3 cm    RENAL FINDINGS: The kidneys are normal in size. There is no  hydronephrosis. No shadowing stones are seen.    BLADDER: The bladder is moderately distended and grossly unremarkable.      Impression    IMPRESSION:      No hydronephrosis. Ultrasound cannot exclude pyelonephritis.      CHARLOTTE HENSON MD         SYSTEM ID:  [id number]   Echocardiogram Complete   Result Value    LVEF  70%    Narrative    [id number]  [id number]  [id number]  [id number]^ELMA^VICENTE^CARLO                                                                       Version 2     [facility]  [address] [place] [address]     Name: KASSANDRA RIVERA  MRN: [id number]  : 1945  Study Date: 2022 01:58 PM  Age: 77 yrs  Gender: Female  Patient Location: [facility]  Reason For Study: Heart Failure  History:  CV surgery 4/15/2021  AVR: INSPIRIS TISSUE VALVE 25 MM  MVR: EPIC TISSUE VALVE 27 MM  TV repair: VILLA MC3 26MM  ASD closure  Ordering Physician: VICENTE WILLS  Performed By: Cinda Hernandez RDCS, RVT     BSA: 2.0 m2  Height: 65 in  Weight: 209 lb  HR: 83  BP: 118/31 mmHg  ______________________________________________________________________________  Procedure  Complete Portable Echo Adult.  ______________________________________________________________________________  Interpretation Summary  1.5cm mobile echo density on mitral prosthetic valve with severely increased  mean mitral gradient 21-24mmHg.. Heart rate 86BPM. Consistent with prosthetic  valve endocarditis.     CV surgery 4/15/2021  AVR: INSPIRIS TISSUE VALVE 25 MM  MVR: EPIC TISSUE VALVE 27 MM  TV repair: VILLA MC3 26MM  ASD closure     ______________________________________________________________________________  Left Ventricle  CV surgery 4/15/2021  AVR: NIDHI TISSUE VALVE 25 MM  MVR: EPIC TISSUE VALVE 27 MM  TV repair: VILLA MC3 26MM  ASD  closure. Global and regional left ventricular function is hyperkinetic  with an EF >70%. Left ventricular wall thickness is normal. Left ventricular  size is normal. Diastolic function not assessed due to presence of prosthetic  mitral valve. No regional wall motion abnormalities are seen.     Right Ventricle  Right ventricular function, chamber size, wall motion, and thickness are  normal. A pacemaker lead is noted in the right ventricle.     Atria  The right atria appears normal. Moderate left atrial enlargement is present.     Mitral Valve  1.5cm mobile echo density on mitral prosthetic valve with severely increased  mean mitral gradient 21-24mmHg.. Heart rate 86BOM.     Aortic Valve  The mean AoV pressure gradient is 12.0 mmHg. A bioprosthetic aortic valve is  present.     Tricuspid Valve  S/P TV repair. Mean gradient not assessed. Mild TR. The right ventricular  systolic pressure is approximated at 55.7 mmHg plus the right atrial pressure.     Pulmonic Valve  The pulmonic valve is normal.     Vessels  The thoracic aorta is normal. The pulmonary artery is normal. Dilation of the  inferior vena cava is present with abnormal respiratory variation in diameter.     Pericardium  No pericardial effusion is present.     ______________________________________________________________________________  MMode/2D Measurements & Calculations  IVSd: 1.2 cm  LVIDd: 3.6 cm  LVIDs: 2.1 cm  LVPWd: 1.3 cm  FS: 42.0 %  LV mass(C)d: 154.0 grams  LV mass(C)dI: 76.4 grams/m2  Ao root diam: 2.8 cm  asc Aorta Diam: 3.3 cm     LVOT diam: 2.1 cm  LVOT area: 3.5 cm2  LA Volume (BP): 92.0 ml  LA Volume Index (BP): 45.5 ml/m2  RWT: 0.73     Doppler Measurements & Calculations  MV max P.4 mmHg  MV mean P.5 mmHg  MV V2 VTI: 105.0 cm  MVA(VTI): 1.0 cm2  Ao V2 max: 238.0 cm/sec  Ao max P.0 mmHg  Ao V2 mean: 163.0 cm/sec  Ao mean P.0 mmHg  Ao V2 VTI: 39.5 cm  GUERITA(I,D): 2.7 cm2  GUERITA(V,D): 2.4 cm2  LV V1 max P.2 mmHg  LV V1  max: 167.0 cm/sec  LV V1 VTI: 31.3 cm  SV(LVOT): 108.4 ml  SI(LVOT): 53.8 ml/m2     PA acc time: 0.10 sec  TR max yasir: 373.0 cm/sec  TR max P.7 mmHg  AV Yasir Ratio (DI): 0.70  GUERITA Index (cm2/m2): 1.4     ______________________________________________________________________________  Report approved by: Saul Singer 2022 04:21 PM         CT Lumbar spine w/o contrast*    Narrative    PROCEDURE: CT LUMBAR SPINE W/O CONTRAST 2022 4:04 PM    HISTORY: ro impingement, pain of back and L leg.    COMPARISONS: 2022    Meds/Dose Given:    TECHNIQUE: CT scan of the lumbar spine with sagittal and coronal  reconstructions    FINDINGS: There is a large Schmorl's node is seen involving the  superior endplate of T12.    The L1 vertebra is intact.    There is a compression fracture of the inferior endplate of L2. There  is increasing sclerosis seen at the site of fracture as compared to  previous examination on May 23, 2022.    There is a compression fracture of the superior endplate of L3. There  is increasing sclerosis consistent with bony healing. No change in the  degree of compression is seen.    There is a new compression fracture of the L4 vertebra with  compression of the superior endplate of the L4 vertebra.    The L5 vertebra is intact.    Severe facet joint degenerative changes are noted in the lower lumbar  spine.         Impression    IMPRESSION: New L4 vertebral body compression fracture involving the  superior endplate. This represents a change from May 23, 2022. The  degree of compression is mild. No involvement of the vertebral arches  are seen.    Compression fractures of L2 and L3 are stable and unchanged from  previous examination.    INDIO CAZARES MD         SYSTEM ID:  [id number]   XR Chest Port 1 View    Narrative    EXAM: XR CHEST PORT 1 VIEW  LOCATION: [facility]  DATE/TIME: 2022 1:02 AM    INDICATION: Right IJ placement  COMPARISON: 2020       "Impression    IMPRESSION: Right IJ catheter present with its tip projecting at distal SVC. Patient rotated somewhat into an LPO projection. Prominent skinfold seen along right chest with no pneumothorax evident. Left lung remains grossly clear. Stable postoperative   changes of heart including pacemaker and AVR. Mild cardiomegaly.       Echo:  No results found for this or any previous visit (from the past 4320 hour(s)).    Clinically Significant Risk Factors Present on Admission       # Hyperkalemia: K = 5.7 mmol/L (Ref range: 3.4 - 5.3 mmol/L) on admission, will monitor as appropriate  # Hyponatremia: Na = 132 mmol/L (Ref range: 133 - 144 mmol/L) on admission, will monitor as appropriate     # Hypoalbuminemia: Albumin = 2.1 g/dL (Ref range: 3.4 - 5.0 g/dL) on admission, will monitor as appropriate   # Coagulation Defect: INR = 1.23 (Ref range: 0.85 - 1.15) and/or PTT = N/A on admission, will monitor for bleeding    # Circulatory Shock: currently requiring pressors for blood pressure support  # DMII: A1C = N/A within past 3 months  # Obesity: Estimated body mass index is 36.25 kg/m  as calculated from the following:    Height as of this encounter: 1.651 m (5' 5\").    Weight as of this encounter: 98.8 kg (217 lb 13 oz).      Cardiovascular: Complication of Cardiac/Vascular Device/Implant/Graft: Other cardiac and vascular devices and implants  Infection         Gastroenterology: GI Hemorrhage: Gastritis with bleeding, chronic         Nephrology: Acute kidney failure, unspecified    Neurology: Encephalopathy: Metabolic encephalopathy    Pulmonology: Bacterial Pneumonia    Systemic: Chronic Fatigue and Other Debilities: Other reduced mobility                "

## 2022-08-25 NOTE — PROGRESS NOTES
Social Service:     It appears patient was discharged to home yesterday. No further needs at this time.     VANNESA CHAMPION on 8/25/2022 at 8:42 AM

## 2022-08-25 NOTE — CONSULTS
[facility]    Infectious Disease Consultation     Date of Admission:  8/24/2022  Date of Consult (When I saw the patient): 08/25/22    Assessment & Plan   Amy Luna is a 77 year old female who was admitted on 8/24/2022.     Impression:  1. 77 y.o with significant cardiac history.   2. She has bioprosthetic mitral and aortic valve replacements with tricuspid annuloplasty repair and PFO closure in 04/2020.  She did require a permanent pacemaker as well. She was admitted to [facility] on 8/23 for worsening encephalopathy.   3. She was admitted with initial diagnosis of encephalopathy and community acquired pneumonia.    4. Her echo showed a possible mobile density and concern for endocarditis and she was trasnferred to [facility] for further care.   5. On vancomycin + meropenem   6. Blood cultures now positive with GNR    7. Urine culture with GNR     Recommendations:   1. For now continue on meropenem   2. Stop vancomycin now that GNR is also in the blood  3. 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 Follow up on the full culture information   5. Will adjust antibiotics more as more data emerges   6. Follow up on the SOCORRO      Azam Recnios MD    Reason for Consult   Reason for consult: I was asked to evaluate this patient for encephalopathy, concern for endocarditis based on TTE.    Primary Care Physician   Petey Escamilla    Chief Complaint   Confusion     History is obtained from the patient and medical records    History of Present Illness   Amy Luna is a 77 year old female with a history of valve disease.  She has bioprosthetic mitral and aortic valve replacements with tricuspid annuloplasty repair and PFO closure in 04/2020.  She did require a permanent pacemaker as well. She was admitted to [facility] on 8/23 for worsening encephalopathy.   -She was admitted with initial diagnosis of encephalopathy and community acquired pneumonia.  Her  echo showed a possible mobile density and concern for endocarditis and she was trasnferred to [facility] for further care.     Past Medical History   I have reviewed this patient's medical history and updated it with pertinent information if needed.   Past Medical History:   Diagnosis Date     (HFpEF) heart failure with preserved ejection fraction (H)      Aortic stenosis      Chest pain      Depressive disorder      Diabetes (H)      Hyperlipidemia      Hypertension      Mitral annular calcification      Mitral stenosis      Obesity      Sleep apnea     CPAP       Past Surgical History   I have reviewed this patient's surgical history and updated it with pertinent information if needed.  Past Surgical History:   Procedure Laterality Date     APPENDECTOMY       CV CORONARY ANGIOGRAM N/A 2020    Procedure: Coronary Angiogram;  Surgeon: Inez Peoples MD;  Location:  HEART CARDIAC CATH LAB     CV LEFT HEART CATH N/A 2020    Procedure: Left Heart Cath;  Surgeon: Inez Peoples MD;  Location:  HEART CARDIAC CATH LAB     CV PFO CLOSURE N/A 4/15/2020    Procedure: Patent Foramen Ovale Closure;  Surgeon: Ok Wilson MD;  Location:  OR      RIGHT HEART CATH MEASUREMENTS RECORDED N/A 2020    Procedure: Right Heart Cath;  Surgeon: Inez Peoples MD;  Location:  HEART CARDIAC CATH LAB     EP PACEMAKER N/A 2020    Procedure: EP Pacemaker;  Surgeon: Sohan Francis MD;  Location:  HEART CARDIAC CATH LAB     GYN SURGERY       x2 ,      GYN SURGERY      total hysterectomy     IMPLANT PACEMAKER       INJECT EPIDURAL TRANSFORAMINAL LUMBAR / SACRAL SINGLE Left 2022    Procedure: left L2-L3 transforaminal epidural steroid injection;  Surgeon: Lito Maldonado MD;  Location: [facility] OR     REPAIR VALVE TRICUSPID N/A 4/15/2020    Procedure: REPAIR TRICUSPID VALVE WITH VILLA MC3 26MM.;  Surgeon: Ok Wilson MD;  Location:  OR      REPLACE VALVE AORTIC N/A 4/15/2020    Procedure: REPLACEMENT, AORTIC VALVE WITH INSPIRIS TISSUE VALVE 25 MM; ON PUMP WITH SOCORRO READ BY CARDIOLOGIST DR BLANTON.;  Surgeon: Ok Wilson MD;  Location:  OR     REPLACE VALVE MITRAL N/A 4/15/2020    Procedure: REPLACEMENT, MITRAL VALVE WITH EPIC TISSUE VALVE 27 MM.;  Surgeon: Ok Wilson MD;  Location:  OR       Prior to Admission Medications   Prior to Admission Medications   Prescriptions Last Dose Informant Patient Reported? Taking?   Biotin 07740 MCG TABS  Self Yes No   Sig: Take 10,000 mcg by mouth every morning    Continuous Blood Gluc  (FREESTYLE ADAM READER) HARJEET   No No   Si each every 14 days Use to read blood sugars per  instructions.   Continuous Blood Gluc Sensor (FREESTYLE ADAM 14 DAY SENSOR) MISC   No No   Si each every 14 days Change every 14 days.   Ginkgo Biloba (GINKOBA PO)   Yes No   Sig: Take 250 mg by mouth daily   Lutein 40 MG CAPS  Self Yes No   Sig: Take 40 mg by mouth every morning    Magnesium 400 MG TABS  Self Yes No   Sig: Take 400 mg by mouth every evening    Propylene Glycol (SYSTANE BALANCE OP)  Self Yes No   Sig: Apply 1 drop to eye 3 times daily as needed (dry eyes)    Vitamin D3 (CHOLECALCIFEROL) 125 MCG (5000 UT) tablet   Yes No   Sig: Take 2 tablets by mouth daily   acetaminophen (TYLENOL) 500 MG tablet   Yes No   Sig: Take 500 mg by mouth 3 times daily Takes with 650 mg tablet three times daily   acetaminophen (TYLENOL) 650 MG CR tablet   Yes No   Sig: Take 650 mg by mouth 3 times daily Takes with 500 mg tablet three times daily   albuterol (PROAIR HFA/PROVENTIL HFA/VENTOLIN HFA) 108 (90 Base) MCG/ACT inhaler  Self Yes No   Sig: Inhale 2 puffs into the lungs every 4 hours as needed for shortness of breath / dyspnea or wheezing   atorvastatin (LIPITOR) 40 MG tablet   No No   Sig: Take 1 tablet (40 mg) by mouth At Bedtime   calcium carbonate (OS-NELLY) 1500 (600 Ca) MG tablet   Yes No    Sig: Take 2,400 mg by mouth daily   coenzyme Q-10 200 MG CAPS  Self Yes No   Sig: Take 200 mg by mouth every morning    cyanocobalamin (VITAMIN B-12) 100 MCG tablet   Yes No   Sig: Take 100 mcg by mouth daily   diphenhydrAMINE (BENADRYL) 25 MG tablet   Yes No   Sig: Take 25 mg by mouth every 6 hours as needed for itching or allergies   ferrous sulfate 140 (45 Fe) MG TBCR CR tablet   Yes No   Sig: Take 280 mg by mouth daily   furosemide (LASIX) 40 MG tablet   No No   Sig: Take 0.5 tablets (20 mg) by mouth daily   gabapentin (NEURONTIN) 100 MG capsule   No No   Sig: Take 3 capsules (300 mg) by mouth 3 times daily   insulin  UNIT/ML vial   No No   Sig: Inject 30 units every morning and 20 unit(s) every evening   Patient taking differently: Inject 30 units every morning and 30 unit(s) every evening   insulin regular (NOVOLIN R VIAL) 100 UNIT/ML vial   No No   Si-4 units before breakfast, 2-4 units before lunch, 2-4 units before dinner   Patient taking differently: 5 units before breakfast, 5 units before lunch, 5 units before dinner plus sliding scale coverage   ipratropium (ATROVENT) 0.06 % nasal spray   No No   Sig: Spray 2 sprays into both nostrils 4 times daily as needed for rhinitis   metFORMIN (GLUCOPHAGE XR) 500 MG 24 hr tablet   No No   Sig: Take 2 tablets (1,000 mg) by mouth daily (with breakfast)   metoprolol succinate ER (TOPROL XL) 25 MG 24 hr tablet   No No   Sig: Take 2 tablets (50 mg) by mouth daily   naloxone (NARCAN) 4 MG/0.1ML nasal spray   No No   Sig: Spray 1 spray (4 mg) into one nostril alternating nostrils once as needed for opioid reversal every 2-3 minutes until assistance arrives   oxyCODONE (ROXICODONE) 5 MG tablet   No No   Sig: Take 1 tablet (5 mg) by mouth every 6 hours as needed for pain or moderate to severe pain   sertraline (ZOLOFT) 100 MG tablet   No No   Sig: TAKE 1 AND 1/2 TABLETS EVERY MORNING   vitamin (B COMPLEX-C) tablet  Self Yes No   Sig: Take 1 tablet by mouth  daily   vitamin C (ASCORBIC ACID) 1000 MG TABS  Self Yes No   Sig: Take 1,000 mg by mouth daily as needed (cold symptoms)   zinc gluconate 50 MG tablet   Yes No   Sig: Take 50 mg by mouth daily      Facility-Administered Medications: None     Allergies   Allergies   Allergen Reactions     Penicillins Hives     3 weeks after taking med got hives.       Pioglitazone Other (See Comments)     Increased urinary frequency, fatigue, dyspnea       Immunization History   Immunization History   Administered Date(s) Administered     COVID-19,PF,Pfizer (12+ Yrs) 03/05/2021, 03/26/2021, 01/11/2022     FLU 6-35 months 12/10/2009     HepB-Adult 04/09/2013, 06/12/2013, 10/10/2013     Influenza (H1N1) 12/10/2009     Influenza (High Dose) 3 valent vaccine 09/28/2011, 10/23/2012, 10/10/2013, 11/25/2014, 10/06/2015, 11/30/2016, 10/17/2017, 12/17/2019     Influenza (IIV3) PF 11/11/2008, 12/10/2009, 10/15/2010     Influenza, Quad, High Dose, Pf, 65yr+ (Fluzone HD) 12/11/2020, 01/11/2022     Pneumo Conj 13-V (2010&after) 05/19/2016     Pneumococcal 23 valent 06/15/2010, 10/17/2017     TDAP Vaccine (Adacel) 06/15/2010, 12/11/2020     Tdap (Adacel,Boostrix) 06/15/2010, 12/11/2020     Zoster vaccine recombinant adjuvanted (SHINGRIX) 04/29/2019, 08/29/2019     Zoster vaccine, live 06/15/2010       Social History   I have reviewed this patient's social history and updated it with pertinent information if needed. Amy Luna  reports that she has never smoked. She has never used smokeless tobacco. She reports that she does not drink alcohol and does not use drugs.    Family History   I have reviewed this patient's family history and updated it with pertinent information if needed.   Family History   Problem Relation Age of Onset     Diabetes Mother 56     Congenital heart disease Father 23     Colon Cancer No family hx of        Review of Systems   The 10 point Review of Systems is negative other than noted in the HPI or here.     Physical  Exam   Temp: 98.8  F (37.1  C) Temp src: Oral BP: 115/47 Pulse: 89   Resp: 17 SpO2: 99 % O2 Device: Nasal cannula Oxygen Delivery: 3 LPM  Vital Signs with Ranges  Temp:  [97.4  F (36.3  C)-98.8  F (37.1  C)] 98.8  F (37.1  C)  Pulse:  [77-97] 89  Resp:  [12-28] 17  BP: (100-134)/(31-68) 115/47  SpO2:  [90 %-99 %] 99 %  217 lbs 13.03 oz  Body mass index is 36.25 kg/m .    GENERAL APPEARANCE:  awake  EYES: Eyes grossly normal to inspection  NECK: no adenopathy  RESP: lungs clear   CV: regular rates and rhythm  LYMPHATICS: normal ant/post cervical and supraclavicular nodes  ABDOMEN: soft, nontender  MS: extremities normal  SKIN: no suspicious lesions or rashes        Data   Lab Results   Component Value Date    WBC 11.1 (H) 08/25/2022    HGB 7.4 (L) 08/25/2022    HCT 24.3 (L) 08/25/2022     08/25/2022     (L) 08/25/2022    POTASSIUM 5.7 (H) 08/25/2022    CHLORIDE 104 08/25/2022    CO2 23 08/25/2022    BUN 43 (H) 08/25/2022    CR 1.06 (H) 08/25/2022     (H) 08/25/2022    DD 0.4 11/11/2014    NTBNPI 1,556 (H) 01/16/2020    TROPI 0.056 (H) 01/17/2020    AST 45 08/25/2022    ALT 19 08/25/2022    ALKPHOS 67 08/25/2022    BILITOTAL 0.4 08/25/2022    NANCY 12 08/25/2022    INR 1.23 (H) 08/25/2022     No results for input(s): CULT in the last 168 hours.  Recent Labs   Lab Test 01/16/20 2221 01/16/20 2220   CULT No growth No growth

## 2022-08-25 NOTE — PROCEDURES
[facility]    Central line    Date/Time: 8/25/2022 12:31 AM  Performed by: Kenny Herman MD  Authorized by: Kenny Herman MD   Indications: vascular access      UNIVERSAL PROTOCOL   Site Marked: NA  Prior Images Obtained and Reviewed:  NA  Required items: Required blood products, implants, devices and special equipment available    Patient identity confirmed:  Verbally with patient  Patient was reevaluated immediately before administering moderate or deep sedation or anesthesia  Confirmation Checklist:  Patient's identity using two indicators, relevant allergies, procedure was appropriate and matched the consent or emergent situation and correct equipment/implants were available  Time out: Immediately prior to the procedure a time out was called    Universal Protocol: the Joint Commission Universal Protocol was followed    Preparation: Patient was prepped and draped in usual sterile fashion       ANESTHESIA    Local Anesthetic: Lidocaine 1% without epinephrine      SEDATION    Patient Sedated: No      Preparation: skin prepped with 2% chlorhexidine  Skin prep agent dried: skin prep agent completely dried prior to procedure  Sterile barriers: all five maximum sterile barriers used - cap, mask, sterile gown, sterile gloves, and large sterile sheet  Hand hygiene: hand hygiene performed prior to central venous catheter insertion  Patient position: flat  Catheter type: triple lumen  Pre-procedure: landmarks identified  Ultrasound guidance: yes  Sterile ultrasound techniques: sterile gel and sterile probe covers were used  Number of attempts: 1  Successful placement: yes  Post-procedure: line sutured and dressing applied  Assessment: blood return through all ports and free fluid flow      PROCEDURE  Describe Procedure: Central line placed for cardiogenic shock  Patient Tolerance:  Patient tolerated the procedure well with no immediate complications  Length of time physician/provider present for 1:1  monitoring during sedation: 0

## 2022-08-25 NOTE — PLAN OF CARE
[id number]    Pt arrived to unit @ 2330 on 0.07 of Levophed. 500mL bolus of LR given, and R CVC placed. Levophed titrated off @0336. Pt remained drowsy and confused, stable vital signs on 2-4L NC. 350mL. Urine output.      Tayo Angulo RN on 8/25/2022 at 6:26 AM

## 2022-08-25 NOTE — PROGRESS NOTES
Met with patients family and her, discussed current findings/results.  Ms. Luna had no new complaints besides her back hurting in the current position and requesting a turn.  Also clarified code status.  Patient would like to be full code.  I explained any surgical recommendation would involve cardiothoracic surgery and they will be further assessing the SOCORRO and patient before making a recommendation.  I also explained the serious nature of the patients illness including sepsis, difficult valve/cardiac issues.  Patients BP improving but she did require going back on levophed for awhile following sedatives for her procedures.  Given the recent vasopressor use will have her remain in the ICU tonight.  If she is doing better could consider [place] [facility] [place] pending any other studies/plans by surgical team.  If ongoing BP issues after sedatives completely worn off could consider transfusion of PRBC with hgb 7-8 range.  Plan for now some gentle fluids also also appears somewhat dehydrated with recent NPO period + poor recent intake.

## 2022-08-25 NOTE — CONSULTS
[facility]  Gastroenterology Consultation         Amy Luna  [address] [facility] [address]  77 year old female    Admission Date/Time: 8/24/2022  Primary Care Provider: Petey Lira  Referring / Attending Physician: Dr. Robinson Guevara    We were asked to see the patient in consultation by Dr. Robinson Guevara for evaluation of black stool.      CC: fatiuge, black stool and hallucinations    HPI: Patient remains quite fatigue and minimally alert. Minimal history gathered from patient. History gathered primarily with discussion with spouse, Irineo and through chart review.     Amy Luna is a 77 year old female who has a past medical history of CHF, aortic stenosis, diabetes mellitus, Mitral stenosis, sleep apnea. She has bioprosthetic mitral and aortic valve replacements with tricuspid annuloplasty repair and PFO closure in 04/2020.  She did require a permanent pacemaker as well. She was admitted to [facility] on 8/23 for worsening encephalopathy.      She also was found to have community acquired pneumonia ad her echo showed mobile density and concern for endocarditis and transferred from outside hospital to [facility].    She has noted black stools in ED at OSH but had hemoccult negative stools, hemoglobin however is below baseline. Hemoglobin in mid 7 range with baseline 10-11. She has no prior history of EGD or known GI bleed. No bright red blood in stool.  Does report iron supplementation.    ROS: A comprehensive ten point review of systems was negative aside from those in mentioned in the HPI.      PAST MED HX:  I have reviewed this patient's medical history and updated it with pertinent information if needed.   Past Medical History:   Diagnosis Date     (HFpEF) heart failure with preserved ejection fraction (H)      Aortic stenosis      Chest pain      Depressive disorder      Diabetes (H)      Hyperlipidemia      Hypertension      Mitral annular calcification       Mitral stenosis      Obesity      Sleep apnea     CPAP       MEDICATIONS:   Prior to Admission Medications   Prescriptions Last Dose Informant Patient Reported? Taking?   Biotin 50333 MCG TABS  Self Yes No   Sig: Take 10,000 mcg by mouth every morning    Continuous Blood Gluc  (FREESTYLE ADAM READER) HARJEET   No No   Si each every 14 days Use to read blood sugars per  instructions.   Continuous Blood Gluc Sensor (FREESTYLE ADAM 14 DAY SENSOR) MISC   No No   Si each every 14 days Change every 14 days.   Ginkgo Biloba (GINKOBA PO)   Yes No   Sig: Take 250 mg by mouth daily   Lutein 40 MG CAPS  Self Yes No   Sig: Take 40 mg by mouth every morning    Magnesium 400 MG TABS  Self Yes No   Sig: Take 400 mg by mouth every evening    Propylene Glycol (SYSTANE BALANCE OP)  Self Yes No   Sig: Apply 1 drop to eye 3 times daily as needed (dry eyes)    Vitamin D3 (CHOLECALCIFEROL) 125 MCG (5000 UT) tablet   Yes No   Sig: Take 2 tablets by mouth daily   acetaminophen (TYLENOL) 500 MG tablet   Yes No   Sig: Take 500 mg by mouth 3 times daily Takes with 650 mg tablet three times daily   acetaminophen (TYLENOL) 650 MG CR tablet   Yes No   Sig: Take 650 mg by mouth 3 times daily Takes with 500 mg tablet three times daily   albuterol (PROAIR HFA/PROVENTIL HFA/VENTOLIN HFA) 108 (90 Base) MCG/ACT inhaler  Self Yes No   Sig: Inhale 2 puffs into the lungs every 4 hours as needed for shortness of breath / dyspnea or wheezing   atorvastatin (LIPITOR) 40 MG tablet   No No   Sig: Take 1 tablet (40 mg) by mouth At Bedtime   calcium carbonate (OS-NELLY) 1500 (600 Ca) MG tablet   Yes No   Sig: Take 2,400 mg by mouth daily   coenzyme Q-10 200 MG CAPS  Self Yes No   Sig: Take 200 mg by mouth every morning    cyanocobalamin (VITAMIN B-12) 100 MCG tablet   Yes No   Sig: Take 100 mcg by mouth daily   diphenhydrAMINE (BENADRYL) 25 MG tablet   Yes No   Sig: Take 25 mg by mouth every 6 hours as needed for itching or allergies    ferrous sulfate 140 (45 Fe) MG TBCR CR tablet   Yes No   Sig: Take 280 mg by mouth daily   furosemide (LASIX) 40 MG tablet   No No   Sig: Take 0.5 tablets (20 mg) by mouth daily   gabapentin (NEURONTIN) 100 MG capsule   No No   Sig: Take 3 capsules (300 mg) by mouth 3 times daily   insulin  UNIT/ML vial   No No   Sig: Inject 30 units every morning and 20 unit(s) every evening   Patient taking differently: Inject 30 units every morning and 30 unit(s) every evening   insulin regular (NOVOLIN R VIAL) 100 UNIT/ML vial   No No   Si-4 units before breakfast, 2-4 units before lunch, 2-4 units before dinner   Patient taking differently: 5 units before breakfast, 5 units before lunch, 5 units before dinner plus sliding scale coverage   ipratropium (ATROVENT) 0.06 % nasal spray   No No   Sig: Spray 2 sprays into both nostrils 4 times daily as needed for rhinitis   metFORMIN (GLUCOPHAGE XR) 500 MG 24 hr tablet   No No   Sig: Take 2 tablets (1,000 mg) by mouth daily (with breakfast)   metoprolol succinate ER (TOPROL XL) 25 MG 24 hr tablet   No No   Sig: Take 2 tablets (50 mg) by mouth daily   naloxone (NARCAN) 4 MG/0.1ML nasal spray   No No   Sig: Spray 1 spray (4 mg) into one nostril alternating nostrils once as needed for opioid reversal every 2-3 minutes until assistance arrives   oxyCODONE (ROXICODONE) 5 MG tablet   No No   Sig: Take 1 tablet (5 mg) by mouth every 6 hours as needed for pain or moderate to severe pain   sertraline (ZOLOFT) 100 MG tablet   No No   Sig: TAKE 1 AND 1/2 TABLETS EVERY MORNING   vitamin (B COMPLEX-C) tablet  Self Yes No   Sig: Take 1 tablet by mouth daily   vitamin C (ASCORBIC ACID) 1000 MG TABS  Self Yes No   Sig: Take 1,000 mg by mouth daily as needed (cold symptoms)   zinc gluconate 50 MG tablet   Yes No   Sig: Take 50 mg by mouth daily      Facility-Administered Medications: None       ALLERGIES:   Allergies   Allergen Reactions     Penicillins Hives     3 weeks after taking med  got hives.       Pioglitazone Other (See Comments)     Increased urinary frequency, fatigue, dyspnea       SOCIAL HISTORY:  Social History     Tobacco Use     Smoking status: Never Smoker     Smokeless tobacco: Never Used   Vaping Use     Vaping Use: Never used   Substance Use Topics     Alcohol use: No     Drug use: No       FAMILY HISTORY:  Family History   Problem Relation Age of Onset     Diabetes Mother 56     Congenital heart disease Father 23     Colon Cancer No family hx of        PHYSICAL EXAM:   General  Appears comfortable, unable to assess orientation  Vital Signs with Ranges  Temp: 98.8  F (37.1  C) Temp src: Oral BP: 119/51 Pulse: 90   Resp: 23 SpO2: 96 % O2 Device: Nasal cannula Oxygen Delivery: 3 LPM  I/O last 3 completed shifts:  In: 20.29 [I.V.:20.29]  Out: -     Constitutional: healthy, alert and no distress   Cardiovascular: negative, PMI normal. No lifts, heaves, or thrills. RRR. No murmurs, clicks gallops or rub  Respiratory: negative, Percussion normal. Good diaphragmatic excursion. Lungs clear  Abdomen: Abdomen soft, non-tender. BS normal. No masses, organomegaly          ADDITIONAL COMMENTS:   I reviewed the patient's new clinical lab test results.   Recent Labs   Lab Test 08/25/22  0546 08/25/22  0115 08/24/22  1712 08/24/22  1044 08/24/22  0732 03/16/22  0518 03/15/22  0910 03/14/22  2207 03/14/22  2142   WBC 11.1* 11.7*  --   --  9.9   < > 10.0  --   --    HGB 7.4* 7.4* 7.2*   < > 7.3*   < > 8.0*   < >  --    MCV 92 94  --   --  91   < > 90  --   --     228  --   --  227   < > 363  --   --    INR  --  1.23*  --   --   --   --  0.81*  --  1.09    < > = values in this interval not displayed.     Recent Labs   Lab Test 08/25/22  0546 08/25/22  0115 08/24/22  0732   POTASSIUM 5.7* 4.9 5.0   CHLORIDE 104 104 99   CO2 23 24 25   BUN 43* 43* 45*   ANIONGAP 5 6 5     Recent Labs   Lab Test 08/25/22  0546 08/25/22  0115 08/23/22  2233 08/23/22  2220 04/27/22  0245 03/14/22  0933  04/15/20  1342 04/08/20  1317   ALBUMIN 2.1* 2.1*  --  2.9*   < >  --    < >  --    BILITOTAL 0.4 0.4  --  0.7   < >  --    < >  --    ALT 19 14  --  8   < >  --    < >  --    AST 45 20  --  20   < >  --    < >  --    PROTEIN  --   --  50 *  --   --  Negative  --  Negative   LIPASE  --  33*  --   --   --   --   --   --     < > = values in this interval not displayed.       I reviewed the patient's new imaging results.        CONSULTATION ASSESSMENT AND PLAN:   Amy Luna is a 77 year old female with fatigue, encephalopathy, sepsis, and endocarditis with additional complaints of black stool     Active Problems:  Black stools  Acute on chronic anemia  Hemoccult negative. Hemoglobin stable in mid 7 range with baseline of 10-11.   Takes iron supplement  Diagnosed with sepsis likely form endocarditis  Findings most concern or multifactorial cuase to drop in hemoglobin likely primarily from sepsis and not from GI bleed  SOCORRO planned today and CT surgery consulted and presumed will need open heart surgery. Therefore will need clearance from GI of no upper GI bleed  Discussed case with Dr. Guevara and Dr. Lindsay  Discussed recommendations to perform EGD to r/o GI bleed and to give clearance to CT surgery with spouse Irineo. He has agreed and consent signed for EGD today    -- NPO  -- EGD today  -- daily lab  -- PPI BID        [facility].  Office: [phone]  Cell : [phone] (Dr. Lindsay)  Cell: [phone] (Lu Pelayo PA-C)

## 2022-08-26 NOTE — PROGRESS NOTES
[facility]  Gastroenterology Progress Note     Amy Luna MRN# [id number]   YOB: 1945 Age: 77 year old          Assessment and Plan:   Amy Luna is a 77 year old female with fatigue, encephalopathy, sepsis, and endocarditis with additional complaints of black stool     Active Problems:  Black stools  Acute on chronic anemia  Hemoccult negative. Hemoglobin stable in mid 7 range with baseline of 10-11.   Takes iron supplement  Diagnosed with sepsis likely form endocarditis  Findings most concern or multifactorial cuase to drop in hemoglobin likely primarily from sepsis and not from GI bleed  SOCORRO planned today and CT surgery consulted and presumed will need open heart surgery. Therefore will need clearance from GI of no upper GI bleed  8/25 EGD revealed no source of bleeding- did note small hiatal hernia    -- Ok with GI for patient to have anticoagulation if necessary  -- GI will sign off and please call with any GI concerns                Interval History:   doing well; no cp, sob, n/v/d, or abd pain.              Review of Systems:   C: NEGATIVE for fever, chills, change in weight  E/M: NEGATIVE for ear, mouth and throat problems  R: NEGATIVE for significant cough or SOB  CV: NEGATIVE for chest pain, palpitations or peripheral edema             Medications:   I have reviewed this patient's current medications    acetaminophen  650 mg Oral TID     gabapentin  100 mg Oral TID     heparin ANTICOAGULANT  5,000 Units Subcutaneous Q12H     insulin aspart  1-12 Units Subcutaneous Q4H     insulin glargine  25 Units Subcutaneous BID     meropenem  1 g Intravenous Q8H     pantoprazole  40 mg Oral BID AC    Or     pantoprazole (PROTONIX) IV  40 mg Intravenous BID AC     sertraline  100 mg Oral Daily                  Physical Exam:   Vitals were reviewed  Vital Signs with Ranges  Temp:  [98.2  F (36.8  C)-99.3  F (37.4  C)] 98.8  F (37.1  C)  Pulse:  [] 72  Resp:   [11-36] 14  BP: ()/(37-79) 118/71  FiO2 (%):  [30 %-50 %] 30 %  SpO2:  [87 %-100 %] 100 %  I/O last 3 completed shifts:  In: 862.94 [I.V.:862.94]  Out: 1975 [Urine:1975]  Constitutional: healthy, alert and no distress   Cardiovascular: negative, PMI normal. No lifts, heaves, or thrills. RRR. No murmurs, clicks gallops or rub  Respiratory: negative, Percussion normal. Good diaphragmatic excursion.   Abdomen: Abdomen soft, non-tender. BS normal. No masses, organomegaly           Data:   I reviewed the patient's new clinical lab test results.   Recent Labs   Lab Test 08/26/22 0420 08/25/22 2127 08/25/22  0546 08/25/22  0115 03/16/22  0518 03/15/22  0910 03/14/22  2207 03/14/22  2142   WBC 15.1*  --  11.1* 11.7*   < > 10.0  --   --    HGB 7.2* 7.3* 7.4* 7.4*   < > 8.0*   < >  --    MCV 93  --  92 94   < > 90  --   --      --  187 228   < > 363  --   --    INR  --   --   --  1.23*  --  0.81*  --  1.09    < > = values in this interval not displayed.     Recent Labs   Lab Test 08/26/22 0420 08/25/22 2127 08/25/22  1426 08/25/22  1140 08/25/22  0546   POTASSIUM 4.7 5.4* 5.0   < > 5.7*   CHLORIDE 107  --  106  --  104   CO2 23  --  25  --  23   BUN 41*  --  40*  --  43*   ANIONGAP 6  --  3  --  5    < > = values in this interval not displayed.     Recent Labs   Lab Test 08/26/22 0420 08/25/22  0546 08/25/22  0115 08/23/22  2233 04/27/22  0245 03/14/22  0933 04/15/20  1342 04/08/20  1317   ALBUMIN 1.9* 2.1* 2.1*  --    < >  --    < >  --    BILITOTAL 0.6 0.4 0.4  --    < >  --    < >  --    ALT 21 19 14  --    < >  --    < >  --    AST 42 45 20  --    < >  --    < >  --    PROTEIN  --   --   --  50 *  --  Negative  --  Negative   LIPASE  --   --  33*  --   --   --   --   --     < > = values in this interval not displayed.       I reviewed the patient's new imaging results.    All laboratory data reviewed  All imaging studies reviewed by me.    Lu Pelayo PA-C,  8/26/2022  [facility]  Office : [phone]  Cell: [phone] (Dr. Lindsay)  Cell: [phone] (Lu Pelayo PA-C)

## 2022-08-26 NOTE — PROGRESS NOTES
[facility]    Infectious Disease Progress Note    Date of Service (when I saw the patient): 08/26/2022     Assessment & Plan   Amy Luna is a 77 year old female who was admitted on 8/24/2022.     Impression:  1. 77 y.o with significant cardiac history.   2. She has bioprosthetic mitral and aortic valve replacements with tricuspid annuloplasty repair and PFO closure in 04/2020.  She did require a permanent pacemaker as well. She was admitted to [facility] on 8/23 for worsening encephalopathy.   3. She was admitted with initial diagnosis of encephalopathy and community acquired pneumonia.    4. Her echo showed a possible mobile density and concern for endocarditis and she was trasnferred to [facility] for further care.   5. On vancomycin + meropenem   6. Blood cultures now positive with GNR    7. Urine culture with GNR      Recommendations:   1. SOCORRO with a freely mobile mass (1.7 cms) attached to the atrial aspect of  the bioprosthetic MV leaflet which is consistent with a vegetation. No  evidence of valve dehiscence, PVL or regurgitation.   2. Its hard to make a distinction between a vegetation or a thrombus based on the SOCORRO for me   3. GNR in the urine has been further identified as Klebsiella.   4. GNR in the blood cultures so far pending ID.   5. Repeat blood cultures here have been negative     Discussion:   GNR are a rare cause of endocarditis. If the so far appearing transient bacteremia is identified as the same organism that is in the blood we can say its a urosepsis. But just based on the this information and the SOCORRO findings endocarditis is hard to rule out and the mobile mass could very well be endocarditis.     Plan:   Continue meropenem till full information back on the blood isolate   If the same klebsiella as the rine switch antibiotics to ceftriaxone + cipro.     Will continue to follow         Azam Recinos MD    Interval History   Intubated last night due to mental status  changes   Afebrile   Culture data as listed     Physical Exam   Temp: 98.6  F (37  C) Temp src: Bladder BP: 125/68 Pulse: 74   Resp: 14 SpO2: 100 % O2 Device: Mechanical Ventilator Oxygen Delivery: 10 LPM  Vitals:    08/25/22 0000 08/25/22 0500 08/26/22 0400   Weight: 98.6 kg (217 lb 6 oz) 98.8 kg (217 lb 13 oz) 101.2 kg (223 lb 1.7 oz)     Vital Signs with Ranges  Temp:  [98.3  F (36.8  C)-99.3  F (37.4  C)] 98.6  F (37  C)  Pulse:  [] 74  Resp:  [11-36] 14  BP: ()/(37-79) 125/68  FiO2 (%):  [30 %-50 %] 30 %  SpO2:  [87 %-100 %] 100 %    Constitutional: intubated   Lungs: Clear to auscultation bilaterally, no crackles or wheezing  Cardiovascular: Regular rate and rhythm, normal S1 and S2, and no murmur noted  Abdomen: Normal bowel sounds, soft, non-distended, non-tender  Skin: No rashes, no cyanosis, no edema  Other:    Medications     dextrose       norepinephrine 0.05 mcg/kg/min (08/26/22 0800)     propofol (DIPRIVAN) infusion 20 mcg/kg/min (08/26/22 1100)     sodium chloride 10 mL/hr at 08/25/22 1005       acetaminophen  650 mg Oral TID     chlorhexidine  15 mL Mouth/Throat Q12H     gabapentin  100 mg Oral TID     heparin ANTICOAGULANT  5,000 Units Subcutaneous Q12H     insulin aspart  1-12 Units Subcutaneous Q4H     insulin glargine  25 Units Subcutaneous BID     meropenem  1 g Intravenous Q8H     pantoprazole  40 mg Oral BID AC    Or     pantoprazole (PROTONIX) IV  40 mg Intravenous BID AC     sertraline  100 mg Oral Daily       Data   All microbiology laboratory data reviewed.  Recent Labs   Lab Test 08/26/22  0420 08/25/22  2127 08/25/22  0546 08/25/22  0115   WBC 15.1*  --  11.1* 11.7*   HGB 7.2* 7.3* 7.4* 7.4*   HCT 24.6*  --  24.3* 24.7*   MCV 93  --  92 94     --  187 228     Recent Labs   Lab Test 08/26/22  0420 08/25/22  1426 08/25/22  0546   CR 1.00 1.04 1.06*     No lab results found.  Recent Labs   Lab Test 01/16/20  2221 01/16/20  2220   CULT No growth No growth

## 2022-08-26 NOTE — PROGRESS NOTES
CVTS    Patient chart reviewed and seen with Dr. Naomy Moralez discussed with pt  Irineo.    Patient is 77 year old female s/p aortic and mitral bioprosthetic valve replacement along with PFO closure and tricuspid ring now presents from OSH for encephalopathy. She was found to have a UTI and now with 1/4 +BC so far. Her TTE showed high mitral gradient and possible mass on mitral valve. SOCORRO on 8/25 with 1.7cm freely mobile mass attachd to atrial aspect of bioprosthetic MV leaflet consistent with a vegetation.    Currently patient's surgical risk is prohibitive  Recommend Abx and medical optimization   Will plan to follow peripherally and reassess surgical candidacy if she improves clinically  Otherwise, recommend thoracentesis for right pleural effusion  Cares per primary team.    Arabella Angulo PA-C  CV surgery

## 2022-08-26 NOTE — PROGRESS NOTES
.Notified provider about indwelling arellano catheter discussed removal or continued need.    Did provider choose to remove indwelling arellano catheter? No    Provider's arellano indication for keeping indwelling arellano catheter: Retention.    Is there an order for indwelling arellano catheter? Yes    *If there is a plan to keep arellano catheter in place at discharge daily notification with provider is not necessary.

## 2022-08-26 NOTE — ANESTHESIA PROCEDURE NOTES
Airway         Procedure Start/Stop Times: 8/25/2022 7:23 PM  Staff -        CRNA: Yuliya Cevallos APRN CRNA       Performed By: CRNA  Consent for Airway        Urgency: emergent       Consent: The procedure was performed in an emergent situation.  Indications and Patient Condition       Indications for airway management: airway protection and respiratory insufficiency       Induction type:intravenous       Mask difficulty assessment: 1 - vent by mask    Final Airway Details       Final airway type: endotracheal airway       Successful airway: ETT - single  Endotracheal Airway Details        ETT size (mm): 8.5 (per ICU intensivist request)       Cuffed: yes       Successful intubation technique: video laryngoscopy       VL Blade Size: Khan 3       Grade View of Cords: 1       Adjucts: stylet       Position: Right       Measured from: lips       Secured at (cm): 24       Bite block used: None    Post intubation assessment        Placement verified by: capnometry        Number of attempts at approach: 1       Number of other approaches attempted: 0       Secured with: commercial tube padilla       Ease of procedure: easy       Dentition: Intact and Unchanged    Medication(s) Administered   rocuronium (ZEMURON) 10 mg/mL injection - Intravenous   50 mg - 8/25/2022 7:23:00 PM  propofol (DIPRIVAN) injection 10 mg/mL vial - Intravenous   100 mg - 8/25/2022 7:23:00 PM   40 mg - 8/25/2022 7:30:00 PM  Medication Administration Time: 8/25/2022 7:23 PM

## 2022-08-26 NOTE — PROGRESS NOTES
MD notified d/t elevated potassium and pt had not voided for over 4 hours. MD ordered for 40mg of IV lasix.

## 2022-08-26 NOTE — PROGRESS NOTES
Critical Care H&P      08/26/2022    Name: Amy Luna MRN#: [id number]   Age: 77 year old YOB: 1945     [facility] Day# 1  ICU DAY # 1    MV DAY # 1             Problem List:   Septic shock (now resolved)  Possible endocarditis of bioprosthetic ao valve  Acute encephalopathy  Acute hypoxemic respiratory failure         Summary/Hospital Course:   Ms. Luna is a very pleasant 76-year-old female with a history of valve disease.  She has bioprosthetic mitral and aortic valve replacements with tricuspid annuloplasty repair and PFO closure in 04/2020.  She did require a permanent pacemaker as well. She was admitted to [facility] on 8/23 for worsening encephalopathy.   -She was admitted with initial diagnosis of encephalopathy and community acquired pneumonia.  Her echo showed a possible mobile density and concern for endocarditis and she was trasnferred to [facility] for further care.   - She also complained of black stool at OSH, but was hemoccult negative there.  Of note, PO iron supplement is on her home med list, which could cause a stool-darkening effect.    The evening after admission here she became obtunded with increasing oxygen requirements requiring intubation.    Unable to obtain history directly due to underlying vented status.        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   I have personally reviewed the daily labs, imaging studies, cultures and discussed the case with referring physician and consulting physicians.   My assessment and plan by system for this patient is as follows:    Neurology/Psychiatry:   1. Acute encephalopathy--etiology unclear.  Seems to have had rather sudden onset and high risk for embolic stroke. Does move all extremities when sedation weaned.  No findings on head CT; pacer limits MRI availability.  Neuro consult.  2. Analgesia - prn tylenol    Cardiovascular:   1.  Septic shock:  In  "setting of endocarditis and bacteremia.  On norepi.  2.  Probable endocarditis:  veg seen on SOCORRO.  Cardiology, cv surg and ID consults.  Not an operative candidate at this time. On meropenem.    Pulmonary/Ventilator Management:   1. Acute hypoxemic respiratory failure, vent dependent:  In setting of possible pna vs pulmonary edema on CT scan and large R effusion.  On meropenem, hemodynamics not favorable for diuresis today.  2.  Pleural effusion:  Will attempt to arrange for US drainage today.    GI and Nutrition :   1. Nutrition consult for TF.  2. Report of dark stools at OSH:  Apparently hgb was stable there and hemoccult negative.  Pt has PO iron on home med list which could cause stool darkening in the absence of bleeding, but her hgb (though stable) is lower than baseline.  No evidence of disease on EGD.    Renal/Fluids/Electrolytes:   1. CLARITA:  Improving to 1.00  2. HyperK:resolved 4.7  3.  HypoNa:  Resolved 136    Infectious Disease:   1. See above for infective endocarditis. Appreciate ID recs.    Endocrine:   1. Hyperglycemia:  In setting of stress and h/o DM:  sliding scale insulin.  Increasing lantus.    Hematology/Oncology:   1. Anemia:  hgb stable at 7.2.  No confirmed blood loss, likely anemia of critical illness.  Cardiology rec of transfusing to 9.0 noted, but literature suggests this would carry little benefit and may even be related to worsened outcomes.  Will tolerate 7.0 or higher unless active bleeding or active cardiac ischemia.  2. Leukocytosis to 15.1, in setting of endocarditis.    ICU Prophylaxis:   1. DVT: mechanical, subcut heparin    Clinically Significant Risk Factors Present on Admission                # Obesity: Estimated body mass index is 37.13 kg/m  as calculated from the following:    Height as of this encounter: 1.651 m (5' 5\").    Weight as of this encounter: 101.2 kg (223 lb 1.7 oz).         Critical care time today (excluding procedures): 60 min           Key Medications: " "      acetaminophen  650 mg Oral TID     gabapentin  100 mg Oral TID     heparin ANTICOAGULANT  5,000 Units Subcutaneous Q12H     insulin aspart  1-12 Units Subcutaneous Q4H     insulin glargine  20 Units Subcutaneous BID     meropenem  1 g Intravenous Q8H     pantoprazole  40 mg Oral BID AC    Or     pantoprazole (PROTONIX) IV  40 mg Intravenous BID AC     sertraline  100 mg Oral Daily       dextrose       norepinephrine 0.06 mcg/kg/min (08/26/22 0545)     propofol (DIPRIVAN) infusion 40 mcg/kg/min (08/26/22 0728)     sodium chloride 10 mL/hr at 08/25/22 1005               Physical Examination:   /52   Pulse 72   Temp 98.8  F (37.1  C)   Resp 13   Ht 1.651 m (5' 5\")   Wt 101.2 kg (223 lb 1.7 oz)   LMP  (LMP Unknown)   SpO2 100%   BMI 37.13 kg/m        GEN: no acute distress, intubated   HEENT: head ncat, sclera anicteric, OP patent, trachea midline   PULM: unlabored synchronous with vent, clear anteriorly    CV/COR: RRR S1S2 no gallop,  No rub, no murmur  ABD: soft nontender, hypoactive bowel sounds, no mass  EXT:  warm and well perfused x4  NEURO: PERRL, no obvious deficits  SKIN: no obvious rash  LINES: clean, dry intact         Data:   All data and imaging reviewed     ROUTINE ICU LABS (Last four results)  CMP  Recent Labs   Lab 08/26/22  0819 08/26/22  0420 08/26/22  0419 08/26/22  0028 08/25/22  2127 08/25/22  1428 08/25/22  1426 08/25/22  1153 08/25/22  1140 08/25/22  0832 08/25/22  0546 08/25/22  0449 08/25/22  0115 08/24/22  1217 08/24/22  0732 08/23/22  2220   NA  --  136  --   --   --   --  134  --   --   --  132*  --  134  --  129* 128*   POTASSIUM  --  4.7  --   --  5.4*  --  5.0  --  4.8  --  5.7*  --  4.9  --  5.0 5.0   CHLORIDE  --  107  --   --   --   --  106  --   --   --  104  --  104  --  99 96*   CO2  --  23  --   --   --   --  25  --   --   --  23  --  24  --  25 23   ANIONGAP  --  6  --   --   --   --  3  --   --   --  5  --  6  --  5 9   * 234* 187* 258*  --    < > 209*   " < >  --    < > 281*   < > 292*   < > 236* 285*   BUN  --  41*  --   --   --   --  40*  --   --   --  43*  --  43*  --  45* 42*   CR  --  1.00  --   --   --   --  1.04  --   --   --  1.06*  --  1.15*  --  1.63* 1.60*   GFRESTIMATED  --  58*  --   --   --   --  55*  --   --   --  54*  --  49*  --  32* 33*   NELLY  --  9.4  --   --   --   --  9.8  --   --   --  9.4  --  9.1  --  10.6* 10.9*   MAG  --  2.0  --   --   --   --   --   --   --   --  1.9  --  1.8  --  1.8*  --    PHOS  --  2.6  --   --   --   --   --   --   --   --  3.5  --  3.6  --   --   --    PROTTOTAL  --  7.3  --   --   --   --   --   --   --   --  7.7  --  7.6  --   --  7.4   ALBUMIN  --  1.9*  --   --   --   --   --   --   --   --  2.1*  --  2.1*  --   --  2.9*   BILITOTAL  --  0.6  --   --   --   --   --   --   --   --  0.4  --  0.4  --   --  0.7   ALKPHOS  --  80  --   --   --   --   --   --   --   --  67  --  66  --   --  57   AST  --  42  --   --   --   --   --   --   --   --  45  --  20  --   --  20   ALT  --  21  --   --   --   --   --   --   --   --  19  --  14  --   --  8    < > = values in this interval not displayed.     CBC  Recent Labs   Lab 08/26/22  0420 08/25/22  2127 08/25/22  0546 08/25/22  0115 08/24/22  1044 08/24/22  0732   WBC 15.1*  --  11.1* 11.7*  --  9.9   RBC 2.64*  --  2.64* 2.62*  --  2.63*   HGB 7.2* 7.3* 7.4* 7.4*   < > 7.3*   HCT 24.6*  --  24.3* 24.7*  --  24.0*   MCV 93  --  92 94  --  91   MCH 27.3  --  28.0 28.2  --  27.8   MCHC 29.3*  --  30.5* 30.0*  --  30.4*   RDW 17.3*  --  17.2* 17.2*  --  17.3*     --  187 228  --  227    < > = values in this interval not displayed.     INR  Recent Labs   Lab 08/25/22  0115   INR 1.23*     Arterial Blood Gas  Recent Labs   Lab 08/25/22  2110   PH 7.30*   PCO2 44   PO2 93   HCO3 21   O2PER 40       All cultures:  No results for input(s): CULT in the last 168 hours.  Recent Results (from the past 24 hour(s))   Transesophageal Echocardiogram   Result Value    LVEF  60-65%     [facility]    [id number]  [facility]  [id number]  [id number]^SUZANNE^JACK^JONH     [facility]  Echocardiography Laboratory  [facility] [place] [address]     Name: KASSANDRA RIVERA  MRN: [id number]  : 1945  Study Date: 2022 12:10 PM  Age: 77 yrs  Gender: Female  Patient Location: [facility]  Reason For Study: Endocarditis  Ordering Physician: JACK PALACIOS  Referring Physician: Petey Lira  Performed By: Ladi Sylvester     BSA: 2.0 m2  Height: 65 in  Weight: 217 lb  HR: 93  BP: 101/45 mmHg  ______________________________________________________________________________  Procedure  Complete SOCORRO Adult. SOCORRO Probe serial #[id number] was used during the procedure.  The heart rate, respiratory rate and response to care were monitored  throughout the procedure with the assistance of the nurse.  ______________________________________________________________________________  Interpretation Summary     CV surgery 4/15/2021. AVR: INSPIRIS TISSUE VALVE 25 MM. MVR: EPIC TISSUE VALVE  27 MM. TV repair: VILLA MC3 26MM.  1. The left ventricle is normal in size. Left ventricular systolic function is  normal. The visual ejection fraction is 60-65%.  2. There is a bioprosthetic mitral valve. There is severe thickenning of the  leaflets of the bioprosthesis with severely restricted opening. There is  severe bioprosthetic stenosis with mean gradient of 23 mmHg.  3. There is a freely mobile mass (1.7 cms) attached to the atrial aspect of  the bioprosthetic MV leaflet which is consistent with a vegetation. No  evidence of valve dehiscence, PVL or regurgitation.  4. There is a well-seated, normally functioning, bioprosthetic valve in aortic  position witho normal leaflet motion. No vegetation noted on AV.  5. The left atrium is severely dilated. The right atrium is moderately  dilated.  ______________________________________________________________________________  SOCORRO  I determined this patient to be an  appropriate candidate for the planned  sedation and procedure and have reassessed the patient immediately prior to  sedation and procedure. Total sedation time: 17 min minutes of continuous  bedside 1:1 monitoring. Versed (2mg) was given intravenously. Fentanyl (25mcg)  was given intravenously. Consent to the procedure was obtained prior to  sedation. Prior to the exam, the oral cavity was checked and no overcrowding  was noted. The transesophageal probe was passed without difficulty. There were  no complications associated with this procedure.     Left Ventricle  The left ventricle is normal in size. Left ventricular systolic function is  normal. The visual ejection fraction is 60-65%.     Right Ventricle  There is a catheter/pacemaker lead seen in the right ventricle. Moderately  decreased right ventricular systolic function.     Atria  The left atrium is severely dilated. The right atrium is moderately dilated.  Pacer wire in right atrium. Left to right atrial shunt, moderate. No thrombus  is detected in the left atrial appendage.     Mitral Valve  There is a bioprosthetic mitral valve. There is severe thickenning of the  leaflets of the bioprosthesis with severely restricted opening. There is  severe bioprosthetic stenosis with mean gradient of 23 mmhg. There is a freely  mobile mass attached to the atrial aspect of the leaflet which is consistent  with a vegetation. No evidence of valve dehiscence, PVL or regurgitation.     Tricuspid Valve  Annuloplasty ring in the tricuspid position. There is mild (1+) tricuspid  regurgitation.     Aortic Valve  There is no vegetation on the aortic valve. There is a bioprosthetic aortic  valve. The prosthetic aortic valve is well-seated. The prosthetic aortic valve  appears to open well.     Pulmonic Valve  The pulmonic valve is normal in structure and function.     ______________________________________________________________________________  Doppler Measurements &  Calculations  MV max P.4 mmHg  MV mean P.2 mmHg  MV V2 VTI: 85.9 cm     ______________________________________________________________________________               MV vegetation.                    Left to right atrial shunt       Severe stenosis of mitral prosthesis     Report approved by: Saul Hawthorne 2022 03:58 PM         XR Chest Port 1 View    Narrative    EXAM: XR CHEST PORT 1 VIEW  LOCATION: [facility]  DATE/TIME: 2022 6:34 PM    INDICATION: SOB, new mild expiratory wheezes  COMPARISON: 2022 at 0100 hours      Impression    IMPRESSION: Status post median sternotomy and valve replacement with cardiac pacemaker in place. Heart size is enlarged but likely stable. Increasing posteriorly layering bilateral pleural effusions, now at least moderate in size. Probable mild   interstitial edema. Central confluent parenchymal opacities may represent atelectasis versus infiltrate. No pneumothorax. Right-sided central venous catheter terminates over the cavoatrial junction..   CT Head w/o Contrast    Narrative    EXAM: CT HEAD W/O CONTRAST  LOCATION: [facility]  DATE/TIME: 2022 8:53 PM    INDICATION: Confusion  COMPARISON: CT head 22  TECHNIQUE: Routine CT Head without IV contrast. Multiplanar reformats. Dose reduction techniques were used.    FINDINGS:  INTRACRANIAL CONTENTS: No intracranial hemorrhage, extraaxial collection, or mass effect.  No CT evidence of acute infarct. Normal parenchymal attenuation. Unchanged mild generalized volume loss. No hydrocephalus.     VISUALIZED ORBITS/SINUSES/MASTOIDS: No intraorbital abnormality. No paranasal sinus mucosal disease. No middle ear or mastoid effusion.    BONES/SOFT TISSUES: No acute abnormality.      Impression    IMPRESSION:  1.  No acute intracranial process.   CT Chest w/o Contrast    Narrative    EXAM: CT CHEST W/O CONTRAST  LOCATION: [facility]  HOSPITAL  DATE/TIME: 8/25/2022 8:54 PM    INDICATION: Hypoxemia.  COMPARISON: CTA chest exam 10/27/2016  TECHNIQUE: CT chest without IV contrast. Multiplanar reformats were obtained. Dose reduction techniques were used.  CONTRAST: None.    FINDINGS:   LUNGS AND PLEURA: Moderate to large right pleural effusion and smaller left pleural effusion and airspace consolidation involving significant portions of both lower lobes, right greater than left. Nodular and groundglass opacities both upper lobes with   diffuse septal thickening.    MEDIASTINUM/AXILLAE: Numerous enlarged mediastinal lymph nodes have slightly progressed, with the largest node measuring 3.4 x 2.1 cm. Atherosclerotic disease thoracic aorta. Cardiomegaly. Dense mitral valve calcification. Prior median sternotomy.   Pacemaker lead tips right atrium and right ventricle. Endotracheal tube tip 2 cm above the allie.    CORONARY ARTERY CALCIFICATION: Severe.    UPPER ABDOMEN: NG tube tip in the stomach. Atherosclerotic disease abdominal aorta extensive plaque at the origins of both renal arteries.    MUSCULOSKELETAL: Osteopenia. Degenerative changes thoracic spine. Deformity and sclerosis involving the left first rib.       Impression    IMPRESSION:   1.  Moderate to large right pleural effusion with compressive atelectasis and consolidation involving a significant portion of the right lower lobe. Smaller left pleural effusion and subsegmental atelectasis left lower lobe.  2.  Nodular and groundglass opacities both upper lobes and septal thickening may represent a combination of pneumonia and pulmonary edema. Follow-up exam recommended.  3.  Mediastinal adenopathy has progressed and may be reactive. These should also be reviewed on follow-up CT.  4.  Sclerotic changes left first rib may be related to old trauma. No other concerning skeletal lesions.         Past Medical History:   Diagnosis Date     (HFpEF) heart failure with preserved ejection fraction (H)       Aortic stenosis      Chest pain      Depressive disorder      Diabetes (H)      Hyperlipidemia      Hypertension      Mitral annular calcification      Mitral stenosis      Obesity      Sleep apnea     CPAP     Past Surgical History:   Procedure Laterality Date     APPENDECTOMY       CV CORONARY ANGIOGRAM N/A 2020    Procedure: Coronary Angiogram;  Surgeon: Inez Peoples MD;  Location:  HEART CARDIAC CATH LAB     CV LEFT HEART CATH N/A 2020    Procedure: Left Heart Cath;  Surgeon: Inez Peoples MD;  Location:  HEART CARDIAC CATH LAB     CV PFO CLOSURE N/A 4/15/2020    Procedure: Patent Foramen Ovale Closure;  Surgeon: Ok Wilson MD;  Location:  OR     CV RIGHT HEART CATH MEASUREMENTS RECORDED N/A 2020    Procedure: Right Heart Cath;  Surgeon: Inez Peoples MD;  Location:  HEART CARDIAC CATH LAB     EP PACEMAKER N/A 2020    Procedure: EP Pacemaker;  Surgeon: Sohan Francis MD;  Location:  HEART CARDIAC CATH LAB     GYN SURGERY       x2 ,      GYN SURGERY      total hysterectomy     IMPLANT PACEMAKER       INJECT EPIDURAL TRANSFORAMINAL LUMBAR / SACRAL SINGLE Left 2022    Procedure: left L2-L3 transforaminal epidural steroid injection;  Surgeon: Lito Maldonado MD;  Location: [facility] OR     REPAIR VALVE TRICUSPID N/A 4/15/2020    Procedure: REPAIR TRICUSPID VALVE WITH VILLA MC3 26MM.;  Surgeon: Ok Wilson MD;  Location:  OR     REPLACE VALVE AORTIC N/A 4/15/2020    Procedure: REPLACEMENT, AORTIC VALVE WITH INSPIRIS TISSUE VALVE 25 MM; ON PUMP WITH SOCORRO READ BY CARDIOLOGIST DR BLANTON.;  Surgeon: Ok Wilson MD;  Location: [facility] OR     REPLACE VALVE MITRAL N/A 4/15/2020    Procedure: REPLACEMENT, MITRAL VALVE WITH EPIC TISSUE VALVE 27 MM.;  Surgeon: Ok Wilson MD;  Location: [facility] OR        Allergies   Allergen Reactions     Penicillins Hives     3 weeks after taking med got hives.        Pioglitazone Other (See Comments)     Increased urinary frequency, fatigue, dyspnea     Current Facility-Administered Medications   Medication     acetaminophen (TYLENOL) tablet 650 mg    Or     acetaminophen (TYLENOL) Suppository 650 mg     acetaminophen (TYLENOL) tablet 650 mg     albuterol (PROVENTIL HFA/VENTOLIN HFA) inhaler     bisacodyl (DULCOLAX) suppository 10 mg     dextrose 10% infusion     glucose gel 15-30 g    Or     dextrose 50 % injection 25-50 mL    Or     glucagon injection 1 mg     fentaNYL (PF) (SUBLIMAZE) injection 50 mcg     flumazenil (ROMAZICON) injection 0.2 mg     gabapentin (NEURONTIN) capsule 100 mg     heparin ANTICOAGULANT injection 5,000 Units     insulin aspart (NovoLOG) injection (RAPID ACTING)     insulin glargine (LANTUS PEN) injection 20 Units     ipratropium - albuterol 0.5 mg/2.5 mg/3 mL (DUONEB) neb solution 3 mL     meropenem (MERREM) 1 g vial to attach to  mL bag     naloxone (NARCAN) injection 0.2 mg    Or     naloxone (NARCAN) injection 0.4 mg    Or     naloxone (NARCAN) injection 0.2 mg    Or     naloxone (NARCAN) injection 0.4 mg     norepinephrine (LEVOPHED) 4 mg in  mL infusion PREMIX     ondansetron (ZOFRAN ODT) ODT tab 4 mg    Or     ondansetron (ZOFRAN) injection 4 mg     pantoprazole (PROTONIX) EC tablet 40 mg    Or     pantoprazole (PROTONIX) IV push injection 40 mg     polyethylene glycol (MIRALAX) Packet 17 g     prochlorperazine (COMPAZINE) injection 5 mg    Or     prochlorperazine (COMPAZINE) tablet 5 mg    Or     prochlorperazine (COMPAZINE) suppository 12.5 mg     propofol (DIPRIVAN) infusion     senna-docusate (SENOKOT-S/PERICOLACE) 8.6-50 MG per tablet 1 tablet    Or     senna-docusate (SENOKOT-S/PERICOLACE) 8.6-50 MG per tablet 2 tablet     sertraline (ZOLOFT) tablet 100 mg     sodium chloride (PF) 0.9% PF flush 3 mL     sodium chloride 0.9% infusion     Social History     Socioeconomic History     Marital status:      Spouse name:  Not on file     Number of children: Not on file     Years of education: Not on file     Highest education level: Not on file   Occupational History     Not on file   Tobacco Use     Smoking status: Never Smoker     Smokeless tobacco: Never Used   Vaping Use     Vaping Use: Never used   Substance and Sexual Activity     Alcohol use: No     Drug use: No     Sexual activity: Yes     Partners: Male   Other Topics Concern     Parent/sibling w/ CABG, MI or angioplasty before 65F 55M? Not Asked   Social History Narrative     Not on file     Social Determinants of Health     Financial Resource Strain: Not on file   Food Insecurity: Not on file   Transportation Needs: Not on file   Physical Activity: Not on file   Stress: Not on file   Social Connections: Not on file   Intimate Partner Violence: Not on file   Housing Stability: Not on file     Family History   Problem Relation Age of Onset     Diabetes Mother 56     Congenital heart disease Father 23     Colon Cancer No family hx of

## 2022-08-26 NOTE — PROGRESS NOTES
[facility]   CRITICAL CARE NOTE     Amy Luna MRN: [id number]  1945  Date of Admission:8/24/2022          Problem List:   1. Acute respiratory failure   2. Septic shock   3. Mitral valve vegetation      24-Hour Goals   1. Mechanical ventilation  2. Bronchoscopy to r/o mucous plug and BAL   3. Supportive care       Overnight Events   RN called re increased O2 needs and AMS. Seen by intensivist on admission 8/25 for AMS w/o chest pain or dyspnea. She was found to have a large MV vegetation and GNR bacteremia which she is being treated for. Currently being seen by ID, CTS and cardiology. Throughout the day today, she had worsening AMS and O2 requirements. She was obtunded and on high flow O2 when I saw her and the decision was made to intubate. CXR this afternoon showed worsening interstitial edema R>L.        Lines/Tubes/Draines/Devices   .  Peripheral IV 08/25/22 Anterior;Right Upper arm (Active)   Site Assessment [facility] 08/25/22 0800   Line Status Saline locked 08/25/22 0800   Dressing Intervention Dressing reinforced 08/25/22 0000   Phlebitis Scale 0-->no symptoms 08/25/22 0800   Infiltration Scale 0 08/25/22 0800   Number of days: 0       CVC TRIPLE LUMEN Right Internal jugular Non - tunneled (Active)   Site Assessment [facility] 08/25/22 0800   Dressing Type Chlorhexidine disk;Transparent 08/25/22 0800   Dressing Status clean;dry;intact 08/25/22 0800   Dressing Intervention new dressing;dressing reinforced 08/25/22 0400   Line Necessity no, provider contacted 08/25/22 0800   Blue - Status saline locked 08/25/22 0800   Brown - Status saline locked 08/25/22 0800   White - Status saline locked 08/25/22 0800   Phlebitis Scale 0-->no symptoms 08/25/22 0800   Infiltration? no 08/25/22 0800   Infiltration Scale 0 08/25/22 0400   Number of days: 0       External Urinary Catheter (Active)   Skin no breakdown;no redness 08/25/22 0800   Urine Color Elizabeth 08/25/22 0800   Urine Appearance Clear  08/25/22 0800   Output (mL) 250 mL 08/25/22 1800   Collection Container Standard 08/25/22 0800   Securement Method Tape 08/25/22 0800   External Catheter changed Done 08/25/22 1400   Number of days: 0       Wound Lower;Midline Abdomen Skin tear small skin tear (Active)   Number of days:        Incision/Surgical Site 04/15/20 Left;Upper Chest (Active)   Number of days: 862       Incision/Surgical Site 04/22/20 Upper;Medial Abdomen (Active)   Number of days: 855       Incision/Surgical Site 04/30/20 Midline Sternum (Active)   Number of days: 847       Incision/Surgical Site 05/23/22 Thoracic spine (Active)   Number of days: 94          ICU Prophylaxis:   1. DVT: mechanical  2. VAP: HOB 30 degrees, chlorhexidine rinse  3. Stress Ulcer: PPI  4. Restraints: Nonviolent soft two point restraints required and necessary for patient safety and continued cares and good effect as patient continues to pull at necessary lines, tubes despite education and distraction. Will readdress daily.   5. IV Access - central access required and necessary for continued patient cares  6. Feeding - NPO      Medications:       acetaminophen  650 mg Oral TID     gabapentin  100 mg Oral TID     insulin aspart  1-12 Units Subcutaneous Q4H     insulin glargine  20 Units Subcutaneous BID     meropenem  1 g Intravenous Q8H     pantoprazole  40 mg Oral BID AC    Or     pantoprazole (PROTONIX) IV  40 mg Intravenous BID AC     propofol         [START ON 8/26/2022] sertraline  100 mg Oral Daily         Review of Systems:   Unable to obtain due to critical illness          Physical Exam:   Temp:  [97.7  F (36.5  C)-98.8  F (37.1  C)] 98.2  F (36.8  C)  Pulse:  [] 105  Resp:  [12-36] 24  BP: ()/(35-73) 140/49  SpO2:  [87 %-100 %] 94 %    Intake/Output Summary (Last 24 hours) at 8/25/2022 1936  Last data filed at 8/25/2022 1800  Gross per 24 hour   Intake 220.29 ml   Output 1100 ml   Net -879.71 ml     Wt Readings from Last 4 Encounters:    08/25/22 98.8 kg (217 lb 13 oz)   08/12/22 94.9 kg (209 lb 2 oz)   07/27/22 104.3 kg (230 lb)   06/16/22 104.5 kg (230 lb 4.8 oz)     BP - Mean:  [59-89] 81  Resp: 24    No lab results found in last 7 days.    GEN: no acute distress   HEENT: head ncat, sclera anicteric, OP patent, trachea midline   PULM: unlabored synchronous with vent, coarse BS R>L   CV/COR: RRR S1S2 no gallop,  No rub, no murmur  ABD: soft nontender, hypoactive bowel sounds, no mass  EXT:  Edema   warm  NEURO: grossly intact  SKIN: no obvious rash      Assessment and plan :     Neurology/Psychiatry/Pain/Sedation:   1. AMS. Likely multifactorial however acutely 2/2 to resp distress.   2. Sedation for vent synchrony. RASS -4 on propofol. Prn fentanyl.     Cardiovascular/Hemodynamics/Pulm/ID/Renal/GI:   1. Acute respiratory failure, AMS in setting of GNR bacteremia and MV vegetation. Easy to oxygenate/ventilate. No hemodynamic instability. Worsening resp failure either 2/2 worsening AMS vs worsening infiltrates on R>L. Will get CT chest and repeat CT head tonight. Bronchoscopy performed and no mucous plugs noted. RML was lavaged and sent for cultures. She is currently on meropenem and will not escalate abx now.   2. No active renal/electrolyte issues. UOP non-oliguric.   3. Keep NPO. PPI for now. Nutrition consult forTF in am    Endocrine/Hematology/Oncology:   1. On lantus and sliding scale insulin.   2. No pharmacological dvt ppx on board. Add heparin     Disposition/Code Status/Other  1. Critically ill   2. Code: Full    I am managing these acute and ongoing critical issues resulting in critical condition that impairs one or more vital organ systems, incur a high probability of imminent or life-threatening deterioration in the patient's condition and providing frequent personal assessment and manipulation of medications and life support equipment.     I have discussed the patient in detail and I agree with the findings, assessment, and plan as  documented when this note was cosigned on this day. The plan was formulated in conjunction with pharmacy, ICU nurses, and respiratory therapist. I have evaluated all laboratory values and imaging studies for the past 24 hours. I have reviewed all the consults that have been ordered and are active for this patient.      Critical Care Time: 30 min.  I spent this time (excluding procedures) personally providing and directing critical care services at the bedside and on the critical care unit.      Murtaza Hanna MD  Associate Professor of Medicine  Section of Interventional Pulmonology   Division of Pulmonary, Allergy, Critical Care and Sleep Medicine   [facility]  Pager: [phone]   Office: [phone]  Email: [email or web address] ICU LABS (Last four results)  CMP  Recent Labs   Lab 08/25/22  1630 08/25/22  1518 08/25/22  1428 08/25/22  1426 08/25/22  1153 08/25/22  1140 08/25/22  0832 08/25/22  0546 08/25/22  0449 08/25/22  0115 08/24/22  1217 08/24/22  0732 08/23/22  2220   NA  --   --   --  134  --   --   --  132*  --  134  --  129* 128*   POTASSIUM  --   --   --  5.0  --  4.8  --  5.7*  --  4.9  --  5.0 5.0   CHLORIDE  --   --   --  106  --   --   --  104  --  104  --  99 96*   CO2  --   --   --  25  --   --   --  23  --  24  --  25 23   ANIONGAP  --   --   --  3  --   --   --  5  --  6  --  5 9   * 225* 212* 209*   < >  --    < > 281*   < > 292*   < > 236* 285*   BUN  --   --   --  40*  --   --   --  43*  --  43*  --  45* 42*   CR  --   --   --  1.04  --   --   --  1.06*  --  1.15*  --  1.63* 1.60*   GFRESTIMATED  --   --   --  55*  --   --   --  54*  --  49*  --  32* 33*   NELLY  --   --   --  9.8  --   --   --  9.4  --  9.1  --  10.6* 10.9*   MAG  --   --   --   --   --   --   --  1.9  --  1.8  --  1.8*  --    PHOS  --   --   --   --   --   --   --  3.5  --  3.6  --   --   --    PROTTOTAL  --   --   --   --   --   --   --  7.7  --  7.6  --   --  7.4   ALBUMIN  --   --    --   --   --   --   --  2.1*  --  2.1*  --   --  2.9*   BILITOTAL  --   --   --   --   --   --   --  0.4  --  0.4  --   --  0.7   ALKPHOS  --   --   --   --   --   --   --  67  --  66  --   --  57   AST  --   --   --   --   --   --   --  45  --  20  --   --  20   ALT  --   --   --   --   --   --   --  19  --  14  --   --  8    < > = values in this interval not displayed.     CBC  Recent Labs   Lab 22  0546 22  0115 22  1712 22  1044 22  0732 22  2217   WBC 11.1* 11.7*  --   --  9.9 11.8*   RBC 2.64* 2.62*  --   --  2.63* 2.67*   HGB 7.4* 7.4* 7.2* 7.5* 7.3* 7.6*   HCT 24.3* 24.7*  --   --  24.0* 23.7*   MCV 92 94  --   --  91 89   MCH 28.0 28.2  --   --  27.8 28.5   MCHC 30.5* 30.0*  --   --  30.4* 32.1   RDW 17.2* 17.2*  --   --  17.3* 17.4*    228  --   --  227 246     INR  Recent Labs   Lab 22  0115   INR 1.23*     Arterial Blood GasNo lab results found in last 7 days.    All cultures:  No results for input(s): CULT in the last 168 hours.  Recent Results (from the past 24 hour(s))   XR Chest Port 1 View    Narrative    EXAM: XR CHEST PORT 1 VIEW  LOCATION: [facility]  DATE/TIME: 2022 1:02 AM    INDICATION: Right IJ placement  COMPARISON: 2020      Impression    IMPRESSION: Right IJ catheter present with its tip projecting at distal SVC. Patient rotated somewhat into an LPO projection. Prominent skinfold seen along right chest with no pneumothorax evident. Left lung remains grossly clear. Stable postoperative   changes of heart including pacemaker and AVR. Mild cardiomegaly.   Transesophageal Echocardiogram   Result Value    LVEF  60-65%    [facility]    [id number]  [id number]  [id number]^SUZANNE^JACK^JONH     [facility]  Echocardiography Laboratory  [address] [place] [id number]     Name: KASSANDRA RIVERA  MRN: [id number]  : 1945  Study Date: 2022 12:10 PM  Age: 77 yrs  Gender:  Female  Patient Location: [facility]  Reason For Study: Endocarditis  Ordering Physician: JACK PALACIOS  Referring Physician: Petey Lira  Performed By: Ladi Sylvester     BSA: 2.0 m2  Height: 65 in  Weight: 217 lb  HR: 93  BP: 101/45 mmHg  ______________________________________________________________________________  Procedure  Complete SOCORRO Adult. SOCORRO Probe serial #[id number] was used during the procedure.  The heart rate, respiratory rate and response to care were monitored  throughout the procedure with the assistance of the nurse.  ______________________________________________________________________________  Interpretation Summary     CV surgery 4/15/2021. AVR: INSPIRIS TISSUE VALVE 25 MM. MVR: EPIC TISSUE VALVE  27 MM. TV repair: VILLA MC3 26MM.  1. The left ventricle is normal in size. Left ventricular systolic function is  normal. The visual ejection fraction is 60-65%.  2. There is a bioprosthetic mitral valve. There is severe thickenning of the  leaflets of the bioprosthesis with severely restricted opening. There is  severe bioprosthetic stenosis with mean gradient of 23 mmHg.  3. There is a freely mobile mass (1.7 cms) attached to the atrial aspect of  the bioprosthetic MV leaflet which is consistent with a vegetation. No  evidence of valve dehiscence, PVL or regurgitation.  4. There is a well-seated, normally functioning, bioprosthetic valve in aortic  position witho normal leaflet motion. No vegetation noted on AV.  5. The left atrium is severely dilated. The right atrium is moderately  dilated.  ______________________________________________________________________________  SOCORRO  I determined this patient to be an appropriate candidate for the planned  sedation and procedure and have reassessed the patient immediately prior to  sedation and procedure. Total sedation time: 17 min minutes of continuous  bedside 1:1 monitoring. Versed (2mg) was given intravenously. Fentanyl (25mcg)  was given  intravenously. Consent to the procedure was obtained prior to  sedation. Prior to the exam, the oral cavity was checked and no overcrowding  was noted. The transesophageal probe was passed without difficulty. There were  no complications associated with this procedure.     Left Ventricle  The left ventricle is normal in size. Left ventricular systolic function is  normal. The visual ejection fraction is 60-65%.     Right Ventricle  There is a catheter/pacemaker lead seen in the right ventricle. Moderately  decreased right ventricular systolic function.     Atria  The left atrium is severely dilated. The right atrium is moderately dilated.  Pacer wire in right atrium. Left to right atrial shunt, moderate. No thrombus  is detected in the left atrial appendage.     Mitral Valve  There is a bioprosthetic mitral valve. There is severe thickenning of the  leaflets of the bioprosthesis with severely restricted opening. There is  severe bioprosthetic stenosis with mean gradient of 23 mmhg. There is a freely  mobile mass attached to the atrial aspect of the leaflet which is consistent  with a vegetation. No evidence of valve dehiscence, PVL or regurgitation.     Tricuspid Valve  Annuloplasty ring in the tricuspid position. There is mild (1+) tricuspid  regurgitation.     Aortic Valve  There is no vegetation on the aortic valve. There is a bioprosthetic aortic  valve. The prosthetic aortic valve is well-seated. The prosthetic aortic valve  appears to open well.     Pulmonic Valve  The pulmonic valve is normal in structure and function.     ______________________________________________________________________________  Doppler Measurements & Calculations  MV max P.4 mmHg  MV mean P.2 mmHg  MV V2 VTI: 85.9 cm     ______________________________________________________________________________               MV vegetation.                    Left to right atrial shunt       Severe stenosis of mitral prosthesis     Report  approved by: Saul Hawthorne 08/25/2022 03:58 PM         XR Chest Port 1 View    Narrative    EXAM: XR CHEST PORT 1 VIEW  LOCATION: [facility]  DATE/TIME: 8/25/2022 6:34 PM    INDICATION: SOB, new mild expiratory wheezes  COMPARISON: 08/25/2022 at 0100 hours      Impression    IMPRESSION: Status post median sternotomy and valve replacement with cardiac pacemaker in place. Heart size is enlarged but likely stable. Increasing posteriorly layering bilateral pleural effusions, now at least moderate in size. Probable mild   interstitial edema. Central confluent parenchymal opacities may represent atelectasis versus infiltrate. No pneumothorax. Right-sided central venous catheter terminates over the cavoatrial junction..

## 2022-08-26 NOTE — PROGRESS NOTES
RT called to pt's room to give PRN neb. Pt SOB , SPo2 low 90's on 7 oxymask. Neb given with out any improvement. Placed on HFNC 100% 40L plus 15 oxymask. Pt start desaturated to 78%. Pt  bagged with improved SPO2. Pt then intubated with 8.5ETT secured 24@lips. ETT placement verified by ETCo2 and bilateral breath sounds.Bronchoscopy performed. Sample will be send to lab. Will continue to monitor.    Liss Reyes, RT

## 2022-08-26 NOTE — PROGRESS NOTES
Pt intubated, care transferred to ICU service.  When patient improved please notify hospitalist service.

## 2022-08-26 NOTE — PLAN OF CARE
Shift Summary  Assumed cares from [id number].    Neuro: Alert to self and date. By end of shift, pt had declined and was no longer following commands and required intubation.   Cardiac: SR to ST in low 100's. Levo titrated to MAP >65. SOCORRO completed today at bedside.   Pulmonary: Pt on 3L NC most of day today. As stated above, pt ended up declining by end of shift, requiring to be intubated. See flow-sheets for further detailed assessments.   GI: No BM this shift. Pt NPO.   : Purewick in place with adequate output.   Integumentary: See flow-sheet.   Restraints: Not needed throughout the day. Pt ended up getting restraints once intubated.   Activity: Bedrest    Plan of care has been explained to patient: Yes.    Maggie Carlisle RN

## 2022-08-26 NOTE — TELEPHONE ENCOUNTER
Patient transferred to higher level of care. No TCM call required per policy. Anca Blandon RN on 8/26/2022 at 7:43 AM

## 2022-08-26 NOTE — PLAN OF CARE
Pt remains sedated this shift. Will open eyes to vigorous stimuli, rarely follows commands.  VSS, remains on low dose norepinephrine.  Minimal vent settings-tolerating well. Thoracentesis today for 1L. Pain medications given w5hasva prior to turns. TF started. Spouse and daughter at bedside- updated with plan of care. Cont to monitor.   Problem: Inability to Wean (Mechanical Ventilation, Invasive)  Goal: Mechanical Ventilation Liberation  Outcome: Ongoing, Progressing  Intervention: Promote Extubation and Mechanical Ventilation Liberation  Recent Flowsheet Documentation  Taken 8/26/2022 1600 by Elizabeth Sotomayor RN  Medication Review/Management: medications reviewed  Taken 8/26/2022 1400 by Elizabeth Sotomayor RN  Medication Review/Management: medications reviewed  Taken 8/26/2022 1200 by Elizabeth Sotomayor RN  Medication Review/Management: medications reviewed  Taken 8/26/2022 1000 by Elizabeth Sotomayor RN  Medication Review/Management: medications reviewed  Taken 8/26/2022 0800 by Elizabeth Sotomayor RN  Medication Review/Management: medications reviewed     Problem: Nutrition Impairment (Mechanical Ventilation, Invasive)  Goal: Optimal Nutrition Delivery  Outcome: Ongoing, Progressing     Problem: Infection  Goal: Absence of Infection Signs and Symptoms  Outcome: Ongoing, Progressing   Goal Outcome Evaluation:

## 2022-08-26 NOTE — PROGRESS NOTES
Patient intubated yesterday for airway protection, not following commands per nursing team.  Will complete current SLP order during intubation, recommending reconsult for 24 hours s/p extubation.

## 2022-08-26 NOTE — PROGRESS NOTES
Assessment and Plan:     Amy Luna is a 77 year old female who was admitted on 8/24/2022.     1.  Acute delirium/encephalopathy secondary to sepsis, with gradual weakness developing over the days prior to admission.  2.  Septic shock vs cardiogenic shock. Probably components of both. Intubated for airway protection. On low dose pressor (Levophed 0.05 mcg/kg/min)  3.  Subacute prosthetic mitral valve endocarditis.  Large vegetation (or thrombus?) identified on the mitral valve by transthoracic echocardiogram with severe valve dysfunction.  Severe mitral valve stenosis is present with a mean mitral valve gradient of 24 mmHg.  No significant mitral regurgitation identified by SOCORRO.   4.  History of bioprosthetic aortic valve replacement without evidence of dysfunction or vegetation by SOCORRO  5.  History of tricuspid valve repair with satisfactory tricuspid valve function. Cannot exclude vegetations on SOCORRO due to shadowing from AV prosthesis, but none seen.  6.  Severe pulmonary hypertension with severe right ventricular enlargement and dysfunction, probably due to severe mitral stenosis  7.  Paroxysmal atrial fibrillation, postoperatively after her cardiac surgery in April 2020 for valve replacement.  She was treated briefly with amiodarone and warfarin.  Amiodarone was discontinued and she continued warfarin until she recently had a GI bleed due to ibuprofen use for back pain and it was then stopped.  She has not had any evidence of recurrent atrial fibrillation on her pacemaker checks since shortly after her cardiac surgery in 2020.  Currently in sinus rhythm.  Not on anticoagulation prior to admission.  8.  Sick sinus syndrome with permanent pacemaker in place.  9.  Severe anemia, cause unclear.  Stool was guaiac negative.  She has a history of recent GI bleed due to ibuprofen use while on warfarin.  Both of those medications were then discontinued.  Upper GI endoscopy on 8/25/2022 did not show a source  of bleeding from the esophagus, stomach, or upper small intestine.  10.  Hypertension   11.  Dyslipidemia  12.  Chronic back and leg pain.  13.  Type 2 diabetes mellitus    Recommendations:    Events overnight were reviewed.  She became obtunded and could not protect her airway.  She was intubated primarily for that reason.  She was somewhat hypotensive as well and is now on a low-dose of Levophed for blood pressure support.  Pressures now look good on only a low-dose of Levophed 0.05 mcg/kg/min.    Her mental status yesterday was quite poor and the progression to obtundation may have been only a slight deterioration in mental status.  She is now on sedation with propofol.    Reviewed her SOCORRO yesterday.  She is a very large burden of thrombus or vegetation on the bioprosthetic mitral valve causing severe mitral stenosis.  In addition, there is a very large mobile mass extending from the valve into the left ventricle and then prolapsing back into the left atrium.  This certainly carries a very high embolic risk.    Discussed her case yesterday with Dr. Moralez from CV surgery.  He indicated that her risk for serious complications including death during cardiovascular surgery and in the immediate postoperative setting was very high and he would prefer to defer surgery, if reasonable.  We agreed to continue antibiotics and close monitoring with the hope of achieving resolution of her bacteremia prior to considering surgery, or moving more emergently if she had significant hemodynamic changes.    At this time she seems hemodynamically stable on a low-dose of Levophed.  Her CT scan shows significant pleural effusions which may be impacting her respiratory function, as well as some pulmonary edema or pneumonia.  I would recommend transfusing her with red blood cells to hemoglobin greater than 9 and removing the largest pleural effusion.  If we can continue antibiotics for now while keeping the patient stable, we may  "reduce the risk of reinfecting a new valve prosthesis.  Fortunately, there is no evidence for cerebral embolization by CT scan of the head yet.    I would favor an ID consultation as well to help determine whether this mass on the mitral valve is endocarditis or whether it is more likely to be thrombus.  She had 1 out of 4 bottles of blood culture positive for gram-negative rods, which is an unusual finding if this is endocarditis, both in terms of the bacteria type and rate of positive culture.  She also had a UTI and I wonder whether this was bacteremia from that issue and not endocarditis.  If this is not felt to be endocarditis, then I would start anticoagulation for thrombus and discuss with CV surgery to consider operating sooner..       MILLIE HERNANDEZ MD     55 minutes critical care including discussion with ICU staff.        Interval History:   Intubated and sedated          Medications:       acetaminophen  650 mg Oral TID     gabapentin  100 mg Oral TID     heparin ANTICOAGULANT  5,000 Units Subcutaneous Q12H     insulin aspart  1-12 Units Subcutaneous Q4H     insulin glargine  25 Units Subcutaneous BID     meropenem  1 g Intravenous Q8H     pantoprazole  40 mg Oral BID AC    Or     pantoprazole (PROTONIX) IV  40 mg Intravenous BID AC     sertraline  100 mg Oral Daily            Physical Exam:   Blood pressure 115/46, pulse 70, temperature 98.8  F (37.1  C), resp. rate 14, height 1.651 m (5' 5\"), weight 101.2 kg (223 lb 1.7 oz), SpO2 99 %, not currently breastfeeding.    Vitals:    08/25/22 0000 08/25/22 0500 08/26/22 0400   Weight: 98.6 kg (217 lb 6 oz) 98.8 kg (217 lb 13 oz) 101.2 kg (223 lb 1.7 oz)         Intake/Output Summary (Last 24 hours) at 8/26/2022 0909  Last data filed at 8/26/2022 0600  Gross per 24 hour   Intake 862.94 ml   Output 1475 ml   Net -612.06 ml       08/21 0700 - 08/26 0659  In: 883.23 [I.V.:883.23]  Out: 1975 [Urine:1975]  Net: -1091.77    Exam:  GENERAL APPEARANCE ADULT: " Intubated and sedated  RESP: crackles diffusely  CV: Mostly regular rhythm with frequent extrasystoles and 2/6 systolic ejection murmur at the base  ABDOMEN: normal bowel sounds, soft, nontender, no hepatosplenomegaly or other masses  EXTREMITIES: No edema         Data:   LABS (Last four results)  CMP  Recent Labs   Lab 08/26/22  0819 08/26/22  0420 08/26/22  0419 08/26/22  0028 08/25/22  2127 08/25/22  1428 08/25/22  1426 08/25/22  1153 08/25/22  1140 08/25/22  0832 08/25/22  0546 08/25/22  0449 08/25/22  0115 08/24/22  1217 08/24/22  0732 08/23/22  2220   NA  --  136  --   --   --   --  134  --   --   --  132*  --  134  --  129* 128*   POTASSIUM  --  4.7  --   --  5.4*  --  5.0  --  4.8  --  5.7*  --  4.9  --  5.0 5.0   CHLORIDE  --  107  --   --   --   --  106  --   --   --  104  --  104  --  99 96*   CO2  --  23  --   --   --   --  25  --   --   --  23  --  24  --  25 23   ANIONGAP  --  6  --   --   --   --  3  --   --   --  5  --  6  --  5 9   * 234* 187* 258*  --    < > 209*   < >  --    < > 281*   < > 292*   < > 236* 285*   BUN  --  41*  --   --   --   --  40*  --   --   --  43*  --  43*  --  45* 42*   CR  --  1.00  --   --   --   --  1.04  --   --   --  1.06*  --  1.15*  --  1.63* 1.60*   GFRESTIMATED  --  58*  --   --   --   --  55*  --   --   --  54*  --  49*  --  32* 33*   NELLY  --  9.4  --   --   --   --  9.8  --   --   --  9.4  --  9.1  --  10.6* 10.9*   MAG  --  2.0  --   --   --   --   --   --   --   --  1.9  --  1.8  --  1.8*  --    PHOS  --  2.6  --   --   --   --   --   --   --   --  3.5  --  3.6  --   --   --    PROTTOTAL  --  7.3  --   --   --   --   --   --   --   --  7.7  --  7.6  --   --  7.4   ALBUMIN  --  1.9*  --   --   --   --   --   --   --   --  2.1*  --  2.1*  --   --  2.9*   BILITOTAL  --  0.6  --   --   --   --   --   --   --   --  0.4  --  0.4  --   --  0.7   ALKPHOS  --  80  --   --   --   --   --   --   --   --  67  --  66  --   --  57   AST  --  42  --   --   --   --   --    --   --   --  45  --  20  --   --  20   ALT  --  21  --   --   --   --   --   --   --   --  19  --  14  --   --  8    < > = values in this interval not displayed.     CBC  Recent Labs   Lab 08/26/22  0420 08/25/22  2127 08/25/22  0546 08/25/22  0115 08/24/22  1044 08/24/22  0732   WBC 15.1*  --  11.1* 11.7*  --  9.9   RBC 2.64*  --  2.64* 2.62*  --  2.63*   HGB 7.2* 7.3* 7.4* 7.4*   < > 7.3*   HCT 24.6*  --  24.3* 24.7*  --  24.0*   MCV 93  --  92 94  --  91   MCH 27.3  --  28.0 28.2  --  27.8   MCHC 29.3*  --  30.5* 30.0*  --  30.4*   RDW 17.3*  --  17.2* 17.2*  --  17.3*     --  187 228  --  227    < > = values in this interval not displayed.     INR  Recent Labs   Lab 08/25/22  0115   INR 1.23*     TROPONINS   Lab Results   Component Value Date    TROPI 0.056 (H) 01/17/2020    TROPI 0.106 (H) 01/17/2020    TROPI 0.020 01/16/2020    TROPI  11/12/2014     <0.015  The 99th percentile for upper reference range is 0.045 ug/L.  Troponin values in   the range of 0.045 - 0.120 ug/L may be associated with risks of adverse   clinical events.   Effective 7/30/2014, the reference range for this assay has changed to reflect   new instrumentation/methodology.      TROPI  11/12/2014     <0.015  The 99th percentile for upper reference range is 0.045 ug/L.  Troponin values in   the range of 0.045 - 0.120 ug/L may be associated with risks of adverse   clinical events.   Effective 7/30/2014, the reference range for this assay has changed to reflect   new instrumentation/methodology.                                                                                                                 MILLIE HERNANDEZ MD

## 2022-08-26 NOTE — CONSULTS
"CLINICAL NUTRITION SERVICES  -  ASSESSMENT NOTE      Recommendations Ordered by Registered Dietitian (RD):   Begin TF via OGT  Start Vital HP at 10 mL/hr. Increase by 20 mL q 12 hrs to goal rate of 50 mL/hr  Free water flush 60 mL q 4 hrs     Vital High Protein @ goal of 50ml/hr (1200ml/day) = 1200 kcals, 104 g PRO (1.8 g/kg), 1003 ml free H20, 133 g CHO, 0 g fiber   Propofol  @ 11.9 mL/hr = 314 kcal  Total provisions = 1514 kcal (15 kcal/kg)     Malnutrition:   % Weight Loss:  > 10% in 6 months (severe malnutrition)  % Intake:  Unable to determine w/o diet hx   Subcutaneous Fat Loss:  None noted   Muscle Loss:  Temporal region mild depletion - ?acute vs chronic   Fluid Retention:  Mild 2+     Malnutrition Diagnosis: Moderate malnutrition  In Context of:  Acute on Chronic illness or disease        REASON FOR ASSESSMENT  Amy Luna is a 77 year old female seen by Registered Dietitian for Positive Admission Nutrition Risk Screen and Provider Order - Registered Dietitian to Assess and Order TF per Medical Nutrition Therapy Protocol      NUTRITION HISTORY  Chart reviewed  Comes from OSH w/ initial dx of encephalopathy and community acquired PNA (also noted to c/o black stools)  Found here w/ bioprosthetic valve endocarditis, septic shock  PMH includes HFpEF, diabetes, HLD, HTN  No known food allergies     Unable to obtain diet hx from patient as intubated and sedated   Per chart review, patient was seen by an OP RD for diabetes management on 7/5/22 & diet recall was obtained ~  Breakfast: when she eats it: milk, potato and egg or oatmeal with raisins 1 cup  Lunch: does not always eat lunch.  Has been in hospital a lot, has lost her appetite.  Dinner: pizza 1 slice or can of soup or ramen (numbers run higher)  Snacks: not been snacking much.  Other: novolin N: 30 units twice daily: morning and evening.  Novolin R: uses sliding scale    During this RD meeting above, patient had reported that she had \"a lot going on\" " "such as being in the process of moving and being in & out of the hospital - which had caused a decreased appetite     CURRENT NUTRITION ORDERS  Diet Order:     NPO, vented     Current Intake/Tolerance:  Plan to start nutrition today     Per chart review~  8/25 EGD revealed no source of bleeding- did note small hiatal hernia    NUTRITION FOCUSED PHYSICAL ASSESSMENT FOR DIAGNOSING MALNUTRITION)  Yes         Observed:    Muscle wasting (refer to documentation in Malnutrition section) - ?acute vs chronic temporal wasting   Further losses potentially masked by adiposity and edema     Obtained from Chart/Interdisciplinary Team:  Edema mild    ANTHROPOMETRICS  Height: 5' 5\"  Weight: 98.6 kg (217 lb 6 oz) - admit   Body mass index is 36.17 kg/m .  Weight Status:  Obesity Grade II BMI 35-39.9  IBW: 56.8 kg   % IBW: 173%  Weight History: Down 37 lbs in the past 6 months (14% body wt)  Wt Readings from Last 20 Encounters:   08/26/22 101.2 kg (223 lb 1.7 oz)   08/12/22 94.9 kg (209 lb 2 oz)   07/27/22 104.3 kg (230 lb)   06/16/22 104.5 kg (230 lb 4.8 oz)   05/25/22 99.9 kg (220 lb 3.2 oz)   05/23/22 101.6 kg (224 lb)   04/11/22 111.1 kg (245 lb)   04/06/22 110.9 kg (244 lb 9.6 oz)   04/04/22 110.9 kg (244 lb 9.6 oz)   03/30/22 109.8 kg (242 lb)   03/25/22 111.9 kg (246 lb 12.8 oz)   03/21/22 112.9 kg (249 lb)   03/19/22 113.1 kg (249 lb 4.8 oz)   02/21/22 118.5 kg (261 lb 3.2 oz)   01/05/22 122 kg (269 lb)   10/19/21 126.6 kg (279 lb 3.2 oz)   07/19/21 127.4 kg (280 lb 14.4 oz)   05/17/21 129.8 kg (286 lb 3.2 oz)   04/29/21 131.2 kg (289 lb 3.2 oz)   01/28/21 131.3 kg (289 lb 8 oz)         LABS  Labs reviewed  Na 136  K 4.7  Mg 2  Phos 2.6    Recent Labs   Lab 08/26/22  0819 08/26/22  0420 08/26/22  0419 08/26/22  0028 08/25/22  2110 08/25/22  1630   * 234* 187* 258* 285* 250*     Lab Results   Component Value Date    A1C 6.6 08/25/2022    A1C 6.7 08/12/2022    A1C 6.6 03/14/2022    A1C 6.9 11/19/2021    A1C 7.3 " 08/04/2021    A1C 7.1 02/05/2021    A1C 6.8 11/03/2020    A1C 6.7 07/28/2020    A1C 6.5 04/08/2020    A1C 6.4 01/16/2020       MEDICATIONS  Medications reviewed  Lasix  Norepi  Propofol at 11.9 mL/hr = 314 kcal/day  HSSI + 25 units lantus BID     ASSESSED NUTRITION NEEDS PER APPROVED PRACTICE GUIDELINES:    Dosing Weight 98.6 kg energy (actual wt); 56.8 kg protein (IBW)  Estimated Energy Needs: 7401-7963 kcals (14-17 Kcal/Kg)  Justification: obese and vented  Estimated Protein Needs:  grams protein (1.5-2 g pro/Kg)  Justification: hypercatabolism with critical illness and obesity guidelines   Estimated Fluid Needs: 1 mL/kcal or per MD      MALNUTRITION:  % Weight Loss:  > 10% in 6 months (severe malnutrition)  % Intake:  Unable to determine w/o diet hx   Subcutaneous Fat Loss:  None noted   Muscle Loss:  Temporal region mild depletion - ?acute vs chronic   Fluid Retention:  Mild 2+     Malnutrition Diagnosis: Moderate malnutrition  In Context of:  Acute on Chronic illness or disease    NUTRITION DIAGNOSIS:  Inadequate protein-energy intake related to NPO, vented as evidenced by receiving <25% estimated energy needs and 0% estimated protein needs from Propofol alone       NUTRITION INTERVENTIONS  Recommendations / Nutrition Prescription  Begin TF via OGT  Vital High Protein @ goal of 50ml/hr (1200ml/day) = 1200 kcals, 104 g PRO (1.8 g/kg), 1003 ml free H20, 133 g CHO, 0 g fiber   Propofol  @ 11.9 mL/hr = 314 kcal  Total provisions = 1514 kcal (15 kcal/kg)      Implementation  Nutrition education: Not appropriate at this time due to patient condition  EN Composition, EN Schedule, Feeding Tube Flush: as above   Collaboration and Referral of Nutrition care: patient was discussed during ICU rounds       Nutrition Goals  EN + Propofol to meet % estimated needs in the next 48 hrs       MONITORING AND EVALUATION:  Progress towards goals will be monitored and evaluated per protocol and Practice  Guidelines        Theodora Begum RD, LD  Clinical Dietitian   ICU pager [phone]

## 2022-08-26 NOTE — CONSULTS
Neurology Consultation    Reason for critical care admission: 8/26/2022  Admitting Team: ICU  Date of Service:  08/26/2022  Date of Admission:  8/24/2022  Hospital Day: 3    Assessment/Plan  Amy Luna is a 77 year old female with a past medical history of s/p aortic and mitral bioprosthetic valve replacement along with PFO closure and tricuspid ring who was transferred from an OSH for encephalopathy. Neurology is consulted to assess for possible embolic stroke in light of her TTE showing possible mitral valve vegetation. Patient is also being treated for sepsis 2/2 UTI and Klebsiella bacteremia. She is also intubated and ventilated.  CT head is completed and showed no acute pathology    24 hour events:  Intubated and admitted to ICU  SOCORRO done; report awaited    Assessment;  Neuro  # Encephalopathy is likely  2/2 sepsis and anemia.  Need to rule out embolic stroke from possible infective endocarditis  - obtain repeat CT head now- patient has pacemaker so MRI unobtainable  - follow SOCORRO report  -Neurochecks every 6 hrs  -HOB > 30   -SBP goal < 180 mmHg  -PT/OT/SLP    Signature  Jazmin Yañez MD  History of Present Illness:  salomón Luna is a 77 year old female with a past medical history of DM, CHF, HLD, HTN, s/p aortic and mitral bioprosthetic valve replacement along with PFO closure and tricuspid ring who was transferred from an OSH for encephalopathy.   As per notes patient with 2 weeks of fatigue and decreased alertness, hallucination. Initial labs showed anemia hemoglobin 7.6, hyponatremia 128, CLARITA creatinine 1.6/GFR 33, febrile at 100.6.  Patient was also reported to have black stool.  Patient needed hemodynamic support and needed         Allergies   Allergen Reactions     Penicillins Hives     3 weeks after taking med got hives.       Pioglitazone Other (See Comments)     Increased urinary frequency, fatigue, dyspnea       PAST MEDICAL HISTORY:   Past Medical History:   Diagnosis Date     (HFpEF) heart  failure with preserved ejection fraction (H)      Aortic stenosis      Chest pain      Depressive disorder      Diabetes (H)      Hyperlipidemia      Hypertension      Mitral annular calcification      Mitral stenosis      Obesity      Sleep apnea     CPAP       PAST SURGICAL HISTORY:   Past Surgical History:   Procedure Laterality Date     APPENDECTOMY       CV CORONARY ANGIOGRAM N/A 2020    Procedure: Coronary Angiogram;  Surgeon: Inez Peoples MD;  Location:  [facility]     CV LEFT HEART CATH N/A 2020    Procedure: Left Heart Cath;  Surgeon: Inez Peoples MD;  Location:  [facility]     CV PFO CLOSURE N/A 4/15/2020    Procedure: Patent Foramen Ovale Closure;  Surgeon: Ok Wilson MD;  Location:  OR      RIGHT HEART CATH MEASUREMENTS RECORDED N/A 2020    Procedure: Right Heart Cath;  Surgeon: Inez Peoples MD;  Location:  [facility]ATH [facility]     EP PACEMAKER N/A 2020    Procedure: EP Pacemaker;  Surgeon: Sohan Francis MD;  Location:  HEART [facility]ARD[facility] CATH LAB     GYN SURGERY       x2 ,      GYN SURGERY      total hysterectomy     IMPLANT PACEMAKER       INJECT EPIDURAL TRANSFORAMINAL LUMBAR / SACRAL SINGLE Left 2022    Procedure: left L2-L3 transforaminal epidural steroid injection;  Surgeon: Lito Maldonado MD;  Location: [facility] OR     REPAIR VALVE TRICUSPID N/A 4/15/2020    Procedure: REPAIR TRICUSPID VALVE WITH VILLA MC3 26MM.;  Surgeon: Ok Wilson MD;  Location:  OR     REPLACE VALVE AORTIC N/A 4/15/2020    Procedure: REPLACEMENT, AORTIC VALVE WITH INSPIRIS TISSUE VALVE 25 MM; ON PUMP WITH SOCORRO READ BY CARDIOLOGIST DR BLANTON.;  Surgeon: Ok Wilson MD;  Location:  OR     REPLACE VALVE MITRAL N/A 4/15/2020    Procedure: REPLACEMENT, MITRAL VALVE WITH EPIC TISSUE VALVE 27 MM.;  Surgeon: Ok Wilson MD;  Location:  OR       FAMILY HISTORY: I have  "reviewed this patient's family history and updated it with pertinent information if needed.  Family History   Problem Relation Age of Onset     Diabetes Mother 56     Congenital heart disease Father 23     Colon Cancer No family hx of          SOCIAL HISTORY:   Social History     Tobacco Use     Smoking status: Never Smoker     Smokeless tobacco: Never Used   Substance Use Topics     Alcohol use: No       ROS: Review of systems not obtained due to patient factors - confusion    Current Medications:    acetaminophen  650 mg Oral TID     chlorhexidine  15 mL Mouth/Throat Q12H     gabapentin  100 mg Oral TID     heparin ANTICOAGULANT  5,000 Units Subcutaneous Q12H     insulin aspart  1-12 Units Subcutaneous Q4H     insulin glargine  25 Units Subcutaneous BID     meropenem  1 g Intravenous Q8H     pantoprazole  40 mg Oral BID AC    Or     pantoprazole (PROTONIX) IV  40 mg Intravenous BID AC     sertraline  100 mg Oral Daily       PRN Medications:  acetaminophen **OR** acetaminophen, albuterol, bisacodyl, dextrose, glucose **OR** dextrose **OR** glucagon, fentaNYL, flumazenil, HYDROmorphone, ipratropium - albuterol 0.5 mg/2.5 mg/3 mL, midazolam, naloxone **OR** naloxone **OR** naloxone **OR** naloxone, ondansetron **OR** ondansetron, polyethylene glycol, prochlorperazine **OR** prochlorperazine **OR** prochlorperazine, senna-docusate **OR** senna-docusate, sodium chloride (PF)    Infusions:    dextrose       norepinephrine 0.05 mcg/kg/min (08/26/22 0800)     propofol (DIPRIVAN) infusion 20 mcg/kg/min (08/26/22 1100)     sodium chloride 10 mL/hr at 08/25/22 1005       Physical Examination:  Vitals: /68   Pulse 74   Temp 98.6  F (37  C)   Resp 14   Ht 1.651 m (5' 5\")   Wt 101.2 kg (223 lb 1.7 oz)   LMP  (LMP Unknown)   SpO2 100%   BMI 37.13 kg/m    General: Adult female patient, lying in bed, critically-ill  HEENT: Normocephalic, atraumatic, no icterus, oral cavity/oropharynx pink and moist  Cardiac: RRR, " s1/s2 auscultated without murmur,   Pulm: Intubated and sedated  Abdomen: Soft, non-distended, bowel sounds present  Extremities: Warm, no edema, distal pulses +2, well perfused  Skin: No rash or lesion  Psych: Calm and cooperative  Neuro:  Mental status: Intubated and sedated, responds to verbal stimuli can wiggle toes and give thumbs up.  Cranial nerves: PUPILS EQUAL AND REACTIVE BRISKLY,  face symmetric,   Motor: Normal bulk and tone. No abnormal movements. UNABLE TO assess strenth   Sensory: not examined  Coordination: not examined  Gait: DIMITRY, deferred.          Labs:  Recent Labs   Lab 08/26/22  0420 08/25/22 2127 08/25/22  1426 08/25/22  1140 08/25/22  0546 08/25/22  0115 08/24/22  0732 08/23/22  2220     --  134  --  132* 134   < > 128*   POTASSIUM 4.7 5.4* 5.0 4.8 5.7* 4.9   < > 5.0   CHLORIDE 107  --  106  --  104 104   < > 96*   CO2 23  --  25  --  23 24   < > 23   BUN 41*  --  40*  --  43* 43*   < > 42*   CR 1.00  --  1.04  --  1.06* 1.15*   < > 1.60*   NELLY 9.4  --  9.8  --  9.4 9.1   < > 10.9*   BILITOTAL 0.6  --   --   --  0.4 0.4  --  0.7   ALKPHOS 80  --   --   --  67 66  --  57   ALT 21  --   --   --  19 14  --  8   AST 42  --   --   --  45 20  --  20    < > = values in this interval not displayed.       Recent Labs   Lab 08/26/22  0420 08/25/22  2127 08/25/22  0546 08/25/22  0115 08/24/22  1044 08/24/22  0732   WBC 15.1*  --  11.1* 11.7*  --  9.9   HGB 7.2* 7.3* 7.4* 7.4*   < > 7.3*     --  187 228  --  227    < > = values in this interval not displayed.       Recent Labs   Lab 08/25/22 2110   PH 7.30*   PCO2 44   PO2 93   HCO3 21       All cultures:  Recent Labs   Lab 08/25/22  0545 08/24/22  2327 08/23/22  2233 08/23/22  2217   CULTURE No growth after 1 day No growth after 1 day >100,000 CFU/mL Klebsiella pneumoniae* No growth after 2 days  Gram negative bacilli*       Imaging:      All relevant imaging and laboratory values personally reviewed.

## 2022-08-26 NOTE — PROGRESS NOTES
[facility] ICU RESPIRATORY NOTE           Date of Admission: 08/24/2022     Date of Intubation (most recent): 08/25/2022    Reason for Mechanical Ventilation: AMS and airway protection      Number of Days on Mechanical Ventilation: 1     Met Criteria for Spontaneous Breathing Trial: No     Significant event today :None      ABG Results:   Recent Labs   Lab 08/25/22 2110   PH 7.30*   PCO2 44   PO2 93   HCO3 21   O2PER 40       Current Vent Settings:  Vent Mode: CMV/AC  (Continuous Mandatory Ventilation/ Assist Control)  FiO2 (%): 30 %  Resp Rate (Set): 14 breaths/min  Tidal Volume (Set, mL): 480 mL  PEEP (cm H2O): 8 cmH2O  Inspiratory Pressure (cm H2O) ([place]): 0.9  Resp: 14      Skin Assessment :Intact. Clean and dry      Plan: Continue full ventilatory support and wean as tolerated.     Liss Reyes, RT

## 2022-08-26 NOTE — PROCEDURES
INTERVENTIONAL PULMONOLOGY       Procedure(s):    A flexible bronchoscopy  Airway exam  BAL  Therapeutic suctioning (1 sites)    Indication:  resp failure    Attending of Record:  Murtaza Hanna MD     Interventional Pulmonary Fellow   None    Trainees Present:   None     Medications:    continued on ICU gtt medications (see MAR)    Sedation Time:   None    Time Out:  Performed    Procedure performed under emergent conditions    Maneuvers / Procedure:      Airway Examination: A complete airway examination was performed from the distal trachea to the subsegmental level in each lobe of both lungs.  Pertinent findings include no mucous plugging or purulent secretions observed.       BAL: The bronchoscope was wedged into the RML bronchus. A total of 120cc of saline was instilled and a total of 40cc  was aspirated. This was sent for analysis.     Any disposable equipment was visually inspected and deemed to be intact immediately post procedure.      Recommendations:     -->  Successful flex bronch, airway exam and BAL   -->  Await cultures    Murtaza Hanna MD  Associate Professor of Medicine  Section of Interventional Pulmonology   Division of Pulmonary, Allergy, Critical Care and Sleep Medicine   [facility]  Pager: [phone]   Office: [phone]  Email: [email or web address]    Julianne DOBBS, RN  Interventional Pulmonary Care Coordinator  Office: [phone]  Email: [email or web address]    Benita Walker  Interventional Pulmonology Surgery Scheduler  Office: [phone]  Email: [email or web address]

## 2022-08-26 NOTE — PROGRESS NOTES
MD notified about pt not voiding for over 4 hours and after 40mg of IV lasix. Bladder scanned for 596ml. MD ordered for an indwelling catheter to be placed.

## 2022-08-26 NOTE — PLAN OF CARE
Goal Outcome Evaluation:    Plan of Care Reviewed With: other (see comments) (patient discussed w/ IDT during rounds)     Overall Patient Progress: no change    Outcome Evaluation: NPO, vented. Plan for TF to start today. See RD orders

## 2022-08-27 NOTE — PROGRESS NOTES
[facility]    Cardiology Progress Note    Date of Service (when I saw the patient): 08/27/2022            Assessment and Plan:   Amy Luna is a 77 year old female who was admitted on 8/24/2022.     1.  Acute delirium/encephalopathy secondary to sepsis, with gradual weakness developing over the days prior to admission.  2.  Septic shock vs cardiogenic shock. Probably components of both. Levophed weaned off this morning.    3.  Subacute prosthetic mitral valve endocarditis.  Large vegetation (or thrombus?) identified on the mitral valve by transthoracic echocardiogram with severe valve dysfunction.  Severe mitral valve stenosis is present with a mean mitral valve gradient of 24 mmHg.  No significant mitral regurgitation identified by SOCORRO.   4.  History of bioprosthetic aortic valve replacement without evidence of dysfunction or vegetation by SOCORRO  5.  History of tricuspid valve repair with satisfactory tricuspid valve function. Cannot exclude vegetations on SOCORRO due to shadowing from AV prosthesis, but none seen.  6.  Severe pulmonary hypertension with severe right ventricular enlargement and dysfunction, probably due to severe mitral stenosis  7.  Paroxysmal atrial fibrillation, postoperatively after her cardiac surgery in April 2020 for valve replacement.  She was treated briefly with amiodarone and warfarin.  Amiodarone was discontinued and she continued warfarin until she recently had a GI bleed due to ibuprofen use for back pain and it was then stopped.  She has not had any evidence of recurrent atrial fibrillation on her pacemaker checks since shortly after her cardiac surgery in 2020.  Currently in sinus rhythm.  Not on anticoagulation prior to admission.  8.  Sick sinus syndrome with permanent pacemaker in place.  9.  Severe anemia, cause unclear.  Stool was guaiac negative.  She has a history of recent GI bleed due to ibuprofen use while on warfarin.  Both of those medications  were then discontinued.  Upper GI endoscopy on 8/25/2022 did not show a source of bleeding from the esophagus, stomach, or upper small intestine.  10.  Hypertension   11.  Dyslipidemia  12.  Chronic back and leg pain.  13.  Type 2 diabetes mellitus     PLAN/RECOMMENDATIONS:  -Pressure support trial and plans for extubation per critical care service  -Reviewed SOCORRO images personally:   She is a very large burden of thrombus or vegetation on the bioprosthetic mitral valve causing severe mitral stenosis.  In addition, there is a very large mobile mass extending from the valve into the left ventricle and then prolapsing back into the left atrium.  This certainly carries a very high embolic risk.  Neurology does not recommend anticoagulation at this time.    -Patient will require surgery to address the mitral valve; currently CV surgery feels she is too high risk; would like to continue with antibiotics and see how she does clinically.  May need to move more emergently if she has significant hemodynamic changes.  -She is at high risk for afib recurrence which she may not tolerate well; would initiate amiodarone with recurrent afib    Sravani Hameed MD [facility]    I personally examined and evaluated the patient today.  Amy Luna was in (or remains in) critical condition today due to septic/cardiogenic shock..  I spent a total of 30 minutes providing critical care services at the bedside, evaluating the patient, directing care and reviewing laboratory values and radiologic reports for Ms. Luna.           Interval History:     Stable overnight.  Levophed weaned off this morning         Medications:       acetaminophen  650 mg Oral TID     albuterol  2.5 mg Nebulization Q6H     chlorhexidine  15 mL Mouth/Throat Q12H     gabapentin  100 mg Oral or Feeding Tube TID     heparin ANTICOAGULANT  5,000 Units Subcutaneous Q12H     insulin aspart  1-12 Units Subcutaneous Q4H     insulin glargine  25  "Units Subcutaneous BID     meropenem  1 g Intravenous Q8H     pantoprazole  40 mg Oral BID AC    Or     pantoprazole (PROTONIX) IV  40 mg Intravenous BID AC     sertraline  100 mg Oral or Feeding Tube Daily              Physical Exam:   Blood pressure 109/48, pulse 113, temperature 99.9  F (37.7  C), resp. rate 27, height 1.651 m (5' 5\"), weight 99.2 kg (218 lb 11.1 oz), SpO2 97 %, not currently breastfeeding.  Vitals:    22 0000 22 0500 22 0400 22 0200   Weight: 98.6 kg (217 lb 6 oz) 98.8 kg (217 lb 13 oz) 101.2 kg (223 lb 1.7 oz) 99.2 kg (218 lb 11.1 oz)       Intake/Output Summary (Last 24 hours) at 2022 1824  Last data filed at 2022 1800  Gross per 24 hour   Intake 1463.74 ml   Output 1065 ml   Net 398.74 ml           Vital Sign Ranges  Temperature Temp  Av.1  F (37.3  C)  Min: 98.6  F (37  C)  Max: 99.9  F (37.7  C)   Blood pressure Systolic (24hrs), Av , Min:96 , Max:139        Diastolic (24hrs), Av, Min:39, Max:64      Pulse Pulse  Av.2  Min: 69  Max: 113   Respirations Resp  Av.9  Min: 8  Max: 27   Pulse oximetry SpO2  Av.8 %  Min: 94 %  Max: 100 %         EXAM:    Constitutional:    Intubated and sedated   Skin:    normal    Head:    Normocephalic, atramatic    Eyes:    pupils equal, round and reactive to light, extra ocular muscles intact, sclera clear, conjunctiva normal    Neck:    JVP    Lungs:    CTAB   Cardiovascular:    S1, S2  RRR no m/r/g, no JVD   Abdomen:    normal bowel sounds    Extremities and Back:    no cyanosis or clubbing and No edema bilaterally, warm   Neurological:    No gross or focal neurologic abnormalities               Data:     Recent Labs   Lab Test 22  0402 22  0420 22  0546 22  0115 22  0518 03/15/22  0910   WBC 9.8 15.1*   < > 11.7*   < > 10.0   HGB 7.2* 7.2*   < > 7.4*   < > 8.0*   MCV 95 93   < > 94   < > 90    279   < > 228   < > 363   INR  --   --   --  1.23*  --  0.81*    < " > = values in this interval not displayed.      Recent Labs   Lab Test 08/27/22  0402 08/26/22  0420    136   POTASSIUM 3.9 4.7   CHLORIDE 109 107   BUN 31* 41*   CR 0.81 1.00     Recent Labs   Lab 08/27/22  1620 08/27/22  1102 08/27/22  0750 08/27/22  0402   * 191* 139* 141*     Recent Labs   Lab Test 08/27/22  0402 08/26/22  0420   ALT 23 21   AST 34 42     Troponin I ES   Date Value Ref Range Status   01/17/2020 0.056 (H) 0.000 - 0.045 ug/L Final     Comment:     The 99th percentile for upper reference range is 0.045 ug/L.  Troponin values   in the range of 0.045 - 0.120 ug/L may be associated with risks of adverse   clinical events.     01/17/2020 0.106 (H) 0.000 - 0.045 ug/L Final     Comment:     The 99th percentile for upper reference range is 0.045 ug/L.  Troponin values   in the range of 0.045 - 0.120 ug/L may be associated with risks of adverse   clinical events.     01/16/2020 0.020 0.000 - 0.045 ug/L Final     Comment:     The 99th percentile for upper reference range is 0.045 ug/L.  Troponin values   in the range of 0.045 - 0.120 ug/L may be associated with risks of adverse   clinical events.     11/12/2014  0.000 - 0.045 ug/L Final    <0.015  The 99th percentile for upper reference range is 0.045 ug/L.  Troponin values in   the range of 0.045 - 0.120 ug/L may be associated with risks of adverse   clinical events.   Effective 7/30/2014, the reference range for this assay has changed to reflect   new instrumentation/methodology.     11/12/2014  0.000 - 0.045 ug/L Final    <0.015  The 99th percentile for upper reference range is 0.045 ug/L.  Troponin values in   the range of 0.045 - 0.120 ug/L may be associated with risks of adverse   clinical events.   Effective 7/30/2014, the reference range for this assay has changed to reflect   new instrumentation/methodology.           Recent Labs   Lab Test 08/27/22  0402 08/26/22  0420 08/25/22  0546   MAG 1.8 2.0 1.9       Lab Results   Component  Value Date    CHOL 190 08/04/2021    CHOL 187 07/28/2020     Lab Results   Component Value Date    HDL 64 08/04/2021    HDL 64 07/28/2020     Lab Results   Component Value Date    LDL 90 08/04/2021     07/28/2020     Lab Results   Component Value Date    TRIG 178 08/04/2021    TRIG 104 07/28/2020     No results found for: CHOLHDLRATIO       TSH   Date Value Ref Range Status   08/04/2021 2.28 0.40 - 4.00 mU/L Final   07/28/2020 4.47 (H) 0.40 - 4.00 mU/L Final         Sravani Hameed MD, FACC  Cardiology

## 2022-08-27 NOTE — PROGRESS NOTES
[facility] ICU RESPIRATORY NOTE        Date of Admission: 8/24/2022    Date of Intubation (most recent): 8/25/2022    Reason for Mechanical Ventilation: Airway protection.     Number of Days on Mechanical Ventilation: 3    Met Criteria for Spontaneous Breathing Trial: No    Reason for No Spontaneous Breathing Trial: Per MD.    Significant Events Today: None.    ABG Results:   Recent Labs   Lab 08/26/22  1208 08/25/22  2110   PH  --  7.30*   PCO2  --  44   PO2  --  93   HCO3  --  21   O2PER 30 40       Current Vent Settings: Vent Mode: CMV/AC  (Continuous Mandatory Ventilation/ Assist Control)  FiO2 (%): 30 %  Resp Rate (Set): 14 breaths/min  Tidal Volume (Set, mL): 420 mL  PEEP (cm H2O): 8 cmH2O  Inspiratory Pressure (cm H2O) ([place]): 0.9  Resp: 13      Skin Assessment: Intact.      Power Blood, RT on 8/27/2022 at 4:00 AM

## 2022-08-27 NOTE — PROGRESS NOTES
Extubation Note    Successful completion of SBT (Yes or No):Yes  Extubation time:1335    Patient assessment:  Lung sounds:clear  Stridor Present (Yes or No): none  Patient tolerance: good    Oxygen device:Oxymask  Liter flow:3    SpO2:99%    Plan: Continue oxymask and wean as tolerated.  8/27/2022  Devon Tyler, RT

## 2022-08-27 NOTE — PROGRESS NOTES
[facility]    Stroke Progress Note    Interval EventsExtubated    HPI Summary  Amy Luna is a 77 year old female with a past medical history of DM, CHF, HLD, HTN, s/p aortic and mitral bioprosthetic valve replacement along with PFO closure and tricuspid ring who was transferred from an OSH for encephalopathy.   As per notes patient with 2 weeks of fatigue and decreased alertness, hallucination. Initial labs showed anemia hemoglobin 7.6, hyponatremia 128, CLARITA creatinine 1.6/GFR 33, febrile at 100.6.  Patient was also reported to have black stool.  Patient needed hemodynamic support and needed   She was found to have endocarditis with her echo showing a mobile density, she is currently on antibiotics.       Stroke Evaluation Summarized    MRI/Head CT                                                                     IMPRESSION:  1.  No acute intracranial process.                                                                   IMPRESSION:     1. No evidence of acute intracranial hemorrhage, mass, or herniation.  2. Mild nonspecific white matter changes likely due to chronic  microvascular ischemic disease. Possibility of acute ischemia would be  better assessed with brain MRI if the patient is able.       Intracranial Vasculature    Cervical Vasculature      Echocardiogram . The left ventricle is normal in size. Left ventricular systolic function is  normal. The visual ejection fraction is 60-65%.  2. There is a bioprosthetic mitral valve. There is severe thickenning of the  leaflets of the bioprosthesis with severely restricted opening. There is  severe bioprosthetic stenosis with mean gradient of 23 mmHg.  3. There is a freely mobile mass (1.7 cms) attached to the atrial aspect of  the bioprosthetic MV leaflet which is consistent with a vegetation. No  evidence of valve dehiscence, PVL or regurgitation.  4. There is a well-seated, normally functioning, bioprosthetic valve in  "aortic  position witho normal leaflet motion. No vegetation noted on AV.  5. The left atrium is severely dilated. The right atrium is moderately  dilated.   EKG/Telemetry    Other Testing Not Applicable     LDL  No lab value available in past 30 days   A1C  8/25/2022: 6.6 %   Troponin No lab value available in past 48 hrs       Impression   Acute encephalopathy- has active endocarditis with a freely mobile mass that increases both stroke risk and infection. She has had one set of positive blood cultures. Neurology was consulted for encephalopathy and concern for stroke. She is unable to get an MRI due to a pacemaker. Head CTs were performed instead and have been negative. She has anemia, hypotension, elevated pro-bnp, and active UTI can cause encephalopathy.     Patient has a history of Afib and was on anticoagulation for a time period but had a GI bleed and it was stopped. In the setting of endocarditis there is an increased risk of bleeding with septic emboli so would continue to hold off on any anticoagulation until endocarditis is resolved.     Plan    -wean sedation  -unable to obtain MRI  -SBP goal <180  -Q4h neuro checks  -PTOT once extubated  -Valvular mass management per primary team/Cardiology/CT surgery  -EEG routine  -Improved mentation and following commands after extubation        Patient Follow-up    - final recommendation pending work-up    We will continue to follow.     The Stroke Staff is Dr. Harper.    Rachel Lucia MD  Vascular Neurology Fellow  To page me or covering stroke neurology team member, click here: [email or web address]   Choose \"On Call\" tab at top, then search dropdown box for \"Neurology Adult\", select location, press Enter, then look for stroke/neuro ICU/telestroke.    ______________________________________________________    Clinically Significant Risk Factors Present on Admission                 Medications   Scheduled Meds    acetaminophen  650 mg Oral TID     chlorhexidine  15 mL " Mouth/Throat Q12H     gabapentin  100 mg Oral or Feeding Tube TID     heparin ANTICOAGULANT  5,000 Units Subcutaneous Q12H     insulin aspart  1-12 Units Subcutaneous Q4H     insulin glargine  25 Units Subcutaneous BID     meropenem  1 g Intravenous Q8H     pantoprazole  40 mg Oral BID AC    Or     pantoprazole (PROTONIX) IV  40 mg Intravenous BID AC     sertraline  100 mg Oral or Feeding Tube Daily       Infusion Meds    dextrose       dextrose       norepinephrine 0.03 mcg/kg/min (08/27/22 0647)     propofol (DIPRIVAN) infusion 30 mcg/kg/min (08/27/22 0622)     sodium chloride 10 mL/hr at 08/25/22 1005       PRN Meds  acetaminophen **OR** acetaminophen, albuterol, bisacodyl, dextrose, dextrose, glucose **OR** dextrose **OR** glucagon, fentaNYL, flumazenil, HYDROmorphone, ipratropium - albuterol 0.5 mg/2.5 mg/3 mL, midazolam, naloxone **OR** naloxone **OR** naloxone **OR** naloxone, ondansetron **OR** ondansetron, polyethylene glycol, prochlorperazine **OR** prochlorperazine **OR** prochlorperazine, senna-docusate **OR** senna-docusate, sodium chloride (PF)       PHYSICAL EXAMINATION  Temp:  [98.4  F (36.9  C)-99.3  F (37.4  C)] 98.8  F (37.1  C)  Pulse:  [64-80] 75  Resp:  [10-28] 10  BP: ()/(39-73) 115/46  FiO2 (%):  [30 %] 30 %  SpO2:  [94 %-100 %] 96 %      Neurologic  Mental Status:  awake,  following commands, oriented  Cranial Nerves:  PERRL, blinks to threat bilaterally, face symmetric, hoarse voice  Motor:  No drift noted in upper extremities, wiggles toes bilaterally  Reflexes:  toes down-going  Sensory:  Equal to light touch bilaterally  Coordination:  unable to test  Station/Gait:  deferred        Imaging  I personally reviewed all imaging; relevant findings per HPI.     Lab Results Data   CBC  Recent Labs   Lab 08/27/22  0402 08/26/22  0420 08/25/22  1930 08/25/22  0546   WBC 9.8 15.1*  --  11.1*   RBC 2.61* 2.64*  --  2.64*   HGB 7.2* 7.2* 7.3* 7.4*   HCT 24.7* 24.6*  --  24.3*    279  --   187     Basic Metabolic Panel    Recent Labs   Lab 08/27/22  0402 08/27/22  0400 08/26/22  2359 08/26/22  0819 08/26/22  0420 08/26/22  0028 08/25/22  2127 08/25/22  1428 08/25/22  1426     --   --   --  136  --   --   --  134   POTASSIUM 3.9  --   --   --  4.7  --  5.4*  --  5.0   CHLORIDE 109  --   --   --  107  --   --   --  106   CO2 25  --   --   --  23  --   --   --  25   BUN 31*  --   --   --  41*  --   --   --  40*   CR 0.81  --   --   --  1.00  --   --   --  1.04   * 136* 144*   < > 234*   < >  --    < > 209*   NELLY 9.2  --   --   --  9.4  --   --   --  9.8    < > = values in this interval not displayed.     Liver Panel  Recent Labs   Lab 08/27/22  0402 08/26/22  1515 08/26/22  0420 08/25/22  0546   PROTTOTAL 7.2 7.2 7.3 7.7   ALBUMIN 1.9*  --  1.9* 2.1*   BILITOTAL 0.5  --  0.6 0.4   ALKPHOS 72  --  80 67   AST 34  --  42 45   ALT 23  --  21 19     INR    Recent Labs   Lab Test 08/25/22  0115 03/15/22  0910 03/14/22  2142   INR 1.23* 0.81* 1.09      Lipid Profile    Recent Labs   Lab Test 08/04/21  1126 07/28/20  0904   CHOL 190 187   HDL 64 64   LDL 90 102*   TRIG 178* 104     A1C    Recent Labs   Lab Test 08/25/22  1140 08/12/22  1506 03/14/22  1841   A1C 6.6* 6.7* 6.6*     Troponin I    Recent Labs   Lab 08/25/22  0115   TROPONINIS 162*

## 2022-08-27 NOTE — PROGRESS NOTES
Critical Care Progress Note      08/27/2022    Name: Amy Luna MRN#: [id number]   Age: 77 year old YOB: 1945     \[facility] [place] [facility]\""tl Day# 2  ICU DAY # 2    MV DAY # 2             Problem List:   Septic shock, resolved   Bioprosthetic mitral valve endocarditis   Acute encephalopathy, septic vs embolic   Acute hypoxemic respiratory failure         Summary/Hospital Course:   Ms. Luna is a very pleasant 76-year-old female with a history of valve disease.  She has bioprosthetic mitral and aortic valve replacements with tricuspid annuloplasty repair and PFO closure in 04/2020.  She did require a permanent pacemaker as well. She was admitted to [facility] on 8/23 for worsening encephalopathy.   -She was admitted with initial diagnosis of encephalopathy and community acquired pneumonia.  Her echo showed a possible mobile density and concern for endocarditis and she was trasnferred to [facility] for further care. Blood cultures are growing GNR. She is receiving meropenem per ID.     The evening after admission here she became obtunded with increasing oxygen requirements requiring intubation. She also underwent right sided thoracentesis and 1000 ml were drained 8/26. Findings are consistent with transudative effusion. Her serum albumin is 1.9.         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   I have personally reviewed the daily labs, imaging studies, cultures and discussed the case with referring physician and consulting physicians.   My assessment and plan by system for this patient is as follows:    Neurology/Psychiatry:   1. Acute encephalopathy--etiology unclear, suspect septic emboli vs septic encephalopathy.  Seems to have had rather sudden onset and high risk for embolic stroke. Does move all extremities when sedation weaned.  No findings on head CT; pacer limits MRI availability.  Neurology on board and case discussed.   2.  Analgesia - prn tylenol and home gabapentin.     Cardiovascular:   1.  Septic shock:  In setting of endocarditis and bacteremia. Weaned of NE gtt this AM. Seems to be responding to meropenem.   2.  IE, SOCORRO: 1.7 cm mobile echo density on mitral prosthetic valve with mitral stenosis. Also has GNR bacteremia.    Pulmonary/Ventilator Management:   1. Acute hypoxemic respiratory failure, vent dependent:  In setting of possible pneumonia vs pulmonary edema (favor the latter in the setting of severe mitral stenosis) on CT scan and large R effusion.  On meropenem, hemodynamics not favorable for diuresis today.  2. Right sided transudative pleural effusion drained 8/26  3. On SBT this AM, VE 6, will obtain VBG and CXR, tentative extubation if mentation appropriate off propofol  4. Change sedation to precedex gtt for RASS 0 to -1 once back on the ventilator     GI and Nutrition :   1. Nutrition consult for TF.  2. Report of dark stools at OSH:  Apparently hgb was stable there and hemoccult negative.  Pt has PO iron on home med list which could cause stool darkening in the absence of bleeding, but her hgb (though stable) is lower than baseline.  No evidence of disease on EGD.    Renal/Fluids/Electrolytes:   1. CLARITA, recovered   2. HyperK:resolved     Infectious Disease:   1. See above for infective endocarditis. Appreciate ID recs.    Endocrine:   1. Hyperglycemia:  In setting of stress and h/o DM:  sliding scale insulin and lantus.     Hematology/Oncology:   1. Anemia:  hgb stable at 7.2.  No confirmed blood loss, likely anemia of critical illness. Tolerating threshold of 7.0 especially in the setting of severe mitral stenosis and pulmonary edema with pleural effusion.   2. Leukocytosis has resolved     ICU Prophylaxis:   1. DVT: mechanical, subcut heparin    Clinically Significant Risk Factors Present on Admission                # Obesity: Estimated body mass index is 36.39 kg/m  as calculated from the following:    Height as  "of this encounter: 1.651 m (5' 5\").    Weight as of this encounter: 99.2 kg (218 lb 11.1 oz).         Critical care time today (excluding procedures): 60 min           Key Medications:       acetaminophen  650 mg Oral TID     chlorhexidine  15 mL Mouth/Throat Q12H     gabapentin  100 mg Oral or Feeding Tube TID     heparin ANTICOAGULANT  5,000 Units Subcutaneous Q12H     insulin aspart  1-12 Units Subcutaneous Q4H     insulin glargine  25 Units Subcutaneous BID     meropenem  1 g Intravenous Q8H     pantoprazole  40 mg Oral BID AC    Or     pantoprazole (PROTONIX) IV  40 mg Intravenous BID AC     sertraline  100 mg Oral or Feeding Tube Daily       dexmedetomidine       dextrose       dextrose       norepinephrine Stopped (08/27/22 1056)     propofol (DIPRIVAN) infusion Stopped (08/27/22 0948)     sodium chloride 10 mL/hr at 08/25/22 1005               Physical Examination:   /53   Pulse 89   Temp 99.1  F (37.3  C)   Resp 8   Ht 1.651 m (5' 5\")   Wt 99.2 kg (218 lb 11.1 oz)   LMP  (LMP Unknown)   SpO2 98%   BMI 36.39 kg/m        GEN: no acute distress, intubated   HEENT: head ncat, sclera anicteric, OP patent, trachea midline   PULM: unlabored synchronous with vent, clear anteriorly    CV/COR: RRR S1, Loud P2, no gallop,  No rub, no murmur  ABD: soft nontender, hypoactive bowel sounds, no mass  EXT:  warm and well perfused x4  NEURO: Opens eyes to verbal stimuli, nods head y/n to questions, still not moving extremities   SKIN: no obvious rash  LINES: clean, dry intact         Data:   All data and imaging reviewed     ROUTINE ICU LABS (Last four results)  CMP  Recent Labs   Lab 08/27/22  0750 08/27/22  0402 08/27/22  0400 08/26/22  2359 08/26/22  1518 08/26/22  1515 08/26/22  0819 08/26/22  0420 08/26/22  0028 08/25/22  2127 08/25/22  1428 08/25/22  1426 08/25/22  0832 08/25/22  0546 08/25/22  0449 08/25/22  0115   NA  --  138  --   --   --   --   --  136  --   --   --  134  --  132*  --  134   POTASSIUM "  --  3.9  --   --   --   --   --  4.7  --  5.4*  --  5.0   < > 5.7*  --  4.9   CHLORIDE  --  109  --   --   --   --   --  107  --   --   --  106  --  104  --  104   CO2  --  25  --   --   --   --   --  23  --   --   --  25  --  23  --  24   ANIONGAP  --  4  --   --   --   --   --  6  --   --   --  3  --  5  --  6   * 141* 136* 144*   < >  --    < > 234*   < >  --    < > 209*   < > 281*   < > 292*   BUN  --  31*  --   --   --   --   --  41*  --   --   --  40*  --  43*  --  43*   CR  --  0.81  --   --   --   --   --  1.00  --   --   --  1.04  --  1.06*  --  1.15*   GFRESTIMATED  --  75  --   --   --   --   --  58*  --   --   --  55*  --  54*  --  49*   NELLY  --  9.2  --   --   --   --   --  9.4  --   --   --  9.8  --  9.4  --  9.1   MAG  --  1.8  --   --   --   --   --  2.0  --   --   --   --   --  1.9  --  1.8   PHOS  --  2.3*  --   --   --   --   --  2.6  --   --   --   --   --  3.5  --  3.6   PROTTOTAL  --  7.2  --   --   --  7.2  --  7.3  --   --   --   --   --  7.7  --  7.6   ALBUMIN  --  1.9*  --   --   --   --   --  1.9*  --   --   --   --   --  2.1*  --  2.1*   BILITOTAL  --  0.5  --   --   --   --   --  0.6  --   --   --   --   --  0.4  --  0.4   ALKPHOS  --  72  --   --   --   --   --  80  --   --   --   --   --  67  --  66   AST  --  34  --   --   --   --   --  42  --   --   --   --   --  45  --  20   ALT  --  23  --   --   --   --   --  21  --   --   --   --   --  19  --  14    < > = values in this interval not displayed.     CBC  Recent Labs   Lab 08/27/22  0402 08/26/22  0420 08/25/22 2127 08/25/22  0546 08/25/22  0115   WBC 9.8 15.1*  --  11.1* 11.7*   RBC 2.61* 2.64*  --  2.64* 2.62*   HGB 7.2* 7.2* 7.3* 7.4* 7.4*   HCT 24.7* 24.6*  --  24.3* 24.7*   MCV 95 93  --  92 94   MCH 27.6 27.3  --  28.0 28.2   MCHC 29.1* 29.3*  --  30.5* 30.0*   RDW 18.1* 17.3*  --  17.2* 17.2*    279  --  187 228     INR  Recent Labs   Lab 08/25/22  0115   INR 1.23*     Arterial Blood Gas  Recent Labs   Lab  08/26/22  1208 08/25/22  2110   PH  --  7.30*   PCO2  --  44   PO2  --  93   HCO3  --  21   O2PER 30 40     Discussed with family and RN at the bedside. Discussed with neurology and cardiology.   Critical care time not including procedures was 55 minutes.

## 2022-08-27 NOTE — PROGRESS NOTES
Notified provider about indwelling arellano catheter discussed removal or continued need.    Did provider choose to remove indwelling arellano catheter? No    Provider's arellano indication for keeping indwelling arellano catheter: Retention.    Is there an order for indwelling arellano catheter? Yes    *If there is a plan to keep arellano catheter in place at discharge daily notification with provider is not necessary.

## 2022-08-27 NOTE — PROVIDER NOTIFICATION
Spoke with Dr Smith early in pm shift re: blood sugar/ssi, lack of IVF, wheezes in R lobes,  diminished lung sounds on left side et shallow breathing due to severe weakness. Ordered to hold ssi, start LR@50/hr, cxr consistent w/lung sounds, et start ez pap with nebs. Pt attempting to cough et oral suction self. Productive.   Upon later assessment, pt abdominal breathing, HR increased fm low 100's to 120's, more shallow respers and diaphoretic. Attempted to deep oral suction pt and pt had no gag reflex. Is oriented and appropriate but simply severely weak. Updated Dr Smith. To bedside to see, ordered bipap with racemic epi neb and abg 1 hr post. Report to pm shift. Monitor

## 2022-08-27 NOTE — PROGRESS NOTES
[facility]    Infectious Disease Progress Note    Date of Service (when I saw the patient): 08/27/2022     Assessment & Plan   Amy Luna is a 77 year old female who was admitted on 8/24/2022.     Impression:  1. 77 y.o with significant cardiac history.   2. She has bioprosthetic mitral and aortic valve replacements with tricuspid annuloplasty repair and PFO closure in 04/2020.  She did require a permanent pacemaker as well. She was admitted to [facility] on 8/23 for worsening encephalopathy.   3. She was admitted with initial diagnosis of encephalopathy and community acquired pneumonia.    4. Her echo showed a possible mobile density and concern for endocarditis and she was trasnferred to [facility] for further care.   5. On vancomycin + meropenem   6. Blood cultures now positive with GNR    7. Urine culture with GNR      Recommendations:   1. SOCORRO with a freely mobile mass (1.7 cms) attached to the atrial aspect of  the bioprosthetic MV leaflet which is consistent with a vegetation. No  evidence of valve dehiscence, PVL or regurgitation.   2. Its hard to make a distinction between a vegetation or a thrombus based on the SOCORRO for me   3. GNR in the urine has been further identified as Klebsiella.   4. GNR in the blood cultures so far pending ID. Now both cultures from the OSH arepos   5. Repeat blood cultures here have been negative     Discussion:   GNR are a rare cause of endocarditis. If the so far appearing transient bacteremia is identified as the same organism that is in the blood we can say its a urosepsis. But just based on the this information and the SOCORRO findings endocarditis is hard to rule out and the mobile mass could very well be endocarditis.     Plan:   Continue meropenem till full information back on the blood isolate   If the same klebsiella as the urine switch antibiotics to ceftriaxone + cipro.     Will continue to follow         Azam Recinos MD    Interval History    Intubated last night due to mental status changes   Afebrile   Culture data as listed     Physical Exam   Temp: 99.3  F (37.4  C) Temp src: Bladder BP: 127/43 Pulse: 98   Resp: 14 SpO2: 97 % O2 Device: Mechanical Ventilator    Vitals:    08/25/22 0500 08/26/22 0400 08/27/22 0200   Weight: 98.8 kg (217 lb 13 oz) 101.2 kg (223 lb 1.7 oz) 99.2 kg (218 lb 11.1 oz)     Vital Signs with Ranges  Temp:  [98.4  F (36.9  C)-99.3  F (37.4  C)] 99.3  F (37.4  C)  Pulse:  [64-98] 98  Resp:  [8-28] 14  BP: ()/(39-73) 127/43  FiO2 (%):  [30 %] 30 %  SpO2:  [94 %-100 %] 97 %    Constitutional: intubated   Lungs: Clear to auscultation bilaterally, no crackles or wheezing  Cardiovascular: Regular rate and rhythm, normal S1 and S2, and no murmur noted  Abdomen: Normal bowel sounds, soft, non-distended, non-tender  Skin: No rashes, no cyanosis, no edema  Other:    Medications     dexmedetomidine       dextrose       dextrose       norepinephrine Stopped (08/27/22 1056)     propofol (DIPRIVAN) infusion Stopped (08/27/22 0948)     sodium chloride 10 mL/hr at 08/25/22 1005       acetaminophen  650 mg Oral TID     chlorhexidine  15 mL Mouth/Throat Q12H     gabapentin  100 mg Oral or Feeding Tube TID     heparin ANTICOAGULANT  5,000 Units Subcutaneous Q12H     insulin aspart  1-12 Units Subcutaneous Q4H     insulin glargine  25 Units Subcutaneous BID     meropenem  1 g Intravenous Q8H     pantoprazole  40 mg Oral BID AC    Or     pantoprazole (PROTONIX) IV  40 mg Intravenous BID AC     sertraline  100 mg Oral or Feeding Tube Daily       Data   All microbiology laboratory data reviewed.  Recent Labs   Lab Test 08/27/22  0402 08/26/22  0420 08/25/22  2127 08/25/22  0546   WBC 9.8 15.1*  --  11.1*   HGB 7.2* 7.2* 7.3* 7.4*   HCT 24.7* 24.6*  --  24.3*   MCV 95 93  --  92    279  --  187     Recent Labs   Lab Test 08/27/22  0402 08/26/22  0420 08/25/22  1426   CR 0.81 1.00 1.04     No lab results found.  Recent Labs   Lab Test  01/16/20 2221 01/16/20 2220   CULT No growth No growth

## 2022-08-27 NOTE — PLAN OF CARE
Pt remains lethargic. Extubated at 1435 to oxymask and transitioned to nasal cannula. Generalized weakness. EEG tech informed nursing they will attach pt to EEG monitoring tomorrow morning.  Daughters at bedside, updated with plan of care.  Encouraged coughing and deep breathing. Dilaudid for pain x 2. Cont to monitor.   Problem: Infection  Goal: Absence of Infection Signs and Symptoms  Outcome: Ongoing, Progressing     Problem: UTI (Urinary Tract Infection)  Goal: Improved Infection Symptoms  Outcome: Ongoing, Progressing     Problem: Risk for Delirium  Goal: Optimal Coping  Outcome: Ongoing, Progressing   Goal Outcome Evaluation:

## 2022-08-27 NOTE — PROGRESS NOTES
Neuro: Sedated, PEERL, Responds to pain  CV: SR, Levophed titrated to meet MAP goals of 65>  Resp: No changes to vent this shift  GI/: Stewart, adequate urine, no BM, G tube, increased TF rate at Q12 dagoberto.  Skin: Multiple skin issues, please see flowsheet  Pain/Gtts: Propofol and levophed infusing. Given Dilaudid PRN for Sedation/Pain management  Family: No contact with family this shift

## 2022-08-28 NOTE — PROGRESS NOTES
CVTS    Chart reviewed and discussed with Dr. Oreilly.     Patient is 77 year old female s/p aortic and mitral bioprosthetic valve replacement along with PFO closure and tricuspid ring now presents from OSH for encephalopathy. She was found to have a UTI and now with 1/4 +BC (8/23) so far. BC from 8/25 remain NGTD. Her TTE showed high mitral gradient and possible mass on mitral valve. SOCORRO on 8/25 with 1.7cm freely mobile mass attached to atrial aspect of bioprosthetic MV leaflet consistent with a vegetation vs thrombus causing severe mitral stenosis.     Patient extubated yesterday, remains on low dose NE. UA obtained overnight remains positive and CXR with worsening right sided opacities. Clinically she remains a very poor surgical candidate and her surgical risk remains prohibitive.     Recommend continuing medical management.  CV surgery will continue to follow peripherally.     Arabella Angulo PA-C  CV surgery

## 2022-08-28 NOTE — PROGRESS NOTES
[facility]    Stroke Progress Note    Interval EventsExtubated and following commands, had increased WOB overnight and was placed on precedex and bipap. She was started on levophed due to hypotension while on precedex.     HPI Summary  Amy Luna is a 77 year old female with a past medical history of DM, CHF, HLD, HTN, s/p aortic and mitral bioprosthetic valve replacement along with PFO closure and tricuspid ring who was transferred from an OSH for encephalopathy.   As per notes patient with 2 weeks of fatigue and decreased alertness, hallucination. Initial labs showed anemia hemoglobin 7.6, hyponatremia 128, CLARITA creatinine 1.6/GFR 33, febrile at 100.6.  Patient was also reported to have black stool.  Patient needed hemodynamic support and needed   She was found to have endocarditis with her echo showing a mobile density, she is currently on antibiotics.       Stroke Evaluation Summarized    MRI/Head CT                                                                     IMPRESSION:  1.  No acute intracranial process.                                                                   IMPRESSION:     1. No evidence of acute intracranial hemorrhage, mass, or herniation.  2. Mild nonspecific white matter changes likely due to chronic  microvascular ischemic disease. Possibility of acute ischemia would be  better assessed with brain MRI if the patient is able.       Intracranial Vasculature    Cervical Vasculature      Echocardiogram . The left ventricle is normal in size. Left ventricular systolic function is  normal. The visual ejection fraction is 60-65%.  2. There is a bioprosthetic mitral valve. There is severe thickenning of the  leaflets of the bioprosthesis with severely restricted opening. There is  severe bioprosthetic stenosis with mean gradient of 23 mmHg.  3. There is a freely mobile mass (1.7 cms) attached to the atrial aspect of  the bioprosthetic MV leaflet which is  "consistent with a vegetation. No  evidence of valve dehiscence, PVL or regurgitation.  4. There is a well-seated, normally functioning, bioprosthetic valve in aortic  position witho normal leaflet motion. No vegetation noted on AV.  5. The left atrium is severely dilated. The right atrium is moderately  dilated.   EKG/Telemetry    Other Testing Not Applicable     LDL  No lab value available in past 30 days   A1C  8/25/2022: 6.6 %   Troponin No lab value available in past 48 hrs       Impression   Acute encephalopathy- has active endocarditis with a freely mobile mass that increases both stroke risk and infection. She has had one set of positive blood cultures. Neurology was consulted for encephalopathy and concern for stroke. She is unable to get an MRI due to a pacemaker. Head CTs were performed instead and have been negative. She has anemia, hypotension, elevated pro-bnp, and active UTI can cause encephalopathy.     Patient has a history of Afib and was on anticoagulation for a time period but had a GI bleed and it was stopped. In the setting of endocarditis there is an increased risk of bleeding with septic emboli so would continue to hold off on any anticoagulation until endocarditis is resolved.     Plan       -SBP goal <180  -Q4h neuro checks  -PTOT   -Valvular mass management per primary team/Cardiology/CT surgery  -EEG routine  -Improved mentation and following commands after extubation  - MRI/ MRA head and neck if pacemaker is compatible      Patient Follow-up    - final recommendation pending work-up    We will continue to follow.     The Stroke Staff is Dr. Harper.    Rachel Lucia MD  Vascular Neurology Fellow  To page me or covering stroke neurology team member, click here: AMCOM   Choose \"On Call\" tab at top, then search dropdown box for \"Neurology Adult\", select location, press Enter, then look for stroke/neuro ICU/telestroke.    ______________________________________________________    Clinically " Significant Risk Factors Present on Admission                 Medications   Scheduled Meds    acetaminophen  650 mg Oral TID     albuterol  2.5 mg Nebulization Q6H     chlorhexidine  15 mL Mouth/Throat Q12H     gabapentin  100 mg Oral or Feeding Tube TID     heparin ANTICOAGULANT  5,000 Units Subcutaneous Q12H     insulin aspart  1-12 Units Subcutaneous Q4H     insulin glargine  25 Units Subcutaneous BID     meropenem  1 g Intravenous Q8H     pantoprazole  40 mg Oral BID AC    Or     pantoprazole (PROTONIX) IV  40 mg Intravenous BID AC     sertraline  100 mg Oral or Feeding Tube Daily       Infusion Meds    dexmedetomidine 0.2 mcg/kg/hr (08/28/22 0630)     dextrose       dextrose       lactated ringers 50 mL/hr at 08/27/22 1759     norepinephrine 0.03 mcg/kg/min (08/28/22 0532)     sodium chloride Stopped (08/27/22 1800)       PRN Meds  acetaminophen **OR** acetaminophen, bisacodyl, dextrose, dextrose, glucose **OR** dextrose **OR** glucagon, fentaNYL, flumazenil, HYDROmorphone, midazolam, naloxone **OR** naloxone **OR** naloxone **OR** naloxone, ondansetron **OR** ondansetron, polyethylene glycol, prochlorperazine **OR** prochlorperazine **OR** prochlorperazine, senna-docusate **OR** senna-docusate, sodium chloride (PF)       PHYSICAL EXAMINATION  Temp:  [93.7  F (34.3  C)-100.4  F (38  C)] 98.8  F (37.1  C)  Pulse:  [] 77  Resp:  [8-27] 14  BP: ()/(43-92) 110/55  FiO2 (%):  [30 %-40 %] 40 %  SpO2:  [90 %-100 %] 100 %      Neurologic  Mental Status:  awake,  following commands, oriented  Cranial Nerves:  PERRL, blinks to threat bilaterally, face symmetric, hoarse voice  Motor:  No drift noted in upper extremities, wiggles toes bilaterally  Reflexes:  toes down-going  Sensory:  Equal to light touch bilaterally  Coordination:  unable to test  Station/Gait:  deferred        Imaging  I personally reviewed all imaging; relevant findings per HPI.     Lab Results Data   CBC  Recent Labs   Lab 08/28/22  6408  08/27/22 2212 08/27/22  0402   WBC 8.8 13.2* 9.8   RBC 2.51* 2.95* 2.61*   HGB 7.1* 8.1* 7.2*   HCT 23.8* 28.3* 24.7*    255 237     Basic Metabolic Panel    Recent Labs   Lab 08/28/22  0402 08/28/22  0401 08/28/22  0017 08/27/22 2212 08/27/22  0750 08/27/22 0402     --   --  139  --  138   POTASSIUM 4.5  --   --  4.6  --  3.9   CHLORIDE 109  --   --  109  --  109   CO2 26  --   --  25  --  25   BUN 25  --   --  26  --  31*   CR 0.80  --   --  0.78  --  0.81   * 169* 203* 255*   < > 141*   NELLY 9.1  --   --  9.3  --  9.2    < > = values in this interval not displayed.     Liver Panel  Recent Labs   Lab 08/28/22  0402 08/27/22 2212 08/27/22  0402   PROTTOTAL 7.0 7.9 7.2   ALBUMIN 1.7* 2.0* 1.9*   BILITOTAL 0.6 0.5 0.5   ALKPHOS 72 82 72   AST 26 37 34   ALT 18 21 23     INR    Recent Labs   Lab Test 08/25/22  0115 03/15/22  0910 03/14/22  2142   INR 1.23* 0.81* 1.09      Lipid Profile    Recent Labs   Lab Test 08/04/21  1126 07/28/20  0904   CHOL 190 187   HDL 64 64   LDL 90 102*   TRIG 178* 104     A1C    Recent Labs   Lab Test 08/25/22  1140 08/12/22  1506 03/14/22  1841   A1C 6.6* 6.7* 6.6*     Troponin I    Recent Labs   Lab 08/25/22  0115   TROPONINIS 162*

## 2022-08-28 NOTE — PROGRESS NOTES
RTwas called to put pt on bipap 10/5 14, 30% @ 1900 for increased weakness and high RR. Pt has a SpO2 of mid 90's. RR improved, pt more relaxed. BS coarse/wheezes expiratory. Pt received neb tx as ordered. Skin intact. Mepilex and liquicol in place.     Will continue to monitor    Marion Clark RT assistant

## 2022-08-28 NOTE — PROVIDER NOTIFICATION
Notified provider about indwelling arellano catheter discussed removal or continued need.    Did provider choose to remove indwelling arellano catheter? No    Provider's arellano indication for keeping indwelling arellano catheter: Retention / Obstruction  Is there an order for indwelling arellano catheter? Yes    *If there is a plan to keep arellano catheter in place at discharge daily notification with provider is not necessary.

## 2022-08-28 NOTE — PROGRESS NOTES
Critical Care Progress Note      08/28/2022    Name: Amy Luna MRN#: [id number]   Age: 77 year old YOB: 1945     \[facility] [place] [facility]\""tl Day# 3  ICU DAY # 3    MV DAY # N/A             Problem List:   Septic shock, resolved   Bioprosthetic mitral valve endocarditis   Severe mitral stenosis   Acute encephalopathy, septic vs embolic   Acute hypoxemic respiratory failure         Summary/Hospital Course:   Ms. Luna is a very pleasant 76-year-old female with a history of valve disease.  She has bioprosthetic mitral and aortic valve replacements with tricuspid annuloplasty repair and PFO closure in 04/2020.  She did require a permanent pacemaker as well. She was admitted to [facility] on 8/23 for worsening encephalopathy.   - She was admitted with initial diagnosis of encephalopathy and community acquired pneumonia.  Her echo showed a mobile density and concern for endocarditis and she was trasnferred to [facility] for further care. This was confirmed by SOCORRO (1.9 cm mitral vegetation) and blood cultures are growing GNR 3/4 bottles from 8/23 but cultures have showed NGTD afterwards. She is receiving meropenem per ID.     The evening after admission here she became obtunded with increasing oxygen requirements requiring intubation. She also underwent right sided thoracentesis and 1000 ml were drained 8/26. Findings are consistent with transudative effusion, likely related to mitral stensis. Her serum albumin is 1.9.         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   I have personally reviewed the daily labs, imaging studies, cultures and discussed the case with referring physician and consulting physicians.   My assessment and plan by system for this patient is as follows:    Neurology/Psychiatry:   1. Acute encephalopathy--etiology unclear, suspect septic emboli vs septic encephalopathy.  Improving, able to nod head for questions. Unable  to do MRI due to PPM, however she did have a lumbar MRI in 5/2019 while she had the PPM, then cardiac surgery in 2020 so perhaps the third lead addition is the limitation?  2. Analgesia - prn tylenol and home gabapentin.     Cardiovascular:   1.  Septic shock:  In setting of endocarditis and bacteremia. NE gtt restarted overnight and now at 0.03. Seems to be responding to meropenem.   - Titrate NE gtt for MAP > 65  2.  IE, SOCORRO: 1.9 cm mobile echo density on mitral prosthetic valve with mitral stenosis. Also has GNR bacteremia.    Pulmonary/Ventilator Management:   1. Acute hypoxemic respiratory failure likely secondary to pulmonary edema in the setting of severe mitral stenosis. Extubated 8/27 but developed upper airway distress and placed on BiPAP and racemic epi.  - Upper airway wheeze but not stridor, will do dexamethasone 4 mg IV q6 x24 hours  - Continue precedex gtt for RASS 0 to +1   - VBG now   - Titrate RR for PaCO2 35-40 (current settings 10/5, RR 14, VE 7)    2. Right sided transudative pleural effusion drained 8/26      GI and Nutrition :   1. Keep NPO given BiPAP   2. Report of dark stools at OSH:  Apparently hgb was stable there and hemoccult negative.  Pt has PO iron on home med list which could cause stool darkening in the absence of bleeding, but her hgb (though stable) is lower than baseline.  No evidence of disease on EGD.    Renal/Fluids/Electrolytes:   Oliguric, likely related to hypotension overnight. Albumin is 1.7, IV fluid options/transfusion limited with severe mitral stenosis and suspected pulmonary edema on new CXR.   - Albumin 25%, 25 g IV over 4 hours  - Avoid nephrotoxins     Infectious Disease:   1. See above for infective endocarditis. Appreciate ID recs.    Endocrine:   1. Hyperglycemia:  In setting of stress and h/o DM:  sliding scale insulin and lantus.     Hematology/Oncology:   1. Anemia:  hgb stable at 7.2.  No confirmed blood loss, likely anemia of critical illness. Tolerating  "threshold of 7.0 especially in the setting of severe mitral stenosis and pulmonary edema with pleural effusion.   2. Leukocytosis has resolved     ICU Prophylaxis:   1. DVT: mechanical, subcut heparin    Clinically Significant Risk Factors Present on Admission                # Obesity: Estimated body mass index is 37.02 kg/m  as calculated from the following:    Height as of this encounter: 1.651 m (5' 5\").    Weight as of this encounter: 100.9 kg (222 lb 7.1 oz).         Critical care time today (excluding procedures): 60 min           Key Medications:       acetaminophen  650 mg Oral TID     albumin human  25 g Intravenous Once     albuterol  2.5 mg Nebulization Q6H     chlorhexidine  15 mL Mouth/Throat Q12H     dexamethasone  4 mg Intravenous Q6H     gabapentin  100 mg Oral or Feeding Tube TID     heparin ANTICOAGULANT  5,000 Units Subcutaneous Q12H     insulin aspart  1-12 Units Subcutaneous Q4H     insulin glargine  25 Units Subcutaneous BID     meropenem  1 g Intravenous Q8H     pantoprazole  40 mg Oral BID AC    Or     pantoprazole (PROTONIX) IV  40 mg Intravenous BID AC     sertraline  100 mg Oral or Feeding Tube Daily       dexmedetomidine 0.2 mcg/kg/hr (08/28/22 0630)     dextrose       dextrose       lactated ringers 50 mL/hr at 08/27/22 1759     norepinephrine 0.03 mcg/kg/min (08/28/22 0532)     sodium chloride Stopped (08/27/22 1800)               Physical Examination:   /55   Pulse 77   Temp 98.8  F (37.1  C)   Resp 14   Ht 1.651 m (5' 5\")   Wt 100.9 kg (222 lb 7.1 oz)   LMP  (LMP Unknown)   SpO2 100%   BMI 37.02 kg/m        GEN: no acute distress, intubated   HEENT: head ncat, sclera anicteric, OP patent, trachea midline   PULM: Good AE BL with no added sounds, upper airway wheeze on auscultation   CV/COR: RRR S1, Loud P2, no gallop,  No rub, no murmur  ABD: soft nontender, hypoactive bowel sounds, no mass  EXT:  warm and well perfused x4  NEURO: Opens eyes to verbal stimuli, nods head " y/n to questions, still not moving extremities   SKIN: no obvious rash  LINES: clean, dry intact         Data:   All data and imaging reviewed     ROUTINE ICU LABS (Last four results)  CMP  Recent Labs   Lab 08/28/22  0402 08/28/22  0401 08/28/22  0017 08/27/22  2212 08/27/22  0750 08/27/22  0402 08/26/22  1518 08/26/22  1515 08/26/22  0819 08/26/22  0420 08/25/22  0832 08/25/22  0546     --   --  139  --  138  --   --   --  136   < > 132*   POTASSIUM 4.5  --   --  4.6  --  3.9  --   --   --  4.7   < > 5.7*   CHLORIDE 109  --   --  109  --  109  --   --   --  107   < > 104   CO2 26  --   --  25  --  25  --   --   --  23   < > 23   ANIONGAP 4  --   --  5  --  4  --   --   --  6   < > 5   * 169* 203* 255*   < > 141*   < >  --    < > 234*   < > 281*   BUN 25  --   --  26  --  31*  --   --   --  41*   < > 43*   CR 0.80  --   --  0.78  --  0.81  --   --   --  1.00   < > 1.06*   GFRESTIMATED 76  --   --  78  --  75  --   --   --  58*   < > 54*   NELLY 9.1  --   --  9.3  --  9.2  --   --   --  9.4   < > 9.4   MAG 1.8  --   --  1.8  --  1.8  --   --   --  2.0  --  1.9   PHOS 2.7  --   --   --   --  2.3*  --   --   --  2.6  --  3.5   PROTTOTAL 7.0  --   --  7.9  --  7.2  --  7.2  --  7.3  --  7.7   ALBUMIN 1.7*  --   --  2.0*  --  1.9*  --   --   --  1.9*  --  2.1*   BILITOTAL 0.6  --   --  0.5  --  0.5  --   --   --  0.6  --  0.4   ALKPHOS 72  --   --  82  --  72  --   --   --  80  --  67   AST 26  --   --  37  --  34  --   --   --  42  --  45   ALT 18  --   --  21  --  23  --   --   --  21  --  19    < > = values in this interval not displayed.     CBC  Recent Labs   Lab 08/28/22  0402 08/27/22  2212 08/27/22  0402 08/26/22  0420   WBC 8.8 13.2* 9.8 15.1*   RBC 2.51* 2.95* 2.61* 2.64*   HGB 7.1* 8.1* 7.2* 7.2*   HCT 23.8* 28.3* 24.7* 24.6*   MCV 95 96 95 93   MCH 28.3 27.5 27.6 27.3   MCHC 29.8* 28.6* 29.1* 29.3*   RDW 17.9* 17.9* 18.1* 17.3*    255 237 279     INR  Recent Labs   Lab 08/25/22  0115   INR  1.23*     Arterial Blood Gas  Recent Labs   Lab 08/27/22 2049 08/27/22  1100 08/26/22  1208 08/25/22  2110   PH 7.36  --   --  7.30*   PCO2 43  --   --  44   PO2 81  --   --  93   HCO3 24  --   --  21   O2PER 40 30 30 40     Discussed with RN at the bedside. Discussed with  and answered his questions.   Critical care time not including procedures was 50 minutes.

## 2022-08-28 NOTE — PROGRESS NOTES
Neuro: Patient lethargic, able to answer yes or no questions, attempts to speak but difficult to understand due to hoarseness and  Weakness, PEERL, follows commands  CV: ST with rates as high as 130 with frequent PACs at beginning of shift. BP slightly elevated. Patient started on Precedex for presumed anxiety, heart rate trending downward throughout remainder of shift in to the high 70s   Resp: Lungs Coarse throughout bilaterally, started on BIPAP at beginning of night shift. Patient with extremely weak cough unable to clear secretions.   GI/: NPO due to swallow issues, no BM this shift, Stewart draining adequate urine but trending downward throughout shift. Sugars trending upward at beginning of shift, monitored sugars, at beginning of shift sugars trending in to the low 200s, gave scheduled Lantus and rechecked with trend continuing upwards in to the 240s, gave 4 units of scheduled insulin at that time. Subsequent rechecks showed drastic downward trend dropping almost 40 points on two consequtive BS checks. Held Aspart remainder of shift due to NPO status and drastic shifts downward  Skin: Multiple skin issues, please see flowsheet  Pain/Gtts: Precedex started which drastically improved patient's status, precedex the turned off due to patient's hypotension. Restarted levophed, restarted precedex after turning on levophed.   Family: No contact with family overnight      At beginning of shift patient was tachycardiac, diaphoretic, febrile, and had increased work of breathing. Discussed with MD to determine plan of care. Labs, CXR, and precedex ordered. Patient showed notable improvement in breathing and hemodynamics upon initiation of precedex.

## 2022-08-28 NOTE — PROGRESS NOTES
Discussed with charge RN and bedside RN patient's worsening fever, tachycardia. On chart review, she is a 76 yo F with medical history significant for MVR, AoVR, and TV annuloplasty and PFO closure, pacemaker, who was admitted on 8/23 with encephalopathy and septic shock and FTH GNR bacteremia, Klebsiella UTI, and a 1.7 cm freely mobile mass on the bioprosthetic MV concerning for endocarditis. She was extubated today to Bpap and received racemic epinephrine for upper airway edema. She remains on Bpap 10/5 with RR 14 to 20, Vt 400 ml, SpO2 95% on 40% FIO2, HR in the low 120's. She appears comfortable in bed, lungs are coarse b/l, she is tolerating Bpap mask, able to answer questions appropriately by head nodding, thumbs. Cough is weak. She has become more febrile this evening with Tmax 100.4 F which is consistent with known endocarditis.      CXR performed and shows worsening right-sided opacities and effusion, stable L-sided effusion. Last thora was 8/26 with 1 L drained, cultures remain NGTD.     Plan:   - continue bpap at current settings and continue to monitor closely for need for reintubation  - she continues on meropenem for GN bacteremia/endocarditis, last blood cultures drawn 8/27    Echo Palomo MD  Pulmonary, Allergy, Critical Care, and Sleep Medicine   [facility]   Pager: [phone]

## 2022-08-28 NOTE — PROGRESS NOTES
[facility]    Cardiology Progress Note    Date of Service (when I saw the patient): 08/28/2022            Assessment and Plan:   Amy Luna is a 77 year old female who was admitted on 8/24/2022.     1.  Acute delirium/encephalopathy secondary to sepsis, with gradual weakness developing over the days prior to admission.  2.  Septic shock vs cardiogenic shock. Probably components of both. Levophed weaned off this morning.    3.  Subacute prosthetic mitral valve endocarditis.  Large vegetation (or thrombus?) identified on the mitral valve by transthoracic echocardiogram with severe valve dysfunction.  Severe mitral valve stenosis is present with a mean mitral valve gradient of 24 mmHg.  No significant mitral regurgitation identified by SOCORRO.   4.  History of bioprosthetic aortic valve replacement without evidence of dysfunction or vegetation by SOCORRO  5.  History of tricuspid valve repair with satisfactory tricuspid valve function. Cannot exclude vegetations on SOCORRO due to shadowing from AV prosthesis, but none seen.  6.  Severe pulmonary hypertension with severe right ventricular enlargement and dysfunction, probably due to severe mitral stenosis  7.  Paroxysmal atrial fibrillation, postoperatively after her cardiac surgery in April 2020 for valve replacement.  She was treated briefly with amiodarone and warfarin.  Amiodarone was discontinued and she continued warfarin until she recently had a GI bleed due to ibuprofen use for back pain and it was then stopped.  She has not had any evidence of recurrent atrial fibrillation on her pacemaker checks since shortly after her cardiac surgery in 2020.  Currently in sinus rhythm.  Not on anticoagulation prior to admission.  8.  Sick sinus syndrome with permanent pacemaker in place.  9.  Severe anemia, cause unclear.  Stool was guaiac negative.  She has a history of recent GI bleed due to ibuprofen use while on warfarin.  Both of those medications  were then discontinued.  Upper GI endoscopy on 8/25/2022 did not show a source of bleeding from the esophagus, stomach, or upper small intestine.  10.  Hypertension   11.  Dyslipidemia  12.  Chronic back and leg pain.  13.  Type 2 diabetes mellitus     PLAN/RECOMMENDATIONS:  -Reviewed SOCORRO images personally:   She is a very large burden of thrombus or vegetation on the bioprosthetic mitral valve causing severe mitral stenosis.  In addition, there is a very large mobile mass extending from the valve into the left ventricle and then prolapsing back into the left atrium.  This certainly carries a very high embolic risk.  Neurology does not recommend anticoagulation at this time.    -Patient will require surgery to address the mitral valve; currently CV surgery feels she is too high risk; would like to continue with antibiotics and see how she does clinically.  May need to move more emergently if she has significant hemodynamic changes.  -She is at high risk for afib recurrence which she may not tolerate well; would initiate amiodarone with recurrent afib  -No new cardiac recommendations today      Sravani Hameed MD [facility]        Interval History:     Extubated yesterday.  Intermittently on BiPAP.  More alert and oriented per nursing report.      Mildly febrile overnight with tachycardia, now resolved  Levophed off.           Medications:       acetaminophen  650 mg Oral TID     albumin human         albuterol  2.5 mg Nebulization Q6H     dexamethasone  4 mg Intravenous Q6H     gabapentin  100 mg Oral or Feeding Tube TID     heparin ANTICOAGULANT  5,000 Units Subcutaneous Q12H     insulin aspart  1-12 Units Subcutaneous Q4H     insulin glargine  25 Units Subcutaneous BID     meropenem  1 g Intravenous Q8H     pantoprazole  40 mg Oral BID AC    Or     pantoprazole (PROTONIX) IV  40 mg Intravenous BID AC     sertraline  100 mg Oral or Feeding Tube Daily            Physical Exam:   Blood pressure  "106/43, pulse 68, temperature 98.8  F (37.1  C), resp. rate 18, height 1.651 m (5' 5\"), weight 100.9 kg (222 lb 7.1 oz), SpO2 100 %, not currently breastfeeding.  Vitals:    22 0000 22 0500 22 0400 22 0200   Weight: 98.6 kg (217 lb 6 oz) 98.8 kg (217 lb 13 oz) 101.2 kg (223 lb 1.7 oz) 99.2 kg (218 lb 11.1 oz)    22 0230   Weight: 100.9 kg (222 lb 7.1 oz)       Intake/Output Summary (Last 24 hours) at 2022 1435  Last data filed at 2022 1400  Gross per 24 hour   Intake 1392.36 ml   Output 700 ml   Net 692.36 ml           Vital Sign Ranges  Temperature Temp  Av.5  F (37.5  C)  Min: 93.7  F (34.3  C)  Max: 100.4  F (38  C)   Blood pressure Systolic (24hrs), Av , Min:87 , Max:145        Diastolic (24hrs), Av, Min:40, Max:92      Pulse Pulse  Av.4  Min: 68  Max: 133   Respirations Resp  Av.6  Min: 10  Max: 27   Pulse oximetry SpO2  Av.3 %  Min: 90 %  Max: 100 %         EXAM:    Constitutional:    in no apparent distress    Skin:    normal    Head:    Normocephalic, atramatic    Eyes:    pupils equal, round and reactive to light, extra ocular muscles intact, sclera clear, conjunctiva normal    Neck:    JVP    Lungs:       Cardiovascular:    S1, S2    Abdomen:    normal bowel sounds    Extremities and Back:    no cyanosis or clubbing and No edema    Neurological:    No gross or focal neurologic abnormalities               Data:     Recent Labs   Lab Test 22  0839 22  0402 22  2212 22  0546 22  0115   WBC  --  8.8 13.2*   < > 11.7*   HGB  --  7.1* 8.1*   < > 7.4*   MCV  --  95 96   < > 94   PLT  --  192 255   < > 228   INR 1.27*  --   --   --  1.23*    < > = values in this interval not displayed.      Recent Labs   Lab Test 22  0402 22  2212    139   POTASSIUM 4.5 4.6   CHLORIDE 109 109   BUN 25 26   CR 0.80 0.78     Recent Labs   Lab 22  1215 22  0837 22  0402 22  0401   * 164* " 190* 169*     Recent Labs   Lab Test 08/28/22  0402 08/27/22 2212   ALT 18 21   AST 26 37     Troponin I ES   Date Value Ref Range Status   01/17/2020 0.056 (H) 0.000 - 0.045 ug/L Final     Comment:     The 99th percentile for upper reference range is 0.045 ug/L.  Troponin values   in the range of 0.045 - 0.120 ug/L may be associated with risks of adverse   clinical events.     01/17/2020 0.106 (H) 0.000 - 0.045 ug/L Final     Comment:     The 99th percentile for upper reference range is 0.045 ug/L.  Troponin values   in the range of 0.045 - 0.120 ug/L may be associated with risks of adverse   clinical events.     01/16/2020 0.020 0.000 - 0.045 ug/L Final     Comment:     The 99th percentile for upper reference range is 0.045 ug/L.  Troponin values   in the range of 0.045 - 0.120 ug/L may be associated with risks of adverse   clinical events.     11/12/2014  0.000 - 0.045 ug/L Final    <0.015  The 99th percentile for upper reference range is 0.045 ug/L.  Troponin values in   the range of 0.045 - 0.120 ug/L may be associated with risks of adverse   clinical events.   Effective 7/30/2014, the reference range for this assay has changed to reflect   new instrumentation/methodology.     11/12/2014  0.000 - 0.045 ug/L Final    <0.015  The 99th percentile for upper reference range is 0.045 ug/L.  Troponin values in   the range of 0.045 - 0.120 ug/L may be associated with risks of adverse   clinical events.   Effective 7/30/2014, the reference range for this assay has changed to reflect   new instrumentation/methodology.           Recent Labs   Lab Test 08/28/22  0402 08/27/22 2212 08/27/22  0402   MAG 1.8 1.8 1.8       Lab Results   Component Value Date    CHOL 190 08/04/2021    CHOL 187 07/28/2020     Lab Results   Component Value Date    HDL 64 08/04/2021    HDL 64 07/28/2020     Lab Results   Component Value Date    LDL 90 08/04/2021     07/28/2020     Lab Results   Component Value Date    TRIG 178 08/04/2021     TRIG 104 07/28/2020     No results found for: CHOLHDLRATIO       TSH   Date Value Ref Range Status   08/04/2021 2.28 0.40 - 4.00 mU/L Final   07/28/2020 4.47 (H) 0.40 - 4.00 mU/L Final           Sravani Hameed MD, FACC  Cardiology

## 2022-08-28 NOTE — PLAN OF CARE
ICU End of Shift Nursing Summary *For vital signs and complete assessments, please see documentation flowsheets      Neuro:  Continues to have mod-severe encephalopathy per routine EEG today.  A/O to self/family, month, year, and place when asked if at mall or hospital.  Follows commands and moves all extremities very weakly.  PERRLA.  Forgetful with situational confusion.  RASS 0/-1 on precedex for restlessness.    Pain: Has severe L4 compression fracture pain with bed mobility/repositioning relieved by IV dilaudid.    CV:  Been on/off low dose Levo to maintain map > 65 mmHg.  Currently off.  Tele sinus dysrhythmia with frequent PAC's rate 60-90's.    Pulm: Coarse, crackles and wheezing throughout.  IV decadron and albuterol nebs.  Bipap 10/5, FiO2 ~ 30-40% maintaining sats %.  Unable to tolerate being off bipap for more than 2 hours today.  Became suddenly SOB, dyspneic with tachypnea.  Requires continuous bipap.   GI/: NPO  Dulcolax supp given for constipation, last bm 8/23.  Stool present in the rectum.  Stewart cath for retention.  Oliguric.  Albumin and IV lasix ordered.    Endocrine: elevated.  ICU MD ok with Lantus bid and sliding scale insulin.  Insulin gtt not necessary at this time.    Skin: pale, scattered bruising and scabs.  Perineum mildly reddened and tender.  Barrier applied and intedry between folds.    Lines: right internal jugular CVC  Drips: Precedex, on/off Levo  Additional: spouse and son in law at bedside and updated by MD.  All questions answered and POC reviewed.    Plan (Upcoming Events): awaiting for negative blood cultures before consideration of surgical intervention for MV mass/vegetation.        Bedside Shift Report Completed : yes  Bedside Safety Check Completed: yes

## 2022-08-28 NOTE — PROGRESS NOTES
CPAP/BiPAP Settings  IPAP: 10  EPAP: 5  Rate: 14  FiO2: 40  Timed Inspiration (sec): 0.9    CPAP/BiPAP/AVAPS/AVAPS AE Alarms  High Pressure: 25  Low Pressure: 5  Low Pressure Delay: 20  High Rate (breaths/min): 45  Low Rate (breaths/min): 5  Alarm Volume Level: 10    CPAP/BiPAP/AVAPS/AVAPS AE Patient Assessment  Skin Assessment: intact  Barriers Applied: mepilex and liquicell  Lung Sounds: coarse    Plan:  Continue support with BiPAP    8/28/2022  Destiney Malagon

## 2022-08-29 NOTE — PROGRESS NOTES
Per unit coordinator, FRANCISCO JAVIER SANTANA concerned as to why Positive blood cultures from 8/21 haven't come back yet. Per [facility] labs test is 'abnormal organism' that is slow growing and results should be back on Wednesday. FRANCISCO JAVIER SANTANA aware.     Idalia Joshua, RN

## 2022-08-29 NOTE — PROGRESS NOTES
CV Surgery    Patient seen and examined. Started on dexamethasone 8/28/22. Hemodynamically stable. CXR appears concerning. Hgb drop 8.1>7.1>6.7, 1U PRBC this am. Large BM overnight after suppository. Anemia of chronic disease vs lower GI bleed. Upper GI bleed. Cultures have been clear since 8/23/22, last BC 8/25/22 (8/23, 8/24) that was negative.     Remains prohibitively high risk for surgery although making progress (off norepi, conversant-appropriate in conversation this am). I discussed with Amy briceño she is not a surgical candidate at this time and was in agreement with IV abx. Neuro wanting to obtain MRI however, pt has PPM and informs me that it is not MRI compatible.      Discussed with Dr. Oreilly.      Lu Ingram PA-C

## 2022-08-29 NOTE — PROGRESS NOTES
[facility]    Infectious Disease Progress Note    Date of Service (when I saw the patient): 08/29/2022     Assessment & Plan   Amy Luna is a 77 year old female who was admitted on 8/24/2022.     Impression:  1. 77 y.o with significant cardiac history. See #2   2. History of bioprosthetic mitral and aortic valve. S/P : tricuspid annuloplasty repair and PFO closure in 04/2020. Permanent pacemaker in place. Admitted to [facility] for encephalopathy.    3. Found to have CAP    4. Her echo showed a possible mobile density and concern for endocarditis and she was trasnferred to [facility] for further care.   5. On vancomycin + meropenem initially.   6. Blood cultures now positive with GNR  though no ID still, done in the OSH   7. Urine culture with GNR further identified as Klebsiella.      Recommendations:   1. SOCORRO with a freely mobile mass (1.7 cms) attached to the atrial aspect of  the bioprosthetic MV leaflet which is consistent with a vegetation. No  evidence of valve dehiscence, PVL or regurgitation.   2. Its hard to make a distinction between a vegetation or a thrombus based on the SOCORRO for me   3. GNR in the urine has been further identified as Klebsiella.   4. GNR in the blood cultures so far pending ID. Now both cultures from the OSH are positive   5. Repeat blood cultures here have been negative     Discussion:   GNR are a rare cause of endocarditis. But just based on positive blood cultures and the SOCORRO findings endocarditis is hard to rule out and the mobile mass could very well be endocarditis.     Plan:   Continue meropenem till full information back on the blood isolate   If the same klebsiella as the urine switch antibiotics to ceftriaxone + cipro.   Treating with IV antibiotics as endocarditis.     Its been 6 days since the report of GNR in the blood, called lab further investigation in place, hopefully ID by tomorrow or day after, hence continue on Meropenem for now     Will  continue to follow         Azam Recinos MD    Interval History   Extubated now on oxymask   No fevers   Repeat blood cultures here have no grow    A 8 point ROS is negative other than mentioned above in the interval history   No changes to PMH, social history or family h istory.     Physical Exam   Temp: 99  F (37.2  C) Temp src: Bladder BP: 133/57 Pulse: 101   Resp: 12 SpO2: 97 % O2 Device: Oxymask Oxygen Delivery: 3 LPM  Vitals:    08/27/22 0200 08/28/22 0230 08/29/22 0530   Weight: 99.2 kg (218 lb 11.1 oz) 100.9 kg (222 lb 7.1 oz) 101.1 kg (222 lb 14.2 oz)     Vital Signs with Ranges  Temp:  [97.3  F (36.3  C)-99  F (37.2  C)] 99  F (37.2  C)  Pulse:  [] 101  Resp:  [9-27] 12  BP: ()/(38-64) 133/57  FiO2 (%):  [30 %-40 %] 30 %  SpO2:  [95 %-100 %] 97 %    Constitutional: on the mask, moaning occasionally   Lungs: diminished breath sounds   Cardiovascular: murmur  Abdomen: Normal bowel sounds, soft, non-distended, non-tender  Skin: No rashes, no cyanosis, no edema  Other:    Medications     dexmedetomidine Stopped (08/29/22 0336)     dextrose       dextrose       lactated ringers 10 mL/hr at 08/29/22 0800     norepinephrine Stopped (08/29/22 0700)     sodium chloride Stopped (08/27/22 1800)       acetaminophen  650 mg Oral TID     albuterol  2.5 mg Nebulization Q6H     furosemide  20 mg Intravenous Q8H     gabapentin  100 mg Oral or Feeding Tube TID     heparin ANTICOAGULANT  5,000 Units Subcutaneous Q12H     insulin aspart  1-12 Units Subcutaneous Q4H     insulin glargine  25 Units Subcutaneous BID     meropenem  1 g Intravenous Q8H     pantoprazole  40 mg Oral BID AC    Or     pantoprazole (PROTONIX) IV  40 mg Intravenous BID AC     sertraline  100 mg Oral or Feeding Tube Daily       Data   All microbiology laboratory data reviewed.  Recent Labs   Lab Test 08/29/22  0546 08/29/22  0351 08/28/22  0402   WBC 8.5 7.7 8.8   HGB 6.7* 6.4* 7.1*   HCT 22.4* 22.4* 23.8*   MCV 92 95 95    194 192      Recent Labs   Lab Test 08/29/22  0351 08/28/22  0402 08/27/22  2212   CR 0.72 0.80 0.78     No lab results found.  Recent Labs   Lab Test 01/16/20  2221 01/16/20 2220   CULT No growth No growth

## 2022-08-29 NOTE — ED PROVIDER NOTES
Patient presenting with fatigue and AMS. Also febrile and started on broad spectrum antibiotics by intial provider. CXR showed possible pneumonia. UA positive for UTI CT showed no acute abnormality. Oatient  To be admitted for continued antibiotics and workup.     Dx: Hyponatremia, Fever, AMS    Due to the nature of this electronic medical record, laboratory results, imaging results, diagnosis, other information and medications reported above may not represent information available to me at the the time of my care and disposition. Medications reported above may have not been ordered by me.     Portions of the record may have been created with voice recognition software. Occasional wrong-word or 'sound-a- like' substitution may have occurred due to the inherent limitations of voice recognition software. Though the chart has been reviewed, there may be inadvertent transcription errors. Read the chart carefully and recognize, using context, where substitutions have occurred.        Shannan Skinner MD  08/28/22 2024

## 2022-08-29 NOTE — PLAN OF CARE
Neuro: A&Ox4. Anxious.     Cardiac: SR-ST. Pacemaker.     Respiratory: Patient alternating between 3L Oxymask and BiPap at 30%     GI: No BM this shift. Moderately thick liquids per SLP eval done today.    : Terry removed this shift. Patient voided immediately after removal. Purewick in place.     Mobility: PT/OT consulted.    Skin: Repositioning q2 hours, patient requesting to lay on back at times due to chronic back pain and SOB when laying on side.     Pain: PRN dilaudid 0.3mcg and scheduled tylenol given for chronic back pain, generalized pain, and L ankle pain. Ice pack applied with relief to L ankle.    Shift Events: 1 unit PRBC given. Recheck Hgb 8.1.     Plan of Care: Video Swallow tomorrow 8/30.

## 2022-08-29 NOTE — PROGRESS NOTES
Neuro: Alert and oriented, PEERL, follows commands  CV: SR/ST, Levophed titrated at times to meet MAP goals, levophed off by end of shift  Resp: Bipap majority of night, transitioned to 2L oxymask at 0500 and tolerated well  GI/: Large BM this shift after suppository on evenings, Arellano in place. Large amount of sediment in arellano at one point requiring flush.  Skin: Multiple skin issues please see flowsheet  Pain/Gtts: Precedex and Levophed on and off multiple times over course of shift, both were off by end of shift  Family:  Irineo bedside at beginning of shift. Discussed with him plan of care and answered any questions.

## 2022-08-29 NOTE — PROGRESS NOTES
SLP Bedside Swallow Evaluation  08/29/22 5370   General Information   Onset of Illness/Injury or Date of Surgery 08/23/22   Referring Physician Dr. Guevara   Patient/Family Therapy Goal Statement (SLP) To return to a regular diet   Pertinent History of Current Problem Per notes: Ms. Luna is a very pleasant 76-year-old female with a history of valve disease.  She has bioprosthetic mitral and aortic valve replacements with tricuspid annuloplasty repair and PFO closure in 04/2020.  She did require a permanent pacemaker as well. She was admitted to [facility] on 8/23 for worsening encephalopathy.  She was admitted with initial diagnosis of encephalopathy and community acquired pneumonia.  Her echo showed a mobile density and concern for endocarditis and she was trasnferred to [facility] for further care. This was confirmed by SOCORRO (1.9 cm mitral vegetation) and blood cultures are growing GNR 3/4 bottles from 8/23 but cultures have showed NGTD afterwards. She is receiving meropenem per ID.  The evening after admission  (8/25) here she became obtunded with increasing oxygen requirements requiring intubation.  She was subsequently extubated on 8/27 to bipap.  Septic shock:  In setting of endocarditis and bacteremia    Surgery needs, but not felt to be stable for surgery currently.    Hx of small hiatal hernia and distal esophageal tertiary contractions vs esophagitis in 4/2020 during esophagram - admit included sternotomy, AVR, PFO closure.   General Observations Pt was alert and cooperative; however, pt declined additional trials during testing due to overall fatigue.  RN reports pt coughing after swabbing.   Oral Motor   Oral Musculature generally intact   Dentition (Oral Motor)   Dentition (Oral Motor) adequate dentition   Vocal Quality/Secretion Management (Oral Motor)   Secretion Management (Oral Motor)   (Report of coughing and spitting out secretions this am)   Comment, Vocal Quality/Secretion Management (Oral Motor)  Decreased vocal intensity   General Swallowing Observations   Current Diet/Method of Nutritional Intake (General Swallowing Observations, NIS) NPO   Respiratory Support (General Swallowing Observations) oxygen mask  (3L, 98%)   Clinical Swallow Evaluation   Feeding Assistance dependent   Clinical Swallow Eval: Thin Liquid Texture Trial   Mode of Presentation, Thin Liquids spoon;cup   Volume of Liquid or Food Presented ice chips x 2, tsps x 5, cup sips x 2   Oral Phase of Swallow premature pharyngeal entry   Pharyngeal Phase of Swallow reduction in laryngeal movement;repeated swallows  (delay)   Diagnostic Statement increased SOB and brief desaturation levels to 90%  during trials by cup - ? source/silent aspiration   Clinical Swallow Eval: Mildly Thick Liquids   Mode of Presentation spoon;cup   Volume Presented tsps x 8, sips by cup x 2   Oral Phase premature pharyngeal entry   Pharyngeal Phase reduction in laryngeal movement;repeated swallows  (delay)   Diagnostic Statement wet voicing and increased SOB after cup trials - ? silent aspiration   Clinical Swallow Evaluation: Puree Solid Texture Trial   Mode of Presentation, Puree spoon   Volume of Puree Presented tsps x 3   Oral Phase, Puree premature pharyngeal entry;residue in oral cavity   Pharyngeal Phase, Puree reduction in laryngeal movement;repeated swallows   Diagnostic Statement pt stated she was fatigued, mod-max cues needed to swallow last trial, overt cough noted   Esophageal Phase of Swallow   Patient reports or presents with symptoms of esophageal dysphagia Yes   Esophageal comments see above   Swallowing Recommendations   Diet Consistency Recommendations mildly thick liquids (level 2);clear liquid diet   Supervision Level for Intake 1:1 supervision needed   Mode of Delivery Recommendations bolus size, small;liquids via spoon only;slow rate of intake   Swallowing Maneuver Recommendations extra swallow;effortful (hard) swallow   Monitoring/Assistance  Required (Eating/Swallowing) monitor for cough or change in vocal quality with intake   Recommended Feeding/Eating Techniques (Swallow Eval) maintain upright posture during/after eating for 30 minutes   Medication Administration Recommendations, Swallowing (SLP) crush and give with mildly thick   Instrumental Assessment Recommendations VFSS (videofluoroscopic swallowing study)   Comment, Swallowing Recommendations Hold if increased coughing/decreased respiratory status   Clinical Impression   Criteria for Skilled Therapeutic Interventions Met (SLP Eval) Yes, treatment indicated   SLP Diagnosis Moderate-severe oral-pharyngeal dysphagia, ? silent aspiration   Risks & Benefits of therapy have been explained evaluation/treatment results reviewed;care plan/treatment goals reviewed;risks/benefits reviewed;current/potential barriers reviewed;participants voiced agreement with care plan;participants included;patient;spouse/significant other   Clinical Impression Comments Pt presents with moderate-severe oral-pharyngeal dysphagia.  Unable to rule out silent aspiration at bedside.  Deficits/risk factors include recent intubation, coughing on secretions this am, delayed swallows, decreased laryngeal elevation, need for multiple swallows, overt coughing after pudding trials, wet voice and SOB after mildly thick by cup, and SOB with desaturation levels to 90% after thin by cup.  Recommend cautious initiation of a clear liquid mildly thick diet by tsp only with RN supervision.  Hold po if decreased tolerance.  If no sugery planned in the next 1-2 days, plan to proceed with a VFSS 8/30 to further assess pharyngeal phase function and aspiration risk.  RN verbalized understanding and will likely place TF to increase nutrition given multiple days of NPO and only limited po recommended today per swallow eval.   SLP Discharge Planning   SLP Discharge Recommendation Transitional Care Facility   SLP Rationale for DC Rec Swallow function  is below baseline   SLP Brief overview of current status  Mod-severe dysphagia, unable to rule out silent aspiration.  Rec:mildly thick liquids (level 2);clear liquid diet by tsp only with RN supervision    Total Evaluation Time   Total Evaluation Time (Minutes) 15   SLP Goals   Therapy Frequency (SLP Eval) daily   SLP Predicted Duration/Target Date for Goal Attainment 09/04/22   SLP Goals Swallow   SLP: Safely tolerate diet without signs/symptoms of aspiration Regular diet;Thin liquids;With use of compensatory swallow strategies;With assistance/supervision;With use of swallow precautions

## 2022-08-29 NOTE — PROGRESS NOTES
[facility]    Stroke Progress Note    Interval EventsMental status was noted to improve today. Patient is able to communicate follow commands, answer questions fully  Pending CTA head and neck    HPI Summary   77 year old female with PMHx of aortic/mitral bioprosthetic valve replacement, Afib (no AC due to prior GI bleeding) PFO closure, HL, HTN, DM, LAVERNE, chronic back pain who presented with sepsis and unresponsiveness.  She is intubated.  She has positive urine culture with Klebsiella and GNR from blood drawn on 8/23.  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  Head CT and repeat Head CT no evidence of stroke.  VEEG without epileptiform discharges. Patient is doing better this morning she is able to follow commands and communicate with no issues.    Stroke Evaluation Summarized    MRI/Head CT                                                        IMPRESSION:  1.  No acute intracranial process.                                                                    IMPRESSION:     1. No evidence of acute intracranial hemorrhage, mass, or herniation.  2. Mild nonspecific white matter changes likely due to chronic  microvascular ischemic disease. Possibility of acute ischemia would be  better assessed with brain MRI if the patient is able.   Intracranial Vasculature Pending CTA   Cervical Vasculature Pending CTA     Echocardiogram The left ventricle is normal in size. Left ventricular systolic function is  normal. The visual ejection fraction is 60-65%.  2. There is a bioprosthetic mitral valve. There is severe thickenning of the  leaflets of the bioprosthesis with severely restricted opening. There is  severe bioprosthetic stenosis with mean gradient of 23 mmHg.  3. There is a freely mobile mass (1.7 cms) attached to the atrial aspect of  the bioprosthetic MV leaflet which is consistent with a vegetation. No  evidence of valve dehiscence, PVL or  "regurgitation.  4. There is a well-seated, normally functioning, bioprosthetic valve in aortic  position witho normal leaflet motion. No vegetation noted on AV.  5. The left atrium is severely dilated. The right atrium is moderately   EKG/Telemetry Sinus Rhythm   Other Testing Not Applicable      LDL  No lab value available in past 30 days   A1C  8/12/2022: 6.7 %  8/25/2022: 6.6 %   Troponin No lab value available in past 48 hrs       Impression   Resolved episode of encephalopathy in the setting of urosepsis with large mitral and tricuspid vegetations    Unfortunately MRI cannot be obtained as patient has 3 lead cardiac pacemaker. She has markedly improved this morning. She is able to carry out a conversation and follow up all commands.    Plan  Pending CTA head and neck      Patient Follow-up    - final recommendation pending work-up    We will continue to follow.     The Stroke Staff is Dr. Dejesus.    Fatmata Clemente MD  Vascular Neurology Fellow  To page me or covering stroke neurology team member, click here: AMCOM   Choose \"On Call\" tab at top, then search dropdown box for \"Neurology Adult\", select location, press Enter, then look for stroke/neuro ICU/telestroke.    ______________________________________________________    Clinically Significant Risk Factors Present on Admission                  Medications   Scheduled Meds    acetaminophen  650 mg Oral TID     albuterol  2.5 mg Nebulization Q6H     furosemide  20 mg Intravenous Q8H     gabapentin  100 mg Oral or Feeding Tube TID     heparin ANTICOAGULANT  5,000 Units Subcutaneous Q12H     insulin aspart  1-12 Units Subcutaneous Q4H     insulin glargine  25 Units Subcutaneous BID     meropenem  1 g Intravenous Q8H     pantoprazole  40 mg Oral BID AC    Or     pantoprazole (PROTONIX) IV  40 mg Intravenous BID AC     sertraline  100 mg Oral or Feeding Tube Daily       Infusion Meds    dexmedetomidine Stopped (08/29/22 0336)     dextrose       dextrose       lactated " ringers 10 mL/hr at 08/29/22 0800     norepinephrine Stopped (08/29/22 0700)     sodium chloride Stopped (08/27/22 1800)       PRN Meds  acetaminophen **OR** acetaminophen, bisacodyl, dextrose, dextrose, glucose **OR** dextrose **OR** glucagon, fentaNYL, flumazenil, HYDROmorphone, midazolam, naloxone **OR** naloxone **OR** naloxone **OR** naloxone, ondansetron **OR** ondansetron, polyethylene glycol, prochlorperazine **OR** prochlorperazine **OR** prochlorperazine, senna-docusate **OR** senna-docusate, sodium chloride (PF)       PHYSICAL EXAMINATION  Temp:  [97.3  F (36.3  C)-99  F (37.2  C)] 99  F (37.2  C)  Pulse:  [] 101  Resp:  [9-27] 19  BP: ()/(38-64) 136/51  FiO2 (%):  [30 %] 30 %  SpO2:  [95 %-100 %] 98 %      Mental Status:  awake,  following commands, oriented  Cranial Nerves:  PERRL, blinks to threat bilaterally, face symmetric, hoarse voice  Motor:  No drift noted in upper extremities, wiggles toes bilaterally  Reflexes:  toes down-going  Sensory:  Equal to light touch bilaterally  Coordination:  unable to test  Station/Gait:  deferred    Stroke Scales    NIHSS  1a. Level of Consciousness 0-->Alert, keenly responsive   1b. LOC Questions 0-->Answers both questions correctly   1c. LOC Commands 0-->Performs both tasks correctly   2.   Best Gaze 0-->Normal   3.   Visual 0-->No visual loss   4.   Facial Palsy 0-->Normal symmetrical movements   5a. Motor Arm, Left 0-->No drift, limb holds 90 (or 45) degrees for full 10 secs   5b. Motor Arm, Right 0-->No drift, limb holds 90 (or 45) degrees for full 10 secs   6a. Motor Leg, Left 0-->No drift, leg holds 30 degree position for full 5 secs   6b. Motor Leg, right 0-->No drift, leg holds 30 degree position for full 5 secs   7.   Limb Ataxia 0-->Absent   8.   Sensory 0-->Normal, no sensory loss   9.   Best Language 0-->No aphasia, normal   10. Dysarthria 0-->Normal   11. Extinction and Inattention  0-->No abnormality   Total 0 (08/29/22 1282)          Imaging  I personally reviewed all imaging; relevant findings per HPI.     Lab Results Data   CBC  Recent Labs   Lab 08/29/22  0546 08/29/22  0351 08/28/22  0402   WBC 8.5 7.7 8.8   RBC 2.44* 2.36* 2.51*   HGB 6.7* 6.4* 7.1*   HCT 22.4* 22.4* 23.8*    194 192     Basic Metabolic Panel    Recent Labs   Lab 08/29/22  1639 08/29/22  1325 08/29/22  0837 08/29/22  0351 08/28/22  0837 08/28/22  0402 08/28/22  0017 08/27/22 2212   NA  --   --   --  142  --  139  --  139   POTASSIUM  --   --   --  4.5  --  4.5  --  4.6   CHLORIDE  --   --   --  109  --  109  --  109   CO2  --   --   --  26  --  26  --  25   BUN  --   --   --  32*  --  25  --  26   CR  --   --   --  0.72  --  0.80  --  0.78   * 176* 179* 178*   < > 190*   < > 255*   NELLY  --   --   --  9.1  --  9.1  --  9.3    < > = values in this interval not displayed.     Liver Panel  Recent Labs   Lab 08/29/22  0351 08/28/22  0402 08/27/22 2212   PROTTOTAL 7.1 7.0 7.9   ALBUMIN 2.1* 1.7* 2.0*   BILITOTAL 0.5 0.6 0.5   ALKPHOS 59 72 82   AST 20 26 37   ALT 15 18 21     INR    Recent Labs   Lab Test 08/28/22  0839 08/25/22  0115 03/15/22  0910   INR 1.27* 1.23* 0.81*      Lipid Profile    Recent Labs   Lab Test 08/04/21  1126 07/28/20  0904   CHOL 190 187   HDL 64 64   LDL 90 102*   TRIG 178* 104     A1C    Recent Labs   Lab Test 08/25/22  1140 08/12/22  1506 03/14/22  1841   A1C 6.6* 6.7* 6.6*     Troponin    Recent Labs   Lab 08/25/22  0115   TROPONINIS 162*

## 2022-08-29 NOTE — PROGRESS NOTES
Cardiology Progress Note   Date of service: 08/29/22       Assessment and Plan:   Amy Luna is a 77 year old female who was admitted on 8/24/2022.     1.  Acute delirium/encephalopathy secondary to sepsis, with gradual weakness developing over the days prior to admission.      2.  Subacute prosthetic mitral valve endocarditis.  Large vegetation (or thrombus?) identified on the mitral valve by transthoracic echocardiogram with severe valve dysfunction.  Severe mitral valve stenosis is present with a mean mitral valve gradient of 24 mmHg.  No significant mitral regurgitation identified by SOCORRO.     3.  History of bioprosthetic aortic valve replacement without evidence of dysfunction or vegetation by SOCORRO    4.  History of tricuspid valve repair with satisfactory tricuspid valve function. Cannot exclude vegetations on SOCORRO due to shadowing from AV prosthesis, but none seen.    5.  Severe pulmonary hypertension with severe right ventricular enlargement and dysfunction, probably due to severe mitral stenosis    6.  Paroxysmal atrial fibrillation, postoperatively after her cardiac surgery in April 2020 for valve replacement.  She was treated briefly with amiodarone and warfarin.  Amiodarone was discontinued and she continued warfarin until she recently had a GI bleed due to ibuprofen use for back pain and it was then stopped.  She has not had any evidence of recurrent atrial fibrillation on her pacemaker checks since shortly after her cardiac surgery in 2020.  Currently in sinus rhythm.  Not on anticoagulation prior to admission.    7.  Sick sinus syndrome with permanent pacemaker in place.    8.  Severe anemia, cause unclear.  Stool was guaiac negative.  She has a history of recent GI bleed due to ibuprofen use while on warfarin.  Both of those medications were then discontinued.  Upper GI endoscopy on 8/25/2022 did not show a source of bleeding from the esophagus, stomach, or upper small  "intestine.       PLAN/RECOMMENDATIONS:  I also reviewed the SOCORRO images and there is a large likely vegetation versus thrombus.  Severe mitral stenosis but not a surgical candidate due to the anemia.  Continue meropenem.  Continue conservative care at this point.  Watch for signs of embolization with her left-sided mass.    35 critical care minutes spent today reviewing past medical record, images of the transesophageal echocardiogram, discussion with patient and her , coordination with staff and postvisit charting.                   Interval History:   Received 1 unit blood today              Review of Systems:   As per subjective, otherwise 5 systems reviewed and negative.           Physical Exam:     Vitals were reviewed  Blood pressure 133/57, pulse 101, temperature 99  F (37.2  C), resp. rate 12, height 1.651 m (5' 5\"), weight 101.1 kg (222 lb 14.2 oz), SpO2 97 %, not currently breastfeeding.  Temperatures:  Current - Temp: 99  F (37.2  C); Max - Temp  Av.4  F (36.9  C)  Min: 97.3  F (36.3  C)  Max: 99  F (37.2  C)  Respiration range: Resp  Av.7  Min: 9  Max: 27  Pulse range: Pulse  Av.4  Min: 62  Max: 106  Blood pressure range: Systolic (24hrs), Av , Min:97 , Max:134   ; Diastolic (24hrs), Av, Min:38, Max:64    Pulse oximetry range: SpO2  Av %  Min: 95 %  Max: 100 %    Intake/Output Summary (Last 24 hours) at 2022 1431  Last data filed at 2022 1400  Gross per 24 hour   Intake 749.51 ml   Output 1635 ml   Net -885.49 ml       Constitutional:   awake, alert, cooperative, no apparent distress, and appears stated age, BiPAP in place     Eyes:   Lids and lashes normal, pupils equal, round and reactive to light, extra ocular muscles intact, sclera clear, conjunctiva normal     Neck:   supple, symmetrical, trachea midline increase jugular venous pressure     Back:   symmetric     Lungs:   Symmetric     Cardiovascular:   Regular, tachycardic     Abdomen:   benign "     Musculoskeletal:   motor strength is 5 out of 5 all extremities bilaterally     Neurologic:   Grossly nonfocal     Skin:   Pale            Medications:     Current Facility-Administered Medications Ordered in Epic   Medication Dose Route Frequency Last Rate Last Admin     acetaminophen (TYLENOL) tablet 650 mg  650 mg Oral Q4H PRN        Or     acetaminophen (TYLENOL) Suppository 650 mg  650 mg Rectal Q4H PRN         acetaminophen (TYLENOL) tablet 650 mg  650 mg Oral TID   650 mg at 08/27/22 0801     albuterol (PROVENTIL) neb solution 2.5 mg  2.5 mg Nebulization Q6H   2.5 mg at 08/29/22 1310     bisacodyl (DULCOLAX) suppository 10 mg  10 mg Rectal Daily PRN   10 mg at 08/28/22 1738     dexmedetomidine (PRECEDEX) 400 mcg in 0.9% sodium chloride 100 mL  0.1-1.2 mcg/kg/hr Intravenous Continuous   Stopped at 08/29/22 0336     dextrose 10% infusion   Intravenous Continuous PRN         dextrose 10% infusion   Intravenous Continuous PRN         glucose gel 15-30 g  15-30 g Oral Q15 Min PRN        Or     dextrose 50 % injection 25-50 mL  25-50 mL Intravenous Q15 Min PRN        Or     glucagon injection 1 mg  1 mg Subcutaneous Q15 Min PRN         fentaNYL (PF) (SUBLIMAZE) injection 50 mcg  50 mcg Intravenous Q2H PRN   50 mcg at 08/26/22 0803     flumazenil (ROMAZICON) injection 0.2 mg  0.2 mg Intravenous q1 min prn         furosemide (LASIX) injection 20 mg  20 mg Intravenous Q8H         gabapentin (NEURONTIN) solution 100 mg  100 mg Oral or Feeding Tube TID   100 mg at 08/27/22 0930     heparin ANTICOAGULANT injection 5,000 Units  5,000 Units Subcutaneous Q12H   5,000 Units at 08/29/22 0929     HYDROmorphone (PF) (DILAUDID) injection 0.3-0.5 mg  0.3-0.5 mg Intravenous Q1H PRN   0.5 mg at 08/29/22 0950     insulin aspart (NovoLOG) injection (RAPID ACTING)  1-12 Units Subcutaneous Q4H   2 Units at 08/29/22 1326     insulin glargine (LANTUS PEN) injection 25 Units  25 Units Subcutaneous BID   25 Units at 08/29/22 0931      lactated ringers infusion   Intravenous Continuous 10 mL/hr at 08/29/22 0800 Rate Verify at 08/29/22 0800     meropenem (MERREM) 1 g vial to attach to  mL bag  1 g Intravenous Q8H 200 mL/hr at 08/26/22 0136 1 g at 08/29/22 0848     midazolam (VERSED) injection 2-4 mg  2-4 mg Intravenous Q1H PRN         naloxone (NARCAN) injection 0.2 mg  0.2 mg Intravenous Q2 Min PRN        Or     naloxone (NARCAN) injection 0.4 mg  0.4 mg Intravenous Q2 Min PRN        Or     naloxone (NARCAN) injection 0.2 mg  0.2 mg Intramuscular Q2 Min PRN        Or     naloxone (NARCAN) injection 0.4 mg  0.4 mg Intramuscular Q2 Min PRN         norepinephrine (LEVOPHED) 4 mg in  mL infusion PREMIX  0.01-0.6 mcg/kg/min Intravenous Continuous   Stopped at 08/29/22 0700     ondansetron (ZOFRAN ODT) ODT tab 4 mg  4 mg Oral Q6H PRN        Or     ondansetron (ZOFRAN) injection 4 mg  4 mg Intravenous Q6H PRN         pantoprazole (PROTONIX) EC tablet 40 mg  40 mg Oral BID AC        Or     pantoprazole (PROTONIX) IV push injection 40 mg  40 mg Intravenous BID AC   40 mg at 08/29/22 0929     polyethylene glycol (MIRALAX) Packet 17 g  17 g Oral Daily PRN         prochlorperazine (COMPAZINE) injection 5 mg  5 mg Intravenous Q6H PRN        Or     prochlorperazine (COMPAZINE) tablet 5 mg  5 mg Oral Q6H PRN        Or     prochlorperazine (COMPAZINE) suppository 12.5 mg  12.5 mg Rectal Q12H PRN         senna-docusate (SENOKOT-S/PERICOLACE) 8.6-50 MG per tablet 1 tablet  1 tablet Oral BID PRN        Or     senna-docusate (SENOKOT-S/PERICOLACE) 8.6-50 MG per tablet 2 tablet  2 tablet Oral BID PRN         sertraline (ZOLOFT) tablet 100 mg  100 mg Oral or Feeding Tube Daily   100 mg at 08/27/22 0801     sodium chloride (PF) 0.9% PF flush 3 mL  3 mL Intravenous q1 min prn         sodium chloride 0.9% infusion   Intravenous Continuous   Stopped at 08/27/22 1800     No current [facility]-ordered outpatient medications on file.                Data:   All  laboratory data reviewed  Results for orders placed or performed during the hospital encounter of 08/24/22 (from the past 24 hour(s))   Glucose by meter   Result Value Ref Range    GLUCOSE BY METER POCT 185 (H) 70 - 99 mg/dL   Glucose by meter   Result Value Ref Range    GLUCOSE BY METER POCT 213 (H) 70 - 99 mg/dL   Glucose by meter   Result Value Ref Range    GLUCOSE BY METER POCT 197 (H) 70 - 99 mg/dL   Glucose by meter   Result Value Ref Range    GLUCOSE BY METER POCT 159 (H) 70 - 99 mg/dL   CBC with platelets   Result Value Ref Range    WBC Count 7.7 4.0 - 11.0 10e3/uL    RBC Count 2.36 (L) 3.80 - 5.20 10e6/uL    Hemoglobin 6.4 (LL) 11.7 - 15.7 g/dL    Hematocrit 22.4 (L) 35.0 - 47.0 %    MCV 95 78 - 100 fL    MCH 27.1 26.5 - 33.0 pg    MCHC 28.6 (L) 31.5 - 36.5 g/dL    RDW 17.9 (H) 10.0 - 15.0 %    Platelet Count 194 150 - 450 10e3/uL   Comprehensive metabolic panel   Result Value Ref Range    Sodium 142 133 - 144 mmol/L    Potassium 4.5 3.4 - 5.3 mmol/L    Chloride 109 94 - 109 mmol/L    Carbon Dioxide (CO2) 26 20 - 32 mmol/L    Anion Gap 7 3 - 14 mmol/L    Urea Nitrogen 32 (H) 7 - 30 mg/dL    Creatinine 0.72 0.52 - 1.04 mg/dL    Calcium 9.1 8.5 - 10.1 mg/dL    Glucose 178 (H) 70 - 99 mg/dL    Alkaline Phosphatase 59 40 - 150 U/L    AST 20 0 - 45 U/L    ALT 15 0 - 50 U/L    Protein Total 7.1 6.8 - 8.8 g/dL    Albumin 2.1 (L) 3.4 - 5.0 g/dL    Bilirubin Total 0.5 0.2 - 1.3 mg/dL    GFR Estimate 86 >60 mL/min/1.73m2   Magnesium   Result Value Ref Range    Magnesium 2.0 1.6 - 2.3 mg/dL   Phosphorus   Result Value Ref Range    Phosphorus 2.9 2.5 - 4.5 mg/dL   CBC with platelets   Result Value Ref Range    WBC Count 8.5 4.0 - 11.0 10e3/uL    RBC Count 2.44 (L) 3.80 - 5.20 10e6/uL    Hemoglobin 6.7 (LL) 11.7 - 15.7 g/dL    Hematocrit 22.4 (L) 35.0 - 47.0 %    MCV 92 78 - 100 fL    MCH 27.5 26.5 - 33.0 pg    MCHC 29.9 (L) 31.5 - 36.5 g/dL    RDW 17.9 (H) 10.0 - 15.0 %    Platelet Count 185 150 - 450 10e3/uL   Prepare  red blood cells (unit)   Result Value Ref Range    Blood Component Type Red Blood Cells     Product Code [id number]     Unit Status Transfused     Unit Number [id number]     CROSSMATCH Compatible     CODING SYSTEM [id number]     ISSUE DATE AND TIME [id number]     UNIT ABO/RH A+     UNIT TYPE ISBT 6200    ABO/Rh type and screen    Narrative    The following orders were created for panel order ABO/Rh type and screen.  Procedure                               Abnormality         Status                     ---------                               -----------         ------                     Adult Type and Screen[[id number]]                            Final result                 Please view results for these tests on the individual orders.   Adult Type and Screen   Result Value Ref Range    ABO/RH(D) A POS     Antibody Screen Negative Negative    SPECIMEN EXPIRATION DATE [id number]    Glucose by meter   Result Value Ref Range    GLUCOSE BY METER POCT 179 (H) 70 - 99 mg/dL   CTA Neck with Contrast    Narrative    CT ANGIOGRAM OF THE NECK CONTRAST  8/29/2022 12:49 PM     HISTORY: Neurological abnormality.    TECHNIQUE: CT angiography with an injection of 75mL [id number]-370 IV with  scans through the neck. Images were transferred to a separate 3-D  workstation where multiplanar reformations and 3-D images were  created. Estimates of carotid stenoses are made relative to the distal  internal carotid artery diameters except as noted. Radiation dose for  this scan was reduced using automated exposure control, adjustment of  the mA and/or kV according to patient size, or iterative  reconstruction technique.    COMPARISON: None.     CT ANGIOGRAM FINDINGS:   A three-vessel aortic arch is present. The bilateral common carotid,  internal carotid, external carotid, and vertebral arteries are patent.  Scattered atherosclerotic plaquing. No evidence of large vessel  occlusion or high-grade stenosis. No evidence of dissection.      INCIDENTAL FINDINGS: Bilateral pleural effusions with nonspecific  pulmonary opacities. Subglottic tracheal secretions, probably  aspirated. Bulky mediastinal lymphadenopathy. Cervical spine  degenerative changes. Cardiac surgery change. Cardiac device. Central  venous catheter.      Impression    IMPRESSION:     1. No evidence of large vessel occlusion, high-grade stenosis, or  dissection.  2. Incidental findings as detailed.    MILLIE TOWNSEND MD         SYSTEM ID:  [id number]   Glucose by meter   Result Value Ref Range    GLUCOSE BY METER POCT 176 (H) 70 - 99 mg/dL       All laboratory data reviewed  Lab Results   Component Value Date    CHOL 190 08/04/2021    CHOL 187 07/28/2020     Lab Results   Component Value Date    HDL 64 08/04/2021    HDL 64 07/28/2020     Lab Results   Component Value Date    LDL 90 08/04/2021     07/28/2020     Lab Results   Component Value Date    TRIG 178 08/04/2021    TRIG 104 07/28/2020     No results found for: CHOLHDLRATIO  TSH   Date Value Ref Range Status   08/04/2021 2.28 0.40 - 4.00 mU/L Final   07/28/2020 4.47 (H) 0.40 - 4.00 mU/L Final     Last Basic Metabolic Panel:  Lab Results   Component Value Date     08/29/2022     02/05/2021      Lab Results   Component Value Date    POTASSIUM 4.5 08/29/2022    POTASSIUM 3.9 02/05/2021     Lab Results   Component Value Date    CHLORIDE 109 08/29/2022    CHLORIDE 105 02/05/2021     Lab Results   Component Value Date    NELLY 9.1 08/29/2022    NELLY 9.7 02/05/2021     Lab Results   Component Value Date    CO2 26 08/29/2022    CO2 28 02/05/2021     Lab Results   Component Value Date    BUN 32 08/29/2022    BUN 26 02/05/2021     Lab Results   Component Value Date    CR 0.72 08/29/2022    CR 1.05 02/05/2021     Lab Results   Component Value Date     08/29/2022     08/29/2022     02/05/2021     Lab Results   Component Value Date    WBC 8.5 08/29/2022    WBC 6.9 02/05/2021     Lab Results   Component  Value Date    RBC 2.44 08/29/2022    RBC 3.94 02/05/2021     Lab Results   Component Value Date    HGB 6.7 08/29/2022    HGB 12.4 02/05/2021     Lab Results   Component Value Date    HCT 22.4 08/29/2022    HCT 38.1 02/05/2021     Lab Results   Component Value Date    MCV 92 08/29/2022    MCV 97 02/05/2021     Lab Results   Component Value Date    MCH 27.5 08/29/2022    MCH 31.5 02/05/2021     Lab Results   Component Value Date    MCHC 29.9 08/29/2022    MCHC 32.5 02/05/2021     Lab Results   Component Value Date    RDW 17.9 08/29/2022    RDW 12.8 02/05/2021     Lab Results   Component Value Date     08/29/2022     02/05/2021

## 2022-08-29 NOTE — PROGRESS NOTES
Critical Care Progress Note      08/29/2022    Name: Amy Luna MRN#: [id number]   Age: 77 year old YOB: 1945     [facility]             Problem List:   Septic shock, resolved   Bioprosthetic mitral valve endocarditis   Severe mitral stenosis   Acute encephalopathy, septic vs embolic   Acute hypoxemic respiratory failure         Summary/Hospital Course:   Ms. Luna is a very pleasant 76-year-old female with a history of valve disease.  She has bioprosthetic mitral and aortic valve replacements with tricuspid annuloplasty repair and PFO closure in 04/2020.  She did require a permanent pacemaker as well. She was admitted to [facility] on 8/23 for worsening encephalopathy.   - She was admitted with initial diagnosis of encephalopathy and community acquired pneumonia.  Her echo showed a mobile density and concern for endocarditis and she was tras[facility]ferred to [facility] for further care. This was confirmed by SOCORRO (1.9 cm mitral vegetation) and blood cultures are growing GNR 3/4 bottles from 8/23 but cultures have showed NGTD afterwards. She is receiving meropenem per ID.     The evening after admission here she became obtunded with increasing oxygen requirements requiring intubation. She also underwent right sided thoracentesis and 1000 ml were drained 8/26. Findings are consistent with transudative effusion, likely related to mitral stensis. Her serum albumin is 1.9.     She was subsequently extubated on 8/27 to bipap.    Today she is improving.  Now tolerating breathing on 2L facemask o2.  Denies dyspnia, chest pain, dizzyness and light-headedness on my visit.  No specific complaints.      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   I have personally reviewed the daily labs, imaging studies, cultures and discussed the case with referring physician and consulting physicians.   My assessment and plan by  system for this patient is as follows:    Neurology/Psychiatry:   1. Acute encephalopathy--etiology unclear, suspect septic emboli vs septic encephalopathy.  Improving.  2. Analgesia - prn tylenol and home gabapentin.     Cardiovascular:   1.  Septic shock:  In setting of endocarditis and bacteremia. NE gtt on and off. Seems to be responding to meropenem.   - Titrate NE gtt for MAP > 65  2.  IE, SOCORRO: 1.9 cm mobile echo density on mitral prosthetic valve with mitral stenosis. Also has GNR bacteremia.  Long discussion with FANNY Newman of CV surgery service--no actions for today; will continue to monitor and allow recovery in the ICU.  3.  -Acute on Chronic HFpEF:  In setting of above.  Mgmt as above.      Pulmonary/Ventilator Management:   1. Acute hypoxemic respiratory failure likely secondary to pulmonary edema in the setting of severe mitral stenosis. Extubated 8/27 but developed upper airway distress and placed on BiPAP and racemic epi.  - dexamethasone finishes today  - Try to wean off precedex  - Titrate RR for PaCO2 35-40 (current settings 10/5, RR 14, VE 7)  - continue diuresis today--lasix 20 mg q8 x3 doses.    2. Right sided transudative pleural effusion drained 8/26    GI and Nutrition :   1. Moderate malnutrition In Context of:  Acute on Chronic illness or diseaseSwallow study today.  2. Report of dark stools at OSH:  Apparently hgb was stable there and hemoccult negative.  Pt has PO iron on home med list which could cause stool darkening in the absence of bleeding, but her hgb (though stable) is lower than baseline.  No evidence of disease on EGD.    Renal/Fluids/Electrolytes:   1.  Creat ok 0.72    Infectious Disease:   1. See above for infective endocarditis. Appreciate ID recs.    Endocrine:   1. Hyperglycemia:  In setting of stress and h/o DM:  sliding scale insulin and lantus.     Hematology/Oncology:   1. Anemia:  hgb 6.7.  Getting 1 prbc today.  No confirmed blood loss, likely anemia of critical  "illness. Tolerating threshold of 7.0 especially in the setting of severe mitral stenosis and pulmonary edema with pleural effusion.   2. Leukocytosis has resolved     ICU Prophylaxis:   1. DVT: mechanical, subcut heparin             Key Medications:       acetaminophen  650 mg Oral TID     albuterol  2.5 mg Nebulization Q6H     dexamethasone  4 mg Intravenous Q6H     furosemide  20 mg Intravenous Q8H     gabapentin  100 mg Oral or Feeding Tube TID     heparin ANTICOAGULANT  5,000 Units Subcutaneous Q12H     insulin aspart  1-12 Units Subcutaneous Q4H     insulin glargine  25 Units Subcutaneous BID     meropenem  1 g Intravenous Q8H     pantoprazole  40 mg Oral BID AC    Or     pantoprazole (PROTONIX) IV  40 mg Intravenous BID AC     sertraline  100 mg Oral or Feeding Tube Daily       dexmedetomidine Stopped (08/28/22 2053)     dextrose       dextrose       lactated ringers 10 mL/hr at 08/28/22 1733     norepinephrine Stopped (08/28/22 1700)     sodium chloride Stopped (08/27/22 1800)               Physical Examination:   /57   Pulse 77   Temp 98.4  F (36.9  C)   Resp 19   Ht 1.651 m (5' 5\")   Wt 100.9 kg (222 lb 7.1 oz)   LMP  (LMP Unknown)   SpO2 98%   BMI 37.02 kg/m        Intake/Output Summary (Last 24 hours) at 8/29/2022 1135  Last data filed at 8/29/2022 1100  Gross per 24 hour   Intake 629.51 ml   Output 1715 ml   Net -1085.49 ml       GEN: no acute distress, NAD  HEENT: head ncat, sclera anicteric, OP patent, trachea midline   NECK: supple  PULM: Good AE BL with no added sounds  CV/COR: RRR S1, Loud P2, no gallop,  No rub, no murmur  ABD: soft nontender, hypoactive bowel sounds, no mass  EXT:  warm and well perfused x4  NEURO: Awake, alert, responds appropriately to questions.  Grossly normal str/sens x4.  SKIN: no obvious rash  LINES: clean, dry intact         Data:   All data and imaging reviewed     ROUTINE ICU LABS (Last four results)  CMP  Recent Labs   Lab 08/28/22  1600 08/28/22  1215 " 08/28/22  0837 08/28/22  0402 08/28/22  0017 08/27/22  2212 08/27/22  0750 08/27/22  0402 08/26/22  1518 08/26/22  1515 08/26/22  0819 08/26/22  0420 08/25/22  0832 08/25/22  0546   NA  --   --   --  139  --  139  --  138  --   --   --  136   < > 132*   POTASSIUM  --   --   --  4.5  --  4.6  --  3.9  --   --   --  4.7   < > 5.7*   CHLORIDE  --   --   --  109  --  109  --  109  --   --   --  107   < > 104   CO2  --   --   --  26  --  25  --  25  --   --   --  23   < > 23   ANIONGAP  --   --   --  4  --  5  --  4  --   --   --  6   < > 5   * 199* 164* 190*   < > 255*   < > 141*   < >  --    < > 234*   < > 281*   BUN  --   --   --  25  --  26  --  31*  --   --   --  41*   < > 43*   CR  --   --   --  0.80  --  0.78  --  0.81  --   --   --  1.00   < > 1.06*   GFRESTIMATED  --   --   --  76  --  78  --  75  --   --   --  58*   < > 54*   NELLY  --   --   --  9.1  --  9.3  --  9.2  --   --   --  9.4   < > 9.4   MAG  --   --   --  1.8  --  1.8  --  1.8  --   --   --  2.0  --  1.9   PHOS  --   --   --  2.7  --   --   --  2.3*  --   --   --  2.6  --  3.5   PROTTOTAL  --   --   --  7.0  --  7.9  --  7.2  --  7.2  --  7.3  --  7.7   ALBUMIN  --   --   --  1.7*  --  2.0*  --  1.9*  --   --   --  1.9*  --  2.1*   BILITOTAL  --   --   --  0.6  --  0.5  --  0.5  --   --   --  0.6  --  0.4   ALKPHOS  --   --   --  72  --  82  --  72  --   --   --  80  --  67   AST  --   --   --  26  --  37  --  34  --   --   --  42  --  45   ALT  --   --   --  18  --  21  --  23  --   --   --  21  --  19    < > = values in this interval not displayed.     CBC  Recent Labs   Lab 08/28/22  0402 08/27/22  2212 08/27/22  0402 08/26/22  0420   WBC 8.8 13.2* 9.8 15.1*   RBC 2.51* 2.95* 2.61* 2.64*   HGB 7.1* 8.1* 7.2* 7.2*   HCT 23.8* 28.3* 24.7* 24.6*   MCV 95 96 95 93   MCH 28.3 27.5 27.6 27.3   MCHC 29.8* 28.6* 29.1* 29.3*   RDW 17.9* 17.9* 18.1* 17.3*    255 237 279     INR  Recent Labs   Lab 08/28/22  0839 08/25/22  0115   INR 1.27* 1.23*      Arterial Blood Gas  Recent Labs   Lab 08/28/22  0839 08/27/22 2049 08/27/22  1100 08/26/22  1208 08/25/22  2110   PH  --  7.36  --   --  7.30*   PCO2  --  43  --   --  44   PO2  --  81  --   --  93   HCO3  --  24  --   --  21   O2PER 40 40 30 30 40

## 2022-08-29 NOTE — PROGRESS NOTES
CPAP/BiPAP Settings  IPAP: 10  EPAP: 5  Rate: 14  FiO2: 30  Timed Inspiration (sec): 0.9    CPAP/BiPAP/AVAPS/AVAPS AE Alarms  High Pressure: 25  Low Pressure: 5  Low Pressure Delay: 20  High Rate (breaths/min): 45  Low Rate (breaths/min): 5  Alarm Volume Level: 10    CPAP/BiPAP/AVAPS/AVAPS AE Patient Assessment  Skin Assessment: Intact   Barriers Applied: Mepilex and liquicell   Lung Sounds: Course     Plan:  Continue with scheduled nebs and place Bipap 10/5 @noc and naps during the day     Afua Gayle, RT

## 2022-08-29 NOTE — PROGRESS NOTES
Notified provider about indwelling arellano catheter discussed removal or continued need.    Did provider choose to remove indwelling arellano catheter? No    Provider's arellano indication for keeping indwelling arellano catheter: Strict 1-2 Hour I & O if external catheters are not an option.    Is there an order for indwelling arellano catheter? Yes    *If there is a plan to keep arellano catheter in place at discharge daily notification with provider is not necessary.

## 2022-08-30 NOTE — PROGRESS NOTES
Patient was on BiPAP 10/5 30% intermittently throughout the day. Pt is on a 3L oxymask with SpO2 in the upper 90's when off of BiPAP. BS clear and diminished. All nebs were given as ordered. Skin intact under oxygen devices with skin barriers in placed.  Will cont to follow.  8/30/2022  Rosi Lara, RT

## 2022-08-30 NOTE — PROGRESS NOTES
[facility]    Medicine Progress Note - Hospitalist Service    Date of Admission:  8/24/2022    Assessment & Plan            Amy Luna is a 77 year old female with a history of valve disease.  She has bioprosthetic mitral and aortic valve replacements with tricuspid annuloplasty repair and PFO closure in 04/2020.  She did require a permanent pacemaker as well. She was admitted to [facility] on 8/23 for worsening encephalopathy.  She was initially felt to have encephalopathy and community acquired pneumonia.  Her echo showed a possible mobile density with concern for endocarditis and she was transferred to [facility] for further care.  En route she had a decline of BP and had a central line placed with levophed briefly administered.  BP quickly improved and now has been off vasopressors several hours.  Hospitalist service was asked to take over care from ICU service 8/25. On 8/25, she had progressive O2 requirements and was subsequently intubated from 8/25- 8/27. Thoracentesis on 8/27 with 1L out. Remained on IV pressors 8/28. Cardiovascular surgery declined patient for any surgical intervention on 8/30. Will transfer to [facility] for further management and exploration of goals of care.     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  Severe mitral valve stenosis associated with vegetation  History of prosthetic mitral/aortic prosthetic valves and prior tricuspid valve repair  Pulmonary HTN also felt possibly contributed to by mitral valve issues  Cardiobacterium hominus bacteremia  Acute on Chronic HFpEF  - cardiology consult  - ID consult  - CV surgery consult (deemed not a surgical candidate on 8/30)  - continue meropenem  - Diuresis per cardiology    SOB  Pulmonary HTN  Acute hypoxic respiratory failure  Right transudative pleural effusion   S/p thoracentesis 8/26  -Continue supplemental oxygen as needed including BiPAP as needed  -Continue scheduled  nebs  -Monitor    K.Pneumoniae UTI  - abx as above    Encephalopathy, acute, metabolic/septic  - Neuro consult appreciated  - repeat CT head 8/30 w/o acute findings    DMT2  Recurrent hypoglycemia 8/30  - HDSSI  - lantus stopped 8/30 due to hypoglycemia, resume as able    Dysphagia  Moderate malnutrition  - SLP  - mod diet per SLP    HTN  - hold BP meds, resume per cardiology    HLD  - hold statin    Acute on chronic anemia  - monitor    Chronic low back pain  - monitor  - continue gabapentin 100TID (decreased from PTA dose)     Goals of Care  *Patient is full code  *Family is hoping for a miracle as they feel it is a miracle that she has made it this far.   is quite clear that if patient was nonambulatory for 6 months, that would be intolerable for her, but likely they would be able to tolerate 6 weeks of nonambulatory state.  Discussed with them that risk of complications with her current state including pressure sores, ongoing weakness, need for prolonged hospitalization, need for discharge to nursing home versus TCU, stroke, need for feeding tube, and other issues is very possible with her current state.  They will think on these things, but would like to continue full restorative cares and full code.       Diet: Adult Formula Drip Feeding: Continuous Vital High Protein; Orogastric tube; Goal Rate: 50; mL/hr; Medication - Feeding Tube Flush Frequency: At least 15-30 mL water before and after medication administration and with tube clogging; do not advance ...  Combination Diet Pureed Diet (level 4); Liquidized/Moderately Thick (level 3) (by tsp only); No Straws    DVT Prophylaxis: Heparin SQ  Stewart Catheter: Not present  Central Lines: PRESENT  CVC TRIPLE LUMEN Right Internal jugular Non - tunneled-Site Assessment: WDL  Cardiac Monitoring: ACTIVE order. Indication: ICU  Code Status: Full Code      Disposition Plan     Expected Discharge Date: 09/03/2022                The patient's care was discussed with  the Bedside Nurse, Patient, Patient's Family, ID Consultant and ICU Team.    Allen Ayers MD  Hospitalist Service  [facility]  Securely message with the Vocera Web Console (learn more here)  Text page via [employer] Paging/Directory         Clinically Significant Risk Factors Present on Admission                      ______________________________________________________________________    Interval History   Patient off pressors.  Not ventilated.  Not on Precedex.  No ongoing need for ICU cares.  Hospitalist team consulted for transfer out of ICU.    Patient reports that she feels very weak.  She feels short of breath constantly.  She notes no positional nature to her shortness of breath, but certainly finds it very difficult to change positions.  She denies any pain.  No other associated symptoms.  She is somewhat confused.    Long discussion held with  at bedside about goals of care, likely complications of her hospital stay.    Data reviewed today: I reviewed all medications, new labs and imaging results over the last 24 hours. I personally reviewed no images or EKG's today.    Physical Exam   Vital Signs: Temp: 98.7  F (37.1  C) Temp src: Axillary BP: 110/47 Pulse: 101   Resp: 28 SpO2: (!) 89 % O2 Device: Oxymask Oxygen Delivery: 4 LPM  Weight: 224 lbs 3.33 oz  Constitutional: Awake, alert, cooperative, no apparent cardiopulmonary distress.  Eyes: Conjunctiva and pupils examined and normal.  HEENT: Moist mucous membranes, normal dentition.  Respiratory: Clear to auscultation bilaterally, no crackles or wheezing.  Cardiovascular: Regular rate and rhythm, normal S1 and S2, and no murmur noted.  GI: Soft, non-distended, non-tender, normal bowel sounds.  Lymph/Hematologic: No anterior cervical or supraclavicular adenopathy.  Skin: No rashes, no cyanosis, no edema noted on exposed skin.  Musculoskeletal: No joint swelling, erythema or tenderness. No gross bony  abnormalities  Neurologic: Cranial nerves 2-12 grossly intact, normal sensation.  Appears somewhat withdrawn and slightly confused.  Diffusely weak.  3 out of 5 leg extension due to deconditioning.  4 out of 5 arm extension bilaterally due to deconditioning.  Psychiatric: Alert, oriented to person, place, no obvious anxiety or depression.      Data   Recent Labs   Lab 08/30/22  1408 08/30/22  1003 08/30/22  0947 08/30/22  0430 08/30/22  0428 08/29/22 2011 08/29/22  1801 08/29/22  1639 08/29/22  1620 08/29/22  0837 08/29/22  0546 08/29/22  0351 08/28/22  1215 08/28/22  0839 08/28/22  0837 08/28/22  0402 08/25/22  0449 08/25/22  0115   WBC  --   --   --   --  10.7  --   --   --   --   --  8.5 7.7  --   --   --  8.8   < > 11.7*   HGB  --   --   --   --  8.0*  --   --   --  8.1*  --  6.7* 6.4*  --   --   --  7.1*   < > 7.4*   MCV  --   --   --   --  91  --   --   --   --   --  92 95  --   --   --  95   < > 94   PLT  --   --   --   --  243  --   --   --   --   --  185 194  --   --   --  192   < > 228   INR  --   --   --   --   --   --   --   --   --   --   --   --   --  1.27*  --   --   --  1.23*   NA  --   --   --   --  143  --  140  --   --   --   --  142  --   --   --  139   < > 134   POTASSIUM  --   --   --   --  3.7  --  3.9  --   --   --   --  4.5  --   --   --  4.5   < > 4.9   CHLORIDE  --   --   --   --  111*  --  108  --   --   --   --  109  --   --   --  109   < > 104   CO2  --   --   --   --  28  --  27  --   --   --   --  26  --   --   --  26   < > 24   BUN  --   --   --   --  35*  --  33*  --   --   --   --  32*  --   --   --  25   < > 43*   CR  --   --   --   --  0.78  --  0.75  --   --   --   --  0.72  --   --   --  0.80   < > 1.15*   ANIONGAP  --   --   --   --  4  --  5  --   --   --   --  7  --   --   --  4   < > 6   NELLY  --   --   --   --  9.2  --  9.5  --   --   --   --  9.1  --   --   --  9.1   < > 9.1   GLC 53* 102* 50*   < > 75   < > 150*   < >  --    < >  --  178*   < >  --    < > 190*   < > 292*    ALBUMIN  --   --   --   --   --   --   --   --   --   --   --  2.1*  --   --   --  1.7*   < > 2.1*   PROTTOTAL  --   --   --   --   --   --   --   --   --   --   --  7.1  --   --   --  7.0   < > 7.6   BILITOTAL  --   --   --   --   --   --   --   --   --   --   --  0.5  --   --   --  0.6   < > 0.4   ALKPHOS  --   --   --   --   --   --   --   --   --   --   --  59  --   --   --  72   < > 66   ALT  --   --   --   --   --   --   --   --   --   --   --  15  --   --   --  18   < > 14   AST  --   --   --   --   --   --   --   --   --   --   --  20  --   --   --  26   < > 20   LIPASE  --   --   --   --   --   --   --   --   --   --   --   --   --   --   --   --   --  33*    < > = values in this interval not displayed.     Recent Results (from the past 24 hour(s))   XR Video Swallow with SLP or OT    Narrative    VIDEO SWALLOWING EVALUATION   8/30/2022 9:30 AM     HISTORY: dysphagia    COMPARISON: None.    FLUOROSCOPY TIME: 1.8 minutes.  SPOT IMAGES OR CINE RUNS: 12    FINDINGS:    Thin: Penetration to the cords and questionable aspiration    Mildly thick: Aspiration    Moderately thick: No penetration or aspiration.    Pudding: No penetration or aspiration.    Semisolid: No penetration or aspiration.    Solid: No penetration or aspiration.    Refer to speech pathology report for additional details.    MILLIE TOWNSEND MD         SYSTEM ID:  [id number]   CTA Head with Contrast    Narrative    CT ANGIOGRAM OF THE HEAD WITH CONTRAST August 30, 2022 12:53 PM     HISTORY: Encephalopathy, questionable stroke    TECHNIQUE: CT angiography with an injection of 75mL Isovue-370 IV with  scans through the head and neck. Images were transferred to a separate  3-D workstation where multiplanar reformations and 3-D images were  created. Estimates of carotid stenoses are made relative to the distal  internal carotid artery diameters except as noted. Radiation dose for  this scan was reduced using automated exposure control, adjustment  of  the mA and/or kV according to patient size, or iterative  reconstruction technique.    COMPARISON: None.     CT ANGIOGRAM HEAD FINDINGS: No evidence of large vessel occlusion or  high-grade stenosis. No evidence of aneurysm or high flow vascular  malformation. Scattered atherosclerotic plaquing. No intravascular  filling defect is identified. Dural venous sinuses are unremarkable.     Bilateral pleural effusions and presumed atelectasis noted on the   image, incompletely characterized.      Impression    IMPRESSION:   1. No evidence of large vessel occlusion or high-grade stenosis.  2. No intravascular filling defect is identified.     Medications     dexmedetomidine Stopped (08/29/22 0336)     dextrose       dextrose       lactated ringers 10 mL/hr at 08/29/22 0800     norepinephrine Stopped (08/29/22 0700)     sodium chloride Stopped (08/27/22 1800)       acetaminophen  650 mg Oral TID     albuterol  2.5 mg Nebulization Q6H     gabapentin  100 mg Oral or Feeding Tube TID     heparin ANTICOAGULANT  5,000 Units Subcutaneous Q12H     insulin aspart  1-12 Units Subcutaneous Q4H     meropenem  1 g Intravenous Q8H     pantoprazole  40 mg Oral BID AC    Or     pantoprazole  40 mg Intravenous BID AC     sertraline  100 mg Oral or Feeding Tube Daily

## 2022-08-30 NOTE — PROGRESS NOTES
Critical Care Progress Note      08/30/2022    Name: Amy Luna MRN#: [id number]   Age: 77 year old YOB: 1945     \[facility] [place] [facility]\""tl Day# 5  ICU DAY # 5           Problem List:   Septic shock, resolved   Bioprosthetic mitral valve endocarditis   Severe mitral stenosis   Acute encephalopathy, septic vs embolic   Acute hypoxemic respiratory failure         Summary/Hospital Course:   Ms. Luna is a very pleasant 76-year-old female with a history of valve disease.  She has bioprosthetic mitral and aortic valve replacements with tricuspid annuloplasty repair and PFO closure in 04/2020.  She did require a permanent pacemaker as well. She was admitted to [facility] on 8/23 for worsening encephalopathy.   - She was admitted with initial diagnosis of encephalopathy and community acquired pneumonia.  Her echo showed a mobile density and concern for endocarditis and she was trasnferred to [facility] for further care. This was confirmed by SOCORRO (1.9 cm mitral vegetation) and blood cultures are growing GNR 3/4 bottles from 8/23 but cultures have showed NGTD afterwards. She is receiving meropenem per ID.     The evening after admission here she became obtunded with increasing oxygen requirements requiring intubation. She also underwent right sided thoracentesis and 1000 ml were drained 8/26. Findings are consistent with transudative effusion, likely related to mitral stensis. Her serum albumin is 1.9.     She was subsequently extubated on 8/27 to bipap.    No specific complaints today.  Alternates between bipap and facemask/nasal cannula o2.    Denies dyspnia, chest pain, dizzyness and light-headedness on my visit.        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   I have personally reviewed the daily labs, imaging studies, cultures and discussed the case with referring physician and consulting physicians.   My assessment and plan by system for  this patient is as follows:    Neurology/Psychiatry:   1. Acute encephalopathy--etiology unclear, suspect septic emboli vs septic encephalopathy.  Improving.  2. Analgesia - prn tylenol and home gabapentin.     Cardiovascular:   1.  Septic shock:  In setting of endocarditis and bacteremia. NE gtt on and off. Seems to be responding to meropenem.   - Titrate NE gtt for MAP > 65  2.  IE, SOCORRO: 1.9 cm mobile echo density on mitral prosthetic valve with mitral stenosis. Also has GNR bacteremia.  Continued discussions with CV surgery re: surgical candidacy.  3.  -Acute on Chronic HFpEF:  In setting of above.  Mgmt as above.    Pulmonary/Ventilator Management:   1. Acute hypoxemic respiratory failure likely secondary to pulmonary edema in the setting of severe mitral stenosis. Extubated 8/27, briefly had suspected upper airway edema following this, but this has resolved with a brief course of dexamethasone and racemic epi.  - Try to wean off precedex  - diuresis as needed to maintain euvolemia.  Holiday today.  Usually responds well to lasix 20 iv q8.    2. Right sided transudative pleural effusion drained 8/26    GI and Nutrition :   1. Moderate malnutrition In Context of:  Acute on Chronic illness or diseaseSwallow study today.  2. Report of dark stools at OSH:  Apparently hgb was stable there and hemoccult negative.  Pt has PO iron on home med list which could cause stool darkening in the absence of bleeding, but her hgb (though stable) is lower than baseline.  No evidence of disease on EGD.    Renal/Fluids/Electrolytes:   1.  Creat ok 0.78    Infectious Disease:   1. See above for infective endocarditis. Appreciate ID recs.    Endocrine:   1. Hyperglycemia:  In setting of stress and h/o DM:  sliding scale insulin and lantus.     Hematology/Oncology:   1. Anemia:  hgb8.0. responded as expected to 1 prbc yesterday.  No confirmed blood loss, likely anemia of critical illness. Tolerating threshold of 7.0 especially in the  "setting of severe mitral stenosis and pulmonary edema with pleural effusion.   2. Leukocytosis has resolved     ICU Prophylaxis:   1. DVT: mechanical, subcut heparin             Key Medications:       acetaminophen  650 mg Oral TID     albuterol  2.5 mg Nebulization Q6H     furosemide  20 mg Intravenous Q8H     gabapentin  100 mg Oral or Feeding Tube TID     heparin ANTICOAGULANT  5,000 Units Subcutaneous Q12H     insulin aspart  1-12 Units Subcutaneous Q4H     insulin glargine  25 Units Subcutaneous BID     meropenem  1 g Intravenous Q8H     pantoprazole  40 mg Oral BID AC    Or     pantoprazole (PROTONIX) IV  40 mg Intravenous BID AC     sertraline  100 mg Oral or Feeding Tube Daily       dexmedetomidine Stopped (08/29/22 0336)     dextrose       dextrose       lactated ringers 10 mL/hr at 08/29/22 0800     norepinephrine Stopped (08/29/22 0700)     sodium chloride Stopped (08/27/22 1800)               Physical Examination:   /56   Pulse 81   Temp 98.7  F (37.1  C) (Axillary)   Resp 14   Ht 1.651 m (5' 5\")   Wt 101.7 kg (224 lb 3.3 oz)   LMP  (LMP Unknown)   SpO2 95%   BMI 37.31 kg/m      Intake/Output Summary (Last 24 hours) at 8/30/2022 0946  Last data filed at 8/30/2022 0600  Gross per 24 hour   Intake 864 ml   Output 1295 ml   Net -431 ml     GEN: no acute distress, NAD  HEENT: head ncat, sclera anicteric, OP patent, trachea midline   NECK: supple  PULM: Good AE BL with no added sounds  CV/COR: RRR S1, Loud P2, no gallop,  No rub, no murmur  ABD: soft nontender, hypoactive bowel sounds, no mass  EXT:  warm and well perfused x4  NEURO: Awake, alert, responds appropriately to questions.  Grossly normal str/sens x4.  SKIN: no obvious rash  LINES: clean, dry intact         Data:   All data and imaging reviewed     ROUTINE ICU LABS (Last four results)  CMP  Recent Labs   Lab 08/30/22  0454 08/30/22  0430 08/30/22  0428 08/29/22  2305 08/29/22 2011 08/29/22  1801 08/29/22  0837 08/29/22  0351 " 08/28/22  0837 08/28/22 0402 08/28/22  0017 08/27/22  2212 08/27/22  0750 08/27/22  0402   NA  --   --  143  --   --  140  --  142  --  139  --  139  --  138   POTASSIUM  --   --  3.7  --   --  3.9  --  4.5  --  4.5  --  4.6  --  3.9   CHLORIDE  --   --  111*  --   --  108  --  109  --  109  --  109  --  109   CO2  --   --  28  --   --  27  --  26  --  26  --  25  --  25   ANIONGAP  --   --  4  --   --  5  --  7  --  4  --  5  --  4   * 68* 75 109*   < > 150*   < > 178*   < > 190*   < > 255*   < > 141*   BUN  --   --  35*  --   --  33*  --  32*  --  25  --  26  --  31*   CR  --   --  0.78  --   --  0.75  --  0.72  --  0.80  --  0.78  --  0.81   GFRESTIMATED  --   --  78  --   --  82  --  86  --  76  --  78  --  75   NELLY  --   --  9.2  --   --  9.5  --  9.1  --  9.1  --  9.3  --  9.2   MAG  --   --  2.0  --   --   --   --  2.0  --  1.8  --  1.8  --  1.8   PHOS  --   --  3.3  --   --   --   --  2.9  --  2.7  --   --   --  2.3*   PROTTOTAL  --   --   --   --   --   --   --  7.1  --  7.0  --  7.9  --  7.2   ALBUMIN  --   --   --   --   --   --   --  2.1*  --  1.7*  --  2.0*  --  1.9*   BILITOTAL  --   --   --   --   --   --   --  0.5  --  0.6  --  0.5  --  0.5   ALKPHOS  --   --   --   --   --   --   --  59  --  72  --  82  --  72   AST  --   --   --   --   --   --   --  20  --  26  --  37  --  34   ALT  --   --   --   --   --   --   --  15  --  18  --  21  --  23    < > = values in this interval not displayed.     CBC  Recent Labs   Lab 08/30/22  0428 08/29/22  1620 08/29/22  0546 08/29/22  0351 08/28/22  0402   WBC 10.7  --  8.5 7.7 8.8   RBC 2.82*  --  2.44* 2.36* 2.51*   HGB 8.0* 8.1* 6.7* 6.4* 7.1*   HCT 25.6*  --  22.4* 22.4* 23.8*   MCV 91  --  92 95 95   MCH 28.4  --  27.5 27.1 28.3   MCHC 31.3*  --  29.9* 28.6* 29.8*   RDW 19.6*  --  17.9* 17.9* 17.9*     --  185 194 192     INR  Recent Labs   Lab 08/28/22  0839 08/25/22  0115   INR 1.27* 1.23*     Arterial Blood Gas  Recent Labs   Lab  08/28/22  0839 08/27/22 2049 08/27/22  1100 08/26/22  1208 08/25/22 2110   PH  --  7.36  --   --  7.30*   PCO2  --  43  --   --  44   PO2  --  81  --   --  93   HCO3  --  24  --   --  21   O2PER 40 40 30 30 40

## 2022-08-30 NOTE — CONSULTS
[facility]  Palliative Care Consultation Note    Patient: Amy Luna  Date of Admission:  8/24/2022    Requesting Clinician / Team: Dr Guevara/ICU  Reason for consult: Goals of care    Recommendations:    Goals of Care (see in depth discussion below): Continue restorative focused care at this time.  Patient and family still gathering information and have spoken to many providers today and are somewhat on information overload.  Palliative will check in with Amy and  Irineo tomorrow regarding ongoing goals of care discussions and offer support during this difficult time.    Code status: Full Code    Advanced Care Planning:    Patient has a completed Health Care Directive: Yes, and on file.    Respiratory compromise related to pulmonary edema in setting of mitral valve stenosis.  Was extubated on 8/27 and given short course of dexamethasone for upper airway edema which has resolved.  Has been on alternating BiPAP and Oxymizer at 3 L/min.  -Appreciate RT expertise.  -Continue to monitor.    Patient case was reviewed with Dr. Guevara, and patient's RN.    Shaunna Yo, [facility]  Contact information available via [facility] Paging/Directory        Thank you for the opportunity to participate in the care of this patient and family. Our team: will continue to follow.     During regular M-F work hours ([phone]) -- if you are not sure who specifically to contact -- please contact us on [facility] Smart Web.     After regular work hours and on weekends/holidays, you can call our answering service at [phone].     Attestation:  Total time on the floor involved in the patient's care: 45 minutes  Total time spent in counseling/care coordination: >50% discussing patient condition, assessment of symptoms, reviewing current status with MD and RN,     Assessments:  Amy Luna is a 77 year old female with PMH significant for heart valve disease s/p aortic/mitral  bioprosthetic valve replacement 2020, sick sinus syndrome s/p pacemaker, a-fib (not on anticoagulation due to prior GI bleed) HTN, DM, LAVERNE, chronic back pain. She presented to ED via EMS on 8/23/22 for evaluation of weakness, confusion and hallucinations.     Today, the patient was seen for:   Decisional support  Patient and family support    Prognosis, Goals, & Planning:        Prognosis, Goals, and/or Advance Care Planning were addressed today: Yes     I introduced palliative care services and our multidisciplinary team. Explained that our service offers an extra layer of support for individuals who are experiencing serious, life threatening illnesses, which can include assistance with symptom management, emotional and spiritual support as well as complex medical decision making. Reviewed past medical history and patient/family understanding of the current medical situation. Amy is quite weak and unable to fully participate in discussion.  Irineo states that there has been heavy discussion today with different doctors regarding Amy's medical situation and treatment options. They are waiting to speak with a surgeon as well for further information.  Irineo feels that he and Amy have had a lot of information coming at them already and we decide to continue discussion with palliative care tomorrow. At this point the plan is to continue IV antibiotics and hope that the endocarditis responds.        Functional Status just prior to hospitalization: 2 (Ambulatory and capable of all selfcare but unable to carry out any work activities; may need help with IADLs up and about > 50% of waking hours)          Patient has decision-making capacity today for complex decisions: Partial (needs assistance with complex decisions)      Patient's decision making preferences: shared with support from loved ones          I have concerns about the patient/family's health literacy today: No           Coping, Meaning, &  Spirituality:   Mood, coping, and/or meaning in the context of serious illness were addressed today: Yes  Summary/Comments:  Irineo is hoping that their family friend and  Jorge Luis Albarado would be able to come in to pray with Amy. Irineo is hoping that this would not count as an official visitor as children would like to visit with Amy as well. Jorge Luis's phone number is [phone].    Social:     Living situation: lives with     Leyva family / caregivers: has two daughters and one stepson    History of Present Illness:   History gathered today from: family/loved ones, medical chart, medical team members, unit team members  Adopted from H&P:  Amy Luna is a 77 year old female with PMH significant for heart valve disease s/p aortic/mitral bioprosthetic valve replacement 2020, sick sinus syndrome s/p pacemaker, a-fib (not on anticoagulation due to prior GI bleed) HTN, DM, LAVERNE, chronic back pain. She presented to ED via EMS on 8/23/22 for evaluation of weakness, confusion and hallucinations.     She was initially diagnosed with encephalopathy and CAP.  on 8/23 urine cultures were positive for Klebsiella and blood cultures with gram negative rods. 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 Amy is not a good surgical candidate at this time. The plan is to continue IV antibiotics and continue watchful waiting.  Shortly after transfer to [facility] she required intubation after becoming obtunded with increasing oxygen requirements but was ultimately extubated on 8/27 to BiPAP and is now on oxymizer at 3L.Consultants include neurology, cardiology, ID, Nutrition.    Key Palliative Symptom Data:  # Pain severity the last 12 hours: none  # Dyspnea severity the last 12 hours: low  # Nausea severity the last 12 hours: none  We are not helping to manage these symptoms currently in this patient.    Patient is on opioids:  bowels not assessed today.    ROS:  Comprehensive ROS is reviewed and is negative except as here & per HPI: N/A     Past Medical History:  Past Medical History:   Diagnosis Date     (HFpEF) heart failure with preserved ejection fraction (H)      Aortic stenosis      Chest pain      Depressive disorder      Diabetes (H)      Hyperlipidemia      Hypertension      Mitral annular calcification      Mitral stenosis      Obesity      Sleep apnea     CPAP        Past Surgical History:  Past Surgical History:   Procedure Laterality Date     APPENDECTOMY       CV CORONARY ANGIOGRAM N/A 2020    Procedure: Coronary Angiogram;  Surgeon: Inez Peoples MD;  Location:  HEART CARDIAC CATH LAB     CV LEFT HEART CATH N/A 2020    Procedure: Left Heart Cath;  Surgeon: Inez Peoples MD;  Location:  HEART CARDIAC CATH LAB     CV PFO CLOSURE N/A 4/15/2020    Procedure: Patent Foramen Ovale Closure;  Surgeon: Ok Wilson MD;  Location:  OR      RIGHT HEART CATH MEASUREMENTS RECORDED N/A 2020    Procedure: Right Heart Cath;  Surgeon: Inez Peoples MD;  Location:  HEART CARDIAC CATH LAB     EP PACEMAKER N/A 2020    Procedure: EP Pacemaker;  Surgeon: Sohan Francis MD;  Location:  HEART CARDIAC CATH LAB     GYN SURGERY       x2 ,      GYN SURGERY      total hysterectomy     IMPLANT PACEMAKER       INJECT EPIDURAL TRANSFORAMINAL LUMBAR / SACRAL SINGLE Left 2022    Procedure: left L2-L3 transforaminal epidural steroid injection;  Surgeon: Lito Maldonado MD;  Location: [facility] OR     REPAIR VALVE TRICUSPID N/A 4/15/2020    Procedure: REPAIR TRICUSPID VALVE WITH VILLA MC3 26MM.;  Surgeon: Ok Wilson MD;  Location:  OR     REPLACE VALVE AORTIC N/A 4/15/2020    Procedure: REPLACEMENT, AORTIC VALVE WITH INSPIRIS TISSUE VALVE 25 MM; ON PUMP WITH SOCORRO READ BY CARDIOLOGIST DR LBANTON.;  Surgeon: Ok Wilson MD;  Location:  OR      REPLACE VALVE MITRAL N/A 4/15/2020    Procedure: REPLACEMENT, MITRAL VALVE WITH EPIC TISSUE VALVE 27 MM.;  Surgeon: Ok Wilson MD;  Location:  OR         Family History:  Family History   Problem Relation Age of Onset     Diabetes Mother 56     Congenital heart disease Father 23     Colon Cancer No family hx of          Allergies:  Allergies   Allergen Reactions     Penicillins Hives     3 weeks after taking med got hives.       Pioglitazone Other (See Comments)     Increased urinary frequency, fatigue, dyspnea        Medications:  I have reviewed this patient's medication profile and medications from this hospitalization.     Noted scheduled meds are:    acetaminophen  650 mg Oral TID     albuterol  2.5 mg Nebulization Q6H     gabapentin  100 mg Oral or Feeding Tube TID     heparin ANTICOAGULANT  5,000 Units Subcutaneous Q12H     insulin aspart  1-12 Units Subcutaneous Q4H     meropenem  1 g Intravenous Q8H     pantoprazole  40 mg Oral BID AC    Or     pantoprazole  40 mg Intravenous BID AC     sertraline  100 mg Oral or Feeding Tube Daily       Noted PRN meds are:  acetaminophen **OR** acetaminophen, bisacodyl, dextrose, dextrose, glucose **OR** dextrose **OR** glucagon, fentaNYL, flumazenil, HYDROmorphone, melatonin, midazolam, naloxone **OR** naloxone **OR** naloxone **OR** naloxone, ondansetron **OR** ondansetron, polyethylene glycol, prochlorperazine **OR** prochlorperazine **OR** prochlorperazine, senna-docusate **OR** senna-docusate, sodium chloride (PF)    Physical Exam:  Vital Signs: Temp: 98.1  F (36.7  C) Temp src: Oral BP: 107/46 Pulse: 89   Resp: 18 SpO2: 100 % O2 Device: Oxymask Oxygen Delivery: 3 LPM  Weight: 224 lbs 3.33 oz    Physical Exam  GENERAL:  Alert, fatigued, no distress, critically ill appearing, flushed.  HEAD: Normocephalic atraumatic  SCLERA: Anicteric  EXTREMITIES: Warm; no edema  ABDOMEN:  Soft, nontender  RESPIRATORY: some accessory muscle use; oxymizer mask at  3L. Productive cough.  CARDIOVASCULAR: RRR  ABDOMEN: Soft, obese.  NEUROLOGIC: Lethargic.  PSYCH: Exhausted, calm.    Data reviewed:  Recent imaging reviewed.  Results for orders placed or performed during the hospital encounter of 08/24/22   XR Chest Port 1 View    Impression    IMPRESSION: Right IJ catheter present with its tip projecting at distal SVC. Patient rotated somewhat into an LPO projection. Prominent skinfold seen along right chest with no pneumothorax evident. Left lung remains grossly clear. Stable postoperative   changes of heart including pacemaker and AVR. Mild cardiomegaly.   XR Chest Port 1 View    Impression    IMPRESSION: Status post median sternotomy and valve replacement with cardiac pacemaker in place. Heart size is enlarged but likely stable. Increasing posteriorly layering bilateral pleural effusions, now at least moderate in size. Probable mild   interstitial edema. Central confluent parenchymal opacities may represent atelectasis versus infiltrate. No pneumothorax. Right-sided central venous catheter terminates over the cavoatrial junction..   CT Chest w/o Contrast    Impression    IMPRESSION:   1.  Moderate to large right pleural effusion with compressive atelectasis and consolidation involving a significant portion of the right lower lobe. Smaller left pleural effusion and subsegmental atelectasis left lower lobe.  2.  Nodular and groundglass opacities both upper lobes and septal thickening may represent a combination of pneumonia and pulmonary edema. Follow-up exam recommended.  3.  Mediastinal adenopathy has progressed and may be reactive. These should also be reviewed on follow-up CT.  4.  Sclerotic changes left first rib may be related to old trauma. No other concerning skeletal lesions.     CT Head w/o Contrast    Impression    IMPRESSION:  1.  No acute intracranial process.   US Thoracentesis Portable    Impression    IMPRESSION: Technically successful thoracentesis without  immediate  complications.    ALEXANDRE NELSON MD         SYSTEM ID:  [id number]   CT Head w/o Contrast    Impression    IMPRESSION:     1. No evidence of acute intracranial hemorrhage, mass, or herniation.  2. Mild nonspecific white matter changes likely due to chronic  microvascular ischemic disease. Possibility of acute ischemia would be  better assessed with brain MRI if the patient is able.     SHANAE COMBS MD         SYSTEM ID:  [id number]   XR Chest Port 1 View    Impression    IMPRESSION: ETT is 3 cm from the allie. Enteric tube enters the stomach and out of field of view. Right IJ line terminates over the mid to distal SVC. Small left effusion and associated atelectasis. Nearly resolved right effusion and associated   atelectasis/consolidation. No pneumothorax. Heart size is stable. Left chest cardiac device, sternotomy wires, valve replacement changes, and mitral annular calcification are similar   XR Chest Port 1 View    Impression    IMPRESSION: Since today's earlier radiograph the ETT has been removed, there is new significant haziness now seen involving the right lung which is worrisome for underlying infiltrates, otherwise the chest is stable with the positioning of the   transvenous lead tips again seen over the RA and RV, there are postoperative changes of a sternotomy, and there is loss of the normally seen left hemidiaphragm consistent with infiltrate and/or atelectasis in the left lower lobe.   CTA Neck with Contrast    Impression    IMPRESSION:     1. No evidence of large vessel occlusion, high-grade stenosis, or  dissection.  2. Incidental findings as detailed.    MILLIE TOWNSEND MD         SYSTEM ID:  [id number]   CTA Head with Contrast    Impression    IMPRESSION:   1. No evidence of large vessel occlusion or high-grade stenosis.  2. No intravascular filling defect is identified.   Transesophageal Echocardiogram   Result Value Ref Range    LVEF  60-65%           Lab Results   Component Value Date     WBC 10.7 08/30/2022    WBC 8.5 08/29/2022    WBC 7.7 08/29/2022    HGB 8.0 (L) 08/30/2022    HGB 8.1 (L) 08/29/2022    HGB 6.7 (LL) 08/29/2022    HCT 25.6 (L) 08/30/2022    HCT 22.4 (L) 08/29/2022    HCT 22.4 (L) 08/29/2022     08/30/2022     08/29/2022     08/29/2022     08/30/2022     08/29/2022     08/29/2022    POTASSIUM 3.7 08/30/2022    POTASSIUM 3.9 08/29/2022    POTASSIUM 4.5 08/29/2022    CHLORIDE 111 (H) 08/30/2022    CHLORIDE 108 08/29/2022    CHLORIDE 109 08/29/2022    CO2 28 08/30/2022    CO2 27 08/29/2022    CO2 26 08/29/2022    BUN 35 (H) 08/30/2022    BUN 33 (H) 08/29/2022    BUN 32 (H) 08/29/2022    CR 0.78 08/30/2022    CR 0.75 08/29/2022    CR 0.72 08/29/2022    GLC 53 (L) 08/30/2022     (H) 08/30/2022    GLC 50 (LL) 08/30/2022    DD 0.4 11/11/2014    NTBNPI 31,699 (H) 08/25/2022    NTBNPI 1,556 (H) 01/16/2020    TROPI 0.056 (H) 01/17/2020    TROPI 0.106 (H) 01/17/2020    TROPI 0.020 01/16/2020    AST 20 08/29/2022    AST 26 08/28/2022    AST 37 08/27/2022    ALT 15 08/29/2022    ALT 18 08/28/2022    ALT 21 08/27/2022    ALKPHOS 59 08/29/2022    ALKPHOS 72 08/28/2022    ALKPHOS 82 08/27/2022    BILITOTAL 0.5 08/29/2022    BILITOTAL 0.6 08/28/2022    BILITOTAL 0.5 08/27/2022    NANCY 12 08/25/2022    INR 1.27 (H) 08/28/2022    INR 1.23 (H) 08/25/2022    INR 0.81 (L) 03/15/2022      Lab Results   Component Value Date    ALBUMIN 2.1 08/29/2022    ALBUMIN 4.0 11/03/2020          Recent Labs   Lab 08/28/22  0839 08/27/22 2049 08/27/22  1100 08/26/22  1208 08/25/22 2110   PH  --  7.36  --   --  7.30*   PCO2  --  43  --   --  44   PO2  --  81  --   --  93   HCO3  --  24  --   --  21   O2PER 40 40 30 30 40

## 2022-08-30 NOTE — PROGRESS NOTES
[facility]    Infectious Disease Progress Note    Date of Service (when I saw the patient): 08/30/2022     Assessment & Plan   Amy Luna is a 77 year old female who was admitted on 8/24/2022.     Impression:  1. 77 y.o with significant cardiac history. See #2   2. History of bioprosthetic mitral and aortic valve. S/P : tricuspid annuloplasty repair and PFO closure in 04/2020. Permanent pacemaker in place. Admitted to [facility] for encephalopathy.    3. Found to have CAP    4. Her echo showed a possible mobile density and concern for endocarditis and she was trasnferred to [facility] for further care.   5. On vancomycin + meropenem initially.   6. Blood cultures now positive with GNR  though no ID still, done in the OSH   7. Urine culture with GNR further identified as Klebsiella.      Recommendations:   1. SOCORRO with a freely mobile mass (1.7 cms) attached to the atrial aspect of  the bioprosthetic MV leaflet which is consistent with a vegetation. No  evidence of valve dehiscence, PVL or regurgitation.   2. Its hard to make a distinction between a vegetation or a thrombus based on the SOCORRO for me   3. GNR in the urine has been further identified as Klebsiella.   4. GNR in the blood cultures so far pending ID. both cultures from the OSH are positive   5. Repeat blood cultures here have been negative     Discussion:   GNR are a rare cause of endocarditis. But based on positive blood cultures and the SOCORRO findings endocarditis is hard to rule out and the mobile mass could very well be endocarditis.     Plan:   Continue meropenem till full information back on the blood isolate   If the same klebsiella as the urine switch antibiotics to ceftriaxone + cipro.   Treating with IV antibiotics as endocarditis.     Its been 6 days since the report of GNR in the blood, called lab8 / 29)  further investigation in place, hopefully ID by tomorrow or day after, hence continue on Meropenem for now     Will  continue to follow         Azam Recinos MD    Interval History   Extubated now on oxymask    bedside   No fevers   Repeat blood cultures here have no growth    A 8 point ROS is negative other than mentioned above in the interval history   No changes to PMH, social history or family h istory.     Physical Exam   Temp: 98.7  F (37.1  C) Temp src: Axillary BP: 118/56 Pulse: 81   Resp: 14 SpO2: 95 % O2 Device: BiPAP/CPAP Oxygen Delivery: 3 LPM  Vitals:    08/28/22 0230 08/29/22 0530 08/30/22 0400   Weight: 100.9 kg (222 lb 7.1 oz) 101.1 kg (222 lb 14.2 oz) 101.7 kg (224 lb 3.3 oz)     Vital Signs with Ranges  Temp:  [97.5  F (36.4  C)-99  F (37.2  C)] 98.7  F (37.1  C)  Pulse:  [] 81  Resp:  [7-25] 14  BP: (108-139)/(47-64) 118/56  FiO2 (%):  [30 %] 30 %  SpO2:  [92 %-99 %] 95 %    Constitutional: On oxymask, lying in the bed, appear comfortable   Lungs: diminished breath sounds   Cardiovascular: murmur  Abdomen: Normal bowel sounds, soft, non-distended, non-tender  Skin: No rashes, no cyanosis, no edema  Other:    Medications     dexmedetomidine Stopped (08/29/22 0336)     dextrose       dextrose       lactated ringers 10 mL/hr at 08/29/22 0800     norepinephrine Stopped (08/29/22 0700)     sodium chloride Stopped (08/27/22 1800)       acetaminophen  650 mg Oral TID     albuterol  2.5 mg Nebulization Q6H     gabapentin  100 mg Oral or Feeding Tube TID     heparin ANTICOAGULANT  5,000 Units Subcutaneous Q12H     insulin aspart  1-12 Units Subcutaneous Q4H     insulin glargine  25 Units Subcutaneous BID     meropenem  1 g Intravenous Q8H     pantoprazole  40 mg Oral BID AC    Or     pantoprazole  40 mg Intravenous BID AC     sertraline  100 mg Oral or Feeding Tube Daily       Data   All microbiology laboratory data reviewed.  Recent Labs   Lab Test 08/30/22  0428 08/29/22  1620 08/29/22  0546 08/29/22  0351   WBC 10.7  --  8.5 7.7   HGB 8.0* 8.1* 6.7* 6.4*   HCT 25.6*  --  22.4* 22.4*   MCV 91  --  92 95      --  185 194     Recent Labs   Lab Test 08/30/22  0428 08/29/22  1801 08/29/22  0351   CR 0.78 0.75 0.72     No lab results found.  Recent Labs   Lab Test 01/16/20 2221 01/16/20  2220   CULT No growth No growth

## 2022-08-30 NOTE — PROGRESS NOTES
[facility]    Stroke Progress Note    Interval EventsCTA neck done, ordered CTA head  Improved mentation continues  No drop in Hb noted today s/p 1 U RBC transfusion    HPI Summary  77 year old female with PMHx of aortic/mitral bioprosthetic valve replacement, Afib (no AC due to prior GI bleeding) PFO closure, HL, HTN, DM, LAVERNE, chronic back pain who presented with sepsis and unresponsiveness.  She is intubated.  She has positive urine culture with Klebsiella and GNR from blood drawn on 8/23.  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 She has very large burden of thrombus or vegetation on the bioprosthetic mitral valve causing severe mitral stenosis.  In addition, there is a very large mobile mass extending from the valve into the left ventricle and then prolapsing back into the left atrium.  Head CT and repeat Head CT no evidence of stroke.  VEEG without epileptiform discharges. Patient continues to do well this morning with improved mental status.    Stroke Evaluation Summarized    MRI/Head CT CT head 8/26 IMPRESSION:     1. No evidence of acute intracranial hemorrhage, mass, or herniation.  2. Mild nonspecific white matter changes likely due to chronic  microvascular ischemic disease. Possibility of acute ischemia would be  better assessed with brain MRI if the patient is able.       Intracranial Vasculature A three-vessel aortic arch is present. The bilateral common carotid,  internal carotid, external carotid, and vertebral arteries are patent.  Scattered atherosclerotic plaquing. No evidence of large vessel  occlusion or high-grade stenosis. No evidence of dissection.       Cervical Vasculature No evidence of large vessel occlusion, aneurysm or dissection     Echocardiogram 1. The left ventricle is normal in size. Left ventricular systolic function is  normal. The visual ejection fraction is 60-65%.  2. There is a bioprosthetic mitral valve. There  is severe thickenning of the  leaflets of the bioprosthesis with severely restricted opening. There is  severe bioprosthetic stenosis with mean gradient of 23 mmHg.  3. There is a freely mobile mass (1.7 cms) attached to the atrial aspect of  the bioprosthetic MV leaflet which is consistent with a vegetation. No  evidence of valve dehiscence, PVL or regurgitation.  4. There is a well-seated, normally functioning, bioprosthetic valve in aortic  position witho normal leaflet motion. No vegetation noted on AV.  5. The left atrium is severely dilated. The right atrium is moderately  dilated.   EKG/Telemetry Sinus rhythm   Other Testing Not Applicable     LDL  No lab value available in past 30 days   A1C  8/12/2022: 6.7 %  8/25/2022: 6.6 %   Troponin No lab value available in past 48 hrs       Impression   1. Resolving encephalopathy most likley secondary to sepsis  2. 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 Patient cannot undergo MRI due to having a three lead pacer.   The most concerning risk for her is that she has very large burden of thrombus or vegetation on the bioprosthetic mitral valve causing severe mitral stenosis.  In addition, there is a very large mobile mass extending from the valve into the left ventricle and then prolapsing back into the left atrium.       Plan  -From stroke stand point, we are highly concerned about mitral valve large vegetation that surgical interference is highly recommended.   -At this point, since patient cannot undergo MRI due to her pacemaker we are unable to rule out mycotic aneurysms and cannot recommend starting anticoagulation.    Patient Follow-up    - final recommendation pending work-up    We will continue to follow.     The Stroke Staff is Dr. Dejesus.    Fatmata Clemente MD  Vascular Neurology Fellow  To  "page me or covering stroke neurology team member, click here: [facility]   Choose \"On Call\" tab at top, then search dropdown box for \"Neurology Adult\", select location, press Enter, then look for stroke/neuro ICU/telestroke.    ______________________________________________________    Clinically Significant Risk Factors Present on Admission                  Medications   Scheduled Meds    acetaminophen  650 mg Oral TID     albuterol  2.5 mg Nebulization Q6H     furosemide  20 mg Intravenous Q8H     gabapentin  100 mg Oral or Feeding Tube TID     heparin ANTICOAGULANT  5,000 Units Subcutaneous Q12H     insulin aspart  1-12 Units Subcutaneous Q4H     insulin glargine  25 Units Subcutaneous BID     meropenem  1 g Intravenous Q8H     pantoprazole  40 mg Oral BID AC    Or     pantoprazole (PROTONIX) IV  40 mg Intravenous BID AC     sertraline  100 mg Oral or Feeding Tube Daily       Infusion Meds    dexmedetomidine Stopped (08/29/22 0336)     dextrose       dextrose       lactated ringers 10 mL/hr at 08/29/22 0800     norepinephrine Stopped (08/29/22 0700)     sodium chloride Stopped (08/27/22 1800)       PRN Meds  acetaminophen **OR** acetaminophen, bisacodyl, dextrose, dextrose, glucose **OR** dextrose **OR** glucagon, fentaNYL, flumazenil, HYDROmorphone, melatonin, midazolam, naloxone **OR** naloxone **OR** naloxone **OR** naloxone, ondansetron **OR** ondansetron, polyethylene glycol, prochlorperazine **OR** prochlorperazine **OR** prochlorperazine, senna-docusate **OR** senna-docusate, sodium chloride (PF)       PHYSICAL EXAMINATION  Temp:  [97.5  F (36.4  C)-99  F (37.2  C)] 98.7  F (37.1  C)  Pulse:  [] 81  Resp:  [7-25] 14  BP: (108-139)/(43-64) 118/56  FiO2 (%):  [30 %] 30 %  SpO2:  [92 %-99 %] 95 %      Mental Status:  awake,  following commands, oriented  Cranial Nerves:  PERRL, blinks to threat bilaterally, face symmetric, hoarse voice  Motor:  No drift noted in upper extremities, wiggles toes " bilaterally  Reflexes:  toes down-going  Sensory:  Equal to light touch bilaterally  Coordination:  unable to test  Station/Gait:  deferred    Stroke Scales    NIHSS  1a. Level of Consciousness 0-->Alert, keenly responsive   1b. LOC Questions 0-->Answers both questions correctly   1c. LOC Commands 0-->Performs both tasks correctly   2.   Best Gaze 0-->Normal   3.   Visual 0-->No visual loss   4.   Facial Palsy 0-->Normal symmetrical movements   5a. Motor Arm, Left 0-->No drift, limb holds 90 (or 45) degrees for full 10 secs   5b. Motor Arm, Right 0-->No drift, limb holds 90 (or 45) degrees for full 10 secs   6a. Motor Leg, Left 0-->No drift, leg holds 30 degree position for full 5 secs   6b. Motor Leg, right 0-->No drift, leg holds 30 degree position for full 5 secs   7.   Limb Ataxia 0-->Absent   8.   Sensory 0-->Normal, no sensory loss   9.   Best Language 0-->No aphasia, normal   10. Dysarthria 0-->Normal   11. Extinction and Inattention  0-->No abnormality   Total 0 (08/29/22 1655)           Imaging  I personally reviewed all imaging; relevant findings per HPI.     Lab Results Data   CBC  Recent Labs   Lab 08/30/22  0428 08/29/22  1620 08/29/22  0546 08/29/22  0351   WBC 10.7  --  8.5 7.7   RBC 2.82*  --  2.44* 2.36*   HGB 8.0* 8.1* 6.7* 6.4*   HCT 25.6*  --  22.4* 22.4*     --  185 194     Basic Metabolic Panel    Recent Labs   Lab 08/30/22  0454 08/30/22  0430 08/30/22  0428 08/29/22 2011 08/29/22  1801 08/29/22  0837 08/29/22  0351   NA  --   --  143  --  140  --  142   POTASSIUM  --   --  3.7  --  3.9  --  4.5   CHLORIDE  --   --  111*  --  108  --  109   CO2  --   --  28  --  27  --  26   BUN  --   --  35*  --  33*  --  32*   CR  --   --  0.78  --  0.75  --  0.72   * 68* 75   < > 150*   < > 178*   NELLY  --   --  9.2  --  9.5  --  9.1    < > = values in this interval not displayed.     Liver Panel  Recent Labs   Lab 08/29/22  0351 08/28/22  0402 08/27/22  2212   PROTTOTAL 7.1 7.0 7.9   ALBUMIN  2.1* 1.7* 2.0*   BILITOTAL 0.5 0.6 0.5   ALKPHOS 59 72 82   AST 20 26 37   ALT 15 18 21     INR    Recent Labs   Lab Test 08/28/22  0839 08/25/22  0115 03/15/22  0910   INR 1.27* 1.23* 0.81*      Lipid Profile    Recent Labs   Lab Test 08/04/21  1126 07/28/20  0904   CHOL 190 187   HDL 64 64   LDL 90 102*   TRIG 178* 104     A1C    Recent Labs   Lab Test 08/25/22  1140 08/12/22  1506 03/14/22  1841   A1C 6.6* 6.7* 6.6*     Troponin    Recent Labs   Lab 08/25/22  0115   TROPONINIS 162*

## 2022-08-30 NOTE — PLAN OF CARE
Goal Outcome Evaluation:    Plan of Care Reviewed With:  (Interdisciplinary bedside rounds)     Overall Patient Progress: no change    Outcome Evaluation: OK for oral diet per SLP.  Added Magic Cup with meals to optimize nutrition.    Bridgett Cristina RD, LD, CNSC   Clinical Dietitian - [facility]

## 2022-08-30 NOTE — PROGRESS NOTES
08/30/22 1600   Quick Adds   Type of Visit Initial Occupational Therapy Evaluation   Living Environment   People in Home spouse;child(janina), adult   Current Living Arrangements house   Home Accessibility no concerns   Living Environment Comments Patient and spouse recently moved into the basement of their daughters home with a walk out. No stairs to enter.   Self-Care   Usual Activity Tolerance moderate   Current Activity Tolerance poor   Regular Exercise No   Equipment Currently Used at Home walker, standard;shower chair;wheelchair, manual;grab bar, tub/shower;other (see comments)   Fall history within last six months yes   Number of times patient has fallen within last six months 2   Activity/Exercise/Self-Care Comment Patient and spouse note recent functional decline. She is normally independent but was needing to use a walker, needing assist to get off of toilet, assist to get in/out of shower and before admitted to the hospital was borrowing a manual wheelchair from a friend due to inability to walk. She was able to manage ADLS.   General Information   Onset of Illness/Injury or Date of Surgery 08/24/22   Referring Physician Robinson Guevara MD   Additional Occupational Profile Info/Pertinent History of Current Problem 77 year old female with PMH significant for heart valve disease s/p aortic/mitral bioprosthetic valve replacement 2020, sick sinus syndrome s/p pacemaker, a-fib (not on anticoagulation due to prior GI bleed) HTN, DM, LAVERNE, chronic back pain. She presented to ED via EMS on 8/23/22 for evaluation of weakness, confusion and hallucinations. Found to have bacteremia and endocarditis and she was transferred to [facility] for further evaluation; SOCORRO confirmed 1.9 cm mitral vegetation. CV surgery deemed not a good surgical candidate. Intubated due to increasing oxygen requirements. extubated on 8/27.   Existing Precautions/Restrictions oxygen therapy device and L/min;fall  (oxymizer 3L)   Cognitive Status  Examination   Affect/Mental Status (Cognitive) WNL   Follows Commands WFL   Range of Motion Comprehensive   General Range of Motion no range of motion deficits identified   Strength Comprehensive (MMT)   Comment, General Manual Muscle Testing (MMT) Assessment overall generalized weakness noted in BUES   Bed Mobility   Bed Mobility supine-sit;sit-supine   Supine-Sit Bent (Bed Mobility) maximum assist (25% patient effort)   Sit-Supine Bent (Bed Mobility) maximum assist (25% patient effort)   Assistive Device (Bed Mobility) draw sheet   Activities of Daily Living   BADL Assessment/Intervention lower body dressing   Lower Body Dressing Assessment/Training   Bent Level (Lower Body Dressing) dependent (less than 25% patient effort)   Clinical Impression   Criteria for Skilled Therapeutic Interventions Met (OT) Yes, treatment indicated   OT Diagnosis below baseline with ADLS and mobility   OT Problem List-Impairments impacting ADL activity tolerance impaired;balance;mobility;strength   Assessment of Occupational Performance 3-5 Performance Deficits   Identified Performance Deficits bathing, dressing, toileting, mobility   Planned Therapy Interventions (OT) ADL retraining;IADL retraining;balance training;strengthening;transfer training;progressive activity/exercise   Clinical Decision Making Complexity (OT) moderate complexity   Risk & Benefits of therapy have been explained evaluation/treatment results reviewed;care plan/treatment goals reviewed;risks/benefits reviewed;current/potential barriers reviewed;participants voiced agreement with care plan;participants included;patient;spouse/significant other   Clinical Impression Comments Patient severely deconditioned and anticipate slow progress due to impaired activity tolerance and multiple medical comorbidities.   OT Discharge Planning   OT Discharge Recommendation (DC Rec) Transitional Care Facility   OT Rationale for DC Rec Patient limited due to  severe debility, poor activity tolerance and strength. Anticipate slow progress with therapies due to multiple medical comorbidities. Patient is currently requiring assist x 2 for all mobility. Recommend continued therapy at TCU at discharge to improve strength and activity tolerance to improve overall independence with ADLS and mobility.   OT Brief overview of current status assist x 2 for all mobility   Total Evaluation Time (Minutes)   Total Evaluation Time (Minutes) 10   OT Goals   Therapy Frequency (OT) 5 times/wk   OT Goals Hygiene/Grooming;Upper Body Dressing;Lower Body Dressing;Transfers;Toilet Transfer/Toileting   OT: Hygiene/Grooming supervision/stand-by assist   OT: Upper Body Dressing Supervision/stand-by assist   OT: Lower Body Dressing Moderate assist   OT: Transfer Moderate assist;with assistive device   OT: Toilet Transfer/Toileting Moderate assist;toilet transfer;cleaning and garment management

## 2022-08-30 NOTE — PROGRESS NOTES
"Cardiology Progress Note   Date of service: 22       Assessment and Plan:     1. 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  With positive blood cultures and urine infection, agree with treating the gram-negative celestino, possible Klebsiella before moving to aggressive, very high risk surgical option.    Patient is not a very good surgical candidate at this time.  Agree and recommend antibiotics, hopefully if endocarditis responds, the mitral stenosis may also improve significantly and help her hemodynamics.    Continue watchful waiting at this time.  We will follow along with you.      35 minutes critical care time spent in review of past medical records, discussion with patient and her  and postvisit charting.                Interval History:   Patient very tired today.  Reviewed infectious disease notes, possible Klebsiella.              Review of Systems:   As per subjective, otherwise 5 systems reviewed and negative.           Physical Exam:     Vitals were reviewed  Blood pressure 110/47, pulse 101, temperature 98.7  F (37.1  C), temperature source Axillary, resp. rate 28, height 1.651 m (5' 5\"), weight 101.7 kg (224 lb 3.3 oz), SpO2 (!) 89 %, not currently breastfeeding.  Temperatures:  Current - Temp: 98.7  F (37.1  C); Max - Temp  Av.6  F (37  C)  Min: 97.5  F (36.4  C)  Max: 99  F (37.2  C)  Respiration range: Resp  Av.3  Min: 7  Max: 28  Pulse range: Pulse  Av.3  Min: 78  Max: 108  Blood pressure range: Systolic (24hrs), Av , Min:108 , Max:139   ; Diastolic (24hrs), Av, Min:47, Max:64    Pulse oximetry range: SpO2  Av.3 %  Min: 89 %  Max: 99 %    Intake/Output Summary (Last 24 hours) at 2022 1141  Last data filed at 2022 1000  Gross per 24 hour   Intake 872 ml   Output 975 ml   Net -103 ml       Patient " pale and sleepy         Medications:     Current Facility-Administered Medications Ordered in Epic   Medication Dose Route Frequency Last Rate Last Admin     acetaminophen (TYLENOL) tablet 650 mg  650 mg Oral Q4H PRN        Or     acetaminophen (TYLENOL) Suppository 650 mg  650 mg Rectal Q4H PRN         acetaminophen (TYLENOL) tablet 650 mg  650 mg Oral TID   650 mg at 08/30/22 0939     albuterol (PROVENTIL) neb solution 2.5 mg  2.5 mg Nebulization Q6H   2.5 mg at 08/30/22 0708     bisacodyl (DULCOLAX) suppository 10 mg  10 mg Rectal Daily PRN   10 mg at 08/28/22 1738     dexmedetomidine (PRECEDEX) 400 mcg in 0.9% sodium chloride 100 mL  0.1-1.2 mcg/kg/hr Intravenous Continuous   Stopped at 08/29/22 0336     dextrose 10% infusion   Intravenous Continuous PRN         dextrose 10% infusion   Intravenous Continuous PRN         glucose gel 15-30 g  15-30 g Oral Q15 Min PRN        Or     dextrose 50 % injection 25-50 mL  25-50 mL Intravenous Q15 Min PRN   25 mL at 08/30/22 0948    Or     glucagon injection 1 mg  1 mg Subcutaneous Q15 Min PRN         fentaNYL (PF) (SUBLIMAZE) injection 50 mcg  50 mcg Intravenous Q2H PRN   50 mcg at 08/26/22 0803     flumazenil (ROMAZICON) injection 0.2 mg  0.2 mg Intravenous q1 min prn         gabapentin (NEURONTIN) solution 100 mg  100 mg Oral or Feeding Tube TID   100 mg at 08/30/22 0954     heparin ANTICOAGULANT injection 5,000 Units  5,000 Units Subcutaneous Q12H   5,000 Units at 08/30/22 0835     HYDROmorphone (PF) (DILAUDID) injection 0.3-0.5 mg  0.3-0.5 mg Intravenous Q1H PRN   0.5 mg at 08/29/22 2044     insulin aspart (NovoLOG) injection (RAPID ACTING)  1-12 Units Subcutaneous Q4H   1 Units at 08/29/22 1641     lactated ringers infusion   Intravenous Continuous 10 mL/hr at 08/29/22 0800 Rate Verify at 08/29/22 0800     melatonin tablet 3 mg  3 mg Oral At Bedtime PRN   3 mg at 08/29/22 2313     meropenem (MERREM) 1 g vial to attach to  mL bag  1 g Intravenous Q8H 200 mL/hr  at 08/26/22 0136 1 g at 08/30/22 0835     midazolam (VERSED) injection 2-4 mg  2-4 mg Intravenous Q1H PRN         naloxone (NARCAN) injection 0.2 mg  0.2 mg Intravenous Q2 Min PRN        Or     naloxone (NARCAN) injection 0.4 mg  0.4 mg Intravenous Q2 Min PRN        Or     naloxone (NARCAN) injection 0.2 mg  0.2 mg Intramuscular Q2 Min PRN        Or     naloxone (NARCAN) injection 0.4 mg  0.4 mg Intramuscular Q2 Min PRN         norepinephrine (LEVOPHED) 4 mg in  mL infusion PREMIX  0.01-0.6 mcg/kg/min Intravenous Continuous   Stopped at 08/29/22 0700     ondansetron (ZOFRAN ODT) ODT tab 4 mg  4 mg Oral Q6H PRN        Or     ondansetron (ZOFRAN) injection 4 mg  4 mg Intravenous Q6H PRN         pantoprazole (PROTONIX) EC tablet 40 mg  40 mg Oral BID AC        Or     pantoprazole (PROTONIX) IV push injection 40 mg  40 mg Intravenous BID AC   40 mg at 08/30/22 0835     polyethylene glycol (MIRALAX) Packet 17 g  17 g Oral Daily PRN         prochlorperazine (COMPAZINE) injection 5 mg  5 mg Intravenous Q6H PRN        Or     prochlorperazine (COMPAZINE) tablet 5 mg  5 mg Oral Q6H PRN        Or     prochlorperazine (COMPAZINE) suppository 12.5 mg  12.5 mg Rectal Q12H PRN         senna-docusate (SENOKOT-S/PERICOLACE) 8.6-50 MG per tablet 1 tablet  1 tablet Oral BID PRN        Or     senna-docusate (SENOKOT-S/PERICOLACE) 8.6-50 MG per tablet 2 tablet  2 tablet Oral BID PRN         sertraline (ZOLOFT) tablet 100 mg  100 mg Oral or Feeding Tube Daily   100 mg at 08/30/22 0939     sodium chloride (PF) 0.9% PF flush 3 mL  3 mL Intravenous q1 min prn         sodium chloride 0.9% infusion   Intravenous Continuous   Stopped at 08/27/22 1800     No current Epic-ordered outpatient medications on file.                Data:   All laboratory data reviewed  Results for orders placed or performed during the hospital encounter of 08/24/22 (from the past 24 hour(s))   CTA Neck with Contrast    Narrative    CT ANGIOGRAM OF THE NECK  CONTRAST  8/29/2022 12:49 PM     HISTORY: Neurological abnormality.    TECHNIQUE: CT angiography with an injection of 75mL [id number]-370 IV with  scans through the neck. Images were transferred to a separate 3-D  workstation where multiplanar reformations and 3-D images were  created. Estimates of carotid stenoses are made relative to the distal  internal carotid artery diameters except as noted. Radiation dose for  this scan was reduced using automated exposure control, adjustment of  the mA and/or kV according to patient size, or iterative  reconstruction technique.    COMPARISON: None.     CT ANGIOGRAM FINDINGS:   A three-vessel aortic arch is present. The bilateral common carotid,  internal carotid, external carotid, and vertebral arteries are patent.  Scattered atherosclerotic plaquing. No evidence of large vessel  occlusion or high-grade stenosis. No evidence of dissection.     INCIDENTAL FINDINGS: Bilateral pleural effusions with nonspecific  pulmonary opacities. Subglottic tracheal secretions, probably  aspirated. Bulky mediastinal lymphadenopathy. Cervical spine  degenerative changes. Cardiac surgery change. Cardiac device. Central  venous catheter.      Impression    IMPRESSION:     1. No evidence of large vessel occlusion, high-grade stenosis, or  dissection.  2. Incidental findings as detailed.    MILLIE TOWNSEND MD         SYSTEM ID:  [id number]   Glucose by meter   Result Value Ref Range    GLUCOSE BY METER POCT 176 (H) 70 - 99 mg/dL   Hemoglobin   Result Value Ref Range    Hemoglobin 8.1 (L) 11.7 - 15.7 g/dL   Glucose by meter   Result Value Ref Range    GLUCOSE BY METER POCT 159 (H) 70 - 99 mg/dL   Basic metabolic panel   Result Value Ref Range    Sodium 140 133 - 144 mmol/L    Potassium 3.9 3.4 - 5.3 mmol/L    Chloride 108 94 - 109 mmol/L    Carbon Dioxide (CO2) 27 20 - 32 mmol/L    Anion Gap 5 3 - 14 mmol/L    Urea Nitrogen 33 (H) 7 - 30 mg/dL    Creatinine 0.75 0.52 - 1.04 mg/dL    Calcium 9.5 8.5 -  10.1 mg/dL    Glucose 150 (H) 70 - 99 mg/dL    GFR Estimate 82 >60 mL/min/1.73m2   Glucose by meter   Result Value Ref Range    GLUCOSE BY METER POCT 124 (H) 70 - 99 mg/dL   Glucose by meter   Result Value Ref Range    GLUCOSE BY METER POCT 109 (H) 70 - 99 mg/dL   CBC with platelets   Result Value Ref Range    WBC Count 10.7 4.0 - 11.0 10e3/uL    RBC Count 2.82 (L) 3.80 - 5.20 10e6/uL    Hemoglobin 8.0 (L) 11.7 - 15.7 g/dL    Hematocrit 25.6 (L) 35.0 - 47.0 %    MCV 91 78 - 100 fL    MCH 28.4 26.5 - 33.0 pg    MCHC 31.3 (L) 31.5 - 36.5 g/dL    RDW 19.6 (H) 10.0 - 15.0 %    Platelet Count 243 150 - 450 10e3/uL   Magnesium   Result Value Ref Range    Magnesium 2.0 1.6 - 2.3 mg/dL   Phosphorus   Result Value Ref Range    Phosphorus 3.3 2.5 - 4.5 mg/dL   Basic metabolic panel   Result Value Ref Range    Sodium 143 133 - 144 mmol/L    Potassium 3.7 3.4 - 5.3 mmol/L    Chloride 111 (H) 94 - 109 mmol/L    Carbon Dioxide (CO2) 28 20 - 32 mmol/L    Anion Gap 4 3 - 14 mmol/L    Urea Nitrogen 35 (H) 7 - 30 mg/dL    Creatinine 0.78 0.52 - 1.04 mg/dL    Calcium 9.2 8.5 - 10.1 mg/dL    Glucose 75 70 - 99 mg/dL    GFR Estimate 78 >60 mL/min/1.73m2   Glucose by meter   Result Value Ref Range    GLUCOSE BY METER POCT 68 (L) 70 - 99 mg/dL   Glucose by meter   Result Value Ref Range    GLUCOSE BY METER POCT 112 (H) 70 - 99 mg/dL   XR Video Swallow with SLP or OT    Narrative    VIDEO SWALLOWING EVALUATION   8/30/2022 9:30 AM     HISTORY: dysphagia    COMPARISON: None.    FLUOROSCOPY TIME: 1.8 minutes.  SPOT IMAGES OR CINE RUNS: 12    FINDINGS:    Thin: Penetration to the cords and questionable aspiration    Mildly thick: Aspiration    Moderately thick: No penetration or aspiration.    Pudding: No penetration or aspiration.    Semisolid: No penetration or aspiration.    Solid: No penetration or aspiration.    Refer to speech pathology report for additional details.    MILLIE TOWNSEND MD         SYSTEM ID:  [id number]   Glucose by meter    Result Value Ref Range    GLUCOSE BY METER POCT 50 (LL) 70 - 99 mg/dL   Glucose by meter   Result Value Ref Range    GLUCOSE BY METER POCT 102 (H) 70 - 99 mg/dL       All laboratory data reviewed  Lab Results   Component Value Date    CHOL 190 08/04/2021    CHOL 187 07/28/2020     Lab Results   Component Value Date    HDL 64 08/04/2021    HDL 64 07/28/2020     Lab Results   Component Value Date    LDL 90 08/04/2021     07/28/2020     Lab Results   Component Value Date    TRIG 178 08/04/2021    TRIG 104 07/28/2020     No results found for: CHOLHDLRATIO  TSH   Date Value Ref Range Status   08/04/2021 2.28 0.40 - 4.00 mU/L Final   07/28/2020 4.47 (H) 0.40 - 4.00 mU/L Final     Last Basic Metabolic Panel:  Lab Results   Component Value Date     08/30/2022     02/05/2021      Lab Results   Component Value Date    POTASSIUM 3.7 08/30/2022    POTASSIUM 3.9 02/05/2021     Lab Results   Component Value Date    CHLORIDE 111 08/30/2022    CHLORIDE 105 02/05/2021     Lab Results   Component Value Date    NELLY 9.2 08/30/2022    NELLY 9.7 02/05/2021     Lab Results   Component Value Date    CO2 28 08/30/2022    CO2 28 02/05/2021     Lab Results   Component Value Date    BUN 35 08/30/2022    BUN 26 02/05/2021     Lab Results   Component Value Date    CR 0.78 08/30/2022    CR 1.05 02/05/2021     Lab Results   Component Value Date     08/30/2022    GLC 75 08/30/2022     02/05/2021     Lab Results   Component Value Date    WBC 10.7 08/30/2022    WBC 6.9 02/05/2021     Lab Results   Component Value Date    RBC 2.82 08/30/2022    RBC 3.94 02/05/2021     Lab Results   Component Value Date    HGB 8.0 08/30/2022    HGB 12.4 02/05/2021     Lab Results   Component Value Date    HCT 25.6 08/30/2022    HCT 38.1 02/05/2021     Lab Results   Component Value Date    MCV 91 08/30/2022    MCV 97 02/05/2021     Lab Results   Component Value Date    MCH 28.4 08/30/2022    [facility] 31.5 02/05/2021     Lab Results    Component Value Date    MCHC 31.3 08/30/2022    MCHC 32.5 02/05/2021     Lab Results   Component Value Date    RDW 19.6 08/30/2022    RDW 12.8 02/05/2021     Lab Results   Component Value Date     08/30/2022     02/05/2021

## 2022-08-30 NOTE — CARE PLAN
Patient arrived on unit from ICU at 1630. Patient is A&O x 4. Vitals are stable on 3L oxymask. Tele NSR. . Abnormal assessment points included pain and blanchable redness on coccyx/sacrum. Blood glucose fluctuates from WDL to critically low, held insulin dose at 1800. internal jugular line in place. Purewick in place. No bowel movement. Client on pureed diet with moderately thick liquids. Poor appetite.  Patient is up with assist of 2 and ceiling lift. Plan is for reposition q 2 hours to prevent skin breakdown. Monitor Resp status, bipap in room for bedtime\intermittent.

## 2022-08-30 NOTE — PROGRESS NOTES
CLINICAL NUTRITION SERVICES - REASSESSMENT NOTE      Recommendations Ordered by Registered Dietitian (RD): Add Magic Cup with meals to optimize nutrition status   Malnutrition: % Weight Loss:  > 10% in 6 months (severe malnutrition) (8/26)  % Intake:  </= 50% for >/= 5 days (severe malnutrition)  Subcutaneous Fat Loss:  None observed (8/26)  Muscle Loss:  Temporal region mild depletion (? Acute vs chronic) (8/26)  Fluid Retention:  Mild 2+ (8/26)    Malnutrition Diagnosis: Severe malnutrition  In Context of:  Acute illness or injury  Chronic illness or disease       EVALUATION OF PROGRESS TOWARD GOALS   Diet:  Pureed (level 4) with moderately thick liquids (level 3)    Nutrition Support:  Patient started on TF 8/26, advanced to 30 mL/hr as of 8/27 (out of goal 50 mL/hr), but then extubated later that day and OG out.     Intake/Tolerance:  Patient's diet just advanced this morning, hasn't ordered anything yet.  She is now day #6 sub-optimal nutrition.       ASSESSED NUTRITION NEEDS:  Dosing Weight 98.6 kg energy (actual wt); 56.8 kg protein (IBW)  Estimated Energy Needs: 7708-0172 kcals (14-17 Kcal/Kg)  Justification: obese and vented  Estimated Protein Needs:  grams protein (1.5-2 g pro/Kg)  Justification: hypercatabolism with critical illness and obesity guidelines   Estimated Fluid Needs: 1 mL/kcal or per MD       NEW FINDINGS:   8/26: Thoracentesis = 1050 mL of  Elizabeth colored fluid was drained   8/27:  Extubated   8/27:  Bipap   8/29:  CTA of neck = No evidence of large vessel occlusion, high-grade stenosis, or   Dissection.   8/29:  SLP = Mod-severe dysphagia, unable to rule out silent aspiration.  Rec:mildly thick liquids (level 2);clear liquid diet by tsp only with RN supervision   8/30:  VSS= Pureed Diet (level 4) + Liquidized/Moderately Thick liquids (level 3)     No plans for CV surgery per rounds     Propofol off with extubation     Previous Goals (8/26):   EN + Propofol to meet % estimated  needs in the next 48 hrs   Evaluation: Not met    Previous Nutrition Diagnosis (8/26):   Inadequate protein-energy intake related to NPO, vented as evidenced by receiving <25% estimated energy needs and 0% estimated protein needs from Propofol alone   Evaluation: No change      MALNUTRITION  % Weight Loss:  > 10% in 6 months (severe malnutrition) (8/26)  % Intake:  </= 50% for >/= 5 days (severe malnutrition)  Subcutaneous Fat Loss:  None observed (8/26)  Muscle Loss:  Temporal region mild depletion (? Acute vs chronic) (8/26)  Fluid Retention:  Mild 2+ (8/26)    Malnutrition Diagnosis: Severe malnutrition  In Context of:  Acute illness or injury  Chronic illness or disease    CURRENT NUTRITION DIAGNOSIS  Inadequate protein-energy intake related to TF off with extubation, diet just advanced today (and patient hasn't ordered yet) as evidenced by meeting 0% needs     INTERVENTIONS  Recommendations / Nutrition Prescription  Add Magic Cup with meals to optimize nutrition status    Implementation  Medical Food Supplement:  Ordered as above   Collaboration and Referral of Nutrition care:  Patient discussed today during interdisciplinary bedside rounds     Goals  Patient will consume at least 50% of meals and supplements TID     MONITORING AND EVALUATION:  Progress towards goals will be monitored and evaluated per protocol and Practice Guidelines    Bridgett Cristina RD, LD, CNSC   Clinical Dietitian - [facility]

## 2022-08-30 NOTE — PROGRESS NOTES
SLP VFSS Report 08/30/22 1001   General Information   Onset of Illness/Injury or Date of Surgery 08/23/22   Referring Physician Dr. Guevara   Patient/Family Therapy Goal Statement (SLP) To return to a regular diet   Pertinent History of Current Problem Per notes: Ms. Luna is a very pleasant 76-year-old female with a history of valve disease.  She has bioprosthetic mitral and aortic valve replacements with tricuspid annuloplasty repair and PFO closure in 04/2020.  She did require a permanent pacemaker as well. She was admitted to [facility] on 8/23 for worsening encephalopathy.  She was admitted with initial diagnosis of encephalopathy and community acquired pneumonia.  Her echo showed a mobile density and concern for endocarditis and she was trasnferred to [facility] for further care. This was confirmed by [facility] (1.9 cm mitral vegetation) and blood cultures are growing GNR 3/4 bottles from 8/23 but cultures have showed NGTD afterwards. She is receiving meropenem per ID.  The evening after admission  (8/25) here she became obtunded with increasing oxygen requirements requiring intubation.  She was subsequently extubated on 8/27 to bipap.  Septic shock:  In setting of endocarditis and bacteremia    Surgery needs, but not felt to be stable for surgery currently.    Hx of small hiatal hernia and distal esophageal tertiary contractions vs esophagitis in 4/2020 during esophagram - admit included sternotomy, AVR, PFO closure.   General Observations Pt was alert and cooperative.  Pt anxious and feeling SOB, saturation levels were at 99% on 4L  AP view not completed given no signiciant residue and able to view under the UES during the study.   VFSS Evaluation   Radiologist Dr. Petty   Views Taken left lateral   Physical Location of Procedure [facility]   VFSS Eval: Thin Liquid Texture Trial   Mode of Presentation, Thin Liquid spoon;cup   Order of Presentation 9, 10, 11, 12   Preparatory Phase Poor bolus control   Oral Phase, Thin  Liquid Premature pharyngeal entry   Pharyngeal Phase, Thin Liquid Delayed swallow reflex   Rosenbek's Penetration Aspiration Scale: Thin Liquid Trial Results 5 - contrast contacts vocal cords, visible residue remains (penetration)  (? slight aspiration as well)   Diagnostic Statement Decreased hyoid elevation, pharyngeal peristalsis, and epiglottic closure, tight UES with slight reflux, min-mild residue,  silent penetration to the vocal folds/? aspiration during swallow with thin by cup, slight chin tuck used with deep penetration observed   VFSS Eval: Mildly Thick Liquids   Mode of Presentation spoon   Order of Presentation 1, 2   Preparatory Phase Poor bolus control   Oral Phase Premature pharyngeal entry   Pharyngeal Phase Delayed swallow reflex   Rosenbek's Penetration Aspiration Scale 8 - contrast passes glottis, visible subglottic residue remains, absent patient response (aspiration)   Diagnostic Statement Decreased hyoid elevation, pharyngeal peristalsis, and epiglottic closure, tight UES with slight reflux, silent aspiration during swallow   VFSS Eval: Moderately Thick Liquids   Mode of Presentation spoon   Order of Presentation 3, 4   Preparatory Phase WFL   Oral Phase Residue in oral cavity   Pharyngeal Phase   (no delay)   Rosenbek's Penetration Aspiration Scale 1 - no aspiration, contrast does not enter airway   Diagnostic Statement Decreased hyoid elevation, pharyngeal peristalsis, and epiglottic closure, tight UES with slight reflux,min-mild residue   VFSS Evaluation: Puree Solid Texture Trial   Mode of Presentation, Puree spoon   Order of Presentation 5, 6   Preparatory Phase WFL   Oral Phase, Puree Residue in oral cavity;Premature pharyngeal entry   Pharyngeal Phase, Puree   (no delay)   Rosenbek's Penetration Aspiration Scale: Puree Food Trial Results 1 - no aspiration, contrast does not enter airway   Diagnostic Statement Decreased hyoid elevation, pharyngeal peristalsis, and epiglottic closure,  tight UES with slight reflux,min-no residue, residue under the UES; pt felt the need to cuugh and spit out small amount of barium after swallow x 1   VFSS Eval: Soft & Bite Sized   Mode of Presentation spoon   Order of presentation 7   Preparatory Phase Poor bolus control   Oral Phase Effortful AP movement;Premature phrayngeal entry   Pharyngeal Phase Delayed swallow reflex   Rosenbek's Penetration Aspiration Scale 1 - no aspiration, contrast does not enter airway   Diagnostic Statement Decreased hyoid elevation, pharyngeal peristalsis, and epiglottic closure, tight UES with slight reflux,min residue, residue under the UES   VFSS Evaluation: Solid Food Texture Trial   Mode of Presentation, Solid spoon   Order of Presentation 8   Preparatory Phase Poor bolus control   Oral Phase, Solid Residue in oral cavity   Pharyngeal Phase, Solid   (no delay)   Rosenbek's Penetration Aspiration Scale: Solid Food Trial Results 1 - no aspiration, contrast does not enter airway   Diagnostic Statement Decreased hyoid elevation, pharyngeal peristalsis, and epiglottic closure, tight UES with slight reflux,min-mild residue, residue under the UES, alternating to clear liquids helped clear residue below the UES   Esophageal Phase of Swallow   Patient reports or presents with symptoms of esophageal dysphagia Yes   Esophageal comments see above   Swallowing Recommendations   Diet Consistency Recommendations moderately thick liquids/liquidized (level 3);pureed (level 4)   Supervision Level for Intake 1:1 supervision needed   Mode of Delivery Recommendations no straws;bolus size, small;liquids via spoon only;slow rate of intake   Swallowing Maneuver Recommendations effortful (hard) swallow;extra swallow   Monitoring/Assistance Required (Eating/Swallowing) monitor for cough or change in vocal quality with intake   Recommended Feeding/Eating Techniques (Swallow Eval) maintain upright posture during/after eating for 30 minutes   Medication  Administration Recommendations, Swallowing (SLP) Crush meds with puree   Comment, Swallowing Recommendations Hold po if increased aspiration signs/decreased respiratory status   Clinical Impression   Criteria for Skilled Therapeutic Interventions Met (SLP Eval) Yes, treatment indicated   SLP Diagnosis Moderate oral-pharyngeal dysphagia   Risks & Benefits of therapy have been explained evaluation/treatment results reviewed;care plan/treatment goals reviewed;risks/benefits reviewed;current/potential barriers reviewed;participants voiced agreement with care plan;participants included;patient;spouse/significant other   Clinical Impression Comments Pt presents with moderate oral-pharyngeal dysphagia per today's study.  Deficits include delayed swallows, decreased oral control, oral residue, decreased hyoid elevation, pharyngeal peristalsis, and epiglottic closure, and tight UES with slight reflux.  Deficits resulted in min-no to min-mild residue, residue under the UES, silent aspiration of mildly thick by tsp, and silent penetration to the vocal folds/? aspiration with thin liquids by cup.  Deep penetration noted with thin liquids despite use of a slight chin tuck.  Recommend cautious initiation of an IDDSI Diet level 4 puree and moderately thick liquids by tsp, 1:1 supervision/assist, and direct cues to use strategies/precautions.  Hold po if increased aspiration signs/decreased respiratory status.  Plan to continue Tx to assess diet tolerance, train strategies, complete exercises, and advance po as indicated.  A repeat VFSS may be appropriate in 1-2 weeks prior to advancement of liquids.   SLP Discharge Planning   SLP Discharge Recommendation Transitional Care Facility   SLP Rationale for DC Rec Swallow function is below baseline   SLP Brief overview of current status  Moderate oral-pharyngeal dysphagia; Rec: DDSI Diet level 4 puree and moderately thick liquids by tsp, 1:1 supervision/assist, and direct cues to use  strategies/precautions.  Hold po if increased aspiration signs/decreased respiratory status.     Total Evaluation Time   Total Evaluation Time (Minutes) 15

## 2022-08-30 NOTE — PLAN OF CARE
Neuro: A&Ox4. Anxious.     Cardiac: SR-ST. Pacemaker. Normotensive. Pulses palpable.    Respiratory: Patient alternating between 3L Oxymask and BiPap at 30%. LS clear.    GI: No BM this shift. Moderately thick liquids. Pills crushed in thickened apple juice.    : Purewick in place. D50 given x1 for hypoglycemia efffective.    Mobility: PT/OT consulted.    Skin: Repositioning q2 hours. Redness to coccyx, R knee scab, R heel healing PI.    Pain: PRN dilaudid and scheduled tylenol given for chronic back pain, generalized pain, and L ankle pain. Ice pack applied with relief to L ankle.    Shift Events: na    Plan of Care: Video Swallow today 8/30.

## 2022-08-30 NOTE — PROGRESS NOTES
Patient alert and oriented. Moves all extremities, but is generally weak. Exhausts easily. SR/ST, BP WNL. Pulses strong. Lung sounds clear, diminished, Rotates between oxymask and BiPap for comfort, no clinical deterioration with switches. Purewick in place, working well. No BM. Pain managed with Tylenol today, dilaudid available if necessary. internal jugular let in place, paging MD about PICC. Blood Sugar critically low twice this shift. Dextrose amp given. MD aware. Video Swallow completed this morning, CT completed early this afternoon.  at bedside. Continuing to monitor.     Idalia Joshua RN

## 2022-08-31 NOTE — PROGRESS NOTES
[facility]    Infectious Disease Progress Note    Date of Service (when I saw the patient): 08/31/2022     Assessment & Plan   Amy Luna is a 77 year old female who was admitted on 8/24/2022.     Impression:  1. 77 y.o with significant cardiac history. See #2   2. History of bioprosthetic mitral and aortic valve. S/P : tricuspid annuloplasty repair and PFO closure in 04/2020. Permanent pacemaker in place. Admitted to [facility] for encephalopathy.    3. Found to have CAP    4. Her echo showed a possible mobile density and concern for endocarditis and she was trasnferred to [facility] for further care.   5. On vancomycin + meropenem initially.   6. 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 Urine culture with GNR further identified as Klebsiella.      Recommendations:   1. SOCORRO with a freely mobile mass (1.7 cms) attached to the atrial aspect of  the bioprosthetic MV leaflet which is consistent with a vegetation. No  evidence of valve dehiscence, PVL or regurgitation.   2. Its hard to make a distinction between a vegetation or a thrombus based on the SOCORRO for me but now that I have the identification on the blood isolate which came back positive for cardiobacterium hominis this is endocarditis causing organism and actually is one of the HACEK organism!, So the SOCORRO findings have to be that of endocarditis.   3. GNR in the urine has been further identified as Klebsiella.   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   This is a HACEK organism   This is a different organism than what she has in her urine.   Ceftriaxone is the antibiotic of choice. Though meropenem was covering     Plan:   Switching to IV ceftriaxone for 6 weeks     Discussed in detail with patient and  sitting bedside       Azam Recinos MD    Interval History    Out of ICU   Awake answering questions   bipap     bedside   No fevers   Repeat blood cultures here have no growth    A 8 point ROS is negative other than mentioned above in the interval history   No changes to PMH, social history or family h istory.     Physical Exam   Temp: 98  F (36.7  C) Temp src: Oral BP: 128/81 Pulse: 108   Resp: (!) 33 SpO2: 100 % O2 Device: BiPAP/CPAP Oxygen Delivery: 3 LPM  Vitals:    08/29/22 0530 08/30/22 0400 08/31/22 0046   Weight: 101.1 kg (222 lb 14.2 oz) 101.7 kg (224 lb 3.3 oz) 98.4 kg (216 lb 14.4 oz)     Vital Signs with Ranges  Temp:  [97.7  F (36.5  C)-98.6  F (37  C)] 98  F (36.7  C)  Pulse:  [] 108  Resp:  [13-33] 33  BP: (107-134)/(37-81) 128/81  FiO2 (%):  [28 %-30 %] 28 %  SpO2:  [98 %-100 %] 100 %    Constitutional: on bipap today k, lying in the bed, appear comfortable   Lungs: diminished breath sounds   Cardiovascular: irregular, murmur   Abdomen: Normal bowel sounds, soft, non-distended, non-tender  Skin: No rashes, no cyanosis, no edema  Other:    Medications     - MEDICATION INSTRUCTIONS -       - MEDICATION INSTRUCTIONS -       sodium chloride Stopped (08/27/22 1800)       acetaminophen  650 mg Oral TID     albuterol  2.5 mg Nebulization Q6H     gabapentin  100 mg Oral or Feeding Tube TID     heparin ANTICOAGULANT  5,000 Units Subcutaneous Q12H     insulin aspart  1-12 Units Subcutaneous Q4H     meropenem  1 g Intravenous Q8H     pantoprazole  40 mg Oral BID AC    Or     pantoprazole  40 mg Intravenous BID AC     sertraline  100 mg Oral or Feeding Tube Daily     sodium chloride (PF)  3 mL Intracatheter Q8H       Data   All microbiology laboratory data reviewed.  Recent Labs   Lab Test 08/31/22  0527 08/30/22  0428 08/29/22  1620 08/29/22  0546   WBC 9.2 10.7  --  8.5   HGB 7.6* 8.0* 8.1* 6.7*   HCT 25.7* 25.6*  --  22.4*   MCV 92 91  --  92    243  --  185     Recent Labs   Lab Test 08/31/22  0527 08/30/22  0428 08/29/22  1801   CR 0.62 0.78  0.75     No lab results found.  Recent Labs   Lab Test 01/16/20 2221 01/16/20 2220   CULT No growth No growth

## 2022-08-31 NOTE — PROGRESS NOTES
[facility]    Medicine Progress Note - Hospitalist Service    Date of Admission:  8/24/2022  Date of Service: 08/31/2022    Assessment & Plan        Amy Luna is a 77 year old female with a history of valve disease.  She has bioprosthetic mitral and aortic valve replacements with tricuspid annuloplasty repair and PFO closure in 04/2020.  She did require a permanent pacemaker as well. She was admitted to [facility] on 8/23 for worsening encephalopathy.  She was initially felt to have encephalopathy and community acquired pneumonia.  Her echo showed a possible mobile density with concern for endocarditis and she was transferred to [facility] for further care.  En route she had a decline of BP and had a central line placed with levophed briefly administered.  BP quickly improved and now has been off vasopressors several hours.  Hospitalist service was asked to take over care from ICU service 8/25. On 8/25, she had progressive O2 requirements and was subsequently intubated from 8/25- 8/27. Thoracentesis on 8/27 with 1L out. Remained on IV pressors 8/28. Cardiovascular surgery declined patient for any surgical intervention on 8/30. Will transfer to [facility] for further management and exploration of goals of care.     Septic Shock due to endocarditis, resolved: Diagnosis based on Temp > 38 C, HR > 90 bpm and WBC > 12 due to sepsis.   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   - CV surgery consulted (deemed not a surgical candidate on 8/30)  -> Plan for continued watchful waiting and medical management at this time. Patient and  express that they would prefer to pursue surgical treatment as soon as risk is not prohibitive pending her course.   - Diuresis per cardiology    SOB  Pulmonary HTN  Acute hypoxic respiratory failure  Right transudative pleural effusion   S/p thoracentesis 8/26  -Continue supplemental oxygen as needed including BiPAP as needed  -Continue scheduled nebs  -Monitor    K.Pneumoniae UTI  - abx as above    Encephalopathy, acute, metabolic/septic  - Neuro consult appreciated  - repeat CT head 8/30 w/o acute findings    DMT2  Recurrent hypoglycemia 8/30  - HDSSI  - lantus stopped 8/30 due to hypoglycemia, resume as able    Dysphagia  Moderate malnutrition  - SLP  - mod diet per SLP    HTN  - hold BP meds, resume per cardiology    HLD  - hold statin    Acute on chronic anemia  - monitor    Chronic low back pain  - monitor  - continue gabapentin 100TID (decreased from PTA dose)     Goals of Care  *Patient is full code  *Family is hoping for a miracle as they feel it is a miracle that she has made it this far.   is quite clear that if patient was nonambulatory for 6 months, that would be intolerable for her, but likely they would be able to tolerate 6 weeks of nonambulatory state.  Discussed with them that risk of complications with her current state including pressure sores, ongoing weakness, need for prolonged hospitalization, need for discharge to nursing home versus TCU, stroke, need for feeding tube, and other issues is very possible with her current state.  They will think on these things, but would like to continue full restorative cares and full code.       Diet: Snacks/Supplements Adult: Magic Cup; With Meals  Combination Diet Minced and Moist Diet (level 5); Liquidized/Moderately Thick (level 3) (by tsp only); No Straws    DVT Prophylaxis: Heparin SQ  Stewart Catheter: Not present  Central Lines: PRESENT  CVC TRIPLE  LUMEN Right Internal jugular Non - tunneled-Site Assessment: WDL  Cardiac Monitoring: ACTIVE order. Indication: Infective endocarditis (48 hours) - additional guidance recommending until clinically stable  Code Status: Full Code      Disposition Plan      Expected Discharge Date: 09/03/2022                The patient's care was discussed with the Bedside Nurse, Patient and Patient's Family.    Duran Barahona MD  Hospitalist Service  [employer]  Securely message with the Vocera Web Console (learn more here)  Text page via [employer] Paging/Directory         Clinically Significant Risk Factors Present on Admission                      ______________________________________________________________________    Interval History     No acute events overnight  No CP/SOB  No nausea / vomiting  No fevers or chills  She denies any pain.  No other associated symptoms.      Data reviewed today: I reviewed all medications, new labs and imaging results over the last 24 hours. I personally reviewed no images or EKG's today.    Physical Exam   Vital Signs: Temp: 98  F (36.7  C) Temp src: Axillary BP: 128/72 Pulse: 111   Resp: 22 SpO2: 98 % O2 Device: BiPAP/CPAP Oxygen Delivery: 5 LPM  Weight: 216 lbs 14.4 oz    Constitutional: no apparent distress  Respiratory: Clear to auscultation bilaterally, no crackles or wheezing.  Cardiovascular: Regular rate and rhythm, normal S1 and S2, and no murmur noted.  GI: Soft, non-distended, non-tender, normal bowel sounds.  Lymph/Hematologic: No anterior cervical or supraclavicular adenopathy.  Skin: No rashes, no cyanosis, no edema noted on exposed skin.  Musculoskeletal: No joint swelling, erythema or tenderness. No gross bony abnormalities  Neurologic:  normal sensation.  Appears somewhat withdrawn and slightly confused.  Diffusely weak.  3 out of 5 leg extension due to deconditioning.  4 out of 5 arm extension bilaterally due to deconditioning.  Psychiatric: Alert, oriented to  person, place, no obvious anxiety or depression.      Data   Recent Labs   Lab 08/31/22  1207 08/31/22  0907 08/31/22  0527 08/30/22  0430 08/30/22  0428 08/29/22 2011 08/29/22  1801 08/29/22  1639 08/29/22  1620 08/29/22  0837 08/29/22  0546 08/29/22  0351 08/28/22  1215 08/28/22  0839 08/28/22  0837 08/28/22  0402 08/25/22  0449 08/25/22  0115   WBC  --   --  9.2  --  10.7  --   --   --   --   --  8.5 7.7  --   --   --  8.8   < > 11.7*   HGB  --   --  7.6*  --  8.0*  --   --   --  8.1*  --  6.7* 6.4*  --   --   --  7.1*   < > 7.4*   MCV  --   --  92  --  91  --   --   --   --   --  92 95  --   --   --  95   < > 94   PLT  --   --  202  --  243  --   --   --   --   --  185 194  --   --   --  192   < > 228   INR  --   --   --   --   --   --   --   --   --   --   --   --   --  1.27*  --   --   --  1.23*   NA  --   --  144  --  143  --  140  --   --   --   --  142  --   --   --  139   < > 134   POTASSIUM  --   --  3.7  --  3.7  --  3.9  --   --   --   --  4.5  --   --   --  4.5   < > 4.9   CHLORIDE  --   --  110*  --  111*  --  108  --   --   --   --  109  --   --   --  109   < > 104   CO2  --   --  30  --  28  --  27  --   --   --   --  26  --   --   --  26   < > 24   BUN  --   --  25  --  35*  --  33*  --   --   --   --  32*  --   --   --  25   < > 43*   CR  --   --  0.62  --  0.78  --  0.75  --   --   --   --  0.72  --   --   --  0.80   < > 1.15*   ANIONGAP  --   --  4  --  4  --  5  --   --   --   --  7  --   --   --  4   < > 6   NELLY  --   --  9.2  --  9.2  --  9.5  --   --   --   --  9.1  --   --   --  9.1   < > 9.1   * 197* 204*   < > 75   < > 150*   < >  --    < >  --  178*   < >  --    < > 190*   < > 292*   ALBUMIN  --   --   --   --   --   --   --   --   --   --   --  2.1*  --   --   --  1.7*   < > 2.1*   PROTTOTAL  --   --   --   --   --   --   --   --   --   --   --  7.1  --   --   --  7.0   < > 7.6   BILITOTAL  --   --   --   --   --   --   --   --   --   --   --  0.5  --   --   --  0.6   < > 0.4    ALKPHOS  --   --   --   --   --   --   --   --   --   --   --  59  --   --   --  72   < > 66   ALT  --   --   --   --   --   --   --   --   --   --   --  15  --   --   --  18   < > 14   AST  --   --   --   --   --   --   --   --   --   --   --  20  --   --   --  26   < > 20   LIPASE  --   --   --   --   --   --   --   --   --   --   --   --   --   --   --   --   --  33*    < > = values in this interval not displayed.     Recent Results (from the past 24 hour(s))   XR Chest 1 View    Narrative    EXAM: CHEST SINGLE VIEW PORTABLE  LOCATION: [facility]  DATE/TIME: 8/30/2022 10:12 PM    INDICATION: Evaluate for recurrent pleural effusion.  COMPARISON: 8/27/2020 at 2209 hours.      Impression    IMPRESSION:   1. Patchy opacities within the right lung, most prominent in the right lung base, overall mildly increased since 8/27/2022. There are unchanged patchy opacities within the retrocardiac region of the left lung base.  2. No other convincing interval change since the recent comparison study.  3. Generalized haziness of both lungs again noted and likely related to bilateral pleural effusions.   4. A left anterior chest wall cardiac device with lead tips in right atrium and ventricle, right internal jugular central venous catheter and prior median sternotomy and cardiac valve replacement are again noted.     Medications     - MEDICATION INSTRUCTIONS -       - MEDICATION INSTRUCTIONS -       sodium chloride Stopped (08/27/22 1800)       acetaminophen  650 mg Oral TID     albuterol  2.5 mg Nebulization Q6H     gabapentin  100 mg Oral or Feeding Tube TID     heparin ANTICOAGULANT  5,000 Units Subcutaneous Q12H     insulin aspart  1-12 Units Subcutaneous Q4H     meropenem  1 g Intravenous Q8H     pantoprazole  40 mg Oral BID AC    Or     pantoprazole  40 mg Intravenous BID AC     sertraline  100 mg Oral or Feeding Tube Daily     sodium chloride (PF)  3 mL Intracatheter Q8H

## 2022-08-31 NOTE — PROGRESS NOTES
SPIRITUAL HEALTH SERVICES  SPIRITUAL ASSESSMENT Progress Note (Palliative Focus)  [facility]    REFERRAL SOURCE: Palliative    Per palliative advanced practice provider, pt Amy's , Irineo, was seeking the spiritual/emotional support of their . Consulted charge RN who escalated family request, ultimately receiving approval for the support of outside clergy. Approval for outside clergy visit was communicated to Irineo.    PLAN: Will continue to follow. Spiritual Health remains available per need/request.    Kirstin Finch  Associate   Phone: [phone]

## 2022-08-31 NOTE — PROGRESS NOTES
[facility]    Cardiology Progress Note    Date of Admission: 08/24/2022  Service Date: 08/31/2022    Summary:  Ms. Amy Luna is a very pleasant 77 year old female with a past medical history notable for severe mitral stenosis and moderate aortic stenosis status post aortic valve replacement with an Inspiris 25 mm bioprosthetic valve, mitral valve replacement with a epic 27 mm bioprosthetic valve, tricuspid valve repair with a 26 mm annuloplasty ring, and repair of PFO in April 2020 by Dr. Wilson who was admitted on 8/24/2022 after presenting with fatigue, confusion, and black stool. Cardiology was consulted as she was found to have evidence of subacute prosthetic mitral valve endocarditis.    Interval History   No acute events overnight. Patient remains quite tired weak, and mildly short of breath. She otherwise denies symptoms of chest pain, palpitations, dizziness, or lightheadedness. She has not filled fevers, chills, or night sweats. Discussed the plan of care as outlined below with the patient and her  who is at the bedside.    Telemetry: Sinus rhythm/sinus tachycardia with occasional PACs/PVCs    Assessment & Plan   1.  Acute encephalopathy and septic shock, resolved    2.  Subacute prosthetic mitral valve endocarditis  - Large vegetation identified on the mitral valve by transthoracic echocardiogram with severe valve dysfunction.  Severe mitral valve stenosis is present with a mean mitral valve gradient of 24 mmHg.  The severity of mitral regurgitation is unclear due to shadowing.    3.  History of bioprosthetic aortic valve replacement without evidence of dysfunction by transthoracic echo    4.  History of tricuspid valve repair with satisfactory tricuspid valve function    5.  Severe pulmonary hypertension with severe right ventricular enlargement and dysfunction, probably due to severe mitral stenosis    6.  Paroxysmal atrial fibrillation, postoperatively after  her cardiac surgery in April 2020 for valve replacement  - Ttreated briefly with amiodarone and warfarin. Amiodarone was discontinued and she continued warfarin until she recently had a GI bleed due to ibuprofen use for back pain and it was then stopped.  She has not had any evidence of recurrent atrial fibrillation on her pacemaker checks since shortly after her cardiac surgery in 2020.  Currently in sinus rhythm.  Not on anticoagulation prior to admission.    7.  Sick sinus syndrome with permanent pacemaker in place    8.  Severe anemia  - Cause unclear.  Stool was guaiac negative.  She has a history of recent GI bleed due to ibuprofen use while on warfarin.  Both of those medications were then discontinued    9.  Type 2 diabetes mellitus    10. Hypertension     11. Dyslipidemia    Plan:   1.  CV surgery following. Severe mitral stenosis with prosthetic mitral valve endocarditis but not a surgical candidate at this time due to the anemia. Plan for continued watchful waiting and medical management at this time. Patient and  express that they would prefer to pursue surgical treatment as soon as risk is not prohibitive pending her course.   2. ID following and managing IV antibiotics.   3. Cardiology will continue to follow along.    I spent 15 minutes face-to-face or coordinating care of Amy BORJA Cheryl. Over 50% of our time on the unit was spent counseling the patient and/or coordinating care.    Thank you for the opportunity to participate in this pleasant patient's care.     SARBJIT Cardenas, CNP   Nurse Practitioner  [facility], [place]  Pager: [phone]  Text Page  (8am - 5pm, M-F)    Patient Active Problem List   Diagnosis     Aortic sten -- S/P Bioprost AVR 4/15/20     Hyperlipidemia     LAVERNE on CPAP     Mitral stenosis -- S/P Bioprost MVR 4/15/19     Mitral annular calcification     (HFpEF) heart failure with preserved ejection fraction (H)     Mitral valve stenosis, unspecified  etiology     Tricuspid valve Regur -- S/P Repair 4/15/20     Patent foramen ovale -- S/P Closure 4/15/20     Fractured atrial pacemaker lead -- Replace 4/16/20     CLARITA (acute kidney injury) (H)     S/P aortic valve and mitral valve replacement     Morbid obesity (H)     Paroxysmal atrial fibrillation (H)     Supratherapeutic INR     Adenomatous polyp of colon     Cardiac pacemaker in situ     Dermatochalasis     Encounter for long-term (current) use of insulin (H)     Glaucoma suspect     Major depressive disorder, recurrent episode, moderate (H)     Second degree AV block, Mobitz type II     Generalized weakness     GI bleed     Anemia due to blood loss, acute     Urticaria     Essential hypertension     Chronic heart failure with preserved ejection fraction (H)     Type 2 diabetes mellitus with other specified complication, with long-term current use of insulin (H)     Compression fracture of lumbar vertebra with routine healing, unspecified lumbar vertebral level, subsequent encounter     Debility     Urinary tract infection without hematuria, site unspecified     Urinary retention     Chronic midline low back pain, unspecified whether sciatica present     Osteoporosis with current pathological fracture, unspecified osteoporosis type, initial encounter     Hyponatremia     Altered mental status, unspecified altered mental status type     Septic shock (H)       Physical Exam   Temp: 97.7  F (36.5  C) Temp src: Oral BP: 128/81 Pulse: 108   Resp: (!) 33 SpO2: 100 % O2 Device: BiPAP/CPAP Oxygen Delivery: 3 LPM  Vitals:    08/29/22 0530 08/30/22 0400 08/31/22 0046   Weight: 101.1 kg (222 lb 14.2 oz) 101.7 kg (224 lb 3.3 oz) 98.4 kg (216 lb 14.4 oz)     Vital Signs with Ranges  Temp:  [97.7  F (36.5  C)-98.6  F (37  C)] 97.7  F (36.5  C)  Pulse:  [] 108  Resp:  [13-33] 33  BP: (107-134)/(37-81) 128/81  FiO2 (%):  [28 %-30 %] 28 %  SpO2:  [98 %-100 %] 100 %  I/O last 3 completed shifts:  In: 100 [P.O.:20;  I.V.:80]  Out: 1700 [Urine:1700]    Constitutional:  Appears her stated age, well nourished, and in no acute distress.  Eyes: Pupils equal, round. Sclerae anicteric.   HEENT: Normocephalic, atraumatic.   Neck: Supple. No JVD appreciated.  Respiratory: Breathing non-labored. Lungs diminished to auscultation bilaterally. No crackles or wheezes.  Cardiovascular: Regular rate and rhythm, normal S1 and S2. No murmur, rub, or gallop.  Skin: Warm, dry. No apparrent rashes.  Musculoskeletal/Extremities: Moves all extremities well and symmetrically. No LE edema bilaterally.  Neurologic: No gross focal deficits. Alert, awake, and cooperative.   Psychiatric: Affect appropriate. Responds to questions appropriately.    Medications     - MEDICATION INSTRUCTIONS -       - MEDICATION INSTRUCTIONS -       sodium chloride Stopped (08/27/22 1800)       acetaminophen  650 mg Oral TID     albuterol  2.5 mg Nebulization Q6H     gabapentin  100 mg Oral or Feeding Tube TID     heparin ANTICOAGULANT  5,000 Units Subcutaneous Q12H     insulin aspart  1-12 Units Subcutaneous Q4H     meropenem  1 g Intravenous Q8H     pantoprazole  40 mg Oral BID AC    Or     pantoprazole  40 mg Intravenous BID AC     sertraline  100 mg Oral or Feeding Tube Daily     sodium chloride (PF)  3 mL Intracatheter Q8H     Data   Recent Labs   Lab 08/31/22  0907 08/31/22  0527 08/31/22  0355 08/30/22  0430 08/30/22  0428 08/29/22 2011 08/29/22  1801 08/29/22  1639 08/29/22  1620 08/29/22  0837 08/29/22  0546 08/29/22  0351 08/28/22  1215 08/28/22  0839 08/28/22  0837 08/28/22  0402 08/25/22  0449 08/25/22  0115   WBC  --  9.2  --   --  10.7  --   --   --   --   --  8.5 7.7  --   --   --  8.8   < > 11.7*   HGB  --  7.6*  --   --  8.0*  --   --   --  8.1*  --  6.7* 6.4*  --   --   --  7.1*   < > 7.4*   MCV  --  92  --   --  91  --   --   --   --   --  92 95  --   --   --  95   < > 94   PLT  --  202  --   --  243  --   --   --   --   --  185 194  --   --   --  192   < >  228   INR  --   --   --   --   --   --   --   --   --   --   --   --   --  1.27*  --   --   --  1.23*   NA  --  144  --   --  143  --  140  --   --   --   --  142  --   --   --  139   < > 134   POTASSIUM  --  3.7  --   --  3.7  --  3.9  --   --   --   --  4.5  --   --   --  4.5   < > 4.9   CHLORIDE  --  110*  --   --  111*  --  108  --   --   --   --  109  --   --   --  109   < > 104   CO2  --  30  --   --  28  --  27  --   --   --   --  26  --   --   --  26   < > 24   BUN  --  25  --   --  35*  --  33*  --   --   --   --  32*  --   --   --  25   < > 43*   CR  --  0.62  --   --  0.78  --  0.75  --   --   --   --  0.72  --   --   --  0.80   < > 1.15*   ANIONGAP  --  4  --   --  4  --  5  --   --   --   --  7  --   --   --  4   < > 6   NELLY  --  9.2  --   --  9.2  --  9.5  --   --   --   --  9.1  --   --   --  9.1   < > 9.1   * 204* 193*   < > 75   < > 150*   < >  --    < >  --  178*   < >  --    < > 190*   < > 292*   ALBUMIN  --   --   --   --   --   --   --   --   --   --   --  2.1*  --   --   --  1.7*   < > 2.1*   PROTTOTAL  --   --   --   --   --   --   --   --   --   --   --  7.1  --   --   --  7.0   < > 7.6   BILITOTAL  --   --   --   --   --   --   --   --   --   --   --  0.5  --   --   --  0.6   < > 0.4   ALKPHOS  --   --   --   --   --   --   --   --   --   --   --  59  --   --   --  72   < > 66   ALT  --   --   --   --   --   --   --   --   --   --   --  15  --   --   --  18   < > 14   AST  --   --   --   --   --   --   --   --   --   --   --  20  --   --   --  26   < > 20   LIPASE  --   --   --   --   --   --   --   --   --   --   --   --   --   --   --   --   --  33*    < > = values in this interval not displayed.       Recent Labs   Lab 08/25/22  0158   NTBNPI 31,883*     Recent Labs   Lab 08/25/22  0115   TROPONINIS 162*        This note was completed in part using Dragon voice recognition software. Although reviewed after completion, some word and grammatical errors may occur.

## 2022-08-31 NOTE — PROGRESS NOTES
[facility]    Stroke Progress Note    No acute or major events overnight. Patient is still continuing to do well. No major or acute events. Cognition still intact.   CTA head was completed yesterday and did not show evidence of mycotic aneurysms.   From stroke stand point, since infective endocarditis is a high likelihood, we do not recommend starting antiplatelet or anticoagulation. We believe that repair of surgical valve vegetation is essential to prevent embolic phenomena.

## 2022-08-31 NOTE — PROGRESS NOTES
08/31/2022 [id number]  Transferred here form ICU on 08/30. Pneumonia, myopathy, sepsis, endocarditis. Hx: valve disease with prosthetic mitral and aortic valves and PPM. VSS on BiPAP for most of the day at 28%. Uses oxymask at 5L when eating. Sating above 90%. BiPAP is mostly for comfort. A&Ox4. Minimal complaints of pain ex chronic back pain managed with scheduled tylenol. LS: coarse. JENKINS. Ax2 with lift. T&R 2hrs. Blanchable redness to coccyx and bilat heels. Refuses to have heels elevated with pillows. purewick in place with low UOP. No BM this shift. Tele: SR with BBB. Takes meds crushed in applesauce. Tolerating moist and minced diet with mod thick liquids (3). Very low appetite. Hgb: 7.6. Palliative came and talked with pt and family and plan to come back tomorrow to talk about options and to educate family and pt about what hospice is. Plan for SOCORRO tomorrow. NPO at MN.   FYI per MD, limit interruptions     internal jugular is currently being pulled by IV team and they are placing a PICC.

## 2022-09-01 NOTE — PROGRESS NOTES
CV Surgery    Patient seen, recent studies reviewed including the SOCORRO findings. Patient is well-known to me, s/p MVR/AVR/TVr in 2000. Severe mitral MAC noted at that time. Admitted this time with sepsis/endocarditis involving the prosthetic mitral valve with high gradient and a large vegatation/thrombus. I agree with my colleagues that she is at a prohibitively high risk for redo MVR. This was explained to the patient and her .    Ok Wilson MD

## 2022-09-01 NOTE — PROGRESS NOTES
[facility]    Infectious Disease Progress Note    Date of Service (when I saw the patient): 09/01/2022     Assessment & Plan   Amy Luna is a 77 year old female who was admitted on 8/24/2022.     Impression:  1. 77 y.o with significant cardiac history. See #2   2. History of bioprosthetic mitral and aortic valve. S/P : tricuspid annuloplasty repair and PFO closure in 04/2020. Permanent pacemaker in place. Admitted to [facility] for encephalopathy.    3. Found to have CAP    4. Her echo showed a possible mobile density and concern for endocarditis and she was trasnferred to [facility] for further care.   5. On vancomycin + meropenem initially.   6. 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 Urine culture with GNR further identified as Klebsiella.      Recommendations:   1. SOCORRO with a freely mobile mass (1.7 cms) attached to the atrial aspect of  the bioprosthetic MV leaflet which is consistent with a vegetation. No  evidence of valve dehiscence, PVL or regurgitation.   2. Its hard to make a distinction between a vegetation or a thrombus based on the SOCORRO for me but now that we have the identification on the blood isolate which came back positive for cardiobacterium hominis this is endocarditis causing organism and actually is one of the HACEK organism!, So the SOCORRO findings have to be that of endocarditis.   3. GNR in the urine has been further identified as Klebsiella. This is different than the organism in the blood.   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   This is a HACEK organism   This is a different organism than what she has in her urine ( klebsiella)   Ceftriaxone is the antibiotic of choice. Though meropenem was covering     Plan: IV ceftriaxone for 6 weeks   Discussed in detail with patient and  sitting bedside     Noted neurology  note, which is recommending surgery as patient is high risk of embolic phenomena, noted CV surgery note which is saying patient high risk for surgery, I discussed limitation of antibiotics alone with massive vegetation.       Azam Recinos MD    Interval History     Awake answering questions   bipap     bedside   No fevers   Repeat blood cultures here have no growth    A 8 point ROS is negative other than mentioned above in the interval history   No changes to PMH, social history or family h istory.     Physical Exam   Temp: 97.5  F (36.4  C) Temp src: Axillary BP: 115/68 Pulse: 115   Resp: 28 SpO2: 98 % O2 Device: BiPAP/CPAP Oxygen Delivery: 4 LPM  Vitals:    08/29/22 0530 08/30/22 0400 08/31/22 0046   Weight: 101.1 kg (222 lb 14.2 oz) 101.7 kg (224 lb 3.3 oz) 98.4 kg (216 lb 14.4 oz)     Vital Signs with Ranges  Temp:  [97.5  F (36.4  C)-98.1  F (36.7  C)] 97.5  F (36.4  C)  Pulse:  [] 115  Resp:  [12-32] 28  BP: (109-128)/(54-78) 115/68  FiO2 (%):  [28 %] 28 %  SpO2:  [92 %-99 %] 98 %    Constitutional: on bipap today k, lying in the bed, appear comfortable   Lungs: diminished breath sounds   Cardiovascular: irregular, murmur   Abdomen: Normal bowel sounds, soft, non-distended, non-tender  Skin: No rashes, no cyanosis, no edema  Other:    Medications     - MEDICATION INSTRUCTIONS -       - MEDICATION INSTRUCTIONS -       sodium chloride Stopped (08/27/22 1800)       acetaminophen  650 mg Oral TID     albuterol  2.5 mg Nebulization Q6H     cefTRIAXone  2 g Intravenous Q24H     gabapentin  100 mg Oral or Feeding Tube TID     heparin ANTICOAGULANT  5,000 Units Subcutaneous Q12H     insulin aspart  1-12 Units Subcutaneous Q4H     pantoprazole  40 mg Oral BID AC    Or     pantoprazole  40 mg Intravenous BID AC     sertraline  100 mg Oral or Feeding Tube Daily     sodium chloride (PF)  10-40 mL Intracatheter Q7 Days     sodium chloride (PF)  3 mL Intracatheter Q8H       Data   All microbiology laboratory  data reviewed.  Recent Labs   Lab Test 09/01/22 0318 08/31/22  0527 08/30/22  0428   WBC 12.9* 9.2 10.7   HGB 8.3* 7.6* 8.0*   HCT 28.3* 25.7* 25.6*   MCV 95 92 91    202 243     Recent Labs   Lab Test 09/01/22 0318 08/31/22  0527 08/30/22  0428   CR 0.63 0.62 0.78     No lab results found.  Recent Labs   Lab Test 01/16/20 2221 01/16/20 2220   CULT No growth No growth

## 2022-09-01 NOTE — PROGRESS NOTES
[facility]    Medicine Progress Note - Hospitalist Service    Date of Admission:  8/24/2022  Date of Service: 09/01/2022    Assessment & Plan        Amy Luna is a 77 year old female with a history of valve disease.  She has bioprosthetic mitral and aortic valve replacements with tricuspid annuloplasty repair and PFO closure in 04/2020.  She did require a permanent pacemaker as well. She was admitted to [facility] on 8/23 for worsening encephalopathy.  She was initially felt to have encephalopathy and community acquired pneumonia.  Her echo showed a possible mobile density with concern for endocarditis and she was transferred to [facility] for further care.  En route she had a decline of BP and had a central line placed with levophed briefly administered.  BP quickly improved and now has been off vasopressors several hours.  Hospitalist service was asked to take over care from ICU service 8/25. On 8/25, she had progressive O2 requirements and was subsequently intubated from 8/25- 8/27. Thoracentesis on 8/27 with 1L out. Remained on IV pressors 8/28. Cardiovascular surgery declined patient for any surgical intervention on 8/30. Will transfer to [facility] for further management and exploration of goals of care.     Septic Shock due to endocarditis, resolved: Diagnosis based on Temp > 38 C, HR > 90 bpm and WBC > 12 due to sepsis.   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 Patient and  express that they would prefer to pursue surgical treatment as soon as risk is not prohibitive pending her course.   - Diuresis per cardiology  - SOCORRO held off for now    SOB  Pulmonary HTN  Acute hypoxic respiratory failure  Right transudative pleural effusion   S/p thoracentesis 8/26  -Continue BiPAP  - Aspiration precautions  -Continue scheduled nebs  -Monitor    K.Pneumoniae UTI  - abx as above    Encephalopathy, acute, metabolic/septic  - Neuro consult appreciated  - repeat CT head 8/30 w/o acute findings    DMT2  Recurrent hypoglycemia 8/30  - HDSSI  - lantus stopped 8/30 due to hypoglycemia, resume as able    Dysphagia  Moderate malnutrition  - SLP  - mod diet per SLP    HTN  - hold BP meds, resume per cardiology    HLD  - hold statin    Acute on chronic anemia  - monitor    Chronic low back pain  - monitor  - continue gabapentin 100TID (decreased from PTA dose)     Goals of Care  *Patient is now DNR / NI  *Family is still hoping for a miracle as they feel it is a miracle that she has made it this far.   is quite clear that if patient was nonambulatory for 6 months, that would be intolerable for her, but likely they would be able to tolerate 6 weeks of nonambulatory state.  Discussed with them that risk of complications with her current state including pressure sores, ongoing weakness, need for prolonged hospitalization, need for discharge to nursing home versus TCU, stroke, need for feeding tube, and other issues is very possible with her current state.       Diet: Snacks/Supplements Adult: Magic Cup; With Meals  Combination Diet Minced and Moist Diet (level 5); Liquidized/Moderately Thick (level 3) (by tsp only); No Straws    DVT Prophylaxis: Heparin SQ  Stewart Catheter: Not present  Central Lines: PRESENT  PICC Single Lumen 08/31/22 Right Basilic-Site Assessment: WDL  Cardiac Monitoring: ACTIVE order. Indication:  Infective endocarditis (48 hours) - additional guidance recommending until clinically stable  Code Status: No CPR- Do NOT Intubate      Disposition Plan      Expected Discharge Date: 09/03/2022                The patient's care was discussed with the Bedside Nurse, Patient and Patient's Family.    Duran Barahona MD  Hospitalist Service  [facility]  Securely message with the Vocera Web Console (learn more here)  Text page via [employer] Paging/Directory         Clinically Significant Risk Factors Present on Admission                       Total time on the floor involved in the patient's care: 40 minutes   Total time spent in counseling/care coordination: >50% spent in counseling pain and symptom management in setting of acute respiratory failure needing BiPAP      ______________________________________________________________________    Interval History     Has aspiration event this AM and now needing BiPAP  No CP  No nausea / vomiting  No fevers or chills  She denies any pain.  No other associated symptoms.     wishes her to be DNR / DNI. BiPAP okay.     Data reviewed today: I reviewed all medications, new labs and imaging results over the last 24 hours. I personally reviewed no images or EKG's today.    Physical Exam   Vital Signs: Temp: 97.5  F (36.4  C) Temp src: Axillary BP: 134/88 Pulse: 116   Resp: 18 SpO2: 100 % O2 Device: BiPAP/CPAP Oxygen Delivery: 4 LPM  Weight: 216 lbs 14.4 oz    Constitutional: no apparent distress  Respiratory: Coarse breaths sounds, diminished at bases.   Cardiovascular: Tachycardic rate and regular hythm, normal S1 and S2, and no murmur noted.  GI: Soft, non-distended, non-tender, normal bowel sounds.  Lymph/Hematologic: No anterior cervical or supraclavicular adenopathy.  Skin: No rashes, no cyanosis, no edema noted on exposed skin.  Musculoskeletal: No joint swelling, erythema or tenderness. No gross bony abnormalities  Neurologic:  normal sensation.   Appears somewhat withdrawn and slightly confused.  Diffusely weak.  3 out of 5 leg extension due to deconditioning.  4 out of 5 arm extension bilaterally due to deconditioning.  Psychiatric: Alert, oriented to person, place, no obvious anxiety or depression.      Data   Recent Labs   Lab 09/01/22  0758 09/01/22  0318 09/01/22  0159 08/31/22  0907 08/31/22  0527 08/30/22  0430 08/30/22  0428 08/29/22  0546 08/29/22  0351 08/28/22  1215 08/28/22  0839 08/28/22  0837 08/28/22  0402   WBC  --  12.9*  --   --  9.2  --  10.7   < > 7.7  --   --   --  8.8   HGB  --  8.3*  --   --  7.6*  --  8.0*   < > 6.4*  --   --   --  7.1*   MCV  --  95  --   --  92  --  91   < > 95  --   --   --  95   PLT  --  219  --   --  202  --  243   < > 194  --   --   --  192   INR  --   --   --   --   --   --   --   --   --   --  1.27*  --   --    NA  --  142  --   --  144  --  143   < > 142  --   --   --  139   POTASSIUM  --  3.9  --   --  3.7  --  3.7   < > 4.5  --   --   --  4.5   CHLORIDE  --  109  --   --  110*  --  111*   < > 109  --   --   --  109   CO2  --  29  --   --  30  --  28   < > 26  --   --   --  26   BUN  --  21  --   --  25  --  35*   < > 32*  --   --   --  25   CR  --  0.63  --   --  0.62  --  0.78   < > 0.72  --   --   --  0.80   ANIONGAP  --  4  --   --  4  --  4   < > 7  --   --   --  4   NELLY  --  9.2  --   --  9.2  --  9.2   < > 9.1  --   --   --  9.1   * 248* 241*   < > 204*   < > 75   < > 178*   < >  --    < > 190*   ALBUMIN  --   --   --   --   --   --   --   --  2.1*  --   --   --  1.7*   PROTTOTAL  --   --   --   --   --   --   --   --  7.1  --   --   --  7.0   BILITOTAL  --   --   --   --   --   --   --   --  0.5  --   --   --  0.6   ALKPHOS  --   --   --   --   --   --   --   --  59  --   --   --  72   ALT  --   --   --   --   --   --   --   --  15  --   --   --  18   AST  --   --   --   --   --   --   --   --  20  --   --   --  26    < > = values in this interval not displayed.     No results found for this  or any previous visit (from the past 24 hour(s)).  Medications     - MEDICATION INSTRUCTIONS -       - MEDICATION INSTRUCTIONS -       sodium chloride Stopped (08/27/22 1800)       acetaminophen  650 mg Oral TID     albuterol  2.5 mg Nebulization Q6H     cefTRIAXone  2 g Intravenous Q24H     gabapentin  100 mg Oral or Feeding Tube TID     heparin ANTICOAGULANT  5,000 Units Subcutaneous Q12H     insulin aspart  1-12 Units Subcutaneous Q4H     metoprolol tartrate  25 mg Oral BID     pantoprazole  40 mg Oral BID AC    Or     pantoprazole  40 mg Intravenous BID AC     sertraline  100 mg Oral or Feeding Tube Daily     sodium chloride (PF)  10-40 mL Intracatheter Q7 Days     sodium chloride (PF)  3 mL Intracatheter Q8H

## 2022-09-01 NOTE — PROGRESS NOTES
"[facility]  Palliative Care Daily Progress Note       Recommendations & Counseling     -Palliative checked in on patient and  this morning.  Goals of care remain restorative with hope that antibiotics will be helpful even though it is not likely to be successful.  They had spoken with CV surgery yesterday evening and were told she is not a candidate for any surgical interventions.  Irineo understands that Amy's medical situation is very fragile and she may be at end-of-life.  He states that \"we are taking it day by day and I know things could change at any time.\"  Family will be coming over the weekend and hopes to visit with Amy.     Shortness of breath related to pulmonary HTN, heart valve valve stenosis. Using intermittent BiPAP.  -Appreciate RT expertise.  -Consider low dose morphine 2.5-5 mg every 4 hrs as needed.     Case was reviewed with Dr. Barahona.    Shaunna Yo, [facility]  Contact information available via [facility] Paging/Directory        Thank you for the opportunity to participate in the care of this patient and family. Our team: will continue to follow.     During regular M-F work hours ([id number]) -- if you are not sure who specifically to contact -- please contact us in [facility] Smart Web.     After regular work hours and on weekends/holidays, you can call our answering service at [phone].     Attestation:  Total time on the floor involved in the patient's care: 15 minutes  Total time spent in goals of care discussion, review of condition, counseling/care coordination: >50%     Assessments          Amy Luna is a 77 year old female with PMH significant for heart valve disease s/p aortic/mitral bioprosthetic valve replacement 2020, sick sinus syndrome s/p pacemaker, a-fib (not on anticoagulation due to prior GI bleed) HTN, DM, LAVERNE, chronic back pain. She presented to ED via EMS on 8/23/22 for evaluation of weakness, confusion " and hallucinations.      She was initially diagnosed with encephalopathy and CAP. On 8/23 urine cultures were positive for Klebsiella and blood cultures with gram negative rods. There was concern for endocarditis and she was [facility]; SOCORRO confirmed 1.9 cm mitral vegetation. Amy is not a good surgical candidate at this time. The plan is to continue IV antibiotics and continue watchful waiting.  Shortly after transfer to Critical access hospital she required intubation after becoming obtunded with increasing oxygen requirements but was ultimately extubated on 8/27 to BiPAP and is now on oxymizer at 3L.Consultants include neurology, cardiology, ID, Nutrition.    Today, the patient was seen for:  Goals of care    Prognosis, Goals, or Advance Care Planning was addressed today with: Yes.  Mood, coping, and/or meaning in the context of serious illness were addressed today: Yes.  Summary/Comments:            Interval History:     Chart review/discussion with unit or clinical team members:   Discussed patient case with hospitalist-and that patient family wish to continue antibiotics- restorative cares.  Post discussion with CV surgery,  Irineo is still hoping that Amy can get stronger and have the surgery someday after antibiotic course is finished.     Per patient or family/caregivers today:  Writer met with Amy and her  Irineo in room this morning.  Amy was not doing well this morning, was exhausted and on BiPAP.  She was unable to participate in the discussion.  Irineo states that they had spoken with CV surgery yesterday evening and were told that surgery is not an option as she is not strong enough at this time.  Irineo states that he was told that the endocarditis vegetation could respond to the antibiotics and that they were still hoping for some improvement.  They are not ready to stop treatments at this time and want to give her IV antibiotics more time; Irineo understands at  minimum this means 6 weeks.  He understands that her condition is very fragile and she could pass away at any time.  They are just taking things a day at a time. Family will be coming to visit over the next few days/weekend    Key Palliative Symptoms/ROS  Per is very fatigued and not participating in conversation.             Review of Systems:     Besides above, an additional  system ROS was reviewed and is unremarkable          Medications:     I have reviewed this patient's medication profile and medications during this hospitalization.    Noted meds:      acetaminophen  650 mg Oral TID     albuterol  2.5 mg Nebulization Q6H     cefTRIAXone  2 g Intravenous Q24H     gabapentin  100 mg Oral or Feeding Tube TID     heparin ANTICOAGULANT  5,000 Units Subcutaneous Q12H     insulin aspart  1-12 Units Subcutaneous Q4H     pantoprazole  40 mg Oral BID AC    Or     pantoprazole  40 mg Intravenous BID AC     sertraline  100 mg Oral or Feeding Tube Daily     sodium chloride (PF)  10-40 mL Intracatheter Q7 Days     sodium chloride (PF)  3 mL Intracatheter Q8H     acetaminophen **OR** acetaminophen, bisacodyl, artificial tears, glucose **OR** dextrose **OR** glucagon, HYDROmorphone, lidocaine 4%, lidocaine 4%, lidocaine (buffered or not buffered), lidocaine (buffered or not buffered), LORazepam, melatonin, naloxone **OR** naloxone **OR** naloxone **OR** naloxone, nitroGLYcerin, - MEDICATION INSTRUCTIONS -, ondansetron **OR** ondansetron, - MEDICATION INSTRUCTIONS -, polyethylene glycol, prochlorperazine **OR** prochlorperazine **OR** prochlorperazine, senna-docusate **OR** senna-docusate, sodium chloride (PF), sodium chloride (PF), sodium chloride (PF), sodium chloride (PF)             Physical Exam:   Temp: 97.5  F (36.4  C) Temp src: Axillary BP: 134/88 Pulse: (!) 131   Resp: 24 SpO2: 98 % O2 Device: BiPAP/CPAP Oxygen Delivery: 4 LPM  GENERAL:  Fatigued, no distress, obese, flushed.  HEAD: Normocephalic atraumatic  SCLERA:  Anicteric  EXTREMITIES: Warm; LE edema.  ABDOMEN:  Soft, obese.  RESPIRATORY: Breathing with use on BiPAP mask.  NEUROLOGIC: Lethargic  PSYCH: exhausted.           Data Reviewed:     Recent imaging reviewed, my comments on pertinents:   Results for orders placed or performed during the hospital encounter of 08/24/22   XR Chest Port 1 View    Impression    IMPRESSION: Right IJ catheter present with its tip projecting at distal SVC. Patient rotated somewhat into an LPO projection. Prominent skinfold seen along right chest with no pneumothorax evident. Left lung remains grossly clear. Stable postoperative   changes of heart including pacemaker and AVR. Mild cardiomegaly.   XR Chest Port 1 View    Impression    IMPRESSION: Status post median sternotomy and valve replacement with cardiac pacemaker in place. Heart size is enlarged but likely stable. Increasing posteriorly layering bilateral pleural effusions, now at least moderate in size. Probable mild   interstitial edema. Central confluent parenchymal opacities may represent atelectasis versus infiltrate. No pneumothorax. Right-sided central venous catheter terminates over the cavoatrial junction..   CT Chest w/o Contrast    Impression    IMPRESSION:   1.  Moderate to large right pleural effusion with compressive atelectasis and consolidation involving a significant portion of the right lower lobe. Smaller left pleural effusion and subsegmental atelectasis left lower lobe.  2.  Nodular and groundglass opacities both upper lobes and septal thickening may represent a combination of pneumonia and pulmonary edema. Follow-up exam recommended.  3.  Mediastinal adenopathy has progressed and may be reactive. These should also be reviewed on follow-up CT.  4.  Sclerotic changes left first rib may be related to old trauma. No other concerning skeletal lesions.     CT Head w/o Contrast    Impression    IMPRESSION:  1.  No acute intracranial process.   US Thoracentesis Portable     Impression    IMPRESSION: Technically successful thoracentesis without immediate  complications.    ALEXANDRE NELSON MD         SYSTEM ID:  [id number]   CT Head w/o Contrast    Impression    IMPRESSION:     1. No evidence of acute intracranial hemorrhage, mass, or herniation.  2. Mild nonspecific white matter changes likely due to chronic  microvascular ischemic disease. Possibility of acute ischemia would be  better assessed with brain MRI if the patient is able.     SHANAE COMBS MD         SYSTEM ID:  [id number]   XR Chest Port 1 View    Impression    IMPRESSION: ETT is 3 cm from the allie. Enteric tube enters the stomach and out of field of view. Right IJ line terminates over the mid to distal SVC. Small left effusion and associated atelectasis. Nearly resolved right effusion and associated   atelectasis/consolidation. No pneumothorax. Heart size is stable. Left chest cardiac device, sternotomy wires, valve replacement changes, and mitral annular calcification are similar   XR Chest Port 1 View    Impression    IMPRESSION: Since today's earlier radiograph the ETT has been removed, there is new significant haziness now seen involving the right lung which is worrisome for underlying infiltrates, otherwise the chest is stable with the positioning of the   transvenous lead tips again seen over the RA and RV, there are postoperative changes of a sternotomy, and there is loss of the normally seen left hemidiaphragm consistent with infiltrate and/or atelectasis in the left lower lobe.   CTA Neck with Contrast    Impression    IMPRESSION:     1. No evidence of large vessel occlusion, high-grade stenosis, or  dissection.  2. Incidental findings as detailed.    MILLIE TOWNSEND MD         SYSTEM ID:  [id number]   CTA Head with Contrast    Impression    IMPRESSION:   1. No evidence of large vessel occlusion or high-grade stenosis.  2. No intravascular filling defect is identified.    MILLIE TOWNESND MD         SYSTEM ID:  [id number]    XR Chest 1 View    Impression    IMPRESSION:   1. Patchy opacities within the right lung, most prominent in the right lung base, overall mildly increased since 8/27/2022. There are unchanged patchy opacities within the retrocardiac region of the left lung base.  2. No other convincing interval change since the recent comparison study.  3. Generalized haziness of both lungs again noted and likely related to bilateral pleural effusions.   4. A left anterior chest wall cardiac device with lead tips in right atrium and ventricle, right internal jugular central venous catheter and prior median sternotomy and cardiac valve replacement are again noted.   Transesophageal Echocardiogram   Result Value Ref Range    LVEF  60-65%         Lab Results   Component Value Date    WBC 12.9 (H) 09/01/2022    WBC 9.2 08/31/2022    WBC 10.7 08/30/2022    HGB 8.3 (L) 09/01/2022    HGB 7.6 (L) 08/31/2022    HGB 8.0 (L) 08/30/2022    HCT 28.3 (L) 09/01/2022    HCT 25.7 (L) 08/31/2022    HCT 25.6 (L) 08/30/2022     09/01/2022     08/31/2022     08/30/2022     09/01/2022     08/31/2022     08/30/2022    POTASSIUM 3.9 09/01/2022    POTASSIUM 3.7 08/31/2022    POTASSIUM 3.7 08/30/2022    CHLORIDE 109 09/01/2022    CHLORIDE 110 (H) 08/31/2022    CHLORIDE 111 (H) 08/30/2022    CO2 29 09/01/2022    CO2 30 08/31/2022    CO2 28 08/30/2022    BUN 21 09/01/2022    BUN 25 08/31/2022    BUN 35 (H) 08/30/2022    CR 0.63 09/01/2022    CR 0.62 08/31/2022    CR 0.78 08/30/2022     (H) 09/01/2022     (H) 09/01/2022     (H) 09/01/2022    DD 0.4 11/11/2014    NTBNPI 31,699 (H) 08/25/2022    NTBNPI 1,556 (H) 01/16/2020    TROPI 0.056 (H) 01/17/2020    TROPI 0.106 (H) 01/17/2020    TROPI 0.020 01/16/2020    AST 20 08/29/2022    AST 26 08/28/2022    AST 37 08/27/2022    ALT 15 08/29/2022    ALT 18 08/28/2022    ALT 21 08/27/2022    ALKPHOS 59 08/29/2022    ALKPHOS 72 08/28/2022    ALKPHOS 82  08/27/2022    BILITOTAL 0.5 08/29/2022    BILITOTAL 0.6 08/28/2022    BILITOTAL 0.5 08/27/2022    NANCY 12 08/25/2022    INR 1.27 (H) 08/28/2022    INR 1.23 (H) 08/25/2022    INR 0.81 (L) 03/15/2022

## 2022-09-01 NOTE — PLAN OF CARE
Goal Outcome Evaluation:    [facility] Status    Orientation: A&O X4, but very somnolent.   Vitals: Vitally stable when on BiPAP except consistent tachycardia. Patient trialed off BiPAP using oxymask at 4L, but still had c/o SOB. Pt had aspiration event in the early morning; oral suctioned PRN & placed back on BiPAP at 40%. Tele SR/ST.  GI/: Purewick in place with adequate UOP. +BS, +flatus, -BM. Pt c/o nausea in afternoon relieved by PRN IV Zofran X1. NPO; pt on BiPAP & unable to handle secretions. Denies pain.  Ambulation/Assist: Lift assist. Turn & repo X2. Continue POC.

## 2022-09-01 NOTE — PROGRESS NOTES
[facility]    Cardiology Progress Note    Date of Admission: 08/24/2022  Service Date: 09/01/2022    Summary:  Ms. Amy Luna is a very pleasant 77 year old female with a past medical history notable for severe mitral stenosis and moderate aortic stenosis status post aortic valve replacement with an Inspiris 25 mm bioprosthetic valve, mitral valve replacement with a epic 27 mm bioprosthetic valve, tricuspid valve repair with a 26 mm annuloplasty ring, and repair of PFO in April 2020 by Dr. Wilson who was admitted on 8/24/2022 after presenting with fatigue, confusion, and black stool. Cardiology was consulted as she was found to have evidence of subacute prosthetic mitral valve endocarditis.    Interval History   Patient remains quite tired and weak. She has been more tachycardic and short of breath with coughing this morning requiring BiPAP.  Mildly somnolent but alert and answering some questions appropriately. Discussed the plan of care as outlined below with the patient.    Telemetry: Sinus rhythm/sinus tachycardia with occasional PACs/PVCs    Assessment & Plan   1.  Acute encephalopathy and septic shock    2.  Subacute prosthetic mitral valve endocarditis  - Large vegetation identified on the mitral valve by transthoracic echocardiogram with severe valve dysfunction.  Severe mitral valve stenosis is present with a mean mitral valve gradient of 24 mmHg. The severity of mitral regurgitation is unclear due to shadowing.    3. Community-acquired pneumonia    4. History of bioprosthetic aortic valve replacement without evidence of dysfunction by transthoracic echo    5.  History of tricuspid valve repair with satisfactory tricuspid valve function    6.  Severe pulmonary hypertension with severe right ventricular enlargement and dysfunction, probably due to severe mitral stenosis    7.  Paroxysmal atrial fibrillation, postoperatively after her cardiac surgery in April 2020 for valve  replacement  - Ttreated briefly with amiodarone and warfarin. Amiodarone was discontinued and she continued warfarin until she recently had a GI bleed due to ibuprofen use for back pain and it was then stopped.  She has not had any evidence of recurrent atrial fibrillation on her pacemaker checks since shortly after her cardiac surgery in 2020.  Currently in sinus rhythm.  Not on anticoagulation prior to admission.    8.  Sick sinus syndrome with permanent pacemaker in place    9.  Severe anemia  - Cause unclear. Stool was guaiac negative.  She has a history of recent GI bleed due to ibuprofen use while on warfarin.  Both of those medications were then discontinued.  - Hgb stable at 8.3 today.     10. Type 2 diabetes mellitus    11. Hypertension     12. Dyslipidemia    Plan:   1. Will start metoprolol tartrate 25 mg twice daily.  2. CV surgery evaluated patient. She is not a candidate for re-do MVR due to prohibitive risk with ongoing anemia and frailty.   3. Plan was initially for repeat SOCORRO today for reassessment of mean gradient of the mitral valve and see if any improvement after 1 week of IV antibiotics. Given her worsening respiratory status this morning and discussion regarding considering comfort care approach, would hold off on this for this. Can reevaluate tomorrow and reconsider this. Please keep n.p.o. after midnight.  4. Continue IV antibiotics per ID.   5. Appreciate palliative care involvement for goals of care discussions. Given that patient is not felt to be a surgical candidate, patient and family are contemplating comfort care approach.  6. Cardiology will continue to follow along.    I spent 15 minutes face-to-face or coordinating care of Amy Luna. Over 50% of our time on the unit was spent counseling the patient and/or coordinating care.    Thank you for the opportunity to participate in this pleasant patient's care.     SARBJIT Cardenas, CNP   Nurse Practitioner  [facility] -  Heart Care  Pager: [phone]  Text Page  (8am - 5pm, M-F)    Patient Active Problem List   Diagnosis     Aortic sten -- S/P Bioprost AVR 4/15/20     Hyperlipidemia     LAVERNE on CPAP     Mitral stenosis -- S/P Bioprost MVR 4/15/19     Mitral annular calcification     (HFpEF) heart failure with preserved ejection fraction (H)     Mitral valve stenosis, unspecified etiology     Tricuspid valve Regur -- S/P Repair 4/15/20     Patent foramen ovale -- S/P Closure 4/15/20     Fractured atrial pacemaker lead -- Replace 4/16/20     CLARITA (acute kidney injury) (H)     S/P aortic valve and mitral valve replacement     Morbid obesity (H)     Paroxysmal atrial fibrillation (H)     Supratherapeutic INR     Adenomatous polyp of colon     Cardiac pacemaker in situ     Dermatochalasis     Encounter for long-term (current) use of insulin (H)     Glaucoma suspect     Major depressive disorder, recurrent episode, moderate (H)     Second degree AV block, Mobitz type II     Generalized weakness     GI bleed     Anemia due to blood loss, acute     Urticaria     Essential hypertension     Chronic heart failure with preserved ejection fraction (H)     Type 2 diabetes mellitus with other specified complication, with long-term current use of insulin (H)     Compression fracture of lumbar vertebra with routine healing, unspecified lumbar vertebral level, subsequent encounter     Debility     Urinary tract infection without hematuria, site unspecified     Urinary retention     Chronic midline low back pain, unspecified whether sciatica present     Osteoporosis with current pathological fracture, unspecified osteoporosis type, initial encounter     Hyponatremia     Altered mental status, unspecified altered mental status type     Septic shock (H)       Physical Exam   Temp: 97.5  F (36.4  C) Temp src: Axillary BP: 134/88 Pulse: (!) 131   Resp: 24 SpO2: 98 % O2 Device: BiPAP/CPAP Oxygen Delivery: 4 LPM  Vitals:    08/29/22 0530 08/30/22 0400  08/31/22 0046   Weight: 101.1 kg (222 lb 14.2 oz) 101.7 kg (224 lb 3.3 oz) 98.4 kg (216 lb 14.4 oz)     Vital Signs with Ranges  Temp:  [97.5  F (36.4  C)-98.1  F (36.7  C)] 97.5  F (36.4  C)  Pulse:  [] 131  Resp:  [12-32] 24  BP: (109-137)/(57-98) 134/88  FiO2 (%):  [28 %] 28 %  SpO2:  [92 %-99 %] 98 %  I/O last 3 completed shifts:  In: 90 [P.O.:90]  Out: 525 [Urine:525]    Constitutional:  Appears her stated age, well nourished, and in no acute distress.  Eyes: Pupils equal, round. Sclerae anicteric.   HEENT: Normocephalic, atraumatic.   Neck: Supple. No JVD appreciated.  Respiratory: Breathing non-labored. Lungs diminished to auscultation anteriorly. No crackles or wheezes appreciated.  Cardiovascular: Regular rate and rhythm, normal S1 and S2. No murmur, rub, or gallop.  Skin: Warm, dry. No apparrent rashes.  Musculoskeletal/Extremities: Moves all extremities well and symmetrically. Trace LE edema in the ankles bilaterally.  Neurologic: No gross focal deficits. Alert, awake, and cooperative.   Psychiatric: Affect appropriate. Responds to questions appropriately.    Medications     - MEDICATION INSTRUCTIONS -       - MEDICATION INSTRUCTIONS -       sodium chloride Stopped (08/27/22 1800)       acetaminophen  650 mg Oral TID     albuterol  2.5 mg Nebulization Q6H     cefTRIAXone  2 g Intravenous Q24H     gabapentin  100 mg Oral or Feeding Tube TID     heparin ANTICOAGULANT  5,000 Units Subcutaneous Q12H     insulin aspart  1-12 Units Subcutaneous Q4H     pantoprazole  40 mg Oral BID AC    Or     pantoprazole  40 mg Intravenous BID AC     sertraline  100 mg Oral or Feeding Tube Daily     sodium chloride (PF)  10-40 mL Intracatheter Q7 Days     sodium chloride (PF)  3 mL Intracatheter Q8H     Data   Recent Labs   Lab 09/01/22  0758 09/01/22  0318 09/01/22  0159 08/31/22  0907 08/31/22  0527 08/30/22  0430 08/30/22  0428 08/29/22  0546 08/29/22  0351 08/28/22  1215 08/28/22  0839 08/28/22  0837 08/28/22  0402    WBC  --  12.9*  --   --  9.2  --  10.7   < > 7.7  --   --   --  8.8   HGB  --  8.3*  --   --  7.6*  --  8.0*   < > 6.4*  --   --   --  7.1*   MCV  --  95  --   --  92  --  91   < > 95  --   --   --  95   PLT  --  219  --   --  202  --  243   < > 194  --   --   --  192   INR  --   --   --   --   --   --   --   --   --   --  1.27*  --   --    NA  --  142  --   --  144  --  143   < > 142  --   --   --  139   POTASSIUM  --  3.9  --   --  3.7  --  3.7   < > 4.5  --   --   --  4.5   CHLORIDE  --  109  --   --  110*  --  111*   < > 109  --   --   --  109   CO2  --  29  --   --  30  --  28   < > 26  --   --   --  26   BUN  --  21  --   --  25  --  35*   < > 32*  --   --   --  25   CR  --  0.63  --   --  0.62  --  0.78   < > 0.72  --   --   --  0.80   ANIONGAP  --  4  --   --  4  --  4   < > 7  --   --   --  4   NELLY  --  9.2  --   --  9.2  --  9.2   < > 9.1  --   --   --  9.1   * 248* 241*   < > 204*   < > 75   < > 178*   < >  --    < > 190*   ALBUMIN  --   --   --   --   --   --   --   --  2.1*  --   --   --  1.7*   PROTTOTAL  --   --   --   --   --   --   --   --  7.1  --   --   --  7.0   BILITOTAL  --   --   --   --   --   --   --   --  0.5  --   --   --  0.6   ALKPHOS  --   --   --   --   --   --   --   --  59  --   --   --  72   ALT  --   --   --   --   --   --   --   --  15  --   --   --  18   AST  --   --   --   --   --   --   --   --  20  --   --   --  26    < > = values in this interval not displayed.     Recent Labs   Lab 08/25/22 2122   [facility] [phone]*     This note was completed in part using Dragon voice recognition software. Although reviewed after completion, some word and grammatical errors may occur.

## 2022-09-01 NOTE — PLAN OF CARE
SLP - RN was consulted this am.  RN reported pt was given thin liquids this am despite recommendations for mod thick liquids.  Pt aspirated, and is now on Bipap due to difficulty breathing.  SLP recommendations have been for moderately thick liquids with an IDDSI Diet Level 5, 1:1 supervision/assist, and careful use of precautions.  SLP to follow with ongoing assessment if pt stable off of Bipap.

## 2022-09-01 NOTE — PROCEDURES
[facility]    Single Lumen PICC Placement    Date/Time: 8/31/2022 8:08 PM  Performed by: Justyn Aguayo RN  Authorized by: Allen Ayers MD   Indications: vascular access      UNIVERSAL PROTOCOL   Site Marked: Yes  Prior Images Obtained and Reviewed:  Yes  Required items: Required blood products, implants, devices and special equipment available    Patient identity confirmed:  Verbally with patient  NA - No sedation, light sedation, or local anesthesia  Confirmation Checklist:  Patient's identity using two indicators, correct equipment/implants were available, relevant allergies and procedure was appropriate and matched the consent or emergent situation  Time out: Immediately prior to the procedure a time out was called    Universal Protocol: the Joint Commission Universal Protocol was followed    Preparation: Patient was prepped and draped in usual sterile fashion       ANESTHESIA    Anesthesia: Local infiltration  Local Anesthetic:  Lidocaine 1% without epinephrine  Anesthetic Total (mL):  2      SEDATION    Patient Sedated: No        Preparation: skin prepped with 2% chlorhexidine  Skin prep agent: skin prep agent completely dried prior to procedure  Sterile barriers: maximum sterile barriers were used: cap, mask, sterile gown, sterile gloves, and large sterile sheet  Hand hygiene: hand hygiene performed prior to central venous catheter insertion  Type of line used: PICC  Catheter type: single lumen  Lumen type: valved  Catheter size: 4 Fr  Brand: Bard  Lot number: [id number]  Placement method: venipuncture, MST and ultrasound  Number of attempts: 1  Difficulty threading catheter: no  Successful placement: yes  Orientation: right    Location: basilic vein  Tip Location: superior vena cava  Arm circumference: adults 10 cm  Extremity circumference: 36  Visible catheter length: 2  Internal length: 44 cm  Total catheter length: 46  Dressing and securement: blood removed, blood  cleaned with CHG, alcohol impregnated caps, chlorhexidine patch applied, subcutaneous anchor securement system and sterile dressing applied  Post procedure assessment: blood return through all ports and placement verified by 3CG technology  PROCEDURE   Patient Tolerance:  Patient tolerated the procedure well with no immediate complicationsDescribe Procedure: PICC placed w/o complication. Primary RN updated ok to use.

## 2022-09-02 NOTE — PROVIDER NOTIFICATION
MD Notification    Notified Person: MD    Notified Person Name: MD Jose Alfredo    Notification Date/Time: 0429; 9/2    Notification Interaction: Amcom     Purpose of Notification: Cr.,B 336  ABG came back; pH 7.25, CO2 69; O2 99, Bicarb 30. Pt still anxious & now hypoactive, lethargic. Bumped up to 60% on BiPap from 40, pt was desatting to high 80s.     Thanks,   Hallie, TARI  #[id number]    Orders Received: Contact RT to adjust settings.     Comments:    ___________________________________________________    MD Notification    Notified Person: MD    Notified Person Name: MD Jose Alfredo    Notification Date/Time: 0250; 09/02    Notification Interaction: Amcom     Purpose of Notification: Cr.,B 336  Can we get an order for ABG. Pt last one was 8/28, pt has remained tachy for x2 days, pt is a&o but increasingly agitated.   Thanks,   Hallie MARC   #[id number]    Orders Received: order for ABG    Comments:

## 2022-09-02 NOTE — PROGRESS NOTES
CV Surgery    Chart reviewed. Noted aspiration event recently. Now Bipap dependent. DNR/DNI currently.    Cards following, recommend SOCORRO in 6 weeks.     CV Surgery will sign off.     Please do not hesitate to contact with questions.    Lu Ingram PA-C  [phone]

## 2022-09-02 NOTE — PROGRESS NOTES
X-cover    ABG with pH 7.25 with pCO2 at 69, acute respiratory acidosis. She is already on BiPAP, will see if respiratory therapy can adjust settings for more CO2 excretion.     Khanh Veliz MD

## 2022-09-02 NOTE — PROGRESS NOTES
Patient seen and examined at bedside. Mental status still intact alert and oriented time 4, is able to move all extremities against gravity. Is on Bipap still for respiratory failure. Noted to have respiratory acidosis on blood gas.   Discussed with cardiology importance of surgical intervention to prevent embolic stroke/mycotic aneurysm. Cardiology do not have contraindication from cardiology stand point.  We still emphasize importance of valve repair to prevent high risk of stroke.     Stroke neurology team will sign off. Please call us if any focal neurological symptoms or signs develop.

## 2022-09-02 NOTE — PROGRESS NOTES
Patient seen and examined this morning. Mental exam intact. Patient was starte don BiPAP for suspected aspiration.   From stroke neurology stand point, we still emphasize the importance for as soon as possible surgical intervention for valve vegetation that carry high risk of mycotic emboli that have devastating sequela on brain.   Plan:  Discuss plan with cardiology.

## 2022-09-02 NOTE — PLAN OF CARE
Orientations: A&Ox4 (forgetful)  Vitals/Pain: Continuous BiPAP 45-50%, tried to wean off O2 sats were good but pt had extreme SOB and needed to be put back on BiPAP. Use of abdom muscles to breathe. Denies pain. Tachycardic (105)   Tele: ST  Lines/Drains: PICC single lumen right arm (SL)  Skin/Wounds: Bruise on left upper arm, blanchable redness on coccyx. Small redness on bridge of nose from BiPAP mask   GI/: No BM and no voiding through shift. Bladder scan 200 and 239 mL. NPO  Labs: Abnormal/Trends: BG q4 (245, 236, 223), ABG variation   Electrolyte Replacement- N/A  Ambulation/Assist: Bedrest (lift)  Sleep Quality: Restless last night (9/1), slept on and off through today   Plan: Monitor O2 stats on BiPAP, Pt and family may discuss comfort cares as next steps.

## 2022-09-02 NOTE — PLAN OF CARE
A&O. VSS on BiPAP 40% overnight. LS coarse. Satting mid 90s, episode of desat into 80s, bumped to 60%; pt was anxious and turned more lethargic/hypoactive, ABG ordered, see results, BiPAP settings changed to AVAP & ABG redrawn w improvement. Tele NSR/ST (110-120s) w frequent PACs. Denies pain. Anxiety rt bipap, ativan given x1, pt feels like Bipap is suffocating and her mouth her very dry. Explained that if we are keep taking the oxygen off, she cannot tolerate it due to her respiratory status, work of breathing & risk of aspiration, pt understood.   Red blanchable sacrum/coccyx, scattered bruising and BLE (+1) edema, otherwise intact.   Urinating adequately via purewick, changed overnight. (-) BM overnight. BG checks q4H, with insulin coverage overnight.     Plan: reevaluate resp status today, possible repeat SOCORRO if improved.

## 2022-09-02 NOTE — PROGRESS NOTES
[facility]    Cardiology Progress Note    Date of Admission: 08/24/2022  Service Date: 09/01/2022    Summary:  Ms. Amy Luna is a very pleasant 77 year old female with a past medical history notable for severe mitral stenosis and moderate aortic stenosis status post aortic valve replacement with an Inspiris 25 mm bioprosthetic valve, mitral valve replacement with a epic 27 mm bioprosthetic valve, tricuspid valve repair with a 26 mm annuloplasty ring, and repair of PFO in April 2020 by Dr. Wilson who was admitted on 8/24/2022 after presenting with fatigue, confusion, and black stool. Cardiology was consulted as she was found to have evidence of subacute prosthetic mitral valve endocarditis.    Interval History   Patient remains tired and weak. She continues to require BiPAP with respiratory distress and evidence of acute respiratory acidosis on ABG. Mildly somnolent but alert and answering some questions appropriately. Discussed the plan of care as outlined below with the patient.    Telemetry: Sinus rhythm/sinus tachycardia with occasional PACs/PVCs    Assessment & Plan   1.  Acute encephalopathy and septic shock    2.  Subacute prosthetic mitral valve endocarditis  - Large vegetation identified on the mitral valve by transthoracic echocardiogram with severe valve dysfunction.  Severe mitral valve stenosis is present with a mean mitral valve gradient of 24 mmHg. The severity of mitral regurgitation is unclear due to shadowing.    3. Community-acquired pneumonia    4. History of bioprosthetic aortic valve replacement without evidence of dysfunction by transthoracic echo    5.  History of tricuspid valve repair with satisfactory tricuspid valve function    6.  Severe pulmonary hypertension with severe right ventricular enlargement and dysfunction, probably due to severe mitral stenosis    7.  Paroxysmal atrial fibrillation, postoperatively after her cardiac surgery in April 2020 for  valve replacement  - Ttreated briefly with amiodarone and warfarin. Amiodarone was discontinued and she continued warfarin until she recently had a GI bleed due to ibuprofen use for back pain and it was then stopped.  She has not had any evidence of recurrent atrial fibrillation on her pacemaker checks since shortly after her cardiac surgery in 2020.  Currently in sinus rhythm.  Not on anticoagulation prior to admission.    8.  Sick sinus syndrome with permanent pacemaker in place    9.  Severe anemia  - Cause unclear. Stool was guaiac negative.  She has a history of recent GI bleed due to ibuprofen use while on warfarin.  Both of those medications were then discontinued.  - Hgb stable at 8.3 today.     10. Type 2 diabetes mellitus    11. Hypertension     12. Dyslipidemia    Plan:   1. Continue metoprolol tartrate 25 mg twice daily.  2. Can consider repeat SOCORRO in about 6 weeks for reassessment of mean gradient of the mitral valve and see if any improvement after IV antibiotics. Patient too high risk for CV surgery at this point so results of SOCORRO may not  but may help with determining prognosis.   3. Continue IV antibiotics per ID.   4. Appreciate palliative care involvement for goals of care discussions. Given that patient is not felt to be a surgical candidate, patient and family are contemplating comfort care approach.    I spent 20 minutes face-to-face or coordinating care of Amy Luna. Over 50% of our time on the unit was spent counseling the patient and/or coordinating care.    Thank you for the opportunity to participate in this pleasant patient's care.     SARBJIT Cardenas, CNP   Nurse Practitioner  [facility]  Pager: [phone]  Text Page  (8am - 5pm, M-F)    Patient Active Problem List   Diagnosis     Aortic sten -- S/P Bioprost AVR 4/15/20     Hyperlipidemia     LAVERNE on CPAP     Mitral stenosis -- S/P Bioprost MVR 4/15/19     Mitral annular calcification      (HFpEF) heart failure with preserved ejection fraction (H)     Mitral valve stenosis, unspecified etiology     Tricuspid valve Regur -- S/P Repair 4/15/20     Patent foramen ovale -- S/P Closure 4/15/20     Fractured atrial pacemaker lead -- Replace 4/16/20     CLARITA (acute kidney injury) (H)     S/P aortic valve and mitral valve replacement     Morbid obesity (H)     Paroxysmal atrial fibrillation (H)     Supratherapeutic INR     Adenomatous polyp of colon     Cardiac pacemaker in situ     Dermatochalasis     Encounter for long-term (current) use of insulin (H)     Glaucoma suspect     Major depressive disorder, recurrent episode, moderate (H)     Second degree AV block, Mobitz type II     Generalized weakness     GI bleed     Anemia due to blood loss, acute     Urticaria     Essential hypertension     Chronic heart failure with preserved ejection fraction (H)     Type 2 diabetes mellitus with other specified complication, with long-term current use of insulin (H)     Compression fracture of lumbar vertebra with routine healing, unspecified lumbar vertebral level, subsequent encounter     Debility     Urinary tract infection without hematuria, site unspecified     Urinary retention     Chronic midline low back pain, unspecified whether sciatica present     Osteoporosis with current pathological fracture, unspecified osteoporosis type, initial encounter     Hyponatremia     Altered mental status, unspecified altered mental status type     Septic shock (H)       Physical Exam   Temp: 97.9  F (36.6  C) Temp src: Axillary BP: 117/73 Pulse: 104   Resp: 17 SpO2: 100 % O2 Device: BiPAP/CPAP    Vitals:    08/29/22 0530 08/30/22 0400 08/31/22 0046   Weight: 101.1 kg (222 lb 14.2 oz) 101.7 kg (224 lb 3.3 oz) 98.4 kg (216 lb 14.4 oz)     Vital Signs with Ranges  Temp:  [97.8  F (36.6  C)-97.9  F (36.6  C)] 97.9  F (36.6  C)  Pulse:  [103-122] 104  Resp:  [17-26] 17  BP: (108-132)/(57-89) 117/73  FiO2 (%):  [30 %-60 %] 50  %  SpO2:  [87 %-100 %] 100 %  I/O last 3 completed shifts:  In: -   Out: 650 [Urine:650]    Constitutional:  Appears her stated age, well nourished, and in no acute distress.  Eyes: Pupils equal, round. Sclerae anicteric.   HEENT: Normocephalic, atraumatic.   Neck: Supple. No JVD appreciated.  Respiratory: Breathing non-labored. Lungs diminished to auscultation anteriorly. No crackles or wheezes appreciated.  Cardiovascular: Regular rhythm, fast rate, normal S1 and S2. No murmur, rub, or gallop.  Skin: Warm, dry. No apparrent rashes.  Musculoskeletal/Extremities: Moves all extremities well and symmetrically. No LE edema bilaterally.  Neurologic: No gross focal deficits. Arouses and wakes to stimuli and questioning.    Psychiatric:  Responds to some questions appropriately.    Medications     - MEDICATION INSTRUCTIONS -       - MEDICATION INSTRUCTIONS -       sodium chloride Stopped (08/27/22 1800)       acetaminophen  650 mg Oral TID     albuterol  2.5 mg Nebulization Q6H     cefTRIAXone  2 g Intravenous Q24H     gabapentin  100 mg Oral or Feeding Tube TID     heparin ANTICOAGULANT  5,000 Units Subcutaneous Q12H     insulin aspart  1-12 Units Subcutaneous Q4H     metoprolol  2.5 mg Intravenous Q8H LESLIE     [Held by provider] metoprolol tartrate  25 mg Oral BID     pantoprazole  40 mg Oral BID AC    Or     pantoprazole  40 mg Intravenous BID AC     sertraline  100 mg Oral or Feeding Tube Daily     sodium chloride (PF)  10-40 mL Intracatheter Q7 Days     sodium chloride (PF)  3 mL Intracatheter Q8H     Data   Recent Labs   Lab 09/02/22  0856 09/02/22  0454 09/02/22  0358 09/01/22  0758 09/01/22  0318 08/31/22  0907 08/31/22  0527 08/29/22  0546 08/29/22  0351 08/28/22  1215 08/28/22  0839 08/28/22  0837 08/28/22  0402   WBC  --  17.7*  --   --  12.9*  --  9.2   < > 7.7  --   --   --  8.8   HGB  --  8.6*  --   --  8.3*  --  7.6*   < > 6.4*  --   --   --  7.1*   MCV  --  99  --   --  95  --  92   < > 95  --   --   --  95    PLT  --  288  --   --  219  --  202   < > 194  --   --   --  192   INR  --   --   --   --   --   --   --   --   --   --  1.27*  --   --    NA  --  144  --   --  142  --  144   < > 142  --   --   --  139   POTASSIUM  --  4.3  --   --  3.9  --  3.7   < > 4.5  --   --   --  4.5   CHLORIDE  --  110*  --   --  109  --  110*   < > 109  --   --   --  109   CO2  --  28  --   --  29  --  30   < > 26  --   --   --  26   BUN  --  27  --   --  21  --  25   < > 32*  --   --   --  25   CR  --  0.76  --   --  0.63  --  0.62   < > 0.72  --   --   --  0.80   ANIONGAP  --  6  --   --  4  --  4   < > 7  --   --   --  4   NELLY  --  9.3  --   --  9.2  --  9.2   < > 9.1  --   --   --  9.1   * 295* 283*   < > 248*   < > 204*   < > 178*   < >  --    < > 190*   ALBUMIN  --   --   --   --   --   --   --   --  2.1*  --   --   --  1.7*   PROTTOTAL  --   --   --   --   --   --   --   --  7.1  --   --   --  7.0   BILITOTAL  --   --   --   --   --   --   --   --  0.5  --   --   --  0.6   ALKPHOS  --   --   --   --   --   --   --   --  59  --   --   --  72   ALT  --   --   --   --   --   --   --   --  15  --   --   --  18   AST  --   --   --   --   --   --   --   --  20  --   --   --  26    < > = values in this interval not displayed.     No results for input(s): NTBNPI, NTBNP in the last 168 hours.  This note was completed in part using Dragon voice recognition software. Although reviewed after completion, some word and grammatical errors may occur.

## 2022-09-02 NOTE — PROGRESS NOTES
[facility]    Medicine Progress Note - Hospitalist Service    Date of Admission:  8/24/2022  Date of Service: 09/02/2022    Assessment & Plan        Amy Luna is a 77 year old female with a history of valve disease.  She has bioprosthetic mitral and aortic valve replacements with tricuspid annuloplasty repair and PFO closure in 04/2020.  She did require a permanent pacemaker as well. She was admitted to [facility] on 8/23 for worsening encephalopathy.  She was initially felt to have encephalopathy and community acquired pneumonia.  Her echo showed a possible mobile density with concern for endocarditis and she was transferred to [facility] for further care.  En route she had a decline of BP and had a central line placed with levophed briefly administered.  BP quickly improved and now has been off vasopressors several hours.  Hospitalist service was asked to take over care from ICU service 8/25. On 8/25, she had progressive O2 requirements and was subsequently intubated from 8/25- 8/27. Thoracentesis on 8/27 with 1L out. Remained on IV pressors 8/28. Cardiovascular surgery declined patient for any surgical intervention on 8/30. Will transfer to [facility] for further management and exploration of goals of care.     Septic Shock due to endocarditis, resolved: Diagnosis based on Temp > 38 C, HR > 90 bpm and WBC > 12 due to sepsis.   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 Patient and  express that they would prefer to pursue surgical treatment as soon as risk is not prohibitive pending her course.   - Diuresis per cardiology  - SOCORRO held off for now -> recommend SOCORRO in 6 weeks    SOB  Pulmonary HTN  Acute hypoxic respiratory failure  Right transudative pleural effusion   S/p thoracentesis 8/26  - Continue BiPAP, wean as able  - Aspiration precautions  - Continue scheduled nebs  - Monitor    K.Pneumoniae UTI  - abx as above    Encephalopathy, acute, metabolic/septic  - Neuro consult appreciated  - repeat CT head 8/30 w/o acute findings    DMT2  Recurrent hypoglycemia 8/30  - HDSSI  - lantus stopped 8/30 due to hypoglycemia, resume as able    Dysphagia  Moderate malnutrition  - SLP  - mod diet per SLP    HTN  - hold BP meds, resume per cardiology    HLD  - hold statin    Acute on chronic anemia  - monitor    Chronic low back pain  - monitor  - continue gabapentin 100TID (decreased from PTA dose)     Goals of Care  Severe Protein-Calorie Malnutrition  *Patient is now DNR / NI  *Family is still hoping for a miracle as they feel it is a miracle that she has made it this far.   is quite clear that if patient was nonambulatory for 6 months, that would be intolerable for her, but likely they would be able to tolerate 6 weeks of nonambulatory state.  Discussed with them that risk of complications with her current state including pressure sores, ongoing weakness, need for prolonged hospitalization, need for discharge to nursing home versus TCU, stroke, need for feeding tube, and other issues is very possible with her current state.  - May need to consider TFs if unable to wean off BiPAP soon       Diet: Snacks/Supplements Adult: Magic Cup; With Meals  Combination Diet Minced and Moist Diet (level 5); Liquidized/Moderately Thick (level 3) (by tsp only); No Straws    DVT Prophylaxis: Heparin SQ  Stewart Catheter: Not  present  Central Lines: PRESENT  PICC Single Lumen 08/31/22 Right Basilic-Site Assessment: WDL  Cardiac Monitoring: ACTIVE order. Indication: Infective endocarditis (48 hours) - additional guidance recommending until clinically stable  Code Status: No CPR- Do NOT Intubate      Disposition Plan      Expected Discharge Date: 09/06/2022        Discharge Comments: 9/1: Bipap, aspirated on thin liquids, palliative consult        The patient's care was discussed with the Bedside Nurse, Patient and Patient's Family.    Duran Barahona MD  Hospitalist Service  [facility]  Securely message with the Vocera Web Console (learn more here)  Text page via [employer] Paging/Directory         Clinically Significant Risk Factors Present on Admission                       Total time on the floor involved in the patient's care: 40 minutes   Total time spent in counseling/care coordination: >50% spent in counseling pain and symptom management in setting of acute respiratory failure needing BiPAP      ______________________________________________________________________    Interval History     Still needing biPAP this AM, but sats better  No CP  No nausea / vomiting  No fevers or chills  She denies any pain.  No other associated symptoms.      Data reviewed today: I reviewed all medications, new labs and imaging results over the last 24 hours. I personally reviewed no images or EKG's today.    Physical Exam   Vital Signs: Temp: 97.9  F (36.6  C) Temp src: Axillary BP: 117/73 Pulse: 104   Resp: 17 SpO2: 100 % O2 Device: BiPAP/CPAP    Weight: 216 lbs 14.4 oz    Constitutional: no apparent distress  Respiratory: Coarse breaths sounds, diminished at bases.   Cardiovascular: Tachycardic rate and regular hythm, normal S1 and S2, and no murmur noted.  GI: Soft, non-distended, non-tender, normal bowel sounds.  Lymph/Hematologic: No anterior cervical or supraclavicular adenopathy.  Skin: No rashes, no cyanosis, no edema noted on  exposed skin.  Musculoskeletal: No joint swelling, erythema or tenderness. No gross bony abnormalities  Neurologic:  normal sensation.  Appears somewhat withdrawn and slightly confused.  Diffusely weak.  3 out of 5 leg extension due to deconditioning.  4 out of 5 arm extension bilaterally due to deconditioning.  Psychiatric: Alert, oriented to person, place, no obvious anxiety or depression.      Data   Recent Labs   Lab 09/02/22  1256 09/02/22  0856 09/02/22  0454 09/01/22  0758 09/01/22  0318 08/31/22  0907 08/31/22  0527 08/29/22  0546 08/29/22  0351 08/28/22  1215 08/28/22  0839 08/28/22  0837 08/28/22  0402   WBC  --   --  17.7*  --  12.9*  --  9.2   < > 7.7  --   --   --  8.8   HGB  --   --  8.6*  --  8.3*  --  7.6*   < > 6.4*  --   --   --  7.1*   MCV  --   --  99  --  95  --  92   < > 95  --   --   --  95   PLT  --   --  288  --  219  --  202   < > 194  --   --   --  192   INR  --   --   --   --   --   --   --   --   --   --  1.27*  --   --    NA  --   --  144  --  142  --  144   < > 142  --   --   --  139   POTASSIUM  --   --  4.3  --  3.9  --  3.7   < > 4.5  --   --   --  4.5   CHLORIDE  --   --  110*  --  109  --  110*   < > 109  --   --   --  109   CO2  --   --  28  --  29  --  30   < > 26  --   --   --  26   BUN  --   --  27  --  21  --  25   < > 32*  --   --   --  25   CR  --   --  0.76  --  0.63  --  0.62   < > 0.72  --   --   --  0.80   ANIONGAP  --   --  6  --  4  --  4   < > 7  --   --   --  4   NELLY  --   --  9.3  --  9.2  --  9.2   < > 9.1  --   --   --  9.1   * 245* 295*   < > 248*   < > 204*   < > 178*   < >  --    < > 190*   ALBUMIN  --   --   --   --   --   --   --   --  2.1*  --   --   --  1.7*   PROTTOTAL  --   --   --   --   --   --   --   --  7.1  --   --   --  7.0   BILITOTAL  --   --   --   --   --   --   --   --  0.5  --   --   --  0.6   ALKPHOS  --   --   --   --   --   --   --   --  59  --   --   --  72   ALT  --   --   --   --   --   --   --   --  15  --   --   --  18   AST   --   --   --   --   --   --   --   --  20  --   --   --  26    < > = values in this interval not displayed.     No results found for this or any previous visit (from the past 24 hour(s)).  Medications     - MEDICATION INSTRUCTIONS -       - MEDICATION INSTRUCTIONS -       sodium chloride Stopped (08/27/22 1800)       acetaminophen  650 mg Oral TID     albuterol  2.5 mg Nebulization Q6H     cefTRIAXone  2 g Intravenous Q24H     gabapentin  100 mg Oral or Feeding Tube TID     heparin ANTICOAGULANT  5,000 Units Subcutaneous Q12H     insulin aspart  1-12 Units Subcutaneous Q4H     metoprolol  2.5 mg Intravenous Q8H LESLIE     [Held by provider] metoprolol tartrate  25 mg Oral BID     pantoprazole  40 mg Oral BID AC    Or     pantoprazole  40 mg Intravenous BID AC     sertraline  100 mg Oral or Feeding Tube Daily     sodium chloride (PF)  10-40 mL Intracatheter Q7 Days     sodium chloride (PF)  3 mL Intracatheter Q8H

## 2022-09-03 NOTE — PROGRESS NOTES
[facility]    Medicine Progress Note - Hospitalist Service    Date of Admission:  8/24/2022  Date of Service: 09/03/2022    Assessment & Plan        Aym Luna is a 77 year old female with a history of valve disease.  She has bioprosthetic mitral and aortic valve replacements with tricuspid annuloplasty repair and PFO closure in 04/2020.  She did require a permanent pacemaker as well. She was admitted to [facility] on 8/23 for worsening encephalopathy.  She was initially felt to have encephalopathy and community acquired pneumonia.  Her echo showed a possible mobile density with concern for endocarditis and she was transferred to [facility] for further care.  En route she had a decline of BP and had a central line placed with levophed briefly administered.  BP quickly improved and now has been off vasopressors several hours.  Hospitalist service was asked to take over care from ICU service 8/25. On 8/25, she had progressive O2 requirements and was subsequently intubated from 8/25- 8/27. Thoracentesis on 8/27 with 1L out. Remained on IV pressors 8/28. Cardiovascular surgery declined patient for any surgical intervention on 8/30.    Septic Shock due to endocarditis, resolved: Diagnosis based on Temp > 38 C, HR > 90 bpm and WBC > 12 due to sepsis.   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 Patient and   express that they would prefer to pursue surgical treatment as soon as risk is not prohibitive pending her course.   -  IV Lasix 40 mg BID given pro BNP 18,000 and crackles on lungs  - 08/25 SOCORRO -> The left ventricle is normal in size. Left ventricular systolic function is normal. The visual ejection fraction is 60-65%. There is a bioprosthetic mitral valve. There is severe thickenning of the leaflets of the bioprosthesis with severely restricted opening. There is severe bioprosthetic stenosis with mean gradient of 23 mmHg.There is a freely mobile mass (1.7 cms) attached to the atrial aspect of the bioprosthetic MV leaflet which is consistent with a vegetation. No evidence of valve dehiscence, PVL or regurgitation.  - SOCORRO repeat held off for now -> recommend SOCORRO in 6 weeks    SOB  Pulmonary HTN  Acute hypoxic respiratory failure  Right transudative pleural effusion   S/p thoracentesis 8/26  Assessment: has had aspiration event 09/01 and CXR 09/03 -> Progressive groundglass changes in both lungs, now fairly extensive. Similar small effusion on the left. Unable to wean off BiPAP at this time.  Plan:  - Continue BiPAP, wean as able  - Check CRP  - Pulmonology consulted  - CT chest in AM  - Aspiration precautions  - Continue scheduled nebs  - Monitor    K.Pneumoniae UTI  - abx as above    Encephalopathy, acute, metabolic/septic  - Neuro consult appreciated -> see recse  - repeat CT head 8/30 w/o acute findings    DMT2  Recurrent hypoglycemia 8/30  - HDSSI  - lantus stopped 8/30 due to hypoglycemia, resume as able -> now on Lantus 20 U at bedtime     Dysphagia  Moderate malnutrition  - SLP  - mod diet per SLP -> now NPO due to BiPAP and resp status  - TPN started 09/03 -> family would like to hold off PEG     HTN  - IV Metoprolol while NPO with parameters    HLD  - hold statin    Acute on chronic anemia  Assessment: no active bleeding, Hgb 8.6 -> 7.9 today, continue to  monitor    Chronic low back pain  -  monitor  - continue gabapentin 100TID (decreased from PTA dose)     Goals of Care  Severe Protein-Calorie Malnutrition  *Patient is now DNR / DNI  *Family is still hoping for a miracle as they feel it is a miracle that she has made it this far.   is quite clear that if patient was nonambulatory for 6 months, that would be intolerable for her, but likely they would be able to tolerate 6 weeks of nonambulatory state.  Discussed with them that risk of complications with her current state including pressure sores, ongoing weakness, need for prolonged hospitalization, need for discharge to nursing home versus TCU, stroke, need for feeding tube, and other issues is very possible with her current state.  TPN started today       Diet: NPO for Medical/Clinical Reasons Except for: Ice Chips, Meds  parenteral nutrition - Clinimix E    DVT Prophylaxis: Heparin SQ  Stewart Catheter: Not present  Central Lines: PRESENT  PICC Single Lumen 08/31/22 Right Basilic-Site Assessment: WDL  Cardiac Monitoring: ACTIVE order. Indication: Infective endocarditis (48 hours) - additional guidance recommending until clinically stable  Code Status: No CPR- Do NOT Intubate      Disposition Plan     Expected Discharge Date: 09/06/2022        Discharge Comments: 9/1: Bipap, aspirated on thin liquids, palliative consult        The patient's care was discussed with the Bedside Nurse, Patient and Patient's Family.    Duran Barahona MD  Hospitalist Service  [facility]  Securely message with the Vocera Web Console (learn more here)  Text page via [employer] Paging/Directory         Clinically Significant Risk Factors Present on Admission                       Total time on the floor involved in the patient's care: 40 minutes   Total time spent in counseling/care coordination: >50% spent in counseling pain and symptom management in setting of acute respiratory failure needing  BiPAP      ______________________________________________________________________    Interval History     Unable to wean off BiPAP  No CP  No nausea / vomiting  No fevers or chills  She denies any pain.  No other associated symptoms.      Data reviewed today: I reviewed all medications, new labs and imaging results over the last 24 hours. I personally reviewed no images or EKG's today.    Physical Exam   Vital Signs: Temp: 98.1  F (36.7  C) Temp src: Axillary BP: 116/67 Pulse: 101   Resp: 17 SpO2: 100 % O2 Device: BiPAP/CPAP    Weight: 209 lbs 3.2 oz    Constitutional: no apparent distress  Respiratory: Coarse breaths sounds, diminished at bases.   Cardiovascular: Tachycardic rate and regular hythm, normal S1 and S2, and no murmur noted.  GI: Soft, non-distended, non-tender, normal bowel sounds.  Lymph/Hematologic: No anterior cervical or supraclavicular adenopathy.  Skin: No rashes, no cyanosis, no edema noted on exposed skin.  Musculoskeletal: No joint swelling, erythema or tenderness. No gross bony abnormalities  Neurologic:  normal sensation.  Appears somewhat withdrawn and slightly confused.  Diffusely weak.  3 out of 5 leg extension due to deconditioning.  4 out of 5 arm extension bilaterally due to deconditioning.  Psychiatric: Alert, oriented to person, place, no obvious anxiety or depression.      Data   Recent Labs   Lab 09/03/22  1525 09/03/22  1128 09/03/22  0851 09/03/22  0616 09/02/22  0856 09/02/22  0454 09/01/22  0758 09/01/22  0318 08/31/22  0907 08/31/22  0527 08/29/22  0546 08/29/22  0351 08/28/22  1215 08/28/22  0839 08/28/22  0837 08/28/22  0402   WBC  --   --   --  12.4*  --  17.7*  --  12.9*  --  9.2   < > 7.7  --   --   --  8.8   HGB  --   --   --  7.9*  --  8.6*  --  8.3*  --  7.6*   < > 6.4*  --   --   --  7.1*   MCV  --   --   --  99  --  99  --  95  --  92   < > 95  --   --   --  95   PLT  --   --   --  275  --  288  --  219  --  202   < > 194  --   --   --  192   INR  --   --   --   --    --   --   --   --   --   --   --   --   --  1.27*  --   --    NA  --   --   --   --   --  144  --  142  --  144   < > 142  --   --   --  139   POTASSIUM  --   --   --   --   --  4.3  --  3.9  --  3.7   < > 4.5  --   --   --  4.5   CHLORIDE  --   --   --   --   --  110*  --  109  --  110*   < > 109  --   --   --  109   CO2  --   --   --   --   --  28  --  29  --  30   < > 26  --   --   --  26   BUN  --   --   --   --   --  27  --  21  --  25   < > 32*  --   --   --  25   CR  --   --   --   --   --  0.76  --  0.63  --  0.62   < > 0.72  --   --   --  0.80   ANIONGAP  --   --   --   --   --  6  --  4  --  4   < > 7  --   --   --  4   NELLY  --   --   --   --   --  9.3  --  9.2  --  9.2   < > 9.1  --   --   --  9.1   * 245* 211*  --    < > 295*   < > 248*   < > 204*   < > 178*   < >  --    < > 190*   ALBUMIN  --   --   --   --   --   --   --   --   --   --   --  2.1*  --   --   --  1.7*   PROTTOTAL  --   --   --   --   --   --   --   --   --   --   --  7.1  --   --   --  7.0   BILITOTAL  --   --   --   --   --   --   --   --   --   --   --  0.5  --   --   --  0.6   ALKPHOS  --   --   --   --   --   --   --   --   --   --   --  59  --   --   --  72   ALT  --   --   --   --   --   --   --   --   --   --   --  15  --   --   --  18   AST  --   --   --   --   --   --   --   --   --   --   --  20  --   --   --  26    < > = values in this interval not displayed.     Recent Results (from the past 24 hour(s))   XR Chest Port 1 View    Narrative    EXAM: XR CHEST PORTABLE 1 VIEW  LOCATION: [facility]  DATE/TIME: 09/03/2022, 1:00 PM    INDICATION: Shortness of breath.  COMPARISON: 08/30/2022.      Impression    IMPRESSION: Progressive groundglass changes in both lungs, now fairly extensive. Similar small effusion on the left.       Medications     dextrose       - MEDICATION INSTRUCTIONS -       parenteral nutrition - Clinimix E       - MEDICATION INSTRUCTIONS -       sodium chloride Stopped (08/27/22  1800)       acetaminophen  650 mg Oral TID     albuterol  2.5 mg Nebulization Q6H     azithromycin  500 mg Intravenous Q24H     cefTRIAXone  2 g Intravenous Q24H     furosemide  40 mg Intravenous BID     gabapentin  100 mg Oral or Feeding Tube TID     heparin ANTICOAGULANT  5,000 Units Subcutaneous Q12H     insulin aspart  1-12 Units Subcutaneous Q4H     insulin glargine  20 Units Subcutaneous At Bedtime     [START ON 9/5/2022] lipids  250 mL Intravenous Once per day on Mon Wed Fri     metoprolol  2.5 mg Intravenous Q8H LESLIE     [Held by provider] metoprolol tartrate  25 mg Oral BID     pantoprazole  40 mg Oral BID AC    Or     pantoprazole  40 mg Intravenous BID AC     [Held by provider] sertraline  100 mg Oral or Feeding Tube Daily     sodium chloride (PF)  10-40 mL Intracatheter Q7 Days     sodium chloride (PF)  3 mL Intracatheter Q8H

## 2022-09-03 NOTE — PROGRESS NOTES
[facility]    Cardiology Progress     Date of Admission:  8/24/2022  Reason for Consult: Mitral valve endocarditis/severe MS    Impression/plan:    1. Subacute infective endocarditis  2. Mitral valve vegetation and severe MS  3. Hx of bioprothetic AVR/MVR and TVr and PFO repair in 2020  4. CHF with preserved EF, pulmonary edema  5. Recurrent aspirations and acute respiratory failure on BiPAP  6. 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 SSS s/p PPM  8. Severe anemia  9 . DM2  10. Hx postoperative AF, currently in sinus rhythm    Plan  Dismal prognosis. Prohibitive risk for cardiac surgery and no good way for fixing underlying valve pathology and improving cardiac status.   Continue BB to improve filling time and hopefully cardiac output. Metoprolol changed to IV 2.5 q 8 hours due to NPO. Will see if we need to increase to target HR <90 bpm. She was quite tachycardic today during my visit likely due to underlying respiratory failure and aspirations/CHF.  Agree with lasix 40 mg IV BID given CXR appearance and likely MS related CHF and pulmonary edema, creatinine normal  Continue IV abx  SOCORRO deferred to follow up.  Consider palliative management/Hospice    Gaudencio Dimas M.D.    Interval History/Subjective:    On BiPAP, discussion about starting nutrition today. She appeared quite tachycardic and dyspneic today. NPO and meds are given IV. Is DNR/DNR.     Physical Exam   Temp: 98.1  F (36.7  C) Temp src: Oral BP: 116/67 Pulse: 101   Resp: 17 SpO2: 100 % O2 Device: BiPAP/CPAP    Vital Signs with Ranges  Temp:  [97.6  F (36.4  C)-98.3  F (36.8  C)] 98.1  F (36.7  C)  Pulse:  [] 101  Resp:  [17-29] 17  BP: ()/(33-77) 116/67  FiO2 (%):  [40 %] 40 %  SpO2:  [99 %-100 %] 100 %  209 lbs 3.2 oz    Constitutional: Somnolent, tachypneic and slightly uncomfortable appearing.   Respiratory: Bilateral crackles, on BiPAP 40% FiO2  Neck: Unable to determine JVD  Cardiovascular:  Regular rate and rhythm, tachycardia, normal S1 and S2, and no murmur noted.  Extremities: No lower or upper extremity edema   Vascular: Radial pulses 4+ and symmetric bilaterally    Data   Most Recent 3 CBC's:  Recent Labs   Lab Test 09/03/22  0616 09/02/22  0454 09/01/22 0318   WBC 12.4* 17.7* 12.9*   HGB 7.9* 8.6* 8.3*   MCV 99 99 95    288 219     Most Recent 3 BMP's:  Recent Labs   Lab Test 09/03/22  1525 09/03/22  1128 09/03/22  0851 09/02/22  0856 09/02/22  0454 09/01/22  0758 09/01/22  0318 08/31/22  0907 08/31/22  0527   NA  --   --   --   --  144  --  142  --  144   POTASSIUM  --   --   --   --  4.3  --  3.9  --  3.7   CHLORIDE  --   --   --   --  110*  --  109  --  110*   CO2  --   --   --   --  28  --  29  --  30   BUN  --   --   --   --  27  --  21  --  25   CR  --   --   --   --  0.76  --  0.63  --  0.62   ANIONGAP  --   --   --   --  6  --  4  --  4   NELLY  --   --   --   --  9.3  --  9.2  --  9.2   * 245* 211*   < > 295*   < > 248*   < > 204*    < > = values in this interval not displayed.     Most Recent 3 Troponin's:  Recent Labs   Lab Test 01/17/20  0507 01/17/20  0149 01/16/20  2221   TROPI 0.056* 0.106* 0.020     Most Recent 3 BNP's:  Recent Labs   Lab Test 09/03/22  1529 08/25/22  2127 01/16/20  2221   NTBNPI 18,883* 31,699* 1,556*     Most Recent Cholesterol Panel:  Recent Labs   Lab Test 08/04/21  1126   CHOL 190   LDL 90   HDL 64   TRIG 178*

## 2022-09-03 NOTE — PLAN OF CARE
A+Ox4 but forgetful. On continuous Bipap on AVAPS setting, desatted to 60% while getting COVID swab. Lung sounds diminished. Tele SR w/ BBB. Bowels active, no BM. Voiding  small amounts via purewick, bladder scans WDL. Turned q2h, redness to coccyx. Breakdown on bridge of nose. Up w/ lift. NPO.

## 2022-09-03 NOTE — PLAN OF CARE
Orientations: A&Ox4 (forgetful and in/out of it)  Vitals/Pain: Tachycardic (105-115), continuous BiPAP (AVAP) removes BiPAP mask frequently (x4); IV ativan given once and calm afterwards, RT attempted to wean off BiPAP to high flow unsuccessful w/ increased WOB/SOB. denies pain   Tele: NSR/ST  Lines/Drains: PICC right arm single lumen SL   Skin/Wounds: blanchable reddened area on bridge of nose from BiPAP, blister sore on buttocks/coccyx with foam (WOC consult ordered per MD). Pulsate mattress in place   GI/: Perwick in place and voiding well, scant incontinent BM today   Labs: Abnormal/Trends: BNP (18,883), BG (211, 245, 266)  Electrolyte Replacement- N/A  Ambulation/Assist: bedrest, lift assist of 2. Reposition q2  Sleep quality: On and off through day   Plan: Restorative care, TPN starting tonight (2000), CT scan of chest in AM, pulmonology consult ordered. Per cardiology dismal prognosis consider palliative care/hospice.      impaired balance/decreased strength/impaired postural control

## 2022-09-03 NOTE — CONSULTS
"CLINICAL NUTRITION SERVICES - REASSESSMENT NOTE  MD CONSULT - Pharmacy/Nutrition to start & manage TPN      Recommendations Ordered by Registered Dietitian (RD):     Will plan to begin TPN via PICC:  Clinimix @ 75 mL/hr (goal rate) + 250 mL 20% lipid Mon-Wed-Fri  This will provide: 1492 cals (16 cals/kg), 90 gm pro (1.6 gm/kg), 270 gm CHO (GIR 1.98)     Malnutrition: (8/30)  % Weight Loss:  > 10% in 6 months (severe malnutrition) (8/26)  % Intake:  </= 50% for >/= 5 days (severe malnutrition)  Subcutaneous Fat Loss:  None observed (8/26)  Muscle Loss:  Temporal region mild depletion (? Acute vs chronic) (8/26)  Fluid Retention:  Mild 2+ (8/26)     Malnutrition Diagnosis: Severe malnutrition  In Context of:  Acute illness or injury  Chronic illness or disease       EVALUATION OF PROGRESS TOWARD GOALS   Diet:    9/1: Minced/Moist (L5), Moderately Thick (L3)  9/3: NPO    Intake/Tolerance:    Chart reviewed  9/1: RN reported pt was given thin liquids this am despite recommendations for mod thick liquids.  Pt aspirated, and is now on Bipap due to difficulty breathing.    No IVF currently  Pt on continuous Bipap      ASSESSED NUTRITION NEEDS:  Dosing Weight: 94.9 energy (9/3 - actual wt); 56.8 kg protein (IBW)  Energy: 9212-9960 kcal (14-17 kcal/kg)   Protein:  g pro (1.5-2 g/kg)  Fluid Needs: 1 mL/kcal or per MD     NEW FINDINGS:     8/31: PICC placed    9/1: Palliative - \"Goals of care remain restorative with hope that antibiotics will be helpful even though it is not likely to be successful\"    Previous Goals (8/30):   Patient will consume at least 50% of meals and supplements TID  Evaluation: Not met    Previous Nutrition Diagnosis (8/30):   Inadequate protein-energy intake related to TF off with extubation, diet just advanced today (and patient hasn't ordered yet) as evidenced by meeting 0% needs   Evaluation: No change, modified below        CURRENT NUTRITION DIAGNOSIS  Inadequate protein-energy intake related " to NPO status as evidenced by pt not meeting estimated nutrition needs    INTERVENTIONS  Recommendations / Nutrition Prescription  NPO    Will plan to begin TPN via PICC:  Clinimix @ 75 mL/hr (goal rate) + 250 mL 20% lipid Mon-Wed-Fri  This will provide: 1492 cals (16 cals/kg), 90 gm pro (1.6 gm/kg), 270 gm CHO (GIR 1.98)      Implementation  PN Composition and PN Schedule - orders completed in EPIC    Goals  PN to meet % estimated needs      MONITORING AND EVALUATION:  Progress towards goals will be monitored and evaluated per protocol and Practice Guidelines

## 2022-09-03 NOTE — PROGRESS NOTES
Patient has been on continuous BIPAP on AVAPS mode. Rate 18, tidal volume 500, max pressure 30, min pressure 10, EPAP 8 and FIO2 40%. Sats in the high 90's. Patient has a breakdown on the bridge of her nose and some blanchable redness. BIPAP mask switched to under the nose mask for comfort. Nebulizer treatment given as ordered. Will continue to follow and rotate mask as tolerated.

## 2022-09-04 NOTE — PLAN OF CARE
Speech Language Therapy Discharge Summary    Reason for therapy discharge:    Change in goals of care - comfort cares initiated.    Progress towards therapy goal(s). See goals on Care Plan in Epic electronic health record for goal details.  Goals not met.  Barriers to achieving goals:   limited tolerance for therapy and change in GOC.    Therapy recommendation(s):    No further therapy is recommended. Most recent SLP recommendation -  Rec: advance to an IDDSI Diet level 5 minced and moist and moderately thick liquids by tsp, 1:1 assist/supervision, swallow precautions.  However, recommendation was then changed to NPO status given need for consistent BIPAP support. Diet now liberalized by provider and comfort cares initiated. No further SLP services needed.

## 2022-09-04 NOTE — PROGRESS NOTES
"SPIRITUAL HEALTH SERVICES  SPIRITUAL ASSESSMENT [place] Note  [facility] [place]OURCE: Staff Request    [facility] received consult with staff to visit pt and family and assess for needs in the context of pt moving to comfort measures.     -I visited pt Amy today, she is on bipap and her spouse and 2 other family members are at bedside. Amy had wrote she is at peace with dying and \"is going to Cm.\" Family shares their hari is important to them in their meaning making and their [employer]  was able to visit pt earlier.     -Amy's main request was for her mask to be removed and for water, which I coordinated with RN about and provided a comfort blanket.     PLAN: No spiritual health needs identified at this time, family expresses they feel well cared for and are accepting of patient's wishes and her sense of peace about her dying process.     Virginia Cooley  Associate    Phone: [phone]  [facility] On Call Pager: [phone]    "

## 2022-09-04 NOTE — PROGRESS NOTES
Pt seen and examined earlier with RN.  Held family conference this am per RN request.  Pt and family voices desire to transition to comfort care at this time.  She is awake on BIPAP and she tells me that she is ready to die.  I reviewed the CT of the chest results and reviewed pulmonary consult note with them present.      I have placed comfort care order set.  I have update bedside RN.  Spiritual care and RT coming to remove BIPAP as per her clear written wishes at this time.  Will premedicate with IV morphine; IV morphine and IV ativan ordered prn for comfort and ease of expected dyspnea symptoms.

## 2022-09-04 NOTE — PLAN OF CARE
DATE & TIME: 9/4/2022 0700 am shift     Cognitive Concerns/ Orientation : A&Ox4 at beginning of the shift. Transitioned to comfort care--now lethargic due to dying process and comfort meds.   BEHAVIOR & AGGRESSION TOOL COLOR: green   CIWA SCORE: na    MOBILITY: bedrest, up with lift.   PAIN MANAGMENT: well managed with current regiment. PRN morphine IV given x9 due to air hunger (prior to be off Bipap and afterward). PRN ativan IV 1 mg given. Pt appeared to be comfortable with  and two daughters at bedside.   DIET: Regular diet ordered after transitioned to comfort care: had half cup of ice cream, sips of water, and ice chips--coughing after sips of water.   BOWEL/BLADDER: inc, purewick in place.   OTHER IMPORTANT INFO: CT of chest done. Pulmonology spoke with pt regarding result and POC this am while pt was alert.

## 2022-09-04 NOTE — PLAN OF CARE
A+Ox4 but forgetful. On continuous Bipap on AVAPS setting. Lung sounds diminished. Tele SR w/ BBB. Bowels active, smear BM. Voiding  adequately via purewick. Turned q2h, redness to coccyx. Breakdown on bridge of nose. Up w/ lift. NPO.

## 2022-09-04 NOTE — PROGRESS NOTES
[facility]  Hospitalist Progress Note    Admit Date:  8/24/2022  Date of Service (when I saw the patient): 09/04/2022   Provider:  Nguyen Trivedi, DO    Assessment & Plan   Amy Luna is a 77 year old female [facility] who was admitted on 8/24/2022. She has a  PMH  Significant for bioprosthetic mitral and aortic valve replacements with tricuspid annuloplasty repair and PFO closure in 04/2020.  She did require a permanent pacemaker as well. She was admitted to [facility] on 8/23 for worsening encephalopathy.  She was initially felt to have encephalopathy and community acquired pneumonia.  Her echo showed a possible mobile density with concern for endocarditis and she was transferred to [facility] for further care.  En route she had a decline of BP and had a central line placed with levophed briefly administered.  BP quickly improved and now has been off vasopressors several hours.  Hospitalist service was asked to take over care from ICU service 8/25. On 8/25, she had progressive O2 requirements and was subsequently intubated from 8/25- 8/27. Thoracentesis on 8/27 with 1L out. Remained on IV pressors 8/28. Cardiovascular surgery declined patient for any surgical intervention on 8/30.    Problem List:    1.  Septic Shock due to endocarditis, resolved: Diagnosis based on Temp > 38 C, HR > 90 bpm and WBC > 12 due to sepsis.    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 Left  ventricular systolic function is normal. The visual ejection fraction is 60-65%. There is a bioprosthetic mitral valve. There is severe thickenning of the leaflets of the bioprosthesis with severely restricted opening. There is severe bioprosthetic stenosis with mean gradient of 23 mmHg.There is a freely mobile mass (1.7 cms) attached to the atrial aspect of the bioprosthetic MV leaflet which is consistent with a vegetation. No evidence of valve dehiscence, PVL or regurgitation.    recommend SOCORRO in 6 weeks     2. Acute hypoxic respiratory failure - progressive     She is continuing to require continuous BIPAP ventilatory support      Per RN and RT at bedside today has been unable to be off BIPAP for > a few minutes in last 24 hours without hypoxia and rapid decline in respiratory distress    Family and pt have requested DNR/I status and no further transfer to the ICU  Rounding hospitalist ordered a repeat CT of the chest for this am to review POC with family  Pulmonary consult was also ordered 9/3/22    3. K.Pneumoniae UTI  - abx as above     4. Encephalopathy, acute, metabolic/septic  - Neuro consult consulted during her hospital stay  - repeat CT head 8/30 w/o acute findings    She appears fairly awake, alert and communicating with clear, written word to me while on BIPAP.     5. Moderate malnutrition  Pt started on TPN 9/3/22    6.  DMT2  Recurrent hypoglycemia 8/30  Now with hyperglycemia with TPN  - HDSSI  Will review insulin orders and will increase  lantus dose today if TPN to continue    7. HTN  - IV Metoprolol while NPO with parameters     8. HLD  - hold statin, for now     9. Acute on chronic anemia  Assessment: no active bleeding  hgb dropped a bit today to 7.5     10. Chronic low back pain  - monitor  - continue gabapentin 100TID (decreased from PTA dose)      Goals of Care  Spoke with patient, bedside RN and RT this am  Pt is writing that she wishes to die  Will continue BIPAP, for now, and have family  "conference  Today with family    Diet: NPO for Medical/Clinical Reasons Except for: Ice Chips, Meds  parenteral nutrition - Clinimix E    DVT Prophylaxis: Enoxaparin (Lovenox) SQ  Stewart Catheter: Not present  Code Status: No CPR- Do NOT Intubate      Disposition Plan  - GUARDED  Awaiting arrival of family for conference this am       Entered: Nguyen Trivedi,  09/04/2022, 7:16 AM       The patient's care was discussed with the Bedside Nurse, Patient and RT.    We are operating under sub optimal conditions in the setting of a world wide pandemic, hospitals are running at full capacity with limited bed availability. We are providing the best possible patient care with limited resources.    Interval History     On continuous BIPAP.   Awake, able to shake head yes or no for me appropriately to answer questions.  She is currently using a pen and pad of  Paper for  Clear, written communication.      She is stating that she wants water and is thirsty.  She shakes  Head, \"yes\" to questions of wanting the BIPAP off.   She is writing, \"when will they let me die?\".    -Data reviewed today: I reviewed all new labs and imaging results over the last 24 hours. I personally reviewed no images or EKG's today.    Physical Exam   Temp: 97.7  F (36.5  C) Temp src: Axillary BP: 124/56 Pulse: 80   Resp: 16 SpO2: 100 % O2 Device: BiPAP/CPAP    Vitals:    08/31/22 0046 09/03/22 0614 09/04/22 0500   Weight: 98.4 kg (216 lb 14.4 oz) 94.9 kg (209 lb 3.2 oz) 94.5 kg (208 lb 4.8 oz)     Vital Signs with Ranges  Temp:  [97.3  F (36.3  C)-98.2  F (36.8  C)] 97.7  F (36.5  C)  Pulse:  [] 80  Resp:  [14-22] 16  BP: ()/(45-77) 124/56  FiO2 (%):  [40 %] 40 %  SpO2:  [98 %-100 %] 100 %  I/O last 3 completed shifts:  In: 180 [I.V.:180]  Out: 1900 [Urine:1900]    GEN:  Alert, awake on BIPAP  HEENT:  Normocephalic/atraumatic, no scleral icterus, no nasal discharge, membranes appear fairly moist through bipap  She had protective, " pressure dressings along her chin, under her eyes and on the bridge of her nose where the BIPAP is in contact with her skin  NECK:  No clear thyromegaly or JVD  CV:  Regular rate and rhythm, sys murmur to ausc.  S1 + S2 noted, no S3 or S4.  LUNGS:  Crackles to ausc ant/lat bilaterally.  No clear rhonchi/wheezing auscultated bilaterally. Symmetric chest rise on inhalation noted.  ABD:  Slower but present bowel sounds, soft, no grimacing to palpation of the  abd throughout, mildly distended.  No rebound/guarding/rigidity.  No masses palpated.   EXT:  2+ pitting edema up to the lower  Thighs  Bilaterally, no cyanosis bilaterally. No joint synovitis noted.  No calf-tenderness or asymmetry noted.  SKIN:  Dry to touch, no rashes or jaundice noted.  PSYCH:  Frustrated at times, near tears at times during our questions   NEURO:  No tremors at rest, using both hands/arms without difficulty.  Able to follow commands without difficulty    Data   Labs:  Recent Labs   Lab 09/04/22  0805 09/04/22  0617 09/04/22  0422 09/02/22  0856 09/02/22  0454 09/01/22  0758 09/01/22  0318   NA  --  149*  --   --  144  --  142   POTASSIUM  --  3.1*  --   --  4.3  --  3.9   CHLORIDE  --  113*  --   --  110*  --  109   CO2  --  33*  --   --  28  --  29   ANIONGAP  --  3  --   --  6  --  4   * 305* 315*   < > 295*   < > 248*   BUN  --  33*  --   --  27  --  21   CR  --  0.85  --   --  0.76  --  0.63   GFRESTIMATED  --  71  --   --  81  --  >90   NELLY  --  9.4  --   --  9.3  --  9.2    < > = values in this interval not displayed.     Recent Labs   Lab 09/03/22  1529   NTBNPI 18,883*     Recent Labs   Lab 09/04/22  0617   WBC 8.0   HGB 7.5*   HCT 26.4*   MCV 99        Recent Labs   Lab 09/04/22  0805 09/04/22  0617 09/04/22  0422 09/02/22  0856 09/02/22  0454 09/01/22  0758 09/01/22  0318 08/29/22  0837 08/29/22  0351   NA  --  149*  --   --  144  --  142   < > 142   POTASSIUM  --  3.1*  --   --  4.3  --  3.9   < > 4.5   CHLORIDE  --   113*  --   --  110*  --  109   < > 109   CO2  --  33*  --   --  28  --  29   < > 26   ANIONGAP  --  3  --   --  6  --  4   < > 7   * 305* 315*   < > 295*   < > 248*   < > 178*   BUN  --  33*  --   --  27  --  21   < > 32*   CR  --  0.85  --   --  0.76  --  0.63   < > 0.72   GFRESTIMATED  --  71  --   --  81  --  >90   < > 86   NELLY  --  9.4  --   --  9.3  --  9.2   < > 9.1   MAG  --  2.2  --   --  2.2  --  1.9   < > 2.0   PHOS  --  2.4*  --   --  4.1  --  2.3*   < > 2.9   PROTTOTAL  --  7.7  --   --   --   --   --   --  7.1   ALBUMIN  --  2.1*  --   --   --   --   --   --  2.1*   BILITOTAL  --  0.4  --   --   --   --   --   --  0.5   ALKPHOS  --  64  --   --   --   --   --   --  59   AST  --  20  --   --   --   --   --   --  20   ALT  --  14  --   --   --   --   --   --  15    < > = values in this interval not displayed.     Recent Labs   Lab 09/04/22  0617   INR 1.37*      Recent Imaging:   Recent Results (from the past 24 hour(s))   XR Chest Port 1 View    Narrative    EXAM: XR CHEST PORTABLE 1 VIEW  LOCATION: [facility]  DATE/TIME: 09/03/2022, 1:00 PM    INDICATION: Shortness of breath.  COMPARISON: 08/30/2022.      Impression    IMPRESSION: Progressive groundglass changes in both lungs, now fairly extensive. Similar small effusion on the left.         Medications     dextrose       - MEDICATION INSTRUCTIONS -       parenteral nutrition - Clinimix E 50 mL/hr at 09/03/22 1951     - MEDICATION INSTRUCTIONS -       sodium chloride Stopped (08/27/22 1800)       acetaminophen  650 mg Oral TID     albuterol  2.5 mg Nebulization Q6H     azithromycin  500 mg Intravenous Q24H     cefTRIAXone  2 g Intravenous Q24H     furosemide  40 mg Intravenous BID     gabapentin  100 mg Oral or Feeding Tube TID     heparin ANTICOAGULANT  5,000 Units Subcutaneous Q12H     insulin aspart  1-12 Units Subcutaneous Q4H     insulin glargine  20 Units Subcutaneous At Bedtime     [START ON 9/5/2022] lipids  250  mL Intravenous Once per day on Mon Wed Fri     metoprolol  2.5 mg Intravenous Q8H LESLIE     [Held by provider] metoprolol tartrate  25 mg Oral BID     pantoprazole  40 mg Oral BID AC    Or     pantoprazole  40 mg Intravenous BID AC     [Held by provider] sertraline  100 mg Oral or Feeding Tube Daily     sodium chloride (PF)  10-40 mL Intracatheter Q7 Days     sodium chloride (PF)  3 mL Intracatheter Q8H

## 2022-09-04 NOTE — PROGRESS NOTES
[facility]    Infectious Disease Progress Note    Date of Service (when I saw the patient): 09/04/2022     Assessment & Plan   Amy Luna is a 77 year old female who was admitted on 8/24/2022.     Impression:  1. 77 y.o with significant cardiac history. See #2   2. History of bioprosthetic mitral and aortic valve. S/P : tricuspid annuloplasty repair and PFO closure in 04/2020. Permanent pacemaker in place. Admitted to [facility] for encephalopathy.    3. Found to have CAP    4. Her echo showed a possible mobile density and concern for endocarditis and she was trasnferred to [facility] for further care.   5. On vancomycin + meropenem initially.   6. 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 Urine culture with GNR further identified as Klebsiella.      Recommendations:   1. SOCORRO with a freely mobile mass (1.7 cms) attached to the atrial aspect of  the bioprosthetic MV leaflet which is consistent with a vegetation. No  evidence of valve dehiscence, PVL or regurgitation.   2. Its hard to make a distinction between a vegetation or a thrombus based on the SOCORRO for me but now that we have the identification on the blood isolate which came back positive for cardiobacterium hominis this is endocarditis causing organism and actually is one of the HACEK organism!, So the SOCORRO findings have to be that of endocarditis.   3. GNR in the urine has been further identified as Klebsiella. This is different than the organism in the blood.   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   This is a HACEK organism   This is a different organism than what she has in her urine ( klebsiella)   Ceftriaxone is the antibiotic of choice. Though meropenem was covering     Plan: IV ceftriaxone for 6 weeks   Discussed in detail with patient and  sitting bedside     Noted neurology  note, which is recommending surgery as patient is high risk of embolic phenomena, noted CV surgery note which is saying patient high risk for surgery, I discussed limitation of antibiotics alone with massive vegetation.     Progressive;y poor prognosis resp failure unlikely new or different infection likely cardiac, continue diuresis pulm noted, looks ill on bipap      Lauro Mcdonald MD    Interval History     Awake answering questions   bipap  despendent   bedside   No fevers   Repeat blood cultures here have no growth    A 8 point ROS is negative other than mentioned above in the interval history   No changes to PMH, social history or family h istory.     Physical Exam   Temp: 97.4  F (36.3  C) Temp src: Axillary BP: 115/51 Pulse: 80   Resp: 16 SpO2: 100 % O2 Device: BiPAP/CPAP    Vitals:    08/31/22 0046 09/03/22 0614 09/04/22 0500   Weight: 98.4 kg (216 lb 14.4 oz) 94.9 kg (209 lb 3.2 oz) 94.5 kg (208 lb 4.8 oz)     Vital Signs with Ranges  Temp:  [97.3  F (36.3  C)-98.2  F (36.8  C)] 97.4  F (36.3  C)  Pulse:  [] 80  Resp:  [14-18] 16  BP: (109-124)/(51-69) 115/51  FiO2 (%):  [40 %] 40 %  SpO2:  [98 %-100 %] 100 %    Constitutional: on bipap today k, lying in the bed, appear comfortable   Lungs: diminished breath sounds   Cardiovascular: irregular, murmur   Abdomen: Normal bowel sounds, soft, non-distended, non-tender  Skin: No rashes, no cyanosis, no edema  Other:    Medications     dextrose       - MEDICATION INSTRUCTIONS -       parenteral nutrition - Clinimix E       parenteral nutrition - Clinimix E 50 mL/hr at 09/03/22 1951     - MEDICATION INSTRUCTIONS -       sodium chloride Stopped (08/27/22 1800)       acetaminophen  650 mg Oral TID     albuterol  2.5 mg Nebulization Q6H     azithromycin  500 mg Intravenous Q24H     cefTRIAXone  2 g Intravenous Q24H     furosemide  40 mg Intravenous BID     gabapentin  100 mg Oral or Feeding Tube TID     heparin ANTICOAGULANT  5,000 Units Subcutaneous  Q12H     insulin aspart  1-12 Units Subcutaneous Q4H     insulin glargine  20 Units Subcutaneous At Bedtime     [START ON 9/5/2022] lipids  250 mL Intravenous Once per day on Mon Wed Fri     metoprolol  2.5 mg Intravenous Q8H LESLIE     [Held by provider] metoprolol tartrate  25 mg Oral BID     pantoprazole  40 mg Oral BID AC    Or     pantoprazole  40 mg Intravenous BID AC     potassium chloride  20 mEq Intravenous Q1H     [Held by provider] sertraline  100 mg Oral or Feeding Tube Daily     sodium chloride (PF)  10-40 mL Intracatheter Q7 Days     sodium chloride (PF)  3 mL Intracatheter Q8H     sodium phosphate  15 mmol Intravenous Once       Data   All microbiology laboratory data reviewed.  Recent Labs   Lab Test 09/04/22  0617 09/03/22  0616 09/02/22  0454   WBC 8.0 12.4* 17.7*   HGB 7.5* 7.9* 8.6*   HCT 26.4* 27.4* 30.3*   MCV 99 99 99    275 288     Recent Labs   Lab Test 09/04/22  0617 09/02/22  0454 09/01/22  0318   CR 0.85 0.76 0.63     No lab results found.  Recent Labs   Lab Test 01/16/20  2221 01/16/20 2220   CULT No growth No growth

## 2022-09-04 NOTE — CONSULTS
PULMONOLOGY CONSULTATION  Date of service: 9/4/2022    [facility]  _____________________________________________________    Amy Luna  77 year old female  [id number]  [address] [facility] [address]    Primary Care Provider:  Petey Lira  Admission Date: 8/24/2022  Hospital Attending Physician:  Kenny Herman MD  ________________________________________    CHIEF COMPLAINT : I was asked to see this patient by Dr. Barahona for evaluation of respiratory failure.    Informant: patient    HISTORY OF PRESENT ILLNESS     Amy Luna is a 77 year old female with history of severe mitral stenosis and moderate aortic stenosis s/p bioprosthetic MV replacement, bioprosthetic AV replacement, TV repair, and PFO closure (4/2020), sick sinus syndrome s/p PPM (2015) nonocclusive CAD who presented to OSH 8/23 with AMS. TTE showed a mobile valvular density concerning for endocarditis.  Blood cultures and urine cultures were obtained.  She was transferred to [facility] for escalation of care.      She was admitted to ICU with brief pressor requirements and subsequently transferred to the floor shortly thereafter. TTE 8/24 showed 1.5cm mobile echo density on the mitral valve consistent with endocarditis.  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  At that time her initial cultures were still pending, and therefore it was unclear if the echodensity represented infection vs clot.  Due to the patient's high embolic risk, she was evaluated by neurology and determined not to require anticoagulation.     On 8/25 she developed progressive hypoxic respiratory failure requiring intubation. This was thought to be due to pulmonary edema in the setting of severe mitral stenosis secondary to valvular mass.  CT chest showed bilateral R>L consolidations with associated pleural effusions. She was admitted  to the ICU and underwent bronchoscopy with RML BAL, which is now growing normal ananth. S/p right-sided thoracentesis 8/27 with drainage of 1 L transudate.  She was subsequently extubated 8/27 complicated by upper airway respiratory distress requiring BiPAP, racemic epi, and dexamethasone.    CTA head and neck with no evidence of infective intracranial aneurysm. 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  Urine cultures also returned positive for Kleb pneumo.  ID is following and she remains on antibiotics. She was reevaluated by surgery and deemed not to be a candidate due to prohibitive risk.  Since her extubation, she is required continuous BiPAP and is now receiving TPN.  BNP 9/3 was 18,800. Repeat CT chest ordered 9/4. pulmonary was consulted 9/4 to comment on persistent respiratory failure.          HOME MEDICATIONS     Medications Prior to Admission   Medication Sig Dispense Refill Last Dose     acetaminophen (TYLENOL) 500 MG tablet Take 500 mg by mouth 3 times daily Takes with 650 mg tablet three times daily        acetaminophen (TYLENOL) 650 MG CR tablet Take 650 mg by mouth 3 times daily Takes with 500 mg tablet three times daily        albuterol (PROAIR HFA/PROVENTIL HFA/VENTOLIN HFA) 108 (90 Base) MCG/ACT inhaler Inhale 2 puffs into the lungs every 4 hours as needed for shortness of breath / dyspnea or wheezing        atorvastatin (LIPITOR) 40 MG tablet Take 1 tablet (40 mg) by mouth At Bedtime 90 tablet 1      Biotin 75616 MCG TABS Take 10,000 mcg by mouth every morning         calcium carbonate (OS-NELLY) 1500 (600 Ca) MG tablet Take 2,400 mg by mouth daily        coenzyme Q-10 200 MG CAPS Take 200 mg by mouth every morning         Continuous Blood Gluc  (FREESTYLE ADAM READER) HARJEET 1 each every 14 days Use to read blood sugars per  instructions. 1 each 0      Continuous Blood Gluc Sensor (FREESTYLE  ADAM 14 DAY SENSOR) MISC 1 each every 14 days Change every 14 days. 2 each 6      cyanocobalamin (VITAMIN B-12) 100 MCG tablet Take 100 mcg by mouth daily        diphenhydrAMINE (BENADRYL) 25 MG tablet Take 25 mg by mouth every 6 hours as needed for itching or allergies        ferrous sulfate 140 (45 Fe) MG TBCR CR tablet Take 280 mg by mouth daily        furosemide (LASIX) 40 MG tablet Take 0.5 tablets (20 mg) by mouth daily 180 tablet 1      gabapentin (NEURONTIN) 100 MG capsule Take 3 capsules (300 mg) by mouth 3 times daily 270 capsule 1      Ginkgo Biloba (GINKOBA PO) Take 250 mg by mouth daily        insulin  UNIT/ML vial Inject 30 units every morning and 20 unit(s) every evening (Patient taking differently: Inject 30 units every morning and 30 unit(s) every evening) 1 mL       insulin regular (NOVOLIN R VIAL) 100 UNIT/ML vial 2-4 units before breakfast, 2-4 units before lunch, 2-4 units before dinner (Patient taking differently: 5 units before breakfast, 5 units before lunch, 5 units before dinner plus sliding scale coverage)  0      ipratropium (ATROVENT) 0.06 % nasal spray Spray 2 sprays into both nostrils 4 times daily as needed for rhinitis 3 Box 1      Lutein 40 MG CAPS Take 40 mg by mouth every morning         Magnesium 400 MG TABS Take 400 mg by mouth every evening         metFORMIN (GLUCOPHAGE XR) 500 MG 24 hr tablet Take 2 tablets (1,000 mg) by mouth daily (with breakfast) 180 tablet 1      metoprolol succinate ER (TOPROL XL) 25 MG 24 hr tablet Take 2 tablets (50 mg) by mouth daily 180 tablet 1      naloxone (NARCAN) 4 MG/0.1ML nasal spray Spray 1 spray (4 mg) into one nostril alternating nostrils once as needed for opioid reversal every 2-3 minutes until assistance arrives 0.2 mL 1      oxyCODONE (ROXICODONE) 5 MG tablet Take 1 tablet (5 mg) by mouth every 6 hours as needed for pain or moderate to severe pain 30 tablet 0      Propylene Glycol (SYSTANE BALANCE OP) Apply 1 drop to eye 3 times  daily as needed (dry eyes)         sertraline (ZOLOFT) 100 MG tablet TAKE 1 AND 1/2 TABLETS EVERY MORNING 135 tablet 0      vitamin (B COMPLEX-C) tablet Take 1 tablet by mouth daily        vitamin C (ASCORBIC ACID) 1000 MG TABS Take 1,000 mg by mouth daily as needed (cold symptoms)        Vitamin D3 (CHOLECALCIFEROL) 125 MCG (5000 UT) tablet Take 2 tablets by mouth daily        zinc gluconate 50 MG tablet Take 50 mg by mouth daily          PAST MEDICAL HISTORY      Past Medical History:   Diagnosis Date     (HFpEF) heart failure with preserved ejection fraction (H)      Aortic stenosis      Chest pain      Depressive disorder      Diabetes (H)      Hyperlipidemia      Hypertension      Mitral annular calcification      Mitral stenosis      Obesity      Sleep apnea     CPAP       PAST SURGICAL HISTORY      Past Surgical History:   Procedure Laterality Date     APPENDECTOMY       CV CORONARY ANGIOGRAM N/A 2020    Procedure: Coronary Angiogram;  Surgeon: Inez Peoples MD;  Location:  HEART CARDIAC CATH LAB     CV LEFT HEART CATH N/A 2020    Procedure: Left Heart Cath;  Surgeon: Inez Peoples MD;  Location:  HEART CARDIAC CATH LAB     CV PFO CLOSURE N/A 4/15/2020    Procedure: Patent Foramen Ovale Closure;  Surgeon: Ok Wilson MD;  Location:  OR      RIGHT HEART CATH MEASUREMENTS RECORDED N/A 2020    Procedure: Right Heart Cath;  Surgeon: Inez Peoples MD;  Location:  HEART CARDIAC CATH LAB     EP PACEMAKER N/A 2020    Procedure: EP Pacemaker;  Surgeon: Sohan Francis MD;  Location:  HEART CARDIAC CATH LAB     ESOPHAGOSCOPY, GASTROSCOPY, DUODENOSCOPY (EGD), COMBINED N/A 2022    Procedure: ESOPHAGOGASTRODUODENOSCOPY (EGD);  Surgeon: Andreas Lindsay MD;  Location:  GI     GYN SURGERY       x2 ,      GYN SURGERY      total hysterectomy     IMPLANT PACEMAKER       INJECT EPIDURAL TRANSFORAMINAL LUMBAR / SACRAL  SINGLE Left 7/27/2022    Procedure: left L2-L3 transforaminal epidural steroid injection;  Surgeon: Lito Maldonado MD;  Location: [facility] OR     REPAIR VALVE TRICUSPID N/A 4/15/2020    Procedure: REPAIR TRICUSPID VALVE WITH VILLA MC3 26MM.;  Surgeon: Ok Wilson MD;  Location: [facility] OR     REPLACE VALVE AORTIC N/A 4/15/2020    Procedure: REPLACEMENT, AORTIC VALVE WITH INSPIRIS TISSUE VALVE 25 MM; ON PUMP WITH SOCORRO READ BY CARDIOLOGIST DR BLANTON.;  Surgeon: Ok Wilson MD;  Location: [facility] OR     REPLACE VALVE MITRAL N/A 4/15/2020    Procedure: REPLACEMENT, MITRAL VALVE WITH EPIC TISSUE VALVE 27 MM.;  Surgeon: Ok Wilson MD;  Location: [facility] OR       ALLERGIES     Allergies   Allergen Reactions     Penicillins Hives     3 weeks after taking med got hives.       Pioglitazone Other (See Comments)     Increased urinary frequency, fatigue, dyspnea       SOCIAL / SUBSTANCE HISTORY     Social History     Socioeconomic History     Marital status:      Spouse name: Not on file     Number of children: Not on file     Years of education: Not on file     Highest education level: Not on file   Occupational History     Not on file   Tobacco Use     Smoking status: Never Smoker     Smokeless tobacco: Never Used   Vaping Use     Vaping Use: Never used   Substance and Sexual Activity     Alcohol use: No     Drug use: No     Sexual activity: Yes     Partners: Male   Other Topics Concern     Parent/sibling w/ CABG, MI or angioplasty before 65F 55M? Not Asked   Social History Narrative     Not on file     Social Determinants of Health     Financial Resource Strain: Not on file   Food Insecurity: Not on file   Transportation Needs: Not on file   Physical Activity: Not on file   Stress: Not on file   Social Connections: Not on file   Intimate Partner Violence: Not on file   Housing Stability: Not on file       FAMILY HISTORY     Family History   Problem Relation Age of Onset     Diabetes Mother 56      "Congenital heart disease Father 23     Colon Cancer No family hx of        REVIEW OF SYSTEMS   A comprehensive review of systems was negative except for items noted in HPI/Subjective.    PHYSICAL EXAMINATION   Vital signs  Temp (24hrs), Av.8  F (36.6  C), Min:97.3  F (36.3  C), Max:98.2  F (36.8  C)    Temp: 97.4  F (36.3  C) Temp src: Axillary BP: 115/51 Pulse: 80   Resp: 16 SpO2: 100 % O2 Device: BiPAP/CPAP Oxygen Delivery: 4 LPM Height: 165.1 cm (5' 5\") Weight: 94.5 kg (208 lb 4.8 oz)  Estimated body mass index is 34.66 kg/m  as calculated from the following:    Height as of this encounter: 1.651 m (5' 5\").    Weight as of this encounter: 94.5 kg (208 lb 4.8 oz).  I/O last 3 completed shifts:  In: 180 [I.V.:180]  Out: 1900 [Urine:1900]    CONSTITUTIONAL/GENERAL: Alert female. No apparent distress. Obese.  EARS, NOSE,THROAT,MOUTH: BiPAP mask in place  RESPIRATORY: Diminished breath sounds bilaterally.  CARDIOVASCULAR: RRR, S1, S2. No LE edema.  PSYCHIATRIC: Appropriate mood and affect.     LABORATORY ASSESSMENT    Arterial Blood Gas  Recent Labs   Lab 22  0554 22  0320   PH 7.33* 7.25*   PCO2 58* 69*   PO2 98 99   HCO3 30* 30*   O2PER 50 60     CBC  Recent Labs   Lab 22  0617 22  0616 22  0454 22  0318   WBC 8.0 12.4* 17.7* 12.9*   RBC 2.68* 2.78* 3.07* 2.97*   HGB 7.5* 7.9* 8.6* 8.3*   HCT 26.4* 27.4* 30.3* 28.3*   MCV 99 99 99 95   MCH 28.0 28.4 28.0 27.9   MCHC 28.4* 28.8* 28.4* 29.3*   RDW 18.6* 18.7* 19.1* 19.6*    275 288 219     BMP  Recent Labs   Lab 22  0805 22  0617 22  0422 22  0028 22  0856 22  0454 22  0758 22  0318 22  0922  0527   NA  --  149*  --   --   --  144  --  142  --  144   POTASSIUM  --  3.1*  --   --   --  4.3  --  3.9  --  3.7   CHLORIDE  --  113*  --   --   --  110*  --  109  --  110*   NELLY  --  9.4  --   --   --  9.3  --  9.2  --  9.2   CO2  --  33*  --   --   --  28  --  29  -- "  30   BUN  --  33*  --   --   --  27  --  21  --  25   CR  --  0.85  --   --   --  0.76  --  0.63  --  0.62   * 305* 315* 303*   < > 295*   < > 248*   < > 204*    < > = values in this interval not displayed.     INR  Recent Labs   Lab 09/04/22  0617   INR 1.37*      BNPNo lab results found in last 7 days.  VENOUS BLOOD GASES  No lab results found in last 7 days.    IMAGING, ECHO, PFT, SLEEP STUDY, RHC, OTHER TESTING     CXR 8/27        CXR 9/3      CT chest 9/4          ASSESSMENT / PLAN      Pulmonary diagnoses (alphabetical):  Abnl CT/CXR R91.8  Fluid overload E87.79  Hypoxemia R09.02  Obesity E66.9  Pleural effusion  Pneumonia unspec J18.9  Resp fail acute J96.00  SOB R06.02      ASSESSMENT:  Complex hospital course as described above.  77-year-old female s/p MV replacement, AV replacement, TV repair and PFO closure (4/2020), sick sinus syndrome s/p PPM (2015), presenting with AMS.  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  Hospital course complicated by acute hypoxic respiratory failure requiring intubation, bibasilar lung consolidations with associated pleural effusions s/p right thoracentesis, and persistent hypoxic respiratory failure requiring BiPAP following extubation. She has been deemed not to be a surgical candidate by cardiovascular surgery.  She is now on TPN and underwent repeat CT chest today 9/4.  Pulmonary was consulted to comment on persistent respiratory failure.    Chest imaging has been reviewed.  Most recent CXR 9/3 showing worsening bilateral alveolar opacities, with L>R costophrenic angle blunting.  Repeat CT chest 9/4 shows increased bilateral pleural effusions with associated compressive atelectasis including LLL collapse. The most likely etiology of her persistent respiratory failure remains pulmonary edema in the setting of severe mitral valve stenosis and volume overload, along with  bilateral pleural effusions causing compressive atelectasis, lobar collapse, and resultant shunt physiology.  BNP 9/3 was 18,800.  HAP is also a consideration; she is remained afebrile and has had fluctuating WBC in the setting of known Kleb pneumo UTI and cardiobacterium hominis bacteremia.  She is on antibiotics per ID.  PE is also a consideration, as is pulmonary hypertension in the setting of extensive cardiac disease and volume overload. eRVSP 56 on TTE 8/24.       PLAN:     Recommend continued diuresis.    If oxygenation and work of breathing do not improve in the next 24-48 hours, recommend bilateral thoracentesis (not simultaneously).     Antibiotics per ID.    Will follow intermittently over the weekend.   will be rounding starting Tuesday.  Please call with questions.      Karan Fuentes MD    [facility]  Office: [phone]  Pager: [phone]

## 2022-09-05 NOTE — DEATH PRONOUNCEMENT
DEATH PRONOUNCEMENT    Called to pronounce Amy Luna dead.    Physical Exam: Unresponsive to noxious stimuli, Spontaneous respirations absent, Breath sounds absent, Carotid pulse absent, Heart sounds absent and Pupillary light reflex absent    Patient was pronounced dead on 22 at 0445 AM    No data filed       Active Problems:    Septic shock (H)       Infectious disease present?: YES    Communicable disease present? (examples: HIV, chicken pox, TB, Ebola, CJD) :  NO    Multi-drug resistant organism present? (example: MRSA): NO    Please consider an autopsy if any of the following exist:  NO Unexpected or unexplained death during or following any dental, medical, or surgical diagnostic treatment procedures.   NO Death of mother at or up to seven days after delivery.     NO All  and pediatric deaths.     NO Death where the cause is sufficiently obscure to delay completion of the death certificate.   NO Deaths in which autopsy would confirm a suspected illness/condition that would affect surviving family members or recipients of transplanted organs.     The following deaths must be reported to the 's Office:  NO A death that may be due entirely or in part to any factors other than natural disease (recent surgery, recent trauma, suspected abuse/neglect).   NO A death that may be an accident, suicide, or homicide.     NO Any sudden, unexpected death in which there is no prior history of significant heart disease or any other condition associated with sudden death.   NO A death under suspicious, unusual, or unexpected circumstances.    NO Any death which is apparently due to natural causes but in which the  does not have a personal physician familiar with the patient s medical history, social, or environmental situation or the circumstances of the terminal event.   NO Any death apparently due to Sudden Infant Death Syndrome.     NO Deaths that occur during, in association with, or as  consequences of a diagnostic, therapeutic, or anesthetic procedure.   NO Any death in which a fracture of a major bone has occurred within the past (6) six months.   NO A death of persons note seen by their physician within 120 days of demise.     NO Any death in which the  was an inmate of a public institution or was in the custody of Law Enforcement personnel.   NO  All unexpected deaths of children   NO Solid organ donors   NO Unidentified bodies   NO Deaths of persons whose bodies are to be cremated or otherwise disposed of so that the bodies will later be unavailable for examination;   NO Deaths unattended by a physician outside of a licensed healthcare facility or licensed residential hospice program   NO Deaths occurring within 24 hours of arrival to a health care facility if death is unexpected.    NO Deaths associated with the decedent s employment.   NO Deaths attributed to acts of terrorism.   NO Any death in which there is uncertainty as to whether it is a medical examiner s care should be discussed with the medical investigator.        Body disposition: Autopsy was discussed with family member:  Spouse in person.  Permission for autopsy was declined.

## 2022-09-08 NOTE — DISCHARGE SUMMARY
[facility]  Discharge/Death Summary  Hospitalist    Date of Admission:  8/24/2022  Date of Death:  9/5/2022  0445  Provider:  Eros Bey DO, [facility]    Discharge Diagnoses   1.  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  Sepsis  3.  Acute on chronic HFpEF related to #1  4.  Acute hypoxic respiratory failure  5.  Mixed encephalopathy with metabolic and infectious aspects  6.  Moderate malnutrition  7.  UTI, not felt dominant infection contributing to sepsis  8.  Acute on chronic anemia felt related to infection/sepsis and not GI bleed    Patient/Family decision for compassionate withdrawal of support/comfort care approach    Other medical issues:  Past Medical History:   Diagnosis Date     (HFpEF) heart failure with preserved ejection fraction (H)      Aortic stenosis      Chest pain      Depressive disorder      Diabetes (H)      Hyperlipidemia      Hypertension      Mitral annular calcification      Mitral stenosis      Obesity      Sleep apnea     CPAP       History of Present Illness   Amy Luna is an 77 year old female who transferred from an OSH due to concern of endocarditis.  Please see the admission history and physical for full details.    Hospital Course   Amy Luna was admitted on 8/24/2022.  The following problems were addressed during her hospitalization:    The patient initially was seen at an OSH.  Sje was found on imaging to have mitral valve changes concerning for endocarditis.  She was transferred to [facility].  On presentation she was encephalopathic and dealing with sepsis.  She was hypotensive and required vasopressors.  Multiple consultations were obtained for both the patients heart/infection, confusion, and anemia.  She was treated with broad spectrum antibiotics and underwent a SOCORRO and Upper endoscopy.  No bleeding source was found and ultimately it was felt her anemia was more related  to sepsis/endocarditis and not bleeding.  SOCORRO demonstrated significant mitral valve disease with a probable vegetation.  Neurology also saw the patient and there was concern of stroke/emboli.  Around this time the patients respiratory status declined and she required intubation/mechanical ventilation.  Her respiratory status improved and she was extubated eventually but continued to have a tenuous status.   Cardiothoracic surgery did not feel she was a surgical candidate given her tenuous status.  She was treated with antibiotics and volume/other issues were attempted to be optimized with a consideration to reconsider surgery if she improved.  Unfortunately, she did not substantially improve and gradually had more issues with volume control and respiratory status exacerbated by her significant valvular issue.  Volume management was very challenging with her cardiac valve function related to the infection.  Palliative care saw her and her family during their stay and discussed goals.  They remained restorative for awhile but she gradually worsened and began needing constant BiPAP.  After multiple days of respiratory challenges the patient and family elected to change to a comfort care approach on 22.  BiPAP was stopped at their request and she was kept comfortable.  She  on 22 at 0445.      Significant Results and Procedures   Intubation/Extubation  Central line placement  SOCORRO  Endoscopy  Thoracentesis    Pending Results     Unresulted Labs Ordered in the Past 30 Days of this Admission     No orders found from 2022 to 2022.          Code Status   Comfort care       Primary Care Physician   Petey Escamilla     Consultations This Hospital Stay   CARDIOLOGY IP CONSULT  PHARMACY TO DOSE VANCO  INFECTIOUS DISEASES IP CONSULT  PHARMACY IP CONSULT  GASTROENTEROLOGY IP CONSULT  CARDIOTHORACIC SURGERY IP CONSULT  SPEECH LANGUAGE PATH ADULT IP CONSULT  NEUROLOGY CRITICAL CARE ADULT IP  CONSULT  NUTRITION SERVICES ADULT IP CONSULT  PHARMACY IP CONSULT  SPEECH LANGUAGE PATH ADULT IP CONSULT  PHYSICAL THERAPY ADULT IP CONSULT  OCCUPATIONAL THERAPY ADULT IP CONSULT  PALLIATIVE CARE ADULT IP CONSULT  VASCULAR ACCESS ADULT IP CONSULT  PHARMACY/NUTRITION TO START AND MANAGE TPN  PHARMACY IP CONSULT  PHARMACY IP CONSULT  PHARMACY IP CONSULT  PHARMACY IP CONSULT  WOUND OSTOMY CONTINENCE NURSE  IP CONSULT  PULMONARY IP CONSULT  SPIRITUAL HEALTH SERVICES IP CONSULT    Data   Recent Labs   Lab 09/04/22  0617 09/03/22  0616 09/02/22  0454   WBC 8.0 12.4* 17.7*   HGB 7.5* 7.9* 8.6*   HCT 26.4* 27.4* 30.3*   MCV 99 99 99    275 288     7-Day Micro Results     Collected Updated Procedure Result Status      09/02/2022 2046 09/02/2022 2244 Asymptomatic COVID-19 Virus (Coronavirus) by PCR Nasopharyngeal [[id number]]    Swab from Nasopharyngeal    Final result Component Value   SARS CoV2 PCR Negative   NEGATIVE: SARS-CoV-2 (COVID-19) RNA not detected, presumed negative.                Recent Labs   Lab 09/04/22  0805 09/04/22  0617 09/04/22  0422 09/02/22  0856 09/02/22  0454 09/01/22  0758 09/01/22  0318   NA  --  149*  --   --  144  --  142   POTASSIUM  --  3.1*  --   --  4.3  --  3.9   CHLORIDE  --  113*  --   --  110*  --  109   CO2  --  33*  --   --  28  --  29   ANIONGAP  --  3  --   --  6  --  4   * 305* 315*   < > 295*   < > 248*   BUN  --  33*  --   --  27  --  21   CR  --  0.85  --   --  0.76  --  0.63   GFRESTIMATED  --  71  --   --  81  --  >90   NELLY  --  9.4  --   --  9.3  --  9.2   MAG  --  2.2  --   --  2.2  --  1.9   PHOS  --  2.4*  --   --  4.1  --  2.3*   PROTTOTAL  --  7.7  --   --   --   --   --    ALBUMIN  --  2.1*  --   --   --   --   --    BILITOTAL  --  0.4  --   --   --   --   --    ALKPHOS  --  64  --   --   --   --   --    AST  --  20  --   --   --   --   --    ALT  --  14  --   --   --   --   --     < > = values in this interval not displayed.     Results for orders placed  or performed during the hospital encounter of 08/24/22   XR Chest Port 1 View    Narrative    EXAM: XR CHEST PORT 1 VIEW  LOCATION: [facility]  DATE/TIME: 8/25/2022 1:02 AM    INDICATION: Right IJ placement  COMPARISON: 8/23/2020      Impression    IMPRESSION: Right IJ catheter present with its tip projecting at distal SVC. Patient rotated somewhat into an LPO projection. Prominent skinfold seen along right chest with no pneumothorax evident. Left lung remains grossly clear. Stable postoperative   changes of heart including pacemaker and AVR. Mild cardiomegaly.   XR Chest Port 1 View    Narrative    EXAM: XR CHEST PORT 1 VIEW  LOCATION: [facility]  DATE/TIME: 8/25/2022 6:34 PM    INDICATION: SOB, new mild expiratory wheezes  COMPARISON: 08/25/2022 at 0100 hours      Impression    IMPRESSION: Status post median sternotomy and valve replacement with cardiac pacemaker in place. Heart size is enlarged but likely stable. Increasing posteriorly layering bilateral pleural effusions, now at least moderate in size. Probable mild   interstitial edema. Central confluent parenchymal opacities may represent atelectasis versus infiltrate. No pneumothorax. Right-sided central venous catheter terminates over the cavoatrial junction..   CT Chest w/o Contrast    Narrative    EXAM: CT CHEST W/O CONTRAST  LOCATION: [facility]  DATE/TIME: 8/25/2022 8:54 PM    INDICATION: Hypoxemia.  COMPARISON: CTA chest exam 10/27/2016  TECHNIQUE: CT chest without IV contrast. Multiplanar reformats were obtained. Dose reduction techniques were used.  CONTRAST: None.    FINDINGS:   LUNGS AND PLEURA: Moderate to large right pleural effusion and smaller left pleural effusion and airspace consolidation involving significant portions of both lower lobes, right greater than left. Nodular and groundglass opacities both upper lobes with   diffuse septal  thickening.    MEDIASTINUM/AXILLAE: Numerous enlarged mediastinal lymph nodes have slightly progressed, with the largest node measuring 3.4 x 2.1 cm. Atherosclerotic disease thoracic aorta. Cardiomegaly. Dense mitral valve calcification. Prior median sternotomy.   Pacemaker lead tips right atrium and right ventricle. Endotracheal tube tip 2 cm above the allie.    CORONARY ARTERY CALCIFICATION: Severe.    UPPER ABDOMEN: NG tube tip in the stomach. Atherosclerotic disease abdominal aorta extensive plaque at the origins of both renal arteries.    MUSCULOSKELETAL: Osteopenia. Degenerative changes thoracic spine. Deformity and sclerosis involving the left first rib.       Impression    IMPRESSION:   1.  Moderate to large right pleural effusion with compressive atelectasis and consolidation involving a significant portion of the right lower lobe. Smaller left pleural effusion and subsegmental atelectasis left lower lobe.  2.  Nodular and groundglass opacities both upper lobes and septal thickening may represent a combination of pneumonia and pulmonary edema. Follow-up exam recommended.  3.  Mediastinal adenopathy has progressed and may be reactive. These should also be reviewed on follow-up CT.  4.  Sclerotic changes left first rib may be related to old trauma. No other concerning skeletal lesions.     CT Head w/o Contrast    Narrative    EXAM: CT HEAD W/O CONTRAST  LOCATION: [facility]  DATE/TIME: 8/25/2022 8:53 PM    INDICATION: Confusion  COMPARISON: CT head 8/23/22  TECHNIQUE: Routine CT Head without IV contrast. Multiplanar reformats. Dose reduction techniques were used.    FINDINGS:  INTRACRANIAL CONTENTS: No intracranial hemorrhage, extraaxial collection, or mass effect.  No CT evidence of acute infarct. Normal parenchymal attenuation. Unchanged mild generalized volume loss. No hydrocephalus.     VISUALIZED ORBITS/SINUSES/MASTOIDS: No intraorbital abnormality. No paranasal sinus mucosal  disease. No middle ear or mastoid effusion.    BONES/SOFT TISSUES: No acute abnormality.      Impression    IMPRESSION:  1.  No acute intracranial process.   US Thoracentesis Portable    Narrative    ULTRASOUND-GUIDED THORACENTESIS  8/26/2022 3:25 PM     HISTORY: Large right-sided effusion on CT chest.    FINDINGS: Ultrasound was used to evaluate for the presence and best  approach for drainage of a pleural effusion. Written and oral informed  consent was obtained. A pause for the cause procedure to verify the  correct patient and correct procedure. The skin overlying the right  chest posteriorly was prepped and draped in the usual sterile fashion.  The subcutaneous tissues were anesthetized with 10 mL of 1% lidocaine.  A catheter was advanced into the pleural space and 1050 mL of  timi  colored fluid was drained. Patient was monitored by nurse under my  direct supervision throughout the exam. Ultrasound images were  permanently stored. There were no immediate complications. Patient  left the ultrasound suite in satisfactory condition.      Impression    IMPRESSION: Technically successful thoracentesis without immediate  complications.    ALEXANDRE NELSON MD         SYSTEM ID:  [id number]   CT Head w/o Contrast    Narrative    CT SCAN OF THE HEAD WITHOUT CONTRAST   8/26/2022 2:08 PM     HISTORY: Encephalopathic with likely infective endocarditis, question  embolic stroke.    TECHNIQUE:  Axial images of the head and coronal reformations without  IV contrast material. Radiation dose for this scan was reduced using  automated exposure control, adjustment of the mA and/or kV according  to patient size, or iterative reconstruction technique.    COMPARISON: Head CT 8/25/2022.    FINDINGS: There is no evidence of intracranial hemorrhage, mass, or  anomaly. The ventricles are normal in size, shape and configuration.  Mild patchy periventricular white matter hypodensities which are  nonspecific, but likely related to chronic  microvascular ischemic  disease.     The visualized portions of the sinuses and mastoids appear normal. The  bony calvarium and bones of the skull base appear intact.       Impression    IMPRESSION:     1. No evidence of acute intracranial hemorrhage, mass, or herniation.  2. Mild nonspecific white matter changes likely due to chronic  microvascular ischemic disease. Possibility of acute ischemia would be  better assessed with brain MRI if the patient is able.     SHANAE COMBS MD         SYSTEM ID:  [id number]   XR Chest Port 1 View    Narrative    EXAM: XR CHEST PORT 1 VIEW  LOCATION: [facility]  DATE/TIME: 8/27/2022 11:18 AM    INDICATION: Status post Rt thoracentesis; intubation  COMPARISON: 8/25/2022      Impression    IMPRESSION: ETT is 3 cm from the allie. Enteric tube enters the stomach and out of field of view. Right IJ line terminates over the mid to distal SVC. Small left effusion and associated atelectasis. Nearly resolved right effusion and associated   atelectasis/consolidation. No pneumothorax. Heart size is stable. Left chest cardiac device, sternotomy wires, valve replacement changes, and mitral annular calcification are similar   XR Chest Port 1 View    Narrative    EXAM: XR CHEST PORT 1 VIEW  LOCATION: [facility]  DATE/TIME: 8/27/2022 10:19 PM    INDICATION: Increasing respiratory needs.  COMPARISON: 8/27/2022 at 1113 hours.      Impression    IMPRESSION: Since today's earlier radiograph the ETT has been removed, there is new significant haziness now seen involving the right lung which is worrisome for underlying infiltrates, otherwise the chest is stable with the positioning of the   transvenous lead tips again seen over the RA and RV, there are postoperative changes of a sternotomy, and there is loss of the normally seen left hemidiaphragm consistent with infiltrate and/or atelectasis in the left lower lobe.   CTA Neck with Contrast    Narrative     CT ANGIOGRAM OF THE NECK CONTRAST  8/29/2022 12:49 PM     HISTORY: Neurological abnormality.    TECHNIQUE: CT angiography with an injection of 75mL Isovue-370 IV with  scans through the neck. Images were transferred to a separate 3-D  workstation where multiplanar reformations and 3-D images were  created. Estimates of carotid stenoses are made relative to the distal  internal carotid artery diameters except as noted. Radiation dose for  this scan was reduced using automated exposure control, adjustment of  the mA and/or kV according to patient size, or iterative  reconstruction technique.    COMPARISON: None.     CT ANGIOGRAM FINDINGS:   A three-vessel aortic arch is present. The bilateral common carotid,  internal carotid, external carotid, and vertebral arteries are patent.  Scattered atherosclerotic plaquing. No evidence of large vessel  occlusion or high-grade stenosis. No evidence of dissection.     INCIDENTAL FINDINGS: Bilateral pleural effusions with nonspecific  pulmonary opacities. Subglottic tracheal secretions, probably  aspirated. Bulky mediastinal lymphadenopathy. Cervical spine  degenerative changes. Cardiac surgery change. Cardiac device. Central  venous catheter.      Impression    IMPRESSION:     1. No evidence of large vessel occlusion, high-grade stenosis, or  dissection.  2. Incidental findings as detailed.    MILLIE TOWNSEND MD         SYSTEM ID:  [id number]   XR Video Swallow with SLP or OT    Narrative    VIDEO SWALLOWING EVALUATION   8/30/2022 9:30 AM     HISTORY: dysphagia    COMPARISON: None.    FLUOROSCOPY TIME: 1.8 minutes.  SPOT IMAGES OR CINE RUNS: 12    FINDINGS:    Thin: Penetration to the cords and questionable aspiration    Mildly thick: Aspiration    Moderately thick: No penetration or aspiration.    Pudding: No penetration or aspiration.    Semisolid: No penetration or aspiration.    Solid: No penetration or aspiration.    Refer to speech pathology report for additional  details.    MILLIE TOWNSEND MD         SYSTEM ID:  [id number]   CTA Head with Contrast    Narrative    CT ANGIOGRAM OF THE HEAD WITH CONTRAST August 30, 2022 12:53 PM     HISTORY: Encephalopathy, questionable stroke    TECHNIQUE: CT angiography with an injection of 75mL Isovue-370 IV with  scans through the head and neck. Images were transferred to a separate  3-D workstation where multiplanar reformations and 3-D images were  created. Estimates of carotid stenoses are made relative to the distal  internal carotid artery diameters except as noted. Radiation dose for  this scan was reduced using automated exposure control, adjustment of  the mA and/or kV according to patient size, or iterative  reconstruction technique.    COMPARISON: None.     CT ANGIOGRAM HEAD FINDINGS: No evidence of large vessel occlusion or  high-grade stenosis. No evidence of aneurysm or high flow vascular  malformation. Scattered atherosclerotic plaquing. No intravascular  filling defect is identified. Dural venous sinuses are unremarkable.     Bilateral pleural effusions and presumed atelectasis noted on the   image, incompletely characterized.      Impression    IMPRESSION:   1. No evidence of large vessel occlusion or high-grade stenosis.  2. No intravascular filling defect is identified.    MILLIE TOWNSEND MD         SYSTEM ID:  [id number]   XR Chest 1 View    Narrative    EXAM: CHEST SINGLE VIEW PORTABLE  LOCATION: [facility]  DATE/TIME: 8/30/2022 10:12 PM    INDICATION: Evaluate for recurrent pleural effusion.  COMPARISON: 8/27/2020 at 2209 hours.      Impression    IMPRESSION:   1. Patchy opacities within the right lung, most prominent in the right lung base, overall mildly increased since 8/27/2022. There are unchanged patchy opacities within the retrocardiac region of the left lung base.  2. No other convincing interval change since the recent comparison study.  3. Generalized haziness of both lungs again  noted and likely related to bilateral pleural effusions.   4. A left anterior chest wall cardiac device with lead tips in right atrium and ventricle, right internal jugular central venous catheter and prior median sternotomy and cardiac valve replacement are again noted.   XR Chest Port 1 View    Narrative    EXAM: XR CHEST PORTABLE 1 VIEW  LOCATION: [facility]  DATE/TIME: 09/03/2022, 1:00 PM    INDICATION: Shortness of breath.  COMPARISON: 08/30/2022.      Impression    IMPRESSION: Progressive groundglass changes in both lungs, now fairly extensive. Similar small effusion on the left.     CT Chest w/o Contrast    Narrative    EXAM: CT CHEST W/O CONTRAST  LOCATION: [facility]  DATE/TIME: 9/4/2022 8:56 AM    INDICATION: resp failure  COMPARISON: 08/25/2022  TECHNIQUE: CT chest without IV contrast. Multiplanar reformats were obtained. Dose reduction techniques were used.  CONTRAST: None.    FINDINGS:   LUNGS AND PLEURA: Most of the nodular opacities on the prior CT have resolved. Persistent mild bilateral groundglass opacities with interlobular septal thickening. Moderate bilateral pleural effusions with associated atelectasis, increased in size, now   with complete collapse of the left lower lobe.    MEDIASTINUM/AXILLAE: Enlarged mediastinal nodes have not changed significantly, the largest currently measuring 3.5 x 2.1 cm, previously 3.4 x 2.1 cm. Atherosclerotic calcification of the thoracic aorta. No aneurysm. Right upper extremity central venous   catheter terminates in the lower SVC. Left chest pacemaker, tricuspid, mitral, aortic valve replacement.     CORONARY ARTERY CALCIFICATION: Severe.    UPPER ABDOMEN: Atherosclerotic calcification of the abdominal aorta extensive plaque at the origins of both renal arteries.    MUSCULOSKELETAL: Osteopenia and degenerative changes in the spine. No aggressive or destructive lesion. Deformity and sclerosis involving the  left first rib, possibly posttraumatic.       Impression    IMPRESSION:   1.  Moderate-large bilateral pleural effusions, increased compared to prior, now with complete collapse of the left lower lobe.  2.  Nodular opacities on the prior CT have mostly resolved. Persistent ground glass opacities bilaterally with interlobular septal thickening suggesting mild residual pulmonary edema.  3.  Mediastinal lymph nodes are unchanged and may be reactive. These can be followed for stability on CT.  4.  Sclerotic changes of the left first rib may be related to prior trauma.        Transesophageal Echocardiogram     Value    LVEF  60-65%    [facility]    [id number]  [id number]  [id number]^SUZANNE^JACK^JONH     [facility]  Echocardiography Laboratory  [address] [place] [address]     Name: KASSANDRA RIVERA  MRN: [id number]  : 1945  Study Date: 2022 12:10 PM  Age: 77 yrs  Gender: Female  [facility]  Reason For Study: Endocarditis  Ordering Physician: JACK PALACIOS  Referring Physician: Petey Lira  Performed By: Ladi Sylvester     BSA: 2.0 m2  Height: 65 in  Weight: 217 lb  HR: 93  BP: 101/45 mmHg  ______________________________________________________________________________  Procedure  Complete SOCORRO Adult. SOCORRO Probe serial #[id number] was used during the procedure.  The heart rate, respiratory rate and response to care were monitored  throughout the procedure with the assistance of the nurse.  ______________________________________________________________________________  Interpretation Summary     CV surgery 4/15/2021. AVR: INSPIRIS TISSUE VALVE 25 MM. MVR: EPIC TISSUE VALVE  27 MM. TV repair: VILLA MC3 26MM.  1. The left ventricle is normal in size. Left ventricular systolic function is  normal. The visual ejection fraction is 60-65%.  2. There is a bioprosthetic mitral valve. There is severe thickenning of the  leaflets of the bioprosthesis with severely  restricted opening. There is  severe bioprosthetic stenosis with mean gradient of 23 mmHg.  3. There is a freely mobile mass (1.7 cms) attached to the atrial aspect of  the bioprosthetic MV leaflet which is consistent with a vegetation. No  evidence of valve dehiscence, PVL or regurgitation.  4. There is a well-seated, normally functioning, bioprosthetic valve in aortic  position witho normal leaflet motion. No vegetation noted on AV.  5. The left atrium is severely dilated. The right atrium is moderately  dilated.  ______________________________________________________________________________  SOCORRO  I determined this patient to be an appropriate candidate for the planned  sedation and procedure and have reassessed the patient immediately prior to  sedation and procedure. Total sedation time: 17 min minutes of continuous  bedside 1:1 monitoring. Versed (2mg) was given intravenously. Fentanyl (25mcg)  was given intravenously. Consent to the procedure was obtained prior to  sedation. Prior to the exam, the oral cavity was checked and no overcrowding  was noted. The transesophageal probe was passed without difficulty. There were  no complications associated with this procedure.     Left Ventricle  The left ventricle is normal in size. Left ventricular systolic function is  normal. The visual ejection fraction is 60-65%.     Right Ventricle  There is a catheter/pacemaker lead seen in the right ventricle. Moderately  decreased right ventricular systolic function.     Atria  The left atrium is severely dilated. The right atrium is moderately dilated.  Pacer wire in right atrium. Left to right atrial shunt, moderate. No thrombus  is detected in the left atrial appendage.     Mitral Valve  There is a bioprosthetic mitral valve. There is severe thickenning of the  leaflets of the bioprosthesis with severely restricted opening. There is  severe bioprosthetic stenosis with mean gradient of 23 mmhg. There is a freely  mobile  mass attached to the atrial aspect of the leaflet which is consistent  with a vegetation. No evidence of valve dehiscence, PVL or regurgitation.     Tricuspid Valve  Annuloplasty ring in the tricuspid position. There is mild (1+) tricuspid  regurgitation.     Aortic Valve  There is no vegetation on the aortic valve. There is a bioprosthetic aortic  valve. The prosthetic aortic valve is well-seated. The prosthetic aortic valve  appears to open well.     Pulmonic Valve  The pulmonic valve is normal in structure and function.     ______________________________________________________________________________  Doppler Measurements & Calculations  MV max P.4 mmHg  MV mean P.2 mmHg  MV V2 VTI: 85.9 cm     ______________________________________________________________________________               MV vegetation.                    Left to right atrial shunt       Severe stenosis of mitral prosthesis     Report approved by: Saul Hawthorne 2022 03:58 PM

## 2023-05-26 NOTE — PROGRESS NOTES
12 hour chart check    Rhythm: Afib  Rate:   Ectopy: (R) PVC, (R) Coup, (R) Big, (R) Trig, (R) Trip  Measurements: -/.10/-             ANTICOAGULATION FOLLOW-UP CLINIC VISIT    Patient Name:  Amy Luna  Date:  2021  Contact Type:  Telephone/ Patient returned call, she reports extended warfarin hold after colonoscopy. Orders discussed with the patient, she agrees with the plan. Lab INR appointment scheduled on 21.    SUBJECTIVE:  Patient Findings     Positives:  Missed doses (Patient was on a warfarin hold for colonoscopy 21, was instructed to not restart warfarin until 21.,)    Comments:  The patient was assessed for diet, medication, and activity level changes, extra doses, bruising or bleeding, with no problem findings.          Clinical Outcomes     Comments:  The patient was assessed for diet, medication, and activity level changes, extra doses, bruising or bleeding, with no problem findings.             OBJECTIVE    Recent labs: (last 7 days)     21  1151   INR 1.20*       ASSESSMENT / PLAN  INR assessment SUB    Recheck INR In: 1 WEEK    INR Location Outside lab      Anticoagulation Summary  As of 2021    INR goal:  2.0-3.0   TTR:  38.7 % (7.1 mo)   INR used for dosin.20 (2021)   Warfarin maintenance plan:  10 mg (5 mg x 2) every Wed, Sat; 7.5 mg (5 mg x 1.5) all other days   Full warfarin instructions:  : 10 mg; Otherwise 10 mg every Wed, Sat; 7.5 mg all other days   Weekly warfarin total:  57.5 mg   Plan last modified:  Nadia Cabello RN (2021)   Next INR check:  2021   Priority:  High   Target end date:  Indefinite    Indications    Paroxysmal atrial fibrillation (H) [I48.0]  Long term current use of anticoagulants with INR goal of 2.0-3.0 [Z79.01]             Anticoagulation Episode Summary     INR check location:      Preferred lab:      Send INR reminders to:  [facility]    Comments:        Anticoagulation Care Providers     Provider Role Specialty Phone number    Yamilka Christina DO Referring Cardiovascular Disease [phone]    Kelli Lewis  CNP Referring Internal Medicine [phone]            See the Encounter Report to view Anticoagulation Flowsheet and Dosing Calendar (Go to Encounters tab in chart review, and find the Anticoagulation Therapy Visit)    Dosage adjustment made based on physician directed care plan.    Chiqui Nowak RN

## 2023-07-06 NOTE — LETTER
3/26/2020      RE: Amy Luna  [address] [facility] [place] [address]       Dear Colleague,    Thank you for the opportunity to participate in the care of your patient, Amy Luna, at the [facility] at [facility]. Please see a copy of my visit note below.    CV Surgery    Patient seen, clinic visit note dictated #[id number].    Ok Wilson MD    Please do not hesitate to contact me if you have any questions/concerns.     Sincerely,     Ok Wilson MD     [FreeTextEntry3] : All medical record entries made by the Scribe were at my, Dr. Richie Steinberg, direction and personally dictated by me on 07/05/2023. I have reviewed the chart and agree that the record accurately reflects my personal performance of the history, physical exam, assessment and plan. I have also personally directed, reviewed and agreed with the chart.\par  [Time Spent: ___ minutes] : I have spent [unfilled] minutes of time on the encounter.

## 2023-08-03 NOTE — TELEPHONE ENCOUNTER
Prescription approved per [facility] Refill Protocol.  Ese Campbell RN, BSN     Female

## 2023-09-18 NOTE — TELEPHONE ENCOUNTER
6/27/22 metformin order received via fax. Form in your mailbox to be signed.     Partial Purse String (Simple) Text: Given the location of the defect and the characteristics of the surrounding skin a simple purse string closure was deemed most appropriate.  Undermining was performed circumferentially around the surgical defect.  A purse string suture was then placed and tightened. Wound tension of the circular defect prevented complete closure of the wound.

## (undated) DEVICE — CANNULA PERFUSION ARTERIAL 20FR 12" 77420

## (undated) DEVICE — COMPENSATOR PRESSURE MONITORING MANIFOLDS, ONE-VALVE, HANDLES OFF, RIGHT PORTS, ROTATING ADAPTER, MEDIUM PRESSURE

## (undated) DEVICE — SU SILK 1 TIE 10X30" SA87G

## (undated) DEVICE — SOL WATER IRRIG 1000ML BOTTLE 2F7114

## (undated) DEVICE — PACK MINI VAC CUSTOM CARDOPULMONARY BB5Z97R15

## (undated) DEVICE — SU VICRYL 2-0 CT-1 27" UND J259H

## (undated) DEVICE — PACK TUBING MINI VAC CUSTOM 1/2X3/8T BB9J78R4

## (undated) DEVICE — SU ETHIBOND 2-0 V-5 EXC 30" PXX82

## (undated) DEVICE — TOTE ANGIO CORP PC15AT SAN32CC83O

## (undated) DEVICE — DEFIB PRO-PADZ LVP LQD GEL ADULT 8900-2105-01

## (undated) DEVICE — RAD INTRODUCER KIT MICRO 5FRX10CM .018 NITINOL G/W

## (undated) DEVICE — Device

## (undated) DEVICE — INTRODUCER CATH VASC 5FRX10CM  MPIS-501-NT-U-SST

## (undated) DEVICE — SU VICRYL 0 CTX 36" J370H

## (undated) DEVICE — GLOVE PROTEXIS POWDER FREE SMT 7.0  2D72PT70X

## (undated) DEVICE — KIT HAND CONTROL ANGIOTOUCH ACIST 65CM AT-P65

## (undated) DEVICE — DRAIN CHEST TUBE 32FR STR 8032

## (undated) DEVICE — SU PROLENE 4-0 SHDA 36" 8521H

## (undated) DEVICE — SU ETHIBOND 2-0 SH-2 DA 30" PXX80

## (undated) DEVICE — LINEN TOWEL PACK X5 5464

## (undated) DEVICE — INTRO GLIDESHEATH SLENDER 6FR 10X45CM 60-1060

## (undated) DEVICE — SU PLEDGET SOFT TFE 3/8"X3/26"X1/16" PCP40

## (undated) DEVICE — KIT LEAD END CAP 5867-3M

## (undated) DEVICE — SU PROLENE 4-0 RB-1DA 36" 8557H

## (undated) DEVICE — SU VICRYL 3-0 FS-1 27" J442H

## (undated) DEVICE — BONE WAX OSTENE 3.5GM CT410

## (undated) DEVICE — WIRE GUIDE 0.035"X260CM SAFE-T-J EXCHANGE G00517

## (undated) DEVICE — RESERVOIR CELL SAVING BLOOD COLLECTION EL2120

## (undated) DEVICE — SHEATH PRELUDE SNAP 7FRX13CM PLS-1007

## (undated) DEVICE — ENDO SNARE EXACTO COLD 9MM LOOP 2.4MMX230CM 00711115

## (undated) DEVICE — PREP CHLORAPREP W/ORANGE TINT 10.5ML 260715

## (undated) DEVICE — BONE WAX 2.5GM W31G

## (undated) DEVICE — SU POLYESTER TAPE 30" 8618-00

## (undated) DEVICE — SURGICEL HEMOSTAT 2X14" 1951

## (undated) DEVICE — NDL SPINAL 22GA 5" QUINCKE 405148

## (undated) DEVICE — CANNULA PERFUSION 14FRX16" LEFT 12016

## (undated) DEVICE — CANNULA PERFUSION VENOUS 30FR 12-15" SGL STAGE STR 66130

## (undated) DEVICE — DRSG WOUND VAC SPONGE MED BLACK M8275052/5

## (undated) DEVICE — INTRO SHEATH 7FRX10CM PINNACLE RSS702

## (undated) DEVICE — CABLE PACING ALLIGATOR CLIP 12FT 5833SL

## (undated) DEVICE — BLADE KNIFE SURG 11 371111

## (undated) DEVICE — CABLE MYO/LEAD PACING WHITE DISP 019-530

## (undated) DEVICE — SU PROLENE 3-0 SHDA 36" 8522H

## (undated) DEVICE — TRAY PAIN INJECTION 97A 640

## (undated) DEVICE — CONNECTOR DRAIN CHEST Y EXTENSION SET 19909

## (undated) DEVICE — SU ETHIBOND 2-0 V-5DA 10X30" PXX52

## (undated) DEVICE — KIT WRENCH 5873W

## (undated) DEVICE — LINEN TOWEL PACK X6 WHITE 5487

## (undated) DEVICE — ENDO TRAP POLYP QUICK CATCH 710201

## (undated) DEVICE — SUCTION CANISTER MEDIVAC LINER 3000ML W/LID 65651-530

## (undated) DEVICE — COVER TABLE POLY 65X90" 8186

## (undated) DEVICE — KIT WASH CELL SAVING ATL2001

## (undated) DEVICE — SU STEEL 6 CCS 4X18" M654G

## (undated) DEVICE — SU DEVICE COR KNOT KIT STD SHORT 2/KIT 030884

## (undated) DEVICE — SPONGE RAY-TEC 4X8" 7318

## (undated) DEVICE — SOL NACL 0.9% IRRIG 1000ML BOTTLE 2F7124

## (undated) DEVICE — CATH ANGIO LANGSTRON 6FRX110CM 145DEG 4H 5540

## (undated) DEVICE — SU ETHIBOND 2-0 SHDA 30" X563H

## (undated) DEVICE — SU ETHIBOND 2-0 V-7 DA 10X30" PXX77

## (undated) DEVICE — PROTECTOR ARM ONE-STEP TRENDELENBURG 40418

## (undated) DEVICE — TUBING SUCTION 12"X1/4" N612

## (undated) DEVICE — LEAD PACER MYOCARDIAL BIPOLAR TEMPORARY 53CM 6495F

## (undated) DEVICE — DEVICE ASSEMBLY SUCTION/ANTI COAG BTC93

## (undated) DEVICE — PACK PCMKR PERM SRG PROC LF SAN32PC573

## (undated) DEVICE — SOMASENSOR CEREBRAL OXIMETER ADULT SAFB-SM

## (undated) DEVICE — SU ETHIBOND 0 CT-1 CR 8X18" CX21D

## (undated) DEVICE — CATH ANGIO INFINITI JL4 4FRX100CM 538420

## (undated) DEVICE — SU ETHIBOND 3-0 BBDA 36" X588H

## (undated) DEVICE — CATH ANGIO SUPERTORQUE AL1 6FRX100CM 532-645

## (undated) DEVICE — VALVE VRV 9156R2

## (undated) DEVICE — CANNULA PERFUSION AORTIC ROOT 22FR 20012

## (undated) DEVICE — LINEN TOWEL PACK X30 5481

## (undated) DEVICE — CANNULA PERFUSION 24FR SGL STAGE RT ANGLE 69324

## (undated) DEVICE — MANIFOLD KIT ANGIO AUTOMATED 014613

## (undated) DEVICE — KIT ENDO TURNOVER/PROCEDURE W/CLEAN A SCOPE LINERS 103888

## (undated) DEVICE — TUBING PERFUSION 1/4X1/16X8FT

## (undated) DEVICE — PACK OPEN HEART PV12OH524

## (undated) DEVICE — SLEEVE TR BAND RADIAL COMPRESSION DEVICE 24CM TRB24-REG

## (undated) DEVICE — LINEN LEG ROLL 5489

## (undated) DEVICE — TOURNIQUET VASCULAR

## (undated) DEVICE — GLIDEWIRE STRAIGHT .035CM GR3501

## (undated) DEVICE — SPONGE PEANUT

## (undated) DEVICE — NDL COUNTER 40CT  31142311

## (undated) DEVICE — DRAIN CHEST TUBE RIGHT ANGLED 32FR 8132

## (undated) DEVICE — CONNECTOR PERFUSION Y TYPE 1/2X3/8X3/8" W/O LL 6040

## (undated) DEVICE — CATH ANGIO INFINITI JR4 4FRX100CM 538421

## (undated) DEVICE — GLOVE PROTEXIS W/NEU-THERA 7.5  2D73TE75

## (undated) DEVICE — SUCTION CATH AIRLIFE TRI-FLO W/CONTROL PORT 14FR  T60C

## (undated) RX ORDER — GLYCOPYRROLATE 0.2 MG/ML
INJECTION, SOLUTION INTRAMUSCULAR; INTRAVENOUS
Status: DISPENSED
Start: 2020-04-15

## (undated) RX ORDER — VASOPRESSIN 20 U/ML
INJECTION PARENTERAL
Status: DISPENSED
Start: 2020-04-15

## (undated) RX ORDER — CLINDAMYCIN PHOSPHATE 900 MG/50ML
INJECTION, SOLUTION INTRAVENOUS
Status: DISPENSED
Start: 2020-04-15

## (undated) RX ORDER — HYDROMORPHONE HYDROCHLORIDE 1 MG/ML
INJECTION, SOLUTION INTRAMUSCULAR; INTRAVENOUS; SUBCUTANEOUS
Status: DISPENSED
Start: 2022-01-01

## (undated) RX ORDER — PROTAMINE SULFATE 10 MG/ML
INJECTION, SOLUTION INTRAVENOUS
Status: DISPENSED
Start: 2020-04-15

## (undated) RX ORDER — ALBUMIN, HUMAN INJ 5% 5 %
SOLUTION INTRAVENOUS
Status: DISPENSED
Start: 2020-04-15

## (undated) RX ORDER — FENTANYL CITRATE 50 UG/ML
INJECTION, SOLUTION INTRAMUSCULAR; INTRAVENOUS
Status: DISPENSED
Start: 2020-04-08

## (undated) RX ORDER — FENTANYL CITRATE 50 UG/ML
INJECTION, SOLUTION INTRAMUSCULAR; INTRAVENOUS
Status: DISPENSED
Start: 2021-01-13

## (undated) RX ORDER — FENTANYL CITRATE 50 UG/ML
INJECTION, SOLUTION INTRAMUSCULAR; INTRAVENOUS
Status: DISPENSED
Start: 2020-04-15

## (undated) RX ORDER — ONDANSETRON 2 MG/ML
INJECTION INTRAMUSCULAR; INTRAVENOUS
Status: DISPENSED
Start: 2022-01-01

## (undated) RX ORDER — HEPARIN SODIUM 1000 [USP'U]/ML
INJECTION, SOLUTION INTRAVENOUS; SUBCUTANEOUS
Status: DISPENSED
Start: 2020-04-15

## (undated) RX ORDER — VERAPAMIL HYDROCHLORIDE 2.5 MG/ML
INJECTION, SOLUTION INTRAVENOUS
Status: DISPENSED
Start: 2020-04-08

## (undated) RX ORDER — VANCOMYCIN HYDROCHLORIDE 500 MG/10ML
INJECTION, POWDER, LYOPHILIZED, FOR SOLUTION INTRAVENOUS
Status: DISPENSED
Start: 2020-04-15

## (undated) RX ORDER — LIDOCAINE HYDROCHLORIDE 20 MG/ML
INJECTION, SOLUTION EPIDURAL; INFILTRATION; INTRACAUDAL; PERINEURAL
Status: DISPENSED
Start: 2020-04-15

## (undated) RX ORDER — MUPIROCIN 20 MG/G
OINTMENT TOPICAL
Status: DISPENSED
Start: 2020-04-15

## (undated) RX ORDER — PHENYLEPHRINE HCL IN 0.9% NACL 1 MG/10 ML
SYRINGE (ML) INTRAVENOUS
Status: DISPENSED
Start: 2020-04-08

## (undated) RX ORDER — HEPARIN SODIUM 200 [USP'U]/100ML
INJECTION, SOLUTION INTRAVENOUS
Status: DISPENSED
Start: 2020-04-08

## (undated) RX ORDER — NITROGLYCERIN 5 MG/ML
VIAL (ML) INTRAVENOUS
Status: DISPENSED
Start: 2020-04-08

## (undated) RX ORDER — NEOSTIGMINE METHYLSULFATE 1 MG/ML
VIAL (ML) INJECTION
Status: DISPENSED
Start: 2020-04-15

## (undated) RX ORDER — LIDOCAINE HYDROCHLORIDE 10 MG/ML
INJECTION, SOLUTION EPIDURAL; INFILTRATION; INTRACAUDAL; PERINEURAL
Status: DISPENSED
Start: 2020-04-08

## (undated) RX ORDER — FENTANYL CITRATE 0.05 MG/ML
INJECTION, SOLUTION INTRAMUSCULAR; INTRAVENOUS
Status: DISPENSED
Start: 2020-04-15

## (undated) RX ORDER — FAMOTIDINE 20 MG/1
TABLET, FILM COATED ORAL
Status: DISPENSED
Start: 2020-04-15

## (undated) RX ORDER — PROPOFOL 10 MG/ML
INJECTION, EMULSION INTRAVENOUS
Status: DISPENSED
Start: 2020-04-15

## (undated) RX ORDER — HEPARIN SODIUM 1000 [USP'U]/ML
INJECTION, SOLUTION INTRAVENOUS; SUBCUTANEOUS
Status: DISPENSED
Start: 2020-04-08

## (undated) RX ORDER — BUPIVACAINE HYDROCHLORIDE 2.5 MG/ML
INJECTION, SOLUTION EPIDURAL; INFILTRATION; INTRACAUDAL
Status: DISPENSED
Start: 2020-04-20

## (undated) RX ORDER — VECURONIUM BROMIDE 1 MG/ML
INJECTION, POWDER, LYOPHILIZED, FOR SOLUTION INTRAVENOUS
Status: DISPENSED
Start: 2020-04-15

## (undated) RX ORDER — METOPROLOL TARTRATE 25 MG/1
TABLET, FILM COATED ORAL
Status: DISPENSED
Start: 2020-04-15

## (undated) RX ORDER — SIMETHICONE 40MG/0.6ML
SUSPENSION, DROPS(FINAL DOSAGE FORM)(ML) ORAL
Status: DISPENSED
Start: 2021-01-13

## (undated) RX ORDER — FENTANYL CITRATE 50 UG/ML
INJECTION, SOLUTION INTRAMUSCULAR; INTRAVENOUS
Status: DISPENSED
Start: 2020-04-20

## (undated) RX ORDER — LIDOCAINE HYDROCHLORIDE 10 MG/ML
INJECTION, SOLUTION EPIDURAL; INFILTRATION; INTRACAUDAL; PERINEURAL
Status: DISPENSED
Start: 2022-01-01

## (undated) RX ORDER — LIDOCAINE HYDROCHLORIDE 10 MG/ML
INJECTION, SOLUTION EPIDURAL; INFILTRATION; INTRACAUDAL; PERINEURAL
Status: DISPENSED
Start: 2020-04-20